# Patient Record
Sex: FEMALE | Race: BLACK OR AFRICAN AMERICAN | NOT HISPANIC OR LATINO | Employment: OTHER | URBAN - METROPOLITAN AREA
[De-identification: names, ages, dates, MRNs, and addresses within clinical notes are randomized per-mention and may not be internally consistent; named-entity substitution may affect disease eponyms.]

---

## 2023-05-31 ENCOUNTER — HOSPITAL ENCOUNTER (OUTPATIENT)
Dept: DATA CONVERSION | Facility: HOSPITAL | Age: 43
End: 2023-05-31
Attending: OTOLARYNGOLOGY | Admitting: OTOLARYNGOLOGY
Payer: COMMERCIAL

## 2023-05-31 DIAGNOSIS — I42.6 ALCOHOLIC CARDIOMYOPATHY (MULTI): ICD-10-CM

## 2023-05-31 DIAGNOSIS — Z79.01 LONG TERM (CURRENT) USE OF ANTICOAGULANTS: ICD-10-CM

## 2023-05-31 DIAGNOSIS — Z87.891 PERSONAL HISTORY OF NICOTINE DEPENDENCE: ICD-10-CM

## 2023-05-31 DIAGNOSIS — I13.0 HYPERTENSIVE HEART AND CHRONIC KIDNEY DISEASE WITH HEART FAILURE AND STAGE 1 THROUGH STAGE 4 CHRONIC KIDNEY DISEASE, OR UNSPECIFIED CHRONIC KIDNEY DISEASE (MULTI): ICD-10-CM

## 2023-05-31 DIAGNOSIS — R94.5 ABNORMAL RESULTS OF LIVER FUNCTION STUDIES: ICD-10-CM

## 2023-05-31 DIAGNOSIS — Z43.0 ENCOUNTER FOR ATTENTION TO TRACHEOSTOMY (MULTI): ICD-10-CM

## 2023-05-31 DIAGNOSIS — Z79.82 LONG TERM (CURRENT) USE OF ASPIRIN: ICD-10-CM

## 2023-05-31 DIAGNOSIS — D62 ACUTE POSTHEMORRHAGIC ANEMIA: ICD-10-CM

## 2023-05-31 DIAGNOSIS — E78.5 HYPERLIPIDEMIA, UNSPECIFIED: ICD-10-CM

## 2023-05-31 DIAGNOSIS — I50.23 ACUTE ON CHRONIC SYSTOLIC (CONGESTIVE) HEART FAILURE (MULTI): ICD-10-CM

## 2023-05-31 DIAGNOSIS — Z53.8 PROCEDURE AND TREATMENT NOT CARRIED OUT FOR OTHER REASONS: ICD-10-CM

## 2023-05-31 DIAGNOSIS — E11.22 TYPE 2 DIABETES MELLITUS WITH DIABETIC CHRONIC KIDNEY DISEASE (MULTI): ICD-10-CM

## 2023-05-31 DIAGNOSIS — E66.9 OBESITY, UNSPECIFIED: ICD-10-CM

## 2023-05-31 DIAGNOSIS — I48.91 UNSPECIFIED ATRIAL FIBRILLATION (MULTI): ICD-10-CM

## 2023-05-31 DIAGNOSIS — N18.9 CHRONIC KIDNEY DISEASE, UNSPECIFIED: ICD-10-CM

## 2023-05-31 DIAGNOSIS — J38.3 OTHER DISEASES OF VOCAL CORDS: ICD-10-CM

## 2023-05-31 LAB
ACTIVATED PARTIAL THROMBOPLASTIN TIME IN PPP BY COAGULATION ASSAY: 29 SEC (ref 26–39)
ALANINE AMINOTRANSFERASE (SGPT) (U/L) IN SER/PLAS: 22 U/L (ref 7–45)
ALBUMIN (G/DL) IN SER/PLAS: 3.9 G/DL (ref 3.4–5)
ALKALINE PHOSPHATASE (U/L) IN SER/PLAS: 55 U/L (ref 33–110)
ANION GAP IN SER/PLAS: 13 MMOL/L (ref 10–20)
ASPARTATE AMINOTRANSFERASE (SGOT) (U/L) IN SER/PLAS: 28 U/L (ref 9–39)
BILIRUBIN TOTAL (MG/DL) IN SER/PLAS: 1.7 MG/DL (ref 0–1.2)
CALCIUM (MG/DL) IN SER/PLAS: 8.6 MG/DL (ref 8.6–10.3)
CARBON DIOXIDE, TOTAL (MMOL/L) IN SER/PLAS: 24 MMOL/L (ref 21–32)
CHLORIDE (MMOL/L) IN SER/PLAS: 102 MMOL/L (ref 98–107)
CREATININE (MG/DL) IN SER/PLAS: 0.93 MG/DL (ref 0.5–1.05)
ERYTHROCYTE DISTRIBUTION WIDTH (RATIO) BY AUTOMATED COUNT: 13.1 % (ref 11.5–14.5)
ERYTHROCYTE MEAN CORPUSCULAR HEMOGLOBIN CONCENTRATION (G/DL) BY AUTOMATED: 31.6 G/DL (ref 32–36)
ERYTHROCYTE MEAN CORPUSCULAR VOLUME (FL) BY AUTOMATED COUNT: 93 FL (ref 80–100)
ERYTHROCYTES (10*6/UL) IN BLOOD BY AUTOMATED COUNT: 4.08 X10E12/L (ref 4–5.2)
GFR FEMALE: 78 ML/MIN/1.73M2
GLUCOSE (MG/DL) IN SER/PLAS: 88 MG/DL (ref 74–99)
HEMATOCRIT (%) IN BLOOD BY AUTOMATED COUNT: 38 % (ref 36–46)
HEMOGLOBIN (G/DL) IN BLOOD: 12 G/DL (ref 12–16)
INR IN PPP BY COAGULATION ASSAY: 1.8 (ref 0.9–1.1)
LEUKOCYTES (10*3/UL) IN BLOOD BY AUTOMATED COUNT: 9.5 X10E9/L (ref 4.4–11.3)
PLATELETS (10*3/UL) IN BLOOD AUTOMATED COUNT: 178 X10E9/L (ref 150–450)
POCT GLUCOSE: 100 MG/DL (ref 74–99)
POTASSIUM (MMOL/L) IN SER/PLAS: 3.7 MMOL/L (ref 3.5–5.3)
PROTEIN TOTAL: 6.5 G/DL (ref 6.4–8.2)
PROTHROMBIN TIME (PT) IN PPP BY COAGULATION ASSAY: 21.3 SEC (ref 9.8–13.4)
SODIUM (MMOL/L) IN SER/PLAS: 135 MMOL/L (ref 136–145)
UREA NITROGEN (MG/DL) IN SER/PLAS: 18 MG/DL (ref 6–23)

## 2023-09-07 VITALS — HEIGHT: 67 IN | WEIGHT: 214.51 LBS | BODY MASS INDEX: 33.67 KG/M2

## 2023-09-30 NOTE — H&P
"    History & Physical Reviewed:   Pregnant/Lactating:  ·  Are You Pregnant no (1)   ·  Are You Currently Breastfeeding no (1)     I have reviewed the History and Physical dated:  31-May-2023   History and Physical reviewed and relevant findings noted. Patient examined to review pertinent physical  findings.: No significant changes   Home Medications Reviewed: no changes noted   Allergies Reviewed: no changes noted       ERAS (Enhanced Recovery After Surgery):  ·  ERAS Patient: no     Consent:   COVID-19 Consent:  ·  COVID-19 Risk Consent Surgeon has reviewed key risks related to the risk of eligio COVID-19 and if they contract COVID-19 what the risks are.       Electronic Signatures:  Efrain King)  (Signed 31-May-2023 12:54)   Authored: History & Physical Reviewed, ERAS, Consent,  Note Completion      Last Updated: 31-May-2023 12:54 by Efrain King)    References:  1.  Data Referenced From \"Patient Profile - Preop v3\" 31-May-2023 11:43   "

## 2023-11-30 ENCOUNTER — ANESTHESIA (OUTPATIENT)
Dept: CARDIOLOGY | Facility: HOSPITAL | Age: 43
End: 2023-11-30
Payer: MEDICARE

## 2023-11-30 ENCOUNTER — ANESTHESIA EVENT (OUTPATIENT)
Dept: CARDIOLOGY | Facility: HOSPITAL | Age: 43
End: 2023-11-30

## 2023-12-07 ENCOUNTER — APPOINTMENT (OUTPATIENT)
Dept: RADIOLOGY | Facility: HOSPITAL | Age: 43
DRG: 291 | End: 2023-12-07
Payer: MEDICARE

## 2023-12-07 ENCOUNTER — HOSPITAL ENCOUNTER (INPATIENT)
Facility: HOSPITAL | Age: 43
LOS: 8 days | Discharge: HOME | DRG: 291 | End: 2023-12-15
Attending: EMERGENCY MEDICINE | Admitting: INTERNAL MEDICINE
Payer: MEDICARE

## 2023-12-07 DIAGNOSIS — I50.9 ACUTE DECOMPENSATED HEART FAILURE (MULTI): Primary | ICD-10-CM

## 2023-12-07 DIAGNOSIS — Z00.00 ENCOUNTER FOR MEDICAL EXAMINATION TO ESTABLISH CARE: ICD-10-CM

## 2023-12-07 LAB
ALBUMIN SERPL BCP-MCNC: 3.4 G/DL (ref 3.4–5)
ALP SERPL-CCNC: 65 U/L (ref 33–110)
ALT SERPL W P-5'-P-CCNC: 14 U/L (ref 7–45)
ANION GAP SERPL CALC-SCNC: 17 MMOL/L (ref 10–20)
AST SERPL W P-5'-P-CCNC: 18 U/L (ref 9–39)
BASOPHILS # BLD AUTO: 0.04 X10*3/UL (ref 0–0.1)
BASOPHILS NFR BLD AUTO: 0.5 %
BILIRUB SERPL-MCNC: 2 MG/DL (ref 0–1.2)
BNP SERPL-MCNC: 1082 PG/ML (ref 0–99)
BUN SERPL-MCNC: 17 MG/DL (ref 6–23)
CALCIUM SERPL-MCNC: 9.3 MG/DL (ref 8.6–10.6)
CARDIAC TROPONIN I PNL SERPL HS: 25 NG/L (ref 0–34)
CHLORIDE SERPL-SCNC: 105 MMOL/L (ref 98–107)
CO2 SERPL-SCNC: 23 MMOL/L (ref 21–32)
CREAT SERPL-MCNC: 1.06 MG/DL (ref 0.5–1.05)
EOSINOPHIL # BLD AUTO: 0.12 X10*3/UL (ref 0–0.7)
EOSINOPHIL NFR BLD AUTO: 1.4 %
ERYTHROCYTE [DISTWIDTH] IN BLOOD BY AUTOMATED COUNT: 16.5 % (ref 11.5–14.5)
GFR SERPL CREATININE-BSD FRML MDRD: 67 ML/MIN/1.73M*2
GLUCOSE SERPL-MCNC: 97 MG/DL (ref 74–99)
HCT VFR BLD AUTO: 44.4 % (ref 36–46)
HGB BLD-MCNC: 13.7 G/DL (ref 12–16)
IMM GRANULOCYTES # BLD AUTO: 0.03 X10*3/UL (ref 0–0.7)
IMM GRANULOCYTES NFR BLD AUTO: 0.4 % (ref 0–0.9)
LYMPHOCYTES # BLD AUTO: 4.01 X10*3/UL (ref 1.2–4.8)
LYMPHOCYTES NFR BLD AUTO: 47.9 %
MCH RBC QN AUTO: 26.9 PG (ref 26–34)
MCHC RBC AUTO-ENTMCNC: 30.9 G/DL (ref 32–36)
MCV RBC AUTO: 87 FL (ref 80–100)
MONOCYTES # BLD AUTO: 0.83 X10*3/UL (ref 0.1–1)
MONOCYTES NFR BLD AUTO: 9.9 %
NEUTROPHILS # BLD AUTO: 3.35 X10*3/UL (ref 1.2–7.7)
NEUTROPHILS NFR BLD AUTO: 39.9 %
NRBC BLD-RTO: 0 /100 WBCS (ref 0–0)
PLATELET # BLD AUTO: 221 X10*3/UL (ref 150–450)
POTASSIUM SERPL-SCNC: 4.5 MMOL/L (ref 3.5–5.3)
PROT SERPL-MCNC: 6.8 G/DL (ref 6.4–8.2)
RBC # BLD AUTO: 5.09 X10*6/UL (ref 4–5.2)
SODIUM SERPL-SCNC: 140 MMOL/L (ref 136–145)
WBC # BLD AUTO: 8.4 X10*3/UL (ref 4.4–11.3)

## 2023-12-07 PROCEDURE — 84075 ASSAY ALKALINE PHOSPHATASE: CPT | Performed by: EMERGENCY MEDICINE

## 2023-12-07 PROCEDURE — 85025 COMPLETE CBC W/AUTO DIFF WBC: CPT | Performed by: EMERGENCY MEDICINE

## 2023-12-07 PROCEDURE — 36415 COLL VENOUS BLD VENIPUNCTURE: CPT | Performed by: EMERGENCY MEDICINE

## 2023-12-07 PROCEDURE — 76705 ECHO EXAM OF ABDOMEN: CPT | Performed by: EMERGENCY MEDICINE

## 2023-12-07 PROCEDURE — 80053 COMPREHEN METABOLIC PANEL: CPT | Performed by: EMERGENCY MEDICINE

## 2023-12-07 PROCEDURE — 76604 US EXAM CHEST: CPT | Performed by: EMERGENCY MEDICINE

## 2023-12-07 PROCEDURE — 99285 EMERGENCY DEPT VISIT HI MDM: CPT | Performed by: EMERGENCY MEDICINE

## 2023-12-07 PROCEDURE — 71046 X-RAY EXAM CHEST 2 VIEWS: CPT | Mod: FR

## 2023-12-07 PROCEDURE — 84443 ASSAY THYROID STIM HORMONE: CPT

## 2023-12-07 PROCEDURE — 2500000001 HC RX 250 WO HCPCS SELF ADMINISTERED DRUGS (ALT 637 FOR MEDICARE OP): Mod: SE

## 2023-12-07 PROCEDURE — 93308 TTE F-UP OR LMTD: CPT | Performed by: EMERGENCY MEDICINE

## 2023-12-07 PROCEDURE — 76604 US EXAM CHEST: CPT

## 2023-12-07 PROCEDURE — 96374 THER/PROPH/DIAG INJ IV PUSH: CPT | Mod: 59

## 2023-12-07 PROCEDURE — 2500000004 HC RX 250 GENERAL PHARMACY W/ HCPCS (ALT 636 FOR OP/ED)

## 2023-12-07 PROCEDURE — 2500000002 HC RX 250 W HCPCS SELF ADMINISTERED DRUGS (ALT 637 FOR MEDICARE OP, ALT 636 FOR OP/ED): Mod: SE

## 2023-12-07 PROCEDURE — 84484 ASSAY OF TROPONIN QUANT: CPT | Performed by: EMERGENCY MEDICINE

## 2023-12-07 PROCEDURE — 84295 ASSAY OF SERUM SODIUM: CPT | Performed by: EMERGENCY MEDICINE

## 2023-12-07 PROCEDURE — 83880 ASSAY OF NATRIURETIC PEPTIDE: CPT | Performed by: EMERGENCY MEDICINE

## 2023-12-07 PROCEDURE — 2500000004 HC RX 250 GENERAL PHARMACY W/ HCPCS (ALT 636 FOR OP/ED): Mod: SE

## 2023-12-07 PROCEDURE — 96375 TX/PRO/DX INJ NEW DRUG ADDON: CPT | Mod: 59

## 2023-12-07 PROCEDURE — 1200000002 HC GENERAL ROOM WITH TELEMETRY DAILY

## 2023-12-07 PROCEDURE — 99222 1ST HOSP IP/OBS MODERATE 55: CPT

## 2023-12-07 PROCEDURE — 93010 ELECTROCARDIOGRAM REPORT: CPT | Performed by: EMERGENCY MEDICINE

## 2023-12-07 PROCEDURE — 71046 X-RAY EXAM CHEST 2 VIEWS: CPT | Mod: FOREIGN READ | Performed by: RADIOLOGY

## 2023-12-07 PROCEDURE — 2500000001 HC RX 250 WO HCPCS SELF ADMINISTERED DRUGS (ALT 637 FOR MEDICARE OP)

## 2023-12-07 RX ORDER — GABAPENTIN 100 MG/1
100 CAPSULE ORAL 3 TIMES DAILY
COMMUNITY
End: 2023-12-28

## 2023-12-07 RX ORDER — QUETIAPINE FUMARATE 50 MG/1
150 TABLET, EXTENDED RELEASE ORAL DAILY
Status: ON HOLD | COMMUNITY
End: 2023-12-15 | Stop reason: SDUPTHER

## 2023-12-07 RX ORDER — AMIODARONE HYDROCHLORIDE 200 MG/1
200 TABLET ORAL ONCE
Status: COMPLETED | OUTPATIENT
Start: 2023-12-07 | End: 2023-12-07

## 2023-12-07 RX ORDER — DIGOXIN 250 MCG
250 TABLET ORAL DAILY
Status: DISCONTINUED | OUTPATIENT
Start: 2023-12-08 | End: 2023-12-15 | Stop reason: HOSPADM

## 2023-12-07 RX ORDER — HYDROCHLOROTHIAZIDE 12.5 MG/1
12.5 TABLET ORAL DAILY
COMMUNITY
End: 2023-12-07 | Stop reason: WASHOUT

## 2023-12-07 RX ORDER — PANTOPRAZOLE SODIUM 40 MG/1
40 TABLET, DELAYED RELEASE ORAL
Status: DISCONTINUED | OUTPATIENT
Start: 2023-12-08 | End: 2023-12-15 | Stop reason: HOSPADM

## 2023-12-07 RX ORDER — METOPROLOL SUCCINATE 50 MG/1
150 TABLET, EXTENDED RELEASE ORAL DAILY
COMMUNITY
End: 2023-12-15 | Stop reason: HOSPADM

## 2023-12-07 RX ORDER — METOPROLOL SUCCINATE 50 MG/1
100 TABLET, EXTENDED RELEASE ORAL ONCE
Status: COMPLETED | OUTPATIENT
Start: 2023-12-07 | End: 2023-12-07

## 2023-12-07 RX ORDER — GABAPENTIN 100 MG/1
100 CAPSULE ORAL 3 TIMES DAILY
Status: DISCONTINUED | OUTPATIENT
Start: 2023-12-07 | End: 2023-12-15 | Stop reason: HOSPADM

## 2023-12-07 RX ORDER — POLYETHYLENE GLYCOL 3350 17 G/17G
17 POWDER, FOR SOLUTION ORAL DAILY
Status: DISCONTINUED | OUTPATIENT
Start: 2023-12-07 | End: 2023-12-09

## 2023-12-07 RX ORDER — MULTIVIT-MIN/IRON FUM/FOLIC AC 7.5 MG-4
1 TABLET ORAL DAILY
Status: ON HOLD | COMMUNITY
End: 2023-12-15 | Stop reason: SDUPTHER

## 2023-12-07 RX ORDER — FOLIC ACID 1 MG/1
1 TABLET ORAL DAILY
Status: DISCONTINUED | OUTPATIENT
Start: 2023-12-07 | End: 2023-12-15 | Stop reason: HOSPADM

## 2023-12-07 RX ORDER — MULTIVIT-MIN/IRON FUM/FOLIC AC 7.5 MG-4
1 TABLET ORAL DAILY
Status: DISCONTINUED | OUTPATIENT
Start: 2023-12-07 | End: 2023-12-15 | Stop reason: HOSPADM

## 2023-12-07 RX ORDER — FUROSEMIDE 10 MG/ML
40 INJECTION INTRAMUSCULAR; INTRAVENOUS ONCE
Status: COMPLETED | OUTPATIENT
Start: 2023-12-07 | End: 2023-12-07

## 2023-12-07 RX ORDER — FOLIC ACID 1 MG/1
1 TABLET ORAL DAILY
COMMUNITY

## 2023-12-07 RX ORDER — METOPROLOL TARTRATE 50 MG/1
50 TABLET ORAL 3 TIMES DAILY
Status: DISCONTINUED | OUTPATIENT
Start: 2023-12-08 | End: 2023-12-08

## 2023-12-07 RX ORDER — DIGOXIN 250 MCG
250 TABLET ORAL DAILY
COMMUNITY

## 2023-12-07 RX ORDER — ATORVASTATIN CALCIUM 40 MG/1
40 TABLET, FILM COATED ORAL DAILY
Status: DISCONTINUED | OUTPATIENT
Start: 2023-12-07 | End: 2023-12-08

## 2023-12-07 RX ORDER — METOPROLOL SUCCINATE 50 MG/1
50 TABLET, EXTENDED RELEASE ORAL DAILY
COMMUNITY
End: 2023-12-07 | Stop reason: WASHOUT

## 2023-12-07 RX ORDER — HYDROCHLOROTHIAZIDE 25 MG/1
12.5 TABLET ORAL DAILY
Status: DISCONTINUED | OUTPATIENT
Start: 2023-12-07 | End: 2023-12-08

## 2023-12-07 RX ORDER — QUETIAPINE FUMARATE 25 MG/1
50 TABLET, FILM COATED ORAL 3 TIMES DAILY
Status: DISCONTINUED | OUTPATIENT
Start: 2023-12-07 | End: 2023-12-15 | Stop reason: HOSPADM

## 2023-12-07 RX ORDER — PANTOPRAZOLE SODIUM 40 MG/1
40 TABLET, DELAYED RELEASE ORAL
Status: ON HOLD | COMMUNITY
End: 2023-12-15 | Stop reason: SDUPTHER

## 2023-12-07 RX ORDER — ATORVASTATIN CALCIUM 40 MG/1
40 TABLET, FILM COATED ORAL DAILY
COMMUNITY
End: 2023-12-15 | Stop reason: HOSPADM

## 2023-12-07 RX ORDER — LORAZEPAM 2 MG/ML
0.5 INJECTION INTRAMUSCULAR ONCE
Status: COMPLETED | OUTPATIENT
Start: 2023-12-07 | End: 2023-12-07

## 2023-12-07 RX ORDER — FUROSEMIDE 40 MG/1
40 TABLET ORAL DAILY
COMMUNITY
End: 2023-12-15 | Stop reason: HOSPADM

## 2023-12-07 RX ORDER — SPIRONOLACTONE 25 MG/1
25 TABLET ORAL DAILY
Status: DISCONTINUED | OUTPATIENT
Start: 2023-12-07 | End: 2023-12-15 | Stop reason: HOSPADM

## 2023-12-07 RX ORDER — DULOXETIN HYDROCHLORIDE 60 MG/1
60 CAPSULE, DELAYED RELEASE ORAL DAILY
COMMUNITY
End: 2023-12-15 | Stop reason: HOSPADM

## 2023-12-07 RX ORDER — SPIRONOLACTONE 25 MG/1
25 TABLET ORAL DAILY
Status: ON HOLD | COMMUNITY
End: 2023-12-15 | Stop reason: SDUPTHER

## 2023-12-07 RX ADMIN — SACUBITRIL AND VALSARTAN 1 TABLET: 97; 103 TABLET, FILM COATED ORAL at 21:29

## 2023-12-07 RX ADMIN — METOPROLOL SUCCINATE 100 MG: 50 TABLET, EXTENDED RELEASE ORAL at 16:15

## 2023-12-07 RX ADMIN — LORAZEPAM 0.5 MG: 2 INJECTION INTRAMUSCULAR; INTRAVENOUS at 16:15

## 2023-12-07 RX ADMIN — FUROSEMIDE 40 MG: 10 INJECTION, SOLUTION INTRAVENOUS at 15:40

## 2023-12-07 RX ADMIN — AMIODARONE HYDROCHLORIDE 200 MG: 200 TABLET ORAL at 16:15

## 2023-12-07 RX ADMIN — Medication 1 TABLET: at 19:43

## 2023-12-07 RX ADMIN — SPIRONOLACTONE 25 MG: 25 TABLET ORAL at 19:45

## 2023-12-07 RX ADMIN — FUROSEMIDE 40 MG: 10 INJECTION, SOLUTION INTRAVENOUS at 19:42

## 2023-12-07 RX ADMIN — ATORVASTATIN CALCIUM 40 MG: 20 TABLET, FILM COATED ORAL at 21:29

## 2023-12-07 RX ADMIN — FOLIC ACID 1 MG: 1 TABLET ORAL at 19:43

## 2023-12-07 RX ADMIN — QUETIAPINE 50 MG: 25 TABLET ORAL at 21:29

## 2023-12-07 RX ADMIN — GABAPENTIN 100 MG: 100 CAPSULE ORAL at 21:29

## 2023-12-07 SDOH — SOCIAL STABILITY: SOCIAL INSECURITY: HAVE YOU HAD THOUGHTS OF HARMING ANYONE ELSE?: NO

## 2023-12-07 SDOH — SOCIAL STABILITY: SOCIAL INSECURITY: DOES ANYONE TRY TO KEEP YOU FROM HAVING/CONTACTING OTHER FRIENDS OR DOING THINGS OUTSIDE YOUR HOME?: NO

## 2023-12-07 SDOH — SOCIAL STABILITY: SOCIAL INSECURITY: ARE THERE ANY APPARENT SIGNS OF INJURIES/BEHAVIORS THAT COULD BE RELATED TO ABUSE/NEGLECT?: NO

## 2023-12-07 SDOH — SOCIAL STABILITY: SOCIAL INSECURITY: ARE YOU OR HAVE YOU BEEN THREATENED OR ABUSED PHYSICALLY, EMOTIONALLY, OR SEXUALLY BY ANYONE?: YES

## 2023-12-07 SDOH — SOCIAL STABILITY: SOCIAL INSECURITY: ABUSE: ADULT

## 2023-12-07 SDOH — SOCIAL STABILITY: SOCIAL INSECURITY: DO YOU FEEL ANYONE HAS EXPLOITED OR TAKEN ADVANTAGE OF YOU FINANCIALLY OR OF YOUR PERSONAL PROPERTY?: NO

## 2023-12-07 SDOH — SOCIAL STABILITY: SOCIAL INSECURITY: DO YOU FEEL UNSAFE GOING BACK TO THE PLACE WHERE YOU ARE LIVING?: NO

## 2023-12-07 SDOH — SOCIAL STABILITY: SOCIAL INSECURITY: HAS ANYONE EVER THREATENED TO HURT YOUR FAMILY OR YOUR PETS?: NO

## 2023-12-07 ASSESSMENT — COGNITIVE AND FUNCTIONAL STATUS - GENERAL
HELP NEEDED FOR BATHING: A LOT
TOILETING: A LOT
DAILY ACTIVITIY SCORE: 15
PATIENT BASELINE BEDBOUND: NO
MOBILITY SCORE: 13
WALKING IN HOSPITAL ROOM: TOTAL
MOVING TO AND FROM BED TO CHAIR: A LOT
DRESSING REGULAR LOWER BODY CLOTHING: A LOT
TURNING FROM BACK TO SIDE WHILE IN FLAT BAD: A LITTLE
CLIMB 3 TO 5 STEPS WITH RAILING: A LOT
MOVING FROM LYING ON BACK TO SITTING ON SIDE OF FLAT BED WITH BEDRAILS: A LITTLE
PERSONAL GROOMING: A LITTLE
STANDING UP FROM CHAIR USING ARMS: A LOT
DRESSING REGULAR UPPER BODY CLOTHING: A LOT

## 2023-12-07 ASSESSMENT — ACTIVITIES OF DAILY LIVING (ADL)
HEARING - RIGHT EAR: FUNCTIONAL
DRESSING YOURSELF: INDEPENDENT
GROOMING: INDEPENDENT
JUDGMENT_ADEQUATE_SAFELY_COMPLETE_DAILY_ACTIVITIES: YES
ADEQUATE_TO_COMPLETE_ADL: YES
HEARING - LEFT EAR: FUNCTIONAL
FEEDING YOURSELF: INDEPENDENT
PATIENT'S MEMORY ADEQUATE TO SAFELY COMPLETE DAILY ACTIVITIES?: YES
WALKS IN HOME: NEEDS ASSISTANCE
BATHING: INDEPENDENT
TOILETING: INDEPENDENT

## 2023-12-07 ASSESSMENT — LIFESTYLE VARIABLES
HOW OFTEN DO YOU HAVE A DRINK CONTAINING ALCOHOL: MONTHLY OR LESS
AUDIT-C TOTAL SCORE: 2
AUDIT-C TOTAL SCORE: 2
SKIP TO QUESTIONS 9-10: 0
HOW MANY STANDARD DRINKS CONTAINING ALCOHOL DO YOU HAVE ON A TYPICAL DAY: 1 OR 2
HOW OFTEN DO YOU HAVE 6 OR MORE DRINKS ON ONE OCCASION: LESS THAN MONTHLY

## 2023-12-07 ASSESSMENT — PATIENT HEALTH QUESTIONNAIRE - PHQ9
2. FEELING DOWN, DEPRESSED OR HOPELESS: SEVERAL DAYS
SUM OF ALL RESPONSES TO PHQ9 QUESTIONS 1 & 2: 2
1. LITTLE INTEREST OR PLEASURE IN DOING THINGS: SEVERAL DAYS

## 2023-12-07 ASSESSMENT — COLUMBIA-SUICIDE SEVERITY RATING SCALE - C-SSRS
6. HAVE YOU EVER DONE ANYTHING, STARTED TO DO ANYTHING, OR PREPARED TO DO ANYTHING TO END YOUR LIFE?: NO
1. IN THE PAST MONTH, HAVE YOU WISHED YOU WERE DEAD OR WISHED YOU COULD GO TO SLEEP AND NOT WAKE UP?: NO
2. HAVE YOU ACTUALLY HAD ANY THOUGHTS OF KILLING YOURSELF?: NO

## 2023-12-07 NOTE — ED PROCEDURE NOTE
Procedure    Performed by: Berna Villagran MD  Authorized by: Angelica Edge MD  Cardiac Indications: orthopnea dyspnea                Procedure: Abdominal FAST Ultrasound    Findings:  RUQ: the RUQ was visualized and was POSITIVE for free fluid  LUQ: The LUQ was visualized and was POSITIVE for free fluid.  Pelvic Free Fluid: The Pelvis was visualized and was POSITIVE for free fluid.    Impression:  FAST exam: Ascites, free fluid.  Procedure: Cardiac Ultrasound    Findings:   Views: parasternal long, parasternal short, apical four and subxiphoid  The pericardial space was visualized and was NEGATIVE for a significant pericardial effusion.  Activity: Ventricular contractions were visualized.  LV: LV systolic function was DECREASED.      Impression:  Cardiac: The focused cardiac ultrasound exam had ABNORMAL findings as specified.  Procedure: Thoracic Ultrasound    Findings:  R Lung Sliding: The RIGHT chest was evaluated and LUNG SLIDING was visualized.  L Lung Sliding: The LEFT chest was evaluated and LUNG SLIDING was visualized.  R Effusion: The RIGHT chest was evaluated and there was NO PLEURAL EFFUSION.  L Effusion: The LEFT chest was evaluated and there was NO PLEURAL EFFUSION.    B-lines: The RIGHT chest was evaluated and there were NO B-LINES visualized        Impression:  Thorax: The focused thoracic ultrasound exam was NORMAL.    Comments: Ascites in abdomen  Decreased EF visualized less than 10%  Pericardial effusion visible, LA and LV enlargement  B lines present, no pulmonary edema or pleural effusion visible, no spine sign                   Berna Villagran MD  Resident  12/07/23 7178    I saw and evaluated the patient, participating in the key elements of the service.  I discussed the findings, assessment and plan with the resident and agree with resident’s findings and plan as documented in the resident's note.  I was immediately available for the entirety of the procedure(s) and present for the key and critical  portions.     MD Angelica Duran MD  12/11/23 1640

## 2023-12-07 NOTE — H&P
"History Of Present Illness  Amirah Bennett is a 42 y.o. female with significant PMHx of HFrEF (LVEF 20-25% (08/2022), Afib on Xarelto w/ DCCV 05/2023), ischemic stroke L MCA d/t AFib LLA thrombus seen on echo (lack of OAC adherence) s/p thrombectomy 01/2022 @ [facility] w/ residual expressive aphasia and R sided weakness, s/p tracheostomy, and HTN presenting to the ED on 17/7/23 with dyspnea, BLE edema and 25 lb weight gain. She endorses feeling swollen from feet to abdomen. She said she has not had medication refills or taken medications for ~2 weeks but has difficulty recalling when. She has stated fluid accumulation in legs has gotten progressively worse for past two weeks now resulting in difficulty ambulating. She has also had worsening SOB over same time, and the past few days her abdomen has been painful. \"Heavy\" chest pain started past few days as well. Of note, she has difficulty word finding so part of history garnered from friend who brought her in who denied changes in mental status or worsening communication. SOB improved significantly s/p 40 mg IV lasix in ED.       She had scheduled cardioversion in 09/2023 for which she did not show and has    She HA, Fevers, chills, visual changes, N/V/D.     ED COURSE  Visit Vitals  /85   Pulse 105   Temp 36.4 °C (97.5 °F) (Temporal)   Resp 22     Labs significant for BNP 1082, Creatinine 1.06, troponin 25    EKG revealed Afib w/ RVR HR ~150 bpm  CXR revealed stable cardiomegaly w/o infiltrates  POCUS significant for + B lines, EF < 10%, Left sided dilation      Received 40 mg IV Lasix, UO was ~1 Liter in 4 hours.    Past Medical History  As above      Surgical History  Past Surgical History:   Procedure Laterality Date    OTHER SURGICAL HISTORY  06/09/2022    Trachectomy        Social History  Denies alcohol, tobacco usage or illicit drugs.     Family History  No family history on file.     Allergies  Patient has no known allergies.    Physical Exam  Constitutional: " "      Appearance: Normal appearance. She is obese.   Eyes:      Conjunctiva/sclera: Conjunctivae normal.   Cardiovascular:      Rate and Rhythm: Tachycardia present. Rhythm irregular.      Pulses: Normal pulses.   Pulmonary:      Effort: Pulmonary effort is normal. No respiratory distress.      Breath sounds: Normal breath sounds. No wheezing or rales.   Abdominal:      General: There is distension.      Tenderness: There is abdominal tenderness. There is no guarding.   Musculoskeletal:      Right lower leg: Edema present.      Left lower leg: Edema present.   Skin:     Capillary Refill: Capillary refill takes 2 to 3 seconds.      Comments: Warm wet   Neurological:      Mental Status: She is alert. Mental status is at baseline.          Last Recorded Vitals  Blood pressure 141/85, pulse (!) 135, temperature 36.4 °C (97.5 °F), temperature source Temporal, resp. rate 25, height 1.702 m (5' 7\"), weight 99.8 kg (220 lb), SpO2 99 %.    Relevant Results  Results for orders placed or performed during the hospital encounter of 12/07/23 (from the past 24 hour(s))   CBC with Differential   Result Value Ref Range    WBC 8.4 4.4 - 11.3 x10*3/uL    nRBC 0.0 0.0 - 0.0 /100 WBCs    RBC 5.09 4.00 - 5.20 x10*6/uL    Hemoglobin 13.7 12.0 - 16.0 g/dL    Hematocrit 44.4 36.0 - 46.0 %    MCV 87 80 - 100 fL    MCH 26.9 26.0 - 34.0 pg    MCHC 30.9 (L) 32.0 - 36.0 g/dL    RDW 16.5 (H) 11.5 - 14.5 %    Platelets 221 150 - 450 x10*3/uL    Neutrophils % 39.9 40.0 - 80.0 %    Immature Granulocytes %, Automated 0.4 0.0 - 0.9 %    Lymphocytes % 47.9 13.0 - 44.0 %    Monocytes % 9.9 2.0 - 10.0 %    Eosinophils % 1.4 0.0 - 6.0 %    Basophils % 0.5 0.0 - 2.0 %    Neutrophils Absolute 3.35 1.20 - 7.70 x10*3/uL    Immature Granulocytes Absolute, Automated 0.03 0.00 - 0.70 x10*3/uL    Lymphocytes Absolute 4.01 1.20 - 4.80 x10*3/uL    Monocytes Absolute 0.83 0.10 - 1.00 x10*3/uL    Eosinophils Absolute 0.12 0.00 - 0.70 x10*3/uL    Basophils Absolute " 0.04 0.00 - 0.10 x10*3/uL   Comprehensive Metabolic Panel   Result Value Ref Range    Glucose 97 74 - 99 mg/dL    Sodium 140 136 - 145 mmol/L    Potassium 4.5 3.5 - 5.3 mmol/L    Chloride 105 98 - 107 mmol/L    Bicarbonate 23 21 - 32 mmol/L    Anion Gap 17 10 - 20 mmol/L    Urea Nitrogen 17 6 - 23 mg/dL    Creatinine 1.06 (H) 0.50 - 1.05 mg/dL    eGFR 67 >60 mL/min/1.73m*2    Calcium 9.3 8.6 - 10.6 mg/dL    Albumin 3.4 3.4 - 5.0 g/dL    Alkaline Phosphatase 65 33 - 110 U/L    Total Protein 6.8 6.4 - 8.2 g/dL    AST 18 9 - 39 U/L    Bilirubin, Total 2.0 (H) 0.0 - 1.2 mg/dL    ALT 14 7 - 45 U/L   Brain Natriuretic Peptide   Result Value Ref Range    BNP 1,082 (H) 0 - 99 pg/mL   Troponin I, High Sensitivity   Result Value Ref Range    Troponin I, High Sensitivity 25 0 - 34 ng/L       Assessment/Plan   Principal Problem:    Acute decompensated heart failure (CMS/HCC)    Amirah Bennett is a 42 y.o. female with significant PMHx of HFrEF (LVEF 20-25% (08/2022), Afib on Xarelto w/ DCCV 05/2023), ischemic stroke L MCA d/t AFib LLA thrombus seen on echo (lack of OAC adherence) s/p thrombectomy 01/2022 @ [facility] w/ residual expressive aphasia and R sided weakness, s/p tracheostomy, and HTN presenting to the ED on 17/7/23 with dyspnea, BLE edema and 25 lb weight gain. EKG revealed Afib w/ RVR, POCUS revealed significant reduction in LVEF to < 10%. This is concerning for ADHF in s/o of non-adherence to GDMT. She has significant history of not following up with EP for DCCV and AMA during admission for tracheostomy closure. Considering significant symptomatic improvement s/p 40 mg IV Lasix push, we will continue to manage ADHF with diuresis and managing Afib with rate control and DOAC. Will continue to monitor for ACS but troponin negative and HDS.    #ADHF  #Afib  #HFrEF (LVEF 20-25% (08/2022)  :: Patient has had persistent Afib managed with metoprolol and digoxin as outpatient. Has missed scheduled DCCV w/  EP.  :: Endorses  non-adherence to medications contributing to edema accumulation and shortness of breath.  :: Bedside POCUS w/ new LVEF < 10%  - TTE Pending  - 40 mg Lasix BID, RFP BID  - Digoxin 250 micrograms every day  - GDMT: Jardiance 10 mg, metoprolol tart 50 mg BID, Entresto , spironolactone 25 mg  - Xarelto 20 mg every day  - Telemetry  - EP consult in AM (not ordered)    #ischemic stroke L MCA  :: No new focal deficits  - CTM  - Xarelto 20 mg    F: PRN, EF ~10%  E: PRN  N: Cardiac  A: 2x PIV    DVT Ppx: Xarelto 20 mg  GI PPX: pantoprazole 40 mg, miralax daily    CODE: FULL, confirmed on admission  NOK: Pranay Owen (friend) [phone]    Neal Hsu MD  PGY1 Medicine

## 2023-12-07 NOTE — ED PROVIDER NOTES
CC: Leg Swelling     HPI: Amirah Bennett is an 42 y.o. female with history including HFrEF with an EF of 20%, A-fib on Xarelto, history of a stroke, urinary retention, status post tracheostomy presenting to the emergency department for lower extremity edema.  Patient notes that she is up 25 pounds from her dry weight.  She notes that her legs feel swollen up into her abdomen.  She states that she has been having shortness of breath with it.  She denies chest pain.  She notes she has been taking her medications however she has not taken a dose today.  She does have palpitations and an irregular rhythm.    Limitations to History: Patient did not know what medication that she was taking.  Additional History Obtained from: History obtained from patient and patient's family    PMHx/PSHx:  Per HPI.   - has no past medical history on file.  - has a past surgical history that includes Other surgical history (06/09/2022).    Social History:  - Tobacco:  has no history on file for tobacco use.   - Alcohol:  has no history on file for alcohol use.   - Drugs:  has no history on file for drug use.     Medications: Reviewed in EMR.     Allergies:  Patient has no known allergies.    ???????????????????????????????????????????????????????????????  Triage Vitals:  T 36.4 °C (97.5 °F)  HR (!) 135  BP (!) 143/95  RR (!) 28  O2 100 %      Physical Exam  Constitutional:       General: She is not in acute distress.  HENT:      Head: Normocephalic.   Cardiovascular:      Rate and Rhythm: Normal rate.   Pulmonary:      Effort: Pulmonary effort is normal. No respiratory distress.   Abdominal:      General: Abdomen is flat.   Musculoskeletal:         General: Normal range of motion.      Right lower leg: Edema present.      Left lower leg: Edema present.      Comments: 2+ lower extremity edema   Skin:     General: Skin is dry.   Neurological:      Mental Status: She is alert and oriented to person, place, and time. Mental status is at  baseline.       ???????????????????????????????????????????????????????????????  EKG (per my interpretation): EKG was independently interpreted and shows atrial fibrillation with RVR. No ST segment elevations.    ED Course/Medical Decision Making:  Patient is a 42-year-old female with a past medical history of HFrEF, stroke, trach who is presenting today for lower extremity edema.  Believe this is most likely related to a heart failure exacerbation.  Low concern for DVT.  Bedside POCUS was performed and showed reduced EF.  Patient was given a dose of Lasix.  BNP was elevated to 1000.  But no troponin elevation.  Patient was tachycardic in A-fib with RVR.  Patient was given their home amiodarone, digoxin and metoprolol.  Because they were unable to take it this morning.  Patient was admitted to cardiology for further management of their heart failure exacerbation.    External records reviewed: recent inpatient, clinic, and prior ED notes  Diagnostic imaging independently reviewed/interpreted by me (as reflected in MDM) includes: Lab work and chest x-ray which showed congestion  Social Determinants Affecting Care:   Discussion of management with other providers: Cardiology  Prescription Drug Consideration: Amiodarone, digoxin, metoprolol and Lasix  Escalation of Care: Consider need for ICU or stepdown admission given A-fib with RVR.    Impression:   A-fib with RVR  CHF exacerbation    Disposition: Admission      Procedures ? SmartLinks last updated 12/7/2023 3:35 PM        Kezia Bustillos MD  Resident  12/07/23 9287

## 2023-12-07 NOTE — ED TRIAGE NOTES
Pt presents to the ED c/o LE swelling, SOB, and CP. Pt states the swelling started about 2 weeks ago and she is unsure about when the chest pain or SOB started. Pt has a trach that is open to air. Pt is unable to determine any medical hx.

## 2023-12-08 ENCOUNTER — APPOINTMENT (OUTPATIENT)
Dept: CARDIOLOGY | Facility: HOSPITAL | Age: 43
DRG: 291 | End: 2023-12-08
Payer: MEDICARE

## 2023-12-08 LAB
ALBUMIN SERPL BCP-MCNC: 2.9 G/DL (ref 3.4–5)
ALBUMIN SERPL BCP-MCNC: 2.9 G/DL (ref 3.4–5)
ALBUMIN SERPL BCP-MCNC: 3 G/DL (ref 3.4–5)
ANION GAP SERPL CALC-SCNC: 14 MMOL/L (ref 10–20)
BUN SERPL-MCNC: 17 MG/DL (ref 6–23)
BUN SERPL-MCNC: 18 MG/DL (ref 6–23)
BUN SERPL-MCNC: 18 MG/DL (ref 6–23)
CALCIUM SERPL-MCNC: 7.9 MG/DL (ref 8.6–10.6)
CALCIUM SERPL-MCNC: 8.3 MG/DL (ref 8.6–10.6)
CALCIUM SERPL-MCNC: 8.5 MG/DL (ref 8.6–10.6)
CHLORIDE SERPL-SCNC: 104 MMOL/L (ref 98–107)
CHLORIDE SERPL-SCNC: 106 MMOL/L (ref 98–107)
CHLORIDE SERPL-SCNC: 107 MMOL/L (ref 98–107)
CO2 SERPL-SCNC: 20 MMOL/L (ref 21–32)
CO2 SERPL-SCNC: 20 MMOL/L (ref 21–32)
CO2 SERPL-SCNC: 23 MMOL/L (ref 21–32)
CREAT SERPL-MCNC: 0.87 MG/DL (ref 0.5–1.05)
CREAT SERPL-MCNC: 0.9 MG/DL (ref 0.5–1.05)
CREAT SERPL-MCNC: 1.2 MG/DL (ref 0.5–1.05)
GFR SERPL CREATININE-BSD FRML MDRD: 57 ML/MIN/1.73M*2
GFR SERPL CREATININE-BSD FRML MDRD: 81 ML/MIN/1.73M*2
GFR SERPL CREATININE-BSD FRML MDRD: 84 ML/MIN/1.73M*2
GLUCOSE SERPL-MCNC: 94 MG/DL (ref 74–99)
GLUCOSE SERPL-MCNC: 95 MG/DL (ref 74–99)
GLUCOSE SERPL-MCNC: 96 MG/DL (ref 74–99)
MAGNESIUM SERPL-MCNC: 1.41 MG/DL (ref 1.6–2.4)
MAGNESIUM SERPL-MCNC: 1.44 MG/DL (ref 1.6–2.4)
MAGNESIUM SERPL-MCNC: 1.7 MG/DL (ref 1.6–2.4)
PHOSPHATE SERPL-MCNC: 3.5 MG/DL (ref 2.5–4.9)
PHOSPHATE SERPL-MCNC: 3.9 MG/DL (ref 2.5–4.9)
PHOSPHATE SERPL-MCNC: 4.3 MG/DL (ref 2.5–4.9)
POTASSIUM SERPL-SCNC: 3.6 MMOL/L (ref 3.5–5.3)
POTASSIUM SERPL-SCNC: 3.7 MMOL/L (ref 3.5–5.3)
POTASSIUM SERPL-SCNC: 4 MMOL/L (ref 3.5–5.3)
Q ONSET: 217 MS
QRS COUNT: 23 BEATS
QRS DURATION: 78 MS
QT INTERVAL: 298 MS
QTC CALCULATION(BAZETT): 458 MS
QTC FREDERICIA: 397 MS
R AXIS: -2 DEGREES
SODIUM SERPL-SCNC: 136 MMOL/L (ref 136–145)
SODIUM SERPL-SCNC: 137 MMOL/L (ref 136–145)
SODIUM SERPL-SCNC: 137 MMOL/L (ref 136–145)
T AXIS: 187 DEGREES
T OFFSET: 366 MS
TSH SERPL-ACNC: 3.17 MIU/L (ref 0.44–3.98)
VENTRICULAR RATE: 142 BPM

## 2023-12-08 PROCEDURE — 80069 RENAL FUNCTION PANEL: CPT

## 2023-12-08 PROCEDURE — 93010 ELECTROCARDIOGRAM REPORT: CPT | Performed by: INTERNAL MEDICINE

## 2023-12-08 PROCEDURE — 83735 ASSAY OF MAGNESIUM: CPT

## 2023-12-08 PROCEDURE — 36415 COLL VENOUS BLD VENIPUNCTURE: CPT

## 2023-12-08 PROCEDURE — 99232 SBSQ HOSP IP/OBS MODERATE 35: CPT

## 2023-12-08 PROCEDURE — 2500000001 HC RX 250 WO HCPCS SELF ADMINISTERED DRUGS (ALT 637 FOR MEDICARE OP)

## 2023-12-08 PROCEDURE — 2500000005 HC RX 250 GENERAL PHARMACY W/O HCPCS

## 2023-12-08 PROCEDURE — 93005 ELECTROCARDIOGRAM TRACING: CPT

## 2023-12-08 PROCEDURE — 2500000004 HC RX 250 GENERAL PHARMACY W/ HCPCS (ALT 636 FOR OP/ED)

## 2023-12-08 PROCEDURE — 1200000002 HC GENERAL ROOM WITH TELEMETRY DAILY

## 2023-12-08 RX ORDER — HYDROMORPHONE HYDROCHLORIDE 1 MG/ML
0.2 INJECTION, SOLUTION INTRAMUSCULAR; INTRAVENOUS; SUBCUTANEOUS EVERY 4 HOURS PRN
Status: DISCONTINUED | OUTPATIENT
Start: 2023-12-08 | End: 2023-12-11

## 2023-12-08 RX ORDER — METOPROLOL TARTRATE 1 MG/ML
5 INJECTION, SOLUTION INTRAVENOUS ONCE
Status: COMPLETED | OUTPATIENT
Start: 2023-12-08 | End: 2023-12-08

## 2023-12-08 RX ORDER — POTASSIUM CHLORIDE 20 MEQ/1
20 TABLET, EXTENDED RELEASE ORAL 2 TIMES DAILY
Status: COMPLETED | OUTPATIENT
Start: 2023-12-08 | End: 2023-12-09

## 2023-12-08 RX ORDER — MAGNESIUM SULFATE HEPTAHYDRATE 40 MG/ML
2 INJECTION, SOLUTION INTRAVENOUS ONCE
Status: COMPLETED | OUTPATIENT
Start: 2023-12-08 | End: 2023-12-08

## 2023-12-08 RX ORDER — METOPROLOL SUCCINATE 50 MG/1
50 TABLET, EXTENDED RELEASE ORAL DAILY
Status: DISCONTINUED | OUTPATIENT
Start: 2023-12-08 | End: 2023-12-15 | Stop reason: HOSPADM

## 2023-12-08 RX ORDER — ATORVASTATIN CALCIUM 80 MG/1
80 TABLET, FILM COATED ORAL DAILY
Status: DISCONTINUED | OUTPATIENT
Start: 2023-12-08 | End: 2023-12-15 | Stop reason: HOSPADM

## 2023-12-08 RX ADMIN — Medication 1 TABLET: at 10:01

## 2023-12-08 RX ADMIN — METOPROLOL SUCCINATE 50 MG: 50 TABLET, EXTENDED RELEASE ORAL at 10:01

## 2023-12-08 RX ADMIN — METOPROLOL TARTRATE 5 MG: 1 INJECTION, SOLUTION INTRAVENOUS at 00:25

## 2023-12-08 RX ADMIN — HYDROMORPHONE HYDROCHLORIDE 0.2 MG: 1 INJECTION, SOLUTION INTRAMUSCULAR; INTRAVENOUS; SUBCUTANEOUS at 21:36

## 2023-12-08 RX ADMIN — FOLIC ACID 1 MG: 1 TABLET ORAL at 10:01

## 2023-12-08 RX ADMIN — QUETIAPINE 50 MG: 25 TABLET ORAL at 21:36

## 2023-12-08 RX ADMIN — GABAPENTIN 100 MG: 100 CAPSULE ORAL at 16:48

## 2023-12-08 RX ADMIN — ATORVASTATIN CALCIUM 80 MG: 80 TABLET, FILM COATED ORAL at 21:36

## 2023-12-08 RX ADMIN — QUETIAPINE 50 MG: 25 TABLET ORAL at 16:48

## 2023-12-08 RX ADMIN — QUETIAPINE 50 MG: 25 TABLET ORAL at 10:01

## 2023-12-08 RX ADMIN — GABAPENTIN 100 MG: 100 CAPSULE ORAL at 10:01

## 2023-12-08 RX ADMIN — PANTOPRAZOLE SODIUM 40 MG: 40 TABLET, DELAYED RELEASE ORAL at 10:01

## 2023-12-08 RX ADMIN — POTASSIUM CHLORIDE 20 MEQ: 1500 TABLET, EXTENDED RELEASE ORAL at 21:36

## 2023-12-08 RX ADMIN — SACUBITRIL AND VALSARTAN 1 TABLET: 24; 26 TABLET, FILM COATED ORAL at 21:36

## 2023-12-08 RX ADMIN — HYDROMORPHONE HYDROCHLORIDE 0.2 MG: 1 INJECTION, SOLUTION INTRAMUSCULAR; INTRAVENOUS; SUBCUTANEOUS at 00:42

## 2023-12-08 RX ADMIN — GABAPENTIN 100 MG: 100 CAPSULE ORAL at 21:36

## 2023-12-08 RX ADMIN — FUROSEMIDE 10 MG/HR: 10 INJECTION, SOLUTION INTRAMUSCULAR; INTRAVENOUS at 14:19

## 2023-12-08 RX ADMIN — SACUBITRIL AND VALSARTAN 1 TABLET: 24; 26 TABLET, FILM COATED ORAL at 10:01

## 2023-12-08 RX ADMIN — RIVAROXABAN 20 MG: 20 TABLET, FILM COATED ORAL at 18:00

## 2023-12-08 RX ADMIN — MAGNESIUM SULFATE HEPTAHYDRATE 2 G: 40 INJECTION, SOLUTION INTRAVENOUS at 10:24

## 2023-12-08 RX ADMIN — SPIRONOLACTONE 25 MG: 25 TABLET ORAL at 10:01

## 2023-12-08 RX ADMIN — EMPAGLIFLOZIN 10 MG: 10 TABLET, FILM COATED ORAL at 10:01

## 2023-12-08 RX ADMIN — DIGOXIN 250 MCG: 250 TABLET ORAL at 10:01

## 2023-12-08 ASSESSMENT — PAIN - FUNCTIONAL ASSESSMENT
PAIN_FUNCTIONAL_ASSESSMENT: 0-10

## 2023-12-08 ASSESSMENT — PAIN SCALES - GENERAL
PAINLEVEL_OUTOF10: 8
PAINLEVEL_OUTOF10: 3
PAINLEVEL_OUTOF10: 0 - NO PAIN
PAINLEVEL_OUTOF10: 8

## 2023-12-08 ASSESSMENT — PAIN DESCRIPTION - LOCATION
LOCATION: GENERALIZED
LOCATION: LEG

## 2023-12-08 ASSESSMENT — PAIN DESCRIPTION - ORIENTATION: ORIENTATION: LOWER

## 2023-12-08 ASSESSMENT — PAIN DESCRIPTION - DESCRIPTORS: DESCRIPTORS: ACHING

## 2023-12-08 NOTE — ED NOTES
Pharmacy Medication History Review    Amirah Bennett is a 42 y.o. female admitted for Acute decompensated heart failure (CMS/[facility]). Pharmacy reviewed the patient's [id number] medications and allergies for accuracy.    The list below reflects the updated PTA list. Comments regarding how patient may be taking medications differently can be found in the Admit Orders Activity  Prior to Admission Medications   Prescriptions Last Dose Informant   DULoxetine (Cymbalta) 60 mg DR capsule     Sig: Take 1 capsule (60 mg) by mouth once daily. Do not crush or chew.   QUEtiapine XR (SEROquel XR) 50 mg 24 hr tablet  Self, Other   Sig: Take 3 tablets (150 mg) by mouth once daily. Do not crush, chew, or split.   atorvastatin (Lipitor) 40 mg tablet  Self, Other   Sig: Take 1 tablet (40 mg) by mouth once daily.   digoxin (Lanoxin) 250 MCG tab;et  Self, Other   Sig: Take 1 tablet (250 mcg) by mouth once daily.   empagliflozin (Jardiance) 10 mg  Self, Other   Sig: Take 1 tablet (10 mg) by mouth once daily.   folic acid (Folvite) 1 mg tablet  Self, Other   Sig: Take 1 tablet (1 mg) by mouth once daily.   furosemide (Lasix) 40 mg tablet     Sig: Take 1 tablet (40 mg) by mouth once daily.   gabapentin (Neurontin) 100 mg capsule  Self, Other   Sig: Take 1 capsule (100 mg) by mouth 3 times a day.   metoprolol succinate XL (Toprol-XL) 50 mg 24 hr tablet     Sig: Take 3 tablets (150 mg) by mouth once daily. Do not crush or chew.   multivitamin with minerals tablet  Self, Other   Sig: Take 1 tablet by mouth once daily.   pantoprazole (ProtoNix) 40 mg EC tablet  Self, Other   Sig: Take 1 tablet (40 mg) by mouth once daily in the morning. Take before meals. Do not crush, chew, or split.   rivaroxaban (Xarelto) 20 mg tablet  Self, Other   Sig: Take 1 tablet (20 mg) by mouth once daily. Take with food.   sacubitriL-valsartan (Entresto)  mg tablet  Self, Other   Sig: Take 1 tablet by mouth 2 times a day.   spironolactone (Aldactone) 25  mg tablet  Self, Other   Sig: Take 1 tablet (25 mg) by mouth once daily.      Facility-Administered Medications: None        The list below reflects the updated allergy list. Please review each documented allergy for additional clarification and justification.  Allergies  Reviewed by Kristina Pratt RPh on 12/7/2023   No Known Allergies           The list below reflects the updated allergy list. Please review each documented allergy for additional clarification and justification.  Allergies  Reviewed by Kristina Pratt RPh on 12/7/2023   No Known Allergies         Patient accepts M2B at discharge. Pharmacy has been updated to [facility].    Sources used to complete the med history include   - patient / family interview (poor historians, probably non-compliance- most meds last filled in March-May 2023)  -OARRS  - cardio office visit note Dr. Umaña 9/21   - Called patient's home pharmacy-  [facility]      Below are additional concerns with the patient's PTA list.  - Digoxin's was last dispensed 11/26 per pharmacy (only recent pharmacy fill)  - Other medications were last dispensed in March-May per pharmacy  -med list based on cardio OV Dr. Umaña 9/21 and most recent pharmacy fills  -amiodarone 200mg once daily was filled one time on 9/21/23 x 30 day supply but patient would be out by now/is noncompliant  -xarelto last filled May 2023 x 30 days    Kristina Pratt, PharmD  PGY-1 Pharmacy Resident  [facility]  Please reach out via [employer] Secure Chat for questions, or if no response call [employer] or [employer] “MedRec”

## 2023-12-08 NOTE — SIGNIFICANT EVENT
This is a 42 y.o. female with significant PMHx of HFrEF (LVEF 20-25% (08/2022), Afib on Xarelto w/ DCCV 05/2023), ischemic stroke L MCA d/t AFib LLA thrombus seen on echo (lack of OAC adherence) s/p thrombectomy 01/2022 @ [facility] w/ residual expressive aphasia and R sided weakness, s/p tracheostomy, and HTN presenting to the ED with worsening dyspnea, BLE edema and 25 lb weight gain for two weeks likely acute decompensated heart failure I/s/o medication non-adherence.    V/S stable, satting 98% RA    Alert and oriented x3, not in acute distress  Clear lung sound with rale, no wheezing  Irregular heart beat w/o murmur, normal S1/S2, no JVD  Soft and flat abdomen w/o tenderness, normoactive bowel sound, no CVA tenderness  Pitting edema 3+  Trach decannulation site: clean  No other significant P/Ex findings    CBC/RFP unremarkable  BNP 1082 < 668  Trop 25  CXR mildly increased interstitial marking  Echo (8/2022) EF 20-25%, reduced RV systolic function, mild to moderate TR, mild to moderately elevated pulmonary artery pressure    #ADHF  #HFrEF (EF 20-25% 08/2022)  #A-fib RVR  :: She hasn't taken her med for about 2 weeks  :: s/p lasix 40mg with 1L urine output, amio 200mg, metop succinate 100cc  :: Her SOB has much improved after lasix 40mg  :: exam still shows warm and wet, hemodynamically stable except for A-fib RVR (100-110 from 130-140)  -resume her GDMT including entresto  BID/genia 25/empa 10/metop tart 50 TID (from succinate 150)  -c/w home digoxin 250  -c/w xarelto 20/atorvastatin 40  -will give additional lasix 40   -hold home hydrochlorothiazide 12.5 I/s/o diuresis  -TTE AM  -daily weight, strict I&O  -on telemetry

## 2023-12-08 NOTE — PROGRESS NOTES
"Amirah Bennett is a 42 y.o. female on day 1 of admission presenting with Acute decompensated heart failure (CMS/HCC).    Subjective   NAEO. Doing well this morning. Legs are very swollen but she is breathing better.     Denies CP, SOB, HA, Fever, Chills, N/V/D    Objective     Physical Exam  Constitutional:       Appearance: Normal appearance. She is obese.   Eyes:      Conjunctiva/sclera: Conjunctivae normal.   Cardiovascular:      Rate and Rhythm: Tachycardia present. Rhythm irregular.   Pulmonary:      Effort: Pulmonary effort is normal. No respiratory distress.      Breath sounds: Normal breath sounds.   Abdominal:      Palpations: Abdomen is soft.   Musculoskeletal:      Right lower leg: Edema present.      Left lower leg: Edema present.   Skin:     General: Skin is warm and dry.      Capillary Refill: Capillary refill takes 2 to 3 seconds.   Neurological:      Mental Status: She is alert.         Last Recorded Vitals  Blood pressure 108/79, pulse 103, temperature 36.5 °C (97.7 °F), temperature source Temporal, resp. rate 18, height 1.702 m (5' 7\"), weight 99.8 kg (220 lb), SpO2 93 %.  Intake/Output last 3 Shifts:  I/O last 3 completed shifts:  In: - (0 mL/kg)   Out: 2650 (26.6 mL/kg) [Urine:2650 (0.7 mL/kg/hr)]  Weight: 99.8 kg     Relevant Results  Results for orders placed or performed during the hospital encounter of 12/07/23 (from the past 24 hour(s))   CBC with Differential   Result Value Ref Range    WBC 8.4 4.4 - 11.3 x10*3/uL    nRBC 0.0 0.0 - 0.0 /100 WBCs    RBC 5.09 4.00 - 5.20 x10*6/uL    Hemoglobin 13.7 12.0 - 16.0 g/dL    Hematocrit 44.4 36.0 - 46.0 %    MCV 87 80 - 100 fL    MCH 26.9 26.0 - 34.0 pg    MCHC 30.9 (L) 32.0 - 36.0 g/dL    RDW 16.5 (H) 11.5 - 14.5 %    Platelets 221 150 - 450 x10*3/uL    Neutrophils % 39.9 40.0 - 80.0 %    Immature Granulocytes %, Automated 0.4 0.0 - 0.9 %    Lymphocytes % 47.9 13.0 - 44.0 %    Monocytes % 9.9 2.0 - 10.0 %    Eosinophils % 1.4 0.0 - 6.0 %    Basophils " % 0.5 0.0 - 2.0 %    Neutrophils Absolute 3.35 1.20 - 7.70 x10*3/uL    Immature Granulocytes Absolute, Automated 0.03 0.00 - 0.70 x10*3/uL    Lymphocytes Absolute 4.01 1.20 - 4.80 x10*3/uL    Monocytes Absolute 0.83 0.10 - 1.00 x10*3/uL    Eosinophils Absolute 0.12 0.00 - 0.70 x10*3/uL    Basophils Absolute 0.04 0.00 - 0.10 x10*3/uL   Comprehensive Metabolic Panel   Result Value Ref Range    Glucose 97 74 - 99 mg/dL    Sodium 140 136 - 145 mmol/L    Potassium 4.5 3.5 - 5.3 mmol/L    Chloride 105 98 - 107 mmol/L    Bicarbonate 23 21 - 32 mmol/L    Anion Gap 17 10 - 20 mmol/L    Urea Nitrogen 17 6 - 23 mg/dL    Creatinine 1.06 (H) 0.50 - 1.05 mg/dL    eGFR 67 >60 mL/min/1.73m*2    Calcium 9.3 8.6 - 10.6 mg/dL    Albumin 3.4 3.4 - 5.0 g/dL    Alkaline Phosphatase 65 33 - 110 U/L    Total Protein 6.8 6.4 - 8.2 g/dL    AST 18 9 - 39 U/L    Bilirubin, Total 2.0 (H) 0.0 - 1.2 mg/dL    ALT 14 7 - 45 U/L   Brain Natriuretic Peptide   Result Value Ref Range    BNP 1,082 (H) 0 - 99 pg/mL   Troponin I, High Sensitivity   Result Value Ref Range    Troponin I, High Sensitivity 25 0 - 34 ng/L   TSH   Result Value Ref Range    Thyroid Stimulating Hormone 3.17 0.44 - 3.98 mIU/L   Renal function panel   Result Value Ref Range    Glucose 94 74 - 99 mg/dL    Sodium 137 136 - 145 mmol/L    Potassium 4.0 3.5 - 5.3 mmol/L    Chloride 107 98 - 107 mmol/L    Bicarbonate 20 (L) 21 - 32 mmol/L    Anion Gap 14 10 - 20 mmol/L    Urea Nitrogen 17 6 - 23 mg/dL    Creatinine 0.87 0.50 - 1.05 mg/dL    eGFR 84 >60 mL/min/1.73m*2    Calcium 7.9 (L) 8.6 - 10.6 mg/dL    Phosphorus 3.5 2.5 - 4.9 mg/dL    Albumin 2.9 (L) 3.4 - 5.0 g/dL   Magnesium   Result Value Ref Range    Magnesium 1.41 (L) 1.60 - 2.40 mg/dL   Renal Function Panel   Result Value Ref Range    Glucose 96 74 - 99 mg/dL    Sodium 136 136 - 145 mmol/L    Potassium 3.6 3.5 - 5.3 mmol/L    Chloride 106 98 - 107 mmol/L    Bicarbonate 20 (L) 21 - 32 mmol/L    Anion Gap 14 10 - 20 mmol/L     Urea Nitrogen 18 6 - 23 mg/dL    Creatinine 0.90 0.50 - 1.05 mg/dL    eGFR 81 >60 mL/min/1.73m*2    Calcium 8.3 (L) 8.6 - 10.6 mg/dL    Phosphorus 3.9 2.5 - 4.9 mg/dL    Albumin 2.9 (L) 3.4 - 5.0 g/dL   Magnesium   Result Value Ref Range    Magnesium 1.44 (L) 1.60 - 2.40 mg/dL   ECG 12 lead   Result Value Ref Range    Ventricular Rate 142 BPM    QRS Duration 78 ms    QT Interval 298 ms    QTC Calculation(Bazett) 458 ms    R Axis -2 degrees    T Axis 187 degrees    QRS Count 23 beats    Q Onset 217 ms    T Offset 366 ms    QTC Fredericia 397 ms   Electrocardiogram, 12-lead PRN ACS symptoms   Result Value Ref Range    Ventricular Rate 128 BPM    Atrial Rate 156 BPM    QRS Duration 82 ms    QT Interval 308 ms    QTC Calculation(Bazett) 449 ms    R Axis 3 degrees    T Axis 195 degrees    QRS Count 21 beats    Q Onset 217 ms    T Offset 371 ms    QTC Fredericia 396 ms         Assessment/Plan   Principal Problem:    Acute decompensated heart failure (CMS/HCC)    Amirah Bennett is a 42 y.o. female with significant PMHx of HFrEF (LVEF 20-25% (08/2022), Afib on Xarelto w/ DCCV 05/2023), ischemic stroke L MCA d/t AFib LLA thrombus seen on echo (lack of OAC adherence) s/p thrombectomy 01/2022 @ [facility] w/ residual expressive aphasia and R sided weakness, s/p tracheostomy, and HTN presenting to the ED on 17/7/23 with dyspnea, BLE edema and 25 lb weight gain. EKG revealed Afib w/ RVR, POCUS revealed significant reduction in LVEF to < 10%. This is concerning for ADHF in s/o of non-adherence to GDMT. She has significant history of not following up with EP for DCCV and AMA during admission for tracheostomy closure. Considering significant symptomatic improvement s/p 40 mg IV Lasix push, we will continue to manage ADHF with diuresis and managing Afib with rate control and DOAC. Will continue to monitor for ACS but troponin negative and HDS.     UPDATES 12/8/23  - Started Lasix gtt at 10 mg/mL w/ goal net output > -1L / day  - RFP  BID    #ADHF  #Afib  #HFrEF (LVEF 20-25% (08/2022)  :: Patient has had persistent Afib managed with metoprolol and digoxin as outpatient. Has missed scheduled DCCV w/  EP.  :: Endorses non-adherence to medications contributing to edema accumulation and shortness of breath.  :: Bedside POCUS w/ new LVEF < 10%  - TTE Pending  - 40 mg Lasix BID, RFP BID  - Digoxin 250 micrograms every day  - GDMT: Jardiance 10 mg, metoprolol tart 50 mg BID, Entresto , spironolactone 25 mg  - Xarelto 20 mg every day  - Telemetry  - EP consult in AM (not ordered)     #ischemic stroke L MCA  :: No new focal deficits  - CTM  - Xarelto 20 mg     F: PRN, EF ~10%  E: PRN  N: Cardiac  A: 2x PIV     DVT Ppx: Xarelto 20 mg  GI PPX: pantoprazole 40 mg, miralax daily     CODE: FULL, confirmed on admission  NOK: Pranay Owen (friend) [phone]             Neal Hsu MD

## 2023-12-08 NOTE — CONSULTS
Wound Care Consult     Visit Date: 12/8/2023      Patient Name: Amirah Bennett         MRN: [id number]           YOB: 1980     Reason for Consult: trach site     Wound History: trach decannulated, but pt cannot recall precisely when     Wound Assessment:  Wound 12/08/23 Incision Throat Anterior (Active)   Wound Image   12/08/23 1334   Site Assessment Brown 12/08/23 1334   Rosario-Wound Assessment Dry 12/08/23 1334   Wound Length (cm) 1 cm 12/08/23 1334   Wound Width (cm) 0.5 cm 12/08/23 1334   Wound Surface Area (cm^2) 0.5 cm^2 12/08/23 1334   Margins Epibole (Rolled edges) 12/08/23 1334   Closure None 12/08/23 1334   Drainage Description None 12/08/23 1334   Dressing Open to air 12/08/23 1334     Wound Team Summary Assessment: Former trach site appears clean, dry and nearly healed. However, air whistle still audible. Wound appears to have rolled edges with moist light brown structure at base. Did not probe. Photo above.     Wound Team Plan: Gently clean trach periwound skin daily, pat dry. May cover with 2x2 gauze and paper tape.      Luciana Carpenter RN, CWON  12/8/2023  1:35 PM

## 2023-12-09 ENCOUNTER — APPOINTMENT (OUTPATIENT)
Dept: RADIOLOGY | Facility: HOSPITAL | Age: 43
DRG: 291 | End: 2023-12-09
Payer: MEDICARE

## 2023-12-09 LAB
ALBUMIN SERPL BCP-MCNC: 3 G/DL (ref 3.4–5)
ANION GAP SERPL CALC-SCNC: 16 MMOL/L (ref 10–20)
BUN SERPL-MCNC: 19 MG/DL (ref 6–23)
CALCIUM SERPL-MCNC: 8.5 MG/DL (ref 8.6–10.6)
CHLORIDE SERPL-SCNC: 100 MMOL/L (ref 98–107)
CO2 SERPL-SCNC: 30 MMOL/L (ref 21–32)
CREAT SERPL-MCNC: 1.22 MG/DL (ref 0.5–1.05)
GFR SERPL CREATININE-BSD FRML MDRD: 56 ML/MIN/1.73M*2
GLUCOSE SERPL-MCNC: 90 MG/DL (ref 74–99)
PHOSPHATE SERPL-MCNC: 4.3 MG/DL (ref 2.5–4.9)
POTASSIUM SERPL-SCNC: 3.8 MMOL/L (ref 3.5–5.3)
SODIUM SERPL-SCNC: 142 MMOL/L (ref 136–145)

## 2023-12-09 PROCEDURE — 2500000004 HC RX 250 GENERAL PHARMACY W/ HCPCS (ALT 636 FOR OP/ED)

## 2023-12-09 PROCEDURE — 2500000001 HC RX 250 WO HCPCS SELF ADMINISTERED DRUGS (ALT 637 FOR MEDICARE OP)

## 2023-12-09 PROCEDURE — 1200000002 HC GENERAL ROOM WITH TELEMETRY DAILY

## 2023-12-09 PROCEDURE — 80069 RENAL FUNCTION PANEL: CPT

## 2023-12-09 PROCEDURE — 36415 COLL VENOUS BLD VENIPUNCTURE: CPT

## 2023-12-09 PROCEDURE — 74176 CT ABD & PELVIS W/O CONTRAST: CPT

## 2023-12-09 PROCEDURE — 99232 SBSQ HOSP IP/OBS MODERATE 35: CPT

## 2023-12-09 PROCEDURE — 74176 CT ABD & PELVIS W/O CONTRAST: CPT | Performed by: STUDENT IN AN ORGANIZED HEALTH CARE EDUCATION/TRAINING PROGRAM

## 2023-12-09 RX ORDER — POTASSIUM CHLORIDE 20 MEQ/1
20 TABLET, EXTENDED RELEASE ORAL 2 TIMES DAILY
Status: DISPENSED | OUTPATIENT
Start: 2023-12-09 | End: 2023-12-11

## 2023-12-09 RX ORDER — POTASSIUM CHLORIDE 1.5 G/1.58G
POWDER, FOR SOLUTION ORAL
Status: COMPLETED
Start: 2023-12-09 | End: 2023-12-09

## 2023-12-09 RX ORDER — DOCUSATE SODIUM 100 MG/1
100 CAPSULE, LIQUID FILLED ORAL 2 TIMES DAILY
Status: DISCONTINUED | OUTPATIENT
Start: 2023-12-09 | End: 2023-12-09

## 2023-12-09 RX ORDER — MAGNESIUM SULFATE HEPTAHYDRATE 40 MG/ML
INJECTION, SOLUTION INTRAVENOUS
Status: COMPLETED
Start: 2023-12-09 | End: 2023-12-09

## 2023-12-09 RX ORDER — POTASSIUM CHLORIDE 1.5 G/1.58G
20 POWDER, FOR SOLUTION ORAL ONCE
Status: DISCONTINUED | OUTPATIENT
Start: 2023-12-09 | End: 2023-12-09

## 2023-12-09 RX ORDER — MAGNESIUM SULFATE HEPTAHYDRATE 40 MG/ML
4 INJECTION, SOLUTION INTRAVENOUS ONCE
Status: DISCONTINUED | OUTPATIENT
Start: 2023-12-09 | End: 2023-12-09

## 2023-12-09 RX ORDER — SENNOSIDES 8.6 MG/1
2 TABLET ORAL NIGHTLY
Status: DISCONTINUED | OUTPATIENT
Start: 2023-12-09 | End: 2023-12-15 | Stop reason: HOSPADM

## 2023-12-09 RX ORDER — POLYETHYLENE GLYCOL 3350 17 G/17G
17 POWDER, FOR SOLUTION ORAL
Status: DISCONTINUED | OUTPATIENT
Start: 2023-12-09 | End: 2023-12-15 | Stop reason: HOSPADM

## 2023-12-09 RX ADMIN — SPIRONOLACTONE 25 MG: 25 TABLET ORAL at 08:52

## 2023-12-09 RX ADMIN — MAGNESIUM SULFATE IN WATER 4 G: 4 INJECTION, SOLUTION INTRAVENOUS at 07:28

## 2023-12-09 RX ADMIN — ATORVASTATIN CALCIUM 80 MG: 80 TABLET, FILM COATED ORAL at 20:59

## 2023-12-09 RX ADMIN — POTASSIUM CHLORIDE 20 MEQ: 1500 TABLET, EXTENDED RELEASE ORAL at 20:59

## 2023-12-09 RX ADMIN — SACUBITRIL AND VALSARTAN 1 TABLET: 24; 26 TABLET, FILM COATED ORAL at 20:59

## 2023-12-09 RX ADMIN — Medication 1 TABLET: at 08:51

## 2023-12-09 RX ADMIN — SACUBITRIL AND VALSARTAN 1 TABLET: 24; 26 TABLET, FILM COATED ORAL at 08:52

## 2023-12-09 RX ADMIN — POTASSIUM CHLORIDE 20 MEQ: 1.5 POWDER, FOR SOLUTION ORAL at 07:20

## 2023-12-09 RX ADMIN — RIVAROXABAN 20 MG: 20 TABLET, FILM COATED ORAL at 17:43

## 2023-12-09 RX ADMIN — DIGOXIN 250 MCG: 250 TABLET ORAL at 08:49

## 2023-12-09 RX ADMIN — GABAPENTIN 100 MG: 100 CAPSULE ORAL at 20:59

## 2023-12-09 RX ADMIN — QUETIAPINE 50 MG: 25 TABLET ORAL at 14:51

## 2023-12-09 RX ADMIN — GABAPENTIN 100 MG: 100 CAPSULE ORAL at 08:51

## 2023-12-09 RX ADMIN — PANTOPRAZOLE SODIUM 40 MG: 40 TABLET, DELAYED RELEASE ORAL at 04:48

## 2023-12-09 RX ADMIN — QUETIAPINE 50 MG: 25 TABLET ORAL at 08:52

## 2023-12-09 RX ADMIN — EMPAGLIFLOZIN 10 MG: 10 TABLET, FILM COATED ORAL at 08:50

## 2023-12-09 RX ADMIN — HYDROMORPHONE HYDROCHLORIDE 0.2 MG: 1 INJECTION, SOLUTION INTRAMUSCULAR; INTRAVENOUS; SUBCUTANEOUS at 04:48

## 2023-12-09 RX ADMIN — FOLIC ACID 1 MG: 1 TABLET ORAL at 08:51

## 2023-12-09 RX ADMIN — SENNOSIDES 17.2 MG: 8.6 TABLET, FILM COATED ORAL at 20:59

## 2023-12-09 RX ADMIN — GABAPENTIN 100 MG: 100 CAPSULE ORAL at 14:50

## 2023-12-09 RX ADMIN — METOPROLOL SUCCINATE 50 MG: 50 TABLET, EXTENDED RELEASE ORAL at 08:51

## 2023-12-09 RX ADMIN — POTASSIUM CHLORIDE 20 MEQ: 1500 TABLET, EXTENDED RELEASE ORAL at 08:52

## 2023-12-09 RX ADMIN — MAGNESIUM SULFATE HEPTAHYDRATE 4 G: 40 INJECTION, SOLUTION INTRAVENOUS at 07:28

## 2023-12-09 ASSESSMENT — PAIN - FUNCTIONAL ASSESSMENT
PAIN_FUNCTIONAL_ASSESSMENT: 0-10

## 2023-12-09 ASSESSMENT — COGNITIVE AND FUNCTIONAL STATUS - GENERAL
DRESSING REGULAR LOWER BODY CLOTHING: A LOT
MOBILITY SCORE: 13
DRESSING REGULAR UPPER BODY CLOTHING: A LOT
CLIMB 3 TO 5 STEPS WITH RAILING: A LOT
TURNING FROM BACK TO SIDE WHILE IN FLAT BAD: A LITTLE
TOILETING: A LOT
MOVING TO AND FROM BED TO CHAIR: A LOT
PERSONAL GROOMING: A LITTLE
MOVING FROM LYING ON BACK TO SITTING ON SIDE OF FLAT BED WITH BEDRAILS: A LITTLE
STANDING UP FROM CHAIR USING ARMS: A LOT
WALKING IN HOSPITAL ROOM: TOTAL
CLIMB 3 TO 5 STEPS WITH RAILING: A LOT
HELP NEEDED FOR BATHING: A LOT
STANDING UP FROM CHAIR USING ARMS: A LOT
TURNING FROM BACK TO SIDE WHILE IN FLAT BAD: A LITTLE
MOBILITY SCORE: 13
MOVING TO AND FROM BED TO CHAIR: A LOT
WALKING IN HOSPITAL ROOM: TOTAL
MOVING FROM LYING ON BACK TO SITTING ON SIDE OF FLAT BED WITH BEDRAILS: A LITTLE
DAILY ACTIVITIY SCORE: 15

## 2023-12-09 ASSESSMENT — PAIN SCALES - GENERAL
PAINLEVEL_OUTOF10: 8
PAINLEVEL_OUTOF10: 0 - NO PAIN
PAINLEVEL_OUTOF10: 0 - NO PAIN

## 2023-12-09 ASSESSMENT — PAIN DESCRIPTION - LOCATION: LOCATION: BACK

## 2023-12-09 NOTE — CARE PLAN
Patient's Lasix drip discontinued today.  Diuretic holiday.  Nursing will continue to monitor patient's intake and output closely.

## 2023-12-09 NOTE — CARE PLAN
The patient's goals for the shift include      The clinical goals for the shift include remain HD stable    Over the shift, the patient did not make progress toward the following goals. Barriers to progression HF and fluid overload Recommendations to address these barriers include diurese 1 Liter/24 hours.

## 2023-12-09 NOTE — PROGRESS NOTES
"Amirah Bennett is a 42 y.o. female on day 2 of admission presenting with Acute decompensated heart failure (CMS/HCC).    Subjective   NAOE. Patient endorsing abdominal pain in LLQ, that is new and throbbing. Last BM a week ago, still passing gas. Still endorsing LE edema with mild improvement, denies any chest pain, sob, nausea, vomiting, palpitations.        Objective     Physical Exam  Constitutional:       Appearance: Normal appearance. She is obese.   Eyes:      Conjunctiva/sclera: Conjunctivae normal.   Cardiovascular:      Rate and Rhythm: Regular rate and rhythm   Pulmonary:      Effort: Pulmonary effort is normal. No respiratory distress.      Breath sounds: Normal breath sounds.   Abdominal:      Palpations: Abdomen is soft. Tender in LLQ with guarding. 10/10  Musculoskeletal:      Right lower leg: Edema present.      Left lower leg: Edema present.   Comments: 2+ pitting edema.   Skin:     General: Skin is warm and dry.      Capillary Refill: Capillary refill takes 2 to 3 seconds.   Neurological:      Mental Status: She is alert.     Last Recorded Vitals  Blood pressure 102/75, pulse 103, temperature 36 °C (96.8 °F), temperature source Temporal, resp. rate 18, height 1.702 m (5' 7\"), weight 99.8 kg (220 lb), SpO2 97 %.  Intake/Output last 3 Shifts:  I/O last 3 completed shifts:  In: - (0 mL/kg)   Out: 78718 (107.2 mL/kg) [Urine:31698 (3 mL/kg/hr)]  Weight: 99.8 kg     Relevant Results    Current Facility-Administered Medications:     atorvastatin (Lipitor) tablet 80 mg, 80 mg, oral, Daily, Candi Ruvalcaba MD, 80 mg at 12/08/23 2136    digoxin (Lanoxin) tablet 250 mcg, 250 mcg, oral, Daily, Neal Hsu MD, 250 mcg at 12/09/23 0849    empagliflozin (Jardiance) tablet 10 mg, 10 mg, oral, Daily, Neal Hsu MD, 10 mg at 12/09/23 0850    folic acid (Folvite) tablet 1 mg, 1 mg, oral, Daily, Neal Hsu MD, 1 mg at 12/09/23 0851    gabapentin (Neurontin) capsule 100 mg, 100 mg, oral, TID, Neal Hsu" MD, 100 mg at 12/09/23 0851    HYDROmorphone (Dilaudid) injection 0.2 mg, 0.2 mg, intravenous, q4h PRN, Carlos Alfonso MD, 0.2 mg at 12/09/23 0448    metoprolol succinate XL (Toprol-XL) 24 hr tablet 50 mg, 50 mg, oral, Daily, Candi Ruvalcaba MD, 50 mg at 12/09/23 0851    multivitamin with minerals 1 tablet, 1 tablet, oral, Daily, Neal Hsu MD, 1 tablet at 12/09/23 0851    pantoprazole (ProtoNix) EC tablet 40 mg, 40 mg, oral, Daily before breakfast, Neal Hsu MD, 40 mg at 12/09/23 0448    polyethylene glycol (Glycolax, Miralax) packet 17 g, 17 g, oral, Daily, Darius Durbin MD    potassium chloride CR (Klor-Con M20) ER tablet 20 mEq, 20 mEq, oral, BID, Neal Hsu MD, 20 mEq at 12/09/23 0852    QUEtiapine (SEROquel) tablet 50 mg, 50 mg, oral, TID, Neal Hsu MD, 50 mg at 12/09/23 0852    rivaroxaban (Xarelto) tablet 20 mg, 20 mg, oral, Daily with evening meal, Neal Hsu MD, 20 mg at 12/08/23 1800    sacubitriL-valsartan (Entresto) 24-26 mg per tablet 1 tablet, 1 tablet, oral, BID, Candi Ruvalcaba MD, 1 tablet at 12/09/23 0852    spironolactone (Aldactone) tablet 25 mg, 25 mg, oral, Daily, Neal Hsu MD, 25 mg at 12/09/23 0852    Results for orders placed or performed during the hospital encounter of 12/07/23 (from the past 24 hour(s))   Renal function panel   Result Value Ref Range    Glucose 95 74 - 99 mg/dL    Sodium 137 136 - 145 mmol/L    Potassium 3.7 3.5 - 5.3 mmol/L    Chloride 104 98 - 107 mmol/L    Bicarbonate 23 21 - 32 mmol/L    Anion Gap 14 10 - 20 mmol/L    Urea Nitrogen 18 6 - 23 mg/dL    Creatinine 1.20 (H) 0.50 - 1.05 mg/dL    eGFR 57 (L) >60 mL/min/1.73m*2    Calcium 8.5 (L) 8.6 - 10.6 mg/dL    Phosphorus 4.3 2.5 - 4.9 mg/dL    Albumin 3.0 (L) 3.4 - 5.0 g/dL   Magnesium   Result Value Ref Range    Magnesium 1.70 1.60 - 2.40 mg/dL       Intake/Output Summary (Last 24 hours) at 12/9/2023 1153  Last data filed at 12/9/2023 1035  Gross per 24 hour   Intake 240 ml   Output  99113 ml   Net -17533 ml          Assessment/Plan   Principal Problem:    Acute decompensated heart failure (CMS/HCC)    Amirah Bennett is a 42 y.o. female with significant PMHx of HFrEF (LVEF 20-25% (08/2022), Afib on Xarelto w/ DCCV 05/2023), ischemic stroke L MCA d/t AFib LLA thrombus seen on echo (lack of OAC adherence) s/p thrombectomy 01/2022 @ [facility] w/ residual expressive aphasia and R sided weakness, s/p tracheostomy, and HTN presenting to the ED on 17/7/23 with dyspnea, BLE edema and 25 lb weight gain. EKG revealed Afib w/ RVR, POCUS revealed significant reduction in LVEF to < 10%. This is concerning for ADHF in s/o of non-adherence to GDMT. She has significant history of not following up with EP for DCCV and AMA during admission for tracheostomy closure. Considering significant symptomatic improvement s/p 40 mg IV Lasix push, we will continue to manage ADHF with diuresis and managing Afib with rate control and DOAC. Will continue to monitor for ACS but troponin negative and HDS.     UPDATES 12/9:  - Diuresis holiday today, Discontinued lasis. Output 9L yesterday.   - CT abdomen pelvis   - NPO     #ADHF  #Afib  #HFrEF (LVEF 20-25% (08/2022)  :: Patient has had persistent Afib managed with metoprolol and digoxin as outpatient. Has missed scheduled DCCV w/ [facility] EP.  :: Endorses non-adherence to medications contributing to edema accumulation and shortness of breath.  :: Bedside POCUS w/ new LVEF < 10%  - TTE Pending  - 40 mg Lasix BID, RFP BID  - Digoxin 250 micrograms every day  - GDMT: Jardiance 10 mg, metoprolol tart 50 mg BID, Entresto , spironolactone 25 mg  - Xarelto 20 mg every day  - Telemetry  - EP consult in AM (not ordered)     #ischemic stroke L MCA  :: No new focal deficits  - CTM  - Xarelto 20 mg     F: PRN, EF ~10%  E: PRN  N: Cardiac  A: 2x PIV     DVT Ppx: Xarelto 20 mg  GI PPX: pantoprazole 40 mg, miralax daily     CODE: FULL, confirmed on admission  NOK: Pranay Owen (friend) [phone]  [phone]              Jeremias Stanford MD

## 2023-12-10 LAB
ALBUMIN SERPL BCP-MCNC: 2.7 G/DL (ref 3.4–5)
ALBUMIN SERPL BCP-MCNC: 2.9 G/DL (ref 3.4–5)
ALP SERPL-CCNC: 57 U/L (ref 33–110)
ALT SERPL W P-5'-P-CCNC: 11 U/L (ref 7–45)
ANION GAP SERPL CALC-SCNC: 13 MMOL/L (ref 10–20)
ANION GAP SERPL CALC-SCNC: 14 MMOL/L (ref 10–20)
AST SERPL W P-5'-P-CCNC: 14 U/L (ref 9–39)
BASOPHILS # BLD AUTO: 0.02 X10*3/UL (ref 0–0.1)
BASOPHILS NFR BLD AUTO: 0.3 %
BILIRUB SERPL-MCNC: 1.2 MG/DL (ref 0–1.2)
BUN SERPL-MCNC: 15 MG/DL (ref 6–23)
BUN SERPL-MCNC: 16 MG/DL (ref 6–23)
CALCIUM SERPL-MCNC: 8.1 MG/DL (ref 8.6–10.6)
CALCIUM SERPL-MCNC: 8.2 MG/DL (ref 8.6–10.6)
CHLORIDE SERPL-SCNC: 101 MMOL/L (ref 98–107)
CHLORIDE SERPL-SCNC: 103 MMOL/L (ref 98–107)
CO2 SERPL-SCNC: 27 MMOL/L (ref 21–32)
CO2 SERPL-SCNC: 28 MMOL/L (ref 21–32)
CREAT SERPL-MCNC: 1.16 MG/DL (ref 0.5–1.05)
CREAT SERPL-MCNC: 1.29 MG/DL (ref 0.5–1.05)
EOSINOPHIL # BLD AUTO: 0.13 X10*3/UL (ref 0–0.7)
EOSINOPHIL NFR BLD AUTO: 2.1 %
ERYTHROCYTE [DISTWIDTH] IN BLOOD BY AUTOMATED COUNT: 16.8 % (ref 11.5–14.5)
GFR SERPL CREATININE-BSD FRML MDRD: 53 ML/MIN/1.73M*2
GFR SERPL CREATININE-BSD FRML MDRD: 60 ML/MIN/1.73M*2
GLUCOSE SERPL-MCNC: 115 MG/DL (ref 74–99)
GLUCOSE SERPL-MCNC: 130 MG/DL (ref 74–99)
HCT VFR BLD AUTO: 41 % (ref 36–46)
HGB BLD-MCNC: 12.7 G/DL (ref 12–16)
IMM GRANULOCYTES # BLD AUTO: 0.03 X10*3/UL (ref 0–0.7)
IMM GRANULOCYTES NFR BLD AUTO: 0.5 % (ref 0–0.9)
LYMPHOCYTES # BLD AUTO: 2.95 X10*3/UL (ref 1.2–4.8)
LYMPHOCYTES NFR BLD AUTO: 46.9 %
MAGNESIUM SERPL-MCNC: 1.58 MG/DL (ref 1.6–2.4)
MAGNESIUM SERPL-MCNC: 2.12 MG/DL (ref 1.6–2.4)
MCH RBC QN AUTO: 26.6 PG (ref 26–34)
MCHC RBC AUTO-ENTMCNC: 31 G/DL (ref 32–36)
MCV RBC AUTO: 86 FL (ref 80–100)
MONOCYTES # BLD AUTO: 0.74 X10*3/UL (ref 0.1–1)
MONOCYTES NFR BLD AUTO: 11.8 %
NEUTROPHILS # BLD AUTO: 2.42 X10*3/UL (ref 1.2–7.7)
NEUTROPHILS NFR BLD AUTO: 38.4 %
NRBC BLD-RTO: 0 /100 WBCS (ref 0–0)
PHOSPHATE SERPL-MCNC: 3.9 MG/DL (ref 2.5–4.9)
PHOSPHATE SERPL-MCNC: 4.1 MG/DL (ref 2.5–4.9)
PLATELET # BLD AUTO: 169 X10*3/UL (ref 150–450)
POTASSIUM SERPL-SCNC: 4.1 MMOL/L (ref 3.5–5.3)
POTASSIUM SERPL-SCNC: 4.4 MMOL/L (ref 3.5–5.3)
PROT SERPL-MCNC: 5.2 G/DL (ref 6.4–8.2)
RBC # BLD AUTO: 4.78 X10*6/UL (ref 4–5.2)
SODIUM SERPL-SCNC: 139 MMOL/L (ref 136–145)
SODIUM SERPL-SCNC: 139 MMOL/L (ref 136–145)
WBC # BLD AUTO: 6.3 X10*3/UL (ref 4.4–11.3)

## 2023-12-10 PROCEDURE — 36415 COLL VENOUS BLD VENIPUNCTURE: CPT

## 2023-12-10 PROCEDURE — 85025 COMPLETE CBC W/AUTO DIFF WBC: CPT

## 2023-12-10 PROCEDURE — 83735 ASSAY OF MAGNESIUM: CPT

## 2023-12-10 PROCEDURE — 80053 COMPREHEN METABOLIC PANEL: CPT

## 2023-12-10 PROCEDURE — 2500000004 HC RX 250 GENERAL PHARMACY W/ HCPCS (ALT 636 FOR OP/ED)

## 2023-12-10 PROCEDURE — 1200000002 HC GENERAL ROOM WITH TELEMETRY DAILY

## 2023-12-10 PROCEDURE — 2500000001 HC RX 250 WO HCPCS SELF ADMINISTERED DRUGS (ALT 637 FOR MEDICARE OP)

## 2023-12-10 PROCEDURE — 84100 ASSAY OF PHOSPHORUS: CPT

## 2023-12-10 PROCEDURE — 99232 SBSQ HOSP IP/OBS MODERATE 35: CPT

## 2023-12-10 PROCEDURE — 80069 RENAL FUNCTION PANEL: CPT | Mod: CCI

## 2023-12-10 PROCEDURE — 97161 PT EVAL LOW COMPLEX 20 MIN: CPT | Mod: GP | Performed by: PHYSICAL MEDICINE & REHABILITATION

## 2023-12-10 RX ORDER — FUROSEMIDE 10 MG/ML
40 INJECTION INTRAMUSCULAR; INTRAVENOUS ONCE
Status: COMPLETED | OUTPATIENT
Start: 2023-12-10 | End: 2023-12-10

## 2023-12-10 RX ORDER — MAGNESIUM SULFATE HEPTAHYDRATE 40 MG/ML
4 INJECTION, SOLUTION INTRAVENOUS ONCE
Status: COMPLETED | OUTPATIENT
Start: 2023-12-10 | End: 2023-12-10

## 2023-12-10 RX ADMIN — PANTOPRAZOLE SODIUM 40 MG: 40 TABLET, DELAYED RELEASE ORAL at 06:52

## 2023-12-10 RX ADMIN — FOLIC ACID 1 MG: 1 TABLET ORAL at 09:22

## 2023-12-10 RX ADMIN — RIVAROXABAN 20 MG: 20 TABLET, FILM COATED ORAL at 17:19

## 2023-12-10 RX ADMIN — QUETIAPINE 50 MG: 25 TABLET ORAL at 20:12

## 2023-12-10 RX ADMIN — POTASSIUM CHLORIDE 20 MEQ: 1500 TABLET, EXTENDED RELEASE ORAL at 20:12

## 2023-12-10 RX ADMIN — SACUBITRIL AND VALSARTAN 1 TABLET: 24; 26 TABLET, FILM COATED ORAL at 20:25

## 2023-12-10 RX ADMIN — ATORVASTATIN CALCIUM 80 MG: 80 TABLET, FILM COATED ORAL at 20:12

## 2023-12-10 RX ADMIN — Medication 1 TABLET: at 09:22

## 2023-12-10 RX ADMIN — POTASSIUM CHLORIDE 20 MEQ: 1500 TABLET, EXTENDED RELEASE ORAL at 09:23

## 2023-12-10 RX ADMIN — DIGOXIN 250 MCG: 250 TABLET ORAL at 09:22

## 2023-12-10 RX ADMIN — MAGNESIUM SULFATE HEPTAHYDRATE 4 G: 40 INJECTION, SOLUTION INTRAVENOUS at 13:14

## 2023-12-10 RX ADMIN — GABAPENTIN 100 MG: 100 CAPSULE ORAL at 09:23

## 2023-12-10 RX ADMIN — FUROSEMIDE 40 MG: 10 INJECTION, SOLUTION INTRAVENOUS at 11:30

## 2023-12-10 RX ADMIN — QUETIAPINE 50 MG: 25 TABLET ORAL at 15:14

## 2023-12-10 RX ADMIN — EMPAGLIFLOZIN 10 MG: 10 TABLET, FILM COATED ORAL at 09:23

## 2023-12-10 RX ADMIN — GABAPENTIN 100 MG: 100 CAPSULE ORAL at 15:14

## 2023-12-10 RX ADMIN — QUETIAPINE 50 MG: 25 TABLET ORAL at 09:23

## 2023-12-10 RX ADMIN — GABAPENTIN 100 MG: 100 CAPSULE ORAL at 20:12

## 2023-12-10 RX ADMIN — SACUBITRIL AND VALSARTAN 1 TABLET: 24; 26 TABLET, FILM COATED ORAL at 09:23

## 2023-12-10 RX ADMIN — METOPROLOL SUCCINATE 50 MG: 50 TABLET, EXTENDED RELEASE ORAL at 09:23

## 2023-12-10 RX ADMIN — SPIRONOLACTONE 25 MG: 25 TABLET ORAL at 09:23

## 2023-12-10 RX ADMIN — HYDROMORPHONE HYDROCHLORIDE 0.2 MG: 1 INJECTION, SOLUTION INTRAMUSCULAR; INTRAVENOUS; SUBCUTANEOUS at 20:30

## 2023-12-10 SDOH — ECONOMIC STABILITY: TRANSPORTATION INSECURITY
IN THE PAST 12 MONTHS, HAS LACK OF TRANSPORTATION KEPT YOU FROM MEDICAL APPOINTMENTS OR FROM GETTING MEDICATIONS?: PATIENT DECLINED

## 2023-12-10 SDOH — ECONOMIC STABILITY: HOUSING INSECURITY: IN THE LAST 12 MONTHS, HOW MANY PLACES HAVE YOU LIVED?: 1

## 2023-12-10 SDOH — ECONOMIC STABILITY: FOOD INSECURITY: HOW HARD IS IT FOR YOU TO PAY FOR THE VERY BASICS LIKE FOOD, HOUSING, MEDICAL CARE, AND HEATING?: PATIENT DECLINED

## 2023-12-10 SDOH — ECONOMIC STABILITY: INCOME INSECURITY: IN THE LAST 12 MONTHS, WAS THERE A TIME WHEN YOU WERE NOT ABLE TO PAY THE MORTGAGE OR RENT ON TIME?: PATIENT REFUSED

## 2023-12-10 SDOH — ECONOMIC STABILITY: TRANSPORTATION INSECURITY
IN THE PAST 12 MONTHS, HAS LACK OF TRANSPORTATION KEPT YOU FROM MEETINGS, WORK, OR FROM GETTING THINGS NEEDED FOR DAILY LIVING?: PATIENT DECLINED

## 2023-12-10 SDOH — ECONOMIC STABILITY: HOUSING INSECURITY: IN THE LAST 12 MONTHS, WAS THERE A TIME WHEN YOU WERE NOT ABLE TO PAY THE MORTGAGE OR RENT ON TIME?: PATIENT REFUSED

## 2023-12-10 SDOH — ECONOMIC STABILITY: TRANSPORTATION INSECURITY
IN THE PAST 12 MONTHS, HAS THE LACK OF TRANSPORTATION KEPT YOU FROM MEDICAL APPOINTMENTS OR FROM GETTING MEDICATIONS?: PATIENT DECLINED

## 2023-12-10 SDOH — ECONOMIC STABILITY: HOUSING INSECURITY
IN THE LAST 12 MONTHS, WAS THERE A TIME WHEN YOU DID NOT HAVE A STEADY PLACE TO SLEEP OR SLEPT IN A SHELTER (INCLUDING NOW)?: PATIENT REFUSED

## 2023-12-10 SDOH — ECONOMIC STABILITY: INCOME INSECURITY: HOW HARD IS IT FOR YOU TO PAY FOR THE VERY BASICS LIKE FOOD, HOUSING, MEDICAL CARE, AND HEATING?: PATIENT DECLINED

## 2023-12-10 ASSESSMENT — COGNITIVE AND FUNCTIONAL STATUS - GENERAL
STANDING UP FROM CHAIR USING ARMS: A LOT
TOILETING: A LOT
MOVING TO AND FROM BED TO CHAIR: A LITTLE
TURNING FROM BACK TO SIDE WHILE IN FLAT BAD: A LITTLE
DRESSING REGULAR LOWER BODY CLOTHING: A LOT
MOVING FROM LYING ON BACK TO SITTING ON SIDE OF FLAT BED WITH BEDRAILS: A LITTLE
DAILY ACTIVITIY SCORE: 15
MOBILITY SCORE: 18
STANDING UP FROM CHAIR USING ARMS: A LOT
DAILY ACTIVITIY SCORE: 15
DRESSING REGULAR LOWER BODY CLOTHING: A LOT
PERSONAL GROOMING: A LITTLE
MOVING FROM LYING ON BACK TO SITTING ON SIDE OF FLAT BED WITH BEDRAILS: A LITTLE
DRESSING REGULAR UPPER BODY CLOTHING: A LOT
CLIMB 3 TO 5 STEPS WITH RAILING: A LOT
STANDING UP FROM CHAIR USING ARMS: A LITTLE
TURNING FROM BACK TO SIDE WHILE IN FLAT BAD: A LITTLE
HELP NEEDED FOR BATHING: A LOT
WALKING IN HOSPITAL ROOM: TOTAL
TURNING FROM BACK TO SIDE WHILE IN FLAT BAD: A LITTLE
CLIMB 3 TO 5 STEPS WITH RAILING: A LITTLE
WALKING IN HOSPITAL ROOM: TOTAL
TOILETING: A LOT
DRESSING REGULAR UPPER BODY CLOTHING: A LOT
MOBILITY SCORE: 13
HELP NEEDED FOR BATHING: A LOT
MOVING FROM LYING ON BACK TO SITTING ON SIDE OF FLAT BED WITH BEDRAILS: A LITTLE
MOVING TO AND FROM BED TO CHAIR: A LOT
MOVING TO AND FROM BED TO CHAIR: A LOT
CLIMB 3 TO 5 STEPS WITH RAILING: A LOT
MOBILITY SCORE: 13
PERSONAL GROOMING: A LITTLE
WALKING IN HOSPITAL ROOM: A LITTLE

## 2023-12-10 ASSESSMENT — PAIN SCALES - GENERAL
PAINLEVEL_OUTOF10: 5 - MODERATE PAIN
PAINLEVEL_OUTOF10: 8
PAINLEVEL_OUTOF10: 0 - NO PAIN
PAINLEVEL_OUTOF10: 0 - NO PAIN

## 2023-12-10 ASSESSMENT — ACTIVITIES OF DAILY LIVING (ADL)
LACK_OF_TRANSPORTATION: PATIENT DECLINED
LACK_OF_TRANSPORTATION: PATIENT DECLINED
ADL_ASSISTANCE: INDEPENDENT

## 2023-12-10 ASSESSMENT — PAIN - FUNCTIONAL ASSESSMENT
PAIN_FUNCTIONAL_ASSESSMENT: 0-10

## 2023-12-10 ASSESSMENT — PAIN DESCRIPTION - LOCATION: LOCATION: BACK

## 2023-12-10 NOTE — PROGRESS NOTES
Physical Therapy    Physical Therapy Evaluation    Patient Name: Amirah Bennett  MRN: [id number]  Today's Date: 12/10/2023   Time Calculation  Start Time: 0915  Stop Time: 0927  Time Calculation (min): 12 min    Assessment/Plan   PT Assessment  PT Assessment Results: Decreased strength, Impaired balance, Decreased mobility, Decreased safety awareness  Rehab Prognosis: Fair  Evaluation/Treatment Tolerance: Patient limited by fatigue  Medical Staff Made Aware: Yes  Strengths: Attitude of self, Coping skills  Barriers to Participation: Insight into problems, Rehab experience, Premorbid level of function  End of Session Communication: Bedside nurse  Assessment Comment: Patient demo decline in functional mobility as compared to baseline. Patient coudl benefit from PT services in order to maximzie function prior to D/C.  End of Session Patient Position: Up in chair  IP OR SWING BED PT PLAN  Inpatient or Swing Bed: Inpatient  PT Plan  Treatment/Interventions: Bed mobility, Transfer training, Gait training, Stair training, Balance training, Neuromuscular re-education, Strengthening, Endurance training, Range of motion, Therapeutic exercise, Therapeutic activity  PT Plan: Skilled PT  PT Frequency: 2 times per week  PT Discharge Recommendations: Low intensity level of continued care  Equipment Recommended upon Discharge: Wheeled walker  PT Recommended Transfer Status: Assist x1, Assistive device  PT - OK to Discharge: Yes (Evaluation completed and D/C recommendation made.)      Subjective   General Visit Information:  General  Reason for Referral: 42 year old female admitted with ADHF  Past Medical History Relevant to Rehab: CVA with resultant R hemiparesis and expressive aphasia, HFrEF (20-25%), tracheostomy, A-fib  Prior to Session Communication: Bedside nurse  Patient Position Received: Bed, 3 rail up (sitting EOB)  Preferred Learning Style: visual, verbal  General Comment: Patient appears close to baseline of functional  mobility but does need consistent cues for safety and proper use of RW  Home Living:  Home Living  Type of Home: House  Lives With: Spouse (Able to assist as needed)  Home Adaptive Equipment: Cane  Home Layout: One level  Home Access: Stairs to enter with rails  Entrance Stairs-Rails: Right  Entrance Stairs-Number of Steps: 2  Bathroom Shower/Tub: Walk-in shower  Bathroom Equipment: Grab bars in shower  Prior Level of Function:  Prior Function Per Pt/Caregiver Report  Level of [place]: Independent with ADLs and functional transfers (Home ambulator with SPC)  ADL Assistance: Independent  Homemaking Assistance: Independent  Ambulatory Assistance: Independent (Household ambulator with SPC)  Prior Function Comments: Patient denies falls  Precautions:  Precautions  Medical Precautions: Cardiac precautions, Fall precautions  Vital Signs:  Vital Signs  Heart Rate: 105    Objective   Pain:  Pain Assessment  Pain Assessment: 0-10  Pain Score: 0 - No pain  Cognition:  Cognition  Orientation Level: Oriented X4      Coordination  Movements are Fluid and Coordinated: Yes         Static Sitting Balance  Static Sitting-Balance Support: No upper extremity supported, Feet supported  Static Sitting-Level of Assistance: Distant supervision  Dynamic Sitting Balance  Dynamic Sitting-Balance Support: Feet supported, No upper extremity supported  Dynamic Sitting-Balance: Forward lean, Reaching for objects  Dynamic Sitting-Comments: SBA    Static Standing Balance  Static Standing-Balance Support: No upper extremity supported  Static Standing-Level of Assistance: Close supervision  Dynamic Standing Balance  Dynamic Standing-Balance Support: No upper extremity supported  Dynamic Standing-Balance: Forward lean, Lateral lean, Reaching for objects  Dynamic Standing-Comments: CGA  Functional Assessments:  Bed Mobility  Bed Mobility: No (Patient sitting upright EOB upon arrival)    Transfers  Transfer: Yes  Transfer 1  Transfer From 1: Sit  to  Transfer to 1: Stand  Transfer Device 1: Walker  Transfer Level of Assistance 1: Close supervision  Trials/Comments 1: Prolonged anterior weight shift  Transfers 2  Transfer From 2: Stand to  Transfer to 2: Sit  Transfer Device 2: Walker  Transfer Level of Assistance 2: Contact guard, Minimal verbal cues  Trials/Comments 2: Cues for use of UEs to assist in controlled lowering    Ambulation/Gait Training  Ambulation/Gait Training Performed: Yes  Ambulation/Gait Training 1  Surface 1: Level tile  Device 1: Rolling walker  Assistance 1: Close supervision, Minimal verbal cues, Minimal tactile cues  Quality of Gait 1: Forward flexed posture (RLE in hip ER with decreased heel to toe gait pattern. Cues for maintaining COM in GARO of RW while coordinating RW with steps)  Comments/Distance (ft) 1: 25 x 2  Extremity/Trunk Assessments:  RUE   RUE :  (Grossly 4/5)  LUE   LUE:  (Grossly 4/5)  RLE   RLE :  (Grossly 3+/5)  LLE   LLE :  (Grossly 4+/5)  Outcome Measures:  [facility] Basic Mobility  Turning from your back to your side while in a flat bed without using bedrails: A little  Moving from lying on your back to sitting on the side of a flat bed without using bedrails: A little  Moving to and from bed to chair (including a wheelchair): A little  Standing up from a chair using your arms (e.g. wheelchair or bedside chair): A little  To walk in hospital room: A little  Climbing 3-5 steps with railing: A little  Basic Mobility - Total Score: 18    Encounter Problems       Encounter Problems (Active)       Balance       STG - Maintains dynamic standing balance without upper extremity support >/= 5 min (Progressing)       Start:  12/10/23    Expected End:  12/24/23       INTERVENTIONS:  1. Practice standing with minimal support.  2. Educate patient about standing tolerance.  3. Educate patient about independence with gait, transfers, and ADL's.  4. Educate patient about use of assistive device.  5. Educate patient about  self-directed care.            Mobility       STG - Patient will ambulate household distance with LRAD and ability to turn, retro step and side step without LOB (Progressing)       Start:  12/10/23    Expected End:  12/24/23            STG - Patient will ascend and descend 2 stairs with handrail and SPC (Progressing)       Start:  12/10/23    Expected End:  12/24/23               Transfers       STG - Transfer from bed to chair mod I with LRAD (Progressing)       Start:  12/10/23    Expected End:  12/24/23            STG - Patient will perform bed mobility independent  (Progressing)       Start:  12/10/23    Expected End:  12/24/23            STG - Patient will transfer sit to and from stand mod I with LRAD (Progressing)       Start:  12/10/23    Expected End:  12/24/23                   Education Documentation  Mobility Training, taught by Court Godwin, PT at 12/10/2023  1:29 PM.  Learner: Patient  Readiness: Acceptance  Method: Explanation, Demonstration  Response: Verbalizes Understanding, Demonstrated Understanding  Comment: Patient educated on role of PT services and proper AD of RW to decrease risk for falls.    Education Comments  No comments found.

## 2023-12-10 NOTE — HOSPITAL COURSE
Amirah Bennett is a 42 y.o. female with significant PMHx of HFrEF (LVEF 20-25% (08/2022), Afib on Xarelto w/ DCCV 05/2023), ischemic stroke L MCA d/t AFib LLA thrombus seen on echo (lack of OAC adherence) s/p thrombectomy 01/2022 @ [facility] w/ residual expressive aphasia and R sided weakness, s/p tracheostomy, and HTN presented to the ED on 12/7/23 with dyspnea, BLE edema and 25 lb weight gain. EKG revealed Afib w/ RVR, POCUS revealed significant reduction in LVEF to < 10%. This is concerning for ADHF in s/o of non-adherence to GDMT. She has significant history of not following up with EP for DCCV and AMA during admission for tracheostomy closure. Considering significant symptomatic improvement s/p 40 mg IV Lasix push on admission patient was diuresed during her admission.  Patient weight on admission was 225 lbs and on discharge was ***

## 2023-12-10 NOTE — PROGRESS NOTES
"Amirah Bennett is a 42 y.o. female on day 3 of admission presenting with Acute decompensated heart failure (CMS/HCC).    Subjective   NAOE. Pt without abdominal pain, denies SOB, chest pain. States swelling in legs the same as yesterday. No concerns this AM.       Objective     Physical Exam  Constitutional:       Appearance: Normal appearance. She is obese.   Eyes:      Conjunctiva/sclera: Conjunctivae normal.   Cardiovascular:      Rate and Rhythm: Regular rate and rhythm   Pulmonary:      Effort: Pulmonary effort is normal. No respiratory distress.      Breath sounds: Normal breath sounds.   Abdominal:      Palpations: Abdomen is soft. Tender in LLQ with guarding. 10/10  Musculoskeletal:      Right lower leg: Edema present.      Left lower leg: Edema present.   Comments: 2+ pitting edema.   Skin:     General: Skin is warm and dry.      Capillary Refill: Capillary refill takes 2 to 3 seconds.   Neurological:      Mental Status: She is alert.     Last Recorded Vitals  Blood pressure 138/86, pulse 100, temperature 36.5 °C (97.7 °F), temperature source Temporal, resp. rate 18, height 1.702 m (5' 7\"), weight 102 kg (225 lb 12 oz), SpO2 93 %.  Intake/Output last 3 Shifts:  I/O last 3 completed shifts:  In: 240 (2.3 mL/kg) [P.O.:240]  Out: 60318 (127.2 mL/kg) [Urine:24862 (3.5 mL/kg/hr)]  Weight: 102.4 kg     Relevant Results    Current Facility-Administered Medications:     atorvastatin (Lipitor) tablet 80 mg, 80 mg, oral, Daily, Candi Ruvalcaba MD, 80 mg at 12/09/23 2059    digoxin (Lanoxin) tablet 250 mcg, 250 mcg, oral, Daily, Neal Hsu MD, 250 mcg at 12/09/23 0849    empagliflozin (Jardiance) tablet 10 mg, 10 mg, oral, Daily, Neal Hsu MD, 10 mg at 12/09/23 0850    folic acid (Folvite) tablet 1 mg, 1 mg, oral, Daily, Neal Hsu MD, 1 mg at 12/09/23 0851    gabapentin (Neurontin) capsule 100 mg, 100 mg, oral, TID, Neal Hsu MD, 100 mg at 12/09/23 2059    HYDROmorphone (Dilaudid) injection 0.2 mg, " 0.2 mg, intravenous, q4h PRN, Carlos Alfonso MD, 0.2 mg at 12/09/23 0448    metoprolol succinate XL (Toprol-XL) 24 hr tablet 50 mg, 50 mg, oral, Daily, Candi Ruvalcaba MD, 50 mg at 12/09/23 0851    multivitamin with minerals 1 tablet, 1 tablet, oral, Daily, Neal Hsu MD, 1 tablet at 12/09/23 0851    pantoprazole (ProtoNix) EC tablet 40 mg, 40 mg, oral, Daily before breakfast, Neal Hsu MD, 40 mg at 12/10/23 0652    polyethylene glycol (Glycolax, Miralax) packet 17 g, 17 g, oral, BID, Jeremias Stanford MD    potassium chloride CR (Klor-Con M20) ER tablet 20 mEq, 20 mEq, oral, BID, Evan HANSEN Do, MD    QUEtiapine (SEROquel) tablet 50 mg, 50 mg, oral, TID, Neal Hsu MD, 50 mg at 12/09/23 1451    rivaroxaban (Xarelto) tablet 20 mg, 20 mg, oral, Daily with evening meal, Neal Hsu MD, 20 mg at 12/09/23 1743    sacubitriL-valsartan (Entresto) 24-26 mg per tablet 1 tablet, 1 tablet, oral, BID, Candi Ruvalcaba MD, 1 tablet at 12/09/23 2059    sennosides (Senokot) tablet 17.2 mg, 2 tablet, oral, Nightly, Candi Ruvalcaba MD, 17.2 mg at 12/09/23 2059    spironolactone (Aldactone) tablet 25 mg, 25 mg, oral, Daily, Neal Hsu MD, 25 mg at 12/09/23 0852    Results for orders placed or performed during the hospital encounter of 12/07/23 (from the past 24 hour(s))   Renal function panel   Result Value Ref Range    Glucose 90 74 - 99 mg/dL    Sodium 142 136 - 145 mmol/L    Potassium 3.8 3.5 - 5.3 mmol/L    Chloride 100 98 - 107 mmol/L    Bicarbonate 30 21 - 32 mmol/L    Anion Gap 16 10 - 20 mmol/L    Urea Nitrogen 19 6 - 23 mg/dL    Creatinine 1.22 (H) 0.50 - 1.05 mg/dL    eGFR 56 (L) >60 mL/min/1.73m*2    Calcium 8.5 (L) 8.6 - 10.6 mg/dL    Phosphorus 4.3 2.5 - 4.9 mg/dL    Albumin 3.0 (L) 3.4 - 5.0 g/dL       Intake/Output Summary (Last 24 hours) at 12/10/2023 0804  Last data filed at 12/10/2023 0416  Gross per 24 hour   Intake 240 ml   Output 5075 ml   Net -4835 ml          Assessment/Plan   Principal Problem:     Acute decompensated heart failure (CMS/HCC)    Amirah Bennett is a 42 y.o. female with significant PMHx of HFrEF (LVEF 20-25% (08/2022), Afib on Xarelto w/ DCCV 05/2023), ischemic stroke L MCA d/t AFib LLA thrombus seen on echo (lack of OAC adherence) s/p thrombectomy 01/2022 @ [facility] w/ residual expressive aphasia and R sided weakness, s/p tracheostomy, and HTN presenting to the ED on 17/7/23 with dyspnea, BLE edema and 25 lb weight gain. EKG revealed Afib w/ RVR, POCUS revealed significant reduction in LVEF to < 10%. This is concerning for ADHF in s/o of non-adherence to GDMT. She has significant history of not following up with EP for DCCV and AMA during admission for tracheostomy closure. Considering significant symptomatic improvement s/p 40 mg IV Lasix push, we will continue to manage ADHF with diuresis and managing Afib with rate control and DOAC. Will continue to monitor for ACS but troponin negative and HDS.    Update 12/10:  -40mg IV Lasix, monitoring RFP and I/O     UPDATES 12/9:  - Diuresis holiday today, Discontinued lasis. Output 9L yesterday.   - CT abdomen pelvis   - NPO     #ADHF  #Afib  #HFrEF (LVEF 20-25% (08/2022)  :: Patient has had persistent Afib managed with metoprolol and digoxin as outpatient. Has missed scheduled DCCV w/ [facility] EP.  :: Endorses non-adherence to medications contributing to edema accumulation and shortness of breath.  :: Bedside POCUS w/ new LVEF < 10%  - TTE Pending  - 40 mg Lasix BID, RFP BID  - Digoxin 250 micrograms every day  - GDMT: Jardiance 10 mg, metoprolol tart 50 mg BID, Entresto , spironolactone 25 mg  - Xarelto 20 mg every day  - Telemetry  - 40mg IV Lasix 12/10, monitoring RFP and I/O      #ischemic stroke L MCA  :: No new focal deficits  - CTM  - Xarelto 20 mg     F: PRN, EF ~10%  E: PRN  N: Cardiac  A: 2x PIV     DVT Ppx: Xarelto 20 mg  GI PPX: pantoprazole 40 mg, miralax daily     CODE: FULL, confirmed on admission  NOK: Pranay Owen (friend) [phone]      Man Gomez,   PGY-1 Neurology

## 2023-12-10 NOTE — CARE PLAN
Patient remained safe and free of falls throughout shift.  Patient continues to be diuresed with IVP Lasix.  Intake and output continue to be closely monitored by nursing.  Patient voided 2.3 L this shift.  Mag 1.5.  Patient given 4g mag IV.  Will notify team of any changes in condition.

## 2023-12-11 LAB
ALBUMIN SERPL BCP-MCNC: 2.9 G/DL (ref 3.4–5)
ALBUMIN SERPL BCP-MCNC: 3.2 G/DL (ref 3.4–5)
ALP SERPL-CCNC: 63 U/L (ref 33–110)
ALT SERPL W P-5'-P-CCNC: 11 U/L (ref 7–45)
ANION GAP SERPL CALC-SCNC: 14 MMOL/L (ref 10–20)
ANION GAP SERPL CALC-SCNC: 15 MMOL/L (ref 10–20)
AST SERPL W P-5'-P-CCNC: 15 U/L (ref 9–39)
ATRIAL RATE: 156 BPM
BILIRUB SERPL-MCNC: 1 MG/DL (ref 0–1.2)
BUN SERPL-MCNC: 16 MG/DL (ref 6–23)
BUN SERPL-MCNC: 18 MG/DL (ref 6–23)
CALCIUM SERPL-MCNC: 8.3 MG/DL (ref 8.6–10.6)
CALCIUM SERPL-MCNC: 8.7 MG/DL (ref 8.6–10.6)
CHLORIDE SERPL-SCNC: 101 MMOL/L (ref 98–107)
CHLORIDE SERPL-SCNC: 102 MMOL/L (ref 98–107)
CO2 SERPL-SCNC: 27 MMOL/L (ref 21–32)
CO2 SERPL-SCNC: 27 MMOL/L (ref 21–32)
CREAT SERPL-MCNC: 1.19 MG/DL (ref 0.5–1.05)
CREAT SERPL-MCNC: 1.27 MG/DL (ref 0.5–1.05)
ERYTHROCYTE [DISTWIDTH] IN BLOOD BY AUTOMATED COUNT: 17.1 % (ref 11.5–14.5)
GFR SERPL CREATININE-BSD FRML MDRD: 54 ML/MIN/1.73M*2
GFR SERPL CREATININE-BSD FRML MDRD: 58 ML/MIN/1.73M*2
GLUCOSE SERPL-MCNC: 113 MG/DL (ref 74–99)
GLUCOSE SERPL-MCNC: 74 MG/DL (ref 74–99)
HCT VFR BLD AUTO: 42.7 % (ref 36–46)
HGB BLD-MCNC: 13.2 G/DL (ref 12–16)
MAGNESIUM SERPL-MCNC: 1.86 MG/DL (ref 1.6–2.4)
MAGNESIUM SERPL-MCNC: 2 MG/DL (ref 1.6–2.4)
MCH RBC QN AUTO: 26.8 PG (ref 26–34)
MCHC RBC AUTO-ENTMCNC: 30.9 G/DL (ref 32–36)
MCV RBC AUTO: 87 FL (ref 80–100)
NRBC BLD-RTO: 0 /100 WBCS (ref 0–0)
PHOSPHATE SERPL-MCNC: 3.5 MG/DL (ref 2.5–4.9)
PHOSPHATE SERPL-MCNC: 3.6 MG/DL (ref 2.5–4.9)
PLATELET # BLD AUTO: 178 X10*3/UL (ref 150–450)
POTASSIUM SERPL-SCNC: 4.2 MMOL/L (ref 3.5–5.3)
POTASSIUM SERPL-SCNC: 4.3 MMOL/L (ref 3.5–5.3)
PROT SERPL-MCNC: 6.1 G/DL (ref 6.4–8.2)
Q ONSET: 217 MS
QRS COUNT: 21 BEATS
QRS DURATION: 82 MS
QT INTERVAL: 308 MS
QTC CALCULATION(BAZETT): 449 MS
QTC FREDERICIA: 396 MS
R AXIS: 3 DEGREES
RBC # BLD AUTO: 4.92 X10*6/UL (ref 4–5.2)
SODIUM SERPL-SCNC: 139 MMOL/L (ref 136–145)
SODIUM SERPL-SCNC: 139 MMOL/L (ref 136–145)
T AXIS: 195 DEGREES
T OFFSET: 371 MS
VENTRICULAR RATE: 128 BPM
WBC # BLD AUTO: 6.4 X10*3/UL (ref 4.4–11.3)

## 2023-12-11 PROCEDURE — 2500000004 HC RX 250 GENERAL PHARMACY W/ HCPCS (ALT 636 FOR OP/ED)

## 2023-12-11 PROCEDURE — 83735 ASSAY OF MAGNESIUM: CPT

## 2023-12-11 PROCEDURE — 1200000002 HC GENERAL ROOM WITH TELEMETRY DAILY

## 2023-12-11 PROCEDURE — 97165 OT EVAL LOW COMPLEX 30 MIN: CPT | Mod: GO

## 2023-12-11 PROCEDURE — 99232 SBSQ HOSP IP/OBS MODERATE 35: CPT | Performed by: INTERNAL MEDICINE

## 2023-12-11 PROCEDURE — 36415 COLL VENOUS BLD VENIPUNCTURE: CPT

## 2023-12-11 PROCEDURE — 2500000001 HC RX 250 WO HCPCS SELF ADMINISTERED DRUGS (ALT 637 FOR MEDICARE OP)

## 2023-12-11 PROCEDURE — 80053 COMPREHEN METABOLIC PANEL: CPT

## 2023-12-11 PROCEDURE — 85027 COMPLETE CBC AUTOMATED: CPT

## 2023-12-11 PROCEDURE — 84100 ASSAY OF PHOSPHORUS: CPT

## 2023-12-11 RX ORDER — FUROSEMIDE 10 MG/ML
40 INJECTION INTRAMUSCULAR; INTRAVENOUS ONCE
Status: COMPLETED | OUTPATIENT
Start: 2023-12-11 | End: 2023-12-11

## 2023-12-11 RX ORDER — MAGNESIUM SULFATE HEPTAHYDRATE 40 MG/ML
2 INJECTION, SOLUTION INTRAVENOUS ONCE
Status: COMPLETED | OUTPATIENT
Start: 2023-12-11 | End: 2023-12-12

## 2023-12-11 RX ADMIN — GABAPENTIN 100 MG: 100 CAPSULE ORAL at 08:12

## 2023-12-11 RX ADMIN — GABAPENTIN 100 MG: 100 CAPSULE ORAL at 14:32

## 2023-12-11 RX ADMIN — FOLIC ACID 1 MG: 1 TABLET ORAL at 08:14

## 2023-12-11 RX ADMIN — DIGOXIN 250 MCG: 250 TABLET ORAL at 08:11

## 2023-12-11 RX ADMIN — QUETIAPINE 50 MG: 25 TABLET ORAL at 08:11

## 2023-12-11 RX ADMIN — SACUBITRIL AND VALSARTAN 1 TABLET: 24; 26 TABLET, FILM COATED ORAL at 08:11

## 2023-12-11 RX ADMIN — SPIRONOLACTONE 25 MG: 25 TABLET ORAL at 08:11

## 2023-12-11 RX ADMIN — GABAPENTIN 100 MG: 100 CAPSULE ORAL at 20:11

## 2023-12-11 RX ADMIN — FUROSEMIDE 40 MG: 10 INJECTION, SOLUTION INTRAVENOUS at 10:00

## 2023-12-11 RX ADMIN — Medication 1 TABLET: at 08:12

## 2023-12-11 RX ADMIN — QUETIAPINE 50 MG: 25 TABLET ORAL at 20:11

## 2023-12-11 RX ADMIN — SACUBITRIL AND VALSARTAN 1 TABLET: 24; 26 TABLET, FILM COATED ORAL at 20:11

## 2023-12-11 RX ADMIN — EMPAGLIFLOZIN 10 MG: 10 TABLET, FILM COATED ORAL at 08:11

## 2023-12-11 RX ADMIN — MAGNESIUM SULFATE HEPTAHYDRATE 2 G: 40 INJECTION, SOLUTION INTRAVENOUS at 23:31

## 2023-12-11 RX ADMIN — RIVAROXABAN 20 MG: 20 TABLET, FILM COATED ORAL at 16:44

## 2023-12-11 RX ADMIN — QUETIAPINE 50 MG: 25 TABLET ORAL at 14:32

## 2023-12-11 RX ADMIN — METOPROLOL SUCCINATE 50 MG: 50 TABLET, EXTENDED RELEASE ORAL at 08:11

## 2023-12-11 RX ADMIN — ATORVASTATIN CALCIUM 80 MG: 80 TABLET, FILM COATED ORAL at 20:11

## 2023-12-11 RX ADMIN — PANTOPRAZOLE SODIUM 40 MG: 40 TABLET, DELAYED RELEASE ORAL at 06:00

## 2023-12-11 ASSESSMENT — COGNITIVE AND FUNCTIONAL STATUS - GENERAL
HELP NEEDED FOR BATHING: A LOT
PERSONAL GROOMING: A LITTLE
DRESSING REGULAR UPPER BODY CLOTHING: A LITTLE
TURNING FROM BACK TO SIDE WHILE IN FLAT BAD: A LITTLE
TOILETING: A LITTLE
STANDING UP FROM CHAIR USING ARMS: A LOT
TURNING FROM BACK TO SIDE WHILE IN FLAT BAD: A LITTLE
TOILETING: A LOT
DAILY ACTIVITIY SCORE: 19
MOVING FROM LYING ON BACK TO SITTING ON SIDE OF FLAT BED WITH BEDRAILS: A LITTLE
DAILY ACTIVITIY SCORE: 18
CLIMB 3 TO 5 STEPS WITH RAILING: A LOT
WALKING IN HOSPITAL ROOM: A LOT
WALKING IN HOSPITAL ROOM: A LOT
HELP NEEDED FOR BATHING: A LITTLE
PERSONAL GROOMING: A LITTLE
STANDING UP FROM CHAIR USING ARMS: A LOT
HELP NEEDED FOR BATHING: A LOT
DRESSING REGULAR LOWER BODY CLOTHING: A LITTLE
MOVING TO AND FROM BED TO CHAIR: A LOT
MOBILITY SCORE: 14
PERSONAL GROOMING: A LITTLE
MOVING FROM LYING ON BACK TO SITTING ON SIDE OF FLAT BED WITH BEDRAILS: A LITTLE
DAILY ACTIVITIY SCORE: 18
DRESSING REGULAR LOWER BODY CLOTHING: A LOT
MOVING TO AND FROM BED TO CHAIR: A LOT
CLIMB 3 TO 5 STEPS WITH RAILING: A LOT
MOBILITY SCORE: 14
DRESSING REGULAR LOWER BODY CLOTHING: A LITTLE
TOILETING: A LITTLE

## 2023-12-11 ASSESSMENT — PAIN SCALES - GENERAL
PAINLEVEL_OUTOF10: 0 - NO PAIN

## 2023-12-11 ASSESSMENT — PAIN - FUNCTIONAL ASSESSMENT
PAIN_FUNCTIONAL_ASSESSMENT: 0-10
PAIN_FUNCTIONAL_ASSESSMENT: 0-10

## 2023-12-11 ASSESSMENT — ACTIVITIES OF DAILY LIVING (ADL): ADL_ASSISTANCE: INDEPENDENT

## 2023-12-11 NOTE — PROGRESS NOTES
Occupational Therapy    Evaluation    Patient Name: Amirah Bennett  MRN: [id number]  Today's Date: 12/11/2023       Assessment  IP OT Assessment  OT Assessment: 41 y/o female admitted for ACHF. Pt presents with decreased ability to fully manage BADLs and functional mobility activities compared to their reported baseline. Would benefit from skilled services to maximize functional potential.    Prognosis: Excellent  Evaluation/Treatment Tolerance: Patient limited by fatigue  Medical Staff Made Aware: Yes  End of Session Communication: Bedside nurse  End of Session Patient Position: Up in chair, Alarm off, not on at start of session  Plan:  Treatment Interventions: ADL retraining, Functional transfer training, UE strengthening/ROM, Endurance training, Compensatory technique education  OT Frequency: 3 times per week  OT Discharge Recommendations: Moderate intensity level of continued care  Equipment Recommended upon Discharge: Wheeled walker  OT Recommended Transfer Status: Stand by assist  OT - OK to Discharge: Yes (Per OT POC)    Subjective   Current Problem:  1. Acute decompensated heart failure (CMS/HCC)  Referral to Cardiology    CANCELED: Transthoracic Echo (TTE) Complete    CANCELED: Transthoracic Echo (TTE) Complete        General:  General  Reason for Referral: Impaired ADL  Past Medical History Relevant to Rehab: HFrEF (LVEF 20-25% (08/2022), Afib on Xarelto w/ DCCV 05/2023), ischemic stroke L MCA d/t AFib LLA thrombus seen on echo (lack of OAC adherence) s/p thrombectomy 01/2022 @ [facility] w/ residual expressive aphasia and R sided weakness, s/p tracheostomy, and HTN presenting  Prior to Session Communication: Bedside nurse  Patient Position Received: Bed, 3 rail up, Alarm off, not on at start of session  General Comment: Pt appears fatigued. Reports feeling for tired this date. BLE edema observed. She was able to participate with session.  Precautions:  Medical Precautions: Fall precautions  Vital Signs:      Pain:  Pain Assessment  Pain Score: 0 - No pain    Objective   Cognition:  Overall Cognitive Status: Within Functional Limits (Has h/o expressive aphasia. Some difficulty with word finding at times.)           Home Living:  Type of Home: House  Lives With: Spouse  Home Adaptive Equipment: Cane  Home Layout: One level  Home Access: Stairs to enter with rails  Entrance Stairs-Number of Steps: 2  Bathroom Shower/Tub: Walk-in shower  Bathroom Equipment: Grab bars in shower   Prior Function:  Level of Maury: Independent with ADLs and functional transfers  Receives Help From:  ( is helpful, but he does work.)  ADL Assistance: Independent  Homemaking Assistance:  (Shared responsbility with spouse)  Ambulatory Assistance: Independent (SC)       Activity Tolerance:  Endurance: Decreased tolerance for upright activites  Bed Mobility/Transfers: Bed Mobility  Bed Mobility: Yes  Bed Mobility 1  Bed Mobility 1: Supine to sitting  Level of Assistance 1: Close supervision  Bed Mobility Comments 1: Increased time needed. HOB raised.    Transfers  Transfer: Yes  Transfer 1  Transfer From 1: Sit to  Transfer to 1: Stand  Transfer Device 1: Walker  Transfer Level of Assistance 1: Close supervision      Ambulation/Gait Training:  Ambulation/Gait Training  Ambulation/Gait Training Performed: Yes  Ambulation/Gait Training 1  Device 1: Rolling walker  Assistance 1: Contact guard  Comments/Distance (ft) 1:  (Pt ambulated short distance to transfer bed->chair. Slow pace and fatigues easily. Ptuses SC at baseline. Agreeable to FWW use this date d/t feeling tired and weak.)  Sitting Balance:  Static Sitting Balance  Static Sitting-Level of Assistance: Close supervision  Dynamic Sitting Balance  Dynamic Sitting-Comments:  (Good balance while managing socks seated EOB)  Standing Balance:  Static Standing Balance  Static Standing-Level of Assistance: Contact guard       Vision:   Sensation:  Light Touch: No apparent  deficits  Strength:  Strength Comments: BUE strength WFL  Perception:  Inattention/Neglect: Appears intact  Coordination:  Movements are Fluid and Coordinated: Yes   Hand Function:  Hand Function  Gross Grasp: Functional        Outcome Measures: [facility] Daily Activity  Putting on and taking off regular lower body clothing: A little  Bathing (including washing, rinsing, drying): A lot  Putting on and taking off regular upper body clothing: None  Toileting, which includes using toilet, bedpan or urinal: A lot  Taking care of personal grooming such as brushing teeth: A little  Eating Meals: None  Daily Activity - Total Score: 18      Education Documentation  Body Mechanics, taught by Naheed Parrish OT at 12/11/2023 11:20 AM.  Learner: Patient  Readiness: Acceptance  Method: Explanation  Response: Verbalizes Understanding    ADL Training, taught by Naheed Parrish OT at 12/11/2023 11:20 AM.  Learner: Patient  Readiness: Acceptance  Method: Explanation  Response: Verbalizes Understanding    Education Comments  No comments found.      Goals:   Encounter Problems       Encounter Problems (Active)       ADLs       Patient will complete lower body dressing with stand by assist for donning and doffing all LE clothes using PRN adaptive equipment in order to increase Indep with task participation.        Start:  12/11/23    Expected End:  01/01/24            Patient will complete toileting, including clothing management and hygiene, with SBA in order to maximize functional Indep with task completion.        Start:  12/11/23    Expected End:  01/01/24                       COGNITION/SAFETY       Pt will identify and incorporate 3 EC/WS strategies into daily routines for increased safety and Indep.        Start:  12/11/23    Expected End:  01/01/24               MOBILITY       Pt will demo increased functional mobility to tolerate tasks necessary to complete ADL routine with Sup.        Start:  12/11/23    Expected End:   01/01/24                       TRANSFERS       Patient will complete functional transfers using least restrictive device with Sup in order to maximize functional potential and increase safety.        Start:  12/11/23    Expected End:  01/01/24

## 2023-12-11 NOTE — CARE PLAN
The patient's goals for the shift include      The clinical goals for the shift include remain HDS    Over the shift, the patient did not make progress toward the following goals. Barriers to progression include --fatigue. Recommendations to address these barriers include --swelling& fattigue.

## 2023-12-11 NOTE — PROGRESS NOTES
"Amirah Bennett is a 42 y.o. female on day 4 of admission presenting with Acute decompensated heart failure (CMS/HCC).    Subjective   No acute events overnight. Cr was 1.16 -> 1.29 so lasix held overnight. Iv 40mg lasix given in AM.         Objective     Physical Exam  Constitutional:       Appearance: Normal appearance. She is obese.   Eyes:      Conjunctiva/sclera: Conjunctivae normal.   Cardiovascular:      Rate and Rhythm: Regular rate and rhythm   Pulmonary:      Effort: Pulmonary effort is normal. No respiratory distress.      Breath sounds: Normal breath sounds.   Abdominal:      Palpations: Abdomen is soft.  Musculoskeletal:      Right lower leg: Edema present.      Left lower leg: Edema present.   Comments: 2+ pitting edema.   Skin:     General: Skin is warm and dry.      Capillary Refill: Capillary refill takes 2 to 3 seconds.   Neurological:      Mental Status: She is alert.   Last Recorded Vitals  Blood pressure 101/66, pulse 96, temperature 36.8 °C (98.2 °F), temperature source Temporal, resp. rate 16, height 1.702 m (5' 7\"), weight 96.3 kg (212 lb 4.9 oz), SpO2 97 %.  Intake/Output last 3 Shifts:  I/O last 3 completed shifts:  In: 960 (10 mL/kg) [P.O.:960]  Out: 5400 (56.1 mL/kg) [Urine:5400 (1.6 mL/kg/hr)]  Weight: 96.3 kg     Relevant Results              Active Medications  Scheduled medications  atorvastatin, 80 mg, oral, Daily  digoxin, 250 mcg, oral, Daily  empagliflozin, 10 mg, oral, Daily  folic acid, 1 mg, oral, Daily  gabapentin, 100 mg, oral, TID  metoprolol succinate XL, 50 mg, oral, Daily  multivitamin with minerals, 1 tablet, oral, Daily  pantoprazole, 40 mg, oral, Daily before breakfast  polyethylene glycol, 17 g, oral, BID  potassium chloride CR, 20 mEq, oral, BID  QUEtiapine, 50 mg, oral, TID  rivaroxaban, 20 mg, oral, Daily with evening meal  sacubitriL-valsartan, 1 tablet, oral, BID  sennosides, 2 tablet, oral, Nightly  spironolactone, 25 mg, oral, Daily      Continuous medications   "   PRN medications       Recent Labs  Results for orders placed or performed during the hospital encounter of 12/07/23 (from the past 24 hour(s))   CBC and Auto Differential   Result Value Ref Range    WBC 6.3 4.4 - 11.3 x10*3/uL    nRBC 0.0 0.0 - 0.0 /100 WBCs    RBC 4.78 4.00 - 5.20 x10*6/uL    Hemoglobin 12.7 12.0 - 16.0 g/dL    Hematocrit 41.0 36.0 - 46.0 %    MCV 86 80 - 100 fL    MCH 26.6 26.0 - 34.0 pg    MCHC 31.0 (L) 32.0 - 36.0 g/dL    RDW 16.8 (H) 11.5 - 14.5 %    Platelets 169 150 - 450 x10*3/uL    Neutrophils % 38.4 40.0 - 80.0 %    Immature Granulocytes %, Automated 0.5 0.0 - 0.9 %    Lymphocytes % 46.9 13.0 - 44.0 %    Monocytes % 11.8 2.0 - 10.0 %    Eosinophils % 2.1 0.0 - 6.0 %    Basophils % 0.3 0.0 - 2.0 %    Neutrophils Absolute 2.42 1.20 - 7.70 x10*3/uL    Immature Granulocytes Absolute, Automated 0.03 0.00 - 0.70 x10*3/uL    Lymphocytes Absolute 2.95 1.20 - 4.80 x10*3/uL    Monocytes Absolute 0.74 0.10 - 1.00 x10*3/uL    Eosinophils Absolute 0.13 0.00 - 0.70 x10*3/uL    Basophils Absolute 0.02 0.00 - 0.10 x10*3/uL   Magnesium   Result Value Ref Range    Magnesium 1.58 (L) 1.60 - 2.40 mg/dL   Comprehensive Metabolic Panel   Result Value Ref Range    Glucose 130 (H) 74 - 99 mg/dL    Sodium 139 136 - 145 mmol/L    Potassium 4.1 3.5 - 5.3 mmol/L    Chloride 101 98 - 107 mmol/L    Bicarbonate 28 21 - 32 mmol/L    Anion Gap 14 10 - 20 mmol/L    Urea Nitrogen 15 6 - 23 mg/dL    Creatinine 1.16 (H) 0.50 - 1.05 mg/dL    eGFR 60 (L) >60 mL/min/1.73m*2    Calcium 8.1 (L) 8.6 - 10.6 mg/dL    Albumin 2.7 (L) 3.4 - 5.0 g/dL    Alkaline Phosphatase 57 33 - 110 U/L    Total Protein 5.2 (L) 6.4 - 8.2 g/dL    AST 14 9 - 39 U/L    Bilirubin, Total 1.2 0.0 - 1.2 mg/dL    ALT 11 7 - 45 U/L   Phosphorus   Result Value Ref Range    Phosphorus 4.1 2.5 - 4.9 mg/dL   Renal function panel   Result Value Ref Range    Glucose 115 (H) 74 - 99 mg/dL    Sodium 139 136 - 145 mmol/L    Potassium 4.4 3.5 - 5.3 mmol/L    Chloride  103 98 - 107 mmol/L    Bicarbonate 27 21 - 32 mmol/L    Anion Gap 13 10 - 20 mmol/L    Urea Nitrogen 16 6 - 23 mg/dL    Creatinine 1.29 (H) 0.50 - 1.05 mg/dL    eGFR 53 (L) >60 mL/min/1.73m*2    Calcium 8.2 (L) 8.6 - 10.6 mg/dL    Phosphorus 3.9 2.5 - 4.9 mg/dL    Albumin 2.9 (L) 3.4 - 5.0 g/dL   Magnesium   Result Value Ref Range    Magnesium 2.12 1.60 - 2.40 mg/dL       Imaging  CT abdomen pelvis wo IV contrast    Result Date: 12/9/2023  Interpreted By:  Ray Ferreira, STUDY: CT ABDOMEN PELVIS WO IV CONTRAST;  12/9/2023 11:03 am   INDICATION: Signs/Symptoms:abdominal tenderness concerns for obstruction / diverticulitis.   COMPARISON: None.   ACCESSION NUMBER(S): [id number]   ORDERING CLINICIAN: TONY FLANNERY   TECHNIQUE: Contiguous axial images of the abdomen and pelvis were obtained without intravenous contrast. Coronal and sagittal reformatted images were reconstructed from the axial data.   FINDINGS: LOWER CHEST: There is moderate cardiomegaly. There is a small pericardial effusion. There is a trace right pleural effusion. There is minimal bibasilar atelectasis.   Note: The absence of intravenous contrast limits evaluation of the solid organs and vasculature.   ABDOMEN/PELVIS:   ABDOMINAL WALL: There is diffuse moderate-severe anasarca.   LIVER: There is geographic hepatic steatosis and cirrhosis. Scattered simple cysts are again noted. The liver is enlarged, measuring 19.2 cm in craniocaudal dimension, previously 17.5 cm (04/05/2022).   BILE DUCTS: No significant intrahepatic or extrahepatic dilatation.   GALLBLADDER: The gallbladder is contracted without acute abnormality.   PANCREAS: No significant abnormality.   SPLEEN: No significant abnormality.   ADRENALS: There is nonspecific diffuse thickening of the adrenal glands, progressed from prior study.   KIDNEYS, URETERS, BLADDER: There is redemonstration of focal volume loss in the mid-upper pole of the left kidney representing remote infarct. There is  mild bilateral pelvicaliectasis (new) without hydroureter or obstructing calculi. The urinary bladder wall thickness appears within normal limits for degree of distention.   REPRODUCTIVE ORGANS: The uterus appears normal. There are no suspicious adnexal masses. The ovaries are normal in size. There is a simple 1.1 cm left ovarian cyst.   VESSELS: Mild aortic atherosclerosis without AAA.   RETROPERITONEUM/LYMPH NODES: Compared to 04/05/2022, there is interval increase in size of para-aortic retroperitoneal lymph nodes which now measure up to 1.7 cm, previously 1.1 cm. There is also a generalized increase in size of gastrohepatic and periportal lymph nodes, bilateral external iliac lymph nodes, and bilateral inguinal lymph nodes. For example, a 1.1 cm x 1 cm right external iliac node previously measured 0.6 cm; a 2.7 cm x 1.5 cm right inguinal lymph node previously measured 2 cm x 1 cm; a left 2 cm x 1.6 cm inguinal lymph node previously measured 1.9 cm x 0.8 cm. No acute retroperitoneal abnormality.   BOWEL/MESENTERY/PERITONEUM: Large colonic stool burden from cecum through the descending colon. No inflammatory bowel wall thickening or dilatation. Normal appendix.   No free air or loculated fluid collection. Sized mesenteric edema and small volume ascites in the dependent pelvic recesses, slightly increased in volume compared to 04/05/2022     MUSCULOSKELETAL: No acute osseous abnormality. Within the left iliopsoas muscle, there are two circumscribed macroscopic fat-containing masses consistent with lipomas (6.5 cm x 3.2 cm x 2.9 cm and 3.9 cm x 2.7 cm x 2.6 cm).       1. No bowel obstruction or diverticulitis. However, there is a large stool burden throughout the ascending, transverse, descending colon that could be related to distended/tender abdomen.   2. Moderate-severe diffuse anasarca, mesenteric edema, and SMALL volume abdominopelvic ascites there is only minimally increased in quantity compared to 04/05/2022.  The aforementioned could be due to any combination of the following including but not limited to, diffuse systemic inflammation/infection, worsening right heart failure, worsening liver function, or worsening renal function. Integration of clinical, laboratory, and imaging findings is recommended.   3. New mild bilateral pelvicaliectasis without hydroureter or obstructing calculus.   4. Generalized increase in size of abdominopelvic and inguinal lymph nodes most likely on the basis of worsening anasarca.   5. Cirrhotic liver morphology with geographic steatosis.   6. Moderate cardiomegaly.   MACRO: None.   Signed by: Ray Ferreira 12/9/2023 11:29 AM Dictation workstation:   [id number]                      Assessment/Plan   Principal Problem:    Acute decompensated heart failure (CMS/HCC)    Amirah Bennett is a 42 y.o. female with significant PMHx of HFrEF (LVEF 20-25% (08/2022), Afib on Xarelto w/ DCCV 05/2023), ischemic stroke L MCA d/t AFib LLA thrombus seen on echo (lack of OAC adherence) s/p thrombectomy 01/2022 @ [facility] w/ residual expressive aphasia and R sided weakness, s/p tracheostomy, and HTN presenting to the ED on 17/7/23 with dyspnea, BLE edema and 25 lb weight gain. EKG revealed Afib w/ RVR, POCUS revealed significant reduction in LVEF to < 10%. This is concerning for ADHF in s/o of non-adherence to GDMT. She has significant history of not following up with EP for DCCV and AMA during admission for tracheostomy closure. Considering significant symptomatic improvement s/p 40 mg IV Lasix push, we will continue to manage ADHF with diuresis and managing Afib with rate control and DOAC. Will continue to monitor for ACS but troponin negative and HDS.     Update 12/11/23 :  - Still significantly volume overloaded  - IV lasix 40mg this AM  - Evening rfp, mag ordered       #ADHF  #Afib  #HFrEF (LVEF 20-25% (08/2022)  :: Patient has had persistent Afib managed with metoprolol and digoxin as outpatient. Has  missed scheduled DCCV w/  EP.  :: Endorses non-adherence to medications contributing to edema accumulation and shortness of breath.  :: Bedside POCUS w/ new LVEF < 10%  - TTE Pending  - 40 mg Lasix BID, RFP BID  - Digoxin 250 micrograms every day  - GDMT: Jardiance 10 mg, metoprolol tart 50 mg BID, Entresto , spironolactone 25 mg  - Xarelto 20 mg every day  - Telemetry  - 40mg IV Lasix 12/10, monitoring RFP and I/O      #ischemic stroke L MCA  :: No new focal deficits  - CTM  - Xarelto 20 mg     F: PRN, EF ~10%  E: PRN  N: Cardiac  A: 2x PIV     DVT Ppx: Xarelto 20 mg  GI PPX: pantoprazole 40 mg, miralax daily     CODE: FULL, confirmed on admission  NOK: Pranay Owen (friend) [phone]           Fermin Reveles MD

## 2023-12-11 NOTE — CARE PLAN
The patient's goals for the shift include      The clinical goals for the shift include Pt will remain HD during this shift    Pt slept in bed overnight, safety maintained, soft BPs, MD aware of SBP 90s, back pain managed with warm packs and Dilaudid, UO monitored.    Problem: Fall/Injury  Goal: Not fall by end of shift  Outcome: Progressing  Goal: Be free from injury by end of the shift  Outcome: Progressing  Goal: Verbalize understanding of personal risk factors for fall in the hospital  Outcome: Progressing  Goal: Verbalize understanding of risk factor reduction measures to prevent injury from fall in the home  Outcome: Progressing  Goal: Use assistive devices by end of the shift  Outcome: Progressing  Goal: Pace activities to prevent fatigue by end of the shift  Outcome: Progressing

## 2023-12-12 PROBLEM — Z00.00 ENCOUNTER FOR MEDICAL EXAMINATION TO ESTABLISH CARE: Status: ACTIVE | Noted: 2023-12-12

## 2023-12-12 LAB
ALBUMIN SERPL BCP-MCNC: 3.4 G/DL (ref 3.4–5)
ANION GAP SERPL CALC-SCNC: 12 MMOL/L (ref 10–20)
ANION GAP SERPL CALC-SCNC: 15 MMOL/L (ref 10–20)
BASOPHILS # BLD AUTO: 0.02 X10*3/UL (ref 0–0.1)
BASOPHILS NFR BLD AUTO: 0.3 %
BUN SERPL-MCNC: 16 MG/DL (ref 6–23)
BUN SERPL-MCNC: 16 MG/DL (ref 6–23)
CALCIUM SERPL-MCNC: 8.6 MG/DL (ref 8.6–10.6)
CALCIUM SERPL-MCNC: 8.7 MG/DL (ref 8.6–10.6)
CHLORIDE SERPL-SCNC: 103 MMOL/L (ref 98–107)
CHLORIDE SERPL-SCNC: 105 MMOL/L (ref 98–107)
CO2 SERPL-SCNC: 27 MMOL/L (ref 21–32)
CO2 SERPL-SCNC: 27 MMOL/L (ref 21–32)
CREAT SERPL-MCNC: 1.1 MG/DL (ref 0.5–1.05)
CREAT SERPL-MCNC: 1.4 MG/DL (ref 0.5–1.05)
EOSINOPHIL # BLD AUTO: 0.18 X10*3/UL (ref 0–0.7)
EOSINOPHIL NFR BLD AUTO: 2.7 %
ERYTHROCYTE [DISTWIDTH] IN BLOOD BY AUTOMATED COUNT: 17.2 % (ref 11.5–14.5)
GFR SERPL CREATININE-BSD FRML MDRD: 48 ML/MIN/1.73M*2
GFR SERPL CREATININE-BSD FRML MDRD: 64 ML/MIN/1.73M*2
GLUCOSE SERPL-MCNC: 101 MG/DL (ref 74–99)
GLUCOSE SERPL-MCNC: 118 MG/DL (ref 74–99)
HCT VFR BLD AUTO: 43.7 % (ref 36–46)
HGB BLD-MCNC: 13.5 G/DL (ref 12–16)
IMM GRANULOCYTES # BLD AUTO: 0.01 X10*3/UL (ref 0–0.7)
IMM GRANULOCYTES NFR BLD AUTO: 0.2 % (ref 0–0.9)
LYMPHOCYTES # BLD AUTO: 3.36 X10*3/UL (ref 1.2–4.8)
LYMPHOCYTES NFR BLD AUTO: 51.1 %
MAGNESIUM SERPL-MCNC: 2.01 MG/DL (ref 1.6–2.4)
MAGNESIUM SERPL-MCNC: 2.15 MG/DL (ref 1.6–2.4)
MCH RBC QN AUTO: 26.6 PG (ref 26–34)
MCHC RBC AUTO-ENTMCNC: 30.9 G/DL (ref 32–36)
MCV RBC AUTO: 86 FL (ref 80–100)
MONOCYTES # BLD AUTO: 0.68 X10*3/UL (ref 0.1–1)
MONOCYTES NFR BLD AUTO: 10.4 %
NEUTROPHILS # BLD AUTO: 2.32 X10*3/UL (ref 1.2–7.7)
NEUTROPHILS NFR BLD AUTO: 35.3 %
NRBC BLD-RTO: 0 /100 WBCS (ref 0–0)
PHOSPHATE SERPL-MCNC: 3.3 MG/DL (ref 2.5–4.9)
PHOSPHATE SERPL-MCNC: 3.7 MG/DL (ref 2.5–4.9)
PLATELET # BLD AUTO: 167 X10*3/UL (ref 150–450)
POTASSIUM SERPL-SCNC: 4.3 MMOL/L (ref 3.5–5.3)
POTASSIUM SERPL-SCNC: 4.6 MMOL/L (ref 3.5–5.3)
RBC # BLD AUTO: 5.07 X10*6/UL (ref 4–5.2)
SODIUM SERPL-SCNC: 140 MMOL/L (ref 136–145)
SODIUM SERPL-SCNC: 140 MMOL/L (ref 136–145)
WBC # BLD AUTO: 6.6 X10*3/UL (ref 4.4–11.3)

## 2023-12-12 PROCEDURE — 83735 ASSAY OF MAGNESIUM: CPT

## 2023-12-12 PROCEDURE — 2500000004 HC RX 250 GENERAL PHARMACY W/ HCPCS (ALT 636 FOR OP/ED)

## 2023-12-12 PROCEDURE — 2500000005 HC RX 250 GENERAL PHARMACY W/O HCPCS

## 2023-12-12 PROCEDURE — 36415 COLL VENOUS BLD VENIPUNCTURE: CPT

## 2023-12-12 PROCEDURE — 2500000001 HC RX 250 WO HCPCS SELF ADMINISTERED DRUGS (ALT 637 FOR MEDICARE OP)

## 2023-12-12 PROCEDURE — 80048 BASIC METABOLIC PNL TOTAL CA: CPT | Mod: CCI

## 2023-12-12 PROCEDURE — 85025 COMPLETE CBC W/AUTO DIFF WBC: CPT

## 2023-12-12 PROCEDURE — 80048 BASIC METABOLIC PNL TOTAL CA: CPT

## 2023-12-12 PROCEDURE — 84100 ASSAY OF PHOSPHORUS: CPT

## 2023-12-12 PROCEDURE — 1200000002 HC GENERAL ROOM WITH TELEMETRY DAILY

## 2023-12-12 PROCEDURE — 80069 RENAL FUNCTION PANEL: CPT

## 2023-12-12 PROCEDURE — 99232 SBSQ HOSP IP/OBS MODERATE 35: CPT | Performed by: INTERNAL MEDICINE

## 2023-12-12 RX ORDER — LIDOCAINE 560 MG/1
2 PATCH PERCUTANEOUS; TOPICAL; TRANSDERMAL DAILY
Status: DISCONTINUED | OUTPATIENT
Start: 2023-12-12 | End: 2023-12-15 | Stop reason: HOSPADM

## 2023-12-12 RX ORDER — FUROSEMIDE 10 MG/ML
20 INJECTION INTRAMUSCULAR; INTRAVENOUS ONCE
Status: COMPLETED | OUTPATIENT
Start: 2023-12-12 | End: 2023-12-12

## 2023-12-12 RX ORDER — METOPROLOL SUCCINATE 50 MG/1
50 TABLET, EXTENDED RELEASE ORAL DAILY
Qty: 30 TABLET | Refills: 0 | Status: ON HOLD
Start: 2023-12-13 | End: 2024-03-14

## 2023-12-12 RX ORDER — SENNOSIDES 8.6 MG/1
2 TABLET ORAL NIGHTLY
Qty: 60 TABLET | Refills: 0
Start: 2023-12-12 | End: 2023-12-15 | Stop reason: SDUPTHER

## 2023-12-12 RX ORDER — ATORVASTATIN CALCIUM 80 MG/1
80 TABLET, FILM COATED ORAL DAILY
Qty: 30 TABLET | Refills: 0
Start: 2023-12-12 | End: 2023-12-15 | Stop reason: SDUPTHER

## 2023-12-12 RX ORDER — QUETIAPINE FUMARATE 50 MG/1
50 TABLET, FILM COATED ORAL 3 TIMES DAILY
Qty: 90 TABLET | Refills: 0
Start: 2023-12-12 | End: 2023-12-15 | Stop reason: HOSPADM

## 2023-12-12 RX ADMIN — SPIRONOLACTONE 25 MG: 25 TABLET ORAL at 08:22

## 2023-12-12 RX ADMIN — QUETIAPINE 50 MG: 25 TABLET ORAL at 14:04

## 2023-12-12 RX ADMIN — GABAPENTIN 100 MG: 100 CAPSULE ORAL at 08:22

## 2023-12-12 RX ADMIN — ATORVASTATIN CALCIUM 80 MG: 80 TABLET, FILM COATED ORAL at 20:07

## 2023-12-12 RX ADMIN — METOPROLOL SUCCINATE 50 MG: 50 TABLET, EXTENDED RELEASE ORAL at 08:22

## 2023-12-12 RX ADMIN — QUETIAPINE 50 MG: 25 TABLET ORAL at 08:22

## 2023-12-12 RX ADMIN — FOLIC ACID 1 MG: 1 TABLET ORAL at 08:22

## 2023-12-12 RX ADMIN — PANTOPRAZOLE SODIUM 40 MG: 40 TABLET, DELAYED RELEASE ORAL at 06:19

## 2023-12-12 RX ADMIN — SACUBITRIL AND VALSARTAN 1 TABLET: 24; 26 TABLET, FILM COATED ORAL at 08:22

## 2023-12-12 RX ADMIN — Medication 1 TABLET: at 08:22

## 2023-12-12 RX ADMIN — RIVAROXABAN 20 MG: 20 TABLET, FILM COATED ORAL at 16:43

## 2023-12-12 RX ADMIN — LIDOCAINE 2 PATCH: 4 PATCH TOPICAL at 21:16

## 2023-12-12 RX ADMIN — GABAPENTIN 100 MG: 100 CAPSULE ORAL at 14:04

## 2023-12-12 RX ADMIN — EMPAGLIFLOZIN 10 MG: 10 TABLET, FILM COATED ORAL at 08:22

## 2023-12-12 RX ADMIN — SACUBITRIL AND VALSARTAN 1 TABLET: 24; 26 TABLET, FILM COATED ORAL at 20:07

## 2023-12-12 RX ADMIN — GABAPENTIN 100 MG: 100 CAPSULE ORAL at 20:07

## 2023-12-12 RX ADMIN — FUROSEMIDE 20 MG: 10 INJECTION, SOLUTION INTRAVENOUS at 11:24

## 2023-12-12 RX ADMIN — QUETIAPINE 50 MG: 25 TABLET ORAL at 20:07

## 2023-12-12 RX ADMIN — DIGOXIN 250 MCG: 250 TABLET ORAL at 08:22

## 2023-12-12 ASSESSMENT — COGNITIVE AND FUNCTIONAL STATUS - GENERAL
MOBILITY SCORE: 18
TOILETING: A LITTLE
DRESSING REGULAR LOWER BODY CLOTHING: A LITTLE
HELP NEEDED FOR BATHING: A LITTLE
CLIMB 3 TO 5 STEPS WITH RAILING: A LOT
WALKING IN HOSPITAL ROOM: A LITTLE
DRESSING REGULAR UPPER BODY CLOTHING: A LITTLE
STANDING UP FROM CHAIR USING ARMS: A LITTLE
WALKING IN HOSPITAL ROOM: A LITTLE
TURNING FROM BACK TO SIDE WHILE IN FLAT BAD: A LITTLE
DAILY ACTIVITIY SCORE: 19
MOVING TO AND FROM BED TO CHAIR: A LITTLE
HELP NEEDED FOR BATHING: A LITTLE
MOVING TO AND FROM BED TO CHAIR: A LITTLE
MOBILITY SCORE: 18
CLIMB 3 TO 5 STEPS WITH RAILING: A LOT
DRESSING REGULAR LOWER BODY CLOTHING: A LITTLE
TURNING FROM BACK TO SIDE WHILE IN FLAT BAD: A LITTLE
TOILETING: A LITTLE
STANDING UP FROM CHAIR USING ARMS: A LITTLE
DRESSING REGULAR UPPER BODY CLOTHING: A LITTLE
PERSONAL GROOMING: A LITTLE
DAILY ACTIVITIY SCORE: 19
PERSONAL GROOMING: A LITTLE

## 2023-12-12 ASSESSMENT — PAIN - FUNCTIONAL ASSESSMENT
PAIN_FUNCTIONAL_ASSESSMENT: 0-10
PAIN_FUNCTIONAL_ASSESSMENT: 0-10

## 2023-12-12 ASSESSMENT — PAIN SCALES - GENERAL
PAINLEVEL_OUTOF10: 5 - MODERATE PAIN
PAINLEVEL_OUTOF10: 0 - NO PAIN

## 2023-12-12 ASSESSMENT — PAIN DESCRIPTION - ORIENTATION: ORIENTATION: RIGHT;LEFT;LOWER

## 2023-12-12 NOTE — PROGRESS NOTES
Spoke with patient. Offered list of home care companies and explained to patient she could choose. Agreeable to [facility] at discharge for PT/OT needs. Naheed Causey RN, [facility]

## 2023-12-12 NOTE — PROGRESS NOTES
"Amirah Bennett is a 42 y.o. female on day 5 of admission presenting with Acute decompensated heart failure (CMS/HCC).    Subjective   No acute events overnight. Cr was uptrending so lasix held overnight. Iv 20mg lasix given in AM.         Objective     Physical Exam  Constitutional:       Appearance: Normal appearance. She is obese.   Eyes:      Conjunctiva/sclera: Conjunctivae normal.   Cardiovascular:      Rate and Rhythm: Regular rate and rhythm   Pulmonary:      Effort: Pulmonary effort is normal. No respiratory distress.      Breath sounds: Normal breath sounds.   Abdominal:      Palpations: Abdomen is soft.  Musculoskeletal:      Right lower leg: Edema present.      Left lower leg: Edema present.   Comments: 2+ pitting edema.   Skin:     General: Skin is warm and dry.      Capillary Refill: Capillary refill takes 2 to 3 seconds.   Neurological:      Mental Status: She is alert.   Last Recorded Vitals  Blood pressure 91/62, pulse 90, temperature 36.5 °C (97.7 °F), resp. rate 18, height 1.702 m (5' 7\"), weight 95.6 kg (210 lb 12.2 oz), SpO2 96 %.  Intake/Output last 3 Shifts:  I/O last 3 completed shifts:  In: 650 (6.8 mL/kg) [P.O.:600; I.V.:50 (0.5 mL/kg)]  Out: 6650 (69.6 mL/kg) [Urine:6650 (1.9 mL/kg/hr)]  Weight: 95.6 kg     Relevant Results              Active Medications  Scheduled medications  atorvastatin, 80 mg, oral, Daily  digoxin, 250 mcg, oral, Daily  empagliflozin, 10 mg, oral, Daily  folic acid, 1 mg, oral, Daily  gabapentin, 100 mg, oral, TID  metoprolol succinate XL, 50 mg, oral, Daily  multivitamin with minerals, 1 tablet, oral, Daily  pantoprazole, 40 mg, oral, Daily before breakfast  polyethylene glycol, 17 g, oral, BID  QUEtiapine, 50 mg, oral, TID  rivaroxaban, 20 mg, oral, Daily with evening meal  sacubitriL-valsartan, 1 tablet, oral, BID  sennosides, 2 tablet, oral, Nightly  spironolactone, 25 mg, oral, Daily      Continuous medications     PRN medications       Recent Labs  Results for " orders placed or performed during the hospital encounter of 12/07/23 (from the past 24 hour(s))   CBC   Result Value Ref Range    WBC 6.4 4.4 - 11.3 x10*3/uL    nRBC 0.0 0.0 - 0.0 /100 WBCs    RBC 4.92 4.00 - 5.20 x10*6/uL    Hemoglobin 13.2 12.0 - 16.0 g/dL    Hematocrit 42.7 36.0 - 46.0 %    MCV 87 80 - 100 fL    MCH 26.8 26.0 - 34.0 pg    MCHC 30.9 (L) 32.0 - 36.0 g/dL    RDW 17.1 (H) 11.5 - 14.5 %    Platelets 178 150 - 450 x10*3/uL   Comprehensive metabolic panel   Result Value Ref Range    Glucose 74 74 - 99 mg/dL    Sodium 139 136 - 145 mmol/L    Potassium 4.2 3.5 - 5.3 mmol/L    Chloride 101 98 - 107 mmol/L    Bicarbonate 27 21 - 32 mmol/L    Anion Gap 15 10 - 20 mmol/L    Urea Nitrogen 16 6 - 23 mg/dL    Creatinine 1.19 (H) 0.50 - 1.05 mg/dL    eGFR 58 (L) >60 mL/min/1.73m*2    Calcium 8.7 8.6 - 10.6 mg/dL    Albumin 3.2 (L) 3.4 - 5.0 g/dL    Alkaline Phosphatase 63 33 - 110 U/L    Total Protein 6.1 (L) 6.4 - 8.2 g/dL    AST 15 9 - 39 U/L    Bilirubin, Total 1.0 0.0 - 1.2 mg/dL    ALT 11 7 - 45 U/L   Magnesium   Result Value Ref Range    Magnesium 2.00 1.60 - 2.40 mg/dL   Phosphorus   Result Value Ref Range    Phosphorus 3.6 2.5 - 4.9 mg/dL   Renal function panel   Result Value Ref Range    Glucose 113 (H) 74 - 99 mg/dL    Sodium 139 136 - 145 mmol/L    Potassium 4.3 3.5 - 5.3 mmol/L    Chloride 102 98 - 107 mmol/L    Bicarbonate 27 21 - 32 mmol/L    Anion Gap 14 10 - 20 mmol/L    Urea Nitrogen 18 6 - 23 mg/dL    Creatinine 1.27 (H) 0.50 - 1.05 mg/dL    eGFR 54 (L) >60 mL/min/1.73m*2    Calcium 8.3 (L) 8.6 - 10.6 mg/dL    Phosphorus 3.5 2.5 - 4.9 mg/dL    Albumin 2.9 (L) 3.4 - 5.0 g/dL   Magnesium   Result Value Ref Range    Magnesium 1.86 1.60 - 2.40 mg/dL       Imaging  CT abdomen pelvis wo IV contrast    Result Date: 12/9/2023  Interpreted By:  Rya Ferreira, STUDY: CT ABDOMEN PELVIS WO IV CONTRAST;  12/9/2023 11:03 am   INDICATION: Signs/Symptoms:abdominal tenderness concerns for obstruction /  diverticulitis.   COMPARISON: None.   ACCESSION NUMBER(S): [id number]   ORDERING CLINICIAN: TONY FLANNERY   TECHNIQUE: Contiguous axial images of the abdomen and pelvis were obtained without intravenous contrast. Coronal and sagittal reformatted images were reconstructed from the axial data.   FINDINGS: LOWER CHEST: There is moderate cardiomegaly. There is a small pericardial effusion. There is a trace right pleural effusion. There is minimal bibasilar atelectasis.   Note: The absence of intravenous contrast limits evaluation of the solid organs and vasculature.   ABDOMEN/PELVIS:   ABDOMINAL WALL: There is diffuse moderate-severe anasarca.   LIVER: There is geographic hepatic steatosis and cirrhosis. Scattered simple cysts are again noted. The liver is enlarged, measuring 19.2 cm in craniocaudal dimension, previously 17.5 cm (04/05/2022).   BILE DUCTS: No significant intrahepatic or extrahepatic dilatation.   GALLBLADDER: The gallbladder is contracted without acute abnormality.   PANCREAS: No significant abnormality.   SPLEEN: No significant abnormality.   ADRENALS: There is nonspecific diffuse thickening of the adrenal glands, progressed from prior study.   KIDNEYS, URETERS, BLADDER: There is redemonstration of focal volume loss in the mid-upper pole of the left kidney representing remote infarct. There is mild bilateral pelvicaliectasis (new) without hydroureter or obstructing calculi. The urinary bladder wall thickness appears within normal limits for degree of distention.   REPRODUCTIVE ORGANS: The uterus appears normal. There are no suspicious adnexal masses. The ovaries are normal in size. There is a simple 1.1 cm left ovarian cyst.   VESSELS: Mild aortic atherosclerosis without AAA.   RETROPERITONEUM/LYMPH NODES: Compared to 04/05/2022, there is interval increase in size of para-aortic retroperitoneal lymph nodes which now measure up to 1.7 cm, previously 1.1 cm. There is also a generalized increase in size of  gastrohepatic and periportal lymph nodes, bilateral external iliac lymph nodes, and bilateral inguinal lymph nodes. For example, a 1.1 cm x 1 cm right external iliac node previously measured 0.6 cm; a 2.7 cm x 1.5 cm right inguinal lymph node previously measured 2 cm x 1 cm; a left 2 cm x 1.6 cm inguinal lymph node previously measured 1.9 cm x 0.8 cm. No acute retroperitoneal abnormality.   BOWEL/MESENTERY/PERITONEUM: Large colonic stool burden from cecum through the descending colon. No inflammatory bowel wall thickening or dilatation. Normal appendix.   No free air or loculated fluid collection. Sized mesenteric edema and small volume ascites in the dependent pelvic recesses, slightly increased in volume compared to 04/05/2022     MUSCULOSKELETAL: No acute osseous abnormality. Within the left iliopsoas muscle, there are two circumscribed macroscopic fat-containing masses consistent with lipomas (6.5 cm x 3.2 cm x 2.9 cm and 3.9 cm x 2.7 cm x 2.6 cm).       1. No bowel obstruction or diverticulitis. However, there is a large stool burden throughout the ascending, transverse, descending colon that could be related to distended/tender abdomen.   2. Moderate-severe diffuse anasarca, mesenteric edema, and SMALL volume abdominopelvic ascites there is only minimally increased in quantity compared to 04/05/2022. The aforementioned could be due to any combination of the following including but not limited to, diffuse systemic inflammation/infection, worsening right heart failure, worsening liver function, or worsening renal function. Integration of clinical, laboratory, and imaging findings is recommended.   3. New mild bilateral pelvicaliectasis without hydroureter or obstructing calculus.   4. Generalized increase in size of abdominopelvic and inguinal lymph nodes most likely on the basis of worsening anasarca.   5. Cirrhotic liver morphology with geographic steatosis.   6. Moderate cardiomegaly.   MACRO: None.   Signed  by: Ray Ferreira 12/9/2023 11:29 AM Dictation workstation:   [id number]                      Assessment/Plan   Principal Problem:    Acute decompensated heart failure (CMS/HCC)    Amirah Bennett is a 42 y.o. female with significant PMHx of HFrEF (LVEF 20-25% (08/2022), Afib on Xarelto w/ DCCV 05/2023), ischemic stroke L MCA d/t AFib LLA thrombus seen on echo (lack of OAC adherence) s/p thrombectomy 01/2022 @ [facility] w/ residual expressive aphasia and R sided weakness, s/p tracheostomy, and HTN presenting to the ED on 17/7/23 with dyspnea, BLE edema and 25 lb weight gain. EKG revealed Afib w/ RVR, POCUS revealed significant reduction in LVEF to < 10%. This is concerning for ADHF in s/o of non-adherence to GDMT. She has significant history of not following up with EP for DCCV and AMA during admission for tracheostomy closure. Considering significant symptomatic improvement s/p 40 mg IV Lasix push, we will continue to manage ADHF with diuresis and managing Afib with rate control and DOAC. Will continue to monitor for ACS but troponin negative and HDS.     Update 12/12/23 :   - Still significantly volume overloaded  - IV lasix 20mg this AM  - Evening rfp, mag ordered  - -5.3 L overnight        #ADHF  #Afib  #HFrEF (LVEF 20-25% (08/2022)  :: Patient has had persistent Afib managed with metoprolol and digoxin as outpatient. Has missed scheduled DCCV w/ [facility] EP.  :: Endorses non-adherence to medications contributing to edema accumulation and shortness of breath.  :: Bedside POCUS w/ new LVEF < 10%  - TTE Pending  - 40 mg Lasix BID, RFP BID  - Digoxin 250 micrograms every day  - GDMT: Jardiance 10 mg, metoprolol tart 50 mg BID, Entresto , spironolactone 25 mg  - Xarelto 20 mg every day  - Telemetry  - 40mg IV Lasix 12/10, monitoring RFP and I/O      #ischemic stroke L MCA  :: No new focal deficits  - CTM  - Xarelto 20 mg     F: PRN, EF ~10%  E: PRN  N: Cardiac  A: 2x PIV     DVT Ppx: Xarelto 20 mg  GI PPX: pantoprazole  40 mg, miralax daily     CODE: FULL, confirmed on admission  NOK: Pranay Owen (friend) [phone]           Fermin Reveles MD

## 2023-12-12 NOTE — CARE PLAN
The patient's goals for the shift include      The clinical goals for the shift include remain HDS    Over the shift, the patient did not make progress toward the following goals. Barriers to progression include --. Recommendations to address these barriers include--reinforce fluid restriction

## 2023-12-13 LAB
ANION GAP SERPL CALC-SCNC: 12 MMOL/L (ref 10–20)
BASOPHILS # BLD AUTO: 0.03 X10*3/UL (ref 0–0.1)
BASOPHILS NFR BLD AUTO: 0.4 %
BUN SERPL-MCNC: 17 MG/DL (ref 6–23)
CALCIUM SERPL-MCNC: 8.2 MG/DL (ref 8.6–10.6)
CHLORIDE SERPL-SCNC: 107 MMOL/L (ref 98–107)
CO2 SERPL-SCNC: 26 MMOL/L (ref 21–32)
CREAT SERPL-MCNC: 1.39 MG/DL (ref 0.5–1.05)
EOSINOPHIL # BLD AUTO: 0.2 X10*3/UL (ref 0–0.7)
EOSINOPHIL NFR BLD AUTO: 2.8 %
ERYTHROCYTE [DISTWIDTH] IN BLOOD BY AUTOMATED COUNT: 16.9 % (ref 11.5–14.5)
GFR SERPL CREATININE-BSD FRML MDRD: 48 ML/MIN/1.73M*2
GLUCOSE SERPL-MCNC: 78 MG/DL (ref 74–99)
HCT VFR BLD AUTO: 41.9 % (ref 36–46)
HGB BLD-MCNC: 12.6 G/DL (ref 12–16)
IMM GRANULOCYTES # BLD AUTO: 0.01 X10*3/UL (ref 0–0.7)
IMM GRANULOCYTES NFR BLD AUTO: 0.1 % (ref 0–0.9)
LYMPHOCYTES # BLD AUTO: 3.35 X10*3/UL (ref 1.2–4.8)
LYMPHOCYTES NFR BLD AUTO: 46.7 %
MAGNESIUM SERPL-MCNC: 1.92 MG/DL (ref 1.6–2.4)
MCH RBC QN AUTO: 26.3 PG (ref 26–34)
MCHC RBC AUTO-ENTMCNC: 30.1 G/DL (ref 32–36)
MCV RBC AUTO: 88 FL (ref 80–100)
MONOCYTES # BLD AUTO: 0.99 X10*3/UL (ref 0.1–1)
MONOCYTES NFR BLD AUTO: 13.8 %
NEUTROPHILS # BLD AUTO: 2.6 X10*3/UL (ref 1.2–7.7)
NEUTROPHILS NFR BLD AUTO: 36.2 %
NRBC BLD-RTO: 0 /100 WBCS (ref 0–0)
PHOSPHATE SERPL-MCNC: 3.9 MG/DL (ref 2.5–4.9)
PLATELET # BLD AUTO: 148 X10*3/UL (ref 150–450)
POTASSIUM SERPL-SCNC: 4.4 MMOL/L (ref 3.5–5.3)
RBC # BLD AUTO: 4.79 X10*6/UL (ref 4–5.2)
SODIUM SERPL-SCNC: 141 MMOL/L (ref 136–145)
WBC # BLD AUTO: 7.2 X10*3/UL (ref 4.4–11.3)

## 2023-12-13 PROCEDURE — 2500000001 HC RX 250 WO HCPCS SELF ADMINISTERED DRUGS (ALT 637 FOR MEDICARE OP)

## 2023-12-13 PROCEDURE — 2500000005 HC RX 250 GENERAL PHARMACY W/O HCPCS

## 2023-12-13 PROCEDURE — 36415 COLL VENOUS BLD VENIPUNCTURE: CPT

## 2023-12-13 PROCEDURE — 84100 ASSAY OF PHOSPHORUS: CPT

## 2023-12-13 PROCEDURE — 99232 SBSQ HOSP IP/OBS MODERATE 35: CPT | Performed by: INTERNAL MEDICINE

## 2023-12-13 PROCEDURE — 83735 ASSAY OF MAGNESIUM: CPT

## 2023-12-13 PROCEDURE — 1200000002 HC GENERAL ROOM WITH TELEMETRY DAILY

## 2023-12-13 PROCEDURE — 85025 COMPLETE CBC W/AUTO DIFF WBC: CPT

## 2023-12-13 PROCEDURE — 2500000004 HC RX 250 GENERAL PHARMACY W/ HCPCS (ALT 636 FOR OP/ED)

## 2023-12-13 PROCEDURE — 80048 BASIC METABOLIC PNL TOTAL CA: CPT

## 2023-12-13 RX ADMIN — SPIRONOLACTONE 25 MG: 25 TABLET ORAL at 09:14

## 2023-12-13 RX ADMIN — Medication 1 TABLET: at 09:14

## 2023-12-13 RX ADMIN — GABAPENTIN 100 MG: 100 CAPSULE ORAL at 14:16

## 2023-12-13 RX ADMIN — QUETIAPINE 50 MG: 25 TABLET ORAL at 09:14

## 2023-12-13 RX ADMIN — SACUBITRIL AND VALSARTAN 1 TABLET: 24; 26 TABLET, FILM COATED ORAL at 09:14

## 2023-12-13 RX ADMIN — PANTOPRAZOLE SODIUM 40 MG: 40 TABLET, DELAYED RELEASE ORAL at 05:17

## 2023-12-13 RX ADMIN — GABAPENTIN 100 MG: 100 CAPSULE ORAL at 20:30

## 2023-12-13 RX ADMIN — FOLIC ACID 1 MG: 1 TABLET ORAL at 09:14

## 2023-12-13 RX ADMIN — EMPAGLIFLOZIN 10 MG: 10 TABLET, FILM COATED ORAL at 09:00

## 2023-12-13 RX ADMIN — DIGOXIN 250 MCG: 250 TABLET ORAL at 09:14

## 2023-12-13 RX ADMIN — QUETIAPINE 50 MG: 25 TABLET ORAL at 20:30

## 2023-12-13 RX ADMIN — ATORVASTATIN CALCIUM 80 MG: 80 TABLET, FILM COATED ORAL at 20:30

## 2023-12-13 RX ADMIN — METOPROLOL SUCCINATE 50 MG: 50 TABLET, EXTENDED RELEASE ORAL at 09:14

## 2023-12-13 RX ADMIN — LIDOCAINE 2 PATCH: 4 PATCH TOPICAL at 09:00

## 2023-12-13 RX ADMIN — GABAPENTIN 100 MG: 100 CAPSULE ORAL at 09:14

## 2023-12-13 RX ADMIN — RIVAROXABAN 20 MG: 20 TABLET, FILM COATED ORAL at 17:11

## 2023-12-13 RX ADMIN — SACUBITRIL AND VALSARTAN 1 TABLET: 24; 26 TABLET, FILM COATED ORAL at 20:30

## 2023-12-13 RX ADMIN — QUETIAPINE 50 MG: 25 TABLET ORAL at 14:16

## 2023-12-13 ASSESSMENT — PAIN SCALES - GENERAL
PAINLEVEL_OUTOF10: 0 - NO PAIN
PAINLEVEL_OUTOF10: 0 - NO PAIN

## 2023-12-13 ASSESSMENT — COGNITIVE AND FUNCTIONAL STATUS - GENERAL
DRESSING REGULAR UPPER BODY CLOTHING: A LITTLE
MOVING TO AND FROM BED TO CHAIR: A LITTLE
DRESSING REGULAR UPPER BODY CLOTHING: A LITTLE
HELP NEEDED FOR BATHING: A LITTLE
MOBILITY SCORE: 18
HELP NEEDED FOR BATHING: A LITTLE
DRESSING REGULAR LOWER BODY CLOTHING: A LITTLE
CLIMB 3 TO 5 STEPS WITH RAILING: A LOT
TURNING FROM BACK TO SIDE WHILE IN FLAT BAD: A LITTLE
TURNING FROM BACK TO SIDE WHILE IN FLAT BAD: A LITTLE
TOILETING: A LITTLE
DAILY ACTIVITIY SCORE: 19
WALKING IN HOSPITAL ROOM: A LITTLE
PERSONAL GROOMING: A LITTLE
DAILY ACTIVITIY SCORE: 19
STANDING UP FROM CHAIR USING ARMS: A LITTLE
PERSONAL GROOMING: A LITTLE
MOBILITY SCORE: 18
WALKING IN HOSPITAL ROOM: A LITTLE
DRESSING REGULAR LOWER BODY CLOTHING: A LITTLE
CLIMB 3 TO 5 STEPS WITH RAILING: A LOT
STANDING UP FROM CHAIR USING ARMS: A LITTLE
TOILETING: A LITTLE
MOVING TO AND FROM BED TO CHAIR: A LITTLE

## 2023-12-13 ASSESSMENT — PAIN - FUNCTIONAL ASSESSMENT: PAIN_FUNCTIONAL_ASSESSMENT: 0-10

## 2023-12-13 NOTE — PROGRESS NOTES
Transitional Care Coordination Progress Note:  PLAN: Continue with diuresing.     PAYOR: [facility]    DISPO: Home with [facility] for PT/OT. ADOD over weekend.     SUPPORT/CONTACT: Friend, Pranay, [phone]    Naheed Causey RN, [facility]

## 2023-12-13 NOTE — PROGRESS NOTES
"Amirah Bennett is a 42 y.o. female on day 6 of admission presenting with Acute decompensated heart failure (CMS/HCC).    Subjective   No acute events overnight. Patient denied, nausea, vomiting, fever, and cough. Patient had an increase in cr to 1.4 and will have a diuresis holiday today.         Objective     Physical Exam  Constitutional:       Appearance: Normal appearance. She is obese.   Eyes:      Conjunctiva/sclera: Conjunctivae normal.   Cardiovascular:      Rate and Rhythm: Regular rate and rhythm   Pulmonary:      Effort: Pulmonary effort is normal. No respiratory distress.      Breath sounds: Normal breath sounds.   Abdominal:      Palpations: Abdomen is soft.  Musculoskeletal:      Right lower leg: Edema present.      Left lower leg: Edema present.   Comments: 2+ pitting edema.   Skin:     General: Skin is warm and dry.      Capillary Refill: Capillary refill takes 2 to 3 seconds.   Neurological:      Mental Status: She is alert.   Last Recorded Vitals  Blood pressure 111/67, pulse 94, temperature 36.4 °C (97.6 °F), temperature source Temporal, resp. rate 18, height 1.702 m (5' 7\"), weight 94.3 kg (207 lb 14.3 oz), SpO2 94 %.  Intake/Output last 3 Shifts:  I/O last 3 completed shifts:  In: 1850 (19.6 mL/kg) [P.O.:1800; I.V.:50 (0.5 mL/kg)]  Out: 6576 (69.7 mL/kg) [Urine:6575 (1.9 mL/kg/hr); Stool:1]  Weight: 94.3 kg     Relevant Results              Active Medications  Scheduled medications  atorvastatin, 80 mg, oral, Daily  digoxin, 250 mcg, oral, Daily  empagliflozin, 10 mg, oral, Daily  folic acid, 1 mg, oral, Daily  gabapentin, 100 mg, oral, TID  lidocaine, 2 patch, transdermal, Daily  metoprolol succinate XL, 50 mg, oral, Daily  multivitamin with minerals, 1 tablet, oral, Daily  pantoprazole, 40 mg, oral, Daily before breakfast  polyethylene glycol, 17 g, oral, BID  QUEtiapine, 50 mg, oral, TID  rivaroxaban, 20 mg, oral, Daily with evening meal  sacubitriL-valsartan, 1 tablet, oral, BID  sennosides, " 2 tablet, oral, Nightly  spironolactone, 25 mg, oral, Daily      Continuous medications     PRN medications       Recent Labs  Results for orders placed or performed during the hospital encounter of 12/07/23 (from the past 24 hour(s))   Renal function panel   Result Value Ref Range    Glucose 118 (H) 74 - 99 mg/dL    Sodium 140 136 - 145 mmol/L    Potassium 4.6 3.5 - 5.3 mmol/L    Chloride 103 98 - 107 mmol/L    Bicarbonate 27 21 - 32 mmol/L    Anion Gap 15 10 - 20 mmol/L    Urea Nitrogen 16 6 - 23 mg/dL    Creatinine 1.40 (H) 0.50 - 1.05 mg/dL    eGFR 48 (L) >60 mL/min/1.73m*2    Calcium 8.7 8.6 - 10.6 mg/dL    Phosphorus 3.7 2.5 - 4.9 mg/dL    Albumin 3.4 3.4 - 5.0 g/dL   Magnesium   Result Value Ref Range    Magnesium 2.01 1.60 - 2.40 mg/dL   CBC and Auto Differential   Result Value Ref Range    WBC 7.2 4.4 - 11.3 x10*3/uL    nRBC 0.0 0.0 - 0.0 /100 WBCs    RBC 4.79 4.00 - 5.20 x10*6/uL    Hemoglobin 12.6 12.0 - 16.0 g/dL    Hematocrit 41.9 36.0 - 46.0 %    MCV 88 80 - 100 fL    MCH 26.3 26.0 - 34.0 pg    MCHC 30.1 (L) 32.0 - 36.0 g/dL    RDW 16.9 (H) 11.5 - 14.5 %    Platelets 148 (L) 150 - 450 x10*3/uL    Neutrophils % 36.2 40.0 - 80.0 %    Immature Granulocytes %, Automated 0.1 0.0 - 0.9 %    Lymphocytes % 46.7 13.0 - 44.0 %    Monocytes % 13.8 2.0 - 10.0 %    Eosinophils % 2.8 0.0 - 6.0 %    Basophils % 0.4 0.0 - 2.0 %    Neutrophils Absolute 2.60 1.20 - 7.70 x10*3/uL    Immature Granulocytes Absolute, Automated 0.01 0.00 - 0.70 x10*3/uL    Lymphocytes Absolute 3.35 1.20 - 4.80 x10*3/uL    Monocytes Absolute 0.99 0.10 - 1.00 x10*3/uL    Eosinophils Absolute 0.20 0.00 - 0.70 x10*3/uL    Basophils Absolute 0.03 0.00 - 0.10 x10*3/uL   Basic Metabolic Panel   Result Value Ref Range    Glucose 78 74 - 99 mg/dL    Sodium 141 136 - 145 mmol/L    Potassium 4.4 3.5 - 5.3 mmol/L    Chloride 107 98 - 107 mmol/L    Bicarbonate 26 21 - 32 mmol/L    Anion Gap 12 10 - 20 mmol/L    Urea Nitrogen 17 6 - 23 mg/dL    Creatinine  1.39 (H) 0.50 - 1.05 mg/dL    eGFR 48 (L) >60 mL/min/1.73m*2    Calcium 8.2 (L) 8.6 - 10.6 mg/dL   Magnesium   Result Value Ref Range    Magnesium 1.92 1.60 - 2.40 mg/dL   Phosphorus   Result Value Ref Range    Phosphorus 3.9 2.5 - 4.9 mg/dL       Imaging  CT abdomen pelvis wo IV contrast    Result Date: 12/9/2023  Interpreted By:  Ray Ferreira, STUDY: CT ABDOMEN PELVIS WO IV CONTRAST;  12/9/2023 11:03 am   INDICATION: Signs/Symptoms:abdominal tenderness concerns for obstruction / diverticulitis.   COMPARISON: None.   ACCESSION NUMBER(S): [id number]   ORDERING CLINICIAN: TONY FLANNERY   TECHNIQUE: Contiguous axial images of the abdomen and pelvis were obtained without intravenous contrast. Coronal and sagittal reformatted images were reconstructed from the axial data.   FINDINGS: LOWER CHEST: There is moderate cardiomegaly. There is a small pericardial effusion. There is a trace right pleural effusion. There is minimal bibasilar atelectasis.   Note: The absence of intravenous contrast limits evaluation of the solid organs and vasculature.   ABDOMEN/PELVIS:   ABDOMINAL WALL: There is diffuse moderate-severe anasarca.   LIVER: There is geographic hepatic steatosis and cirrhosis. Scattered simple cysts are again noted. The liver is enlarged, measuring 19.2 cm in craniocaudal dimension, previously 17.5 cm (04/05/2022).   BILE DUCTS: No significant intrahepatic or extrahepatic dilatation.   GALLBLADDER: The gallbladder is contracted without acute abnormality.   PANCREAS: No significant abnormality.   SPLEEN: No significant abnormality.   ADRENALS: There is nonspecific diffuse thickening of the adrenal glands, progressed from prior study.   KIDNEYS, URETERS, BLADDER: There is redemonstration of focal volume loss in the mid-upper pole of the left kidney representing remote infarct. There is mild bilateral pelvicaliectasis (new) without hydroureter or obstructing calculi. The urinary bladder wall thickness appears  within normal limits for degree of distention.   REPRODUCTIVE ORGANS: The uterus appears normal. There are no suspicious adnexal masses. The ovaries are normal in size. There is a simple 1.1 cm left ovarian cyst.   VESSELS: Mild aortic atherosclerosis without AAA.   RETROPERITONEUM/LYMPH NODES: Compared to 04/05/2022, there is interval increase in size of para-aortic retroperitoneal lymph nodes which now measure up to 1.7 cm, previously 1.1 cm. There is also a generalized increase in size of gastrohepatic and periportal lymph nodes, bilateral external iliac lymph nodes, and bilateral inguinal lymph nodes. For example, a 1.1 cm x 1 cm right external iliac node previously measured 0.6 cm; a 2.7 cm x 1.5 cm right inguinal lymph node previously measured 2 cm x 1 cm; a left 2 cm x 1.6 cm inguinal lymph node previously measured 1.9 cm x 0.8 cm. No acute retroperitoneal abnormality.   BOWEL/MESENTERY/PERITONEUM: Large colonic stool burden from cecum through the descending colon. No inflammatory bowel wall thickening or dilatation. Normal appendix.   No free air or loculated fluid collection. Sized mesenteric edema and small volume ascites in the dependent pelvic recesses, slightly increased in volume compared to 04/05/2022     MUSCULOSKELETAL: No acute osseous abnormality. Within the left iliopsoas muscle, there are two circumscribed macroscopic fat-containing masses consistent with lipomas (6.5 cm x 3.2 cm x 2.9 cm and 3.9 cm x 2.7 cm x 2.6 cm).       1. No bowel obstruction or diverticulitis. However, there is a large stool burden throughout the ascending, transverse, descending colon that could be related to distended/tender abdomen.   2. Moderate-severe diffuse anasarca, mesenteric edema, and SMALL volume abdominopelvic ascites there is only minimally increased in quantity compared to 04/05/2022. The aforementioned could be due to any combination of the following including but not limited to, diffuse systemic  inflammation/infection, worsening right heart failure, worsening liver function, or worsening renal function. Integration of clinical, laboratory, and imaging findings is recommended.   3. New mild bilateral pelvicaliectasis without hydroureter or obstructing calculus.   4. Generalized increase in size of abdominopelvic and inguinal lymph nodes most likely on the basis of worsening anasarca.   5. Cirrhotic liver morphology with geographic steatosis.   6. Moderate cardiomegaly.   MACRO: None.   Signed by: Ray Ferreira 12/9/2023 11:29 AM Dictation workstation:   [id number]                      Assessment/Plan   Principal Problem:    Acute decompensated heart failure (CMS/HCC)  Active Problems:    Encounter for medical examination to establish care    Amirah Bennett is a 42 y.o. female with significant PMHx of HFrEF (LVEF 20-25% (08/2022), Afib on Xarelto w/ DCCV 05/2023), ischemic stroke L MCA d/t AFib LLA thrombus seen on echo (lack of OAC adherence) s/p thrombectomy 01/2022 @ [facility] w/ residual expressive aphasia and R sided weakness, s/p tracheostomy, and HTN presenting to the ED on 17/7/23 with dyspnea, BLE edema and 25 lb weight gain. EKG revealed Afib w/ RVR, POCUS revealed significant reduction in LVEF to < 10%. This is concerning for ADHF in s/o of non-adherence to GDMT. She has significant history of not following up with EP for DCCV and AMA during admission for tracheostomy closure. Considering significant symptomatic improvement s/p 40 mg IV Lasix push, we will continue to manage ADHF with diuresis and managing Afib with rate control and DOAC. Will continue to monitor for ACS but troponin negative and HDS.     Update 12/13/23 :   - Still significantly volume overloaded  - Cr bump to 1.4   - Plan for diuresis holiday today  - Evening rfp, mag ordered  - -3 L over 24h  - plan for trial of oral diuresis tomorrow       #ADHF  #Afib  #HFrEF (LVEF 20-25% (08/2022)  :: Patient has had persistent Afib managed  with metoprolol and digoxin as outpatient. Has missed scheduled DCCV w/  EP.  :: Endorses non-adherence to medications contributing to edema accumulation and shortness of breath.  :: Bedside POCUS w/ new LVEF < 10%  - TTE Pending  - 40 mg Lasix BID, RFP BID  - Digoxin 250 micrograms every day  - GDMT: Jardiance 10 mg, metoprolol tart 50 mg BID, Entresto , spironolactone 25 mg  - Xarelto 20 mg every day  - Telemetry  - 40mg IV Lasix 12/10, monitoring RFP and I/O   - Dry weight 190 lbs  -Patient currently 270 lbs     #ischemic stroke L MCA  :: No new focal deficits  - CTM  - Xarelto 20 mg     F: PRN, EF ~10%  E: PRN  N: Cardiac  A: 2x PIV     DVT Ppx: Xarelto 20 mg  GI PPX: pantoprazole 40 mg, miralax daily     CODE: FULL, confirmed on admission  NOK: Pranay Owen (friend) [phone]           Fermin Reveles MD

## 2023-12-13 NOTE — CARE PLAN
The patient's goals for the shift include      The clinical goals for the shift include Pt will remain HDS during this shift    Pt slept in bed overnight, safety maintained, HDS, Lidocaine patch ordered for lower back pain.    Problem: Fall/Injury  Goal: Not fall by end of shift  Outcome: Progressing  Goal: Be free from injury by end of the shift  Outcome: Progressing     Problem: Not Adhereing To Sodium Restrictions  Goal: 2000mg or less of sodium per day over the next 30 days  Outcome: Progressing

## 2023-12-14 ENCOUNTER — APPOINTMENT (OUTPATIENT)
Dept: CARDIOLOGY | Facility: HOSPITAL | Age: 43
DRG: 291 | End: 2023-12-14
Payer: MEDICARE

## 2023-12-14 LAB
ALBUMIN SERPL BCP-MCNC: 3.4 G/DL (ref 3.4–5)
ANION GAP SERPL CALC-SCNC: 13 MMOL/L (ref 10–20)
ANION GAP SERPL CALC-SCNC: 15 MMOL/L (ref 10–20)
AORTIC VALVE PEAK VELOCITY: 1.57
AV PEAK GRADIENT: 9.9
AVA (PEAK VEL): 1.72
BASOPHILS # BLD AUTO: 0.03 X10*3/UL (ref 0–0.1)
BASOPHILS NFR BLD AUTO: 0.5 %
BUN SERPL-MCNC: 14 MG/DL (ref 6–23)
BUN SERPL-MCNC: 16 MG/DL (ref 6–23)
CALCIUM SERPL-MCNC: 8.4 MG/DL (ref 8.6–10.6)
CALCIUM SERPL-MCNC: 8.7 MG/DL (ref 8.6–10.6)
CHLORIDE SERPL-SCNC: 103 MMOL/L (ref 98–107)
CHLORIDE SERPL-SCNC: 108 MMOL/L (ref 98–107)
CO2 SERPL-SCNC: 24 MMOL/L (ref 21–32)
CO2 SERPL-SCNC: 24 MMOL/L (ref 21–32)
CREAT SERPL-MCNC: 1.1 MG/DL (ref 0.5–1.05)
CREAT SERPL-MCNC: 1.13 MG/DL (ref 0.5–1.05)
EJECTION FRACTION APICAL 4 CHAMBER: 38.5
EJECTION FRACTION: 33
EOSINOPHIL # BLD AUTO: 0.16 X10*3/UL (ref 0–0.7)
EOSINOPHIL NFR BLD AUTO: 2.5 %
ERYTHROCYTE [DISTWIDTH] IN BLOOD BY AUTOMATED COUNT: 16.9 % (ref 11.5–14.5)
GFR SERPL CREATININE-BSD FRML MDRD: 62 ML/MIN/1.73M*2
GFR SERPL CREATININE-BSD FRML MDRD: 64 ML/MIN/1.73M*2
GLUCOSE SERPL-MCNC: 121 MG/DL (ref 74–99)
GLUCOSE SERPL-MCNC: 96 MG/DL (ref 74–99)
HCT VFR BLD AUTO: 42.7 % (ref 36–46)
HGB BLD-MCNC: 12.9 G/DL (ref 12–16)
IMM GRANULOCYTES # BLD AUTO: 0.01 X10*3/UL (ref 0–0.7)
IMM GRANULOCYTES NFR BLD AUTO: 0.2 % (ref 0–0.9)
LEFT ATRIUM VOLUME AREA LENGTH INDEX BSA: 44.9
LEFT VENTRICLE INTERNAL DIMENSION DIASTOLE: 5.1 (ref 3.5–6)
LEFT VENTRICULAR OUTFLOW TRACT DIAMETER: 1.8
LYMPHOCYTES # BLD AUTO: 3.34 X10*3/UL (ref 1.2–4.8)
LYMPHOCYTES NFR BLD AUTO: 52.4 %
MAGNESIUM SERPL-MCNC: 1.81 MG/DL (ref 1.6–2.4)
MAGNESIUM SERPL-MCNC: 1.92 MG/DL (ref 1.6–2.4)
MCH RBC QN AUTO: 26.7 PG (ref 26–34)
MCHC RBC AUTO-ENTMCNC: 30.2 G/DL (ref 32–36)
MCV RBC AUTO: 88 FL (ref 80–100)
MITRAL VALVE E/E' RATIO: 23.55
MONOCYTES # BLD AUTO: 0.91 X10*3/UL (ref 0.1–1)
MONOCYTES NFR BLD AUTO: 14.3 %
NEUTROPHILS # BLD AUTO: 1.92 X10*3/UL (ref 1.2–7.7)
NEUTROPHILS NFR BLD AUTO: 30.1 %
NRBC BLD-RTO: 0 /100 WBCS (ref 0–0)
PHOSPHATE SERPL-MCNC: 3.6 MG/DL (ref 2.5–4.9)
PHOSPHATE SERPL-MCNC: 3.9 MG/DL (ref 2.5–4.9)
PLATELET # BLD AUTO: 155 X10*3/UL (ref 150–450)
POTASSIUM SERPL-SCNC: 4.5 MMOL/L (ref 3.5–5.3)
POTASSIUM SERPL-SCNC: 4.6 MMOL/L (ref 3.5–5.3)
RBC # BLD AUTO: 4.84 X10*6/UL (ref 4–5.2)
RIGHT VENTRICLE FREE WALL PEAK S': 8.78
RIGHT VENTRICLE PEAK SYSTOLIC PRESSURE: 38.3
SODIUM SERPL-SCNC: 137 MMOL/L (ref 136–145)
SODIUM SERPL-SCNC: 140 MMOL/L (ref 136–145)
TRICUSPID ANNULAR PLANE SYSTOLIC EXCURSION: 1.3
WBC # BLD AUTO: 6.4 X10*3/UL (ref 4.4–11.3)

## 2023-12-14 PROCEDURE — 80048 BASIC METABOLIC PNL TOTAL CA: CPT | Mod: CCI

## 2023-12-14 PROCEDURE — 80069 RENAL FUNCTION PANEL: CPT

## 2023-12-14 PROCEDURE — 83735 ASSAY OF MAGNESIUM: CPT

## 2023-12-14 PROCEDURE — 36415 COLL VENOUS BLD VENIPUNCTURE: CPT

## 2023-12-14 PROCEDURE — 2500000001 HC RX 250 WO HCPCS SELF ADMINISTERED DRUGS (ALT 637 FOR MEDICARE OP)

## 2023-12-14 PROCEDURE — 2500000004 HC RX 250 GENERAL PHARMACY W/ HCPCS (ALT 636 FOR OP/ED)

## 2023-12-14 PROCEDURE — 85025 COMPLETE CBC W/AUTO DIFF WBC: CPT

## 2023-12-14 PROCEDURE — 1200000002 HC GENERAL ROOM WITH TELEMETRY DAILY

## 2023-12-14 PROCEDURE — 93306 TTE W/DOPPLER COMPLETE: CPT

## 2023-12-14 PROCEDURE — 84100 ASSAY OF PHOSPHORUS: CPT

## 2023-12-14 PROCEDURE — 93306 TTE W/DOPPLER COMPLETE: CPT | Performed by: INTERNAL MEDICINE

## 2023-12-14 RX ORDER — FUROSEMIDE 20 MG/1
20 TABLET ORAL ONCE
Status: COMPLETED | OUTPATIENT
Start: 2023-12-14 | End: 2023-12-14

## 2023-12-14 RX ORDER — LIDOCAINE 560 MG/1
2 PATCH PERCUTANEOUS; TOPICAL; TRANSDERMAL DAILY
Qty: 60 PATCH | Refills: 0 | Status: SHIPPED | OUTPATIENT
Start: 2023-12-15 | End: 2023-12-15 | Stop reason: SDUPTHER

## 2023-12-14 RX ORDER — LANOLIN ALCOHOL/MO/W.PET/CERES
400 CREAM (GRAM) TOPICAL ONCE
Status: COMPLETED | OUTPATIENT
Start: 2023-12-14 | End: 2023-12-14

## 2023-12-14 RX ORDER — FUROSEMIDE 20 MG/1
20 TABLET ORAL DAILY
Qty: 30 TABLET | Refills: 1 | Status: SHIPPED | OUTPATIENT
Start: 2023-12-14 | End: 2023-12-15 | Stop reason: SDUPTHER

## 2023-12-14 RX ADMIN — GABAPENTIN 100 MG: 100 CAPSULE ORAL at 08:43

## 2023-12-14 RX ADMIN — SACUBITRIL AND VALSARTAN 1 TABLET: 24; 26 TABLET, FILM COATED ORAL at 20:10

## 2023-12-14 RX ADMIN — RIVAROXABAN 20 MG: 20 TABLET, FILM COATED ORAL at 16:48

## 2023-12-14 RX ADMIN — PANTOPRAZOLE SODIUM 40 MG: 40 TABLET, DELAYED RELEASE ORAL at 05:14

## 2023-12-14 RX ADMIN — GABAPENTIN 100 MG: 100 CAPSULE ORAL at 14:31

## 2023-12-14 RX ADMIN — FUROSEMIDE 20 MG: 20 TABLET ORAL at 09:55

## 2023-12-14 RX ADMIN — ATORVASTATIN CALCIUM 80 MG: 80 TABLET, FILM COATED ORAL at 20:10

## 2023-12-14 RX ADMIN — EMPAGLIFLOZIN 10 MG: 10 TABLET, FILM COATED ORAL at 08:43

## 2023-12-14 RX ADMIN — FOLIC ACID 1 MG: 1 TABLET ORAL at 08:43

## 2023-12-14 RX ADMIN — QUETIAPINE 50 MG: 25 TABLET ORAL at 14:31

## 2023-12-14 RX ADMIN — METOPROLOL SUCCINATE 50 MG: 50 TABLET, EXTENDED RELEASE ORAL at 08:45

## 2023-12-14 RX ADMIN — SPIRONOLACTONE 25 MG: 25 TABLET ORAL at 08:43

## 2023-12-14 RX ADMIN — SACUBITRIL AND VALSARTAN 1 TABLET: 24; 26 TABLET, FILM COATED ORAL at 08:45

## 2023-12-14 RX ADMIN — QUETIAPINE 50 MG: 25 TABLET ORAL at 08:43

## 2023-12-14 RX ADMIN — QUETIAPINE 50 MG: 25 TABLET ORAL at 20:11

## 2023-12-14 RX ADMIN — DIGOXIN 250 MCG: 250 TABLET ORAL at 08:43

## 2023-12-14 RX ADMIN — MAGNESIUM OXIDE TAB 400 MG (241.3 MG ELEMENTAL MG) 400 MG: 400 (241.3 MG) TAB at 08:45

## 2023-12-14 RX ADMIN — GABAPENTIN 100 MG: 100 CAPSULE ORAL at 20:10

## 2023-12-14 RX ADMIN — Medication 1 TABLET: at 08:43

## 2023-12-14 ASSESSMENT — COGNITIVE AND FUNCTIONAL STATUS - GENERAL
STANDING UP FROM CHAIR USING ARMS: A LITTLE
CLIMB 3 TO 5 STEPS WITH RAILING: A LOT
DRESSING REGULAR UPPER BODY CLOTHING: A LITTLE
HELP NEEDED FOR BATHING: A LITTLE
TOILETING: A LITTLE
DAILY ACTIVITIY SCORE: 19
PERSONAL GROOMING: A LITTLE
TURNING FROM BACK TO SIDE WHILE IN FLAT BAD: A LITTLE
DRESSING REGULAR LOWER BODY CLOTHING: A LITTLE
MOBILITY SCORE: 18
WALKING IN HOSPITAL ROOM: A LITTLE
MOVING TO AND FROM BED TO CHAIR: A LITTLE

## 2023-12-14 ASSESSMENT — PAIN - FUNCTIONAL ASSESSMENT: PAIN_FUNCTIONAL_ASSESSMENT: 0-10

## 2023-12-14 ASSESSMENT — PAIN SCALES - GENERAL: PAINLEVEL_OUTOF10: 0 - NO PAIN

## 2023-12-14 NOTE — CARE PLAN
The patient's goals for the shift include      The clinical goals for the shift include Pt will remain HDS during this shift      Problem: Fall/Injury  Goal: Not fall by end of shift  Outcome: Progressing  Goal: Be free from injury by end of the shift  Outcome: Progressing

## 2023-12-14 NOTE — PROGRESS NOTES
"Amirah Bennett is a 42 y.o. female on day 7 of admission presenting with Acute decompensated heart failure (CMS/HCC).    Subjective   No acute events overnight. Patient denied, nausea, vomiting, fever, and cough.          Objective     Physical Exam  Constitutional:       Appearance: Normal appearance. She is obese.   Eyes:      Conjunctiva/sclera: Conjunctivae normal.   Cardiovascular:      Rate and Rhythm: irregularly irregular  Pulmonary:      Effort: Pulmonary effort is normal. No respiratory distress.      Breath sounds: Normal breath sounds.   Abdominal:      Palpations: Abdomen is soft.  Musculoskeletal:      Right lower leg: Edema present.      Left lower leg: Edema present.   Comments: 2+ pitting edema.   Skin:     General: Skin is warm and dry.      Capillary Refill: Capillary refill takes 2 to 3 seconds.   Neurological:      Mental Status: She is alert.   Last Recorded Vitals  Blood pressure 104/71, pulse 89, temperature 36.3 °C (97.4 °F), resp. rate 18, height 1.702 m (5' 7\"), weight 93.8 kg (206 lb 12.7 oz), SpO2 95 %.  Intake/Output last 3 Shifts:  I/O last 3 completed shifts:  In: 2400 (25.5 mL/kg) [P.O.:2400]  Out: 4526 (48 mL/kg) [Urine:4525 (1.3 mL/kg/hr); Stool:1]  Weight: 94.3 kg     Relevant Results                Active Medications  Scheduled medications  atorvastatin, 80 mg, oral, Daily  digoxin, 250 mcg, oral, Daily  empagliflozin, 10 mg, oral, Daily  folic acid, 1 mg, oral, Daily  gabapentin, 100 mg, oral, TID  lidocaine, 2 patch, transdermal, Daily  metoprolol succinate XL, 50 mg, oral, Daily  multivitamin with minerals, 1 tablet, oral, Daily  pantoprazole, 40 mg, oral, Daily before breakfast  polyethylene glycol, 17 g, oral, BID  QUEtiapine, 50 mg, oral, TID  rivaroxaban, 20 mg, oral, Daily with evening meal  sacubitriL-valsartan, 1 tablet, oral, BID  sennosides, 2 tablet, oral, Nightly  spironolactone, 25 mg, oral, Daily      Continuous medications     PRN medications       Recent Labs  No " results found for this or any previous visit (from the past 24 hour(s)).    Imaging  No results found.                  Assessment/Plan   Principal Problem:    Acute decompensated heart failure (CMS/HCC)  Active Problems:    Encounter for medical examination to establish care    Amirah Bennett is a 42 y.o. female with significant PMHx of HFrEF (LVEF 20-25% (08/2022), Afib on Xarelto w/ DCCV 05/2023), ischemic stroke L MCA d/t AFib LLA thrombus seen on echo (lack of OAC adherence) s/p thrombectomy 01/2022 @ [facility] w/ residual expressive aphasia and R sided weakness, s/p tracheostomy, and HTN presenting to the ED on 17/7/23 with dyspnea, BLE edema and 25 lb weight gain. EKG revealed Afib w/ RVR, POCUS revealed significant reduction in LVEF to < 10%. This is concerning for ADHF in s/o of non-adherence to GDMT. She has significant history of not following up with EP for DCCV and AMA during admission for tracheostomy closure. Managing Afib with rate control and DOAC. Currently diuresing     Update 12/14/23 :   - Still significantly volume overloaded  - Plan for oral diuresis trial with 20mg  - -140 ml over 24h w/o diuretics  - Evening rfp, mag ordered  - plan for trial of oral diuresis tomorrow  - ECHO today        #ADHF  #Afib  #HFrEF (LVEF 20-25% (08/2022)  :: Patient has had persistent Afib managed with metoprolol and digoxin as outpatient. Has missed scheduled DCCV w/ [facility] EP.  :: Endorses non-adherence to medications contributing to edema accumulation and shortness of breath.  :: Bedside POCUS w/ new LVEF < 10%  - TTE Pending  - 40 mg Lasix BID, RFP BID  - Digoxin 250 micrograms every day  - GDMT: Jardiance 10 mg, metoprolol tart 50 mg BID, Entresto , spironolactone 25 mg  - Xarelto 20 mg every day  - Telemetry  - 40mg IV Lasix 12/10, monitoring RFP and I/O   - Dry weight 190 lbs  -Patient currently   Vitals:    12/14/23 0539   Weight: 93.8 kg (206 lb 12.7 oz)        #ischemic stroke L MCA  :: No new focal  deficits  - CTM  - Xarelto 20 mg     F: PRN, EF ~10%  E: PRN  N: Cardiac  A: 2x PIV     DVT Ppx: Xarelto 20 mg  GI PPX: pantoprazole 40 mg, miralax daily     CODE: FULL, confirmed on admission  NOK: Pranay Owen (friend) [phone]             Fermin Reveles MD

## 2023-12-14 NOTE — DISCHARGE INSTRUCTIONS
Dear Ms. Bennett,  You were admitted to us after having significant shortness of breath and bilateral swelling in your legs that was caused by the decompensation in your heart failure.  He you are treated with diuretics to make you pee out the extra fluid and medications to control your heart rate.  After you lost a significant amount of extra fluid you were discharged home.  Things to do:    1.  Please take your medications as outlined in this document.  2.  Please go to your follow-up appointments as outlined in this document.  It was a pleasure being part of your care,  Your cardiology team

## 2023-12-14 NOTE — PROGRESS NOTES
Transitional Care Coordination Progress Note:  Patient discussed during interdisciplinary rounds.   Team members present: Erlin Torres RN TCC, [facility] Team, Nurse Manager  Plan per Medical/Surgical team: Diuresis, PT/OT, ECHO,   Payer: [facility]  Status: inpatient  Discharge disposition: Home no needs  Potential Barriers: None  ADOD: 12/15 vs weekend  This TCC spoke with patient and inquired about patients PCP. Patient states she currently does not have a PCP. This TCC advised patient we are unable to set up home care services without a PCP. Team also notified  Erlin Torres RN Transitional Care Coordinator [phone]

## 2023-12-14 NOTE — NURSING NOTE
Heart Failure Education    Assessment   Mini- Cog Assessment Score: NA  Primary language, and health literacy- English Good  Understanding of hospitalization- Yes  Understanding of heart failure diagnosis- yes  Understanding of signs and symptoms- yes  Understanding of how to address HF symptoms-yes  Scale in home?  No   Daily weights? No  BP cuff at home?  No    Daily BPs? No   Heart failure education materials provided   Living with Heart Failure book  - Know your heart failure zones and what to do magnet       Medications- GDMT  Understanding and adherence i.e. medication names dose and frequency.  ARNI- Entresto 24-26mg bid   BB- Metoprolol XL 50mg every day   MRA- Spironolactone 25mg every day   SGLT2i- Jardiance 10mg every day   Diuretic- not currently prescribed  Other- Xarelto 20mg every day Afib    [facility]ARMACY- [facility]      Nutrition  Reduced sodium intake- 2gm Na  Reduced fluid intake- 8-8oz a day if swelling/sob present  Reduced carb intake for DM- NA  Food labels- discussed with patient  Nutrition consult- NA    Activity   Cardiac Rehab- order not currently placed  Independent low intensity exercise. Target minimum 30-45 min. 3x week    Social   Smoking- 1 cigarette a day. Patient states she is trying to quit  Alcohol use- social drinking. On Sundays during football game  Illicit drug use- Denies  Support person(s)- Friend    Barriers  Financial- Denies  Transportation- Patient reports friend Oziel drives her. Also stated she may need assistance in the future to appointments. Patient notified of [facility] services.   Self-care- denies  Communication- denies    Follow-up appointment(s) with Cardiology or Advanced HF Scheduled?  1. Polly Singletary APRN 1/3/2024 /[facility] [place]  2. Advised she will need a PCP    [facility]- Pending    Patient provided my business card to contact me with any questions or concerns.    Debora Garibay BSN, RN  Clinical Nurse Navigator- CHF  [phone]

## 2023-12-15 ENCOUNTER — PHARMACY VISIT (OUTPATIENT)
Dept: PHARMACY | Facility: CLINIC | Age: 43
End: 2023-12-15
Payer: COMMERCIAL

## 2023-12-15 VITALS
BODY MASS INDEX: 32.11 KG/M2 | TEMPERATURE: 98.1 F | SYSTOLIC BLOOD PRESSURE: 117 MMHG | WEIGHT: 204.59 LBS | OXYGEN SATURATION: 96 % | DIASTOLIC BLOOD PRESSURE: 80 MMHG | RESPIRATION RATE: 17 BRPM | HEIGHT: 67 IN | HEART RATE: 77 BPM

## 2023-12-15 PROCEDURE — RXMED WILLOW AMBULATORY MEDICATION CHARGE

## 2023-12-15 PROCEDURE — 99238 HOSP IP/OBS DSCHRG MGMT 30/<: CPT

## 2023-12-15 PROCEDURE — 2500000001 HC RX 250 WO HCPCS SELF ADMINISTERED DRUGS (ALT 637 FOR MEDICARE OP)

## 2023-12-15 PROCEDURE — 2500000004 HC RX 250 GENERAL PHARMACY W/ HCPCS (ALT 636 FOR OP/ED)

## 2023-12-15 RX ORDER — SENNOSIDES 8.6 MG/1
2 TABLET ORAL NIGHTLY
Qty: 60 TABLET | Refills: 0 | Status: SHIPPED | OUTPATIENT
Start: 2023-12-15 | End: 2024-01-14

## 2023-12-15 RX ORDER — ACETAMINOPHEN 325 MG/1
650 TABLET ORAL ONCE
Status: DISCONTINUED | OUTPATIENT
Start: 2023-12-15 | End: 2023-12-15

## 2023-12-15 RX ORDER — LIDOCAINE 560 MG/1
2 PATCH PERCUTANEOUS; TOPICAL; TRANSDERMAL DAILY
Qty: 60 PATCH | Refills: 0 | Status: SHIPPED | OUTPATIENT
Start: 2023-12-15 | End: 2024-01-14

## 2023-12-15 RX ORDER — SPIRONOLACTONE 25 MG/1
25 TABLET ORAL DAILY
Qty: 30 TABLET | Refills: 0 | Status: SHIPPED | OUTPATIENT
Start: 2023-12-15 | End: 2024-03-14 | Stop reason: HOSPADM

## 2023-12-15 RX ORDER — FUROSEMIDE 40 MG/1
40 TABLET ORAL ONCE
Status: COMPLETED | OUTPATIENT
Start: 2023-12-15 | End: 2023-12-15

## 2023-12-15 RX ORDER — ATORVASTATIN CALCIUM 80 MG/1
80 TABLET, FILM COATED ORAL DAILY
Qty: 30 TABLET | Refills: 0 | Status: SHIPPED | OUTPATIENT
Start: 2023-12-15 | End: 2024-01-14

## 2023-12-15 RX ORDER — FUROSEMIDE 20 MG/1
40 TABLET ORAL DAILY
Qty: 60 TABLET | Refills: 1 | Status: SHIPPED | OUTPATIENT
Start: 2023-12-15 | End: 2024-03-14 | Stop reason: HOSPADM

## 2023-12-15 RX ORDER — PANTOPRAZOLE SODIUM 40 MG/1
40 TABLET, DELAYED RELEASE ORAL
Qty: 30 TABLET | Refills: 0 | Status: SHIPPED | OUTPATIENT
Start: 2023-12-15 | End: 2024-01-14

## 2023-12-15 RX ORDER — MULTIVIT-MIN/IRON FUM/FOLIC AC 7.5 MG-4
1 TABLET ORAL DAILY
Qty: 30 TABLET | Refills: 0 | Status: SHIPPED | OUTPATIENT
Start: 2023-12-15 | End: 2024-01-14

## 2023-12-15 RX ORDER — QUETIAPINE FUMARATE 50 MG/1
150 TABLET, EXTENDED RELEASE ORAL DAILY
Qty: 90 TABLET | Refills: 0 | Status: SHIPPED | OUTPATIENT
Start: 2023-12-15 | End: 2024-03-14 | Stop reason: HOSPADM

## 2023-12-15 RX ADMIN — SACUBITRIL AND VALSARTAN 1 TABLET: 24; 26 TABLET, FILM COATED ORAL at 08:24

## 2023-12-15 RX ADMIN — GABAPENTIN 100 MG: 100 CAPSULE ORAL at 14:18

## 2023-12-15 RX ADMIN — FUROSEMIDE 40 MG: 40 TABLET ORAL at 08:25

## 2023-12-15 RX ADMIN — SPIRONOLACTONE 25 MG: 25 TABLET ORAL at 08:24

## 2023-12-15 RX ADMIN — PANTOPRAZOLE SODIUM 40 MG: 40 TABLET, DELAYED RELEASE ORAL at 06:15

## 2023-12-15 RX ADMIN — METOPROLOL SUCCINATE 50 MG: 50 TABLET, EXTENDED RELEASE ORAL at 08:24

## 2023-12-15 RX ADMIN — DIGOXIN 250 MCG: 250 TABLET ORAL at 08:24

## 2023-12-15 RX ADMIN — QUETIAPINE 50 MG: 25 TABLET ORAL at 08:24

## 2023-12-15 RX ADMIN — Medication 1 TABLET: at 08:24

## 2023-12-15 RX ADMIN — QUETIAPINE 50 MG: 25 TABLET ORAL at 14:18

## 2023-12-15 RX ADMIN — FOLIC ACID 1 MG: 1 TABLET ORAL at 08:24

## 2023-12-15 RX ADMIN — EMPAGLIFLOZIN 10 MG: 10 TABLET, FILM COATED ORAL at 08:25

## 2023-12-15 RX ADMIN — GABAPENTIN 100 MG: 100 CAPSULE ORAL at 08:24

## 2023-12-15 ASSESSMENT — COGNITIVE AND FUNCTIONAL STATUS - GENERAL
DRESSING REGULAR LOWER BODY CLOTHING: A LITTLE
MOVING TO AND FROM BED TO CHAIR: A LITTLE
CLIMB 3 TO 5 STEPS WITH RAILING: A LITTLE
MOBILITY SCORE: 19
DRESSING REGULAR UPPER BODY CLOTHING: A LITTLE
HELP NEEDED FOR BATHING: A LITTLE
PERSONAL GROOMING: A LITTLE
TURNING FROM BACK TO SIDE WHILE IN FLAT BAD: A LITTLE
WALKING IN HOSPITAL ROOM: A LITTLE
TOILETING: A LITTLE
DAILY ACTIVITIY SCORE: 19
STANDING UP FROM CHAIR USING ARMS: A LITTLE

## 2023-12-15 ASSESSMENT — PAIN SCALES - GENERAL
PAINLEVEL_OUTOF10: 0 - NO PAIN
PAINLEVEL_OUTOF10: 0 - NO PAIN

## 2023-12-15 ASSESSMENT — PAIN - FUNCTIONAL ASSESSMENT: PAIN_FUNCTIONAL_ASSESSMENT: 0-10

## 2023-12-15 NOTE — CARE PLAN
The patient's goals for the shift include  patient will not have a fall through out the shift.    The clinical goals for the shift include Patient will remain HDS throughout shift      Problem: Fall/Injury  Goal: Not fall by end of shift  Outcome: Progressing  Goal: Be free from injury by end of the shift  Outcome: Progressing  Goal: Verbalize understanding of personal risk factors for fall in the hospital  Outcome: Progressing

## 2023-12-15 NOTE — DOCUMENTATION CLARIFICATION NOTE
"    PATIENT:               MANUEL GOULD  ACCT #:                  [id number]  MRN:                       [id number]  :                       1980  ADMIT DATE:       2023 3:05 PM  DISCH DATE:  RESPONDING PROVIDER #:        [id number]          PROVIDER RESPONSE TEXT:    Acute Kidney Injury    CDI QUERY TEXT:    [facility]    Instruction:    Based on your assessment of the patient and the clinical information, please provide the requested documentation by clicking on the appropriate radio button and enter any additional information if prompted.    Question: Please clarify a diagnosis for the patient renal status    When answering this query, please exercise your independent professional judgment. The fact that a question is being asked, does not imply that any particular answer is desired or expected.    The patient's clinical indicators include:  Clinical Information: Progress notes indicate patient is a 42 year old female admitted for acute decompensated heart failure    Clinical Indicators: Per Cardiology Progress Note on , \"Cr was uptrending so lasix held overnight\"    Serum creatinine trended  thru  as: 1.06, 0.87, 0.90, 1.20, 1.22, 1.16, 1.29, 1.19, 1.27, 1.10    Treatment: Holding diuresis, Monitoring Is/Os, Daily RFP    Risk Factors: IV diuresis, Acute decompensated HFrEF, HTN  Options provided:  -- Acute Kidney Injury  -- Chronic Kidney Disease, Please specify stage below  -- SARAH on CKD, Please specify stage below  -- Other - I will add my own diagnosis  -- Refer to Clinical Documentation Reviewer    Query created by: Vanessa Briscoe on 2023 5:42 PM      Electronically signed by:  MANAN BARBOSA MD 12/15/2023 3:18 PM          "

## 2023-12-15 NOTE — DISCHARGE SUMMARY
Discharge Diagnosis  Acute decompensated heart failure (CMS/HCC)    Issues Requiring Follow-Up  [ ] Ensure patient compliant with medications  [ ] Up titrate GDMT as tolerated       Test Results Pending At Discharge  Pending Labs       No current pending labs.            Hospital Course  Amirah Bennett is a 42 y.o. female with significant PMHx of HFrEF (LVEF 20-25% (08/2022), Afib on Xarelto w/ DCCV 05/2023), ischemic stroke L MCA d/t AFib LLA thrombus seen on echo (lack of OAC adherence) s/p thrombectomy 01/2022 @ [facility] w/ residual expressive aphasia and R sided weakness, s/p tracheostomy, and HTN presented to the ED on 12/7/23 with dyspnea, BLE edema and 25 lb weight gain. EKG revealed Afib w/ RVR, POCUS revealed significant reduction in LVEF to < 10%. This is concerning for ADHF in s/o of non-adherence to GDMT. She has significant history of not following up with EP for DCCV and AMA during admission for tracheostomy closure. Considering significant symptomatic improvement s/p 40 mg IV Lasix push on admission patient was diuresed during her admission.  Patient weight on admission was 225 lbs and on discharge was  204 on discharge. She was not euvolemic on discharge however within goal for discharge.       Pertinent Physical Exam At Time of Discharge  Physical Exam  Constitutional:       Appearance: Normal appearance. She is obese.   Eyes:      Conjunctiva/sclera: Conjunctivae normal.   Cardiovascular:      Rate and Rhythm: irregularly irregular  Pulmonary:      Effort: Pulmonary effort is normal. No respiratory distress.      Breath sounds: Normal breath sounds.   Abdominal:      Palpations: Abdomen is soft.  Musculoskeletal:      Right lower leg: Edema present.      Left lower leg: Edema present.   Comments: 2+ pitting edema.   Skin:     General: Skin is warm and dry.      Capillary Refill: Capillary refill takes 2 to 3 seconds.   Neurological:      Mental Status: She is alert.     Home Medications     Medication  List      START taking these medications     lidocaine 4 % patch; Place 2 patches over 12 hours on the skin once   daily for 7 days. Remove & discard patch within 12 hours or as directed by   MD. Leave off for at least 12 hours before applying new patch   sacubitriL-valsartan 24-26 mg tablet; Commonly known as: Entresto; Take   1 tablet by mouth 2 times a day.; Replaces: sacubitriL-valsartan  mg   tablet   sennosides 8.6 mg tablet; Commonly known as: Senokot; Take 2 tablets   (17.2 mg) by mouth once daily at bedtime.     CHANGE how you take these medications     atorvastatin 80 mg tablet; Commonly known as: Lipitor; Take 1 tablet (80   mg) by mouth once daily.; What changed: medication strength, how much to   take   furosemide 20 mg tablet; Commonly known as: Lasix; Take 2 tablets (40   mg) by mouth once daily.; What changed: medication strength   metoprolol succinate XL 50 mg 24 hr tablet; Commonly known as:   Toprol-XL; Take 1 tablet (50 mg) by mouth once daily. Do not crush or   chew. Do not start before December 13, 2023.; What changed: how much to   take     CONTINUE taking these medications     digoxin 250 MCG tab;et; Commonly known as: Lanoxin   empagliflozin 10 mg; Commonly known as: Jardiance; Take 1 tablet (10 mg)   by mouth once daily.   folic acid 1 mg tablet; Commonly known as: Folvite   gabapentin 100 mg capsule; Commonly known as: Neurontin   multivitamin with minerals tablet; Take 1 tablet by mouth once daily.   pantoprazole 40 mg EC tablet; Commonly known as: ProtoNix; Take 1 tablet   (40 mg) by mouth once daily in the morning. Take before meals. Do not   crush, chew, or split.   QUEtiapine XR 50 mg 24 hr tablet; Commonly known as: SEROquel XR; Take 3   tablets (150 mg) by mouth once daily. Do not crush, chew, or split.   spironolactone 25 mg tablet; Commonly known as: Aldactone; Take 1 tablet   (25 mg) by mouth once daily.   Xarelto 20 mg tablet; Generic drug: rivaroxaban     STOP taking  these medications     DULoxetine 60 mg DR capsule; Commonly known as: Cymbalta   sacubitriL-valsartan  mg tablet; Commonly known as: Entresto;   Replaced by: sacubitriL-valsartan 24-26 mg tablet       Outpatient Follow-Up  Future Appointments   Date Time Provider Department Center   1/3/2024  2:20 PM JAE Childers-CNP [id number] Academic   1/15/2024  1:00 PM Reyes Crane MD [id number] Academic       Fermin Reveles MD

## 2023-12-17 ENCOUNTER — PATIENT OUTREACH (OUTPATIENT)
Dept: HOME HEALTH SERVICES | Age: 43
End: 2023-12-17
Payer: MEDICARE

## 2023-12-17 SDOH — ECONOMIC STABILITY: INCOME INSECURITY: IN THE LAST 12 MONTHS, WAS THERE A TIME WHEN YOU WERE NOT ABLE TO PAY THE MORTGAGE OR RENT ON TIME?: NO

## 2023-12-17 SDOH — SOCIAL STABILITY: SOCIAL NETWORK: HOW OFTEN DO YOU GET TOGETHER WITH FRIENDS OR RELATIVES?: ONCE A WEEK

## 2023-12-17 SDOH — ECONOMIC STABILITY: INCOME INSECURITY: IN THE PAST 12 MONTHS, HAS THE ELECTRIC, GAS, OIL, OR WATER COMPANY THREATENED TO SHUT OFF SERVICE IN YOUR HOME?: YES

## 2023-12-17 SDOH — ECONOMIC STABILITY: FOOD INSECURITY: WITHIN THE PAST 12 MONTHS, THE FOOD YOU BOUGHT JUST DIDN'T LAST AND YOU DIDN'T HAVE MONEY TO GET MORE.: NEVER TRUE

## 2023-12-17 SDOH — SOCIAL STABILITY: SOCIAL INSECURITY: WITHIN THE LAST YEAR, HAVE YOU BEEN AFRAID OF YOUR PARTNER OR EX-PARTNER?: NO

## 2023-12-17 SDOH — ECONOMIC STABILITY: FOOD INSECURITY: WITHIN THE PAST 12 MONTHS, YOU WORRIED THAT YOUR FOOD WOULD RUN OUT BEFORE YOU GOT MONEY TO BUY MORE.: SOMETIMES TRUE

## 2023-12-17 SDOH — HEALTH STABILITY: MENTAL HEALTH: HOW OFTEN DO YOU HAVE A DRINK CONTAINING ALCOHOL?: NEVER

## 2023-12-17 SDOH — SOCIAL STABILITY: SOCIAL INSECURITY: WITHIN THE LAST YEAR, HAVE YOU BEEN HUMILIATED OR EMOTIONALLY ABUSED IN OTHER WAYS BY YOUR PARTNER OR EX-PARTNER?: NO

## 2023-12-17 SDOH — HEALTH STABILITY: MENTAL HEALTH
STRESS IS WHEN SOMEONE FEELS TENSE, NERVOUS, ANXIOUS, OR CAN'T SLEEP AT NIGHT BECAUSE THEIR MIND IS TROUBLED. HOW STRESSED ARE YOU?: ONLY A LITTLE

## 2023-12-17 SDOH — ECONOMIC STABILITY: TRANSPORTATION INSECURITY
IN THE PAST 12 MONTHS, HAS LACK OF TRANSPORTATION KEPT YOU FROM MEETINGS, WORK, OR FROM GETTING THINGS NEEDED FOR DAILY LIVING?: YES

## 2023-12-17 SDOH — SOCIAL STABILITY: SOCIAL NETWORK
DO YOU BELONG TO ANY CLUBS OR ORGANIZATIONS SUCH AS CHURCH GROUPS UNIONS, FRATERNAL OR ATHLETIC GROUPS, OR SCHOOL GROUPS?: YES

## 2023-12-17 SDOH — SOCIAL STABILITY: SOCIAL NETWORK: HOW OFTEN DO YOU ATTEND CHURCH OR RELIGIOUS SERVICES?: NEVER

## 2023-12-17 SDOH — SOCIAL STABILITY: SOCIAL NETWORK: ARE YOU MARRIED, WIDOWED, DIVORCED, SEPARATED, NEVER MARRIED, OR LIVING WITH A PARTNER?: NEVER MARRIED

## 2023-12-17 SDOH — HEALTH STABILITY: MENTAL HEALTH: HOW OFTEN DO YOU HAVE 6 OR MORE DRINKS ON ONE OCCASION?: NEVER

## 2023-12-17 SDOH — SOCIAL STABILITY: SOCIAL INSECURITY
WITHIN THE LAST YEAR, HAVE TO BEEN RAPED OR FORCED TO HAVE ANY KIND OF SEXUAL ACTIVITY BY YOUR PARTNER OR EX-PARTNER?: NO

## 2023-12-17 SDOH — HEALTH STABILITY: PHYSICAL HEALTH: ON AVERAGE, HOW MANY MINUTES DO YOU ENGAGE IN EXERCISE AT THIS LEVEL?: 30 MIN

## 2023-12-17 SDOH — SOCIAL STABILITY: SOCIAL NETWORK: IN A TYPICAL WEEK, HOW MANY TIMES DO YOU TALK ON THE PHONE WITH FAMILY, FRIENDS, OR NEIGHBORS?: TWICE A WEEK

## 2023-12-17 SDOH — HEALTH STABILITY: PHYSICAL HEALTH: ON AVERAGE, HOW MANY DAYS PER WEEK DO YOU ENGAGE IN MODERATE TO STRENUOUS EXERCISE (LIKE A BRISK WALK)?: 7 DAYS

## 2023-12-17 SDOH — SOCIAL STABILITY: SOCIAL INSECURITY
WITHIN THE LAST YEAR, HAVE YOU BEEN KICKED, HIT, SLAPPED, OR OTHERWISE PHYSICALLY HURT BY YOUR PARTNER OR EX-PARTNER?: NO

## 2023-12-17 SDOH — HEALTH STABILITY: MENTAL HEALTH: HOW MANY STANDARD DRINKS CONTAINING ALCOHOL DO YOU HAVE ON A TYPICAL DAY?: PATIENT DOES NOT DRINK

## 2023-12-17 SDOH — SOCIAL STABILITY: SOCIAL NETWORK: HOW OFTEN DO YOU ATTENT MEETINGS OF THE CLUB OR ORGANIZATION YOU BELONG TO?: NEVER

## 2023-12-17 SDOH — ECONOMIC STABILITY: TRANSPORTATION INSECURITY
IN THE PAST 12 MONTHS, HAS THE LACK OF TRANSPORTATION KEPT YOU FROM MEDICAL APPOINTMENTS OR FROM GETTING MEDICATIONS?: NO

## 2023-12-17 ASSESSMENT — LIFESTYLE VARIABLES
SKIP TO QUESTIONS 9-10: 1
AUDIT-C TOTAL SCORE: 0

## 2023-12-17 NOTE — PROGRESS NOTES
[facility] Transitions Assessment    RN enrollment call complete. Discussed [facility] program and [facility] screen completed. Patient agrees to enroll in the program. [facility], verbalized understanding. Provided phone number to contact [facility] if any questions.     [address]   [address] [facility] [address]  **  If this is not the address patient will receive services - alert team and address in EMR**       Patient Contacts:  Extended Emergency Contact Information  Primary Emergency Contact: Pranay Owen  Mobile Phone: [phone]  Relation: Friend  Preferred language: English   needed? No                                Patient's Preferred Phone: [phone]  Patient's E-mail: No e-mail address on record

## 2023-12-18 ENCOUNTER — PATIENT OUTREACH (OUTPATIENT)
Dept: HOME HEALTH SERVICES | Age: 43
End: 2023-12-18
Payer: MEDICARE

## 2023-12-18 NOTE — PROGRESS NOTES
Daily Call Note: Daily call complete.  Pt reports she is feeling well.  Does not have scale at home.  Is adhering to low sodium diet and fluid restriction.  Reviewed importance of both, pt verbalized understanding.  Verified upcoming [facility] appt.  All questions/ concerns answered.      Pt Education: CHF   Barriers:   Topics for Daily Review:   CHF   Pt demonstrates clear understanding: No    Daily Weight:  There were no vitals filed for this visit.   Last 3 Weights:  Wt Readings from Last 7 Encounters:   12/15/23 92.8 kg (204 lb 9.4 oz)   06/29/23 92.4 kg (203 lb 12.8 oz)   01/12/23 95.3 kg (210 lb)   11/02/22 103 kg (226 lb 6.6 oz)   06/27/22 98 kg (216 lb)       Masimo Device: No   Masimo Clinical Impression:     Virtual Visits--Scheduled (Most Recent Date at Top)  Follow up Appointments  Recent Visits  No visits were found meeting these conditions.  Showing recent visits within past 30 days and meeting all other requirements  Future Appointments  No visits were found meeting these conditions.  Showing future appointments within next 90 days and meeting all other requirements       Frequency of RN Calls & Virtual Visits per Team Agreement: [facility] Frequency: Daily    Medication issues Addressed (what was done):     Follow up appointments scheduled by [facility] Staff:   Referrals made by [facility] staff:       [facility] Care Transitions Assessment    Patient's Address:   [address] [place] [address]  **  If this is not the address patient will receive services - alert team and address in EMR**       Patient Contacts:  Extended Emergency Contact Information  Primary Emergency Contact: Pranay Owen  Mobile Phone: [phone]  Relation: Friend  Preferred language: English   needed? No                                Patient's Preferred Phone: [phone]  Patient's E-mail: [email or web address]

## 2023-12-23 ENCOUNTER — PATIENT OUTREACH (OUTPATIENT)
Dept: HOME HEALTH SERVICES | Age: 43
End: 2023-12-23
Payer: MEDICARE

## 2023-12-23 DIAGNOSIS — G89.0 CENTRAL PAIN SYNDROME: Primary | ICD-10-CM

## 2023-12-23 NOTE — PROGRESS NOTES
Daily Call Note:   Disenrolled from [facility]. Unable to contact patient.     Pt Education:   Barriers:   Topics for Daily Review:   Pt demonstrates clear understanding:     Daily Weight:  There were no vitals filed for this visit.   Last 3 Weights:  Wt Readings from Last 7 Encounters:   12/15/23 92.8 kg (204 lb 9.4 oz)   09/21/23 100 kg (221 lb 5 oz)   06/29/23 92.4 kg (203 lb 12.8 oz)   01/12/23 95.3 kg (210 lb)   11/02/22 103 kg (226 lb 6.6 oz)   06/27/22 98 kg (216 lb)       Masimo Device:    Masimo Clinical Impression:     Virtual Visits--Scheduled (Most Recent Date at Top)  Follow up Appointments  Recent Visits  No visits were found meeting these conditions.  Showing recent visits within past 30 days and meeting all other requirements  Future Appointments  No visits were found meeting these conditions.  Showing future appointments within next 90 days and meeting all other requirements       Frequency of RN Calls & Virtual Visits per Team Agreement:     Medication issues Addressed (what was done):     Follow up appointments scheduled by [facility] Staff:   Referrals made by [facility] staff:       [facility] Care Transitions Assessment    Patient's Address:   [address] [place] [address]  **  If this is not the address patient will receive services - alert team and address in EMR**       Patient Contacts:  Extended Emergency Contact Information  Primary Emergency Contact: Pranay Owen  Mobile Phone: [phone]  Relation: Friend  Preferred language: English   needed? No                                Patient's Preferred Phone: [phone]  Patient's E-mail: [email or web address]

## 2023-12-26 ENCOUNTER — TELEPHONE (OUTPATIENT)
Dept: CARDIOLOGY | Facility: HOSPITAL | Age: 43
End: 2023-12-26
Payer: MEDICARE

## 2023-12-26 NOTE — TELEPHONE ENCOUNTER
Heart Failure Discharge Follow-up Call  No Answer. Left voicemail to return call.      Debora LAWTONN, RN  Clinical Nurse Navigator- CHF  [phone]

## 2023-12-27 ENCOUNTER — APPOINTMENT (OUTPATIENT)
Dept: PRIMARY CARE | Facility: CLINIC | Age: 43
End: 2023-12-27
Payer: COMMERCIAL

## 2023-12-27 PROBLEM — I51.3 MURAL THROMBUS OF LEFT ATRIUM: Status: ACTIVE | Noted: 2021-12-18

## 2023-12-27 PROBLEM — E66.811 OBESITY, CLASS I, BMI 30-34.9: Status: ACTIVE | Noted: 2022-02-08

## 2023-12-27 PROBLEM — I69.351 HEMIPARESIS AFFECTING RIGHT SIDE AS LATE EFFECT OF CEREBROVASCULAR ACCIDENT (MULTI): Status: ACTIVE | Noted: 2023-12-27

## 2023-12-27 PROBLEM — R49.9 CHANGE IN VOICE: Status: ACTIVE | Noted: 2023-12-27

## 2023-12-27 PROBLEM — F39 MOOD DISORDER (CMS-HCC): Chronic | Status: ACTIVE | Noted: 2020-08-28

## 2023-12-27 PROBLEM — F10.10 ETOH ABUSE: Status: ACTIVE | Noted: 2021-12-18

## 2023-12-27 PROBLEM — R49.9 VOICE DISORDER: Status: ACTIVE | Noted: 2023-12-27

## 2023-12-27 PROBLEM — G89.0 CENTRAL PAIN SYNDROME: Status: ACTIVE | Noted: 2023-12-27

## 2023-12-27 PROBLEM — I62.9 INTRACRANIAL HEMORRHAGE (MULTI): Status: ACTIVE | Noted: 2022-02-10

## 2023-12-27 PROBLEM — E11.9 DIABETES (MULTI): Status: ACTIVE | Noted: 2023-12-27

## 2023-12-27 PROBLEM — F17.200 NICOTINE USE DISORDER: Status: ACTIVE | Noted: 2021-12-18

## 2023-12-27 PROBLEM — N18.9 CHRONIC KIDNEY DISEASE: Status: ACTIVE | Noted: 2022-02-01

## 2023-12-27 PROBLEM — I48.91 ATRIAL FIBRILLATION (MULTI): Status: ACTIVE | Noted: 2021-12-18

## 2023-12-27 PROBLEM — I50.9 CHF (CONGESTIVE HEART FAILURE) (MULTI): Status: ACTIVE | Noted: 2023-12-27

## 2023-12-27 PROBLEM — R79.89 ELEVATED LFTS: Status: ACTIVE | Noted: 2022-02-01

## 2023-12-27 PROBLEM — J38.7 LARYNGEAL MASS: Status: ACTIVE | Noted: 2023-12-27

## 2023-12-27 PROBLEM — E66.9 OBESITY, CLASS I, BMI 30-34.9: Status: ACTIVE | Noted: 2022-02-08

## 2023-12-27 PROBLEM — I69.391 DYSPHAGIA FOLLOWING CEREBRAL INFARCTION: Status: ACTIVE | Noted: 2022-02-08

## 2023-12-27 PROBLEM — Z86.73 HISTORY OF STROKE: Status: ACTIVE | Noted: 2023-12-27

## 2023-12-27 PROBLEM — I50.22 CHRONIC SYSTOLIC HEART FAILURE (MULTI): Status: ACTIVE | Noted: 2022-02-10

## 2023-12-27 PROBLEM — J38.6 LARYNGEAL STENOSIS: Status: ACTIVE | Noted: 2023-12-27

## 2023-12-27 PROBLEM — I69.920 APHASIA DUE TO LATE EFFECTS OF CEREBROVASCULAR DISEASE: Status: ACTIVE | Noted: 2023-12-27

## 2023-12-27 RX ORDER — DIGOXIN 250 MCG
TABLET ORAL
COMMUNITY
Start: 2022-08-16 | End: 2024-03-14 | Stop reason: HOSPADM

## 2023-12-27 RX ORDER — QUETIAPINE FUMARATE 100 MG/1
TABLET, FILM COATED ORAL
COMMUNITY
Start: 2022-04-20 | End: 2024-03-14 | Stop reason: HOSPADM

## 2023-12-27 RX ORDER — MEGESTROL ACETATE 40 MG/1
40 TABLET ORAL 2 TIMES DAILY
COMMUNITY
Start: 2012-05-23 | End: 2024-03-14 | Stop reason: HOSPADM

## 2023-12-27 RX ORDER — ESOMEPRAZOLE MAGNESIUM 40 MG/1
CAPSULE, DELAYED RELEASE ORAL
COMMUNITY
Start: 2022-08-26 | End: 2024-03-14 | Stop reason: HOSPADM

## 2023-12-27 RX ORDER — HYDROCHLOROTHIAZIDE 12.5 MG/1
TABLET ORAL
COMMUNITY
Start: 2022-04-25 | End: 2024-03-14 | Stop reason: HOSPADM

## 2023-12-27 RX ORDER — QUETIAPINE FUMARATE 50 MG/1
TABLET, FILM COATED ORAL
COMMUNITY
Start: 2022-08-26 | End: 2024-03-14 | Stop reason: HOSPADM

## 2023-12-27 RX ORDER — LOSARTAN POTASSIUM 25 MG/1
25 TABLET ORAL
COMMUNITY
Start: 2022-02-19 | End: 2024-03-14 | Stop reason: HOSPADM

## 2023-12-27 RX ORDER — SPIRONOLACTONE 25 MG/1
TABLET ORAL
COMMUNITY
Start: 2022-03-17 | End: 2024-03-14 | Stop reason: HOSPADM

## 2023-12-27 RX ORDER — TALC
3 POWDER (GRAM) TOPICAL DAILY PRN
COMMUNITY
Start: 2022-02-18

## 2023-12-27 RX ORDER — METOPROLOL SUCCINATE 100 MG/1
100 TABLET, EXTENDED RELEASE ORAL
COMMUNITY
Start: 2022-02-11 | End: 2024-03-14 | Stop reason: HOSPADM

## 2023-12-27 RX ORDER — METOPROLOL SUCCINATE 100 MG/1
1 TABLET, EXTENDED RELEASE ORAL 2 TIMES DAILY
COMMUNITY
Start: 2021-12-22 | End: 2024-03-14 | Stop reason: HOSPADM

## 2023-12-27 RX ORDER — SACUBITRIL AND VALSARTAN 97; 103 MG/1; MG/1
TABLET, FILM COATED ORAL
COMMUNITY
Start: 2022-06-09 | End: 2024-03-14 | Stop reason: HOSPADM

## 2023-12-27 RX ORDER — METFORMIN HYDROCHLORIDE 850 MG/1
850 TABLET ORAL 3 TIMES DAILY
COMMUNITY
Start: 2014-06-12 | End: 2024-03-14 | Stop reason: HOSPADM

## 2023-12-27 RX ORDER — NAPROXEN SODIUM 220 MG/1
81 TABLET, FILM COATED ORAL
COMMUNITY
Start: 2022-02-11

## 2023-12-27 RX ORDER — LANOLIN ALCOHOL/MO/W.PET/CERES
100 CREAM (GRAM) TOPICAL 2 TIMES DAILY
COMMUNITY
Start: 2022-02-10 | End: 2024-03-14 | Stop reason: HOSPADM

## 2023-12-27 RX ORDER — FOLIC ACID 1 MG/1
1 TABLET ORAL DAILY
COMMUNITY
Start: 2022-02-18 | End: 2024-03-14 | Stop reason: HOSPADM

## 2023-12-27 RX ORDER — DICLOFENAC SODIUM 10 MG/G
GEL TOPICAL
COMMUNITY
Start: 2023-05-17

## 2023-12-27 RX ORDER — ATORVASTATIN CALCIUM 40 MG/1
TABLET, FILM COATED ORAL
COMMUNITY
Start: 2022-04-20 | End: 2024-03-14 | Stop reason: HOSPADM

## 2023-12-27 RX ORDER — TRAZODONE HYDROCHLORIDE 50 MG/1
50-100 TABLET ORAL
COMMUNITY
Start: 2020-08-21 | End: 2024-03-14 | Stop reason: HOSPADM

## 2023-12-27 RX ORDER — DIGOXIN 250 MCG
TABLET ORAL
COMMUNITY
Start: 2022-08-26 | End: 2024-03-14 | Stop reason: HOSPADM

## 2023-12-27 RX ORDER — LIDOCAINE 560 MG/1
PATCH PERCUTANEOUS; TOPICAL; TRANSDERMAL
COMMUNITY
Start: 2022-08-16 | End: 2024-03-14 | Stop reason: HOSPADM

## 2023-12-27 RX ORDER — ACETAMINOPHEN 325 MG/1
TABLET ORAL
COMMUNITY
Start: 2022-08-26

## 2023-12-27 RX ORDER — SACUBITRIL AND VALSARTAN 24; 26 MG/1; MG/1
1 TABLET, FILM COATED ORAL 2 TIMES DAILY
COMMUNITY
Start: 2022-02-25 | End: 2024-03-14 | Stop reason: HOSPADM

## 2023-12-27 RX ORDER — ATORVASTATIN CALCIUM 40 MG/1
TABLET, FILM COATED ORAL
COMMUNITY
Start: 2022-08-26 | End: 2024-03-14 | Stop reason: HOSPADM

## 2023-12-27 RX ORDER — SIMVASTATIN 20 MG/1
20 TABLET, FILM COATED ORAL
COMMUNITY
Start: 2014-06-12 | End: 2024-03-14 | Stop reason: HOSPADM

## 2023-12-27 RX ORDER — OXYCODONE HYDROCHLORIDE 5 MG/1
5 TABLET ORAL EVERY 4 HOURS PRN
COMMUNITY
Start: 2023-05-31 | End: 2024-03-14 | Stop reason: HOSPADM

## 2023-12-27 RX ORDER — GABAPENTIN 100 MG/1
1 CAPSULE ORAL 3 TIMES DAILY
COMMUNITY
Start: 2022-06-09 | End: 2024-03-14 | Stop reason: HOSPADM

## 2023-12-27 RX ORDER — AMIODARONE HYDROCHLORIDE 200 MG/1
200 TABLET ORAL DAILY
COMMUNITY
End: 2024-03-14 | Stop reason: HOSPADM

## 2023-12-27 RX ORDER — DIGOXIN 125 MCG
0.12 TABLET ORAL
COMMUNITY
Start: 2022-02-11 | End: 2024-03-14 | Stop reason: HOSPADM

## 2023-12-27 RX ORDER — POLYETHYLENE GLYCOL 3350 17 G/17G
17 POWDER, FOR SOLUTION ORAL
COMMUNITY

## 2023-12-27 RX ORDER — ADHESIVE BANDAGE
30 BANDAGE TOPICAL DAILY PRN
COMMUNITY
Start: 2022-02-18 | End: 2024-03-14 | Stop reason: HOSPADM

## 2023-12-27 RX ORDER — SPIRONOLACTONE 25 MG/1
25 TABLET ORAL
COMMUNITY
Start: 2014-06-12

## 2023-12-27 RX ORDER — FUROSEMIDE 20 MG/1
TABLET ORAL
COMMUNITY
Start: 2022-08-16 | End: 2024-03-14 | Stop reason: HOSPADM

## 2023-12-27 RX ORDER — GABAPENTIN 300 MG/1
300 CAPSULE ORAL NIGHTLY
COMMUNITY
Start: 2023-05-15 | End: 2024-03-14 | Stop reason: HOSPADM

## 2023-12-27 RX ORDER — GUAIFENESIN 600 MG/1
600 TABLET, EXTENDED RELEASE ORAL 2 TIMES DAILY
COMMUNITY
Start: 2022-02-18 | End: 2024-03-14 | Stop reason: HOSPADM

## 2023-12-27 RX ORDER — ARIPIPRAZOLE 15 MG/1
15 TABLET ORAL
COMMUNITY
Start: 2020-08-21 | End: 2024-03-14 | Stop reason: HOSPADM

## 2023-12-27 RX ORDER — SACUBITRIL AND VALSARTAN 97; 103 MG/1; MG/1
TABLET, FILM COATED ORAL
COMMUNITY
Start: 2022-08-26 | End: 2024-03-14 | Stop reason: HOSPADM

## 2023-12-27 RX ORDER — FUROSEMIDE 40 MG/1
40 TABLET ORAL 2 TIMES DAILY
COMMUNITY
Start: 2021-12-22

## 2023-12-27 RX ORDER — MEDROXYPROGESTERONE ACETATE 10 MG/1
10 TABLET ORAL 2 TIMES DAILY
COMMUNITY
Start: 2011-02-25 | End: 2024-03-14 | Stop reason: HOSPADM

## 2023-12-27 RX ORDER — GUAIFENESIN 100 MG/5ML
100 SOLUTION ORAL EVERY 4 HOURS PRN
COMMUNITY
Start: 2022-02-18 | End: 2024-03-14 | Stop reason: HOSPADM

## 2023-12-27 RX ORDER — POTASSIUM CHLORIDE 20 MEQ/1
40 TABLET, EXTENDED RELEASE ORAL
COMMUNITY
Start: 2022-02-11

## 2023-12-28 RX ORDER — GABAPENTIN 100 MG/1
100 CAPSULE ORAL 3 TIMES DAILY
Qty: 270 CAPSULE | Refills: 1 | Status: SHIPPED | OUTPATIENT
Start: 2023-12-28 | End: 2024-06-25

## 2024-01-02 PROBLEM — F20.9 SCHIZOPHRENIA (MULTI): Status: ACTIVE | Noted: 2021-12-18

## 2024-01-02 PROBLEM — I63.9 STROKE (MULTI): Status: ACTIVE | Noted: 2022-01-31

## 2024-01-02 PROBLEM — I10 PRIMARY HYPERTENSION: Status: ACTIVE | Noted: 2021-12-19

## 2024-03-08 ENCOUNTER — HOSPITAL ENCOUNTER (INPATIENT)
Facility: HOSPITAL | Age: 44
LOS: 6 days | Discharge: NURSING, REHAB OR LONG-TERM CARE | DRG: 065 | End: 2024-03-14
Attending: EMERGENCY MEDICINE | Admitting: INTERNAL MEDICINE
Payer: COMMERCIAL

## 2024-03-08 ENCOUNTER — CLINICAL SUPPORT (OUTPATIENT)
Dept: EMERGENCY MEDICINE | Facility: HOSPITAL | Age: 44
DRG: 065 | End: 2024-03-08
Payer: COMMERCIAL

## 2024-03-08 ENCOUNTER — APPOINTMENT (OUTPATIENT)
Dept: RADIOLOGY | Facility: HOSPITAL | Age: 44
DRG: 065 | End: 2024-03-08
Payer: COMMERCIAL

## 2024-03-08 DIAGNOSIS — I10 PRIMARY HYPERTENSION: ICD-10-CM

## 2024-03-08 DIAGNOSIS — I48.11 LONGSTANDING PERSISTENT ATRIAL FIBRILLATION (MULTI): ICD-10-CM

## 2024-03-08 DIAGNOSIS — I50.9 CONGESTIVE HEART FAILURE, UNSPECIFIED HF CHRONICITY, UNSPECIFIED HEART FAILURE TYPE (MULTI): ICD-10-CM

## 2024-03-08 DIAGNOSIS — I63.9 CEREBROVASCULAR ACCIDENT (CVA), UNSPECIFIED MECHANISM (MULTI): Primary | ICD-10-CM

## 2024-03-08 DIAGNOSIS — I63.112: ICD-10-CM

## 2024-03-08 DIAGNOSIS — R10.84 GENERALIZED ABDOMINAL PAIN: ICD-10-CM

## 2024-03-08 DIAGNOSIS — R79.89 ELEVATED LFTS: ICD-10-CM

## 2024-03-08 DIAGNOSIS — I63.40 CEREBROVASCULAR ACCIDENT (CVA) DUE TO EMBOLISM OF CEREBRAL ARTERY (MULTI): ICD-10-CM

## 2024-03-08 DIAGNOSIS — I63.442 CEREBROVASCULAR ACCIDENT (CVA) DUE TO EMBOLISM OF LEFT CEREBELLAR ARTERY (MULTI): ICD-10-CM

## 2024-03-08 DIAGNOSIS — E11.42 DIABETIC POLYNEUROPATHY ASSOCIATED WITH TYPE 2 DIABETES MELLITUS (MULTI): ICD-10-CM

## 2024-03-08 DIAGNOSIS — I63.342 CEREBROVASCULAR ACCIDENT (CVA) DUE TO THROMBOSIS OF LEFT CEREBELLAR ARTERY (MULTI): ICD-10-CM

## 2024-03-08 DIAGNOSIS — R41.82 ALTERED MENTAL STATUS, UNSPECIFIED ALTERED MENTAL STATUS TYPE: ICD-10-CM

## 2024-03-08 DIAGNOSIS — I50.9 ACUTE DECOMPENSATED HEART FAILURE (MULTI): ICD-10-CM

## 2024-03-08 DIAGNOSIS — N83.201 CYST OF RIGHT OVARY: ICD-10-CM

## 2024-03-08 LAB
ABO GROUP (TYPE) IN BLOOD: NORMAL
ALBUMIN SERPL BCP-MCNC: 3.4 G/DL (ref 3.4–5)
ALBUMIN SERPL BCP-MCNC: 4.4 G/DL (ref 3.4–5)
ALP SERPL-CCNC: 68 U/L (ref 33–110)
ALT SERPL W P-5'-P-CCNC: 107 U/L (ref 7–45)
AMMONIA PLAS-SCNC: 33 UMOL/L (ref 16–53)
AMPHETAMINES UR QL SCN: ABNORMAL
ANION GAP BLDV CALCULATED.4IONS-SCNC: 15 MMOL/L (ref 10–25)
ANION GAP SERPL CALC-SCNC: 13 MMOL/L (ref 10–20)
ANION GAP SERPL CALC-SCNC: 18 MMOL/L (ref 10–20)
ANTIBODY SCREEN: NORMAL
APPEARANCE UR: CLEAR
AST SERPL W P-5'-P-CCNC: 107 U/L (ref 9–39)
BARBITURATES UR QL SCN: ABNORMAL
BASE EXCESS BLDV CALC-SCNC: -1.1 MMOL/L (ref -2–3)
BASOPHILS # BLD MANUAL: 0 X10*3/UL (ref 0–0.1)
BASOPHILS NFR BLD MANUAL: 0 %
BENZODIAZ UR QL SCN: ABNORMAL
BILIRUB SERPL-MCNC: 2.3 MG/DL (ref 0–1.2)
BILIRUB UR STRIP.AUTO-MCNC: NEGATIVE MG/DL
BNP SERPL-MCNC: 290 PG/ML (ref 0–99)
BODY TEMPERATURE: 37 DEGREES CELSIUS
BUN SERPL-MCNC: 16 MG/DL (ref 6–23)
BUN SERPL-MCNC: 19 MG/DL (ref 6–23)
BURR CELLS BLD QL SMEAR: ABNORMAL
BZE UR QL SCN: ABNORMAL
CA-I BLDV-SCNC: 0.98 MMOL/L (ref 1.1–1.33)
CALCIUM SERPL-MCNC: 9.2 MG/DL (ref 8.6–10.6)
CALCIUM SERPL-MCNC: 9.9 MG/DL (ref 8.6–10.6)
CANNABINOIDS UR QL SCN: ABNORMAL
CARDIAC TROPONIN I PNL SERPL HS: 33 NG/L (ref 0–34)
CHLORIDE BLDV-SCNC: 97 MMOL/L (ref 98–107)
CHLORIDE SERPL-SCNC: 96 MMOL/L (ref 98–107)
CHLORIDE SERPL-SCNC: 98 MMOL/L (ref 98–107)
CO2 SERPL-SCNC: 23 MMOL/L (ref 21–32)
CO2 SERPL-SCNC: 27 MMOL/L (ref 21–32)
COLOR UR: ABNORMAL
CREAT SERPL-MCNC: 0.77 MG/DL (ref 0.5–1.05)
CREAT SERPL-MCNC: 0.87 MG/DL (ref 0.5–1.05)
DIGOXIN SERPL-MCNC: 0.38 NG/ML (ref 0.8–?)
EGFRCR SERPLBLD CKD-EPI 2021: 84 ML/MIN/1.73M*2
EGFRCR SERPLBLD CKD-EPI 2021: >90 ML/MIN/1.73M*2
EOSINOPHIL # BLD MANUAL: 0.08 X10*3/UL (ref 0–0.7)
EOSINOPHIL NFR BLD MANUAL: 0.9 %
ERYTHROCYTE [DISTWIDTH] IN BLOOD BY AUTOMATED COUNT: 18.5 % (ref 11.5–14.5)
FENTANYL+NORFENTANYL UR QL SCN: ABNORMAL
FOLATE SERPL-MCNC: >24 NG/ML
GLUCOSE BLDV-MCNC: 155 MG/DL (ref 74–99)
GLUCOSE SERPL-MCNC: 109 MG/DL (ref 74–99)
GLUCOSE SERPL-MCNC: 142 MG/DL (ref 74–99)
GLUCOSE UR STRIP.AUTO-MCNC: NORMAL MG/DL
HCO3 BLDV-SCNC: 25.8 MMOL/L (ref 22–26)
HCT VFR BLD AUTO: 48.4 % (ref 36–46)
HCT VFR BLD EST: 49 % (ref 36–46)
HGB BLD-MCNC: 15.9 G/DL (ref 12–16)
HGB BLDV-MCNC: 16.2 G/DL (ref 12–16)
HOLD SPECIMEN: NORMAL
IMM GRANULOCYTES # BLD AUTO: 0.03 X10*3/UL (ref 0–0.7)
IMM GRANULOCYTES NFR BLD AUTO: 0.3 % (ref 0–0.9)
INHALED O2 CONCENTRATION: 21 %
KETONES UR STRIP.AUTO-MCNC: ABNORMAL MG/DL
LACTATE BLDV-SCNC: 2.7 MMOL/L (ref 0.4–2)
LEUKOCYTE ESTERASE UR QL STRIP.AUTO: NEGATIVE
LIPASE SERPL-CCNC: 16 U/L (ref 9–82)
LYMPHOCYTES # BLD MANUAL: 1.67 X10*3/UL (ref 1.2–4.8)
LYMPHOCYTES NFR BLD MANUAL: 18.2 %
MAGNESIUM SERPL-MCNC: 1.6 MG/DL (ref 1.6–2.4)
MAGNESIUM SERPL-MCNC: 2.04 MG/DL (ref 1.6–2.4)
MAGNESIUM SERPL-MCNC: NORMAL MG/DL
MCH RBC QN AUTO: 28 PG (ref 26–34)
MCHC RBC AUTO-ENTMCNC: 32.9 G/DL (ref 32–36)
MCV RBC AUTO: 85 FL (ref 80–100)
METHADONE UR QL SCN: ABNORMAL
MONOCYTES # BLD MANUAL: 0.32 X10*3/UL (ref 0.1–1)
MONOCYTES NFR BLD MANUAL: 3.5 %
NEUTS SEG # BLD MANUAL: 7.12 X10*3/UL (ref 1.2–7)
NEUTS SEG NFR BLD MANUAL: 77.4 %
NITRITE UR QL STRIP.AUTO: NEGATIVE
NRBC BLD-RTO: 0.2 /100 WBCS (ref 0–0)
OPIATES UR QL SCN: ABNORMAL
OXYCODONE+OXYMORPHONE UR QL SCN: ABNORMAL
OXYHGB MFR BLDV: 44.4 % (ref 45–75)
PCO2 BLDV: 50 MM HG (ref 41–51)
PCP UR QL SCN: ABNORMAL
PH BLDV: 7.32 PH (ref 7.33–7.43)
PH UR STRIP.AUTO: 7 [PH]
PHOSPHATE SERPL-MCNC: 3.8 MG/DL (ref 2.5–4.9)
PLATELET # BLD AUTO: 128 X10*3/UL (ref 150–450)
PO2 BLDV: 38 MM HG (ref 35–45)
POLYCHROMASIA BLD QL SMEAR: ABNORMAL
POTASSIUM BLDV-SCNC: 7.6 MMOL/L (ref 3.5–5.3)
POTASSIUM SERPL-SCNC: 3.8 MMOL/L (ref 3.5–5.3)
POTASSIUM SERPL-SCNC: 4 MMOL/L (ref 3.5–5.3)
POTASSIUM SERPL-SCNC: 4.2 MMOL/L (ref 3.5–5.3)
PREGNANCY TEST URINE, POC: NEGATIVE
PROT SERPL-MCNC: 8.1 G/DL (ref 6.4–8.2)
PROT UR STRIP.AUTO-MCNC: ABNORMAL MG/DL
RBC # BLD AUTO: 5.68 X10*6/UL (ref 4–5.2)
RBC # UR STRIP.AUTO: NEGATIVE /UL
RBC #/AREA URNS AUTO: NORMAL /HPF
RBC MORPH BLD: ABNORMAL
RH FACTOR (ANTIGEN D): NORMAL
SAO2 % BLDV: 45 % (ref 45–75)
SARS-COV-2 RNA RESP QL NAA+PROBE: NOT DETECTED
SODIUM BLDV-SCNC: 130 MMOL/L (ref 136–145)
SODIUM SERPL-SCNC: 132 MMOL/L (ref 136–145)
SODIUM SERPL-SCNC: 135 MMOL/L (ref 136–145)
SP GR UR STRIP.AUTO: 1.02
SQUAMOUS #/AREA URNS AUTO: NORMAL /HPF
TOTAL CELLS COUNTED BLD: 115
TREPONEMA PALLIDUM IGG+IGM AB [PRESENCE] IN SERUM OR PLASMA BY IMMUNOASSAY: NONREACTIVE
TSH SERPL-ACNC: 1.04 MIU/L (ref 0.44–3.98)
UROBILINOGEN UR STRIP.AUTO-MCNC: NORMAL MG/DL
VIT B12 SERPL-MCNC: 740 PG/ML (ref 211–911)
WBC # BLD AUTO: 9.2 X10*3/UL (ref 4.4–11.3)
WBC #/AREA URNS AUTO: NORMAL /HPF

## 2024-03-08 PROCEDURE — 2500000004 HC RX 250 GENERAL PHARMACY W/ HCPCS (ALT 636 FOR OP/ED): Performed by: STUDENT IN AN ORGANIZED HEALTH CARE EDUCATION/TRAINING PROGRAM

## 2024-03-08 PROCEDURE — 82140 ASSAY OF AMMONIA: CPT | Performed by: STUDENT IN AN ORGANIZED HEALTH CARE EDUCATION/TRAINING PROGRAM

## 2024-03-08 PROCEDURE — 85027 COMPLETE CBC AUTOMATED: CPT | Performed by: STUDENT IN AN ORGANIZED HEALTH CARE EDUCATION/TRAINING PROGRAM

## 2024-03-08 PROCEDURE — 84443 ASSAY THYROID STIM HORMONE: CPT | Performed by: STUDENT IN AN ORGANIZED HEALTH CARE EDUCATION/TRAINING PROGRAM

## 2024-03-08 PROCEDURE — 74177 CT ABD & PELVIS W/CONTRAST: CPT | Mod: FOREIGN READ | Performed by: RADIOLOGY

## 2024-03-08 PROCEDURE — 2500000005 HC RX 250 GENERAL PHARMACY W/O HCPCS

## 2024-03-08 PROCEDURE — 80179 DRUG ASSAY SALICYLATE: CPT | Mod: 91 | Performed by: STUDENT IN AN ORGANIZED HEALTH CARE EDUCATION/TRAINING PROGRAM

## 2024-03-08 PROCEDURE — 1200000002 HC GENERAL ROOM WITH TELEMETRY DAILY

## 2024-03-08 PROCEDURE — 2550000001 HC RX 255 CONTRASTS: Performed by: EMERGENCY MEDICINE

## 2024-03-08 PROCEDURE — 84484 ASSAY OF TROPONIN QUANT: CPT | Performed by: STUDENT IN AN ORGANIZED HEALTH CARE EDUCATION/TRAINING PROGRAM

## 2024-03-08 PROCEDURE — 99223 1ST HOSP IP/OBS HIGH 75: CPT

## 2024-03-08 PROCEDURE — 84132 ASSAY OF SERUM POTASSIUM: CPT

## 2024-03-08 PROCEDURE — 82248 BILIRUBIN DIRECT: CPT | Performed by: STUDENT IN AN ORGANIZED HEALTH CARE EDUCATION/TRAINING PROGRAM

## 2024-03-08 PROCEDURE — 2500000004 HC RX 250 GENERAL PHARMACY W/ HCPCS (ALT 636 FOR OP/ED)

## 2024-03-08 PROCEDURE — 96361 HYDRATE IV INFUSION ADD-ON: CPT

## 2024-03-08 PROCEDURE — 93005 ELECTROCARDIOGRAM TRACING: CPT

## 2024-03-08 PROCEDURE — 36415 COLL VENOUS BLD VENIPUNCTURE: CPT | Performed by: STUDENT IN AN ORGANIZED HEALTH CARE EDUCATION/TRAINING PROGRAM

## 2024-03-08 PROCEDURE — 80179 DRUG ASSAY SALICYLATE: CPT | Performed by: STUDENT IN AN ORGANIZED HEALTH CARE EDUCATION/TRAINING PROGRAM

## 2024-03-08 PROCEDURE — 99285 EMERGENCY DEPT VISIT HI MDM: CPT | Mod: 25

## 2024-03-08 PROCEDURE — 80162 ASSAY OF DIGOXIN TOTAL: CPT | Performed by: STUDENT IN AN ORGANIZED HEALTH CARE EDUCATION/TRAINING PROGRAM

## 2024-03-08 PROCEDURE — 2500000004 HC RX 250 GENERAL PHARMACY W/ HCPCS (ALT 636 FOR OP/ED): Mod: SE | Performed by: STUDENT IN AN ORGANIZED HEALTH CARE EDUCATION/TRAINING PROGRAM

## 2024-03-08 PROCEDURE — 82607 VITAMIN B-12: CPT

## 2024-03-08 PROCEDURE — 36415 COLL VENOUS BLD VENIPUNCTURE: CPT

## 2024-03-08 PROCEDURE — 96375 TX/PRO/DX INJ NEW DRUG ADDON: CPT

## 2024-03-08 PROCEDURE — 70450 CT HEAD/BRAIN W/O DYE: CPT

## 2024-03-08 PROCEDURE — 36415 COLL VENOUS BLD VENIPUNCTURE: CPT | Performed by: EMERGENCY MEDICINE

## 2024-03-08 PROCEDURE — 70450 CT HEAD/BRAIN W/O DYE: CPT | Performed by: RADIOLOGY

## 2024-03-08 PROCEDURE — 84425 ASSAY OF VITAMIN B-1: CPT

## 2024-03-08 PROCEDURE — 2500000001 HC RX 250 WO HCPCS SELF ADMINISTERED DRUGS (ALT 637 FOR MEDICARE OP)

## 2024-03-08 PROCEDURE — 93010 ELECTROCARDIOGRAM REPORT: CPT | Performed by: EMERGENCY MEDICINE

## 2024-03-08 PROCEDURE — 84132 ASSAY OF SERUM POTASSIUM: CPT | Performed by: STUDENT IN AN ORGANIZED HEALTH CARE EDUCATION/TRAINING PROGRAM

## 2024-03-08 PROCEDURE — 76856 US EXAM PELVIC COMPLETE: CPT

## 2024-03-08 PROCEDURE — 83735 ASSAY OF MAGNESIUM: CPT | Mod: 91

## 2024-03-08 PROCEDURE — 2500000002 HC RX 250 W HCPCS SELF ADMINISTERED DRUGS (ALT 637 FOR MEDICARE OP, ALT 636 FOR OP/ED)

## 2024-03-08 PROCEDURE — 86780 TREPONEMA PALLIDUM: CPT | Performed by: EMERGENCY MEDICINE

## 2024-03-08 PROCEDURE — 80074 ACUTE HEPATITIS PANEL: CPT | Performed by: STUDENT IN AN ORGANIZED HEALTH CARE EDUCATION/TRAINING PROGRAM

## 2024-03-08 PROCEDURE — 71045 X-RAY EXAM CHEST 1 VIEW: CPT

## 2024-03-08 PROCEDURE — 76856 US EXAM PELVIC COMPLETE: CPT | Performed by: STUDENT IN AN ORGANIZED HEALTH CARE EDUCATION/TRAINING PROGRAM

## 2024-03-08 PROCEDURE — 86900 BLOOD TYPING SEROLOGIC ABO: CPT | Mod: 91 | Performed by: STUDENT IN AN ORGANIZED HEALTH CARE EDUCATION/TRAINING PROGRAM

## 2024-03-08 PROCEDURE — 87635 SARS-COV-2 COVID-19 AMP PRB: CPT | Performed by: STUDENT IN AN ORGANIZED HEALTH CARE EDUCATION/TRAINING PROGRAM

## 2024-03-08 PROCEDURE — 83690 ASSAY OF LIPASE: CPT | Performed by: STUDENT IN AN ORGANIZED HEALTH CARE EDUCATION/TRAINING PROGRAM

## 2024-03-08 PROCEDURE — 80307 DRUG TEST PRSMV CHEM ANLYZR: CPT

## 2024-03-08 PROCEDURE — 82373 ASSAY C-D TRANSFER MEASURE: CPT

## 2024-03-08 PROCEDURE — 99285 EMERGENCY DEPT VISIT HI MDM: CPT | Performed by: EMERGENCY MEDICINE

## 2024-03-08 PROCEDURE — 71045 X-RAY EXAM CHEST 1 VIEW: CPT | Mod: FOREIGN READ | Performed by: RADIOLOGY

## 2024-03-08 PROCEDURE — 96374 THER/PROPH/DIAG INJ IV PUSH: CPT

## 2024-03-08 PROCEDURE — 87040 BLOOD CULTURE FOR BACTERIA: CPT | Mod: 91

## 2024-03-08 PROCEDURE — 83880 ASSAY OF NATRIURETIC PEPTIDE: CPT | Performed by: STUDENT IN AN ORGANIZED HEALTH CARE EDUCATION/TRAINING PROGRAM

## 2024-03-08 PROCEDURE — 83735 ASSAY OF MAGNESIUM: CPT | Performed by: STUDENT IN AN ORGANIZED HEALTH CARE EDUCATION/TRAINING PROGRAM

## 2024-03-08 PROCEDURE — 82746 ASSAY OF FOLIC ACID SERUM: CPT

## 2024-03-08 PROCEDURE — 81001 URINALYSIS AUTO W/SCOPE: CPT | Performed by: STUDENT IN AN ORGANIZED HEALTH CARE EDUCATION/TRAINING PROGRAM

## 2024-03-08 PROCEDURE — 85007 BL SMEAR W/DIFF WBC COUNT: CPT | Performed by: STUDENT IN AN ORGANIZED HEALTH CARE EDUCATION/TRAINING PROGRAM

## 2024-03-08 PROCEDURE — 74177 CT ABD & PELVIS W/CONTRAST: CPT

## 2024-03-08 PROCEDURE — 84295 ASSAY OF SERUM SODIUM: CPT | Performed by: STUDENT IN AN ORGANIZED HEALTH CARE EDUCATION/TRAINING PROGRAM

## 2024-03-08 PROCEDURE — 84132 ASSAY OF SERUM POTASSIUM: CPT | Mod: 91

## 2024-03-08 PROCEDURE — 36410 VNPNXR 3YR/> PHY/QHP DX/THER: CPT

## 2024-03-08 RX ORDER — QUETIAPINE FUMARATE 50 MG/1
50 TABLET, FILM COATED ORAL 3 TIMES DAILY
Status: DISCONTINUED | OUTPATIENT
Start: 2024-03-08 | End: 2024-03-08

## 2024-03-08 RX ORDER — FOLIC ACID 1 MG/1
1 TABLET ORAL DAILY
Status: DISCONTINUED | OUTPATIENT
Start: 2024-03-08 | End: 2024-03-14 | Stop reason: HOSPADM

## 2024-03-08 RX ORDER — DIGOXIN 125 MCG
250 TABLET ORAL DAILY
Status: DISCONTINUED | OUTPATIENT
Start: 2024-03-08 | End: 2024-03-14 | Stop reason: HOSPADM

## 2024-03-08 RX ORDER — METOPROLOL TARTRATE 1 MG/ML
INJECTION, SOLUTION INTRAVENOUS
Status: COMPLETED
Start: 2024-03-08 | End: 2024-03-08

## 2024-03-08 RX ORDER — HYDROMORPHONE HYDROCHLORIDE 1 MG/ML
0.5 INJECTION, SOLUTION INTRAMUSCULAR; INTRAVENOUS; SUBCUTANEOUS ONCE
Status: COMPLETED | OUTPATIENT
Start: 2024-03-08 | End: 2024-03-08

## 2024-03-08 RX ORDER — POLYETHYLENE GLYCOL 3350 17 G/17G
17 POWDER, FOR SOLUTION ORAL DAILY PRN
Status: DISCONTINUED | OUTPATIENT
Start: 2024-03-08 | End: 2024-03-14 | Stop reason: HOSPADM

## 2024-03-08 RX ORDER — GABAPENTIN 100 MG/1
100 CAPSULE ORAL 3 TIMES DAILY
Status: DISCONTINUED | OUTPATIENT
Start: 2024-03-08 | End: 2024-03-14 | Stop reason: HOSPADM

## 2024-03-08 RX ORDER — ACETAMINOPHEN 325 MG/1
650 TABLET ORAL EVERY 6 HOURS PRN
Status: DISCONTINUED | OUTPATIENT
Start: 2024-03-08 | End: 2024-03-14 | Stop reason: HOSPADM

## 2024-03-08 RX ORDER — METOPROLOL TARTRATE 25 MG/1
12.5 TABLET, FILM COATED ORAL ONCE
Status: COMPLETED | OUTPATIENT
Start: 2024-03-08 | End: 2024-03-08

## 2024-03-08 RX ORDER — METOPROLOL SUCCINATE 50 MG/1
50 TABLET, EXTENDED RELEASE ORAL DAILY
Status: DISCONTINUED | OUTPATIENT
Start: 2024-03-08 | End: 2024-03-14 | Stop reason: HOSPADM

## 2024-03-08 RX ORDER — FUROSEMIDE 20 MG/1
40 TABLET ORAL DAILY
Status: DISCONTINUED | OUTPATIENT
Start: 2024-03-08 | End: 2024-03-14 | Stop reason: HOSPADM

## 2024-03-08 RX ORDER — ONDANSETRON HYDROCHLORIDE 2 MG/ML
4 INJECTION, SOLUTION INTRAVENOUS ONCE
Status: COMPLETED | OUTPATIENT
Start: 2024-03-08 | End: 2024-03-08

## 2024-03-08 RX ORDER — ONDANSETRON HYDROCHLORIDE 2 MG/ML
4 INJECTION, SOLUTION INTRAVENOUS EVERY 8 HOURS PRN
Status: DISCONTINUED | OUTPATIENT
Start: 2024-03-08 | End: 2024-03-14 | Stop reason: HOSPADM

## 2024-03-08 RX ORDER — LABETALOL HYDROCHLORIDE 5 MG/ML
20 INJECTION, SOLUTION INTRAVENOUS ONCE
Status: COMPLETED | OUTPATIENT
Start: 2024-03-08 | End: 2024-03-08

## 2024-03-08 RX ORDER — ENOXAPARIN SODIUM 100 MG/ML
40 INJECTION SUBCUTANEOUS EVERY 24 HOURS
Status: DISCONTINUED | OUTPATIENT
Start: 2024-03-08 | End: 2024-03-08

## 2024-03-08 RX ORDER — QUETIAPINE FUMARATE 50 MG/1
150 TABLET, EXTENDED RELEASE ORAL DAILY
Status: DISCONTINUED | OUTPATIENT
Start: 2024-03-08 | End: 2024-03-08

## 2024-03-08 RX ORDER — POTASSIUM CHLORIDE 14.9 MG/ML
INJECTION INTRAVENOUS
Status: COMPLETED
Start: 2024-03-08 | End: 2024-03-09

## 2024-03-08 RX ORDER — PANTOPRAZOLE SODIUM 40 MG/1
40 TABLET, DELAYED RELEASE ORAL
Status: DISCONTINUED | OUTPATIENT
Start: 2024-03-09 | End: 2024-03-14 | Stop reason: HOSPADM

## 2024-03-08 RX ORDER — POTASSIUM CHLORIDE 14.9 MG/ML
20 INJECTION INTRAVENOUS ONCE
Status: COMPLETED | OUTPATIENT
Start: 2024-03-08 | End: 2024-03-09

## 2024-03-08 RX ORDER — METOPROLOL TARTRATE 1 MG/ML
5 INJECTION, SOLUTION INTRAVENOUS ONCE
Status: COMPLETED | OUTPATIENT
Start: 2024-03-08 | End: 2024-03-08

## 2024-03-08 RX ORDER — ATORVASTATIN CALCIUM 80 MG/1
80 TABLET, FILM COATED ORAL DAILY
Status: DISCONTINUED | OUTPATIENT
Start: 2024-03-08 | End: 2024-03-14 | Stop reason: HOSPADM

## 2024-03-08 RX ORDER — ONDANSETRON 4 MG/1
4 TABLET, ORALLY DISINTEGRATING ORAL EVERY 8 HOURS PRN
Status: DISCONTINUED | OUTPATIENT
Start: 2024-03-08 | End: 2024-03-14 | Stop reason: HOSPADM

## 2024-03-08 RX ORDER — METOPROLOL TARTRATE 25 MG/1
TABLET, FILM COATED ORAL
Status: COMPLETED
Start: 2024-03-08 | End: 2024-03-08

## 2024-03-08 RX ORDER — MAGNESIUM SULFATE HEPTAHYDRATE 40 MG/ML
2 INJECTION, SOLUTION INTRAVENOUS ONCE
Status: COMPLETED | OUTPATIENT
Start: 2024-03-08 | End: 2024-03-08

## 2024-03-08 RX ORDER — SPIRONOLACTONE 25 MG/1
25 TABLET ORAL
Status: DISCONTINUED | OUTPATIENT
Start: 2024-03-08 | End: 2024-03-14 | Stop reason: HOSPADM

## 2024-03-08 RX ORDER — MULTIVIT-MIN/IRON FUM/FOLIC AC 7.5 MG-4
1 TABLET ORAL
Status: DISCONTINUED | OUTPATIENT
Start: 2024-03-08 | End: 2024-03-14 | Stop reason: HOSPADM

## 2024-03-08 RX ADMIN — METOPROLOL TARTRATE 5 MG: 1 INJECTION, SOLUTION INTRAVENOUS at 20:57

## 2024-03-08 RX ADMIN — EMPAGLIFLOZIN 10 MG: 10 TABLET, FILM COATED ORAL at 16:45

## 2024-03-08 RX ADMIN — QUETIAPINE FUMARATE 50 MG: 50 TABLET ORAL at 16:44

## 2024-03-08 RX ADMIN — POTASSIUM CHLORIDE 20 MEQ: 14.9 INJECTION, SOLUTION INTRAVENOUS at 22:34

## 2024-03-08 RX ADMIN — ONDANSETRON 4 MG: 2 INJECTION INTRAMUSCULAR; INTRAVENOUS at 04:08

## 2024-03-08 RX ADMIN — LABETALOL HYDROCHLORIDE 20 MG: 5 INJECTION, SOLUTION INTRAVENOUS at 05:25

## 2024-03-08 RX ADMIN — METOPROLOL TARTRATE 12.5 MG: 25 TABLET, FILM COATED ORAL at 20:51

## 2024-03-08 RX ADMIN — RIVAROXABAN 20 MG: 20 TABLET, FILM COATED ORAL at 17:41

## 2024-03-08 RX ADMIN — FOLIC ACID 1 MG: 1 TABLET ORAL at 16:44

## 2024-03-08 RX ADMIN — GABAPENTIN 100 MG: 100 CAPSULE ORAL at 16:44

## 2024-03-08 RX ADMIN — POTASSIUM CHLORIDE 20 MEQ: 14.9 INJECTION, SOLUTION INTRAVENOUS at 22:35

## 2024-03-08 RX ADMIN — FUROSEMIDE 40 MG: 40 TABLET ORAL at 16:43

## 2024-03-08 RX ADMIN — SPIRONOLACTONE 25 MG: 25 TABLET, FILM COATED ORAL at 16:43

## 2024-03-08 RX ADMIN — SODIUM CHLORIDE, POTASSIUM CHLORIDE, SODIUM LACTATE AND CALCIUM CHLORIDE 500 ML: 600; 310; 30; 20 INJECTION, SOLUTION INTRAVENOUS at 04:27

## 2024-03-08 RX ADMIN — METOPROLOL TARTRATE 5 MG: 1 INJECTION, SOLUTION INTRAVENOUS at 20:50

## 2024-03-08 RX ADMIN — MAGNESIUM SULFATE HEPTAHYDRATE 2 G: 40 INJECTION, SOLUTION INTRAVENOUS at 19:08

## 2024-03-08 RX ADMIN — Medication 1 TABLET: at 16:44

## 2024-03-08 RX ADMIN — DIGOXIN 250 MCG: 125 TABLET ORAL at 16:43

## 2024-03-08 RX ADMIN — HYDROMORPHONE HYDROCHLORIDE 0.5 MG: 1 INJECTION, SOLUTION INTRAMUSCULAR; INTRAVENOUS; SUBCUTANEOUS at 04:09

## 2024-03-08 RX ADMIN — IOHEXOL 75 ML: 350 INJECTION, SOLUTION INTRAVENOUS at 06:07

## 2024-03-08 RX ADMIN — ATORVASTATIN CALCIUM 80 MG: 80 TABLET, FILM COATED ORAL at 16:44

## 2024-03-08 RX ADMIN — METOPROLOL SUCCINATE 50 MG: 50 TABLET, EXTENDED RELEASE ORAL at 16:44

## 2024-03-08 ASSESSMENT — ENCOUNTER SYMPTOMS
TROUBLE SWALLOWING: 0
DIAPHORESIS: 1
LIGHT-HEADEDNESS: 1
VOMITING: 1
TREMORS: 0
APPETITE CHANGE: 1
NUMBNESS: 0
SORE THROAT: 0
CONSTIPATION: 0
BLOOD IN STOOL: 0
DIARRHEA: 1
PHOTOPHOBIA: 1
HEADACHES: 1
SHORTNESS OF BREATH: 0
PALPITATIONS: 0
CHILLS: 0
WEAKNESS: 0
FEVER: 0
FLANK PAIN: 0
CHEST TIGHTNESS: 0
NAUSEA: 1
CHOKING: 0
ABDOMINAL PAIN: 1
SEIZURES: 0
AGITATION: 1
DYSURIA: 0
FREQUENCY: 0
SPEECH DIFFICULTY: 1
FACIAL ASYMMETRY: 0
CONFUSION: 1
COUGH: 0
DIZZINESS: 0

## 2024-03-08 ASSESSMENT — PAIN DESCRIPTION - PROGRESSION: CLINICAL_PROGRESSION: NOT CHANGED

## 2024-03-08 ASSESSMENT — COLUMBIA-SUICIDE SEVERITY RATING SCALE - C-SSRS
2. HAVE YOU ACTUALLY HAD ANY THOUGHTS OF KILLING YOURSELF?: NO
1. IN THE PAST MONTH, HAVE YOU WISHED YOU WERE DEAD OR WISHED YOU COULD GO TO SLEEP AND NOT WAKE UP?: NO
6. HAVE YOU EVER DONE ANYTHING, STARTED TO DO ANYTHING, OR PREPARED TO DO ANYTHING TO END YOUR LIFE?: NO

## 2024-03-08 ASSESSMENT — LIFESTYLE VARIABLES
EVER FELT BAD OR GUILTY ABOUT YOUR DRINKING: NO
EVER HAD A DRINK FIRST THING IN THE MORNING TO STEADY YOUR NERVES TO GET RID OF A HANGOVER: NO
HAVE YOU EVER FELT YOU SHOULD CUT DOWN ON YOUR DRINKING: NO
HAVE PEOPLE ANNOYED YOU BY CRITICIZING YOUR DRINKING: NO

## 2024-03-08 ASSESSMENT — PAIN SCALES - GENERAL
PAINLEVEL_OUTOF10: 0 - NO PAIN
PAINLEVEL_OUTOF10: 0 - NO PAIN

## 2024-03-08 ASSESSMENT — PAIN - FUNCTIONAL ASSESSMENT: PAIN_FUNCTIONAL_ASSESSMENT: 0-10

## 2024-03-08 NOTE — CONSULTS
"Reason For Consult  Ovarian cyst    History Of Present Illness  Amirah Bennett is a 43 y.o. P0 with a history of HrEF, Afib on Xarelto, ischemic stroke 2/2 afib thrombus, and residual aphasia and R sided weakness, now presenting to the ED with AMS.    Patient is altered and unable to communicate or consent.  at bedside providing collateral.   reports that for the last few days, patient has been vomiting and feeling generally unwell. She was reporting \"stomach\" pain from all the vomiting. Overnight, she called out saying she all of a sudden could not see and when he tried to get her to walk she was unable. He says that at that time her speech started to change and he was immediately concerned about a stroke as she has had multiple before.  According to his best knowledge she has no children, has been pregnant once which resulted in a miscarriage. She is getting regular monthly menses, last however was in January as far as he knows. He does not think she has had pelvic pain.   He is unaware of any other relevant history.     In ED:  - hypertensive & tachypnea, afebrile  - pan-scan CT demonstrated 4cm R ovarian cyst, not present on previous imaging  - neg UPT  - COVID neg, flu pending     Past Medical History  She has a past medical history of CHF (congestive heart failure) (CMS/[facility]) and Stroke (CMS/[facility]).    Surgical History  She has a past surgical history that includes Other surgical history (06/09/2022).     Social History  She reports that she quit smoking about 3 months ago. Her smoking use included cigarettes. She smoked an average of .25 packs per day. She does not have any smokeless tobacco history on file. No history on file for alcohol use and drug use.    Family History  No family history on file.     Allergies  Hydrocodone-acetaminophen and Ibuprofen    Review of Systems  Review of Systems   Gastrointestinal:  Positive for abdominal pain.   Psychiatric/Behavioral:  Positive for agitation and " confusion.    All other systems reviewed and are negative.        Physical Exam  General: sleeping   Cardiovascular: Warm and well perfused, hypertensive  Respiratory: Normal respiratory effort, NC in place   Abdominal:  Soft, tender in LUQ & epigastric region > lower quadrants  Extremities: No edema, no calf tenderness   Skin: No rashes or lesions visualized  Psych: intermittently agitated and confused        Last Recorded Vitals  Blood pressure (!) 124/100, pulse 93, temperature 36.3 °C (97.3 °F), temperature source Oral, resp. rate (!) 24, SpO2 99 %.    Relevant Results  CT abdomen pelvis w IV contrast    Result Date: 3/8/2024  STUDY: CT Abdomen and Pelvis with IV Contrast; 03/08/2024 6:19 AM INDICATION: Right-sided abdominal pain, nausea, vomiting. COMPARISON: CT AP 12/09/2023, US RUQ 08/08/2022. ACCESSION NUMBER(S): [id number] ORDERING CLINICIAN: ULISSES LOONEY TECHNIQUE: CT of the abdomen and pelvis was performed.  Contiguous axial images were obtained at 3 mm slice thickness through the abdomen and pelvis. Coronal and sagittal reconstructions at 3 mm slice thickness were performed.  Omnipaque 350 75 mL was administered intravenously.  FINDINGS: Cardiac enlargement.  No pericardial effusion.  Pulmonary vascular congestive changes.  Lung bases are otherwise clear. Abdomen: No acute abnormality of the stomach is identified. The liver of normal size and contour.  9 mm LEFT hepatic cyst unchanged.  No intrahepatic ductal dilatation is identified. Gallbladder collapsed.  The pancreas, spleen and adrenal glands are normal appearance. No acute renal process.  Chronic LEFT lateral cortical renal infarct. No retroperitoneal adenopathy. No findings of abdominal aortic aneurysm. No bowel obstruction. No free air or free fluid within the abdomen. There is a normal-appearing appendix. Pelvis: CT examination of the pelvis shows no free fluid. No findings of adenopathy.  4.8 cm RIGHT ovarian cyst.  No inflammatory change or  findings of free air. Skeleton: Lower lumbar facet arthrosis.  No acute bony process identified.    Cardiac enlargement and pulmonary vascular congestive changes. 4.8 cm RIGHT ovarian cyst.  This was not present on the prior study. Consider ultrasound correlation of the RIGHT ovary. Signed by Evan Hughes MD    CT head wo IV contrast    Result Date: 3/8/2024  Interpreted By:  Finkelstein, Evan, STUDY: CT HEAD WO IV CONTRAST;  3/8/2024 6:05 am   INDICATION: Signs/Symptoms:Possible AMS.   COMPARISON: CT brain 05/31/2023   ACCESSION NUMBER(S): [id number]   ORDERING CLINICIAN: ULISSES LOONEY   TECHNIQUE: Axial noncontrast CT images of the head with coronal and sagittal reconstructions.   FINDINGS: Extracalvarial soft tissues are unremarkable. Paranasal sinuses and mastoid air cells are aerated. No depressed calvarial fracture. Left frontal/temporal lobe encephalomalacia, similar compared to prior imaging. There are nonspecific periventricular white matter hypodensities. Parenchymal atrophy with prominence of the ventricles and cortical sulci. Ex vacuo dilatation of the left lateral ventricle.         No acute intracranial hemorrhage, mass effect or midline shift.   Sequela of prior left MCA territory infarct. Additional nonspecific scattered white matter hypodensities favored to represent sequela of small vessel ischemia.     MACRO: None.   Signed by: Evan Finkelstein 3/8/2024 6:35 AM Dictation workstation:   [id number]    XR chest 1 view    Result Date: 3/8/2024  STUDY: Chest Radiograph;  03/08/2024 02:09AM INDICATION: Altered mental status, possible infectious workup COMPARISON: 12/07/2023 XR Chest. ACCESSION NUMBER(S): [id number] ORDERING CLINICIAN: ULISSES LOONEY TECHNIQUE:  Frontal chest was obtained at 02:08 hours. FINDINGS: CARDIOMEDIASTINAL SILHOUETTE: Cardiomediastinal silhouette is enlarged..  LUNGS: Lungs are clear.  ABDOMEN: No remarkable upper abdominal findings.  BONES: No acute osseous  changes.    Cardiomegaly unchanged.. Signed by Yandel Maza, DO       Assessment/Plan     Amirah Bennett is a 43 y.o. P0 with a history of HrEF, Afib on Xarelto, ischemic stroke 2/2 afib thrombus, and residual aphasia and R sided weakness, now presenting to the ED with AMS.    Summary:  - patient with extensive past medical history, inclusive of: HF, strokes, neurological deficits  - presenting after multiple days of n/v and sick sympts  - found to have incidental new 4cm R ovarian cyst  - no previous GYN complaints or pathology known to  who provides collateral history    Recommendations:  - given that patient is unable to consent to pelvic exam or TVUS, would recommend transabdominal ultrasound for characterization of the cyst; from CT images, cyst appears simple  - small 4cm simple cyst in a pre-menopausal woman does not require urgent/surgical intervention in the absence of torsion/pain  - agree with investigation of any other etiologies of AMS, as initiated by primary team  - will order outpatient GYN referral for follow up, would obtain repeat TVUS in 6 months when patient stabilized    Discussed with Dr Pk Vaca MD  Gynecology, pager [phone]

## 2024-03-08 NOTE — ED PROVIDER NOTES
History of Present Illness   CC: Abdominal Pain     History provided by: EMS and Nursing Staff and significant other  Limitations to History: AMS and residual expressive aphasia from prior stroke    HPI:  Amirah Bennett is a 43 y.o. female with past medical history notable for HFrEF, EF 20 to 25%, A-fib on Xarelto, prior ischemic stroke with residual expressive aphasia and right-sided weakness status post trach which has since been decannulated, hypertension presenting to the ED with altered mental status for the past 3 days.  Per nursing triage note, patient was found sitting in her own stool today, has had flulike symptoms and abdominal pain.  She has had diarrhea as well.  Patient is only alert and oriented to person and place, but not time or events, cannot provide additional history.  History is otherwise limited at this time.    External Records Reviewed: Reviewed discharge summary from 12/15 documenting patient past medical history, at that time, she was admitted for CHF exacerbation and diuresis by the cardiology service.  I also reviewed the patient's home medication list including digoxin, DOACs, SGLT2 inhibitors    Physical Exam   Triage vitals:  T 36.3 °C (97.3 °F)  HR (!) 108  BP (!) 135/105  RR 20  O2 98 % None (Room air)    Vital signs reviewed in nursing triage note, EMR flow sheets, and at patient's bedside.   General: Awake, alert, but ill-appearing.  Eyes: Gaze conjugate.  No scleral icterus or injection.  Pupils equal, round, and reactive to light bilaterally.  HENT: Normo-cephalic, atraumatic. No stridor.  Patient is status post tracheostomy, with decannulation, but she remains with patent trach stoma.  CV: Tachycardic rate, irregular rhythm. Radial pulses 2+ bilaterally.  No lower extremity edema bilaterally  Resp: Breathing non-labored. Clear to auscultation bilaterally.  No wheezes, rales, rhonchi  GI: Soft, non-distended, tender in the right lower quadrant with focal guarding.  Unable to  assess for rebound tenderness.    MSK/Extremities: No gross bony deformities. Moving all extremities  Skin: Warm. Appropriate color  Neuro: Awake, alert oriented to person, knows she is in the hospital, otherwise unsure if time, or other events.  Moves all extremities.  Does follow commands in the upper extremities with strong  strength, and flexion extension at the elbows.  Does not follow commands in the lower extremities, but is moving vigorously.    ED Course & Medical Decision Making   ED Course:  ED Course as of 03/08/24 0728   Fri Mar 08, 2024   0215 ECG 12 lead  EKG obtained at 0148 demonstrating atrial flutter with rapid ventricular response, rate of 106, normal axis, normal intervals, 2 inversions in V4 through V6 as well as inferior leads.  No ST segment elevations.  Nonspecific overall. [SH]   0317 XR chest 1 view  Chest x-ray with cardiomegaly, no other acute processes. [SH]   0331 Comprehensive metabolic panel(!) [SH]   0334 Magnesium  Borderline low mag [SH]   0335 TSH with reflex to Free T4 if abnormal  TSH normal [SH]   0335 Troponin I, High Sensitivity  Trop normal [SH]   0335 Digoxin level(!)  Digoxin level is low [SH]   0336 Lipase  Lipase normal [SH]   0336 Comprehensive metabolic panel(!)  Normal glucose, electrolytes, renal function, bicarb, slight AST and ALT and bilirubin elevation [SH]   0347 CBC and Auto Differential(!)  Normal white blood cell count, normal hemoglobin, mild thrombocytopenia [SH]   0348 Manual Differential(!)  There is a left shift with increased segmented neutrophils [SH]   0348 B-Type Natriuretic Peptide(!)  Slightly elevated BNP [SH]   0402 Ammonia  Ammonia normal [SH]   0536 Sars-CoV-2 PCR  COVID-negative [SH]   0639 CT head wo IV contrast  CT head reviewed, independently interpreted, no evidence of acute intracranial hemorrhage.  There does appear to be sequela of prior left MCA stroke. [SH]   0657 POCT pregnancy, urine  HCG negative [SH]      ED Course User  Index  [SH] Jason Coyne MD       Differential diagnoses considered include but are not limited to: Intracranial hemorrhage, stroke, metabolic encephalopathy, DKA, euglycemic DKA, intra-abdominal infection, flu, dehydration, hepatic encephalopathy, hyperthyroidism, thyrotoxicosis, digoxin toxicity, CHF exacerbation    Social Determinants Limiting Care: None identified    MDM:  43 y.o. female who is chronically ill with multiple cardiovascular comorbidities presenting to the emergency department with altered mental status, abdominal pain, nausea, vomiting.  On arrival vital signs notable for tachycardia, hemodynamically stable.  Patient is ill-appearing with unclear etiology.  Will send broad metabolic workup, and obtain CT head, abdomen pelvis, and chest x-ray.  Will also send digoxin level and obtain EKG.  Anticipate patient will require admission.  Will provide small IV fluid bolus given her reported EF of 20 to 25%, and will provide Dilaudid and Zofran for analgesia and nausea.    Patient's heart rate improved with small IV fluid bolus.  She was noted to be fairly hypertensive, this improved as well.  CT head was negative.  CT abdomen pelvis demonstrates a large right ovarian cyst which is reportedly new.  This may be the etiology of her pain, but unclear how this would also lead to her severe encephalopathy.  Currently pending UA results and flu swab results.  Her lab work has otherwise been largely unremarkable.  She did have a blood gas which demonstrated hyperkalemia, but suspected that there was significant hemolysis given that her metabolic panel which was drawn via ultrasound-guided IV demonstrates normal potassium.  Patient signed out at 7 AM pending consult OB/GYN given her large right ovarian cyst.  Unable to perform a pelvic ultrasound given her alteration mental status making her not consentable for this.  We are also unable to perform pelvic exam for the same reason.  Unclear if she truly does  have any acute ovarian pathology or if this is an incidental finding.  Suspect that patient will be admitted to medicine regardless of OB/GYN recommendations.    Disposition   Patient was signed out at 0700 pending completion of their work-up.  Please see the next provider's transition of care note for the remainder of the patient's care.    Procedures   Procedures    Patient seen and discussed with ED attending physician.    Jose Martin Coyne MD  PGY 3 Emergency Medicine         Jason Coyne MD  Resident  03/08/24 7125

## 2024-03-08 NOTE — H&P
History Of Present Illness  Amirah Bennett is a 43 y.o. female PMHx of HFrEF (LVEF 20-25% (08/2022), Afib on Xarelto w/ DCCV 05/2023), ischemic stroke L MCA d/t AFib LLA thrombus seen on echo (lack of OAC adherence) s/p thrombectomy 01/2022 @ [facility] w/ residual expressive aphasia and R sided weakness, s/p tracheostomy, and HTN presenting with AMS.    Per , patient started having nausea, emesis, and diarrhea on Chi March 3. Patient also had poor PO tolerance. Endorses patient was able to take her medications. Denies sick contacts. Patient drinks 2 beers and maybe 4 glasses of liquor a few days per week. Patient endorses no more than 6 drinks per day. Denies other drug use. Last drink on Sunday.     Around 11:30 PM 3/7  heard patient call out I can't see. Clarified with patient what she meant by she could not see. Patient confirmed that she had blurry vision with spots (description sounds like scotomas) in vision.  then endorses that patient was not able to speak as well as she could.  was concerned for stroke so he brought wife to ED. Prior to event patient could communicate well and was A&Ox4. Patient denies numbness, tingling, new weakness during this event. Endorses throbbing migraine in b/l frontal areas that has persisted since blurry vision. Endorses worsening n/v with these headaches. Some photophobia. No phonophobia. Some difficulty with balance, but also has been experiencing some lightheadedness. Denies abnormal movements, shaking, tongue biting, urinary incontinence.     In ED patient was tachycardic which improved with some IVF. CTH only showed prior L MCA stroke. CT AP showed R ovarian cyst which was confirmed with ultrasound.     Currently patient is still having nausea and vomiting. Denies blood in emesis. Has diffuse lower abdominal pain. Some shortness of breath but only with emesis. Endorses she is still having migraine but no other neurological symptoms.     Past Medical  History  Past Medical History:   Diagnosis Date    CHF (congestive heart failure) (CMS/[facility])     Stroke (CMS/[facility])        Surgical History  Past Surgical History:   Procedure Laterality Date    OTHER SURGICAL HISTORY  06/09/2022    Trachectomy        Social History  She reports that she quit smoking about 3 months ago. Her smoking use included cigarettes. She smoked an average of .25 packs per day. She does not have any smokeless tobacco history on file. No history on file for alcohol use and drug use.    Family History  No family history on file.     Allergies  Hydrocodone-acetaminophen and Ibuprofen    Review of Systems   Constitutional:  Positive for appetite change and diaphoresis. Negative for chills and fever.   HENT:  Negative for sore throat and trouble swallowing.    Eyes:  Positive for photophobia and visual disturbance.   Respiratory:  Negative for cough, choking, chest tightness and shortness of breath.    Cardiovascular:  Negative for chest pain, palpitations and leg swelling.   Gastrointestinal:  Positive for abdominal pain, diarrhea, nausea and vomiting. Negative for blood in stool and constipation.   Genitourinary:  Negative for dysuria, flank pain, frequency and urgency.   Neurological:  Positive for speech difficulty, light-headedness and headaches. Negative for dizziness, tremors, seizures, syncope, facial asymmetry, weakness and numbness.        Physical Exam  HENT:      Head: Normocephalic and atraumatic.      Mouth/Throat:      Mouth: Mucous membranes are moist.      Pharynx: Oropharynx is clear.   Eyes:      Extraocular Movements: Extraocular movements intact.      Conjunctiva/sclera: Conjunctivae normal.      Pupils: Pupils are equal, round, and reactive to light.   Cardiovascular:      Rate and Rhythm: Regular rhythm. Tachycardia present.      Pulses: Normal pulses.      Heart sounds: Normal heart sounds.   Pulmonary:      Effort: Pulmonary effort is normal.      Breath sounds: Normal breath  "sounds.   Abdominal:      General: There is no distension.      Palpations: There is no mass.      Tenderness: There is abdominal tenderness. There is no guarding or rebound.   Musculoskeletal:         General: No swelling. Normal range of motion.   Skin:     General: Skin is warm and dry.   Neurological:      Comments: Mental status: alert. Able to follow complex commands. Comprehends. Will say a few words here and there. Was saying \"scon\" when trying to talk about losing vision. Frustrated with situation.  CN intact. Strength 5/5 UE and LE. Reportedly decreased sensation to light touch on R side. Reflexes intact. No dysmetria noticed on finger to nose.           Last Recorded Vitals  Blood pressure (!) 130/94, pulse 84, temperature 36.3 °C (97.3 °F), temperature source Oral, resp. rate 16, height 1.702 m (5' 7\"), weight 92 kg (202 lb 13.2 oz), SpO2 96 %.    Relevant Results  Results from last 72 hours   Lab Units 03/08/24  0240   WBC AUTO x10*3/uL 9.2   HEMOGLOBIN g/dL 15.9   HEMATOCRIT % 48.4*   PLATELETS AUTO x10*3/uL 128*        Results from last 72 hours   Lab Units 03/08/24  0134   SODIUM mmol/L 135*   POTASSIUM mmol/L 4.0   CHLORIDE mmol/L 98   CO2 mmol/L 23   BUN mg/dL 19   CREATININE mg/dL 0.87   MAGNESIUM mg/dL 1.60      , Ammonia 33, UA with 1+ protein and trace ketones  UDS positive for cannabinoids  Syphilis Nonreactive    US Pelvis 3/8  A 4.4 cm simple right ovarian cyst, likely physiologic. No   sonographic evidence of ovarian torsion.     CTH w/o IV contrast 3/8  No acute intracranial hemorrhage, mass effect or midline shift.       Sequela of prior left MCA territory infarct. Additional nonspecific   scattered white matter hypodensities favored to represent sequela of   small vessel ischemia.     CT A/P 3/8  Cardiac enlargement and pulmonary vascular congestive changes.   4.8 cm RIGHT ovarian cyst.  This was not present on the prior study.   Consider ultrasound correlation of the RIGHT ovary. "     CXR 3/8  Cardiomegaly unchanged..        Assessment/Plan   Amirah Bennett is a 42 y.o. female with significant PMHx of HFrEF (LVEF 20-25% (08/2022), Afib on Xarelto w/ DCCV 05/2023), ischemic stroke L MCA d/t AFib LLA thrombus seen on echo (lack of OAC adherence) s/p thrombectomy 01/2022 @ [facility] w/ residual expressive aphasia and R sided weakness, s/p tracheostomy, and HTN presenting to the ED with expressive aphasia and headache after a week of emesis and diarrhea. Differential includes stroke vs recrudescence vs complex migraine vs hypertensive urgency vs PRES. Will reach out to neurology and treat patient's symptoms.    #Expressive aphasia/AMS  #Hx L MCA stroke  Ddx: stroke vs complex migraine vs recrudescence vs PRES   :: endorses patient had much better expression prior to 1130PM 3/7  ::CTH wo IV contrast 3/8 without acute intracranial hemorrhage, mass effect or midline shift; sequela of prior L MCA infarct  ::prior L MCA infarct cardioembolic, patient had LLA thrombus  ::patient having very high blood pressures upon admission with MAPS up to 140  ::Syphilis nonreactive, TSH wnl, ammonia wnl  -Neurology consult, appreciate recs  -follow up B12, folate, thiamine  -Xarelto 20mg every day   -atorvastatin 80mg every day     #Migraine  ::patient reporting b/l frontal throbbing  -tylenol, zofran  -consider further migraine cocktail if not improving    #Emesis  #Diarrhea  -zofran  -imodium  -follow up enteric stool panel  -IVF cautiously (HFrEF)    #HFrEF (EF 20-25% 8/22)  #Afib  ::no signs of acute decompensation at this time  ::follows with Dr Umaña outpatient  -Atorvastatin 80mg every day   -Digoxin 250mg every day  -Lasix 40mg every day   -Metoprolol Succinate 50mg every day   -Xarelto 20mg every day   -Spironolactone 25mg every day     #Alcohol Use Disorder  ::Patient out of the window for withdrawal, last drink one week ago  -folic acid 1mg every day   -multivitamin every day     #R simple cyst of  ovary  ::no torsion seen on US  -follow up outpatient    #Neuropathic pain  -gabapentin    F: prn with caution  E: prn  N: Cardiac  A: PIV    GI: pantoprazole  DVT PPX: Xarelto 20mg    CODE STATUS: FULL CODE  NOK: Navin Sanches () [phone]    Aleah Braun MD  PGY-1    Patient will be staffed with attending in AM.             Aleah Braun MD

## 2024-03-08 NOTE — SIGNIFICANT EVENT
INTERNAL MEDICINE SENIOR STAFFING NOTE    See Dr. Braun's note for detailed HPI.    HPI:  Ms. Bennett is a 43y F PMH significant for HFrEF (LVEF 20-25% (08/2022), Afib on Xarelto w/ DCCV 05/2023), ischemic stroke L MCA d/t AFib LLA thrombus seen on echo (lack of OAC adherence) s/p thrombectomy 01/2022 @ [facility] w/ residual expressive aphasia and R sided weakness, s/p tracheostomy, and HTN presenting with an acute change in mental status which started last night.  Prior to this, Ms. Bennett had experienced ~4-5 days of nausea, vomiting, and diarrhea - all non-bloody.  She has ~4 episodes of emesis daily, unable to keep down any fluids, and also ~4 episodes of diarrhea per day, which is watery brown.  There is no suspicious food exposures, no sick contacts, no recent travel, or other exposures out of her norm.      Last night, symptoms of blurry vision, seeing spots, and tingling, per  is more confused.  Also with headache.  's main concern is that she is unable to express herself as well as usual, which is consistent with her prior stroke.    Note that , Navin Sanches, is at bedside, who helps to provide additional history.  It is difficult to understand patient at times (part of this is perhaps related to tracheostomy site which is not closed) and some of her answers are quite truncated, although it does seem that she is responding appropriately to the questions.      Ms. Bennett has hx of EtOH per chart review.  Per her and her , she drinks ~3-4 glasses of liquor per day,  indicating a quantity which would equate to approximately 3-4oz; also may have 2 beers; later he states that it is actually more like 2 glasses per day, and not every day, and Ms. Scruggs states that she does not have more than 6 drinks per day.  Last drink was Sunday.  Otherwise, denies illicit drug use.      Physical exam:  Neuro: (Please see Dr. Braun's note for detailed neuro exam.)  HEENt: MM mildly  dry.  Cardiac: JVP is ~5cm from sternal angle.  Tachycardic, irregular, no audible murmur or gallop.  Pulm: CTAB, no resp distress.  Extrem: No pitting edema, LE equal b/l; feet are mildly cool, no mottling.  Abdomen: Soft, mild diffuse tenderness, greater in lower quadrants.      ASSESSMENT: This acute change in mentation accompanied by some neurologic symptoms could represent recrudescence of prior stroke; other top differential is hypertensive encephalopathy or PRES.  Note that CTH in ED showed only sequela of prior stroke.  As for EtOH; with no drink for 5 days, she is out of window for withdrawal (also there is no tremor, not describing tactile or auditory disturbance).  Patient is on digoxin, though the level is actually low, 0.38; furthermore there is no SARAH despite probably ~dehydrated, no hyperkalemia.  So far, syphilis Ab negative, TSH normal, no hypercapnea, and otherwise laboratory workup to date is unrevealing for etiology of change in mental status.    As for diarrhea and nausea, will get stool panel.  Treat symptomatically in the meantime.  If it does not improve will pursue further testing, including stool electrolytes, etc.    Regarding her HFrEF, on exam, she is not in decompensated HF and breathing comfortably on RA.      PLAN:  - Follow up remainder of lab workup.  - Check CDT for EtOH use (this is the presumed etiology of her HFrEF and still drinking).  - Consulted neurology in case this could be concerning for stroke; of course, would be out of window for thrombolysis, but appreciate their help with recommendation for further imaging.  - For her HFrEF medications, continue her home regimen.  With her having MAPs >135 on presentation, suspect that a lot of this may be that her medications are all lost in her vomit, which as above may contribute to some items on our Ddx, also with her cardiomyopathy would target lower BP.  - Hold home diuretic.  - Continue her home Xarelto; there is no sign of  bleeding.

## 2024-03-08 NOTE — ED TRIAGE NOTES
PT presents to ED via EMS for chief complaint of abdominal pain. Per EMS they were originally called for stroke like symptoms. EMS states that PT was found sitting in her own stool and endorsing flu like symptoms and abdominal pain. Per EMS PT has a history of CVA, CHF and a trach stoma. PT unable to express when she has a trach stoma. PT endorsing abdominal pain and headache for the past two days. PT also endorsing diarrhea. PT is A[employer]4.

## 2024-03-08 NOTE — CONSULTS
Inpatient consult to Neurology  Consult performed by: Yaniv Lujan MD  Consult ordered by: Choco Solorzano MD        History Of Present Illness  mAirah Bennett is a 43 y.o. 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 Neurology was consulted for concern of stroke recrudescence.      Last known well: 11:30 pm on 3/7/2024    HPI obtained from patient, her  and chart review:    About 3-4 days ago patient started having nausea, vomiting and diarrhea (multiple episodes throughout the day) until on the 3rd day while the patient was trying to stand up her vision suddenly got blurry and her speech worsened.  During the past 3 days she hasn't been eating or drinking. This is when her  brought her to the hospital. There was no worsening to her right sided weakness. She never had anything like this since her last stroke. Symptoms at the time were aphasia and right sided weakness. Per her  right weakness improved since her initial stroke, so as her speech although it never went back to normal. He thinks her speech worse than her new baseline. Regarding blurry vision, she never had anything like this before and denies any visual aura or blacked out vision. She also endorses throbbing bifrontal headaches since the blurry vision started.  Denies missing any doses of Xarelto.     On interview almost 24 hours from presentation: vision remains blurry, speech slightly improved, and headaches are persistent    In the ED:   /126, HR: 122, afebrile  Labs: no leukocytosis, UA negative, Utox positive for cannabinoids      Last neurology exam at Stroke follow up in 6/2022  Patient is alert, attentive. She says she is 32 (she is 41) and the month is Dec (is June). She is able to  follow simple but has difficulty with complex crossed commands. She has difficulty with naming and repetition. Mild dysarthria. Extraocular eye movements are intact without nystagmus. Sensation is unreliable due to aphasia but seems to indicate reduced sensation to light touch V1-3. Face is symmetric. There is right drift, orbiting around the right arm and reduced fine finger movements. Sensation unreliable but seems to indicate that sensation is reduced on the right to light touch. Finger nose finger shows no ataxia out of proportion to weakness. Gait cannot be well seen on the video but she is steady when walking.       2022 Hospital course:   41 yo F w/ PMHx of AFIB c/b YFN thrombus (12/2021), non-compliant with Eliquis, HTN, schizoaffective disorder, ETOH use, smoker who presented with aphasia and right hemiparesis. Last known well 1/31 at 3:30am before sleeping- her boyfriend noticed the following day (11:00am) that she could not speak or move her right side. NIHSS on arrival to ED was 25 (LOC questions = 2; LOC commands = 1; RFD = 2; Motor RUE = 4; Motor RLE = 4; Motor LUE = 2; Motor LLE = 3; RUE sensory loss = 1; Aphasia = 3; Dysarthria = 1; Extinction = 2). She was also in cardiogenic shock, A Fib with RVR on arrival, necessitating intubation and commencement of amiodarone and levophed in ED prior to CT. CT Brain showed left basal ganglia hypoattenuation, with CTA showing acute left M1 occlusion. CT perfusion showed mismatch ratio of 1.1- felt to underestimate mismatch volume. She therefore underwent thrombectomy, with TICI3 recanalization of left M1 MCA occlusion.       CT Brain: left basal ganglia hypoattenuation CTA Head/Neck: acute left M1 occlusion; otherwise no focal stenosis/LVO CT Perfusion: ischemic core: 72 ml; hypoperfusion volume 79 ml (mismatch volume: 7 ml) ->Imaging reviewed and discussed with Endovascular Team- based on CTH appearance, felt that CT perfusion may have underestimated mismatch  volume ->Angiogram findings: 1. Initial proximal left M1 - MCA occlusion 2. Successful TICI 3 recanalization of left M1 MCA occlusion with single pass of aspiration thrombectomy 22: TC 72; LDL 36; HDL 13;  22: TSH 7.7 : HbA1C: 6.6% MRI Brain showing large infarct territory, would be at risk of hemorrhagic conversion .   Etiology: Cardio-embolic in the setting of a fib and known cardiac thrombus, non-adherence to eliquis at home         Past Medical History  Past Medical History:   Diagnosis Date    CHF (congestive heart failure) (CMS/[facility])     Stroke (CMS/[facility])      Surgical History  Past Surgical History:   Procedure Laterality Date    OTHER SURGICAL HISTORY  2022    Trachectomy     Social History  Social History     Tobacco Use    Smoking status: Former     Packs/day: .25     Types: Cigarettes     Quit date: 2023     Years since quittin.2     Allergies  Hydrocodone-acetaminophen and Ibuprofen  Home Medications  Xarelto      Review of Systems  Neurological Exam  Physical Exam      GENERAL APPEARANCE:  In moderate distress, hand on her head alert, interactive and cooperative.       MENTAL STATE:   Orientation was normal to slef, place and situation only. For time she thought it is 2022.  Recent and remote memory was intact.  Attention span and concentration were normal. She was able to name objects like glove, feather, key, cactus but there was slowness with finding the words. She did not repeat the given sentence. Mild dysarthria. Difficulty with following complex commands but able to perform it with repeated questions    OPHTHALMOSCOPIC:   The ophthalmoscopic exam was limited    CRANIAL NERVES:   CN 2   Visual fields full to confrontation.   CN 3, 4, 6   Pupils round, 4 mm in diameter, equally reactive to light. Left gaze preference but able to  cross midline    Facial sensation intact bilaterally.   CN 7   Normal and symmetric facial strength. Nasolabial folds symmetric.    CN 8   Hearing intact to conversation  CN 9   Palate elevates symmetrically.   CN 11   Normal strength of shoulder shrug and neck turning.   CN 12   Tongue midline, with normal bulk and strength; no fasciculations.     MOTOR:   Muscle bulk was normal. RLE spasticity.  No fasciculations, tremor or other abnormal movements were present.                     R       L  Delt          5       5  Bicep        5       5  Tricep       5       5             5       5    Hip Flex     5       5  Leg Ext     5       5  Leg Flex    5       5  DF            5       5  PF            5       5     REFLEXES:     RIGHT UE   LEFT UE   Biceps:1      Biceps:1     Triceps:1     Triceps:1       RIGHT LLE   LEFT LLE     Knee:1         Knee:1    Ankle:1         Ankle:1       Babinski: toes downgoing to plantar stimulation. No clonus,     SENSORY:   Sensory exam was normal. In both upper and lower extremities, sensation was intact to light touch; sharp/dull.    COORDINATION:    Coordination exam was normal. In both upper extremities, finger-nose-finger was intact without dysmetria or overshoot    GAIT:   Deferred      NIH Stroke Scale  Person Administering Scale: Yaniv Lujan MD    Administer stroke scale items in the order listed. Record performance in each category after each subscale exam. Do not go back and change scores. Follow directions provided for each exam technique. Scores should reflect what the patient does, not what the clinician thinks the patient can do. The clinician should record answers while administering the exam and work quickly. Except where indicated, the patient should not be coached (i.e., repeated requests to patient to make a special effort).      1a  Level of consciousness: 0=alert; keenly responsive   1b. LOC questions:  2=Performs neither task correctly   1c. LOC commands: 0=Performs both tasks correctly   2.  Best Gaze: 1=partial gaze palsy   3.  Visual: 0=No visual loss   4. Facial Palsy: 0=Normal  symmetric movement   5a.  Motor left arm: 0=No drift, limb holds 90 (or 45) degrees for full 10 seconds   5b.  Motor right arm: 0=No drift, limb holds 90 (or 45) degrees for full 10 seconds   6a. motor left le=No drift, limb holds 90 (or 45) degrees for full 10 seconds   6b  Motor right le=No drift, limb holds 90 (or 45) degrees for full 10 seconds   7. Limb Ataxia: 0=Absent   8.  Sensory: 0=Normal; no sensory loss   9. Best Language:  1=Mild to moderate aphasia; some obvious loss of fluency or facility of comprehension without significant limitation on ideas expressed or form of expression.   10. Dysarthria: 1=Mild to moderate, patient slurs at least some words and at worst, can be understood with some difficulty   11. Extinction and Inattention: 0=No abnormality   12. Distal motor function: 0=Normal    Total:   5       Relevant Results  Scheduled medications  atorvastatin, 80 mg, oral, Daily  digoxin, 250 mcg, oral, Daily  empagliflozin, 10 mg, oral, Daily  folic acid, 1 mg, oral, Daily  furosemide, 40 mg, oral, Daily  gabapentin, 100 mg, oral, TID  magnesium sulfate, 2 g, intravenous, Once  metoprolol succinate XL, 50 mg, oral, Daily  multivitamin with minerals, 1 tablet, oral, Daily  [START ON 3/9/2024] pantoprazole, 40 mg, oral, Daily before breakfast  rivaroxaban, 20 mg, oral, Daily with evening meal  sacubitriL-valsartan, 1 tablet, oral, BID  spironolactone, 25 mg, oral, Daily      Continuous medications     PRN medications  PRN medications: acetaminophen, ondansetron ODT **OR** ondansetron, polyethylene glycol      Results for orders placed or performed during the hospital encounter of 24 (from the past 24 hour(s))   Comprehensive metabolic panel   Result Value Ref Range    Glucose 142 (H) 74 - 99 mg/dL    Sodium 135 (L) 136 - 145 mmol/L    Potassium 4.0 3.5 - 5.3 mmol/L    Chloride 98 98 - 107 mmol/L    Bicarbonate 23 21 - 32 mmol/L    Anion Gap 18 10 - 20 mmol/L    Urea Nitrogen 19 6 - 23  mg/dL    Creatinine 0.87 0.50 - 1.05 mg/dL    eGFR 84 >60 mL/min/1.73m*2    Calcium 9.9 8.6 - 10.6 mg/dL    Albumin 4.4 3.4 - 5.0 g/dL    Alkaline Phosphatase 68 33 - 110 U/L    Total Protein 8.1 6.4 - 8.2 g/dL     (H) 9 - 39 U/L    Bilirubin, Total 2.3 (H) 0.0 - 1.2 mg/dL     (H) 7 - 45 U/L   Magnesium   Result Value Ref Range    Magnesium 1.60 1.60 - 2.40 mg/dL   Lipase   Result Value Ref Range    Lipase 16 9 - 82 U/L   Troponin I, High Sensitivity   Result Value Ref Range    Troponin I, High Sensitivity 33 0 - 34 ng/L   TSH with reflex to Free T4 if abnormal   Result Value Ref Range    Thyroid Stimulating Hormone 1.04 0.44 - 3.98 mIU/L   Digoxin level   Result Value Ref Range    Digoxin  0.38 (L) 0.80 - <2.00 ng/mL   Ammonia   Result Value Ref Range    Ammonia 33 16 - 53 umol/L   CBC and Auto Differential   Result Value Ref Range    WBC 9.2 4.4 - 11.3 x10*3/uL    nRBC 0.2 (H) 0.0 - 0.0 /100 WBCs    RBC 5.68 (H) 4.00 - 5.20 x10*6/uL    Hemoglobin 15.9 12.0 - 16.0 g/dL    Hematocrit 48.4 (H) 36.0 - 46.0 %    MCV 85 80 - 100 fL    MCH 28.0 26.0 - 34.0 pg    MCHC 32.9 32.0 - 36.0 g/dL    RDW 18.5 (H) 11.5 - 14.5 %    Platelets 128 (L) 150 - 450 x10*3/uL    Immature Granulocytes %, Automated 0.3 0.0 - 0.9 %    Immature Granulocytes Absolute, Automated 0.03 0.00 - 0.70 x10*3/uL   B-Type Natriuretic Peptide   Result Value Ref Range     (H) 0 - 99 pg/mL   Type And Screen   Result Value Ref Range    ABO TYPE B     Rh TYPE POS     ANTIBODY SCREEN NEG    Blood Gas Venous Full Panel   Result Value Ref Range    POCT pH, Venous 7.32 (L) 7.33 - 7.43 pH    POCT pCO2, Venous 50 41 - 51 mm Hg    POCT pO2, Venous 38 35 - 45 mm Hg    POCT SO2, Venous 45 45 - 75 %    POCT Oxy Hemoglobin, Venous 44.4 (L) 45.0 - 75.0 %    POCT Hematocrit Calculated, Venous 49.0 (H) 36.0 - 46.0 %    POCT Sodium, Venous 130 (L) 136 - 145 mmol/L    POCT Potassium, Venous 7.6 (HH) 3.5 - 5.3 mmol/L    POCT Chloride, Venous 97 (L) 98 -  107 mmol/L    POCT Ionized Calicum, Venous 0.98 (L) 1.10 - 1.33 mmol/L    POCT Glucose, Venous 155 (H) 74 - 99 mg/dL    POCT Lactate, Venous 2.7 (H) 0.4 - 2.0 mmol/L    POCT Base Excess, Venous -1.1 -2.0 - 3.0 mmol/L    POCT HCO3 Calculated, Venous 25.8 22.0 - 26.0 mmol/L    POCT Hemoglobin, Venous 16.2 (H) 12.0 - 16.0 g/dL    POCT Anion Gap, Venous 15.0 10.0 - 25.0 mmol/L    Patient Temperature 37.0 degrees Celsius    FiO2 21 %   Manual Differential   Result Value Ref Range    Neutrophils %, Manual 77.4 40.0 - 80.0 %    Lymphocytes %, Manual 18.2 13.0 - 44.0 %    Monocytes %, Manual 3.5 2.0 - 10.0 %    Eosinophils %, Manual 0.9 0.0 - 6.0 %    Basophils %, Manual 0.0 0.0 - 2.0 %    Seg Neutrophils Absolute, Manual 7.12 (H) 1.20 - 7.00 x10*3/uL    Lymphocytes Absolute, Manual 1.67 1.20 - 4.80 x10*3/uL    Monocytes Absolute, Manual 0.32 0.10 - 1.00 x10*3/uL    Eosinophils Absolute, Manual 0.08 0.00 - 0.70 x10*3/uL    Basophils Absolute, Manual 0.00 0.00 - 0.10 x10*3/uL    Total Cells Counted 115     RBC Morphology See Below     Polychromasia Mild     [place] Cells Few    Sars-CoV-2 PCR   Result Value Ref Range    Coronavirus 2019, PCR Not Detected Not Detected   Urinalysis with Reflex Culture and Microscopic   Result Value Ref Range    Color, Urine Light-Yellow Light-Yellow, Yellow, Dark-Yellow    Appearance, Urine Clear Clear    Specific Gravity, Urine 1.021 1.005 - 1.035    pH, Urine 7.0 5.0, 5.5, 6.0, 6.5, 7.0, 7.5, 8.0    Protein, Urine 30 (1+) (A) NEGATIVE, 10 (TRACE), 20 (TRACE) mg/dL    Glucose, Urine Normal Normal mg/dL    Blood, Urine NEGATIVE NEGATIVE    Ketones, Urine TRACE (A) NEGATIVE mg/dL    Bilirubin, Urine NEGATIVE NEGATIVE    Urobilinogen, Urine Normal Normal mg/dL    Nitrite, Urine NEGATIVE NEGATIVE    Leukocyte Esterase, Urine NEGATIVE NEGATIVE   Extra Urine Gray Tube   Result Value Ref Range    Extra Tube Hold for add-ons.    Urinalysis Microscopic   Result Value Ref Range    WBC, Urine NONE 1-5,  NONE /HPF    RBC, Urine NONE NONE, 1-2, 3-5 /HPF    Squamous Epithelial Cells, Urine 1-9 (SPARSE) Reference range not established. /HPF   Drug Screen, Urine   Result Value Ref Range    Amphetamine Screen, Urine Presumptive Negative Presumptive Negative    Barbiturate Screen, Urine Presumptive Negative Presumptive Negative    Benzodiazepines Screen, Urine Presumptive Negative Presumptive Negative    Cannabinoid Screen, Urine Presumptive Positive (A) Presumptive Negative    Cocaine Metabolite Screen, Urine Presumptive Negative Presumptive Negative    Fentanyl Screen, Urine Presumptive Negative Presumptive Negative    Opiate Screen, Urine Presumptive Negative Presumptive Negative    Oxycodone Screen, Urine Presumptive Negative Presumptive Negative    PCP Screen, Urine Presumptive Negative Presumptive Negative    Methadone Screen, Urine Presumptive Negative Presumptive Negative   POCT pregnancy, urine   Result Value Ref Range    Preg Test, Ur Negative Negative   Syphilis Screen with Reflex   Result Value Ref Range    Syphilis Total Ab Nonreactive Nonreactive   Blood Culture    Specimen: Peripheral Venipuncture; Blood culture   Result Value Ref Range    Blood Culture Loaded on Instrument - Culture in progress    Blood Culture    Specimen: Peripheral Venipuncture; Blood culture   Result Value Ref Range    Blood Culture Loaded on Instrument - Culture in progress    Folate   Result Value Ref Range    Folate, Serum >24.0 >5.0 ng/mL   Vitamin B12   Result Value Ref Range    Vitamin B12 740 211 - 911 pg/mL   Potassium   Result Value Ref Range    Potassium 4.2 3.5 - 5.3 mmol/L                     No echocardiogram results found for the past 14 days        BNP   Date/Time Value Ref Range Status   03/08/2024 02:40  (H) 0 - 99 pg/mL Final        I have personally reviewed the following imaging results US pelvis    Result Date: 3/8/2024  Interpreted By:  Marleen King and Stephens Katherine STUDY: US PELVIS;  3/8/2024 9:08  am   INDICATION: Signs/Symptoms:Large R ovarian cyst, please evaluate R ovarian blood flow.   COMPARISON: CTA abdomen pelvis 03/08/2024   ACCESSION NUMBER(S): [id number]   ORDERING CLINICIAN: ULISSES LOONEY   TECHNIQUE: Multiple multiplanar static gray scale, color and spectral waveform sonographic images of the pelvis were obtained. Transabdominal ultrasound was performed.  This examination was interpreted at [facility].   FINDINGS: UTERUS: The uterus measures  4.28 x 2.78 x 8.26  . The uterine myometrium appears normal.   ENDOMETRIUM: The endometrium measures a thickness of 0.4 cm, which is normal.   RIGHT ADNEXA: The right ovary measures 4.6 cm x 4.3 cm x 5.2 cm and demonstrates normal flow. 4.4 x 4.3 x 3.3 cm thin-walled anechoic lesion with posterior acoustic enhancement and no internal vascularity favored to represent a simple ovarian cyst. No gross right adnexal masses are seen, no hydrosalpinx.   LEFT ADNEXA: The left ovary measures 2.2 cm x 2.7 cm x 3.5 cm and demonstrates normal flow. No gross left adnexal masses are seen, no hydrosalpinx.   CUL DE SAC: No gross free fluid is seen in the pelvic cul-de-sac.       A 4.4 cm simple right ovarian cyst, likely physiologic. No sonographic evidence of ovarian torsion.   I personally reviewed the images/study and I agree with Azalea Genao DO's (radiology resident) findings as stated. This study was interpreted at [place], [place].   MACRO: None   Signed by: Marleen King 3/8/2024 9:44 AM Dictation workstation:   [id number]    CT abdomen pelvis w IV contrast    Result Date: 3/8/2024  STUDY: CT Abdomen and Pelvis with IV Contrast; 03/08/2024 6:19 AM INDICATION: Right-sided abdominal pain, nausea, vomiting. COMPARISON: CT AP 12/09/2023, US RUQ 08/08/2022. ACCESSION NUMBER(S): [id number] ORDERING CLINICIAN: ULISSES LOONEY TECHNIQUE: CT of the abdomen and pelvis was performed.   Contiguous axial images were obtained at 3 mm slice thickness through the abdomen and pelvis. Coronal and sagittal reconstructions at 3 mm slice thickness were performed.  Omnipaque 350 75 mL was administered intravenously.  FINDINGS: Cardiac enlargement.  No pericardial effusion.  Pulmonary vascular congestive changes.  Lung bases are otherwise clear. Abdomen: No acute abnormality of the stomach is identified. The liver of normal size and contour.  9 mm LEFT hepatic cyst unchanged.  No intrahepatic ductal dilatation is identified. Gallbladder collapsed.  The pancreas, spleen and adrenal glands are normal appearance. No acute renal process.  Chronic LEFT lateral cortical renal infarct. No retroperitoneal adenopathy. No findings of abdominal aortic aneurysm. No bowel obstruction. No free air or free fluid within the abdomen. There is a normal-appearing appendix. Pelvis: CT examination of the pelvis shows no free fluid. No findings of adenopathy.  4.8 cm RIGHT ovarian cyst.  No inflammatory change or findings of free air. Skeleton: Lower lumbar facet arthrosis.  No acute bony process identified.    Cardiac enlargement and pulmonary vascular congestive changes. 4.8 cm RIGHT ovarian cyst.  This was not present on the prior study. Consider ultrasound correlation of the RIGHT ovary. Signed by Evan Hughes MD    CT head wo IV contrast    Result Date: 3/8/2024  Interpreted By:  Finkelstein, Evan, STUDY: CT HEAD WO IV CONTRAST;  3/8/2024 6:05 am   INDICATION: Signs/Symptoms:Possible AMS.   COMPARISON: CT brain 05/31/2023   ACCESSION NUMBER(S): [id number]   ORDERING CLINICIAN: ULISSES LOONEY   TECHNIQUE: Axial noncontrast CT images of the head with coronal and sagittal reconstructions.   FINDINGS: Extracalvarial soft tissues are unremarkable. Paranasal sinuses and mastoid air cells are aerated. No depressed calvarial fracture. Left frontal/temporal lobe encephalomalacia, similar compared to prior imaging. There are  nonspecific periventricular white matter hypodensities. Parenchymal atrophy with prominence of the ventricles and cortical sulci. Ex vacuo dilatation of the left lateral ventricle.         No acute intracranial hemorrhage, mass effect or midline shift.   Sequela of prior left MCA territory infarct. Additional nonspecific scattered white matter hypodensities favored to represent sequela of small vessel ischemia.     MACRO: None.   Signed by: Evan Finkelstein 3/8/2024 6:35 AM Dictation workstation:   [id number]    XR chest 1 view    Result Date: 3/8/2024  STUDY: Chest Radiograph;  03/08/2024 02:09AM INDICATION: Altered mental status, possible infectious workup COMPARISON: 12/07/2023 XR Chest. ACCESSION NUMBER(S): [id number] ORDERING CLINICIAN: ULISSES LOONEY TECHNIQUE:  Frontal chest was obtained at 02:08 hours. FINDINGS: CARDIOMEDIASTINAL SILHOUETTE: Cardiomediastinal silhouette is enlarged..  LUNGS: Lungs are clear.  ABDOMEN: No remarkable upper abdominal findings.  BONES: No acute osseous changes.           Assessment/Plan   Amirah Bennett is a 43 y.o. 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 Neurology was consulted for concern of stroke recrudescence. On exam she has mild aphasia and dysarthria that seems better than the last documented exam but still thought to be worse by patient and her . No focal weakness seen. However, given the history of multiple days of nausea and vomiting patient might not be able to keep Xarelto down even if she reports taking it which make a new stroke a possibility. Recrudescence is also likely given her presentation and her acute illness. For her blurry vision, stroke or recrudescence would not explain her visual changes and she had no  unilateral visual field cut, therefore further investigation is needed.     # Aphasia  # Blurry vision  - Can consider Brain MRI without contrast given worsening aphasia and questionable Xarelto adherence  - Consider Ophthalmology consult for dilated eye exam  - Rest of toxic metabolic work up per primary team      Patient to be staffed with attending physician in the AM    Yaniv Lujan MD  Neurology Resident PGY-2

## 2024-03-08 NOTE — PROGRESS NOTES
Handoff Note    I received Amirah Bennett in signout from Dr. Coyne.  Please see the previous note for all HPI, PE and MDM up to the time of signout at 0700.    In brief Amirah Bennett is an 43 y.o. female presenting for   Chief Complaint   Patient presents with    Abdominal Pain   .      At the time of signout, the patient's disposition is pending pelvic ultrasound and gynecology recommendations.  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  She was apparently found sitting in her own stool.  She is alert and oriented to herself and location however does not recall the events of the past few days that led her here to the hospital.  She is complaining of abdominal pain for the previous provider ordered a CT abdomen and pelvis that returned positive for a large right-sided ovarian cyst.  Given she is unable to consent for pelvic exam, gynecology was consulted and a pelvic ultrasound was ordered.  This returned showing the ovarian cyst however no other acute findings.  Gynecology agrees this is likely just cystic and probably not because of her mental status though could be contributing to her abdominal pain.  The rest of her workup was reviewed showing no leukocytosis or anemia.  There is thrombocytopenia with platelets of 128.  Metabolic panel no significant electrolyte abnormalities.  AST, ALT, bilirubin all elevated pointing to likely some sort of liver dysfunction.  BNP mildly elevated at 290.  Troponin is normal.  Digoxin level is low at 0.38.  Thyroid level is normal.  Viral testing is negative for COVID.  Urinalysis not consistent with signs of urinary tract infection.  The patient is reevaluated by myself who continues to complain of abdominal pain that is relatively  diffuse on exam.  She is alert and oriented to herself and the place however is not aware of the details over the last few days and what brought her here to the hospital.  Admissions coordinator was contacted and accepted the patient to medicine.  She remained hemodynamically stable and awaits transfer to regular nursing floor for further workup of her out altered mental status.      Throughout the ED stay, the patient was monitored and re-examined for any changes in stability or symptomatology.     ED Course:   ED Course as of 03/08/24 1115   Fri Mar 08, 2024   0215 ECG 12 lead  EKG obtained at 0148 demonstrating atrial flutter with rapid ventricular response, rate of 106, normal axis, normal intervals, 2 inversions in V4 through V6 as well as inferior leads.  No ST segment elevations.  Nonspecific overall. [SH]   0317 XR chest 1 view  Chest x-ray with cardiomegaly, no other acute processes. [SH]   0331 Comprehensive metabolic panel(!) [SH]   0334 Magnesium  Borderline low mag [SH]   0335 TSH with reflex to Free T4 if abnormal  TSH normal [SH]   0335 Troponin I, High Sensitivity  Trop normal [SH]   0335 Digoxin level(!)  Digoxin level is low [SH]   0336 Lipase  Lipase normal [SH]   0336 Comprehensive metabolic panel(!)  Normal glucose, electrolytes, renal function, bicarb, slight AST and ALT and bilirubin elevation [SH]   0347 CBC and Auto Differential(!)  Normal white blood cell count, normal hemoglobin, mild thrombocytopenia [SH]   0348 Manual Differential(!)  There is a left shift with increased segmented neutrophils [SH]   0348 B-Type Natriuretic Peptide(!)  Slightly elevated BNP [SH]   0402 Ammonia  Ammonia normal [SH]   0536 Sars-CoV-2 PCR  COVID-negative [SH]   0639 CT head wo IV contrast  CT head reviewed, independently interpreted, no evidence of acute intracranial hemorrhage.  There does appear to be sequela of prior left MCA stroke. [SH]   0657 POCT pregnancy, urine  HCG negative [SH]      ED Course User  Index  [SH] Jason Coyne MD         Diagnoses as of 03/08/24 1115   Altered mental status, unspecified altered mental status type   Generalized abdominal pain         Patient seen by and discussed with attending emergency medicine physician, Dr. Arreaga    Pt Disposition: Admit-medicine    Procedures      Lane Perdue DO  Emergency Medicine PGY-2  [facility]

## 2024-03-08 NOTE — ED PROCEDURE NOTE
Procedure  Procedures    There was difficulty obtaining IV access on this patient, and multiple attempts by nursing staff and/or medics have failed. Patient required IV access by ED provider under the assistance of ultrasound.      Site was prepped and sterilized before IV insertion.     Ultrasound images were not saved in the patient's chart demonstrating cannulization.     IV Size:20  IV Side: Left  IV Site: Forearm    MD Jason Casarez MD  Resident  03/08/24 0254

## 2024-03-09 ENCOUNTER — APPOINTMENT (OUTPATIENT)
Dept: RADIOLOGY | Facility: HOSPITAL | Age: 44
DRG: 065 | End: 2024-03-09
Payer: COMMERCIAL

## 2024-03-09 ENCOUNTER — APPOINTMENT (OUTPATIENT)
Dept: CARDIOLOGY | Facility: HOSPITAL | Age: 44
DRG: 065 | End: 2024-03-09
Payer: COMMERCIAL

## 2024-03-09 LAB
ALBUMIN SERPL BCP-MCNC: 3 G/DL (ref 3.4–5)
ALBUMIN SERPL BCP-MCNC: 3.3 G/DL (ref 3.4–5)
ALP SERPL-CCNC: 52 U/L (ref 33–110)
ALT SERPL W P-5'-P-CCNC: 78 U/L (ref 7–45)
ANION GAP SERPL CALC-SCNC: 13 MMOL/L (ref 10–20)
APAP SERPL-MCNC: <10 UG/ML
AST SERPL W P-5'-P-CCNC: 71 U/L (ref 9–39)
BASOPHILS # BLD AUTO: 0.02 X10*3/UL (ref 0–0.1)
BASOPHILS NFR BLD AUTO: 0.2 %
BILIRUB DIRECT SERPL-MCNC: 0.4 MG/DL (ref 0–0.3)
BILIRUB SERPL-MCNC: 1.9 MG/DL (ref 0–1.2)
BUN SERPL-MCNC: 16 MG/DL (ref 6–23)
CALCIUM SERPL-MCNC: 8.7 MG/DL (ref 8.6–10.6)
CHLORIDE SERPL-SCNC: 101 MMOL/L (ref 98–107)
CO2 SERPL-SCNC: 27 MMOL/L (ref 21–32)
CREAT SERPL-MCNC: 0.98 MG/DL (ref 0.5–1.05)
EGFRCR SERPLBLD CKD-EPI 2021: 73 ML/MIN/1.73M*2
EOSINOPHIL # BLD AUTO: 0.08 X10*3/UL (ref 0–0.7)
EOSINOPHIL NFR BLD AUTO: 0.9 %
ERYTHROCYTE [DISTWIDTH] IN BLOOD BY AUTOMATED COUNT: 17.2 % (ref 11.5–14.5)
ETHANOL SERPL-MCNC: <10 MG/DL
GLUCOSE BLD MANUAL STRIP-MCNC: 103 MG/DL (ref 74–99)
GLUCOSE SERPL-MCNC: 112 MG/DL (ref 74–99)
HAV IGM SER QL: NONREACTIVE
HBV CORE IGM SER QL: NONREACTIVE
HBV SURFACE AG SERPL QL IA: NONREACTIVE
HCT VFR BLD AUTO: 41 % (ref 36–46)
HCV AB SER QL: NONREACTIVE
HGB BLD-MCNC: 13.9 G/DL (ref 12–16)
IMM GRANULOCYTES # BLD AUTO: 0.03 X10*3/UL (ref 0–0.7)
IMM GRANULOCYTES NFR BLD AUTO: 0.3 % (ref 0–0.9)
LACTATE SERPL-SCNC: 0.9 MMOL/L (ref 0.4–2)
LYMPHOCYTES # BLD AUTO: 4.06 X10*3/UL (ref 1.2–4.8)
LYMPHOCYTES NFR BLD AUTO: 43.1 %
MAGNESIUM SERPL-MCNC: 1.76 MG/DL (ref 1.6–2.4)
MCH RBC QN AUTO: 28.4 PG (ref 26–34)
MCHC RBC AUTO-ENTMCNC: 33.9 G/DL (ref 32–36)
MCV RBC AUTO: 84 FL (ref 80–100)
MONOCYTES # BLD AUTO: 0.85 X10*3/UL (ref 0.1–1)
MONOCYTES NFR BLD AUTO: 9 %
NEUTROPHILS # BLD AUTO: 4.37 X10*3/UL (ref 1.2–7.7)
NEUTROPHILS NFR BLD AUTO: 46.5 %
NRBC BLD-RTO: 0 /100 WBCS (ref 0–0)
PHOSPHATE SERPL-MCNC: 3.5 MG/DL (ref 2.5–4.9)
PLATELET # BLD AUTO: 150 X10*3/UL (ref 150–450)
POTASSIUM SERPL-SCNC: 3.8 MMOL/L (ref 3.5–5.3)
PROT SERPL-MCNC: 7.1 G/DL (ref 6.4–8.2)
RBC # BLD AUTO: 4.89 X10*6/UL (ref 4–5.2)
RIGHT VENTRICLE PEAK SYSTOLIC PRESSURE: 33 MMHG
SALICYLATES SERPL-MCNC: <3 MG/DL
SALICYLATES SERPL-MCNC: <3 MG/DL
SODIUM SERPL-SCNC: 137 MMOL/L (ref 136–145)
WBC # BLD AUTO: 9.4 X10*3/UL (ref 4.4–11.3)

## 2024-03-09 PROCEDURE — 99222 1ST HOSP IP/OBS MODERATE 55: CPT

## 2024-03-09 PROCEDURE — 82947 ASSAY GLUCOSE BLOOD QUANT: CPT

## 2024-03-09 PROCEDURE — 93010 ELECTROCARDIOGRAM REPORT: CPT | Performed by: INTERNAL MEDICINE

## 2024-03-09 PROCEDURE — 2500000005 HC RX 250 GENERAL PHARMACY W/O HCPCS

## 2024-03-09 PROCEDURE — 2500000001 HC RX 250 WO HCPCS SELF ADMINISTERED DRUGS (ALT 637 FOR MEDICARE OP)

## 2024-03-09 PROCEDURE — 93321 DOPPLER ECHO F-UP/LMTD STD: CPT | Performed by: INTERNAL MEDICINE

## 2024-03-09 PROCEDURE — 2020000001 HC ICU ROOM DAILY

## 2024-03-09 PROCEDURE — 2500000004 HC RX 250 GENERAL PHARMACY W/ HCPCS (ALT 636 FOR OP/ED)

## 2024-03-09 PROCEDURE — 80069 RENAL FUNCTION PANEL: CPT

## 2024-03-09 PROCEDURE — 70551 MRI BRAIN STEM W/O DYE: CPT | Performed by: RADIOLOGY

## 2024-03-09 PROCEDURE — 83605 ASSAY OF LACTIC ACID: CPT | Performed by: STUDENT IN AN ORGANIZED HEALTH CARE EDUCATION/TRAINING PROGRAM

## 2024-03-09 PROCEDURE — 83735 ASSAY OF MAGNESIUM: CPT

## 2024-03-09 PROCEDURE — 85025 COMPLETE CBC W/AUTO DIFF WBC: CPT

## 2024-03-09 PROCEDURE — 93325 DOPPLER ECHO COLOR FLOW MAPG: CPT | Performed by: INTERNAL MEDICINE

## 2024-03-09 PROCEDURE — 70496 CT ANGIOGRAPHY HEAD: CPT | Performed by: RADIOLOGY

## 2024-03-09 PROCEDURE — 70498 CT ANGIOGRAPHY NECK: CPT | Performed by: RADIOLOGY

## 2024-03-09 PROCEDURE — 70498 CT ANGIOGRAPHY NECK: CPT

## 2024-03-09 PROCEDURE — 70551 MRI BRAIN STEM W/O DYE: CPT

## 2024-03-09 PROCEDURE — 93308 TTE F-UP OR LMTD: CPT | Performed by: INTERNAL MEDICINE

## 2024-03-09 PROCEDURE — 2500000002 HC RX 250 W HCPCS SELF ADMINISTERED DRUGS (ALT 637 FOR MEDICARE OP, ALT 636 FOR OP/ED): Mod: MUE

## 2024-03-09 PROCEDURE — 99232 SBSQ HOSP IP/OBS MODERATE 35: CPT | Performed by: STUDENT IN AN ORGANIZED HEALTH CARE EDUCATION/TRAINING PROGRAM

## 2024-03-09 PROCEDURE — 2500000004 HC RX 250 GENERAL PHARMACY W/ HCPCS (ALT 636 FOR OP/ED): Performed by: STUDENT IN AN ORGANIZED HEALTH CARE EDUCATION/TRAINING PROGRAM

## 2024-03-09 PROCEDURE — 2550000001 HC RX 255 CONTRASTS: Performed by: INTERNAL MEDICINE

## 2024-03-09 PROCEDURE — 36415 COLL VENOUS BLD VENIPUNCTURE: CPT

## 2024-03-09 PROCEDURE — 93325 DOPPLER ECHO COLOR FLOW MAPG: CPT

## 2024-03-09 RX ORDER — DEXTROSE MONOHYDRATE 100 MG/ML
0.3 INJECTION, SOLUTION INTRAVENOUS ONCE AS NEEDED
Status: DISCONTINUED | OUTPATIENT
Start: 2024-03-09 | End: 2024-03-14 | Stop reason: HOSPADM

## 2024-03-09 RX ORDER — METOPROLOL TARTRATE 1 MG/ML
5 INJECTION, SOLUTION INTRAVENOUS ONCE
Status: COMPLETED | OUTPATIENT
Start: 2024-03-09 | End: 2024-03-09

## 2024-03-09 RX ORDER — HYDRALAZINE HYDROCHLORIDE 25 MG/1
25 TABLET, FILM COATED ORAL EVERY 6 HOURS PRN
Status: DISCONTINUED | OUTPATIENT
Start: 2024-03-11 | End: 2024-03-14 | Stop reason: HOSPADM

## 2024-03-09 RX ORDER — HYDRALAZINE HYDROCHLORIDE 20 MG/ML
10 INJECTION INTRAMUSCULAR; INTRAVENOUS
Status: ACTIVE | OUTPATIENT
Start: 2024-03-09 | End: 2024-03-11

## 2024-03-09 RX ORDER — DEXMEDETOMIDINE HYDROCHLORIDE 4 UG/ML
INJECTION, SOLUTION INTRAVENOUS
Status: COMPLETED
Start: 2024-03-09 | End: 2024-03-09

## 2024-03-09 RX ORDER — LABETALOL HYDROCHLORIDE 5 MG/ML
10 INJECTION, SOLUTION INTRAVENOUS EVERY 10 MIN PRN
Status: DISCONTINUED | OUTPATIENT
Start: 2024-03-09 | End: 2024-03-11

## 2024-03-09 RX ORDER — POLYETHYLENE GLYCOL 3350 17 G/17G
17 POWDER, FOR SOLUTION ORAL DAILY
Status: DISCONTINUED | OUTPATIENT
Start: 2024-03-09 | End: 2024-03-14 | Stop reason: HOSPADM

## 2024-03-09 RX ORDER — INSULIN LISPRO 100 [IU]/ML
0-10 INJECTION, SOLUTION INTRAVENOUS; SUBCUTANEOUS EVERY 4 HOURS
Status: DISCONTINUED | OUTPATIENT
Start: 2024-03-09 | End: 2024-03-14 | Stop reason: HOSPADM

## 2024-03-09 RX ORDER — DEXTROSE 50 % IN WATER (D50W) INTRAVENOUS SYRINGE
25
Status: DISCONTINUED | OUTPATIENT
Start: 2024-03-09 | End: 2024-03-14 | Stop reason: HOSPADM

## 2024-03-09 RX ORDER — DEXMEDETOMIDINE HYDROCHLORIDE 4 UG/ML
.1-1.5 INJECTION, SOLUTION INTRAVENOUS CONTINUOUS
Status: DISCONTINUED | OUTPATIENT
Start: 2024-03-09 | End: 2024-03-10

## 2024-03-09 RX ORDER — METOPROLOL TARTRATE 1 MG/ML
INJECTION, SOLUTION INTRAVENOUS
Status: COMPLETED
Start: 2024-03-09 | End: 2024-03-09

## 2024-03-09 RX ADMIN — METOPROLOL SUCCINATE 50 MG: 50 TABLET, EXTENDED RELEASE ORAL at 08:36

## 2024-03-09 RX ADMIN — SPIRONOLACTONE 25 MG: 25 TABLET, FILM COATED ORAL at 06:24

## 2024-03-09 RX ADMIN — IOHEXOL 75 ML: 350 INJECTION, SOLUTION INTRAVENOUS at 13:23

## 2024-03-09 RX ADMIN — DEXMEDETOMIDINE HYDROCHLORIDE 0.3 MCG/KG/HR: 4 INJECTION, SOLUTION INTRAVENOUS at 15:30

## 2024-03-09 RX ADMIN — SACUBITRIL AND VALSARTAN 1 TABLET: 24; 26 TABLET, FILM COATED ORAL at 08:35

## 2024-03-09 RX ADMIN — Medication 1 TABLET: at 06:24

## 2024-03-09 RX ADMIN — METOPROLOL TARTRATE 5 MG: 1 INJECTION, SOLUTION INTRAVENOUS at 13:50

## 2024-03-09 RX ADMIN — THIAMINE HYDROCHLORIDE 200 MG: 100 INJECTION, SOLUTION INTRAMUSCULAR; INTRAVENOUS at 10:07

## 2024-03-09 RX ADMIN — EMPAGLIFLOZIN 10 MG: 10 TABLET, FILM COATED ORAL at 08:36

## 2024-03-09 RX ADMIN — ATORVASTATIN CALCIUM 80 MG: 80 TABLET, FILM COATED ORAL at 08:30

## 2024-03-09 RX ADMIN — FOLIC ACID 1 MG: 1 TABLET ORAL at 08:31

## 2024-03-09 RX ADMIN — ACETAMINOPHEN 650 MG: 325 TABLET ORAL at 23:06

## 2024-03-09 RX ADMIN — FUROSEMIDE 40 MG: 40 TABLET ORAL at 08:31

## 2024-03-09 RX ADMIN — PANTOPRAZOLE SODIUM 40 MG: 40 TABLET, DELAYED RELEASE ORAL at 06:24

## 2024-03-09 RX ADMIN — GABAPENTIN 100 MG: 100 CAPSULE ORAL at 08:36

## 2024-03-09 RX ADMIN — DIGOXIN 250 MCG: 125 TABLET ORAL at 08:36

## 2024-03-09 ASSESSMENT — PAIN SCALES - GENERAL
PAINLEVEL_OUTOF10: 3
PAINLEVEL_OUTOF10: 0 - NO PAIN
PAINLEVEL_OUTOF10: 0 - NO PAIN

## 2024-03-09 ASSESSMENT — PAIN - FUNCTIONAL ASSESSMENT
PAIN_FUNCTIONAL_ASSESSMENT: 0-10
PAIN_FUNCTIONAL_ASSESSMENT: 0-10

## 2024-03-09 ASSESSMENT — ENCOUNTER SYMPTOMS
PALPITATIONS: 1
BACK PAIN: 1
NAUSEA: 1
NERVOUS/ANXIOUS: 1
HEADACHES: 1
SPEECH DIFFICULTY: 1
EYES NEGATIVE: 1
ENDOCRINE NEGATIVE: 1

## 2024-03-09 ASSESSMENT — PAIN DESCRIPTION - LOCATION: LOCATION: HEAD

## 2024-03-09 NOTE — PROGRESS NOTES
Amirah Bennett is a 43 y.o. female on day 1 of admission presenting with Altered mental status, unspecified altered mental status type.    Subjective   Overnight, Ms. Bennett had A fib with RVR (see significant event note), which improved with IVP of metoprolol 5mg x2.  This AM, she indicates that her headache is mild at this time, and no further emesis or diarrhea overnight.      Neurology was consulted after our assessment yesterday, recommended MRI, which was obtained and showed acute cerebellar stroke.  Called by neuro stroke team who accepted patient to [facility].      Hospital Course to Date: Ms. Bennett is a 43y F PMH significant for HFrEF (EtOH cardiomyopathy per prior cardiology evaluation), prior L MCA stroke with residual aphasia likely mechanism A fib, prior tracheostomy placed during prolonged hospitalization in 2022 for mixed cardiogenic / distributive shock (appears per ENT plan is for reversal), who presented with symptoms of nausea and vomiting starting on around Sunday, 3/3, then on Thursday had worsening aphasia, confusion / change in mental status.  We assessed patient on Friday afternoon when she was admitted for encephalopathy; the metabolic workup and noncontrast CT head were essentially negative for a cause of acute encephalopathy; we consulted neurology who advised to get MRI brain, suspected recrudescence of prior L MCA stroke, advised MRI susanna which ultimately showed an acute cerebellar stroke.  Transfer to NSU.    Called  to discuss MRI findings and plan to transfer care to neurology.    Objective     Physical Exam  Constitutional:       General: She is not in acute distress.     Appearance: She is ill-appearing.   HENT:      Head: Normocephalic and atraumatic.      Mouth/Throat:      Mouth: Mucous membranes are dry.   Eyes:      General:         Right eye: No discharge.         Left eye: No discharge.      Pupils: Pupils are equal, round, and reactive to light.   Cardiovascular:      Rate  "and Rhythm: Tachycardia present. Rhythm irregular.      Heart sounds: Normal heart sounds.      Comments: JVP is not elevated.  Pulmonary:      Effort: Pulmonary effort is normal. No respiratory distress.      Breath sounds: Normal breath sounds.   Abdominal:      General: Abdomen is flat.      Tenderness: There is abdominal tenderness.      Comments: Mild diffuse tenderness (improved from prior).   Musculoskeletal:         General: No swelling.   Skin:     General: Skin is warm.   Neurological:      Comments: Strength 5/5 in upper and lower extremities.  PERRL.  No nystagmus, EOM intact.         Last Recorded Vitals  Blood pressure (!) 134/95, pulse (!) 117, temperature 36.8 °C (98.2 °F), temperature source Temporal, resp. rate 24, height 1.702 m (5' 7\"), weight 83 kg (182 lb 15.7 oz), SpO2 98 %.  Intake/Output last 3 Shifts:  I/O last 3 completed shifts:  In: 500 (5.4 mL/kg) [IV Piggyback:500]  Out: - (0 mL/kg)   Weight: 92 kg     Assessment/Plan   Principal Problem:    Altered mental status, unspecified altered mental status type    Amirah Bennett is a 42 y.o. female with significant PMHx of HFrEF (LVEF 20-25% (08/2022), Afib on Xarelto w/ DCCV 05/2023), ischemic stroke L MCA d/t AFib LLA thrombus seen on echo (lack of OAC adherence) s/p thrombectomy 01/2022 @ [facility] w/ residual expressive aphasia and R sided weakness, s/p tracheostomy, and HTN presenting to the ED with expressive aphasia and headache after a week of emesis and diarrhea.  Found on MRI to have acute cerebellar stroke and transferred to NSU.     #Acute cerebellar stroke  #Hx L MCA stroke  Transfer to NSU.  Defer further care to neuro stroke team.      #Emesis  #Diarrhea  -zofran  -imodium  -follow up enteric stool panel  -IVF cautiously (HFrEF)     #HFrEF (EF 20-25% 8/22)  #Afib  ::no signs of acute decompensation at this time  ::follows with Dr Umaña outpatient  -Atorvastatin 80mg every day   -Digoxin 250mg every day  -Lasix 40mg every day "   -Metoprolol Succinate 50mg every day   -Xarelto 20mg every day   -Spironolactone 25mg every day      #Alcohol Use Disorder  ::Patient out of the window for withdrawal, last drink one week ago  -folic acid 1mg every day   -multivitamin every day      #R simple cyst of ovary  ::no torsion seen on US  -follow up outpatient     #Neuropathic pain  -gabapentin     F: prn with caution  E: prn  N: Cardiac  A: PIV     GI: pantoprazole  DVT PPX: Xarelto 20mg     CODE STATUS: FULL CODE  NOK: Navin Sanches () [phone]           Vincent Everett MD

## 2024-03-09 NOTE — SIGNIFICANT EVENT
Rapid response paged by ED staff 2/2 afib RVR with rates reaching as high as 170 bpm, other vitals stable as documented in flowsheets.  2 doses of 5 mg IVP Metoprolol and 1 dose of 12.5 mg Metoprolol given orally.  Rates now in the low 120's.  Patient has not had metoprolol in the last few days due to N/V/D at home.  Patient remained in ED at this time waiting for a [facility] 50 bed.

## 2024-03-09 NOTE — CONSULTS
Inpatient consult to Psychiatry  Consult performed by: Reece Kaba MD  Consult ordered by: Evan Márquez MD  Reason for consult: history of schizophrenia vs shizoaffective disorder; self d/c quiteiapine a couple months ago. Consult question is whether she needs to be on this.  Seems it was started in context of ICU delerium.      HISTORY OF PRESENT ILLNESS:  Amirah Bennett is a 43 y.o. female with a past psychiatric history of an anxiety disorder, alcohol use disorder, and questionable history of psychosis (schizophrenia v schizoaffective) and past medical history of left MCA stroke s/p thrombectomy in 2022 secondary to atrial fibrillation with left atrial thrombus on Xarelto with residual aphasia and right sided weakness, HFrEF (LVEF 20-25%), and Graves disease who presented to [facility] ED on 3/8/24 with 3-4 days of N/V, acute blurry vision, and worsening aphasia. MRI brain was complete demonstrating an acute cerebellar stroke so patient was admitted to [facility]. Psychiatry was consulted on 3/8/24 for medication management given unclear history of psychotic disorder (schizophrenia versus schizoaffective).     Over the course of current hospitalization, neurology was consulted after arrival to emergency department d/t concern for stroke recrudescence who recommended MRI brain given worsening aphasia. Gynecology was also consulted d/t incidental finding of large right-sided ovarian cyst on CT abdomen and pelvis which was done bc patient was complaining of abdominal pain. No acute findings on pelvic ultrasound and further work up deferred to outpatient. Neurosurgery was also consulted for SOC watch.     On chart review, psychiatry CL service consulted during patients hospitalization in April 2022 for agitation and assistance with weaning off of precedex after being admitted for acute stroke. At that time, there was no clear history of schizophrenia versus schizoaffective disorder per chart review or collateral  "other than she had received care at [facility] including previously reported medications of aripiprazole/trazodone from her boyfriend. During the course of that hospitalization, her delirium improved, and quetiapine was gradually titrated to 150mg at bedtime but there was no clear evidence or report from patient of psychotic symptoms.     Per nurse at bedside, patient experienced a panic attack shortly after arriving to [facility] because there were multiple individuals in her room as they were getting her situated. She was able to calm down once these persons left and the nurse walked her through guided imagery which was effective in managing her anxiety.     On interview:  The patient was initially asleep when approached at beside and somewhat reluctant to speak to this interviewer at first. She was difficult to understand at times due to dysarthria and mixed aphasia but she also communicated via thumbs up/down to some questions. She did give thumbs up when asked if anybody had ever told her she had schizophrenia or schizoaffective disorder but stated that she was not taking quetiapine currently. She could not recall any other psychotropic medications.    She was alert and oriented to person, place, time and circumstance. She noted that she came to the hospital for nausea and vomiting after several days including a headache.     She was somewhat irritable when asked about alcohol use and just swayed her head yes/no to questions but per chart review she has been drinking alcohol. Asked more questions regarding psychiatric and social history she repeated \"what can I say\" several times as she seemed increasingly irritated.    However, she did smile and joke near end of interview when asked if there was anything this writer could do for her she replied, \"cake.\"    She denied any suicidal ideation, intent or plan. She denied any auditory or visual hallucinations. She did not demonstrate or endorse any signs or symptoms " concerning for psychosis.    Per collateral from patient's partner (Oziel), [phone]:  He was upset because he had recently been told that the patient indeed did have a new stroke and he felt like providers did not appreciate his concern for her change in mental status. However, regarding her psychiatric history, he said that she has never been admitted to a psychiatric hospital over the 10+ years that he has been with her. He did note that she had been admitted in the early 2000s but was not entirely sure of why. He did state that she drinks, typically 2-3 glasses of liquor but was adamant that this is not a daily occurrence. He said that she does experience anxiety especially in regards to finances because she keeps getting cut off from her SSDI. Additionally, he said she experiences social anxiety and that will sometimes have panic attacks when she is around large groups of people. He was not aware of any previous history of suicide attempts or tiarra psychotic symptoms other than when she experienced delirium in previous hospitalizations while admitted.     PSYCHIATRIC REVIEW OF SYSTEMS  Depression: depressed mood  Anxiety: excessive worry that is difficult to control, restlessness or feeling keyed up or on edge, difficulty concentrating, irritability, and social anxiety  Prachi: negative  Psychosis: negative  Delirium: negative   Trauma: negative    PSYCHIATRIC HISTORY  Prior diagnoses: schizophrenia versus schizoaffective disorder, anxiety, delirium   Prior hospitalizations: [facility] in early 2000s (per )  History of suicide attempts: denied  History of self-harm: denied  History of trauma/abuse/loss: denied  History of violence: denied    Current psychiatrist: none  Current mental health agency: none (pre[facility]  Current : none    Current psychiatric medications: none  Past psychiatric medications: quetiapine (last filled quetiapine 50mg, 10 day supply, 30  tabs on 24), trazodone, aripiprazole, duloxetine    SUBSTANCE USE HISTORY   She reports that she quit smoking about 3 months ago. Her smoking use included cigarettes. She smoked an average of .25 packs per day. She does not have any smokeless tobacco history on file. No history on file for alcohol use and drug use.    Tobacco: former smoker, used to smoke 1/2 PPD   Alcohol: history of heavy use (quit around 2022) but recently started drinking again (unsure of time line); drinks 2-3 glasses of liquor but not daily (according to patient & )     - History of severe withdrawal: denied     - Last use: Chi 3/3  Cannabis: denied  Other substances: denied   Prior substance use disorder treatment: denied    SOCIAL HISTORY  Social History     Socioeconomic History    Marital status: Single     Spouse name: None    Number of children: None    Years of education: None    Highest education level: None   Occupational History    None   Tobacco Use    Smoking status: Former     Packs/day: .25     Types: Cigarettes     Quit date: 2023     Years since quittin.2    Smokeless tobacco: None   Substance and Sexual Activity    Alcohol use: None    Drug use: None    Sexual activity: None   Other Topics Concern    None   Social History Narrative    None     Social Determinants of Health     Financial Resource Strain: Patient Declined (12/10/2023)    Overall Financial Resource Strain (CARDIA)     Difficulty of Paying Living Expenses: Patient declined   Food Insecurity: Food Insecurity Present (2023)    Hunger Vital Sign     Worried About Running Out of Food in the Last Year: Sometimes true     Ran Out of Food in the Last Year: Never true   Transportation Needs: Unmet Transportation Needs (2023)    PRAPARE - Transportation     Lack of Transportation (Medical): No     Lack of Transportation (Non-Medical): Yes   Physical Activity: Sufficiently Active (2023)    Exercise Vital Sign     Days of  Exercise per Week: 7 days     Minutes of Exercise per Session: 30 min   Stress: No Stress Concern Present (12/17/2023)    [employer] - Occupational Stress Questionnaire     Feeling of Stress : Only a little   Social Connections: Moderately Isolated (12/17/2023)    Social Connection and Isolation Panel [NHANES]     Frequency of Communication with Friends and Family: Twice a week     Frequency of Social Gatherings with Friends and Family: Once a week     Attends Anabaptism Services: Never     Active Member of Clubs or Organizations: Yes     Attends Club or Organization Meetings: Never     Marital Status: Never    Intimate Partner Violence: Not At Risk (12/17/2023)    Humiliation, Afraid, Rape, and Kick questionnaire     Fear of Current or Ex-Partner: No     Emotionally Abused: No     Physically Abused: No     Sexually Abused: No   Housing Stability: Unknown (12/17/2023)    Housing Stability Vital Sign     Unable to Pay for Housing in the Last Year: No     Number of Places Lived in the Last Year: 1     Unstable Housing in the Last Year: Patient refused      Current living situation: lives at home with  and other family members  Current employment/source of income: unemployed, but intermittently receives SSDI?     Education: completed high school   Marital status:  to Navin Sanches (they have been together for over 10 years)  Children: none  Social support: did not assess  Legal history: denied   history: none  Access to weapons: denied    PAST MEDICAL HISTORY  Past Medical History:   Diagnosis Date    CHF (congestive heart failure) (CMS/HCC)     Stroke (CMS/HCC)       PAST SURGICAL HISTORY  Past Surgical History:   Procedure Laterality Date    OTHER SURGICAL HISTORY  06/09/2022    Trachectomy      FAMILY HISTORY  No family history on file.     ALLERGIES  Hydrocodone-acetaminophen and Ibuprofen    OARRS REVIEW  OARRS checked: yes  OARRS comments: Recent fill for  gabapentin 100mg on 12/28/23 (30 days, 90 capsules) and gabapentin 300mg on 1/8/24 (30 days, 30 capsules) with intermittent fills between January - May 2023     OBJECTIVE    VITALS      3/9/2024    11:15 AM 3/9/2024    11:30 AM 3/9/2024    11:45 AM 3/9/2024     1:50 PM 3/9/2024     1:55 PM 3/9/2024     4:00 PM 3/9/2024     5:00 PM   Vitals   Systolic    131 134  108   Diastolic    107 95  77   Heart Rate 108 114 107 214 117  98   Temp      36.8 °C (98.2 °F)    Resp 12 16 16 24   22   Weight (lb)    182.98      BMI    28.66 kg/m2      BSA (m2)    1.98 m2           MENTAL STATUS EXAM  Appearance: Black female who appears older than stated age, laying down in hospital bed, with eyes closed initially.   Attitude: Calm, cooperative, but poorly engaged in conversation.  Behavior: Fair eye contact  Motor Activity: No PMR/PMA.   Speech: Dysarthric with mixed aphasia, difficult to comprehend at times 2/2 decannulated trach; communicated via thumbs up/down as well  Mood: Anxious  Affect: Mood congruent  Thought Process: Linear and logical.  Thought Content:  Denies SI/HI. No delusions elicited.  Thought Perception: Denies AVH. Does not appear to be responding to internal stimuli.  Cognition: Alert and oriented to person, place, and time. Attention and concentration were normal.   Insight: Moderate  Judgement: Fair     MEDICAL REVIEW OF SYSTEMS  Review of Systems   HENT: Negative.     Eyes: Negative.    Cardiovascular:  Positive for palpitations. Negative for chest pain.   Gastrointestinal:  Positive for nausea.   Endocrine: Negative.    Musculoskeletal:  Positive for back pain and gait problem.   Neurological:  Positive for speech difficulty and headaches.   Psychiatric/Behavioral:  The patient is nervous/anxious.       HOME MEDICATIONS  Medication Documentation Review Audit       Reviewed by Alma Swanson RN (Registered Nurse) on 03/08/24 at 1218      Medication Order Taking? Sig Documenting Provider Last Dose Status    acetaminophen (Tylenol) 325 mg tablet [id number]  2 tablet EVERY 8 HOURS (route: oral) Nitin Estrada MD  Active   amiodarone (Pacerone) 200 mg tablet [id number]  Take 1 tablet (200 mg) by mouth once daily. Nitin Provider, MD  Active   apixaban (Eliquis) 5 mg tablet [id number]  Take 1 tablet (5 mg) by mouth twice a day. Nitin Estrada MD  Active   ARIPiprazole (Abilify) 15 mg tablet [id number]  Take 1 tablet (15 mg) by mouth once daily. Nitin Provider, MD  Active   aspirin 81 mg chewable tablet [id number]  1 tablet (81 mg) by Enteral route once daily. Nitin Estrada MD  Active   atorvastatin (Lipitor) 40 mg tablet [id number]  1 tablet DAILY (route: oral) Nitin Provider, MD  Active   atorvastatin (Lipitor) 40 mg tablet [id number]  Take by mouth. Historical Provider, MD  Active   atorvastatin (Lipitor) 80 mg tablet [id number]  Take 1 tablet (80 mg) by mouth once daily. Fermin Reveles MD   24 2359   diclofenac sodium (Voltaren) 1 % gel gel [id number]  PLEASE SEE ATTACHED FOR DETAILED DIRECTIONS Nitin Estrada MD  Active   digoxin (Lanoxin) 125 MCG tablet [id number]  Take 1 tablet (0.125 mg) by mouth once daily. Historical MD Sean  Active   digoxin (Lanoxin) 250 MCG tab;et [id number]  Take 1 tablet (250 mcg) by mouth once daily. Nitin Estrada MD  Active   digoxin (Lanoxin) 250 MCG tab;et [id number]  Take by mouth. Nitin Estraad MD  Active   digoxin (Lanoxin) 250 MCG tab;et [id number]  1 tablet DAILY (route: oral) Nitin Estrada MD  Active   empagliflozin (Jardiance) 10 mg [id number]  Take 1 tablet (10 mg) by mouth once daily. Fermin Reveles MD   24 2359   empagliflozin (Jardiance) 10 mg [id number]  Take by mouth. Nitin Estrada MD  Active   esomeprazole (NexIUM) 40 mg DR capsule [id number]  1 capsule DAILY (route: oral) Nitin Estrada MD  Active   folic acid (Folvite) 1 mg tablet [id number]  Take 1 tablet (1 mg) by mouth once daily.  Nitin Estrada MD  Active   folic acid (Folvite) 1 mg tablet [id number]  Take 1 tablet (1 mg) by mouth once daily. Nitin Estrada MD  Active   furosemide (Lasix) 20 mg tablet [id number]  Take 2 tablets (40 mg) by mouth once daily. Fermin Reveles MD   24 2359   furosemide (Lasix) 20 mg tablet [id number]  Take by mouth. Nitin Estrada MD  Active   furosemide (Lasix) 40 mg tablet [id number]  Take 1 tablet (40 mg) by mouth twice a day. Nitin Estrada MD  Active   gabapentin (Neurontin) 100 mg capsule [id number]  Take 1 capsule (100 mg) by mouth 3 times a day. Carolyn Moore MD  Active   gabapentin (Neurontin) 100 mg capsule [id number]  Take 1 capsule (100 mg) by mouth 3 times a day. Nitin Estrada MD  Active   gabapentin (Neurontin) 300 mg capsule [id number]  Take 1 capsule (300 mg) by mouth once daily at bedtime. Nitin Estrada MD  Active   guaiFENesin (Mucinex) 600 mg 12 hr tablet [id number]  Take 1 tablet (600 mg) by mouth twice a day. Nitin Estrada MD  Active   guaiFENesin (Robitussin) 100 mg/5 mL syrup [id number]  Take 5 mL (100 mg) by mouth every 4 hours if needed. Nitin Estrada MD  Active   hydroCHLOROthiazide (HYDRODiuril) 12.5 mg tablet [id number]  Take by mouth. Nitin Estrada MD  Active   lidocaine 4 % patch [id number]  Lidocaine Pain Relief 4 % External Patch   Quantity: 3  Refills: 0        Start : 16-Aug-2022   Active Nitin Estrada MD  Active   losartan (Cozaar) 25 mg tablet [id number]  Take 1 tablet (25 mg) by mouth once daily. Nitin Estrada MD  Active   magnesium hydroxide (Milk of Magnesia) 400 mg/5 mL suspension [id number]  Take 30 mL by mouth once daily as needed. Nitin Estrada MD  Active   medroxyPROGESTERone (Provera) 10 mg tablet [id number]  Take 1 tablet (10 mg) by mouth twice a day. Nitin Estrada MD  Active   megestrol (Megace) 40 mg tablet [id number]  Take 1 tablet (40 mg total) by mouth twice a day.  Nitin Estrada MD  Active   melatonin 3 mg tablet [id number]  Take 1 tablet (3 mg) by mouth once daily as needed. Nitin Estrada MD  Active   metFORMIN (Glucophage) 850 mg tablet [id number]  Take 1 tablet (850 mg) by mouth 3 times a day. Nitin Estrada MD  Active   metoprolol succinate XL (Toprol-XL) 100 mg 24 hr tablet [id number]  Take 1 tablet (100 mg) by mouth 2 times a day. Nitin Estrada MD  Active   metoprolol succinate XL (Toprol-XL) 100 mg 24 hr tablet [id number]  Take 1 tablet (100 mg) by mouth once daily. Nitin Estrada MD  Active   metoprolol succinate XL (Toprol-XL) 50 mg 24 hr tablet [id number]  Take 1 tablet (50 mg) by mouth once daily. Do not crush or chew. Do not start before 2023. Candi Ruvalcaba MD   24 235   multivitamin with minerals tablet [id number]  Take 1 tablet by mouth once daily. Nitin Estrada MD  Active   oxyCODONE (Roxicodone) 5 mg immediate release tablet [id number]  Take 1 tablet (5 mg) by mouth every 4 hours if needed. Historical MD Sean  Active   pantoprazole (ProtoNix) 40 mg EC tablet [id number]  Take 1 tablet (40 mg) by mouth once daily in the morning. Take before meals. Do not crush, chew, or split. Fermin Reveles MD   24 235   polyethylene glycol (Glycolax, Miralax) 17 gram packet [id number]  Take 17 g by mouth once daily. Nitin Estrada MD  Active   potassium chloride CR 20 mEq ER tablet [id number]  Take 2 tablets (40 mEq) by mouth once daily. Nitin Estrada MD  Active   QUEtiapine (SEROquel) 100 mg tablet [id number]  Take by mouth. Nitin Estrada MD  Active   QUEtiapine (SEROquel) 50 mg tablet [id number]  3 tablet DAILY (route: oral) Nitin Estrada MD  Active   QUEtiapine XR (SEROquel XR) 50 mg 24 hr tablet [id number]  Take 3 tablets (150 mg) by mouth once daily. Do not crush, chew, or split. Fermin Reveles MD   24 235   sacubitriL-valsartan (Entresto) 24-26 mg tablet  [id number]  Take 1 tablet by mouth 2 times a day. Fermin Reveles MD   24 2359   sacubitriL-valsartan (Entresto) 24-26 mg tablet [id number]  Take 1 tablet by mouth twice a day. Historical Provider, MD  Active   sacubitriL-valsartan (Entresto)  mg tablet [id number]  Take by mouth. Historical Provider, MD  Active   sacubitriL-valsartan (Entresto)  mg tablet [id number]  1 tablet 2 TIMES DAILY (route: oral) Historical Provider, MD  Active   simvastatin (Zocor) 20 mg tablet [id number]  Take 1 tablet (20 mg) by mouth. Historical Provider, MD  Active   spironolactone (Aldactone) 25 mg tablet [id number]  Take 1 tablet (25 mg) by mouth once daily. Fermin Reveles MD   24 2359   spironolactone (Aldactone) 25 mg tablet [id number]  Take 1 tablet (25 mg) by mouth once daily. Historical Provider, MD  Active   spironolactone (Aldactone) 25 mg tablet [id number]  25 mg DAILY (route: oral) Historical Provider, MD  Active   thiamine 100 mg tablet [id number]  1 tablet (100 mg) by Enteral route twice a day. Historical Provider, MD  Active   traZODone (Desyrel) 50 mg tablet [id number]  Take 1-2 tablets ( mg) by mouth. Historical Provider, MD  Active                     CURRENT MEDICATIONS  Scheduled medications  atorvastatin, 80 mg, oral, Daily  digoxin, 250 mcg, oral, Daily  [Held by provider] empagliflozin, 10 mg, oral, Daily  folic acid, 1 mg, oral, Daily  furosemide, 40 mg, oral, Daily  gabapentin, 100 mg, oral, TID  insulin lispro, 0-10 Units, subcutaneous, q4h  metoprolol succinate XL, 50 mg, oral, Daily  multivitamin with minerals, 1 tablet, oral, Daily  pantoprazole, 40 mg, oral, Daily before breakfast  polyethylene glycol, 17 g, oral, Daily  sacubitriL-valsartan, 1 tablet, oral, BID  spironolactone, 25 mg, oral, Daily  thiamine, 200 mg, intravenous, Daily        Continuous medications  dexmedeTOMIDine, 0.1-1.5 mcg/kg/hr, Last Rate: Stopped (24 4048)        PRN medications  PRN medications:  acetaminophen, dextrose 10 % in water (D10W), dextrose, glucagon, hydrALAZINE **FOLLOWED BY** [START ON 3/11/2024] hydrALAZINE, labetaloL, lubricating eye drops, ondansetron ODT **OR** ondansetron, oxygen, polyethylene glycol     LABS  Results for orders placed or performed during the hospital encounter of 03/08/24 (from the past 24 hour(s))   Renal function panel   Result Value Ref Range    Glucose 109 (H) 74 - 99 mg/dL    Sodium 132 (L) 136 - 145 mmol/L    Potassium 3.8 3.5 - 5.3 mmol/L    Chloride 96 (L) 98 - 107 mmol/L    Bicarbonate 27 21 - 32 mmol/L    Anion Gap 13 10 - 20 mmol/L    Urea Nitrogen 16 6 - 23 mg/dL    Creatinine 0.77 0.50 - 1.05 mg/dL    eGFR >90 >60 mL/min/1.73m*2    Calcium 9.2 8.6 - 10.6 mg/dL    Phosphorus 3.8 2.5 - 4.9 mg/dL    Albumin 3.4 3.4 - 5.0 g/dL   Magnesium   Result Value Ref Range    Magnesium     Salicylate   Result Value Ref Range    Salicylate  <3 4 - 20 mg/dL   Magnesium   Result Value Ref Range    Magnesium 2.04 1.60 - 2.40 mg/dL   Hepatic function panel   Result Value Ref Range    Albumin 3.3 (L) 3.4 - 5.0 g/dL    Bilirubin, Total 1.9 (H) 0.0 - 1.2 mg/dL    Bilirubin, Direct 0.4 (H) 0.0 - 0.3 mg/dL    Alkaline Phosphatase 52 33 - 110 U/L    ALT 78 (H) 7 - 45 U/L    AST 71 (H) 9 - 39 U/L    Total Protein 7.1 6.4 - 8.2 g/dL   Acute Toxicology Panel, Blood   Result Value Ref Range    Acetaminophen <10.0 10.0 - 30.0 ug/mL    Salicylate  <3 4 - 20 mg/dL    Alcohol <10 <=10 mg/dL   CBC and Auto Differential   Result Value Ref Range    WBC 9.4 4.4 - 11.3 x10*3/uL    nRBC 0.0 0.0 - 0.0 /100 WBCs    RBC 4.89 4.00 - 5.20 x10*6/uL    Hemoglobin 13.9 12.0 - 16.0 g/dL    Hematocrit 41.0 36.0 - 46.0 %    MCV 84 80 - 100 fL    MCH 28.4 26.0 - 34.0 pg    MCHC 33.9 32.0 - 36.0 g/dL    RDW 17.2 (H) 11.5 - 14.5 %    Platelets 150 150 - 450 x10*3/uL    Neutrophils % 46.5 40.0 - 80.0 %    Immature Granulocytes %, Automated 0.3 0.0 - 0.9 %    Lymphocytes % 43.1 13.0 - 44.0 %    Monocytes % 9.0 2.0  - 10.0 %    Eosinophils % 0.9 0.0 - 6.0 %    Basophils % 0.2 0.0 - 2.0 %    Neutrophils Absolute 4.37 1.20 - 7.70 x10*3/uL    Immature Granulocytes Absolute, Automated 0.03 0.00 - 0.70 x10*3/uL    Lymphocytes Absolute 4.06 1.20 - 4.80 x10*3/uL    Monocytes Absolute 0.85 0.10 - 1.00 x10*3/uL    Eosinophils Absolute 0.08 0.00 - 0.70 x10*3/uL    Basophils Absolute 0.02 0.00 - 0.10 x10*3/uL   Renal function panel   Result Value Ref Range    Glucose 112 (H) 74 - 99 mg/dL    Sodium 137 136 - 145 mmol/L    Potassium 3.8 3.5 - 5.3 mmol/L    Chloride 101 98 - 107 mmol/L    Bicarbonate 27 21 - 32 mmol/L    Anion Gap 13 10 - 20 mmol/L    Urea Nitrogen 16 6 - 23 mg/dL    Creatinine 0.98 0.50 - 1.05 mg/dL    eGFR 73 >60 mL/min/1.73m*2    Calcium 8.7 8.6 - 10.6 mg/dL    Phosphorus 3.5 2.5 - 4.9 mg/dL    Albumin 3.0 (L) 3.4 - 5.0 g/dL   Magnesium   Result Value Ref Range    Magnesium 1.76 1.60 - 2.40 mg/dL   Lactate   Result Value Ref Range    Lactate 0.9 0.4 - 2.0 mmol/L   Transthoracic Echo (TTE) Limited   Result Value Ref Range    RVSP 33.0 mmHg        IMAGING  CT angio head and neck w and wo IV contrast    Result Date: 3/9/2024  Interpreted By:  Tabby Monroy, STUDY: CT ANGIO HEAD AND NECK W AND WO IV CONTRAST   INDICATION: Signs/Symptoms:Stroke workup   History of left MCA stroke status post thrombectomy 2022 presenting with nausea, vomiting, blurry vision. Brain MRI demonstrates acute infarct in the inferior left cerebellar hemisphere.   COMPARISON: Brain MRI 03/09/2024. Head CT 03/08/2024.   ACCESSION NUMBER(S): [id number]   ORDERING CLINICIAN: NELSY SMART   TECHNIQUE: CTA of the head and neck was performed after the intravenous administration of 75 mL Omnipaque 350 nonionic IV contrast. 3-D reconstructions were performed under physician supervision on a separate workstation and reviewed.   FINDINGS: CTA NECK:   AORTIC ARCH: The visualized portion of the aortic arch is unremarkable in appearance. The origins of the great  vessels and the vertebral arteries are normal without evidence of stenosis.   CERVICAL CAROTID ARTERIES: The common carotid arteries are patent bilaterally without evidence of stenosis. There is no narrowing of the cervical internal carotid arteries bilaterally.   VERTEBRAL ARTERIES: The vertebral arteries are patent bilaterally throughout their course.   CTA HEAD:   INTERNAL CAROTID ARTERIES: Normal in course and caliber.   CEREBRAL ARTERIES: Left MCA branches are diminutive in caliber compared to the right, predominantly the superior division, likely due to area of encephalomalacia from previous MCA territory infarct. Right MCA, bilateral ACAs, and bilateral PCAs are unremarkable.   VERTEBROBASILAR SYSTEM: Focal high-grade stenosis versus occlusion within the left intradural vertebral artery (series 501, image 331). The left PICA is not visualized and is presumably occluded. Right intradural vertebral artery and PICA are patent and normal in caliber. Basilar artery is normal. Superior cerebellar arteries are patent.       OTHER: Redemonstration of chronic left MCA territory infarct. Evolving left inferior cerebellar infarct without associated hemorrhagic conversion. No change in partial effacement of the 4th ventricle. Supratentorial ventricular system appears unchanged.       Focal high-grade stenosis versus occlusion of the left intradural vertebral artery with immediate reconstitution. Nonvisualization of the left PICA, presumably occluded.   Diminutive caliber and superior division of left MCA branches, likely secondary to encephalomalacia in this territory.   Otherwise, remaining cervical and intracranial vasculature is within normal limits.   Evolving left inferior cerebellar infarct without associated hemorrhagic conversion. Partial effacement of the 4th ventricle, unchanged from previous examinations. Supratentorial ventricular system is unchanged.     _____________ NASCET criteria for internal carotid  artery stenosis: Mild: 0% to 49% Moderate: 50% to 69% Severe: 70% to 99% Complete Occlusion   Signed by: Tabby Monroy 3/9/2024 5:04 PM Dictation workstation:   [id number]    Transthoracic Echo (TTE) Limited    Result Date: 3/9/2024   [facility], [address] [place] [address]                Tel [phone] and Fax [phone] TRANSTHORACIC ECHOCARDIOGRAM REPORT  Patient Name:      MANUEL GOULD        Bree Physician:    [id number] Rao MD Study Date:        3/9/2024             Ordering Provider:    [id number] ROCK DIAZ MRN/PID:           [id number]             Fellow: Accession#:        [id number]         Nurse: Date of Birth/Age: 1980 / 43      Sonographer:          Edmund gillis RDCS Gender:            F                    Additional Staff: Height:            170.18 cm            Admit Date:           3/8/2024 Weight:            93.90 kg             Admission Status:     Inpatient -                                                               Routine BSA / BMI:         2.05 m2 / 32.42      Encounter#:           [id number]                    kg/m2                                         Department Location:  [facility] Blood Pressure: 109 /86 mmHg Study Type:    TRANSTHORACIC ECHO (TTE) LIMITED Diagnosis/ICD: Cerebral infarction due to thrombosis of left cerebellar                artery-I63.342 Indication:    Cerebrovascular Accident CPT Code:      Echo Limited-42297; Doppler Limited-35649; Color Doppler-80030 Patient History: Pertinent History: Acute stroke, HFrEF, afib, prior ischemic stroke, HTN, DM, Hx                    of stroke. Study Detail: The following Echo studies were performed: 2D, M-Mode, Doppler and               color flow. Agitated saline used as a contrast agent  for               intraseptal flow evaluation.  PHYSICIAN INTERPRETATION: Left Ventricle: The left ventricular systolic function is severely decreased, with an estimated ejection fraction of 20-25%. There is global hypokinesis of the left ventricle with minor regional variations. The left ventricular cavity size is mildly dilated. Left ventricular diastolic filling was not assessed. Left Atrium: The left atrium is mildly dilated. A bubble study using agitated saline was performed. Bubble study is negative. Right Ventricle: The right ventricle is mildly enlarged. There is mildly reduced right ventricular systolic function. Right Atrium: The right atrium is normal in size. Aortic Valve: The aortic valve is trileaflet. Aortic valve regurgitation was not assessed. Mitral Valve: The mitral valve is normal in structure. There is trace mitral valve regurgitation. Tricuspid Valve: The tricuspid valve is structurally normal. There is mild tricuspid regurgitation. The Doppler estimated RVSP is mildly elevated at 33.0 mmHg. Pulmonic Valve: The pulmonic valve is not well visualized. Pulmonic valve regurgitation was not assessed. Pericardium: There is a trivial pericardial effusion. Aorta: The aortic root is normal. Systemic Veins: The inferior vena cava appears to be of normal size. There is IVC inspiratory collapse greater than 50%.  CONCLUSIONS:  1. Left ventricular systolic function is severely decreased with a 20-25% estimated ejection fraction.  2. There is mildly reduced right ventricular systolic function.  3. Mildly elevated RVSP.  4. There is global hypokinesis of the left ventricle with minor regional variations. QUANTITATIVE DATA SUMMARY: TRICUSPID VALVE/RVSP:                             Normal Ranges: Peak TR Velocity: 2.74 m/s RV Syst Pressure: 33.0 mmHg (< 30mmHg)  [id number] Alex Rao MD Electronically signed on 3/9/2024 at 4:24:36 PM  ** Final **     MR brain wo IV contrast    Result Date: 3/9/2024  Interpreted  By:  Kali Win and Jiang Sirui STUDY: MR BRAIN WO IV CONTRAST;  3/9/2024 12:08 pm   INDICATION: Signs/Symptoms:New FND.   COMPARISON: CT head 03/08/2024   ACCESSION NUMBER(S): [id number]   ORDERING CLINICIAN: TOPHER VILLA   TECHNIQUE: Axial T2, FLAIR, DWI, gradient echo T2 and sagittal and coronal T1 weighted images of the brain were acquired.   FINDINGS: Evaluation is degraded secondary to motion.   CSF Spaces: There is dilatation of the ventricles at least in part due to global parenchymal volume loss. There is ex vacuo dilatation of the left lateral ventricle. The basal cisterns are patent. No abnormal extra-axial collection.   Parenchyma: The inferior medial left cerebellum has acute/subacute ischemia with diffusion restriction.   There is encephalomalacia and gliosis of the left MCA territory sequela of prior stroke. There is a background of confluence T2 and FLAIR hyperintense signal of the subcortical and periventricular white matter which is nonspecific. No evidence of intracranial hemorrhage. There is no mass effect or midline shift.   Paranasal Sinuses and Mastoids: Visualized mastoid air cells and paranasal sinuses are clear.   The orbits are unremarkable. The visualized flow voids are patent.       1. The inferior left cerebellum has acute ischemia with restricted diffusion. No hemorrhage is noted. 2. Left-sided encephalomalacia and gliosis compatible with sequela of prior left MCA stroke. 3. [place] periventricular and subcortical white matter changes which is nonspecific may be seen with chronic small vessel ischemic disease, migraine headaches, demyelinating processes, vasculitis, among others.   I personally reviewed the image(s) / study and I agree with the findings as stated by Lesli Davis MD. This study was interpreted at [facility], [place], [place].   MACRO: None.   Signed by: Kali Win 3/9/2024 12:50 PM Dictation workstation:   [id number]    US pelvis    Result  Date: 3/8/2024  Interpreted By:  Marleen King  and Chadwick Tristan STUDY: US PELVIS;  3/8/2024 9:08 am   INDICATION: Signs/Symptoms:Large R ovarian cyst, please evaluate R ovarian blood flow.   COMPARISON: CTA abdomen pelvis 03/08/2024   ACCESSION NUMBER(S): [id number]   ORDERING CLINICIAN: ULISSES LOONEY   TECHNIQUE: Multiple multiplanar static gray scale, color and spectral waveform sonographic images of the pelvis were obtained. Transabdominal ultrasound was performed.  This examination was interpreted at [facility]   FINDINGS: UTERUS: The uterus measures  4.28 x 2.78 x 8.26  . The uterine myometrium appears normal.   ENDOMETRIUM: The endometrium measures a thickness of 0.4 cm, which is normal.   RIGHT ADNEXA: The right ovary measures 4.6 cm x 4.3 cm x 5.2 cm and demonstrates normal flow. 4.4 x 4.3 x 3.3 cm thin-walled anechoic lesion with posterior acoustic enhancement and no internal vascularity favored to represent a simple ovarian cyst. No gross right adnexal masses are seen, no hydrosalpinx.   LEFT ADNEXA: The left ovary measures 2.2 cm x 2.7 cm x 3.5 cm and demonstrates normal flow. No gross left adnexal masses are seen, no hydrosalpinx.   CUL DE SAC: No gross free fluid is seen in the pelvic cul-de-sac.       A 4.4 cm simple right ovarian cyst, likely physiologic. No sonographic evidence of ovarian torsion.   I personally reviewed the images/study and I agree with Azalea Genao DO's (radiology resident) findings as stated. This study was interpreted at [place], [place].   MACRO: None   Signed by: Marleen King 3/8/2024 9:44 AM Dictation workstation:   [id number]    CT abdomen pelvis w IV contrast    Result Date: 3/8/2024  STUDY: CT Abdomen and Pelvis with IV Contrast; 03/08/2024 6:19 AM INDICATION: Right-sided abdominal pain, nausea, vomiting. COMPARISON: CT AP 12/09/2023, US RUQ 08/08/2022. ACCESSION NUMBER(S):  [id number] ORDERING CLINICIAN: ULISSES LOONEY TECHNIQUE: CT of the abdomen and pelvis was performed.  Contiguous axial images were obtained at 3 mm slice thickness through the abdomen and pelvis. Coronal and sagittal reconstructions at 3 mm slice thickness were performed.  Omnipaque 350 75 mL was administered intravenously.  FINDINGS: Cardiac enlargement.  No pericardial effusion.  Pulmonary vascular congestive changes.  Lung bases are otherwise clear. Abdomen: No acute abnormality of the stomach is identified. The liver of normal size and contour.  9 mm LEFT hepatic cyst unchanged.  No intrahepatic ductal dilatation is identified. Gallbladder collapsed.  The pancreas, spleen and adrenal glands are normal appearance. No acute renal process.  Chronic LEFT lateral cortical renal infarct. No retroperitoneal adenopathy. No findings of abdominal aortic aneurysm. No bowel obstruction. No free air or free fluid within the abdomen. There is a normal-appearing appendix. Pelvis: CT examination of the pelvis shows no free fluid. No findings of adenopathy.  4.8 cm RIGHT ovarian cyst.  No inflammatory change or findings of free air. Skeleton: Lower lumbar facet arthrosis.  No acute bony process identified.    Cardiac enlargement and pulmonary vascular congestive changes. 4.8 cm RIGHT ovarian cyst.  This was not present on the prior study. Consider ultrasound correlation of the RIGHT ovary. Signed by Evan Hughes MD    CT head wo IV contrast    Result Date: 3/8/2024  Interpreted By:  Finkelstein, Evan, STUDY: CT HEAD WO IV CONTRAST;  3/8/2024 6:05 am   INDICATION: Signs/Symptoms:Possible AMS.   COMPARISON: CT brain 05/31/2023   ACCESSION NUMBER(S): [id number]   ORDERING CLINICIAN: ULISSES LOONEY   TECHNIQUE: Axial noncontrast CT images of the head with coronal and sagittal reconstructions.   FINDINGS: Extracalvarial soft tissues are unremarkable. Paranasal sinuses and mastoid air cells are aerated. No depressed calvarial  fracture. Left frontal/temporal lobe encephalomalacia, similar compared to prior imaging. There are nonspecific periventricular white matter hypodensities. Parenchymal atrophy with prominence of the ventricles and cortical sulci. Ex vacuo dilatation of the left lateral ventricle.         No acute intracranial hemorrhage, mass effect or midline shift.   Sequela of prior left MCA territory infarct. Additional nonspecific scattered white matter hypodensities favored to represent sequela of small vessel ischemia.     MACRO: None.   Signed by: Evan Finkelstein 3/8/2024 6:35 AM Dictation workstation:   [id number]    XR chest 1 view    Result Date: 3/8/2024  STUDY: Chest Radiograph;  03/08/2024 02:09AM INDICATION: Altered mental status, possible infectious workup COMPARISON: 12/07/2023 XR Chest. ACCESSION NUMBER(S): [id number] ORDERING CLINICIAN: ULISSES LOONEY TECHNIQUE:  Frontal chest was obtained at 02:08 hours. FINDINGS: CARDIOMEDIASTINAL SILHOUETTE: Cardiomediastinal silhouette is enlarged..  LUNGS: Lungs are clear.  ABDOMEN: No remarkable upper abdominal findings.  BONES: No acute osseous changes.    Cardiomegaly unchanged.. Signed by Yandel Maaz DO       PSYCHIATRIC RISK ASSESSMENT  Violence Risk Factors:  substance abuse , unemployed, and lower socioeconomic status  Acute Risk of Harm to Others is Considered: Low  Suicide Risk Factors: having a disability , chronic medical illness, and chronic pain  Protective Factors: strong coping skills, fear of suicide or death, fear of social disapproval, sense of responsibility towards family, positive family relationships, and marriage/partnership  Acute Risk of Harm to Self is Considered: Low    ASSESSMENT AND PLAN  Amirah Bennett is a 43 y.o. female with a past psychiatric history of an anxiety disorder, alcohol use disorder, and questionable history of psychosis (schizophrenia v schizoaffective) and past medical history of left MCA stroke s/p thrombectomy in 2022  "secondary to atrial fibrillation with left atrial thrombus on Xarelto with residual aphasia and right sided weakness, HFrEF (LVEF 20-25%), and Graves disease who presented to [facility] ED on 3/8/24 with 3-4 days of N/V, acute blurry vision, and worsening aphasia. MRI brain was complete demonstrating an acute cerebellar stroke so patient was admitted to [facility]. Psychiatry was consulted on 3/8/24 for medication management given unclear history of psychotic disorder (schizophrenia versus schizoaffective).     On initial assessment, the patient was alert and oriented to person, place, and time but did demonstrate dysarthria and mixed aphasia which may be worse compared to baseline. The patient did not endorse any psychotic symptoms and there is an unclear history of psychosis. No clear indication for patient to remain on quetiapine and patient has not been on the medication since January 2024. However, the patient has utilized quetiapine in other hospitalizations for delirium and anxiety, with good benefit. Given patient has experienced anxiety in previous hospitalizations and reportedly had a panic attack after transfer to [facility] would recommend utilizing quetiapine 12.5mg every 8 hours as needed for anxiety.     Again, given no clear indication for quetiapine as a standing medication, would ensure that it is discontinued upon discharge. Additionally, as patient has experienced hospital-acquired delirium in previous hospitalizations would recommend strict application of delirium guidelines, as below.     EKG (3/9/24): QTc of 459 ms     IMPRESSION  - Other specified anxiety disorder  - Alcohol use disorder, mild to moderate  - Delirium by history    RECOMMENDATIONS  - Patient does not currently meet criteria for inpatient psychiatric admission.   - To evaluate decision-making capacity, recommend use of the Capacity Evaluation Tool. Search “ IP Capacity Evaluation under SmartText\" unless the patient has a legal guardian, in " which case all decisions per the legal guardian.  - Patient does not require a 1:1 sitter from a psychiatric perspective at this time.  - Defer to primary team decision for 1:1 sitter.  - As with all hospitalized patients, would recommend delirium precautions, as below.    Medications:  - Quetiapine 12.5mg PO every 8 hours PRN anxiety [DOES NOT needed to be continued on discharge]  - Melatonin 3mg PO at bedtime for sleep-wake cycle consolidation    Ancillary Services:  - Recommend , pet/music consults per patient preference.     ==========  - Discussed recommendations with primary team.  - Psychiatry will continue to follow but not daily.    Thank you for allowing us to participate in the care of this patient. Please page [phone] with any questions or concerns.    Patient discussed with Dr. Stewart, who agrees with above plan.    Reece Kaba MD  Adult Psychiatry, PGY3    Medication Consent  Medication Consent: n/a; consult service        DELIRIUM GUIDELINES  Non-Pharmacologic:  - Assess visual and hearing impairments and provide aids and communication boards.  - Assess immobility and advocate for early evaluation and intervention by physical therapy, out of bed when medically indicated, and expeditious removal of tethers.  - Promote physiologic sleep and maintenance of sleep/wake cycle by ensuring blinds are open during the day, maintaining dark/quiet room at night with minimal interruptions, and minimizing daytime naps.  - Minimize room and staff changes.  - Engage the patient in cognitively stimulating activities and provide frequent reorientation.   - Minimize use of restraints to situations where necessary to keep patient and staff safe and to prevent from removing lines, tubes, medical devices, dressings, etc.      Pharmacologic:  - Minimize use of deliriogenic medications such as benzodiazepines, anticholinergic medications, and opiates (while ensuring adequate treatment of pain).  - Assess and treat  disruption in bowel and bladder function.   - Assess and treat abnormalities in nutrition and hydration status.

## 2024-03-09 NOTE — NURSING NOTE
educated on risks of aspiration due to patient having difficulty swallowing. Informed that speech evaluation needs to be completed by speech therapist, for patient to eat any foods.

## 2024-03-09 NOTE — CONSULTS
Consults    Reason For Consult  SOC watch    History Of Present Illness  Amirah Bennett is a 43 y.o. 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 Neurosurgery was consulted for SOC watch.     Patient stated that she has been experiencing worsening aphasia, nausea, vomiting, and blurry vision for about 4 days.  She is on Xarelto for A-fib, however, if she has not been taking her medication daily.  Endorses mild headache worsening gait.    Imaging is personally reviewed and CTH is notable for L MCA territory encephalomalacia o/w neg for acute intracranial pathology or ventriculomegaly, or effacement of ventricles.  MRI brain is notable for diffuse acute left cerebellar diffusion hits otherwise no effacement of fourth ventricle.    Xarelto last dose 3/8 PM.  Past Medical History  She has a past medical history of CHF (congestive heart failure) (CMS/HCC) and Stroke (CMS/[facility]).    Surgical History  She has a past surgical history that includes Other surgical history (06/09/2022).     Social History  She reports that she quit smoking about 3 months ago. Her smoking use included cigarettes. She smoked an average of .25 packs per day. She does not have any smokeless tobacco history on file. No history on file for alcohol use and drug use.    Family History  No family history on file.     Allergies  Hydrocodone-acetaminophen and Ibuprofen    Review of Systems   Review of systems was reviewed and otherwise negative other than what was listed in the HPI.    Physical Exam  Appears anxious   Tracheostomy (decannulated)  Irregularly irregular HR  Awake, Oriented x3  Ou3R, EOMI, right field cut  Naming  "2/3, repetition impaired, dysarthria, Word finding difficulty (mixed aphasia)  R FD  TM  Motor: RUE D/B 4+ o/w 5/5               LUE 5/5               BLE 5/5  B/l dysmetria  Gait imbalance   SILT    Last Recorded Vitals  Blood pressure (!) 125/101, pulse (!) 107, temperature 36.8 °C (98.2 °F), temperature source Temporal, resp. rate 16, height 1.702 m (5' 7\"), weight 92 kg (202 lb 13.2 oz), SpO2 98 %.    Relevant Results  Results from last 72 hours   Lab Units 03/09/24  0641 03/08/24  0240   WBC AUTO x10*3/uL 9.4 9.2   NRBC AUTO /100 WBCs 0.0 0.2*   RBC AUTO x10*6/uL 4.89 5.68*   HEMOGLOBIN g/dL 13.9 15.9   HEMATOCRIT % 41.0 48.4*   MCV fL 84 85   MCH pg 28.4 28.0   MCHC g/dL 33.9 32.9   RDW % 17.2* 18.5*   PLATELETS AUTO x10*3/uL 150 128*      Results from last 72 hours   Lab Units 03/09/24  0641 03/08/24  2215 03/08/24  2115   GLUCOSE mg/dL 112*  --  109*   SODIUM mmol/L 137  --  132*   POTASSIUM mmol/L 3.8  --  3.8   CHLORIDE mmol/L 101  --  96*   CO2 mmol/L 27  --  27   ANION GAP mmol/L 13  --  13   BUN mg/dL 16  --  16   CREATININE mg/dL 0.98  --  0.77   EGFR mL/min/1.73m*2 73  --  >90   CALCIUM mg/dL 8.7  --  9.2   PHOSPHORUS mg/dL 3.5  --  3.8   ALBUMIN g/dL 3.0* 3.3* 3.4   MAGNESIUM mg/dL 1.76 2.04  --       MR brain wo IV contrast    Result Date: 3/9/2024  STUDY: MR BRAIN WO IV CONTRAST;  3/9/2024 12:08 pm   INDICATION: Signs/Symptoms:New FND.   COMPARISON: CT head 03/08/2024       1. Left-sided encephalomalacia and gliosis compatible with sequela of prior left MCA stroke. 2. [place] periventricular and subcortical white matter changes which is nonspecific may be seen with chronic small vessel ischemic disease, migraine headaches, demyelinating processes, vasculitis, among others.   I personally reviewed the image(s) / study and I agree with the findings as stated by Lesli Davis MD. This study was interpreted at [facility], [place], [place].   MACRO: None.     Dictation workstation:   " [id number]      CT head wo IV contrast    Result Date: 3/8/2024  Interpreted By:  Finkelstein, Evan, STUDY: CT HEAD WO IV CONTRAST;  3/8/2024 6:05 am   INDICATION: Signs/Symptoms:Possible AMS.   COMPARISON: CT brain 05/31/2023       No acute intracranial hemorrhage, mass effect or midline shift.   Sequela of prior left MCA territory infarct. Additional nonspecific scattered white matter hypodensities favored to represent sequela of small vessel ischemia.     MACRO: None.   Signed by: Evan Finkelstein 3/8/2024 6:35 AM Dictation workstation:   [id number]         Assessment/Plan   Amirah Bennett is a 43 y.o. 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 Neurosurgery was consulted for SOC watch.     Imaging is personally reviewed and CTH is notable for L MCA territory encephalomalacia o/w neg for acute intracranial pathology or ventriculomegaly, or effacement of ventricles. MRI brain is notable for diffuse acute left cerebellar diffusion hits otherwise no effacement of fourth ventricle.  CTA is concerning for possible left vert thrombus.     Exam and imaging is not concerning for effacement or ventricles of ventriculomgally.  We will continue to monitor.    Recommendation  - Stroke primary  - Recommend [facility] admission for every hour neurochecks  - Please obtain CT head in a.m.  - TTE and further workup per primary  - Please obtain CBC/RFP/coag/T&S/UA/UPT/EKG/CXR  - neurosurgery will continue to follow    Plan is not final until signed by attending physician.    Magnolia Salguero MD

## 2024-03-09 NOTE — SIGNIFICANT EVENT
Rapid Response:    Carolyn Mcdonnell called around 2035, pt was reported to be SOB and . Seen and examined at bedside. Pt states she is SOB, denies CP, endorses epigastric pain otherwise no pain reported.     Vitals:    03/08/24 2115   BP: (!) 125/98   Pulse: (!) 146   Resp: (!) 22   Temp:    SpO2: 99%     Exam  Gen: Somnolent, appears with mild discomfort  CV: Irregular rhythm, tachycardic, no murmurs appreciated  Pulm: CTA b/l, no wheeze, rales, rhonchi  Extremities: No peripheral edema, skin warm and dry  Neuro: A&O to name, location, no FND appreciated (baseline R sided sensory deficits)    A/P  -Pt found to be in A-fib RVR on EKG, gave metop 5mg IV x2 and additional 12.5mg PO metop tartrate, HR improved to 110s-120s  -RFP, Mg ordered, will fu and replete PRN  -Will continue to monitor patient over the next hour. If HR persistently >120s, plan to give 250cc Bolus, then assess for further indication for rate control  -Consider splitting metop succ into metop tartrate q6H in the AM    Man Gomez,   PGY-1 Neurology

## 2024-03-10 ENCOUNTER — APPOINTMENT (OUTPATIENT)
Dept: RADIOLOGY | Facility: HOSPITAL | Age: 44
DRG: 065 | End: 2024-03-10
Payer: COMMERCIAL

## 2024-03-10 LAB
ALBUMIN SERPL BCP-MCNC: 3.3 G/DL (ref 3.4–5)
ANION GAP BLDV CALCULATED.4IONS-SCNC: 8 MMOL/L (ref 10–25)
ANION GAP SERPL CALC-SCNC: 13 MMOL/L (ref 10–20)
BASE EXCESS BLDV CALC-SCNC: 6.1 MMOL/L (ref -2–3)
BASOPHILS # BLD AUTO: 0.02 X10*3/UL (ref 0–0.1)
BASOPHILS NFR BLD AUTO: 0.3 %
BNP SERPL-MCNC: 388 PG/ML (ref 0–99)
BODY TEMPERATURE: 37 DEGREES CELSIUS
BUN SERPL-MCNC: 14 MG/DL (ref 6–23)
CA-I BLDV-SCNC: 1.2 MMOL/L (ref 1.1–1.33)
CALCIUM SERPL-MCNC: 8.8 MG/DL (ref 8.6–10.6)
CHLORIDE BLDV-SCNC: 93 MMOL/L (ref 98–107)
CHLORIDE SERPL-SCNC: 99 MMOL/L (ref 98–107)
CHOLEST SERPL-MCNC: 125 MG/DL (ref 0–199)
CHOLESTEROL/HDL RATIO: 7.1
CO2 SERPL-SCNC: 30 MMOL/L (ref 21–32)
CREAT SERPL-MCNC: 1.03 MG/DL (ref 0.5–1.05)
EGFRCR SERPLBLD CKD-EPI 2021: 68 ML/MIN/1.73M*2
EOSINOPHIL # BLD AUTO: 0.14 X10*3/UL (ref 0–0.7)
EOSINOPHIL NFR BLD AUTO: 1.8 %
ERYTHROCYTE [DISTWIDTH] IN BLOOD BY AUTOMATED COUNT: 17.8 % (ref 11.5–14.5)
EST. AVERAGE GLUCOSE BLD GHB EST-MCNC: 111 MG/DL
GLUCOSE BLD MANUAL STRIP-MCNC: 102 MG/DL (ref 74–99)
GLUCOSE BLD MANUAL STRIP-MCNC: 87 MG/DL (ref 74–99)
GLUCOSE BLD MANUAL STRIP-MCNC: 95 MG/DL (ref 74–99)
GLUCOSE BLDV-MCNC: 124 MG/DL (ref 74–99)
GLUCOSE SERPL-MCNC: 88 MG/DL (ref 74–99)
HBA1C MFR BLD: 5.5 %
HCO3 BLDV-SCNC: 31.2 MMOL/L (ref 22–26)
HCT VFR BLD AUTO: 45.1 % (ref 36–46)
HCT VFR BLD EST: 41 % (ref 36–46)
HDLC SERPL-MCNC: 17.6 MG/DL
HGB BLD-MCNC: 14.7 G/DL (ref 12–16)
HGB BLDV-MCNC: 13.7 G/DL (ref 12–16)
IMM GRANULOCYTES # BLD AUTO: 0.01 X10*3/UL (ref 0–0.7)
IMM GRANULOCYTES NFR BLD AUTO: 0.1 % (ref 0–0.9)
INHALED O2 CONCENTRATION: 21 %
LACTATE BLDV-SCNC: 1 MMOL/L (ref 0.4–2)
LDLC SERPL CALC-MCNC: 87 MG/DL
LYMPHOCYTES # BLD AUTO: 3.74 X10*3/UL (ref 1.2–4.8)
LYMPHOCYTES NFR BLD AUTO: 47 %
MAGNESIUM SERPL-MCNC: 1.35 MG/DL (ref 1.6–2.4)
MCH RBC QN AUTO: 28.4 PG (ref 26–34)
MCHC RBC AUTO-ENTMCNC: 32.6 G/DL (ref 32–36)
MCV RBC AUTO: 87 FL (ref 80–100)
MONOCYTES # BLD AUTO: 0.73 X10*3/UL (ref 0.1–1)
MONOCYTES NFR BLD AUTO: 9.2 %
NEUTROPHILS # BLD AUTO: 3.31 X10*3/UL (ref 1.2–7.7)
NEUTROPHILS NFR BLD AUTO: 41.6 %
NON HDL CHOLESTEROL: 107 MG/DL (ref 0–149)
NRBC BLD-RTO: 0 /100 WBCS (ref 0–0)
OXYHGB MFR BLDV: 84.1 % (ref 45–75)
PCO2 BLDV: 46 MM HG (ref 41–51)
PH BLDV: 7.44 PH (ref 7.33–7.43)
PHOSPHATE SERPL-MCNC: 3.2 MG/DL (ref 2.5–4.9)
PLATELET # BLD AUTO: 160 X10*3/UL (ref 150–450)
PO2 BLDV: 61 MM HG (ref 35–45)
POTASSIUM BLDV-SCNC: 3.6 MMOL/L (ref 3.5–5.3)
POTASSIUM SERPL-SCNC: 3.5 MMOL/L (ref 3.5–5.3)
RBC # BLD AUTO: 5.17 X10*6/UL (ref 4–5.2)
SAO2 % BLDV: 87 % (ref 45–75)
SODIUM BLDV-SCNC: 129 MMOL/L (ref 136–145)
SODIUM SERPL-SCNC: 138 MMOL/L (ref 136–145)
TRIGL SERPL-MCNC: 100 MG/DL (ref 0–149)
VLDL: 20 MG/DL (ref 0–40)
WBC # BLD AUTO: 8 X10*3/UL (ref 4.4–11.3)

## 2024-03-10 PROCEDURE — 2500000002 HC RX 250 W HCPCS SELF ADMINISTERED DRUGS (ALT 637 FOR MEDICARE OP, ALT 636 FOR OP/ED)

## 2024-03-10 PROCEDURE — 2500000004 HC RX 250 GENERAL PHARMACY W/ HCPCS (ALT 636 FOR OP/ED)

## 2024-03-10 PROCEDURE — 80061 LIPID PANEL: CPT

## 2024-03-10 PROCEDURE — 83735 ASSAY OF MAGNESIUM: CPT

## 2024-03-10 PROCEDURE — 2020000001 HC ICU ROOM DAILY

## 2024-03-10 PROCEDURE — 83880 ASSAY OF NATRIURETIC PEPTIDE: CPT

## 2024-03-10 PROCEDURE — 97162 PT EVAL MOD COMPLEX 30 MIN: CPT | Mod: GP

## 2024-03-10 PROCEDURE — 85025 COMPLETE CBC W/AUTO DIFF WBC: CPT

## 2024-03-10 PROCEDURE — 80069 RENAL FUNCTION PANEL: CPT

## 2024-03-10 PROCEDURE — 36415 COLL VENOUS BLD VENIPUNCTURE: CPT

## 2024-03-10 PROCEDURE — 83036 HEMOGLOBIN GLYCOSYLATED A1C: CPT

## 2024-03-10 PROCEDURE — 99291 CRITICAL CARE FIRST HOUR: CPT | Performed by: STUDENT IN AN ORGANIZED HEALTH CARE EDUCATION/TRAINING PROGRAM

## 2024-03-10 PROCEDURE — 82947 ASSAY GLUCOSE BLOOD QUANT: CPT

## 2024-03-10 PROCEDURE — 2500000001 HC RX 250 WO HCPCS SELF ADMINISTERED DRUGS (ALT 637 FOR MEDICARE OP)

## 2024-03-10 PROCEDURE — 97110 THERAPEUTIC EXERCISES: CPT | Mod: GP

## 2024-03-10 PROCEDURE — 70450 CT HEAD/BRAIN W/O DYE: CPT

## 2024-03-10 PROCEDURE — 99232 SBSQ HOSP IP/OBS MODERATE 35: CPT

## 2024-03-10 PROCEDURE — 70450 CT HEAD/BRAIN W/O DYE: CPT | Performed by: RADIOLOGY

## 2024-03-10 PROCEDURE — 92610 EVALUATE SWALLOWING FUNCTION: CPT | Mod: GN

## 2024-03-10 PROCEDURE — 97165 OT EVAL LOW COMPLEX 30 MIN: CPT | Mod: GO

## 2024-03-10 RX ORDER — POTASSIUM CHLORIDE 1.5 G/1.58G
40 POWDER, FOR SOLUTION ORAL EVERY 6 HOURS PRN
Status: DISCONTINUED | OUTPATIENT
Start: 2024-03-10 | End: 2024-03-14 | Stop reason: HOSPADM

## 2024-03-10 RX ORDER — TALC
3 POWDER (GRAM) TOPICAL NIGHTLY
Status: DISCONTINUED | OUTPATIENT
Start: 2024-03-10 | End: 2024-03-14 | Stop reason: HOSPADM

## 2024-03-10 RX ORDER — MAGNESIUM SULFATE HEPTAHYDRATE 40 MG/ML
4 INJECTION, SOLUTION INTRAVENOUS EVERY 6 HOURS PRN
Status: DISCONTINUED | OUTPATIENT
Start: 2024-03-10 | End: 2024-03-14 | Stop reason: HOSPADM

## 2024-03-10 RX ORDER — SODIUM CHLORIDE 9 MG/ML
50 INJECTION, SOLUTION INTRAVENOUS CONTINUOUS
Status: DISCONTINUED | OUTPATIENT
Start: 2024-03-10 | End: 2024-03-10

## 2024-03-10 RX ORDER — POTASSIUM CHLORIDE 20 MEQ/1
40 TABLET, EXTENDED RELEASE ORAL EVERY 6 HOURS PRN
Status: DISCONTINUED | OUTPATIENT
Start: 2024-03-10 | End: 2024-03-14 | Stop reason: HOSPADM

## 2024-03-10 RX ORDER — POTASSIUM CHLORIDE 14.9 MG/ML
20 INJECTION INTRAVENOUS ONCE
Status: COMPLETED | OUTPATIENT
Start: 2024-03-10 | End: 2024-03-10

## 2024-03-10 RX ORDER — POTASSIUM CHLORIDE 20 MEQ/1
20 TABLET, EXTENDED RELEASE ORAL EVERY 6 HOURS PRN
Status: DISCONTINUED | OUTPATIENT
Start: 2024-03-10 | End: 2024-03-14 | Stop reason: HOSPADM

## 2024-03-10 RX ORDER — POTASSIUM CHLORIDE 1.5 G/1.58G
20 POWDER, FOR SOLUTION ORAL EVERY 6 HOURS PRN
Status: DISCONTINUED | OUTPATIENT
Start: 2024-03-10 | End: 2024-03-14 | Stop reason: HOSPADM

## 2024-03-10 RX ORDER — QUETIAPINE FUMARATE 25 MG/1
12.5 TABLET, FILM COATED ORAL EVERY 8 HOURS PRN
Status: DISCONTINUED | OUTPATIENT
Start: 2024-03-10 | End: 2024-03-14

## 2024-03-10 RX ORDER — MAGNESIUM SULFATE HEPTAHYDRATE 40 MG/ML
2 INJECTION, SOLUTION INTRAVENOUS ONCE
Status: COMPLETED | OUTPATIENT
Start: 2024-03-10 | End: 2024-03-10

## 2024-03-10 RX ORDER — MAGNESIUM SULFATE HEPTAHYDRATE 40 MG/ML
2 INJECTION, SOLUTION INTRAVENOUS EVERY 6 HOURS PRN
Status: DISCONTINUED | OUTPATIENT
Start: 2024-03-10 | End: 2024-03-14 | Stop reason: HOSPADM

## 2024-03-10 RX ADMIN — FOLIC ACID 1 MG: 1 TABLET ORAL at 09:51

## 2024-03-10 RX ADMIN — GABAPENTIN 100 MG: 100 CAPSULE ORAL at 09:51

## 2024-03-10 RX ADMIN — POTASSIUM CHLORIDE 20 MEQ: 14.9 INJECTION, SOLUTION INTRAVENOUS at 05:35

## 2024-03-10 RX ADMIN — SPIRONOLACTONE 25 MG: 25 TABLET, FILM COATED ORAL at 07:28

## 2024-03-10 RX ADMIN — MAGNESIUM SULFATE HEPTAHYDRATE 2 G: 40 INJECTION, SOLUTION INTRAVENOUS at 05:34

## 2024-03-10 RX ADMIN — THIAMINE HYDROCHLORIDE 200 MG: 100 INJECTION, SOLUTION INTRAMUSCULAR; INTRAVENOUS at 09:52

## 2024-03-10 RX ADMIN — Medication 1 TABLET: at 07:28

## 2024-03-10 RX ADMIN — DIGOXIN 250 MCG: 125 TABLET ORAL at 09:51

## 2024-03-10 RX ADMIN — ATORVASTATIN CALCIUM 80 MG: 80 TABLET, FILM COATED ORAL at 09:51

## 2024-03-10 RX ADMIN — GABAPENTIN 100 MG: 100 CAPSULE ORAL at 16:00

## 2024-03-10 RX ADMIN — FUROSEMIDE 40 MG: 40 TABLET ORAL at 09:51

## 2024-03-10 RX ADMIN — SACUBITRIL AND VALSARTAN 1 TABLET: 24; 26 TABLET, FILM COATED ORAL at 20:19

## 2024-03-10 RX ADMIN — SACUBITRIL AND VALSARTAN 1 TABLET: 24; 26 TABLET, FILM COATED ORAL at 09:51

## 2024-03-10 RX ADMIN — METOPROLOL SUCCINATE 50 MG: 50 TABLET, EXTENDED RELEASE ORAL at 09:51

## 2024-03-10 RX ADMIN — PANTOPRAZOLE SODIUM 40 MG: 40 TABLET, DELAYED RELEASE ORAL at 07:28

## 2024-03-10 RX ADMIN — MELATONIN 3 MG: 3 TAB ORAL at 20:19

## 2024-03-10 RX ADMIN — GABAPENTIN 100 MG: 100 CAPSULE ORAL at 20:19

## 2024-03-10 ASSESSMENT — PAIN - FUNCTIONAL ASSESSMENT
PAIN_FUNCTIONAL_ASSESSMENT: 0-10

## 2024-03-10 ASSESSMENT — COGNITIVE AND FUNCTIONAL STATUS - GENERAL
TURNING FROM BACK TO SIDE WHILE IN FLAT BAD: A LITTLE
DRESSING REGULAR LOWER BODY CLOTHING: A LITTLE
TOILETING: A LITTLE
WALKING IN HOSPITAL ROOM: A LOT
DRESSING REGULAR UPPER BODY CLOTHING: A LITTLE
DAILY ACTIVITIY SCORE: 19
STANDING UP FROM CHAIR USING ARMS: A LOT
MOVING TO AND FROM BED TO CHAIR: A LITTLE
MOVING FROM LYING ON BACK TO SITTING ON SIDE OF FLAT BED WITH BEDRAILS: A LITTLE
MOBILITY SCORE: 15
CLIMB 3 TO 5 STEPS WITH RAILING: A LOT
HELP NEEDED FOR BATHING: A LITTLE
PERSONAL GROOMING: A LITTLE

## 2024-03-10 ASSESSMENT — PAIN SCALES - GENERAL
PAINLEVEL_OUTOF10: 0 - NO PAIN

## 2024-03-10 ASSESSMENT — ACTIVITIES OF DAILY LIVING (ADL)
ADL_ASSISTANCE: INDEPENDENT
BATHING_ASSISTANCE: MINIMAL

## 2024-03-10 NOTE — CARE PLAN
Problem: General Stroke  Goal: Establish a mutual long term goal with patient by discharge  Outcome: Progressing  Goal: Demonstrate improvement in neurological exam throughout the shift  Outcome: Progressing  Goal: Maintain BP within ordered limits throughout shift  Outcome: Progressing  Goal: Participate in treatment (ie., meds, therapy) throughout shift  Outcome: Progressing  Goal: No symptoms of aspiration throughout shift  Outcome: Progressing  Goal: Controlled blood glucose throughout shift  Outcome: Progressing  Goal: Out of bed three times today  Outcome: Progressing     Problem: ICU Stroke  Goal: Maintain patent airway throughout shift  Outcome: Progressing     Problem: Skin  Goal: Prevent/manage excess moisture  Outcome: Progressing  Goal: Prevent/minimize sheer/friction injuries  Outcome: Progressing  Goal: Promote/optimize nutrition  Outcome: Progressing     Problem: Pain  Goal: My pain/discomfort is manageable  Outcome: Progressing     Problem: Safety  Goal: Patient will be injury free during hospitalization  Outcome: Progressing  Goal: I will remain free of falls  Outcome: Progressing     Problem: Daily Care  Goal: Daily care needs are met  Outcome: Progressing     Problem: Psychosocial Needs  Goal: Demonstrates ability to cope with hospitalization/illness  Outcome: Progressing  Goal: Collaborate with me, my family, and caregiver to identify my specific goals  Outcome: Progressing     Problem: Discharge Barriers  Goal: My discharge needs are met  Outcome: Progressing

## 2024-03-10 NOTE — PROGRESS NOTES
Mark Anthony Macario Bennett is a 43 y.o. 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 MRI brain showed left PICA infarct. CTA with left vertebral artery thrombus. Was admitted to the NSU for suboccipital craniectomy watch.    Interval Events: No acute events overnight     Objective   Vitals 24 hour ranges:  Heart Rate:  []   Temp:  [36.4 °C (97.5 °F)-36.8 °C (98.2 °F)]   Resp:  [7-24]   BP: (104-134)/()   Weight:  [83 kg (182 lb 15.7 oz)]   SpO2:  [95 %-100 %]    Hemodynamic parameters for last 24 hours:     Intake/Output for last 24 hours:    Intake/Output Summary (Last 24 hours) at 3/10/2024 0851  Last data filed at 3/10/2024 0000  Gross per 24 hour   Intake --   Output 650 ml   Net -650 ml      Vent settings:       Physical Exam:  NEURO:  Alert, oriented to person, place, year (stated month as January), follows simple commands, difficulty with complex commands. Errors with repetition, naming intact.  EOS, PERRL, EOMI, Mildly restricted visual field in right, tortional nystagmus worst with left gaze  KING 5/5 strength, no drift, mild dysmetria with left finger-nose-finger  Sensation to light touch decreased in right upper extremity  CV:  RRR on telemetry, NSR  RESP:  Regular, unlabored  Oxygen: room air  :  No catheter in place  GI:  Abdomen NT/ND, soft  SKIN:  Intact    Medications  Scheduled: PRN: Continuous:   atorvastatin, 80 mg, oral, Daily  digoxin, 250 mcg, oral, Daily  [Held by provider] empagliflozin, 10 mg, oral, Daily  folic acid, 1 mg, oral, Daily  furosemide, 40 mg, oral, Daily  gabapentin, 100 mg, oral, TID  insulin lispro, 0-10 Units, subcutaneous, q4h  metoprolol succinate XL, 50 mg, oral, Daily  multivitamin with minerals, 1 tablet, oral,  Daily  pantoprazole, 40 mg, oral, Daily before breakfast  polyethylene glycol, 17 g, oral, Daily  sacubitriL-valsartan, 1 tablet, oral, BID  spironolactone, 25 mg, oral, Daily  thiamine, 200 mg, intravenous, Daily     PRN medications: acetaminophen, dextrose 10 % in water (D10W), dextrose, glucagon, hydrALAZINE **FOLLOWED BY** [START ON 3/11/2024] hydrALAZINE, labetaloL, lubricating eye drops, magnesium sulfate, magnesium sulfate, ondansetron ODT **OR** ondansetron, oxygen, polyethylene glycol, potassium chloride CR **OR** potassium chloride, potassium chloride CR **OR** potassium chloride dexmedeTOMIDine, 0.1-1.5 mcg/kg/hr, Last Rate: Stopped (03/09/24 1718)         Lab Results  Results for orders placed or performed during the hospital encounter of 03/08/24 (from the past 96 hour(s))   Comprehensive metabolic panel   Result Value Ref Range    Glucose 142 (H) 74 - 99 mg/dL    Sodium 135 (L) 136 - 145 mmol/L    Potassium 4.0 3.5 - 5.3 mmol/L    Chloride 98 98 - 107 mmol/L    Bicarbonate 23 21 - 32 mmol/L    Anion Gap 18 10 - 20 mmol/L    Urea Nitrogen 19 6 - 23 mg/dL    Creatinine 0.87 0.50 - 1.05 mg/dL    eGFR 84 >60 mL/min/1.73m*2    Calcium 9.9 8.6 - 10.6 mg/dL    Albumin 4.4 3.4 - 5.0 g/dL    Alkaline Phosphatase 68 33 - 110 U/L    Total Protein 8.1 6.4 - 8.2 g/dL     (H) 9 - 39 U/L    Bilirubin, Total 2.3 (H) 0.0 - 1.2 mg/dL     (H) 7 - 45 U/L   Magnesium   Result Value Ref Range    Magnesium 1.60 1.60 - 2.40 mg/dL   Lipase   Result Value Ref Range    Lipase 16 9 - 82 U/L   Troponin I, High Sensitivity   Result Value Ref Range    Troponin I, High Sensitivity 33 0 - 34 ng/L   TSH with reflex to Free T4 if abnormal   Result Value Ref Range    Thyroid Stimulating Hormone 1.04 0.44 - 3.98 mIU/L   Digoxin level   Result Value Ref Range    Digoxin  0.38 (L) 0.80 - <2.00 ng/mL   Ammonia   Result Value Ref Range    Ammonia 33 16 - 53 umol/L   CBC and Auto Differential   Result Value Ref Range    WBC 9.2  4.4 - 11.3 x10*3/uL    nRBC 0.2 (H) 0.0 - 0.0 /100 WBCs    RBC 5.68 (H) 4.00 - 5.20 x10*6/uL    Hemoglobin 15.9 12.0 - 16.0 g/dL    Hematocrit 48.4 (H) 36.0 - 46.0 %    MCV 85 80 - 100 fL    MCH 28.0 26.0 - 34.0 pg    MCHC 32.9 32.0 - 36.0 g/dL    RDW 18.5 (H) 11.5 - 14.5 %    Platelets 128 (L) 150 - 450 x10*3/uL    Immature Granulocytes %, Automated 0.3 0.0 - 0.9 %    Immature Granulocytes Absolute, Automated 0.03 0.00 - 0.70 x10*3/uL   B-Type Natriuretic Peptide   Result Value Ref Range     (H) 0 - 99 pg/mL   Type And Screen   Result Value Ref Range    ABO TYPE B     Rh TYPE POS     ANTIBODY SCREEN NEG    Blood Gas Venous Full Panel   Result Value Ref Range    POCT pH, Venous 7.32 (L) 7.33 - 7.43 pH    POCT pCO2, Venous 50 41 - 51 mm Hg    POCT pO2, Venous 38 35 - 45 mm Hg    POCT SO2, Venous 45 45 - 75 %    POCT Oxy Hemoglobin, Venous 44.4 (L) 45.0 - 75.0 %    POCT Hematocrit Calculated, Venous 49.0 (H) 36.0 - 46.0 %    POCT Sodium, Venous 130 (L) 136 - 145 mmol/L    POCT Potassium, Venous 7.6 (HH) 3.5 - 5.3 mmol/L    POCT Chloride, Venous 97 (L) 98 - 107 mmol/L    POCT Ionized Calicum, Venous 0.98 (L) 1.10 - 1.33 mmol/L    POCT Glucose, Venous 155 (H) 74 - 99 mg/dL    POCT Lactate, Venous 2.7 (H) 0.4 - 2.0 mmol/L    POCT Base Excess, Venous -1.1 -2.0 - 3.0 mmol/L    POCT HCO3 Calculated, Venous 25.8 22.0 - 26.0 mmol/L    POCT Hemoglobin, Venous 16.2 (H) 12.0 - 16.0 g/dL    POCT Anion Gap, Venous 15.0 10.0 - 25.0 mmol/L    Patient Temperature 37.0 degrees Celsius    FiO2 21 %   Manual Differential   Result Value Ref Range    Neutrophils %, Manual 77.4 40.0 - 80.0 %    Lymphocytes %, Manual 18.2 13.0 - 44.0 %    Monocytes %, Manual 3.5 2.0 - 10.0 %    Eosinophils %, Manual 0.9 0.0 - 6.0 %    Basophils %, Manual 0.0 0.0 - 2.0 %    Seg Neutrophils Absolute, Manual 7.12 (H) 1.20 - 7.00 x10*3/uL    Lymphocytes Absolute, Manual 1.67 1.20 - 4.80 x10*3/uL    Monocytes Absolute, Manual 0.32 0.10 - 1.00 x10*3/uL     Eosinophils Absolute, Manual 0.08 0.00 - 0.70 x10*3/uL    Basophils Absolute, Manual 0.00 0.00 - 0.10 x10*3/uL    Total Cells Counted 115     RBC Morphology See Below     Polychromasia Mild     Rodríguez Cells Few    Sars-CoV-2 PCR   Result Value Ref Range    Coronavirus 2019, PCR Not Detected Not Detected   Urinalysis with Reflex Culture and Microscopic   Result Value Ref Range    Color, Urine Light-Yellow Light-Yellow, Yellow, Dark-Yellow    Appearance, Urine Clear Clear    Specific Gravity, Urine 1.021 1.005 - 1.035    pH, Urine 7.0 5.0, 5.5, 6.0, 6.5, 7.0, 7.5, 8.0    Protein, Urine 30 (1+) (A) NEGATIVE, 10 (TRACE), 20 (TRACE) mg/dL    Glucose, Urine Normal Normal mg/dL    Blood, Urine NEGATIVE NEGATIVE    Ketones, Urine TRACE (A) NEGATIVE mg/dL    Bilirubin, Urine NEGATIVE NEGATIVE    Urobilinogen, Urine Normal Normal mg/dL    Nitrite, Urine NEGATIVE NEGATIVE    Leukocyte Esterase, Urine NEGATIVE NEGATIVE   Extra Urine Gray Tube   Result Value Ref Range    Extra Tube Hold for add-ons.    Urinalysis Microscopic   Result Value Ref Range    WBC, Urine NONE 1-5, NONE /HPF    RBC, Urine NONE NONE, 1-2, 3-5 /HPF    Squamous Epithelial Cells, Urine 1-9 (SPARSE) Reference range not established. /HPF   Drug Screen, Urine   Result Value Ref Range    Amphetamine Screen, Urine Presumptive Negative Presumptive Negative    Barbiturate Screen, Urine Presumptive Negative Presumptive Negative    Benzodiazepines Screen, Urine Presumptive Negative Presumptive Negative    Cannabinoid Screen, Urine Presumptive Positive (A) Presumptive Negative    Cocaine Metabolite Screen, Urine Presumptive Negative Presumptive Negative    Fentanyl Screen, Urine Presumptive Negative Presumptive Negative    Opiate Screen, Urine Presumptive Negative Presumptive Negative    Oxycodone Screen, Urine Presumptive Negative Presumptive Negative    PCP Screen, Urine Presumptive Negative Presumptive Negative    Methadone Screen, Urine Presumptive Negative  Presumptive Negative   POCT pregnancy, urine   Result Value Ref Range    Preg Test, Ur Negative Negative   Syphilis Screen with Reflex   Result Value Ref Range    Syphilis Total Ab Nonreactive Nonreactive   Blood Culture    Specimen: Peripheral Venipuncture; Blood culture   Result Value Ref Range    Blood Culture No growth at 1 day    Blood Culture    Specimen: Peripheral Venipuncture; Blood culture   Result Value Ref Range    Blood Culture No growth at 1 day    Folate   Result Value Ref Range    Folate, Serum >24.0 >5.0 ng/mL   Vitamin B12   Result Value Ref Range    Vitamin B12 740 211 - 911 pg/mL   Potassium   Result Value Ref Range    Potassium 4.2 3.5 - 5.3 mmol/L   Hepatitis panel, acute   Result Value Ref Range    Hepatitis B Surface AG Nonreactive Nonreactive    Hepatitis A  AB- IgM Nonreactive Nonreactive    Hepatitis B Core AB; IgM Nonreactive Nonreactive    Hepatitis C AB Nonreactive Nonreactive   Renal function panel   Result Value Ref Range    Glucose 109 (H) 74 - 99 mg/dL    Sodium 132 (L) 136 - 145 mmol/L    Potassium 3.8 3.5 - 5.3 mmol/L    Chloride 96 (L) 98 - 107 mmol/L    Bicarbonate 27 21 - 32 mmol/L    Anion Gap 13 10 - 20 mmol/L    Urea Nitrogen 16 6 - 23 mg/dL    Creatinine 0.77 0.50 - 1.05 mg/dL    eGFR >90 >60 mL/min/1.73m*2    Calcium 9.2 8.6 - 10.6 mg/dL    Phosphorus 3.8 2.5 - 4.9 mg/dL    Albumin 3.4 3.4 - 5.0 g/dL   Magnesium   Result Value Ref Range    Magnesium     Salicylate   Result Value Ref Range    Salicylate  <3 4 - 20 mg/dL   Magnesium   Result Value Ref Range    Magnesium 2.04 1.60 - 2.40 mg/dL   Hepatic function panel   Result Value Ref Range    Albumin 3.3 (L) 3.4 - 5.0 g/dL    Bilirubin, Total 1.9 (H) 0.0 - 1.2 mg/dL    Bilirubin, Direct 0.4 (H) 0.0 - 0.3 mg/dL    Alkaline Phosphatase 52 33 - 110 U/L    ALT 78 (H) 7 - 45 U/L    AST 71 (H) 9 - 39 U/L    Total Protein 7.1 6.4 - 8.2 g/dL   Acute Toxicology Panel, Blood   Result Value Ref Range    Acetaminophen <10.0 10.0 -  30.0 ug/mL    Salicylate  <3 4 - 20 mg/dL    Alcohol <10 <=10 mg/dL   CBC and Auto Differential   Result Value Ref Range    WBC 9.4 4.4 - 11.3 x10*3/uL    nRBC 0.0 0.0 - 0.0 /100 WBCs    RBC 4.89 4.00 - 5.20 x10*6/uL    Hemoglobin 13.9 12.0 - 16.0 g/dL    Hematocrit 41.0 36.0 - 46.0 %    MCV 84 80 - 100 fL    MCH 28.4 26.0 - 34.0 pg    MCHC 33.9 32.0 - 36.0 g/dL    RDW 17.2 (H) 11.5 - 14.5 %    Platelets 150 150 - 450 x10*3/uL    Neutrophils % 46.5 40.0 - 80.0 %    Immature Granulocytes %, Automated 0.3 0.0 - 0.9 %    Lymphocytes % 43.1 13.0 - 44.0 %    Monocytes % 9.0 2.0 - 10.0 %    Eosinophils % 0.9 0.0 - 6.0 %    Basophils % 0.2 0.0 - 2.0 %    Neutrophils Absolute 4.37 1.20 - 7.70 x10*3/uL    Immature Granulocytes Absolute, Automated 0.03 0.00 - 0.70 x10*3/uL    Lymphocytes Absolute 4.06 1.20 - 4.80 x10*3/uL    Monocytes Absolute 0.85 0.10 - 1.00 x10*3/uL    Eosinophils Absolute 0.08 0.00 - 0.70 x10*3/uL    Basophils Absolute 0.02 0.00 - 0.10 x10*3/uL   Renal function panel   Result Value Ref Range    Glucose 112 (H) 74 - 99 mg/dL    Sodium 137 136 - 145 mmol/L    Potassium 3.8 3.5 - 5.3 mmol/L    Chloride 101 98 - 107 mmol/L    Bicarbonate 27 21 - 32 mmol/L    Anion Gap 13 10 - 20 mmol/L    Urea Nitrogen 16 6 - 23 mg/dL    Creatinine 0.98 0.50 - 1.05 mg/dL    eGFR 73 >60 mL/min/1.73m*2    Calcium 8.7 8.6 - 10.6 mg/dL    Phosphorus 3.5 2.5 - 4.9 mg/dL    Albumin 3.0 (L) 3.4 - 5.0 g/dL   Magnesium   Result Value Ref Range    Magnesium 1.76 1.60 - 2.40 mg/dL   Lactate   Result Value Ref Range    Lactate 0.9 0.4 - 2.0 mmol/L   Transthoracic Echo (TTE) Limited   Result Value Ref Range    RVSP 33.0 mmHg   POCT GLUCOSE   Result Value Ref Range    POCT Glucose 103 (H) 74 - 99 mg/dL   Lipid Panel   Result Value Ref Range    Cholesterol 125 0 - 199 mg/dL    HDL-Cholesterol 17.6 mg/dL    Cholesterol/HDL Ratio 7.1     LDL Calculated 87 <=99 mg/dL    VLDL 20 0 - 40 mg/dL    Triglycerides 100 0 - 149 mg/dL    Non HDL  Cholesterol 107 0 - 149 mg/dL   Hemoglobin A1C   Result Value Ref Range    Hemoglobin A1C 5.5 see below %    Estimated Average Glucose 111 Not Established mg/dL   B-Type Natriuretic Peptide   Result Value Ref Range     (H) 0 - 99 pg/mL   CBC and Auto Differential   Result Value Ref Range    WBC 8.0 4.4 - 11.3 x10*3/uL    nRBC 0.0 0.0 - 0.0 /100 WBCs    RBC 5.17 4.00 - 5.20 x10*6/uL    Hemoglobin 14.7 12.0 - 16.0 g/dL    Hematocrit 45.1 36.0 - 46.0 %    MCV 87 80 - 100 fL    MCH 28.4 26.0 - 34.0 pg    MCHC 32.6 32.0 - 36.0 g/dL    RDW 17.8 (H) 11.5 - 14.5 %    Platelets 160 150 - 450 x10*3/uL    Neutrophils % 41.6 40.0 - 80.0 %    Immature Granulocytes %, Automated 0.1 0.0 - 0.9 %    Lymphocytes % 47.0 13.0 - 44.0 %    Monocytes % 9.2 2.0 - 10.0 %    Eosinophils % 1.8 0.0 - 6.0 %    Basophils % 0.3 0.0 - 2.0 %    Neutrophils Absolute 3.31 1.20 - 7.70 x10*3/uL    Immature Granulocytes Absolute, Automated 0.01 0.00 - 0.70 x10*3/uL    Lymphocytes Absolute 3.74 1.20 - 4.80 x10*3/uL    Monocytes Absolute 0.73 0.10 - 1.00 x10*3/uL    Eosinophils Absolute 0.14 0.00 - 0.70 x10*3/uL    Basophils Absolute 0.02 0.00 - 0.10 x10*3/uL   Renal function panel   Result Value Ref Range    Glucose 88 74 - 99 mg/dL    Sodium 138 136 - 145 mmol/L    Potassium 3.5 3.5 - 5.3 mmol/L    Chloride 99 98 - 107 mmol/L    Bicarbonate 30 21 - 32 mmol/L    Anion Gap 13 10 - 20 mmol/L    Urea Nitrogen 14 6 - 23 mg/dL    Creatinine 1.03 0.50 - 1.05 mg/dL    eGFR 68 >60 mL/min/1.73m*2    Calcium 8.8 8.6 - 10.6 mg/dL    Phosphorus 3.2 2.5 - 4.9 mg/dL    Albumin 3.3 (L) 3.4 - 5.0 g/dL   Magnesium   Result Value Ref Range    Magnesium 1.35 (L) 1.60 - 2.40 mg/dL   POCT GLUCOSE   Result Value Ref Range    POCT Glucose 95 74 - 99 mg/dL   POCT GLUCOSE   Result Value Ref Range    POCT Glucose 87 74 - 99 mg/dL         Imaging Results  CT head wo IV contrast   Final Result   Evolving left inferior cerebellar infarct without hemorrhagic   conversion.  Similar mass effect with partial effacement of the 4th   ventricle and adjacent cerebellar folia.        I personally reviewed the images/study and I agree with the findings   as stated by María Quiñones MD. This study was interpreted at   [facility], [place], [place].        MACRO:   None        Signed by: Osbaldo Lassiter 3/10/2024 8:12 AM   Dictation workstation:   [id number]      CT angio head and neck w and wo IV contrast   Final Result   Focal high-grade stenosis versus occlusion of the left intradural   vertebral artery with immediate reconstitution. Nonvisualization of   the left PICA, presumably occluded.        Diminutive caliber and superior division of left MCA branches, likely   secondary to encephalomalacia in this territory.        Otherwise, remaining cervical and intracranial vasculature is within   normal limits.        Evolving left inferior cerebellar infarct without associated   hemorrhagic conversion. Partial effacement of the 4th ventricle,   unchanged from previous examinations. Supratentorial ventricular   system is unchanged.             _____________   NASCET criteria for internal carotid artery stenosis:   Mild: 0% to 49%   Moderate: 50% to 69%   Severe: 70% to 99%   Complete Occlusion        Signed by: Tabby Monroy 3/9/2024 5:04 PM   Dictation workstation:   [id number]      Transthoracic Echo (TTE) Limited   Final Result      MR brain wo IV contrast   Final Result   1. The inferior left cerebellum has acute ischemia with restricted   diffusion. No hemorrhage is noted.   2. Left-sided encephalomalacia and gliosis compatible with sequela of   prior left MCA stroke.   3. Eidson periventricular and subcortical white matter changes   which is nonspecific may be seen with chronic small vessel ischemic   disease, migraine headaches, demyelinating processes, vasculitis,   among others.        I personally reviewed the image(s) / study and I agree with the    findings as stated by Lesli Davis MD. This study was interpreted at   [facility], [place], [place].        MACRO:   None.        Signed by: Kali Win 3/9/2024 12:50 PM   Dictation workstation:   [id number]      US pelvis   Final Result   A 4.4 cm simple right ovarian cyst, likely physiologic. No   sonographic evidence of ovarian torsion.        I personally reviewed the images/study and I agree with Azalea Genao DO's (radiology resident) findings as stated. This study   was interpreted at [facility],   [place], [place].        MACRO:   None        Signed by: Marleen King 3/8/2024 9:44 AM   Dictation workstation:   [id number]      CT head wo IV contrast   Final Result        No acute intracranial hemorrhage, mass effect or midline shift.        Sequela of prior left MCA territory infarct. Additional nonspecific   scattered white matter hypodensities favored to represent sequela of   small vessel ischemia.             MACRO:   None.        Signed by: Evan Finkelstein 3/8/2024 6:35 AM   Dictation workstation:   [id number]      CT abdomen pelvis w IV contrast   Final Result   Cardiac enlargement and pulmonary vascular congestive changes.   4.8 cm RIGHT ovarian cyst.  This was not present on the prior study.    Consider ultrasound correlation of the RIGHT ovary.   Signed by Evan Hughes MD      XR chest 1 view   Final Result   Cardiomegaly unchanged..   Signed by Yandel Maza DO      Transthoracic Echo (TTE) Complete    (Results Pending)         Assessment/Plan   NEURO:  #Hx L MCA ischemic stroke  #Acute L cerebellar ischemic infarct  #Hx schizophrenia vs schizoaffective d/o  Assessment:  Neurologically: Alert, mild confusion,   Plan:  NSU  Q1H neuro checks  Hold anticoagulation for 1 week, resume 3/17  Neurosurgery consulted for SOC watch  Resume aspirin once out of window for SOC  Pain: acetaminophen PRN  Psychiatry consulted for hx  schizophrenia vs schizoaffective self-dc'd from seroquel  Seroquel 12.5mg q8hr PRN agitation  Melatonin 3mg QHS  Nausea: ondansetron PRN  PT/OT/SLP    CARDIOVASCULAR:  #HFrEF (EF 20-25%)  # Hx LA thrombus  Assessment:  ECHO: EF20-25%, global LV hypokinesis, mild RV systolic dysfunction, mild increased RVSP    Plan:  Continue to monitor on telemetry  Continue home meds  Atorvastatin 80mg daily  Digoxin 250mg PO daily  Furosemide 40mg PO daily  Metoprolol succinate 50mg PO daily  Spironolactone 25mg PO daily  Entresto 24-26mg PO BID  Hold Jardiance while hospitalized  Check digoxin level tomorrow  Goal SBP < 220 - Nicardipine, Hydralazine and Labetalol PRN    RESPIRATORY:  #No active issues  Assessment:  On room air  Plan:  Continuous pulse oximetry   O2 PRN to maintain SpO2 > 94%, wean as tolerated  Incentive spirometry while awake     RENAL/:  #No active issues  Assessment:  Baseline BUN/Cr: 18/0.9  Results from last 72 hours   Lab Units 03/10/24  0120 03/09/24  0641   BUN mg/dL 14 16   CREATININE mg/dL 1.03 0.98       Plan:  Monitor with daily RFP    FEN/GI:  #Nausea  #Emesis  Assessment:     Nausea 2/2 cerebellar infarct  Plan:  Monitor and replace electrolytes per protocol  IVF: Cautious bolus PRN with severe heart failure  Diet: 2-3g salt, 2L fluid restriction  Bowel Regimen: Miralax    ENDOCRINE:  #No active issues  Assessment:  Results from last 7 days   Lab Units 03/10/24  0139 03/10/24  0120 03/09/24 2022 03/09/24  0641   POCT GLUCOSE mg/dL 95  --  103*  --    GLUCOSE mg/dL  --  88  --  112*      Plan:  Accuchecks & ISS Q6H    HEMATOLOGY:  #No active issues  Assessment:  Baseline Hgb: 13.7  Baseline Plts: 221  Results from last 7 days   Lab Units 03/10/24  0120 03/09/24  0641   HEMOGLOBIN g/dL 14.7 13.9   HEMATOCRIT % 45.1 41.0   PLATELETS AUTO x10*3/uL 160 150     Plan:  Continue to monitor with daily CBC and Coag panel    INFECTIOUS DISEASE:  #No active issues  Assessment:  Results from last 7 days    Lab Units 03/10/24  0120 24  0641   WBC AUTO x10*3/uL 8.0 9.4     Temp (24hrs), Av.6 °C (97.9 °F), Min:36.4 °C (97.5 °F), Max:36.8 °C (98.2 °F)    Plan:  Continue to monitor for s/sx of infection  Pan culture for temperature > 38.4 C    MUSCULOSKELETAL:  No acute issues    SKIN:  No acute issues  Turns and skin care per NSU protocol    ACCESS:  pIVs    PROPHYLAXIS:  DVT/VTE: SCDs, SQ lovenox once out of SOC watch  GI: PPI    RESTRAINTS:  Not indicated/Patient does not meet criteria for restraints    Anders Catalan MD  Neurology PGY-2  Neuroscience Intensive Care       Plan of care to be discussed with neurocritical care attending    Attending Attestation:     Neuro - h/o prior L MCA stroke p/w several days of N/V and visual changes, MRI L cerebellar stroke with L vertebral artery thrombus   - baseline aphasia  - SOC watch   Cardiac -   - TTE EF 20-25% (known chronic heart failure)   - permissive hypertension   - resume home meds (metoprolol, digoxin, spironolactone, lasix)   - ASA 81 starting 3/11   - home xarelto held currently   Respiratory - no acute issues, on RA   Renal - no acute issues   GI - okay for diet   Psych - h/o schizoaffective disorder (psych following)     Code status - full code      Chris Mckeon MD    of Neurosurgery   [facility]   [facility]   Office: [phone]  Fax: [phone]

## 2024-03-10 NOTE — PROGRESS NOTES
Physical Therapy    Physical Therapy Evaluation & Treatment    Patient Name: Amirah Bennett  MRN: [id number]  Today's Date: 3/10/2024   Time Calculation  Start Time: 0920  Stop Time: 0948  Time Calculation (min): 28 min    Assessment/Plan   PT Assessment  PT Assessment Results: Decreased strength, Decreased range of motion, Decreased endurance, Impaired balance, Decreased mobility, Decreased cognition, Decreased coordination  Rehab Prognosis: Good  Evaluation/Treatment Tolerance: Patient tolerated treatment well  Medical Staff Made Aware: Yes  End of Session Communication: Bedside nurse (medical team, rounding following session)  End of Session Patient Position: Bed, 3 rail up, Alarm off, caregiver present   IP OR SWING BED PT PLAN  Inpatient or Swing Bed: Inpatient  PT Plan  Treatment/Interventions: Bed mobility, Transfer training, Gait training, Stair training, Balance training, Neuromuscular re-education, Strengthening, Endurance training  PT Plan: Skilled PT  PT Frequency: 4 times per week  PT Discharge Recommendations: High intensity level of continued care  PT Recommended Transfer Status: Assist x1  PT - OK to Discharge: Yes (pending medical team clearance)      Subjective     General Visit Information:  General  Reason for Referral: p/w 3-4 days of N/V,  acute blurry vision, & worsening aphasia. Neurosurgery was consulted for SOC watch. CTH is notable for L MCA territory encephalomalacia o/w neg for acute intracranial pathology or ventriculomegaly, or effacement of ventricles. MRI brain is notable for diffuse acute left cerebellar diffusion hits otherwise no effacement of fourth ventricle  Past Medical History Relevant to Rehab: HTN, left MCA stroke s/p thrombectomy in 2022 secondary to Afib with left atrial thrombus (on Xarelto), with residual aphasia and right sided weakness, HFrEF (LVEF 20-25% ), DM, dilated cardiomyopathy (2/2 to alcohol), alcohol use disorder, CKD, Graves disease, prior GI bleeding,  schizoaffective d/o,  Family/Caregiver Present: No  Prior to Session Communication: Bedside nurse  Patient Position Received: Bed, 3 rail up, Alarm on  General Comment: patient supine in bed on arrival, pleasant and agreeable to PT; tele, IV.  Home Living:  Home Living  Type of Home: House  Lives With: Spouse, Friends  Home Adaptive Equipment: None  Home Layout:  (pt reports 'oh yes' when asked about stairs, but was not able to state how many she had. pt reports 'lots of stairs' at home.)    Prior Level of Function:  Prior Function Per Pt/Caregiver Report  Level of [place]: Independent with ADLs and functional transfers, Independent with homemaking with ambulation  Prior Function Comments: pt reports that she enjoys watching tv, and she helps with household chores around the house. pt is a poor historian, but says that 'I don't get much help around the house'. Pt states she lives with  and a female friend. pt does not work. unclear on driving status.    Precautions:  Precautions  Hearing/Visual Limitations: hearing is WFL  Medical Precautions: Fall precautions, Neuro precautions  Precautions Comment: SBP <180 per RN    Vital Signs:  Vital Signs  Heart Rate:  (PRE: 101; DURIN; POST: 104)  Resp:  (PRE 21; POST: 20)  SpO2:  (PRE: 100: POST: 97%)  BP:  (PRE: 123/66; POST: 112/83)  BP Method: Automatic    Objective   Pain:  Pain Assessment  Pain Assessment: 0-10  Pain Score: 0 - No pain    Cognition:  Cognition  Orientation Level:  (oriented to self, oriented to place, not oriented to date/time (became frustrated/tearful when asked again). patient does have aphasia from prior CVA)  Following Commands: Follows one step commands with repetition  Processing Speed:  (pt required incr processing time and repeated cues.)    General Assessments:       Activity Tolerance  Endurance: Tolerates 10 - 20 min exercise with multiple rests  Early Mobility/Exercise Safety Screen: Proceed with mobilization - No  exclusion criteria met    Coordination  Movements are Fluid and Coordinated: No  Rapid Alternating Movements: Dysdiadokinesia  Heel to Miranda: Dysdiadokinesia  Coordination Comment: pt with impaired coordination in both UE (finger to nose), and lower extremity (heel to shin movement). pt tends to over correct for finger to nose, along with some dysmetria noted throughout.    Static Sitting Balance  Static Sitting-Comment/Number of Minutes: CGA  Dynamic Sitting Balance  Dynamic Sitting-Balance:  (CGA to Min A 2/2 impulsivity)    Static Standing Balance  Static Standing-Level of Assistance:  (Min A with FWW)  Dynamic Standing Balance  Dynamic Standing-Balance:  (Min A with FWW)  Functional Assessments:  Bed Mobility  Bed Mobility:  (Min A sit>supine, CGA supine>sit with cues for safety)    Transfers  Transfer:  (Min A sit<>stand with FWW)    Ambulation/Gait Training  Ambulation/Gait Training Performed:  (Min A with FWW x 15 ft, cues for safety and to assist with navigating FWW. Pt tends to take too large of steps, and requires constant cues for small steps and to keep LE within confines of walker. Min A during turns for safety.)  Extremity/Trunk Assessments:  RLE   RLE :  (ROM WFL; MMT hip flex 4/5, knee ext 4/5, ankle DF/PF 4-/5)  LLE   LLE :  (ROM WFL; MMT hip flex 4-/5, knee ext 4/5, ankle DF/PF 4/5)    Treatments:       Therapeutic Activity  Therapeutic Activity Performed: Yes  Therapeutic Activity 1: LAQ x 10, 2 set warm up prior to ambulation  Therapeutic Activity 2: Worked on standing balance, along with weight shifting. pt able to take forward/backward steps with cues from PT, to work on pre gait training prior to ambulation. Pt tends to be impulsive at times, and needed cuing to take time and work on slower movements for safety.       Outcome Measures:  [facility] Basic Mobility  Turning from your back to your side while in a flat bed without using bedrails: A little  Moving from lying on your back to sitting on the  side of a flat bed without using bedrails: A little  Moving to and from bed to chair (including a wheelchair): A little  Standing up from a chair using your arms (e.g. wheelchair or bedside chair): A lot  To walk in hospital room: A lot  Climbing 3-5 steps with railing: A lot  Basic Mobility - Total Score: 15    FSS-ICU  Ambulation: Walks <50 feet with any assistance x1 or walks any distance with assistance x2 people  Rolling: Supervision or set-up only  Sitting: Minimal assistance (performs 75% or more of task)  Transfer Sit-to-Stand: Minimal assistance (performs 75% or more of task)  Transfer Supine-to-Sit: Minimal assistance (performs 75% or more of task)  Total Score: 18      ICU Mobility Screen  Early Mobility/Exercise Safety Screen: Proceed with mobilization - No exclusion criteria met  E = Exercise and Early Mobility  Early Mobility/Exercise Safety Screen: Proceed with mobilization - No exclusion criteria met  Current Activity: Ambulating in room    Encounter Problems       Encounter Problems (Active)       PT Problem       indep in bed mobility        Start:  03/10/24    Expected End:  03/24/24            indep in sit<>stand with LRD       Start:  03/10/24    Expected End:  03/24/24            amb x 250 ft, with SBA and LRD, and no acute LOB       Start:  03/10/24    Expected End:  03/24/24            negotiate 1 flight of stairs with CGA, stable vitals, and no acute LOB       Start:  03/10/24    Expected End:  03/24/24                   Education Documentation  Mobility Training, taught by Emma Zapien, PT at 3/10/2024 12:34 PM.  Learner: Patient  Readiness: Acceptance  Method: Explanation  Response: Needs Reinforcement  Comment: safety during mobility    Education Comments  No comments found.

## 2024-03-10 NOTE — PROGRESS NOTES
Speech-Language Pathology        SLP Adult Inpatient Speech-Language Pathology Clinical Swallow Evaluation    Patient Name: Amirah Bnenett  MRN: [id number]  Today's Date: 3/10/2024   Time Calculation  Start Time: 1050  Stop Time: 1103  Time Calculation (min): 13 min       Recommendations:  Solid Diet Recommendations : Regular (IDDSI Level 7)  Liquid Diet Recommendations: Thin (IDDSI Level 0)  Frequent oral care  SLP evaluation for speech/language function    Assessment:   Clinical swallow evaluation completed. Pt demonstrating functional oral phase, and no s/s aspiration/pharyngeal dysphagia. Rec continue regular texture diet. Also rec SLP evaluation for speech/language function given mixed aphasia noted.     Plan:  SLP Plan: No skilled SLP  Discussed POC: Patient, Nursing, Physician  Discussed Risks/Benefits: Yes  Patient/Caregiver Agreeable: Yes  SLP - OK to Discharge: Yes      Subjective     History and Physical:    Amirah Bennett is a 43 y.o. 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 Neurology was consulted for concern of stroke recrudescence initially. MRI brain showed left cerebellar infarct. Was admitted to the [facility]U for suboccipital craniectomy watch     Relevant SLP Hx:  None on file     Baseline Assessment:  Respiratory Status: Room air  Behavior/Cognition: Alert, Cooperative (Recpetive and expressive aphasia noted (rec further eval by SLP))  Patient Positioning: Upright in Bed    Objective     Oral/Motor Assessment:  Oral Hygiene: WFL  Dentition: Adequate/Natural, Some Missing Teeth  Oral Motor: Within Functional Limits  Vocal Quality:  (Pt w/ open stoma, noted air escapte resulting in intermittent phonation during speech, but pt able to phonate.)      Clinical  Observations:  Consistencies Trialed: Yes  Consistencies Trialed: Thin (IDDSI Level 0) - Straw, Soft & bite sized/chopped (IDDSI Level 6), Regular (IDDSI Level 7)  3oz Water Protocol Utilized: Yes, pass    Pt received upright, consuming breakfast including Serbian toast and scrambled eggs. Noted impulsive rate with feeding. Mastication was timely and no oral residues observed post-swallow. Pt completed 3oz water assessment via straw sips =without s/s aspiration, no change in vitals, and maintaining clear vocal quality. No s/s aspiration observed across all trials. Pt denied perceived difficulty.      Inpatient Education:  Pt educated on result and recommendations. Pt indicated understanding.

## 2024-03-10 NOTE — PROGRESS NOTES
Occupational Therapy    Evaluation    Patient Name: Amirah Bennett  MRN: [id number]  Today's Date: 3/10/2024  Time Calculation  Start Time: 1117  Stop Time: 1132  Time Calculation (min): 15 min    Assessment  IP OT Assessment  OT Assessment: impaired ADLs/transfers  Prognosis: Good  End of Session Communication: Bedside nurse  End of Session Patient Position: Bed, 3 rail up, Alarm on  Plan:  Treatment Interventions: ADL retraining, Functional transfer training, UE strengthening/ROM, Endurance training, Cognitive reorientation, Patient/family training, Equipment evaluation/education, Neuromuscular reeducation, Compensatory technique education  OT Frequency: 4 times per week  OT Discharge Recommendations: High intensity level of continued care  OT Recommended Transfer Status: Minimal assist, Assist of 1  OT - OK to Discharge: Yes    Subjective   General:  General  Reason for Referral: p/w 3-4 days of N/V,  acute blurry vision, & worsening aphasia. Neurosurgery was consulted for SOC watch. CTH is notable for L MCA territory encephalomalacia o/w neg for acute intracranial pathology or ventriculomegaly, or effacement of ventricles. MRI brain is notable for diffuse acute left cerebellar diffusion hits otherwise no effacement of fourth ventricle  Past Medical History Relevant to Rehab: HTN, left MCA stroke s/p thrombectomy in 2022 secondary to Afib with left atrial thrombus (on Xarelto), with residual aphasia and right sided weakness, HFrEF (LVEF 20-25% ), DM, dilated cardiomyopathy (2/2 to alcohol), alcohol use disorder, CKD, Graves disease, prior GI bleeding, schizoaffective d/o  Family/Caregiver Present: No  Prior to Session Communication: Bedside nurse  Patient Position Received: Bed, 3 rail up, Alarm on  General Comment: patient supine in bed on arrival, pleasant and agreeable to OT; tele, IV  Precautions:  Hearing/Visual Limitations: hearing is WFL  Medical Precautions: Fall precautions, Neuro precautions  Precautions  "Comment: SBP <180 per RN  Vital Signs:  Heart Rate:  (pre 99, post 103)  SpO2:  (pre 99%, post 99%)  BP:  (pre 119/98, post 120/89)  Pain:  Pain Assessment  Pain Assessment: 0-10  Pain Score: 0 - No pain    Objective   Cognition:  Overall Cognitive Status:  (awake and alert)  Arousal/Alertness: Appropriate responses to stimuli  Orientation Level:  (oriented to self, oriented to place with choices, oriented to month, oriented to year with choices; patient does have aphasia from prior CVA)  Following Commands:  (follows one step commands ~90% with repetition)        [place] Agitation Sedation Scale  [place] Agitation Sedation Scale (RASS): Alert and calm  Home Living:  Type of Home: House  Lives With: Friends, Significant other  Home Adaptive Equipment: None   Prior Function:  Level of [place]: Independent with ADLs and functional transfers  ADL Assistance: Independent  Homemaking Assistance:  (reports sharing IADLs with boyfriend)  Ambulatory Assistance: Independent  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)  Leisure: \"having a good time\"  Hand Dominance: Right  Prior Function Comments: reports she drives     ADL:  Eating Assistance: Independent  Eating Deficit: Setup  Grooming Assistance: Stand by  Grooming Deficit:  (in sitting)  Bathing Assistance: Minimal  Bathing Deficit:  (anticipated)  UE Dressing Assistance: Stand by  UE Dressing Deficit:  (anticipated)  LE Dressing Assistance:  (CGA)  LE Dressing Deficit: Don/doff R sock, Don/doff L sock (sitting EOB)  Toileting Assistance with Device: Minimal  Toileting Deficit:  (anticipated)  Activity Tolerance:  Early Mobility/Exercise Safety Screen: Proceed with mobilization - No exclusion criteria met  Bed Mobility/Transfers: Bed Mobility  Bed Mobility: Yes  Bed Mobility 1  Bed Mobility 1: Supine to sitting, Sitting to supine  Level of Assistance 1: Contact guard, Minimal verbal cues  Bed Mobility Comments 1: HOB elevated    Transfers  Transfer: " "Yes  Transfer 1  Transfer From 1: Sit to, Stand to  Transfer to 1: Sit, Stand  Technique 1: Sit to stand, Stand to sit  Transfer Level of Assistance 1: Minimum assistance, Moderate verbal cues  Trials/Comments 1: arm in arm assist; patient unsteady in standing and when patient attempted a side step towards HOB patient with LOB posteriorly back on to bed requiring min-mod A to prevent full LOB    Sitting Balance:  Static Sitting Balance  Static Sitting-Level of Assistance: Close supervision  Dynamic Sitting Balance  Dynamic Sitting-Comments: CGA-SBA  Standing Balance:  Static Standing Balance  Static Standing-Level of Assistance: Minimum assistance  Dynamic Standing Balance  Dynamic Standing-Comments: min-mod A     Vision: Vision - Basic Assessment  Current Vision:  (patient states \"yes\" when asked if she wears glasses, able to correctly ID number of digits held up in front of patient in midline)  Sensation:  Light Touch:  (patient indicates feeling light touch to (B)UEs however difficult to fully assess 2/2 aphasia)  Strength:  Strength Comments: (B)  4/5, (R) elbow push/pull 4-5/, (L) elbow push/pull 4/5, (R) shoulder flexion 4-/5, (L) shoulder flexion 4/5     Coordination:  Coordination Comment: (B) thumb-digit opposition WFL      Extremities: JACQUIEE   RUE :  (AROM WFL) and FREDRICKE   LESLEY:  (AROM WFL)    Outcome Measures: [facility] Daily Activity  Putting on and taking off regular lower body clothing: A little  Bathing (including washing, rinsing, drying): A little  Putting on and taking off regular upper body clothing: A little  Toileting, which includes using toilet, bedpan or urinal: A little  Taking care of personal grooming such as brushing teeth: A little  Eating Meals: None  Daily Activity - Total Score: 19    , Confusion Assessment Method-ICU (CAM-ICU)  Feature 1: Acute Onset or Fluctuating Course: Positive  Feature 2: Inattention: Positive  Feature 3: Altered Level of Consciousness: Negative  Feature 4: " Disorganized Thinking: Positive  Overall CAM-ICU: Positive  , and E = Exercise and Early Mobility  Early Mobility/Exercise Safety Screen: Proceed with mobilization - No exclusion criteria met  Current Activity: Standing  Education Documentation  ADL Training, taught by Jennifer Saucedo OT at 3/10/2024 12:43 PM.  Learner: Patient  Readiness: Acceptance  Method: Explanation  Response: Needs Reinforcement    Education Comments  No comments found.      Goals:   Encounter Problems       Encounter Problems (Active)       ADLs       Patient with complete upper body dressing with independent level of assistance donning and doffing all UE clothes with no adaptive equipment. (Progressing)       Start:  03/10/24    Expected End:  03/24/24            Patient with complete lower body dressing with stand by assist level of assistance donning and doffing all LE clothes  with PRN adaptive equipment. (Progressing)       Start:  03/10/24    Expected End:  03/24/24            Patient will complete daily grooming tasks with stand by assist level of assistance and PRN adaptive equipment while standing. (Progressing)       Start:  03/10/24    Expected End:  03/24/24            Patient will complete toileting including hygiene clothing management/hygiene with supervision level of assistance. (Progressing)       Start:  03/10/24    Expected End:  03/24/24            Patient will perform IADLs/simulated household tasks with CGA, utilizing EC/WS principles as needed.O (Progressing)       Start:  03/10/24    Expected End:  03/24/24               BALANCE       Pt will maintain dynamic standing balance during ADL task with contact guard assist level of assistance in order to demonstrate decreased risk of falling and improved postural control. (Progressing)       Start:  03/10/24    Expected End:  03/24/24               COGNITION/SAFETY       Patient will follow 75% Two-step commands to allow improved ADL performance. (Progressing)       Start:   03/10/24    Expected End:  03/24/24            Patient will demonstrated orientation x 3 with use of external cues as needed. (Progressing)       Start:  03/10/24    Expected End:  03/24/24       ORIENTATION            EXERCISE/STRENGTHENING       Patient will complete RUE exercises in order to improve strength and activity tolerance for ADL performance.  (Progressing)       Start:  03/10/24    Expected End:  03/24/24               MOBILITY       Patient will perform Functional mobility Household distances/Community Distances with contact guard assist level of assistance and least restrictive device in order to improve safety and functional mobility. (Progressing)       Start:  03/10/24    Expected End:  03/24/24               TRANSFERS       Patient will perform bed mobility supervision level of assistance in order to improve safety and independence with mobility (Progressing)       Start:  03/10/24    Expected End:  03/24/24            Patient will complete functional transfers with least restrictive device with contact guard assist level of assistance. (Progressing)       Start:  03/10/24    Expected End:  03/24/24               Jennifer Saucedo OTR/L  Inpatient Occupational Therapist   Rehab Office: [phone]

## 2024-03-10 NOTE — PROGRESS NOTES
"Amirah Bennett is a 43 y.o. female on day 2 of admission presenting with Altered mental status, unspecified altered mental status type.    Subjective   naeo       Objective     Physical Exam  Awake, interactive  Ox3 delayed  Names 2/3, rep not intact  RUE 4+   LUE/BLE 5  Last Recorded Vitals  Blood pressure 113/78, pulse (!) 111, temperature 36.7 °C (98.1 °F), resp. rate (!) 8, height 1.702 m (5' 7\"), weight 83 kg (182 lb 15.7 oz), SpO2 100 %.  Intake/Output last 3 Shifts:  No intake/output data recorded.    Relevant Results           This patient currently has cardiac telemetry ordered; if you would like to modify or discontinue the telemetry order, click here to go to the orders activity to modify/discontinue the order.                 Assessment/Plan   Principal Problem:    Altered mental status, unspecified altered mental status type    Amirah Bennett is a 43 y.o. 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 Neurosurgery was consulted for SOC watch.     Imaging is personally reviewed and CTH is notable for L MCA territory encephalomalacia o/w neg for acute intracranial pathology or ventriculomegaly, or effacement of ventricles. MRI brain is notable for diffuse acute left cerebellar diffusion hits otherwise no effacement of fourth ventricle.  CTA is concerning for possible left vert thrombus.      Exam and imaging is not concerning for effacement or ventricles of ventriculomgally.  We will continue to monitor.    Hospital Course  3/10 CTH stable     Recommendation  - Stroke primary  - neurosurgery will continue to follow for possible SOC watch     Plan is not final until signed by " attending physician.           Lloyd Farooq MD

## 2024-03-10 NOTE — PROGRESS NOTES
"Amirah Bennett is a 43 y.o. female on day 2 of admission presenting with Altered mental status, unspecified altered mental status type.    Subjective   Patient was admitted to [facility] yesterday afternoon after was found to have a left cerebellar infarct for suboccipital craniectomy watch.        Objective     Last Recorded Vitals  Blood pressure (!) 134/104, pulse 95, temperature 36.7 °C (98.1 °F), resp. rate 15, height 1.702 m (5' 7\"), weight 83 kg (182 lb 15.7 oz), SpO2 99 %.    Physical Exam  Neurological Exam      Awake, following commands, no acute distress  Quiet resprations on room air  In afib,   Oriented to self, location and time. Attention and concentration normal. Moderate expressive and receptive aphasia  VF intact, EOM full, face symmetric  Moderate left upper extremity dysmetria, no dysmetria noticed in the LLE. Coordination intact on the right side  Normal bulk and tone  Strength preserved 5/5 throughout BUE and BLE in proximal and distal muscle groups  Sensation intact to light touch            Scheduled medications  atorvastatin, 80 mg, oral, Daily  digoxin, 250 mcg, oral, Daily  [Held by provider] empagliflozin, 10 mg, oral, Daily  folic acid, 1 mg, oral, Daily  furosemide, 40 mg, oral, Daily  gabapentin, 100 mg, oral, TID  insulin lispro, 0-10 Units, subcutaneous, q4h  melatonin, 3 mg, oral, Nightly  metoprolol succinate XL, 50 mg, oral, Daily  multivitamin with minerals, 1 tablet, oral, Daily  pantoprazole, 40 mg, oral, Daily before breakfast  polyethylene glycol, 17 g, oral, Daily  sacubitriL-valsartan, 1 tablet, oral, BID  spironolactone, 25 mg, oral, Daily  thiamine, 200 mg, intravenous, Daily      Continuous medications  dexmedeTOMIDine, 0.1-1.5 mcg/kg/hr, Last Rate: Stopped (03/09/24 4228)      PRN medications  PRN medications: acetaminophen, dextrose 10 % in water (D10W), dextrose, glucagon, hydrALAZINE **FOLLOWED BY** [START ON 3/11/2024] hydrALAZINE, labetaloL, lubricating eye drops, " magnesium sulfate, magnesium sulfate, ondansetron ODT **OR** ondansetron, oxygen, polyethylene glycol, potassium chloride CR **OR** potassium chloride, potassium chloride CR **OR** potassium chloride, QUEtiapine    Results for orders placed or performed during the hospital encounter of 03/08/24 (from the past 24 hour(s))   Transthoracic Echo (TTE) Limited   Result Value Ref Range    RVSP 33.0 mmHg   POCT GLUCOSE   Result Value Ref Range    POCT Glucose 103 (H) 74 - 99 mg/dL   Lipid Panel   Result Value Ref Range    Cholesterol 125 0 - 199 mg/dL    HDL-Cholesterol 17.6 mg/dL    Cholesterol/HDL Ratio 7.1     LDL Calculated 87 <=99 mg/dL    VLDL 20 0 - 40 mg/dL    Triglycerides 100 0 - 149 mg/dL    Non HDL Cholesterol 107 0 - 149 mg/dL   Hemoglobin A1C   Result Value Ref Range    Hemoglobin A1C 5.5 see below %    Estimated Average Glucose 111 Not Established mg/dL   B-Type Natriuretic Peptide   Result Value Ref Range     (H) 0 - 99 pg/mL   CBC and Auto Differential   Result Value Ref Range    WBC 8.0 4.4 - 11.3 x10*3/uL    nRBC 0.0 0.0 - 0.0 /100 WBCs    RBC 5.17 4.00 - 5.20 x10*6/uL    Hemoglobin 14.7 12.0 - 16.0 g/dL    Hematocrit 45.1 36.0 - 46.0 %    MCV 87 80 - 100 fL    MCH 28.4 26.0 - 34.0 pg    MCHC 32.6 32.0 - 36.0 g/dL    RDW 17.8 (H) 11.5 - 14.5 %    Platelets 160 150 - 450 x10*3/uL    Neutrophils % 41.6 40.0 - 80.0 %    Immature Granulocytes %, Automated 0.1 0.0 - 0.9 %    Lymphocytes % 47.0 13.0 - 44.0 %    Monocytes % 9.2 2.0 - 10.0 %    Eosinophils % 1.8 0.0 - 6.0 %    Basophils % 0.3 0.0 - 2.0 %    Neutrophils Absolute 3.31 1.20 - 7.70 x10*3/uL    Immature Granulocytes Absolute, Automated 0.01 0.00 - 0.70 x10*3/uL    Lymphocytes Absolute 3.74 1.20 - 4.80 x10*3/uL    Monocytes Absolute 0.73 0.10 - 1.00 x10*3/uL    Eosinophils Absolute 0.14 0.00 - 0.70 x10*3/uL    Basophils Absolute 0.02 0.00 - 0.10 x10*3/uL   Renal function panel   Result Value Ref Range    Glucose 88 74 - 99 mg/dL    Sodium 138  136 - 145 mmol/L    Potassium 3.5 3.5 - 5.3 mmol/L    Chloride 99 98 - 107 mmol/L    Bicarbonate 30 21 - 32 mmol/L    Anion Gap 13 10 - 20 mmol/L    Urea Nitrogen 14 6 - 23 mg/dL    Creatinine 1.03 0.50 - 1.05 mg/dL    eGFR 68 >60 mL/min/1.73m*2    Calcium 8.8 8.6 - 10.6 mg/dL    Phosphorus 3.2 2.5 - 4.9 mg/dL    Albumin 3.3 (L) 3.4 - 5.0 g/dL   Magnesium   Result Value Ref Range    Magnesium 1.35 (L) 1.60 - 2.40 mg/dL   POCT GLUCOSE   Result Value Ref Range    POCT Glucose 95 74 - 99 mg/dL   POCT GLUCOSE   Result Value Ref Range    POCT Glucose 87 74 - 99 mg/dL     MR brain wo IV contrast 03/09/2024    Narrative  Interpreted By:  Kali Win and Jiang Sirui  STUDY:  MR BRAIN WO IV CONTRAST;  3/9/2024 12:08 pm    INDICATION:  Signs/Symptoms:New FND.    COMPARISON:  CT head 03/08/2024    ACCESSION NUMBER(S):  [id number]    ORDERING CLINICIAN:  TOPHER VILLA    TECHNIQUE:  Axial T2, FLAIR, DWI, gradient echo T2 and sagittal and coronal T1  weighted images of the brain were acquired.    FINDINGS:  Evaluation is degraded secondary to motion.    CSF Spaces: There is dilatation of the ventricles at least in part  due to global parenchymal volume loss. There is ex vacuo dilatation  of the left lateral ventricle. The basal cisterns are patent. No  abnormal extra-axial collection.    Parenchyma: The inferior medial left cerebellum has acute/subacute  ischemia with diffusion restriction.    There is encephalomalacia and gliosis of the left MCA territory  sequela of prior stroke. There is a background of confluence T2 and  FLAIR hyperintense signal of the subcortical and periventricular  white matter which is nonspecific. No evidence of intracranial  hemorrhage. There is no mass effect or midline shift.    Paranasal Sinuses and Mastoids: Visualized mastoid air cells and  paranasal sinuses are clear.    The orbits are unremarkable. The visualized flow voids are patent.    Impression  1. The inferior left cerebellum has acute  ischemia with restricted  diffusion. No hemorrhage is noted.  2. Left-sided encephalomalacia and gliosis compatible with sequela of  prior left MCA stroke.  3. [place] periventricular and subcortical white matter changes  which is nonspecific may be seen with chronic small vessel ischemic  disease, migraine headaches, demyelinating processes, vasculitis,  among others.    I personally reviewed the image(s) / study and I agree with the  findings as stated by Lesli Davis MD. This study was interpreted at  [facility], [place], [place].    MACRO:  None.    Signed by: Kali Win 3/9/2024 12:50 PM  Dictation workstation:   [id number]    Assessment/Plan     Amirah Bennett is a 43 y.o. 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 Neurology was consulted for concern of stroke recrudescence initially. MRI brain showed left cerebellar infarct. Was admitted to the NSU for suboccipital craniectomy watch      #L cerebellar infarct  # Afib on Xarelto with poor adherence  - Mental status watch while in the NSU for  suboccipital craniectomy watch.   - Can restart AC within 1 week from today (3/16/24)  - HR control  - PT/OT  - Rest of care per NSU team      Yaniv Lujan MD  Neurology Resident PGY-2          Yaniv Lujan MD

## 2024-03-11 ENCOUNTER — APPOINTMENT (OUTPATIENT)
Dept: CARDIOLOGY | Facility: HOSPITAL | Age: 44
DRG: 065 | End: 2024-03-11
Payer: COMMERCIAL

## 2024-03-11 LAB
ALBUMIN SERPL BCP-MCNC: 3.1 G/DL (ref 3.4–5)
ANION GAP SERPL CALC-SCNC: 14 MMOL/L (ref 10–20)
BASOPHILS # BLD AUTO: 0.03 X10*3/UL (ref 0–0.1)
BASOPHILS NFR BLD AUTO: 0.5 %
BUN SERPL-MCNC: 13 MG/DL (ref 6–23)
CALCIUM SERPL-MCNC: 8.1 MG/DL (ref 8.6–10.6)
CHLORIDE SERPL-SCNC: 98 MMOL/L (ref 98–107)
CO2 SERPL-SCNC: 28 MMOL/L (ref 21–32)
CREAT SERPL-MCNC: 0.83 MG/DL (ref 0.5–1.05)
DIGOXIN SERPL-MCNC: 0.84 NG/ML (ref 0.8–?)
EGFRCR SERPLBLD CKD-EPI 2021: 89 ML/MIN/1.73M*2
EOSINOPHIL # BLD AUTO: 0.13 X10*3/UL (ref 0–0.7)
EOSINOPHIL NFR BLD AUTO: 2 %
ERYTHROCYTE [DISTWIDTH] IN BLOOD BY AUTOMATED COUNT: 17.6 % (ref 11.5–14.5)
FLUAV RNA RESP QL NAA+PROBE: NOT DETECTED
FLUBV RNA RESP QL NAA+PROBE: NOT DETECTED
GLUCOSE BLD MANUAL STRIP-MCNC: 106 MG/DL (ref 74–99)
GLUCOSE BLD MANUAL STRIP-MCNC: 113 MG/DL (ref 74–99)
GLUCOSE BLD MANUAL STRIP-MCNC: 125 MG/DL (ref 74–99)
GLUCOSE BLD MANUAL STRIP-MCNC: 126 MG/DL (ref 74–99)
GLUCOSE BLD MANUAL STRIP-MCNC: 89 MG/DL (ref 74–99)
GLUCOSE BLD MANUAL STRIP-MCNC: 97 MG/DL (ref 74–99)
GLUCOSE BLD MANUAL STRIP-MCNC: 98 MG/DL (ref 74–99)
GLUCOSE SERPL-MCNC: 109 MG/DL (ref 74–99)
HCT VFR BLD AUTO: 43.7 % (ref 36–46)
HGB BLD-MCNC: 13.2 G/DL (ref 12–16)
IMM GRANULOCYTES # BLD AUTO: 0.01 X10*3/UL (ref 0–0.7)
IMM GRANULOCYTES NFR BLD AUTO: 0.2 % (ref 0–0.9)
LYMPHOCYTES # BLD AUTO: 2.79 X10*3/UL (ref 1.2–4.8)
LYMPHOCYTES NFR BLD AUTO: 42.3 %
MAGNESIUM SERPL-MCNC: 1.44 MG/DL (ref 1.6–2.4)
MCH RBC QN AUTO: 28.5 PG (ref 26–34)
MCHC RBC AUTO-ENTMCNC: 30.2 G/DL (ref 32–36)
MCV RBC AUTO: 94 FL (ref 80–100)
MONOCYTES # BLD AUTO: 0.8 X10*3/UL (ref 0.1–1)
MONOCYTES NFR BLD AUTO: 12.1 %
NEUTROPHILS # BLD AUTO: 2.83 X10*3/UL (ref 1.2–7.7)
NEUTROPHILS NFR BLD AUTO: 42.9 %
NRBC BLD-RTO: 0 /100 WBCS (ref 0–0)
PHOSPHATE SERPL-MCNC: 4 MG/DL (ref 2.5–4.9)
PLATELET # BLD AUTO: 132 X10*3/UL (ref 150–450)
POTASSIUM SERPL-SCNC: 4 MMOL/L (ref 3.5–5.3)
RBC # BLD AUTO: 4.63 X10*6/UL (ref 4–5.2)
SODIUM SERPL-SCNC: 136 MMOL/L (ref 136–145)
WBC # BLD AUTO: 6.6 X10*3/UL (ref 4.4–11.3)

## 2024-03-11 PROCEDURE — 97535 SELF CARE MNGMENT TRAINING: CPT | Mod: GO

## 2024-03-11 PROCEDURE — 2500000004 HC RX 250 GENERAL PHARMACY W/ HCPCS (ALT 636 FOR OP/ED)

## 2024-03-11 PROCEDURE — 2500000001 HC RX 250 WO HCPCS SELF ADMINISTERED DRUGS (ALT 637 FOR MEDICARE OP)

## 2024-03-11 PROCEDURE — 99233 SBSQ HOSP IP/OBS HIGH 50: CPT

## 2024-03-11 PROCEDURE — 36415 COLL VENOUS BLD VENIPUNCTURE: CPT

## 2024-03-11 PROCEDURE — 80069 RENAL FUNCTION PANEL: CPT

## 2024-03-11 PROCEDURE — 2500000002 HC RX 250 W HCPCS SELF ADMINISTERED DRUGS (ALT 637 FOR MEDICARE OP, ALT 636 FOR OP/ED)

## 2024-03-11 PROCEDURE — 2500000005 HC RX 250 GENERAL PHARMACY W/O HCPCS

## 2024-03-11 PROCEDURE — 1200000002 HC GENERAL ROOM WITH TELEMETRY DAILY

## 2024-03-11 PROCEDURE — 92523 SPEECH SOUND LANG COMPREHEN: CPT | Mod: GN

## 2024-03-11 PROCEDURE — 80162 ASSAY OF DIGOXIN TOTAL: CPT

## 2024-03-11 PROCEDURE — 82947 ASSAY GLUCOSE BLOOD QUANT: CPT

## 2024-03-11 PROCEDURE — 93005 ELECTROCARDIOGRAM TRACING: CPT

## 2024-03-11 PROCEDURE — 85025 COMPLETE CBC W/AUTO DIFF WBC: CPT

## 2024-03-11 PROCEDURE — 97530 THERAPEUTIC ACTIVITIES: CPT | Mod: GO

## 2024-03-11 PROCEDURE — 83735 ASSAY OF MAGNESIUM: CPT

## 2024-03-11 RX ORDER — ENOXAPARIN SODIUM 100 MG/ML
40 INJECTION SUBCUTANEOUS EVERY 24 HOURS
Status: DISCONTINUED | OUTPATIENT
Start: 2024-03-11 | End: 2024-03-14 | Stop reason: HOSPADM

## 2024-03-11 RX ORDER — ONDANSETRON HYDROCHLORIDE 2 MG/ML
8 INJECTION, SOLUTION INTRAVENOUS ONCE
Status: DISCONTINUED | OUTPATIENT
Start: 2024-03-11 | End: 2024-03-11

## 2024-03-11 RX ORDER — DIPHENHYDRAMINE HYDROCHLORIDE 50 MG/ML
25 INJECTION INTRAMUSCULAR; INTRAVENOUS ONCE
Status: DISCONTINUED | OUTPATIENT
Start: 2024-03-11 | End: 2024-03-11

## 2024-03-11 RX ADMIN — SACUBITRIL AND VALSARTAN 1 TABLET: 24; 26 TABLET, FILM COATED ORAL at 09:33

## 2024-03-11 RX ADMIN — THIAMINE HYDROCHLORIDE 200 MG: 100 INJECTION, SOLUTION INTRAMUSCULAR; INTRAVENOUS at 09:33

## 2024-03-11 RX ADMIN — SACUBITRIL AND VALSARTAN 1 TABLET: 24; 26 TABLET, FILM COATED ORAL at 20:03

## 2024-03-11 RX ADMIN — Medication 1 TABLET: at 06:51

## 2024-03-11 RX ADMIN — GABAPENTIN 100 MG: 100 CAPSULE ORAL at 09:33

## 2024-03-11 RX ADMIN — PANTOPRAZOLE SODIUM 40 MG: 40 TABLET, DELAYED RELEASE ORAL at 06:51

## 2024-03-11 RX ADMIN — FOLIC ACID 1 MG: 1 TABLET ORAL at 09:33

## 2024-03-11 RX ADMIN — SPIRONOLACTONE 25 MG: 25 TABLET, FILM COATED ORAL at 06:51

## 2024-03-11 RX ADMIN — FUROSEMIDE 40 MG: 40 TABLET ORAL at 09:33

## 2024-03-11 RX ADMIN — GABAPENTIN 100 MG: 100 CAPSULE ORAL at 20:03

## 2024-03-11 RX ADMIN — POLYETHYLENE GLYCOL 3350 17 G: 17 POWDER, FOR SOLUTION ORAL at 09:33

## 2024-03-11 RX ADMIN — ENOXAPARIN SODIUM 40 MG: 100 INJECTION SUBCUTANEOUS at 11:14

## 2024-03-11 RX ADMIN — DIGOXIN 250 MCG: 125 TABLET ORAL at 09:33

## 2024-03-11 RX ADMIN — MAGNESIUM SULFATE IN WATER 4 G: 40 INJECTION, SOLUTION INTRAVENOUS at 03:03

## 2024-03-11 RX ADMIN — MELATONIN 3 MG: 3 TAB ORAL at 20:03

## 2024-03-11 RX ADMIN — ONDANSETRON 4 MG: 4 TABLET, ORALLY DISINTEGRATING ORAL at 09:33

## 2024-03-11 RX ADMIN — ACETAMINOPHEN 650 MG: 325 TABLET ORAL at 20:03

## 2024-03-11 RX ADMIN — GABAPENTIN 100 MG: 100 CAPSULE ORAL at 16:24

## 2024-03-11 RX ADMIN — METOPROLOL SUCCINATE 50 MG: 50 TABLET, EXTENDED RELEASE ORAL at 09:33

## 2024-03-11 RX ADMIN — ATORVASTATIN CALCIUM 80 MG: 80 TABLET, FILM COATED ORAL at 09:33

## 2024-03-11 ASSESSMENT — PAIN - FUNCTIONAL ASSESSMENT
PAIN_FUNCTIONAL_ASSESSMENT: 0-10

## 2024-03-11 ASSESSMENT — PAIN SCALES - GENERAL
PAINLEVEL_OUTOF10: 0 - NO PAIN

## 2024-03-11 ASSESSMENT — ACTIVITIES OF DAILY LIVING (ADL)
LACK_OF_TRANSPORTATION: NO
HOME_MANAGEMENT_TIME_ENTRY: 13

## 2024-03-11 ASSESSMENT — COGNITIVE AND FUNCTIONAL STATUS - GENERAL
DRESSING REGULAR LOWER BODY CLOTHING: A LITTLE
TOILETING: A LITTLE
HELP NEEDED FOR BATHING: A LITTLE
DRESSING REGULAR UPPER BODY CLOTHING: A LITTLE
DAILY ACTIVITIY SCORE: 19
PERSONAL GROOMING: A LITTLE

## 2024-03-11 NOTE — PROGRESS NOTES
Occupational Therapy    Occupational Therapy Treatment    Name: Amirah Bennett  MRN: [id number]  : 1980  Date: 24  Time Calculation  Start Time: 1704  Stop Time: 1730  Time Calculation (min): 26 min    Assessment:  Prognosis: Good  Evaluation/Treatment Tolerance: Patient tolerated treatment well  Medical Staff Made Aware: Yes  End of Session Communication: Bedside nurse  End of Session Patient Position: Bed, 3 rail up, Alarm on  Plan:  Treatment Interventions: ADL retraining, Functional transfer training, UE strengthening/ROM, Endurance training, Cognitive reorientation, Patient/family training, Equipment evaluation/education, Neuromuscular reeducation, Compensatory technique education  OT Frequency: 4 times per week  OT Discharge Recommendations: High intensity level of continued care  OT Recommended Transfer Status: Minimal assist, Assist of 1  OT - OK to Discharge: Yes    Subjective   General:  OT Last Visit  OT Received On: 24  Prior to Session Communication: Bedside nurse  Patient Position Received: Bed, 3 rail up, Alarm on  General Comment: Pt pleasant and agreeable to therapy. Pt emotionally labile but motivated for therapy.   Precautions:  Hearing/Visual Limitations: hearing is WFL  Medical Precautions: Fall precautions  Precautions Comment: \  Vitals:     Lines/Tubes/Drains:       Cognition:  Overall Cognitive Status: Impaired  Arousal/Alertness: Appropriate responses to stimuli  Orientation Level:  (oriented to self, hospital, month, and year with prompt)  Following Commands:  (Pt followed all 1-2 step commands appropriately)  Cognition Comments: Pt impulsive, cues for safety during session. Pt emotionally labile, initially tearful during session but then laughing and joking with therapist. Word finding deficits noted.  Insight: Mild  Impulsive: Mildly  Processing Speed: Delayed    Pain Assessment:  Pain Assessment  Pain Assessment: 0-10  Pain Score: 0 - No pain     Objective   Activities  of Daily Living:        Grooming  Grooming Level of Assistance: Contact guard  Grooming Where Assessed: Standing sinkside  Grooming Comments: Pt completed oral hygiene management in standing at sink with CGA.                   LE Dressing  LE Dressing: Yes  Pants Level of Assistance: Contact guard  Sock Level of Assistance: Contact guard  LE Dressing Where Assessed: Edge of bed  LE Dressing Comments: Pt doffed/donned socks at EOB with CGA-SBA. Pt donned pants with CGA at EOB with cues for safety and sequencing.         Functional Standing Tolerance:     Bed Mobility/Transfers:     Bed Mobility  Bed Mobility: Yes  Bed Mobility 1  Bed Mobility 1: Supine to sitting, Sitting to supine  Level of Assistance 1: Contact guard  Bed Mobility Comments 1: HOB elevated, cues for safety    Transfers  Transfer: Yes  Transfer 1  Technique 1: Sit to stand, Stand to sit  Transfer Device 1: Walker, Gait belt  Transfer Level of Assistance 1: Minimum assistance  Trials/Comments 1: cues for hand placement and sequencing                      Balance:  Dynamic Sitting Balance  Dynamic Sitting-Comments: CGA-SBA  Dynamic Standing Balance  Dynamic Standing-Comments: CGA-Min A at FWW  Static Sitting Balance  Static Sitting-Level of Assistance: Close supervision  Static Standing Balance  Static Standing-Level of Assistance: Contact guard (at FWW)  Communication:     Splinting:     Therapy/Activity:      Therapeutic Activity  Therapeutic Activity Performed: Yes  Therapeutic Activity 1: Pt completed dynamic standing at FWW with coordination task in standing with pt completing with CGA.     Sensory:     Cognitive Skill Development:     Vision:     Strength:     Other Activity:       Outcome Measures:  [facility] Daily Activity  Putting on and taking off regular lower body clothing: A little  Bathing (including washing, rinsing, drying): A little  Putting on and taking off regular upper body clothing: A little  Toileting, which includes using toilet,  bedpan or urinal: A little  Taking care of personal grooming such as brushing teeth: A little  Eating Meals: None  Daily Activity - Total Score: 19     Education Documentation  ADL Training, taught by Loreta Corbin OT at 3/11/2024  5:40 PM.  Learner: Patient  Readiness: Acceptance  Method: Explanation, Demonstration  Response: Verbalizes Understanding, Needs Reinforcement    Education Comments  No comments found.        Goals:  Encounter Problems       Encounter Problems (Active)       ADLs       Patient with complete upper body dressing with independent level of assistance donning and doffing all UE clothes with no adaptive equipment. (Progressing)       Start:  03/10/24    Expected End:  03/24/24            Patient with complete lower body dressing with stand by assist level of assistance donning and doffing all LE clothes  with PRN adaptive equipment. (Progressing)       Start:  03/10/24    Expected End:  03/24/24            Patient will complete daily grooming tasks with stand by assist level of assistance and PRN adaptive equipment while standing. (Progressing)       Start:  03/10/24    Expected End:  03/24/24            Patient will complete toileting including hygiene clothing management/hygiene with supervision level of assistance. (Progressing)       Start:  03/10/24    Expected End:  03/24/24            Patient will perform IADLs/simulated household tasks with CGA, utilizing EC/WS principles as needed.O (Progressing)       Start:  03/10/24    Expected End:  03/24/24               BALANCE       Pt will maintain dynamic standing balance during ADL task with contact guard assist level of assistance in order to demonstrate decreased risk of falling and improved postural control. (Progressing)       Start:  03/10/24    Expected End:  03/24/24               COGNITION/SAFETY       Patient will follow 75% Two-step commands to allow improved ADL performance. (Progressing)       Start:  03/10/24    Expected End:   03/24/24            Patient will demonstrated orientation x 3 with use of external cues as needed. (Progressing)       Start:  03/10/24    Expected End:  03/24/24       ORIENTATION            EXERCISE/STRENGTHENING       Patient will complete RUE exercises in order to improve strength and activity tolerance for ADL performance.  (Progressing)       Start:  03/10/24    Expected End:  03/24/24               MOBILITY       Patient will perform Functional mobility Household distances/Community Distances with contact guard assist level of assistance and least restrictive device in order to improve safety and functional mobility. (Progressing)       Start:  03/10/24    Expected End:  03/24/24               TRANSFERS       Patient will perform bed mobility supervision level of assistance in order to improve safety and independence with mobility (Progressing)       Start:  03/10/24    Expected End:  03/24/24            Patient will complete functional transfers with least restrictive device with contact guard assist level of assistance. (Progressing)       Start:  03/10/24    Expected End:  03/24/24 03/11/24 at 5:41 PM   Loreta Corbin, OT   [phone]

## 2024-03-11 NOTE — PROGRESS NOTES
03/11/24        Transitional Care Coordination Progress Note:   Patient discussed during interdisciplinary rounds.   Team members present: RN TAMIR CAMACHO MD   Plan per Medical/Surgical team: adm dx Altered mental status, unspecified altered mental status type   Discharge disposition: awaiting pt/ot recs   Status-Inpatient   Payer-  [facility]   Potential Barriers: None   ADOD: 3/13/2024   Colton Vaca RN [facility] [phone]    Patient is currently Precerting with  kip

## 2024-03-11 NOTE — HOSPITAL COURSE
Amirah Bennett is a 43 y.o. 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 Neurology was consulted for concern of stroke recrudescence initially. MRI brain showed left cerebellar infarct. Was admitted to the NSU for suboccipital craniectomy/EVD watch. Patient was downgraded to Stroke service on 3/11. ENT was consulted to assess patients trach site as patients speech was altered due to air escape and mucous discharge from trach site. They gave patient instructions o apply pressure while speaking and  decided to follow her output for closure. Patient was passed for a diet. She was discharge to acute rehab on 3/14.      Medication changes:   Restart Xarelto 20mg on 3/16:    Follow up:   - Stroke with Dr. Jaimes  - Cardiology for management of your irregular heart rhythm with Dr. Umaña  - PCP - for management of your diabetes, and heart failure  - Gynecology: follow up for R simple cyst of ovary  -ENT for tracheal site fistula closure with Dr King

## 2024-03-11 NOTE — PROGRESS NOTES
Amirah Bennett is a 43 y.o. female on day 3 of admission presenting with Altered mental status, unspecified altered mental status type.    Subjective/Overnight Events:   Neurosurgery signed off  Objective:       24 Hour Vitals  Temp:  [36.3 °C (97.3 °F)-36.9 °C (98.4 °F)] 36.5 °C (97.7 °F)  Heart Rate:  [] 80  Resp:  [14-23] 16  BP: (102-152)/() 127/99   24 hour Intake/Output    Intake/Output Summary (Last 24 hours) at 3/11/2024 0716  Last data filed at 3/11/2024 0700  Gross per 24 hour   Intake 1320 ml   Output 1400 ml   Net -80 ml       Last BM:         Physical Exam   NEURO:  Alert, oriented to person, place, year (stated month as January), follows simple commands, difficulty with complex commands. Errors with repetition, naming intact.  EOS, PERRL, EOMI, Possible RHH/neglect, limited exam.   Exam limited due to distress 2/2 nausea  KING 5/5 strength, no drift, no dysmmetria  CV:  RRR on telemetry, NSR  RESP:  Regular, unlabored  Oxygen: room air  :  No catheter in place  GI:  Abdomen NT/ND, soft  SKIN:  Intact    Labs  Results from last 72 hours   Lab Units 03/11/24  0112 03/10/24  0120 03/09/24  0641   WBC AUTO x10*3/uL 6.6 8.0 9.4   HEMOGLOBIN g/dL 13.2 14.7 13.9   HEMATOCRIT % 43.7 45.1 41.0   PLATELETS AUTO x10*3/uL 132* 160 150        Results from last 72 hours   Lab Units 03/11/24  0112 03/10/24  0120 03/09/24  0641   SODIUM mmol/L 136 138 137   POTASSIUM mmol/L 4.0 3.5 3.8   CHLORIDE mmol/L 98 99 101   CO2 mmol/L 28 30 27   BUN mg/dL 13 14 16   CREATININE mg/dL 0.83 1.03 0.98   MAGNESIUM mg/dL 1.44* 1.35* 1.76   PHOSPHORUS mg/dL 4.0 3.2 3.5           Medications   Scheduled Medications  atorvastatin, 80 mg, oral, Daily  digoxin, 250 mcg, oral, Daily  [Held by provider] empagliflozin, 10 mg, oral, Daily  folic acid, 1 mg, oral, Daily  furosemide, 40 mg, oral, Daily  gabapentin, 100 mg, oral, TID  insulin lispro, 0-10 Units, subcutaneous, q4h  melatonin, 3 mg, oral, Nightly  metoprolol succinate  XL, 50 mg, oral, Daily  multivitamin with minerals, 1 tablet, oral, Daily  pantoprazole, 40 mg, oral, Daily before breakfast  polyethylene glycol, 17 g, oral, Daily  sacubitriL-valsartan, 1 tablet, oral, BID  spironolactone, 25 mg, oral, Daily  thiamine, 200 mg, intravenous, Daily     Continuous Medications    PRN Medications  PRN medications: acetaminophen, dextrose 10 % in water (D10W), dextrose, glucagon, hydrALAZINE **FOLLOWED BY** hydrALAZINE, labetaloL, lubricating eye drops, magnesium sulfate, magnesium sulfate, ondansetron ODT **OR** ondansetron, oxygen, polyethylene glycol, potassium chloride CR **OR** potassium chloride, potassium chloride CR **OR** potassium chloride, QUEtiapine        Assessment/Plan   Amirah Bennett is a 43 y.o. 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 Neurology was consulted for concern of stroke recrudescence initially. MRI brain showed left cerebellar infarct. Was admitted to the NSU for suboccipital craniectomy watch. Neurosurgery signed off as does not need SOC/EVD watch anymore. Stroke will continue to follow.     Type:  Intracerebral Hemorrhage,   Subtype: Type 2 - cardioembolism  Vessels Involved: L. Vert thrombus on CTA  Neurologic Manifestations:  Aphasia,   Vascular Risk Factors: HTN, DM, Dyslipidemia, etc. (please specify here LDL, Hba1c, TTE).     #Hx L MCA ischemic stroke  #Acute L cerebellar ischemic infarct  #Ischemic stroke  :: HbA1c 5.5, LDL 87,   - Hold anticoagulation for 1 week, resume 3/16 (apixaban 5mg BID)  -Resume aspirin once out of window for SOC   -PT/OT/SLP     #HFrEF (EF 20-25% 8/22)  #Afib  ::no signs of acute decompensation at this time  ::follows with Dr Umaña outpatient  -Atorvastatin 80mg every day   -Digoxin  250mg every day  -Lasix 40mg every day   -Metoprolol Succinate 50mg every day   -Xarelto 20mg every day  - hold  -Spironolactone 25mg every day     #Hx schizophrenia vs schizoaffective d/o   -Psych following  - Seroquel 12.5mg q8hr PRN agitation  - Melatonin 3mg at bedtime    F: prn with caution  E: prn  N: Cardiac  A: PIV     GI: pantoprazole  DVT PPX: subcutaneous lovenox     CODE STATUS: FULL CODE  NOK: Navin Sanches () [phone]    Tonia Mendoza MD  Department of Neurology, PGY-2

## 2024-03-11 NOTE — PROGRESS NOTES
SUBJECTIVE  Per chart review, patient has not required PRN medications for anxiety/delirium. She received scheduled melatonin.    On interview, patient was somnolent and only opened eyes to name, with minimal speech production. She denies getting good sleep. She endorsed having AH but would not further elaborate before falling asleep. She did not answer orientation questions.    OBJECTIVE    VITALS      3/11/2024     4:00 AM 3/11/2024     4:31 AM 3/11/2024     5:00 AM 3/11/2024     6:00 AM 3/11/2024     7:00 AM 3/11/2024     8:00 AM 3/11/2024     9:00 AM   Vitals   Systolic 123 127   117 118 123   Diastolic 92 99   100 83 107   Heart Rate 78 93 80 86 91 79 100   Temp 36.5 °C (97.7 °F)         Resp 21 23 16 17 19 16 18      MENTAL STATUS EXAM  Appearance: 44 yo F, with fair grooming and hygiene, trimmed short black hair, curled up on side, covered in blankets   Attitude: Calm but disengaged  Behavior: Only opened eyes to name  Motor Activity: No gross PMR/PMA  Speech: Minimal speech production, previously noted to be dysarthric with mixed aphasia. Communicated through shaking/nodding head  Mood: Unable to assess  Affect: Somnolent, blunted, non-labile  Thought Process: limited assessment d/t somnolence  Thought Content: Poor sleep  Thought Perception: Endorses AH, not responding to internal simuli  Cognition: somnolent, difficulty sustaining wakefulness  Insight: impaired/somnolent  Judgment: impaired/somnolent    CURRENT MEDICATIONS  Scheduled medications  atorvastatin, 80 mg, oral, Daily  digoxin, 250 mcg, oral, Daily  [Held by provider] empagliflozin, 10 mg, oral, Daily  folic acid, 1 mg, oral, Daily  furosemide, 40 mg, oral, Daily  gabapentin, 100 mg, oral, TID  insulin lispro, 0-10 Units, subcutaneous, q4h  melatonin, 3 mg, oral, Nightly  metoprolol succinate XL, 50 mg, oral, Daily  multivitamin with minerals, 1 tablet, oral, Daily  pantoprazole, 40 mg, oral, Daily before breakfast  polyethylene glycol, 17 g,  oral, Daily  sacubitriL-valsartan, 1 tablet, oral, BID  spironolactone, 25 mg, oral, Daily        Continuous medications       PRN medications  PRN medications: acetaminophen, dextrose 10 % in water (D10W), dextrose, glucagon, hydrALAZINE **FOLLOWED BY** hydrALAZINE, labetaloL, lubricating eye drops, magnesium sulfate, magnesium sulfate, ondansetron ODT **OR** ondansetron, oxygen, polyethylene glycol, potassium chloride CR **OR** potassium chloride, potassium chloride CR **OR** potassium chloride, QUEtiapine     LABS  Results for orders placed or performed during the hospital encounter of 03/08/24 (from the past 24 hour(s))   POCT GLUCOSE   Result Value Ref Range    POCT Glucose 102 (H) 74 - 99 mg/dL   POCT GLUCOSE   Result Value Ref Range    POCT Glucose 106 (H) 74 - 99 mg/dL   CBC and Auto Differential   Result Value Ref Range    WBC 6.6 4.4 - 11.3 x10*3/uL    nRBC 0.0 0.0 - 0.0 /100 WBCs    RBC 4.63 4.00 - 5.20 x10*6/uL    Hemoglobin 13.2 12.0 - 16.0 g/dL    Hematocrit 43.7 36.0 - 46.0 %    MCV 94 80 - 100 fL    MCH 28.5 26.0 - 34.0 pg    MCHC 30.2 (L) 32.0 - 36.0 g/dL    RDW 17.6 (H) 11.5 - 14.5 %    Platelets 132 (L) 150 - 450 x10*3/uL    Neutrophils % 42.9 40.0 - 80.0 %    Immature Granulocytes %, Automated 0.2 0.0 - 0.9 %    Lymphocytes % 42.3 13.0 - 44.0 %    Monocytes % 12.1 2.0 - 10.0 %    Eosinophils % 2.0 0.0 - 6.0 %    Basophils % 0.5 0.0 - 2.0 %    Neutrophils Absolute 2.83 1.20 - 7.70 x10*3/uL    Immature Granulocytes Absolute, Automated 0.01 0.00 - 0.70 x10*3/uL    Lymphocytes Absolute 2.79 1.20 - 4.80 x10*3/uL    Monocytes Absolute 0.80 0.10 - 1.00 x10*3/uL    Eosinophils Absolute 0.13 0.00 - 0.70 x10*3/uL    Basophils Absolute 0.03 0.00 - 0.10 x10*3/uL   Renal function panel   Result Value Ref Range    Glucose 109 (H) 74 - 99 mg/dL    Sodium 136 136 - 145 mmol/L    Potassium 4.0 3.5 - 5.3 mmol/L    Chloride 98 98 - 107 mmol/L    Bicarbonate 28 21 - 32 mmol/L    Anion Gap 14 10 - 20 mmol/L    Urea  Nitrogen 13 6 - 23 mg/dL    Creatinine 0.83 0.50 - 1.05 mg/dL    eGFR 89 >60 mL/min/1.73m*2    Calcium 8.1 (L) 8.6 - 10.6 mg/dL    Phosphorus 4.0 2.5 - 4.9 mg/dL    Albumin 3.1 (L) 3.4 - 5.0 g/dL   Magnesium   Result Value Ref Range    Magnesium 1.44 (L) 1.60 - 2.40 mg/dL   POCT GLUCOSE   Result Value Ref Range    POCT Glucose 89 74 - 99 mg/dL   POCT GLUCOSE   Result Value Ref Range    POCT Glucose 98 74 - 99 mg/dL        IMAGING  CT head wo IV contrast    Result Date: 3/10/2024  Interpreted By:  Osbaldo Lassiter,  and Ishmael Daniel STUDY: CT HEAD WO IV CONTRAST;  3/10/2024 4:02 am   INDICATION: Signs/Symptoms:fu cerebellar stroke.   COMPARISON: CT head 03/09/2024 and MR brain 03/09/2024   ACCESSION NUMBER(S): [id number]   ORDERING CLINICIAN: UMU LISA   TECHNIQUE: Noncontrast axial CT scan of head was performed. Angled reformats in brain and bone windows were generated. The images were reviewed in bone, brain, blood and soft tissue windows.   FINDINGS: CSF Spaces: There is mild prominence of the ventricles and sulci. The basal cisterns are clear. There is no extraaxial fluid collection.   Parenchyma: Redemonstrated large area of encephalomalacia due to left MCA territory infarct. An evolving infarct is again noted in the inferior left cerebellar hemisphere without evidence of hemorrhagic conversion. There is associated edema with partial effacement of the 4th ventricle and effacement of the adjacent cerebellar folia. The grey-white differentiation is otherwise intact. No midline shift.   Calvarium: The calvarium is unremarkable.   Paranasal sinuses and mastoids: Visualized paranasal sinuses and mastoids are clear.       Evolving left inferior cerebellar infarct without hemorrhagic conversion. Similar mass effect with partial effacement of the 4th ventricle and adjacent cerebellar folia.   I personally reviewed the images/study and I agree with the findings as stated by María Quiñones MD. This study was  interpreted at [facility], [place], [place].   MACRO: None   Signed by: Osbaldo Lassiter 3/10/2024 8:12 AM Dictation workstation:   [id number]     PSYCHIATRIC RISK ASSESSMENT  Acute Risk of Harm to Others is Considered: Low  Acute Risk of Harm to Self is Considered: Low    ASSESSMENT AND PLAN  Amirah Bennett is a 43 y.o. female with a past psychiatric history of an anxiety disorder, alcohol use disorder, and questionable history of psychosis (schizophrenia v schizoaffective) and past medical history of left MCA stroke s/p thrombectomy in 2022 secondary to atrial fibrillation with left atrial thrombus on Xarelto with residual aphasia and right sided weakness, HFrEF (LVEF 20-25%), and Graves disease who presented to [facility] ED on 3/8/24 with 3-4 days of N/V, acute blurry vision, and worsening aphasia. MRI brain was complete demonstrating an acute cerebellar stroke so patient was admitted to [facility]. Psychiatry was consulted on 3/8/24 for medication management given unclear history of psychotic disorder (schizophrenia versus schizoaffective).      On initial assessment, the patient was alert and oriented to person, place, and time but did demonstrate dysarthria and mixed aphasia which may be worse compared to baseline. The patient did not endorse any psychotic symptoms and there is an unclear history of psychosis. No clear indication for patient to remain on quetiapine and patient has not been on the medication since January 2024. However, the patient has utilized quetiapine in other hospitalizations for delirium and anxiety, with good benefit. Given patient has experienced anxiety in previous hospitalizations and reportedly had a panic attack after transfer to [facility] would recommend utilizing quetiapine 12.5mg every 8 hours as needed for anxiety.    EKG (3/9/24): QTc of 459 ms     Update 3/11: Patient notably somnolent and only opened eyes to name. She did not answer orientation questions but did  "endorse AH without further elaboration. Reminded patient of PRNs available to her.    IMPRESSION  - Delirium, multifactorial  - Other specified anxiety disorder  - Alcohol use disorder, mild to moderate    RECOMMENDATIONS  Safety:  - Patient does not currently meet criteria for inpatient psychiatric admission.  - Patient does not require a 1:1 sitter from a psychiatric perspective at this time. Defer to primary team decision for 1:1 sitter.  - To evaluate decision-making capacity, recommend use of the Capacity Evaluation Tool. Search “ IP Capacity Evaluation under SmartText\"    - As with all hospitalized patients, would recommend delirium precautions, as below.         - Frequent reorientation, minimize naps and room/staff changes, open blinds during the day, and dark/quiet room at night.         - Out of bed as tolerated, with early evaluation and intervention by physical therapy.         - Minimize use of restraints to situations involving patient/staff safety and preventing removal of lines, tubes, medical devices, and dressings.         - Minimize use of benzodiazepines, anticholinergic medications, and opiates (while ensuring adequate treatment of pain).         - Ensure regular bowel and bladder function, nutrition, and hydration.    Medications:  - Continue quetiapine 12.5 mg PO every 8 hours PRN anxiety [DOES NOT needed to be continued on discharge]  - Continue melatonin 3 mg PO at bedtime for sleep-wake cycle consolidation    ==========  - Discussed recommendations with primary team.  - Psychiatry will continue to follow but not daily. Please page [phone] with any questions or concerns.     Patient staffed and discussed with attending, Dr. Kincaid, who agrees with the above.    Crystal Camejo MD   Adult Psychiatry, PGY-2  "

## 2024-03-11 NOTE — PROGRESS NOTES
"Speech-Language Pathology    Inpatient SLP Speech-Language Cognition Evaluation     Patient Name: Amirah Bennett  MRN: [id number]  Today's Date: 3/11/2024   Time Calculation  Start Time: 1135  Stop Time: 1150  Time Calculation (min): 15 min       Recommendations:  Utilize multiple modalities to increase comprehension  Provide verbal options when asking wants/needs  Ongoing SLP     SLP Assessment:  Pt presenting with moderate expression and mild-mod receptive deficits in setting of stroke. Evaluation limited by pt's headache, thus plan to complete ongoing evaluation.     Prognosis: Good      SLP Plan:  Plan  SLP Plan: Skilled SLP  SLP Frequency: 2x per week  Duration: 2 weeks  Discussed POC: Patient, Nursing, Physician  Discussed Risks/Benefits: Yes  Patient/Caregiver Agreeable: Yes  SLP - OK to Discharge: Yes    Goals:   Patient will communicate wants/needs/thoughts via any modality with no more than min cues.    Start: 3/11/24, End: 3/25/24    Pt will follow two-step commands with min cues for accurate completion >80% opportunities.   Start: 03/11/24, End: 3/25/24      Subjective   Current Problem:  Amirah Bennett is a 43 y.o. 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 Neurology was consulted for concern of stroke recrudescence initially. MRI brain showed left cerebellar infarct. Was admitted to the [facility] for suboccipital craniectomy watch      Current presentation:   Pt alert and willing to participate. Good mood. Approximately 15 minutes into session pt's face scrunched up and she appeared to be in pain, upon questioning she said \"my head\" and rated it 10/10 pain\" she also politely asked to conclude session d/t her headache. RN notified of pain and she stated she would contact " "physician.     Objective     Pain: 10/10 headache, RN notified.         Motor Speech Production:  Motor Speech Production  Automatic Speech:  (Counting 1-10: intact, Days of the week: x1 error, perseverating on error requiring cue to redirect)      Auditory Comprehension:   Auditory Comprehension  Yes/No Questions:  (Basic: 75% accurate, Min. Complex: 60% accurate)  Commands:  (One step: Intact Two step: Impaired, pt would complete both steps in command but not necessarily in correct order)  Conversation: Impaired    Verbal:  Verbal Expression  Confrontation Naming: Impaired  Confrontation Objects:  (80% accuracy, noted semantic paraphasias, unable to correct without direct cue)  Open Ended Questions:  (Pt with appropriate automatic responses such as \"i'm good, howre you\" however otherwise responses may be unrelated and paraphasic)  Affect: Within Functional Limits  Prosody: Impaired  Eye Contact: Within Functional Limits      Inpatient Education:  Pt educated regarding results and recommendations. Pt indicated understanding understanding                   "

## 2024-03-11 NOTE — PROGRESS NOTES
"Amirah Bennett is a 43 y.o. female on day 3 of admission presenting with Altered mental status, unspecified altered mental status type.    Subjective     No acute events overnight.       Objective     Physical Exam    Awake, Ox3  Names 2/3, rep not intact  RUE 4+   LUE/BLE 5    Last Recorded Vitals  Blood pressure 113/86, pulse 85, temperature 36.4 °C (97.5 °F), temperature source Temporal, resp. rate 19, height 1.702 m (5' 7\"), weight 83 kg (182 lb 15.7 oz), SpO2 99 %.  Intake/Output last 3 Shifts:  I/O last 3 completed shifts:  In: 1320 (15.9 mL/kg) [I.V.:1320 (15.9 mL/kg)]  Out: 1350 (16.3 mL/kg) [Urine:1350 (0.5 mL/kg/hr)]  Weight: 83 kg     Relevant Results           This patient currently has cardiac telemetry ordered; if you would like to modify or discontinue the telemetry order, click here to go to the orders activity to modify/discontinue the order.      Assessment/Plan   Principal Problem:    Altered mental status, unspecified altered mental status type    Amirah Bennett is a 43 y.o. 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 Neurosurgery was consulted for SOC watch.     Imaging is personally reviewed and CTH is notable for L MCA territory encephalomalacia o/w neg for acute intracranial pathology or ventriculomegaly, or effacement of ventricles. MRI brain is notable for diffuse acute left cerebellar diffusion hits otherwise no effacement of fourth ventricle.  CTA is concerning for possible left vert thrombus.      Exam and imaging is not concerning for effacement or ventricles of ventriculomgally.  We will continue to monitor.    Hospital Course  3/10 CTH stable   "   Recommendations    Patient with stable mentation over course of several days  No further need for SOC/EVD watch at this time given long term stability  ASA/Xarelto restart per primary team  We will sign off, please page [phone] with any questions or concerns           Figueroa Amin MD

## 2024-03-11 NOTE — CARE PLAN
The patient's goals for the shift include        Problem: General Stroke  Goal: Maintain BP within ordered limits throughout shift  Outcome: Progressing  Goal: Participate in treatment (ie., meds, therapy) throughout shift  Outcome: Progressing  Goal: No symptoms of aspiration throughout shift  Outcome: Progressing  Goal: Controlled blood glucose throughout shift  Outcome: Progressing     Problem: Skin  Goal: Promote/optimize nutrition  Outcome: Progressing     Problem: Pain  Goal: My pain/discomfort is manageable  Outcome: Progressing     Problem: Safety  Goal: Patient will be injury free during hospitalization  Outcome: Progressing  Goal: I will remain free of falls  Outcome: Progressing

## 2024-03-12 LAB
ALBUMIN SERPL BCP-MCNC: 3.4 G/DL (ref 3.4–5)
ANION GAP SERPL CALC-SCNC: 14 MMOL/L (ref 10–20)
ATRIAL RATE: 227 BPM
BACTERIA BLD CULT: NORMAL
BACTERIA BLD CULT: NORMAL
BASOPHILS # BLD AUTO: 0.02 X10*3/UL (ref 0–0.1)
BASOPHILS NFR BLD AUTO: 0.3 %
BUN SERPL-MCNC: 19 MG/DL (ref 6–23)
CALCIUM SERPL-MCNC: 9.1 MG/DL (ref 8.6–10.6)
CHLORIDE SERPL-SCNC: 100 MMOL/L (ref 98–107)
CO2 SERPL-SCNC: 28 MMOL/L (ref 21–32)
CREAT SERPL-MCNC: 0.89 MG/DL (ref 0.5–1.05)
EGFRCR SERPLBLD CKD-EPI 2021: 82 ML/MIN/1.73M*2
EOSINOPHIL # BLD AUTO: 0.19 X10*3/UL (ref 0–0.7)
EOSINOPHIL NFR BLD AUTO: 3 %
ERYTHROCYTE [DISTWIDTH] IN BLOOD BY AUTOMATED COUNT: 17.1 % (ref 11.5–14.5)
GLUCOSE BLD MANUAL STRIP-MCNC: 107 MG/DL (ref 74–99)
GLUCOSE BLD MANUAL STRIP-MCNC: 109 MG/DL (ref 74–99)
GLUCOSE BLD MANUAL STRIP-MCNC: 114 MG/DL (ref 74–99)
GLUCOSE BLD MANUAL STRIP-MCNC: 86 MG/DL (ref 74–99)
GLUCOSE BLD MANUAL STRIP-MCNC: 92 MG/DL (ref 74–99)
GLUCOSE SERPL-MCNC: 94 MG/DL (ref 74–99)
HCT VFR BLD AUTO: 48 % (ref 36–46)
HGB BLD-MCNC: 14.3 G/DL (ref 12–16)
IMM GRANULOCYTES # BLD AUTO: 0.01 X10*3/UL (ref 0–0.7)
IMM GRANULOCYTES NFR BLD AUTO: 0.2 % (ref 0–0.9)
LYMPHOCYTES # BLD AUTO: 2.87 X10*3/UL (ref 1.2–4.8)
LYMPHOCYTES NFR BLD AUTO: 45.3 %
MAGNESIUM SERPL-MCNC: 1.72 MG/DL (ref 1.6–2.4)
MCH RBC QN AUTO: 27.7 PG (ref 26–34)
MCHC RBC AUTO-ENTMCNC: 29.8 G/DL (ref 32–36)
MCV RBC AUTO: 93 FL (ref 80–100)
MONOCYTES # BLD AUTO: 0.87 X10*3/UL (ref 0.1–1)
MONOCYTES NFR BLD AUTO: 13.7 %
NEUTROPHILS # BLD AUTO: 2.37 X10*3/UL (ref 1.2–7.7)
NEUTROPHILS NFR BLD AUTO: 37.5 %
NRBC BLD-RTO: 0 /100 WBCS (ref 0–0)
PHOSPHATE SERPL-MCNC: 4.1 MG/DL (ref 2.5–4.9)
PLATELET # BLD AUTO: 154 X10*3/UL (ref 150–450)
POTASSIUM SERPL-SCNC: 4.1 MMOL/L (ref 3.5–5.3)
Q ONSET: 216 MS
Q ONSET: 217 MS
QRS COUNT: 15 BEATS
QRS COUNT: 21 BEATS
QRS DURATION: 82 MS
QRS DURATION: 88 MS
QT INTERVAL: 266 MS
QT INTERVAL: 366 MS
QTC CALCULATION(BAZETT): 395 MS
QTC CALCULATION(BAZETT): 459 MS
QTC FREDERICIA: 346 MS
QTC FREDERICIA: 426 MS
R AXIS: 38 DEGREES
R AXIS: 39 DEGREES
RBC # BLD AUTO: 5.16 X10*6/UL (ref 4–5.2)
SODIUM SERPL-SCNC: 138 MMOL/L (ref 136–145)
T AXIS: 242 DEGREES
T AXIS: 266 DEGREES
T OFFSET: 350 MS
T OFFSET: 399 MS
VENTRICULAR RATE: 133 BPM
VENTRICULAR RATE: 95 BPM
WBC # BLD AUTO: 6.3 X10*3/UL (ref 4.4–11.3)

## 2024-03-12 PROCEDURE — 83735 ASSAY OF MAGNESIUM: CPT

## 2024-03-12 PROCEDURE — 2500000004 HC RX 250 GENERAL PHARMACY W/ HCPCS (ALT 636 FOR OP/ED)

## 2024-03-12 PROCEDURE — 1200000002 HC GENERAL ROOM WITH TELEMETRY DAILY

## 2024-03-12 PROCEDURE — 99222 1ST HOSP IP/OBS MODERATE 55: CPT | Performed by: OTOLARYNGOLOGY

## 2024-03-12 PROCEDURE — 99232 SBSQ HOSP IP/OBS MODERATE 35: CPT

## 2024-03-12 PROCEDURE — 92507 TX SP LANG VOICE COMM INDIV: CPT | Mod: GN

## 2024-03-12 PROCEDURE — 2500000002 HC RX 250 W HCPCS SELF ADMINISTERED DRUGS (ALT 637 FOR MEDICARE OP, ALT 636 FOR OP/ED)

## 2024-03-12 PROCEDURE — 82947 ASSAY GLUCOSE BLOOD QUANT: CPT

## 2024-03-12 PROCEDURE — 36415 COLL VENOUS BLD VENIPUNCTURE: CPT

## 2024-03-12 PROCEDURE — 80069 RENAL FUNCTION PANEL: CPT

## 2024-03-12 PROCEDURE — 2500000001 HC RX 250 WO HCPCS SELF ADMINISTERED DRUGS (ALT 637 FOR MEDICARE OP)

## 2024-03-12 PROCEDURE — 85025 COMPLETE CBC W/AUTO DIFF WBC: CPT

## 2024-03-12 RX ORDER — ASPIRIN 81 MG/1
81 TABLET ORAL DAILY
Status: DISCONTINUED | OUTPATIENT
Start: 2024-03-13 | End: 2024-03-14 | Stop reason: HOSPADM

## 2024-03-12 RX ADMIN — ENOXAPARIN SODIUM 40 MG: 100 INJECTION SUBCUTANEOUS at 12:02

## 2024-03-12 RX ADMIN — POLYETHYLENE GLYCOL 3350 17 G: 17 POWDER, FOR SOLUTION ORAL at 20:36

## 2024-03-12 RX ADMIN — METOPROLOL SUCCINATE 50 MG: 50 TABLET, EXTENDED RELEASE ORAL at 09:15

## 2024-03-12 RX ADMIN — SACUBITRIL AND VALSARTAN 1 TABLET: 24; 26 TABLET, FILM COATED ORAL at 09:15

## 2024-03-12 RX ADMIN — SPIRONOLACTONE 25 MG: 25 TABLET, FILM COATED ORAL at 06:03

## 2024-03-12 RX ADMIN — PANTOPRAZOLE SODIUM 40 MG: 40 TABLET, DELAYED RELEASE ORAL at 06:03

## 2024-03-12 RX ADMIN — GABAPENTIN 100 MG: 100 CAPSULE ORAL at 09:15

## 2024-03-12 RX ADMIN — FUROSEMIDE 40 MG: 40 TABLET ORAL at 09:15

## 2024-03-12 RX ADMIN — ATORVASTATIN CALCIUM 80 MG: 80 TABLET, FILM COATED ORAL at 09:15

## 2024-03-12 RX ADMIN — FOLIC ACID 1 MG: 1 TABLET ORAL at 09:15

## 2024-03-12 RX ADMIN — SACUBITRIL AND VALSARTAN 1 TABLET: 24; 26 TABLET, FILM COATED ORAL at 20:35

## 2024-03-12 RX ADMIN — GABAPENTIN 100 MG: 100 CAPSULE ORAL at 20:35

## 2024-03-12 RX ADMIN — DIGOXIN 250 MCG: 125 TABLET ORAL at 09:15

## 2024-03-12 RX ADMIN — MELATONIN 3 MG: 3 TAB ORAL at 20:35

## 2024-03-12 RX ADMIN — Medication 1 TABLET: at 06:03

## 2024-03-12 RX ADMIN — GABAPENTIN 100 MG: 100 CAPSULE ORAL at 15:14

## 2024-03-12 ASSESSMENT — COGNITIVE AND FUNCTIONAL STATUS - GENERAL
EATING MEALS: A LITTLE
WALKING IN HOSPITAL ROOM: A LITTLE
TOILETING: A LITTLE
HELP NEEDED FOR BATHING: A LITTLE
TURNING FROM BACK TO SIDE WHILE IN FLAT BAD: A LITTLE
PERSONAL GROOMING: A LITTLE
MOVING FROM LYING ON BACK TO SITTING ON SIDE OF FLAT BED WITH BEDRAILS: A LITTLE
MOBILITY SCORE: 18
DRESSING REGULAR UPPER BODY CLOTHING: A LITTLE
MOVING TO AND FROM BED TO CHAIR: A LITTLE
CLIMB 3 TO 5 STEPS WITH RAILING: A LITTLE
DAILY ACTIVITIY SCORE: 18
DRESSING REGULAR LOWER BODY CLOTHING: A LITTLE
STANDING UP FROM CHAIR USING ARMS: A LITTLE

## 2024-03-12 ASSESSMENT — PAIN - FUNCTIONAL ASSESSMENT
PAIN_FUNCTIONAL_ASSESSMENT: 0-10

## 2024-03-12 ASSESSMENT — PAIN SCALES - GENERAL
PAINLEVEL_OUTOF10: 0 - NO PAIN

## 2024-03-12 NOTE — PROGRESS NOTES
"Speech-Language Pathology    Inpatient Speech-Language Pathology Treatment     Patient Name: Amirah Bennett  MRN: [id number]  Today's Date: 3/12/2024  Time Calculation  Start Time: 1000  Stop Time: 1022  Time Calculation (min): 22 min         Recommendations:  Utilize multiple modalities to increase comprehension  Provide verbal options when asking wants/needs  Ongoing SLP     SLP Assessment:  Pt presenting with moderate expression and mild-mod receptive deficits in setting of stroke. ?acute on chronic aphasia given report of residual deficits from previous stroke, and pt's report. Continue current recommendations.        Plan:  SLP Plan: Skilled SLP  SLP Frequency: 2x per week  Duration: 2 weeks  Discussed POC: Patient, Nursing, Physician  Discussed Risks/Benefits: Yes  Patient/Caregiver Agreeable: Yes  SLP - OK to Discharge: Yes    Goals:   Patient will communicate wants/needs/thoughts via any modality with no more than min cues.               Start: 3/11/24, End: 3/25/24     Pt will follow two-step commands with min cues for accurate completion >80% opportunities.   Start: 03/11/24, End: 3/25/24      Subjective   Current Presentation:    Pt received upright and alert at edge of bed, consuming breakfast. She spontaneously stated \"it's a shit day\", and when prompted regarding this statement she became significantly tearful. Pt provided emotional support/encouragement as well as a tissue, this seemed to be impacting her participation.       Objective           Language Expression:  Non-verbal communication:     Given that aphasia seems to be acute on chronic in setting of acute and previous stroke, therapy focused on communication in function/conversation.     Pt with paraphasic errors or perseverative errors in approximately every other phrase/statement obscuring meaning of her utterances. Pt requires mod level cuing to identify when a breakdown occurs and that the listener cannot understand.  Pt consistently " benefited from direct cues for writing and typing, resulting in accurate two word phrases when pt unable to verbalize accurately. Pt also spontaneously utilized gestures to promote understandability in setting of breakdowns, though not 100% effective.       Inpatient Education:  Pt educated on result and recommendations. Pt indicated understanding.

## 2024-03-12 NOTE — PROGRESS NOTES
"SUBJECTIVE  Per chart review, patient has not required PRN medications for anxiety/delirium. She received scheduled melatonin.    On interview, patient was bright and eating cereal at bedside. She initially only used body language to communicate but switched to using voice when declining AH. She then denied SI/HI/VH/paranoia. Mood stated as \"chill.\" She understands that she's waiting for dispo per primary team.    OBJECTIVE    VITALS      3/11/2024    11:07 AM 3/11/2024     3:45 PM 3/11/2024     7:56 PM 3/11/2024    11:27 PM 3/12/2024     3:00 AM 3/12/2024     7:00 AM 3/12/2024    11:00 AM   Vitals   Systolic 91 129 141 116 114 113 123   Diastolic 76 97 98 91 76 85 90   Heart Rate 76 80 89 75 73 66 73   Temp 36 °C (96.8 °F) 36.5 °C (97.7 °F) 36.2 °C (97.2 °F) 36.4 °C (97.5 °F) 36.5 °C (97.7 °F) 35.6 °C (96.1 °F) 36.4 °C (97.5 °F)   Resp 18 18 18 18 18 18 18      MENTAL STATUS EXAM  Appearance: 44 yo F, with fair grooming and hygiene, trimmed short black hair, sitting up at bedside eating cereal  Attitude: Calm, engaged, cooperative  Behavior: Fair eye contact, though at times stared; initially only used body language to communicate before switching to voice when asked about AH; responded to questions as if they were silly  Motor Activity: No gross PMR/PMA  Speech: Voice produced through trach stoma, mainly using 1-2 word answers; slight pauses and delays before responding  Mood: \"chill\"  Affect: Bright though constricted, non-labile  Thought Process: answers questions appropriately, concrete  Thought Content: denied SI/HI, no delusions elicited  Thought Perception: denied AVH, not responding to internal stimuli  Cognition: alert and grossly oriented  Insight: limited  Judgment: intact    CURRENT MEDICATIONS  Scheduled medications  [START ON 3/13/2024] aspirin, 81 mg, oral, Daily  atorvastatin, 80 mg, oral, Daily  digoxin, 250 mcg, oral, Daily  [Held by provider] empagliflozin, 10 mg, oral, Daily  enoxaparin, 40 mg, " subcutaneous, q24h  folic acid, 1 mg, oral, Daily  furosemide, 40 mg, oral, Daily  gabapentin, 100 mg, oral, TID  insulin lispro, 0-10 Units, subcutaneous, q4h  melatonin, 3 mg, oral, Nightly  metoprolol succinate XL, 50 mg, oral, Daily  multivitamin with minerals, 1 tablet, oral, Daily  pantoprazole, 40 mg, oral, Daily before breakfast  polyethylene glycol, 17 g, oral, Daily  sacubitriL-valsartan, 1 tablet, oral, BID  spironolactone, 25 mg, oral, Daily        Continuous medications       PRN medications  PRN medications: acetaminophen, dextrose 10 % in water (D10W), dextrose, glucagon, [] hydrALAZINE **FOLLOWED BY** hydrALAZINE, lubricating eye drops, magnesium sulfate, magnesium sulfate, ondansetron ODT **OR** ondansetron, oxygen, polyethylene glycol, potassium chloride CR **OR** potassium chloride, potassium chloride CR **OR** potassium chloride, QUEtiapine     LABS  Results for orders placed or performed during the hospital encounter of 24 (from the past 24 hour(s))   Digoxin   Result Value Ref Range    Digoxin  0.84 0.80 - <2.00 ng/mL   POCT GLUCOSE   Result Value Ref Range    POCT Glucose 113 (H) 74 - 99 mg/dL   POCT GLUCOSE   Result Value Ref Range    POCT Glucose 126 (H) 74 - 99 mg/dL   POCT GLUCOSE   Result Value Ref Range    POCT Glucose 97 74 - 99 mg/dL   POCT GLUCOSE   Result Value Ref Range    POCT Glucose 92 74 - 99 mg/dL   POCT GLUCOSE   Result Value Ref Range    POCT Glucose 107 (H) 74 - 99 mg/dL   CBC and Auto Differential   Result Value Ref Range    WBC 6.3 4.4 - 11.3 x10*3/uL    nRBC 0.0 0.0 - 0.0 /100 WBCs    RBC 5.16 4.00 - 5.20 x10*6/uL    Hemoglobin 14.3 12.0 - 16.0 g/dL    Hematocrit 48.0 (H) 36.0 - 46.0 %    MCV 93 80 - 100 fL    MCH 27.7 26.0 - 34.0 pg    MCHC 29.8 (L) 32.0 - 36.0 g/dL    RDW 17.1 (H) 11.5 - 14.5 %    Platelets 154 150 - 450 x10*3/uL    Neutrophils % 37.5 40.0 - 80.0 %    Immature Granulocytes %, Automated 0.2 0.0 - 0.9 %    Lymphocytes % 45.3 13.0 - 44.0 %     Monocytes % 13.7 2.0 - 10.0 %    Eosinophils % 3.0 0.0 - 6.0 %    Basophils % 0.3 0.0 - 2.0 %    Neutrophils Absolute 2.37 1.20 - 7.70 x10*3/uL    Immature Granulocytes Absolute, Automated 0.01 0.00 - 0.70 x10*3/uL    Lymphocytes Absolute 2.87 1.20 - 4.80 x10*3/uL    Monocytes Absolute 0.87 0.10 - 1.00 x10*3/uL    Eosinophils Absolute 0.19 0.00 - 0.70 x10*3/uL    Basophils Absolute 0.02 0.00 - 0.10 x10*3/uL   Renal function panel   Result Value Ref Range    Glucose 94 74 - 99 mg/dL    Sodium 138 136 - 145 mmol/L    Potassium 4.1 3.5 - 5.3 mmol/L    Chloride 100 98 - 107 mmol/L    Bicarbonate 28 21 - 32 mmol/L    Anion Gap 14 10 - 20 mmol/L    Urea Nitrogen 19 6 - 23 mg/dL    Creatinine 0.89 0.50 - 1.05 mg/dL    eGFR 82 >60 mL/min/1.73m*2    Calcium 9.1 8.6 - 10.6 mg/dL    Phosphorus 4.1 2.5 - 4.9 mg/dL    Albumin 3.4 3.4 - 5.0 g/dL   Magnesium   Result Value Ref Range    Magnesium 1.72 1.60 - 2.40 mg/dL   POCT GLUCOSE   Result Value Ref Range    POCT Glucose 86 74 - 99 mg/dL        IMAGING  CT head wo IV contrast    Result Date: 3/10/2024  Interpreted By:  Osbaldo Lassiter,  and Ishmael Daniel STUDY: CT HEAD WO IV CONTRAST;  3/10/2024 4:02 am   INDICATION: Signs/Symptoms:fu cerebellar stroke.   COMPARISON: CT head 03/09/2024 and MR brain 03/09/2024   ACCESSION NUMBER(S): [id number]   ORDERING CLINICIAN: UMU LISA   TECHNIQUE: Noncontrast axial CT scan of head was performed. Angled reformats in brain and bone windows were generated. The images were reviewed in bone, brain, blood and soft tissue windows.   FINDINGS: CSF Spaces: There is mild prominence of the ventricles and sulci. The basal cisterns are clear. There is no extraaxial fluid collection.   Parenchyma: Redemonstrated large area of encephalomalacia due to left MCA territory infarct. An evolving infarct is again noted in the inferior left cerebellar hemisphere without evidence of hemorrhagic conversion. There is associated edema with partial effacement  of the 4th ventricle and effacement of the adjacent cerebellar folia. The grey-white differentiation is otherwise intact. No midline shift.   Calvarium: The calvarium is unremarkable.   Paranasal sinuses and mastoids: Visualized paranasal sinuses and mastoids are clear.       Evolving left inferior cerebellar infarct without hemorrhagic conversion. Similar mass effect with partial effacement of the 4th ventricle and adjacent cerebellar folia.   I personally reviewed the images/study and I agree with the findings as stated by María Quiñones MD. This study was interpreted at [facility], [place], [place].   MACRO: None   Signed by: Osbaldo Lassiter 3/10/2024 8:12 AM Dictation workstation:   [id number]     PSYCHIATRIC RISK ASSESSMENT  Acute Risk of Harm to Others is Considered: Low  Acute Risk of Harm to Self is Considered: Low    ASSESSMENT AND PLAN  Amirah Bennett is a 43 y.o. female with a past psychiatric history of an anxiety disorder, alcohol use disorder, and questionable history of psychosis (schizophrenia v schizoaffective) and past medical history of left MCA stroke s/p thrombectomy in 2022 secondary to atrial fibrillation with left atrial thrombus on Xarelto with residual aphasia and right sided weakness, HFrEF (LVEF 20-25%), and Graves disease who presented to [facility] ED on 3/8/24 with 3-4 days of N/V, acute blurry vision, and worsening aphasia. MRI brain was complete demonstrating an acute cerebellar stroke so patient was admitted to [facility]. Psychiatry was consulted on 3/8/24 for medication management given unclear history of psychotic disorder (schizophrenia versus schizoaffective).      On initial assessment, the patient was alert and oriented to person, place, and time but did demonstrate dysarthria and mixed aphasia which may be worse compared to baseline. The patient did not endorse any psychotic symptoms and there is an unclear history of psychosis. No clear indication for  "patient to remain on quetiapine and patient has not been on the medication since January 2024. However, the patient has utilized quetiapine in other hospitalizations for delirium and anxiety, with good benefit. Given patient has experienced anxiety in previous hospitalizations and reportedly had a panic attack after transfer to [facility] would recommend utilizing quetiapine 12.5mg every 8 hours as needed for anxiety.    EKG (3/9/24): QTc of 459 ms     Update 3/12: Bright affect, though remains constricted. Appears concrete with odd behavior initially by only using body language to respond before declining AH verbally, with some indication that patient thought questions were silly. Denies SI, HI, AVH, paranoia. No PRNs utilized.    IMPRESSION  - Delirium, multifactorial, improving  - Other specified anxiety disorder  - Alcohol use disorder, mild to moderate    RECOMMENDATIONS  Safety:  - Patient does not currently meet criteria for inpatient psychiatric admission.  - Patient does not require a 1:1 sitter from a psychiatric perspective at this time. Defer to primary team decision for 1:1 sitter.  - To evaluate decision-making capacity, recommend use of the Capacity Evaluation Tool. Search “ IP Capacity Evaluation under SmartText\"    - As with all hospitalized patients, would recommend delirium precautions, as below.         - Frequent reorientation, minimize naps and room/staff changes, open blinds during the day, and dark/quiet room at night.         - Out of bed as tolerated, with early evaluation and intervention by physical therapy.         - Minimize use of restraints to situations involving patient/staff safety and preventing removal of lines, tubes, medical devices, and dressings.         - Minimize use of benzodiazepines, anticholinergic medications, and opiates (while ensuring adequate treatment of pain).         - Ensure regular bowel and bladder function, nutrition, and hydration.    Medications:  - Continue " quetiapine 12.5 mg PO every 8 hours PRN anxiety [DOES NOT needed to be continued on discharge]  - Continue melatonin 3 mg PO at bedtime for sleep-wake cycle consolidation    ==========  - Discussed recommendations with primary team.  - Psychiatry will continue to follow but not daily. Please page [phone] with any questions or concerns.     Patient staffed and discussed with attending, Dr. Kincaid, who agrees with the above.    Crystal Camejo MD   Adult Psychiatry, PGY-2

## 2024-03-12 NOTE — CONSULTS
"ENT DEPARTMENT CONSULTATION NOTE  Name: Amirah Bennett  MRN: [id number]  : 1980  Consulting Team: Neurology  Reason for Consult: Tracheocutaneous fistula \"gurgling\"    History of Present Illness  The patient is a 43 y.o. female who presented to [facility] on [facility]24 for acute blurry vision and worsening aphasia and was found to have left cerebellar infarct on brain MRI.  The patient has a history of a left MCA stroke in  secondary to A-fib, HFrEF with ejection fraction of 20 to 25%.  The patient was previously trach by ENT in 2022.  The patient remained with a trach in for a very long time and was eventually decannulated early .  She last followed with Dr. King in May 2023.  During her last visit with Dr. King, there were discussions of plans to repair the fistula in the OR and have her cover the fistula during voicing.  She unfortunately did not follow-up with Dr. King.    ENT was consulted for \"gurgling\" from her tracheocutaneous fistula that started today.  The patient has a tracheocutaneous fistula with mucus noted to be draining out of the fistula and a wheezing noise when she speaks.  When the tracheocutaneous fistula is occluded, there is no mucus draining or any breathiness or wheezing noted.  Patient was unable to give a good HPI given aphasia and previous stroke history.  Patient notes that she has had gurgling and mucus draining from her TCF for a long time, although she was not answering questions with great accuracy.  When asked about how long the tracheocutaneous fistula has been there, the patient just answers with \"trach\".  She also answered yes to many questions, even when asked opposing questions, likely secondary to her previous stroke unfortunately.        Review of Systems  14 point review of systems completed and all negative except as noted in HPI.    Past Medical History  Past Medical History:   Diagnosis Date    CHF (congestive heart failure) (CMS/[facility])     Stroke " (CMS/[facility])        Past Surgical History  Past Surgical History:   Procedure Laterality Date    OTHER SURGICAL HISTORY  2022    Trachectomy       Allergies  Allergies   Allergen Reactions    Hydrocodone-Acetaminophen Rash    Ibuprofen Rash     Tolerates aspirin       Medications    Current Facility-Administered Medications:     acetaminophen (Tylenol) tablet 650 mg, 650 mg, oral, q6h PRN, Tonia Mendoza MD, 650 mg at 24    [START ON 3/13/2024] aspirin EC tablet 81 mg, 81 mg, oral, Daily, Tonia Mendoza MD    atorvastatin (Lipitor) tablet 80 mg, 80 mg, oral, Daily, Tonia Mendoza MD, 80 mg at 24 0915    dextrose 10 % in water (D10W) infusion, 0.3 g/kg/hr, intravenous, Once PRN, Tonia Mendoza MD    dextrose 50 % injection 25 g, 25 g, intravenous, q15 min PRN, Tonia Mendoza MD    digoxin (Lanoxin) tablet 250 mcg, 250 mcg, oral, Daily, Tonia Mendoza MD, 250 mcg at 24 0915    [Held by provider] empagliflozin (Jardiance) tablet 10 mg, 10 mg, oral, Daily, Tonia Mendoza MD, 10 mg at 24 0836    enoxaparin (Lovenox) syringe 40 mg, 40 mg, subcutaneous, q24h, Tonia Mendoza MD, 40 mg at 24 1202    folic acid (Folvite) tablet 1 mg, 1 mg, oral, Daily, Tonia Mendoza MD, 1 mg at 24 0915    furosemide (Lasix) tablet 40 mg, 40 mg, oral, Daily, Tonia Mendoza MD, 40 mg at 24 0915    gabapentin (Neurontin) capsule 100 mg, 100 mg, oral, TID, Tonia Mendoza MD, 100 mg at 24 1514    glucagon (Glucagen) injection 1 mg, 1 mg, intramuscular, q15 min PRN, Tonia Mendoza MD    [] hydrALAZINE (Apresoline) injection 10 mg, 10 mg, intravenous, q20 min PRN **FOLLOWED BY** hydrALAZINE (Apresoline) tablet 25 mg, 25 mg, oral, q6h PRN, Tonia Mendoza MD    insulin lispro (HumaLOG) injection 0-10 Units, 0-10 Units, subcutaneous, q4h, Tonia Mendoza MD    lubricating eye drops ophthalmic solution 2 drop, 2 drop, Both Eyes, PRN, Tonia Mendoza MD    magnesium sulfate IV 2 g, 2 g,  intravenous, q6h PRN, Tonia Mendoza MD    magnesium sulfate IV 4 g, 4 g, intravenous, q6h PRN, Tonia Mendoza MD, Stopped at 03/11/24 0703    melatonin tablet 3 mg, 3 mg, oral, Nightly, Tonia Mendoza MD, 3 mg at 03/11/24 2003    metoprolol succinate XL (Toprol-XL) 24 hr tablet 50 mg, 50 mg, oral, Daily, Tonia Mendoza MD, 50 mg at 03/12/24 0915    multivitamin with minerals 1 tablet, 1 tablet, oral, Daily, Tonia Mendoza MD, 1 tablet at 03/12/24 0603    ondansetron ODT (Zofran-ODT) disintegrating tablet 4 mg, 4 mg, oral, q8h PRN, 4 mg at 03/11/24 0933 **OR** ondansetron (Zofran) injection 4 mg, 4 mg, intravenous, q8h PRN, Tonia Mendoza MD    oxygen (O2) therapy, , inhalation, Continuous PRN - O2/gases, Tonia Mendoza MD    pantoprazole (ProtoNix) EC tablet 40 mg, 40 mg, oral, Daily before breakfast, Tonia Mendoza MD, 40 mg at 03/12/24 0603    polyethylene glycol (Glycolax, Miralax) packet 17 g, 17 g, oral, Daily PRN, Tonia Mendoza MD    polyethylene glycol (Glycolax, Miralax) packet 17 g, 17 g, oral, Daily, Tonia Mendoza MD, 17 g at 03/11/24 0933    potassium chloride CR (Klor-Con M20) ER tablet 20 mEq, 20 mEq, oral, q6h PRN **OR** potassium chloride (Klor-Con) packet 20 mEq, 20 mEq, oral, q6h PRN, Tonia Mendoza MD    potassium chloride CR (Klor-Con M20) ER tablet 40 mEq, 40 mEq, oral, q6h PRN **OR** potassium chloride (Klor-Con) packet 40 mEq, 40 mEq, oral, q6h PRN, Tonia Mendoza MD    QUEtiapine (SEROquel) tablet 12.5 mg, 12.5 mg, oral, q8h PRN, Tonia Mendoza MD    sacubitriL-valsartan (Entresto) 24-26 mg per tablet 1 tablet, 1 tablet, oral, BID, Tonia Mendoza MD, 1 tablet at 03/12/24 0915    spironolactone (Aldactone) tablet 25 mg, 25 mg, oral, Daily, Tonia Mendoza MD, 25 mg at 03/12/24 0603    Family History  No family history on file.    Social History  Social History     Socioeconomic History    Marital status: Single     Spouse name: Not on file    Number of children: Not on file    Years of  education: Not on file    Highest education level: Not on file   Occupational History    Not on file   Tobacco Use    Smoking status: Former     Packs/day: .25     Types: Cigarettes     Quit date: 2023     Years since quittin.2    Smokeless tobacco: Not on file   Substance and Sexual Activity    Alcohol use: Yes    Drug use: Not on file    Sexual activity: Not on file   Other Topics Concern    Not on file   Social History Narrative    Not on file     Social Determinants of Health     Financial Resource Strain: Low Risk  (3/11/2024)    Overall Financial Resource Strain (CARDIA)     Difficulty of Paying Living Expenses: Not very hard   Food Insecurity: Food Insecurity Present (2023)    Hunger Vital Sign     Worried About Running Out of Food in the Last Year: Sometimes true     Ran Out of Food in the Last Year: Never true   Transportation Needs: No Transportation Needs (3/11/2024)    PRAPARE - Transportation     Lack of Transportation (Medical): No     Lack of Transportation (Non-Medical): No   Recent Concern: Transportation Needs - Unmet Transportation Needs (2023)    PRAPARE - Transportation     Lack of Transportation (Medical): No     Lack of Transportation (Non-Medical): Yes   Physical Activity: Sufficiently Active (2023)    Exercise Vital Sign     Days of Exercise per Week: 7 days     Minutes of Exercise per Session: 30 min   Stress: No Stress Concern Present (2023)    [place] [employer] - Occupational Stress Questionnaire     Feeling of Stress : Only a little   Social Connections: Moderately Isolated (2023)    Social Connection and Isolation Panel [NHANES]     Frequency of Communication with Friends and Family: Twice a week     Frequency of Social Gatherings with Friends and Family: Once a week     Attends [employer] Services: Never     Active Member of Clubs or Organizations: Yes     Attends Club or Organization Meetings: Never     Marital Status: Never     Intimate Partner Violence: Not At Risk (12/17/2023)    Humiliation, Afraid, Rape, and Kick questionnaire     Fear of Current or Ex-Partner: No     Emotionally Abused: No     Physically Abused: No     Sexually Abused: No   Housing Stability: Low Risk  (3/11/2024)    Housing Stability Vital Sign     Unable to Pay for Housing in the Last Year: No     Number of Places Lived in the Last Year: 1     Unstable Housing in the Last Year: No       Vital Signs  Vitals:    03/12/24 1100   BP: 123/90   Pulse: 73   Resp: 18   Temp: 36.4 °C (97.5 °F)   SpO2: 98%       Physical Examination  GEN: The patient appears stated age in no acute distress  VOICE: Patient has mild dysphonia.  Significant air leaking through tracheocutaneous fistula with mucous.  With plugging, no airleak or mucus and voice improves.  Voice quality is G1 R0 B2 A0 S0.  The breathiness goes away when occluding the TCF.  RESP: Unlabored on room air  CV: Clinically well perfused   EYES: EOM grossly intact with no scleral icterus  NEURO: Alert and oriented with no focal deficits   HEAD: Scalp is normocephalic and atraumatic  FACE: No abrasions or lacerations, no maxillary or mandibular stepoffs  SAL: No parotid or submandibular masses or tenderness to palpation   EARS: Normal external ears, hard of hearing to spoken voice  NOSE: External nose appears normal, no significant septal deviation, anterior rhinoscopy limited with no active bleeding or lesions  OC: Normal lips, normal buccal mucosa, normal alveolar ridge, normal floor of mouth, normal tongue, normal hard palate, normal retromolar trigone  OP: normal pharyngeal walls, normal soft palate  NECK: Trachea midline, no significant lymphadenopathy.  Tracheocutaneous fistula with moderate mucus bubbling with speech and cough.  When occluded, no mucus leaking.    Laboratory and Data  Results for orders placed or performed during the hospital encounter of 03/08/24 (from the past 24 hour(s))   POCT GLUCOSE    Result Value Ref Range    POCT Glucose 126 (H) 74 - 99 mg/dL   POCT GLUCOSE   Result Value Ref Range    POCT Glucose 97 74 - 99 mg/dL   POCT GLUCOSE   Result Value Ref Range    POCT Glucose 92 74 - 99 mg/dL   POCT GLUCOSE   Result Value Ref Range    POCT Glucose 107 (H) 74 - 99 mg/dL   CBC and Auto Differential   Result Value Ref Range    WBC 6.3 4.4 - 11.3 x10*3/uL    nRBC 0.0 0.0 - 0.0 /100 WBCs    RBC 5.16 4.00 - 5.20 x10*6/uL    Hemoglobin 14.3 12.0 - 16.0 g/dL    Hematocrit 48.0 (H) 36.0 - 46.0 %    MCV 93 80 - 100 fL    MCH 27.7 26.0 - 34.0 pg    MCHC 29.8 (L) 32.0 - 36.0 g/dL    RDW 17.1 (H) 11.5 - 14.5 %    Platelets 154 150 - 450 x10*3/uL    Neutrophils % 37.5 40.0 - 80.0 %    Immature Granulocytes %, Automated 0.2 0.0 - 0.9 %    Lymphocytes % 45.3 13.0 - 44.0 %    Monocytes % 13.7 2.0 - 10.0 %    Eosinophils % 3.0 0.0 - 6.0 %    Basophils % 0.3 0.0 - 2.0 %    Neutrophils Absolute 2.37 1.20 - 7.70 x10*3/uL    Immature Granulocytes Absolute, Automated 0.01 0.00 - 0.70 x10*3/uL    Lymphocytes Absolute 2.87 1.20 - 4.80 x10*3/uL    Monocytes Absolute 0.87 0.10 - 1.00 x10*3/uL    Eosinophils Absolute 0.19 0.00 - 0.70 x10*3/uL    Basophils Absolute 0.02 0.00 - 0.10 x10*3/uL   Renal function panel   Result Value Ref Range    Glucose 94 74 - 99 mg/dL    Sodium 138 136 - 145 mmol/L    Potassium 4.1 3.5 - 5.3 mmol/L    Chloride 100 98 - 107 mmol/L    Bicarbonate 28 21 - 32 mmol/L    Anion Gap 14 10 - 20 mmol/L    Urea Nitrogen 19 6 - 23 mg/dL    Creatinine 0.89 0.50 - 1.05 mg/dL    eGFR 82 >60 mL/min/1.73m*2    Calcium 9.1 8.6 - 10.6 mg/dL    Phosphorus 4.1 2.5 - 4.9 mg/dL    Albumin 3.4 3.4 - 5.0 g/dL   Magnesium   Result Value Ref Range    Magnesium 1.72 1.60 - 2.40 mg/dL   POCT GLUCOSE   Result Value Ref Range    POCT Glucose 86 74 - 99 mg/dL         Harpal Bennett is a 43 y.o. female with a history of a left MCA stroke and thrombectomy in 2022, HFrEF, who presented to  for increased blurry vision  and worsening aphasia.  The patient was found to have a left cerebellar infarct and has been followed by the neurosurgery and neurology teams.  ENT was consulted for tracheocutaneous fistula gurgling, and physical exam demonstrated mucus drainage and breathiness from her chronic tracheocutaneous fistula.  There is no evidence of pus, and when the tracheocutaneous fistula was occluded, the breathiness and mucus drainage resolved.    Recommendations  -Cover the tracheocutaneous fistula with tape and gauze and encourage patient to apply pressure when voicing. Please include this in her discharge instructions.   -Follow-up with Dr. King for consideration of fistula closure; ENT will coordinate  -Okay to discharge from ENT standpoint    Jose M Galarza MD - PGY1  Otolaryngology - Head & Neck Surgery  [facility]    ENT Consult pager: [phone]  ENT Peds pager: [phone]  ENT Head & Neck Surgery Phone: [phone]  ENT subspecialty team: Estrellita individual resident who wrote today's note  ENT Outpatient scheduling number: [phone]     I saw and evaluated the patient. I personally obtained the key and critical portions of the history and physical exam or was physically present for key and critical portions performed by the resident/fellow. I reviewed the resident/fellow's documentation and discussed the patient with the resident/fellow. I agree with the resident/fellow's medical decision making as documented in the note with the exception/addition of the following  Pt with known Tracheocutaneous fistula, consulted for gurgling at times since there is a hole there.  Can keep it covered will follow up with dr. King as outpt.    Marck Cabral MD

## 2024-03-12 NOTE — PROGRESS NOTES
Amirah Bennett is a 43 y.o. female on day 4 of admission presenting with Altered mental status, unspecified altered mental status type.    Subjective/Overnight Events:   Neurosurgery signed off  Objective:       24 Hour Vitals  Temp:  [36 °C (96.8 °F)-36.5 °C (97.7 °F)] 36.5 °C (97.7 °F)  Heart Rate:  [] 73  Resp:  [16-18] 18  BP: ()/() 114/76   24 hour Intake/Output  No intake or output data in the 24 hours ending 03/12/24 0744      Last BM:         Physical Exam   NEURO:  Alert, oriented to person, place, year (stated month as January), follows simple commands, difficulty with complex commands. Errors with repetition, naming intact.  EOS, PERRL, EOMI, Possible RHH/neglect,   No dysmmetria  KING 5/5 strength, no drift, no dysmetria  Sensation intact to light touch  CV:  RRR on telemetry, NSR  RESP:  Regular, unlabored  Oxygen: room air  :  No catheter in place  GI:  Abdomen NT/ND, soft  SKIN:  Intact    Labs  Results from last 72 hours   Lab Units 03/11/24  0112 03/10/24  0120   WBC AUTO x10*3/uL 6.6 8.0   HEMOGLOBIN g/dL 13.2 14.7   HEMATOCRIT % 43.7 45.1   PLATELETS AUTO x10*3/uL 132* 160        Results from last 72 hours   Lab Units 03/11/24  0112 03/10/24  0120   SODIUM mmol/L 136 138   POTASSIUM mmol/L 4.0 3.5   CHLORIDE mmol/L 98 99   CO2 mmol/L 28 30   BUN mg/dL 13 14   CREATININE mg/dL 0.83 1.03   MAGNESIUM mg/dL 1.44* 1.35*   PHOSPHORUS mg/dL 4.0 3.2           Medications   Scheduled Medications  [START ON 3/13/2024] aspirin, 81 mg, oral, Daily  atorvastatin, 80 mg, oral, Daily  digoxin, 250 mcg, oral, Daily  [Held by provider] empagliflozin, 10 mg, oral, Daily  enoxaparin, 40 mg, subcutaneous, q24h  folic acid, 1 mg, oral, Daily  furosemide, 40 mg, oral, Daily  gabapentin, 100 mg, oral, TID  insulin lispro, 0-10 Units, subcutaneous, q4h  melatonin, 3 mg, oral, Nightly  metoprolol succinate XL, 50 mg, oral, Daily  multivitamin with minerals, 1 tablet, oral, Daily  pantoprazole, 40 mg,  oral, Daily before breakfast  polyethylene glycol, 17 g, oral, Daily  sacubitriL-valsartan, 1 tablet, oral, BID  spironolactone, 25 mg, oral, Daily     Continuous Medications    PRN Medications  PRN medications: acetaminophen, dextrose 10 % in water (D10W), dextrose, glucagon, [] hydrALAZINE **FOLLOWED BY** hydrALAZINE, lubricating eye drops, magnesium sulfate, magnesium sulfate, ondansetron ODT **OR** ondansetron, oxygen, polyethylene glycol, potassium chloride CR **OR** potassium chloride, potassium chloride CR **OR** potassium chloride, QUEtiapine        Assessment/Plan   Amirah Bennett is a 43 y.o. female with PMH of Left MCA stroke s/p thrombectomy in  secondary to Afib with left atrial thrombus on Xarelto, with residual aphasia and right sided weakness, HFrEF (LVEF 20-25% )  presenting with 3-4 days of nausea and vomiting and acute blurry vision and worsening aphasia. Neurology was consulted for concern of stroke recrudescence initially. MRI brain showed left cerebellar infarct. Was admitted to the [facility] for suboccipital craniectomy watch. Neurosurgery signed off as does not need SOC/EVD watch anymore. Stroke will continue to follow. Pending dispo.    Type:  Intracerebral Hemorrhage,   Subtype: Type 2 - cardioembolism  Vessels Involved: L. Vert thrombus on CTA  Neurologic Manifestations:  Aphasia,   Vascular Risk Factors: HTN, DM, Dyslipidemia, etc. (please specify here LDL, Hba1c, TTE).     #Hx L MCA ischemic stroke  #Acute L cerebellar ischemic infarct  #Ischemic stroke  :: HbA1c 5.5, LDL 87,   - Hold anticoagulation for 1 week, resume 3/16 (apixaban 5mg BID)  - Aspirin restarted on 3/12  -PT/OT/SLP - regular diet    #HFrEF (EF 20-25% )  #Afib  ::no signs of acute decompensation at this time  ::follows with Dr Umaña outpatient  -Atorvastatin 80mg every day   -Digoxin 250mg every day  -Lasix 40mg every day   -Metoprolol Succinate 50mg every day   -Xarelto 20mg every day  -  hold  -Spironolactone 25mg every day     #Hx schizophrenia vs schizoaffective d/o   -Psych following  - Seroquel 12.5mg q8hr PRN agitation  - Melatonin 3mg at bedtime    F: prn with caution  E: prn  N: Cardiac  A: PIV     GI: pantoprazole  DVT PPX: subcutaneous lovenox     CODE STATUS: FULL CODE  NOK: Navin Sanches () [phone]    Tonia Mendoza MD  Department of Neurology, PGY-2

## 2024-03-13 LAB
ALBUMIN SERPL BCP-MCNC: 3.4 G/DL (ref 3.4–5)
ANION GAP SERPL CALC-SCNC: 11 MMOL/L (ref 10–20)
BASOPHILS # BLD AUTO: 0.03 X10*3/UL (ref 0–0.1)
BASOPHILS NFR BLD AUTO: 0.4 %
BUN SERPL-MCNC: 18 MG/DL (ref 6–23)
CALCIUM SERPL-MCNC: 9.2 MG/DL (ref 8.6–10.6)
CHLORIDE SERPL-SCNC: 101 MMOL/L (ref 98–107)
CO2 SERPL-SCNC: 28 MMOL/L (ref 21–32)
CREAT SERPL-MCNC: 0.9 MG/DL (ref 0.5–1.05)
EGFRCR SERPLBLD CKD-EPI 2021: 81 ML/MIN/1.73M*2
EOSINOPHIL # BLD AUTO: 0.22 X10*3/UL (ref 0–0.7)
EOSINOPHIL NFR BLD AUTO: 3.3 %
ERYTHROCYTE [DISTWIDTH] IN BLOOD BY AUTOMATED COUNT: 16.9 % (ref 11.5–14.5)
GLUCOSE BLD MANUAL STRIP-MCNC: 118 MG/DL (ref 74–99)
GLUCOSE BLD MANUAL STRIP-MCNC: 119 MG/DL (ref 74–99)
GLUCOSE BLD MANUAL STRIP-MCNC: 83 MG/DL (ref 74–99)
GLUCOSE BLD MANUAL STRIP-MCNC: 85 MG/DL (ref 74–99)
GLUCOSE BLD MANUAL STRIP-MCNC: 88 MG/DL (ref 74–99)
GLUCOSE BLD MANUAL STRIP-MCNC: 90 MG/DL (ref 74–99)
GLUCOSE SERPL-MCNC: 79 MG/DL (ref 74–99)
HCT VFR BLD AUTO: 49 % (ref 36–46)
HGB BLD-MCNC: 14.7 G/DL (ref 12–16)
IMM GRANULOCYTES # BLD AUTO: 0.01 X10*3/UL (ref 0–0.7)
IMM GRANULOCYTES NFR BLD AUTO: 0.1 % (ref 0–0.9)
LYMPHOCYTES # BLD AUTO: 3.46 X10*3/UL (ref 1.2–4.8)
LYMPHOCYTES NFR BLD AUTO: 51.3 %
MAGNESIUM SERPL-MCNC: 1.71 MG/DL (ref 1.6–2.4)
MCH RBC QN AUTO: 27.7 PG (ref 26–34)
MCHC RBC AUTO-ENTMCNC: 30 G/DL (ref 32–36)
MCV RBC AUTO: 92 FL (ref 80–100)
MONOCYTES # BLD AUTO: 0.57 X10*3/UL (ref 0.1–1)
MONOCYTES NFR BLD AUTO: 8.5 %
NEUTROPHILS # BLD AUTO: 2.45 X10*3/UL (ref 1.2–7.7)
NEUTROPHILS NFR BLD AUTO: 36.4 %
NRBC BLD-RTO: 0 /100 WBCS (ref 0–0)
PHOSPHATE SERPL-MCNC: 3.3 MG/DL (ref 2.5–4.9)
PLATELET # BLD AUTO: 168 X10*3/UL (ref 150–450)
POTASSIUM SERPL-SCNC: 4.1 MMOL/L (ref 3.5–5.3)
RBC # BLD AUTO: 5.31 X10*6/UL (ref 4–5.2)
SODIUM SERPL-SCNC: 136 MMOL/L (ref 136–145)
VIT B1 PYROPHOSHATE BLD-SCNC: 81 NMOL/L (ref 70–180)
WBC # BLD AUTO: 6.7 X10*3/UL (ref 4.4–11.3)

## 2024-03-13 PROCEDURE — 2500000002 HC RX 250 W HCPCS SELF ADMINISTERED DRUGS (ALT 637 FOR MEDICARE OP, ALT 636 FOR OP/ED)

## 2024-03-13 PROCEDURE — 82947 ASSAY GLUCOSE BLOOD QUANT: CPT

## 2024-03-13 PROCEDURE — 2500000001 HC RX 250 WO HCPCS SELF ADMINISTERED DRUGS (ALT 637 FOR MEDICARE OP)

## 2024-03-13 PROCEDURE — 85025 COMPLETE CBC W/AUTO DIFF WBC: CPT

## 2024-03-13 PROCEDURE — 2500000004 HC RX 250 GENERAL PHARMACY W/ HCPCS (ALT 636 FOR OP/ED)

## 2024-03-13 PROCEDURE — 97116 GAIT TRAINING THERAPY: CPT | Mod: GP | Performed by: PHYSICAL THERAPIST

## 2024-03-13 PROCEDURE — 80069 RENAL FUNCTION PANEL: CPT

## 2024-03-13 PROCEDURE — 36415 COLL VENOUS BLD VENIPUNCTURE: CPT

## 2024-03-13 PROCEDURE — 1200000002 HC GENERAL ROOM WITH TELEMETRY DAILY

## 2024-03-13 PROCEDURE — 97112 NEUROMUSCULAR REEDUCATION: CPT | Mod: GP | Performed by: PHYSICAL THERAPIST

## 2024-03-13 PROCEDURE — 83735 ASSAY OF MAGNESIUM: CPT

## 2024-03-13 PROCEDURE — 99232 SBSQ HOSP IP/OBS MODERATE 35: CPT

## 2024-03-13 RX ADMIN — METOPROLOL SUCCINATE 50 MG: 50 TABLET, EXTENDED RELEASE ORAL at 09:04

## 2024-03-13 RX ADMIN — MELATONIN 3 MG: 3 TAB ORAL at 22:07

## 2024-03-13 RX ADMIN — GABAPENTIN 100 MG: 100 CAPSULE ORAL at 09:04

## 2024-03-13 RX ADMIN — ENOXAPARIN SODIUM 40 MG: 100 INJECTION SUBCUTANEOUS at 13:24

## 2024-03-13 RX ADMIN — PANTOPRAZOLE SODIUM 40 MG: 40 TABLET, DELAYED RELEASE ORAL at 06:04

## 2024-03-13 RX ADMIN — ASPIRIN 81 MG: 81 TABLET, COATED ORAL at 09:04

## 2024-03-13 RX ADMIN — FOLIC ACID 1 MG: 1 TABLET ORAL at 09:04

## 2024-03-13 RX ADMIN — ATORVASTATIN CALCIUM 80 MG: 80 TABLET, FILM COATED ORAL at 09:04

## 2024-03-13 RX ADMIN — SACUBITRIL AND VALSARTAN 1 TABLET: 24; 26 TABLET, FILM COATED ORAL at 22:07

## 2024-03-13 RX ADMIN — SPIRONOLACTONE 25 MG: 25 TABLET, FILM COATED ORAL at 06:04

## 2024-03-13 RX ADMIN — Medication 1 TABLET: at 06:04

## 2024-03-13 RX ADMIN — SACUBITRIL AND VALSARTAN 1 TABLET: 24; 26 TABLET, FILM COATED ORAL at 13:24

## 2024-03-13 RX ADMIN — GABAPENTIN 100 MG: 100 CAPSULE ORAL at 22:07

## 2024-03-13 RX ADMIN — FUROSEMIDE 40 MG: 40 TABLET ORAL at 09:04

## 2024-03-13 RX ADMIN — DIGOXIN 250 MCG: 125 TABLET ORAL at 09:04

## 2024-03-13 RX ADMIN — GABAPENTIN 100 MG: 100 CAPSULE ORAL at 14:52

## 2024-03-13 RX ADMIN — POLYETHYLENE GLYCOL 3350 17 G: 17 POWDER, FOR SOLUTION ORAL at 09:04

## 2024-03-13 ASSESSMENT — PAIN - FUNCTIONAL ASSESSMENT
PAIN_FUNCTIONAL_ASSESSMENT: 0-10

## 2024-03-13 ASSESSMENT — PAIN SCALES - GENERAL
PAINLEVEL_OUTOF10: 0 - NO PAIN

## 2024-03-13 ASSESSMENT — COGNITIVE AND FUNCTIONAL STATUS - GENERAL
MOVING FROM LYING ON BACK TO SITTING ON SIDE OF FLAT BED WITH BEDRAILS: A LITTLE
STANDING UP FROM CHAIR USING ARMS: A LITTLE
WALKING IN HOSPITAL ROOM: A LITTLE
CLIMB 3 TO 5 STEPS WITH RAILING: TOTAL
MOBILITY SCORE: 16
TURNING FROM BACK TO SIDE WHILE IN FLAT BAD: A LITTLE
MOVING TO AND FROM BED TO CHAIR: A LITTLE

## 2024-03-13 NOTE — PROGRESS NOTES
Physical Therapy    Physical Therapy Treatment    Patient Name: Amirah Bennett  MRN: [id number]  Today's Date: 3/13/2024  Time Calculation  Start Time: 1330  Stop Time: 1411  Time Calculation (min): 41 min       Assessment/Plan   PT Assessment  PT Assessment Results: Decreased strength, Decreased range of motion, Decreased endurance, Impaired balance, Decreased mobility, Decreased cognition, Decreased coordination, Decreased safety awareness, Impaired judgement  Rehab Prognosis: Excellent  Evaluation/Treatment Tolerance: Patient tolerated treatment well  Medical Staff Made Aware: Yes  Strengths: Ability to acquire knowledge, Attitude of self, Coping skills, Premorbid level of function, Support of Caregivers  Barriers to Participation:  (dizziness slightly limiting at times)  End of Session Communication: Bedside nurse  Assessment Comment: 42 yo female with Left MCA encephalomalacia, acute Left cerebellar infarct, delirium, and premorbid aphasia, trach, DM and A-fib. Pt presents with aphasia, ataxia, decreased coordination, decreased activity tolerance and high fall risk; pt with improved ambulation today. Recommend high intensity therapy as pt needs all 3 therapies, is not at baseline, appears motivated and is making good gains.  End of Session Patient Position: Bed, 3 rail up, Alarm on  PT Plan  Inpatient/Swing Bed or Outpatient: Inpatient  PT Plan  Treatment/Interventions: Bed mobility, Transfer training, Gait training, Stair training, Balance training, Neuromuscular re-education, Strengthening, Endurance training, Therapeutic exercise, Therapeutic activity, Home exercise program, Postural re-education  PT Plan: Skilled PT  PT Frequency: 5 times per week  PT Discharge Recommendations: High intensity level of continued care  Equipment Recommended upon Discharge:  (TBD at rehab)  PT Recommended Transfer Status: Assist x1, Assistive device (WW min A)  PT - OK to Discharge: Yes (PT evaluation completed and DC recs  made)      General Visit Information:   PT  Visit  PT Received On: 03/13/24  General  Reason for Referral: p/w 3-4 days of N/V,  acute blurry vision, & worsening aphasia as well as AMS; dx: L MCA territory encephalomalacia, acute Left cerebellar ischemic infarct, delirium, transcutaneous trach fistula  Past Medical History Relevant to Rehab: HTN, left MCA stroke s/p thrombectomy in 2022 secondary to Afib with left atrial thrombus (on Xarelto), with residual aphasia and right sided weakness, HFrEF (LVEF 20-25% ), DM, dilated cardiomyopathy (2/2 to alcohol), alcohol use disorder, CKD, Graves disease, prior GI bleeding, schizoaffective d/o  Missed Visit: No  Family/Caregiver Present: No  Co-Treatment:  (N/A)  Prior to Session Communication: Bedside nurse  Patient Position Received: Bed, 3 rail up, Alarm on  Preferred Learning Style: verbal, kinesthetic, visual  General Comment: Pt supine alert, very engaged, tearful/labile at end of session, slightly limited by dizziness towards end of first walk, but overall improved stability and balance as noted.    Subjective   Precautions:  Precautions  Medical Precautions: Fall precautions (SBP <180, falls aphasia, DM, labile)  Precautions Comment: mildly impulsive at times  Vital Signs:  Vital Signs  Heart Rate:  (sitting after first walk: O2 100% HR 88; sitting end of session after 2nd walk:  /73  O2  100%  HR 52)    Objective   Pain:  Pain Assessment  Pain Assessment: 0-10  Pain Score: 0 - No pain  Cognition:  Cognition  Overall Cognitive Status: Impaired  Arousal/Alertness: Delayed responses to stimuli  Orientation Level:  (dificult to assess accurately due to aphasia; oriented to self, hospital, stroke (not day, date))  Following Commands:  (followed 100% of simple 1 step commands with demo and some repetition)  Safety Judgment: Decreased awareness of need for assistance  Problem Solving: Assistance required to identify errors made  Cognition Comments: alert, engaged,  labile at end of session  Problem Solving: Exceptions to WFL  Simple Functional Tasks: Impaired  Safety/Judgement: Exceptions to WFL  Routine Tasks: Moderate  Insight: Moderate  Impulsive: Moderately  Processing Speed: Delayed  Postural Control:  Postural Control  Postural Control: Impaired  Trunk Control: decreased in standing (see standing balance activities)  Posture Comment: posterior lean, wide GARO  Static Sitting Balance  Static Sitting-Balance Support: Feet supported, Bilateral upper extremity supported  Static Sitting-Level of Assistance: Contact guard  Dynamic Sitting Balance  Dynamic Sitting-Balance Support: Feet supported, No upper extremity supported  Dynamic Sitting-Balance: Reaching for objects  Dynamic Sitting-Comments: CG and cues for safety  Static Standing Balance  Static Standing-Balance Support: Bilateral upper extremity supported (on WW)  Static Standing-Level of Assistance: Contact guard (cues for safety)  Dynamic Standing Balance  Dynamic Standing-Balance Support: Bilateral upper extremity supported (on WW)  Dynamic Standing-Balance: Turning (transfers and gait including turns)  Dynamic Standing-Comments: CG/min A for balance with gait, cues for safety    Activity Tolerance:  Activity Tolerance  Endurance: Tolerates 10 - 20 min exercise with multiple rests (limited by dizziness after 1st walk (likely b/c she was looking at her feet))  Treatments:  Balance/Neuromuscular Re-Education  Balance/Neuromuscular Re-Education Activity Performed: Yes  Balance/Neuromuscular Re-Education Activity 1: sit to/from stand balance using the 5 Times Sit to Stand Test with CGA (15.52 seconds)    Bed Mobility  Bed Mobility: Yes  Bed Mobility 1  Bed Mobility 1: Supine to sitting, Sitting to supine  Level of Assistance 1: Contact guard, Minimal verbal cues  Bed Mobility Comments 1: HOB elevated 25 degrees, use of rail, in/out on Right side, cues for safety (pt moves impulsively at times)    Ambulation/Gait  Training  Ambulation/Gait Training Performed: Yes  Ambulation/Gait Training 1  Surface 1: Level tile  Device 1: Rolling walker  Assistance 1: Minimum assistance, Minimal verbal cues, Minimal tactile cues  Quality of Gait 1:  (eyes looking at feet entire time, ataxic,slow, dizziness towards end of 1st walk)  Comments/Distance (ft) 1: 65 (x 2 (5 minute sitting rest in between; very dizzy at end of 1st walk))  Transfers  Transfer: Yes  Transfer 1  Transfer From 1: Sit to, Stand to  Transfer to 1: Sit, Stand  Technique 1: Sit to stand, Stand to sit  Transfer Device 1: Walker  Transfer Level of Assistance 1: Contact guard, Minimal verbal cues  Trials/Comments 1: cues for hand placement  Transfers 2  Transfer From 2: Stand to (bench)  Transfer to 2: Stand (ench)  Technique 2: Sit to stand, Stand to sit, Stand pivot  Transfer Device 2: Walker  Trials/Comments 2: CG/min A, cues for hand placement    Stairs  Stairs: No    Outcome Measures:  [facility] Basic Mobility  Turning from your back to your side while in a flat bed without using bedrails: A little  Moving from lying on your back to sitting on the side of a flat bed without using bedrails: A little  Moving to and from bed to chair (including a wheelchair): A little  Standing up from a chair using your arms (e.g. wheelchair or bedside chair): A little  To walk in hospital room: A little  Climbing 3-5 steps with railing: Total  Basic Mobility - Total Score: 16    Other Measures  5x Sit to Stand: 15.52 seconds with CGA no device present    Education Documentation  Precautions, taught by Leonarda Dietz PT at 3/13/2024  2:25 PM.  Learner: Patient  Readiness: Acceptance  Method: Explanation, Demonstration, Teach-back  Response: Needs Reinforcement, Demonstrated Understanding, Verbalizes Understanding  Comment: reoriented multiple times, need for assist with all in-hospital mobility, safe gait/transfers, improvements, DC recs for rehab, vitals    Body Mechanics, taught by  Leonarda Dietz, PT at 3/13/2024  2:25 PM.  Learner: Patient  Readiness: Acceptance  Method: Explanation, Demonstration, Teach-back  Response: Needs Reinforcement, Demonstrated Understanding, Verbalizes Understanding  Comment: reoriented multiple times, need for assist with all in-hospital mobility, safe gait/transfers, improvements, DC recs for rehab, vitals    Mobility Training, taught by Leonarda Dietz PT at 3/13/2024  2:25 PM.  Learner: Patient  Readiness: Acceptance  Method: Explanation, Demonstration, Teach-back  Response: Needs Reinforcement, Demonstrated Understanding, Verbalizes Understanding  Comment: reoriented multiple times, need for assist with all in-hospital mobility, safe gait/transfers, improvements, DC recs for rehab, vitals    Education Comments  No comments found.        OP EDUCATION:       Encounter Problems       Encounter Problems (Active)       PT Problem       indep in bed mobility  (Progressing)       Start:  03/10/24    Expected End:  03/24/24            indep in sit<>stand with LRD (Progressing)       Start:  03/10/24    Expected End:  03/24/24            amb x 250 ft, with SBA and LRD, and no acute LOB (Progressing)       Start:  03/10/24    Expected End:  03/24/24            negotiate 1 flight of stairs with CGA, stable vitals, and no acute LOB (Not Progressing)       Start:  03/10/24    Expected End:  03/24/24

## 2024-03-13 NOTE — CARE PLAN
The patient's goals for the shift include      The clinical goals for the shift include Patient will remain safe throughout shift.    Over the shift, the patient remained safe with no falls/injury.      Problem: General Stroke  Goal: Maintain BP within ordered limits throughout shift  Outcome: Met  Goal: Controlled blood glucose throughout shift  Outcome: Met

## 2024-03-13 NOTE — PROGRESS NOTES
Amirah Bennett is a 43 y.o. female on day 5 of admission presenting with Altered mental status, unspecified altered mental status type.    Subjective/Overnight Events:   Ent evaluated - no inpatient measures  Objective:       24 Hour Vitals  Temp:  [35.6 °C (96.1 °F)-36.5 °C (97.7 °F)] 35.7 °C (96.3 °F)  Heart Rate:  [70-84] 84  Resp:  [18] 18  BP: ()/(59-94) 98/63   24 hour Intake/Output    Intake/Output Summary (Last 24 hours) at 3/13/2024 0745  Last data filed at 3/12/2024 1154  Gross per 24 hour   Intake --   Output 300 ml   Net -300 ml         Last BM:         Physical Exam   NEURO:  Alert, oriented to person, place, year (stated month as January), follows simple commands, difficulty with complex commands. Errors with repetition, naming intact.  EOS, PERRL, EOMI, Possible RHH/neglect,   No dysmmetria  KING 5/5 strength, no drift, no dysmetria  Sensation intact to light touch  CV:  RRR on telemetry, NSR  RESP:  Regular, unlabored  Oxygen: room air  :  No catheter in place  GI:  Abdomen NT/ND, soft  SKIN:  Intact    Labs  Results from last 72 hours   Lab Units 03/12/24  0906 03/11/24  0112   WBC AUTO x10*3/uL 6.3 6.6   HEMOGLOBIN g/dL 14.3 13.2   HEMATOCRIT % 48.0* 43.7   PLATELETS AUTO x10*3/uL 154 132*        Results from last 72 hours   Lab Units 03/12/24  0906 03/11/24  0112   SODIUM mmol/L 138 136   POTASSIUM mmol/L 4.1 4.0   CHLORIDE mmol/L 100 98   CO2 mmol/L 28 28   BUN mg/dL 19 13   CREATININE mg/dL 0.89 0.83   MAGNESIUM mg/dL 1.72 1.44*   PHOSPHORUS mg/dL 4.1 4.0           Medications   Scheduled Medications  aspirin, 81 mg, oral, Daily  atorvastatin, 80 mg, oral, Daily  digoxin, 250 mcg, oral, Daily  [Held by provider] empagliflozin, 10 mg, oral, Daily  enoxaparin, 40 mg, subcutaneous, q24h  folic acid, 1 mg, oral, Daily  furosemide, 40 mg, oral, Daily  gabapentin, 100 mg, oral, TID  insulin lispro, 0-10 Units, subcutaneous, q4h  melatonin, 3 mg, oral, Nightly  metoprolol succinate XL, 50 mg,  oral, Daily  multivitamin with minerals, 1 tablet, oral, Daily  pantoprazole, 40 mg, oral, Daily before breakfast  polyethylene glycol, 17 g, oral, Daily  sacubitriL-valsartan, 1 tablet, oral, BID  spironolactone, 25 mg, oral, Daily     Continuous Medications    PRN Medications  PRN medications: acetaminophen, dextrose 10 % in water (D10W), dextrose, glucagon, [] hydrALAZINE **FOLLOWED BY** hydrALAZINE, lubricating eye drops, magnesium sulfate, magnesium sulfate, ondansetron ODT **OR** ondansetron, oxygen, polyethylene glycol, potassium chloride CR **OR** potassium chloride, potassium chloride CR **OR** potassium chloride, QUEtiapine        Assessment/Plan   Amirah Bennett is a 43 y.o. female with PMH of Left MCA stroke s/p thrombectomy in  secondary to Afib with left atrial thrombus on Xarelto, with residual aphasia and right sided weakness, HFrEF (LVEF 20-25% )  presenting with 3-4 days of nausea and vomiting and acute blurry vision and worsening aphasia. Neurology was consulted for concern of stroke recrudescence initially. MRI brain showed left cerebellar infarct. Was admitted to the [facility] for suboccipital craniectomy watch. Neurosurgery signed off as does not need SOC/EVD watch anymore. Stroke will continue to follow. Pending dispo.    Type:  Intracerebral Hemorrhage,   Subtype: Type 2 - cardioembolism  Vessels Involved: L. Vert thrombus on CTA  Neurologic Manifestations:  Aphasia,   Vascular Risk Factors: HTN, DM, Dyslipidemia, etc. (please specify here LDL, Hba1c, TTE).     #Hx L MCA ischemic stroke  #Acute L cerebellar ischemic infarct  #Ischemic stroke  :: HbA1c 5.5, LDL 87,   - Hold anticoagulation for 1 week, resume 3/16 (apixaban 5mg BID)  - Aspirin restarted on 3/12  -PT/OT/SLP - regular diet    #HFrEF (EF 20-25% )  #Afib  ::no signs of acute decompensation at this time  ::follows with Dr Umaña outpatient  -Atorvastatin 80mg every day   -Digoxin 250mg every day  -Lasix 40mg every  day   -Metoprolol Succinate 50mg every day   -Xarelto 20mg every day  - hold  -Spironolactone 25mg every day     #Trach site - transcutaneous fistula  :: increased breaths/mucous discharge from trach site  -ENT consulted - will see outpt for closure  - Patient to be instructed to cover with tape and apply pressure while speaking    #Hx schizophrenia vs schizoaffective d/o   -Psych following  - Seroquel 12.5mg q8hr PRN agitation  - Melatonin 3mg at bedtime    F: prn with caution  E: prn  N: Cardiac  A: PIV     GI: pantoprazole  DVT PPX: subcutaneous lovenox     CODE STATUS: FULL CODE  NOK: Navin Sanches () [phone]    Tonia Mendoza MD  Department of Neurology, PGY-2

## 2024-03-14 VITALS
BODY MASS INDEX: 28.72 KG/M2 | DIASTOLIC BLOOD PRESSURE: 86 MMHG | WEIGHT: 182.98 LBS | HEIGHT: 67 IN | TEMPERATURE: 98.1 F | RESPIRATION RATE: 20 BRPM | HEART RATE: 88 BPM | OXYGEN SATURATION: 100 % | SYSTOLIC BLOOD PRESSURE: 138 MMHG

## 2024-03-14 PROBLEM — R41.82 ALTERED MENTAL STATUS, UNSPECIFIED ALTERED MENTAL STATUS TYPE: Status: RESOLVED | Noted: 2024-03-08 | Resolved: 2024-03-14

## 2024-03-14 LAB
ALBUMIN SERPL BCP-MCNC: 3.7 G/DL (ref 3.4–5)
ANION GAP SERPL CALC-SCNC: 15 MMOL/L (ref 10–20)
BASOPHILS # BLD AUTO: 0.02 X10*3/UL (ref 0–0.1)
BASOPHILS NFR BLD AUTO: 0.4 %
BUN SERPL-MCNC: 16 MG/DL (ref 6–23)
CALCIUM SERPL-MCNC: 9.5 MG/DL (ref 8.6–10.6)
CHLORIDE SERPL-SCNC: 102 MMOL/L (ref 98–107)
CO2 SERPL-SCNC: 25 MMOL/L (ref 21–32)
CREAT SERPL-MCNC: 0.87 MG/DL (ref 0.5–1.05)
EGFRCR SERPLBLD CKD-EPI 2021: 84 ML/MIN/1.73M*2
EOSINOPHIL # BLD AUTO: 0.17 X10*3/UL (ref 0–0.7)
EOSINOPHIL NFR BLD AUTO: 3.1 %
ERYTHROCYTE [DISTWIDTH] IN BLOOD BY AUTOMATED COUNT: 17 % (ref 11.5–14.5)
GLUCOSE BLD MANUAL STRIP-MCNC: 119 MG/DL (ref 74–99)
GLUCOSE BLD MANUAL STRIP-MCNC: 72 MG/DL (ref 74–99)
GLUCOSE BLD MANUAL STRIP-MCNC: 79 MG/DL (ref 74–99)
GLUCOSE BLD MANUAL STRIP-MCNC: 90 MG/DL (ref 74–99)
GLUCOSE SERPL-MCNC: 109 MG/DL (ref 74–99)
HCT VFR BLD AUTO: 48.6 % (ref 36–46)
HGB BLD-MCNC: 14.5 G/DL (ref 12–16)
IMM GRANULOCYTES # BLD AUTO: 0.01 X10*3/UL (ref 0–0.7)
IMM GRANULOCYTES NFR BLD AUTO: 0.2 % (ref 0–0.9)
LYMPHOCYTES # BLD AUTO: 2.59 X10*3/UL (ref 1.2–4.8)
LYMPHOCYTES NFR BLD AUTO: 47.2 %
MAGNESIUM SERPL-MCNC: 1.79 MG/DL (ref 1.6–2.4)
MCH RBC QN AUTO: 28.4 PG (ref 26–34)
MCHC RBC AUTO-ENTMCNC: 29.8 G/DL (ref 32–36)
MCV RBC AUTO: 95 FL (ref 80–100)
MONOCYTES # BLD AUTO: 0.49 X10*3/UL (ref 0.1–1)
MONOCYTES NFR BLD AUTO: 8.9 %
NEUTROPHILS # BLD AUTO: 2.21 X10*3/UL (ref 1.2–7.7)
NEUTROPHILS NFR BLD AUTO: 40.2 %
NRBC BLD-RTO: 0 /100 WBCS (ref 0–0)
PHOSPHATE SERPL-MCNC: 3.5 MG/DL (ref 2.5–4.9)
PLATELET # BLD AUTO: 162 X10*3/UL (ref 150–450)
POTASSIUM SERPL-SCNC: 4.7 MMOL/L (ref 3.5–5.3)
RBC # BLD AUTO: 5.1 X10*6/UL (ref 4–5.2)
SODIUM SERPL-SCNC: 137 MMOL/L (ref 136–145)
WBC # BLD AUTO: 5.5 X10*3/UL (ref 4.4–11.3)

## 2024-03-14 PROCEDURE — 97530 THERAPEUTIC ACTIVITIES: CPT | Mod: GO

## 2024-03-14 PROCEDURE — 2500000004 HC RX 250 GENERAL PHARMACY W/ HCPCS (ALT 636 FOR OP/ED)

## 2024-03-14 PROCEDURE — 97116 GAIT TRAINING THERAPY: CPT | Mod: GP | Performed by: PHYSICAL THERAPIST

## 2024-03-14 PROCEDURE — 36415 COLL VENOUS BLD VENIPUNCTURE: CPT

## 2024-03-14 PROCEDURE — 2500000002 HC RX 250 W HCPCS SELF ADMINISTERED DRUGS (ALT 637 FOR MEDICARE OP, ALT 636 FOR OP/ED)

## 2024-03-14 PROCEDURE — 83735 ASSAY OF MAGNESIUM: CPT

## 2024-03-14 PROCEDURE — 82947 ASSAY GLUCOSE BLOOD QUANT: CPT

## 2024-03-14 PROCEDURE — 85025 COMPLETE CBC W/AUTO DIFF WBC: CPT

## 2024-03-14 PROCEDURE — 2500000001 HC RX 250 WO HCPCS SELF ADMINISTERED DRUGS (ALT 637 FOR MEDICARE OP)

## 2024-03-14 PROCEDURE — 99239 HOSP IP/OBS DSCHRG MGMT >30: CPT | Performed by: PSYCHIATRY & NEUROLOGY

## 2024-03-14 PROCEDURE — 97112 NEUROMUSCULAR REEDUCATION: CPT | Mod: GP | Performed by: PHYSICAL THERAPIST

## 2024-03-14 PROCEDURE — 80069 RENAL FUNCTION PANEL: CPT

## 2024-03-14 RX ORDER — METOPROLOL SUCCINATE 50 MG/1
50 TABLET, EXTENDED RELEASE ORAL DAILY
Qty: 30 TABLET | Refills: 11 | Status: SHIPPED | OUTPATIENT
Start: 2024-03-14 | End: 2025-03-14

## 2024-03-14 RX ADMIN — FUROSEMIDE 40 MG: 40 TABLET ORAL at 08:34

## 2024-03-14 RX ADMIN — FOLIC ACID 1 MG: 1 TABLET ORAL at 08:34

## 2024-03-14 RX ADMIN — GABAPENTIN 100 MG: 100 CAPSULE ORAL at 15:46

## 2024-03-14 RX ADMIN — ATORVASTATIN CALCIUM 80 MG: 80 TABLET, FILM COATED ORAL at 08:34

## 2024-03-14 RX ADMIN — Medication 1 TABLET: at 08:43

## 2024-03-14 RX ADMIN — GABAPENTIN 100 MG: 100 CAPSULE ORAL at 08:34

## 2024-03-14 RX ADMIN — POLYETHYLENE GLYCOL 3350 17 G: 17 POWDER, FOR SOLUTION ORAL at 08:35

## 2024-03-14 RX ADMIN — SACUBITRIL AND VALSARTAN 1 TABLET: 24; 26 TABLET, FILM COATED ORAL at 08:35

## 2024-03-14 RX ADMIN — METOPROLOL SUCCINATE 50 MG: 50 TABLET, EXTENDED RELEASE ORAL at 08:34

## 2024-03-14 RX ADMIN — ASPIRIN 81 MG: 81 TABLET, COATED ORAL at 08:34

## 2024-03-14 RX ADMIN — SPIRONOLACTONE 25 MG: 25 TABLET, FILM COATED ORAL at 08:43

## 2024-03-14 RX ADMIN — DIGOXIN 250 MCG: 125 TABLET ORAL at 08:35

## 2024-03-14 RX ADMIN — PANTOPRAZOLE SODIUM 40 MG: 40 TABLET, DELAYED RELEASE ORAL at 08:43

## 2024-03-14 RX ADMIN — ENOXAPARIN SODIUM 40 MG: 100 INJECTION SUBCUTANEOUS at 12:16

## 2024-03-14 ASSESSMENT — COGNITIVE AND FUNCTIONAL STATUS - GENERAL
PERSONAL GROOMING: A LITTLE
STANDING UP FROM CHAIR USING ARMS: A LITTLE
DRESSING REGULAR UPPER BODY CLOTHING: A LITTLE
DRESSING REGULAR UPPER BODY CLOTHING: A LITTLE
TOILETING: A LITTLE
MOBILITY SCORE: 18
MOBILITY SCORE: 18
PERSONAL GROOMING: A LITTLE
MOVING TO AND FROM BED TO CHAIR: A LITTLE
CLIMB 3 TO 5 STEPS WITH RAILING: A LITTLE
HELP NEEDED FOR BATHING: A LITTLE
WALKING IN HOSPITAL ROOM: A LITTLE
DRESSING REGULAR LOWER BODY CLOTHING: A LITTLE
TOILETING: A LITTLE
WALKING IN HOSPITAL ROOM: A LITTLE
MOVING FROM LYING ON BACK TO SITTING ON SIDE OF FLAT BED WITH BEDRAILS: A LITTLE
DRESSING REGULAR LOWER BODY CLOTHING: A LITTLE
CLIMB 3 TO 5 STEPS WITH RAILING: A LOT
TURNING FROM BACK TO SIDE WHILE IN FLAT BAD: A LITTLE
DAILY ACTIVITIY SCORE: 18
EATING MEALS: A LITTLE
HELP NEEDED FOR BATHING: A LITTLE
MOVING TO AND FROM BED TO CHAIR: A LITTLE
TURNING FROM BACK TO SIDE WHILE IN FLAT BAD: A LITTLE
STANDING UP FROM CHAIR USING ARMS: A LITTLE
DAILY ACTIVITIY SCORE: 19

## 2024-03-14 ASSESSMENT — PAIN SCALES - WONG BAKER: WONGBAKER_NUMERICALRESPONSE: NO HURT

## 2024-03-14 ASSESSMENT — PAIN - FUNCTIONAL ASSESSMENT
PAIN_FUNCTIONAL_ASSESSMENT: 0-10

## 2024-03-14 ASSESSMENT — PAIN SCALES - GENERAL
PAINLEVEL_OUTOF10: 0 - NO PAIN

## 2024-03-14 ASSESSMENT — ACTIVITIES OF DAILY LIVING (ADL): HOME_MANAGEMENT_TIME_ENTRY: 2

## 2024-03-14 NOTE — PROGRESS NOTES
"Occupational Therapy    Occupational Therapy Treatment    Name: Amirah Bennett  MRN: [id number]  : 1980  Date: 24  Time Calculation  Start Time: 1408  Stop Time: 1418  Time Calculation (min): 10 min    Assessment:  Evaluation/Treatment Tolerance: Patient tolerated treatment well  Medical Staff Made Aware: Yes  End of Session Communication: Bedside nurse  End of Session Patient Position: Bed, 3 rail up, Alarm on  Plan:  Treatment Interventions: ADL retraining, Functional transfer training, UE strengthening/ROM, Endurance training, Cognitive reorientation, Patient/family training, Equipment evaluation/education, Neuromuscular reeducation, Compensatory technique education  OT Frequency: 4 times per week  OT Discharge Recommendations: High intensity level of continued care  Equipment Recommended upon Discharge:  (TBD at rehab)  OT Recommended Transfer Status: Minimal assist, Assist of 1  OT - OK to Discharge: Yes    Subjective   General:  OT Last Visit  OT Received On: 24  Prior to Session Communication: Bedside nurse  Patient Position Received: Bed, 3 rail up, Alarm on  Family/Caregiver Present: No  General Comment: Pt pleasant and agreeable to OT session, she states \"I am ready to get out of here.\"   Precautions:  Hearing/Visual Limitations: hearing is WFL  Medical Precautions: Fall precautions (SBP <180, falls aphasia, DM, labile)  Precautions Comment: mildly impulsive at times  Cognition:  Orientation Level: Oriented X4    Pain Assessment:  Pain Assessment  Pain Assessment: 0-10  Pain Score: 0 - No pain     Objective   Activities of Daily Living:        Grooming  Grooming Level of Assistance: Contact guard  Grooming Where Assessed: Standing sinkside  Grooming Comments: Pt completed teeth brushing while standing at sink with CGA for safety, pt leaned into sink for balance with education for improved positioning for safety. Pt able to complete with extended time.    UE Dressing  UE Dressing Level of " Assistance: Contact guard  UE Dressing Where Assessed: Edge of bed  UE Dressing Comments: CGA provided while stting EOB to manage dress up around shoulders, cueing for improved IND.,     Bed Mobility/Transfers:     Bed Mobility 1  Bed Mobility 1: Supine to sitting, Sitting to supine  Level of Assistance 1: Close supervision  Bed Mobility Comments 1: SBA for safety, HOB elevated, use of bed rails, cueing to increase safety.    Transfers  Transfer: Yes  Transfer 1  Transfer From 1: Sit to, Stand to  Transfer to 1: Stand, Sit  Technique 1: Sit to stand, Stand to sit  Transfer Device 1: Walker  Transfer Level of Assistance 1: Contact guard  Trials/Comments 1: Cueing for safety     Therapy/Activity:      Therapeutic Activity  Therapeutic Activity Performed: Yes  Therapeutic Activity 1: Pt performed several sit to stands from bench in hallway to walker to increase safety awareness during functional transfers. She benefits from cueing to push up from bench and reach back when sitting. Ot demos increasing safety throughout and verbalizes understanding. Short rest breaks between trials for recovery.   Outcome Measures:  [facility] Daily Activity  Putting on and taking off regular lower body clothing: A little  Bathing (including washing, rinsing, drying): A little  Putting on and taking off regular upper body clothing: A little  Toileting, which includes using toilet, bedpan or urinal: A little  Taking care of personal grooming such as brushing teeth: A little  Eating Meals: None  Daily Activity - Total Score: 19     Education Documentation  ADL Training, taught by Maycol Garcia OT at 3/14/2024  2:30 PM.  Learner: Patient  Readiness: Acceptance  Method: Explanation, Demonstration  Response: Verbalizes Understanding, Needs Reinforcement    Education Comments  No comments found.        Goals:  Encounter Problems       Encounter Problems (Active)       ADLs       Patient with complete upper body dressing with independent level of  assistance donning and doffing all UE clothes with no adaptive equipment. (Progressing)       Start:  03/10/24    Expected End:  03/24/24            Patient with complete lower body dressing with stand by assist level of assistance donning and doffing all LE clothes  with PRN adaptive equipment. (Progressing)       Start:  03/10/24    Expected End:  03/24/24            Patient will complete daily grooming tasks with stand by assist level of assistance and PRN adaptive equipment while standing. (Progressing)       Start:  03/10/24    Expected End:  03/24/24            Patient will complete toileting including hygiene clothing management/hygiene with supervision level of assistance. (Progressing)       Start:  03/10/24    Expected End:  03/24/24            Patient will perform IADLs/simulated household tasks with CGA, utilizing EC/WS principles as needed.O (Progressing)       Start:  03/10/24    Expected End:  03/24/24               BALANCE       Pt will maintain dynamic standing balance during ADL task with contact guard assist level of assistance in order to demonstrate decreased risk of falling and improved postural control. (Progressing)       Start:  03/10/24    Expected End:  03/24/24               COGNITION/SAFETY       Patient will follow 75% Two-step commands to allow improved ADL performance. (Progressing)       Start:  03/10/24    Expected End:  03/24/24            Patient will demonstrated orientation x 3 with use of external cues as needed. (Progressing)       Start:  03/10/24    Expected End:  03/24/24       ORIENTATION            EXERCISE/STRENGTHENING       Patient will complete RUE exercises in order to improve strength and activity tolerance for ADL performance.  (Progressing)       Start:  03/10/24    Expected End:  03/24/24               MOBILITY       Patient will perform Functional mobility Household distances/Community Distances with contact guard assist level of assistance and least restrictive  device in order to improve safety and functional mobility. (Progressing)       Start:  03/10/24    Expected End:  03/24/24               TRANSFERS       Patient will perform bed mobility supervision level of assistance in order to improve safety and independence with mobility (Progressing)       Start:  03/10/24    Expected End:  03/24/24            Patient will complete functional transfers with least restrictive device with contact guard assist level of assistance. (Progressing)       Start:  03/10/24    Expected End:  03/24/24 03/14/24 at 2:30 PM   GARLAND CALDERON, OT   [phone]

## 2024-03-14 NOTE — PROGRESS NOTES
"SUBJECTIVE  Per chart review, patient has not required PRN medications for anxiety/delirium. She received scheduled melatonin. Pending acceptance to acute rehabilitation facility.    On interview, patient was bright and smiling, sitting in bed with ample food on tray. She states that her mood is \"good.\" When asked how she's been spending her time, she states, \"my legs! You know what I mean?\" When asked if she means that she's been working on getting stronger, she affirmed statement. She has not been anxious and denies SI. No issues with sleep.    OBJECTIVE    VITALS      3/13/2024     7:00 AM 3/13/2024    11:00 AM 3/13/2024     4:00 PM 3/13/2024     8:00 PM 3/14/2024     1:00 AM 3/14/2024     5:00 AM 3/14/2024     7:00 AM   Vitals   Systolic 126 120 131 124 113 153 85   Diastolic 90 88 80 74 70 82 66   Heart Rate 78 64 74 66 86 82 77   Temp 36 °C (96.8 °F) 36.4 °C (97.5 °F) 35.9 °C (96.6 °F) 36.1 °C (97 °F) 35.5 °C (95.9 °F) 35.8 °C (96.4 °F) 36.7 °C (98.1 °F)   Resp 18 18 18 18 14 18 18      MENTAL STATUS EXAM  Appearance: 44 yo F, with fair grooming and hygiene, trimmed short black hair, sitting up in bed  Attitude: Bright, animated, engaged, cooperative, pleasant  Behavior: Appropriate eye contact  Motor Activity: No gross PMR/PMA  Speech: Voice produced through trach stoma, increased quantity; slight pauses and delays before responding  Mood: \"good!\"  Affect: Bright though constricted, non-labile  Thought Process: Answers questions appropriately, concrete  Thought Content: Denied SI, no delusions elicited  Thought Perception: Not responding to internal stimuli  Cognition: Alert and grossly oriented; some deficits in word-finding/ability to explain self  Insight: Limited given concreteness  Judgment: Intact    CURRENT MEDICATIONS  Scheduled medications  aspirin, 81 mg, oral, Daily  atorvastatin, 80 mg, oral, Daily  digoxin, 250 mcg, oral, Daily  [Held by provider] empagliflozin, 10 mg, oral, Daily  enoxaparin, 40 " mg, subcutaneous, q24h  folic acid, 1 mg, oral, Daily  furosemide, 40 mg, oral, Daily  gabapentin, 100 mg, oral, TID  insulin lispro, 0-10 Units, subcutaneous, q4h  melatonin, 3 mg, oral, Nightly  metoprolol succinate XL, 50 mg, oral, Daily  multivitamin with minerals, 1 tablet, oral, Daily  pantoprazole, 40 mg, oral, Daily before breakfast  polyethylene glycol, 17 g, oral, Daily  sacubitriL-valsartan, 1 tablet, oral, BID  spironolactone, 25 mg, oral, Daily        Continuous medications       PRN medications  PRN medications: acetaminophen, dextrose 10 % in water (D10W), dextrose, glucagon, [] hydrALAZINE **FOLLOWED BY** hydrALAZINE, lubricating eye drops, magnesium sulfate, magnesium sulfate, ondansetron ODT **OR** ondansetron, oxygen, polyethylene glycol, potassium chloride CR **OR** potassium chloride, potassium chloride CR **OR** potassium chloride, QUEtiapine     LABS  Results for orders placed or performed during the hospital encounter of 24 (from the past 24 hour(s))   POCT GLUCOSE   Result Value Ref Range    POCT Glucose 118 (H) 74 - 99 mg/dL   POCT GLUCOSE   Result Value Ref Range    POCT Glucose 88 74 - 99 mg/dL   POCT GLUCOSE   Result Value Ref Range    POCT Glucose 119 (H) 74 - 99 mg/dL   POCT GLUCOSE   Result Value Ref Range    POCT Glucose 90 74 - 99 mg/dL   POCT GLUCOSE   Result Value Ref Range    POCT Glucose 79 74 - 99 mg/dL        IMAGING  CT head wo IV contrast    Result Date: 3/10/2024  Interpreted By:  Osbaldo Lassiter,  and Ishmael Daniel STUDY: CT HEAD WO IV CONTRAST;  3/10/2024 4:02 am   INDICATION: Signs/Symptoms:fu cerebellar stroke.   COMPARISON: CT head 2024 and MR brain 2024   ACCESSION NUMBER(S): [id number]   ORDERING CLINICIAN: UMU LISA   TECHNIQUE: Noncontrast axial CT scan of head was performed. Angled reformats in brain and bone windows were generated. The images were reviewed in bone, brain, blood and soft tissue windows.   FINDINGS: CSF Spaces: There  is mild prominence of the ventricles and sulci. The basal cisterns are clear. There is no extraaxial fluid collection.   Parenchyma: Redemonstrated large area of encephalomalacia due to left MCA territory infarct. An evolving infarct is again noted in the inferior left cerebellar hemisphere without evidence of hemorrhagic conversion. There is associated edema with partial effacement of the 4th ventricle and effacement of the adjacent cerebellar folia. The grey-white differentiation is otherwise intact. No midline shift.   Calvarium: The calvarium is unremarkable.   Paranasal sinuses and mastoids: Visualized paranasal sinuses and mastoids are clear.       Evolving left inferior cerebellar infarct without hemorrhagic conversion. Similar mass effect with partial effacement of the 4th ventricle and adjacent cerebellar folia.   I personally reviewed the images/study and I agree with the findings as stated by María Quñiones MD. This study was interpreted at [facility], [place], [place].   MACRO: None   Signed by: Osbaldo Lassiter 3/10/2024 8:12 AM Dictation workstation:   [id number]     PSYCHIATRIC RISK ASSESSMENT  Acute Risk of Harm to Others is Considered: Low  Acute Risk of Harm to Self is Considered: Low    ASSESSMENT AND PLAN  Amirah Bennett is a 43 y.o. female with a past psychiatric history of an anxiety disorder, alcohol use disorder, and questionable history of psychosis (schizophrenia v schizoaffective) and past medical history of left MCA stroke s/p thrombectomy in 2022 secondary to atrial fibrillation with left atrial thrombus on Xarelto with residual aphasia and right sided weakness, HFrEF (LVEF 20-25%), and Graves disease who presented to [facility] ED on 3/8/24 with 3-4 days of N/V, acute blurry vision, and worsening aphasia. MRI brain was complete demonstrating an acute cerebellar stroke so patient was admitted to [facility]. Psychiatry was consulted on 3/8/24 for medication management  "given unclear history of psychotic disorder (schizophrenia versus schizoaffective).      On initial assessment, the patient was alert and oriented to person, place, and time but did demonstrate dysarthria and mixed aphasia which may be worse compared to baseline. The patient did not endorse any psychotic symptoms and there is an unclear history of psychosis. No clear indication for patient to remain on quetiapine and patient has not been on the medication since January 2024. However, the patient has utilized quetiapine in other hospitalizations for delirium and anxiety, with good benefit. Given patient has experienced anxiety in previous hospitalizations and reportedly had a panic attack after transfer to [facility] would recommend utilizing quetiapine 12.5mg every 8 hours as needed for anxiety.    EKG (3/9/24): QTc of 459 ms     Update 3/14: No PRNs utilized. Denies anxiety, issues with sleep, or SI. Bright affect, though remains constricted, pleasant, and concrete thought process. Pending discharge to acute rehabilitation. Psychiatry will sign-off at this time.    IMPRESSION  - Delirium, multifactorial, resolved  - Other specified anxiety disorder  - Alcohol use disorder, mild to moderate    RECOMMENDATIONS  Safety:  - Patient does not currently meet criteria for inpatient psychiatric admission.  - Patient does not require a 1:1 sitter from a psychiatric perspective at this time. Defer to primary team decision for 1:1 sitter.  - To evaluate decision-making capacity, recommend use of the Capacity Evaluation Tool. Search “[facility] Capacity Evaluation under SmartText\"    - As with all hospitalized patients, would recommend delirium precautions, as below.         - Frequent reorientation, minimize naps and room/staff changes, open blinds during the day, and dark/quiet room at night.         - Out of bed as tolerated, with early evaluation and intervention by physical therapy.         - Minimize use of restraints to situations " involving patient/staff safety and preventing removal of lines, tubes, medical devices, and dressings.         - Minimize use of benzodiazepines, anticholinergic medications, and opiates (while ensuring adequate treatment of pain).         - Ensure regular bowel and bladder function, nutrition, and hydration.    Medications:  - Please DISCONTINUE quetiapine 12.5 mg PO every 8 hours PRN anxiety on discharge  - Continue melatonin 3 mg PO at bedtime for sleep-wake cycle consolidation    ==========  - Discussed recommendations with primary team.  - Psychiatry will sign-off. Please page [phone] with any questions or concerns.     Patient staffed and discussed with attending, Dr. Rose, who agrees with the above.    Crystal Camejo MD   Adult Psychiatry, PGY-2

## 2024-03-14 NOTE — DISCHARGE INSTRUCTIONS
Dear Ms. Amirah,    It was a pleasure taking care of you [facility] you came in with nausea, vomiting and blurry vision and found to have a left-sided cerebellar (side of your brain that lies posteriorly) stroke.  We believe the stroke was caused because of clot dislodging from your heart due to your irregular heart rhythm and traveling to the brain.  Please continue to take your home Xarelto to prevent strokes in the future.  While you were admitted we also found incidentally a simple cyst in your right ovary as well as problems with your tracheal sites during speaking wound that would require closure.  We have ordered appointments with stroke, cardiology, gynecology, PCP and ENT.     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    Medication changes:   Restart Xarelto 20mg on 3/16:    Follow up:   - Stroke with Dr. Jaimes  - Cardiology for management of your irregular heart rhythm with Dr. Umaña  - PCP - for management of your diabetes, and heart failure  - Gynecology: follow up for R simple cyst of ovary  -ENT for tracheal site fistula closure with Dr King    Stroke prevention:  Eat a healthy diet with plenty of fresh fruits and vegetables.  Avoid salt and fried foods.  Lose weight and exercise on a regular basis.  Do not smoke or use drugs.  Be sure to take all medications.  Call 911 for signs of stroke (numbness or weakness on one side, trouble seeing on one side, trouble speaking (either because your speech is slurred or you can't find the words), sudden double vision, spinning sensation or loss of coordination).    To find a stroke support group: https://www.stroke.org/en/stroke-support-group-finder  To learn about Virtual Stroke Educations sessions: contact Parris Hahn at: [email or web address]    I can be reached at phone: [phone] or email:  [email or web address]      Wishing you a speedy recovery,   neurology

## 2024-03-14 NOTE — PROGRESS NOTES
Amirah Bennett is a 43 y.o. female on day 6 of admission presenting with Altered mental status, unspecified altered mental status type.    Subjective/Overnight Events:   No overnight events  Objective:       24 Hour Vitals  Temp:  [35.5 °C (95.9 °F)-36.7 °C (98.1 °F)] 36.7 °C (98.1 °F)  Heart Rate:  [64-86] 77  Resp:  [14-18] 18  BP: ()/(66-88) 85/66   24 hour Intake/Output    Intake/Output Summary (Last 24 hours) at 3/14/2024 0815  Last data filed at 3/14/2024 0500  Gross per 24 hour   Intake 240 ml   Output --   Net 240 ml         Last BM:         Physical Exam   NEURO:  Alert, oriented to person, place, year, follows simple commands,  EOS, PERRL, EOMI, Possible RHH/neglect,   No dysmmetria  KING 5/5 strength, no drift, no dysmetria  Sensation intact to light touch  CV:  RRR on telemetry, NSR  RESP:  Regular, unlabored  Oxygen: room air  :  No catheter in place  GI:  Abdomen NT/ND, soft  SKIN:  Intact    Labs  Results from last 72 hours   Lab Units 03/13/24  0901 03/12/24  0906   WBC AUTO x10*3/uL 6.7 6.3   HEMOGLOBIN g/dL 14.7 14.3   HEMATOCRIT % 49.0* 48.0*   PLATELETS AUTO x10*3/uL 168 154        Results from last 72 hours   Lab Units 03/13/24  0901 03/12/24  0906   SODIUM mmol/L 136 138   POTASSIUM mmol/L 4.1 4.1   CHLORIDE mmol/L 101 100   CO2 mmol/L 28 28   BUN mg/dL 18 19   CREATININE mg/dL 0.90 0.89   MAGNESIUM mg/dL 1.71 1.72   PHOSPHORUS mg/dL 3.3 4.1           Medications   Scheduled Medications  aspirin, 81 mg, oral, Daily  atorvastatin, 80 mg, oral, Daily  digoxin, 250 mcg, oral, Daily  [Held by provider] empagliflozin, 10 mg, oral, Daily  enoxaparin, 40 mg, subcutaneous, q24h  folic acid, 1 mg, oral, Daily  furosemide, 40 mg, oral, Daily  gabapentin, 100 mg, oral, TID  insulin lispro, 0-10 Units, subcutaneous, q4h  melatonin, 3 mg, oral, Nightly  metoprolol succinate XL, 50 mg, oral, Daily  multivitamin with minerals, 1 tablet, oral, Daily  pantoprazole, 40 mg, oral, Daily before  breakfast  polyethylene glycol, 17 g, oral, Daily  sacubitriL-valsartan, 1 tablet, oral, BID  spironolactone, 25 mg, oral, Daily     Continuous Medications    PRN Medications  PRN medications: acetaminophen, dextrose 10 % in water (D10W), dextrose, glucagon, [] hydrALAZINE **FOLLOWED BY** hydrALAZINE, lubricating eye drops, magnesium sulfate, magnesium sulfate, ondansetron ODT **OR** ondansetron, oxygen, polyethylene glycol, potassium chloride CR **OR** potassium chloride, potassium chloride CR **OR** potassium chloride, QUEtiapine        Assessment/Plan   Amirah Bennett is a 43 y.o. female with PMH of Left MCA stroke s/p thrombectomy in  secondary to Afib with left atrial thrombus on Xarelto, with residual aphasia and right sided weakness, HFrEF (LVEF 20-25% )  presenting with 3-4 days of nausea and vomiting and acute blurry vision and worsening aphasia. Neurology was consulted for concern of stroke recrudescence initially. MRI brain showed left cerebellar infarct. Was admitted to the [facility] for suboccipital craniectomy watch. Neurosurgery signed off as does not need SOC/EVD watch anymore. Stroke will continue to follow. Pending dispo.    Type:  Intracerebral Hemorrhage,   Subtype: Type 2 - cardioembolism  Vessels Involved: L. Vert thrombus on CTA  Neurologic Manifestations:  Aphasia,   Vascular Risk Factors: HTN, DM, Dyslipidemia, etc. (please specify here LDL, Hba1c, TTE).     #Hx L MCA ischemic stroke  #Acute L cerebellar ischemic infarct  #Ischemic stroke  :: HbA1c 5.5, LDL 87,   - Hold anticoagulation for 1 week, resume 3/16 (apixaban 5mg BID)  - Aspirin restarted on 3/12  -PT/OT/SLP - regular diet    #HFrEF (EF 20-25% )  #Afib  ::no signs of acute decompensation at this time  ::follows with Dr Umaña outpatient  -Atorvastatin 80mg every day   -Digoxin 250mg every day  -Lasix 40mg every day   -Metoprolol Succinate 50mg every day   -Xarelto 20mg every day  - hold  -Spironolactone 25mg every  day     #Trach site - transcutaneous fistula  :: increased breaths/mucous discharge from trach site  -ENT consulted - will see outpt for closure  - Patient to be instructed to cover with tape and apply pressure while speaking    #Hx schizophrenia vs schizoaffective d/o   -Psych following  - Seroquel 12.5mg q8hr PRN agitation  - Melatonin 3mg at bedtime    F: prn with caution  E: prn  N: Cardiac  A: PIV     GI: pantoprazole  DVT PPX: subcutaneous lovenox     CODE STATUS: FULL CODE  NOK: Navin Sanches () [phone]    Tonia Mendoza MD  Department of Neurology, PGY-2

## 2024-03-14 NOTE — PROGRESS NOTES
Physical Therapy    Physical Therapy Treatment    Patient Name: Amirah Bennett  MRN: [id number]  Today's Date: 3/14/2024  Time Calculation  Start Time: 1425  Stop Time: 1452  Time Calculation (min): 27 min       Assessment/Plan   PT Assessment  PT Assessment Results: Decreased strength, Decreased range of motion, Decreased endurance, Impaired balance, Decreased mobility, Decreased cognition, Decreased coordination, Decreased safety awareness, Impaired judgement  Rehab Prognosis: Excellent  Evaluation/Treatment Tolerance: Patient tolerated treatment well  Medical Staff Made Aware: Yes  Strengths: Ability to acquire knowledge, Attitude of self, Premorbid level of function  Barriers to Participation:  (none)  End of Session Communication: Bedside nurse  Assessment Comment: 42 yo female with Left MCA encephalomalacia, acute Left cerebellar infarct, delirium, and premorbid aphasia, trach, DM and A-fib. Pt presents with aphasia, ataxia, decreased coordination, decreased activity tolerance and high fall risk; pt with much improved ambulation and mood today today. Recommend high intensity therapy as pt needs all 3 therapies, is not at baseline, appears motivated and is making good gains.  End of Session Patient Position: Bed, 2 rail up, Alarm on  PT Plan  Inpatient/Swing Bed or Outpatient: Inpatient  PT Plan  Treatment/Interventions: Bed mobility, Transfer training, Gait training, Stair training, Balance training, Neuromuscular re-education, Endurance training, Therapeutic activity, Home exercise program, Postural re-education  PT Plan: Skilled PT  PT Frequency: 5 times per week  PT Discharge Recommendations: High intensity level of continued care  Equipment Recommended upon Discharge:  (TBD at rehab)  PT Recommended Transfer Status: Assist x1, Assistive device, Contact guard (WW, CG to min A, cues for safety)  PT - OK to Discharge: Yes (PT evaluation completed and DC recs made)      General Visit Information:   PT  Visit  PT  Received On: 03/14/24  General  Reason for Referral: Reason for Referral: p/w 3-4 days of N/V,  acute blurry vision, & worsening aphasia as well as AMS; dx: L MCA territory encephalomalacia, acute Left cerebellar ischemic infarct, delirium, transcutaneous trach fistula  Past Medical History Relevant to Rehab: Past Medical History Relevant to Rehab: HTN, left MCA stroke s/p thrombectomy in 2022 secondary to Afib with left atrial thrombus (on Xarelto), with residual aphasia and right sided weakness, HFrEF (LVEF 20-25% ), DM, dilated cardiomyopathy (2/2 to alcohol), alcohol use disorder, CKD, Graves disease, prior GI bleeding, schizoaffective d/o  Missed Visit: No  Family/Caregiver Present: No  Co-Treatment:  (N/A)  Prior to Session Communication: Bedside nurse  Patient Position Received: Bed, 3 rail up, Alarm on  Preferred Learning Style: verbal, visual  General Comment: PT supine excited to go to rehab, improved gait and balance, aphasic and slight lability at end of session.    Subjective   Precautions:  Precautions  Medical Precautions: Fall precautions (SBP <180, aphasia, DM, labile)  Precautions Comment: mildly impulsive at times  Vital Signs:  Vital Signs  Heart Rate:  (sitting post gait:  /96  HR 46-60 and O2 93%-100%)  BP Location: Right arm  BP Method: Automatic  Patient Position: Sitting    Objective   Pain:  Pain Assessment  Pain Assessment: 0-10  Pain Score: 0 - No pain  Cognition:  Cognition  Overall Cognitive Status: Impaired  Arousal/Alertness: Delayed responses to stimuli (due to aphasia)  Orientation Level:  (oriented x 4 with choices)  Safety Judgment: Decreased awareness of need for assistance  Problem Solving: Assistance required to identify errors made  Cognition Comments: alert, engaged, euphoric at times/crying at others (labile), excited about her progress and going to rehab  Problem Solving: Exceptions to WFL  Simple Functional Tasks: Impaired  Safety/Judgement: Exceptions to  WFL  Routine Tasks: Minimal  Insight: Mild  Impulsive: Moderately  Processing Speed: Delayed  Postural Control:  Postural Control  Postural Control: Impaired  Trunk Control: decreased in standing (static & dynamic)  Posture Comment: standing: wide GARO, flat feet bilaterally, slight posterior lean  Static Sitting Balance  Static Sitting-Balance Support: Feet supported, Bilateral upper extremity supported  Static Sitting-Level of Assistance: Close supervision  Dynamic Sitting Balance  Dynamic Sitting-Balance Support: Feet supported, No upper extremity supported  Dynamic Sitting-Balance: Reaching for objects  Dynamic Sitting-Comments: SBA/CGA  Static Standing Balance  Static Standing-Balance Support: Bilateral upper extremity supported (on WW)  Static Standing-Level of Assistance: Contact guard  Dynamic Standing Balance  Dynamic Standing-Balance Support: Bilateral upper extremity supported (on WW)  Dynamic Standing-Balance:  (gait including turns)    Activity Tolerance:  Activity Tolerance  Endurance: Endurance does not limit participation in activity  Treatments:       Balance/Neuromuscular Re-Education  Balance/Neuromuscular Re-Education Activity Performed: Yes  Balance/Neuromuscular Re-Education Activity 1: standing balance activities focusing on transfers and static standing without device as noted (too fatigued to do entire Kaplan)    Bed Mobility  Bed Mobility: Yes  Bed Mobility 1  Bed Mobility 1: Supine to sitting, Sitting to supine  Level of Assistance 1: Minimal verbal cues, Minimal tactile cues  Bed Mobility Comments 1: HOB elevated 60 degrees, use of rail, in/out on Right side    Ambulation/Gait Training  Ambulation/Gait Training Performed: Yes  Ambulation/Gait Training 1  Surface 1: Level tile  Device 1: Rolling walker  Assistance 1: Minimum assistance, Minimal verbal cues, Minimal tactile cues (CG/min A, cues for hand placement and safety)  Quality of Gait 1:  (eyes looking at feet intermittently,  ataxic,slow, mild intermittent dizziness)  Comments/Distance (ft) 1: 100' then 70' after 5 minute rest  Transfers  Transfer: Yes  Transfer 1  Transfer From 1: Sit to, Stand to  Transfer to 1: Stand, Sit  Technique 1: Sit to stand, Stand to sit  Transfer Device 1: Walker  Transfer Level of Assistance 1: Contact guard, Minimal verbal cues  Transfers 2  Transfer From 2: Stand to (bench)  Transfer to 2: Stand (bench)  Technique 2: Stand pivot, Stand to sit, Sit to stand  Transfer Device 2: Walker  Transfer Level of Assistance 2: Contact guard, Minimal verbal cues    Stairs  Stairs: No    Other Activity  Other Activity Performed: Yes  Other Activity 1: started to do Kaplan Balance Test but pt became labile and tired/dizzy so only did a few items. CG/cues for transfers, use of hands for transfers, static stand 2 minutes no device with CGA, min A static standing balance eyes closed    Outcome Measures:  [facility] Basic Mobility  Turning from your back to your side while in a flat bed without using bedrails: None  Moving from lying on your back to sitting on the side of a flat bed without using bedrails: A little  Moving to and from bed to chair (including a wheelchair): A little  Standing up from a chair using your arms (e.g. wheelchair or bedside chair): A little  To walk in hospital room: A little  Climbing 3-5 steps with railing: A lot  Basic Mobility - Total Score: 18    Education Documentation  Precautions, taught by Leonarda Dietz, PT at 3/14/2024  3:08 PM.  Learner: Patient  Readiness: Acceptance  Method: Explanation, Demonstration  Response: Verbalizes Understanding, Demonstrated Understanding, Needs Reinforcement  Comment: progress, PT purpose, safe gait/transfers, reoriented    Body Mechanics, taught by Leonarda Dietz, PT at 3/14/2024  3:08 PM.  Learner: Patient  Readiness: Acceptance  Method: Explanation, Demonstration  Response: Verbalizes Understanding, Demonstrated Understanding, Needs Reinforcement  Comment:  progress, PT purpose, safe gait/transfers, reoriented    Home Exercise Program, taught by Leonarda Dietz PT at 3/14/2024  3:08 PM.  Learner: Patient  Readiness: Acceptance  Method: Explanation, Demonstration  Response: Verbalizes Understanding, Demonstrated Understanding, Needs Reinforcement  Comment: progress, PT purpose, safe gait/transfers, reoriented    Mobility Training, taught by Leonarda Dietz PT at 3/14/2024  3:08 PM.  Learner: Patient  Readiness: Acceptance  Method: Explanation, Demonstration  Response: Verbalizes Understanding, Demonstrated Understanding, Needs Reinforcement  Comment: progress, PT purpose, safe gait/transfers, reoriented    Education Comments  No comments found.          Encounter Problems       Encounter Problems (Active)       PT Problem       indep in bed mobility  (Progressing)       Start:  03/10/24    Expected End:  03/24/24            indep in sit<>stand with LRD (Progressing)       Start:  03/10/24    Expected End:  03/24/24            amb x 250 ft, with SBA and LRD, and no acute LOB (Progressing)       Start:  03/10/24    Expected End:  03/24/24            negotiate 1 flight of stairs with CGA, stable vitals, and no acute LOB (Not Progressing)       Start:  03/10/24    Expected End:  03/24/24               Pain - Adult

## 2024-03-14 NOTE — CARE PLAN
Problem: General Stroke  Goal: Establish a mutual long term goal with patient by discharge  Outcome: Adequate for Discharge  Goal: Demonstrate improvement in neurological exam throughout the shift  Outcome: Adequate for Discharge  Goal: Participate in treatment (ie., meds, therapy) throughout shift  Outcome: Adequate for Discharge  Goal: No symptoms of aspiration throughout shift  Outcome: Adequate for Discharge  Goal: Out of bed three times today  Outcome: Adequate for Discharge  Goal: Maintain BP within ordered limits throughout shift  Outcome: Adequate for Discharge  Goal: No symptoms of hemorrhage throughout shift  Outcome: Adequate for Discharge  Goal: Tolerate enteral feeding throughout shift  Outcome: Adequate for Discharge  Goal: Decreased nausea/vomiting throughout shift  Outcome: Adequate for Discharge  Goal: Controlled blood glucose throughout shift  Outcome: Adequate for Discharge     Problem: ICU Stroke  Goal: Maintain patent airway throughout shift  Outcome: Adequate for Discharge  Goal: Maintain ICP within ordered limits throughout shift  Outcome: Adequate for Discharge  Goal: Tolerate EVD clamping trial throughout shift  Outcome: Adequate for Discharge  Goal: Tolerate ventilator weaning trial during shift  Outcome: Adequate for Discharge  Goal: Achieve/maintain targeted sodium level throughout shift  Outcome: Adequate for Discharge     Problem: Skin  Goal: Prevent/manage excess moisture  Outcome: Adequate for Discharge  Goal: Prevent/minimize sheer/friction injuries  Outcome: Adequate for Discharge  Goal: Promote/optimize nutrition  Outcome: Adequate for Discharge     Problem: Pain  Goal: My pain/discomfort is manageable  Outcome: Adequate for Discharge     Problem: Safety  Goal: Patient will be injury free during hospitalization  Outcome: Adequate for Discharge  Goal: I will remain free of falls  Outcome: Adequate for Discharge     Problem: Daily Care  Goal: Daily care needs are met  Outcome:  Adequate for Discharge     Problem: Psychosocial Needs  Goal: Demonstrates ability to cope with hospitalization/illness  Outcome: Adequate for Discharge  Goal: Collaborate with me, my family, and caregiver to identify my specific goals  Outcome: Adequate for Discharge     Problem: Discharge Barriers  Goal: My discharge needs are met  Outcome: Adequate for Discharge     Problem: Pain - Adult  Goal: Verbalizes/displays adequate comfort level or baseline comfort level  Outcome: Adequate for Discharge     Problem: Safety - Adult  Goal: Free from fall injury  Outcome: Adequate for Discharge     Problem: Discharge Planning  Goal: Discharge to home or other facility with appropriate resources  Outcome: Adequate for Discharge     Problem: Chronic Conditions and Co-morbidities  Goal: Patient's chronic conditions and co-morbidity symptoms are monitored and maintained or improved  Outcome: Adequate for Discharge     Problem: Diabetes  Goal: Achieve decreasing blood glucose levels by end of shift  Outcome: Adequate for Discharge  Goal: Increase stability of blood glucose readings by end of shift  Outcome: Adequate for Discharge  Goal: Decrease in ketones present in urine by end of shift  Outcome: Adequate for Discharge  Goal: Maintain electrolyte levels within acceptable range throughout shift  Outcome: Adequate for Discharge  Goal: Maintain glucose levels >70mg/dl to <250mg/dl throughout shift  Outcome: Adequate for Discharge  Goal: No changes in neurological exam by end of shift  Outcome: Adequate for Discharge  Goal: Learn about and adhere to nutrition recommendations by end of shift  Outcome: Adequate for Discharge  Goal: Vital signs within normal range for age by end of shift  Outcome: Adequate for Discharge  Goal: Increase self care and/or family involovement by end of shift  Outcome: Adequate for Discharge  Goal: Receive DSME education by end of shift  Outcome: Adequate for Discharge  The clinical goals for the shift  include pt will remain free of fallsduring shift

## 2024-03-14 NOTE — PROGRESS NOTES
0700 assumed care of pt. Pt in bed resting with eyes closed. Resp even non labored. Care is ongoing.  0900 meds given per MD order pt tolerated well, pt up to the rest room unsteady gait observed nurse assist needed.   1145 orders received fo DC.  1443 report given

## 2024-03-15 ENCOUNTER — LAB REQUISITION (OUTPATIENT)
Dept: LAB | Facility: LAB | Age: 44
End: 2024-03-15
Payer: COMMERCIAL

## 2024-03-15 LAB
ALBUMIN SERPL BCP-MCNC: 3.4 G/DL (ref 3.4–5)
ALP SERPL-CCNC: 44 U/L (ref 33–110)
ALT SERPL W P-5'-P-CCNC: 24 U/L (ref 7–45)
ANION GAP SERPL CALC-SCNC: 12 MMOL/L (ref 10–20)
APO CIII-0/APO CIII-2 SERPL: 0.09 {RATIO}
APO CIII-1/APO CIII-2 SERPL: 1.05 {RATIO}
AST SERPL W P-5'-P-CCNC: 20 U/L (ref 9–39)
BILIRUB SERPL-MCNC: 0.5 MG/DL (ref 0–1.2)
BUN SERPL-MCNC: 14 MG/DL (ref 6–23)
CALCIUM SERPL-MCNC: 8.3 MG/DL (ref 8.6–10.3)
CDT ASIALO/CDT TETRASIALO SERPL: 0
CDT DISIALO/CDT TETRASIALO SERPL: 0.02
CDT TRISIALO/CDT TETRASIALO SERPL: 0.03 {RATIO}
CHLORIDE SERPL-SCNC: 101 MMOL/L (ref 98–107)
CO2 SERPL-SCNC: 28 MMOL/L (ref 21–32)
CREAT SERPL-MCNC: 0.91 MG/DL (ref 0.5–1.05)
EGFRCR SERPLBLD CKD-EPI 2021: 79 ML/MIN/1.73M*2
ERYTHROCYTE [DISTWIDTH] IN BLOOD BY AUTOMATED COUNT: 16.9 % (ref 11.5–14.5)
GLUCOSE SERPL-MCNC: 84 MG/DL (ref 74–99)
HCT VFR BLD AUTO: 44.4 % (ref 36–46)
HGB BLD-MCNC: 13.4 G/DL (ref 12–16)
MCH RBC QN AUTO: 28 PG (ref 26–34)
MCHC RBC AUTO-ENTMCNC: 30.2 G/DL (ref 32–36)
MCV RBC AUTO: 93 FL (ref 80–100)
NRBC BLD-RTO: 0 /100 WBCS (ref 0–0)
OLIGOSACCHARIDE PATTERN SERPL-IMP: NORMAL
PLATELET # BLD AUTO: 169 X10*3/UL (ref 150–450)
POTASSIUM SERPL-SCNC: 4.1 MMOL/L (ref 3.5–5.3)
PROT SERPL-MCNC: 6.1 G/DL (ref 6.4–8.2)
PROVIDER SIGNING NAME: NORMAL
RBC # BLD AUTO: 4.79 X10*6/UL (ref 4–5.2)
REASON FOR REFERRAL (NARRATIVE): NORMAL
SODIUM SERPL-SCNC: 137 MMOL/L (ref 136–145)
WBC # BLD AUTO: 7.2 X10*3/UL (ref 4.4–11.3)

## 2024-03-15 PROCEDURE — 85027 COMPLETE CBC AUTOMATED: CPT

## 2024-03-15 PROCEDURE — 80053 COMPREHEN METABOLIC PANEL: CPT

## 2024-03-15 RX ORDER — POTASSIUM CHLORIDE 20 MEQ/1
10 TABLET, EXTENDED RELEASE ORAL DAILY
Status: CANCELLED | OUTPATIENT
Start: 2024-03-15

## 2024-03-15 NOTE — DISCHARGE SUMMARY
Discharge Diagnosis  Altered mental status, unspecified altered mental status type    Problem List  Active Problems:  There are no active Hospital Problems.      Amirah Bennett is a 43 y.o. female who presented to the hospital with acute blurry vision and worsening aphasia. They were diagnosed with a stroke.  Etiology: Ischemic Stroke: Cardioembolism    Relevant hospital complications: None  Discharge antithrombotics: Aspirin, Xarelto (start on 3/16)  Pending evaluation:  NOne   Issues Requiring Follow-Up  Follow up:   - Stroke with Dr. Jaimes  - Cardiology for management of your irregular heart rhythm with Dr. Umaña  - PCP - for management of your diabetes, and heart failure  - Gynecology: follow up for R simple cyst of ovary  -ENT for tracheal site fistula closure with Dr King  MR brain wo IV contrast    Result Date: 3/9/2024  Interpreted By:  Kali Win and Jiang Sirui STUDY: MR BRAIN WO IV CONTRAST;  3/9/2024 12:08 pm   INDICATION: Signs/Symptoms:New FND.   COMPARISON: CT head 03/08/2024   ACCESSION NUMBER(S): [id number]   ORDERING CLINICIAN: TOPHER VILLA   TECHNIQUE: Axial T2, FLAIR, DWI, gradient echo T2 and sagittal and coronal T1 weighted images of the brain were acquired.   FINDINGS: Evaluation is degraded secondary to motion.   CSF Spaces: There is dilatation of the ventricles at least in part due to global parenchymal volume loss. There is ex vacuo dilatation of the left lateral ventricle. The basal cisterns are patent. No abnormal extra-axial collection.   Parenchyma: The inferior medial left cerebellum has acute/subacute ischemia with diffusion restriction.   There is encephalomalacia and gliosis of the left MCA territory sequela of prior stroke. There is a background of confluence T2 and FLAIR hyperintense signal of the subcortical and periventricular white matter which is nonspecific. No evidence of intracranial hemorrhage. There is no mass effect or midline shift.   Paranasal Sinuses and  Mastoids: Visualized mastoid air cells and paranasal sinuses are clear.   The orbits are unremarkable. The visualized flow voids are patent.       1. The inferior left cerebellum has acute ischemia with restricted diffusion. No hemorrhage is noted. 2. Left-sided encephalomalacia and gliosis compatible with sequela of prior left MCA stroke. 3. Erwin periventricular and subcortical white matter changes which is nonspecific may be seen with chronic small vessel ischemic disease, migraine headaches, demyelinating processes, vasculitis, among others.   I personally reviewed the image(s) / study and I agree with the findings as stated by Lesli Davis MD. This study was interpreted at [place], [place].   MACRO: None.   Signed by: Kali Win 3/9/2024 12:50 PM Dictation workstation:   [id number]   CT head wo IV contrast    Result Date: 3/10/2024  Interpreted By:  Osbaldo Lassiter,  and Ishmael Daniel STUDY: CT HEAD WO IV CONTRAST;  3/10/2024 4:02 am   INDICATION: Signs/Symptoms:fu cerebellar stroke.   COMPARISON: CT head 03/09/2024 and MR brain 03/09/2024   ACCESSION NUMBER(S): [id number]   ORDERING CLINICIAN: UMU LISA   TECHNIQUE: Noncontrast axial CT scan of head was performed. Angled reformats in brain and bone windows were generated. The images were reviewed in bone, brain, blood and soft tissue windows.   FINDINGS: CSF Spaces: There is mild prominence of the ventricles and sulci. The basal cisterns are clear. There is no extraaxial fluid collection.   Parenchyma: Redemonstrated large area of encephalomalacia due to left MCA territory infarct. An evolving infarct is again noted in the inferior left cerebellar hemisphere without evidence of hemorrhagic conversion. There is associated edema with partial effacement of the 4th ventricle and effacement of the adjacent cerebellar folia. The grey-white differentiation is otherwise intact. No midline shift.   Calvarium: The calvarium is  unremarkable.   Paranasal sinuses and mastoids: Visualized paranasal sinuses and mastoids are clear.       Evolving left inferior cerebellar infarct without hemorrhagic conversion. Similar mass effect with partial effacement of the 4th ventricle and adjacent cerebellar folia.   I personally reviewed the images/study and I agree with the findings as stated by María Quiñones MD. This study was interpreted at [facility], [place], [place].   MACRO: None   Signed by: Osbaldo Lassiter 3/10/2024 8:12 AM Dictation workstation:   [id number]    CT head wo IV contrast    Result Date: 3/8/2024  Interpreted By:  Finkelstein, Evan, STUDY: CT HEAD WO IV CONTRAST;  3/8/2024 6:05 am   INDICATION: Signs/Symptoms:Possible AMS.   COMPARISON: CT brain 05/31/2023   ACCESSION NUMBER(S): [id number]   ORDERING CLINICIAN: ULISSES LOONEY   TECHNIQUE: Axial noncontrast CT images of the head with coronal and sagittal reconstructions.   FINDINGS: Extracalvarial soft tissues are unremarkable. Paranasal sinuses and mastoid air cells are aerated. No depressed calvarial fracture. Left frontal/temporal lobe encephalomalacia, similar compared to prior imaging. There are nonspecific periventricular white matter hypodensities. Parenchymal atrophy with prominence of the ventricles and cortical sulci. Ex vacuo dilatation of the left lateral ventricle.         No acute intracranial hemorrhage, mass effect or midline shift.   Sequela of prior left MCA territory infarct. Additional nonspecific scattered white matter hypodensities favored to represent sequela of small vessel ischemia.     MACRO: None.   Signed by: Evan Finkelstein 3/8/2024 6:35 AM Dictation workstation:   [id number]   Transthoracic Echo (TTE) Limited    Result Date: 3/9/2024   [facility], [address] [place] [address]                Tel [phone] and Fax [phone] TRANSTHORACIC ECHOCARDIOGRAM REPORT  Patient Name:      MANUEL  BRYANNA Giron Physician:    [id number] Alex Rao MD Study Date:        3/9/2024             Ordering Provider:    [id number] ROCK DIAZ MRN/PID:           [id number]             Fellow: Accession#:        [id number]         Nurse: Date of Birth/Age: 1980 / 43      Sonographer:          Edmund gillis                                      LOWELL Gender:            F                    Additional Staff: Height:            170.18 cm            Admit Date:           3/8/2024 Weight:            93.90 kg             Admission Status:     Inpatient -                                                               Routine BSA / BMI:         2.05 m2 / 32.42      Encounter#:           [id number]                    kg/m2                                         Department Location:  [facility] Blood Pressure: 109 /86 mmHg Study Type:    TRANSTHORACIC ECHO (TTE) LIMITED Diagnosis/ICD: Cerebral infarction due to thrombosis of left cerebellar                artery-I63.342 Indication:    Cerebrovascular Accident CPT Code:      Echo Limited-40274; Doppler Limited-45336; Color Doppler-45094 Patient History: Pertinent History: Acute stroke, HFrEF, afib, prior ischemic stroke, HTN, DM, Hx                    of stroke. Study Detail: The following Echo studies were performed: 2D, M-Mode, Doppler and               color flow. Agitated saline used as a contrast agent for               intraseptal flow evaluation.  PHYSICIAN INTERPRETATION: Left Ventricle: The left ventricular systolic function is severely decreased, with an estimated ejection fraction of 20-25%. There is global hypokinesis of the left ventricle with minor regional variations. The left ventricular cavity size is mildly dilated. Left ventricular diastolic filling was not assessed. Left Atrium: The left atrium is  mildly dilated. A bubble study using agitated saline was performed. Bubble study is negative. Right Ventricle: The right ventricle is mildly enlarged. There is mildly reduced right ventricular systolic function. Right Atrium: The right atrium is normal in size. Aortic Valve: The aortic valve is trileaflet. Aortic valve regurgitation was not assessed. Mitral Valve: The mitral valve is normal in structure. There is trace mitral valve regurgitation. Tricuspid Valve: The tricuspid valve is structurally normal. There is mild tricuspid regurgitation. The Doppler estimated RVSP is mildly elevated at 33.0 mmHg. Pulmonic Valve: The pulmonic valve is not well visualized. Pulmonic valve regurgitation was not assessed. Pericardium: There is a trivial pericardial effusion. Aorta: The aortic root is normal. Systemic Veins: The inferior vena cava appears to be of normal size. There is IVC inspiratory collapse greater than 50%.  CONCLUSIONS:  1. Left ventricular systolic function is severely decreased with a 20-25% estimated ejection fraction.  2. There is mildly reduced right ventricular systolic function.  3. Mildly elevated RVSP.  4. There is global hypokinesis of the left ventricle with minor regional variations. QUANTITATIVE DATA SUMMARY: TRICUSPID VALVE/RVSP:                             Normal Ranges: Peak TR Velocity: 2.74 m/s RV Syst Pressure: 33.0 mmHg (< 30mmHg)  [id number] Alex Rao MD Electronically signed on 3/9/2024 at 4:24:36 PM  ** Final **        Results from last 7 days   Lab Units 03/10/24  0120   HEMOGLOBIN A1C % 5.5     BNP   Date/Time Value Ref Range Status   03/10/2024 01:20  (H) 0 - 99 pg/mL Final       Test Results Pending At Discharge  Pending Labs       Order Current Status    Carbohydrate deficient transferrin In process    POCT pregnancy, urine In process            Hospital Course  Amirah Bennett is a 43 y.o. female with PMH of Left MCA stroke s/p thrombectomy in 2022 secondary to Afib with left  atrial thrombus on Xarelto, with residual aphasia and right sided weakness, HFrEF (LVEF 20-25% )  presenting with 3-4 days of nausea and vomiting and acute blurry vision and worsening aphasia. Neurology was consulted for concern of stroke recrudescence initially. MRI brain showed left cerebellar infarct. Was admitted to the [facility] for suboccipital craniectomy/EVD watch. Patient was downgraded to Stroke service on 3/11. ENT was consulted to assess patients trach site as patients speech was altered due to air escape and mucous discharge from trach site. They gave patient instructions o apply pressure while speaking and  decided to follow her output for closure. Patient was passed for a diet. She was discharge to acute rehab on 3/14.      Medication changes:   Restart Xarelto 20mg on 3/16:    Follow up:   - Stroke with Dr. Jaimes  - Cardiology for management of your irregular heart rhythm with Dr. Umaña  - PCP - for management of your diabetes, and heart failure  - Gynecology: follow up for R simple cyst of ovary  -ENT for tracheal site fistula closure with Dr King        Pertinent Physical Exam At Time of Discharge  Physical Exam  Neurological Exam  NEURO:  Alert, oriented to person, place, year, follows simple commands,  EOS, PERRL, EOMI, No field deficits (previous right field neglect improved)   No dysmmetria  KING 5/5 strength, no drift, no dysmetria  Sensation intact to light touch  CV:  RRR on telemetry, NSR  RESP:  Regular, unlabored  Oxygen: room air  :  No catheter in place  GI:  Abdomen NT/ND, soft  SKIN:  Intact  Outpatient Follow-Up  Future Appointments   Date Time Provider Department Center   5/13/2024 11:45 AM Efrain King MD [id number]       Tonia Mendoza MD

## 2024-03-15 NOTE — PROGRESS NOTES
Patient set to discharge being transported by community care ambulance going to [facility] patient medical team  spouse and nurse all notified

## 2024-06-07 ENCOUNTER — DOCUMENTATION (OUTPATIENT)
Dept: HOME HEALTH SERVICES | Facility: HOME HEALTH | Age: 44
End: 2024-06-07
Payer: COMMERCIAL

## 2024-06-07 ENCOUNTER — HOME HEALTH ADMISSION (OUTPATIENT)
Dept: HOME HEALTH SERVICES | Facility: HOME HEALTH | Age: 44
End: 2024-06-07
Payer: COMMERCIAL

## 2024-06-07 NOTE — HH CARE COORDINATION
Home Care received a Referral for Nursing. We have processed the referral for a Start of Care on 6/8-6/9/24.     If you have any questions or concerns, please feel free to contact us at [phone]. Follow the prompts, enter your five digit zip code, and you will be directed to your care team on [facility].

## 2024-06-10 ENCOUNTER — HOME CARE VISIT (OUTPATIENT)
Dept: HOME HEALTH SERVICES | Facility: HOME HEALTH | Age: 44
End: 2024-06-10
Payer: COMMERCIAL

## 2024-06-10 PROCEDURE — 169592 NO-PAY CLAIM PROCEDURE

## 2024-06-10 PROCEDURE — G0299 HHS/HOSPICE OF RN EA 15 MIN: HCPCS | Mod: HHH

## 2024-06-11 ASSESSMENT — ENCOUNTER SYMPTOMS
PAIN LOCATION: LEFT LEG
PAIN LOCATION: RIGHT LEG
PAIN: 1
HIGHEST PAIN SEVERITY IN PAST 24 HOURS: 6/10
LOWEST PAIN SEVERITY IN PAST 24 HOURS: 3/10

## 2024-06-11 ASSESSMENT — ACTIVITIES OF DAILY LIVING (ADL): ENTERING_EXITING_HOME: SUPERVISION

## 2024-06-12 VITALS — BODY MASS INDEX: 28.66 KG/M2 | HEIGHT: 67 IN

## 2024-06-12 ASSESSMENT — ENCOUNTER SYMPTOMS
LAST BOWEL MOVEMENT: 67001
LOWER EXTREMITY EDEMA: 1
OCCASIONAL FEELINGS OF UNSTEADINESS: 0
APPETITE LEVEL: GOOD

## 2024-06-12 ASSESSMENT — ACTIVITIES OF DAILY LIVING (ADL): OASIS_M1830: 02

## 2024-06-13 ENCOUNTER — HOME CARE VISIT (OUTPATIENT)
Dept: HOME HEALTH SERVICES | Facility: HOME HEALTH | Age: 44
End: 2024-06-13
Payer: COMMERCIAL

## 2024-06-14 ENCOUNTER — HOME CARE VISIT (OUTPATIENT)
Dept: HOME HEALTH SERVICES | Facility: HOME HEALTH | Age: 44
End: 2024-06-14
Payer: COMMERCIAL

## 2024-06-18 ENCOUNTER — HOME CARE VISIT (OUTPATIENT)
Dept: HOME HEALTH SERVICES | Facility: HOME HEALTH | Age: 44
End: 2024-06-18
Payer: COMMERCIAL

## 2024-06-19 ENCOUNTER — HOME CARE VISIT (OUTPATIENT)
Dept: HOME HEALTH SERVICES | Facility: HOME HEALTH | Age: 44
End: 2024-06-19
Payer: COMMERCIAL

## 2024-06-19 PROCEDURE — G0153 HHCP-SVS OF S/L PATH,EA 15MN: HCPCS | Mod: HHH

## 2024-06-19 PROCEDURE — G0155 HHCP-SVS OF CSW,EA 15 MIN: HCPCS | Mod: HHH

## 2024-06-19 SDOH — HEALTH STABILITY: MENTAL HEALTH: STRESS FACTORS COMMENTS: ONGOING PHYSICAL LIMITATIONS, ANXIETY, ANGER ISSUES

## 2024-06-19 SDOH — HEALTH STABILITY: MENTAL HEALTH: SOCIAL ISOLATION: 1

## 2024-06-19 ASSESSMENT — ACTIVITIES OF DAILY LIVING (ADL)
LAUNDRY_REQUIRES_ASSISTANCE: 1
AMBULATION_REQUIRES_ASSISTANCE: 1
SHOPPING_REQUIRES_ASSISTANCE: 1

## 2024-06-19 ASSESSMENT — ENCOUNTER SYMPTOMS: AGITATION: 1

## 2024-06-20 ENCOUNTER — APPOINTMENT (OUTPATIENT)
Dept: RADIOLOGY | Facility: HOSPITAL | Age: 44
DRG: 239 | End: 2024-06-20
Payer: COMMERCIAL

## 2024-06-20 ENCOUNTER — APPOINTMENT (OUTPATIENT)
Dept: CARDIOLOGY | Facility: HOSPITAL | Age: 44
DRG: 239 | End: 2024-06-20
Payer: COMMERCIAL

## 2024-06-20 ENCOUNTER — HOSPITAL ENCOUNTER (INPATIENT)
Facility: HOSPITAL | Age: 44
End: 2024-06-20
Attending: EMERGENCY MEDICINE | Admitting: INTERNAL MEDICINE
Payer: COMMERCIAL

## 2024-06-20 DIAGNOSIS — F10.10 ETOH ABUSE: ICD-10-CM

## 2024-06-20 DIAGNOSIS — R49.9 VOICE DISORDER: ICD-10-CM

## 2024-06-20 DIAGNOSIS — I10 PRIMARY HYPERTENSION: ICD-10-CM

## 2024-06-20 DIAGNOSIS — M79.89 LEFT ARM SWELLING: ICD-10-CM

## 2024-06-20 DIAGNOSIS — I48.91 ATRIAL FIBRILLATION (MULTI): Primary | ICD-10-CM

## 2024-06-20 DIAGNOSIS — I69.920 APHASIA DUE TO LATE EFFECTS OF CEREBROVASCULAR DISEASE: ICD-10-CM

## 2024-06-20 DIAGNOSIS — I69.351 HEMIPARESIS AFFECTING RIGHT SIDE AS LATE EFFECT OF CEREBROVASCULAR ACCIDENT (MULTI): ICD-10-CM

## 2024-06-20 DIAGNOSIS — R60.0 EDEMA LEG: ICD-10-CM

## 2024-06-20 DIAGNOSIS — J95.04 TRACHEOCUTANEOUS FISTULA FOLLOWING TRACHEOSTOMY (MULTI): ICD-10-CM

## 2024-06-20 DIAGNOSIS — G47.00 INSOMNIA, UNSPECIFIED TYPE: ICD-10-CM

## 2024-06-20 DIAGNOSIS — I51.3 MURAL THROMBUS OF LEFT ATRIUM: ICD-10-CM

## 2024-06-20 DIAGNOSIS — I50.21 ACUTE SYSTOLIC CONGESTIVE HEART FAILURE (MULTI): ICD-10-CM

## 2024-06-20 DIAGNOSIS — G89.0 CENTRAL PAIN SYNDROME: ICD-10-CM

## 2024-06-20 DIAGNOSIS — Z74.09 IMPAIRED MOBILITY: ICD-10-CM

## 2024-06-20 DIAGNOSIS — I70.223 ATHEROSCLEROSIS OF NATIVE ARTERIES OF EXTREMITIES WITH REST PAIN, BILATERAL LEGS (MULTI): ICD-10-CM

## 2024-06-20 DIAGNOSIS — J38.7 LARYNGEAL MASS: ICD-10-CM

## 2024-06-20 DIAGNOSIS — I99.8 ACUTE LOWER EXTREMITY ISCHEMIA: ICD-10-CM

## 2024-06-20 DIAGNOSIS — I73.9 PAD (PERIPHERAL ARTERY DISEASE) (CMS-HCC): ICD-10-CM

## 2024-06-20 DIAGNOSIS — I62.9 INTRACRANIAL HEMORRHAGE (MULTI): ICD-10-CM

## 2024-06-20 DIAGNOSIS — F39 MOOD DISORDER (CMS-HCC): Chronic | ICD-10-CM

## 2024-06-20 DIAGNOSIS — K59.00 CONSTIPATION, UNSPECIFIED CONSTIPATION TYPE: ICD-10-CM

## 2024-06-20 DIAGNOSIS — E66.9 OBESITY, CLASS I, BMI 30-34.9: ICD-10-CM

## 2024-06-20 DIAGNOSIS — I50.9 ACUTE DECOMPENSATED HEART FAILURE (MULTI): ICD-10-CM

## 2024-06-20 DIAGNOSIS — R57.0 CARDIOGENIC SHOCK (MULTI): ICD-10-CM

## 2024-06-20 DIAGNOSIS — R49.9 CHANGE IN VOICE: ICD-10-CM

## 2024-06-20 DIAGNOSIS — I50.22 CHRONIC SYSTOLIC HEART FAILURE (MULTI): ICD-10-CM

## 2024-06-20 DIAGNOSIS — E56.9 VITAMIN DEFICIENCY: ICD-10-CM

## 2024-06-20 DIAGNOSIS — I25.10 CORONARY ARTERY DISEASE INVOLVING NATIVE HEART, UNSPECIFIED VESSEL OR LESION TYPE, UNSPECIFIED WHETHER ANGINA PRESENT: ICD-10-CM

## 2024-06-20 DIAGNOSIS — Z86.73 HISTORY OF STROKE: ICD-10-CM

## 2024-06-20 DIAGNOSIS — Z00.00 ENCOUNTER FOR MEDICAL EXAMINATION TO ESTABLISH CARE: ICD-10-CM

## 2024-06-20 DIAGNOSIS — T81.30XA WOUND DEHISCENCE: ICD-10-CM

## 2024-06-20 DIAGNOSIS — J38.6 LARYNGEAL STENOSIS: ICD-10-CM

## 2024-06-20 DIAGNOSIS — F17.200 NICOTINE USE DISORDER: ICD-10-CM

## 2024-06-20 DIAGNOSIS — I50.9 ACUTE ON CHRONIC CONGESTIVE HEART FAILURE, UNSPECIFIED HEART FAILURE TYPE (MULTI): ICD-10-CM

## 2024-06-20 DIAGNOSIS — I69.391 DYSPHAGIA FOLLOWING CEREBRAL INFARCTION: ICD-10-CM

## 2024-06-20 DIAGNOSIS — R79.89 ELEVATED LFTS: ICD-10-CM

## 2024-06-20 LAB
ABO GROUP (TYPE) IN BLOOD: NORMAL
ALBUMIN SERPL BCP-MCNC: 2.6 G/DL (ref 3.4–5)
ALBUMIN SERPL BCP-MCNC: 2.7 G/DL (ref 3.4–5)
ALBUMIN SERPL BCP-MCNC: 3.3 G/DL (ref 3.4–5)
ALBUMIN SERPL BCP-MCNC: 3.4 G/DL (ref 3.4–5)
ALP SERPL-CCNC: 65 U/L (ref 33–110)
ALP SERPL-CCNC: 72 U/L (ref 33–110)
ALT SERPL W P-5'-P-CCNC: 19 U/L (ref 7–45)
ALT SERPL W P-5'-P-CCNC: 609 U/L (ref 7–45)
AMPHETAMINES UR QL SCN: ABNORMAL
ANION GAP BLDA CALCULATED.4IONS-SCNC: 25 MMO/L (ref 10–25)
ANION GAP BLDA CALCULATED.4IONS-SCNC: 29 MMO/L (ref 10–25)
ANION GAP BLDA CALCULATED.4IONS-SCNC: 33 MMO/L (ref 10–25)
ANION GAP BLDMV CALCULATED.4IONS-SCNC: 22 MMO/L (ref 10–25)
ANION GAP BLDMV CALCULATED.4IONS-SCNC: 30 MMO/L (ref 10–25)
ANION GAP BLDMV CALCULATED.4IONS-SCNC: 32 MMO/L (ref 10–25)
ANION GAP BLDV CALCULATED.4IONS-SCNC: 14 MMOL/L (ref 10–25)
ANION GAP BLDV CALCULATED.4IONS-SCNC: 29 MMOL/L (ref 10–25)
ANION GAP SERPL CALC-SCNC: 22 MMOL/L (ref 10–20)
ANION GAP SERPL CALC-SCNC: 24 MMOL/L (ref 10–20)
ANION GAP SERPL CALC-SCNC: 27 MMOL/L (ref 10–20)
ANION GAP SERPL CALC-SCNC: 31 MMOL/L (ref 10–20)
ANTIBODY SCREEN: NORMAL
APTT PPP: 27 SECONDS (ref 27–38)
AST SERPL W P-5'-P-CCNC: 1996 U/L (ref 9–39)
AST SERPL W P-5'-P-CCNC: 28 U/L (ref 9–39)
ATRIAL RATE: 111 BPM
BARBITURATES UR QL SCN: ABNORMAL
BASE EXCESS BLDA CALC-SCNC: -13.4 MMOL/L (ref -2–3)
BASE EXCESS BLDA CALC-SCNC: -14.2 MMOL/L (ref -2–3)
BASE EXCESS BLDA CALC-SCNC: -18.8 MMOL/L (ref -2–3)
BASE EXCESS BLDMV CALC-SCNC: -15.8 MMOL/L (ref -2–3)
BASE EXCESS BLDMV CALC-SCNC: -16 MMOL/L (ref -2–3)
BASE EXCESS BLDMV CALC-SCNC: -5.2 MMOL/L (ref -2–3)
BASE EXCESS BLDV CALC-SCNC: -17.8 MMOL/L (ref -2–3)
BASE EXCESS BLDV CALC-SCNC: -4.5 MMOL/L (ref -2–3)
BASOPHILS # BLD AUTO: 0.03 X10*3/UL (ref 0–0.1)
BASOPHILS # BLD AUTO: 0.04 X10*3/UL (ref 0–0.1)
BASOPHILS NFR BLD AUTO: 0.2 %
BASOPHILS NFR BLD AUTO: 0.4 %
BENZODIAZ UR QL SCN: ABNORMAL
BILIRUB DIRECT SERPL-MCNC: 2.3 MG/DL (ref 0–0.3)
BILIRUB SERPL-MCNC: 3.2 MG/DL (ref 0–1.2)
BILIRUB SERPL-MCNC: 3.7 MG/DL (ref 0–1.2)
BNP SERPL-MCNC: 263 PG/ML (ref 0–99)
BODY TEMPERATURE: 37 DEGREES CELSIUS
BUN SERPL-MCNC: 25 MG/DL (ref 6–23)
BUN SERPL-MCNC: 26 MG/DL (ref 6–23)
BUN SERPL-MCNC: 29 MG/DL (ref 6–23)
BUN SERPL-MCNC: 31 MG/DL (ref 6–23)
BZE UR QL SCN: ABNORMAL
CA-I BLDA-SCNC: 0.97 MMOL/L (ref 1.1–1.33)
CA-I BLDA-SCNC: 1 MMOL/L (ref 1.1–1.33)
CA-I BLDA-SCNC: 1.07 MMOL/L (ref 1.1–1.33)
CA-I BLDMV-SCNC: 1 MMOL/L (ref 1.1–1.33)
CA-I BLDMV-SCNC: 1.06 MMOL/L (ref 1.1–1.33)
CA-I BLDMV-SCNC: 1.12 MMOL/L (ref 1.1–1.33)
CA-I BLDV-SCNC: 1.01 MMOL/L (ref 1.1–1.33)
CA-I BLDV-SCNC: 1.09 MMOL/L (ref 1.1–1.33)
CALCIUM SERPL-MCNC: 8.6 MG/DL (ref 8.6–10.6)
CALCIUM SERPL-MCNC: 8.7 MG/DL (ref 8.6–10.6)
CALCIUM SERPL-MCNC: 8.9 MG/DL (ref 8.6–10.6)
CALCIUM SERPL-MCNC: 9.1 MG/DL (ref 8.6–10.6)
CANNABINOIDS UR QL SCN: ABNORMAL
CARDIAC TROPONIN I PNL SERPL HS: 289 NG/L (ref 0–34)
CARDIAC TROPONIN I PNL SERPL HS: 313 NG/L (ref 0–34)
CARDIAC TROPONIN I PNL SERPL HS: 326 NG/L (ref 0–34)
CARDIAC TROPONIN I PNL SERPL HS: 95 NG/L (ref 0–34)
CHLORIDE BLD-SCNC: 93 MMOL/L (ref 98–107)
CHLORIDE BLD-SCNC: 94 MMOL/L (ref 98–107)
CHLORIDE BLD-SCNC: 97 MMOL/L (ref 98–107)
CHLORIDE BLDA-SCNC: 94 MMOL/L (ref 98–107)
CHLORIDE BLDA-SCNC: 95 MMOL/L (ref 98–107)
CHLORIDE BLDA-SCNC: 95 MMOL/L (ref 98–107)
CHLORIDE BLDV-SCNC: 91 MMOL/L (ref 98–107)
CHLORIDE BLDV-SCNC: 98 MMOL/L (ref 98–107)
CHLORIDE SERPL-SCNC: 95 MMOL/L (ref 98–107)
CHLORIDE SERPL-SCNC: 97 MMOL/L (ref 98–107)
CHLORIDE SERPL-SCNC: 97 MMOL/L (ref 98–107)
CHLORIDE SERPL-SCNC: 98 MMOL/L (ref 98–107)
CHLORIDE UR-SCNC: 18 MMOL/L
CHLORIDE/CREATININE (MMOL/G) IN URINE: 8 MMOL/G CREAT (ref 38–318)
CO2 SERPL-SCNC: 10 MMOL/L (ref 21–32)
CO2 SERPL-SCNC: 15 MMOL/L (ref 21–32)
CO2 SERPL-SCNC: 15 MMOL/L (ref 21–32)
CO2 SERPL-SCNC: 17 MMOL/L (ref 21–32)
CREAT SERPL-MCNC: 1.33 MG/DL (ref 0.5–1.05)
CREAT SERPL-MCNC: 1.56 MG/DL (ref 0.5–1.05)
CREAT SERPL-MCNC: 1.63 MG/DL (ref 0.5–1.05)
CREAT SERPL-MCNC: 1.81 MG/DL (ref 0.5–1.05)
CREAT UR-MCNC: 239.4 MG/DL (ref 20–320)
EGFRCR SERPLBLD CKD-EPI 2021: 35 ML/MIN/1.73M*2
EGFRCR SERPLBLD CKD-EPI 2021: 39 ML/MIN/1.73M*2
EGFRCR SERPLBLD CKD-EPI 2021: 42 ML/MIN/1.73M*2
EGFRCR SERPLBLD CKD-EPI 2021: 50 ML/MIN/1.73M*2
EOSINOPHIL # BLD AUTO: 0 X10*3/UL (ref 0–0.7)
EOSINOPHIL # BLD AUTO: 0.03 X10*3/UL (ref 0–0.7)
EOSINOPHIL NFR BLD AUTO: 0 %
EOSINOPHIL NFR BLD AUTO: 0.3 %
ERYTHROCYTE [DISTWIDTH] IN BLOOD BY AUTOMATED COUNT: 15.6 % (ref 11.5–14.5)
ERYTHROCYTE [DISTWIDTH] IN BLOOD BY AUTOMATED COUNT: 15.8 % (ref 11.5–14.5)
ERYTHROCYTE [DISTWIDTH] IN BLOOD BY AUTOMATED COUNT: 15.9 % (ref 11.5–14.5)
FENTANYL+NORFENTANYL UR QL SCN: ABNORMAL
GLUCOSE BLD MANUAL STRIP-MCNC: 239 MG/DL (ref 74–99)
GLUCOSE BLD MANUAL STRIP-MCNC: 55 MG/DL (ref 74–99)
GLUCOSE BLD MANUAL STRIP-MCNC: 62 MG/DL (ref 74–99)
GLUCOSE BLD-MCNC: 146 MG/DL (ref 74–99)
GLUCOSE BLD-MCNC: 154 MG/DL (ref 74–99)
GLUCOSE BLD-MCNC: 169 MG/DL (ref 74–99)
GLUCOSE BLDA-MCNC: 103 MG/DL (ref 74–99)
GLUCOSE BLDA-MCNC: 162 MG/DL (ref 74–99)
GLUCOSE BLDA-MCNC: 187 MG/DL (ref 74–99)
GLUCOSE BLDV-MCNC: 107 MG/DL (ref 74–99)
GLUCOSE BLDV-MCNC: ABNORMAL MG/DL
GLUCOSE SERPL-MCNC: 102 MG/DL (ref 74–99)
GLUCOSE SERPL-MCNC: 103 MG/DL (ref 74–99)
GLUCOSE SERPL-MCNC: 153 MG/DL (ref 74–99)
GLUCOSE SERPL-MCNC: 153 MG/DL (ref 74–99)
HCG UR QL IA.RAPID: NEGATIVE
HCO3 BLDA-SCNC: 4.4 MMOL/L (ref 22–26)
HCO3 BLDA-SCNC: 8.5 MMOL/L (ref 22–26)
HCO3 BLDA-SCNC: 9.2 MMOL/L (ref 22–26)
HCO3 BLDMV-SCNC: 18.4 MMOL/L (ref 22–26)
HCO3 BLDMV-SCNC: 9 MMOL/L (ref 22–26)
HCO3 BLDMV-SCNC: 9.4 MMOL/L (ref 22–26)
HCO3 BLDV-SCNC: 11.2 MMOL/L (ref 22–26)
HCO3 BLDV-SCNC: 20.3 MMOL/L (ref 22–26)
HCT VFR BLD AUTO: 35 % (ref 36–46)
HCT VFR BLD AUTO: 36.5 % (ref 36–46)
HCT VFR BLD AUTO: 39.9 % (ref 36–46)
HCT VFR BLD EST: 32 % (ref 36–46)
HCT VFR BLD EST: 35 % (ref 36–46)
HCT VFR BLD EST: 36 % (ref 36–46)
HCT VFR BLD EST: 36 % (ref 36–46)
HCT VFR BLD EST: 40 % (ref 36–46)
HCT VFR BLD EST: 41 % (ref 36–46)
HGB BLD-MCNC: 11.1 G/DL (ref 12–16)
HGB BLD-MCNC: 11.4 G/DL (ref 12–16)
HGB BLD-MCNC: 13.4 G/DL (ref 12–16)
HGB BLDA-MCNC: 11.9 G/DL (ref 12–16)
HGB BLDA-MCNC: 13.2 G/DL (ref 12–16)
HGB BLDA-MCNC: 13.7 G/DL (ref 12–16)
HGB BLDMV-MCNC: 10.7 G/DL (ref 12–16)
HGB BLDMV-MCNC: 11.6 G/DL (ref 12–16)
HGB BLDMV-MCNC: 12.1 G/DL (ref 12–16)
HGB BLDV-MCNC: 13.6 G/DL (ref 12–16)
HGB BLDV-MCNC: 13.7 G/DL (ref 12–16)
IMM GRANULOCYTES # BLD AUTO: 0.03 X10*3/UL (ref 0–0.7)
IMM GRANULOCYTES # BLD AUTO: 0.15 X10*3/UL (ref 0–0.7)
IMM GRANULOCYTES NFR BLD AUTO: 0.3 % (ref 0–0.9)
IMM GRANULOCYTES NFR BLD AUTO: 1.2 % (ref 0–0.9)
INHALED O2 CONCENTRATION: 21 %
INHALED O2 CONCENTRATION: 21 %
INHALED O2 CONCENTRATION: 28 %
INHALED O2 CONCENTRATION: 36 %
INHALED O2 CONCENTRATION: 42 %
INR PPP: 2.1 (ref 0.9–1.1)
LACTATE BLDA-SCNC: 12.3 MMOL/L (ref 0.4–2)
LACTATE BLDA-SCNC: 15.4 MMOL/L (ref 0.4–2)
LACTATE BLDA-SCNC: 8.1 MMOL/L (ref 0.4–2)
LACTATE BLDMV-SCNC: 12.9 MMOL/L (ref 0.4–2)
LACTATE BLDMV-SCNC: 15.3 MMOL/L (ref 0.4–2)
LACTATE BLDMV-SCNC: 8.5 MMOL/L (ref 0.4–2)
LACTATE BLDV-SCNC: 13.6 MMOL/L (ref 0.4–2)
LACTATE BLDV-SCNC: 13.6 MMOL/L (ref 0.4–2)
LACTATE BLDV-SCNC: 4.8 MMOL/L (ref 0.4–2)
LACTATE SERPL-SCNC: 13.5 MMOL/L (ref 0.4–2)
LACTATE SERPL-SCNC: 14.7 MMOL/L (ref 0.4–2)
LACTATE SERPL-SCNC: 16 MMOL/L (ref 0.4–2)
LYMPHOCYTES # BLD AUTO: 2.85 X10*3/UL (ref 1.2–4.8)
LYMPHOCYTES # BLD AUTO: 3.84 X10*3/UL (ref 1.2–4.8)
LYMPHOCYTES NFR BLD AUTO: 23.2 %
LYMPHOCYTES NFR BLD AUTO: 39.5 %
MAGNESIUM SERPL-MCNC: 1.85 MG/DL (ref 1.6–2.4)
MAGNESIUM SERPL-MCNC: 2.08 MG/DL (ref 1.6–2.4)
MCH RBC QN AUTO: 27.7 PG (ref 26–34)
MCH RBC QN AUTO: 27.8 PG (ref 26–34)
MCH RBC QN AUTO: 28.2 PG (ref 26–34)
MCHC RBC AUTO-ENTMCNC: 30.4 G/DL (ref 32–36)
MCHC RBC AUTO-ENTMCNC: 32.6 G/DL (ref 32–36)
MCHC RBC AUTO-ENTMCNC: 33.6 G/DL (ref 32–36)
MCV RBC AUTO: 84 FL (ref 80–100)
MCV RBC AUTO: 85 FL (ref 80–100)
MCV RBC AUTO: 91 FL (ref 80–100)
METHADONE UR QL SCN: ABNORMAL
MONOCYTES # BLD AUTO: 0.65 X10*3/UL (ref 0.1–1)
MONOCYTES # BLD AUTO: 0.82 X10*3/UL (ref 0.1–1)
MONOCYTES NFR BLD AUTO: 5.3 %
MONOCYTES NFR BLD AUTO: 8.4 %
NEUTROPHILS # BLD AUTO: 4.97 X10*3/UL (ref 1.2–7.7)
NEUTROPHILS # BLD AUTO: 8.58 X10*3/UL (ref 1.2–7.7)
NEUTROPHILS NFR BLD AUTO: 51.1 %
NEUTROPHILS NFR BLD AUTO: 70.1 %
NRBC BLD-RTO: 0.1 /100 WBCS (ref 0–0)
NRBC BLD-RTO: 0.2 /100 WBCS (ref 0–0)
NRBC BLD-RTO: 0.2 /100 WBCS (ref 0–0)
OPIATES UR QL SCN: ABNORMAL
OXYCODONE+OXYMORPHONE UR QL SCN: ABNORMAL
OXYHGB MFR BLDA: 96.9 % (ref 94–98)
OXYHGB MFR BLDA: 97.5 % (ref 94–98)
OXYHGB MFR BLDA: 97.5 % (ref 94–98)
OXYHGB MFR BLDMV: 43.5 % (ref 45–75)
OXYHGB MFR BLDMV: 50.9 % (ref 45–75)
OXYHGB MFR BLDMV: 68.9 % (ref 45–75)
OXYHGB MFR BLDV: 11.5 % (ref 45–75)
OXYHGB MFR BLDV: 59.4 % (ref 45–75)
PCO2 BLDA: 14 MM HG (ref 38–42)
PCO2 BLDA: 17 MM HG (ref 38–42)
PCO2 BLDA: 9 MM HG (ref 38–42)
PCO2 BLDMV: 20 MM HG (ref 41–51)
PCO2 BLDMV: 21 MM HG (ref 41–51)
PCO2 BLDMV: 29 MM HG (ref 41–51)
PCO2 BLDV: 36 MM HG (ref 41–51)
PCO2 BLDV: 37 MM HG (ref 41–51)
PCP UR QL SCN: ABNORMAL
PH BLDA: 7.3 PH (ref 7.38–7.42)
PH BLDA: 7.34 PH (ref 7.38–7.42)
PH BLDA: 7.39 PH (ref 7.38–7.42)
PH BLDMV: 7.26 PH (ref 7.33–7.43)
PH BLDMV: 7.26 PH (ref 7.33–7.43)
PH BLDMV: 7.41 PH (ref 7.33–7.43)
PH BLDV: 7.09 PH (ref 7.33–7.43)
PH BLDV: 7.36 PH (ref 7.33–7.43)
PHOSPHATE SERPL-MCNC: 4.7 MG/DL (ref 2.5–4.9)
PHOSPHATE SERPL-MCNC: 4.9 MG/DL (ref 2.5–4.9)
PHOSPHATE SERPL-MCNC: 5.8 MG/DL (ref 2.5–4.9)
PLATELET # BLD AUTO: 139 X10*3/UL (ref 150–450)
PLATELET # BLD AUTO: 149 X10*3/UL (ref 150–450)
PLATELET # BLD AUTO: 212 X10*3/UL (ref 150–450)
PO2 BLDA: 116 MM HG (ref 85–95)
PO2 BLDA: 159 MM HG (ref 85–95)
PO2 BLDA: 168 MM HG (ref 85–95)
PO2 BLDMV: 34 MM HG (ref 35–45)
PO2 BLDMV: 40 MM HG (ref 35–45)
PO2 BLDMV: 44 MM HG (ref 35–45)
PO2 BLDV: 18 MM HG (ref 35–45)
PO2 BLDV: 42 MM HG (ref 35–45)
POTASSIUM BLDA-SCNC: 3.6 MMOL/L (ref 3.5–5.3)
POTASSIUM BLDA-SCNC: 3.8 MMOL/L (ref 3.5–5.3)
POTASSIUM BLDA-SCNC: 3.9 MMOL/L (ref 3.5–5.3)
POTASSIUM BLDMV-SCNC: 3.4 MMOL/L (ref 3.5–5.3)
POTASSIUM BLDMV-SCNC: 3.5 MMOL/L (ref 3.5–5.3)
POTASSIUM BLDMV-SCNC: 3.6 MMOL/L (ref 3.5–5.3)
POTASSIUM BLDV-SCNC: 3.4 MMOL/L (ref 3.5–5.3)
POTASSIUM BLDV-SCNC: 3.9 MMOL/L (ref 3.5–5.3)
POTASSIUM SERPL-SCNC: 3.4 MMOL/L (ref 3.5–5.3)
POTASSIUM SERPL-SCNC: 3.5 MMOL/L (ref 3.5–5.3)
POTASSIUM SERPL-SCNC: 3.6 MMOL/L (ref 3.5–5.3)
POTASSIUM SERPL-SCNC: 3.9 MMOL/L (ref 3.5–5.3)
POTASSIUM UR-SCNC: 44 MMOL/L
POTASSIUM/CREAT UR-RTO: 18 MMOL/G CREAT
PROCALCITONIN SERPL-MCNC: 0.28 NG/ML
PROCALCITONIN SERPL-MCNC: 0.56 NG/ML
PROT SERPL-MCNC: 5.6 G/DL (ref 6.4–8.2)
PROT SERPL-MCNC: 7 G/DL (ref 6.4–8.2)
PROTHROMBIN TIME: 23.9 SECONDS (ref 9.8–12.8)
Q ONSET: 215 MS
Q ONSET: 216 MS
QRS COUNT: 19 BEATS
QRS COUNT: 31 BEATS
QRS DURATION: 82 MS
QRS DURATION: 90 MS
QT INTERVAL: 242 MS
QT INTERVAL: 340 MS
QTC CALCULATION(BAZETT): 431 MS
QTC CALCULATION(BAZETT): 462 MS
QTC FREDERICIA: 356 MS
QTC FREDERICIA: 417 MS
R AXIS: 56 DEGREES
R AXIS: 9 DEGREES
RBC # BLD AUTO: 4 X10*6/UL (ref 4–5.2)
RBC # BLD AUTO: 4.11 X10*6/UL (ref 4–5.2)
RBC # BLD AUTO: 4.75 X10*6/UL (ref 4–5.2)
RH FACTOR (ANTIGEN D): NORMAL
SAO2 % BLDA: 100 % (ref 94–100)
SAO2 % BLDA: 100 % (ref 94–100)
SAO2 % BLDA: 99 % (ref 94–100)
SAO2 % BLDMV: 44 % (ref 45–75)
SAO2 % BLDMV: 52 % (ref 45–75)
SAO2 % BLDMV: 70 % (ref 45–75)
SAO2 % BLDV: 12 % (ref 45–75)
SAO2 % BLDV: 60 % (ref 45–75)
SODIUM BLDA-SCNC: 125 MMOL/L (ref 136–145)
SODIUM BLDA-SCNC: 128 MMOL/L (ref 136–145)
SODIUM BLDA-SCNC: 130 MMOL/L (ref 136–145)
SODIUM BLDMV-SCNC: 130 MMOL/L (ref 136–145)
SODIUM BLDMV-SCNC: 131 MMOL/L (ref 136–145)
SODIUM BLDMV-SCNC: 133 MMOL/L (ref 136–145)
SODIUM BLDV-SCNC: 127 MMOL/L (ref 136–145)
SODIUM BLDV-SCNC: 129 MMOL/L (ref 136–145)
SODIUM SERPL-SCNC: 130 MMOL/L (ref 136–145)
SODIUM SERPL-SCNC: 133 MMOL/L (ref 136–145)
SODIUM SERPL-SCNC: 135 MMOL/L (ref 136–145)
SODIUM SERPL-SCNC: 135 MMOL/L (ref 136–145)
SODIUM UR-SCNC: 18 MMOL/L
SODIUM/CREAT UR-RTO: 8 MMOL/G CREAT
T AXIS: -20 DEGREES
T AXIS: 155 DEGREES
T OFFSET: 336 MS
T OFFSET: 386 MS
T4 FREE SERPL-MCNC: 1.48 NG/DL (ref 0.78–1.48)
TSH SERPL-ACNC: 6.28 MIU/L (ref 0.44–3.98)
UFH PPP CHRO-ACNC: 0.6 IU/ML
UFH PPP CHRO-ACNC: 0.6 IU/ML
UFH PPP CHRO-ACNC: 1 IU/ML
VENTRICULAR RATE: 111 BPM
VENTRICULAR RATE: 191 BPM
WBC # BLD AUTO: 12.3 X10*3/UL (ref 4.4–11.3)
WBC # BLD AUTO: 15.1 X10*3/UL (ref 4.4–11.3)
WBC # BLD AUTO: 9.7 X10*3/UL (ref 4.4–11.3)

## 2024-06-20 PROCEDURE — 93010 ELECTROCARDIOGRAM REPORT: CPT | Performed by: INTERNAL MEDICINE

## 2024-06-20 PROCEDURE — 71275 CT ANGIOGRAPHY CHEST: CPT | Performed by: RADIOLOGY

## 2024-06-20 PROCEDURE — 2500000004 HC RX 250 GENERAL PHARMACY W/ HCPCS (ALT 636 FOR OP/ED)

## 2024-06-20 PROCEDURE — 2500000005 HC RX 250 GENERAL PHARMACY W/O HCPCS: Mod: SE

## 2024-06-20 PROCEDURE — 2550000001 HC RX 255 CONTRASTS: Performed by: EMERGENCY MEDICINE

## 2024-06-20 PROCEDURE — 84132 ASSAY OF SERUM POTASSIUM: CPT | Mod: 91

## 2024-06-20 PROCEDURE — 85027 COMPLETE CBC AUTOMATED: CPT

## 2024-06-20 PROCEDURE — 96375 TX/PRO/DX INJ NEW DRUG ADDON: CPT | Mod: 59

## 2024-06-20 PROCEDURE — 92960 CARDIOVERSION ELECTRIC EXT: CPT

## 2024-06-20 PROCEDURE — 84484 ASSAY OF TROPONIN QUANT: CPT

## 2024-06-20 PROCEDURE — 81025 URINE PREGNANCY TEST: CPT

## 2024-06-20 PROCEDURE — 83605 ASSAY OF LACTIC ACID: CPT | Mod: 91

## 2024-06-20 PROCEDURE — 85025 COMPLETE CBC W/AUTO DIFF WBC: CPT

## 2024-06-20 PROCEDURE — 93005 ELECTROCARDIOGRAM TRACING: CPT

## 2024-06-20 PROCEDURE — 85025 COMPLETE CBC W/AUTO DIFF WBC: CPT | Mod: 91

## 2024-06-20 PROCEDURE — 84132 ASSAY OF SERUM POTASSIUM: CPT

## 2024-06-20 PROCEDURE — 99291 CRITICAL CARE FIRST HOUR: CPT

## 2024-06-20 PROCEDURE — C1769 GUIDE WIRE: HCPCS | Performed by: INTERNAL MEDICINE

## 2024-06-20 PROCEDURE — 84443 ASSAY THYROID STIM HORMONE: CPT

## 2024-06-20 PROCEDURE — 96374 THER/PROPH/DIAG INJ IV PUSH: CPT | Mod: 59

## 2024-06-20 PROCEDURE — 93451 RIGHT HEART CATH: CPT | Performed by: INTERNAL MEDICINE

## 2024-06-20 PROCEDURE — 85610 PROTHROMBIN TIME: CPT | Performed by: STUDENT IN AN ORGANIZED HEALTH CARE EDUCATION/TRAINING PROGRAM

## 2024-06-20 PROCEDURE — 2500000005 HC RX 250 GENERAL PHARMACY W/O HCPCS

## 2024-06-20 PROCEDURE — 83735 ASSAY OF MAGNESIUM: CPT

## 2024-06-20 PROCEDURE — 84484 ASSAY OF TROPONIN QUANT: CPT | Mod: 91

## 2024-06-20 PROCEDURE — 71045 X-RAY EXAM CHEST 1 VIEW: CPT | Performed by: STUDENT IN AN ORGANIZED HEALTH CARE EDUCATION/TRAINING PROGRAM

## 2024-06-20 PROCEDURE — 2500000004 HC RX 250 GENERAL PHARMACY W/ HCPCS (ALT 636 FOR OP/ED): Mod: SE

## 2024-06-20 PROCEDURE — 36620 INSERTION CATHETER ARTERY: CPT | Performed by: INTERNAL MEDICINE

## 2024-06-20 PROCEDURE — 2720000007 HC OR 272 NO HCPCS: Performed by: INTERNAL MEDICINE

## 2024-06-20 PROCEDURE — 36415 COLL VENOUS BLD VENIPUNCTURE: CPT

## 2024-06-20 PROCEDURE — 2500000001 HC RX 250 WO HCPCS SELF ADMINISTERED DRUGS (ALT 637 FOR MEDICARE OP)

## 2024-06-20 PROCEDURE — 71275 CT ANGIOGRAPHY CHEST: CPT

## 2024-06-20 PROCEDURE — 80307 DRUG TEST PRSMV CHEM ANLYZR: CPT

## 2024-06-20 PROCEDURE — 71045 X-RAY EXAM CHEST 1 VIEW: CPT

## 2024-06-20 PROCEDURE — 83880 ASSAY OF NATRIURETIC PEPTIDE: CPT

## 2024-06-20 PROCEDURE — 37799 UNLISTED PX VASCULAR SURGERY: CPT

## 2024-06-20 PROCEDURE — 82436 ASSAY OF URINE CHLORIDE: CPT

## 2024-06-20 PROCEDURE — C1751 CATH, INF, PER/CENT/MIDLINE: HCPCS | Performed by: INTERNAL MEDICINE

## 2024-06-20 PROCEDURE — 92960 CARDIOVERSION ELECTRIC EXT: CPT | Performed by: EMERGENCY MEDICINE

## 2024-06-20 PROCEDURE — 83735 ASSAY OF MAGNESIUM: CPT | Mod: 91

## 2024-06-20 PROCEDURE — C1894 INTRO/SHEATH, NON-LASER: HCPCS | Performed by: INTERNAL MEDICINE

## 2024-06-20 PROCEDURE — 85520 HEPARIN ASSAY: CPT | Mod: 91

## 2024-06-20 PROCEDURE — 84520 ASSAY OF UREA NITROGEN: CPT

## 2024-06-20 PROCEDURE — 86900 BLOOD TYPING SEROLOGIC ABO: CPT

## 2024-06-20 PROCEDURE — 84132 ASSAY OF SERUM POTASSIUM: CPT | Mod: 91 | Performed by: INTERNAL MEDICINE

## 2024-06-20 PROCEDURE — 99291 CRITICAL CARE FIRST HOUR: CPT | Mod: 25 | Performed by: STUDENT IN AN ORGANIZED HEALTH CARE EDUCATION/TRAINING PROGRAM

## 2024-06-20 PROCEDURE — 84439 ASSAY OF FREE THYROXINE: CPT

## 2024-06-20 PROCEDURE — 84145 PROCALCITONIN (PCT): CPT | Mod: 91

## 2024-06-20 PROCEDURE — 5A2204Z RESTORATION OF CARDIAC RHYTHM, SINGLE: ICD-10-PCS | Performed by: STUDENT IN AN ORGANIZED HEALTH CARE EDUCATION/TRAINING PROGRAM

## 2024-06-20 PROCEDURE — 87040 BLOOD CULTURE FOR BACTERIA: CPT

## 2024-06-20 PROCEDURE — 99291 CRITICAL CARE FIRST HOUR: CPT | Performed by: EMERGENCY MEDICINE

## 2024-06-20 PROCEDURE — 84075 ASSAY ALKALINE PHOSPHATASE: CPT

## 2024-06-20 PROCEDURE — 2500000005 HC RX 250 GENERAL PHARMACY W/O HCPCS: Performed by: INTERNAL MEDICINE

## 2024-06-20 PROCEDURE — 4A023N6 MEASUREMENT OF CARDIAC SAMPLING AND PRESSURE, RIGHT HEART, PERCUTANEOUS APPROACH: ICD-10-PCS | Performed by: INTERNAL MEDICINE

## 2024-06-20 PROCEDURE — 2020000001 HC ICU ROOM DAILY

## 2024-06-20 PROCEDURE — 82947 ASSAY GLUCOSE BLOOD QUANT: CPT | Mod: 91

## 2024-06-20 PROCEDURE — 86901 BLOOD TYPING SEROLOGIC RH(D): CPT

## 2024-06-20 PROCEDURE — 84100 ASSAY OF PHOSPHORUS: CPT

## 2024-06-20 RX ORDER — HEPARIN SODIUM 10000 [USP'U]/100ML
0-4500 INJECTION, SOLUTION INTRAVENOUS CONTINUOUS
Status: DISCONTINUED | OUTPATIENT
Start: 2024-06-20 | End: 2024-06-25

## 2024-06-20 RX ORDER — CHLOROTHIAZIDE SODIUM 500 MG/1
1000 INJECTION INTRAVENOUS ONCE
Status: COMPLETED | OUTPATIENT
Start: 2024-06-20 | End: 2024-06-20

## 2024-06-20 RX ORDER — TALC
3 POWDER (GRAM) TOPICAL NIGHTLY
Status: DISCONTINUED | OUTPATIENT
Start: 2024-06-20 | End: 2024-06-26

## 2024-06-20 RX ORDER — NAPROXEN SODIUM 220 MG/1
81 TABLET, FILM COATED ORAL
Status: DISCONTINUED | OUTPATIENT
Start: 2024-06-20 | End: 2024-07-24 | Stop reason: HOSPADM

## 2024-06-20 RX ORDER — FUROSEMIDE 10 MG/ML
40 INJECTION INTRAMUSCULAR; INTRAVENOUS ONCE
Status: DISCONTINUED | OUTPATIENT
Start: 2024-06-20 | End: 2024-06-20

## 2024-06-20 RX ORDER — CALCIUM GLUCONATE 20 MG/ML
1 INJECTION, SOLUTION INTRAVENOUS ONCE
Status: COMPLETED | OUTPATIENT
Start: 2024-06-20 | End: 2024-06-20

## 2024-06-20 RX ORDER — HEPARIN SODIUM 10000 [USP'U]/100ML
INJECTION, SOLUTION INTRAVENOUS
Status: COMPLETED
Start: 2024-06-20 | End: 2024-06-20

## 2024-06-20 RX ORDER — DEXTROSE 50 % IN WATER (D50W) INTRAVENOUS SYRINGE
12.5
Status: DISCONTINUED | OUTPATIENT
Start: 2024-06-20 | End: 2024-07-24 | Stop reason: HOSPADM

## 2024-06-20 RX ORDER — DIGOXIN 0.25 MG/ML
INJECTION INTRAMUSCULAR; INTRAVENOUS
Status: COMPLETED
Start: 2024-06-20 | End: 2024-06-20

## 2024-06-20 RX ORDER — MAGNESIUM SULFATE HEPTAHYDRATE 40 MG/ML
2 INJECTION, SOLUTION INTRAVENOUS ONCE
Status: COMPLETED | OUTPATIENT
Start: 2024-06-20 | End: 2024-06-20

## 2024-06-20 RX ORDER — NOREPINEPHRINE BITARTRATE/D5W 8 MG/250ML
PLASTIC BAG, INJECTION (ML) INTRAVENOUS
Status: DISPENSED
Start: 2024-06-20 | End: 2024-06-20

## 2024-06-20 RX ORDER — SODIUM BICARBONATE 1 MEQ/ML
50 SYRINGE (ML) INTRAVENOUS ONCE
Status: COMPLETED | OUTPATIENT
Start: 2024-06-20 | End: 2024-06-20

## 2024-06-20 RX ORDER — VANCOMYCIN HYDROCHLORIDE 1 G/20ML
INJECTION, POWDER, LYOPHILIZED, FOR SOLUTION INTRAVENOUS DAILY PRN
Status: DISCONTINUED | OUTPATIENT
Start: 2024-06-20 | End: 2024-06-21

## 2024-06-20 RX ORDER — CALCIUM GLUCONATE 98 MG/ML
1 INJECTION, SOLUTION INTRAVENOUS ONCE
Status: DISCONTINUED | OUTPATIENT
Start: 2024-06-20 | End: 2024-06-20

## 2024-06-20 RX ORDER — SPIRONOLACTONE 25 MG/1
25 TABLET ORAL
Status: DISCONTINUED | OUTPATIENT
Start: 2024-06-20 | End: 2024-06-27

## 2024-06-20 RX ORDER — METOPROLOL TARTRATE 1 MG/ML
INJECTION, SOLUTION INTRAVENOUS
Status: COMPLETED
Start: 2024-06-20 | End: 2024-06-20

## 2024-06-20 RX ORDER — GABAPENTIN 100 MG/1
100 CAPSULE ORAL 3 TIMES DAILY
Status: DISCONTINUED | OUTPATIENT
Start: 2024-06-20 | End: 2024-06-27

## 2024-06-20 RX ORDER — POTASSIUM CHLORIDE 14.9 MG/ML
20 INJECTION INTRAVENOUS ONCE
Status: COMPLETED | OUTPATIENT
Start: 2024-06-20 | End: 2024-06-20

## 2024-06-20 RX ORDER — BUMETANIDE 0.25 MG/ML
4 INJECTION INTRAMUSCULAR; INTRAVENOUS ONCE
Status: COMPLETED | OUTPATIENT
Start: 2024-06-20 | End: 2024-06-20

## 2024-06-20 RX ORDER — INSULIN LISPRO 100 [IU]/ML
0-5 INJECTION, SOLUTION INTRAVENOUS; SUBCUTANEOUS EVERY 4 HOURS
Status: DISCONTINUED | OUTPATIENT
Start: 2024-06-20 | End: 2024-07-03

## 2024-06-20 RX ORDER — SODIUM NITROPRUSSIDE IN 0.9% SODIUM CHLORIDE 0.5 MG/ML
.25-5 INJECTION INTRAVENOUS CONTINUOUS
Status: DISCONTINUED | OUTPATIENT
Start: 2024-06-20 | End: 2024-06-29

## 2024-06-20 RX ORDER — LIDOCAINE HYDROCHLORIDE 10 MG/ML
INJECTION, SOLUTION EPIDURAL; INFILTRATION; INTRACAUDAL; PERINEURAL AS NEEDED
Status: DISCONTINUED | OUTPATIENT
Start: 2024-06-20 | End: 2024-06-20 | Stop reason: HOSPADM

## 2024-06-20 RX ORDER — EPINEPHRINE 1 MG/ML
INJECTION INTRAMUSCULAR; INTRAVENOUS; SUBCUTANEOUS
Status: DISPENSED
Start: 2024-06-20 | End: 2024-06-20

## 2024-06-20 RX ORDER — DIGOXIN 0.25 MG/ML
250 INJECTION INTRAMUSCULAR; INTRAVENOUS ONCE
Status: COMPLETED | OUTPATIENT
Start: 2024-06-20 | End: 2024-06-20

## 2024-06-20 RX ORDER — POTASSIUM CHLORIDE 1.5 G/1.58G
20 POWDER, FOR SOLUTION ORAL ONCE
Status: DISCONTINUED | OUTPATIENT
Start: 2024-06-20 | End: 2024-06-20

## 2024-06-20 RX ORDER — DEXTROSE 50 % IN WATER (D50W) INTRAVENOUS SYRINGE
25
Status: DISCONTINUED | OUTPATIENT
Start: 2024-06-20 | End: 2024-07-24 | Stop reason: HOSPADM

## 2024-06-20 RX ORDER — DOBUTAMINE HYDROCHLORIDE 400 MG/100ML
2.5-2 INJECTION INTRAVENOUS CONTINUOUS
Status: DISCONTINUED | OUTPATIENT
Start: 2024-06-20 | End: 2024-06-27

## 2024-06-20 RX ORDER — CALCIUM GLUCONATE 20 MG/ML
2 INJECTION, SOLUTION INTRAVENOUS ONCE
Status: COMPLETED | OUTPATIENT
Start: 2024-06-20 | End: 2024-06-20

## 2024-06-20 RX ORDER — FOLIC ACID 1 MG/1
1 TABLET ORAL DAILY
Status: DISCONTINUED | OUTPATIENT
Start: 2024-06-20 | End: 2024-06-27

## 2024-06-20 RX ORDER — DOBUTAMINE HYDROCHLORIDE 400 MG/100ML
INJECTION INTRAVENOUS
Status: COMPLETED
Start: 2024-06-20 | End: 2024-06-20

## 2024-06-20 RX ORDER — METOPROLOL SUCCINATE 50 MG/1
50 TABLET, EXTENDED RELEASE ORAL DAILY
Status: DISCONTINUED | OUTPATIENT
Start: 2024-06-20 | End: 2024-06-27

## 2024-06-20 RX ORDER — PANTOPRAZOLE SODIUM 40 MG/1
40 TABLET, DELAYED RELEASE ORAL
Status: DISCONTINUED | OUTPATIENT
Start: 2024-06-21 | End: 2024-06-22

## 2024-06-20 RX ORDER — POTASSIUM CHLORIDE 14.9 MG/ML
20 INJECTION INTRAVENOUS ONCE
Status: COMPLETED | OUTPATIENT
Start: 2024-06-20 | End: 2024-06-21

## 2024-06-20 RX ORDER — ATORVASTATIN CALCIUM 80 MG/1
80 TABLET, FILM COATED ORAL DAILY
Status: DISCONTINUED | OUTPATIENT
Start: 2024-06-20 | End: 2024-06-22

## 2024-06-20 RX ADMIN — AMIODARONE HYDROCHLORIDE 1 MG/MIN: 1.8 INJECTION, SOLUTION INTRAVENOUS at 08:19

## 2024-06-20 RX ADMIN — ASPIRIN 81 MG CHEWABLE TABLET 81 MG: 81 TABLET CHEWABLE at 10:45

## 2024-06-20 RX ADMIN — Medication 2 L/MIN: at 20:00

## 2024-06-20 RX ADMIN — DOBUTAMINE HYDROCHLORIDE 7.5 MCG/KG/MIN: 400 INJECTION INTRAVENOUS at 16:38

## 2024-06-20 RX ADMIN — SODIUM BICARBONATE 50 MEQ: 84 INJECTION INTRAVENOUS at 19:48

## 2024-06-20 RX ADMIN — METOPROLOL TARTRATE 5 MG: 1 INJECTION, SOLUTION INTRAVENOUS at 07:43

## 2024-06-20 RX ADMIN — HEPARIN SODIUM AND DEXTROSE 1600 UNITS/HR: 10000; 5 INJECTION INTRAVENOUS at 09:12

## 2024-06-20 RX ADMIN — BUMETANIDE 4 MG: 0.25 INJECTION INTRAMUSCULAR; INTRAVENOUS at 18:17

## 2024-06-20 RX ADMIN — HEPARIN SODIUM 1600 UNITS/HR: 10000 INJECTION, SOLUTION INTRAVENOUS at 09:12

## 2024-06-20 RX ADMIN — HEPARIN SODIUM 1600 UNITS/HR: 10000 INJECTION, SOLUTION INTRAVENOUS at 20:57

## 2024-06-20 RX ADMIN — CHLOROTHIAZIDE SODIUM 1000 MG: 500 INJECTION, POWDER, LYOPHILIZED, FOR SOLUTION INTRAVENOUS at 12:36

## 2024-06-20 RX ADMIN — BUMETANIDE 2 MG/HR: 0.25 INJECTION INTRAMUSCULAR; INTRAVENOUS at 18:18

## 2024-06-20 RX ADMIN — POTASSIUM CHLORIDE 20 MEQ: 14.9 INJECTION, SOLUTION INTRAVENOUS at 16:49

## 2024-06-20 RX ADMIN — MAGNESIUM SULFATE HEPTAHYDRATE 2 G: 40 INJECTION, SOLUTION INTRAVENOUS at 16:52

## 2024-06-20 RX ADMIN — DEXTROSE MONOHYDRATE 25 G: 25 INJECTION, SOLUTION INTRAVENOUS at 12:37

## 2024-06-20 RX ADMIN — AMIODARONE HYDROCHLORIDE 1 MG/MIN: 1.8 INJECTION, SOLUTION INTRAVENOUS at 16:37

## 2024-06-20 RX ADMIN — BUMETANIDE 4 MG: 0.25 INJECTION INTRAMUSCULAR; INTRAVENOUS at 10:29

## 2024-06-20 RX ADMIN — CALCIUM GLUCONATE 1 G: 20 INJECTION, SOLUTION INTRAVENOUS at 20:12

## 2024-06-20 RX ADMIN — ATORVASTATIN CALCIUM 80 MG: 80 TABLET, FILM COATED ORAL at 10:45

## 2024-06-20 RX ADMIN — IOHEXOL 90 ML: 350 INJECTION, SOLUTION INTRAVENOUS at 09:34

## 2024-06-20 RX ADMIN — SODIUM NITROPRUSSIDE 0.5 MCG/KG/MIN: 0.5 INJECTION, SOLUTION INTRAVENOUS at 23:52

## 2024-06-20 RX ADMIN — AMIODARONE HYDROCHLORIDE 1 MG/MIN: 1.8 INJECTION, SOLUTION INTRAVENOUS at 23:51

## 2024-06-20 RX ADMIN — DIGOXIN 250 MCG: 0.25 INJECTION INTRAMUSCULAR; INTRAVENOUS at 08:00

## 2024-06-20 RX ADMIN — CALCIUM GLUCONATE 2 G: 20 INJECTION, SOLUTION INTRAVENOUS at 16:48

## 2024-06-20 ASSESSMENT — PAIN - FUNCTIONAL ASSESSMENT
PAIN_FUNCTIONAL_ASSESSMENT: 0-10

## 2024-06-20 ASSESSMENT — ENCOUNTER SYMPTOMS
SUBJECTIVE PAIN PROGRESSION: UNCHANGED
PERSON REPORTING PAIN: PATIENT
ANGER WITHIN DEFINED LIMITS: 1
AGGRESSION WITHIN DEFINED LIMITS: 1
DENIES PAIN: 1

## 2024-06-20 ASSESSMENT — PAIN DESCRIPTION - LOCATION: LOCATION: LEG

## 2024-06-20 ASSESSMENT — LIFESTYLE VARIABLES
TOTAL SCORE: 0
HAVE PEOPLE ANNOYED YOU BY CRITICIZING YOUR DRINKING: NO
EVER FELT BAD OR GUILTY ABOUT YOUR DRINKING: NO
HAVE YOU EVER FELT YOU SHOULD CUT DOWN ON YOUR DRINKING: NO
EVER HAD A DRINK FIRST THING IN THE MORNING TO STEADY YOUR NERVES TO GET RID OF A HANGOVER: NO

## 2024-06-20 ASSESSMENT — COLUMBIA-SUICIDE SEVERITY RATING SCALE - C-SSRS
1. IN THE PAST MONTH, HAVE YOU WISHED YOU WERE DEAD OR WISHED YOU COULD GO TO SLEEP AND NOT WAKE UP?: NO
2. HAVE YOU ACTUALLY HAD ANY THOUGHTS OF KILLING YOURSELF?: NO
6. HAVE YOU EVER DONE ANYTHING, STARTED TO DO ANYTHING, OR PREPARED TO DO ANYTHING TO END YOUR LIFE?: NO

## 2024-06-20 ASSESSMENT — PAIN SCALES - GENERAL
PAINLEVEL_OUTOF10: 10 - WORST POSSIBLE PAIN
PAINLEVEL_OUTOF10: 0 - NO PAIN
PAINLEVEL_OUTOF10: 0 - NO PAIN

## 2024-06-20 ASSESSMENT — PAIN DESCRIPTION - ORIENTATION: ORIENTATION: RIGHT;LEFT

## 2024-06-20 NOTE — ED PROCEDURE NOTE
Procedure  Critical Care    Performed by: Elidia Oro MD  Authorized by: Kermit Arreaga MD    Critical care provider statement:     Critical care time (minutes):  45    Critical care time was exclusive of:  Separately billable procedures and treating other patients and teaching time    Critical care was necessary to treat or prevent imminent or life-threatening deterioration of the following conditions:  Cardiac failure and circulatory failure    Critical care was time spent personally by me on the following activities:  Ordering and performing treatments and interventions, blood draw for specimens, development of treatment plan with patient or surrogate, ordering and review of laboratory studies, discussions with consultants, pulse oximetry, re-evaluation of patient's condition, evaluation of patient's response to treatment, examination of patient, review of old charts, interpretation of cardiac output measurements, vascular access procedures and obtaining history from patient or surrogate    Care discussed with: admitting provider                 Elidia Oro MD  Resident  06/20/24 0945      ** Please excuse any errors in grammar or translation related to this dictation. Voice recognition software was utilized to prepare this document. **       Kermit Arreaga MD  [facility] Emergency Medicine      Kermit Arreaga MD  06/20/24 1221    I was present during all critical and key portions of the procedure(s) and immediately available to furnish services the entire duration.  See resident note for details.      Kermit Arreaga MD  09/06/24 [id number]

## 2024-06-20 NOTE — Clinical Note
Patient Clipped and Prepped: groin, right radial and right neck. Prepped with ChloraPrep, a minimum of 3 minute dry time, longer if needed, no pooling noted, patient draped in sterile fashion.

## 2024-06-20 NOTE — ED TRIAGE NOTES
Pt BIB [facility] for bilateral leg pain. Pt states she ran out of her water pills 4 days ago, 3 days ago she noticed pain and sweling to both legs. Pt has pitting edema to both legs going up to her knees. Pt state shse is unable to ambulate due to the pain. Pt enriqueta CP, HA, n/v, abd pain or SOB.

## 2024-06-20 NOTE — H&P
Health Maintenance Due   Topic Date Due   • IPV Vaccine (3 of 4 - 4-dose series) 2023   • Hepatitis B Vaccine (4 of 4 - 4-dose series) 2023   • Rotavirus Vaccine (3 of 3 - 3-dose series) 2023   • DTaP/Tdap/Td Vaccine (3 - DTaP) 2023   • COVID-19 Vaccine (1) Never done   • Influenza Vaccine (1 of 2) 2023   • Well Child Exam 6 Months  2023   • Pneumococcal Vaccine 0-64 (3 - PCV13 or PCV15) 2023       Patient is due for the topics as listed above and wishes to proceed with them. Orders placed for Immunization(s) Dtap/Tdap/Td, Hep B, IPV, Pneumococcal and Rotavirus.     JESSE Benentt is a 42 y.o. female with significant PMHx of HFrEF (LVEF 20-25% (08/2022), Afib on Xarelto w/ DCCV 05/2023), ischemic stroke L MCA d/t AFib LLA thrombus seen on echo (lack of OAC adherence) s/p thrombectomy 01/2022 @ [facility] w/ residual expressive aphasia and R sided weakness, recent 03/2024 left cerebellar MCA, s/p tracheostomy, T2DM (03/2024 HgbA1c 5.5) and HTN presenting with bilateral leg pain.     Patient is poor historian and occasionally not answering questions appropriately. For example she adamantly agrees when asked if she checks her blood pressures at home but then when asked what her blood pressures typically run, she repeatedly points to her head.     She is unable to provide full history of what brought her to the hospital. Patient endorses shortness of breath, leg pain and swelling, and headache. She also endorses dizziness (denies lightheadedness, endorses feeling like the room was spinning). She ran out of lasix and a couple other medications couple weeks ago. She is unable to ambulate due to pain.     Denies fevers, chills, dizziness, blurry vision, chest pain, palpitations, cough, abdominal pain, nausea/vomiting, diarrhea/constipation, dysuria, hematuria, melena, hematochezia, new numbness/tingling/weakness in extremities.      Patient stated she gets her medications from [facility] on [address]. In chart, half from [facility] and half from [facility]. Per pharmacy, no medications filled since December 2023 at [facility] and were all 30 day supplies however pharmacy states they don't always see the most up to date fill history on Epic. Patient initially stated she last filled medications 2 weeks ago but then she states she ran out of medications many weeks ago and hasn't been taking them.     Last hospitalization for ADHF in 12/2023 for medication nonadherence. Dry weight based on 12/2023 discharge weight is 204 though noted to not be euvolemic at that time.    ED course:  In the ED, she was noted to be  tachycardic to 105 in triage and but then when placed on the monitor was found to be in atrial fibrillation with RVR with a rate between 150 and 190. She was given 5 mg of metoprolol with slight improvement of her rate but became hypotensive and symptomatic. She was started on heparin both for her A-fib and for possible DVT/PE. Lytics were not given because of the risk of potential intracranial hemorrhage after her recent stroke. Instead decision made to cardiovert the patient and also gave digoxin, and amiodarone. After cardioversion she still looked like she was in atrial fibrillation with RVR but only to a rate in the 120s and 130s. Her blood pressure h improved. Levophed ordered along with calcium and magnesium. No evidence of infection. Patient admitted to the ICU with plan to get a CT angiogram of her chest to rule out PE prior to going upstairs.     CICU course:   On transfer to the unit, noted to have cold extremities and difficulty placing an Haily despite multiple tries radially, brachially, and femoral (x5). Friend Navin Sanches provided consent for lines, intubation if needed, and blood/blood products. Started on dobutamine. Worsening lactate so decision made for RHC. CVP 25. CI 1.6. CO 3.2. Femoral Haily placed however stopped working.     *I discussed with both patient and friend her current critical state. They were made aware she is not a candidate for advanced therapies. I discussed our plan to do our best to use medications to take off fluid and support her heart. Patient seemed to understand this and teared up. She wanted to remain FULL CODE.     In the ED:  - Vitals:   T 36.8,  /105   CBC: WBC 9.7, Hgb 13.4, plt 169   BMP: Na 133, K 3.4, Cl 97, HCO3 17, BUN 25, Cr 1.33H (from baseline 0.9), glu 103   LFT: Ca 8.6, tprot 7.0, alb 3.3, alkphos 72, AST 28, ALT 19, tbili 3.2   Electrolytes: PO4 4.7 Mg 1.85   Heme: PT 23.9H, INR 2.1H, aPTT 27   hs-TnI 95 > 289 > 313 > 326 > 289,   (from 388 03/2024), lactate 14.7H   Urine pregnancy test negative  Procal 0.28   BCx x2 drawn  - Imaging:  CXR 6/20/2024:  1. Persistent cardiomegaly.  2. Subsegmental right mid and basilar atelectasis. Otherwise, no  acute cardiopulmonary process.    CTA 6/20/2024:  1. No evidence of acute pulmonary embolism.  2. Redemonstrated cardiomegaly with findings suggestive of right  heart dysfunction including right atrial dilatation and reflux of  contrast into the intrahepatic IVC and hepatic veins.  3. There are indeterminate low-attenuation nodular filling defects  within the atrial appendage. While this may represent contrast under  filling, a right atrial appendage thrombus can not be excluded.  4. Consider correlation with transesophageal echocardiogram or  cardiac MR.  5. Mild pulmonary edema.  6. Small volume ascites noted within the partially visualized upper  abdomen.    - Echocardiography 03/9/2024:   1. Left ventricular systolic function is severely decreased with a 20-25% estimated ejection fraction.   2. There is mildly reduced right ventricular systolic function.   3. Mildly elevated RVSP.   4. There is global hypokinesis of the left ventricle with minor regional variations.    - [facility] 6/20/2024:  Hemodynamic Pressures:     +----+----------+---------+-------------+-------------+---+----+-------+-------+  SiteDate Time   Phase  Systolic mmHg  Diastolic  ED MeanA-Wave V-Wave                   Name                    mmHg     mmHmmHg mmHg   mmHg                                                      g                     +----+----------+---------+-------------+-------------+---+----+-------+-------+    RA 6/20/2024     Rest                               19     26     28      3:34:40 PM                                                          +----+----------+---------+-------------+-------------+---+----+-------+-------+    RV 6/20/2024     Rest           43             4 12                        3:35:14 PM                                                          +----+----------+---------+-------------+-------------+---+----+-------+-------+    RV 6/20/2024     Rest           46            5 10                        3:36:23 PM                                                          +----+----------+---------+-------------+-------------+---+----+-------+-------+    PW 6/20/2024     Rest                               18     19     25      3:37:42 PM                                                          +----+----------+---------+-------------+-------------+---+----+-------+-------+    PA 6/20/2024     Rest           42           18     30                    3:41:25 PM                                                          +----+----------+---------+-------------+-------------+---+----+-------+-------+           Oxygen Saturation %:  +-----------+----------+------------+  Sample SiteO2 Sat (%)HB (g/100ml)  +-----------+----------+------------+           FA        98        12.3  +-----------+----------+------------+           PA        48        12.3  +-----------+----------+------------+           FA        98        12.3  +-----------+----------+------------+           PA        48        12.3  +-----------+----------+------------+        Cardiac Outputs:  +---------------+------------------+-------+  MITCHEL CO (l/min)MITCHEL CI (l/min/m2)MITCHEL SV  +---------------+------------------+-------+              3.2               1.6   33.8  +---------------+------------------+-------+        Complications:  No in-lab complications observed.     Cardiac Cath Post Procedure Notes:  Post Procedure Diagnosis: CMY.  Blood Loss:               Estimated blood loss during the procedure was < 10                            mls.  Specimens Removed:        Number of specimen(s) removed:  none.        Recommendations:  Maximize medical therapy.  Further recommendations per primary team.     ____________________________________________________________________________________  CONCLUSIONS:   1. Elevated right and left heart filling pressures with low CO/CI.   2. Successful 4 Frisian right femoral arterial line placement.    PMH: per above  Allergies:  Ibuprofen  Hydrocodone-Tylenol    SocHx:   Denies smoking, alcohol use, drug use.   Lives alone per patient report  Feels safe at home    Home Medications   Unknown what patient is actively taking    Meds listed as prior meds in chart:  Duloxetine 60mg   Tylenol 650 q8hr  Aspirin 81mg   Atorvastatin 80  Diclofenac  Digoxin 250mcg (last filled 12/2023)  Jardiance 10mg  Folic acid 1mg  Lasix 40mg BID  Gabapentin 100mg TID  Melatonin 3mg  Metoprolol succinate 50mg daily  Pantoprazole 40mg daily  Miralax 17g daily  Potassium chloride  Xarelto 20mg daily  Entresto 24-26 daily  Spironolactone 25mg daily      Constitutional: in NAD  HEENT: sclerae anicteric, EOM grossly intact  CV: RRR, no murmurs noted  Pulm: CTAB, increased work of breathing, 3L O2  GI: abd soft, NT, ND  Skin: cold extremities  Ext: bilateral LE with 3+ pitting edema, no LE pulses dopplerable  Neuro: alert and conversant however only intermittently answering questions appropriately  AOX4,+expressive aphasia, EOMI, peripheral vision intact, facial sensation and expressions symmetric,  strength equal, able to move arms spontaneously. Endorsing pain in bilateral legs. She has equal sensation in bilateral thighs but states she has numbness in lower right leg. She is unable to wiggle toes in lower right leg but can bend right hip.     Assessment & Plan  Amirah Bennett is a 42 y.o. 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 RHC c/w cardiogenic shock with CI of 1.6, CVP of 25. She is not candidate for advanced therapies given documented medical nonadherence. Managing medically. Course also c/b SARAH, cardiac thrombi, and Afib with RVR.    NEUROLOGIC  #AMS  #ischemic stroke L MCA d/t AFib LLA thrombus seen on echo (lack of OAC adherence) s/p thrombectomy 01/2022 @ [facility] w/ residual expressive aphasia and R sided weakness   ::UDS positive for cannabinoids  -pending CT head  -pending infectious w/up per below  -pending PT/OT    #Insomnia  -c/w melatonin 3mg at bedtime    #Depression?  ::Patient's friend states that she doesn't care for herself due to being depressed  -consider psychiatry consult    CARDIOVASCULAR  #Cardiogenic shock  #HFrEF (LVEF 20-25%)  ::Not candidate for advanced therapies due to medical nonadherence  -dobutamine gtt  -bumex 2mg/hr gtt  -palliative care consulted given patient not candidate for advanced therapies, appreciate recommendations  -holding GDMT given pressures, unclear what patient was actively taking however was previously prescribed entresto 24-26, spironolactone 25mg, metoprolol succinate 50mg daily, lasix 40mg, jardiance 10mg    #Cardiac thrombi  ::Likely in setting of Xarelto nonadherence  ::There are indeterminate low-attenuation nodular filling defects within the atrial appendage. While this may represent contrast under filling, a right atrial appendage thrombus can not be excluded.  -c/w heparin gtt    #Afib on Xarelto w/ DCCV 05/2023), ischemic stroke L MCA d/t AFib LLA thrombus seen on echo (lack of OAC adherence) s/p thrombectomy 01/2022 @ [facility] w/  residual expressive aphasia and R sided weakness   ::Episode of RVR in ED  ::Previously prescribed digoxin 250mcg however last fill was 12/2023  -c/w amiodarone gtt  -hold home digoxin 250mcg  -c/w AC per above (holding home Xarelto 20mg daily in evening)  -c/w aspirin 81mg  -c/w atorvastatin 80  -consider repeat echo for bubble study  -hold digoxin  -pending TSH/FT4 (has had elevated free T4 and TSH in past with negative peroxidase abs)    #S/p femoral arterial line sheath placement, c/f clot  -pending bilateral arterial duplex ultrasound of lower extremity in AM  -maintaining femoral sheath for frequent ABGs    #Elevated troponin  ::95 > 289 > 313 > 326 > 289  -stop trending    #HTN  -holding home medications    PULMONARY  #Dyspnea  #Mild pulmonary edema  ::No evidence of PE  -diuresis per above  -wean as able to room air    #Hx trach c/b trancutaneous fistula  -monitor for breaths/mucus discharge from trach site  -follow up outpatient ENT for closure    RENAL/  #Metabolic acidosis  ::Likely in setting of shock  -trend ABGs q1hr    #SARAH  ::Likely prerenal vs ATN in setting of shock  -pending UA with culture  -pending urine lytes  -avoid nephrotoxic medications    #Elevated lactate  -trend q6hr to normalization    GASTROINTESTINAL  #Elevated Tbili (3.2 on admission, from 0.5 in 03/2024)  -trend CMP  -RUQ US if continues to be elevated    #GERD  -c/w pantoprazole 40mg daily    ENDOCRINE  #T2DM, diet controlled  ::HgbA1c 3/2024 5.5  -hypoglycemia protocol and mild SSI    HEME/ONC  #Cardiac thrombi  -see cardiac section for plan    INFECTIOUS DISEASE  #Concern for infection  ::Procal 0.28  ::CXR without signs of PNA  -pending blood cultures x1 (only able to draw one culture)  -pending UA with culture      N: NPO  A: femoral artery sheath right, pIVs  DVT ppx: heparin drip  GI ppx: pantoprazole 40mg     Code Status: FULL CODE per patient and POA confirmed on presentation to CICU)  Surrogate Medical Decision-maker:  friend (not )

## 2024-06-20 NOTE — ED NOTES
0815 time out completed. Plan to cardiovert due to unstable pressures and poor mental status. No time for sedation.   0816 Duane RN on monitor. Monitor synched, cardioverted 150J. See flowsheets for vitals signs. Difficulty obtained pulse ox. Pressures stabilizing.   0834 18 LAC USIV       Nae Castro, RN  06/20/24 0834

## 2024-06-20 NOTE — ED PROCEDURE NOTE
Procedure  Electrical Cardioversion    Performed by: Elidia Oro MD  Authorized by: Kermit Arreaga MD    Consent:     Consent obtained:  Verbal    Consent given by:  Patient    Risks, benefits, and alternatives were discussed: yes      Risks discussed:  Death, pain, induced arrhythmia and cutaneous burn    Alternatives discussed:  No treatment, delayed treatment, rate-control medication and alternative treatment  Universal protocol:     Procedure explained and questions answered to patient or proxy's satisfaction: yes    Pre-procedure details:     Cardioversion basis:  Emergent    Rhythm:  Atrial fibrillation    Electrode placement:  Anterior-lateral  Attempt one:     Cardioversion mode:  Synchronous    Shock (Joules):  150    Cardioversion outcome attempt one: rate and BP improved, remains in afib.  Post-procedure details:     Patient status:  Alert    Procedure completion:  Tolerated well, no immediate complications               Elidia Oro MD  Resident  06/20/24 2471    ** Please excuse any errors in grammar or translation related to this dictation. Voice recognition software was utilized to prepare this document. **       Kermit Arreaga MD  [facility]      Kermit Arreaga MD  06/20/24 [id number]

## 2024-06-20 NOTE — Clinical Note
Sheath was exchanged in the right femoral artery with ACCESS KIT, S-LIZBETH MINI, 4FR 10CM 0.018IN 40CM, NT/PT, ECHO ENHANCE NEEDLE.

## 2024-06-20 NOTE — ED NOTES
0813 plan to cardiovert, Duane RN on [facility]. Preethi [facility] time out  BP 41/26 , 160, ambu bag, suction, pt on pads,   0816 synched, cleared, shock delivered 117, RR 16, /164  0819 amio bolus started   0821 105

## 2024-06-20 NOTE — POST-PROCEDURE NOTE
Physician Transition of Care Summary  Invasive Cardiovascular Lab    Procedure Date: 6/20/2024  Attending:    * Osman Su - Primary  Resident/Fellow/Other Assistant: Surgeons and Role:     * Diego Butts MD - Fellow     * Ernesto Gongora MD - Fellow    Indications:   Pre-op Diagnosis     * Atrial fibrillation (Multi) [I48.91]     * Cardiogenic shock (Multi) [R57.0]     * Acute systolic congestive heart failure (Multi) [I50.21]    Post-procedure diagnosis:   Post-op Diagnosis     * Atrial fibrillation (Multi) [I48.91]     * Cardiogenic shock (Multi) [R57.0]     * Acute systolic congestive heart failure (Multi) [I50.21]    Procedure(s):   Arterial  Access, Intracatheter    Right Heart Cath  36657 - [place] RIGHT HEART CATH O2 SATURATION & CARDIAC OUTPUT    Procedure Findings:   RHC: elevated right- and left-sided filling pressures, low cardiac output    RA: 19  RV: 46 (EDP 10)  PA: 42/18 (mean 30)  PCWP: 18    PA sat: 48%  CO/CI: 3.0/1.5    Description of the Procedure:   RHC: 9Fr RIJ access with [place]-Abe catheter placement, locked at 50 cm.     Arterial line: unable to pass catheter from 16 cm a-line kit, placed 4Fr sheath via R CFA. Sutured in place.     Complications:   None.     Stents/Implants:   Implants       No implant documentation for this case.          Anticoagulation/Antiplatelet Plan:   Per primary team. Heparin gtt was resumed at conclusion of case.     Estimated Blood Loss:   5 mL    Anesthesia: Moderate Sedation Anesthesia Staff: No anesthesia staff entered.    Any Specimen(s) Removed:   No specimens collected during this procedure.    Disposition:   Return to CICU.     Electronically signed by: Ernesto Gongora MD, 6/20/2024 3:56 PM

## 2024-06-20 NOTE — ED PROVIDER NOTES
HPI   Chief Complaint   Patient presents with    Leg Pain       43-year-old female past medical history of hypertension hyperlipidemia HFrEF most recent EF of 20 to 25% and history of LA thrombus with cardioembolic stroke as well as A-fib on Xarelto who presents today for bilateral lower extremity swelling.  Patient states that she has been out of some of her meds for roughly 2 weeks.  She states that the meds that she has with her of the only meds that she has been taking.  She does appear uncomfortable, does endorse swelling in her bilateral extremities, shortness of breath, no chest pain.  She does have some difficulty answering questions secondary to discomfort.      History provided by:  Patient  History limited by:  Acuity of condition and mental status change   used: No                        [place] Coma Scale Score: 15                     Patient History   Past Medical History:   Diagnosis Date    CHF (congestive heart failure) (Multi)     Stroke (Multi)      Past Surgical History:   Procedure Laterality Date    OTHER SURGICAL HISTORY  2022    Trachectomy     No family history on file.  Social History     Tobacco Use    Smoking status: Former     Current packs/day: 0.00     Types: Cigarettes     Quit date: 2023     Years since quittin.5    Smokeless tobacco: Not on file   Substance Use Topics    Alcohol use: Yes    Drug use: Not on file       Physical Exam   ED Triage Vitals   Temp Heart Rate Resp BP   24 0649 24 0649 24 0649 24 0649   36.8 °C (98.2 °F) (!) 105 (!) 22 (!) 117/105      SpO2 Temp Source Heart Rate Source Patient Position   24 0750 24 0649 24 0649 24 0649   (!) 83 % Temporal Monitor Lying      BP Location FiO2 (%)     24 0649 24 0649     Right arm 21 %       Physical Exam  Vitals and nursing note reviewed.   Constitutional:       General: She is in acute distress.      Appearance: She is  ill-appearing and diaphoretic.   HENT:      Head: Normocephalic and atraumatic.      Mouth/Throat:      Mouth: Mucous membranes are dry.      Pharynx: Oropharynx is clear.   Eyes:      Extraocular Movements: Extraocular movements intact.      Pupils: Pupils are equal, round, and reactive to light.   Cardiovascular:      Rate and Rhythm: Regular rhythm. Tachycardia present.      Pulses: Normal pulses.      Heart sounds: Normal heart sounds.   Pulmonary:      Effort: Respiratory distress present.      Breath sounds: Rales present. No wheezing or rhonchi.   Chest:      Chest wall: No tenderness.   Abdominal:      Palpations: Abdomen is soft.      Tenderness: There is no abdominal tenderness. There is no guarding or rebound.   Musculoskeletal:      Cervical back: Normal range of motion and neck supple.      Comments: Pitting edema bilateral lower extremities   Skin:     Coloration: Skin is not pale.      Findings: No erythema.   Neurological:      Mental Status: She is disoriented.   Psychiatric:         Mood and Affect: Mood normal.         Behavior: Behavior normal.         ED Course & MDM   ED Course as of 06/20/24 1328   Thu Jun 20, 2024   0902 Patient is a 43-year-old female who is chronically ill with a history of heart failure, EF 20 to 25%, atrial fibrillation, recent MCA stroke supposed to be on digoxin and anticoagulation coming in with lower extremity pain, lightheadedness, in the setting of not taking her medications.  She was noted to be tachycardic to 105 in triage and was brought back to room 34 but then when placed on the monitor was found to be in atrial fibrillation with RVR with a rate between 150 and 190.  She was given 5 mg of metoprolol with slight improvement of her rate but became hypotensive and symptomatic.  We started her on heparin both for her A-fib and for possible DVT/PE.  Lytics were not given because of the risk of potential intracranial hemorrhage after her recent stroke.  Instead we  decided to cardiovert the patient and also gave digoxin, and amiodarone.  After cardioversion she still looks like she is in atrial fibrillation with RVR but only to a rate in the 120s and 130s.  Her blood pressure has improved.  Levophed has been ordered along with calcium and magnesium.  No evidence of infection.  Patient admitted to the ICU with plan to get a CT angiogram of her chest to rule out PE prior to going upstairs.  She is full code. [DM]   0908 Senior resident placing an A-line now. [DM]      ED Course User Index  [DM] Kermit Arreaga MD         Diagnoses as of 06/20/24 1328   Atrial fibrillation (Multi)       Medical Decision Making  43-year-old female with a past medical history of hypertension hyperlipidemia HFrEF most recent EF of 20 to 25% with LA thrombus with resultant cardioembolic stroke as well as A-fib with RVR on Xarelto who presents today for bilateral leg swelling.  Patient does appear to be having a CHF exacerbation upon initial evaluation, given this, we will get basic labs as well as troponins and BNP.  Will also put in for 40 of Lasix, as it does not appear she has been taking her Lasix.  After the patient was placed on telemetry, she did appear to become more acutely ill with heart rates in the 200s appearing to be in A-fib with RVR.  At this time, we put in for 5 metoprolol, as this was at home and that she has not been taking with mild reduction in her heart rate.  Given this, as well as further review of her med list and evaluation via point-of-care ultrasound with the patient's EF being very reduced and significantly enlarged RV, we did decide to dig load her with 250mcg as it did not appear the patient had taken it recently.      Patient's pressures continue to to get softer during this period, so cardiology consult was placed emergently, they evaluated the patient at bedside with us, and agreed that the patient appeared to be becoming much more unstable with maps in the 30s  to 40s with altered mental status, and recommended initiating Amio with cardioversion.  The patient was cardioverted with a reduction in her heart rate to the 100s.  Patient's blood pressure did improve at this time.  She did appear to remain in A-fib with RVR with heart rates in the 100s to 110s. Additionally, given the fact the patient not been taking her Xarelto, we did initiate heparin as she should be therapeutically anticoagulated.  The patient's pressure did improve to some degree, and the patient was admitted to the cardiac ICU for further evaluation and monitoring.        Procedure  Procedures     Colin Jeff MD  Resident  06/20/24 1745    I saw and evaluated the patient. I personally obtained the key and critical portions of the history and physical exam or was physically present for key and critical portions performed by the resident/fellow/JOSE. I reviewed the resident/fellow/JOSE's documentation and discussed the patient with the resident/fellow/JOSE. I agree with the resident/fellow/JOSE's medical decision making as documented in the note.    ** Please excuse any errors in grammar or translation related to this dictation. Voice recognition software was utilized to prepare this document. **       Kermit Arreaga MD  [facility]        Kermit Arreaga MD  06/24/24 0540

## 2024-06-20 NOTE — Clinical Note
IVC filter placed via R fem vein. 2 mg of Versed and 150 mcg of Fentanyl given. VSS. Site dressed with 2x2 and tegaderm.  Dressing is C/D/I. Report given to CICU RNParris. Pt in transport back to CICU

## 2024-06-21 ENCOUNTER — APPOINTMENT (OUTPATIENT)
Dept: VASCULAR MEDICINE | Facility: HOSPITAL | Age: 44
DRG: 239 | End: 2024-06-21
Payer: COMMERCIAL

## 2024-06-21 ENCOUNTER — APPOINTMENT (OUTPATIENT)
Dept: RADIOLOGY | Facility: HOSPITAL | Age: 44
DRG: 239 | End: 2024-06-21
Payer: COMMERCIAL

## 2024-06-21 ENCOUNTER — APPOINTMENT (OUTPATIENT)
Dept: RADIOLOGY | Facility: HOSPITAL | Age: 44
End: 2024-06-21
Payer: COMMERCIAL

## 2024-06-21 LAB
ALBUMIN SERPL BCP-MCNC: 2.6 G/DL (ref 3.4–5)
ALBUMIN SERPL BCP-MCNC: 2.7 G/DL (ref 3.4–5)
ALBUMIN SERPL BCP-MCNC: 2.7 G/DL (ref 3.4–5)
ALP SERPL-CCNC: 61 U/L (ref 33–110)
ALT SERPL W P-5'-P-CCNC: 857 U/L (ref 7–45)
ANION GAP BLDA CALCULATED.4IONS-SCNC: 11 MMO/L (ref 10–25)
ANION GAP BLDA CALCULATED.4IONS-SCNC: 16 MMO/L (ref 10–25)
ANION GAP BLDA CALCULATED.4IONS-SCNC: 19 MMO/L (ref 10–25)
ANION GAP BLDMV CALCULATED.4IONS-SCNC: 10 MMO/L (ref 10–25)
ANION GAP BLDMV CALCULATED.4IONS-SCNC: 12 MMO/L (ref 10–25)
ANION GAP BLDMV CALCULATED.4IONS-SCNC: 16 MMO/L (ref 10–25)
ANION GAP BLDMV CALCULATED.4IONS-SCNC: 7 MMO/L (ref 10–25)
ANION GAP SERPL CALC-SCNC: 15 MMOL/L (ref 10–20)
ANION GAP SERPL CALC-SCNC: 16 MMOL/L (ref 10–20)
ANION GAP SERPL CALC-SCNC: 17 MMOL/L (ref 10–20)
APPEARANCE UR: CLEAR
AST SERPL W P-5'-P-CCNC: 2629 U/L (ref 9–39)
ATRIAL RATE: 107 BPM
BACTERIA #/AREA URNS AUTO: ABNORMAL /HPF
BASE EXCESS BLDA CALC-SCNC: -1.2 MMOL/L (ref -2–3)
BASE EXCESS BLDA CALC-SCNC: -2.3 MMOL/L (ref -2–3)
BASE EXCESS BLDA CALC-SCNC: 4.1 MMOL/L (ref -2–3)
BASE EXCESS BLDMV CALC-SCNC: 0 MMOL/L (ref -2–3)
BASE EXCESS BLDMV CALC-SCNC: 4.5 MMOL/L (ref -2–3)
BASE EXCESS BLDMV CALC-SCNC: 6.8 MMOL/L (ref -2–3)
BASE EXCESS BLDMV CALC-SCNC: 7.5 MMOL/L (ref -2–3)
BASE EXCESS BLDMV CALC-SCNC: 7.5 MMOL/L (ref -2–3)
BASE EXCESS BLDMV CALC-SCNC: 7.8 MMOL/L (ref -2–3)
BASOPHILS # BLD AUTO: 0.02 X10*3/UL (ref 0–0.1)
BASOPHILS NFR BLD AUTO: 0.1 %
BILIRUB DIRECT SERPL-MCNC: 1.6 MG/DL (ref 0–0.3)
BILIRUB SERPL-MCNC: 2.8 MG/DL (ref 0–1.2)
BILIRUB UR STRIP.AUTO-MCNC: NEGATIVE MG/DL
BODY TEMPERATURE: 37 DEGREES CELSIUS
BUN SERPL-MCNC: 29 MG/DL (ref 6–23)
BUN SERPL-MCNC: 30 MG/DL (ref 6–23)
BUN SERPL-MCNC: 30 MG/DL (ref 6–23)
CA-I BLD-SCNC: 1.11 MMOL/L (ref 1.1–1.33)
CA-I BLDA-SCNC: 1.06 MMOL/L (ref 1.1–1.33)
CA-I BLDA-SCNC: 1.1 MMOL/L (ref 1.1–1.33)
CA-I BLDA-SCNC: 1.11 MMOL/L (ref 1.1–1.33)
CA-I BLDMV-SCNC: 1.03 MMOL/L (ref 1.1–1.33)
CA-I BLDMV-SCNC: 1.05 MMOL/L (ref 1.1–1.33)
CA-I BLDMV-SCNC: 1.07 MMOL/L (ref 1.1–1.33)
CA-I BLDMV-SCNC: 1.1 MMOL/L (ref 1.1–1.33)
CA-I BLDMV-SCNC: 1.11 MMOL/L (ref 1.1–1.33)
CA-I BLDMV-SCNC: 1.11 MMOL/L (ref 1.1–1.33)
CALCIUM SERPL-MCNC: 8.5 MG/DL (ref 8.6–10.6)
CALCIUM SERPL-MCNC: 8.5 MG/DL (ref 8.6–10.6)
CALCIUM SERPL-MCNC: 8.6 MG/DL (ref 8.6–10.6)
CHLORIDE BLD-SCNC: 91 MMOL/L (ref 98–107)
CHLORIDE BLD-SCNC: 92 MMOL/L (ref 98–107)
CHLORIDE BLD-SCNC: 93 MMOL/L (ref 98–107)
CHLORIDE BLD-SCNC: 95 MMOL/L (ref 98–107)
CHLORIDE BLDA-SCNC: 95 MMOL/L (ref 98–107)
CHLORIDE BLDA-SCNC: 95 MMOL/L (ref 98–107)
CHLORIDE BLDA-SCNC: 96 MMOL/L (ref 98–107)
CHLORIDE SERPL-SCNC: 90 MMOL/L (ref 98–107)
CHLORIDE SERPL-SCNC: 91 MMOL/L (ref 98–107)
CHLORIDE SERPL-SCNC: 96 MMOL/L (ref 98–107)
CHLORIDE UR-SCNC: 119 MMOL/L
CHLORIDE/CREATININE (MMOL/G) IN URINE: 1750 MMOL/G CREAT (ref 38–318)
CO2 SERPL-SCNC: 25 MMOL/L (ref 21–32)
CO2 SERPL-SCNC: 28 MMOL/L (ref 21–32)
CO2 SERPL-SCNC: 32 MMOL/L (ref 21–32)
COLOR UR: COLORLESS
CREAT SERPL-MCNC: 1.41 MG/DL (ref 0.5–1.05)
CREAT SERPL-MCNC: 1.42 MG/DL (ref 0.5–1.05)
CREAT SERPL-MCNC: 1.6 MG/DL (ref 0.5–1.05)
CREAT UR-MCNC: 6.8 MG/DL (ref 20–320)
CREAT UR-MCNC: 7 MG/DL (ref 20–320)
EGFRCR SERPLBLD CKD-EPI 2021: 40 ML/MIN/1.73M*2
EGFRCR SERPLBLD CKD-EPI 2021: 47 ML/MIN/1.73M*2
EGFRCR SERPLBLD CKD-EPI 2021: 47 ML/MIN/1.73M*2
EOSINOPHIL # BLD AUTO: 0 X10*3/UL (ref 0–0.7)
EOSINOPHIL NFR BLD AUTO: 0 %
ERYTHROCYTE [DISTWIDTH] IN BLOOD BY AUTOMATED COUNT: 15 % (ref 11.5–14.5)
GLUCOSE BLD MANUAL STRIP-MCNC: 117 MG/DL (ref 74–99)
GLUCOSE BLD MANUAL STRIP-MCNC: 118 MG/DL (ref 74–99)
GLUCOSE BLD MANUAL STRIP-MCNC: 127 MG/DL (ref 74–99)
GLUCOSE BLD MANUAL STRIP-MCNC: 133 MG/DL (ref 74–99)
GLUCOSE BLD MANUAL STRIP-MCNC: 141 MG/DL (ref 74–99)
GLUCOSE BLD MANUAL STRIP-MCNC: 154 MG/DL (ref 74–99)
GLUCOSE BLD MANUAL STRIP-MCNC: 154 MG/DL (ref 74–99)
GLUCOSE BLD-MCNC: 113 MG/DL (ref 74–99)
GLUCOSE BLD-MCNC: 113 MG/DL (ref 74–99)
GLUCOSE BLD-MCNC: 124 MG/DL (ref 74–99)
GLUCOSE BLD-MCNC: 137 MG/DL (ref 74–99)
GLUCOSE BLD-MCNC: 149 MG/DL (ref 74–99)
GLUCOSE BLD-MCNC: 160 MG/DL (ref 74–99)
GLUCOSE BLDA-MCNC: 118 MG/DL (ref 74–99)
GLUCOSE BLDA-MCNC: 152 MG/DL (ref 74–99)
GLUCOSE BLDA-MCNC: 160 MG/DL (ref 74–99)
GLUCOSE SERPL-MCNC: 110 MG/DL (ref 74–99)
GLUCOSE SERPL-MCNC: 140 MG/DL (ref 74–99)
GLUCOSE SERPL-MCNC: 80 MG/DL (ref 74–99)
GLUCOSE UR STRIP.AUTO-MCNC: NORMAL MG/DL
HAV IGM SER QL: NONREACTIVE
HBV CORE IGM SER QL: NONREACTIVE
HBV SURFACE AG SERPL QL IA: NONREACTIVE
HCO3 BLDA-SCNC: 20.9 MMOL/L (ref 22–26)
HCO3 BLDA-SCNC: 21.8 MMOL/L (ref 22–26)
HCO3 BLDA-SCNC: 26.9 MMOL/L (ref 22–26)
HCO3 BLDMV-SCNC: 23.6 MMOL/L (ref 22–26)
HCO3 BLDMV-SCNC: 28.3 MMOL/L (ref 22–26)
HCO3 BLDMV-SCNC: 30.3 MMOL/L (ref 22–26)
HCO3 BLDMV-SCNC: 30.9 MMOL/L (ref 22–26)
HCO3 BLDMV-SCNC: 31.1 MMOL/L (ref 22–26)
HCO3 BLDMV-SCNC: 32 MMOL/L (ref 22–26)
HCT VFR BLD AUTO: 30.9 % (ref 36–46)
HCT VFR BLD EST: 33 % (ref 36–46)
HCT VFR BLD EST: 34 % (ref 36–46)
HCT VFR BLD EST: 35 % (ref 36–46)
HCT VFR BLD EST: 35 % (ref 36–46)
HCV AB SER QL: NONREACTIVE
HGB BLD-MCNC: 10.6 G/DL (ref 12–16)
HGB BLDA-MCNC: 10.9 G/DL (ref 12–16)
HGB BLDA-MCNC: 10.9 G/DL (ref 12–16)
HGB BLDA-MCNC: 11.2 G/DL (ref 12–16)
HGB BLDMV-MCNC: 11.1 G/DL (ref 12–16)
HGB BLDMV-MCNC: 11.3 G/DL (ref 12–16)
HGB BLDMV-MCNC: 11.3 G/DL (ref 12–16)
HGB BLDMV-MCNC: 11.4 G/DL (ref 12–16)
HGB BLDMV-MCNC: 11.6 G/DL (ref 12–16)
HGB BLDMV-MCNC: 11.7 G/DL (ref 12–16)
HOLD SPECIMEN: NORMAL
IMM GRANULOCYTES # BLD AUTO: 0.12 X10*3/UL (ref 0–0.7)
IMM GRANULOCYTES NFR BLD AUTO: 0.7 % (ref 0–0.9)
INHALED O2 CONCENTRATION: 21 %
INHALED O2 CONCENTRATION: 28 %
KETONES UR STRIP.AUTO-MCNC: NEGATIVE MG/DL
LACTATE BLDA-SCNC: 2.5 MMOL/L (ref 0.4–2)
LACTATE BLDA-SCNC: 4.6 MMOL/L (ref 0.4–2)
LACTATE BLDA-SCNC: 6 MMOL/L (ref 0.4–2)
LACTATE BLDMV-SCNC: 1.6 MMOL/L (ref 0.4–2)
LACTATE BLDMV-SCNC: 2 MMOL/L (ref 0.4–2)
LACTATE BLDMV-SCNC: 2.4 MMOL/L (ref 0.4–2)
LACTATE BLDMV-SCNC: 2.4 MMOL/L (ref 0.4–2)
LACTATE BLDMV-SCNC: 2.5 MMOL/L (ref 0.4–2)
LACTATE BLDMV-SCNC: 4.6 MMOL/L (ref 0.4–2)
LACTATE SERPL-SCNC: 1.5 MMOL/L (ref 0.4–2)
LACTATE SERPL-SCNC: 1.6 MMOL/L (ref 0.4–2)
LACTATE SERPL-SCNC: 2.6 MMOL/L (ref 0.4–2)
LACTATE SERPL-SCNC: 3.7 MMOL/L (ref 0.4–2)
LACTATE SERPL-SCNC: 6.2 MMOL/L (ref 0.4–2)
LEUKOCYTE ESTERASE UR QL STRIP.AUTO: NEGATIVE
LYMPHOCYTES # BLD AUTO: 3.66 X10*3/UL (ref 1.2–4.8)
LYMPHOCYTES NFR BLD AUTO: 21.9 %
MAGNESIUM SERPL-MCNC: 1.68 MG/DL (ref 1.6–2.4)
MAGNESIUM SERPL-MCNC: 1.92 MG/DL (ref 1.6–2.4)
MAGNESIUM SERPL-MCNC: 2.08 MG/DL (ref 1.6–2.4)
MCH RBC QN AUTO: 28 PG (ref 26–34)
MCHC RBC AUTO-ENTMCNC: 34.3 G/DL (ref 32–36)
MCV RBC AUTO: 82 FL (ref 80–100)
MONOCYTES # BLD AUTO: 0.65 X10*3/UL (ref 0.1–1)
MONOCYTES NFR BLD AUTO: 3.9 %
MUCOUS THREADS #/AREA URNS AUTO: ABNORMAL /LPF
NEUTROPHILS # BLD AUTO: 12.28 X10*3/UL (ref 1.2–7.7)
NEUTROPHILS NFR BLD AUTO: 73.4 %
NITRITE UR QL STRIP.AUTO: NEGATIVE
NRBC BLD-RTO: 0 /100 WBCS (ref 0–0)
OXYHGB MFR BLDA: 92.6 % (ref 94–98)
OXYHGB MFR BLDA: 92.9 % (ref 94–98)
OXYHGB MFR BLDA: 93.4 % (ref 94–98)
OXYHGB MFR BLDMV: 60.7 % (ref 45–75)
OXYHGB MFR BLDMV: 63.3 % (ref 45–75)
OXYHGB MFR BLDMV: 70.3 % (ref 45–75)
OXYHGB MFR BLDMV: 70.4 % (ref 45–75)
OXYHGB MFR BLDMV: 70.4 % (ref 45–75)
OXYHGB MFR BLDMV: 71.9 % (ref 45–75)
PCO2 BLDA: 30 MM HG (ref 38–42)
PCO2 BLDA: 30 MM HG (ref 38–42)
PCO2 BLDA: 33 MM HG (ref 38–42)
PCO2 BLDMV: 34 MM HG (ref 41–51)
PCO2 BLDMV: 37 MM HG (ref 41–51)
PCO2 BLDMV: 38 MM HG (ref 41–51)
PCO2 BLDMV: 38 MM HG (ref 41–51)
PCO2 BLDMV: 39 MM HG (ref 41–51)
PCO2 BLDMV: 44 MM HG (ref 41–51)
PH BLDA: 7.45 PH (ref 7.38–7.42)
PH BLDA: 7.47 PH (ref 7.38–7.42)
PH BLDA: 7.52 PH (ref 7.38–7.42)
PH BLDMV: 7.45 PH (ref 7.33–7.43)
PH BLDMV: 7.47 PH (ref 7.33–7.43)
PH BLDMV: 7.48 PH (ref 7.33–7.43)
PH BLDMV: 7.51 PH (ref 7.33–7.43)
PH BLDMV: 7.51 PH (ref 7.33–7.43)
PH BLDMV: 7.53 PH (ref 7.33–7.43)
PH UR STRIP.AUTO: 7 [PH]
PHOSPHATE SERPL-MCNC: 3.2 MG/DL (ref 2.5–4.9)
PHOSPHATE SERPL-MCNC: 3.4 MG/DL (ref 2.5–4.9)
PHOSPHATE SERPL-MCNC: 3.9 MG/DL (ref 2.5–4.9)
PLATELET # BLD AUTO: 132 X10*3/UL (ref 150–450)
PO2 BLDA: 68 MM HG (ref 85–95)
PO2 BLDA: 70 MM HG (ref 85–95)
PO2 BLDA: 73 MM HG (ref 85–95)
PO2 BLDMV: 36 MM HG (ref 35–45)
PO2 BLDMV: 42 MM HG (ref 35–45)
PO2 BLDMV: 44 MM HG (ref 35–45)
PO2 BLDMV: 45 MM HG (ref 35–45)
PO2 BLDMV: 45 MM HG (ref 35–45)
PO2 BLDMV: 46 MM HG (ref 35–45)
POTASSIUM BLDA-SCNC: 3.3 MMOL/L (ref 3.5–5.3)
POTASSIUM BLDA-SCNC: 3.4 MMOL/L (ref 3.5–5.3)
POTASSIUM BLDA-SCNC: 3.4 MMOL/L (ref 3.5–5.3)
POTASSIUM BLDMV-SCNC: 3.4 MMOL/L (ref 3.5–5.3)
POTASSIUM BLDMV-SCNC: 3.4 MMOL/L (ref 3.5–5.3)
POTASSIUM BLDMV-SCNC: 3.5 MMOL/L (ref 3.5–5.3)
POTASSIUM BLDMV-SCNC: 3.6 MMOL/L (ref 3.5–5.3)
POTASSIUM BLDMV-SCNC: 3.6 MMOL/L (ref 3.5–5.3)
POTASSIUM BLDMV-SCNC: 3.7 MMOL/L (ref 3.5–5.3)
POTASSIUM SERPL-SCNC: 3.4 MMOL/L (ref 3.5–5.3)
POTASSIUM SERPL-SCNC: 3.6 MMOL/L (ref 3.5–5.3)
POTASSIUM SERPL-SCNC: 3.8 MMOL/L (ref 3.5–5.3)
POTASSIUM UR-SCNC: 6 MMOL/L
POTASSIUM/CREAT UR-RTO: 88 MMOL/G CREAT
PROT SERPL-MCNC: 5.1 G/DL (ref 6.4–8.2)
PROT UR STRIP.AUTO-MCNC: NEGATIVE MG/DL
Q ONSET: 213 MS
QRS COUNT: 22 BEATS
QRS DURATION: 100 MS
QT INTERVAL: 334 MS
QTC CALCULATION(BAZETT): 494 MS
QTC FREDERICIA: 434 MS
R AXIS: 39 DEGREES
RBC # BLD AUTO: 3.79 X10*6/UL (ref 4–5.2)
RBC # UR STRIP.AUTO: ABNORMAL /UL
RBC #/AREA URNS AUTO: ABNORMAL /HPF
SAO2 % BLDA: 94 % (ref 94–100)
SAO2 % BLDA: 95 % (ref 94–100)
SAO2 % BLDA: 96 % (ref 94–100)
SAO2 % BLDMV: 62 % (ref 45–75)
SAO2 % BLDMV: 65 % (ref 45–75)
SAO2 % BLDMV: 72 % (ref 45–75)
SAO2 % BLDMV: 74 % (ref 45–75)
SODIUM BLDA-SCNC: 130 MMOL/L (ref 136–145)
SODIUM BLDA-SCNC: 130 MMOL/L (ref 136–145)
SODIUM BLDA-SCNC: 131 MMOL/L (ref 136–145)
SODIUM BLDMV-SCNC: 126 MMOL/L (ref 136–145)
SODIUM BLDMV-SCNC: 130 MMOL/L (ref 136–145)
SODIUM BLDMV-SCNC: 131 MMOL/L (ref 136–145)
SODIUM SERPL-SCNC: 132 MMOL/L (ref 136–145)
SODIUM SERPL-SCNC: 133 MMOL/L (ref 136–145)
SODIUM SERPL-SCNC: 134 MMOL/L (ref 136–145)
SODIUM UR-SCNC: 117 MMOL/L
SODIUM/CREAT UR-RTO: 1721 MMOL/G CREAT
SP GR UR STRIP.AUTO: 1.01
T AXIS: 256 DEGREES
T OFFSET: 380 MS
UFH PPP CHRO-ACNC: 0.4 IU/ML
UREA/CREAT UR-SRTO: 10.4 G/G CREAT
UROBILINOGEN UR STRIP.AUTO-MCNC: NORMAL MG/DL
UUN UR-MCNC: 73 MG/DL
VENTRICULAR RATE: 132 BPM
WBC # BLD AUTO: 16.7 X10*3/UL (ref 4.4–11.3)
WBC #/AREA URNS AUTO: ABNORMAL /HPF

## 2024-06-21 PROCEDURE — 83605 ASSAY OF LACTIC ACID: CPT | Mod: 91

## 2024-06-21 PROCEDURE — 99497 ADVNCD CARE PLAN 30 MIN: CPT

## 2024-06-21 PROCEDURE — 76937 US GUIDE VASCULAR ACCESS: CPT

## 2024-06-21 PROCEDURE — 2500000001 HC RX 250 WO HCPCS SELF ADMINISTERED DRUGS (ALT 637 FOR MEDICARE OP)

## 2024-06-21 PROCEDURE — 76705 ECHO EXAM OF ABDOMEN: CPT

## 2024-06-21 PROCEDURE — 83735 ASSAY OF MAGNESIUM: CPT

## 2024-06-21 PROCEDURE — 97162 PT EVAL MOD COMPLEX 30 MIN: CPT | Mod: GP

## 2024-06-21 PROCEDURE — 85520 HEPARIN ASSAY: CPT

## 2024-06-21 PROCEDURE — 84132 ASSAY OF SERUM POTASSIUM: CPT | Mod: 91

## 2024-06-21 PROCEDURE — 84540 ASSAY OF URINE/UREA-N: CPT

## 2024-06-21 PROCEDURE — 82947 ASSAY GLUCOSE BLOOD QUANT: CPT | Mod: 91

## 2024-06-21 PROCEDURE — 82436 ASSAY OF URINE CHLORIDE: CPT

## 2024-06-21 PROCEDURE — 2500000002 HC RX 250 W HCPCS SELF ADMINISTERED DRUGS (ALT 637 FOR MEDICARE OP, ALT 636 FOR OP/ED)

## 2024-06-21 PROCEDURE — 83735 ASSAY OF MAGNESIUM: CPT | Mod: 91

## 2024-06-21 PROCEDURE — A4217 STERILE WATER/SALINE, 500 ML: HCPCS

## 2024-06-21 PROCEDURE — 2500000004 HC RX 250 GENERAL PHARMACY W/ HCPCS (ALT 636 FOR OP/ED)

## 2024-06-21 PROCEDURE — 82330 ASSAY OF CALCIUM: CPT | Mod: 91 | Performed by: INTERNAL MEDICINE

## 2024-06-21 PROCEDURE — 99223 1ST HOSP IP/OBS HIGH 75: CPT

## 2024-06-21 PROCEDURE — 84132 ASSAY OF SERUM POTASSIUM: CPT | Mod: 91 | Performed by: INTERNAL MEDICINE

## 2024-06-21 PROCEDURE — 84100 ASSAY OF PHOSPHORUS: CPT

## 2024-06-21 PROCEDURE — 71045 X-RAY EXAM CHEST 1 VIEW: CPT

## 2024-06-21 PROCEDURE — 2500000005 HC RX 250 GENERAL PHARMACY W/O HCPCS

## 2024-06-21 PROCEDURE — 76705 ECHO EXAM OF ABDOMEN: CPT | Performed by: RADIOLOGY

## 2024-06-21 PROCEDURE — 85025 COMPLETE CBC W/AUTO DIFF WBC: CPT

## 2024-06-21 PROCEDURE — 80074 ACUTE HEPATITIS PANEL: CPT

## 2024-06-21 PROCEDURE — 84075 ASSAY ALKALINE PHOSPHATASE: CPT

## 2024-06-21 PROCEDURE — 99291 CRITICAL CARE FIRST HOUR: CPT

## 2024-06-21 PROCEDURE — 81003 URINALYSIS AUTO W/O SCOPE: CPT

## 2024-06-21 PROCEDURE — 97530 THERAPEUTIC ACTIVITIES: CPT | Mod: GP

## 2024-06-21 PROCEDURE — 84132 ASSAY OF SERUM POTASSIUM: CPT

## 2024-06-21 PROCEDURE — 37799 UNLISTED PX VASCULAR SURGERY: CPT

## 2024-06-21 PROCEDURE — 71045 X-RAY EXAM CHEST 1 VIEW: CPT | Performed by: RADIOLOGY

## 2024-06-21 PROCEDURE — 2020000001 HC ICU ROOM DAILY

## 2024-06-21 RX ORDER — POTASSIUM CHLORIDE 20 MEQ/1
40 TABLET, EXTENDED RELEASE ORAL ONCE
Status: COMPLETED | OUTPATIENT
Start: 2024-06-21 | End: 2024-06-21

## 2024-06-21 RX ORDER — DIGOXIN 250 MCG
250 TABLET ORAL DAILY
COMMUNITY
End: 2024-07-24 | Stop reason: HOSPADM

## 2024-06-21 RX ORDER — ACETAZOLAMIDE 500 MG/5ML
500 INJECTION, POWDER, LYOPHILIZED, FOR SOLUTION INTRAVENOUS ONCE
Status: COMPLETED | OUTPATIENT
Start: 2024-06-21 | End: 2024-06-21

## 2024-06-21 RX ORDER — LIDOCAINE 50 MG/G
1 PATCH TOPICAL DAILY
Status: ON HOLD | COMMUNITY
End: 2024-07-24

## 2024-06-21 RX ORDER — BUMETANIDE 0.25 MG/ML
4 INJECTION INTRAMUSCULAR; INTRAVENOUS ONCE
Status: COMPLETED | OUTPATIENT
Start: 2024-06-21 | End: 2024-06-21

## 2024-06-21 RX ORDER — MAGNESIUM SULFATE HEPTAHYDRATE 40 MG/ML
4 INJECTION, SOLUTION INTRAVENOUS ONCE
Status: COMPLETED | OUTPATIENT
Start: 2024-06-21 | End: 2024-06-21

## 2024-06-21 RX ORDER — QUETIAPINE FUMARATE 50 MG/1
TABLET, FILM COATED ORAL
COMMUNITY
End: 2024-07-24 | Stop reason: HOSPADM

## 2024-06-21 RX ORDER — POLYETHYLENE GLYCOL 3350 17 G/17G
17 POWDER, FOR SOLUTION ORAL DAILY
Status: DISCONTINUED | OUTPATIENT
Start: 2024-06-21 | End: 2024-06-22

## 2024-06-21 RX ORDER — CALCIUM GLUCONATE 20 MG/ML
1 INJECTION, SOLUTION INTRAVENOUS ONCE
Status: COMPLETED | OUTPATIENT
Start: 2024-06-21 | End: 2024-06-21

## 2024-06-21 RX ORDER — SENNOSIDES 8.6 MG/1
1 TABLET ORAL NIGHTLY PRN
Status: DISCONTINUED | OUTPATIENT
Start: 2024-06-21 | End: 2024-06-24

## 2024-06-21 RX ORDER — POTASSIUM CHLORIDE 20 MEQ/1
40 TABLET, EXTENDED RELEASE ORAL ONCE
Status: DISCONTINUED | OUTPATIENT
Start: 2024-06-21 | End: 2024-06-21

## 2024-06-21 RX ORDER — MULTIVIT-MIN/IRON FUM/FOLIC AC 7.5 MG-4
1 TABLET ORAL DAILY
Status: ON HOLD | COMMUNITY
End: 2024-07-24

## 2024-06-21 RX ORDER — OXYCODONE HYDROCHLORIDE 5 MG/1
5 TABLET ORAL ONCE
Status: COMPLETED | OUTPATIENT
Start: 2024-06-21 | End: 2024-06-21

## 2024-06-21 RX ORDER — CALCIUM GLUCONATE 98 MG/ML
1 INJECTION, SOLUTION INTRAVENOUS ONCE
Status: DISCONTINUED | OUTPATIENT
Start: 2024-06-21 | End: 2024-06-21

## 2024-06-21 RX ORDER — POTASSIUM CHLORIDE 1.5 G/1.58G
40 POWDER, FOR SOLUTION ORAL ONCE
Status: COMPLETED | OUTPATIENT
Start: 2024-06-21 | End: 2024-06-22

## 2024-06-21 RX ADMIN — FOLIC ACID 1 MG: 1 TABLET ORAL at 09:12

## 2024-06-21 RX ADMIN — POTASSIUM CHLORIDE 40 MEQ: 1500 TABLET, EXTENDED RELEASE ORAL at 05:54

## 2024-06-21 RX ADMIN — CALCIUM GLUCONATE 1 G: 20 INJECTION, SOLUTION INTRAVENOUS at 15:33

## 2024-06-21 RX ADMIN — MAGNESIUM SULFATE HEPTAHYDRATE 4 G: 40 INJECTION, SOLUTION INTRAVENOUS at 05:48

## 2024-06-21 RX ADMIN — AMIODARONE HYDROCHLORIDE 1 MG/MIN: 1.8 INJECTION, SOLUTION INTRAVENOUS at 03:36

## 2024-06-21 RX ADMIN — Medication 2 L/MIN: at 20:00

## 2024-06-21 RX ADMIN — AMIODARONE HYDROCHLORIDE 1 MG/MIN: 1.8 INJECTION, SOLUTION INTRAVENOUS at 09:11

## 2024-06-21 RX ADMIN — BUMETANIDE 2 MG/HR: 0.25 INJECTION INTRAMUSCULAR; INTRAVENOUS at 02:42

## 2024-06-21 RX ADMIN — OXYCODONE HYDROCHLORIDE 5 MG: 5 TABLET ORAL at 18:22

## 2024-06-21 RX ADMIN — CALCIUM GLUCONATE 1 G: 20 INJECTION, SOLUTION INTRAVENOUS at 02:36

## 2024-06-21 RX ADMIN — AMIODARONE HYDROCHLORIDE 1 MG/MIN: 1.8 INJECTION, SOLUTION INTRAVENOUS at 15:32

## 2024-06-21 RX ADMIN — VANCOMYCIN HYDROCHLORIDE 1500 MG: 5 INJECTION, POWDER, LYOPHILIZED, FOR SOLUTION INTRAVENOUS at 01:26

## 2024-06-21 RX ADMIN — SODIUM NITROPRUSSIDE 1 MCG/KG/MIN: 0.5 INJECTION, SOLUTION INTRAVENOUS at 04:41

## 2024-06-21 RX ADMIN — BUMETANIDE 4 MG: 0.25 INJECTION INTRAMUSCULAR; INTRAVENOUS at 20:08

## 2024-06-21 RX ADMIN — Medication 2 L/MIN: at 08:00

## 2024-06-21 RX ADMIN — PIPERACILLIN SODIUM AND TAZOBACTAM SODIUM 4.5 G: 4; .5 INJECTION, SOLUTION INTRAVENOUS at 05:57

## 2024-06-21 RX ADMIN — HEPARIN SODIUM 1600 UNITS/HR: 10000 INJECTION, SOLUTION INTRAVENOUS at 11:04

## 2024-06-21 RX ADMIN — Medication 2 L/MIN: at 00:00

## 2024-06-21 RX ADMIN — ATORVASTATIN CALCIUM 80 MG: 80 TABLET, FILM COATED ORAL at 09:12

## 2024-06-21 RX ADMIN — PANTOPRAZOLE SODIUM 40 MG: 40 TABLET, DELAYED RELEASE ORAL at 09:12

## 2024-06-21 RX ADMIN — POLYETHYLENE GLYCOL 3350 17 G: 17 POWDER, FOR SOLUTION ORAL at 20:08

## 2024-06-21 RX ADMIN — POTASSIUM CHLORIDE 20 MEQ: 14.9 INJECTION, SOLUTION INTRAVENOUS at 00:02

## 2024-06-21 RX ADMIN — DOBUTAMINE HYDROCHLORIDE 5 MCG/KG/MIN: 400 INJECTION INTRAVENOUS at 02:37

## 2024-06-21 RX ADMIN — AMIODARONE HYDROCHLORIDE 1 MG/MIN: 1.8 INJECTION, SOLUTION INTRAVENOUS at 22:21

## 2024-06-21 RX ADMIN — ACETAZOLAMIDE 500 MG: 500 INJECTION, POWDER, LYOPHILIZED, FOR SOLUTION INTRAVENOUS at 18:41

## 2024-06-21 RX ADMIN — SODIUM NITROPRUSSIDE 1 MCG/KG/MIN: 0.5 INJECTION, SOLUTION INTRAVENOUS at 13:04

## 2024-06-21 RX ADMIN — PIPERACILLIN SODIUM AND TAZOBACTAM SODIUM 4.5 G: 4; .5 INJECTION, SOLUTION INTRAVENOUS at 00:42

## 2024-06-21 RX ADMIN — ASPIRIN 81 MG CHEWABLE TABLET 81 MG: 81 TABLET CHEWABLE at 09:12

## 2024-06-21 RX ADMIN — Medication 2 L/MIN: at 04:00

## 2024-06-21 RX ADMIN — BUMETANIDE 2 MG/HR: 0.25 INJECTION INTRAMUSCULAR; INTRAVENOUS at 15:32

## 2024-06-21 RX ADMIN — Medication 3 MG: at 20:08

## 2024-06-21 RX ADMIN — SODIUM NITROPRUSSIDE 1.5 MCG/KG/MIN: 0.5 INJECTION, SOLUTION INTRAVENOUS at 19:52

## 2024-06-21 SDOH — SOCIAL STABILITY: SOCIAL INSECURITY: ARE YOU OR HAVE YOU BEEN THREATENED OR ABUSED PHYSICALLY, EMOTIONALLY, OR SEXUALLY BY ANYONE?: NO

## 2024-06-21 SDOH — SOCIAL STABILITY: SOCIAL INSECURITY: HAVE YOU HAD THOUGHTS OF HARMING ANYONE ELSE?: NO

## 2024-06-21 SDOH — ECONOMIC STABILITY: HOUSING INSECURITY
IN THE LAST 12 MONTHS, WAS THERE A TIME WHEN YOU DID NOT HAVE A STEADY PLACE TO SLEEP OR SLEPT IN A SHELTER (INCLUDING NOW)?: NO

## 2024-06-21 SDOH — ECONOMIC STABILITY: INCOME INSECURITY: IN THE LAST 12 MONTHS, WAS THERE A TIME WHEN YOU WERE NOT ABLE TO PAY THE MORTGAGE OR RENT ON TIME?: NO

## 2024-06-21 SDOH — ECONOMIC STABILITY: FOOD INSECURITY: WITHIN THE PAST 12 MONTHS, YOU WORRIED THAT YOUR FOOD WOULD RUN OUT BEFORE YOU GOT MONEY TO BUY MORE.: SOMETIMES TRUE

## 2024-06-21 SDOH — SOCIAL STABILITY: SOCIAL INSECURITY: DOES ANYONE TRY TO KEEP YOU FROM HAVING/CONTACTING OTHER FRIENDS OR DOING THINGS OUTSIDE YOUR HOME?: NO

## 2024-06-21 SDOH — SOCIAL STABILITY: SOCIAL INSECURITY: ARE THERE ANY APPARENT SIGNS OF INJURIES/BEHAVIORS THAT COULD BE RELATED TO ABUSE/NEGLECT?: NO

## 2024-06-21 SDOH — SOCIAL STABILITY: SOCIAL INSECURITY: HAS ANYONE EVER THREATENED TO HURT YOUR FAMILY OR YOUR PETS?: NO

## 2024-06-21 SDOH — SOCIAL STABILITY: SOCIAL INSECURITY: WERE YOU ABLE TO COMPLETE ALL THE BEHAVIORAL HEALTH SCREENINGS?: YES

## 2024-06-21 SDOH — SOCIAL STABILITY: SOCIAL INSECURITY: HAVE YOU HAD ANY THOUGHTS OF HARMING ANYONE ELSE?: NO

## 2024-06-21 SDOH — ECONOMIC STABILITY: FOOD INSECURITY: WITHIN THE PAST 12 MONTHS, THE FOOD YOU BOUGHT JUST DIDN'T LAST AND YOU DIDN'T HAVE MONEY TO GET MORE.: SOMETIMES TRUE

## 2024-06-21 SDOH — SOCIAL STABILITY: SOCIAL INSECURITY: DO YOU FEEL UNSAFE GOING BACK TO THE PLACE WHERE YOU ARE LIVING?: NO

## 2024-06-21 SDOH — SOCIAL STABILITY: SOCIAL INSECURITY: DO YOU FEEL ANYONE HAS EXPLOITED OR TAKEN ADVANTAGE OF YOU FINANCIALLY OR OF YOUR PERSONAL PROPERTY?: NO

## 2024-06-21 SDOH — ECONOMIC STABILITY: TRANSPORTATION INSECURITY
IN THE PAST 12 MONTHS, HAS LACK OF TRANSPORTATION KEPT YOU FROM MEETINGS, WORK, OR FROM GETTING THINGS NEEDED FOR DAILY LIVING?: NO

## 2024-06-21 SDOH — SOCIAL STABILITY: SOCIAL INSECURITY: ABUSE: ADULT

## 2024-06-21 SDOH — ECONOMIC STABILITY: INCOME INSECURITY: HOW HARD IS IT FOR YOU TO PAY FOR THE VERY BASICS LIKE FOOD, HOUSING, MEDICAL CARE, AND HEATING?: NOT HARD AT ALL

## 2024-06-21 ASSESSMENT — ACTIVITIES OF DAILY LIVING (ADL)
JUDGMENT_ADEQUATE_SAFELY_COMPLETE_DAILY_ACTIVITIES: YES
GROOMING: NEEDS ASSISTANCE
FEEDING YOURSELF: NEEDS ASSISTANCE
LACK_OF_TRANSPORTATION: NO
HEARING - RIGHT EAR: FUNCTIONAL
WALKS IN HOME: NEEDS ASSISTANCE
TOILETING: NEEDS ASSISTANCE
ADEQUATE_TO_COMPLETE_ADL: YES
DRESSING YOURSELF: NEEDS ASSISTANCE
BATHING: NEEDS ASSISTANCE
PATIENT'S MEMORY ADEQUATE TO SAFELY COMPLETE DAILY ACTIVITIES?: YES
HEARING - LEFT EAR: FUNCTIONAL

## 2024-06-21 ASSESSMENT — PAIN DESCRIPTION - ORIENTATION: ORIENTATION: RIGHT;LEFT

## 2024-06-21 ASSESSMENT — PATIENT HEALTH QUESTIONNAIRE - PHQ9
1. LITTLE INTEREST OR PLEASURE IN DOING THINGS: NOT AT ALL
SUM OF ALL RESPONSES TO PHQ9 QUESTIONS 1 & 2: 0
2. FEELING DOWN, DEPRESSED OR HOPELESS: NOT AT ALL

## 2024-06-21 ASSESSMENT — LIFESTYLE VARIABLES
HOW MANY STANDARD DRINKS CONTAINING ALCOHOL DO YOU HAVE ON A TYPICAL DAY: PATIENT DOES NOT DRINK
AUDIT-C TOTAL SCORE: 0
SKIP TO QUESTIONS 9-10: 1
AUDIT-C TOTAL SCORE: 0
HOW OFTEN DO YOU HAVE 6 OR MORE DRINKS ON ONE OCCASION: NEVER
HOW OFTEN DO YOU HAVE A DRINK CONTAINING ALCOHOL: NEVER

## 2024-06-21 ASSESSMENT — COGNITIVE AND FUNCTIONAL STATUS - GENERAL
CLIMB 3 TO 5 STEPS WITH RAILING: TOTAL
WALKING IN HOSPITAL ROOM: TOTAL
TURNING FROM BACK TO SIDE WHILE IN FLAT BAD: A LOT
MOBILITY SCORE: 10
MOVING TO AND FROM BED TO CHAIR: TOTAL
MOVING FROM LYING ON BACK TO SITTING ON SIDE OF FLAT BED WITH BEDRAILS: A LITTLE
STANDING UP FROM CHAIR USING ARMS: A LOT
PATIENT BASELINE BEDBOUND: UNABLE TO ASSESS AT THIS TIME

## 2024-06-21 ASSESSMENT — PAIN SCALES - GENERAL
PAINLEVEL_OUTOF10: 0 - NO PAIN
PAINLEVEL_OUTOF10: 2
PAINLEVEL_OUTOF10: 0 - NO PAIN
PAINLEVEL_OUTOF10: 8
PAINLEVEL_OUTOF10: 0 - NO PAIN
PAINLEVEL_OUTOF10: 3
PAINLEVEL_OUTOF10: 5 - MODERATE PAIN

## 2024-06-21 ASSESSMENT — PAIN DESCRIPTION - DESCRIPTORS
DESCRIPTORS: ACHING
DESCRIPTORS: ACHING

## 2024-06-21 ASSESSMENT — PAIN - FUNCTIONAL ASSESSMENT
PAIN_FUNCTIONAL_ASSESSMENT: 0-10
PAIN_FUNCTIONAL_ASSESSMENT: CPOT (CRITICAL CARE PAIN OBSERVATION TOOL)
PAIN_FUNCTIONAL_ASSESSMENT: 0-10

## 2024-06-21 ASSESSMENT — PAIN DESCRIPTION - LOCATION: LOCATION: LEG

## 2024-06-21 ASSESSMENT — PAIN SCALES - WONG BAKER: WONGBAKER_NUMERICALRESPONSE: HURTS LITTLE MORE

## 2024-06-21 NOTE — PROGRESS NOTES
Vancomycin Dosing by Pharmacy- INITIAL    Amirah Bennett is a 43 y.o. year old female who Pharmacy has been consulted for vancomycin dosing for leukocytosis. Based on the patient's indication and renal status this patient will be dosed based on a goal AUC of 400-600.     Renal function is currently declining.    Visit Vitals  /87   Pulse (!) 115   Temp 35.4 °C (95.7 °F) (Temporal)   Resp (!) 31        Lab Results   Component Value Date    CREATININE 1.56 (H) 2024    CREATININE 1.81 (H) 2024    CREATININE 1.63 (H) 2024    CREATININE 1.33 (H) 2024        Patient weight is as follows:   Vitals:    24 1100   Weight: 111 kg (244 lb 11.4 oz)       Cultures:  No results found for the encounter in last 14 days.        I/O last 3 completed shifts:  In: 522.3 (4.7 mL/kg) [I.V.:422.3 (3.8 mL/kg); IV Piggyback:100]  Out: 1155 (10.4 mL/kg) [Urine:1150 (0.3 mL/kg/hr); Blood:5]  Weight: 111 kg   I/O during current shift:  I/O this shift:  In: 242.1 [I.V.:192.1; IV Piggyback:50]  Out: 1050 [Urine:1050]    Temp (24hrs), Av °C (96.8 °F), Min:35.4 °C (95.7 °F), Max:36.8 °C (98.2 °F)         Assessment/Plan     Patient will not be given a loading dose.  Will initiate vancomycin maintenance,  1500 mg every 24 hours.    This dosing regimen is predicted by [employer] [facility] [employer] to result in the following pharmacokinetic parameters:    Regimen: 1500 mg IV every 24 hours.  Start time: 22:57 on 2024  Exposure target: AUC24 (range)400-600 mg/L.hr   AUC24,ss: 437 mg/L.hr  Probability of AUC24 > 400: 61 %  Ctrough,ss: 13.1 mg/L  Probability of Ctrough,ss > 20: 14 %  Probability of nephrotoxicity (Lodise KWAME ): 8 %    Follow-up level will be ordered on  at  unless clinically indicated sooner.  Will continue to monitor renal function daily while on vancomycin and order serum creatinine at least every 48 hours if not already ordered.  Follow for continued vancomycin needs, clinical response, and  signs/symptoms of toxicity.       Mohsen Betancourt, PharmD        no

## 2024-06-21 NOTE — PROGRESS NOTES
Social Work Progress Note  -ICU treatment plan: Patient was brought to the ED via E[facility] due to bilateral leg swelling, she was admitted to the MICU for CT angiogram to rule out PE and RHC. Patient also ran out of medication at home.   - Additional Information: Palliative has been consulted; Per Pall care note patient does not have capacity to make her own decisions at this time. Her surrogate decision maker is Navin Sanches  - Payer: [facility]      -Planned disposition: Rehab recommendation - Mod intensity.   -Barriers to discharge: None noted at this time SW will continue to follow.   - Assessment: I attempted to met with the patient to complete the assessments. Due to patients MS and pain, the assessment is incomplete. SW will attempt when appropriate.   ADOD: 6/24  OLVIN Rader, LSW

## 2024-06-21 NOTE — CARE PLAN
The patient's goals for the shift include  remain pain free    The clinical goals for the shift include become  hemodynamically stable      Problem: Skin  Goal: Decreased wound size/increased tissue granulation at next dressing change  Outcome: Progressing  Goal: Participates in plan/prevention/treatment measures  Outcome: Progressing  Goal: Prevent/manage excess moisture  Outcome: Progressing  Goal: Prevent/minimize sheer/friction injuries  Outcome: Progressing  Goal: Promote/optimize nutrition  Outcome: Progressing  Goal: Promote skin healing  Outcome: Progressing     Problem: Pain - Adult  Goal: Verbalizes/displays adequate comfort level or baseline comfort level  Outcome: Progressing     Problem: Safety - Adult  Goal: Free from fall injury  Outcome: Progressing     Problem: Discharge Planning  Goal: Discharge to home or other facility with appropriate resources  Outcome: Progressing     Problem: Chronic Conditions and Co-morbidities  Goal: Patient's chronic conditions and co-morbidity symptoms are monitored and maintained or improved  Outcome: Progressing

## 2024-06-21 NOTE — PROGRESS NOTES
Pharmacy Admission Order Reconciliation Review    Amirah Bennett is a 43 y.o. female admitted for Atrial fibrillation (Multi). Pharmacy reviewed the patient's unreconciled admission medications.    Prior to admission medications that were reviewed and acted on by the pharmacist include:  MVI  Digoxin    These medications have been reconciled.     Any other unreconcilied medications have been addressed and will be ordered or held by the patient's medical team. Medications addressed by the pharmacist may be added or changed by the patient's medical team at any time.    Nan Villagran, PharmD  Transitions of Care Pharmacist  [facility]  Please reach out via Secure Chat for questions

## 2024-06-21 NOTE — CONSULTS
Vancomycin Dosing by Pharmacy- Cessation of Therapy    Consult to pharmacy for vancomycin dosing has been discontinued by the prescriber, pharmacy will sign off at this time.    Please call pharmacy if there are further questions or re-enter a consult if vancomycin is resumed.     Richa Moore, PharmD

## 2024-06-21 NOTE — CONSULTS
Inpatient consult to Palliative Care  Consult performed by: JAE Evangelista-CNP  Consult ordered by: Evan Morgan DO          Palliative Medicine Consult  Complex medical decision making, symptom management, patient/family support    History obtained from chart review including ED note, H&P, patient's daily progress notes, review of lab/test results, and discussion with primary team and bedside RN.    Subjective    History of Present Illness  Amirah Bennett is a 42 y.o. female with significant PMHx of HFrEF (LVEF 20-25% (08/2022), Afib on Xarelto w/ DCCV 05/2023), ischemic stroke L MCA d/t AFib LLA thrombus seen on echo (lack of OAC adherence) s/p thrombectomy 01/2022 @ [facility] w/ residual expressive aphasia and R sided weakness, recent 03/2024 left cerebellar MCA, s/p tracheostomy, T2DM (03/2024 HgbA1c 5.5) and HTN presenting with bilateral leg pain.     In the ED, she was noted to be tachycardic to 105 in triage and but then when placed on the monitor was found to be in atrial fibrillation with RVR with a rate between 150 and 190. She was given 5 mg of metoprolol with slight improvement of her rate but became hypotensive and symptomatic. She was started on heparin both for her A-fib and for possible DVT/PE. Lytics were not given because of the risk of potential intracranial hemorrhage after her recent stroke. Instead decision made to cardiovert the patient and also gave digoxin, and amiodarone. After cardioversion she still looked like she was in atrial fibrillation with RVR but only to a rate in the 120s and 130s. Her blood pressure improved. Levophed ordered along with calcium and magnesium. No evidence of infection. Patient admitted to the ICU with plan to get a CT angiogram of her chest to rule out PE and RHC.      Introduction to Palliative Care  Met with pt at bedside. Palliative called Navin via phone x3 but no response. Left a message with call back number to discuss pt/GOC.    Patient remains  "altered, does not have capacity to make their own medical decisions at this time. Pt is able to participate in ROS  with yes and no questions, is able to be an active participant in her goals of care discussion. Surrogate decision maker is Navin Sanches.    Staff present: Dannielle Silvestre CNP    Palliative Medicine was introduced as a specialty service for patients with serious illness to help with symptom management, improve quality of life, assist with goals of care conversations, navigate complex decision making, and provide support to patients and families. Support and empathy was provided throughout the encounter. Provided reflective listening and presence.     Symptoms  Pain: denies  Dyspnea: yes, some  Fatigue: no. Agrees to taking naps sometimes  Insomnia: denies  Drowsiness: denies  Constipation: yes  Nausea: denies  Appetite: not much  Anxiety: yes  Depression: yes.    Palliative Medicine Social History:  The patient is not  and does not have any children. The patient lives alone in a house with stairs. Pt repeats \"has water\", as in water damage. 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 Pt says she uses a cane or walker for mobility assistance when at home. The patient spends most of the day at home. Hobbies and enjoyments include being in the presence of her friends and listening to music. Pt says she likes to \"get down\" (as in dance). Coping methods are \"her hopes and dreams\" which pt was unable to elaborate.     Objective    Last Recorded Vitals  BP 97/69   Pulse (!) 121   Temp 36 °C (96.8 °F) (Temporal)   Resp (!) 0   Ht 1.702 m (5' 7.01\")   Wt 107 kg (234 lb 12.6 oz)   SpO2 96%   BMI 36.76 kg/m²      Physical Exam  Constitutional:       Appearance: She is obese.   HENT:      Head: Normocephalic and atraumatic.      Nose: Nose normal.      Mouth/Throat:      Mouth: Mucous membranes are moist.      Pharynx: Oropharynx is clear. "   Cardiovascular:      Rate and Rhythm: Tachycardia present.   Pulmonary:      Effort: Pulmonary effort is normal.      Breath sounds: Decreased air movement present. Decreased breath sounds present.   Abdominal:      Palpations: Abdomen is soft.   Musculoskeletal:      Right lower leg: Edema present.      Left lower leg: Edema present.      Comments: BLE wrapped to the knee with ace wraps   Skin:     General: Skin is dry.      Comments: BLE cool to touch   Neurological:      Mental Status: She is alert.      Comments: Significant expressive aphasia, word salad, spelling of names/things, and repetitive speech. Long pauses and word searching.  Alert to place, month, year, self. Able to write some. Unable to articulate reason for ICU stay other than leg pain.   Psychiatric:         Attention and Perception: Attention normal.         Mood and Affect: Mood is anxious.         Behavior: Behavior is cooperative.          Relevant Results  Results for orders placed or performed during the hospital encounter of 06/20/24 (from the past 24 hour(s))   POCT GLUCOSE   Result Value Ref Range    POCT Glucose 55 (L) 74 - 99 mg/dL   POCT GLUCOSE   Result Value Ref Range    POCT Glucose 62 (L) 74 - 99 mg/dL   POCT GLUCOSE   Result Value Ref Range    POCT Glucose 239 (H) 74 - 99 mg/dL   Blood Gas Venous Full Panel   Result Value Ref Range    POCT pH, Venous 7.09 (LL) 7.33 - 7.43 pH    POCT pCO2, Venous 37 (L) 41 - 51 mm Hg    POCT pO2, Venous 18 (L) 35 - 45 mm Hg    POCT SO2, Venous 12 (L) 45 - 75 %    POCT Oxy Hemoglobin, Venous 11.5 (L) 45.0 - 75.0 %    POCT Hematocrit Calculated, Venous 41.0 36.0 - 46.0 %    POCT Sodium, Venous 127 (L) 136 - 145 mmol/L    POCT Potassium, Venous 3.9 3.5 - 5.3 mmol/L    POCT Chloride, Venous 91 (L) 98 - 107 mmol/L    POCT Ionized Calicum, Venous 1.01 (L) 1.10 - 1.33 mmol/L    POCT Glucose, Venous      POCT Lactate, Venous 13.6 (HH) 0.4 - 2.0 mmol/L    POCT Base Excess, Venous -17.8 (L) -2.0 - 3.0  mmol/L    POCT HCO3 Calculated, Venous 11.2 (L) 22.0 - 26.0 mmol/L    POCT Hemoglobin, Venous 13.6 12.0 - 16.0 g/dL    POCT Anion Gap, Venous 29.0 (H) 10.0 - 25.0 mmol/L    Patient Temperature 37.0 degrees Celsius    FiO2 21 %   Blood Gas Lactic Acid, Venous   Result Value Ref Range    POCT Lactate, Venous 13.6 (HH) 0.4 - 2.0 mmol/L   Blood Culture    Specimen: Peripheral Venipuncture; Blood culture   Result Value Ref Range    Blood Culture Loaded on Instrument - Culture in progress    Lactate   Result Value Ref Range    Lactate 14.7 (HH) 0.4 - 2.0 mmol/L   Troponin I, High Sensitivity   Result Value Ref Range    Troponin I, High Sensitivity 326 (HH) 0 - 34 ng/L   Blood Gas Arterial Full Panel Unsolicited   Result Value Ref Range    POCT pH, Arterial 7.30 (L) 7.38 - 7.42 pH    POCT pCO2, Arterial 9 (LL) 38 - 42 mm Hg    POCT pO2, Arterial 159 (H) 85 - 95 mm Hg    POCT SO2, Arterial 100 94 - 100 %    POCT Oxy Hemoglobin, Arterial 97.5 94.0 - 98.0 %    POCT Hematocrit Calculated, Arterial 40.0 36.0 - 46.0 %    POCT Sodium, Arterial 128 (L) 136 - 145 mmol/L    POCT Potassium, Arterial 3.8 3.5 - 5.3 mmol/L    POCT Chloride, Arterial 94 (L) 98 - 107 mmol/L    POCT Ionized Calcium, Arterial 0.97 (L) 1.10 - 1.33 mmol/L    POCT Glucose, Arterial 187 (H) 74 - 99 mg/dL    POCT Lactate, Arterial 15.4 (HH) 0.4 - 2.0 mmol/L    POCT Base Excess, Arterial -18.8 (L) -2.0 - 3.0 mmol/L    POCT HCO3 Calculated, Arterial 4.4 (L) 22.0 - 26.0 mmol/L    POCT Hemoglobin, Arterial 13.2 12.0 - 16.0 g/dL    POCT Anion Gap, Arterial 33 (H) 10 - 25 mmo/L    Patient Temperature 37.0 degrees Celsius   Drug Screen, Urine   Result Value Ref Range    Amphetamine Screen, Urine Presumptive Negative Presumptive Negative    Barbiturate Screen, Urine Presumptive Negative Presumptive Negative    Benzodiazepines Screen, Urine Presumptive Negative Presumptive Negative    Cannabinoid Screen, Urine Presumptive Positive (A) Presumptive Negative    Cocaine  Metabolite Screen, Urine Presumptive Negative Presumptive Negative    Fentanyl Screen, Urine Presumptive Negative Presumptive Negative    Opiate Screen, Urine Presumptive Negative Presumptive Negative    Oxycodone Screen, Urine Presumptive Negative Presumptive Negative    PCP Screen, Urine Presumptive Negative Presumptive Negative    Methadone Screen, Urine Presumptive Negative Presumptive Negative   hCG, Urine, Qualitative   Result Value Ref Range    HCG, Urine NEGATIVE NEGATIVE   Urine electrolytes   Result Value Ref Range    Sodium, Urine Random 18 mmol/L    Sodium/Creatinine Ratio 8 Not established. mmol/g Creat    Potassium, Urine Random 44 mmol/L    Potassium/Creatinine Ratio 18 Not established mmol/g Creat    Chloride, Urine Random 18 mmol/L    Chloride/Creatinine Ratio 8 (L) 38 - 318 mmol/g creat    Creatinine, Urine Random 239.4 20.0 - 320.0 mg/dL   Blood Gas Mixed Venous Full Panel Unsolicited   Result Value Ref Range    POCT pH, Mixed 7.26 (L) 7.33 - 7.43 pH    POCT pCO2, Mixed 20 (L) 41 - 51 mm Hg    POCT pO2, Mixed 40 35 - 45 mm Hg    POCT SO2, Mixed 52 45 - 75 %    POCT Oxy Hemoglobin, Mixed 50.9 45.0 - 75.0 %    POCT Hematocrit Calculated, Mixed 36.0 36.0 - 46.0 %    POCT Sodium, Mixed 130 (L) 136 - 145 mmol/L    POCT Potassium, Mixed 3.6 3.5 - 5.3 mmol/L    POCT Chloride, Mixed 93 (L) 98 - 107 mmol/L    POCT Ionized Calcium, Mixed 1.00 (L) 1.10 - 1.33 mmol/L    POCT Glucose, Mixed 154 (H) 74 - 99 mg/dL    POCT Lactate, Mixed 15.3 (HH) 0.4 - 2.0 mmol/L    POCT Base Excess, Mixed -16.0 (L) -2.0 - 3.0 mmol/L    POCT HCO3 Calculated, Mixed 9.0 (L) 22.0 - 26.0 mmol/L    POCT Hemoglobin, Mixed 12.1 12.0 - 16.0 g/dL    POCT Anion Gap, Mixed 32 (H) 10 - 25 mmo/L    Patient Temperature 37.0 degrees Celsius   Procalcitonin   Result Value Ref Range    Procalcitonin 0.28 (H) <=0.07 ng/mL   Lactate   Result Value Ref Range    Lactate 16.0 (HH) 0.4 - 2.0 mmol/L   Troponin I, High Sensitivity   Result Value Ref  Range    Troponin I, High Sensitivity 289 (HH) 0 - 34 ng/L   T4, free   Result Value Ref Range    Thyroxine, Free 1.48 0.78 - 1.48 ng/dL   Renal function panel   Result Value Ref Range    Glucose 153 (H) 74 - 99 mg/dL    Sodium 135 (L) 136 - 145 mmol/L    Potassium 3.9 3.5 - 5.3 mmol/L    Chloride 98 98 - 107 mmol/L    Bicarbonate 10 (LL) 21 - 32 mmol/L    Anion Gap 31 (H) 10 - 20 mmol/L    Urea Nitrogen 29 (H) 6 - 23 mg/dL    Creatinine 1.81 (H) 0.50 - 1.05 mg/dL    eGFR 35 (L) >60 mL/min/1.73m*2    Calcium 8.9 8.6 - 10.6 mg/dL    Phosphorus 5.8 (H) 2.5 - 4.9 mg/dL    Albumin 2.6 (L) 3.4 - 5.0 g/dL   CBC and Auto Differential   Result Value Ref Range    WBC 12.3 (H) 4.4 - 11.3 x10*3/uL    nRBC 0.2 (H) 0.0 - 0.0 /100 WBCs    RBC 4.00 4.00 - 5.20 x10*6/uL    Hemoglobin 11.1 (L) 12.0 - 16.0 g/dL    Hematocrit 36.5 36.0 - 46.0 %    MCV 91 80 - 100 fL    MCH 27.8 26.0 - 34.0 pg    MCHC 30.4 (L) 32.0 - 36.0 g/dL    RDW 15.9 (H) 11.5 - 14.5 %    Platelets 139 (L) 150 - 450 x10*3/uL    Neutrophils % 70.1 40.0 - 80.0 %    Immature Granulocytes %, Automated 1.2 (H) 0.0 - 0.9 %    Lymphocytes % 23.2 13.0 - 44.0 %    Monocytes % 5.3 2.0 - 10.0 %    Eosinophils % 0.0 0.0 - 6.0 %    Basophils % 0.2 0.0 - 2.0 %    Neutrophils Absolute 8.58 (H) 1.20 - 7.70 x10*3/uL    Immature Granulocytes Absolute, Automated 0.15 0.00 - 0.70 x10*3/uL    Lymphocytes Absolute 2.85 1.20 - 4.80 x10*3/uL    Monocytes Absolute 0.65 0.10 - 1.00 x10*3/uL    Eosinophils Absolute 0.00 0.00 - 0.70 x10*3/uL    Basophils Absolute 0.03 0.00 - 0.10 x10*3/uL   Heparin Assay, UFH   Result Value Ref Range    Heparin Unfractionated 0.6 See Comment Below for Therapeutic Ranges IU/mL   BLOOD GAS MIXED VENOUS FULL PANEL   Result Value Ref Range    POCT pH, Mixed 7.26 (L) 7.33 - 7.43 pH    POCT pCO2, Mixed 21 (L) 41 - 51 mm Hg    POCT pO2, Mixed 34 (L) 35 - 45 mm Hg    POCT SO2, Mixed 44 (L) 45 - 75 %    POCT Oxy Hemoglobin, Mixed 43.5 (L) 45.0 - 75.0 %    POCT  Hematocrit Calculated, Mixed 32.0 (L) 36.0 - 46.0 %    POCT Sodium, Mixed 133 (L) 136 - 145 mmol/L    POCT Potassium, Mixed 3.5 3.5 - 5.3 mmol/L    POCT Chloride, Mixed 97 (L) 98 - 107 mmol/L    POCT Ionized Calcium, Mixed 1.06 (L) 1.10 - 1.33 mmol/L    POCT Glucose, Mixed 146 (H) 74 - 99 mg/dL    POCT Lactate, Mixed 12.9 (HH) 0.4 - 2.0 mmol/L    POCT Base Excess, Mixed -15.8 (L) -2.0 - 3.0 mmol/L    POCT HCO3 Calculated, Mixed 9.4 (L) 22.0 - 26.0 mmol/L    POCT Hemoglobin, Mixed 10.7 (L) 12.0 - 16.0 g/dL    POCT Anion Gap, Mixed 30 (H) 10 - 25 mmo/L    Patient Temperature 37.0 degrees Celsius    FiO2 21 %   Procalcitonin   Result Value Ref Range    Procalcitonin 0.56 (H) <=0.07 ng/mL   Lactate   Result Value Ref Range    Lactate 13.5 (HH) 0.4 - 2.0 mmol/L   Blood Gas Arterial Full Panel   Result Value Ref Range    POCT pH, Arterial 7.34 (L) 7.38 - 7.42 pH    POCT pCO2, Arterial 17 (L) 38 - 42 mm Hg    POCT pO2, Arterial 116 (H) 85 - 95 mm Hg    POCT SO2, Arterial 99 94 - 100 %    POCT Oxy Hemoglobin, Arterial 96.9 94.0 - 98.0 %    POCT Hematocrit Calculated, Arterial 36.0 36.0 - 46.0 %    POCT Sodium, Arterial 130 (L) 136 - 145 mmol/L    POCT Potassium, Arterial 3.6 3.5 - 5.3 mmol/L    POCT Chloride, Arterial 95 (L) 98 - 107 mmol/L    POCT Ionized Calcium, Arterial 1.07 (L) 1.10 - 1.33 mmol/L    POCT Glucose, Arterial 162 (H) 74 - 99 mg/dL    POCT Lactate, Arterial 12.3 (HH) 0.4 - 2.0 mmol/L    POCT Base Excess, Arterial -14.2 (L) -2.0 - 3.0 mmol/L    POCT HCO3 Calculated, Arterial 9.2 (L) 22.0 - 26.0 mmol/L    POCT Hemoglobin, Arterial 11.9 (L) 12.0 - 16.0 g/dL    POCT Anion Gap, Arterial 29 (H) 10 - 25 mmo/L    Patient Temperature 37.0 degrees Celsius    FiO2 28 %   Hepatic Function Panel   Result Value Ref Range    Albumin 2.7 (L) 3.4 - 5.0 g/dL    Bilirubin, Total 3.7 (H) 0.0 - 1.2 mg/dL    Bilirubin, Direct 2.3 (H) 0.0 - 0.3 mg/dL    Alkaline Phosphatase 65 33 - 110 U/L     (H) 7 - 45 U/L    AST 1,996  (H) 9 - 39 U/L    Total Protein 5.6 (L) 6.4 - 8.2 g/dL   Heparin Assay, UFH   Result Value Ref Range    Heparin Unfractionated 0.6 See Comment Below for Therapeutic Ranges IU/mL   CBC   Result Value Ref Range    WBC 15.1 (H) 4.4 - 11.3 x10*3/uL    nRBC 0.1 (H) 0.0 - 0.0 /100 WBCs    RBC 4.11 4.00 - 5.20 x10*6/uL    Hemoglobin 11.4 (L) 12.0 - 16.0 g/dL    Hematocrit 35.0 (L) 36.0 - 46.0 %    MCV 85 80 - 100 fL    MCH 27.7 26.0 - 34.0 pg    MCHC 32.6 32.0 - 36.0 g/dL    RDW 15.6 (H) 11.5 - 14.5 %    Platelets 149 (L) 150 - 450 x10*3/uL   Basic Metabolic Panel   Result Value Ref Range    Glucose 153 (H) 74 - 99 mg/dL    Sodium 135 (L) 136 - 145 mmol/L    Potassium 3.6 3.5 - 5.3 mmol/L    Chloride 97 (L) 98 - 107 mmol/L    Bicarbonate 15 (L) 21 - 32 mmol/L    Anion Gap 27 (H) 10 - 20 mmol/L    Urea Nitrogen 31 (H) 6 - 23 mg/dL    Creatinine 1.56 (H) 0.50 - 1.05 mg/dL    eGFR 42 (L) >60 mL/min/1.73m*2    Calcium 8.7 8.6 - 10.6 mg/dL   Phosphorus   Result Value Ref Range    Phosphorus 4.9 2.5 - 4.9 mg/dL   Magnesium   Result Value Ref Range    Magnesium 1.92 1.60 - 2.40 mg/dL   BLOOD GAS MIXED VENOUS FULL PANEL   Result Value Ref Range    POCT pH, Mixed 7.41 7.33 - 7.43 pH    POCT pCO2, Mixed 29 (L) 41 - 51 mm Hg    POCT pO2, Mixed 44 35 - 45 mm Hg    POCT SO2, Mixed 70 45 - 75 %    POCT Oxy Hemoglobin, Mixed 68.9 45.0 - 75.0 %    POCT Hematocrit Calculated, Mixed 35.0 (L) 36.0 - 46.0 %    POCT Sodium, Mixed 131 (L) 136 - 145 mmol/L    POCT Potassium, Mixed 3.4 (L) 3.5 - 5.3 mmol/L    POCT Chloride, Mixed 94 (L) 98 - 107 mmol/L    POCT Ionized Calcium, Mixed 1.12 1.10 - 1.33 mmol/L    POCT Glucose, Mixed 169 (H) 74 - 99 mg/dL    POCT Lactate, Mixed 8.5 (HH) 0.4 - 2.0 mmol/L    POCT Base Excess, Mixed -5.2 (L) -2.0 - 3.0 mmol/L    POCT HCO3 Calculated, Mixed 18.4 (L) 22.0 - 26.0 mmol/L    POCT Hemoglobin, Mixed 11.6 (L) 12.0 - 16.0 g/dL    POCT Anion Gap, Mixed 22 10 - 25 mmo/L    Patient Temperature 37.0 degrees Celsius     FiO2 36 %   Electrocardiogram, 12-lead PRN ACS symptoms   Result Value Ref Range    Ventricular Rate 132 BPM    Atrial Rate 107 BPM    QRS Duration 100 ms    QT Interval 334 ms    QTC Calculation(Bazett) 494 ms    R Axis 39 degrees    T Axis 256 degrees    QRS Count 22 beats    Q Onset 213 ms    T Offset 380 ms    QTC Fredericia 434 ms   POCT GLUCOSE   Result Value Ref Range    POCT Glucose 154 (H) 74 - 99 mg/dL   Lactate   Result Value Ref Range    Lactate 6.2 (HH) 0.4 - 2.0 mmol/L   Urine electrolytes   Result Value Ref Range    Sodium, Urine Random 117 mmol/L    Sodium/Creatinine Ratio 1,721 Not established. mmol/g Creat    Potassium, Urine Random 6 mmol/L    Potassium/Creatinine Ratio 88 Not established mmol/g Creat    Chloride, Urine Random 119 mmol/L    Chloride/Creatinine Ratio 1,750 (H) 38 - 318 mmol/g creat    Creatinine, Urine Random 6.8 (L) 20.0 - 320.0 mg/dL   Hepatitis panel, acute   Result Value Ref Range    Hepatitis B Surface AG Nonreactive Nonreactive    Hepatitis A  AB- IgM Nonreactive Nonreactive    Hepatitis B Core AB; IgM Nonreactive Nonreactive    Hepatitis C AB Nonreactive Nonreactive   Urea Nitrogen, Urine Random   Result Value Ref Range    Urea Nitrogen, Urine Random 73 mg/dL    Creatinine, Urine Random 7.0 (L) 20.0 - 320.0 mg/dL    Urea Nitrogen/Creatinine Ratio 10.4 Not established. g/g creat   Blood Gas Arterial Full Panel   Result Value Ref Range    POCT pH, Arterial 7.45 (H) 7.38 - 7.42 pH    POCT pCO2, Arterial 30 (L) 38 - 42 mm Hg    POCT pO2, Arterial 70 (L) 85 - 95 mm Hg    POCT SO2, Arterial 95 94 - 100 %    POCT Oxy Hemoglobin, Arterial 92.9 (L) 94.0 - 98.0 %    POCT Hematocrit Calculated, Arterial 34.0 (L) 36.0 - 46.0 %    POCT Sodium, Arterial 131 (L) 136 - 145 mmol/L    POCT Potassium, Arterial 3.4 (L) 3.5 - 5.3 mmol/L    POCT Chloride, Arterial 95 (L) 98 - 107 mmol/L    POCT Ionized Calcium, Arterial 1.06 (L) 1.10 - 1.33 mmol/L    POCT Glucose, Arterial 152 (H) 74 - 99  mg/dL    POCT Lactate, Arterial 6.0 (HH) 0.4 - 2.0 mmol/L    POCT Base Excess, Arterial -2.3 (L) -2.0 - 3.0 mmol/L    POCT HCO3 Calculated, Arterial 20.9 (L) 22.0 - 26.0 mmol/L    POCT Hemoglobin, Arterial 11.2 (L) 12.0 - 16.0 g/dL    POCT Anion Gap, Arterial 19 10 - 25 mmo/L    Patient Temperature 37.0 degrees Celsius    FiO2 28 %   BLOOD GAS MIXED VENOUS FULL PANEL   Result Value Ref Range    POCT pH, Mixed 7.45 (H) 7.33 - 7.43 pH    POCT pCO2, Mixed 34 (L) 41 - 51 mm Hg    POCT pO2, Mixed 46 (H) 35 - 45 mm Hg    POCT SO2, Mixed 72 45 - 75 %    POCT Oxy Hemoglobin, Mixed 70.4 45.0 - 75.0 %    POCT Hematocrit Calculated, Mixed 34.0 (L) 36.0 - 46.0 %    POCT Sodium, Mixed 131 (L) 136 - 145 mmol/L    POCT Potassium, Mixed 3.5 3.5 - 5.3 mmol/L    POCT Chloride, Mixed 95 (L) 98 - 107 mmol/L    POCT Ionized Calcium, Mixed 1.10 1.10 - 1.33 mmol/L    POCT Glucose, Mixed 160 (H) 74 - 99 mg/dL    POCT Lactate, Mixed 4.6 (HH) 0.4 - 2.0 mmol/L    POCT Base Excess, Mixed 0.0 -2.0 - 3.0 mmol/L    POCT HCO3 Calculated, Mixed 23.6 22.0 - 26.0 mmol/L    POCT Hemoglobin, Mixed 11.3 (L) 12.0 - 16.0 g/dL    POCT Anion Gap, Mixed 16 10 - 25 mmo/L    Patient Temperature 37.0 degrees Celsius    FiO2 28 %   Blood Gas Arterial Full Panel   Result Value Ref Range    POCT pH, Arterial 7.47 (H) 7.38 - 7.42 pH    POCT pCO2, Arterial 30 (L) 38 - 42 mm Hg    POCT pO2, Arterial 68 (L) 85 - 95 mm Hg    POCT SO2, Arterial 94 94 - 100 %    POCT Oxy Hemoglobin, Arterial 92.6 (L) 94.0 - 98.0 %    POCT Hematocrit Calculated, Arterial 33.0 (L) 36.0 - 46.0 %    POCT Sodium, Arterial 130 (L) 136 - 145 mmol/L    POCT Potassium, Arterial 3.4 (L) 3.5 - 5.3 mmol/L    POCT Chloride, Arterial 96 (L) 98 - 107 mmol/L    POCT Ionized Calcium, Arterial 1.11 1.10 - 1.33 mmol/L    POCT Glucose, Arterial 160 (H) 74 - 99 mg/dL    POCT Lactate, Arterial 4.6 (HH) 0.4 - 2.0 mmol/L    POCT Base Excess, Arterial -1.2 -2.0 - 3.0 mmol/L    POCT HCO3 Calculated, Arterial 21.8  (L) 22.0 - 26.0 mmol/L    POCT Hemoglobin, Arterial 10.9 (L) 12.0 - 16.0 g/dL    POCT Anion Gap, Arterial 16 10 - 25 mmo/L    Patient Temperature 37.0 degrees Celsius    FiO2 28 %   POCT GLUCOSE   Result Value Ref Range    POCT Glucose 141 (H) 74 - 99 mg/dL   CBC and Auto Differential   Result Value Ref Range    WBC 16.7 (H) 4.4 - 11.3 x10*3/uL    nRBC 0.0 0.0 - 0.0 /100 WBCs    RBC 3.79 (L) 4.00 - 5.20 x10*6/uL    Hemoglobin 10.6 (L) 12.0 - 16.0 g/dL    Hematocrit 30.9 (L) 36.0 - 46.0 %    MCV 82 80 - 100 fL    MCH 28.0 26.0 - 34.0 pg    MCHC 34.3 32.0 - 36.0 g/dL    RDW 15.0 (H) 11.5 - 14.5 %    Platelets 132 (L) 150 - 450 x10*3/uL    Neutrophils % 73.4 40.0 - 80.0 %    Immature Granulocytes %, Automated 0.7 0.0 - 0.9 %    Lymphocytes % 21.9 13.0 - 44.0 %    Monocytes % 3.9 2.0 - 10.0 %    Eosinophils % 0.0 0.0 - 6.0 %    Basophils % 0.1 0.0 - 2.0 %    Neutrophils Absolute 12.28 (H) 1.20 - 7.70 x10*3/uL    Immature Granulocytes Absolute, Automated 0.12 0.00 - 0.70 x10*3/uL    Lymphocytes Absolute 3.66 1.20 - 4.80 x10*3/uL    Monocytes Absolute 0.65 0.10 - 1.00 x10*3/uL    Eosinophils Absolute 0.00 0.00 - 0.70 x10*3/uL    Basophils Absolute 0.02 0.00 - 0.10 x10*3/uL   Magnesium   Result Value Ref Range    Magnesium 1.68 1.60 - 2.40 mg/dL   Phosphorus   Result Value Ref Range    Phosphorus 3.9 2.5 - 4.9 mg/dL   Basic Metabolic Panel   Result Value Ref Range    Glucose 140 (H) 74 - 99 mg/dL    Sodium 134 (L) 136 - 145 mmol/L    Potassium 3.4 (L) 3.5 - 5.3 mmol/L    Chloride 96 (L) 98 - 107 mmol/L    Bicarbonate 25 21 - 32 mmol/L    Anion Gap 16 10 - 20 mmol/L    Urea Nitrogen 30 (H) 6 - 23 mg/dL    Creatinine 1.60 (H) 0.50 - 1.05 mg/dL    eGFR 40 (L) >60 mL/min/1.73m*2    Calcium 8.6 8.6 - 10.6 mg/dL   Lactate   Result Value Ref Range    Lactate 3.7 (H) 0.4 - 2.0 mmol/L   Calcium, ionized   Result Value Ref Range    POCT Calcium, Ionized 1.11 1.1 - 1.33 mmol/L   Heparin Assay, UFH   Result Value Ref Range    Heparin  Unfractionated 0.4 See Comment Below for Therapeutic Ranges IU/mL   Blood Gas Arterial Full Panel   Result Value Ref Range    POCT pH, Arterial 7.52 (H) 7.38 - 7.42 pH    POCT pCO2, Arterial 33 (L) 38 - 42 mm Hg    POCT pO2, Arterial 73 (L) 85 - 95 mm Hg    POCT SO2, Arterial 96 94 - 100 %    POCT Oxy Hemoglobin, Arterial 93.4 (L) 94.0 - 98.0 %    POCT Hematocrit Calculated, Arterial 33.0 (L) 36.0 - 46.0 %    POCT Sodium, Arterial 130 (L) 136 - 145 mmol/L    POCT Potassium, Arterial 3.3 (L) 3.5 - 5.3 mmol/L    POCT Chloride, Arterial 95 (L) 98 - 107 mmol/L    POCT Ionized Calcium, Arterial 1.10 1.10 - 1.33 mmol/L    POCT Glucose, Arterial 118 (H) 74 - 99 mg/dL    POCT Lactate, Arterial 2.5 (H) 0.4 - 2.0 mmol/L    POCT Base Excess, Arterial 4.1 (H) -2.0 - 3.0 mmol/L    POCT HCO3 Calculated, Arterial 26.9 (H) 22.0 - 26.0 mmol/L    POCT Hemoglobin, Arterial 10.9 (L) 12.0 - 16.0 g/dL    POCT Anion Gap, Arterial 11 10 - 25 mmo/L    Patient Temperature 37.0 degrees Celsius    FiO2 28 %   Urinalysis with Reflex Culture and Microscopic   Result Value Ref Range    Color, Urine Colorless (N) Light-Yellow, Yellow, Dark-Yellow    Appearance, Urine Clear Clear    Specific Gravity, Urine 1.007 1.005 - 1.035    pH, Urine 7.0 5.0, 5.5, 6.0, 6.5, 7.0, 7.5, 8.0    Protein, Urine NEGATIVE NEGATIVE, 10 (TRACE), 20 (TRACE) mg/dL    Glucose, Urine Normal Normal mg/dL    Blood, Urine 0.1 (1+) (A) NEGATIVE    Ketones, Urine NEGATIVE NEGATIVE mg/dL    Bilirubin, Urine NEGATIVE NEGATIVE    Urobilinogen, Urine Normal Normal mg/dL    Nitrite, Urine NEGATIVE NEGATIVE    Leukocyte Esterase, Urine NEGATIVE NEGATIVE   Urinalysis Microscopic   Result Value Ref Range    WBC, Urine NONE 1-5, NONE /HPF    RBC, Urine 1-2 NONE, 1-2, 3-5 /HPF    Bacteria, Urine 1+ (A) NONE SEEN /HPF    Mucus, Urine FEW Reference range not established. /LPF   BLOOD GAS MIXED VENOUS FULL PANEL   Result Value Ref Range    POCT pH, Mixed 7.48 (H) 7.33 - 7.43 pH    POCT pCO2,  Mixed 38 (L) 41 - 51 mm Hg    POCT pO2, Mixed 45 35 - 45 mm Hg    POCT SO2, Mixed 74 45 - 75 %    POCT Oxy Hemoglobin, Mixed 71.9 45.0 - 75.0 %    POCT Hematocrit Calculated, Mixed 33.0 (L) 36.0 - 46.0 %    POCT Sodium, Mixed 130 (L) 136 - 145 mmol/L    POCT Potassium, Mixed 3.4 (L) 3.5 - 5.3 mmol/L    POCT Chloride, Mixed 93 (L) 98 - 107 mmol/L    POCT Ionized Calcium, Mixed 1.11 1.10 - 1.33 mmol/L    POCT Glucose, Mixed 124 (H) 74 - 99 mg/dL    POCT Lactate, Mixed 2.4 (H) 0.4 - 2.0 mmol/L    POCT Base Excess, Mixed 4.5 (H) -2.0 - 3.0 mmol/L    POCT HCO3 Calculated, Mixed 28.3 (H) 22.0 - 26.0 mmol/L    POCT Hemoglobin, Mixed 11.1 (L) 12.0 - 16.0 g/dL    POCT Anion Gap, Mixed 12 10 - 25 mmo/L    Patient Temperature 37.0 degrees Celsius    FiO2 28 %   POCT GLUCOSE   Result Value Ref Range    POCT Glucose 127 (H) 74 - 99 mg/dL      Electrocardiogram, 12-lead PRN ACS symptoms  Atrial fibrillation with rapid ventricular response with premature ventricular or aberrantly conducted complexes  T wave abnormality, consider inferolateral ischemia  Abnormal ECG  When compared with ECG of 20-JUN-2024 10:05,  Non-specific change in ST segment in Inferior leads  XR chest 1 view  Narrative: Interpreted By:  Zheng Damon and Meyers Emily   STUDY:  XR CHEST 1 VIEW;  6/21/2024 7:13 am      INDICATION:  Signs/Symptoms:swan placement.      COMPARISON:  Chest radiograph 06/20/2024      ACCESSION NUMBER(S):  [id number]      ORDERING CLINICIAN:  MINNA MICHAEL      FINDINGS:  AP radiograph of the chest was provided.      Interval placement of a right IJ approach [place]-Abe catheter with its  tip overlying the expected location of the right interlobar pulmonary  artery.      CARDIOMEDIASTINAL SILHOUETTE:  Cardiomediastinal silhouette is enlarged, though stable in size and  configuration.      LUNGS:  Bilateral subsegmental atelectasis. Otherwise, no focal  consolidation, pleural effusion, or pneumothorax.      ABDOMEN:  No  remarkable upper abdominal findings.      BONES:  No acute osseous changes.      Impression: 1. Bilateral subsegmental atelectasis.  2. Right IJ approach [place]-Abe catheter with its tip overlying the  expected location of the right interlobar pulmonary artery.      I personally reviewed the images/study, and I agree with the findings  as stated above. This study was interpreted at [facility], [place], [place].      MACRO:  None      Signed by: Zheng Damon 6/21/2024 8:04 AM  Dictation workstation:   [id number]  US right upper quadrant  Narrative: Interpreted By:  Manuel Miller and Ebai Jerky   STUDY:  US RIGHT UPPER QUADRANT;  6/21/2024 12:22 am      INDICATION:  Signs/Symptoms:elevated LFTs.      COMPARISON:  Right upper quadrant ultrasound 08/08/2022.      ACCESSION NUMBER(S):  [id number]      ORDERING CLINICIAN:  MINNA MICHAEL      TECHNIQUE:  Multiple images of the right upper quadrant were obtained.      FINDINGS:  LIVER:  The liver measures 17.99 and is diffusely echogenic and coarse  appearance, consistent with diffuse fatty infiltration. The resulting  increased beam attenuation thereby limiting evaluation of the liver  for focal lesions. There is a 1.1 x 0.8 x 0.8 cm anechoic mass within  the periphery of the left hepatic lobe demonstrating posterior  acoustic enhancement and no internal vascularity on Doppler  interrogation, consistent with a cyst seen on CT abdomen pelvis from  03/08/2024. No additional liver lesions are seen..      GALLBLADDER:  The gallbladder is decompressed, and demonstrates no evidence of  gallstones,  or surrounding fluid. The gallbladder wall is thickened  measuring 0.55 cm. Sonographic James's sign is negative.      BILE DUCTS:  No evidence of intra or extrahepatic biliary dilatation is  identified; the common bile duct measures .43 cm.      PANCREAS:  The visualized pancreas is unremarkable in appearance.      RIGHT KIDNEY:  The  right kidney measures 12.13 cm in length. The renal cortical  echogenicity and thickness are within normal limit.  No  hydronephrosis or renal calculi are seen.      There is perihepatic ascites.  Newly enlarged intrahepatic hepatic veins compared to 08/08/2022.  Stable from same day CT chest for PE.      Impression: 1. Diffuse coarse and echogenic appearance of the liver, correlate  with liver disease.  2. Decompressed gallbladder with wall thickening, correlate with  increased fluid status.  3. Interval increase in size of the intrahepatic veins compared to  08/08/2022, recommend correlation with concern for right heart  dysfunction.  4. Perihepatic ascites.      I personally reviewed the images/study and I agree with the findings  as stated by Resident Tam Oliver. This study was interpreted at  [facility], [place], [place].      MACRO:  None      Signed by: Manuel Miller 6/21/2024 5:57 AM  Dictation workstation:   [id number]     Encounter Date: 06/20/24   Electrocardiogram, 12-lead PRN ACS symptoms   Result Value    Ventricular Rate 132    Atrial Rate 107    QRS Duration 100    QT Interval 334    QTC Calculation(Bazett) 494    R Axis 39    T Axis 256    QRS Count 22    Q Onset 213    T Offset 380    QTC Fredericia 434    Narrative    Atrial fibrillation with rapid ventricular response with premature ventricular or aberrantly conducted complexes  T wave abnormality, consider inferolateral ischemia  Abnormal ECG  When compared with ECG of 20-JUN-2024 10:05,  Non-specific change in ST segment in Inferior leads        Allergies  Hydrocodone-acetaminophen and Ibuprofen    Scheduled medications  amiodarone, 150 mg, intravenous, Once  aspirin, 81 mg, oral, Daily  atorvastatin, 80 mg, oral, Daily  [Held by provider] empagliflozin, 10 mg, oral, Daily  folic acid, 1 mg, oral, Daily  [Held by provider] gabapentin, 100 mg, oral, TID  insulin lispro, 0-5 Units, subcutaneous, q4h  melatonin,  3 mg, oral, Nightly  [Held by provider] metoprolol succinate XL, 50 mg, oral, Daily  oxygen, , inhalation, Continuous - Inhalation  pantoprazole, 40 mg, oral, Daily before breakfast  [Held by provider] sacubitriL-valsartan, 1 tablet, oral, BID  [Held by provider] spironolactone, 25 mg, oral, Daily      Continuous medications  amiodarone, 1 mg/min, Last Rate: 1 mg/min (06/21/24 0911)  bumetanide, 2 mg/hr, Last Rate: 2 mg/hr (06/21/24 0700)  DOBUTamine, 2.5-20 mcg/kg/min, Last Rate: 2.5 mcg/kg/min (06/21/24 0700)  heparin, 0-4,500 Units/hr, Last Rate: 1,600 Units/hr (06/21/24 0700)  nitroprusside, 0.25-5 mcg/kg/min, Last Rate: 1 mcg/kg/min (06/21/24 0700)      PRN medications  PRN medications: dextrose, dextrose, glucagon, glucagon, heparin     Assessment/Plan    Amirah Bennett is a 42 y.o. female with significant PMHx of HFrEF (LVEF 20-25% (08/2022), Afib on Xarelto w/ DCCV 05/2023), ischemic stroke L MCA d/t AFib LLA thrombus seen on echo (lack of OAC adherence) s/p thrombectomy 01/2022 @ [facility] w/ residual expressive aphasia and R sided weakness, recent 03/2024 left cerebellar MCA, s/p tracheostomy, T2DM (03/2024 HgbA1c 5.5) and HTN presenting with bilateral leg pain.     In the ED, she was noted to be tachycardic to 105 in triage and but then when placed on the monitor was found to be in atrial fibrillation with RVR with a rate between 150 and 190. She was given 5 mg of metoprolol with slight improvement of her rate but became hypotensive and symptomatic. She was started on heparin both for her A-fib and for possible DVT/PE. Lytics were not given because of the risk of potential intracranial hemorrhage after her recent stroke. Instead decision made to cardiovert the patient and also gave digoxin, and amiodarone. After cardioversion she still looked like she was in atrial fibrillation with RVR but only to a rate in the 120s and 130s. Her blood pressure improved. Levophed ordered along with calcium and magnesium. No  "evidence of infection. Patient admitted to the ICU with plan to get a CT angiogram of her chest to rule out PE and RHC.     6/21: Palliative consulted for pt support and GOC.      Palliative Performance Scale (PPS): 50    ----------------------------------------------------------------------------------------------------------------------------------------------------------------------------------------------------------------------------------------------------------------------------------------------------------------------------------------------------------------------  Advanced Care Planning  Patient and/or family consented to a voluntary Advanced Care Planning meeting.   Serious Illness Assessment and Counseling:  Life Limiting Disease:   Cardiogenic shock requiring inotrope cardiac support posing threat to life and function.    Disease Specific Information Provided/Prognosis Discussed: Patient's current clinical condition, including diagnosis, prognosis, and management plan were discussed.   Counseling provided on home meds (GDMT) are needed for heart health and continued life to prevent hospitalization and death.  Counseling provided on guarded prognosis and inotrope needed to support heart function.  Counseling provided on the irreversible and progressive nature of patient's heart disease, and that pt is in advanced stages.    Understanding/Overall Impression: Patient expressing limited understanding of overall health status and severity of illness.     Goals/Hopes: Discussion ensued about patient's goals for their medical care going forward. Allowed patient time to talk about her current quality of life, disease course/progression, and symptom and treatment burden. Pt's goals are \"to live\" and \"home\".    Fears/Worries/Concerns: Unable to comment    Patient's Perception of Functional Status: Pt was using a walker or cane at home and lived alone.    Minimal Acceptable Quality of Life/Maximal [place] " "Tolerable for the Possibility of More Time: Counseling provided on the concept of MAO/Maximal [place]. Patient expressing that they would never want to be in a health state where they were unable to recognize/be with her friends Pranay Ashley and Isiah. Patient deems that this would not be an acceptable quality of life for the patient.   Author asked family to consider whether or not they believed this ICU admission and IV life support would be an acceptable quality of life for the patient. Pt stated \"I can make it\".    Resuscitation Assessment: Counseling provided on the benefit versus burden of CPR in the setting of patient's overall health status. Pt states she wants to live.     Advanced Directives:  Surrogate Health Care Decision Maker: Navin Sanches  HPOA: Not on file   Living will: Not on file    Code Status:   Decision to keep code status FULL CODE at this time.     I spent 25 minutes in providing separately identifiable ACP services with the patient and/or surrogate decision maker in a voluntary conversation discussing the patient's wishes and goals as detailed in the above note.   ----------------------------------------------------------------------------------------------------------------------------------------------------------------------------------------------------------------------------------------------------------------------------------------------------------------------------------------------------------------------    #Complex Medical Decision Making  #Goals of Care  #Advanced Care Planning  - Code status: Full code  - Surrogate decision maker: Navin Sanches  - Goals are survival and time based   6/21: Pt with notable expressive aphasia making open ended questioning difficult. Pt able to answer yes and no questions more easily. Pt demonstrating understanding/comprehension of questions asked. Pt agrees that Navin is MPOA and papers are signed. She is upset with him today but is unable to " "verbalize why. Palliative recapped medication nonadherence and pt agreeable that she stops taking them when she is depressed. When pt asked why she stops taking her medicine, pt starting spelling out her name and saying nonsensical words. Palliative expressed a worry that her depression is causing her to not to want to continue to live. Palliative asked is that is correct, pt stated \"no\". Palliative asked pt earlier about her goals, she stated \"to live\" and \"I can make it\". Pt was unable to articulate any further explanation of her statements.   Pt was asked if her breathing were to be compromised and she needed intubated again (trached) would she want that? Pt hesitated but stated \"yes\".     Palliative attempted to reach out to Navin x3. First call he answered, and said he was on the other line with a doctor and to call back at 1230. Palliative did and his phone went to . Gia called back around 1500 and again, right to . Gia then called Pranay to see if Navin has another number, and Pranay's went right to . Palliative left a message with call back number for Navin and Pranay.     Will attempt to discuss GOC with returned call from Navin. Otherwise Palliative can follow up Monday or Tuesday.     #Cardiogenic shock  - Per primary team, on nipride and bumex gtts, heparin and dobutamine.  - Pt with multiple admissions r/t medication nonadherence d/t pt stated depression. Recommend Psychiatry consult.  - Unable to have GOC 6/20 d/t unavailable HPOA.    #Depression  - Not currently on antidepressant but home med list notable for Quetiapine 50mg at bedtime. Unclear history. Recommend Psychiatry consult.    #Psychosocial Support  - Ongoing pt and family support  - Music Therapy  - Spiritual Care Support    Plan of Care discussed with: Updated primary team and bedside RN on goals of care decision, medication adjustments, and code status     Medical Decision Making was high level due to high complexity of problems, " extensive data review, and high risk of management/treatment.     - Cardiogenic shock requiring inotrope cardiac support posing threat to life and function.  - Reviewed external notes from   - Reviews results from TTE and labs which were used in decision making for guiding GOC conversation.  - Recommended the following tests:   - Assessment required independent historian: gayle ortiz mikayla  - Independent interpretation of test: labs and imaging  - Discussion of management with: nursing, primary team  - Drug therapy requiring intensive monitoring for toxicity: monitor renal status with meds/HF.  - Decision regarding elective major surgery with identified patient or procedure risk factors:   - Decision regarding emergency major surgery:   - Decision regarding hospitalization or escalation of hospital-level care:   - Decision not to resuscitate or to de-escalate care because of poor prognosis:   - Parenteral controlled substances: amio, hep, bumex, dobut, nipride    Thank you for allowing us to participate in the care of this patient. Palliative will continue to follow as needed. Palliative medicine is available Monday-Friday, 8a-6p. Please contact team with any questions or concerns.  Team pager [phone] (weekdays)  JAE Evangelista-CNP

## 2024-06-21 NOTE — PROGRESS NOTES
Pharmacy Medication History Review    Amirah Bennett is a 43 y.o. female admitted for Atrial fibrillation (Multi). Pharmacy reviewed the patient's [id number] medications and allergies for accuracy.    The list below reflects the updated PTA list. Comments regarding how patient may be taking medications differently can be found in the Admit Orders Activity  Prior to Admission Medications   Prescriptions Last Dose Informant Patient Reported? Taking?   QUEtiapine (SEROquel) 50 mg tablet Unknown at patient request refills Self Yes No   Sig: Take 3 tablets by mouth at bedtime   aspirin 81 mg chewable tablet 2024 at patient request refills Self Yes Yes   Si tablet (81 mg) by Enteral route once daily.   atorvastatin (Lipitor) 80 mg tablet Unknown at patient request refills Self No Yes   Sig: Take 1 tablet (80 mg) by mouth once daily.   digoxin (Lanoxin) 250 MCG tab;et Unknown at patient request refills Self Yes No   Sig: Take 1 tablet (250 mcg) by mouth once daily.   empagliflozin (Jardiance) 10 mg Unknown at patient request refills Self No No   Sig: Take 1 tablet (10 mg) by mouth once daily.   folic acid (Folvite) 1 mg tablet Unknown at patient request refills Self Yes No   Sig: Take 1 tablet (1 mg) by mouth once daily.   furosemide (Lasix) 40 mg tablet Unknown at patient request refills Self Yes No   Sig: Take 1 tablet (40 mg) by mouth twice a day.   gabapentin (Neurontin) 100 mg capsule Unknown at patient request refills Self No No   Sig: Take 1 capsule (100 mg) by mouth 3 times a day.   lidocaine (Lidoderm) 5 % patch Unknown at patient request refills Self Yes No   Sig: Place 1 patch on the skin once daily. Remove & discard patch within 12 hours or as directed by MD.   melatonin 3 mg tablet Unknown at patient request refills Self Yes No   Sig: Take 1 tablet (3 mg) by mouth once daily as needed.   metoprolol succinate XL (Toprol-XL) 50 mg 24 hr tablet Unknown at patient request refills Self No No   Sig:  Take 1 tablet (50 mg) by mouth once daily. Do not crush or chew.   multivitamin with minerals tablet Unknown at patient request refills Self Yes No   Sig: Take 1 tablet by mouth once daily.   pantoprazole (ProtoNix) 40 mg EC tablet Unknown at patient request refills  No No   Sig: Take 1 tablet (40 mg) by mouth once daily in the morning. Take before meals. Do not crush, chew, or split.   polyethylene glycol (Glycolax, Miralax) 17 gram packet Unknown at patient request refills Self Yes No   Sig: Take 17 g by mouth once daily.   rivaroxaban (Xarelto) 20 mg tablet Unknown Self No No   Sig: Take 1 tablet (20 mg) by mouth once daily in the evening. Take with meals. Take with food. Do not start before March 16, 2024.   sacubitriL-valsartan (Entresto) 24-26 mg tablet Unknown at patient request refills  No No   Sig: Take 1 tablet by mouth 2 times a day.   spironolactone (Aldactone) 25 mg tablet Unknown at patient request refills Self Yes No   Sig: Take 1 tablet (25 mg) by mouth once daily.      Facility-Administered Medications: None        The list below reflects the updated allergy list. Please review each documented allergy for additional clarification and justification.  Allergies  Reviewed by Ramón Self on 6/21/2024        Severity Reactions Comments    Hydrocodone-acetaminophen Medium Rash     Ibuprofen Medium Rash Tolerates aspirin            Patient accepts M2B at discharge. Pharmacy has been updated to [facility].    Sources used to complete the med history include   Epic dispense history  Epic allergy list   [facility] ( none listed )  3/14/2024 discharge summary   Patient interview     Below are additional concerns with the patient's PTA list.  Patient is non-compliant with home medications patient states that she has problems getting to preferred pharmacy - patient would like education and possible enrolment in  medication home delivery service    Per the 3/14/2024 discharge summary patient is to stop taking  quetiapine 50 mg - patient states that she is having problems with anxiety - patient request refills   Patient prescription for gabapentin 100 mg and gabapentin 300 mg are not listed in [facility] - Patient state that she takes gabapentin 100 mg however patient last fill was 12/28/2023 ds 30 qty 90  - patient has a more recent fill of gabapentin 300 mg 1/8/2024 ds 30 qty 30      [facility]   12/28/2023 gabapentin 100 mg ds 30 qty 90  1/8/2024 gabapentin 300 mg ds 30 qty 30     Ramón Self CP  Transitions of Care Pharmacy Technician  [facility]  Please reach out via [employer] Chat for questions, or if no response call [phone] or [employer] “MedRec”

## 2024-06-21 NOTE — PROGRESS NOTES
Patient has an appointment tomorrow at 12/21 at 2:30. This can be discuss further   Physical Therapy    Physical Therapy Evaluation & Treatment    Patient Name: Amirah Bennett  MRN: [id number]  Today's Date: 6/21/2024   Time Calculation  Start Time: 1421  Stop Time: 1446  Time Calculation (min): 25 min    Assessment/Plan   PT Assessment  PT Assessment Results: Decreased strength, Decreased range of motion, Decreased endurance, Impaired balance, Decreased mobility, Pain, Decreased cognition (expressive aphasia, some receptive deficits)  Rehab Prognosis: Fair  End of Session Communication: Bedside nurse  End of Session Patient Position: Bed, 3 rail up, Alarm off, not on at start of session   IP OR SWING BED PT PLAN  Inpatient or Swing Bed: Inpatient  PT Plan  Treatment/Interventions: Bed mobility, Transfer training, Gait training, Stair training, Balance training, Strengthening, Endurance training, Range of motion, Therapeutic exercise, Therapeutic activity  PT Plan: Ongoing PT  PT Frequency: 3 times per week  PT Discharge Recommendations: Moderate intensity level of continued care  PT Recommended Transfer Status: Assist x2  PT - OK to Discharge: Yes    Subjective     General Visit Information:  General  Reason for Referral: cardiogenic shock; Course c/b SARAH, cardiac thrombi, and Afib with RVR.  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 Pt is a poor historian 2/2 residual expressive aphasia. Telemetry, nipride 1mcg, Harvinder Fish  Catheter, Araya catheter, bumex gtt, Amio 1mg, heaprin GTT, 2 L O2 NC.    Home Living:  Home Living  Type of Home: House  Lives With: Alone  Home Adaptive Equipment: Walker rolling or standard  Home Layout:  (Patient stated there were stairs with railings)    Prior Level of Function:  Prior Function Per Pt/Caregiver Report  Ambulatory Assistance: Independent (Uses FWW; Patient denies falls)  Leisure: Going out    Precautions:  Precautions  Medical Precautions: Cardiac precautions, Fall precautions, Oxygen therapy device and L/min    Vital Signs:  Vital Signs  Heart Rate:  (PRE: 121 DURIN POST: 112)  Resp:  (PRE: 20 POST : 15)  SpO2:  (PRE: 100 DURIN POST: 100)  BP:  (PRE: 90/63 DURING: slight drop from supine to sit then increased to 98/68 POST: 91/70)    Objective     Pain:  Pain Assessment  Pain Assessment: 0-10  0-10 (Numeric) Pain Score:  (Pt reporting pain in R calf/shin and foot- unable to rate, but limited activity/WBing. RN aware.)    Cognition:  Cognition  Overall Cognitive Status: Impaired at baseline  Arousal/Alertness: Appropriate responses to stimuli  Orientation Level: Oriented X4 (Needed choices to answer questions 2/2 expressive aphasia)  Following Commands: Follows one step commands with increased time (Follows one step commands with repitition. Needs reliance on gestures and demonstration to follow commands)    General Assessments:     Activity Tolerance  Early Mobility/Exercise Safety Screen: Proceed with mobilization - No exclusion criteria met    Sensation  Light Touch:  (Pt reports unable to feel light touch in R foot, but otherwise able to feel light touch throughout.. L LE WFL.)    Strength  Strength Comments: RLE limited due to pain. At least 4-/5 Bilaterally (Required demonstration and verbal and tactile cues)  Strength  Strength Comments: RLE limited due to pain. At least 4-/5 Bilaterally (Required demonstration and verbal and tactile cues)    Static Sitting Balance  Static  Sitting-Balance Support: Bilateral upper extremity supported  Static Sitting-Level of Assistance: Close supervision  Dynamic Sitting Balance  Dynamic Sitting-Balance Support: Bilateral upper extremity supported  Dynamic Sitting-Comments: CGA    Static Standing Balance  Static Standing-Balance Support: Bilateral upper extremity supported  Static Standing-Level of Assistance: Minimum assistance (x2; arm-in-arm assistance; needed verbal and tactile cues)  Dynamic Standing Balance  Dynamic Standing-Balance Support: Bilateral upper extremity supported  Dynamic Standing-Comments: Denny x2 (Needed verbal and tactile cues; To lift RLE Denny x2; To put weight on RLE ModA x2)    Functional Assessments:  Bed Mobility  Bed Mobility: Yes  Bed Mobility 1  Bed Mobility 1: Supine to sitting  Level of Assistance 1: Moderate assistance (x1; needed verbal and tactile cues)  Bed Mobility Comments 1: Needed assistance with RLE; HOB elevated  Bed Mobility 2  Bed Mobility  2: Sitting to supine  Level of Assistance 2: Moderate assistance (x1; Needed verbal and tactile cues)  Bed Mobility Comments 2: Needed assistance moving B LEs onto bed    Transfers  Transfer: Yes  Transfer 1  Transfer From 1: Sit to  Transfer to 1: Stand  Technique 1: Sit to stand  Transfer Device 1: Walker  Transfer Level of Assistance 1: Minimum assistance (x2; Needed verbal and tactile cues)  Transfers 2  Transfer From 2: Stand to  Transfer to 2: Sit  Technique 2: Stand to sit  Transfer Device 2: Walker  Transfer Level of Assistance 2: Minimum assistance (x2; Needed verbal and tactile cues. Poor eccentric control.)    Ambulation/Gait Training  Ambulation/Gait Training Performed: Yes  Ambulation/Gait Training 1  Surface 1: Level tile  Device 1: Rolling walker  Assistance 1: Moderate assistance (x2)  Quality of Gait 1: Decreased step length, Knee(s) buckle, Forward flexed posture (leaning significantly to the L with WBing on R LE)  Comments/Distance (ft) 1: Small lateral  step to the left.    Extremity/Trunk Assessments:  RLE   RLE : Within Functional Limits (PROM WFL)  LLE   LLE : Within Functional Limits    Treatments:  Therapeutic Activity  Therapeutic Activity Performed: Yes  Therapeutic Activity 1: Patient transferred from Sit <-> stand with Denny x2 with FWW. Poor eccentric control. Required verbal and tactile cues.  Therapeutic Activity 2: Patient sat EOB for 8 minutes with close supervision.  Therapeutic Activity 3: Pt stood statically with FWW and minAx2 with cues for posture correction ~30 seconds. Time limited by pain.    Outcome Measures:  [facility] Basic Mobility  Turning from your back to your side while in a flat bed without using bedrails: A little  Moving from lying on your back to sitting on the side of a flat bed without using bedrails: A lot  Moving to and from bed to chair (including a wheelchair): Total  Standing up from a chair using your arms (e.g. wheelchair or bedside chair): A lot  To walk in hospital room: Total  Climbing 3-5 steps with railing: Total  Basic Mobility - Total Score: 10    Confusion Assessment Method-ICU (CAM-ICU)  Feature 1: Acute Onset or Fluctuating Course: Negative  Overall CAM-ICU: Negative    FSS-ICU  Ambulation: Unable to attempt due to weakness  Rolling: Minimal assistance (performs 75% or more of task)  Sitting: Supervision or set-up only  Transfer Sit-to-Stand: Total assistance (performs 25% or requires another person)  Transfer Supine-to-Sit: Moderate assistance (performs 50 - 74% of task)  Total Score: 13    ICU Mobility Screen  Early Mobility/Exercise Safety Screen: Proceed with mobilization - No exclusion criteria met  ICU Mobility Scale: Standing  E = Exercise and Early Mobility  Early Mobility/Exercise Safety Screen: Proceed with mobilization - No exclusion criteria met  ICU Mobility Scale: Standing    Encounter Problems       Encounter Problems (Active)       Mobility       Patient will ambulate 15ft with LRAD and ModA x1  (Progressing)       Start:  06/21/24    Expected End:  07/05/24               PT Transfers       Transfer from bed to chair with LRAD and Denny x1 (Progressing)       Start:  06/21/24    Expected End:  07/05/24            Patient will perform bed mobility independently (Progressing)       Start:  06/21/24    Expected End:  07/05/24            Patient will transfer sit to and from stand with LRAD and CGA (Progressing)       Start:  06/21/24    Expected End:  07/05/24            Pt will perform B alternating marches x20 with LRAD and Denny x1 (Progressing)       Start:  06/21/24    Expected End:  07/05/24               Pain - Adult              Education Documentation  Mobility Training, taught by AUBREY Armando at 6/21/2024  3:51 PM.  Learner: Patient  Readiness: Acceptance  Method: Explanation, Demonstration  Response: Needs Reinforcement    Education Comments  No comments found.

## 2024-06-21 NOTE — PROGRESS NOTES
Speech-Language Pathology             Therapy Communication Note     Patient Name: Amirah Bennett  MRN:  [id number]  Today's Date:  06/21/24  Missed Time: 0830     Missed Visit Reason:      Speech-language eval received and appreciated. Pt familiar to SLP from admission in March. At that time we extensively discussed pt's aphasia hx which had been ongoing for two years and didn't seem to have worsened with new stroke. Given chronicity of aphasia, without current any acute etiology for worsening, pt not a candidate for acute SLP services. May consider follow-up in OP if pt willing to participate in therapy.     Refer to recommendations per speech-language evaluation 3/11/24 for most effectively communicating with pt including: Provide multiple modalities when communicating with pt (verbal, gestures, written prompts, pictures if need be), and also give pt choices when asking regarding wants/needs     SLP to sign-off. If pt with neurologic changes/worsening of communication deficits may consider re-consulting SLP for updated evaluation.

## 2024-06-21 NOTE — PROGRESS NOTES
"  MEDICINE INPATIENT PROGRESS NOTE    Amirah Bennett is a 43 y.o. female on hospital day 1    Subjective   Overnight started on nipride. She had improving lactates and had good output with bumex drip.    Upon evaluation this AM, patient endorsing palpitations, headache, and dizziness (describes as vertigo).    Denies fevers, chills, blurry vision, chest pain, dyspnea, cough, abdominal pain, nausea/vomiting, diarrhea/constipation, dysuria, hematuria, melena, hematochezia, new numbness/tingling/weakness in extremities.         Objective     Last Recorded Vitals  Blood pressure 100/75, pulse (!) 113, temperature 36 °C (96.8 °F), temperature source Temporal, resp. rate 14, height 1.702 m (5' 7.01\"), weight 107 kg (234 lb 12.6 oz), SpO2 99%.    Intake/Output last 3 Shifts:  I/O last 3 completed shifts:  In: 2328.1 (21.9 mL/kg) [I.V.:1428.1 (13.4 mL/kg); IV Piggyback:900]  Out: 5605 (52.6 mL/kg) [Urine:5600 (1.5 mL/kg/hr); Blood:5]  Weight: 106.5 kg     Relevant Results  Results from last 7 days   Lab Units 06/21/24  0403 06/20/24  2116 06/20/24  1747   WBC AUTO x10*3/uL 16.7* 15.1* 12.3*   HEMOGLOBIN g/dL 10.6* 11.4* 11.1*   HEMATOCRIT % 30.9* 35.0* 36.5   PLATELETS AUTO x10*3/uL 132* 149* 139*     Results from last 7 days   Lab Units 06/21/24  0403 06/20/24 2116 06/20/24  1747 06/20/24  1023   SODIUM mmol/L 134* 135* 135* 130*   POTASSIUM mmol/L 3.4* 3.6 3.9 3.5   CO2 mmol/L 25 15* 10* 15*   ANION GAP mmol/L 16 27* 31* 24*   BUN mg/dL 30* 31* 29* 26*   CREATININE mg/dL 1.60* 1.56* 1.81* 1.63*   GLUCOSE mg/dL 140* 153* 153* 103*   EGFR mL/min/1.73m*2 40* 42* 35* 39*   MAGNESIUM mg/dL 1.68 1.92  --  2.08   PHOSPHORUS mg/dL 3.9 4.9 5.8* 4.7      Results from last 7 days   Lab Units 06/20/24  2116 06/20/24  0758   ALT U/L 609* 19   AST U/L 1,996* 28   ALK PHOS U/L 65 72      Results from last 7 days   Lab Units 06/20/24  0758   INR  2.1*     Results from last 7 days   Lab Units 06/21/24  1129 06/21/24  0613 06/21/24  0542 " 06/21/24  0206 06/21/24  0202 06/21/24  0026   POCT PH, ARTERIAL pH  --   --  7.52* 7.47*  --  7.45*   POCT PO2, ARTERIAL mm Hg  --   --  73* 68*  --  70*   POCT PCO2, ARTERIAL mm Hg  --   --  33* 30*  --  30*   FIO2 % 28 28 28 28   < > 28    < > = values in this interval not displayed.     Results from last 7 days   Lab Units 06/20/24  1303 06/20/24  0746   POCT PH, VENOUS pH 7.09* 7.36   POCT PCO2, VENOUS mm Hg 37* 36*     Results from last 7 days   Lab Units 06/21/24  1129 06/21/24  0403 06/21/24  0025 06/20/24  1942 06/20/24  1624   LACTATE mmol/L 1.6 3.7* 6.2*   < > 16.0*   FREE T4 ng/dL  --   --   --   --  1.48    < > = values in this interval not displayed.         trophs:10     Physical Exam  Constitutional: in NAD  HEENT: sclerae anicteric, EOM grossly intact  CV: RRR, no murmurs noted  Pulm: CTAB, increased work of breathing, 3L O2  GI: abd soft, NT, ND  Skin: cold extremities  Ext: bilateral LE wrapped  Neuro: alert and conversant however only intermittently answering questions appropriately  AOX4,+expressive aphasia, EOMI, able to move arms spontaneously. Endorsing pain in bilateral legs. She has equal sensation in bilateral thighs but states she has numbness in lower right leg. She is unable to wiggle toes in lower right leg but can bend right hip.     Medications    amiodarone, 150 mg, intravenous, Once  aspirin, 81 mg, oral, Daily  atorvastatin, 80 mg, oral, Daily  [Held by provider] empagliflozin, 10 mg, oral, Daily  folic acid, 1 mg, oral, Daily  [Held by provider] gabapentin, 100 mg, oral, TID  insulin lispro, 0-5 Units, subcutaneous, q4h  melatonin, 3 mg, oral, Nightly  [Held by provider] metoprolol succinate XL, 50 mg, oral, Daily  oxygen, , inhalation, Continuous - Inhalation  pantoprazole, 40 mg, oral, Daily before breakfast  [Held by provider] sacubitriL-valsartan, 1 tablet, oral, BID  [Held by provider] spironolactone, 25 mg, oral, Daily        amiodarone, 1 mg/min, Last Rate: 1 mg/min  (06/21/24 1200)  bumetanide, 2 mg/hr, Last Rate: 2 mg/hr (06/21/24 1200)  DOBUTamine, 2.5-20 mcg/kg/min, Last Rate: Stopped (06/21/24 1211)  heparin, 0-4,500 Units/hr, Last Rate: 16 Units/hr (06/21/24 1104)  nitroprusside, 0.25-5 mcg/kg/min, Last Rate: 1 mcg/kg/min (06/21/24 1200)        PRN medications: dextrose, dextrose, glucagon, glucagon, heparin           Assessment/Plan   Amirah Bennett is a 42 y.o. 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 RHC c/w cardiogenic shock with CI of 1.6, CVP of 25. She is not candidate for advanced therapies given documented medical nonadherence. Managing medically. Course also c/b SARAH, cardiac thrombi, and Afib with RVR.    Updates 6/21:  -swan numbers q6hrs  -started on nipride  -weaning off dobutamine  -lactate with significant improvement (lactate 1.6 from peak of 16.0)  -plan to start entresto if continues to improve  -continue bumex gtt  -remove femoral sheath (not working for blood pressures)  -stop antibiotics  -pending LE bilateral DVT US  -patient at neurologic baseline, CT head not indicated  -start diet  -UA with 1+ blood, 1+ bacteria, no WBC, neg nitrite and leuk esterase  *Patient requesting I not call POA or let him make decisions because she is mad at him. Due to expressive aphasia, she is unable to expand on why or have conversation regarding this. Unable to assess capacity to change POA. The day prior she had confirmed him as POA so also with  fluctuating decisions. No major medical decisions so did not contact POA. I did update patient's friend Isiah (Pranay Owen) per patient's request.      NEUROLOGIC  #AMS, resolved at her baseline  #ischemic stroke L MCA d/t AFib LLA thrombus seen on echo (lack of OAC adherence) s/p thrombectomy 01/2022 @ [facility] w/ residual expressive aphasia and R sided weakness   ::UDS positive for cannabinoids  -pending infectious w/up per below  -pending PT/OT     #Insomnia  -c/w melatonin 3mg at bedtime     #Depression?  #Anxiety  ::Patient's friend states that she doesn't care for herself due to being depressed  -consider psychiatry consult  -told to stop quetiapine 50mg at last discharge     CARDIOVASCULAR  #Cardiogenic shock  #HFrEF (LVEF 20-25%)  ::Not candidate for advanced therapies due to medical nonadherence  -weaning off dobutamine gtt  -bumex 2mg/hr gtt  -palliative care consulted given patient not candidate for advanced therapies, appreciate recommendations  -monitor swan numbers q6h  -plan to start entresto if continues to improve  -holding GDMT given pressures, unclear what patient was actively taking however was previously prescribed entresto 24-26, spironolactone 25mg, metoprolol succinate 50mg daily, lasix 40mg, jardiance 10mg    #Cardiac thrombi  ::Likely in setting of Xarelto nonadherence  ::There are indeterminate low-attenuation nodular filling defects within the atrial appendage. While this may represent contrast under filling, a right atrial appendage thrombus can not be excluded.  -c/w heparin gtt     #Afib on Xarelto w/ DCCV 05/2023), ischemic stroke L MCA d/t AFib LLA thrombus seen on echo (lack of OAC adherence) s/p thrombectomy 01/2022 @ [facility] w/ residual expressive aphasia and R sided weakness   ::Episode of RVR in ED  ::Previously prescribed digoxin 250mcg however last fill was 12/2023  ::TSH 6.28H, free T4 1.48  -c/w amiodarone gtt at 1  -hold home digoxin 250mcg  -c/w AC per above (holding home Xarelto  20mg daily in evening)  -c/w aspirin 81mg  -c/w atorvastatin 80     #S/p femoral arterial line sheath placement, c/f clot on ultrasound  -pending bilateral duplex ultrasound of lower extremity in AM  -maintaining femoral sheath for frequent ABGs     #Elevated troponin  ::95 > 289 > 313 > 326 > 289  -stop trending     #HTN  -holding home medications     PULMONARY  #Dyspnea  #Mild pulmonary edema  ::No evidence of PE  -diuresis per above  -wean as able to room air     #Hx trach c/b trancutaneous fistula  -monitor for breaths/mucus discharge from trach site  -follow up outpatient ENT for closure     RENAL/  #Metabolic acidosis  ::Likely in setting of shock  -trend ABGs q1hr     #SARAH  ::Likely prerenal vs ATN in setting of shock  ::FeUrea suggests intrinsic  -pending UA with culture  -avoid nephrotoxic medications     #Elevated lactate, improved     GASTROINTESTINAL  #Elevated Tbili (3.2 on admission, from 0.5 in 03/2024)  -trend CMP  -RUQ US if continues to be elevated     #GERD  -c/w pantoprazole 40mg daily     ENDOCRINE  #T2DM, diet controlled  ::HgbA1c 3/2024 5.5  -hypoglycemia protocol and mild SSI     HEME/ONC  #Cardiac thrombi  -see cardiac section for plan     INFECTIOUS DISEASE  #Concern for infection  ::Procal 0.28  ::CXR without signs of PNA  ::s/p vanc/zosyn (6/20-6/21)  ::UA with 1+ blood, 1+ bacteria, no WBC, neg nitrite and leuk esterase  -pending blood cultures x1 (only able to draw one culture)  -pending UA with culture     Dispo:  -pending PT/OT  -patient would like enrollment in  home delivery service for medications (she has trouble getting to preferred pharmacy), pharmacy updated to [facility]  -repeat thyroid labs outpatient     N: cardiac, diabetic  A: femoral artery sheath right, pIVs  DVT ppx: heparin drip  GI ppx: pantoprazole 40mg     Code Status: FULL CODE per patient and POA confirmed on presentation to CICU)  Surrogate Medical Decision-maker: friend Navin Sanches (not ) [phone]      Kristine Pat MD  Internal Medicine, PGY-2

## 2024-06-22 ENCOUNTER — APPOINTMENT (OUTPATIENT)
Dept: CARDIOLOGY | Facility: HOSPITAL | Age: 44
DRG: 239 | End: 2024-06-22
Payer: COMMERCIAL

## 2024-06-22 ENCOUNTER — APPOINTMENT (OUTPATIENT)
Dept: RADIOLOGY | Facility: HOSPITAL | Age: 44
DRG: 239 | End: 2024-06-22
Payer: COMMERCIAL

## 2024-06-22 ENCOUNTER — APPOINTMENT (OUTPATIENT)
Dept: CARDIOLOGY | Facility: HOSPITAL | Age: 44
End: 2024-06-22
Payer: COMMERCIAL

## 2024-06-22 LAB
ALBUMIN SERPL BCP-MCNC: 2.5 G/DL (ref 3.4–5)
ALBUMIN SERPL BCP-MCNC: 3 G/DL (ref 3.4–5)
ALP SERPL-CCNC: 80 U/L (ref 33–110)
ALT SERPL W P-5'-P-CCNC: 805 U/L (ref 7–45)
ANION GAP BLDA CALCULATED.4IONS-SCNC: 13 MMO/L (ref 10–25)
ANION GAP BLDMV CALCULATED.4IONS-SCNC: 11 MMO/L (ref 10–25)
ANION GAP BLDMV CALCULATED.4IONS-SCNC: 12 MMO/L (ref 10–25)
ANION GAP BLDMV CALCULATED.4IONS-SCNC: 12 MMO/L (ref 10–25)
ANION GAP BLDMV CALCULATED.4IONS-SCNC: 13 MMO/L (ref 10–25)
ANION GAP BLDMV CALCULATED.4IONS-SCNC: 7 MMO/L (ref 10–25)
ANION GAP BLDMV CALCULATED.4IONS-SCNC: 7 MMO/L (ref 10–25)
ANION GAP BLDMV CALCULATED.4IONS-SCNC: 8 MMO/L (ref 10–25)
ANION GAP SERPL CALC-SCNC: 14 MMOL/L (ref 10–20)
ANION GAP SERPL CALC-SCNC: 21 MMOL/L (ref 10–20)
AORTIC VALVE MEAN GRADIENT: 8 MMHG
AORTIC VALVE PEAK VELOCITY: 1.78 M/S
APPEARANCE UR: CLEAR
AST SERPL W P-5'-P-CCNC: 1417 U/L (ref 9–39)
ATRIAL RATE: 107 BPM
AV PEAK GRADIENT: 12.7 MMHG
AVA (PEAK VEL): 2.21 CM2
AVA (VTI): 2.19 CM2
BASE EXCESS BLDA CALC-SCNC: 2.2 MMOL/L (ref -2–3)
BASE EXCESS BLDMV CALC-SCNC: 3.9 MMOL/L (ref -2–3)
BASE EXCESS BLDMV CALC-SCNC: 4.7 MMOL/L (ref -2–3)
BASE EXCESS BLDMV CALC-SCNC: 4.9 MMOL/L (ref -2–3)
BASE EXCESS BLDMV CALC-SCNC: 5 MMOL/L (ref -2–3)
BASE EXCESS BLDMV CALC-SCNC: 5 MMOL/L (ref -2–3)
BASE EXCESS BLDMV CALC-SCNC: 6.9 MMOL/L (ref -2–3)
BASE EXCESS BLDMV CALC-SCNC: 8.1 MMOL/L (ref -2–3)
BASOPHILS # BLD AUTO: 0.02 X10*3/UL (ref 0–0.1)
BASOPHILS NFR BLD AUTO: 0.1 %
BILIRUB DIRECT SERPL-MCNC: 1.7 MG/DL (ref 0–0.3)
BILIRUB SERPL-MCNC: 3 MG/DL (ref 0–1.2)
BILIRUB UR STRIP.AUTO-MCNC: NEGATIVE MG/DL
BODY TEMPERATURE: 37 DEGREES CELSIUS
BUN SERPL-MCNC: 29 MG/DL (ref 6–23)
BUN SERPL-MCNC: 30 MG/DL (ref 6–23)
CA-I BLDA-SCNC: 0.96 MMOL/L (ref 1.1–1.33)
CA-I BLDMV-SCNC: 0.99 MMOL/L (ref 1.1–1.33)
CA-I BLDMV-SCNC: 0.99 MMOL/L (ref 1.1–1.33)
CA-I BLDMV-SCNC: 1 MMOL/L (ref 1.1–1.33)
CA-I BLDMV-SCNC: 1.01 MMOL/L (ref 1.1–1.33)
CA-I BLDMV-SCNC: 1.02 MMOL/L (ref 1.1–1.33)
CA-I BLDMV-SCNC: 1.03 MMOL/L (ref 1.1–1.33)
CA-I BLDMV-SCNC: 1.07 MMOL/L (ref 1.1–1.33)
CALCIUM SERPL-MCNC: 8.5 MG/DL (ref 8.6–10.6)
CALCIUM SERPL-MCNC: 8.5 MG/DL (ref 8.6–10.6)
CHLORIDE BLD-SCNC: 91 MMOL/L (ref 98–107)
CHLORIDE BLD-SCNC: 91 MMOL/L (ref 98–107)
CHLORIDE BLD-SCNC: 92 MMOL/L (ref 98–107)
CHLORIDE BLD-SCNC: 92 MMOL/L (ref 98–107)
CHLORIDE BLD-SCNC: 93 MMOL/L (ref 98–107)
CHLORIDE BLD-SCNC: 93 MMOL/L (ref 98–107)
CHLORIDE BLD-SCNC: 95 MMOL/L (ref 98–107)
CHLORIDE BLDA-SCNC: 94 MMOL/L (ref 98–107)
CHLORIDE SERPL-SCNC: 91 MMOL/L (ref 98–107)
CHLORIDE SERPL-SCNC: 91 MMOL/L (ref 98–107)
CO2 SERPL-SCNC: 26 MMOL/L (ref 21–32)
CO2 SERPL-SCNC: 30 MMOL/L (ref 21–32)
COLOR UR: ABNORMAL
CREAT SERPL-MCNC: 1.53 MG/DL (ref 0.5–1.05)
CREAT SERPL-MCNC: 1.66 MG/DL (ref 0.5–1.05)
EGFRCR SERPLBLD CKD-EPI 2021: 39 ML/MIN/1.73M*2
EGFRCR SERPLBLD CKD-EPI 2021: 43 ML/MIN/1.73M*2
EJECTION FRACTION APICAL 4 CHAMBER: 20.3
EJECTION FRACTION: 23 %
EOSINOPHIL # BLD AUTO: 0.02 X10*3/UL (ref 0–0.7)
EOSINOPHIL NFR BLD AUTO: 0.1 %
ERYTHROCYTE [DISTWIDTH] IN BLOOD BY AUTOMATED COUNT: 16 % (ref 11.5–14.5)
GLUCOSE BLD MANUAL STRIP-MCNC: 111 MG/DL (ref 74–99)
GLUCOSE BLD MANUAL STRIP-MCNC: 117 MG/DL (ref 74–99)
GLUCOSE BLD MANUAL STRIP-MCNC: 127 MG/DL (ref 74–99)
GLUCOSE BLD MANUAL STRIP-MCNC: 143 MG/DL (ref 74–99)
GLUCOSE BLD-MCNC: 100 MG/DL (ref 74–99)
GLUCOSE BLD-MCNC: 109 MG/DL (ref 74–99)
GLUCOSE BLD-MCNC: 123 MG/DL (ref 74–99)
GLUCOSE BLD-MCNC: 125 MG/DL (ref 74–99)
GLUCOSE BLD-MCNC: 125 MG/DL (ref 74–99)
GLUCOSE BLD-MCNC: 127 MG/DL (ref 74–99)
GLUCOSE BLD-MCNC: 135 MG/DL (ref 74–99)
GLUCOSE BLDA-MCNC: 140 MG/DL (ref 74–99)
GLUCOSE SERPL-MCNC: 129 MG/DL (ref 74–99)
GLUCOSE SERPL-MCNC: 98 MG/DL (ref 74–99)
GLUCOSE UR STRIP.AUTO-MCNC: NORMAL MG/DL
HCO3 BLDA-SCNC: 24.2 MMOL/L (ref 22–26)
HCO3 BLDMV-SCNC: 27.7 MMOL/L (ref 22–26)
HCO3 BLDMV-SCNC: 29.1 MMOL/L (ref 22–26)
HCO3 BLDMV-SCNC: 29.1 MMOL/L (ref 22–26)
HCO3 BLDMV-SCNC: 29.2 MMOL/L (ref 22–26)
HCO3 BLDMV-SCNC: 29.2 MMOL/L (ref 22–26)
HCO3 BLDMV-SCNC: 31.3 MMOL/L (ref 22–26)
HCO3 BLDMV-SCNC: 32 MMOL/L (ref 22–26)
HCT VFR BLD AUTO: 38.9 % (ref 36–46)
HCT VFR BLD EST: 31 % (ref 36–46)
HCT VFR BLD EST: 31 % (ref 36–46)
HCT VFR BLD EST: 33 % (ref 36–46)
HCT VFR BLD EST: 36 % (ref 36–46)
HCT VFR BLD EST: 37 % (ref 36–46)
HCT VFR BLD EST: 38 % (ref 36–46)
HGB BLD-MCNC: 12.3 G/DL (ref 12–16)
HGB BLDA-MCNC: 12.5 G/DL (ref 12–16)
HGB BLDMV-MCNC: 10.3 G/DL (ref 12–16)
HGB BLDMV-MCNC: 10.4 G/DL (ref 12–16)
HGB BLDMV-MCNC: 11 G/DL (ref 12–16)
HGB BLDMV-MCNC: 12.1 G/DL (ref 12–16)
HGB BLDMV-MCNC: 12.3 G/DL (ref 12–16)
HGB BLDMV-MCNC: 12.5 G/DL (ref 12–16)
HGB BLDMV-MCNC: 12.6 G/DL (ref 12–16)
HOLD SPECIMEN: NORMAL
IMM GRANULOCYTES # BLD AUTO: 0.17 X10*3/UL (ref 0–0.7)
IMM GRANULOCYTES NFR BLD AUTO: 0.8 % (ref 0–0.9)
INHALED O2 CONCENTRATION: 100 %
INHALED O2 CONCENTRATION: 21 %
INHALED O2 CONCENTRATION: 28 %
INHALED O2 CONCENTRATION: 42 %
INHALED O2 CONCENTRATION: 42 %
KETONES UR STRIP.AUTO-MCNC: NEGATIVE MG/DL
LACTATE BLDA-SCNC: 2.8 MMOL/L (ref 0.4–2)
LACTATE BLDMV-SCNC: 1.6 MMOL/L (ref 0.4–2)
LACTATE BLDMV-SCNC: 1.6 MMOL/L (ref 0.4–2)
LACTATE BLDMV-SCNC: 1.9 MMOL/L (ref 0.4–2)
LACTATE BLDMV-SCNC: 2.5 MMOL/L (ref 0.4–2)
LACTATE BLDMV-SCNC: 2.7 MMOL/L (ref 0.4–2)
LACTATE BLDMV-SCNC: 3.1 MMOL/L (ref 0.4–2)
LACTATE BLDMV-SCNC: 3.2 MMOL/L (ref 0.4–2)
LACTATE SERPL-SCNC: 1.5 MMOL/L (ref 0.4–2)
LACTATE SERPL-SCNC: 2.5 MMOL/L (ref 0.4–2)
LACTATE SERPL-SCNC: 2.5 MMOL/L (ref 0.4–2)
LACTATE SERPL-SCNC: 3 MMOL/L (ref 0.4–2)
LEFT ATRIUM VOLUME AREA LENGTH INDEX BSA: 40.5 ML/M2
LEFT VENTRICLE INTERNAL DIMENSION DIASTOLE: 5.6 CM (ref 3.5–6)
LEFT VENTRICULAR OUTFLOW TRACT DIAMETER: 1.9 CM
LEUKOCYTE ESTERASE UR QL STRIP.AUTO: NEGATIVE
LYMPHOCYTES # BLD AUTO: 3.06 X10*3/UL (ref 1.2–4.8)
LYMPHOCYTES NFR BLD AUTO: 15.3 %
MAGNESIUM SERPL-MCNC: 2.01 MG/DL (ref 1.6–2.4)
MCH RBC QN AUTO: 27.6 PG (ref 26–34)
MCHC RBC AUTO-ENTMCNC: 31.6 G/DL (ref 32–36)
MCV RBC AUTO: 87 FL (ref 80–100)
MITRAL VALVE E/A RATIO: 3.66
MITRAL VALVE E/E' RATIO: 17.76
MONOCYTES # BLD AUTO: 0.84 X10*3/UL (ref 0.1–1)
MONOCYTES NFR BLD AUTO: 4.2 %
NEUTROPHILS # BLD AUTO: 15.95 X10*3/UL (ref 1.2–7.7)
NEUTROPHILS NFR BLD AUTO: 79.5 %
NITRITE UR QL STRIP.AUTO: NEGATIVE
NRBC BLD-RTO: 0.2 /100 WBCS (ref 0–0)
OXYHGB MFR BLDA: 97.9 % (ref 94–98)
OXYHGB MFR BLDMV: 24.3 % (ref 45–75)
OXYHGB MFR BLDMV: 26.3 % (ref 45–75)
OXYHGB MFR BLDMV: 30.5 % (ref 45–75)
OXYHGB MFR BLDMV: 35.9 % (ref 45–75)
OXYHGB MFR BLDMV: 69 % (ref 45–75)
OXYHGB MFR BLDMV: 74.7 % (ref 45–75)
OXYHGB MFR BLDMV: 74.8 % (ref 45–75)
P AXIS: -65 DEGREES
P OFFSET: 200 MS
P ONSET: 144 MS
PCO2 BLDA: 29 MM HG (ref 38–42)
PCO2 BLDMV: 38 MM HG (ref 41–51)
PCO2 BLDMV: 40 MM HG (ref 41–51)
PCO2 BLDMV: 40 MM HG (ref 41–51)
PCO2 BLDMV: 41 MM HG (ref 41–51)
PCO2 BLDMV: 41 MM HG (ref 41–51)
PCO2 BLDMV: 42 MM HG (ref 41–51)
PCO2 BLDMV: 43 MM HG (ref 41–51)
PH BLDA: 7.53 PH (ref 7.38–7.42)
PH BLDMV: 7.45 PH (ref 7.33–7.43)
PH BLDMV: 7.46 PH (ref 7.33–7.43)
PH BLDMV: 7.47 PH (ref 7.33–7.43)
PH BLDMV: 7.5 PH (ref 7.33–7.43)
PH UR STRIP.AUTO: 7.5 [PH]
PHOSPHATE SERPL-MCNC: 3.6 MG/DL (ref 2.5–4.9)
PHOSPHATE SERPL-MCNC: 3.9 MG/DL (ref 2.5–4.9)
PLATELET # BLD AUTO: 160 X10*3/UL (ref 150–450)
PO2 BLDA: 300 MM HG (ref 85–95)
PO2 BLDMV: 22 MM HG (ref 35–45)
PO2 BLDMV: 23 MM HG (ref 35–45)
PO2 BLDMV: 26 MM HG (ref 35–45)
PO2 BLDMV: 29 MM HG (ref 35–45)
PO2 BLDMV: 44 MM HG (ref 35–45)
PO2 BLDMV: 46 MM HG (ref 35–45)
PO2 BLDMV: 48 MM HG (ref 35–45)
POTASSIUM BLDA-SCNC: 4.7 MMOL/L (ref 3.5–5.3)
POTASSIUM BLDMV-SCNC: 3.4 MMOL/L (ref 3.5–5.3)
POTASSIUM BLDMV-SCNC: 3.5 MMOL/L (ref 3.5–5.3)
POTASSIUM BLDMV-SCNC: 3.5 MMOL/L (ref 3.5–5.3)
POTASSIUM BLDMV-SCNC: 4.1 MMOL/L (ref 3.5–5.3)
POTASSIUM BLDMV-SCNC: 4.2 MMOL/L (ref 3.5–5.3)
POTASSIUM BLDMV-SCNC: 4.6 MMOL/L (ref 3.5–5.3)
POTASSIUM BLDMV-SCNC: 4.7 MMOL/L (ref 3.5–5.3)
POTASSIUM SERPL-SCNC: 3.4 MMOL/L (ref 3.5–5.3)
POTASSIUM SERPL-SCNC: 4.7 MMOL/L (ref 3.5–5.3)
PR INTERVAL: 144 MS
PROT SERPL-MCNC: 5.8 G/DL (ref 6.4–8.2)
PROT UR STRIP.AUTO-MCNC: ABNORMAL MG/DL
Q ONSET: 216 MS
QRS COUNT: 17 BEATS
QRS DURATION: 90 MS
QT INTERVAL: 352 MS
QTC CALCULATION(BAZETT): 469 MS
QTC FREDERICIA: 427 MS
R AXIS: 18 DEGREES
RBC # BLD AUTO: 4.45 X10*6/UL (ref 4–5.2)
RBC # UR STRIP.AUTO: ABNORMAL /UL
RBC #/AREA URNS AUTO: NORMAL /HPF
RIGHT VENTRICLE FREE WALL PEAK S': 10.7 CM/S
RIGHT VENTRICLE PEAK SYSTOLIC PRESSURE: 54.9 MMHG
SAO2 % BLDA: 100 % (ref 94–100)
SAO2 % BLDMV: 25 % (ref 45–75)
SAO2 % BLDMV: 27 % (ref 45–75)
SAO2 % BLDMV: 31 % (ref 45–75)
SAO2 % BLDMV: 36 % (ref 45–75)
SAO2 % BLDMV: 71 % (ref 45–75)
SAO2 % BLDMV: 76 % (ref 45–75)
SAO2 % BLDMV: 76 % (ref 45–75)
SODIUM BLDA-SCNC: 126 MMOL/L (ref 136–145)
SODIUM BLDMV-SCNC: 128 MMOL/L (ref 136–145)
SODIUM SERPL-SCNC: 132 MMOL/L (ref 136–145)
SODIUM SERPL-SCNC: 133 MMOL/L (ref 136–145)
SP GR UR STRIP.AUTO: 1.01
T AXIS: -65 DEGREES
T OFFSET: 392 MS
TRICUSPID ANNULAR PLANE SYSTOLIC EXCURSION: 2.1 CM
UFH PPP CHRO-ACNC: 0.7 IU/ML
UROBILINOGEN UR STRIP.AUTO-MCNC: NORMAL MG/DL
VANCOMYCIN TROUGH SERPL-MCNC: 8.8 UG/ML (ref 5–20)
VENTRICULAR RATE: 107 BPM
WBC # BLD AUTO: 20.1 X10*3/UL (ref 4.4–11.3)
WBC #/AREA URNS AUTO: NORMAL /HPF

## 2024-06-22 PROCEDURE — 2500000004 HC RX 250 GENERAL PHARMACY W/ HCPCS (ALT 636 FOR OP/ED)

## 2024-06-22 PROCEDURE — 84132 ASSAY OF SERUM POTASSIUM: CPT | Mod: 91 | Performed by: STUDENT IN AN ORGANIZED HEALTH CARE EDUCATION/TRAINING PROGRAM

## 2024-06-22 PROCEDURE — 83605 ASSAY OF LACTIC ACID: CPT | Mod: 91

## 2024-06-22 PROCEDURE — 83735 ASSAY OF MAGNESIUM: CPT | Mod: 91

## 2024-06-22 PROCEDURE — 87040 BLOOD CULTURE FOR BACTERIA: CPT

## 2024-06-22 PROCEDURE — 36620 INSERTION CATHETER ARTERY: CPT | Performed by: STUDENT IN AN ORGANIZED HEALTH CARE EDUCATION/TRAINING PROGRAM

## 2024-06-22 PROCEDURE — 99291 CRITICAL CARE FIRST HOUR: CPT

## 2024-06-22 PROCEDURE — 36415 COLL VENOUS BLD VENIPUNCTURE: CPT

## 2024-06-22 PROCEDURE — 85520 HEPARIN ASSAY: CPT

## 2024-06-22 PROCEDURE — 84132 ASSAY OF SERUM POTASSIUM: CPT | Mod: 91 | Performed by: INTERNAL MEDICINE

## 2024-06-22 PROCEDURE — 85025 COMPLETE CBC W/AUTO DIFF WBC: CPT

## 2024-06-22 PROCEDURE — 84075 ASSAY ALKALINE PHOSPHATASE: CPT

## 2024-06-22 PROCEDURE — 93005 ELECTROCARDIOGRAM TRACING: CPT

## 2024-06-22 PROCEDURE — 37799 UNLISTED PX VASCULAR SURGERY: CPT | Performed by: INTERNAL MEDICINE

## 2024-06-22 PROCEDURE — 2500000001 HC RX 250 WO HCPCS SELF ADMINISTERED DRUGS (ALT 637 FOR MEDICARE OP)

## 2024-06-22 PROCEDURE — 3E033XZ INTRODUCTION OF VASOPRESSOR INTO PERIPHERAL VEIN, PERCUTANEOUS APPROACH: ICD-10-PCS

## 2024-06-22 PROCEDURE — 82947 ASSAY GLUCOSE BLOOD QUANT: CPT

## 2024-06-22 PROCEDURE — 2500000005 HC RX 250 GENERAL PHARMACY W/O HCPCS

## 2024-06-22 PROCEDURE — 84132 ASSAY OF SERUM POTASSIUM: CPT | Mod: 91

## 2024-06-22 PROCEDURE — 2020000001 HC ICU ROOM DAILY

## 2024-06-22 PROCEDURE — 74018 RADEX ABDOMEN 1 VIEW: CPT | Performed by: STUDENT IN AN ORGANIZED HEALTH CARE EDUCATION/TRAINING PROGRAM

## 2024-06-22 PROCEDURE — 93306 TTE W/DOPPLER COMPLETE: CPT | Performed by: INTERNAL MEDICINE

## 2024-06-22 PROCEDURE — 82810 BLOOD GASES O2 SAT ONLY: CPT | Mod: 91 | Performed by: INTERNAL MEDICINE

## 2024-06-22 PROCEDURE — 74018 RADEX ABDOMEN 1 VIEW: CPT

## 2024-06-22 PROCEDURE — 37799 UNLISTED PX VASCULAR SURGERY: CPT

## 2024-06-22 PROCEDURE — 2500000004 HC RX 250 GENERAL PHARMACY W/ HCPCS (ALT 636 FOR OP/ED): Mod: JZ

## 2024-06-22 PROCEDURE — 82248 BILIRUBIN DIRECT: CPT

## 2024-06-22 PROCEDURE — 83735 ASSAY OF MAGNESIUM: CPT

## 2024-06-22 PROCEDURE — 71045 X-RAY EXAM CHEST 1 VIEW: CPT | Performed by: STUDENT IN AN ORGANIZED HEALTH CARE EDUCATION/TRAINING PROGRAM

## 2024-06-22 PROCEDURE — 71045 X-RAY EXAM CHEST 1 VIEW: CPT

## 2024-06-22 PROCEDURE — 81001 URINALYSIS AUTO W/SCOPE: CPT

## 2024-06-22 PROCEDURE — 84100 ASSAY OF PHOSPHORUS: CPT

## 2024-06-22 PROCEDURE — 80202 ASSAY OF VANCOMYCIN: CPT

## 2024-06-22 PROCEDURE — 93306 TTE W/DOPPLER COMPLETE: CPT

## 2024-06-22 PROCEDURE — 93010 ELECTROCARDIOGRAM REPORT: CPT | Performed by: INTERNAL MEDICINE

## 2024-06-22 PROCEDURE — A4217 STERILE WATER/SALINE, 500 ML: HCPCS

## 2024-06-22 RX ORDER — PANTOPRAZOLE SODIUM 40 MG/10ML
40 INJECTION, POWDER, LYOPHILIZED, FOR SOLUTION INTRAVENOUS
Status: DISCONTINUED | OUTPATIENT
Start: 2024-06-23 | End: 2024-06-27

## 2024-06-22 RX ORDER — POTASSIUM CHLORIDE 20 MEQ/1
40 TABLET, EXTENDED RELEASE ORAL ONCE
Status: DISCONTINUED | OUTPATIENT
Start: 2024-06-22 | End: 2024-06-22

## 2024-06-22 RX ORDER — CALCIUM GLUCONATE 20 MG/ML
2 INJECTION, SOLUTION INTRAVENOUS ONCE
Status: COMPLETED | OUTPATIENT
Start: 2024-06-22 | End: 2024-06-22

## 2024-06-22 RX ORDER — VANCOMYCIN HYDROCHLORIDE 1 G/20ML
INJECTION, POWDER, LYOPHILIZED, FOR SOLUTION INTRAVENOUS DAILY PRN
Status: DISCONTINUED | OUTPATIENT
Start: 2024-06-22 | End: 2024-06-24

## 2024-06-22 RX ORDER — NOREPINEPHRINE BITARTRATE/D5W 8 MG/250ML
PLASTIC BAG, INJECTION (ML) INTRAVENOUS
Status: COMPLETED
Start: 2024-06-22 | End: 2024-06-22

## 2024-06-22 RX ORDER — NOREPINEPHRINE BITARTRATE/D5W 8 MG/250ML
.01-1 PLASTIC BAG, INJECTION (ML) INTRAVENOUS CONTINUOUS
Status: DISCONTINUED | OUTPATIENT
Start: 2024-06-22 | End: 2024-06-24

## 2024-06-22 RX ORDER — BISACODYL 10 MG/1
10 SUPPOSITORY RECTAL DAILY
Status: DISCONTINUED | OUTPATIENT
Start: 2024-06-22 | End: 2024-07-24 | Stop reason: HOSPADM

## 2024-06-22 RX ORDER — POTASSIUM CHLORIDE 14.9 MG/ML
20 INJECTION INTRAVENOUS
Status: COMPLETED | OUTPATIENT
Start: 2024-06-22 | End: 2024-06-23

## 2024-06-22 RX ADMIN — SODIUM NITROPRUSSIDE 1 MCG/KG/MIN: 0.5 INJECTION, SOLUTION INTRAVENOUS at 23:20

## 2024-06-22 RX ADMIN — SODIUM NITROPRUSSIDE 1 MCG/KG/MIN: 0.5 INJECTION, SOLUTION INTRAVENOUS at 00:18

## 2024-06-22 RX ADMIN — HEPARIN SODIUM 1600 UNITS/HR: 10000 INJECTION, SOLUTION INTRAVENOUS at 04:25

## 2024-06-22 RX ADMIN — PANTOPRAZOLE SODIUM 40 MG: 40 TABLET, DELAYED RELEASE ORAL at 08:52

## 2024-06-22 RX ADMIN — POTASSIUM CHLORIDE 20 MEQ: 14.9 INJECTION, SOLUTION INTRAVENOUS at 21:16

## 2024-06-22 RX ADMIN — VANCOMYCIN HYDROCHLORIDE 1250 MG: 1.25 INJECTION, POWDER, LYOPHILIZED, FOR SOLUTION INTRAVENOUS at 10:52

## 2024-06-22 RX ADMIN — Medication 0.06 MCG: at 03:34

## 2024-06-22 RX ADMIN — NOREPINEPHRINE BITARTRATE 0.06 MCG: 8 INJECTION, SOLUTION INTRAVENOUS at 03:34

## 2024-06-22 RX ADMIN — POTASSIUM CHLORIDE 20 MEQ: 14.9 INJECTION, SOLUTION INTRAVENOUS at 23:24

## 2024-06-22 RX ADMIN — ASPIRIN 81 MG CHEWABLE TABLET 81 MG: 81 TABLET CHEWABLE at 08:52

## 2024-06-22 RX ADMIN — BISACODYL 10 MG: 10 SUPPOSITORY RECTAL at 15:34

## 2024-06-22 RX ADMIN — PIPERACILLIN SODIUM AND TAZOBACTAM SODIUM 3.38 G: 3; .375 INJECTION, SOLUTION INTRAVENOUS at 21:16

## 2024-06-22 RX ADMIN — AMIODARONE HYDROCHLORIDE 1 MG/MIN: 1.8 INJECTION, SOLUTION INTRAVENOUS at 10:50

## 2024-06-22 RX ADMIN — Medication 2 L/MIN: at 20:00

## 2024-06-22 RX ADMIN — AMIODARONE HYDROCHLORIDE 1 MG/MIN: 1.8 INJECTION, SOLUTION INTRAVENOUS at 04:24

## 2024-06-22 RX ADMIN — SODIUM NITROPRUSSIDE 0.2 MCG/KG/MIN: 0.5 INJECTION, SOLUTION INTRAVENOUS at 08:30

## 2024-06-22 RX ADMIN — PIPERACILLIN SODIUM AND TAZOBACTAM SODIUM 3.38 G: 3; .375 INJECTION, SOLUTION INTRAVENOUS at 10:00

## 2024-06-22 RX ADMIN — PIPERACILLIN SODIUM AND TAZOBACTAM SODIUM 3.38 G: 3; .375 INJECTION, SOLUTION INTRAVENOUS at 15:35

## 2024-06-22 RX ADMIN — PIPERACILLIN SODIUM AND TAZOBACTAM SODIUM 3.38 G: 3; .375 INJECTION, SOLUTION INTRAVENOUS at 06:45

## 2024-06-22 RX ADMIN — BUMETANIDE 2 MG/HR: 0.25 INJECTION INTRAMUSCULAR; INTRAVENOUS at 00:15

## 2024-06-22 RX ADMIN — CALCIUM GLUCONATE 2 G: 20 INJECTION, SOLUTION INTRAVENOUS at 08:51

## 2024-06-22 RX ADMIN — DOBUTAMINE HYDROCHLORIDE 7.5 MCG/KG/MIN: 400 INJECTION INTRAVENOUS at 20:45

## 2024-06-22 RX ADMIN — AMIODARONE HYDROCHLORIDE 1 MG/MIN: 1.8 INJECTION, SOLUTION INTRAVENOUS at 17:03

## 2024-06-22 RX ADMIN — CALCIUM GLUCONATE 2 G: 20 INJECTION, SOLUTION INTRAVENOUS at 15:34

## 2024-06-22 RX ADMIN — HEPARIN SODIUM 1600 UNITS/HR: 10000 INJECTION, SOLUTION INTRAVENOUS at 20:45

## 2024-06-22 RX ADMIN — SODIUM NITROPRUSSIDE 2 MCG/KG/MIN: 0.5 INJECTION, SOLUTION INTRAVENOUS at 12:56

## 2024-06-22 RX ADMIN — POTASSIUM CHLORIDE 40 MEQ: 1.5 POWDER, FOR SOLUTION ORAL at 00:01

## 2024-06-22 RX ADMIN — ATORVASTATIN CALCIUM 80 MG: 80 TABLET, FILM COATED ORAL at 08:52

## 2024-06-22 RX ADMIN — POLYETHYLENE GLYCOL 3350 17 G: 17 POWDER, FOR SOLUTION ORAL at 08:57

## 2024-06-22 RX ADMIN — PERFLUTREN 10 ML OF DILUTION: 6.52 INJECTION, SUSPENSION INTRAVENOUS at 12:30

## 2024-06-22 RX ADMIN — AMIODARONE HYDROCHLORIDE 1 MG/MIN: 1.8 INJECTION, SOLUTION INTRAVENOUS at 23:20

## 2024-06-22 RX ADMIN — FOLIC ACID 1 MG: 1 TABLET ORAL at 08:52

## 2024-06-22 ASSESSMENT — PAIN SCALES - GENERAL
PAINLEVEL_OUTOF10: 5 - MODERATE PAIN
PAINLEVEL_OUTOF10: 0 - NO PAIN
PAINLEVEL_OUTOF10: 2
PAINLEVEL_OUTOF10: 3

## 2024-06-22 ASSESSMENT — PAIN - FUNCTIONAL ASSESSMENT
PAIN_FUNCTIONAL_ASSESSMENT: 0-10
PAIN_FUNCTIONAL_ASSESSMENT: 0-10
PAIN_FUNCTIONAL_ASSESSMENT: CPOT (CRITICAL CARE PAIN OBSERVATION TOOL)
PAIN_FUNCTIONAL_ASSESSMENT: 0-10

## 2024-06-22 ASSESSMENT — PAIN SCALES - WONG BAKER
WONGBAKER_NUMERICALRESPONSE: HURTS LITTLE BIT
WONGBAKER_NUMERICALRESPONSE: HURTS LITTLE BIT

## 2024-06-22 NOTE — CONSULTS
Vancomycin Dosing by Pharmacy- INITIAL    Amirah Bennett is a 43 y.o. year old female who Pharmacy has been consulted for vancomycin dosing for other unknown/sepsis concern . Based on the patient's indication and renal status this patient will be dosed based on a goal 15-20    Renal function is currently elevated from baseline of around ~0.9 and will dose by level for now can consider q24H dosing depending on results of 1st level.     Patient was started on vanco  and received 1 dose of 1500 mg on  around 130. Will check AM level prior to ordering next dose.    Visit Vitals  BP 85/59   Pulse 107   Temp 35.8 °C (96.4 °F) (Temporal)   Resp 22        Lab Results   Component Value Date    CREATININE 1.53 (H) 2024    CREATININE 1.41 (H) 2024    CREATININE 1.42 (H) 2024    CREATININE 1.60 (H) 2024        Patient weight is as follows:   Vitals:    24 0600   Weight: 107 kg (234 lb 12.6 oz)       Cultures:  No results found for the encounter in last 14 days.        I/O last 3 completed shifts:  In: 3631.6 (34.1 mL/kg) [P.O.:480; I.V.:2251.6 (21.1 mL/kg); IV Piggyback:900]  Out: 02308 (104.5 mL/kg) [Urine:04921 (2.9 mL/kg/hr); Blood:5]  Weight: 106.5 kg   I/O during current shift:  I/O this shift:  In: 240 [P.O.:240]  Out: 2150 [Urine:2150]    Temp (24hrs), Av.9 °C (96.7 °F), Min:35.8 °C (96.4 °F), Max:36 °C (96.8 °F)         Assessment/Plan  Patient was started on vanco  and received 1 dose of 1500 mg on  around 013. Will check AM level prior to ordering next dose.  Will initiate vancomycin maintenance, pending results of  AM level   Follow-up level will be ordered on  AM labs unless clinically indicated sooner.  Will continue to monitor renal function daily while on vancomycin and order serum creatinine at least every 48 hours if not already ordered.  Follow for continued vancomycin needs, clinical response, and signs/symptoms of toxicity.       Earl Bermeo,  PharmD

## 2024-06-22 NOTE — HOSPITAL COURSE
Amirah Bennett is a 42 y.o. female with significant PMHx of HFrEF (LVEF 20-25% (08/2022), with prior history of cardiogenic shock 04/2022 Afib on Xarelto w/ DCCV 05/2023), ischemic stroke L MCA d/t AFib LLA thrombus seen on echo (lack of OAC adherence) s/p thrombectomy 01/2022 @ [facility] w/ residual expressive aphasia and R sided weakness, recent 03/2024 left cerebellar MCA, paratracheal abscess s/p tracheostomy c/b tracheocutaneous fistula, T2DM (03/2024 HgbA1c 5.5) and HTN presenting with dyspnea and leg swelling/pain in setting of running out of medications 2-3 weeks ago.     ED course:  In the ED, she was noted to be tachycardic to 105 in triage and but then when placed on the monitor was found to be in atrial fibrillation with RVR with a rate between 150 and 190. She was given 5 mg of metoprolol with slight improvement of her rate but became hypotensive and symptomatic. She was started on heparin both for her A-fib and for possible DVT/PE. Lytics were not given because of the risk of potential intracranial hemorrhage after her recent stroke. Instead decision made to cardiovert the patient and also gave digoxin, and amiodarone. After cardioversion she still looked like she was in atrial fibrillation with RVR but only to a rate in the 120s and 130s. Her blood pressure h improved. Levophed ordered along with calcium and magnesium. No evidence of infection. UDS positive for cannabinoids. CTPE without pulmonary embolus however there are indeterminate low-attenuation nodular filling defects within the atrial appendage (suggests RAA thrombus), cardiomegaly with influx of contrast into IVC and hepatic veins, mild pulmonary edema, and small volume ascites. Patient admitted to the ICU.    CICU course:  Initially with significant difficulty placing Haily so multiple attempts were made. Patient arrived with cold extremities and lacking pulses in extremities, unable to move right lower extremity and numbness below knee. RHC c/w  cardiogenic shock with CI of 1.6, CVP of 25 ([place] placed). Troponin peak of 326. Lactate peak of 16. Procal 0.26. Started on vanc/zosyn (6/20-6/24-stopped as shock likely cardiogenic and blood cultures negative, 6/27 to present). She is not candidate for advanced therapies given documented medical nonadherence, determined by two heart failure specialists at [facility]. Managed medically with dobutamine and nipride, along with heparin drip and amiodarone drip. Holding digoxin and GDMT. Attempted to wean off dobutamine while increasing nitroprusside so stopped dobutamine 6/21 however acutely worsened with increased lactate and worsened SvO2 from 65 to 25 overnight with new abdominal pain and right leg pain concerning for ischemia. Diuresed as needed. Improving parameters, normalized lactate, and pain resolution when dobutamine restarted. Echo 6/22 with 20-25% EF, global hypokinesis, moderate to severe TR, and moderately elevated RV systolic pressure along with severely dilated LA, mildly enlarged RV, and mildly reduced RV systolic function. Lactate normalized. Patient with fluctuating pain in lower extremities and limbs intermittently cool which both seemed associated with state of cardiogenic shock at time of exam.  Vascular surgery consulted 6/24 due to concern for right limb ischemia. Arterial duplex with monophasic R CFA, SFA, and PFA signals, absent popliteal flow, and monophasic tibial flow though hard to visualize. 6/24 CTA Aorta with BLE runoff showed aortic bifurcation embolus, R BA-EIA embolus, SFA and popliteal emboli. Vascular surgery recommending right leg AKA. AKA surgery delayed pending stability with cardiogenic shock. Endovascular consulted to attempt to salvage limb however not able to offer alternative options. Neurology also confirmed likely vascular cause to inability to move right foot. Had family meeting, patient and family electing to move forward with AKA. Vascular medicine consulted 6/25 for  thrombocytopenia and multiple thrombi. Recommended IVC filter prior to surgery due to duration of time off heparin drip for surgery, IVC filter placed 6/27.      Broad spectrum antibiotics restarted 6/27 due to leukocytosis and concern for infected wound (wound culture negative). Blood cultures have remained negative. Patient had right limb AKA and embolectomy on 6/28 with Dr. Vaca and Dr. De La Rosa.  Vascular medicine recommended IVC filter insertion on 6/27 in preparation for right AKA 6/28. Which was complicated by 3 days persistent stump site oozing & intramuscular hematoma evident on CTA RLE, which was managed conservatively with 6 blood transfusions while on heparin infusion necessitation to hold heparin drip temporarily for 12 hours to allow hemostasis. On the following day patient had similar stump bleeding with Hgb 6.9 requiring an additional 1pRBC incrementing hemoglobin to 8.1. hence Heparin was restarted 12 hours later at 1:30 Am 7/5 without signs of bleeding & stable Hemoglobin 8.9.    Course also c/b Afib with RVR, SARAH (resolved), cardiac thrombi, liver injury with AST to 2,629 on 6/21 likely due to ischemia (negative RUQ US) which has improved, anemia (in part due to oozing while maintaining heparin drip, no evidence of DIC),  thrombocytopenia (briefly on bivalrudin however negative HIT abs, likely iso critical illness), left brachial artery injury with attempt at [place] with hematoma though resolving, bilateral lower extremity DVTs, and rhabdomyolysis (CK 14K, unable to give fluids due to cardiogenic shock). Infectious workup remained negative. Palliative care and ethics were consulted given patient's overall clinical status. In the absence of POA documentation, NOK are cousins Clara, Elyse, and Keli, however friends can give additional perspective.     Floor  On 6th July, patient was hemodynamically stable and transferred to cardiology floor, was kept on heparin infusion with hemoglobin  monitoring, as patient's stump headed well without gross bleeding, nor hemoglobin drop, Vascular medicine recommended anticoagulation bridging with warfarin started on 9th July. Patient was started on Lyrica for phantom limb pain.  July 11th patient had minimal bloody output from dhillon catheter, but hemoglobin stable. Urine culture was positive and patient was started on ceftriaxone for UTI, received 2 days. Urine culture resulted with Klebsiella and switched her to Augmentin for 10 days starting 07/14 (07/14-07/23). We also held her home jardiance in the setting of UTI. Continuing to monitor INR on warfarin with heparin bridge with goal of 2-3. Warfarin was held for right AKA revision which took place on 7/17 with vascular surgery.  Wound VAC was placed with plans to discharge with consult to wound care team for VAC change.  Warfarin was then restarted on 7/18.  Lyrica was increased to 75 twice daily and duloxetine to 60 mg daily for phantom limb pain and depression, respectively. On 7/23, she was also administered 1u pRBCs and Lasix 40 mg IV yesterday after persistent anemia (HgB approximately 7.5). Patient met INR goal of >2 for at least 2 days before leaving the hospital. She was discharged on Warfarin 7.5 mg.  Also discharged with a wound VAC to be changed at her facility.    Patient was medically stable and appropriate for discharge. Patient agreeable to discharge and expresses agreement to follow-up with outpatient providers.

## 2024-06-22 NOTE — SIGNIFICANT EVENT
"Decision maker    Ethics consulted due to unclear surrogate with patient's ongoing lack of capacity (inability to communicate what she is thinking at times due to expressive aphasia, inability to express consistent decision with changing responses likely in part due to expressive aphasia- this has been evidenced by patient's substitutions of words she doesn't mean when requesting something, denying pain despite crying out and then later able to endorse she is having pain, initially saying boyfriend was POA but then later saying cousin is POA). Based on chart, POA was noted to be patient's prior boyfriend Navin Sanches (had made tracheostomy decision for patient). Both he and patient confirmed that he was POA and had paperwork however he thinks it is at his house and he doesn't know where this paperwork is. Without legal paperwork, decision making falls to next of kin. We also became aware on 6/22 that a cousin had started process of POA paperwork but unknown whether submitted.      Patient and cousin Clara Black confirmed no living parents, no children, no spouse.  She has half brother she is extranged from (neither Clara nor patient have a number for him anyway). Patient gave numbers for a couple cousins but when asked if any other family members, she shook her head no. Per ethics, given lack of legal documentation, NOK would be cousins (patient and cousins do not have number of half brother who she does not talk to). Legally would fall to \"majority\" of cousins however patient has 10+ first cousins and only talks to 3 of them per patient and cousin Clara Black ([phone]). Cousin Clara Black states she is closest to him, his brother Efrain Black ([phone]), and cousin Keli Osborne ([phone]). They had been in the process of completing paperwork for Keli Osborne to be POA however Clara says patient last had the paperwork and unclear if it was submitted (patient said it was not). " "Keli Osborne is currently in [place] and unable to be reached by our team and Clara. Clara said she is there for a wedding event and he worries about telling her when she can't get here soon. Claar stated that when her prior boyfriend Mr. Sanches had made decisions for her in the past, they had been in communication about patient (seemed to be friendly with Mr. Sanches). Per ethics, it is allowable to use the three cousins as we do not have the numbers of any other cousins, and ethically decision making should be done by the people who know her best. I also spoke with Mr. Sanches this morning to update him on patient's course. I let him know that cousin Keli Osborne had been working on POA paperwork. Mr. Sanches stated that he was unaware of this but that it was actually preferable to him if cousins would take medical decision making because it is stressful for him.     Clara Black planning on visiting patient tomorrow morning. I expressed that she was critically ill due to her heart and that we were managing her with \"life support\" medications to try to help her heart pump blood to her vital organs. I expressed that we are hoping for the best but that things can change quickly in the ICU. We are hoping for the best but preparing for the worst. I told him that he should visit if he can as there is a chance that despite everything we are doing currently, she may die during this admission. He is planning to visit tomorrow morning. Clara Black is happy to make decisions for patient and I expressed that it would be both him and his brother in that capacity. I was unable to reach Efrain Black.    Important numbers  NOK Cousins:  Cousin Clara Black ([phone])  Cousin Efrain Black ([phone])  Cousin Keli Osborne ([phone])- in [place]    Friends who may be helpful in making decisions:  Prior boyfriend Navin Sanches [phone]  Very good friend \"Isiah\" Pranay Owen " [phone]

## 2024-06-22 NOTE — PROGRESS NOTES
Pharmacy to Dose Vancomycin:  Amirah Bennett is a 43 y.o. female ordered pharmacy to dose vancomycin for  leukocytosis . Today is day 1 of therapy.  Goal: -600mg/L*hr  Results from last 7 days   Lab Units 06/22/24  0449 06/22/24  0124 06/21/24  2041 06/21/24  1736 06/21/24  0403 06/20/24  2116   BUN mg/dL  --  29* 29* 30* 30* 31*   CREATININE mg/dL  --  1.53* 1.41* 1.42* 1.60* 1.56*   WBC AUTO x10*3/uL  --  20.1*  --   --  16.7* 15.1*   VANCOMYCIN TR ug/mL 8.8  --   --   --   --   --       Blood Culture   Date/Time Value Ref Range Status   06/20/2024 01:03 PM No growth at 1 day  Preliminary      No results found for the last 90 days.    Renal function remains stable (scr above apparent baseline but has been consistent at this level for every draw over the last three days.    Plan:  Start vanco [id number].  The above dosing regimen is predicted by [employer] to result in the following pharmacokinetic parameters:    Follow-up level ordered for 6/24 AM unless clinically indicated sooner.  Pharmacy will continue daily vancomycin monitoring. Please contact the pharmacy with any questions.    Thank you,  Ricardo Cheng [facility]

## 2024-06-22 NOTE — PROGRESS NOTES
"  MEDICINE INPATIENT PROGRESS NOTE    Amirah Bennett is a 43 y.o. female on hospital day 2    Subjective   OVN events:   Patient had been weaned off dobutamine with nipride 1 at noon on 6/21. Titrated nipride up to 1.5 however became more hypotensive so nipride weaned off by 1AM. She had a change in her mixed SO2 from 65 on 6/21 at 2253 to 25 on 6/22 at 0200. Dobutamine restarted at that time and titrated upwards to 10 by 8AM with addition of nipride. Bumex drip was stopped. She was also briefly on levo to support pressures. Lactate at highest overnight was 3.0 from normal. Haily was placed ovn. She was net negative 6.5L.     This AM, patient with pain all over body, particularly in head, abdomen, and legs. Per nursing was yelling out in pain. When I talked to her patient initially denying pain, then when I specified each body part she was able to say that she was having pain in those areas. She also endorsed dyspnea. Unable to complete full ROS due to patient condition.    Of note, when reassessed after [place] numbers improved, patient now without significant pain in body and improved mental status.        Objective     Last Recorded Vitals  Blood pressure 85/59, pulse 95, temperature 36.1 °C (97 °F), temperature source Temporal, resp. rate 19, height 1.702 m (5' 7.01\"), weight 102 kg (225 lb 8.5 oz), SpO2 99%.    Intake/Output last 3 Shifts:  I/O last 3 completed shifts:  In: 4253.4 (41.6 mL/kg) [P.O.:720; I.V.:2733.4 (26.7 mL/kg); IV Piggyback:800]  Out: 83694 (121.7 mL/kg) [Urine:42361 (3.4 mL/kg/hr)]  Weight: 102.3 kg     Relevant Results  Results from last 7 days   Lab Units 06/22/24  0124 06/21/24  0403 06/20/24  2116   WBC AUTO x10*3/uL 20.1* 16.7* 15.1*   HEMOGLOBIN g/dL 12.3 10.6* 11.4*   HEMATOCRIT % 38.9 30.9* 35.0*   PLATELETS AUTO x10*3/uL 160 132* 149*     Results from last 7 days   Lab Units 06/22/24  0124 06/21/24  2041 06/21/24  1736 06/21/24  0403   SODIUM mmol/L 133* 132* 133* 134*   POTASSIUM " mmol/L 4.7 3.6 3.8 3.4*   CO2 mmol/L 26 28 32 25   ANION GAP mmol/L 21* 17 15 16   BUN mg/dL 29* 29* 30* 30*   CREATININE mg/dL 1.53* 1.41* 1.42* 1.60*   GLUCOSE mg/dL 129* 110* 80 140*   EGFR mL/min/1.73m*2 43* 47* 47* 40*   MAGNESIUM mg/dL 2.01 2.08  --  1.68   PHOSPHORUS mg/dL 3.6 3.2 3.4 3.9      Results from last 7 days   Lab Units 06/22/24  0124 06/21/24  0403 06/20/24  2116   ALT U/L 805* 857* 609*   AST U/L 1,417* 2,629* 1,996*   ALK PHOS U/L 80 61 65      Results from last 7 days   Lab Units 06/20/24  0758   INR  2.1*     Results from last 7 days   Lab Units 06/22/24  1137 06/22/24  0746 06/22/24  0449 06/22/24  0321 06/21/24  0613 06/21/24  0542 06/21/24  0206   POCT PH, ARTERIAL pH  --   --   --  7.53*  --  7.52* 7.47*   POCT PO2, ARTERIAL mm Hg  --   --   --  300*  --  73* 68*   POCT PCO2, ARTERIAL mm Hg  --   --   --  29*  --  33* 30*   FIO2 % 28 28 42 100   < > 28 28    < > = values in this interval not displayed.     Results from last 7 days   Lab Units 06/20/24  1303 06/20/24  0746   POCT PH, VENOUS pH 7.09* 7.36   POCT PCO2, VENOUS mm Hg 37* 36*     Results from last 7 days   Lab Units 06/22/24  0634 06/22/24  0449 06/22/24  0124 06/20/24  1942 06/20/24  1624   LACTATE mmol/L 2.5* 2.5* 3.0*   < > 16.0*   FREE T4 ng/dL  --   --   --   --  1.48    < > = values in this interval not displayed.         trophs:10     Physical Exam  Constitutional: in NAD  HEENT: sclerae anicteric, EOM grossly intact  CV: RRR, no murmurs noted  Pulm: CTAB, increased work of breathing, 3L O2  GI: abd soft, significant pain with palpation especially over RLQ  Skin: cold extremities  Ext: bilateral LE wrapped, pulse present on left foot, no pulse right foot (has femoral pulse), feet cool, significant pain with light touch  Neuro: alert and conversant however only intermittently answering questions appropriately  ,+expressive aphasia, endorsing pain in bilateral legs. She has equal sensation in bilateral thighs but states she  has numbness in lower right leg. She is unable to wiggle toes in lower right leg but can bend right hip.     Medications    amiodarone, 150 mg, intravenous, Once  aspirin, 81 mg, oral, Daily  bisacodyl, 10 mg, rectal, Daily  [Held by provider] empagliflozin, 10 mg, oral, Daily  [Held by provider] folic acid, 1 mg, oral, Daily  [Held by provider] gabapentin, 100 mg, oral, TID  insulin lispro, 0-5 Units, subcutaneous, q4h  [Held by provider] melatonin, 3 mg, oral, Nightly  [Held by provider] metoprolol succinate XL, 50 mg, oral, Daily  oxygen, , inhalation, Continuous - Inhalation  [START ON 6/23/2024] pantoprazole, 40 mg, intravenous, Daily before breakfast  perflutren protein A microsphere, 0.5 mL, intravenous, Once in imaging  piperacillin-tazobactam, 3.375 g, intravenous, q6h  [Held by provider] sacubitriL-valsartan, 1 tablet, oral, BID  [Held by provider] spironolactone, 25 mg, oral, Daily  sulfur hexafluoride microsphr, 2 mL, intravenous, Once in imaging  vancomycin, 1,250 mg, intravenous, q24h        amiodarone, 1 mg/min, Last Rate: 1 mg/min (06/22/24 1300)  DOBUTamine, 2.5-20 mcg/kg/min, Last Rate: 10.004 mcg/kg/min (06/22/24 1300)  heparin, 0-4,500 Units/hr, Last Rate: 1,600 Units/hr (06/22/24 1300)  nitroprusside, 0.25-5 mcg/kg/min, Last Rate: 2 mcg/kg/min (06/22/24 1256)  norepinephrine, 0.01-1 mcg/kg/min, Last Rate: Stopped (06/22/24 0345)        PRN medications: dextrose, dextrose, glucagon, glucagon, heparin, [Held by provider] sennosides, vancomycin           Assessment/Plan   Amirah Bennett is a 42 y.o. 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 RHC c/w cardiogenic shock with CI of 1.6, CVP of 25. She is not candidate for advanced therapies given documented medical nonadherence. Managing medically. Attempted to wean off dobutamine while increasing nitroprusside so stopped dobutamine 6/21 however acutely worsened with increased lactate and worsened SvO2 from 65 to 25 overnight with new abdominal pain and right leg pain concerning for ischemia. Improving parameters, normalized lactate, and pain resolution when dobutamine restarted. Course also c/b SARAH, cardiac thrombi, liver injury likely due to ischemia, and Afib with RVR.    Updates 6/22:  -Patient had been weaned off dobutamine with nipride 1 at noon on 6/21. Titrated nipride up to 1.5 however became more hypotensive so nipride weaned off by 1AM. She had a change in her mixed SO2 from 65 on 6/21 at 2253 to 25 on 6/22 at 0200. Dobutamine restarted at that time and titrated upwards to 10 by 8AM with addition of nipride. Bumex drip was stopped. She was also briefly on levo to support pressures. Lactate at highest overnight was 3.0 from normal. [place] was placed ovn. She was net negative 6.5L.   -patient improving on nipride and dobutamine with mixed SO2 to 71%, with pain resolved. Likely was due to ischemia. Lactate normalized.   -hold diuresis today   -pending repeat echo to rule out pericardial effusion on heparin  -Ethics consulted due to unclear surrogate with patient's ongoing lack of capacity (inability to communicate what she is thinking at times due to expressive aphasia, inability to express consistent decision likely in part due to expressive aphasia). See significant event note from today 6/22 for further information on surrogate decision maker (will fall to cousins Clara Black, Efrain Black, and Azalea Osborne- Azalea is  currently not reachable and in [place]). Friends should be consulted if able given that they may be able to also speak to patient's wishes.     NEUROLOGIC  #AMS, resolved at her baseline  #ischemic stroke L MCA d/t AFib LLA thrombus seen on echo (lack of OAC adherence) s/p thrombectomy 01/2022 @ [facility] w/ residual expressive aphasia and R sided weakness   ::UDS positive for cannabinoids  -pending infectious w/up per below  -pending PT/OT     #Insomnia  -c/w melatonin 3mg at bedtime     #Depression?  #Anxiety  ::Patient's friend states that she doesn't care for herself due to being depressed  -consider psychiatry consult  -told to stop quetiapine 50mg at last discharge     CARDIOVASCULAR  #Cardiogenic shock  #HFrEF (LVEF 20-25%)  ::Not candidate for advanced therapies due to medical nonadherence  -dobutamine gtt  -nitroprusside gtt  -bumex drip holiday  -palliative care consulted given patient not candidate for advanced therapies, appreciate recommendations  -monitor swan numbers q6h  -pending repeat echo to rule out pericardial effusion on heparin drip  -holding GDMT given pressures, unclear what patient was actively taking however was previously prescribed entresto 24-26, spironolactone 25mg, metoprolol succinate 50mg daily, lasix 40mg, jardiance 10mg     #Cardiac thrombi  ::Likely in setting of Xarelto nonadherence  ::There are indeterminate low-attenuation nodular filling defects within the atrial appendage. While this may represent contrast under filling, a right atrial appendage thrombus can not be excluded.  -c/w heparin gtt     #Afib on Xarelto w/ DCCV 05/2023), ischemic stroke L MCA d/t AFib LLA thrombus seen on echo (lack of OAC adherence) s/p thrombectomy 01/2022 @ [facility] w/ residual expressive aphasia and R sided weakness   ::Episode of RVR in ED  ::Previously prescribed digoxin 250mcg however last fill was 12/2023  ::TSH 6.28H, free T4 1.48  -c/w amiodarone gtt at 1  -hold home digoxin 250mcg  -c/w AC per  above (holding home Xarelto 20mg daily in evening)  -c/w aspirin 81mg  -HOLD atorvastatin 80 due to liver injury     #S/p femoral arterial line sheath placement, c/f clot on ultrasound  -pending bilateral duplex ultrasound of lower extremity in AM  -maintaining femoral sheath for frequent ABGs     #Elevated troponin  ::95 > 289 > 313 > 326 > 289  -stop trending     #HTN  -holding home medications     PULMONARY  #Dyspnea  #Mild pulmonary edema  ::No evidence of PE  -diuresis per above  -wean as able to room air     #Hx trach c/b trancutaneous fistula  -monitor for breaths/mucus discharge from trach site  -follow up outpatient ENT for closure     RENAL/  #Metabolic acidosis  ::Likely in setting of shock  -trend ABGs q1hr     #SARAH  ::Likely prerenal vs ATN in setting of shock  ::FeUrea suggests intrinsic  -pending UA with culture  -avoid nephrotoxic medications     #Elevated lactate, improved     GASTROINTESTINAL  #Elevated Tbili (3.2 on admission, from 0.5 in 03/2024)  #Elevated LFTs  -trend CMP  -RUQ US if continues to be elevated     #GERD  -c/w pantoprazole 40mg daily     ENDOCRINE  #T2DM, diet controlled  ::HgbA1c 3/2024 5.5  -hypoglycemia protocol and mild SSI     HEME/ONC  #Cardiac thrombi  -see cardiac section for plan     INFECTIOUS DISEASE  #Concern for infection  ::Procal 0.28  ::CXR without signs of PNA  ::s/p vanc/zosyn (6/20-6/21)  ::UA with 1+ blood, 1+ bacteria, no WBC, neg nitrite and leuk esterase  -pending blood cultures x1 (only able to draw one culture)  -pending UA with culture  -monitoring off antibiotics     Dispo:  -pending PT/OT  -patient would like enrollment in  home delivery service for medications (she has trouble getting to preferred pharmacy), pharmacy updated to [facility]  -repeat thyroid labs outpatient     N: cardiac, diabetic  A: femoral artery sheath right, pIVs  DVT ppx: heparin drip  GI ppx: pantoprazole 40mg     Code Status: FULL CODE per patient and POA confirmed on  presentation to CICU)  Surrogate Medical Decision-maker:   Given lack of paperwork from Navin Pritchard (noted to be POA in chart) and sarai Pat MD  Internal Medicine, PGY-2

## 2024-06-22 NOTE — PROCEDURES
Arterial Line Insertion    Date/Time: 6/22/2024 3:37 AM    Performed by: Lamonte Marvin MD  Authorized by: Lamonte Marvin MD    Consent:     Consent obtained:  Emergent situation  Universal protocol:     Procedure explained and questions answered to patient or proxy's satisfaction: yes      Immediately prior to procedure, a time out was called: yes      Patient identity confirmed:  Arm band  Indications:     Indications: hemodynamic monitoring and multiple ABGs    Pre-procedure details:     Skin preparation:  Chlorhexidine  Sedation:     Sedation type:  None  Anesthesia:     Anesthesia method:  Local infiltration  Procedure details:     Location:  L radial    Number of attempts:  1    Transducer: waveform confirmed    Post-procedure details:     Post-procedure:  Sterile dressing applied and sutured    Procedure completion:  Tolerated well, no immediate complications

## 2024-06-22 NOTE — CONSULTS
ETHICS CONSULTATION NOTE    SERVICE DATE: 6/22/34     SERVICE TIME:  12:00  CONSULT REQUESTER: Kristine Pat MD    ETHICS QUESTION: Who is the appropriate surrogate decision-maker?    BACKGROUND:    Process Steps: Information was gathered via conversation with Dr. Pat and chart review.     Ethically Relevant Medical Information: Amirah Bennett is a 42 y.o. female with significant PMHx of HFrEF (LVEF 20-25% (08/2022), Afib on Xarelto w/ DCCV 05/2023), ischemic stroke L MCA d/t AFib LLA thrombus seen on echo (lack of OAC adherence) s/p thrombectomy 01/2022 @ [facility] w/ residual expressive aphasia and R sided weakness, recent 03/2024 left cerebellar MCA, s/p tracheostomy, T2DM (03/2024 HgbA1c 5.5) and HTN presenting with bilateral leg pain.     Ms. Bennett was admitted for cardiogenic shock.     Code Status: Ms. Bennett has a full code order. She has expressed to multiple clinicians that she wishes for resuscitation in the event of a cardiac arrest.     Capacity/Decision-Making Considerations: From a chart review, it appears Ms. Bennett's capacity for medical decision-making is limited due to her expressive aphasia    Advance Directives/Family/Support System: Ms. Bennett purportedly has a healthcare power of . In fact, there is a note from Dr. Umaña on 9/21/23 that states that the patient's Living Will and Healthcare Power of  are on file. Yet I am unable to find them in the EMR.     Ms. Bennett has friends Navin Sanches, Dion, and Pranay Owen. Navin is apparently Ms. Bennett's HCPOA. Yet he has not been able to present the documentation. And I am unable to locate it in the EMR.    Ms. Bennett also has a half brother (whom she apparently does not speak with), and cousins, particularly Felicitas and his brother and Keli (who is currently in [place]).     ETHICS DISCUSSION & ANALYSIS:    The role of a surrogate is to represent the wishes and healthcare values of the patient, in addition to safeguarding  her best interests. In this way, a surrogate should be close to the patient, capable of articulating her minimally acceptable qualities of life (such as levels of mobility, cognition, communication, etc.). With this information, the clinicians and the surrogate may then fashion a treatment plan together.     ETHICS RECOMMENDATIONS:  Given the confusion over Ms. Bennett's surrogate decision-maker, I recommend speaking with the available next of kin (Felicitas and his brother, for instance), so long as they are willing and able to perform the role of a surrogate. Should her friends, like Navin, be available for goals of care discussions, I think they would also be helpful to articulate Ms. Bennett's values and preferences    I suggest continuing to include Ms. Bennett in goals of care discussions as much as possible.     If it is determined that emergent interventions, such as cardiac resuscitation, would have no meaningful chance of achieving the intended medical outcome, cause harm or suffering which significantly outweigh benefit to the patient, offer little, if any, hope of survival outside an ICU, and/or is inconsistent with recognized professional standards of care, then a DNR order with added limitations should be placed in the patient's chart to prevent suffering or harm. A DNR order is a medical order determined by a careful balance between medical judgment, patient values, and the patient's best interests. It does not require consent from the patient or next of kin. The patient and the family should be notified of the DNR order as soon as possible.    FOLLOW UP:  I will continue to follow. I'm available via Haiku or at #[id number]    SIGNATURE: Sandeep De La Paz, PhD PATIENT NAME: Amirah Bennett   DATE: June 22, 2024 MRN: [id number]   TIME: 12:58 PM

## 2024-06-22 NOTE — DOCUMENTATION CLARIFICATION NOTE
"    PATIENT:               MANUEL GOULD  ACCT #:                  [id number]  MRN:                       [id number]  :                       1980  ADMIT DATE:       2024 6:40 AM  DISCH DATE:  RESPONDING PROVIDER #:        [id number]          PROVIDER RESPONSE TEXT:    Acute on Chronic Systolic Congestive Heart Failure    CDI QUERY TEXT:    Clarification        Instruction:    Based on your assessment of the patient and the clinical information, please provide the requested documentation by clicking on the appropriate radio button and enter any additional information if prompted.    Question: Please further clarify the acuity of systolic congestive heart failure      When answering this query, please exercise your independent professional judgment. The fact that a question is being asked, does not imply that any particular answer is desired or expected.    The patient's clinical indicators include:  Clinical Information: Pt is 43yo female with PMHx of HFrEF, HTN, prior stroke, and DM2 who presented with leg edema.    Clinical Indicators: BNP : 263  Troponin trend: 95, 313, 326, 289    per [facility] : \"found to have elevated lactate to 14.7, cold extremities, and 3 plus pitting edema. RHC c/w cardiogenic shock with CI of 1.6, CVP of 25.\" and \"HFrEF LVEF 20-25 percent\" and \"Dyspnea  Mild pulmonary edema\"    per ED Provider Note : \"does appear to be having a CHF exacerbation upon initial evaluation, given this, we will get basic labs as well as troponins and BNP.  Will also put in for 40 of Lasix, as it does not appear she has been taking her Lasix.\"    Treatment: RHC with SGC, BNP, troponins, cardioversion, monitor IO/wt, Bumex drip at 2mg/hr, Bumex 4mg IVP x2 doses, dobutamine and nipride infusions -    Risk Factors: hx HFrEF, HTN, cardiogenic shock, pAfib, medicine non-compliance  Options provided:  -- Acute on Chronic Systolic Congestive Heart Failure  -- Chronic Systolic Congestive Heart " Failure  -- Other - I will add my own diagnosis  -- Refer to Clinical Documentation Reviewer    Query created by: Richa Rios on 6/21/2024 7:55 AM      Electronically signed by:  ABE PARKINSON MD 6/22/2024 1:30 PM

## 2024-06-23 ENCOUNTER — APPOINTMENT (OUTPATIENT)
Dept: RADIOLOGY | Facility: HOSPITAL | Age: 44
DRG: 239 | End: 2024-06-23
Payer: COMMERCIAL

## 2024-06-23 ENCOUNTER — APPOINTMENT (OUTPATIENT)
Dept: RADIOLOGY | Facility: HOSPITAL | Age: 44
End: 2024-06-23
Payer: COMMERCIAL

## 2024-06-23 VITALS
HEART RATE: 87 BPM | WEIGHT: 215.17 LBS | BODY MASS INDEX: 33.77 KG/M2 | DIASTOLIC BLOOD PRESSURE: 59 MMHG | OXYGEN SATURATION: 92 % | SYSTOLIC BLOOD PRESSURE: 85 MMHG | TEMPERATURE: 96.4 F | HEIGHT: 67 IN | RESPIRATION RATE: 24 BRPM

## 2024-06-23 LAB
ALBUMIN SERPL BCP-MCNC: 2.5 G/DL (ref 3.4–5)
ALBUMIN SERPL BCP-MCNC: 2.6 G/DL (ref 3.4–5)
ALP SERPL-CCNC: 66 U/L (ref 33–110)
ALT SERPL W P-5'-P-CCNC: 640 U/L (ref 7–45)
ANION GAP BLDMV CALCULATED.4IONS-SCNC: 10 MMO/L (ref 10–25)
ANION GAP BLDMV CALCULATED.4IONS-SCNC: 7 MMO/L (ref 10–25)
ANION GAP BLDMV CALCULATED.4IONS-SCNC: 7 MMO/L (ref 10–25)
ANION GAP BLDMV CALCULATED.4IONS-SCNC: 9 MMO/L (ref 10–25)
ANION GAP BLDMV CALCULATED.4IONS-SCNC: 9 MMO/L (ref 10–25)
ANION GAP SERPL CALC-SCNC: 12 MMOL/L (ref 10–20)
ANION GAP SERPL CALC-SCNC: 16 MMOL/L (ref 10–20)
AST SERPL W P-5'-P-CCNC: 997 U/L (ref 9–39)
BACTERIA BLD CULT: NORMAL
BASE EXCESS BLDMV CALC-SCNC: 2.8 MMOL/L (ref -2–3)
BASE EXCESS BLDMV CALC-SCNC: 3.5 MMOL/L (ref -2–3)
BASE EXCESS BLDMV CALC-SCNC: 6.3 MMOL/L (ref -2–3)
BASE EXCESS BLDMV CALC-SCNC: 6.4 MMOL/L (ref -2–3)
BASE EXCESS BLDMV CALC-SCNC: 7.2 MMOL/L (ref -2–3)
BASOPHILS # BLD AUTO: 0.01 X10*3/UL (ref 0–0.1)
BASOPHILS NFR BLD AUTO: 0.1 %
BILIRUB DIRECT SERPL-MCNC: 1.7 MG/DL (ref 0–0.3)
BILIRUB SERPL-MCNC: 2.9 MG/DL (ref 0–1.2)
BODY TEMPERATURE: 37 DEGREES CELSIUS
BUN SERPL-MCNC: 24 MG/DL (ref 6–23)
BUN SERPL-MCNC: 27 MG/DL (ref 6–23)
CA-I BLDMV-SCNC: 0.98 MMOL/L (ref 1.1–1.33)
CA-I BLDMV-SCNC: 1 MMOL/L (ref 1.1–1.33)
CA-I BLDMV-SCNC: 1.01 MMOL/L (ref 1.1–1.33)
CA-I BLDMV-SCNC: 1.02 MMOL/L (ref 1.1–1.33)
CA-I BLDMV-SCNC: 1.05 MMOL/L (ref 1.1–1.33)
CALCIUM SERPL-MCNC: 7.4 MG/DL (ref 8.6–10.6)
CALCIUM SERPL-MCNC: 7.9 MG/DL (ref 8.6–10.6)
CHLORIDE BLD-SCNC: 93 MMOL/L (ref 98–107)
CHLORIDE BLD-SCNC: 93 MMOL/L (ref 98–107)
CHLORIDE BLD-SCNC: 94 MMOL/L (ref 98–107)
CHLORIDE BLD-SCNC: 94 MMOL/L (ref 98–107)
CHLORIDE BLD-SCNC: 97 MMOL/L (ref 98–107)
CHLORIDE SERPL-SCNC: 91 MMOL/L (ref 98–107)
CHLORIDE SERPL-SCNC: 92 MMOL/L (ref 98–107)
CK SERPL-CCNC: ABNORMAL U/L (ref 0–215)
CO2 SERPL-SCNC: 30 MMOL/L (ref 21–32)
CO2 SERPL-SCNC: 30 MMOL/L (ref 21–32)
CREAT SERPL-MCNC: 1.48 MG/DL (ref 0.5–1.05)
CREAT SERPL-MCNC: 1.61 MG/DL (ref 0.5–1.05)
EGFRCR SERPLBLD CKD-EPI 2021: 40 ML/MIN/1.73M*2
EGFRCR SERPLBLD CKD-EPI 2021: 44 ML/MIN/1.73M*2
EOSINOPHIL # BLD AUTO: 0.01 X10*3/UL (ref 0–0.7)
EOSINOPHIL NFR BLD AUTO: 0.1 %
ERYTHROCYTE [DISTWIDTH] IN BLOOD BY AUTOMATED COUNT: 16 % (ref 11.5–14.5)
ERYTHROCYTE [DISTWIDTH] IN BLOOD BY AUTOMATED COUNT: 16.5 % (ref 11.5–14.5)
GLUCOSE BLD MANUAL STRIP-MCNC: 105 MG/DL (ref 74–99)
GLUCOSE BLD MANUAL STRIP-MCNC: 108 MG/DL (ref 74–99)
GLUCOSE BLD MANUAL STRIP-MCNC: 108 MG/DL (ref 74–99)
GLUCOSE BLD MANUAL STRIP-MCNC: 109 MG/DL (ref 74–99)
GLUCOSE BLD MANUAL STRIP-MCNC: 117 MG/DL (ref 74–99)
GLUCOSE BLD MANUAL STRIP-MCNC: 155 MG/DL (ref 74–99)
GLUCOSE BLD MANUAL STRIP-MCNC: 94 MG/DL (ref 74–99)
GLUCOSE BLD-MCNC: 106 MG/DL (ref 74–99)
GLUCOSE BLD-MCNC: 109 MG/DL (ref 74–99)
GLUCOSE BLD-MCNC: 152 MG/DL (ref 74–99)
GLUCOSE BLD-MCNC: 93 MG/DL (ref 74–99)
GLUCOSE BLD-MCNC: 96 MG/DL (ref 74–99)
GLUCOSE SERPL-MCNC: 110 MG/DL (ref 74–99)
GLUCOSE SERPL-MCNC: 111 MG/DL (ref 74–99)
HCO3 BLDMV-SCNC: 27.2 MMOL/L (ref 22–26)
HCO3 BLDMV-SCNC: 27.8 MMOL/L (ref 22–26)
HCO3 BLDMV-SCNC: 30.5 MMOL/L (ref 22–26)
HCO3 BLDMV-SCNC: 31.2 MMOL/L (ref 22–26)
HCO3 BLDMV-SCNC: 31.2 MMOL/L (ref 22–26)
HCT VFR BLD AUTO: 29.2 % (ref 36–46)
HCT VFR BLD AUTO: 29.9 % (ref 36–46)
HCT VFR BLD EST: 28 % (ref 36–46)
HCT VFR BLD EST: 29 % (ref 36–46)
HCT VFR BLD EST: 30 % (ref 36–46)
HGB BLD-MCNC: 9.7 G/DL (ref 12–16)
HGB BLD-MCNC: 9.9 G/DL (ref 12–16)
HGB BLDMV-MCNC: 10 G/DL (ref 12–16)
HGB BLDMV-MCNC: 10.1 G/DL (ref 12–16)
HGB BLDMV-MCNC: 9.4 G/DL (ref 12–16)
HGB BLDMV-MCNC: 9.6 G/DL (ref 12–16)
HGB BLDMV-MCNC: 9.9 G/DL (ref 12–16)
IMM GRANULOCYTES # BLD AUTO: 0.1 X10*3/UL (ref 0–0.7)
IMM GRANULOCYTES NFR BLD AUTO: 0.7 % (ref 0–0.9)
INHALED O2 CONCENTRATION: 21 %
INHALED O2 CONCENTRATION: 27 %
INHALED O2 CONCENTRATION: 27 %
LACTATE BLDMV-SCNC: 0.9 MMOL/L (ref 0.4–2)
LACTATE BLDMV-SCNC: 0.9 MMOL/L (ref 0.4–2)
LACTATE BLDMV-SCNC: 1 MMOL/L (ref 0.4–2)
LACTATE BLDMV-SCNC: 1 MMOL/L (ref 0.4–2)
LACTATE BLDMV-SCNC: 2.9 MMOL/L (ref 0.4–2)
LACTATE SERPL-SCNC: 1 MMOL/L (ref 0.4–2)
LACTATE SERPL-SCNC: 1 MMOL/L (ref 0.4–2)
LACTATE SERPL-SCNC: 1.1 MMOL/L (ref 0.4–2)
LYMPHOCYTES # BLD AUTO: 2.2 X10*3/UL (ref 1.2–4.8)
LYMPHOCYTES NFR BLD AUTO: 16 %
MAGNESIUM SERPL-MCNC: 1.6 MG/DL (ref 1.6–2.4)
MAGNESIUM SERPL-MCNC: 1.68 MG/DL (ref 1.6–2.4)
MCH RBC QN AUTO: 27.3 PG (ref 26–34)
MCH RBC QN AUTO: 27.8 PG (ref 26–34)
MCHC RBC AUTO-ENTMCNC: 33.1 G/DL (ref 32–36)
MCHC RBC AUTO-ENTMCNC: 33.2 G/DL (ref 32–36)
MCV RBC AUTO: 82 FL (ref 80–100)
MCV RBC AUTO: 84 FL (ref 80–100)
MONOCYTES # BLD AUTO: 0.87 X10*3/UL (ref 0.1–1)
MONOCYTES NFR BLD AUTO: 6.3 %
NEUTROPHILS # BLD AUTO: 10.57 X10*3/UL (ref 1.2–7.7)
NEUTROPHILS NFR BLD AUTO: 76.8 %
NRBC BLD-RTO: 0.1 /100 WBCS (ref 0–0)
NRBC BLD-RTO: 0.1 /100 WBCS (ref 0–0)
OXYHGB MFR BLDMV: 59.7 % (ref 45–75)
OXYHGB MFR BLDMV: 63 % (ref 45–75)
OXYHGB MFR BLDMV: 63 % (ref 45–75)
OXYHGB MFR BLDMV: 64.6 % (ref 45–75)
OXYHGB MFR BLDMV: 70.9 % (ref 45–75)
PCO2 BLDMV: 40 MM HG (ref 41–51)
PCO2 BLDMV: 40 MM HG (ref 41–51)
PCO2 BLDMV: 41 MM HG (ref 41–51)
PCO2 BLDMV: 41 MM HG (ref 41–51)
PCO2 BLDMV: 46 MM HG (ref 41–51)
PH BLDMV: 7.44 PH (ref 7.33–7.43)
PH BLDMV: 7.44 PH (ref 7.33–7.43)
PH BLDMV: 7.45 PH (ref 7.33–7.43)
PH BLDMV: 7.48 PH (ref 7.33–7.43)
PH BLDMV: 7.49 PH (ref 7.33–7.43)
PHOSPHATE SERPL-MCNC: 3.9 MG/DL (ref 2.5–4.9)
PHOSPHATE SERPL-MCNC: 4.1 MG/DL (ref 2.5–4.9)
PLATELET # BLD AUTO: 102 X10*3/UL (ref 150–450)
PLATELET # BLD AUTO: 102 X10*3/UL (ref 150–450)
PO2 BLDMV: 39 MM HG (ref 35–45)
PO2 BLDMV: 40 MM HG (ref 35–45)
PO2 BLDMV: 40 MM HG (ref 35–45)
PO2 BLDMV: 44 MM HG (ref 35–45)
PO2 BLDMV: 44 MM HG (ref 35–45)
POTASSIUM BLDMV-SCNC: 3.4 MMOL/L (ref 3.5–5.3)
POTASSIUM BLDMV-SCNC: 3.6 MMOL/L (ref 3.5–5.3)
POTASSIUM SERPL-SCNC: 3.4 MMOL/L (ref 3.5–5.3)
POTASSIUM SERPL-SCNC: 3.5 MMOL/L (ref 3.5–5.3)
PROT SERPL-MCNC: 5.2 G/DL (ref 6.4–8.2)
RBC # BLD AUTO: 3.55 X10*6/UL (ref 4–5.2)
RBC # BLD AUTO: 3.56 X10*6/UL (ref 4–5.2)
SAO2 % BLDMV: 61 % (ref 45–75)
SAO2 % BLDMV: 64 % (ref 45–75)
SAO2 % BLDMV: 65 % (ref 45–75)
SAO2 % BLDMV: 66 % (ref 45–75)
SAO2 % BLDMV: 72 % (ref 45–75)
SODIUM BLDMV-SCNC: 128 MMOL/L (ref 136–145)
SODIUM BLDMV-SCNC: 130 MMOL/L (ref 136–145)
SODIUM BLDMV-SCNC: 130 MMOL/L (ref 136–145)
SODIUM SERPL-SCNC: 130 MMOL/L (ref 136–145)
SODIUM SERPL-SCNC: 134 MMOL/L (ref 136–145)
UFH PPP CHRO-ACNC: 0.6 IU/ML
UFH PPP CHRO-ACNC: 0.6 IU/ML
UFH PPP CHRO-ACNC: 0.8 IU/ML
UFH PPP CHRO-ACNC: 0.8 IU/ML
WBC # BLD AUTO: 13.8 X10*3/UL (ref 4.4–11.3)
WBC # BLD AUTO: 16.2 X10*3/UL (ref 4.4–11.3)

## 2024-06-23 PROCEDURE — 84132 ASSAY OF SERUM POTASSIUM: CPT | Mod: 91

## 2024-06-23 PROCEDURE — 85025 COMPLETE CBC W/AUTO DIFF WBC: CPT

## 2024-06-23 PROCEDURE — 93970 EXTREMITY STUDY: CPT

## 2024-06-23 PROCEDURE — 83735 ASSAY OF MAGNESIUM: CPT | Mod: 91

## 2024-06-23 PROCEDURE — 83605 ASSAY OF LACTIC ACID: CPT | Mod: 91

## 2024-06-23 PROCEDURE — 93971 EXTREMITY STUDY: CPT | Performed by: RADIOLOGY

## 2024-06-23 PROCEDURE — 82247 BILIRUBIN TOTAL: CPT

## 2024-06-23 PROCEDURE — 82947 ASSAY GLUCOSE BLOOD QUANT: CPT

## 2024-06-23 PROCEDURE — 71045 X-RAY EXAM CHEST 1 VIEW: CPT

## 2024-06-23 PROCEDURE — 37799 UNLISTED PX VASCULAR SURGERY: CPT | Performed by: INTERNAL MEDICINE

## 2024-06-23 PROCEDURE — 37799 UNLISTED PX VASCULAR SURGERY: CPT

## 2024-06-23 PROCEDURE — 82550 ASSAY OF CK (CPK): CPT

## 2024-06-23 PROCEDURE — 84100 ASSAY OF PHOSPHORUS: CPT

## 2024-06-23 PROCEDURE — 84132 ASSAY OF SERUM POTASSIUM: CPT

## 2024-06-23 PROCEDURE — 85520 HEPARIN ASSAY: CPT | Mod: 91

## 2024-06-23 PROCEDURE — 2500000001 HC RX 250 WO HCPCS SELF ADMINISTERED DRUGS (ALT 637 FOR MEDICARE OP)

## 2024-06-23 PROCEDURE — 84075 ASSAY ALKALINE PHOSPHATASE: CPT

## 2024-06-23 PROCEDURE — 2500000004 HC RX 250 GENERAL PHARMACY W/ HCPCS (ALT 636 FOR OP/ED)

## 2024-06-23 PROCEDURE — 84132 ASSAY OF SERUM POTASSIUM: CPT | Mod: 91 | Performed by: INTERNAL MEDICINE

## 2024-06-23 PROCEDURE — 85027 COMPLETE CBC AUTOMATED: CPT

## 2024-06-23 PROCEDURE — 83735 ASSAY OF MAGNESIUM: CPT

## 2024-06-23 PROCEDURE — 2020000001 HC ICU ROOM DAILY

## 2024-06-23 PROCEDURE — 93925 LOWER EXTREMITY STUDY: CPT

## 2024-06-23 PROCEDURE — C9113 INJ PANTOPRAZOLE SODIUM, VIA: HCPCS

## 2024-06-23 PROCEDURE — A4217 STERILE WATER/SALINE, 500 ML: HCPCS

## 2024-06-23 PROCEDURE — 99291 CRITICAL CARE FIRST HOUR: CPT

## 2024-06-23 PROCEDURE — 71045 X-RAY EXAM CHEST 1 VIEW: CPT | Performed by: STUDENT IN AN ORGANIZED HEALTH CARE EDUCATION/TRAINING PROGRAM

## 2024-06-23 RX ORDER — BUMETANIDE 0.25 MG/ML
2 INJECTION INTRAMUSCULAR; INTRAVENOUS ONCE
Status: COMPLETED | OUTPATIENT
Start: 2024-06-23 | End: 2024-06-23

## 2024-06-23 RX ORDER — HYDROMORPHONE HYDROCHLORIDE 1 MG/ML
0.4 INJECTION, SOLUTION INTRAMUSCULAR; INTRAVENOUS; SUBCUTANEOUS EVERY 4 HOURS PRN
Status: DISCONTINUED | OUTPATIENT
Start: 2024-06-23 | End: 2024-06-29

## 2024-06-23 RX ORDER — OXYCODONE HYDROCHLORIDE 5 MG/1
5 TABLET ORAL ONCE
Status: COMPLETED | OUTPATIENT
Start: 2024-06-23 | End: 2024-06-23

## 2024-06-23 RX ORDER — MAGNESIUM SULFATE HEPTAHYDRATE 40 MG/ML
4 INJECTION, SOLUTION INTRAVENOUS ONCE
Status: COMPLETED | OUTPATIENT
Start: 2024-06-23 | End: 2024-06-24

## 2024-06-23 RX ORDER — POTASSIUM CHLORIDE 1.5 G/1.58G
40 POWDER, FOR SOLUTION ORAL ONCE
Status: COMPLETED | OUTPATIENT
Start: 2024-06-23 | End: 2024-06-23

## 2024-06-23 RX ORDER — POTASSIUM CHLORIDE 14.9 MG/ML
20 INJECTION INTRAVENOUS ONCE
Status: COMPLETED | OUTPATIENT
Start: 2024-06-23 | End: 2024-06-23

## 2024-06-23 RX ORDER — POTASSIUM CHLORIDE 14.9 MG/ML
20 INJECTION INTRAVENOUS
Status: COMPLETED | OUTPATIENT
Start: 2024-06-23 | End: 2024-06-23

## 2024-06-23 RX ORDER — MAGNESIUM SULFATE HEPTAHYDRATE 40 MG/ML
2 INJECTION, SOLUTION INTRAVENOUS ONCE
Status: COMPLETED | OUTPATIENT
Start: 2024-06-23 | End: 2024-06-23

## 2024-06-23 RX ADMIN — PIPERACILLIN SODIUM AND TAZOBACTAM SODIUM 3.38 G: 3; .375 INJECTION, SOLUTION INTRAVENOUS at 22:42

## 2024-06-23 RX ADMIN — POTASSIUM CHLORIDE 20 MEQ: 14.9 INJECTION, SOLUTION INTRAVENOUS at 10:32

## 2024-06-23 RX ADMIN — HYDROMORPHONE HYDROCHLORIDE 0.4 MG: 1 INJECTION, SOLUTION INTRAMUSCULAR; INTRAVENOUS; SUBCUTANEOUS at 15:50

## 2024-06-23 RX ADMIN — AMIODARONE HYDROCHLORIDE 1 MG/MIN: 1.8 INJECTION, SOLUTION INTRAVENOUS at 04:54

## 2024-06-23 RX ADMIN — PIPERACILLIN SODIUM AND TAZOBACTAM SODIUM 3.38 G: 3; .375 INJECTION, SOLUTION INTRAVENOUS at 03:30

## 2024-06-23 RX ADMIN — SODIUM NITROPRUSSIDE 1.3 MCG/KG/MIN: 0.5 INJECTION, SOLUTION INTRAVENOUS at 18:34

## 2024-06-23 RX ADMIN — HYDROMORPHONE HYDROCHLORIDE 0.4 MG: 1 INJECTION, SOLUTION INTRAMUSCULAR; INTRAVENOUS; SUBCUTANEOUS at 20:59

## 2024-06-23 RX ADMIN — HEPARIN SODIUM 1200 UNITS/HR: 10000 INJECTION, SOLUTION INTRAVENOUS at 15:03

## 2024-06-23 RX ADMIN — SODIUM NITROPRUSSIDE 1.3 MCG/KG/MIN: 0.5 INJECTION, SOLUTION INTRAVENOUS at 06:31

## 2024-06-23 RX ADMIN — VANCOMYCIN HYDROCHLORIDE 1250 MG: 1.25 INJECTION, POWDER, LYOPHILIZED, FOR SOLUTION INTRAVENOUS at 08:15

## 2024-06-23 RX ADMIN — SODIUM NITROPRUSSIDE 1.3 MCG/KG/MIN: 0.5 INJECTION, SOLUTION INTRAVENOUS at 12:15

## 2024-06-23 RX ADMIN — PANTOPRAZOLE SODIUM 40 MG: 40 INJECTION, POWDER, FOR SOLUTION INTRAVENOUS at 08:15

## 2024-06-23 RX ADMIN — PIPERACILLIN SODIUM AND TAZOBACTAM SODIUM 3.38 G: 3; .375 INJECTION, SOLUTION INTRAVENOUS at 10:28

## 2024-06-23 RX ADMIN — AMIODARONE HYDROCHLORIDE 1 MG/MIN: 1.8 INJECTION, SOLUTION INTRAVENOUS at 23:28

## 2024-06-23 RX ADMIN — ASPIRIN 81 MG CHEWABLE TABLET 81 MG: 81 TABLET CHEWABLE at 08:15

## 2024-06-23 RX ADMIN — MAGNESIUM SULFATE HEPTAHYDRATE 2 G: 40 INJECTION, SOLUTION INTRAVENOUS at 08:25

## 2024-06-23 RX ADMIN — POTASSIUM CHLORIDE 40 MEQ: 1.5 POWDER, FOR SOLUTION ORAL at 20:15

## 2024-06-23 RX ADMIN — AMIODARONE HYDROCHLORIDE 1 MG/MIN: 1.8 INJECTION, SOLUTION INTRAVENOUS at 10:57

## 2024-06-23 RX ADMIN — OXYCODONE HYDROCHLORIDE 5 MG: 5 TABLET ORAL at 10:46

## 2024-06-23 RX ADMIN — POTASSIUM CHLORIDE 20 MEQ: 14.9 INJECTION, SOLUTION INTRAVENOUS at 08:25

## 2024-06-23 RX ADMIN — BISACODYL 10 MG: 10 SUPPOSITORY RECTAL at 08:25

## 2024-06-23 RX ADMIN — MAGNESIUM SULFATE HEPTAHYDRATE 4 G: 40 INJECTION, SOLUTION INTRAVENOUS at 20:13

## 2024-06-23 RX ADMIN — AMIODARONE HYDROCHLORIDE 1 MG/MIN: 1.8 INJECTION, SOLUTION INTRAVENOUS at 17:24

## 2024-06-23 RX ADMIN — DOBUTAMINE HYDROCHLORIDE 2.5 MCG/KG/MIN: 400 INJECTION INTRAVENOUS at 23:28

## 2024-06-23 RX ADMIN — BUMETANIDE 2 MG: 0.25 INJECTION, SOLUTION INTRAMUSCULAR; INTRAVENOUS at 18:29

## 2024-06-23 RX ADMIN — POTASSIUM CHLORIDE 20 MEQ: 14.9 INJECTION, SOLUTION INTRAVENOUS at 20:12

## 2024-06-23 RX ADMIN — PIPERACILLIN SODIUM AND TAZOBACTAM SODIUM 3.38 G: 3; .375 INJECTION, SOLUTION INTRAVENOUS at 15:47

## 2024-06-23 ASSESSMENT — PAIN SCALES - GENERAL
PAINLEVEL_OUTOF10: 3
PAINLEVEL_OUTOF10: 0 - NO PAIN
PAINLEVEL_OUTOF10: 0 - NO PAIN
PAINLEVEL_OUTOF10: 7
PAINLEVEL_OUTOF10: 7
PAINLEVEL_OUTOF10: 8
PAINLEVEL_OUTOF10: 0 - NO PAIN

## 2024-06-23 ASSESSMENT — PAIN - FUNCTIONAL ASSESSMENT
PAIN_FUNCTIONAL_ASSESSMENT: 0-10

## 2024-06-23 ASSESSMENT — PAIN SCALES - PAIN ASSESSMENT IN ADVANCED DEMENTIA (PAINAD): TOTALSCORE: MD NOTIFIED (COMMENT);MEDICATION (SEE MAR)

## 2024-06-23 ASSESSMENT — PAIN DESCRIPTION - ORIENTATION
ORIENTATION: RIGHT
ORIENTATION: RIGHT

## 2024-06-23 ASSESSMENT — PAIN DESCRIPTION - LOCATION
LOCATION: FOOT
LOCATION: FOOT

## 2024-06-23 NOTE — PROGRESS NOTES
"Amirah Bennett is a 43 y.o. female on day 3 of admission presenting with Atrial fibrillation (Multi).    Subjective   Patient states that she is feeling much better. Denies chest pain palpitations or SOB.        Objective     Physical Exam  Constitutional: in NAD  HEENT: sclerae anicteric, EOM grossly intact  CV: RRR, no murmurs noted  Pulm: CTAB, increased work of breathing, 2L O2  GI: abd soft, non-tender  Skin: cold extremities  Ext: bilateral LE wrapped, pulse present on left foot, no pulse right foot (has femoral pulse), feet cool, significant pain with light touch  Neuro: alert and conversant however only intermittently answering questions appropriately  ,+expressive aphasia, endorsing pain in bilateral legs. She has equal sensation in bilateral thighs but states she has numbness in lower right leg. She is unable to wiggle toes in lower right leg but can bend right hip.     Last Recorded Vitals  Blood pressure 85/59, pulse 94, temperature 35.9 °C (96.6 °F), temperature source Temporal, resp. rate 12, height 1.702 m (5' 7.01\"), weight 97.6 kg (215 lb 2.7 oz), SpO2 97%.  Intake/Output last 3 Shifts:  I/O last 3 completed shifts:  In: 3685.8 (37.8 mL/kg) [P.O.:240; I.V.:2745.8 (28.1 mL/kg); IV Piggyback:700]  Out: 7790 (79.8 mL/kg) [Urine:7790 (2.2 mL/kg/hr)]  Weight: 97.6 kg     Relevant Results  Scheduled medications  amiodarone, 150 mg, intravenous, Once  aspirin, 81 mg, oral, Daily  bisacodyl, 10 mg, rectal, Daily  [Held by provider] empagliflozin, 10 mg, oral, Daily  [Held by provider] folic acid, 1 mg, oral, Daily  [Held by provider] gabapentin, 100 mg, oral, TID  insulin lispro, 0-5 Units, subcutaneous, q4h  [Held by provider] melatonin, 3 mg, oral, Nightly  [Held by provider] metoprolol succinate XL, 50 mg, oral, Daily  oxygen, , inhalation, Continuous - Inhalation  pantoprazole, 40 mg, intravenous, Daily before breakfast  perflutren protein A microsphere, 0.5 mL, intravenous, Once in " imaging  piperacillin-tazobactam, 3.375 g, intravenous, q6h  potassium chloride, 20 mEq, intravenous, q2h  [Held by provider] sacubitriL-valsartan, 1 tablet, oral, BID  [Held by provider] spironolactone, 25 mg, oral, Daily  sulfur hexafluoride microsphr, 2 mL, intravenous, Once in imaging  vancomycin, 1,250 mg, intravenous, q24h      Continuous medications  amiodarone, 1 mg/min, Last Rate: 1 mg/min (06/23/24 1057)  DOBUTamine, 2.5-20 mcg/kg/min, Last Rate: 7.5 mcg/kg/min (06/23/24 0607)  heparin, 0-4,500 Units/hr, Last Rate: 1,200 Units/hr (06/23/24 1134)  nitroprusside, 0.25-5 mcg/kg/min, Last Rate: 1.3 mcg/kg/min (06/23/24 0631)  norepinephrine, 0.01-1 mcg/kg/min, Last Rate: Stopped (06/22/24 0725)      PRN medications  PRN medications: dextrose, dextrose, glucagon, glucagon, heparin, [Held by provider] sennosides, vancomycin     LABS:     CBC     Latest Reference Range & Units 06/23/24 05:46   LEUKOCYTES (10*3/UL) IN BLOOD BY AUTOMATED COUNT, Serbian 4.4 - 11.3 x10*3/uL 13.8 (H)   nRBC 0.0 - 0.0 /100 WBCs 0.1 (H)   ERYTHROCYTES (10*6/UL) IN BLOOD BY AUTOMATED COUNT, Serbian 4.00 - 5.20 x10*6/uL 3.56 (L)   HEMOGLOBIN 12.0 - 16.0 g/dL 9.9 (L)   HEMATOCRIT 36.0 - 46.0 % 29.9 (L)   MCV 80 - 100 fL 84   MCH 26.0 - 34.0 pg 27.8   MCHC 32.0 - 36.0 g/dL 33.1   RED CELL DISTRIBUTION WIDTH 11.5 - 14.5 % 16.5 (H)   PLATELETS (10*3/UL) IN BLOOD AUTOMATED COUNT, Serbian 150 - 450 x10*3/uL 102 (L)   NEUTROPHILS/100 LEUKOCYTES IN BLOOD BY AUTOMATED COUNT, Serbian 40.0 - 80.0 % 76.8   Immature Granulocytes %, Automated 0.0 - 0.9 % 0.7   Lymphocytes % 13.0 - 44.0 % 16.0   Monocytes % 2.0 - 10.0 % 6.3   Eosinophils % 0.0 - 6.0 % 0.1   Basophils % 0.0 - 2.0 % 0.1   NEUTROPHILS (10*3/UL) IN BLOOD BY AUTOMATED COUNT, Serbian 1.20 - 7.70 x10*3/uL 10.57 (H)   Immature Granulocytes Absolute, Automated 0.00 - 0.70 x10*3/uL 0.10   Lymphocytes Absolute 1.20 - 4.80 x10*3/uL 2.20   Monocytes Absolute 0.10 - 1.00 x10*3/uL 0.87    Eosinophils Absolute 0.00 - 0.70 x10*3/uL 0.01   Basophils Absolute 0.00 - 0.10 x10*3/uL 0.01   (H): Data is abnormally high  (L): Data is abnormally low    CMP     Latest Reference Range & Units 06/23/24 05:46   GLUCOSE 74 - 99 mg/dL 110 (H)   SODIUM 136 - 145 mmol/L 134 (L)   POTASSIUM 3.5 - 5.3 mmol/L 3.5   CHLORIDE 98 - 107 mmol/L 92 (L)   Bicarbonate 21 - 32 mmol/L 30   Anion Gap 10 - 20 mmol/L 16   Blood Urea Nitrogen 6 - 23 mg/dL 27 (H)   Creatinine 0.50 - 1.05 mg/dL 1.61 (H)   EGFR >60 mL/min/1.73m*2 40 (L)   Calcium 8.6 - 10.6 mg/dL 7.9 (L)   PHOSPHORUS 2.5 - 4.9 mg/dL 3.9   Albumin 3.4 - 5.0 g/dL 2.6 (L)   Alkaline Phosphatase 33 - 110 U/L 66   ALT 7 - 45 U/L 640 (H)   AST 9 - 39 U/L 997 (H)   Bilirubin Total 0.0 - 1.2 mg/dL 2.9 (H)   Bilirubin, Direct 0.0 - 0.3 mg/dL 1.7 (H)   (H): Data is abnormally high  (L): Data is abnormally low    CK   Latest Reference Range & Units 06/23/24 05:46   Creatine Kinase 0 - 215 U/L 14,529 (H)   (H): Data is abnormally high    Imaging:     Venous lower extremity doppler    1. Partial compressibility of the right external iliac vein and the  right common femoral vein, which raises concern for early  nonocclusive deep venous thrombosis, although flow is seen on Doppler  images.  2. Otherwise, no sonographic evidence for deep vein thrombosis within  the remainder of the evaluated veins of the bilateral lower extremity.     Arterial doppler    1. Absent Doppler flow seen within the right popliteal artery, with significantly diminished flow seen within the distal right common femoral artery, right superficial femoral artery as well as the right peroneal and right posterior tibial arteries. Findings likely represent stenosis proximal to the right common femoral artery, possibly within the right external iliac right common iliac artery. A CTA of the lower extremities can be obtained for further evaluation.   2. Otherwise, remainder of the right-sided arteries and the arteries  of the left lower extremity are patent, with unremarkable flow seen on Doppler images.      Assessment/Plan   Principal Problem:    Atrial fibrillation (Multi)  Active Problems:    CHF (congestive heart failure) (Multi)    Cardiogenic shock (Multi)    Amirah Bennett is a 42 y.o. 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 RHC c/w cardiogenic shock with CI of 1.6, CVP of 25. She is not candidate for advanced therapies given documented medical nonadherence. Managing medically. Attempted to wean off dobutamine while increasing nitroprusside so stopped dobutamine 6/21 however acutely worsened with increased lactate and worsened SvO2 from 65 to 25 overnight with new abdominal pain and right leg pain concerning for ischemia. Improving parameters, normalized lactate, and pain resolution when dobutamine restarted. Course also c/b SARHA, cardiac thrombi, liver injury likely due to ischemia, and Afib with RVR. Found to have rhabdomyolysis with CK of 14,000. Venous and Arterial dopplers of lower extremities concerning for non-occlusive thrombus in      Updates 6/23-  -Dobutamine decreased to 5mcg/kg/min based off most-recent swan numbers  -Venous doppler concerning for thrombus in right CFV and right EIV, already on heparin gtt  -Arterial doppler concerning for absent flow in right popliteal artery, diminised flow in right CFA, right SFA, right peroneal & right PTA.   -Ethics consulted: NOK is decision maker until  POA forms can be produced by friend.     NEUROLOGIC  #AMS, resolved at her baseline  #ischemic stroke L MCA d/t AFib LLA thrombus seen on echo (lack of OAC adherence) s/p thrombectomy 01/2022 @ [facility] w/ residual expressive aphasia and R sided weakness   ::UDS positive for cannabinoids  -pending infectious w/up per below     #Insomnia  -c/w melatonin 3mg at bedtime     #Depression?  #Anxiety  ::Patient's friend states that she doesn't care for herself due to being depressed  -consider psychiatry consult  -told to stop quetiapine 50mg at last discharge     CARDIOVASCULAR  #Cardiogenic shock  #HFrEF (LVEF 20-25%)  ::Not candidate for advanced therapies due to medical nonadherence  -dobutamine gtt  -nitroprusside gtt  -bumex drip holiday  -palliative care consulted given patient not candidate for advanced therapies, appreciate recommendations  -monitor swan numbers q6h  -pending repeat echo to rule out pericardial effusion on heparin drip  -holding GDMT given pressures, unclear what patient was actively taking however was previously prescribed entresto 24-26, spironolactone 25mg, metoprolol succinate 50mg daily, lasix 40mg, jardiance 10mg     #Cardiac thrombi  ::Likely in setting of Xarelto nonadherence  ::There are indeterminate low-attenuation nodular filling defects within the atrial appendage. While this may represent contrast under filling, a right atrial appendage thrombus can not be excluded.  -c/w heparin gtt    #Right lower extremity DVT  -Continue heparin gtt    #Chronic limb ischemia  -Arterial duplex as above  -Consider CTA when more stable     #Afib on Xarelto w/ DCCV 05/2023), ischemic stroke L MCA d/t AFib LLA thrombus seen on echo (lack of OAC adherence) s/p thrombectomy 01/2022 @ [facility] w/ residual expressive aphasia and R sided weakness   ::Episode of RVR in ED  ::Previously prescribed digoxin 250mcg however last fill was 12/2023  ::TSH 6.28H, free T4 1.48  -c/w amiodarone gtt at 1  -hold home digoxin  250mcg  -c/w AC per above (holding home Xarelto 20mg daily in evening)  -c/w aspirin 81mg  -HOLD atorvastatin 80 due to liver injury     #S/p femoral arterial line sheath placement, c/f clot on ultrasound  -pending bilateral duplex ultrasound of lower extremity in AM  -maintaining femoral sheath for frequent ABGs     #Elevated troponin  ::95 > 289 > 313 > 326 > 289  -stop trending     #HTN  -holding home medications     PULMONARY  #Dyspnea  #Mild pulmonary edema  ::No evidence of PE  -diuresis per above  -wean as able to room air     #Hx trach c/b trancutaneous fistula  -monitor for breaths/mucus discharge from trach site  -follow up outpatient ENT for closure     RENAL/  #Metabolic acidosis  ::Likely in setting of shock  -trend ABGs q1hr    #Rhabdomyolysis   -Ck > 14,000  -Daily CK, unable to give fluids given cardiogenic shock     #SARAH  ::Likely prerenal vs ATN in setting of shock  ::FeUrea suggests intrinsic  -U/A with 3+ blood and no RBCs. Elevated CK as above.  -avoid nephrotoxic medications     #Elevated lactate, improved     GASTROINTESTINAL  #Elevated Tbili (3.2 on admission, from 0.5 in 03/2024)  #Elevated LFTs  -trend CMP  -RUQ US if continues to be elevated     #GERD  -c/w pantoprazole 40mg daily     ENDOCRINE  #T2DM, diet controlled  ::HgbA1c 3/2024 5.5  -hypoglycemia protocol and mild SSI     HEME/ONC  #Cardiac thrombi  -see cardiac section for plan     INFECTIOUS DISEASE  #Concern for infection  ::Procal 0.28  ::CXR without signs of PNA  ::s/p vanc/zosyn (6/20-6/21)  ::UA with 1+ blood, 1+ bacteria, no WBC, neg nitrite and leuk esterase  -pending blood cultures x1 (only able to draw one culture)  -pending UA with culture  -monitoring off antibiotics     Dispo:  -PT: Moderate intensity level of care  -patient would like enrollment in  home delivery service for medications (she has trouble getting to preferred pharmacy), pharmacy updated to [facility]  -repeat thyroid labs outpatient     N: cardiac,  diabetic  A: femoral artery sheath right, pIVs  DVT ppx: heparin drip  GI ppx: pantoprazole 40mg     Code Status: FULL CODE per patient and POA confirmed on presentation to CICU)  Surrogate Medical Decision-maker:   Given lack of paperwork from Navin Pritchard (noted to be POA in chart) and sarai Werner MD  PGY-3 Internal Medicine

## 2024-06-24 ENCOUNTER — APPOINTMENT (OUTPATIENT)
Dept: RADIOLOGY | Facility: HOSPITAL | Age: 44
DRG: 239 | End: 2024-06-24
Payer: COMMERCIAL

## 2024-06-24 ENCOUNTER — APPOINTMENT (OUTPATIENT)
Dept: VASCULAR MEDICINE | Facility: HOSPITAL | Age: 44
End: 2024-06-24
Payer: COMMERCIAL

## 2024-06-24 ENCOUNTER — ANESTHESIA EVENT (OUTPATIENT)
Dept: OPERATING ROOM | Facility: HOSPITAL | Age: 44
End: 2024-06-24
Payer: COMMERCIAL

## 2024-06-24 PROBLEM — I99.8 ACUTE LOWER EXTREMITY ISCHEMIA: Status: ACTIVE | Noted: 2024-06-20

## 2024-06-24 LAB
ALBUMIN SERPL BCP-MCNC: 2.4 G/DL (ref 3.4–5)
ALBUMIN SERPL BCP-MCNC: 2.5 G/DL (ref 3.4–5)
ALP SERPL-CCNC: 62 U/L (ref 33–110)
ALT SERPL W P-5'-P-CCNC: 655 U/L (ref 7–45)
ANION GAP BLDMV CALCULATED.4IONS-SCNC: 6 MMO/L (ref 10–25)
ANION GAP BLDMV CALCULATED.4IONS-SCNC: 7 MMO/L (ref 10–25)
ANION GAP BLDMV CALCULATED.4IONS-SCNC: 8 MMO/L (ref 10–25)
ANION GAP SERPL CALC-SCNC: 12 MMOL/L (ref 10–20)
ANION GAP SERPL CALC-SCNC: 14 MMOL/L (ref 10–20)
APTT PPP: 189 SECONDS (ref 27–38)
AST SERPL W P-5'-P-CCNC: 890 U/L (ref 9–39)
BACTERIA BLD CULT: NORMAL
BASE EXCESS BLDMV CALC-SCNC: 1.5 MMOL/L (ref -2–3)
BASE EXCESS BLDMV CALC-SCNC: 3.2 MMOL/L (ref -2–3)
BASE EXCESS BLDMV CALC-SCNC: 5.9 MMOL/L (ref -2–3)
BASOPHILS # BLD AUTO: 0.01 X10*3/UL (ref 0–0.1)
BASOPHILS NFR BLD AUTO: 0.1 %
BILIRUB DIRECT SERPL-MCNC: 1.7 MG/DL (ref 0–0.3)
BILIRUB SERPL-MCNC: 2.9 MG/DL (ref 0–1.2)
BODY TEMPERATURE: 37 DEGREES CELSIUS
BUN SERPL-MCNC: 22 MG/DL (ref 6–23)
BUN SERPL-MCNC: 23 MG/DL (ref 6–23)
CA-I BLDMV-SCNC: 1.01 MMOL/L (ref 1.1–1.33)
CA-I BLDMV-SCNC: 1.05 MMOL/L (ref 1.1–1.33)
CA-I BLDMV-SCNC: 1.05 MMOL/L (ref 1.1–1.33)
CALCIUM SERPL-MCNC: 7.6 MG/DL (ref 8.6–10.6)
CALCIUM SERPL-MCNC: 7.7 MG/DL (ref 8.6–10.6)
CHLORIDE BLD-SCNC: 100 MMOL/L (ref 98–107)
CHLORIDE BLD-SCNC: 94 MMOL/L (ref 98–107)
CHLORIDE BLD-SCNC: 95 MMOL/L (ref 98–107)
CHLORIDE SERPL-SCNC: 94 MMOL/L (ref 98–107)
CHLORIDE SERPL-SCNC: 94 MMOL/L (ref 98–107)
CK SERPL-CCNC: ABNORMAL U/L (ref 0–215)
CK SERPL-CCNC: ABNORMAL U/L (ref 0–215)
CO2 SERPL-SCNC: 26 MMOL/L (ref 21–32)
CO2 SERPL-SCNC: 29 MMOL/L (ref 21–32)
CREAT SERPL-MCNC: 1.32 MG/DL (ref 0.5–1.05)
CREAT SERPL-MCNC: 1.41 MG/DL (ref 0.5–1.05)
D DIMER PPP FEU-MCNC: 1408 NG/ML FEU
EGFRCR SERPLBLD CKD-EPI 2021: 47 ML/MIN/1.73M*2
EGFRCR SERPLBLD CKD-EPI 2021: 51 ML/MIN/1.73M*2
EOSINOPHIL # BLD AUTO: 0.03 X10*3/UL (ref 0–0.7)
EOSINOPHIL NFR BLD AUTO: 0.2 %
ERYTHROCYTE [DISTWIDTH] IN BLOOD BY AUTOMATED COUNT: 16.2 % (ref 11.5–14.5)
FIBRINOGEN PPP-MCNC: 339 MG/DL (ref 200–400)
GLUCOSE BLD MANUAL STRIP-MCNC: 109 MG/DL (ref 74–99)
GLUCOSE BLD MANUAL STRIP-MCNC: 127 MG/DL (ref 74–99)
GLUCOSE BLD MANUAL STRIP-MCNC: 82 MG/DL (ref 74–99)
GLUCOSE BLD MANUAL STRIP-MCNC: 94 MG/DL (ref 74–99)
GLUCOSE BLD MANUAL STRIP-MCNC: 99 MG/DL (ref 74–99)
GLUCOSE BLD-MCNC: 100 MG/DL (ref 74–99)
GLUCOSE BLD-MCNC: 103 MG/DL (ref 74–99)
GLUCOSE BLD-MCNC: 90 MG/DL (ref 74–99)
GLUCOSE SERPL-MCNC: 108 MG/DL (ref 74–99)
GLUCOSE SERPL-MCNC: 111 MG/DL (ref 74–99)
HCO3 BLDMV-SCNC: 25.9 MMOL/L (ref 22–26)
HCO3 BLDMV-SCNC: 27.9 MMOL/L (ref 22–26)
HCO3 BLDMV-SCNC: 30.6 MMOL/L (ref 22–26)
HCT VFR BLD AUTO: 28.4 % (ref 36–46)
HCT VFR BLD EST: 26 % (ref 36–46)
HCT VFR BLD EST: 28 % (ref 36–46)
HCT VFR BLD EST: 30 % (ref 36–46)
HGB BLD-MCNC: 9.5 G/DL (ref 12–16)
HGB BLDMV-MCNC: 10 G/DL (ref 12–16)
HGB BLDMV-MCNC: 8.5 G/DL (ref 12–16)
HGB BLDMV-MCNC: 9.2 G/DL (ref 12–16)
IMM GRANULOCYTES # BLD AUTO: 0.18 X10*3/UL (ref 0–0.7)
IMM GRANULOCYTES NFR BLD AUTO: 1.1 % (ref 0–0.9)
INHALED O2 CONCENTRATION: 21 %
INR PPP: 1.8 (ref 0.9–1.1)
LACTATE BLDMV-SCNC: 0.9 MMOL/L (ref 0.4–2)
LACTATE SERPL-SCNC: 0.8 MMOL/L (ref 0.4–2)
LACTATE SERPL-SCNC: 1.1 MMOL/L (ref 0.4–2)
LACTATE SERPL-SCNC: 1.7 MMOL/L (ref 0.4–2)
LYMPHOCYTES # BLD AUTO: 2.34 X10*3/UL (ref 1.2–4.8)
LYMPHOCYTES NFR BLD AUTO: 14.9 %
MAGNESIUM SERPL-MCNC: 1.71 MG/DL (ref 1.6–2.4)
MAGNESIUM SERPL-MCNC: 2.02 MG/DL (ref 1.6–2.4)
MAGNESIUM SERPL-MCNC: 2.06 MG/DL (ref 1.6–2.4)
MCH RBC QN AUTO: 27.3 PG (ref 26–34)
MCHC RBC AUTO-ENTMCNC: 33.5 G/DL (ref 32–36)
MCV RBC AUTO: 82 FL (ref 80–100)
MONOCYTES # BLD AUTO: 1.27 X10*3/UL (ref 0.1–1)
MONOCYTES NFR BLD AUTO: 8.1 %
NEUTROPHILS # BLD AUTO: 11.87 X10*3/UL (ref 1.2–7.7)
NEUTROPHILS NFR BLD AUTO: 75.6 %
NRBC BLD-RTO: 0.1 /100 WBCS (ref 0–0)
OXYHGB MFR BLDMV: 58.1 % (ref 45–75)
OXYHGB MFR BLDMV: 61.5 % (ref 45–75)
OXYHGB MFR BLDMV: 65.8 % (ref 45–75)
PCO2 BLDMV: 39 MM HG (ref 41–51)
PCO2 BLDMV: 42 MM HG (ref 41–51)
PCO2 BLDMV: 44 MM HG (ref 41–51)
PH BLDMV: 7.43 PH (ref 7.33–7.43)
PH BLDMV: 7.43 PH (ref 7.33–7.43)
PH BLDMV: 7.45 PH (ref 7.33–7.43)
PHOSPHATE SERPL-MCNC: 3.9 MG/DL (ref 2.5–4.9)
PHOSPHATE SERPL-MCNC: 4.1 MG/DL (ref 2.5–4.9)
PLATELET # BLD AUTO: 99 X10*3/UL (ref 150–450)
PO2 BLDMV: 38 MM HG (ref 35–45)
PO2 BLDMV: 41 MM HG (ref 35–45)
PO2 BLDMV: 42 MM HG (ref 35–45)
POTASSIUM BLDMV-SCNC: 3.2 MMOL/L (ref 3.5–5.3)
POTASSIUM BLDMV-SCNC: 3.3 MMOL/L (ref 3.5–5.3)
POTASSIUM BLDMV-SCNC: 3.4 MMOL/L (ref 3.5–5.3)
POTASSIUM SERPL-SCNC: 3.6 MMOL/L (ref 3.5–5.3)
POTASSIUM SERPL-SCNC: 3.6 MMOL/L (ref 3.5–5.3)
PROT SERPL-MCNC: 5.1 G/DL (ref 6.4–8.2)
PROTHROMBIN TIME: 20.7 SECONDS (ref 9.8–12.8)
RBC # BLD AUTO: 3.48 X10*6/UL (ref 4–5.2)
SAO2 % BLDMV: 60 % (ref 45–75)
SAO2 % BLDMV: 63 % (ref 45–75)
SAO2 % BLDMV: 68 % (ref 45–75)
SODIUM BLDMV-SCNC: 127 MMOL/L (ref 136–145)
SODIUM BLDMV-SCNC: 128 MMOL/L (ref 136–145)
SODIUM BLDMV-SCNC: 130 MMOL/L (ref 136–145)
SODIUM SERPL-SCNC: 130 MMOL/L (ref 136–145)
SODIUM SERPL-SCNC: 131 MMOL/L (ref 136–145)
VANCOMYCIN SERPL-MCNC: 14.1 UG/ML (ref 5–20)
WBC # BLD AUTO: 15.7 X10*3/UL (ref 4.4–11.3)

## 2024-06-24 PROCEDURE — 37799 UNLISTED PX VASCULAR SURGERY: CPT | Performed by: INTERNAL MEDICINE

## 2024-06-24 PROCEDURE — 93971 EXTREMITY STUDY: CPT

## 2024-06-24 PROCEDURE — 84132 ASSAY OF SERUM POTASSIUM: CPT | Mod: 91

## 2024-06-24 PROCEDURE — 85384 FIBRINOGEN ACTIVITY: CPT

## 2024-06-24 PROCEDURE — 80053 COMPREHEN METABOLIC PANEL: CPT

## 2024-06-24 PROCEDURE — 82550 ASSAY OF CK (CPK): CPT | Mod: 91

## 2024-06-24 PROCEDURE — 75635 CT ANGIO ABDOMINAL ARTERIES: CPT | Performed by: RADIOLOGY

## 2024-06-24 PROCEDURE — 84100 ASSAY OF PHOSPHORUS: CPT

## 2024-06-24 PROCEDURE — 85379 FIBRIN DEGRADATION QUANT: CPT

## 2024-06-24 PROCEDURE — 85025 COMPLETE CBC W/AUTO DIFF WBC: CPT

## 2024-06-24 PROCEDURE — 80202 ASSAY OF VANCOMYCIN: CPT

## 2024-06-24 PROCEDURE — 82248 BILIRUBIN DIRECT: CPT

## 2024-06-24 PROCEDURE — 83605 ASSAY OF LACTIC ACID: CPT

## 2024-06-24 PROCEDURE — 82947 ASSAY GLUCOSE BLOOD QUANT: CPT

## 2024-06-24 PROCEDURE — 2500000004 HC RX 250 GENERAL PHARMACY W/ HCPCS (ALT 636 FOR OP/ED): Mod: JZ

## 2024-06-24 PROCEDURE — 2500000001 HC RX 250 WO HCPCS SELF ADMINISTERED DRUGS (ALT 637 FOR MEDICARE OP)

## 2024-06-24 PROCEDURE — 86022 PLATELET ANTIBODIES: CPT

## 2024-06-24 PROCEDURE — C9113 INJ PANTOPRAZOLE SODIUM, VIA: HCPCS

## 2024-06-24 PROCEDURE — 82947 ASSAY GLUCOSE BLOOD QUANT: CPT | Mod: 91

## 2024-06-24 PROCEDURE — 75635 CT ANGIO ABDOMINAL ARTERIES: CPT

## 2024-06-24 PROCEDURE — 93971 EXTREMITY STUDY: CPT | Performed by: SURGERY

## 2024-06-24 PROCEDURE — 83735 ASSAY OF MAGNESIUM: CPT

## 2024-06-24 PROCEDURE — 83605 ASSAY OF LACTIC ACID: CPT | Mod: 91

## 2024-06-24 PROCEDURE — 80069 RENAL FUNCTION PANEL: CPT | Mod: CCI

## 2024-06-24 PROCEDURE — 83010 ASSAY OF HAPTOGLOBIN QUANT: CPT | Mod: WESLAB

## 2024-06-24 PROCEDURE — 2500000002 HC RX 250 W HCPCS SELF ADMINISTERED DRUGS (ALT 637 FOR MEDICARE OP, ALT 636 FOR OP/ED)

## 2024-06-24 PROCEDURE — A4217 STERILE WATER/SALINE, 500 ML: HCPCS

## 2024-06-24 PROCEDURE — 99291 CRITICAL CARE FIRST HOUR: CPT | Performed by: INTERNAL MEDICINE

## 2024-06-24 PROCEDURE — 37799 UNLISTED PX VASCULAR SURGERY: CPT

## 2024-06-24 PROCEDURE — 83615 LACTATE (LD) (LDH) ENZYME: CPT

## 2024-06-24 PROCEDURE — 2550000001 HC RX 255 CONTRASTS: Performed by: INTERNAL MEDICINE

## 2024-06-24 PROCEDURE — 2500000005 HC RX 250 GENERAL PHARMACY W/O HCPCS

## 2024-06-24 PROCEDURE — 2500000004 HC RX 250 GENERAL PHARMACY W/ HCPCS (ALT 636 FOR OP/ED)

## 2024-06-24 PROCEDURE — 99497 ADVNCD CARE PLAN 30 MIN: CPT

## 2024-06-24 PROCEDURE — 85610 PROTHROMBIN TIME: CPT

## 2024-06-24 PROCEDURE — 84132 ASSAY OF SERUM POTASSIUM: CPT | Mod: 91 | Performed by: INTERNAL MEDICINE

## 2024-06-24 PROCEDURE — 82550 ASSAY OF CK (CPK): CPT

## 2024-06-24 PROCEDURE — 80048 BASIC METABOLIC PNL TOTAL CA: CPT

## 2024-06-24 PROCEDURE — 2020000001 HC ICU ROOM DAILY

## 2024-06-24 PROCEDURE — 99233 SBSQ HOSP IP/OBS HIGH 50: CPT | Performed by: SURGERY

## 2024-06-24 RX ORDER — SENNOSIDES 8.6 MG/1
2 TABLET ORAL 2 TIMES DAILY
Status: DISCONTINUED | OUTPATIENT
Start: 2024-06-24 | End: 2024-07-24 | Stop reason: HOSPADM

## 2024-06-24 RX ORDER — CALCIUM GLUCONATE 20 MG/ML
2 INJECTION, SOLUTION INTRAVENOUS ONCE
Status: COMPLETED | OUTPATIENT
Start: 2024-06-24 | End: 2024-06-24

## 2024-06-24 RX ORDER — LIDOCAINE 50 MG/G
OINTMENT TOPICAL AS NEEDED
Status: DISCONTINUED | OUTPATIENT
Start: 2024-06-24 | End: 2024-07-24 | Stop reason: HOSPADM

## 2024-06-24 RX ORDER — POTASSIUM CHLORIDE 14.9 MG/ML
20 INJECTION INTRAVENOUS
Status: COMPLETED | OUTPATIENT
Start: 2024-06-24 | End: 2024-06-25

## 2024-06-24 RX ORDER — POLYETHYLENE GLYCOL 3350 17 G/17G
17 POWDER, FOR SOLUTION ORAL 2 TIMES DAILY
Status: DISCONTINUED | OUTPATIENT
Start: 2024-06-24 | End: 2024-07-24 | Stop reason: HOSPADM

## 2024-06-24 RX ORDER — POTASSIUM CHLORIDE 20 MEQ/1
20 TABLET, EXTENDED RELEASE ORAL ONCE
Status: DISCONTINUED | OUTPATIENT
Start: 2024-06-24 | End: 2024-06-24

## 2024-06-24 RX ORDER — POTASSIUM CHLORIDE 14.9 MG/ML
20 INJECTION INTRAVENOUS ONCE
Status: COMPLETED | OUTPATIENT
Start: 2024-06-24 | End: 2024-06-24

## 2024-06-24 RX ORDER — AMIODARONE HYDROCHLORIDE 200 MG/1
200 TABLET ORAL DAILY
Status: DISCONTINUED | OUTPATIENT
Start: 2024-06-24 | End: 2024-07-24 | Stop reason: HOSPADM

## 2024-06-24 RX ORDER — SENNOSIDES 8.6 MG/1
1 TABLET ORAL 2 TIMES DAILY
Status: DISCONTINUED | OUTPATIENT
Start: 2024-06-24 | End: 2024-06-24

## 2024-06-24 RX ADMIN — VANCOMYCIN HYDROCHLORIDE 1250 MG: 1.25 INJECTION, POWDER, LYOPHILIZED, FOR SOLUTION INTRAVENOUS at 10:17

## 2024-06-24 RX ADMIN — POLYETHYLENE GLYCOL 3350 17 G: 17 POWDER, FOR SOLUTION ORAL at 20:27

## 2024-06-24 RX ADMIN — Medication 95 PERCENT: at 20:00

## 2024-06-24 RX ADMIN — CALCIUM GLUCONATE 2 G: 20 INJECTION, SOLUTION INTRAVENOUS at 14:41

## 2024-06-24 RX ADMIN — HYDROMORPHONE HYDROCHLORIDE 0.4 MG: 1 INJECTION, SOLUTION INTRAMUSCULAR; INTRAVENOUS; SUBCUTANEOUS at 04:23

## 2024-06-24 RX ADMIN — CALCIUM GLUCONATE 2 G: 20 INJECTION, SOLUTION INTRAVENOUS at 08:56

## 2024-06-24 RX ADMIN — SENNOSIDES 8.6 MG: 8.6 TABLET, FILM COATED ORAL at 10:56

## 2024-06-24 RX ADMIN — POTASSIUM CHLORIDE 20 MEQ: 14.9 INJECTION, SOLUTION INTRAVENOUS at 11:54

## 2024-06-24 RX ADMIN — SODIUM NITROPRUSSIDE 1 MCG/KG/MIN: 0.5 INJECTION, SOLUTION INTRAVENOUS at 01:07

## 2024-06-24 RX ADMIN — CALCIUM GLUCONATE 2 G: 20 INJECTION, SOLUTION INTRAVENOUS at 20:28

## 2024-06-24 RX ADMIN — HYDROMORPHONE HYDROCHLORIDE 0.4 MG: 1 INJECTION, SOLUTION INTRAMUSCULAR; INTRAVENOUS; SUBCUTANEOUS at 10:47

## 2024-06-24 RX ADMIN — PANTOPRAZOLE SODIUM 40 MG: 40 INJECTION, POWDER, FOR SOLUTION INTRAVENOUS at 06:20

## 2024-06-24 RX ADMIN — ASPIRIN 81 MG CHEWABLE TABLET 81 MG: 81 TABLET CHEWABLE at 06:20

## 2024-06-24 RX ADMIN — HYDROMORPHONE HYDROCHLORIDE 0.4 MG: 1 INJECTION, SOLUTION INTRAMUSCULAR; INTRAVENOUS; SUBCUTANEOUS at 23:40

## 2024-06-24 RX ADMIN — AMIODARONE HYDROCHLORIDE 1 MG/MIN: 1.8 INJECTION, SOLUTION INTRAVENOUS at 05:53

## 2024-06-24 RX ADMIN — POTASSIUM CHLORIDE 20 MEQ: 14.9 INJECTION, SOLUTION INTRAVENOUS at 21:54

## 2024-06-24 RX ADMIN — SENNOSIDES 17.2 MG: 8.6 TABLET, FILM COATED ORAL at 20:27

## 2024-06-24 RX ADMIN — Medication 3 MG: at 20:27

## 2024-06-24 RX ADMIN — PIPERACILLIN SODIUM AND TAZOBACTAM SODIUM 3.38 G: 3; .375 INJECTION, SOLUTION INTRAVENOUS at 03:22

## 2024-06-24 RX ADMIN — SODIUM NITROPRUSSIDE 1 MCG/KG/MIN: 0.5 INJECTION, SOLUTION INTRAVENOUS at 08:53

## 2024-06-24 RX ADMIN — POLYETHYLENE GLYCOL 3350 17 G: 17 POWDER, FOR SOLUTION ORAL at 10:56

## 2024-06-24 RX ADMIN — SODIUM NITROPRUSSIDE 0.8 MCG/KG/MIN: 0.5 INJECTION, SOLUTION INTRAVENOUS at 18:00

## 2024-06-24 RX ADMIN — BISACODYL 10 MG: 10 SUPPOSITORY RECTAL at 08:53

## 2024-06-24 RX ADMIN — HEPARIN SODIUM 1200 UNITS/HR: 10000 INJECTION, SOLUTION INTRAVENOUS at 08:53

## 2024-06-24 RX ADMIN — IOHEXOL 120 ML: 350 INJECTION, SOLUTION INTRAVENOUS at 14:25

## 2024-06-24 RX ADMIN — AMIODARONE HYDROCHLORIDE 200 MG: 200 TABLET ORAL at 10:58

## 2024-06-24 ASSESSMENT — PAIN SCALES - GENERAL
PAINLEVEL_OUTOF10: 0 - NO PAIN
PAINLEVEL_OUTOF10: 10 - WORST POSSIBLE PAIN
PAINLEVEL_OUTOF10: 0 - NO PAIN
PAINLEVEL_OUTOF10: 0 - NO PAIN
PAINLEVEL_OUTOF10: 6
PAINLEVEL_OUTOF10: 0 - NO PAIN
PAINLEVEL_OUTOF10: 5 - MODERATE PAIN
PAINLEVEL_OUTOF10: 10 - WORST POSSIBLE PAIN

## 2024-06-24 ASSESSMENT — PAIN - FUNCTIONAL ASSESSMENT
PAIN_FUNCTIONAL_ASSESSMENT: 0-10

## 2024-06-24 ASSESSMENT — PAIN DESCRIPTION - LOCATION
LOCATION: LEG
LOCATION: ARM

## 2024-06-24 ASSESSMENT — PAIN DESCRIPTION - ORIENTATION
ORIENTATION: LEFT
ORIENTATION: RIGHT

## 2024-06-24 NOTE — PROGRESS NOTES
Vancomycin Dosing by Pharmacy- Cessation of Therapy    Consult to pharmacy for vancomycin dosing has been discontinued by the prescriber, pharmacy will sign off at this time.    Please call pharmacy if there are further questions or re-enter a consult if vancomycin is resumed.     Joslyn Goode, PharmD

## 2024-06-24 NOTE — H&P (VIEW-ONLY)
Reason For Consult  Acute on chronic limb ischemia     History Of Present Illness  Amirah Bennett is a 43 y.o. female presenting with leg pain. She has a complex history including HFrEF, AF on DOAC s./p prior DCCV and cardioversion this admission, L MCA stroke s/p thrombectomy 2022 with reported aphasia and right sided weakness per the admission H&P (though last neurology note 3/2024 documents 5/5 strength throughout all extremities). Reported to be a poor historian per H&P.     In the ED found to be in cardiogenic shock and AFRVR, s/p DCCV. Working diagnosis PE but CTPE negative. Started on multiple vasoactive medications and transferred to ICU. Reported many attempts at arterial lines which were unsuccessful. Notably a right femoral arterial line was attempted but no blood diamante back. She was taken to the cath lab for RHC, also placement of arterial line in the right femoral system which functioned but reportedly there was difficulty passing the wire.     Throughout her hospital course there are no documented arterial signals of either foot. Sensation documented in the bilateral thighs but reported numbness in the right leg without any noted movement beyond the right hip. Left leg without motor or sensory deficits reported.     Also had left brachial arterial line attempted which was unsuccessful but later a left radial arterial line was placed successfully.     On my exam the patient endorses pain in the left arm/hand and right leg. She is unable to move the right foot or toes. She states she ambulates at home.      Past Medical History  She has a past medical history of CHF (congestive heart failure) (Multi) and Stroke (Multi).    Surgical History  She has a past surgical history that includes Other surgical history (06/09/2022); Invasive Vascular Procedure (N/A, 6/20/2024); and Cardiac catheterization (N/A, 6/20/2024).     Social History  She reports that she quit smoking about 6 months ago. Her smoking use  "included cigarettes. She does not have any smokeless tobacco history on file. She reports current alcohol use. No history on file for drug use.    Family History  No family history on file.     Allergies  Hydrocodone-acetaminophen and Ibuprofen    Review of Systems  A complete, 10-point review of systems was completed and negative except as noted above.        Physical Exam  Constitutional: no acute distress  Neuro: awake, alert, oriented; inconsistent at answering questions throughout the interview. Moves the left leg briskly. Able to flex the right hip and adduct the thigh. Unable to extend the knee but can bend the knee slightly. No movement of the left ankle or toes. Sensory intact to light touch LLE, right distal thigh, knee, proximal leg sensation diminished, absent below this. Move the left arm/hand,  strength weak. Left hand sensation intact to thumb, 3rd and 5th digits.  HEENT: No deformities, no scleral icterus. Right neck line in place.  Cardiac: regular rate  Pulmonary: unlabored respirations on room air  Abdomen: soft, non-tender, non-distended  Skin: warm and dry; wounds: right groin puncture dressing in place, no hematoma.   Extremities: peripheral edema noted BLE, LUE; ecchymoses of the right arm with tender mass that is not pulsatile.   Vascular:   Radial: R: palpable. L: a line in place with pulsatile waveform.   Femoral: R: non-palpable. L: palpable.   AT: R: absent. L: monophasic.   PT: R: monophasic. L: multiphasic.        Last Recorded Vitals  Blood pressure 85/59, pulse 87, temperature 35.6 °C (96.1 °F), temperature source Temporal, resp. rate 20, height 1.702 m (5' 7.01\"), weight 97.6 kg (215 lb 2.7 oz), SpO2 97%.    Relevant Results  6/23/24  Venous duplex BUE  Right Upper Venous: The subclavian vein demonstrates a normal spontaneous and phasic flow.  Left Upper Venous: No evidence of acute deep vein thrombus visualized in the left upper extremity. Non-vascularized mass in left upper arm " measuring 46.27 mm x 64.90 mm. Additional Findings; Non-vascularized mass.    6/23/24  Arterial duplex lower extremities  1. Echogenic material within the right distal femoral and popliteal  arteries with  absent Doppler flow in the right popliteal artery  significantly diminished flow within the right distal superficial  femoral artery. Findings raise concern for thrombosis of the distal  SFA and popliteal artery. Decreased flow within the right posterior  tibial and peroneal arteries could be through collateralization.  2. Decreased flow within the right common femoral artery, right deep  femoral artery as well as the proximal and mid superficial femoral  arteries, raising concern for stenosis proximal to the right common  femoral artery, possibly within the right external iliac right common  iliac artery. A CTA of the lower extremities can be obtained for  further evaluation.  3. Unremarkable Doppler interrogation of the left lower extremity  arteries.  ** R CFA monophasic waveform 30cm/s  ** R SFA monophasic 10cc/s  ** R PFA monophasic 10cc/s  ** R popliteal absent  ** R PTA and peroneal monophasic    6/23/24  Venous duplex BLE  1. Partial compressibility of the right external iliac vein and the  right common femoral vein, which raises concern for early  nonocclusive deep venous thrombosis, although flow is seen on Doppler  images.  2. Otherwise, no sonographic evidence for deep vein thrombosis within  the remainder of the evaluated veins of the bilateral lower extremity.     Assessment/Plan     43 year old female presents with reported bilateral lower extremity pain, found to be in cardiogenic shock. Admission exam on 6/20/24 documented inability to move the right lower extremity beyond the hip and with no sensation below the knee. No reported BLE signals throughout her course here. Notably she had an attempted but unsuccessful right femoral artery catheterization followed by placement of 4Fr sheath in the cath  lab. Arterial duplex with monophasic R CFA, SFA, and PFA signals, absent popliteal flow, and monophasic tibial flow though hard to visualize. On exam no right femoral pulse, monophasic PT signal at distal leg; no sensation below the knee and no movement at the ankle. History and physical concerning for acute limb ischemia present at admission (6/20/24) based on chart review and history. Unclear whether the right foot will be salvageable based on exam today. Likely with right iliac (inflow) disease based on exam, possible popliteal thromboembolism based on duplex. Also with possible left brachial arterial injury given likely hematoma on exam (radial waveform is appropriate). Right leg not salvageable, will need inflow procedure and R AKA given motor deficits.     CTA Aorta with BLE runoff shows: aortic bifurcation embolus, R BA-EIA embolus, SFA and popliteal emboli    - arterial duplex LUE to eval for hematoma/arterial injury  - continue heparin gtt  - added on for 6/25/24 for Dr. Rebolledo for R AKA, aorto-iliac thrombectomy    Seen with attending, Dr. Amaury Simon MD, PhD  Vascular Surgery, PGY2  [id number]

## 2024-06-24 NOTE — PROGRESS NOTES
Occupational Therapy                 Therapy Communication Note    Patient Name: Amirah Bennett  MRN: [id number]  Today's Date: 6/24/2024     Discipline: Occupational Therapy    Missed Visit Reason: Missed Visit Reason:  (Communicated with RN, recomend hold therapy at this time. Pt in too much pain and awaiting angiogram. No OT at this times.)    Missed Time: Attempt 1254

## 2024-06-24 NOTE — PROGRESS NOTES
Amirah Bennett is a 43 y.o. female on day 4 of admission presenting with Atrial fibrillation (Multi). Patient remains acutely ill and unable to make medical decisions. Over the weekend, Ethics team did assessment and recommended that surrogate decision maker be patient's cousin, Felicitas. He can be reached at [phone]. Felicitas is requesting some assist with housing resources. SW referred to W to provide resources to family. PT/OT recommending SNF. Palliative care also involved at this time.     Ada Barcenas, JEREMYW

## 2024-06-24 NOTE — PROGRESS NOTES
"Amirah Bennett is a 43 y.o. female on day 4 of admission presenting with Atrial fibrillation (Multi).    Subjective   Pt awake, alert, nodding her head yes to questions, intermittently speaking in short phrases. Provided mindfulness practices as pt was anxious of bleeding noted on her central line site.    Objective     Physical Exam  Vitals reviewed. Exam conducted with a chaperone present.   Constitutional:       General: She is awake.      Appearance: She is overweight. She is ill-appearing.   HENT:      Head: Normocephalic and atraumatic.      Mouth/Throat:      Mouth: Mucous membranes are dry.   Eyes:      Extraocular Movements: Extraocular movements intact.      Pupils: Pupils are equal, round, and reactive to light.   Cardiovascular:      Rate and Rhythm: Normal rate.   Pulmonary:      Effort: Pulmonary effort is normal.      Breath sounds: Examination of the right-lower field reveals decreased breath sounds. Examination of the left-lower field reveals decreased breath sounds. Decreased breath sounds present.   Abdominal:      General: There is distension.      Palpations: Abdomen is soft.   Musculoskeletal:      Right lower leg: Edema (Decreased pulses on RLE; team aware) present.      Left lower leg: Edema present.   Skin:     General: Skin is cool and dry.   Neurological:      Mental Status: She is alert. She is disoriented.   Psychiatric:         Mood and Affect: Mood is anxious.         Behavior: Behavior is cooperative.         Last Recorded Vitals  Blood pressure 85/59, pulse 89, temperature 36.2 °C (97.2 °F), temperature source Temporal, resp. rate 10, height 1.702 m (5' 7.01\"), weight 97.6 kg (215 lb 2.7 oz), SpO2 95%.  Intake/Output last 3 Shifts:  I/O last 3 completed shifts:  In: 3235.4 (33.1 mL/kg) [P.O.:480; I.V.:1855.4 (19 mL/kg); IV Piggyback:900]  Out: 7315 (74.9 mL/kg) [Urine:7315 (2.1 mL/kg/hr)]  Weight: 97.6 kg     Relevant Results  Scheduled medications  amiodarone, 200 mg, oral, " Daily  aspirin, 81 mg, oral, Daily  bisacodyl, 10 mg, rectal, Daily  [Held by provider] empagliflozin, 10 mg, oral, Daily  [Held by provider] folic acid, 1 mg, oral, Daily  [Held by provider] gabapentin, 100 mg, oral, TID  insulin lispro, 0-5 Units, subcutaneous, q4h  melatonin, 3 mg, oral, Nightly  [Held by provider] metoprolol succinate XL, 50 mg, oral, Daily  oxygen, , inhalation, Continuous - Inhalation  pantoprazole, 40 mg, intravenous, Daily before breakfast  perflutren protein A microsphere, 0.5 mL, intravenous, Once in imaging  polyethylene glycol, 17 g, oral, BID  potassium chloride, 20 mEq, intravenous, Once  [Held by provider] sacubitriL-valsartan, 1 tablet, oral, BID  sennosides, 2 tablet, oral, BID  [Held by provider] spironolactone, 25 mg, oral, Daily  sulfur hexafluoride microsphr, 2 mL, intravenous, Once in imaging      Continuous medications  DOBUTamine, 2.5-20 mcg/kg/min, Last Rate: 2.5 mcg/kg/min (06/24/24 1200)  heparin, 0-4,500 Units/hr, Last Rate: 1,200 Units/hr (06/24/24 1200)  nitroprusside, 0.25-5 mcg/kg/min, Last Rate: 1 mcg/kg/min (06/24/24 1200)      PRN medications  PRN medications: dextrose, dextrose, glucagon, glucagon, heparin, HYDROmorphone    Results for orders placed or performed during the hospital encounter of 06/20/24 (from the past 24 hour(s))   Heparin Assay, UFH   Result Value Ref Range    Heparin Unfractionated 0.6 See Comment Below for Therapeutic Ranges IU/mL   POCT GLUCOSE   Result Value Ref Range    POCT Glucose 117 (H) 74 - 99 mg/dL   Renal function panel   Result Value Ref Range    Glucose 111 (H) 74 - 99 mg/dL    Sodium 130 (L) 136 - 145 mmol/L    Potassium 3.4 (L) 3.5 - 5.3 mmol/L    Chloride 91 (L) 98 - 107 mmol/L    Bicarbonate 30 21 - 32 mmol/L    Anion Gap 12 10 - 20 mmol/L    Urea Nitrogen 24 (H) 6 - 23 mg/dL    Creatinine 1.48 (H) 0.50 - 1.05 mg/dL    eGFR 44 (L) >60 mL/min/1.73m*2    Calcium 7.4 (L) 8.6 - 10.6 mg/dL    Phosphorus 4.1 2.5 - 4.9 mg/dL    Albumin  2.5 (L) 3.4 - 5.0 g/dL   Magnesium   Result Value Ref Range    Magnesium 1.68 1.60 - 2.40 mg/dL   BLOOD GAS MIXED VENOUS FULL PANEL   Result Value Ref Range    POCT pH, Mixed 7.44 (H) 7.33 - 7.43 pH    POCT pCO2, Mixed 46 41 - 51 mm Hg    POCT pO2, Mixed 39 35 - 45 mm Hg    POCT SO2, Mixed 61 45 - 75 %    POCT Oxy Hemoglobin, Mixed 59.7 45.0 - 75.0 %    POCT Hematocrit Calculated, Mixed 30.0 (L) 36.0 - 46.0 %    POCT Sodium, Mixed 128 (L) 136 - 145 mmol/L    POCT Potassium, Mixed 3.4 (L) 3.5 - 5.3 mmol/L    POCT Chloride, Mixed 93 (L) 98 - 107 mmol/L    POCT Ionized Calcium, Mixed 1.02 (L) 1.10 - 1.33 mmol/L    POCT Glucose, Mixed 109 (H) 74 - 99 mg/dL    POCT Lactate, Mixed 0.9 0.4 - 2.0 mmol/L    POCT Base Excess, Mixed 6.3 (H) -2.0 - 3.0 mmol/L    POCT HCO3 Calculated, Mixed 31.2 (H) 22.0 - 26.0 mmol/L    POCT Hemoglobin, Mixed 9.9 (L) 12.0 - 16.0 g/dL    POCT Anion Gap, Mixed 7 (L) 10 - 25 mmo/L    Patient Temperature 37.0 degrees Celsius    FiO2 21 %   POCT GLUCOSE   Result Value Ref Range    POCT Glucose 109 (H) 74 - 99 mg/dL   Lactate   Result Value Ref Range    Lactate 1.0 0.4 - 2.0 mmol/L   CBC   Result Value Ref Range    WBC 16.2 (H) 4.4 - 11.3 x10*3/uL    nRBC 0.1 (H) 0.0 - 0.0 /100 WBCs    RBC 3.55 (L) 4.00 - 5.20 x10*6/uL    Hemoglobin 9.7 (L) 12.0 - 16.0 g/dL    Hematocrit 29.2 (L) 36.0 - 46.0 %    MCV 82 80 - 100 fL    MCH 27.3 26.0 - 34.0 pg    MCHC 33.2 32.0 - 36.0 g/dL    RDW 16.0 (H) 11.5 - 14.5 %    Platelets 102 (L) 150 - 450 x10*3/uL   Heparin Assay, UFH   Result Value Ref Range    Heparin Unfractionated 0.6 See Comment Below for Therapeutic Ranges IU/mL   BLOOD GAS MIXED VENOUS FULL PANEL   Result Value Ref Range    POCT pH, Mixed 7.44 (H) 7.33 - 7.43 pH    POCT pCO2, Mixed 40 (L) 41 - 51 mm Hg    POCT pO2, Mixed 40 35 - 45 mm Hg    POCT SO2, Mixed 65 45 - 75 %    POCT Oxy Hemoglobin, Mixed 63.0 45.0 - 75.0 %    POCT Hematocrit Calculated, Mixed 28.0 (L) 36.0 - 46.0 %    POCT Sodium, Mixed 128  (L) 136 - 145 mmol/L    POCT Potassium, Mixed 3.4 (L) 3.5 - 5.3 mmol/L    POCT Chloride, Mixed 97 (L) 98 - 107 mmol/L    POCT Ionized Calcium, Mixed 1.00 (L) 1.10 - 1.33 mmol/L    POCT Glucose, Mixed 96 74 - 99 mg/dL    POCT Lactate, Mixed 0.9 0.4 - 2.0 mmol/L    POCT Base Excess, Mixed 2.8 -2.0 - 3.0 mmol/L    POCT HCO3 Calculated, Mixed 27.2 (H) 22.0 - 26.0 mmol/L    POCT Hemoglobin, Mixed 9.4 (L) 12.0 - 16.0 g/dL    POCT Anion Gap, Mixed 7 (L) 10 - 25 mmo/L    Patient Temperature 37.0 degrees Celsius    FiO2 21 %   POCT GLUCOSE   Result Value Ref Range    POCT Glucose 94 74 - 99 mg/dL   CBC and Auto Differential   Result Value Ref Range    WBC 15.7 (H) 4.4 - 11.3 x10*3/uL    nRBC 0.1 (H) 0.0 - 0.0 /100 WBCs    RBC 3.48 (L) 4.00 - 5.20 x10*6/uL    Hemoglobin 9.5 (L) 12.0 - 16.0 g/dL    Hematocrit 28.4 (L) 36.0 - 46.0 %    MCV 82 80 - 100 fL    MCH 27.3 26.0 - 34.0 pg    MCHC 33.5 32.0 - 36.0 g/dL    RDW 16.2 (H) 11.5 - 14.5 %    Platelets 99 (L) 150 - 450 x10*3/uL    Neutrophils % 75.6 40.0 - 80.0 %    Immature Granulocytes %, Automated 1.1 (H) 0.0 - 0.9 %    Lymphocytes % 14.9 13.0 - 44.0 %    Monocytes % 8.1 2.0 - 10.0 %    Eosinophils % 0.2 0.0 - 6.0 %    Basophils % 0.1 0.0 - 2.0 %    Neutrophils Absolute 11.87 (H) 1.20 - 7.70 x10*3/uL    Immature Granulocytes Absolute, Automated 0.18 0.00 - 0.70 x10*3/uL    Lymphocytes Absolute 2.34 1.20 - 4.80 x10*3/uL    Monocytes Absolute 1.27 (H) 0.10 - 1.00 x10*3/uL    Eosinophils Absolute 0.03 0.00 - 0.70 x10*3/uL    Basophils Absolute 0.01 0.00 - 0.10 x10*3/uL   Magnesium   Result Value Ref Range    Magnesium 2.06 1.60 - 2.40 mg/dL   Hepatic Function Panel   Result Value Ref Range    Albumin 2.5 (L) 3.4 - 5.0 g/dL    Bilirubin, Total 2.9 (H) 0.0 - 1.2 mg/dL    Bilirubin, Direct 1.7 (H) 0.0 - 0.3 mg/dL    Alkaline Phosphatase 62 33 - 110 U/L     (H) 7 - 45 U/L     (H) 9 - 39 U/L    Total Protein 5.1 (L) 6.4 - 8.2 g/dL   Vancomycin   Result Value Ref Range     Vancomycin 14.1 5.0 - 20.0 ug/mL   Creatine Kinase   Result Value Ref Range    Creatine Kinase 14,677 (H) 0 - 215 U/L   Phosphorus   Result Value Ref Range    Phosphorus 3.9 2.5 - 4.9 mg/dL   Basic Metabolic Panel   Result Value Ref Range    Glucose 111 (H) 74 - 99 mg/dL    Sodium 130 (L) 136 - 145 mmol/L    Potassium 3.6 3.5 - 5.3 mmol/L    Chloride 94 (L) 98 - 107 mmol/L    Bicarbonate 26 21 - 32 mmol/L    Anion Gap 14 10 - 20 mmol/L    Urea Nitrogen 22 6 - 23 mg/dL    Creatinine 1.41 (H) 0.50 - 1.05 mg/dL    eGFR 47 (L) >60 mL/min/1.73m*2    Calcium 7.6 (L) 8.6 - 10.6 mg/dL   Lactate   Result Value Ref Range    Lactate 0.8 0.4 - 2.0 mmol/L   POCT GLUCOSE   Result Value Ref Range    POCT Glucose 109 (H) 74 - 99 mg/dL   BLOOD GAS MIXED VENOUS FULL PANEL   Result Value Ref Range    POCT pH, Mixed 7.45 (H) 7.33 - 7.43 pH    POCT pCO2, Mixed 44 41 - 51 mm Hg    POCT pO2, Mixed 38 35 - 45 mm Hg    POCT SO2, Mixed 60 45 - 75 %    POCT Oxy Hemoglobin, Mixed 58.1 45.0 - 75.0 %    POCT Hematocrit Calculated, Mixed 30.0 (L) 36.0 - 46.0 %    POCT Sodium, Mixed 127 (L) 136 - 145 mmol/L    POCT Potassium, Mixed 3.4 (L) 3.5 - 5.3 mmol/L    POCT Chloride, Mixed 94 (L) 98 - 107 mmol/L    POCT Ionized Calcium, Mixed 1.01 (L) 1.10 - 1.33 mmol/L    POCT Glucose, Mixed 100 (H) 74 - 99 mg/dL    POCT Lactate, Mixed 0.9 0.4 - 2.0 mmol/L    POCT Base Excess, Mixed 5.9 (H) -2.0 - 3.0 mmol/L    POCT HCO3 Calculated, Mixed 30.6 (H) 22.0 - 26.0 mmol/L    POCT Hemoglobin, Mixed 10.0 (L) 12.0 - 16.0 g/dL    POCT Anion Gap, Mixed 6 (L) 10 - 25 mmo/L    Patient Temperature 37.0 degrees Celsius    FiO2 21 %   POCT GLUCOSE   Result Value Ref Range    POCT Glucose 94 74 - 99 mg/dL   Lactate   Result Value Ref Range    Lactate 1.7 0.4 - 2.0 mmol/L   Coagulation Screen   Result Value Ref Range    Protime 20.7 (H) 9.8 - 12.8 seconds    INR 1.8 (H) 0.9 - 1.1    aPTT 189 (HH) 27 - 38 seconds   Fibrinogen   Result Value Ref Range    Fibrinogen 339 200  - 400 mg/dL   D-dimer, Non VTE   Result Value Ref Range    D-Dimer Non VTE, Quant (ng/mL FEU) 1,408 (H) <=500 ng/mL FEU   POCT GLUCOSE   Result Value Ref Range    POCT Glucose 127 (H) 74 - 99 mg/dL   BLOOD GAS MIXED VENOUS FULL PANEL   Result Value Ref Range    POCT pH, Mixed 7.43 7.33 - 7.43 pH    POCT pCO2, Mixed 42 41 - 51 mm Hg    POCT pO2, Mixed 42 35 - 45 mm Hg    POCT SO2, Mixed 68 45 - 75 %    POCT Oxy Hemoglobin, Mixed 65.8 45.0 - 75.0 %    POCT Hematocrit Calculated, Mixed 28.0 (L) 36.0 - 46.0 %    POCT Sodium, Mixed 128 (L) 136 - 145 mmol/L    POCT Potassium, Mixed 3.3 (L) 3.5 - 5.3 mmol/L    POCT Chloride, Mixed 95 (L) 98 - 107 mmol/L    POCT Ionized Calcium, Mixed 1.05 (L) 1.10 - 1.33 mmol/L    POCT Glucose, Mixed 90 74 - 99 mg/dL    POCT Lactate, Mixed 0.9 0.4 - 2.0 mmol/L    POCT Base Excess, Mixed 3.2 (H) -2.0 - 3.0 mmol/L    POCT HCO3 Calculated, Mixed 27.9 (H) 22.0 - 26.0 mmol/L    POCT Hemoglobin, Mixed 9.2 (L) 12.0 - 16.0 g/dL    POCT Anion Gap, Mixed 8 (L) 10 - 25 mmo/L    Patient Temperature 37.0 degrees Celsius    FiO2 21 %     Vascular US upper extremity venous duplex left    Result Date: 6/24/2024  Preliminary Cardiology Report           [place] [address]   Tel [phone] and Fax [phone]       Preliminary Vascular Lab Report  VASC US UPPER EXTREMITY VENOUS DUPLEX LEFT  Patient Name:      MANUEL GOULD Bree Physician:  [id number] Ugo Moreno MD Study Date:        6/24/2024     Ordering Physician: [id number] OMER SCHILLING MRN/PID:           [id number]      Technologist:       Catalina Lantigua T Accession#:        [id number]  Technologist 2: Date of Birth/Age: 1980    Encounter#:         [id number] Gender:            F Admission Status:  Inpatient     Location Performed: [facility]  Diagnosis/ICD: Left arm swelling-M79.89 Procedure/CPT: 02415 Peripheral venous duplex scan for DVT Limited  PRELIMINARY CONCLUSIONS: Right Upper  Venous: The subclavian vein demonstrates a normal spontaneous and phasic flow. Left Upper Venous: No evidence of acute deep vein thrombus visualized in the left upper extremity. Non-vascularized mass in left upper arm measuring 46.27 mm x 64.90 mm. Additional Findings; Non-vascularized mass.  Imaging & Doppler Findings:  Right             Thrombus   Flow Subclavian Distal   None   Pulsatile  Left                Compress Thrombus   Flow Internal Jugular      Yes      None   Pulsatile Subclavian            Yes      None Subclavian Proximal   Yes      None   Pulsatile Subclavian Mid        Yes      None Subclavian Distal     Yes      None   Pulsatile Axillary              Yes      None   Pulsatile Brachial              Yes      None Cephalic              Yes      None Basilic               Yes      None VASCULAR PRELIMINARY REPORT completed by Catalina Lantigua [email or web address] on 6/24/2024 at 9:58:27 AM  ** Final **     Lower extremity arterial duplex bilateral    Result Date: 6/23/2024  Interpreted By:  Kun Marshall,  and Mindi Padilla STUDY: [facility] LOWER EXTREMITY ARTERIAL DUPLEX BILATERAL; ;  6/23/2024 10:24 am   INDICATION: Signs/Symptoms:loss of dopplerable pulses.   COMPARISON: None.   ACCESSION NUMBER(S): [id number]   ORDERING CLINICIAN: OMER SCHILLING   TECHNIQUE: Grayscale and color Doppler images of the arteries of the bilateral lower extremities were obtained for evaluation.   FINDINGS: Bilateral common femoral, superficial femoral, popliteal, posterior tibial, peroneal arteries were evaluated.     The right common femoral artery, deep femoral artery, proximal and mid superficial femoral arteries demonstrate normal color filling but decreased flow velocities and biphasic waveforms on Doppler interrogation compared to the left side.   There is echogenic material within the distal right superficial femoral artery as well as the popliteal artery. Doppler interrogation demonstrates a decreased flow within the distal  superficial femoral artery with the slightly biphasic waveforms and no significant flow within the popliteal artery. The right posterior tibial and peroneal arteries demonstrate color filling but with markedly decreased flow velocities and monophasic waveforms.   The left common femoral, deep femoral, superficial femoral and popliteal arteries demonstrate normal color filling with normal peak velocities and triphasic waveforms. The left peroneal and posterior tibial arteries demonstrate normal color filling with biphasic waveforms.   Few prominent inguinal lymph nodes are visualized.       1. Echogenic material within the right distal femoral and popliteal arteries with  absent Doppler flow in the right popliteal artery significantly diminished flow within the right distal superficial femoral artery. Findings raise concern for thrombosis of the distal SFA and popliteal artery. Decreased flow within the right posterior tibial and peroneal arteries could be through collateralization. 2. Decreased flow within the right common femoral artery, right deep femoral artery as well as the proximal and mid superficial femoral arteries, raising concern for stenosis proximal to the right common femoral artery, possibly within the right external iliac right common iliac artery. A CTA of the lower extremities can be obtained for further evaluation. 3. Unremarkable Doppler interrogation of the left lower extremity arteries.     I personally reviewed the images/study and I agree with the findings as stated. This study was interpreted at [place], [place].   MACRO: None   Signed by: Kun Marshall 6/23/2024 12:18 PM Dictation workstation:   [id number]    Lower extremity venous duplex bilateral    Result Date: 6/23/2024  Interpreted By:  Kun Marshall,  and Mindi Padilla STUDY: [facility] LOWER EXTREMITY VENOUS DUPLEX BILATERAL;  6/23/2024 10:24 am   INDICATION:  Signs/Symptoms:lower extremity swelling and concern clot was seen on US as femoral Haily placed, doac nonadherene.   COMPARISON: None.   ACCESSION NUMBER(S): [id number]   ORDERING CLINICIAN: OMER SCHILLING   TECHNIQUE: Vascular ultrasound of the bilateral lower extremities was performed. Real-time compression views as well as Gray scale, color Doppler and spectral Doppler waveform analysis was performed.   FINDINGS: Evaluation of the visualized portions of the bilateral common femoral vein, proximal, mid, and distal femoral vein, and popliteal vein were performed.  Evaluation of the visualized portions of the  posterior tibial and peroneal veins were also performed.   There is partial compressibility of the right external iliac vein and the right common femoral vein, however with normal flow seen on Doppler images.   The remainder of the evaluated veins demonstrate normal compressibility. There is intact venous flow demonstrating normal respiratory variability and normal augmentation of flow with calf compression. Therefore, there is no ultrasonographic evidence for deep vein thrombosis within the evaluated veins.   Respiratory variation and augmentation to calf pressure was noted.       1. Partial compressibility of the right external iliac vein and the right common femoral vein, which raises concern for early nonocclusive deep venous thrombosis, although flow is seen on Doppler images. 2. Otherwise, no sonographic evidence for deep vein thrombosis within the remainder of the evaluated veins of the bilateral lower extremity.   I personally reviewed the images/study and I agree with the findings as stated. This study was interpreted at [place], [place].   MACRO: None   Signed by: Kun Marshall 6/23/2024 11:25 AM Dictation workstation:   [id number]    XR chest 1 view    Result Date: 6/23/2024  Interpreted By:  Perfecto Cope and Dervishi Mario STUDY: XR CHEST 1  VIEW;  6/23/2024 9:33 am   INDICATION: Signs/Symptoms:check PA catheter placement.   COMPARISON: Chest radiograph: 06/22/2024. CT chest 06/20/2024   ACCESSION NUMBER(S): [id number]   ORDERING CLINICIAN: OMER SCHILLING   FINDINGS: AP radiograph of the chest was provided. Patient is slightly rotated to the right. A right IJ [place]-Abe catheter is noted with the tip projecting over the distal right main pulmonary artery, similar to prior.   CARDIOMEDIASTINAL SILHOUETTE: Cardio-pericardial silhouette remains markedly enlarged, similar compared to prior imaging.   LUNGS: There are low lung volumes with associated bronchovascular crowding and no tiarra pulmonary edema. Bilateral subsegmental atelectasis are again noted. No focal consolidations, sizable pleural effusions or pneumothorax.   ABDOMEN: No remarkable upper abdominal findings.   BONES: No acute osseous changes.       1. Right IJ approach [place]-Abe catheter tip overlies distal right pulmonary artery, similar to prior. 2. Stable enlargement of cardiomediastinal silhouette with no tiarra pulmonary edema on present study.   I personally reviewed the images/study and I agree with the findings as stated by Resident Wellington Mesa MD.   MACRO: NONE.   Signed by: Perfecto Cope 6/23/2024 10:48 AM Dictation workstation:   [id number]    Transthoracic Echo (TTE) Complete    Result Date: 6/22/2024   [facility] (formerly [facility]3], [address] [place] [address]                Tel [phone] and Fax [phone] TRANSTHORACIC ECHOCARDIOGRAM REPORT  Patient Name:      MANUEL Giron Physician:    [id number] Rao MD Study Date:        6/22/2024            Ordering Provider:    [id number] OMER SCHILLING MRN/PID:           [id number]             Fellow: Accession#:        [id number]         Nurse: Date of Birth/Age: 1980 / 43       Sonographer:          Juanita Carlos RCS                    years Gender:            F                    Additional Staff: Height:            170.00 cm            Admit Date:           6/20/2024 Weight:            102.06 kg            Admission Status:     Inpatient -                                                               Routine BSA / BMI:         2.12 m2 / 35.31      Encounter#:           [id number]                    kg/m2 Blood Pressure:    85/59 mmHg           Department Location:  [facility] Study Type:    TRANSTHORACIC ECHO (TTE) COMPLETE Diagnosis/ICD: Cardiogenic shock-R57.0; Acute systolic (congestive) heart                failure (CHF)-I50.21 Indication:    Congestive Heart Failure; Cardiogenic Shock CPT Code:      Echo Complete w Full Doppler-[id number] Patient History: Valve Disorders:   Mitral Regurgitation and Tricuspid Regurgitation. Pertinent History: HTN, CHF and A-Fib. HFrEF, Cardiogenic shock, ETOH abuse, Hx                    of stroke. Study Detail: The following Echo studies were performed: 2D, M-Mode, Doppler and               color flow. Technically challenging study due to prominent lung               artifact. Definity used as a contrast agent for endocardial border               definition. Total contrast used for this procedure was 3.0 mL via               IV push. Unable to obtain suprasternal notch view.  PHYSICIAN INTERPRETATION: Left Ventricle: Left ventricular ejection fraction is severely decreased, by visual estimate at 20-25%. There is global hypokinesis of the left ventricle with minor regional variations. The left ventricular cavity size is normal. Left ventricular diastolic filling was indeterminate. Left Atrium: The left atrium is severely dilated. Right Ventricle: The right ventricle is mildly enlarged. There is mildly reduced right ventricular systolic function. A device is visualized in the right ventricle. Right Atrium: The right atrium is mildly dilated. Aortic  Valve: The aortic valve is trileaflet. The aortic valve dimensionless index is 0.77. There is no evidence of aortic valve regurgitation. The peak instantaneous gradient of the aortic valve is 12.7 mmHg. The mean gradient of the aortic valve is 8.0 mmHg. Mitral Valve: The mitral valve is normal in structure. There is trace mitral valve regurgitation. Tricuspid Valve: The tricuspid valve is structurally normal. There is moderate to severe tricuspid regurgitation. The Doppler estimated RVSP is moderately elevated at 54.9 mmHg. Pulmonic Valve: The pulmonic valve is structurally normal. There is mild pulmonic valve regurgitation. Pericardium: There is a small pericardial effusion. Aorta: The aortic root is normal. The Ao Sinus is 2.70 cm. The Asc Ao is 3.30 cm. Systemic Veins: The inferior vena cava appears dilated. There is less than 50% IVC collapse with inspiration. In comparison to the previous echocardiogram(s): Compared with study dated 3/9/2024,. TR has worsened.  CONCLUSIONS:  1. Left ventricular ejection fraction is severely decreased, by visual estimate at 20-25%.  2. There is global hypokinesis of the left ventricle with minor regional variations.  3. Left ventricular diastolic filling was indeterminate.  4. Mildly enlarged right ventricle.  5. There is mildly reduced right ventricular systolic function.  6. The left atrium is severely dilated.  7. Moderate to severe tricuspid regurgitation visualized.  8. Moderately elevated right ventricular systolic pressure.  9. TR has worsened. QUANTITATIVE DATA SUMMARY: 2D MEASUREMENTS:                          Normal Ranges: Ao Root d:     2.70 cm   (2.0-3.7cm) LAs:           4.70 cm   (2.7-4.0cm) IVSd:          0.80 cm   (0.6-1.1cm) LVPWd:         0.90 cm   (0.6-1.1cm) LVIDd:         5.60 cm   (3.9-5.9cm) LVIDs:         5.00 cm LV Mass Index: 83.9 g/m2 LV % FS        10.7 % LA VOLUME:                               Normal Ranges: LA Vol A4C:        89.6 ml     (22+/-6mL/m2) LA Vol A2C:        79.1 ml LA Vol BP:         86.1 ml LA Vol Index A4C:  42.2ml/m2 LA Vol Index A2C:  37.3 ml/m2 LA Vol Index BP:   40.5 ml/m2 LA Area A4C:       25.6 cm2 LA Area A2C:       24.6 cm2 LA Major Axis A4C: 6.2 cm LA Major Axis A2C: 6.5 cm LA Volume Index:   40.5 ml/m2 LA Vol A4C:        82.5 ml LA Vol A2C:        71.9 ml RA VOLUME BY A/L METHOD:                               Normal Ranges: RA Vol A4C:        72.7 ml    (8.3-19.5ml) RA Vol Index A4C:  34.2 ml/m2 RA Area A4C:       22.4 cm2 RA Major Axis A4C: 5.9 cm AORTA MEASUREMENTS:                      Normal Ranges: Ao Sinus, d: 2.70 cm (2.1-3.5cm) Asc Ao, d:   3.30 cm (2.1-3.4cm) LV SYSTOLIC FUNCTION BY 2D PLANIMETRY (MOD):                      Normal Ranges: EF-A4C View:    20 % (>=55%) EF-A2C View:    21 % EF-Biplane:     21 % EF-Visual:      23 % LV EF Reported: 23 % LV DIASTOLIC FUNCTION:                         Normal Ranges: MV Peak E:    1.51 m/s  (0.7-1.2 m/s) MV Peak A:    0.41 m/s  (0.42-0.7 m/s) E/A Ratio:    3.66      (1.0-2.2) MV e'         0.088 m/s (>8.0) MV lateral e' 0.10 m/s MV medial e'  0.07 m/s E/e' Ratio:   17.17     (<8.0) MITRAL VALVE:                       Normal Ranges: MV Vmax:    1.76 m/s  (<=1.3m/s) MV peak P.4 mmHg (<5mmHg) MV mean P.0 mmHg  (<2mmHg) MV DT:      123 msec  (150-240msec) AORTIC VALVE:                                    Normal Ranges: AoV Vmax:                1.78 m/s  (<=1.7m/s) AoV Peak P.7 mmHg (<20mmHg) AoV Mean P.0 mmHg  (1.7-11.5mmHg) LVOT Max Jim:            1.39 m/s  (<=1.1m/s) AoV VTI:                 29.80 cm  (18-25cm) LVOT VTI:                23.00 cm LVOT Diameter:           1.90 cm   (1.8-2.4cm) AoV Area, VTI:           2.19 cm2  (2.5-5.5cm2) AoV Area,Vmax:           2.21 cm2  (2.5-4.5cm2) AoV Dimensionless Index: 0.77  RIGHT VENTRICLE: RV Basal 4.70 cm RV Mid   4.50 cm RV Major 6.8 cm TAPSE:   20.7 mm RV s'    0.11 m/s TRICUSPID  VALVE/RVSP:                             Normal Ranges: Peak TR Velocity: 3.16 m/s Est. RA Pressure: 15 mmHg RV Syst Pressure: 54.9 mmHg (< 30mmHg) IVC Diam:         3.31 cm PULMONIC VALVE:                          Normal Ranges: PV Accel Time: 53 msec   (>120ms) PV Max Jim:    1.4 m/s   (0.6-0.9m/s) PV Max P.0 mmHg PV Mean P.0 mmHg PV VTI:        21.50 cm CA Vmax:       1.54 m/s PIEDV:         1.54 m/s PADP:          24.5 mmHg  [id number] Alex Rao MD Electronically signed on 2024 at 2:26:43 PM  ** Final **     XR chest 1 view    Result Date: 2024  Interpreted By:  Perfecto Cope and Meyers Emily STUDY: XR CHEST 1 VIEW;  2024 4:22 am   INDICATION: Signs/Symptoms:swan placement.   COMPARISON: Chest radiograph 2024 and chest CT 2024   ACCESSION NUMBER(S): [id number]   ORDERING CLINICIAN: OMER SCHILLING   FINDINGS: AP radiograph of the chest was provided. Right IJ approach Hood-Abe catheter with its tip overlying the expected location of the distal right main pulmonary artery, similar to prior..   CARDIOMEDIASTINAL SILHOUETTE: Cardiomediastinal silhouette is persistently enlarged, stable in size and configuration.   LUNGS: Low lung volumes with bronchovascular crowding with suggestion of mild interstitial edema, similar to prior. Bilateral subsegmental atelectasis. Otherwise, no focal consolidation, pleural effusion, or pneumothorax.   ABDOMEN: No remarkable upper abdominal findings.   BONES: No acute osseous changes.       1. Right IJ approach Hood-Abe catheter with its tip overlying the expected location of the distal right main pulmonary artery, similar to prior. 2. Suggestion of mild interstitial pulmonary edema, similar to prior. 3. Bilateral subsegmental atelectasis.   I personally reviewed the images/study, and I agree with the findings as stated above. This study was interpreted at [facility], [place], [place].   MACRO: None   Signed by:  Perfecto Cope 6/22/2024 12:12 PM Dictation workstation:   [id number]    XR abdomen 1 view    Result Date: 6/22/2024  Interpreted By:  Perfecto Cope and Meyers Emily STUDY: XR ABDOMEN 1 VIEW;  6/22/2024 2:26 am   INDICATION: Signs/Symptoms:abdominal pain.   COMPARISON: CT abdomen pelvis 03/20/2024, abdominal radiograph 04/10/2022   ACCESSION NUMBER(S): [id number]   ORDERING CLINICIAN: OMER SCHILLING   FINDINGS: 2 AP radiographs of the abdomen available for interpretation.   Nonobstructive bowel gas pattern. Limited evaluation of pneumoperitoneum on supine imaging, however no gross evidence of free air is noted.   Visualized lungs are clear. Similar asymmetric right hemidiaphragm elevation.   Osseous structures demonstrate no acute bony changes.       1.  Nonobstructive bowel gas pattern.   I personally reviewed the images/study, and I agree with the findings as stated above. This study was interpreted at [place], [place].   MACRO: None   Signed by: Perfecto Cope 6/22/2024 12:11 PM Dictation workstation:   [id number]    ECG 12 lead    Result Date: 6/22/2024  Unusual P axis, possible ectopic atrial tachycardia with occasional Premature ventricular complexes Nonspecific T wave abnormality Abnormal ECG When compared with ECG of 20-JUN-2024 23:42, Ectopic atrial rhythm has replaced Atrial fibrillation    Electrocardiogram, 12-lead PRN ACS symptoms    Result Date: 6/21/2024  Atrial fibrillation with rapid ventricular response with premature ventricular or aberrantly conducted complexes T wave abnormality, consider inferolateral ischemia Abnormal ECG When compared with ECG of 20-JUN-2024 10:05, Non-specific change in ST segment in Inferior leads    XR chest 1 view    Result Date: 6/21/2024  Interpreted By:  Zheng Damon and Meyers Emily STUDY: XR CHEST 1 VIEW;  6/21/2024 7:13 am   INDICATION: Signs/Symptoms:swan placement.   COMPARISON: Chest radiograph 06/20/2024   ACCESSION  NUMBER(S): [id number]   ORDERING CLINICIAN: MINNA MICHAEL   FINDINGS: AP radiograph of the chest was provided.   Interval placement of a right IJ approach Lyles-Abe catheter with its tip overlying the expected location of the right interlobar pulmonary artery.   CARDIOMEDIASTINAL SILHOUETTE: Cardiomediastinal silhouette is enlarged, though stable in size and configuration.   LUNGS: Bilateral subsegmental atelectasis. Otherwise, no focal consolidation, pleural effusion, or pneumothorax.   ABDOMEN: No remarkable upper abdominal findings.   BONES: No acute osseous changes.       1. Bilateral subsegmental atelectasis. 2. Right IJ approach Lyles-Abe catheter with its tip overlying the expected location of the right interlobar pulmonary artery.   I personally reviewed the images/study, and I agree with the findings as stated above. This study was interpreted at [place], [place].   MACRO: None   Signed by: Zheng Damon 6/21/2024 8:04 AM Dictation workstation:   [id number]    US right upper quadrant    Result Date: 6/21/2024  Interpreted By:  Manuel Miller and Ebai Jerky STUDY: US RIGHT UPPER QUADRANT;  6/21/2024 12:22 am   INDICATION: Signs/Symptoms:elevated LFTs.   COMPARISON: Right upper quadrant ultrasound 08/08/2022.   ACCESSION NUMBER(S): [id number]   ORDERING CLINICIAN: MINNA MICHAEL   TECHNIQUE: Multiple images of the right upper quadrant were obtained.   FINDINGS: LIVER: The liver measures 17.99 and is diffusely echogenic and coarse appearance, consistent with diffuse fatty infiltration. The resulting increased beam attenuation thereby limiting evaluation of the liver for focal lesions. There is a 1.1 x 0.8 x 0.8 cm anechoic mass within the periphery of the left hepatic lobe demonstrating posterior acoustic enhancement and no internal vascularity on Doppler interrogation, consistent with a cyst seen on CT abdomen pelvis from 03/08/2024. No additional  liver lesions are seen..   GALLBLADDER: The gallbladder is decompressed, and demonstrates no evidence of gallstones,  or surrounding fluid. The gallbladder wall is thickened measuring 0.55 cm. Sonographic James's sign is negative.   BILE DUCTS: No evidence of intra or extrahepatic biliary dilatation is identified; the common bile duct measures .43 cm.   PANCREAS: The visualized pancreas is unremarkable in appearance.   RIGHT KIDNEY: The right kidney measures 12.13 cm in length. The renal cortical echogenicity and thickness are within normal limit.  No hydronephrosis or renal calculi are seen.   There is perihepatic ascites. Newly enlarged intrahepatic hepatic veins compared to 08/08/2022. Stable from same day CT chest for PE.       1. Diffuse coarse and echogenic appearance of the liver, correlate with liver disease. 2. Decompressed gallbladder with wall thickening, correlate with increased fluid status. 3. Interval increase in size of the intrahepatic veins compared to 08/08/2022, recommend correlation with concern for right heart dysfunction. 4. Perihepatic ascites.   I personally reviewed the images/study and I agree with the findings as stated by Resident Tam Oliver. This study was interpreted at [facility], [place], [place].   MACRO: None   Signed by: Manuel Miller 6/21/2024 5:57 AM Dictation workstation:   [id number]    ECG 12 lead    Result Date: 6/20/2024  Atrial fibrillation with rapid ventricular response Cannot rule out Anterior infarct , age undetermined Abnormal ECG When compared with ECG of 09-MAR-2024 17:34, Vent. rate has increased BY  96 BPM Nonspecific T wave abnormality has replaced inverted T waves in Inferior leads See ED provider note for full interpretation and clinical correlation Confirmed by Princess Burt ([id number]) on 6/20/2024 5:39:18 PM    Cardiac Catheterization Procedure    Result Date: 6/20/2024   [facility], Cath Lab, [address]  [place] [address] Cardiovascular Catheterization Report Patient Name:      MANUEL GOULD         Performing Physician:  [id number] Osman Su MD Study Date:        6/20/2024             Verifying Physician:   [id number] Su MD MRN/PID:           [id number]              Cardiologist/Co-Scrub: Accession#:        [id number]          Ordering Provider:     [id number] MINNA YU Date of Birth/Age: 1980 / 43 years Cardiologist: Gender:            F                     Fellow:                [id number] Diego Butts MD Admit Date:                              Fellow:                [id number] Ernesto Gongora MD Encounter#:        [id number]            Surgeon:  Study:            Right Heart Cath Additional Study: Arterial Line Insertion  Indications: Heart failure.  Appropriate Use Criteria: Known or suspected cardiomyopathy with or without heart failure; AUC score = 7.  Procedure Description: After infiltration with 2% Lidocaine, the right femoral artery was cannulated with a modified Seldinger technique. Subsequently a 4 Irish sheath was placed retrograde in the right femoral artery. After infiltration of local anesthetic, the right internal jugular vein was identified with two-dimensional ultrasound. Under direct ultrasound visualization, the right internal jugular vein was cannulated with a micropuncture technique. A 9 Irish sheath was placed in the vein. A balloon tipped catheter was advanced through the right heart to record pressures and balloon tipped catheter was positioned in the right heart system to record pressures and remained in the patient when transferred from the lab.  Cardiac output was calculated via the Dulce method. After completion of the procedure, the arterial sheath was left intact and sutured in place.  Right Heart Catheterization: A balloon tipped catheter was advanced through the right heart to record pressures and balloon tipped catheter was positioned in the right heart system to record pressures and remained in the patient when transferred from the lab. Cardiac output was calculated via the Dulce method. Elevated left sided filling pressures with low cardiac output. Cardiac output is severely decreased.  Hemo Personnel: +--------------------+---------+ Name                Duty      +--------------------+---------+ Osman Su MD 1 +--------------------+---------+  Hemodynamic Pressures:  +----+----------+---------+-------------+-------------+---+----+-------+-------+ SiteDate Time   Phase  Systolic mmHg  Diastolic  ED MeanA-Wave V-Wave                  Name                    mmHg     mmHmmHg mmHg   mmHg                                                     g                    +----+----------+---------+-------------+-------------+---+----+-------+-------+   RA 6/20/2024     Rest                               19     26     28     3:34:40 PM                                                         +----+----------+---------+-------------+-------------+---+----+-------+-------+   RV 6/20/2024     Rest           43            4 12                       3:35:14 PM                                                         +----+----------+---------+-------------+-------------+---+----+-------+-------+   RV 6/20/2024     Rest           46            5 10                       3:36:23 PM                                                         +----+----------+---------+-------------+-------------+---+----+-------+-------+   PW 6/20/2024     Rest                                18     19     25     3:37:42 PM                                                         +----+----------+---------+-------------+-------------+---+----+-------+-------+   PA 6/20/2024     Rest           42           18     30                   3:41:25 PM                                                         +----+----------+---------+-------------+-------------+---+----+-------+-------+  Oxygen Saturation %: +-----------+----------+------------+ Sample SiteO2 Sat (%)HB (g/100ml) +-----------+----------+------------+          FA        98        12.3 +-----------+----------+------------+          PA        48        12.3 +-----------+----------+------------+          FA        98        12.3 +-----------+----------+------------+          PA        48        12.3 +-----------+----------+------------+  Cardiac Outputs: +---------------+------------------+-------+ MITCHEL CO (l/min)MITCHEL CI (l/min/m2)MITCHEL SV +---------------+------------------+-------+             3.2               1.6   33.8 +---------------+------------------+-------+  Complications: No in-lab complications observed.  Cardiac Cath Post Procedure Notes: Post Procedure Diagnosis: CMY. Blood Loss:               Estimated blood loss during the procedure was < 10                           mls. Specimens Removed:        Number of specimen(s) removed: none.  Recommendations: Maximize medical therapy. Further recommendations per primary team. ____________________________________________________________________________________ CONCLUSIONS:  1. Elevated right and left heart filling pressures with low CO/CI.  2. Successful 4 Macedonian right femoral arterial line placement. ICD 10 Codes: Cardiogenic shock-R57.0  CPT Codes: Insert arterial Cath-48540; Right Heart Cath O2/Cardiac output without biopsy (RHC)-56316  [id number] Osman Su MD Performing Physician Electronically signed by [id number] Osman  Sharlene [facility] on 6/20/2024 at 4:08:38 PM  ** Final **     Electrocardiogram, 12-lead PRN ACS symptoms    Result Date: 6/20/2024  Atrial fibrillation with rapid ventricular response with premature ventricular or aberrantly conducted complexes Cannot rule out Anterior infarct (cited on or before 20-JUN-2024) T wave abnormality, consider inferior ischemia Abnormal ECG When compared with ECG of 20-JUN-2024 09:06, Atrial fibrillation has replaced Sinus rhythm Inverted T waves have replaced nonspecific T wave abnormality in Inferior leads    CT angio chest for pulmonary embolism    Result Date: 6/20/2024  Interpreted By:  Sandeep Hanks  and Ishmael Daniel STUDY: CT ANGIO CHEST FOR PULMONARY EMBOLISM;  6/20/2024 9:49 am   INDICATION: Signs/Symptoms:c/f PE, unstable.   COMPARISON: CT chest abdomen pelvis 03/26/2022, CT abdomen pelvis 03/08/2024   ACCESSION NUMBER(S): [id number]   ORDERING CLINICIAN: JOSEFINA CEVALLOS   TECHNIQUE: Helical data acquisition of the chest was obtained after intravenous administration of 90 ML Omnipaque 350, as per PE protocol. Images were reformatted in coronal and sagittal planes. Axial and coronal maximum intensity projection (MIP) images were created and reviewed.   FINDINGS: POTENTIAL LIMITATIONS OF THE STUDY: None   HEART AND VESSELS: There are no discrete filling defects within main pulmonary artery and its branches to suggest acute pulmonary embolism. Main pulmonary artery and its branches are normal in caliber.   The thoracic aorta normal in course and caliber. No coronary artery calcifications are seen. Please note, the study is not optimized for evaluation of coronary arteries.   Redemonstrated severe cardiomegaly with biatrial enlargement and disproportion enlargement of the right atrium. There is nodular low-attenuation filling defects within the atrial appendage.   There is a small pericardial effusion present.   MEDIASTINUM AND GHISLAINE, LOWER NECK AND AXILLA: The visualized  thyroid gland is within normal limits. Similar increased soft tissue attenuation of the anterior mediastinum is noted. No evidence of thoracic lymphadenopathy by CT criteria. Esophagus appears within normal limits as seen.   LUNGS AND AIRWAYS: The trachea and central airways are patent. No endobronchial lesion is seen.   Bibasilar atelectasis/scarring, right-greater-than-left. Scattered areas of smooth interlobular septal thickening and patchy ground-glass opacities are noted bilaterally, most pronounced in the right upper lobe. No focal consolidation, pleural effusion, or pneumothorax.   No suspicious pulmonary nodule.   UPPER ABDOMEN: Reflux of contrast is again noted into the intrahepatic inferior vena cava and hepatic veins. Small volume perihepatic ascites is noted. Otherwise, the subdiaphragmatic structures are unremarkable.   CHEST WALL AND OSSEOUS STRUCTURES: Chest wall is within normal limits. No acute osseous pathology.There are no suspicious osseous lesions.       1. No evidence of acute pulmonary embolism. 2. Redemonstrated cardiomegaly with findings suggestive of right heart dysfunction including right atrial dilatation and reflux of contrast into the intrahepatic IVC and hepatic veins. 3. There are indeterminate low-attenuation nodular filling defects within the atrial appendage. While this may represent contrast under filling, a right atrial appendage thrombus can not be excluded. 4. Consider correlation with transesophageal echocardiogram or cardiac MR. 5. Mild pulmonary edema. 6. Small volume ascites noted within the partially visualized upper abdomen.   I personally reviewed the images/study and I agree with the findings as stated by María Quiñones MD. This study was interpreted at [facility], [place], [place].   MACRO: None   Signed by: Sandeep Hanks 6/20/2024 11:02 AM Dictation workstation:   [id number]    XR chest 1 view    Result Date:  6/20/2024  Interpreted By:  Colton Arellano and Meyers Emily STUDY: XR CHEST 1 VIEW;  6/20/2024 7:10 am   INDICATION: Signs/Symptoms:dyspnea.   COMPARISON: Chest radiograph 03/08/2024   ACCESSION NUMBER(S): [id number]   ORDERING CLINICIAN: TEE ROSADO   FINDINGS: AP radiograph of the chest was provided.   CARDIOMEDIASTINAL SILHOUETTE: Cardiomediastinal silhouette is persistently enlarged, though stable in size and configuration.   LUNGS: Subsegmental atelectasis within the right middle lower lung zones, similar prior. Otherwise, no focal consolidation, pleural effusion, or pneumothorax.   ABDOMEN: No remarkable upper abdominal findings.   BONES: No acute osseous changes.       1. Persistent cardiomegaly. 2. Subsegmental right mid and basilar atelectasis. Otherwise, no acute cardiopulmonary process.   I personally reviewed the images/study, and I agree with the findings as stated above. This study was interpreted at [place], [place].   MACRO: None   Signed by: Colton Arellano 6/20/2024 8:37 AM Dictation workstation:   [id number]         Assessment/Plan   Introduction to Palliative Care:  Spoke with pt at bedside. Patient seemed to appreciate the extra layer of support.   Palliative care was introduced as a service for patients with serious illness to help with symptoms, assist with goals of care conversations, navigate complex decision making, improve quality of life for patients, and provide support both patients and families.    Generalized pain:   pain related to B/L LE pain, concern for limb ischemia   Pending imaging   Pain is well-controlled with current regimen   -Tt has received Dilaudid 0.4 mg IV x 4 doses and oxycodone 5 mg PO x 1 dose in the past 24 hours (Total OME = 26.25)  -Recommend continuing Dilaudid 0.4 mg IV q4h PRN for pain management      Constipation:    Patient is at risk due to decreased motility  LBM 6/23/24  Monitor BM frequency  Senna-S 2 tabs  BID   Miralax 17g daily   Recommend having additional laxative PRN if no BM in 48h or to achieve soft stool without straining (i.e. Miralax, Lactulose, dulcolax AR, or enema)    Altered Mood:  Anxiety and/or depression r/t health concerns  -Reviewed mindfulness practices with pt with improved anxiety relief    Supportive Interventions:  Music therapy as needed  Spiritual care  Art therapy  Mindfulness practices reviewed with pt      Advance Care Planning:   No Advance Directives on file.   Surrogate decision maker is: pt's Clara moon Wayne: [phone], Efrain Wayne: [phone], Keli Osborne: [phone]  Code status: full. Not addressed at today's visit.  -Will arrange discussion with pt's NOK once imaging of B/L LE are completed; primary aware    Disposition:  Plan pending hospital course; ongoing evaluation for concern of RLE ischemia     Case discussed with primary team.    Thank you for allowing us to participate in the care of this patient. Palliative Team will continue to follow as needed.  Please contact team with any questions or concerns.    JAE Mendosa-CNP   Palliative Care (weekdays - pager [phone])    I spent 60 minutes in the care of this patient which included chart review, interviewing patient/family, discussion with primary team, coordination of care, and documentation.    Medical Decision Making was high level due to high complexity of problems, extensive data review, and high risk of management/treatment.

## 2024-06-24 NOTE — CONSULTS
"Nutrition Initial Assessment:   Nutrition Assessment    The patient is a 43 y.o. female who is hospital day #4.  Pt presented with dyspnea and leg swelling. Found to have elevated lactated, cold extremities, and edema. RHC c/w cardiogenic shock. Not a candidate for advanced therapies given documented medical non adherence.  Managing medically. Ethics consult for help with who is medical decision maker for pt. Course also c/b SARAH, cardiac thrombi, liver injury likely due to ischemia, and Afib with RVR. Found to have rhabdomyolysis with CK of 14,000.    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     Nutrition History:  Food and Nutrient History: Pt reports she has not been eating well. Apetite is low. Reports maybe eating 1 meal a day. When asked for how long she has been eating 1 meal a day she says 1 day. But later on states her appetite has been decreased for weeks and only eating 1 meal at home. Does not drink ONS. Likes pizza. Pt getting 3 meals a day throughout admission. Most meals ordered have been just liquids/pudding/gelatin. Only 3 meals out of 9 with solid foods including protein. PO intake recorded in flowsheet indicate pt eating 50-75% of what is ordered.      Anthropometrics:  Start of admission anthropometrics:  Height: 170.2 cm (5' 7\")  Weight: 88.5 kg (195 lb)  BMI (Calculated): 30.53    IBW/kg (Dietitian Calculated): 61.4 kg  Percent of IBW: 159 %       Wt Hx PTA    06/23/24 97.6 kg " (215 lb 2.7 oz)   03/09/24 83 kg (182 lb 15.7 oz)   12/15/23 92.8 kg (204 lb 9.4 oz)   09/21/23 100 kg (221 lb 5 oz)   06/29/23 92.4 kg (203 lb 12.8 oz)   01/12/23 95.3 kg (210 lb)   11/02/22 103 kg (226 lb 6.6 oz)   06/27/22 98 kg (216 lb)     Weight during admission   06/23/24  97.6 kg (215 lb 2.7 oz)  06/22/24  102 kg (225 lb 8.5 oz)  06/21/24  107 kg (234 lb 12.6 oz)  06/20/24  111 kg (244 lb 11.4 oz)  06/20/24  90.7 kg (200 lb)  06/20/24  88.5 kg     Weight Change %:  Weight History / % Weight Change: No evidence of wt loss PTA per EMR wt hx. Wt gain during this admission and variability PTA likely d/t fluid status 2/2 med non adherence. Pt unsure of UBW but reports no wt loss.    Nutrition Focused Physical Exam Findings:    Subcutaneous Fat Loss:   Orbital Fat Pads: Well nourished (slightly bulging fat pads)  Buccal Fat Pads: Well nourished (full, rounded cheeks)  Triceps: Well nourished (ample fat tissue)  Muscle Wasting:  Temporalis: Well nourished (well-defined muscle)  Pectoralis (Clavicular Region): Well nourished (clavicle not visible)  Deltoid/Trapezius: Well nourished (rounded appearance at arm, shoulder, neck)  Interosseous: Well nourished (muscle bulges)  Quadriceps: Well nourished (well developed, well rounded)  Gastrocnemius: Well nourished (well developed bulbous muscle)  Edema:  Edema: +2 mild  Edema Location: generalized  Physical Findings:  Hair: Negative  Eyes: Negative  Nails: Negative  Skin:  (skin tear)    Objective Data:    Last BM Date:  (unknown)    Nutrition Significant Labs:    Results from last 7 days   Lab Units 06/24/24  0316 06/23/24  2025 06/23/24  1734 06/23/24  0546   HEMOGLOBIN g/dL 9.5* 9.7*  --  9.9*   MCV fL 82 82  --  84   GLUCOSE mg/dL 111*  --    < > 110*   POTASSIUM mmol/L 3.6  --    < > 3.5   SODIUM mmol/L 130*  --    < > 134*   PHOSPHORUS mg/dL 3.9  --    < > 3.9   MAGNESIUM mg/dL 2.06  --    < > 1.60   CREATININE mg/dL 1.41*  --    < > 1.61*   BUN mg/dL 22  --    < >  27*   ALT U/L 655*  --   --  640*   AST U/L 890*  --   --  997*   ALK PHOS U/L 62  --   --  66    < > = values in this interval not displayed.       Nutrition Specific Medications:  bisacodyl, 10 mg, rectal, Daily  [Held by provider] folic acid, 1 mg, oral, Daily  [Held by provider] gabapentin, 100 mg, oral, TID  insulin lispro, 0-5 Units, subcutaneous, q4h  pantoprazole, 40 mg, intravenous, Daily before breakfast  polyethylene glycol, 17 g, oral, BID  potassium chloride, 20 mEq, intravenous, Once  sennosides, 1 tablet, oral, BID  [Held by provider] spironolactone, 25 mg, oral, Daily  DOBUTamine, 2.5-20 mcg/kg/min, Last Rate: 2.5 mcg/kg/min (06/24/24 1200)  heparin, 0-4,500 Units/hr, Last Rate: 1,200 Units/hr (06/24/24 1200)  nitroprusside, 0.25-5 mcg/kg/min, Last Rate: 0.8 mcg/kg/min (06/24/24 1227)      I/O:   I/O last 2 completed shifts:  In: 1935.7 (19.8 mL/kg) [P.O.:480; I.V.:905.7 (9.3 mL/kg); IV Piggyback:550]  Out: 4600 (47.1 mL/kg) [Urine:4600 (2 mL/kg/hr)]  Weight: 97.6 kg     Dietary Orders (From admission, onward)       Start     Ordered    06/24/24 1327  Oral nutritional supplements  Until discontinued        Question Answer Comment   Deliver with Breakfast    Deliver with Lunch    Select supplement: Ensure Plus        06/24/24 1326    06/22/24 1439  Adult diet Cardiac; 70 gm fat; 2 - 3 grams Sodium  Diet effective now        Question Answer Comment   Diet type Cardiac    Fat restriction: 70 gm fat    Sodium restriction: 2 - 3 grams Sodium        06/22/24 1438                     Estimated Needs:   Total Energy Estimated Needs (kCal): 1700 kCal  Method for Estimating Needs: 28 kcal/kg IBW    Total Protein Estimated Needs (g): 80 g  Method for Estimating Needs: 1.3 g/kg IBW    Method for Estimating Needs: 1 ml/kcal or per team          Nutrition Diagnosis   Malnutrition Diagnosis  Patient has Malnutrition Diagnosis: No    Nutrition Diagnosis  Patient has Nutrition Diagnosis: Yes  Diagnosis Status (1):  New  Nutrition Diagnosis 1: Inadequate protein energy intake  Related to (1): decreased appetite?  As Evidenced by (1): meals while inpatient mostly include only fluids/pudding/gelatin       Nutrition Interventions/Recommendations   Individualized Nutrition Prescription Provided for : 1700 kcal and 80g protein via oral diet        Nutrition Interventions:   Interventions: Medical food supplement  Medical Food Supplement: Commercial beverage  Goal: Ensure Plus (350 kcal and 13g protein ) BID         Nutrition Education:   Discussed ONS use to help increase PO intake      Nutrition Monitoring and Evaluation   Monitoring and Evaluation Plan: Energy intake  Energy Intake: Estimated energy intake  Criteria: >50% of nutr needs    Monitoring and Evaluation Plan: Weight  Weight: Weight change  Criteria: no wt change    Monitoring and Evaluation Plan: Electrolyte/renal panel  Electrolyte and Renal Panel: Phosphorus, Magnesium, Potassium, Sodium  Criteria: lytes wnl              Time Spent (min): 45 minutes

## 2024-06-24 NOTE — PROGRESS NOTES
"  MEDICINE INPATIENT PROGRESS NOTE    Amirah Bennett is a 43 y.o. female on hospital day 4    Subjective   Overnight nipride decreased 1.3 to 1 due to low pressures and dobutamine decreased 5 to 2.5.     Upon evaluation this AM, patient endorses pain in left arm and bilateral legs. Limited ability to complete ROS due to expressive aphasia.        Objective     Last Recorded Vitals  Blood pressure 85/59, pulse 89, temperature 36.2 °C (97.2 °F), temperature source Temporal, resp. rate 10, height 1.702 m (5' 7.01\"), weight 97.6 kg (215 lb 2.7 oz), SpO2 95%.    Intake/Output last 3 Shifts:  I/O last 3 completed shifts:  In: 3235.4 (33.1 mL/kg) [P.O.:480; I.V.:1855.4 (19 mL/kg); IV Piggyback:900]  Out: 7315 (74.9 mL/kg) [Urine:7315 (2.1 mL/kg/hr)]  Weight: 97.6 kg     Relevant Results  Results from last 7 days   Lab Units 06/24/24 0316 06/23/24 2025 06/23/24  0546   WBC AUTO x10*3/uL 15.7* 16.2* 13.8*   HEMOGLOBIN g/dL 9.5* 9.7* 9.9*   HEMATOCRIT % 28.4* 29.2* 29.9*   PLATELETS AUTO x10*3/uL 99* 102* 102*     Results from last 7 days   Lab Units 06/24/24  0316 06/23/24  1734 06/23/24  0546   SODIUM mmol/L 130* 130* 134*   POTASSIUM mmol/L 3.6 3.4* 3.5   CO2 mmol/L 26 30 30   ANION GAP mmol/L 14 12 16   BUN mg/dL 22 24* 27*   CREATININE mg/dL 1.41* 1.48* 1.61*   GLUCOSE mg/dL 111* 111* 110*   EGFR mL/min/1.73m*2 47* 44* 40*   MAGNESIUM mg/dL 2.06 1.68 1.60   PHOSPHORUS mg/dL 3.9 4.1 3.9      Results from last 7 days   Lab Units 06/24/24  0316 06/23/24  0546 06/22/24  0124   ALT U/L 655* 640* 805*   AST U/L 890* 997* 1,417*   ALK PHOS U/L 62 66 80      Results from last 7 days   Lab Units 06/24/24  1022 06/20/24  0758   INR  1.8* 2.1*     Results from last 7 days   Lab Units 06/24/24  1204 06/24/24  0539 06/23/24  2314 06/22/24  0449 06/22/24  0321 06/21/24  0613 06/21/24  0542 06/21/24  0206   POCT PH, ARTERIAL pH  --   --   --   --  7.53*  --  7.52* 7.47*   POCT PO2, ARTERIAL mm Hg  --   --   --   --  300*  --  73* 68* "   POCT PCO2, ARTERIAL mm Hg  --   --   --   --  29*  --  33* 30*   FIO2 % 21 21 21   < > 100   < > 28 28    < > = values in this interval not displayed.     Results from last 7 days   Lab Units 06/20/24  1303 06/20/24  0746   POCT PH, VENOUS pH 7.09* 7.36   POCT PCO2, VENOUS mm Hg 37* 36*     Results from last 7 days   Lab Units 06/24/24  1022 06/24/24  0317 06/23/24 2025 06/20/24  1942 06/20/24  1624   LACTATE mmol/L 1.7 0.8 1.0   < > 16.0*   FREE T4 ng/dL  --   --   --   --  1.48    < > = values in this interval not displayed.         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 Can hear airleak. RIJ line oozing blood.   CV: normal rate, irregular rhythm, no murmurs noted  Pulm: CTAB anteriorly, increased work of breathing, 2L O2  GI: abd soft, non-tender, bowel sounds present  Skin: warm extremities  Ext: pulse present on left foot, no pulse right foot (has femoral pulse, popliteal and TP per vascular), feet warm significant pain with light touch, left upper extremity with asymmetric firm swelling and pain (unable to wiggle left hand fingers), able to move right upper extremity spontaneously  Neuro: alert and conversant however only intermittently answering questions appropriately, AOx4,+expressive aphasia so will initially say no pain, endorsing pain in bilateral legs. She has equal sensation in bilateral thighs but states she has numbness in lower right leg. She is unable to wiggle toes in right foot but can bend right hip and knee. Right popliteal pulse present. Per vascular surgery, she has a high TP pulse present. No DP in the right. Patient does have DP/TP in left foot. Both feet/legs are warm.      Medications    amiodarone, 200 mg, oral, Daily  aspirin, 81 mg, oral, Daily  bisacodyl, 10 mg, rectal, Daily  calcium gluconate, 2 g, intravenous, Once  [Held by provider]  empagliflozin, 10 mg, oral, Daily  [Held by provider] folic acid, 1 mg, oral, Daily  [Held by provider] gabapentin, 100 mg, oral, TID  insulin lispro, 0-5 Units, subcutaneous, q4h  melatonin, 3 mg, oral, Nightly  [Held by provider] metoprolol succinate XL, 50 mg, oral, Daily  oxygen, , inhalation, Continuous - Inhalation  pantoprazole, 40 mg, intravenous, Daily before breakfast  perflutren protein A microsphere, 0.5 mL, intravenous, Once in imaging  polyethylene glycol, 17 g, oral, BID  potassium chloride, 20 mEq, intravenous, Once  [Held by provider] sacubitriL-valsartan, 1 tablet, oral, BID  sennosides, 2 tablet, oral, BID  [Held by provider] spironolactone, 25 mg, oral, Daily  sulfur hexafluoride microsphr, 2 mL, intravenous, Once in imaging        DOBUTamine, 2.5-20 mcg/kg/min, Last Rate: 2.5 mcg/kg/min (06/24/24 1200)  heparin, 0-4,500 Units/hr, Last Rate: 1,200 Units/hr (06/24/24 1200)  nitroprusside, 0.25-5 mcg/kg/min, Last Rate: 0.8 mcg/kg/min (06/24/24 1227)        PRN medications: dextrose, dextrose, glucagon, glucagon, heparin, HYDROmorphone           Assessment/Plan   Amirah Bennett is a 42 y.o. 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 RHC c/w cardiogenic shock with CI of 1.6, CVP of 25. She is not candidate for advanced therapies given documented medical nonadherence. Managing medically. Attempted to wean off dobutamine while increasing nitroprusside so stopped dobutamine 6/21 however  acutely worsened with increased lactate and worsened SvO2 from 65 to 25 overnight with new abdominal pain and right leg pain concerning for ischemia. Improving parameters, normalized lactate, and pain resolution when dobutamine restarted. Course also c/b SARAH, cardiac thrombi, liver injury likely due to ischemia, and Afib with RVR. Found to have rhabdomyolysis with CK of 14,000, unable to give fluids due to cardiogenic shock. Arterial doppler of lower extremities with echogenic material within right distal femoral and popliteal arteries with absent Doppler flow in the right popliteal artery with significantly diminished flow within the right distal superficial femoral artery significantly diminished flow within the right distal superficial femoral artery, findings raise concern for thrombosis of distal SFA and popliteal artery. Decreased flow within right posterior tibial and peroneal arteries could be through collateralization. Additionally decreased flow in right common femoral artery, right deep femoral artery as well as proximal and mid superficial femoral arteries concerning for stenosis proximal to right common femoral artery possible within right external iliac right common iliac artery. Bilateral LE duplex with likely early nonocclusive deep venous thrombosis. Ethics involved given lack of documentation of POA and limited family (NOK are cousins).     Updates 6/24:  -Arterial doppler of lower extremities with echogenic material within right distal femoral and popliteal arteries with absent Doppler flow in the right popliteal artery with significantly diminished flow within the right distal superficial femoral artery significantly diminished flow within the right distal superficial femoral artery, findings raise concern for thrombosis of distal SFA and popliteal artery. Decreased flow within right posterior tibial and peroneal arteries could be through collateralization. Additionally decreased flow in right  common femoral artery, right deep femoral artery as well as proximal and mid superficial femoral arteries concerning for stenosis proximal to right common femoral artery possible within right external iliac right common iliac artery.   -Bilateral LE duplex with likely early nonocclusive deep venous thrombosis.   -vascular surgery consulted, pending CTA aorta and bilateral iliofemoral runoff   -Discussed patient's care and current plan including concern that patient's foot may not be salvageable with Clara and Keli- (unable to reach Efrain)- Clara planning to call Efrain. Clara and Keli are planning to come tomorrow for family meeting. Both Clara and Keli stated that if there was a procedure to salvage the foot, the patient would want that. Isiah, Navin Sanches both unable to make meeting. Unable to reach Efrain.  -requested that Keli bring POA paperwork if she has it  -DIC score 5 c/w overt DIC (oozing of line and multiple clots)  -alerted SW of family's request to start process with [facility] program for patient  -transition IV amiodarone to amiodarone 200mg daily  -pending vascular surgery plan regarding whether surgery needed, will hold off on entresto  -LUE DVT scan with nonvascularized mass, called CT while patient was there to request CTA of left upper extremity and they said logistically they would be unable to perform both scans  -lactate wnl since 6/22  -pending PF4  -Labs c/w DIC  -stop antibiotics, cardiogenic shock most likely cause      NEUROLOGIC  #AMS, resolved at her baseline  #ischemic stroke L MCA d/t AFib LLA thrombus seen on echo (lack of OAC adherence) s/p thrombectomy 01/2022 @ [facility] w/ residual expressive aphasia and R sided weakness   ::UDS positive for cannabinoids  -pending infectious w/up per below     #Insomnia  -c/w melatonin 3mg at bedtime     #Depression?  #Anxiety  ::Patient's friend states that she doesn't care for herself due to being depressed  -consider psychiatry  consult  -told to stop quetiapine 50mg at last discharge     CARDIOVASCULAR  #Cardiogenic shock  #HFrEF (LVEF 20-25%)  ::Not candidate for advanced therapies due to medical nonadherence  -dobutamine gtt  -nitroprusside gtt  -bumex drip holiday  -palliative care consulted given patient not candidate for advanced therapies, appreciate recommendations  -monitor swan numbers q6h  -pending repeat echo to rule out pericardial effusion on heparin drip  -holding GDMT given pressures, unclear what patient was actively taking however was previously prescribed entresto 24-26, spironolactone 25mg, metoprolol succinate 50mg daily, lasix 40mg, jardiance 10mg     #Cardiac thrombi  ::Likely in setting of Xarelto nonadherence  ::There are indeterminate low-attenuation nodular filling defects within the atrial appendage. While this may represent contrast under filling, a right atrial appendage thrombus can not be excluded.  -c/w heparin gtt     #Right lower extremity DVT  -Continue heparin gtt     #Afib on Xarelto w/ DCCV 05/2023), ischemic stroke L MCA d/t AFib LLA thrombus seen on echo (lack of OAC adherence) s/p thrombectomy 01/2022 @ [facility] w/ residual expressive aphasia and R sided weakness   ::Episode of RVR in ED  ::Previously prescribed digoxin 250mcg however last fill was 12/2023  ::TSH 6.28H, free T4 1.48  -c/w amiodarone 200mg daily  -hold home digoxin 250mcg  -c/w AC per above (holding home Xarelto 20mg daily in evening)  -c/w aspirin 81mg  -HOLD atorvastatin 80 due to liver injury     #Right limb ischemia  #S/p femoral arterial line sheath placement now removed  ::Arterial doppler of lower extremities with echogenic material within right distal femoral and popliteal arteries with absent Doppler flow in the right popliteal artery with significantly diminished flow within the right distal superficial femoral artery significantly diminished flow within the right distal superficial femoral artery, findings raise concern for  thrombosis of distal SFA and popliteal artery. Decreased flow within right posterior tibial and peroneal arteries could be through collateralization. Additionally decreased flow in right common femoral artery, right deep femoral artery as well as proximal and mid superficial femoral arteries concerning for stenosis proximal to right common femoral artery possible within right external iliac right common iliac artery.   ::Bilateral LE duplex with likely early nonocclusive deep venous thrombosis.   ::Unable to walk prior to presentation to hospital  ::Right leg has been cold and painful in times where cardiogenic shock has worsened then warm and not painful to touch when out of shock state  -heparin drip per above  --vascular surgery consulted, pending CTA aorta and bilateral iliofemoral runoff      #Elevated troponin  ::95 > 289 > 313 > 326 > 289  -stop trending     #HTN  -holding home medications     PULMONARY  #Dyspnea  #Mild pulmonary edema  ::No evidence of PE  -diuresis per above  -wean as able to room air     #Hx trach c/b trancutaneous fistula  ::ENT had been consulted 3/2024 for gurgling and mucus drainage, no inpatient interventions required  -monitor for breaths/mucus discharge from trach site  -Cover the tracheocutaneous fistula with tape and gauze and encourage patient to apply pressure when voicing.   -per prior ENT note on last admission   -follow up outpatient ENT for closure (Dr. King)     RENAL/  #Metabolic acidosis  ::Likely in setting of shock  -trend ABGs q1hr     #Rhabdomyolysis   -Ck > 14,000  -Daily CK, unable to give fluids given cardiogenic shock     #SARAH  ::Likely prerenal vs ATN in setting of shock  ::FeUrea suggests intrinsic  -U/A with 3+ blood and no RBCs. Elevated CK as above.  -avoid nephrotoxic medications     #Elevated lactate, improved     GASTROINTESTINAL  #Elevated Tbili (3.2 on admission, from 0.5 in 03/2024)  #Elevated LFTs  -trend CMP  -RUQ US if continues to be elevated      #GERD  -c/w pantoprazole 40mg daily     ENDOCRINE  #T2DM, diet controlled  ::HgbA1c 3/2024 5.5  -hypoglycemia protocol and mild SSI     HEME/ONC  #Cardiac thrombi  -see cardiac section for plan    #Arterial thrombosis  #DVT  ::Arterial doppler of lower extremities with echogenic material within right distal femoral and popliteal arteries with absent Doppler flow in the right popliteal artery with significantly diminished flow within the right distal superficial femoral artery significantly diminished flow within the right distal superficial femoral artery, findings raise concern for thrombosis of distal SFA and popliteal artery. Decreased flow within right posterior tibial and peroneal arteries could be through collateralization. Additionally decreased flow in right common femoral artery, right deep femoral artery as well as proximal and mid superficial femoral arteries concerning for stenosis proximal to right common femoral artery possible within right external iliac right common iliac artery.   ::Bilateral LE duplex with likely early nonocclusive deep venous thrombosis.   -heparin drip per above  -further plan per above    #DIC  #Thrombocytopenia  ::Oozing of central line, Multiple thrombi, DIC score 5 on 6/24/2024  ::Hit score of 4  -monitor DIC labs daily  -pending PF4    #Left brachial injury  ::Likely injury  :LUE DVT scan with nonvascularized mass, called CT while patient was there to request CTA of left upper extremity and they said logistically they would be unable to perform both scans, and given protocol for max dosing of contrast, could not give further contrast until 6/25 at 2PM  -consider benefit vs risk of CTA LUE given SARAH     INFECTIOUS DISEASE  #Concern for infection  ::Procal 0.28  ::CXR without signs of PNA  ::s/p vanc/zosyn (6/20-6/24)  ::UA with 1+ blood, 1+ bacteria, no WBC, neg nitrite and leuk esterase  -pending blood cultures x1 6/20, x2 6/22, NGTD   -monitoring off antibiotics    "  Dispo:  -PT: Moderate intensity level of care  -patient would like enrollment in  home delivery service for medications (she has trouble getting to preferred pharmacy), pharmacy updated to [facility]  -patient's family would like her enrolled in [facility]  -repeat thyroid labs outpatient     N: cardiac, diabetic  A: femoral artery sheath right, pIVs  DVT ppx: heparin drip  GI ppx: pantoprazole 40mg     Code Status: FULL CODE per patient and POA confirmed on presentation to CICU)  Surrogate Medical Decision-maker:   Surrogate decision maker is: pt's cousin, Clara Wayne: [phone], Efrain Wayne: [phone], Keli Dylon: [phone]     Friends who may be helpful in making decisions:  Prior boyfriend Navin Sanches [phone]  Very good friend \"Isiah\" Pranay Owen [phone]    Kristine Pat MD  Internal Medicine, PGY-2     Addended to add:  Vascular surgery recommending right AKA. Discussed with Clara and Navin Sanches. Keli currently flying. I asked Clara to please update Keli and his brother. I attempted to call Isiah but did not get an answer. We planned for family meeting on 6/24, unknown when Keli is available as she is returning on flight landing at midnight but Clara is asking her if 9 would work for her. I invited Navin Sanches but he may not be able to join due to work but expressed concerns that Keli \"would just try to get her in a home and leave her there.\"   "

## 2024-06-24 NOTE — PROGRESS NOTES
"Spiritual Care Visit     People who were present: Patient, Bedside RN and Palliative CNP    Topics discussed: Patient's condition, music, art (loves art \"obviously\")    Interventions: Provided non-anxious, supportive presence, reflective listening, referral to art and music therapy    Plan of Care: Palliative  to provide ongoing spiritual care                                                     "

## 2024-06-24 NOTE — CONSULTS
Reason For Consult  Acute on chronic limb ischemia     History Of Present Illness  Amirah Bennett is a 43 y.o. female presenting with leg pain. She has a complex history including HFrEF, AF on DOAC s./p prior DCCV and cardioversion this admission, L MCA stroke s/p thrombectomy 2022 with reported aphasia and right sided weakness per the admission H&P (though last neurology note 3/2024 documents 5/5 strength throughout all extremities). Reported to be a poor historian per H&P.     In the ED found to be in cardiogenic shock and AFRVR, s/p DCCV. Working diagnosis PE but CTPE negative. Started on multiple vasoactive medications and transferred to ICU. Reported many attempts at arterial lines which were unsuccessful. Notably a right femoral arterial line was attempted but no blood diamante back. She was taken to the cath lab for RHC, also placement of arterial line in the right femoral system which functioned but reportedly there was difficulty passing the wire.     Throughout her hospital course there are no documented arterial signals of either foot. Sensation documented in the bilateral thighs but reported numbness in the right leg without any noted movement beyond the right hip. Left leg without motor or sensory deficits reported.     Also had left brachial arterial line attempted which was unsuccessful but later a left radial arterial line was placed successfully.     On my exam the patient endorses pain in the left arm/hand and right leg. She is unable to move the right foot or toes. She states she ambulates at home.      Past Medical History  She has a past medical history of CHF (congestive heart failure) (Multi) and Stroke (Multi).    Surgical History  She has a past surgical history that includes Other surgical history (06/09/2022); Invasive Vascular Procedure (N/A, 6/20/2024); and Cardiac catheterization (N/A, 6/20/2024).     Social History  She reports that she quit smoking about 6 months ago. Her smoking use  "included cigarettes. She does not have any smokeless tobacco history on file. She reports current alcohol use. No history on file for drug use.    Family History  No family history on file.     Allergies  Hydrocodone-acetaminophen and Ibuprofen    Review of Systems  A complete, 10-point review of systems was completed and negative except as noted above.        Physical Exam  Constitutional: no acute distress  Neuro: awake, alert, oriented; inconsistent at answering questions throughout the interview. Moves the left leg briskly. Able to flex the right hip and adduct the thigh. Unable to extend the knee but can bend the knee slightly. No movement of the left ankle or toes. Sensory intact to light touch LLE, right distal thigh, knee, proximal leg sensation diminished, absent below this. Move the left arm/hand,  strength weak. Left hand sensation intact to thumb, 3rd and 5th digits.  HEENT: No deformities, no scleral icterus. Right neck line in place.  Cardiac: regular rate  Pulmonary: unlabored respirations on room air  Abdomen: soft, non-tender, non-distended  Skin: warm and dry; wounds: right groin puncture dressing in place, no hematoma.   Extremities: peripheral edema noted BLE, LUE; ecchymoses of the right arm with tender mass that is not pulsatile.   Vascular:   Radial: R: palpable. L: a line in place with pulsatile waveform.   Femoral: R: non-palpable. L: palpable.   AT: R: absent. L: monophasic.   PT: R: monophasic. L: multiphasic.        Last Recorded Vitals  Blood pressure 85/59, pulse 87, temperature 35.6 °C (96.1 °F), temperature source Temporal, resp. rate 20, height 1.702 m (5' 7.01\"), weight 97.6 kg (215 lb 2.7 oz), SpO2 97%.    Relevant Results  6/23/24  Venous duplex BUE  Right Upper Venous: The subclavian vein demonstrates a normal spontaneous and phasic flow.  Left Upper Venous: No evidence of acute deep vein thrombus visualized in the left upper extremity. Non-vascularized mass in left upper arm " measuring 46.27 mm x 64.90 mm. Additional Findings; Non-vascularized mass.    6/23/24  Arterial duplex lower extremities  1. Echogenic material within the right distal femoral and popliteal  arteries with  absent Doppler flow in the right popliteal artery  significantly diminished flow within the right distal superficial  femoral artery. Findings raise concern for thrombosis of the distal  SFA and popliteal artery. Decreased flow within the right posterior  tibial and peroneal arteries could be through collateralization.  2. Decreased flow within the right common femoral artery, right deep  femoral artery as well as the proximal and mid superficial femoral  arteries, raising concern for stenosis proximal to the right common  femoral artery, possibly within the right external iliac right common  iliac artery. A CTA of the lower extremities can be obtained for  further evaluation.  3. Unremarkable Doppler interrogation of the left lower extremity  arteries.  ** R CFA monophasic waveform 30cm/s  ** R SFA monophasic 10cc/s  ** R PFA monophasic 10cc/s  ** R popliteal absent  ** R PTA and peroneal monophasic    6/23/24  Venous duplex BLE  1. Partial compressibility of the right external iliac vein and the  right common femoral vein, which raises concern for early  nonocclusive deep venous thrombosis, although flow is seen on Doppler  images.  2. Otherwise, no sonographic evidence for deep vein thrombosis within  the remainder of the evaluated veins of the bilateral lower extremity.     Assessment/Plan     43 year old female presents with reported bilateral lower extremity pain, found to be in cardiogenic shock. Admission exam on 6/20/24 documented inability to move the right lower extremity beyond the hip and with no sensation below the knee. No reported BLE signals throughout her course here. Notably she had an attempted but unsuccessful right femoral artery catheterization followed by placement of 4Fr sheath in the cath  lab. Arterial duplex with monophasic R CFA, SFA, and PFA signals, absent popliteal flow, and monophasic tibial flow though hard to visualize. On exam no right femoral pulse, monophasic PT signal at distal leg; no sensation below the knee and no movement at the ankle. History and physical concerning for acute limb ischemia present at admission (6/20/24) based on chart review and history. Unclear whether the right foot will be salvageable based on exam today. Likely with right iliac (inflow) disease based on exam, possible popliteal thromboembolism based on duplex. Also with possible left brachial arterial injury given likely hematoma on exam (radial waveform is appropriate). Right leg not salvageable, will need inflow procedure and R AKA given motor deficits.     CTA Aorta with BLE runoff shows: aortic bifurcation embolus, R BA-EIA embolus, SFA and popliteal emboli    - arterial duplex LUE to eval for hematoma/arterial injury  - continue heparin gtt  - added on for 6/25/24 for Dr. Rebolledo for R AKA, aorto-iliac thrombectomy    Seen with attending, Dr. Amaury Simon MD, PhD  Vascular Surgery, PGY2  [id number]

## 2024-06-24 NOTE — PROGRESS NOTES
Vancomycin Dosing by Pharmacy- FOLLOW UP    Amirah Bennett is a 43 y.o. year old female who Pharmacy has been consulted for vancomycin dosing for other Leukocytosis . Based on the patient's indication and renal status this patient is being dosed based on a goal AUC of 400-600.     Renal function is currently stable.    Current vancomycin dose: 1250 mg given every 24 hours    Most recent random level: 14.1 mcg/mL    Visit Vitals  BP 85/59   Pulse 99   Temp 35.6 °C (96.1 °F) (Temporal)   Resp 24        Lab Results   Component Value Date    CREATININE 1.41 (H) 2024    CREATININE 1.48 (H) 2024    CREATININE 1.61 (H) 2024    CREATININE 1.66 (H) 2024        Patient weight is as follows:   Vitals:    24 0600   Weight: 97.6 kg (215 lb 2.7 oz)       Cultures:  No results found for the encounter in last 14 days.       I/O last 3 completed shifts:  In: 3235.4 (33.1 mL/kg) [P.O.:480; I.V.:1855.4 (19 mL/kg); IV Piggyback:900]  Out: 7315 (74.9 mL/kg) [Urine:7315 (2.1 mL/kg/hr)]  Weight: 97.6 kg   I/O during current shift:  I/O this shift:  In: 827.9 [I.V.:827.9]  Out: -     Temp (24hrs), Av.8 °C (96.4 °F), Min:35.6 °C (96.1 °F), Max:36 °C (96.8 °F)      Assessment/Plan    Within goal AUC range. Continue current vancomycin regimen.    This dosing regimen is predicted by [employer] to result in the following pharmacokinetic parameters:  AUC24,ss: 478 mg/L.hr  Probability of AUC24 > 400: 93 %  Ctrough,ss: 13.9 mg/L  Probability of Ctrough,ss > 20: 2 %  Probability of nephrotoxicity (Lodise KWAME ): 9 %    The next level will be obtained on  with AM labs. May be obtained sooner if clinically indicated.   Will continue to monitor renal function daily while on vancomycin and order serum creatinine at least every 48 hours if not already ordered.  Follow for continued vancomycin needs, clinical response, and signs/symptoms of toxicity.       LAVERNE HOLM, PharmD

## 2024-06-24 NOTE — PROGRESS NOTES
Physical Therapy                 Therapy Communication Note    Patient Name: Amirah Bennett  MRN: [id number]  Today's Date: 6/24/2024     Discipline: Physical Therapy    Missed Visit Reason: Missed Visit Reason:  (Pt on hold for PT at this time per RN as pt having too much pain.)    Missed Time: Attempt    Comment:

## 2024-06-25 ENCOUNTER — ANESTHESIA (OUTPATIENT)
Dept: OPERATING ROOM | Facility: HOSPITAL | Age: 44
End: 2024-06-25
Payer: COMMERCIAL

## 2024-06-25 LAB
ABO GROUP (TYPE) IN BLOOD: NORMAL
ALBUMIN SERPL BCP-MCNC: 2.3 G/DL (ref 3.4–5)
ALBUMIN SERPL BCP-MCNC: 2.4 G/DL (ref 3.4–5)
ALP SERPL-CCNC: 69 U/L (ref 33–110)
ALT SERPL W P-5'-P-CCNC: 513 U/L (ref 7–45)
ANION GAP BLDMV CALCULATED.4IONS-SCNC: 5 MMO/L (ref 10–25)
ANION GAP BLDMV CALCULATED.4IONS-SCNC: 7 MMO/L (ref 10–25)
ANION GAP BLDMV CALCULATED.4IONS-SCNC: 7 MMO/L (ref 10–25)
ANION GAP BLDMV CALCULATED.4IONS-SCNC: 8 MMO/L (ref 10–25)
ANION GAP SERPL CALC-SCNC: 11 MMOL/L (ref 10–20)
ANION GAP SERPL CALC-SCNC: 14 MMOL/L (ref 10–20)
ANTIBODY SCREEN: NORMAL
APTT PPP: 182 SECONDS (ref 27–38)
APTT PPP: 61 SECONDS (ref 27–38)
APTT PPP: 73 SECONDS (ref 27–38)
AST SERPL W P-5'-P-CCNC: 510 U/L (ref 9–39)
BASE EXCESS BLDMV CALC-SCNC: 2.3 MMOL/L (ref -2–3)
BASE EXCESS BLDMV CALC-SCNC: 3.3 MMOL/L (ref -2–3)
BASE EXCESS BLDMV CALC-SCNC: 3.3 MMOL/L (ref -2–3)
BASE EXCESS BLDMV CALC-SCNC: 4.7 MMOL/L (ref -2–3)
BASOPHILS # BLD AUTO: 0.02 X10*3/UL (ref 0–0.1)
BASOPHILS # BLD AUTO: 0.02 X10*3/UL (ref 0–0.1)
BASOPHILS # BLD AUTO: 0.03 X10*3/UL (ref 0–0.1)
BASOPHILS NFR BLD AUTO: 0.1 %
BASOPHILS NFR BLD AUTO: 0.1 %
BASOPHILS NFR BLD AUTO: 0.2 %
BILIRUB DIRECT SERPL-MCNC: 2 MG/DL (ref 0–0.3)
BILIRUB SERPL-MCNC: 3.2 MG/DL (ref 0–1.2)
BODY TEMPERATURE: 37 DEGREES CELSIUS
BUN SERPL-MCNC: 20 MG/DL (ref 6–23)
BUN SERPL-MCNC: 21 MG/DL (ref 6–23)
CA-I BLDMV-SCNC: 1.01 MMOL/L (ref 1.1–1.33)
CA-I BLDMV-SCNC: 1.08 MMOL/L (ref 1.1–1.33)
CA-I BLDMV-SCNC: 1.1 MMOL/L (ref 1.1–1.33)
CA-I BLDMV-SCNC: 1.15 MMOL/L (ref 1.1–1.33)
CALCIUM SERPL-MCNC: 7.5 MG/DL (ref 8.6–10.6)
CALCIUM SERPL-MCNC: 7.7 MG/DL (ref 8.6–10.6)
CHLORIDE BLD-SCNC: 94 MMOL/L (ref 98–107)
CHLORIDE BLD-SCNC: 95 MMOL/L (ref 98–107)
CHLORIDE BLD-SCNC: 97 MMOL/L (ref 98–107)
CHLORIDE BLD-SCNC: 98 MMOL/L (ref 98–107)
CHLORIDE SERPL-SCNC: 94 MMOL/L (ref 98–107)
CHLORIDE SERPL-SCNC: 95 MMOL/L (ref 98–107)
CK SERPL-CCNC: 8204 U/L (ref 0–215)
CK SERPL-CCNC: 8543 U/L (ref 0–215)
CK SERPL-CCNC: 8550 U/L (ref 0–215)
CK SERPL-CCNC: ABNORMAL U/L (ref 0–215)
CO2 SERPL-SCNC: 26 MMOL/L (ref 21–32)
CO2 SERPL-SCNC: 26 MMOL/L (ref 21–32)
CREAT SERPL-MCNC: 1.06 MG/DL (ref 0.5–1.05)
CREAT SERPL-MCNC: 1.25 MG/DL (ref 0.5–1.05)
D DIMER PPP FEU-MCNC: 1145 NG/ML FEU
EGFRCR SERPLBLD CKD-EPI 2021: 54 ML/MIN/1.73M*2
EGFRCR SERPLBLD CKD-EPI 2021: 66 ML/MIN/1.73M*2
EOSINOPHIL # BLD AUTO: 0.11 X10*3/UL (ref 0–0.7)
EOSINOPHIL # BLD AUTO: 0.12 X10*3/UL (ref 0–0.7)
EOSINOPHIL # BLD AUTO: 0.16 X10*3/UL (ref 0–0.7)
EOSINOPHIL NFR BLD AUTO: 0.6 %
EOSINOPHIL NFR BLD AUTO: 0.7 %
EOSINOPHIL NFR BLD AUTO: 0.9 %
ERYTHROCYTE [DISTWIDTH] IN BLOOD BY AUTOMATED COUNT: 15.8 % (ref 11.5–14.5)
ERYTHROCYTE [DISTWIDTH] IN BLOOD BY AUTOMATED COUNT: 16.1 % (ref 11.5–14.5)
ERYTHROCYTE [DISTWIDTH] IN BLOOD BY AUTOMATED COUNT: 16.8 % (ref 11.5–14.5)
FIBRINOGEN PPP-MCNC: 321 MG/DL (ref 200–400)
GLUCOSE BLD MANUAL STRIP-MCNC: 103 MG/DL (ref 74–99)
GLUCOSE BLD MANUAL STRIP-MCNC: 106 MG/DL (ref 74–99)
GLUCOSE BLD MANUAL STRIP-MCNC: 60 MG/DL (ref 74–99)
GLUCOSE BLD MANUAL STRIP-MCNC: 62 MG/DL (ref 74–99)
GLUCOSE BLD MANUAL STRIP-MCNC: 73 MG/DL (ref 74–99)
GLUCOSE BLD MANUAL STRIP-MCNC: 93 MG/DL (ref 74–99)
GLUCOSE BLD-MCNC: 111 MG/DL (ref 74–99)
GLUCOSE BLD-MCNC: 118 MG/DL (ref 74–99)
GLUCOSE BLD-MCNC: 88 MG/DL (ref 74–99)
GLUCOSE BLD-MCNC: 92 MG/DL (ref 74–99)
GLUCOSE SERPL-MCNC: 117 MG/DL (ref 74–99)
GLUCOSE SERPL-MCNC: 87 MG/DL (ref 74–99)
HCO3 BLDMV-SCNC: 26.4 MMOL/L (ref 22–26)
HCO3 BLDMV-SCNC: 27.8 MMOL/L (ref 22–26)
HCO3 BLDMV-SCNC: 27.8 MMOL/L (ref 22–26)
HCO3 BLDMV-SCNC: 29.2 MMOL/L (ref 22–26)
HCT VFR BLD AUTO: 22.1 % (ref 36–46)
HCT VFR BLD AUTO: 23.3 % (ref 36–46)
HCT VFR BLD AUTO: 24 % (ref 36–46)
HCT VFR BLD EST: 25 % (ref 36–46)
HCT VFR BLD EST: 25 % (ref 36–46)
HCT VFR BLD EST: 26 % (ref 36–46)
HCT VFR BLD EST: 43 % (ref 36–46)
HGB BLD-MCNC: 7.7 G/DL (ref 12–16)
HGB BLD-MCNC: 7.7 G/DL (ref 12–16)
HGB BLD-MCNC: 7.9 G/DL (ref 12–16)
HGB BLDMV-MCNC: 14.2 G/DL (ref 12–16)
HGB BLDMV-MCNC: 8.2 G/DL (ref 12–16)
HGB BLDMV-MCNC: 8.2 G/DL (ref 12–16)
HGB BLDMV-MCNC: 8.8 G/DL (ref 12–16)
IMM GRANULOCYTES # BLD AUTO: 0.15 X10*3/UL (ref 0–0.7)
IMM GRANULOCYTES # BLD AUTO: 0.16 X10*3/UL (ref 0–0.7)
IMM GRANULOCYTES # BLD AUTO: 0.18 X10*3/UL (ref 0–0.7)
IMM GRANULOCYTES NFR BLD AUTO: 0.9 % (ref 0–0.9)
IMM GRANULOCYTES NFR BLD AUTO: 0.9 % (ref 0–0.9)
IMM GRANULOCYTES NFR BLD AUTO: 1 % (ref 0–0.9)
INHALED O2 CONCENTRATION: 21 %
INHALED O2 CONCENTRATION: 21 %
INHALED O2 CONCENTRATION: 28 %
INHALED O2 CONCENTRATION: 28 %
INR PPP: 1.6 (ref 0.9–1.1)
INTERPRETATION FOR ANTI-PLATELET FACTOR 4 ANTIBODY: NEGATIVE
LACTATE BLDMV-SCNC: 0.6 MMOL/L (ref 0.4–2)
LACTATE BLDMV-SCNC: 0.8 MMOL/L (ref 0.4–2)
LACTATE BLDMV-SCNC: 0.8 MMOL/L (ref 0.4–2)
LACTATE BLDMV-SCNC: 1 MMOL/L (ref 0.4–2)
LACTATE SERPL-SCNC: 0.6 MMOL/L (ref 0.4–2)
LACTATE SERPL-SCNC: 0.7 MMOL/L (ref 0.4–2)
LACTATE SERPL-SCNC: 0.8 MMOL/L (ref 0.4–2)
LACTATE SERPL-SCNC: 1 MMOL/L (ref 0.4–2)
LDH SERPL L TO P-CCNC: 899 U/L (ref 84–246)
LYMPHOCYTES # BLD AUTO: 3.31 X10*3/UL (ref 1.2–4.8)
LYMPHOCYTES # BLD AUTO: 3.43 X10*3/UL (ref 1.2–4.8)
LYMPHOCYTES # BLD AUTO: 3.64 X10*3/UL (ref 1.2–4.8)
LYMPHOCYTES NFR BLD AUTO: 19.6 %
LYMPHOCYTES NFR BLD AUTO: 19.7 %
LYMPHOCYTES NFR BLD AUTO: 20.1 %
MAGNESIUM SERPL-MCNC: 1.76 MG/DL (ref 1.6–2.4)
MAGNESIUM SERPL-MCNC: 1.92 MG/DL (ref 1.6–2.4)
MAGNESIUM SERPL-MCNC: 2.08 MG/DL (ref 1.6–2.4)
MCH RBC QN AUTO: 27.8 PG (ref 26–34)
MCH RBC QN AUTO: 27.9 PG (ref 26–34)
MCH RBC QN AUTO: 28.1 PG (ref 26–34)
MCHC RBC AUTO-ENTMCNC: 32.9 G/DL (ref 32–36)
MCHC RBC AUTO-ENTMCNC: 33 G/DL (ref 32–36)
MCHC RBC AUTO-ENTMCNC: 34.8 G/DL (ref 32–36)
MCV RBC AUTO: 80 FL (ref 80–100)
MCV RBC AUTO: 84 FL (ref 80–100)
MCV RBC AUTO: 85 FL (ref 80–100)
MONOCYTES # BLD AUTO: 1.66 X10*3/UL (ref 0.1–1)
MONOCYTES # BLD AUTO: 1.67 X10*3/UL (ref 0.1–1)
MONOCYTES # BLD AUTO: 1.71 X10*3/UL (ref 0.1–1)
MONOCYTES NFR BLD AUTO: 9.2 %
MONOCYTES NFR BLD AUTO: 9.8 %
MONOCYTES NFR BLD AUTO: 9.8 %
NEUTROPHILS # BLD AUTO: 11.63 X10*3/UL (ref 1.2–7.7)
NEUTROPHILS # BLD AUTO: 11.95 X10*3/UL (ref 1.2–7.7)
NEUTROPHILS # BLD AUTO: 12.46 X10*3/UL (ref 1.2–7.7)
NEUTROPHILS NFR BLD AUTO: 68.6 %
NEUTROPHILS NFR BLD AUTO: 68.9 %
NEUTROPHILS NFR BLD AUTO: 68.9 %
NRBC BLD-RTO: 0.3 /100 WBCS (ref 0–0)
NRBC BLD-RTO: 0.4 /100 WBCS (ref 0–0)
NRBC BLD-RTO: 0.4 /100 WBCS (ref 0–0)
OXYHGB MFR BLDMV: 55.9 % (ref 45–75)
OXYHGB MFR BLDMV: 58.3 % (ref 45–75)
OXYHGB MFR BLDMV: 59.9 % (ref 45–75)
OXYHGB MFR BLDMV: 63.8 % (ref 45–75)
PCO2 BLDMV: 38 MM HG (ref 41–51)
PCO2 BLDMV: 41 MM HG (ref 41–51)
PCO2 BLDMV: 41 MM HG (ref 41–51)
PCO2 BLDMV: 42 MM HG (ref 41–51)
PH BLDMV: 7.44 PH (ref 7.33–7.43)
PH BLDMV: 7.44 PH (ref 7.33–7.43)
PH BLDMV: 7.45 PH (ref 7.33–7.43)
PH BLDMV: 7.45 PH (ref 7.33–7.43)
PHOSPHATE SERPL-MCNC: 3.3 MG/DL (ref 2.5–4.9)
PHOSPHATE SERPL-MCNC: 3.7 MG/DL (ref 2.5–4.9)
PLATELET # BLD AUTO: 108 X10*3/UL (ref 150–450)
PLATELET # BLD AUTO: 94 X10*3/UL (ref 150–450)
PLATELET # BLD AUTO: 95 X10*3/UL (ref 150–450)
PO2 BLDMV: 38 MM HG (ref 35–45)
PO2 BLDMV: 38 MM HG (ref 35–45)
PO2 BLDMV: 39 MM HG (ref 35–45)
PO2 BLDMV: 40 MM HG (ref 35–45)
POTASSIUM BLDMV-SCNC: 3.4 MMOL/L (ref 3.5–5.3)
POTASSIUM BLDMV-SCNC: 3.5 MMOL/L (ref 3.5–5.3)
POTASSIUM BLDMV-SCNC: 4 MMOL/L (ref 3.5–5.3)
POTASSIUM BLDMV-SCNC: 4 MMOL/L (ref 3.5–5.3)
POTASSIUM SERPL-SCNC: 3.4 MMOL/L (ref 3.5–5.3)
POTASSIUM SERPL-SCNC: 3.8 MMOL/L (ref 3.5–5.3)
PROT SERPL-MCNC: 4.7 G/DL (ref 6.4–8.2)
PROTHROMBIN TIME: 18.6 SECONDS (ref 9.8–12.8)
RBC # BLD AUTO: 2.76 X10*6/UL (ref 4–5.2)
RBC # BLD AUTO: 2.77 X10*6/UL (ref 4–5.2)
RBC # BLD AUTO: 2.81 X10*6/UL (ref 4–5.2)
RH FACTOR (ANTIGEN D): NORMAL
SAO2 % BLDMV: 58 % (ref 45–75)
SAO2 % BLDMV: 60 % (ref 45–75)
SAO2 % BLDMV: 62 % (ref 45–75)
SAO2 % BLDMV: 66 % (ref 45–75)
SERUM AND PLATELET FACTOR 4: 0.18 OD UNITS
SODIUM BLDMV-SCNC: 126 MMOL/L (ref 136–145)
SODIUM BLDMV-SCNC: 126 MMOL/L (ref 136–145)
SODIUM BLDMV-SCNC: 127 MMOL/L (ref 136–145)
SODIUM BLDMV-SCNC: 128 MMOL/L (ref 136–145)
SODIUM SERPL-SCNC: 129 MMOL/L (ref 136–145)
SODIUM SERPL-SCNC: 130 MMOL/L (ref 136–145)
UFH PPP CHRO-ACNC: 0.3 IU/ML
UFH PPP CHRO-ACNC: 0.4 IU/ML
WBC # BLD AUTO: 16.9 X10*3/UL (ref 4.4–11.3)
WBC # BLD AUTO: 17.3 X10*3/UL (ref 4.4–11.3)
WBC # BLD AUTO: 18.1 X10*3/UL (ref 4.4–11.3)

## 2024-06-25 PROCEDURE — 85520 HEPARIN ASSAY: CPT | Mod: 91

## 2024-06-25 PROCEDURE — 85730 THROMBOPLASTIN TIME PARTIAL: CPT | Mod: 91

## 2024-06-25 PROCEDURE — 2500000004 HC RX 250 GENERAL PHARMACY W/ HCPCS (ALT 636 FOR OP/ED)

## 2024-06-25 PROCEDURE — 85520 HEPARIN ASSAY: CPT

## 2024-06-25 PROCEDURE — 86900 BLOOD TYPING SEROLOGIC ABO: CPT

## 2024-06-25 PROCEDURE — 83735 ASSAY OF MAGNESIUM: CPT | Mod: 91

## 2024-06-25 PROCEDURE — 2500000001 HC RX 250 WO HCPCS SELF ADMINISTERED DRUGS (ALT 637 FOR MEDICARE OP)

## 2024-06-25 PROCEDURE — C9113 INJ PANTOPRAZOLE SODIUM, VIA: HCPCS

## 2024-06-25 PROCEDURE — 85025 COMPLETE CBC W/AUTO DIFF WBC: CPT

## 2024-06-25 PROCEDURE — 84132 ASSAY OF SERUM POTASSIUM: CPT | Mod: 91 | Performed by: INTERNAL MEDICINE

## 2024-06-25 PROCEDURE — 2500000002 HC RX 250 W HCPCS SELF ADMINISTERED DRUGS (ALT 637 FOR MEDICARE OP, ALT 636 FOR OP/ED)

## 2024-06-25 PROCEDURE — 85025 COMPLETE CBC W/AUTO DIFF WBC: CPT | Mod: 91

## 2024-06-25 PROCEDURE — 82550 ASSAY OF CK (CPK): CPT

## 2024-06-25 PROCEDURE — 85060 BLOOD SMEAR INTERPRETATION: CPT | Performed by: PATHOLOGY

## 2024-06-25 PROCEDURE — 37799 UNLISTED PX VASCULAR SURGERY: CPT | Performed by: INTERNAL MEDICINE

## 2024-06-25 PROCEDURE — 82248 BILIRUBIN DIRECT: CPT

## 2024-06-25 PROCEDURE — 82550 ASSAY OF CK (CPK): CPT | Mod: 91

## 2024-06-25 PROCEDURE — 85027 COMPLETE CBC AUTOMATED: CPT

## 2024-06-25 PROCEDURE — 80053 COMPREHEN METABOLIC PANEL: CPT

## 2024-06-25 PROCEDURE — 85384 FIBRINOGEN ACTIVITY: CPT

## 2024-06-25 PROCEDURE — 83605 ASSAY OF LACTIC ACID: CPT

## 2024-06-25 PROCEDURE — 84132 ASSAY OF SERUM POTASSIUM: CPT | Mod: 91

## 2024-06-25 PROCEDURE — 99233 SBSQ HOSP IP/OBS HIGH 50: CPT

## 2024-06-25 PROCEDURE — 99223 1ST HOSP IP/OBS HIGH 75: CPT | Performed by: SURGERY

## 2024-06-25 PROCEDURE — 2020000001 HC ICU ROOM DAILY

## 2024-06-25 PROCEDURE — 2500000005 HC RX 250 GENERAL PHARMACY W/O HCPCS

## 2024-06-25 PROCEDURE — 99497 ADVNCD CARE PLAN 30 MIN: CPT

## 2024-06-25 PROCEDURE — 99223 1ST HOSP IP/OBS HIGH 75: CPT | Performed by: INTERNAL MEDICINE

## 2024-06-25 PROCEDURE — 85379 FIBRIN DEGRADATION QUANT: CPT

## 2024-06-25 PROCEDURE — 83735 ASSAY OF MAGNESIUM: CPT

## 2024-06-25 PROCEDURE — 83605 ASSAY OF LACTIC ACID: CPT | Mod: 91

## 2024-06-25 PROCEDURE — 86923 COMPATIBILITY TEST ELECTRIC: CPT | Mod: 91

## 2024-06-25 PROCEDURE — 99291 CRITICAL CARE FIRST HOUR: CPT | Performed by: INTERNAL MEDICINE

## 2024-06-25 PROCEDURE — 85610 PROTHROMBIN TIME: CPT

## 2024-06-25 PROCEDURE — 82947 ASSAY GLUCOSE BLOOD QUANT: CPT | Mod: 91

## 2024-06-25 PROCEDURE — 84100 ASSAY OF PHOSPHORUS: CPT

## 2024-06-25 PROCEDURE — 99223 1ST HOSP IP/OBS HIGH 75: CPT

## 2024-06-25 PROCEDURE — 86901 BLOOD TYPING SEROLOGIC RH(D): CPT

## 2024-06-25 RX ORDER — HEPARIN SODIUM 10000 [USP'U]/100ML
0-4500 INJECTION, SOLUTION INTRAVENOUS CONTINUOUS
Status: DISCONTINUED | OUTPATIENT
Start: 2024-06-25 | End: 2024-06-28

## 2024-06-25 RX ORDER — CALCIUM GLUCONATE 20 MG/ML
2 INJECTION, SOLUTION INTRAVENOUS ONCE
Status: COMPLETED | OUTPATIENT
Start: 2024-06-25 | End: 2024-06-25

## 2024-06-25 RX ORDER — POTASSIUM CHLORIDE 29.8 MG/ML
40 INJECTION INTRAVENOUS ONCE
Status: COMPLETED | OUTPATIENT
Start: 2024-06-25 | End: 2024-06-26

## 2024-06-25 RX ORDER — MAGNESIUM SULFATE HEPTAHYDRATE 40 MG/ML
2 INJECTION, SOLUTION INTRAVENOUS ONCE
Status: COMPLETED | OUTPATIENT
Start: 2024-06-25 | End: 2024-06-25

## 2024-06-25 RX ORDER — HEPARIN SODIUM 10000 [USP'U]/100ML
INJECTION, SOLUTION INTRAVENOUS
Status: COMPLETED
Start: 2024-06-25 | End: 2024-06-25

## 2024-06-25 RX ORDER — MAGNESIUM SULFATE HEPTAHYDRATE 40 MG/ML
INJECTION, SOLUTION INTRAVENOUS
Status: COMPLETED
Start: 2024-06-25 | End: 2024-06-25

## 2024-06-25 RX ADMIN — DEXTROSE MONOHYDRATE 12.5 G: 25 INJECTION, SOLUTION INTRAVENOUS at 16:04

## 2024-06-25 RX ADMIN — POLYETHYLENE GLYCOL 3350 17 G: 17 POWDER, FOR SOLUTION ORAL at 10:11

## 2024-06-25 RX ADMIN — HYDROMORPHONE HYDROCHLORIDE 0.4 MG: 1 INJECTION, SOLUTION INTRAMUSCULAR; INTRAVENOUS; SUBCUTANEOUS at 11:42

## 2024-06-25 RX ADMIN — DEXTROSE MONOHYDRATE 12.5 G: 25 INJECTION, SOLUTION INTRAVENOUS at 11:46

## 2024-06-25 RX ADMIN — SODIUM NITROPRUSSIDE 0.8 MCG/KG/MIN: 0.5 INJECTION, SOLUTION INTRAVENOUS at 03:15

## 2024-06-25 RX ADMIN — PANTOPRAZOLE SODIUM 40 MG: 40 INJECTION, POWDER, FOR SOLUTION INTRAVENOUS at 06:30

## 2024-06-25 RX ADMIN — BISACODYL 10 MG: 10 SUPPOSITORY RECTAL at 10:06

## 2024-06-25 RX ADMIN — HEPARIN SODIUM 1200 UNITS/HR: 10000 INJECTION, SOLUTION INTRAVENOUS at 05:09

## 2024-06-25 RX ADMIN — SENNOSIDES 17.2 MG: 8.6 TABLET, FILM COATED ORAL at 10:06

## 2024-06-25 RX ADMIN — SODIUM NITROPRUSSIDE 0.7 MCG/KG/MIN: 0.5 INJECTION, SOLUTION INTRAVENOUS at 20:22

## 2024-06-25 RX ADMIN — MAGNESIUM SULFATE HEPTAHYDRATE 2 G: 40 INJECTION, SOLUTION INTRAVENOUS at 03:27

## 2024-06-25 RX ADMIN — SODIUM NITROPRUSSIDE 0.8 MCG/KG/MIN: 0.5 INJECTION, SOLUTION INTRAVENOUS at 14:23

## 2024-06-25 RX ADMIN — BIVALIRUDIN 0.1 MG/KG/HR: 250 INJECTION, POWDER, LYOPHILIZED, FOR SOLUTION INTRAVENOUS at 09:28

## 2024-06-25 RX ADMIN — HEPARIN SODIUM 1200 UNITS/HR: 10000 INJECTION, SOLUTION INTRAVENOUS at 17:53

## 2024-06-25 RX ADMIN — HYDROMORPHONE HYDROCHLORIDE 0.4 MG: 1 INJECTION, SOLUTION INTRAMUSCULAR; INTRAVENOUS; SUBCUTANEOUS at 20:22

## 2024-06-25 RX ADMIN — POTASSIUM CHLORIDE 20 MEQ: 14.9 INJECTION, SOLUTION INTRAVENOUS at 00:27

## 2024-06-25 RX ADMIN — AMIODARONE HYDROCHLORIDE 200 MG: 200 TABLET ORAL at 10:07

## 2024-06-25 RX ADMIN — MAGNESIUM SULFATE HEPTAHYDRATE 2 G: 40 INJECTION, SOLUTION INTRAVENOUS at 10:06

## 2024-06-25 RX ADMIN — ASPIRIN 81 MG CHEWABLE TABLET 81 MG: 81 TABLET CHEWABLE at 06:30

## 2024-06-25 RX ADMIN — HYDROMORPHONE HYDROCHLORIDE 0.4 MG: 1 INJECTION, SOLUTION INTRAMUSCULAR; INTRAVENOUS; SUBCUTANEOUS at 05:53

## 2024-06-25 RX ADMIN — POTASSIUM CHLORIDE 40 MEQ: 29.8 INJECTION, SOLUTION INTRAVENOUS at 20:52

## 2024-06-25 RX ADMIN — CALCIUM GLUCONATE 2 G: 20 INJECTION, SOLUTION INTRAVENOUS at 22:57

## 2024-06-25 RX ADMIN — Medication 3 MG: at 20:52

## 2024-06-25 ASSESSMENT — PAIN SCALES - GENERAL
PAINLEVEL_OUTOF10: 2
PAINLEVEL_OUTOF10: 7
PAINLEVEL_OUTOF10: 0 - NO PAIN
PAINLEVEL_OUTOF10: 10 - WORST POSSIBLE PAIN
PAINLEVEL_OUTOF10: 7
PAINLEVEL_OUTOF10: 0 - NO PAIN

## 2024-06-25 ASSESSMENT — PAIN - FUNCTIONAL ASSESSMENT
PAIN_FUNCTIONAL_ASSESSMENT: 0-10

## 2024-06-25 ASSESSMENT — PAIN DESCRIPTION - DESCRIPTORS: DESCRIPTORS: ACHING

## 2024-06-25 ASSESSMENT — PAIN DESCRIPTION - LOCATION
LOCATION: LEG

## 2024-06-25 ASSESSMENT — PAIN DESCRIPTION - ORIENTATION
ORIENTATION: RIGHT
ORIENTATION: RIGHT
ORIENTATION: RIGHT;LOWER

## 2024-06-25 ASSESSMENT — PAIN SCALES - PAIN ASSESSMENT IN ADVANCED DEMENTIA (PAINAD): TOTALSCORE: MEDICATION (SEE MAR)

## 2024-06-25 NOTE — PROGRESS NOTES
Occupational Therapy                 Therapy Communication Note    Patient Name: Amirah Bennett  MRN: [id number]  Today's Date: 6/25/2024     Discipline: Occupational Therapy    Missed Visit Reason: Missed Visit Reason:  (Currently in family meeting with Palliative Care. No OT at this time.)    Missed Time: Attempt 1037

## 2024-06-25 NOTE — CARE PLAN
CHW Note  This CHW was consulted to follow up with patient's cousin Felicitas ([phone]) to discuss housing support.  I spoke with Felicitas via outbound phone, introduced self and role.  Felicitas interested in receiving information for [employer] and additional subsidized housing support for the patient.  CHW educated Felicitas on [employer] as a free community DME supplied for patient's future equipment needs.  Felicitas requests that I leave all information and resources discussed with the patient at bedside to retrieve when he returns to the hospital on Friday.   Discussed the following topics on behalf of the patient:  []  Behavioral Health Assistance     []  Case Management  []   Assistance  []  Digital Equity Assistance  []  Dental Health Assistance  []  Education Assistance  []  Employment Assistance  []  Financial Strain Relief Assistance  []  Food Insecurity Assistance  []  Healthcare Coverage Assistance  [x]  Housing Stability Assistance  []  IP Violence Relief Assistance  []  Legal Assistance  []  Physical Activity Assistance  []  Social Connection Assistance  []  Stress Relief Assistance   []  Substance Abuse Assistance  []  Transportation Assistance  []  Utility Assistance  []  Other: [insert comment here]    Kristina Donnelly [facility]

## 2024-06-25 NOTE — PROGRESS NOTES
"Amirah Bennett is a 43 y.o. female on day 5 of admission presenting with Atrial fibrillation (Multi).      Palliative Medicine following for:  Complex medical decision making, symptom management, patient/family support    History obtained from chart review including ED note, H&P, patient's daily progress notes, review of lab/test results, and discussion with primary team and bedside RN.    Subjective    History of Present Illness  Amirah Bennett is a 42 y.o. female with significant PMHx of HFrEF (LVEF 20-25% (08/2022), Afib on Xarelto w/ DCCV 05/2023), ischemic stroke L MCA d/t AFib LLA thrombus seen on echo (lack of OAC adherence) s/p thrombectomy 01/2022 @ [facility] w/ residual expressive aphasia and R sided weakness, recent 03/2024 left cerebellar MCA, s/p tracheostomy, T2DM (03/2024 HgbA1c 5.5) and HTN presenting with bilateral leg pain.      In the ED, she was noted to be tachycardic to 105 in triage and but then when placed on the monitor was found to be in atrial fibrillation with RVR with a rate between 150 and 190. She was given 5 mg of metoprolol with slight improvement of her rate but became hypotensive and symptomatic. She was started on heparin both for her A-fib and for possible DVT/PE. Lytics were not given because of the risk of potential intracranial hemorrhage after her recent stroke. Instead decision made to cardiovert the patient and also gave digoxin, and amiodarone. After cardioversion she still looked like she was in atrial fibrillation with RVR but only to a rate in the 120s and 130s. Her blood pressure improved. Levophed ordered along with calcium and magnesium. No evidence of infection. Patient admitted to the ICU with plan to get a CT angiogram of her chest to rule out PE and RHC.      Symptoms  Pain: rle  Dyspnea: denies  Fatigue: denies  Insomnia: denies  Drowsiness: denies  Constipation: denies  Nausea: denies  Appetite: good. Is hungry.  Anxiety: sort of  Depression: is \"cool\"    Objective  " "  Last Recorded Vitals  BP 85/59   Pulse 91   Temp 36.2 °C (97.2 °F)   Resp 21   Ht 1.702 m (5' 7.01\")   Wt 95.5 kg (210 lb 8.6 oz)   SpO2 100%   BMI 32.97 kg/m²      Physical Exam   Physical Exam  Constitutional:       Appearance: She is obese.   HENT:      Head: Normocephalic and atraumatic.      Nose: Nose normal.      Mouth/Throat:      Mouth: Mucous membranes are moist.      Pharynx: Oropharynx is clear.   Cardiovascular:      Rate and Rhythm: Tachycardia present.   Pulmonary:      Effort: Pulmonary effort is normal.      Breath sounds: Decreased air movement present. Decreased breath sounds present.   Abdominal:      Palpations: Abdomen is soft.   Musculoskeletal:   LUE: Significant edema present.     Right lower leg: Edema present.      Left lower leg: Edema present.      Comments: BLE wrapped to the knee with ace wraps   Skin:     General: Skin is dry.      Comments: BLE cool to touch   Neurological:      Mental Status: She is alert.      Comments: Significant expressive aphasia and poor name recollection. Pauses and word searching.    Psychiatric:         Attention and Perception: Attention normal.         Mood and Affect: Mood is calm.         Behavior: Behavior is cooperative.       Relevant Results   Results for orders placed or performed during the hospital encounter of 06/20/24 (from the past 24 hour(s))   POCT GLUCOSE   Result Value Ref Range    POCT Glucose 99 74 - 99 mg/dL   Lactate   Result Value Ref Range    Lactate 1.1 0.4 - 2.0 mmol/L   Renal function panel   Result Value Ref Range    Glucose 108 (H) 74 - 99 mg/dL    Sodium 131 (L) 136 - 145 mmol/L    Potassium 3.6 3.5 - 5.3 mmol/L    Chloride 94 (L) 98 - 107 mmol/L    Bicarbonate 29 21 - 32 mmol/L    Anion Gap 12 10 - 20 mmol/L    Urea Nitrogen 23 6 - 23 mg/dL    Creatinine 1.32 (H) 0.50 - 1.05 mg/dL    eGFR 51 (L) >60 mL/min/1.73m*2    Calcium 7.7 (L) 8.6 - 10.6 mg/dL    Phosphorus 4.1 2.5 - 4.9 mg/dL    Albumin 2.4 (L) 3.4 - 5.0 g/dL "   Creatine Kinase   Result Value Ref Range    Creatine Kinase 11,624 (H) 0 - 215 U/L   Lactate dehydrogenase   Result Value Ref Range     (H) 84 - 246 U/L   BLOOD GAS MIXED VENOUS FULL PANEL   Result Value Ref Range    POCT pH, Mixed 7.43 7.33 - 7.43 pH    POCT pCO2, Mixed 39 (L) 41 - 51 mm Hg    POCT pO2, Mixed 41 35 - 45 mm Hg    POCT SO2, Mixed 63 45 - 75 %    POCT Oxy Hemoglobin, Mixed 61.5 45.0 - 75.0 %    POCT Hematocrit Calculated, Mixed 26.0 (L) 36.0 - 46.0 %    POCT Sodium, Mixed 130 (L) 136 - 145 mmol/L    POCT Potassium, Mixed 3.2 (L) 3.5 - 5.3 mmol/L    POCT Chloride, Mixed 100 98 - 107 mmol/L    POCT Ionized Calcium, Mixed 1.05 (L) 1.10 - 1.33 mmol/L    POCT Glucose, Mixed 103 (H) 74 - 99 mg/dL    POCT Lactate, Mixed 0.9 0.4 - 2.0 mmol/L    POCT Base Excess, Mixed 1.5 -2.0 - 3.0 mmol/L    POCT HCO3 Calculated, Mixed 25.9 22.0 - 26.0 mmol/L    POCT Hemoglobin, Mixed 8.5 (L) 12.0 - 16.0 g/dL    POCT Anion Gap, Mixed 7 (L) 10 - 25 mmo/L    Patient Temperature 37.0 degrees Celsius    FiO2 21 %   POCT GLUCOSE   Result Value Ref Range    POCT Glucose 82 74 - 99 mg/dL   Lactate   Result Value Ref Range    Lactate 0.8 0.4 - 2.0 mmol/L   Creatine Kinase   Result Value Ref Range    Creatine Kinase 11,379 (H) 0 - 215 U/L   BLOOD GAS MIXED VENOUS FULL PANEL   Result Value Ref Range    POCT pH, Mixed 7.45 (H) 7.33 - 7.43 pH    POCT pCO2, Mixed 42 41 - 51 mm Hg    POCT pO2, Mixed 38 35 - 45 mm Hg    POCT SO2, Mixed 60 45 - 75 %    POCT Oxy Hemoglobin, Mixed 58.3 45.0 - 75.0 %    POCT Hematocrit Calculated, Mixed 26.0 (L) 36.0 - 46.0 %    POCT Sodium, Mixed 127 (L) 136 - 145 mmol/L    POCT Potassium, Mixed 4.0 3.5 - 5.3 mmol/L    POCT Chloride, Mixed 95 (L) 98 - 107 mmol/L    POCT Ionized Calcium, Mixed 1.10 1.10 - 1.33 mmol/L    POCT Glucose, Mixed 88 74 - 99 mg/dL    POCT Lactate, Mixed 0.8 0.4 - 2.0 mmol/L    POCT Base Excess, Mixed 4.7 (H) -2.0 - 3.0 mmol/L    POCT HCO3 Calculated, Mixed 29.2 (H) 22.0 -  26.0 mmol/L    POCT Hemoglobin, Mixed 8.8 (L) 12.0 - 16.0 g/dL    POCT Anion Gap, Mixed 7 (L) 10 - 25 mmo/L    Patient Temperature 37.0 degrees Celsius    FiO2 28 %   Magnesium   Result Value Ref Range    Magnesium 1.76 1.60 - 2.40 mg/dL   POCT GLUCOSE   Result Value Ref Range    POCT Glucose 73 (L) 74 - 99 mg/dL   Lactate   Result Value Ref Range    Lactate 0.6 0.4 - 2.0 mmol/L   CBC and Auto Differential   Result Value Ref Range    WBC 16.9 (H) 4.4 - 11.3 x10*3/uL    nRBC 0.4 (H) 0.0 - 0.0 /100 WBCs    RBC 2.77 (L) 4.00 - 5.20 x10*6/uL    Hemoglobin 7.7 (L) 12.0 - 16.0 g/dL    Hematocrit 23.3 (L) 36.0 - 46.0 %    MCV 84 80 - 100 fL    MCH 27.8 26.0 - 34.0 pg    MCHC 33.0 32.0 - 36.0 g/dL    RDW 15.8 (H) 11.5 - 14.5 %    Platelets 95 (L) 150 - 450 x10*3/uL    Neutrophils % 68.9 40.0 - 80.0 %    Immature Granulocytes %, Automated 0.9 0.0 - 0.9 %    Lymphocytes % 19.6 13.0 - 44.0 %    Monocytes % 9.8 2.0 - 10.0 %    Eosinophils % 0.7 0.0 - 6.0 %    Basophils % 0.1 0.0 - 2.0 %    Neutrophils Absolute 11.63 (H) 1.20 - 7.70 x10*3/uL    Immature Granulocytes Absolute, Automated 0.15 0.00 - 0.70 x10*3/uL    Lymphocytes Absolute 3.31 1.20 - 4.80 x10*3/uL    Monocytes Absolute 1.66 (H) 0.10 - 1.00 x10*3/uL    Eosinophils Absolute 0.12 0.00 - 0.70 x10*3/uL    Basophils Absolute 0.02 0.00 - 0.10 x10*3/uL   Magnesium   Result Value Ref Range    Magnesium 1.92 1.60 - 2.40 mg/dL   Hepatic Function Panel   Result Value Ref Range    Albumin 2.3 (L) 3.4 - 5.0 g/dL    Bilirubin, Total 3.2 (H) 0.0 - 1.2 mg/dL    Bilirubin, Direct 2.0 (H) 0.0 - 0.3 mg/dL    Alkaline Phosphatase 69 33 - 110 U/L     (H) 7 - 45 U/L     (H) 9 - 39 U/L    Total Protein 4.7 (L) 6.4 - 8.2 g/dL   Fibrinogen   Result Value Ref Range    Fibrinogen 321 200 - 400 mg/dL   Coagulation Screen   Result Value Ref Range    Protime 18.6 (H) 9.8 - 12.8 seconds    INR 1.6 (H) 0.9 - 1.1    aPTT 182 (HH) 27 - 38 seconds   D-dimer, Non VTE   Result Value Ref  Range    D-Dimer Non VTE, Quant (ng/mL FEU) 1,145 (H) <=500 ng/mL FEU   Creatine Kinase   Result Value Ref Range    Creatine Kinase 8,550 (H) 0 - 215 U/L   Phosphorus   Result Value Ref Range    Phosphorus 3.7 2.5 - 4.9 mg/dL   Basic Metabolic Panel   Result Value Ref Range    Glucose 87 74 - 99 mg/dL    Sodium 130 (L) 136 - 145 mmol/L    Potassium 3.8 3.5 - 5.3 mmol/L    Chloride 94 (L) 98 - 107 mmol/L    Bicarbonate 26 21 - 32 mmol/L    Anion Gap 14 10 - 20 mmol/L    Urea Nitrogen 21 6 - 23 mg/dL    Creatinine 1.25 (H) 0.50 - 1.05 mg/dL    eGFR 54 (L) >60 mL/min/1.73m*2    Calcium 7.7 (L) 8.6 - 10.6 mg/dL   Heparin Assay, UFH   Result Value Ref Range    Heparin Unfractionated 0.4 See Comment Below for Therapeutic Ranges IU/mL   BLOOD GAS MIXED VENOUS FULL PANEL   Result Value Ref Range    POCT pH, Mixed 7.44 (H) 7.33 - 7.43 pH    POCT pCO2, Mixed 41 41 - 51 mm Hg    POCT pO2, Mixed 38 35 - 45 mm Hg    POCT SO2, Mixed 58 45 - 75 %    POCT Oxy Hemoglobin, Mixed 55.9 45.0 - 75.0 %    POCT Hematocrit Calculated, Mixed 25.0 (L) 36.0 - 46.0 %    POCT Sodium, Mixed 126 (L) 136 - 145 mmol/L    POCT Potassium, Mixed 4.0 3.5 - 5.3 mmol/L    POCT Chloride, Mixed 97 (L) 98 - 107 mmol/L    POCT Ionized Calcium, Mixed 1.15 1.10 - 1.33 mmol/L    POCT Glucose, Mixed 92 74 - 99 mg/dL    POCT Lactate, Mixed 0.6 0.4 - 2.0 mmol/L    POCT Base Excess, Mixed 3.3 (H) -2.0 - 3.0 mmol/L    POCT HCO3 Calculated, Mixed 27.8 (H) 22.0 - 26.0 mmol/L    POCT Hemoglobin, Mixed 8.2 (L) 12.0 - 16.0 g/dL    POCT Anion Gap, Mixed 5 (L) 10 - 25 mmo/L    Patient Temperature 37.0 degrees Celsius    FiO2 28 %   Type and Screen   Result Value Ref Range    ABO TYPE B     Rh TYPE POS     ANTIBODY SCREEN NEG    POCT GLUCOSE   Result Value Ref Range    POCT Glucose 93 74 - 99 mg/dL   POCT GLUCOSE   Result Value Ref Range    POCT Glucose 60 (L) 74 - 99 mg/dL   BLOOD GAS MIXED VENOUS FULL PANEL   Result Value Ref Range    POCT pH, Mixed 7.44 (H) 7.33 - 7.43 pH     POCT pCO2, Mixed 41 41 - 51 mm Hg    POCT pO2, Mixed 39 35 - 45 mm Hg    POCT SO2, Mixed 62 45 - 75 %    POCT Oxy Hemoglobin, Mixed 59.9 45.0 - 75.0 %    POCT Hematocrit Calculated, Mixed 43.0 36.0 - 46.0 %    POCT Sodium, Mixed 126 (L) 136 - 145 mmol/L    POCT Potassium, Mixed 3.4 (L) 3.5 - 5.3 mmol/L    POCT Chloride, Mixed 94 (L) 98 - 107 mmol/L    POCT Ionized Calcium, Mixed 1.01 (L) 1.10 - 1.33 mmol/L    POCT Glucose, Mixed 111 (H) 74 - 99 mg/dL    POCT Lactate, Mixed 0.8 0.4 - 2.0 mmol/L    POCT Base Excess, Mixed 3.3 (H) -2.0 - 3.0 mmol/L    POCT HCO3 Calculated, Mixed 27.8 (H) 22.0 - 26.0 mmol/L    POCT Hemoglobin, Mixed 14.2 12.0 - 16.0 g/dL    POCT Anion Gap, Mixed 8 (L) 10 - 25 mmo/L    Patient Temperature 37.0 degrees Celsius    FiO2 21 %   aPTT   Result Value Ref Range    aPTT 73 (H) 27 - 38 seconds   CBC   Result Value Ref Range    WBC 17.3 (H) 4.4 - 11.3 x10*3/uL    nRBC 0.3 (H) 0.0 - 0.0 /100 WBCs    RBC 2.76 (L) 4.00 - 5.20 x10*6/uL    Hemoglobin 7.7 (L) 12.0 - 16.0 g/dL    Hematocrit 22.1 (L) 36.0 - 46.0 %    MCV 80 80 - 100 fL    MCH 27.9 26.0 - 34.0 pg    MCHC 34.8 32.0 - 36.0 g/dL    RDW 16.1 (H) 11.5 - 14.5 %    Platelets 94 (L) 150 - 450 x10*3/uL        Allergies  Hydrocodone-acetaminophen and Ibuprofen  Medications  Scheduled medications  amiodarone, 200 mg, oral, Daily  aspirin, 81 mg, oral, Daily  bisacodyl, 10 mg, rectal, Daily  [Held by provider] empagliflozin, 10 mg, oral, Daily  [Held by provider] folic acid, 1 mg, oral, Daily  [Held by provider] gabapentin, 100 mg, oral, TID  insulin lispro, 0-5 Units, subcutaneous, q4h  melatonin, 3 mg, oral, Nightly  [Held by provider] metoprolol succinate XL, 50 mg, oral, Daily  oxygen, , inhalation, Continuous - Inhalation  pantoprazole, 40 mg, intravenous, Daily before breakfast  perflutren protein A microsphere, 0.5 mL, intravenous, Once in imaging  polyethylene glycol, 17 g, oral, BID  [Held by provider] sacubitriL-valsartan, 1 tablet, oral,  BID  sennosides, 2 tablet, oral, BID  [Held by provider] spironolactone, 25 mg, oral, Daily  sulfur hexafluoride microsphr, 2 mL, intravenous, Once in imaging      Continuous medications  bivalirudin, 0.05-0.49 mg/kg/hr, Last Rate: 0.1 mg/kg/hr (06/25/24 1400)  DOBUTamine, 2.5-20 mcg/kg/min, Last Rate: Stopped (06/24/24 1431)  nitroprusside, 0.25-5 mcg/kg/min, Last Rate: 0.8 mcg/kg/min (06/25/24 1423)      PRN medications  PRN medications: dextrose, dextrose, glucagon, glucagon, HYDROmorphone, lidocaine     Assessment/Plan    History of Present Illness  Amirah Bennett is a 42 y.o. female with significant PMHx of HFrEF (LVEF 20-25% (08/2022), Afib on Xarelto w/ DCCV 05/2023), ischemic stroke L MCA d/t AFib LLA thrombus seen on echo (lack of OAC adherence) s/p thrombectomy 01/2022 @ [facility] w/ residual expressive aphasia and R sided weakness, recent 03/2024 left cerebellar MCA, s/p tracheostomy, T2DM (03/2024 HgbA1c 5.5) and HTN presenting with bilateral leg pain.      In the ED, she was noted to be tachycardic to 105 in triage and but then when placed on the monitor was found to be in atrial fibrillation with RVR with a rate between 150 and 190. She was given 5 mg of metoprolol with slight improvement of her rate but became hypotensive and symptomatic. She was started on heparin both for her A-fib and for possible DVT/PE. Lytics were not given because of the risk of potential intracranial hemorrhage after her recent stroke. Instead decision made to cardiovert the patient and also gave digoxin, and amiodarone. After cardioversion she still looked like she was in atrial fibrillation with RVR but only to a rate in the 120s and 130s. Her blood pressure improved. Levophed ordered along with calcium and magnesium. No evidence of infection. Patient admitted to the ICU with plan to get a CT angiogram of her chest to rule out PE and RHC.      6/21: Palliative consulted for pt support and GOC.      6/25: Palliative met for a family  and care team meeting in pt's room. Discussed current findings via imaging/scans and labs, current complications, cardiac and circulation risks of sx and not doing sx. Pt wishes to move forward with sx. NOK/surrogates agreeable to pt's wishes. Needing cardiac optimization prior to sx. No current infection present.   Pt elects cousins Felicitas and Keli as surrogate decision makers. Keli mentions a recent MPOA completed and will bring in.       Palliative Performance Scale (PPS): 30    ----------------------------------------------------------------------------------------------------------------------------------------------------------------------------------------------------------------------------------------------------------------------------------------------------------------------------------------------------------------------      I spent 36 minutes in providing separately identifiable ACP services with the patient and/or surrogate decision maker in a voluntary conversation discussing the patient's wishes and goals as detailed in the above note.   ----------------------------------------------------------------------------------------------------------------------------------------------------------------------------------------------------------------------------------------------------------------------------------------------------------------------------------------------------------------------    #Complex Medical Decision Making  #Goals of Care  #Advanced Care Planning  - Code status: Full code  - Surrogate decision maker: Keli Osborne (sarai) [phone]. Clara Black (sarai) [phone].  - Goals are survival and improved quality of life.   6/21: Pt with notable expressive aphasia making open ended questioning difficult. Pt able to answer yes and no questions more easily. Pt demonstrating understanding/comprehension of questions asked. Pt agrees that Navin is MPOA and papers are  "signed. She is upset with him today but is unable to verbalize why. Palliative recapped medication nonadherence and pt agreeable that she stops taking them when she is depressed. When pt asked why she stops taking her medicine, pt starting spelling out her name and saying nonsensical words. Palliative expressed a worry that her depression is causing her to not to want to continue to live. Palliative asked is that is correct, pt stated \"no\". Palliative asked pt earlier about her goals, she stated \"to live\" and \"I can make it\". Pt was unable to articulate any further explanation of her statements.   Pt was asked if her breathing were to be compromised and she needed intubated again (trached) would she want that? Pt hesitated but stated \"yes\".      Palliative attempted to reach out to Navin x3. First call he answered, and said he was on the other line with a doctor and to call back at 1230. Palliative did and his phone went to . Gia called back around 1500 and again, right to . Gia then called Pranay to see if Navin has another number, and Pranay's went right to . Damian left a message with call back number for Navin and Pranay.      Will attempt to discuss GOC with returned call from Navin. Otherwise Palliative can follow up Monday or Tuesday.      6/25: Palliative met for a family and care team meeting in pt's room. Those involved included Dr. Simon, Dr. Pat, Dannielle NP, Jennifer Palliative Mary, pt, and cousins Felicitas and Keli, and second cousin Marianela. Discussed current findings via imaging/scans and labs, current clot complications, and cardiac and circulation standpoints. Pt now requiring right AKA for loss of circulation. Vascular explained risks of AKA sx and not doing sx. Pt wishes to move forward with sx. NOK/new surrogates (Clara and Keli) agreeable to pt's wishes. Vascular and primary team expressed needing cardiac optimization prior to sx. Pt does not currently have an active infection, " "making this a non-urgent decision. Potential sx in the next few days.   Pt elects cousins Clara and Keli as surrogate decision makers vs Navin, and both Felicitas and Keli expressing the want and willingness to fulfil that position. Keli mentions a recent MPOA was completed with pt and signed by a notary. Keli will bring in.   Palliative reiterated and realigned heard goals of continuing to do everything we are currently doing as life and continued aggressive measures are paramount. Pt and family state \"yes\".       #Cardiogenic shock  #Thrombi  - Per primary team, on nipride, bival (c/f possible HIT vs DIC, anti-plt factor 4 AB in process), and trialing on/off Dobutamine.  - Kidney and liver levels improving. Latest lactate normal 0.6..  - Pt with multiple admissions r/t medication nonadherence d/t pt stated depression. Recommend Psychiatry consult.  - Unable to have GOC 6/20 d/t unavailable HPOA.       #Depression  - Not currently on antidepressant but home med list notable for Quetiapine 50mg at bedtime. Unclear history. Recommend Psychiatry consult.       #RLE circulation  - Vascular stating no blood flow to the foot and partial calf. Requires Right AKA d/t lack of current circulation and aortoiliac thrombectomy to open blood flow to remaining leg.   - Pt and family elect to move forward with the sx. Pt encourages and voices \"live\" and to \"keep going\".       #Psychosocial Support  - Ongoing pt and family support  - Music Therapy  - Spiritual Care Support     Plan of Care discussed with: Updated primary team and bedside RN on goals of care decision, medication adjustments, and code status      Medical Decision Making was high level due to high complexity of problems, extensive data review, and high risk of management/treatment.     - Cardiogenic shock, shock liver, acute kidney injury requiring inotrope cardiac support, loss of right lower extremity perfusion requiring AKA posing threat to life and " function.  - Reviewed external notes from   - Reviews results from  which were used in decision making for   - Recommended the following tests:   - Assessment required independent historian: Felicitas, meri, vascular, and primary team.  - Independent interpretation of test: labs and imaging  - Discussion of management with: nursing, primary and vascular team  - Drug therapy requiring intensive monitoring for toxicity: monitor renal status with meds/HF, mag level.  - Decision regarding elective major surgery with identified patient or procedure risk factors: pt/family aware of cardiac risk and risk of potential loss of LLE circulation.  - Decision regarding emergency major surgery: pt/surrogates wishing to pursue right AKA  - Decision regarding hospitalization or escalation of hospital-level care:   - Decision not to resuscitate or to de-escalate care because of poor prognosis: full code  - Parenteral controlled substances: bivalirudin, nipride. On/off dobutamine.     Thank you for allowing us to participate in the care of this patient. Palliative will continue to follow as needed. Palliative medicine is available Monday-Friday, 8a-6p. Please contact team with any questions or concerns.  Team pager [phone] (weekdays)  JAE Evangelista-CNP

## 2024-06-25 NOTE — CONSULTS
Inpatient consult to Vascular Medicine  Consult performed by: Julia Gordon MD  Consult ordered by: Angel Chan MD  Reason for consult: HIT         Mark Anthony Bennett is a 43 y.o. female on day 5 of admission presenting with Atrial fibrillation (Multi). Vascular medicine is consulted for thrombocytopenia and concern for HIT     Pt has PMHx of HTN, HLD, DM, obesity, HFrEF, Afib s/p DCCV (non adherence to Xarelto) c/b YFN thrombus and stroke s/p thrombectomy in 2022, Stroke 3/2024    Admitted for cardiogenic shock course further complicated by SARAH, elevated liver enzymes, rahbdo, Afib with RVR    ?R atrial appendage thrombus noted on the CTPE on 6/20 in setting of Afib    ALI thought to be embolic given evidence of aortic bifurcation- R BA/IIA/EIA/SFA/popliteal thrombus, plans for R AKA, aorto-iliac thrombectomy by VS when stable    DVT US showing R EIV-CFV DVT     Thrombocytopenia ?HIT  Baseline seems to be in the 200s, per chart review it fluctuates throughout the hospital stays  PLTs are 90s today, 212 on admission on 6/20, dropped 6/20 later in the day   Last hospitalization 3/2024, she was on Lovenox ppx     PF4 sent yesterday, heparin dc today, pt started on Bival      Review of Systems  RLE weakness and no sensation below the knee   Legs swelling   Arms swelling   Chronic Expressive aphasia   Chronic R sided weakness  Oozing from the lines earlier reported   No new SOB     Past Medical History:   Diagnosis Date    CHF (congestive heart failure) (Multi)     Stroke (Multi)      Past Surgical History:   Procedure Laterality Date    CARDIAC CATHETERIZATION N/A 6/20/2024    Procedure: Right Heart Cath;  Surgeon: Osman Su MD;  Location: [place] [address] [facility];  Service: Cardiovascular;  Laterality: N/A;    INVASIVE VASCULAR PROCEDURE N/A 6/20/2024    Procedure: Arterial  Access, Intracatheter;  Surgeon: Osman Su MD;  Location: [place] [address] [facility];   Service: Cardiovascular;  Laterality: N/A;    OTHER SURGICAL HISTORY  2022    Trachectomy     Social History     Socioeconomic History    Marital status: Single     Spouse name: Not on file    Number of children: Not on file    Years of education: Not on file    Highest education level: Not on file   Occupational History    Not on file   Tobacco Use    Smoking status: Former     Current packs/day: 0.00     Types: Cigarettes     Quit date: 2023     Years since quittin.5    Smokeless tobacco: Not on file   Substance and Sexual Activity    Alcohol use: Yes    Drug use: Not on file    Sexual activity: Not on file   Other Topics Concern    Not on file   Social History Narrative    Not on file     Social Determinants of Health     Financial Resource Strain: Low Risk  (2024)    Overall Financial Resource Strain (CARDIA)     Difficulty of Paying Living Expenses: Not hard at all   Food Insecurity: Food Insecurity Present (2024)    Hunger Vital Sign     Worried About Running Out of Food in the Last Year: Sometimes true     Ran Out of Food in the Last Year: Sometimes true   Transportation Needs: No Transportation Needs (2024)    PRAPARE - Transportation     Lack of Transportation (Medical): No     Lack of Transportation (Non-Medical): No   Recent Concern: Transportation Needs - Unmet Transportation Needs (6/10/2024)    OASIS : Transportation     Lack of Transportation (Medical): Yes     Lack of Transportation (Non-Medical): No     Patient Unable or Declines to Respond: No   Physical Activity: Sufficiently Active (2023)    Exercise Vital Sign     Days of Exercise per Week: 7 days     Minutes of Exercise per Session: 30 min   Stress: No Stress Concern Present (2023)    Citizen of [place] [employer] - Occupational Stress Questionnaire     Feeling of Stress : Only a little   Social Connections: Feeling Socially Integrated (6/10/2024)    OASIS : Social Isolation      Frequency of experiencing loneliness or isolation: Never   Intimate Partner Violence: Not At Risk (12/17/2023)    Humiliation, Afraid, Rape, and Kick questionnaire     Fear of Current or Ex-Partner: No     Emotionally Abused: No     Physically Abused: No     Sexually Abused: No   Housing Stability: Low Risk  (6/21/2024)    Housing Stability Vital Sign     Unable to Pay for Housing in the Last Year: No     Number of Places Lived in the Last Year: 1     Unstable Housing in the Last Year: No     No family history on file.   Allergies   Allergen Reactions    Hydrocodone-Acetaminophen Rash    Ibuprofen Rash     Tolerates aspirin       Objective   Physical Exam  Vitals:    06/25/24 0600 06/25/24 0700 06/25/24 0800 06/25/24 0900   BP:       Pulse: 88 81 87 86   Resp: 19 20 15 19   Temp:   35.9 °C (96.6 °F)    TempSrc:       SpO2: 95% 94% 93% 96%   Weight: 95.5 kg (210 lb 8.6 oz)      Height:          General:  In no acute distress, ill appearing   Central line in place   Neuro: alert and oriented x3  CV:  RRR  Lungs: CTA bilaterally  Abd:  Soft, non-tender   Psych:  Appropriate affect  Upper extremities: jadiel LE swelling, left >Rt  Lower extremities: jadiel LE edema, R PT, L DP/PT- dopplerable    Medications   Scheduled medications  amiodarone, 200 mg, oral, Daily  aspirin, 81 mg, oral, Daily  bisacodyl, 10 mg, rectal, Daily  [Held by provider] empagliflozin, 10 mg, oral, Daily  [Held by provider] folic acid, 1 mg, oral, Daily  [Held by provider] gabapentin, 100 mg, oral, TID  insulin lispro, 0-5 Units, subcutaneous, q4h  magnesium sulfate, 2 g, intravenous, Once  melatonin, 3 mg, oral, Nightly  [Held by provider] metoprolol succinate XL, 50 mg, oral, Daily  oxygen, , inhalation, Continuous - Inhalation  pantoprazole, 40 mg, intravenous, Daily before breakfast  perflutren protein A microsphere, 0.5 mL, intravenous, Once in imaging  polyethylene glycol, 17 g, oral, BID  [Held by provider] sacubitriL-valsartan, 1 tablet, oral,  BID  sennosides, 2 tablet, oral, BID  [Held by provider] spironolactone, 25 mg, oral, Daily  sulfur hexafluoride microsphr, 2 mL, intravenous, Once in imaging      Continuous medications  bivalirudin, 0.05-0.49 mg/kg/hr, Last Rate: 0.1 mg/kg/hr (06/25/24 0928)  DOBUTamine, 2.5-20 mcg/kg/min, Last Rate: Stopped (06/24/24 1431)  nitroprusside, 0.25-5 mcg/kg/min, Last Rate: 0.8 mcg/kg/min (06/25/24 0800)      PRN medications  PRN medications: dextrose, dextrose, glucagon, glucagon, HYDROmorphone, lidocaine     Lab Review   Recent Labs     06/25/24  0505 06/25/24  0028 06/24/24  1802 06/24/24  0316 06/23/24  1734 06/23/24  0546 06/22/24  1756 06/22/24  0449 06/22/24  0124 06/21/24  2041   *  --  131* 130* 130* 134* 132*  --  133* 132*   K 3.8  --  3.6 3.6 3.4* 3.5 3.4*  --  4.7 3.6   CL 94*  --  94* 94* 91* 92* 91*  --  91* 91*   CO2 26  --  29 26 30 30 30  --  26 28   ANIONGAP 14  --  12 14 12 16 14  --  21* 17   BUN 21  --  23 22 24* 27* 30*  --  29* 29*   CREATININE 1.25*  --  1.32* 1.41* 1.48* 1.61* 1.66*  --  1.53* 1.41*   EGFR 54*  --  51* 47* 44* 40* 39*  --  43* 47*   MG 1.92 1.76  --  2.06 1.68 1.60 1.71 2.02 2.01 2.08     Recent Labs     06/25/24  0505 06/24/24  1802 06/24/24  0316 06/23/24  1734 06/23/24  0546 06/22/24  1756 06/22/24  0124 06/21/24  1736 06/21/24  0403 03/08/24  2115 03/08/24  0134 03/26/22  1926 03/26/22  1446   ALBUMIN 2.3* 2.4* 2.5* 2.5* 2.6*   < > 3.0*   < > 2.6*   < > 4.4   < > 3.5   ALKPHOS 69  --  62  --  66  --  80  --  61   < > 68   < > 90   *  --  655*  --  640*  --  805*  --  857*   < > 107*   < > 2,716*   *  --  890*  --  997*  --  1,417*  --  2,629*   < > 107*   < > 6,960*   BILITOT 3.2*  --  2.9*  --  2.9*  --  3.0*  --  2.8*   < > 2.3*   < > 3.6*   LIPASE  --   --   --   --   --   --   --   --   --   --  16  --  80    < > = values in this interval not displayed.     Recent Labs     06/25/24  0505 06/24/24  0316 06/23/24  2025 06/23/24  0546 06/22/24  0124  06/21/24  0403 06/20/24 2116 06/20/24  1747   WBC 16.9* 15.7* 16.2* 13.8* 20.1* 16.7* 15.1* 12.3*   HGB 7.7* 9.5* 9.7* 9.9* 12.3 10.6* 11.4* 11.1*   HCT 23.3* 28.4* 29.2* 29.9* 38.9 30.9* 35.0* 36.5   PLT 95* 99* 102* 102* 160 132* 149* 139*   MCV 84 82 82 84 87 82 85 91     Recent Labs     06/25/24  0505 06/24/24  1022 06/23/24 2025 06/23/24  1546 06/23/24  1041 06/23/24  0546 06/22/24  0335 06/21/24  0403 06/20/24 2116 06/20/24  1024 06/20/24  0758 05/31/23  1456 05/31/23  1353 08/09/22  0330 08/08/22  0816 08/07/22  2208 08/07/22  1658 08/07/22  0009 03/30/22  0437 03/29/22  1643   INR 1.6* 1.8*  --   --   --   --   --   --   --   --  2.1* 1.8* 1.8*  --  2.5*  --  2.7* 2.9*   < >  --    HAUF 0.4  --  0.6 0.6 0.8 0.8 0.7 0.4 0.6   < >  --   --   --    < >  --    < > 0.9  --    < >  --    HAPTOGLOBIN  --   --   --   --   --   --   --   --   --   --   --   --   --   --   --   --   --   --   --  76   FIBRINOGEN 321 339  --   --   --   --   --   --   --   --   --   --   --   --   --   --   --   --   --  204    < > = values in this interval not displayed.     PTT - 6/25/2024:  5:05 AM  Estimated Creatinine Clearance: 67.5 mL/min (A) (by C-G formula based on SCr of 1.25 mg/dL (H)).    Recent Labs     03/10/24  0120 06/01/23  1843   CHOL 125 CANCELED   LDLF  --  CANCELED   HDL 17.6 CANCELED   TRIG 100 CANCELED     Lab Results   Component Value Date    HGBA1C 5.5 03/10/2024     Lab Results   Component Value Date    TSH 6.28 (H) 06/20/2024       Imaging  CTA 6/24/24  RIGHT LOWER EXTREMITY:  1. Intraluminal filling defects in the aortoiliac bifurcation.  2. Nearly occlusive thrombus in the distal right common iliac artery  with nearly completely occlusive thrombus in the proximal right  external iliac artery with extension into the proximal right internal  iliac artery. Partially occlusive thrombus within the short-segment  of the proximal SFA and distal SFA as it exits the adductor hiatus  with complete occlusion of the  popliteal artery and faint  reconstitution of flow into the infrapopliteal vasculature as  detailed above.  LEFT LOWER EXTREMITY:  1. Complete total occlusion of the P2 and P3 segments of the left  popliteal artery with the extension into the tibioperoneal trunk and  reconstitution of flow into the proximal posterior tibial, peroneal  and anterior tibial arteries. There is abrupt cutoff of the mid  anterior tibial artery with distal reconstitution. Faint  opacification of the peroneal artery and posterior artery throughout  their course. Additional findings are as detailed above      LE arterial duplex 6/23/24  1. Echogenic material within the right distal femoral and popliteal  arteries with  absent Doppler flow in the right popliteal artery  significantly diminished flow within the right distal superficial  femoral artery. Findings raise concern for thrombosis of the distal  SFA and popliteal artery. Decreased flow within the right posterior  tibial and peroneal arteries could be through collateralization.  2. Decreased flow within the right common femoral artery, right deep  femoral artery as well as the proximal and mid superficial femoral  arteries, raising concern for stenosis proximal to the right common  femoral artery, possibly within the right external iliac right common  iliac artery. A CTA of the lower extremities can be obtained for  further evaluation.  3. Unremarkable Doppler interrogation of the left lower extremity  arteries.    LE DVT US 6/23/24  1. Partial compressibility of the right external iliac vein and the  right common femoral vein, which raises concern for early  nonocclusive deep venous thrombosis, although flow is seen on Doppler  images.  2. Otherwise, no sonographic evidence for deep vein thrombosis within  the remainder of the evaluated veins of the bilateral lower extremity    CTPE 6/20/24  1. No evidence of acute pulmonary embolism.  2. Redemonstrated cardiomegaly with findings  suggestive of right  heart dysfunction including right atrial dilatation and reflux of  contrast into the intrahepatic IVC and hepatic veins.  3. There are indeterminate low-attenuation nodular filling defects  within the atrial appendage. While this may represent contrast under  filling, a right atrial appendage thrombus can not be excluded.  4. Consider correlation with transesophageal echocardiogram or  cardiac MR.  5. Mild pulmonary edema.  6. Small volume ascites noted within the partially visualized upper  abdomen.    Assessment/Plan   Amirah Bennett is a 43 y.o. female with PMHx of HTN, HLD, DM, obesity, HFrEF, Afib s/p DCCV (non adherence to Xarelto) c/b YFN thrombus and stroke s/p thrombectomy in 2022, Stroke 3/2024. Admitted for cardiogenic shock course further complicated by SARAH, elevated liver enzymes, rahbdo, Afib with RVR, DVT, intracardiac thrombus and ALI. VM consulted for thrombocytopenia and concern for HIT     6/2024   _ Thrombocytopenia ?HIT, 4T score-6  _ R EIV-CFV DVT   _ ?R atrial appendage thrombus in setting of AFib  _ ALI in setting of aortic bifurcation- R BA/IIA/EIA/SFA/popliteal emboli, plans for R AKA, aorto-iliac thrombectomy by VS when stable  _ Anemia   _ Elevated liver enzymes   _ SARAH     Plan   --- 4T score with high probability of HIT. Continue Bival, monitor for bleeding (irreversible)   --- Avoid heparin, heparin products, flushes, heparin coated catheters   --- UE and LE DVT US   --- Avoid PLTs transfusion as possible  --- If primary team wish to pursue further thrombocytopenia work up, please consult star Gordon MD

## 2024-06-25 NOTE — CONSULTS
Consults    Inpatient Consult to Neurology  Consult seen by Dr. Lamonte Leahy and Melinda Vences, MS3  Consult ordered by: Angel Chan MD    History Of Present Illness  Amirah Bennett is a 43 y.o. female presenting initially 5 days ago with dyspnea and lower extremity pain. PMHx is complex, significant for HFrEF (LVEF 20-25% (08/2022), with prior history of cardiogenic shock, Afib on Xarelto w/ DCCV 05/2023, L MCA stroke d/t AFib LLA thrombus seen on echo (lack of OAC adherence) s/p thrombectomy 01/2022 @ [facility] w/ residual expressive aphasia, recent 03/2024 left cerebellar stroke, paratracheal abscess s/p tracheostomy c/b tracheocutaneous fistula, T2DM (03/2024 HgbA1c 5.5) and HTN. Neurology was consulted to examine lower extremities for sensation and motor function.    Per cardiology note, on initial presentation, had dyspnea and leg swelling, was found to have elevated lactate to 14.7, cold extremities, and 3+ pitting edema. C/f thrombosis of distal SFA and popliteal artery. Arterial Doppler of lower extremities showed echogenic material within right distal femoral and popliteal arteries and absent Doppler flow in the right popliteal artery with significantly diminished flow within the right distal superficial femoral artery. Decreased flow within right posterior tibial and peroneal arteries could be through collateralization. Additionally decreased flow in right common femoral artery, right deep femoral artery as well as proximal and mid superficial femoral arteries concerning for stenosis proximal to right common femoral artery possible within right external iliac right common iliac artery. Bilateral LE duplex with likely early nonocclusive deep venous thrombosis. Ethics was involved given lack of documentation of POA and limited family (NOK are cousins). Throughout hospital course, no documented arterial signals of either foot. On previous exams per chart review, sensation documented in the bilateral thighs but  reported numbness in the right leg without any noted movement beyond the right hip. Left leg without motor or sensory deficits reported.     Past Medical History  Past Medical History:   Diagnosis Date    CHF (congestive heart failure) (Multi)     Stroke (Multi)      Surgical History  Past Surgical History:   Procedure Laterality Date    CARDIAC CATHETERIZATION N/A 2024    Procedure: Right Heart Cath;  Surgeon: Osman Su MD;  Location: [place] [address] [facility];  Service: Cardiovascular;  Laterality: N/A;    INVASIVE VASCULAR PROCEDURE N/A 2024    Procedure: Arterial  Access, Intracatheter;  Surgeon: Osman Su MD;  Location: [place] [address] [facility];  Service: Cardiovascular;  Laterality: N/A;    OTHER SURGICAL HISTORY  2022    Trachectomy     Social History  Social History     Tobacco Use    Smoking status: Former     Current packs/day: 0.00     Types: Cigarettes     Quit date: 2023     Years since quittin.5   Substance Use Topics    Alcohol use: Yes     Allergies  Hydrocodone-acetaminophen and Ibuprofen  Medications Prior to Admission   Medication Sig Dispense Refill Last Dose    aspirin 81 mg chewable tablet 1 tablet (81 mg) by Enteral route once daily.   2024 at patient request refills    atorvastatin (Lipitor) 80 mg tablet Take 1 tablet (80 mg) by mouth once daily. 30 tablet 0 Unknown at patient request refills    digoxin (Lanoxin) 250 MCG tab;et Take 1 tablet (250 mcg) by mouth once daily.   Unknown at patient request refills    empagliflozin (Jardiance) 10 mg Take 1 tablet (10 mg) by mouth once daily. 30 tablet 11 Unknown at patient request refills    folic acid (Folvite) 1 mg tablet Take 1 tablet (1 mg) by mouth once daily.   Unknown at patient request refills    furosemide (Lasix) 40 mg tablet Take 1 tablet (40 mg) by mouth twice a day.   Unknown at patient request refills    gabapentin (Neurontin) 100 mg capsule Take 1 capsule (100 mg) by mouth 3  times a day. 270 capsule 1 Unknown at patient request refills    lidocaine (Lidoderm) 5 % patch Place 1 patch on the skin once daily. Remove & discard patch within 12 hours or as directed by MD.   Unknown at patient request refills    melatonin 3 mg tablet Take 1 tablet (3 mg) by mouth once daily as needed.   Unknown at patient request refills    metoprolol succinate XL (Toprol-XL) 50 mg 24 hr tablet Take 1 tablet (50 mg) by mouth once daily. Do not crush or chew. 30 tablet 11 Unknown at patient request refills    multivitamin with minerals tablet Take 1 tablet by mouth once daily.   Unknown at patient request refills    pantoprazole (ProtoNix) 40 mg EC tablet Take 1 tablet (40 mg) by mouth once daily in the morning. Take before meals. Do not crush, chew, or split. 30 tablet 0 Unknown at patient request refills    polyethylene glycol (Glycolax, Miralax) 17 gram packet Take 17 g by mouth once daily.   Unknown at patient request refills    QUEtiapine (SEROquel) 50 mg tablet Take 3 tablets by mouth at bedtime   Unknown at patient request refills    rivaroxaban (Xarelto) 20 mg tablet Take 1 tablet (20 mg) by mouth once daily in the evening. Take with meals. Take with food. Do not start before March 16, 2024. 30 tablet 11 Unknown    spironolactone (Aldactone) 25 mg tablet Take 1 tablet (25 mg) by mouth once daily.   Unknown at patient request refills    [DISCONTINUED] sacubitriL-valsartan (Entresto) 24-26 mg tablet Take 1 tablet by mouth 2 times a day. 60 tablet 2 Unknown at patient request refills     Neurological Exam  Physical Exam  GENERAL APPEARANCE:  Laying in bed, appears distressed and uncomfortable, cooperation limited by pain.     MENTAL STATE:   Patient was oriented to person, place, and time. Attention span and concentration was generally maintained except when distracted due to pain. Language notable for expressive aphasia and difficulty with word finding.     CRANIAL NERVES:   CN 2   Visual fields were full  to confrontation.    CN 3, 4, 6   PERRL, 3mm in diameter. Lids were symmetric with no ptosis. EOMs were intact, slight saccadic intrusions but otherwise smooth pursuit. Convergence was intact. No nystagmus was noted. Peripheral fields were generally intact.  CN 5   Intact to light touch bilaterally.   CN 7   Normal and symmetric strength. Flattened R nasolabial fold.   CN 8   Hearing impaired and not recognizing finger rub on R. Hearing intact to conversation and finger rub on L.  CN 9/10  Symmetric elevation.  CN 11   Neck turning intact. Shoulder shrug observed, but resistance not attempted due to significant patient pain and access lines.   CN 12   Tongue appears midline, with normal bulk and strength, no fasciculations noted.     MOTOR:   Muscle bulk and tone appears normal in RUE. Both lower extremities appear swollen. RLE appears taut and with significant pain limiting tone and strength exam. RLE initially seen in position of hip external rotation. RLE able to move against gravity, but exam terminated due to pain. No fasciculations, tremor, or abnormal movements appreciated on observation.     STRENGTH:                               R       L  Shoulder Abd      5        3* *Did not attempt resistance due to patient pain  Elbow Flexion      5        -* *Patient refused due to pain  Elbow Extension  5        -* *Patient refused due to pain  Patient refused wrist flexion and extension in both UE due to LUE pain.    Hip Flex               3*   5 *Did not attempt resistance due to patient pain   Knee Flex     2*   4 *Did not attempt resistance due to patient pain  Knee Ext               -*      4 *Did not attempt resistance due to patient pain  Patient refused dorsiflexion and plantarflexion in both LE due to RLE pain.    REFLEXES:                       R          L  BR:               3         -*  Biceps:         3          -*  Triceps:        -*         -*  Knee:           3          3  Ankle:          3           "3  *Reflexes limited by patient cooperation due to significant pain. Babinski, clonus also not assessed due to this reason.    SENSORY:   In both upper and lower extremities, sensation was intact to light touch, pinprick, and temperature. Hyperalgesia noted in LUE and both lower extremities, but was symmetric.    COORDINATION:    Deferred exam for patient pain.      GAIT:   Deferred for patient pain.    Last Recorded Vitals  Blood pressure 85/59, pulse 91, temperature 36.2 °C (97.2 °F), resp. rate 21, height 1.702 m (5' 7.01\"), weight 95.5 kg (210 lb 8.6 oz), SpO2 100%.    Relevant Results  [place] Coma Scale  Best Eye Response: Spontaneous  Best Verbal Response: Confused  Best Motor Response: Follows commands  [place] Coma Scale Score: 14              Assessment/Plan   Principal Problem:    Atrial fibrillation (Multi)  Active Problems:    CHF (congestive heart failure) (Multi)    Cardiogenic shock (Multi)    Acute lower extremity ischemia    Amirah Bennett is a 43 y.o. year old female patient presenting with acute leg pain. Neurology was consulted to perform a neurological exam to assess lower extremities for sensation and strength in case other conditions may better explain the acute leg pain R>>L.     Patient's PMHx is complex and significant for HFrEF (LVEF 20-25% 08/2022), Afib (on Xarelto and s/p DCCV 05/2023), L MCA stroke d/t AFib LLA thrombus seen on echo (lack of OAC adherence) s/p thrombectomy 01/2022 @ [facility] w/ residual expressive aphasia and R sided weakness, L cerebellar stroke 03/2024, T2DM (03/2024 HgbA1c 5.5) and HTN. On initial presentation, had dyspnea and leg swelling, was found to have elevated lactate to 14.7, cold extremities, and 3+ pitting edema. Per chart review, limb ischemia suspected and vascular surgery consulted with recommendation for pending amputation.     Given the patient's intact sensation to light touch and pinprick in all extremities and deficits in strength and temperature " appreciated mainly in the LUE and RLE, the sites of suspected limb ischemia, there does not appear to be a significant neurologic basis for the patient's presentation. Neurologic exam is not significantly changed from prior presentations; expressive aphasia remains, but patient is otherwise oriented without notable focal weakness aside from that of actions in limbs, which include LUE and RLE, limited by significant pain. As patient is pending R AKA, aorto-iliac thrombectomy when medically optimized from HF/cardiogenic shock standpoint per vascular surgery note, no further recommendations from neurology.      CLAUDY HART  Medical Student, MS3        ===============  Senior neurology addendum note      Amirah Bennett is a 43 y.o. female presenting with PMHx notable for LMCA ischemic stroke s/p MT 2022 in the setting of Afib, HFrEF 20-25% with atrial thrombus, paratracheal abscess s/p tracheostomy c/b tracheocutaneous fistula, left cerebellar infarct with PICA territory in the setting of L certebral occlusion (3/2024) who presented on 6/20 with dyspnea and bilateral leg pain. During hospital course, he was having unstable Afib with RVR  and was cardiverted and started on digitoxin and amiodarone. Persisted to have Afib with RVR and hypotension requiring vasopressor and ICU admission. He was found to have elevated lactate to 14.7, cold extremities, and 3+ pitting edema. CTPE without pulmonary embolus however there are indeterminate low-attenuation nodular filling defects within the atrial appendage. RHC was done and was consistent with cardiogenic shock with ischemic transaminitis. LE pain and swelling worked up with LE arterial Duplex showed right distal femoral and popliteal arteries with absent flow and echogenic materials. Vascular surgery consulted and impression was concerning for acute limb ischemia and planned for AKA. Neurology consulted for neuro exam. Exam is as listed above. RLE is concerning for limb ischemia. No  neurologic cause to explain RLE pain, coldness and absent pulse.   Neurology team will sign off for now.       Lamonte Leahy MD   PGY-4 Neurology

## 2024-06-25 NOTE — CARE PLAN
The patient's goals for the shift include      The clinical goals for the shift include Patient will remain hemodynamically stable throughout shift.      Problem: Skin  Goal: Decreased wound size/increased tissue granulation at next dressing change  Outcome: Progressing  Goal: Participates in plan/prevention/treatment measures  Outcome: Progressing  Goal: Prevent/manage excess moisture  Outcome: Progressing  Goal: Prevent/minimize sheer/friction injuries  Outcome: Progressing  Goal: Promote/optimize nutrition  Outcome: Progressing  Goal: Promote skin healing  Outcome: Progressing     Problem: Pain - Adult  Goal: Verbalizes/displays adequate comfort level or baseline comfort level  Outcome: Progressing     Problem: Safety - Adult  Goal: Free from fall injury  Outcome: Progressing     Problem: Discharge Planning  Goal: Discharge to home or other facility with appropriate resources  Outcome: Progressing     Problem: Chronic Conditions and Co-morbidities  Goal: Patient's chronic conditions and co-morbidity symptoms are monitored and maintained or improved  Outcome: Progressing     Problem: Heart Failure  Goal: Improved gas exchange this shift  Outcome: Progressing  Goal: Improved urinary output this shift  Outcome: Progressing  Goal: Reduction in peripheral edema within 24 hours  Outcome: Progressing  Goal: Report improvement of dyspnea/breathlessness this shift  Outcome: Progressing  Goal: Weight from fluid excess reduced over 2-3 days, then stabilize  Outcome: Progressing  Goal: Increase self care and/or family involvement in 24 hours  Outcome: Progressing     Problem: Diabetes  Goal: Achieve decreasing blood glucose levels by end of shift  Outcome: Progressing  Goal: Increase stability of blood glucose readings by end of shift  Outcome: Progressing  Goal: Decrease in ketones present in urine by end of shift  Outcome: Progressing  Goal: Maintain electrolyte levels within acceptable range throughout shift  Outcome:  Progressing  Goal: Maintain glucose levels >70mg/dl to <250mg/dl throughout shift  Outcome: Progressing  Goal: No changes in neurological exam by end of shift  Outcome: Progressing  Goal: Learn about and adhere to nutrition recommendations by end of shift  Outcome: Progressing  Goal: Vital signs within normal range for age by end of shift  Outcome: Progressing  Goal: Increase self care and/or family involovement by end of shift  Outcome: Progressing  Goal: Receive DSME education by end of shift  Outcome: Progressing

## 2024-06-25 NOTE — CARE PLAN
Problem: Skin  Goal: Decreased wound size/increased tissue granulation at next dressing change  6/25/2024 0155 by Lavern Valenzuela RN  Flowsheets (Taken 6/25/2024 0155)  Decreased wound size/increased tissue granulation at next dressing change: Protective dressings over bony prominences  6/25/2024 0154 by Lavern Valenzuela RN  Outcome: Progressing  Goal: Participates in plan/prevention/treatment measures  6/25/2024 0155 by Lavern Valenzuela RN  Flowsheets (Taken 6/25/2024 0155)  Participates in plan/prevention/treatment measures:   Elevate heels   Discuss with provider PT/OT consult  6/25/2024 0154 by Lavern Valenzuela RN  Outcome: Progressing  Goal: Prevent/manage excess moisture  6/25/2024 0155 by Lavern Valenzuela RN  Flowsheets (Taken 6/25/2024 0155)  Prevent/manage excess moisture:   Moisturize dry skin   Cleanse incontinence/protect with barrier cream  6/25/2024 0154 by Lavern Valenzuela RN  Outcome: Progressing  Goal: Prevent/minimize sheer/friction injuries  6/25/2024 0155 by Lavern Valenzuela RN  Flowsheets (Taken 6/25/2024 0155)  Prevent/minimize sheer/friction injuries:   Turn/reposition every 2 hours/use positioning/transfer devices   HOB 30 degrees or less  6/25/2024 0154 by Lavern Valenzuela RN  Outcome: Progressing  Goal: Promote/optimize nutrition  6/25/2024 0155 by Lavern Valenzuela RN  Flowsheets (Taken 6/25/2024 0155)  Promote/optimize nutrition: Monitor/record intake including meals  6/25/2024 0154 by Lavern Valenzuela RN  Outcome: Progressing  Goal: Promote skin healing  6/25/2024 0155 by Lavern Valenzuela RN  Flowsheets (Taken 6/25/2024 0155)  Promote skin healing:   Assess skin/pad under line(s)/device(s)   Rotate device position/do not position patient on device  6/25/2024 0154 by Lavern Valenzuela RN  Outcome: Progressing   The patient's goals for the shift include      The clinical goals for the shift include Patient will remain hemodynamically stable throughout shift.

## 2024-06-25 NOTE — PROGRESS NOTES
"Vascular Surgery Daily Progress Note    Amirah Bennett is a 43 y.o. female on day 5 of admission presenting with Atrial fibrillation (Multi).    Subjective   Overnight events: none  Stable exam  CK downtrending  CICU team feels she is not optimized from heart failure/shock standpoing so will hold off on OR today  Pending family meeting       Objective     Last Recorded Vitals  BP 85/59   Pulse 87   Temp 35.9 °C (96.6 °F)   Resp 15   Ht 1.702 m (5' 7.01\")   Wt 95.5 kg (210 lb 8.6 oz)   SpO2 93%   BMI 32.97 kg/m²     Physical Exam:  Constitutional: no acute distress  Neuro: awake, alert, oriented; inconsistent at answering questions throughout the interview. Moves the left leg briskly. Able to flex the right hip and adduct the thigh. Unable to extend the knee but can bend the knee slightly. No movement of the left ankle or toes. Sensory intact to light touch LLE, right distal thigh, knee, proximal leg sensation diminished, absent below this. Move the left arm/hand,  strength weak. Left hand sensation intact to thumb, 3rd and 5th digits.  HEENT: No deformities, no scleral icterus. Right neck line in place.  Cardiac: regular rate  Pulmonary: unlabored respirations on room air  Abdomen: soft, non-tender, non-distended  Skin: warm and dry; wounds: right groin puncture dressing in place, no hematoma.   Extremities: peripheral edema noted BLE, LUE; ecchymoses of the right arm with tender mass that is not pulsatile.   Vascular:   Radial: R: palpable. L: a line in place with pulsatile waveform.   Femoral: R: non-palpable. L: palpable.   AT: R: absent. L: monophasic.   PT: R: monophasic. L: multiphasic.     Intake/Output last 3 Shifts:  I/O last 3 completed shifts:  In: 2189 (22.9 mL/kg) [P.O.:240; I.V.:1524 (16 mL/kg); IV Piggyback:425]  Out: 4150 (43.5 mL/kg) [Urine:4150 (1.2 mL/kg/hr)]  Weight: 95.5 kg     Relevant Lab Results  Lab Review   Recent Labs     06/25/24  0505 06/25/24  0028 06/24/24  1802 " 06/24/24 0316 06/23/24  1734 06/23/24  0546   *  --  131* 130* 130* 134*   K 3.8  --  3.6 3.6 3.4* 3.5   BUN 21 -- 23 22 24* 27*   CREATININE 1.25*  --  1.32* 1.41* 1.48* 1.61*   EGFR 54*  --  51* 47* 44* 40*   MG 1.92 1.76  --  2.06 1.68 1.60     Recent Labs     06/25/24  0505 06/24/24  1802 06/24/24 0316 06/23/24 1734 06/23/24  0546 06/22/24  1756 06/22/24  0124 06/21/24 1736 06/21/24  0403 03/08/24  2115 03/08/24  0134 03/26/22  1926 03/26/22  1446   ALBUMIN 2.3* 2.4* 2.5* 2.5* 2.6*   < > 3.0*   < > 2.6*   < > 4.4   < > 3.5   ALKPHOS 69  --  62  --  66  --  80  --  61   < > 68   < > 90   *  --  655*  --  640*  --  805*  --  857*   < > 107*   < > 2,716*   *  --  890*  --  997*  --  1,417*  --  2,629*   < > 107*   < > 6,960*   BILITOT 3.2*  --  2.9*  --  2.9*  --  3.0*  --  2.8*   < > 2.3*   < > 3.6*   LIPASE  --   --   --   --   --   --   --   --   --   --  16  --  80    < > = values in this interval not displayed.     Recent Labs     06/25/24  0505 06/24/24 0316 06/23/24 2025 06/23/24  0546 06/22/24  0124   WBC 16.9* 15.7* 16.2* 13.8* 20.1*   HGB 7.7* 9.5* 9.7* 9.9* 12.3   HCT 23.3* 28.4* 29.2* 29.9* 38.9   PLT 95* 99* 102* 102* 160   MCV 84 82 82 84 87     Recent Labs     06/25/24  0505 06/24/24  1022 06/23/24 2025 06/23/24  1546 06/23/24  1041 06/23/24  0546 06/20/24  1024 06/20/24  0758 05/31/23  1456 05/31/23  1353 03/30/22  0437 03/29/22  1643   INR 1.6* 1.8*  --   --   --   --   --  2.1* 1.8* 1.8*   < >  --    HAUF 0.4  --  0.6 0.6 0.8 0.8   < >  --   --   --    < >  --    FIBRINOGEN 321 339  --   --   --   --   --   --   --   --   --  204    < > = values in this interval not displayed.     PTT - 6/25/2024:  5:05 AM  1.6   18.6 182     Recent Labs     03/10/24  0120 06/01/23  1843   CHOL 125 CANCELED   LDLF  --  CANCELED   HDL 17.6 CANCELED   TRIG 100 CANCELED     Lab Results   Component Value Date    HGBA1C 5.5 03/10/2024     Lab Results   Component Value Date    TSH 6.28 (H)  2024      8550 (37071, 54052)    Imagin24  Venous duplex BUE  Right Upper Venous: The subclavian vein demonstrates a normal spontaneous and phasic flow.  Left Upper Venous: No evidence of acute deep vein thrombus visualized in the left upper extremity. Non-vascularized mass in left upper arm measuring 46.27 mm x 64.90 mm. Additional Findings; Non-vascularized mass.     24  Arterial duplex lower extremities  1. Echogenic material within the right distal femoral and popliteal  arteries with  absent Doppler flow in the right popliteal artery  significantly diminished flow within the right distal superficial  femoral artery. Findings raise concern for thrombosis of the distal  SFA and popliteal artery. Decreased flow within the right posterior  tibial and peroneal arteries could be through collateralization.  2. Decreased flow within the right common femoral artery, right deep  femoral artery as well as the proximal and mid superficial femoral  arteries, raising concern for stenosis proximal to the right common  femoral artery, possibly within the right external iliac right common  iliac artery. A CTA of the lower extremities can be obtained for  further evaluation.  3. Unremarkable Doppler interrogation of the left lower extremity  arteries.  ** R CFA monophasic waveform 30cm/s  ** R SFA monophasic 10cc/s  ** R PFA monophasic 10cc/s  ** R popliteal absent  ** R PTA and peroneal monophasic     24  Venous duplex BLE  1. Partial compressibility of the right external iliac vein and the  right common femoral vein, which raises concern for early  nonocclusive deep venous thrombosis, although flow is seen on Doppler  images.  2. Otherwise, no sonographic evidence for deep vein thrombosis within  the remainder of the evaluated veins of the bilateral lower extremity.       CTA  RIGHT LOWER EXTREMITY:  1. Intraluminal filling defects in the aortoiliac bifurcation.  2. Nearly occlusive thrombus in  the distal right common iliac artery  with nearly completely occlusive thrombus in the proximal right  external iliac artery with extension into the proximal right internal  iliac artery. Partially occlusive thrombus within the short-segment  of the proximal SFA and distal SFA as it exits the adductor hiatus  with complete occlusion of the popliteal artery and faint  reconstitution of flow into the infrapopliteal vasculature as  detailed above.      LEFT LOWER EXTREMITY:  1. Complete total occlusion of the P2 and P3 segments of the left  popliteal artery with the extension into the tibioperoneal trunk and  reconstitution of flow into the proximal posterior tibial, peroneal  and anterior tibial arteries. There is abrupt cutoff of the mid  anterior tibial artery with distal reconstitution. Faint  opacification of the peroneal artery and posterior artery throughout  their course. Additional findings are as detailed above.    Current medications:   Scheduled medications  amiodarone, 200 mg, oral, Daily  aspirin, 81 mg, oral, Daily  bisacodyl, 10 mg, rectal, Daily  [Held by provider] empagliflozin, 10 mg, oral, Daily  [Held by provider] folic acid, 1 mg, oral, Daily  [Held by provider] gabapentin, 100 mg, oral, TID  insulin lispro, 0-5 Units, subcutaneous, q4h  magnesium sulfate, 2 g, intravenous, Once  melatonin, 3 mg, oral, Nightly  [Held by provider] metoprolol succinate XL, 50 mg, oral, Daily  oxygen, , inhalation, Continuous - Inhalation  pantoprazole, 40 mg, intravenous, Daily before breakfast  perflutren protein A microsphere, 0.5 mL, intravenous, Once in imaging  polyethylene glycol, 17 g, oral, BID  [Held by provider] sacubitriL-valsartan, 1 tablet, oral, BID  sennosides, 2 tablet, oral, BID  [Held by provider] spironolactone, 25 mg, oral, Daily  sulfur hexafluoride microsphr, 2 mL, intravenous, Once in imaging      Continuous medications  bivalirudin, 0.05-0.49 mg/kg/hr, Last Rate: 0.1 mg/kg/hr (06/25/24  0928)  DOBUTamine, 2.5-20 mcg/kg/min, Last Rate: Stopped (06/24/24 1431)  nitroprusside, 0.25-5 mcg/kg/min, Last Rate: 0.8 mcg/kg/min (06/25/24 0800)      PRN medications  PRN medications: dextrose, dextrose, glucagon, glucagon, HYDROmorphone, lidocaine    Assessment/Plan   Principal Problem:    Atrial fibrillation (Multi)  Active Problems:    CHF (congestive heart failure) (Multi)    Acute lower extremity ischemia    Cardiogenic shock (Multi)    43 year old female presents with reported bilateral lower extremity pain, found to be in cardiogenic shock. Admission exam on 6/20/24 documented inability to move the right lower extremity beyond the hip and with no sensation below the knee. No reported BLE signals throughout her course here. Notably she had an attempted but unsuccessful right femoral artery catheterization followed by placement of 4Fr sheath in the cath lab. Arterial duplex with monophasic R CFA, SFA, and PFA signals, absent popliteal flow, and monophasic tibial flow though hard to visualize. On exam no right femoral pulse, monophasic PT signal at distal leg; no sensation below the knee and no movement at the ankle. History and physical concerning for acute limb ischemia present at admission (6/20/24) based on chart review and history. Unclear whether the right foot will be salvageable based on exam today. Likely with right iliac (inflow) disease based on exam, possible popliteal thromboembolism based on duplex. Also with possible left brachial arterial injury given likely hematoma on exam (radial waveform is appropriate). Right leg not salvageable, will need inflow procedure and R AKA given motor deficits.      CTA Aorta with BLE runoff shows: aortic bifurcation embolus, R BA-EIA embolus, SFA and popliteal emboli     6/25: stable vascular exam, stable motor/sensory exam, downtrending CK    - continue heparin gtt  - Planning for R AKA, aorto-iliac thrombectomy when medically optimized from heart  failure/cardiogenic shock standpoint  - appreciate CICU care  - appreciate palliative care recs  - pending family meeting today    Discussed with attending, Dr. Amaury Simon MD, PhD  Vascular Surgery, PGY2  [id number]

## 2024-06-25 NOTE — PROGRESS NOTES
Spiritual Care Visit     People who were present: Patient, patient's 3 cousins, Clara, Keli and Marianela along with Primary Resident, Vascular Resident and Palliative CNP    Topics discussed: Patient's condition, possible medical trajectories    Interventions: Provided non-anxious, supportive presence, reflective listening    Plan of Care: Palliative  to provide ongoing spiritual care

## 2024-06-25 NOTE — PROGRESS NOTES
"  MEDICINE INPATIENT PROGRESS NOTE    Amirah Bennett is a 43 y.o. female on hospital day 5    Subjective   No acute events overnight.  Upon evaluation this AM, having pain in LUE and bilateral lower extremities. Full ROS limited by expressive aphasia.        Objective   Last Recorded Vitals  Blood pressure 85/59, pulse 88, temperature 36.8 °C (98.2 °F), resp. rate 21, height 1.702 m (5' 7.01\"), weight 95.5 kg (210 lb 8.6 oz), SpO2 96%.    Intake/Output last 3 Shifts:  I/O last 3 completed shifts:  In: 2189 (22.9 mL/kg) [P.O.:240; I.V.:1524 (16 mL/kg); IV Piggyback:425]  Out: 4150 (43.5 mL/kg) [Urine:4150 (1.2 mL/kg/hr)]  Weight: 95.5 kg     Relevant Results  Results from last 7 days   Lab Units 06/25/24  1218 06/25/24  0505 06/24/24  0316   WBC AUTO x10*3/uL 17.3* 16.9* 15.7*   HEMOGLOBIN g/dL 7.7* 7.7* 9.5*   HEMATOCRIT % 22.1* 23.3* 28.4*   PLATELETS AUTO x10*3/uL 94* 95* 99*     Results from last 7 days   Lab Units 06/25/24  0505 06/25/24  0028 06/24/24  1802 06/24/24  0316   SODIUM mmol/L 130*  --  131* 130*   POTASSIUM mmol/L 3.8  --  3.6 3.6   CO2 mmol/L 26  --  29 26   ANION GAP mmol/L 14  --  12 14   BUN mg/dL 21  --  23 22   CREATININE mg/dL 1.25*  --  1.32* 1.41*   GLUCOSE mg/dL 87  --  108* 111*   EGFR mL/min/1.73m*2 54*  --  51* 47*   MAGNESIUM mg/dL 1.92 1.76  --  2.06   PHOSPHORUS mg/dL 3.7  --  4.1 3.9      Results from last 7 days   Lab Units 06/25/24  0505 06/24/24  0316 06/23/24  0546   ALT U/L 513* 655* 640*   AST U/L 510* 890* 997*   ALK PHOS U/L 69 62 66      Results from last 7 days   Lab Units 06/25/24  0505 06/24/24  1022 06/20/24  0758   INR  1.6* 1.8* 2.1*     Results from last 7 days   Lab Units 06/25/24  1215 06/25/24  0512 06/25/24  0028 06/22/24  0449 06/22/24  0321 06/21/24  0613 06/21/24  0542 06/21/24  0206   POCT PH, ARTERIAL pH  --   --   --   --  7.53*  --  7.52* 7.47*   POCT PO2, ARTERIAL mm Hg  --   --   --   --  300*  --  73* 68*   POCT PCO2, ARTERIAL mm Hg  --   --   --   --  " 29*  --  33* 30*   FIO2 % 21 28 28   < > 100   < > 28 28    < > = values in this interval not displayed.     Results from last 7 days   Lab Units 06/20/24  1303 06/20/24  0746   POCT PH, VENOUS pH 7.09* 7.36   POCT PCO2, VENOUS mm Hg 37* 36*     Results from last 7 days   Lab Units 06/25/24  0505 06/25/24  0028 06/24/24  1802 06/20/24  1942 06/20/24  1624   LACTATE mmol/L 0.6 0.8 1.1   < > 16.0*   FREE T4 ng/dL  --   --   --   --  1.48    < > = values in this interval not displayed.         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 Can hear airleak. RIJ line oozing blood.   CV: normal rate, irregular rhythm, no murmurs noted  Pulm: CTAB anteriorly, 2L O2  GI: abd soft, non-tender, bowel sounds present  Ext: She has equal sensation in bilateral thighs but states she has numbness in lower right leg. She is unable to wiggle toes in right foot but can bend right hip and knee. Right popliteal pulse present. Per vascular surgery, she has a high TP pulse present. No DP in the right. Patient does have DP/TP in left foot. Right foot cool and numb. left upper extremity with asymmetric firm swelling and pain (able to slightly move fingers of left hand towards a fist position, able to move right upper extremity spontaneously  Neuro: alert and conversant however only intermittently answering questions appropriately, +dysmetria, AOx4,+expressive aphasia so will initially say no pain, endorsing pain in bilateral legs.     Medications    amiodarone, 200 mg, oral, Daily  aspirin, 81 mg, oral, Daily  bisacodyl, 10 mg, rectal, Daily  [Held by provider] empagliflozin, 10 mg, oral, Daily  [Held by provider] folic acid, 1 mg, oral, Daily  [Held by provider] gabapentin, 100 mg, oral, TID  insulin lispro, 0-5 Units, subcutaneous, q4h  melatonin, 3 mg, oral, Nightly  [Held by provider] metoprolol succinate XL,  50 mg, oral, Daily  oxygen, , inhalation, Continuous - Inhalation  pantoprazole, 40 mg, intravenous, Daily before breakfast  perflutren protein A microsphere, 0.5 mL, intravenous, Once in imaging  polyethylene glycol, 17 g, oral, BID  [Held by provider] sacubitriL-valsartan, 1 tablet, oral, BID  sennosides, 2 tablet, oral, BID  [Held by provider] spironolactone, 25 mg, oral, Daily  sulfur hexafluoride microsphr, 2 mL, intravenous, Once in imaging        DOBUTamine, 2.5-20 mcg/kg/min, Last Rate: Stopped (06/24/24 1431)  heparin, 0-4,500 Units/hr  nitroprusside, 0.25-5 mcg/kg/min, Last Rate: 0.8 mcg/kg/min (06/25/24 1700)        PRN medications: dextrose, dextrose, glucagon, glucagon, heparin, HYDROmorphone, lidocaine           Assessment/Plan   Amirah Bennett is a 42 y.o. 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 RHC c/w cardiogenic shock with CI of 1.6, CVP of 25. She is not candidate for advanced therapies given documented medical nonadherence. Managing medically. Attempted to wean off dobutamine while increasing nitroprusside so stopped dobutamine 6/21 however acutely worsened with increased lactate and worsened SvO2 from 65 to 25 overnight with new abdominal pain and right leg pain concerning for ischemia. Improving parameters, normalized lactate, and pain resolution when dobutamine restarted. Course also c/b SARAH, cardiac thrombi, liver injury likely due to ischemia, and Afib with RVR. Found to  have rhabdomyolysis with CK of 14,000, unable to give fluids due to cardiogenic shock. Arterial doppler of lower extremities with echogenic material within right distal femoral and popliteal arteries with absent Doppler flow in the right popliteal artery with significantly diminished flow within the right distal superficial femoral artery significantly diminished flow within the right distal superficial femoral artery, findings raise concern for thrombosis of distal SFA and popliteal artery. Decreased flow within right posterior tibial and peroneal arteries could be through collateralization. Additionally decreased flow in right common femoral artery, right deep femoral artery as well as proximal and mid superficial femoral arteries concerning for stenosis proximal to right common femoral artery possible within right external iliac right common iliac artery. Bilateral LE duplex with likely early nonocclusive deep venous thrombosis. Ethics involved given lack of documentation of POA and limited family (NOK are cousins).     Updates 6/25:  -CTA Aorta with runoff 6/24:    RIGHT LOWER EXTREMITY:  1. Intraluminal filling defects in the aortoiliac bifurcation.  2. Nearly occlusive thrombus in the distal right common iliac artery  with nearly completely occlusive thrombus in the proximal right  external iliac artery with extension into the proximal right internal  iliac artery. Partially occlusive thrombus within the short-segment  of the proximal SFA and distal SFA as it exits the adductor hiatus  with complete occlusion of the popliteal artery and faint  reconstitution of flow into the infrapopliteal vasculature as  detailed above.      LEFT LOWER EXTREMITY:  1. Complete total occlusion of the P2 and P3 segments of the left  popliteal artery with the extension into the tibioperoneal trunk and  reconstitution of flow into the proximal posterior tibial, peroneal  and anterior tibial arteries. There is abrupt cutoff of the  mid  anterior tibial artery with distal reconstitution. Faint  opacification of the peroneal artery and posterior artery     -family meeting today, family and patient electing to move forward with AKA as early as tomorrow (see palliative care note)  -vascular medicine consulted today, HIT labs negative, transitioned from bivalirudin to heparin drip for ease with AKA (okay with vascular medicine)  -neurology consulted, no new imaging recommended and believe that right leg is likely ischemic rather than neurologic cause of loss of function  -no upper extremity CT, pressure held on LUE and per vascular surgery should keep wrapped, [place] with good reading, no current concern for compartment syndrome  -CK continues to downtrend  -per hematology by telephone regarding oozing and thrombocytopenia, unlikely to be DIC given normal fibrinogen (labs are affected by heparin), recommending peripheral smear and can formally consult in AM  -continued oozing of Rt internal jugular central line  -volume optimized  -on nipride, remains off dobutamine  -per vascular, no further imaging needed prior to surgery  -vascular aware that this is high risk and our team recommending cardiac anesthesia  -NPO at midnight for AKA tomorrow  -cousins Clara and Keli both consented to transfusion of blood and blood products if needed        NEUROLOGIC  #AMS, resolved at her baseline  #ischemic stroke L MCA d/t AFib LLA thrombus seen on echo (lack of OAC adherence) s/p thrombectomy 01/2022 @ [facility] w/ residual expressive aphasia and R sided weakness   ::Neurology consulted 6/25, no new deficits concerning for cerebral event  ::UDS positive for cannabinoids  -pending infectious w/up per below     #Insomnia  -c/w melatonin 3mg at bedtime     #Depression?  #Anxiety  ::Patient's friend states that she doesn't care for herself due to being depressed  -consider psychiatry consult  -told to stop quetiapine 50mg at last discharge      CARDIOVASCULAR  #Cardiogenic shock  #HFrEF (LVEF 20-25%)  ::Not candidate for advanced therapies due to medical nonadherence  -dobutamine gtt, weaning as tolerated (*currently off)  -nitroprusside gtt  -bumex drip holiday  -palliative care consulted given patient not candidate for advanced therapies, appreciate recommendations  -monitor swan numbers q6h  -holding GDMT given pressures, unclear what patient was actively taking however was previously prescribed entresto 24-26, spironolactone 25mg, metoprolol succinate 50mg daily, lasix 40mg, jardiance 10mg-- once out of window after surgery would restart entresto first    #Right limb ischemia  #S/p femoral arterial line sheath placement now removed  ::CTA Aorta with runoff 6/24: RLE with intraluminal filling defects in aortoiliac bifurcation, nearly occlusive thrombus in distal right common iliac artery with nearly completely occlusive thrombus in the proximal right external iliac artery with extension into the proximal right internal iliac artery. Partially occlusive thrombus within the short-segment of the proximal SFA and distal SFA as it exits the adductor hiatus with complete occlusion of the popliteal artery and faint reconstitution of flow into the infrapopliteal vasculature as detailed above.  ::::CTA Aorta with runoff 6/24: LLE: Complete total occlusion of the P2 and P3 segments of the left popliteal artery with the extension into the tibioperoneal trunk and reconstitution of flow into the proximal posterior tibial, peroneal and anterior tibial arteries. There is abrupt cutoff of the mid anterior tibial artery with distal reconstitution. Faint  opacification of the peroneal artery and posterior artery throughout  their course. Additional findings are as detailed above.  ::Arterial doppler of lower extremities with echogenic material within right distal femoral and popliteal arteries with absent Doppler flow in the right popliteal artery with significantly  diminished flow within the right distal superficial femoral artery significantly diminished flow within the right distal superficial femoral artery, findings raise concern for thrombosis of distal SFA and popliteal artery. Decreased flow within right posterior tibial and peroneal arteries could be through collateralization. Additionally decreased flow in right common femoral artery, right deep femoral artery as well as proximal and mid superficial femoral arteries concerning for stenosis proximal to right common femoral artery possible within right external iliac right common iliac artery.   ::Bilateral LE duplex with likely early nonocclusive deep venous thrombosis.   ::Unable to walk prior to presentation to hospital  ::Right leg has been cold and painful in times where cardiogenic shock has worsened then warm and not painful to touch when out of shock state, fluctuating exam  -heparin drip per above  -vascular surgery consulted, pending CTA aorta and bilateral iliofemoral runoff   -plan for AKA with vascular surgery, potentially 6/26/2024     #Cardiac thrombi  ::Likely in setting of Xarelto nonadherence  ::There are indeterminate low-attenuation nodular filling defects within the atrial appendage. While this may represent contrast under filling, a right atrial appendage thrombus can not be excluded.  -c/w heparin gtt     #Right lower extremity DVT  -Continue heparin gtt     #Afib on Xarelto w/ DCCV 05/2023), ischemic stroke L MCA d/t AFib LLA thrombus seen on echo (lack of OAC adherence) s/p thrombectomy 01/2022 @ [facility] w/ residual expressive aphasia and R sided weakness   ::Episode of RVR in ED  ::Previously prescribed digoxin 250mcg however last fill was 12/2023  ::TSH 6.28H, free T4 1.48  -c/w amiodarone 200mg daily  -hold home digoxin 250mcg  -c/w AC per above (holding home Xarelto 20mg daily in evening)  -c/w aspirin 81mg  -HOLD atorvastatin 80 due to liver injury     #Elevated troponin  ::95 > 289 > 313 > 326  > 289  -stop trending     #HTN  -holding home medications     PULMONARY  #Dyspnea  #Mild pulmonary edema  ::No evidence of PE  -diuresis per above  -wean as able to room air     #Hx trach c/b trancutaneous fistula  ::ENT had been consulted 3/2024 for gurgling and mucus drainage, no inpatient interventions required  -monitor for breaths/mucus discharge from trach site  -Cover the tracheocutaneous fistula with tape and gauze and encourage patient to apply pressure when voicing.   -per prior ENT note on last admission   -follow up outpatient ENT for closure (Dr. King)     RENAL/  #Metabolic acidosis  ::Likely in setting of shock  -trend ABGs q1hr     #Rhabdomyolysis   -Ck > 14,000, trending down  -Daily CK, unable to give fluids given cardiogenic shock     #SARAH  ::Likely prerenal vs ATN in setting of shock  ::FeUrea suggests intrinsic  -U/A with 3+ blood and no RBCs. Elevated CK as above.  -avoid nephrotoxic medications     #Elevated lactate, improved     GASTROINTESTINAL  #Elevated Tbili (3.2 on admission, from 0.5 in 03/2024)  #Elevated LFTs  ::Likely ischemic liver injury  -trend CMP     #GERD  -c/w pantoprazole 40mg daily     ENDOCRINE  #T2DM, diet controlled  ::HgbA1c 3/2024 5.5  -hypoglycemia protocol and mild SSI     HEME/ONC  #Cardiac thrombi  -see cardiac section for plan     #Arterial thrombosis  #DVT  ::Arterial doppler of lower extremities with echogenic material within right distal femoral and popliteal arteries with absent Doppler flow in the right popliteal artery with significantly diminished flow within the right distal superficial femoral artery significantly diminished flow within the right distal superficial femoral artery, findings raise concern for thrombosis of distal SFA and popliteal artery. Decreased flow within right posterior tibial and peroneal arteries could be through collateralization. Additionally decreased flow in right common femoral artery, right deep femoral artery as well as  proximal and mid superficial femoral arteries concerning for stenosis proximal to right common femoral artery possible within right external iliac right common iliac artery.   ::Bilateral LE duplex with likely early nonocclusive deep venous thrombosis.   -heparin drip per above  -further plan per above     #Thrombocytopenia  ::Oozing of central line, Multiple thrombi, DIC score 5 on 6/24/2024  ::Hit score of 6  ::Negative PF4  -vascular medicine consulted   -monitor DIC labs daily  -consider hematology consult given oozing and thrombocytopenia with normal fibrinogen     #Left brachial injury  ::Likely injury  :LUE DVT scan with nonvascularized mass, called CT while patient was there to request CTA of left upper extremity and they said logistically they would be unable to perform both scans, and given protocol for max dosing of contrast, could not give further contrast until 6/25 at 2PM  -no CTA LUE needed      INFECTIOUS DISEASE  #Concern for infection  ::Procal 0.28  ::CXR without signs of PNA  ::s/p vanc/zosyn (6/20-6/24)  ::UA with 1+ blood, 1+ bacteria, no WBC, neg nitrite and leuk esterase  -pending blood cultures x1 6/20, x2 6/22, NGTD   -monitoring off antibiotics     Dispo:  -PT: Moderate intensity level of care  -patient would like enrollment in  home delivery service for medications (she has trouble getting to preferred pharmacy), pharmacy updated to [facility]  -patient's family would like her enrolled in [facility]  -repeat thyroid labs outpatient     N: cardiac, diabetic  A: [place], pIVs, Haily Lt radial  DVT ppx: heparin drip  GI ppx: pantoprazole 40mg     Code Status: FULL CODE per patient and POA confirmed on presentation to CICU)  Surrogate Medical Decision-maker:   Surrogate decision maker is: pt's cousin, Clara Wayne: [phone], Efrain Black: [phone], Keli Osborne: [phone]      Friends who may be helpful in making decisions:  Prior boyfriend Navin Sanches [phone]  Very good  "friend \"Isiah\" Pranay Owen [phone]    Kristine Pat MD  Internal Medicine, PGY-2   "

## 2024-06-26 ENCOUNTER — HOME CARE VISIT (OUTPATIENT)
Dept: HOME HEALTH SERVICES | Facility: HOME HEALTH | Age: 44
End: 2024-06-26
Payer: COMMERCIAL

## 2024-06-26 PROBLEM — J95.04 TRACHEOCUTANEOUS FISTULA FOLLOWING TRACHEOSTOMY (MULTI): Status: ACTIVE | Noted: 2024-06-26

## 2024-06-26 PROBLEM — D69.6 THROMBOCYTOPENIA (CMS-HCC): Status: ACTIVE | Noted: 2024-06-26

## 2024-06-26 PROBLEM — I70.221 CRITICAL LIMB ISCHEMIA OF RIGHT LOWER EXTREMITY (MULTI): Status: ACTIVE | Noted: 2024-06-26

## 2024-06-26 PROBLEM — E87.20 LACTIC ACIDOSIS: Status: ACTIVE | Noted: 2024-06-26

## 2024-06-26 PROBLEM — I82.409 DEEP VEIN THROMBOSIS (DVT) OF LOWER EXTREMITY (MULTI): Status: ACTIVE | Noted: 2024-06-26

## 2024-06-26 PROBLEM — M62.82 RHABDOMYOLYSIS: Status: ACTIVE | Noted: 2024-06-26

## 2024-06-26 LAB
ALBUMIN SERPL BCP-MCNC: 2.4 G/DL (ref 3.4–5)
ALBUMIN SERPL BCP-MCNC: 2.6 G/DL (ref 3.4–5)
ALP SERPL-CCNC: 71 U/L (ref 33–110)
ALT SERPL W P-5'-P-CCNC: 411 U/L (ref 7–45)
ANION GAP BLDMV CALCULATED.4IONS-SCNC: 4 MMO/L (ref 10–25)
ANION GAP BLDMV CALCULATED.4IONS-SCNC: 5 MMO/L (ref 10–25)
ANION GAP BLDMV CALCULATED.4IONS-SCNC: 6 MMO/L (ref 10–25)
ANION GAP BLDMV CALCULATED.4IONS-SCNC: 8 MMO/L (ref 10–25)
ANION GAP BLDMV CALCULATED.4IONS-SCNC: 9 MMO/L (ref 10–25)
ANION GAP SERPL CALC-SCNC: 13 MMOL/L (ref 10–20)
ANION GAP SERPL CALC-SCNC: 14 MMOL/L (ref 10–20)
APTT PPP: 105 SECONDS (ref 27–38)
AST SERPL W P-5'-P-CCNC: 332 U/L (ref 9–39)
BACTERIA BLD CULT: NORMAL
BACTERIA BLD CULT: NORMAL
BASE EXCESS BLDMV CALC-SCNC: 1.5 MMOL/L (ref -2–3)
BASE EXCESS BLDMV CALC-SCNC: 1.9 MMOL/L (ref -2–3)
BASE EXCESS BLDMV CALC-SCNC: 1.9 MMOL/L (ref -2–3)
BASE EXCESS BLDMV CALC-SCNC: 2.8 MMOL/L (ref -2–3)
BASE EXCESS BLDMV CALC-SCNC: 3.3 MMOL/L (ref -2–3)
BASOPHILS # BLD AUTO: 0.02 X10*3/UL (ref 0–0.1)
BASOPHILS # BLD AUTO: 0.03 X10*3/UL (ref 0–0.1)
BASOPHILS NFR BLD AUTO: 0.1 %
BASOPHILS NFR BLD AUTO: 0.1 %
BILIRUB DIRECT SERPL-MCNC: 2.3 MG/DL (ref 0–0.3)
BILIRUB SERPL-MCNC: 3.7 MG/DL (ref 0–1.2)
BODY TEMPERATURE: 37 DEGREES CELSIUS
BUN SERPL-MCNC: 17 MG/DL (ref 6–23)
BUN SERPL-MCNC: 19 MG/DL (ref 6–23)
CA-I BLDMV-SCNC: 1.05 MMOL/L (ref 1.1–1.33)
CA-I BLDMV-SCNC: 1.09 MMOL/L (ref 1.1–1.33)
CA-I BLDMV-SCNC: 1.1 MMOL/L (ref 1.1–1.33)
CA-I BLDMV-SCNC: 1.12 MMOL/L (ref 1.1–1.33)
CA-I BLDMV-SCNC: 1.13 MMOL/L (ref 1.1–1.33)
CALCIUM SERPL-MCNC: 7.4 MG/DL (ref 8.6–10.6)
CALCIUM SERPL-MCNC: 7.7 MG/DL (ref 8.6–10.6)
CHLORIDE BLD-SCNC: 100 MMOL/L (ref 98–107)
CHLORIDE BLD-SCNC: 101 MMOL/L (ref 98–107)
CHLORIDE BLD-SCNC: 99 MMOL/L (ref 98–107)
CHLORIDE SERPL-SCNC: 95 MMOL/L (ref 98–107)
CHLORIDE SERPL-SCNC: 96 MMOL/L (ref 98–107)
CK SERPL-CCNC: 6900 U/L (ref 0–215)
CK SERPL-CCNC: 7604 U/L (ref 0–215)
CO2 SERPL-SCNC: 25 MMOL/L (ref 21–32)
CO2 SERPL-SCNC: 26 MMOL/L (ref 21–32)
CREAT SERPL-MCNC: 0.99 MG/DL (ref 0.5–1.05)
CREAT SERPL-MCNC: 1.01 MG/DL (ref 0.5–1.05)
D DIMER PPP FEU-MCNC: 1145 NG/ML FEU
EGFRCR SERPLBLD CKD-EPI 2021: 70 ML/MIN/1.73M*2
EGFRCR SERPLBLD CKD-EPI 2021: 72 ML/MIN/1.73M*2
EOSINOPHIL # BLD AUTO: 0.23 X10*3/UL (ref 0–0.7)
EOSINOPHIL # BLD AUTO: 0.24 X10*3/UL (ref 0–0.7)
EOSINOPHIL NFR BLD AUTO: 1.1 %
EOSINOPHIL NFR BLD AUTO: 1.2 %
ERYTHROCYTE [DISTWIDTH] IN BLOOD BY AUTOMATED COUNT: 16.9 % (ref 11.5–14.5)
ERYTHROCYTE [DISTWIDTH] IN BLOOD BY AUTOMATED COUNT: 17.4 % (ref 11.5–14.5)
FIBRINOGEN PPP-MCNC: 420 MG/DL (ref 200–400)
GLUCOSE BLD MANUAL STRIP-MCNC: 112 MG/DL (ref 74–99)
GLUCOSE BLD MANUAL STRIP-MCNC: 122 MG/DL (ref 74–99)
GLUCOSE BLD MANUAL STRIP-MCNC: 134 MG/DL (ref 74–99)
GLUCOSE BLD MANUAL STRIP-MCNC: 83 MG/DL (ref 74–99)
GLUCOSE BLD MANUAL STRIP-MCNC: 89 MG/DL (ref 74–99)
GLUCOSE BLD MANUAL STRIP-MCNC: 96 MG/DL (ref 74–99)
GLUCOSE BLD-MCNC: 129 MG/DL (ref 74–99)
GLUCOSE BLD-MCNC: 85 MG/DL (ref 74–99)
GLUCOSE BLD-MCNC: 93 MG/DL (ref 74–99)
GLUCOSE BLD-MCNC: 97 MG/DL (ref 74–99)
GLUCOSE BLD-MCNC: 99 MG/DL (ref 74–99)
GLUCOSE SERPL-MCNC: 126 MG/DL (ref 74–99)
GLUCOSE SERPL-MCNC: 89 MG/DL (ref 74–99)
HAPTOGLOB SERPL-MCNC: 132 MG/DL (ref 37–246)
HCO3 BLDMV-SCNC: 25.9 MMOL/L (ref 22–26)
HCO3 BLDMV-SCNC: 26.6 MMOL/L (ref 22–26)
HCO3 BLDMV-SCNC: 26.6 MMOL/L (ref 22–26)
HCO3 BLDMV-SCNC: 27.2 MMOL/L (ref 22–26)
HCO3 BLDMV-SCNC: 27.9 MMOL/L (ref 22–26)
HCT VFR BLD AUTO: 20.7 % (ref 36–46)
HCT VFR BLD AUTO: 23.9 % (ref 36–46)
HCT VFR BLD EST: 22 % (ref 36–46)
HCT VFR BLD EST: 23 % (ref 36–46)
HCT VFR BLD EST: 24 % (ref 36–46)
HGB BLD-MCNC: 6.9 G/DL (ref 12–16)
HGB BLD-MCNC: 7.3 G/DL (ref 12–16)
HGB BLDMV-MCNC: 7.2 G/DL (ref 12–16)
HGB BLDMV-MCNC: 7.5 G/DL (ref 12–16)
HGB BLDMV-MCNC: 7.7 G/DL (ref 12–16)
HGB BLDMV-MCNC: 7.7 G/DL (ref 12–16)
HGB BLDMV-MCNC: 7.9 G/DL (ref 12–16)
IMM GRANULOCYTES # BLD AUTO: 0.32 X10*3/UL (ref 0–0.7)
IMM GRANULOCYTES # BLD AUTO: 0.44 X10*3/UL (ref 0–0.7)
IMM GRANULOCYTES NFR BLD AUTO: 1.6 % (ref 0–0.9)
IMM GRANULOCYTES NFR BLD AUTO: 2.1 % (ref 0–0.9)
INHALED O2 CONCENTRATION: 21 %
INR PPP: 1.5 (ref 0.9–1.1)
LACTATE BLDMV-SCNC: 0.6 MMOL/L (ref 0.4–2)
LACTATE BLDMV-SCNC: 0.6 MMOL/L (ref 0.4–2)
LACTATE BLDMV-SCNC: 0.7 MMOL/L (ref 0.4–2)
LACTATE BLDMV-SCNC: 0.7 MMOL/L (ref 0.4–2)
LACTATE BLDMV-SCNC: 1.1 MMOL/L (ref 0.4–2)
LACTATE SERPL-SCNC: 0.6 MMOL/L (ref 0.4–2)
LYMPHOCYTES # BLD AUTO: 3.95 X10*3/UL (ref 1.2–4.8)
LYMPHOCYTES # BLD AUTO: 4.76 X10*3/UL (ref 1.2–4.8)
LYMPHOCYTES NFR BLD AUTO: 19.3 %
LYMPHOCYTES NFR BLD AUTO: 24.4 %
MAGNESIUM SERPL-MCNC: 1.73 MG/DL (ref 1.6–2.4)
MAGNESIUM SERPL-MCNC: 1.76 MG/DL (ref 1.6–2.4)
MCH RBC QN AUTO: 27.4 PG (ref 26–34)
MCH RBC QN AUTO: 27.8 PG (ref 26–34)
MCHC RBC AUTO-ENTMCNC: 30.5 G/DL (ref 32–36)
MCHC RBC AUTO-ENTMCNC: 33.3 G/DL (ref 32–36)
MCV RBC AUTO: 84 FL (ref 80–100)
MCV RBC AUTO: 90 FL (ref 80–100)
MONOCYTES # BLD AUTO: 2.18 X10*3/UL (ref 0.1–1)
MONOCYTES # BLD AUTO: 2.35 X10*3/UL (ref 0.1–1)
MONOCYTES NFR BLD AUTO: 11.2 %
MONOCYTES NFR BLD AUTO: 11.5 %
NEUTROPHILS # BLD AUTO: 11.98 X10*3/UL (ref 1.2–7.7)
NEUTROPHILS # BLD AUTO: 13.5 X10*3/UL (ref 1.2–7.7)
NEUTROPHILS NFR BLD AUTO: 61.5 %
NEUTROPHILS NFR BLD AUTO: 65.9 %
NRBC BLD-RTO: 0.4 /100 WBCS (ref 0–0)
NRBC BLD-RTO: 0.7 /100 WBCS (ref 0–0)
OXYHGB MFR BLDMV: 47.7 % (ref 45–75)
OXYHGB MFR BLDMV: 53.7 % (ref 45–75)
OXYHGB MFR BLDMV: 55.1 % (ref 45–75)
OXYHGB MFR BLDMV: 55.5 % (ref 45–75)
OXYHGB MFR BLDMV: 58.4 % (ref 45–75)
PATH REVIEW-CBC DIFFERENTIAL: NORMAL
PCO2 BLDMV: 39 MM HG (ref 41–51)
PCO2 BLDMV: 40 MM HG (ref 41–51)
PCO2 BLDMV: 41 MM HG (ref 41–51)
PCO2 BLDMV: 41 MM HG (ref 41–51)
PCO2 BLDMV: 42 MM HG (ref 41–51)
PH BLDMV: 7.42 PH (ref 7.33–7.43)
PH BLDMV: 7.42 PH (ref 7.33–7.43)
PH BLDMV: 7.43 PH (ref 7.33–7.43)
PH BLDMV: 7.43 PH (ref 7.33–7.43)
PH BLDMV: 7.44 PH (ref 7.33–7.43)
PHOSPHATE SERPL-MCNC: 2.7 MG/DL (ref 2.5–4.9)
PHOSPHATE SERPL-MCNC: 3.3 MG/DL (ref 2.5–4.9)
PLATELET # BLD AUTO: 107 X10*3/UL (ref 150–450)
PLATELET # BLD AUTO: 116 X10*3/UL (ref 150–450)
PO2 BLDMV: 34 MM HG (ref 35–45)
PO2 BLDMV: 36 MM HG (ref 35–45)
PO2 BLDMV: 37 MM HG (ref 35–45)
PO2 BLDMV: 37 MM HG (ref 35–45)
PO2 BLDMV: 41 MM HG (ref 35–45)
POTASSIUM BLDMV-SCNC: 3.5 MMOL/L (ref 3.5–5.3)
POTASSIUM BLDMV-SCNC: 3.7 MMOL/L (ref 3.5–5.3)
POTASSIUM BLDMV-SCNC: 3.7 MMOL/L (ref 3.5–5.3)
POTASSIUM BLDMV-SCNC: 3.8 MMOL/L (ref 3.5–5.3)
POTASSIUM BLDMV-SCNC: 3.8 MMOL/L (ref 3.5–5.3)
POTASSIUM SERPL-SCNC: 3.6 MMOL/L (ref 3.5–5.3)
POTASSIUM SERPL-SCNC: 3.7 MMOL/L (ref 3.5–5.3)
PROT SERPL-MCNC: 4.8 G/DL (ref 6.4–8.2)
PROTHROMBIN TIME: 16.5 SECONDS (ref 9.8–12.8)
RBC # BLD AUTO: 2.48 X10*6/UL (ref 4–5.2)
RBC # BLD AUTO: 2.66 X10*6/UL (ref 4–5.2)
SAO2 % BLDMV: 49 % (ref 45–75)
SAO2 % BLDMV: 56 % (ref 45–75)
SAO2 % BLDMV: 57 % (ref 45–75)
SAO2 % BLDMV: 57 % (ref 45–75)
SAO2 % BLDMV: 60 % (ref 45–75)
SODIUM BLDMV-SCNC: 127 MMOL/L (ref 136–145)
SODIUM BLDMV-SCNC: 128 MMOL/L (ref 136–145)
SODIUM BLDMV-SCNC: 129 MMOL/L (ref 136–145)
SODIUM BLDMV-SCNC: 130 MMOL/L (ref 136–145)
SODIUM BLDMV-SCNC: 131 MMOL/L (ref 136–145)
SODIUM SERPL-SCNC: 130 MMOL/L (ref 136–145)
SODIUM SERPL-SCNC: 131 MMOL/L (ref 136–145)
UFH PPP CHRO-ACNC: 0.4 IU/ML
WBC # BLD AUTO: 19.5 X10*3/UL (ref 4.4–11.3)
WBC # BLD AUTO: 20.5 X10*3/UL (ref 4.4–11.3)

## 2024-06-26 PROCEDURE — 84132 ASSAY OF SERUM POTASSIUM: CPT | Mod: 91 | Performed by: INTERNAL MEDICINE

## 2024-06-26 PROCEDURE — 85610 PROTHROMBIN TIME: CPT

## 2024-06-26 PROCEDURE — 2500000002 HC RX 250 W HCPCS SELF ADMINISTERED DRUGS (ALT 637 FOR MEDICARE OP, ALT 636 FOR OP/ED)

## 2024-06-26 PROCEDURE — C9113 INJ PANTOPRAZOLE SODIUM, VIA: HCPCS

## 2024-06-26 PROCEDURE — 84132 ASSAY OF SERUM POTASSIUM: CPT | Mod: 91

## 2024-06-26 PROCEDURE — 82550 ASSAY OF CK (CPK): CPT

## 2024-06-26 PROCEDURE — 85384 FIBRINOGEN ACTIVITY: CPT

## 2024-06-26 PROCEDURE — 85027 COMPLETE CBC AUTOMATED: CPT

## 2024-06-26 PROCEDURE — 99291 CRITICAL CARE FIRST HOUR: CPT | Performed by: INTERNAL MEDICINE

## 2024-06-26 PROCEDURE — 85025 COMPLETE CBC W/AUTO DIFF WBC: CPT

## 2024-06-26 PROCEDURE — 99233 SBSQ HOSP IP/OBS HIGH 50: CPT

## 2024-06-26 PROCEDURE — 2020000001 HC ICU ROOM DAILY

## 2024-06-26 PROCEDURE — 85025 COMPLETE CBC W/AUTO DIFF WBC: CPT | Mod: 91

## 2024-06-26 PROCEDURE — 85379 FIBRIN DEGRADATION QUANT: CPT

## 2024-06-26 PROCEDURE — 84100 ASSAY OF PHOSPHORUS: CPT

## 2024-06-26 PROCEDURE — 83605 ASSAY OF LACTIC ACID: CPT | Mod: 91

## 2024-06-26 PROCEDURE — 2500000004 HC RX 250 GENERAL PHARMACY W/ HCPCS (ALT 636 FOR OP/ED)

## 2024-06-26 PROCEDURE — 85520 HEPARIN ASSAY: CPT

## 2024-06-26 PROCEDURE — 37799 UNLISTED PX VASCULAR SURGERY: CPT

## 2024-06-26 PROCEDURE — 99232 SBSQ HOSP IP/OBS MODERATE 35: CPT | Performed by: NURSE PRACTITIONER

## 2024-06-26 PROCEDURE — 2500000001 HC RX 250 WO HCPCS SELF ADMINISTERED DRUGS (ALT 637 FOR MEDICARE OP)

## 2024-06-26 PROCEDURE — 84075 ASSAY ALKALINE PHOSPHATASE: CPT

## 2024-06-26 PROCEDURE — 82947 ASSAY GLUCOSE BLOOD QUANT: CPT | Mod: 91

## 2024-06-26 PROCEDURE — 83735 ASSAY OF MAGNESIUM: CPT

## 2024-06-26 PROCEDURE — 80069 RENAL FUNCTION PANEL: CPT | Mod: CCI

## 2024-06-26 PROCEDURE — P9016 RBC LEUKOCYTES REDUCED: HCPCS

## 2024-06-26 PROCEDURE — 36430 TRANSFUSION BLD/BLD COMPNT: CPT

## 2024-06-26 PROCEDURE — 37799 UNLISTED PX VASCULAR SURGERY: CPT | Performed by: INTERNAL MEDICINE

## 2024-06-26 PROCEDURE — 99233 SBSQ HOSP IP/OBS HIGH 50: CPT | Performed by: SURGERY

## 2024-06-26 PROCEDURE — 83735 ASSAY OF MAGNESIUM: CPT | Mod: 91

## 2024-06-26 RX ORDER — HYDROMORPHONE HYDROCHLORIDE 1 MG/ML
0.4 INJECTION, SOLUTION INTRAMUSCULAR; INTRAVENOUS; SUBCUTANEOUS ONCE
Status: COMPLETED | OUTPATIENT
Start: 2024-06-26 | End: 2024-06-26

## 2024-06-26 RX ORDER — TALC
6 POWDER (GRAM) TOPICAL NIGHTLY
Status: DISCONTINUED | OUTPATIENT
Start: 2024-06-26 | End: 2024-07-03

## 2024-06-26 RX ORDER — POTASSIUM CHLORIDE 14.9 MG/ML
20 INJECTION INTRAVENOUS
Status: COMPLETED | OUTPATIENT
Start: 2024-06-26 | End: 2024-06-26

## 2024-06-26 RX ORDER — MAGNESIUM SULFATE HEPTAHYDRATE 40 MG/ML
4 INJECTION, SOLUTION INTRAVENOUS ONCE
Status: COMPLETED | OUTPATIENT
Start: 2024-06-26 | End: 2024-06-27

## 2024-06-26 RX ORDER — BUMETANIDE 0.25 MG/ML
4 INJECTION INTRAMUSCULAR; INTRAVENOUS ONCE
Status: COMPLETED | OUTPATIENT
Start: 2024-06-26 | End: 2024-06-26

## 2024-06-26 RX ORDER — MAGNESIUM SULFATE HEPTAHYDRATE 40 MG/ML
2 INJECTION, SOLUTION INTRAVENOUS ONCE
Status: COMPLETED | OUTPATIENT
Start: 2024-06-26 | End: 2024-06-26

## 2024-06-26 RX ORDER — POTASSIUM CHLORIDE 29.8 MG/ML
40 INJECTION INTRAVENOUS ONCE
Status: COMPLETED | OUTPATIENT
Start: 2024-06-26 | End: 2024-06-27

## 2024-06-26 RX ADMIN — POTASSIUM CHLORIDE 20 MEQ: 14.9 INJECTION, SOLUTION INTRAVENOUS at 11:09

## 2024-06-26 RX ADMIN — SODIUM NITROPRUSSIDE 0.8 MCG/KG/MIN: 0.5 INJECTION, SOLUTION INTRAVENOUS at 11:09

## 2024-06-26 RX ADMIN — SODIUM NITROPRUSSIDE 0.8 MCG/KG/MIN: 0.5 INJECTION, SOLUTION INTRAVENOUS at 02:36

## 2024-06-26 RX ADMIN — HYDROMORPHONE HYDROCHLORIDE 0.4 MG: 1 INJECTION, SOLUTION INTRAMUSCULAR; INTRAVENOUS; SUBCUTANEOUS at 02:28

## 2024-06-26 RX ADMIN — POTASSIUM CHLORIDE 20 MEQ: 14.9 INJECTION, SOLUTION INTRAVENOUS at 08:27

## 2024-06-26 RX ADMIN — AMIODARONE HYDROCHLORIDE 200 MG: 200 TABLET ORAL at 08:27

## 2024-06-26 RX ADMIN — SODIUM NITROPRUSSIDE 0.8 MCG/KG/MIN: 0.5 INJECTION, SOLUTION INTRAVENOUS at 18:32

## 2024-06-26 RX ADMIN — MAGNESIUM SULFATE HEPTAHYDRATE 4 G: 40 INJECTION, SOLUTION INTRAVENOUS at 23:10

## 2024-06-26 RX ADMIN — POTASSIUM CHLORIDE 40 MEQ: 29.8 INJECTION, SOLUTION INTRAVENOUS at 23:11

## 2024-06-26 RX ADMIN — Medication 6 MG: at 21:26

## 2024-06-26 RX ADMIN — MAGNESIUM SULFATE HEPTAHYDRATE 2 G: 2 INJECTION, SOLUTION INTRAVENOUS at 07:45

## 2024-06-26 RX ADMIN — HYDROMORPHONE HYDROCHLORIDE 0.4 MG: 1 INJECTION, SOLUTION INTRAMUSCULAR; INTRAVENOUS; SUBCUTANEOUS at 06:35

## 2024-06-26 RX ADMIN — BUMETANIDE 4 MG: 0.25 INJECTION INTRAMUSCULAR; INTRAVENOUS at 02:48

## 2024-06-26 RX ADMIN — HEPARIN SODIUM 1200 UNITS/HR: 10000 INJECTION, SOLUTION INTRAVENOUS at 13:56

## 2024-06-26 RX ADMIN — HYDROMORPHONE HYDROCHLORIDE 0.4 MG: 1 INJECTION, SOLUTION INTRAMUSCULAR; INTRAVENOUS; SUBCUTANEOUS at 15:34

## 2024-06-26 RX ADMIN — HYDROMORPHONE HYDROCHLORIDE 0.4 MG: 1 INJECTION, SOLUTION INTRAMUSCULAR; INTRAVENOUS; SUBCUTANEOUS at 17:25

## 2024-06-26 RX ADMIN — PANTOPRAZOLE SODIUM 40 MG: 40 INJECTION, POWDER, FOR SOLUTION INTRAVENOUS at 06:03

## 2024-06-26 RX ADMIN — HYDROMORPHONE HYDROCHLORIDE 0.4 MG: 1 INJECTION, SOLUTION INTRAMUSCULAR; INTRAVENOUS; SUBCUTANEOUS at 21:26

## 2024-06-26 RX ADMIN — ASPIRIN 81 MG CHEWABLE TABLET 81 MG: 81 TABLET CHEWABLE at 06:03

## 2024-06-26 SDOH — ECONOMIC STABILITY: FOOD INSECURITY: WITHIN THE PAST 12 MONTHS, YOU WORRIED THAT YOUR FOOD WOULD RUN OUT BEFORE YOU GOT MONEY TO BUY MORE.: SOMETIMES TRUE

## 2024-06-26 SDOH — HEALTH STABILITY: PHYSICAL HEALTH: ON AVERAGE, HOW MANY DAYS PER WEEK DO YOU ENGAGE IN MODERATE TO STRENUOUS EXERCISE (LIKE A BRISK WALK)?: 0 DAYS

## 2024-06-26 SDOH — ECONOMIC STABILITY: INCOME INSECURITY: IN THE LAST 12 MONTHS, WAS THERE A TIME WHEN YOU WERE NOT ABLE TO PAY THE MORTGAGE OR RENT ON TIME?: NO

## 2024-06-26 SDOH — SOCIAL STABILITY: SOCIAL NETWORK: IN A TYPICAL WEEK, HOW MANY TIMES DO YOU TALK ON THE PHONE WITH FAMILY, FRIENDS, OR NEIGHBORS?: TWICE A WEEK

## 2024-06-26 SDOH — HEALTH STABILITY: PHYSICAL HEALTH: ON AVERAGE, HOW MANY MINUTES DO YOU ENGAGE IN EXERCISE AT THIS LEVEL?: 0 MIN

## 2024-06-26 SDOH — SOCIAL STABILITY: SOCIAL NETWORK
DO YOU BELONG TO ANY CLUBS OR ORGANIZATIONS SUCH AS CHURCH GROUPS UNIONS, FRATERNAL OR ATHLETIC GROUPS, OR SCHOOL GROUPS?: NO

## 2024-06-26 SDOH — ECONOMIC STABILITY: HOUSING INSECURITY: IN THE LAST 12 MONTHS, HOW MANY PLACES HAVE YOU LIVED?: 1

## 2024-06-26 SDOH — SOCIAL STABILITY: SOCIAL NETWORK: HOW OFTEN DO YOU ATTEND CHURCH OR RELIGIOUS SERVICES?: NEVER

## 2024-06-26 SDOH — HEALTH STABILITY: MENTAL HEALTH
HOW OFTEN DO YOU NEED TO HAVE SOMEONE HELP YOU WHEN YOU READ INSTRUCTIONS, PAMPHLETS, OR OTHER WRITTEN MATERIAL FROM YOUR DOCTOR OR PHARMACY?: NEVER

## 2024-06-26 SDOH — SOCIAL STABILITY: SOCIAL NETWORK: ARE YOU MARRIED, WIDOWED, DIVORCED, SEPARATED, NEVER MARRIED, OR LIVING WITH A PARTNER?: NEVER MARRIED

## 2024-06-26 SDOH — ECONOMIC STABILITY: FOOD INSECURITY: WITHIN THE PAST 12 MONTHS, THE FOOD YOU BOUGHT JUST DIDN'T LAST AND YOU DIDN'T HAVE MONEY TO GET MORE.: SOMETIMES TRUE

## 2024-06-26 SDOH — SOCIAL STABILITY: SOCIAL INSECURITY: WITHIN THE LAST YEAR, HAVE YOU BEEN HUMILIATED OR EMOTIONALLY ABUSED IN OTHER WAYS BY YOUR PARTNER OR EX-PARTNER?: NO

## 2024-06-26 SDOH — SOCIAL STABILITY: SOCIAL INSECURITY: WITHIN THE LAST YEAR, HAVE YOU BEEN AFRAID OF YOUR PARTNER OR EX-PARTNER?: NO

## 2024-06-26 SDOH — SOCIAL STABILITY: SOCIAL NETWORK: HOW OFTEN DO YOU ATTENT MEETINGS OF THE CLUB OR ORGANIZATION YOU BELONG TO?: NEVER

## 2024-06-26 SDOH — SOCIAL STABILITY: SOCIAL NETWORK: HOW OFTEN DO YOU GET TOGETHER WITH FRIENDS OR RELATIVES?: TWICE A WEEK

## 2024-06-26 SDOH — HEALTH STABILITY: MENTAL HEALTH: HOW OFTEN DO YOU HAVE A DRINK CONTAINING ALCOHOL?: 2-3 TIMES A WEEK

## 2024-06-26 SDOH — ECONOMIC STABILITY: INCOME INSECURITY: IN THE PAST 12 MONTHS, HAS THE ELECTRIC, GAS, OIL, OR WATER COMPANY THREATENED TO SHUT OFF SERVICE IN YOUR HOME?: YES

## 2024-06-26 SDOH — ECONOMIC STABILITY: INCOME INSECURITY: HOW HARD IS IT FOR YOU TO PAY FOR THE VERY BASICS LIKE FOOD, HOUSING, MEDICAL CARE, AND HEATING?: NOT HARD AT ALL

## 2024-06-26 SDOH — ECONOMIC STABILITY: TRANSPORTATION INSECURITY
IN THE PAST 12 MONTHS, HAS THE LACK OF TRANSPORTATION KEPT YOU FROM MEDICAL APPOINTMENTS OR FROM GETTING MEDICATIONS?: YES

## 2024-06-26 ASSESSMENT — PAIN - FUNCTIONAL ASSESSMENT
PAIN_FUNCTIONAL_ASSESSMENT: 0-10

## 2024-06-26 ASSESSMENT — PAIN DESCRIPTION - LOCATION
LOCATION: LEG

## 2024-06-26 ASSESSMENT — PAIN SCALES - GENERAL
PAINLEVEL_OUTOF10: 0 - NO PAIN
PAINLEVEL_OUTOF10: 7
PAINLEVEL_OUTOF10: 1
PAINLEVEL_OUTOF10: 9
PAINLEVEL_OUTOF10: 1
PAINLEVEL_OUTOF10: 0 - NO PAIN
PAINLEVEL_OUTOF10: 0 - NO PAIN
PAINLEVEL_OUTOF10: 8
PAINLEVEL_OUTOF10: 1
PAINLEVEL_OUTOF10: 0 - NO PAIN
PAINLEVEL_OUTOF10: 3
PAINLEVEL_OUTOF10: 7
PAINLEVEL_OUTOF10: 7

## 2024-06-26 ASSESSMENT — PAIN DESCRIPTION - ORIENTATION
ORIENTATION: RIGHT;LEFT
ORIENTATION: RIGHT
ORIENTATION: RIGHT
ORIENTATION: RIGHT;LOWER
ORIENTATION: RIGHT

## 2024-06-26 ASSESSMENT — PAIN DESCRIPTION - DESCRIPTORS
DESCRIPTORS: ACHING

## 2024-06-26 ASSESSMENT — PAIN SCALES - WONG BAKER: WONGBAKER_NUMERICALRESPONSE: NO HURT

## 2024-06-26 NOTE — PROGRESS NOTES
Amirah Bennett is a 43 y.o. female on day 6 of admission.   Medical history significant for atrial fibrillation s/p DCCV c/b YFN thrombus and stroke s/p thrombectomy 2022 and stroke 3/2024 with residual expressive aphasia and right sided weakness, HLD, HTN, T2DM.  Presents with report of BLE pain and dyspnea.  Patient underwent RHC that was consistent with cardiogenic shock. Hospital course with medical management of shock, as patient not a candidate for advanced therapies, complicated by SARAH, cardiac thrombi, DVT of EIV-CFV, elevated liver enzymes, rhabdomyolysis, intracardiac thrombus, ALI and Afib with RVR.    Underwent arterial duplex exam with monophasic right CFA, SFA and PFA signals, absent popliteal flow and monophasic flow in the tibial artery, with no sensation below the right knee and no movement at the ankle, which is concerning for ALI.  Vascular Surgery is involved, and notes that the right leg is not salvageable, and that patient will need an inflow procedure and right AKA procedure.     Vascular Medicine Service consulted for HIT due to thrombocytopenia.    HIT workup stopped after PF4 resulted with negative result.   Transitioned back to Heparin infusion with rising platelet count.     Subjective   Patient reports she is preparing herself for right AKA surgery.  Denies CP, SOB or bleeding.      Objective   Vascular Medicine Service following for anticoagulation recommendations.     Physical Exam  Resting in bed in NAD  Respirations full and easy with breat sounds CTA all anterior lung fields; dressing dry and intact at neck;   Right PA line in place, with good wave forms on monitor.  Normal heart sounds with regular rate/rhythm; monitor shows SR; vital signs stable   Abdomen soft and nontender to palpation.  Extremities are warm to touch with the exception of the right foot that is cool to touch; palpable bilateral radial pulses and left DP pulse; unable to palpate or obtain doppler signal of the  "right DP/PT.  Patient is awake and conversant, participates a little in conversation    Last Recorded Vitals  Blood pressure 85/59, pulse 93, temperature 36.6 °C (97.9 °F), resp. rate 20, height 1.702 m (5' 7.01\"), weight 89.8 kg (197 lb 15.6 oz), SpO2 99%.  Intake/Output last 3 Shifts:  I/O last 3 completed shifts:  In: 1672.7 (17.5 mL/kg) [P.O.:450; I.V.:822.7 (8.6 mL/kg); IV Piggyback:400]  Out: 3850 (40.3 mL/kg) [Urine:3850 (1.1 mL/kg/hr)]  Weight: 95.5 kg     Relevant Results  Scheduled medications  amiodarone, 200 mg, oral, Daily  aspirin, 81 mg, oral, Daily  bisacodyl, 10 mg, rectal, Daily  [Held by provider] empagliflozin, 10 mg, oral, Daily  [Held by provider] folic acid, 1 mg, oral, Daily  [Held by provider] gabapentin, 100 mg, oral, TID  insulin lispro, 0-5 Units, subcutaneous, q4h  melatonin, 6 mg, oral, Nightly  [Held by provider] metoprolol succinate XL, 50 mg, oral, Daily  oxygen, , inhalation, Continuous - Inhalation  pantoprazole, 40 mg, intravenous, Daily before breakfast  perflutren protein A microsphere, 0.5 mL, intravenous, Once in imaging  polyethylene glycol, 17 g, oral, BID  [Held by provider] sacubitriL-valsartan, 1 tablet, oral, BID  sennosides, 2 tablet, oral, BID  [Held by provider] spironolactone, 25 mg, oral, Daily  sulfur hexafluoride microsphr, 2 mL, intravenous, Once in imaging      Continuous medications  DOBUTamine, 2.5-20 mcg/kg/min, Last Rate: Stopped (06/24/24 1431)  heparin, 0-4,500 Units/hr, Last Rate: 12 Units/hr (06/26/24 1400)  nitroprusside, 0.25-5 mcg/kg/min, Last Rate: 0.8 mcg/kg/min (06/26/24 1400)      Results from last 7 days   Lab Units 06/26/24  0434 06/25/24  1757 06/25/24  1218   WBC AUTO x10*3/uL 19.5* 18.1* 17.3*   HEMOGLOBIN g/dL 7.3* 7.9* 7.7*   HEMATOCRIT % 23.9* 24.0* 22.1*   PLATELETS AUTO x10*3/uL 107* 108* 94*       Results from last 7 days   Lab Units 06/26/24  0434 06/25/24  1757 06/25/24  0505   SODIUM mmol/L 131* 129* 130*   POTASSIUM mmol/L 3.7 3.4* " 3.8   CHLORIDE mmol/L 96* 95* 94*   CO2 mmol/L 25 26 26   BUN mg/dL 19 20 21   CREATININE mg/dL 0.99 1.06* 1.25*   GLUCOSE mg/dL 89 117* 87   CALCIUM mg/dL 7.7* 7.5* 7.7*   Estimated Creatinine Clearance: 82.6 mL/min (by C-G formula based on SCr of 0.99 mg/dL).      Results from last 7 days   Lab Units 06/26/24  0434 06/25/24  1606 06/25/24  1215 06/25/24  0505 06/24/24  1022   APTT seconds 105* 61* 73* 182* 189*   INR  1.5*  --   --  1.6* 1.8*       Results from last 7 days   Lab Units 06/26/24  0434 06/25/24  2302 06/25/24  0505   ANTI XA UNFRACTIONATED IU/mL 0.4 0.3 0.4      VAS US LOWER EXTREMITY ARTERIAL DUPLEX BILATERAL  6/23/2024 10:24   INDICATION: Signs/Symptoms:loss of dopplerable pulses.  IMPRESSION:  1. Echogenic material within the right distal femoral and popliteal arteries with  absent Doppler flow in the right popliteal artery significantly diminished flow within the right distal superficial femoral artery. Findings raise concern for thrombosis of the distal SFA and popliteal artery. Decreased flow within the right posterior  tibial and peroneal arteries could be through collateralization.  2. Decreased flow within the right common femoral artery, right deep femoral artery as well as the proximal and mid superficial femoral arteries, raising concern for stenosis proximal to the right common femoral artery, possibly within the right external iliac right common iliac artery. A CTA of the lower extremities can be obtained for further evaluation.  3. Unremarkable Doppler interrogation of the left lower extremity arteries.    [facility] US LOWER EXTREMITY VENOUS DUPLEX BILATERAL  6/23/2024 10:24 am   INDICATION: Signs/Symptoms:lower extremity swelling and concern clot was seen on US as femoral [place] placed, doac nonadherene.  IMPRESSION:  1. Partial compressibility of the right external iliac vein and the right common femoral vein, which raises concern for early nonocclusive deep venous thrombosis, although  flow is seen on Doppler images.  2. Otherwise, no sonographic evidence for deep vein thrombosis within the remainder of the evaluated veins of the bilateral lower extremity.     [facility] US UPPER EXTREMITY VENOUS DUPLEX LEFT   Study Date: 6/24/2024   CONCLUSIONS:  Right Upper Venous: The subclavian vein demonstrates a normal spontaneous and phasic flow.  Left Upper Venous: No evidence of acute deep vein thrombus visualized in the left upper extremity. Non-vascularized mass in left upper arm measuring 46.27 mm x 64.90 mm. Additional Findings; Non-vascularized mass.    CT ANGIO AORTA AND BILATERAL ILIOFEMORAL RUNOFF W AND OR WO IV CONTRAST  6/24/2024 2:24 pm  INDICATION: Signs/Symptoms:concern for limb ischemia in right leg.  IMPRESSION:  RIGHT LOWER EXTREMITY:   1. Intraluminal filling defects in the aortoiliac bifurcation.  2. Nearly occlusive thrombus in the distal right common iliac artery with nearly completely occlusive thrombus in the proximal right external iliac artery with extension into the proximal right internal iliac artery. Partially occlusive thrombus within the short-segment of the proximal SFA and distal SFA as it exits the adductor hiatus with complete occlusion of the popliteal artery and faint reconstitution of flow into the infrapopliteal vasculature as detailed above.       LEFT LOWER EXTREMITY:  1. Complete total occlusion of the P2 and P3 segments of the left popliteal artery with the extension into the tibioperoneal trunk and reconstitution of flow into the proximal posterior tibial, peroneal and anterior tibial arteries. There is abrupt cutoff of the mid anterior tibial artery with distal reconstitution. Faint opacification of the peroneal artery and posterior artery throughout their course. Additional findings are as detailed above.      NONVASCULAR:  1. Cardiomegaly with early opacification of the hepatic veins suggestive of congestive hepatopathy with component of right heart failure.  2.  Mildly enlarged liver with diffuse decreased hepatic attenuation suggestive of steatosis.  3. Cortical atrophy involving the upper and middle pole of the region of the left kidney similar compared to prior distant imaging suggestive of sequela of remote infarcts.  4. Diffuse anasarca with mesenteric edema and small abdominopelvic ascites.      Assessment/Plan   Principal Problem:    Atrial fibrillation (Multi)  Active Problems:    CHF (congestive heart failure) (Multi)    Cardiogenic shock (Multi)    Acute lower extremity ischemia    43  year old female presents with BLE pain and dyspnea.  Medical history as noted above.  Patient found to have monophasic right CFA, SFA and PFA signals, absent popliteal flow and monophasic flow in the tibial artery, with no sensation below the right knee and no movement at the ankle, which was concerning for ALI.  Vascular Surgery plans for right AKA this week.   Vascular Medicine Service consulted for thrombocytopenia and workup for HIT.  HIT ruled out with negative PF4 result.  Transitioned back to Heparin infusion with increase in platelet counts.   Vascular studies reviewed by Dr. Julia Gordon, and she is recommending placement of IVC filter prior to surgery, as discussion with Vascular Surgery team indicated need to hold anticoagulation therapy 6 hours prior to surgery and up to 48 hours postoperatively.   Plan of care discussed with patient, and her questions were answered to her satisfaction.        Recommendations:  ~CONTINUE Heparin infusion; follow nomogram to achieve therapeutic assay 0.3-0.7  ~MONITOR for bleeding  ~Plan for placement of IVC filter  ~We will determine outpatient anticoagulation agent after surgery has been completed.     Plan of care discussed with Dr. Julia Gordon  Plan of care discussed in person with Dr. KEERTHI Pat from the [facility] Service    JAE Mendiola-CNP  Vascular Medicine Service   Pager [phone]

## 2024-06-26 NOTE — PROGRESS NOTES
"Amirah Bennett is a 43 y.o. female on day 6 of admission presenting with Atrial fibrillation (Multi).    Subjective   Symptoms (0 - 10, Best to Worst)  [place] Symptom Assessment System  0-10 (Numeric) Pain Score: 0 - No pain  ***       Objective     Physical Exam    Last Recorded Vitals  Blood pressure 85/59, pulse 93, temperature 36.6 °C (97.9 °F), resp. rate 20, height 1.702 m (5' 7.01\"), weight 89.8 kg (197 lb 15.6 oz), SpO2 99%.  Intake/Output last 3 Shifts:  I/O last 3 completed shifts:  In: 1672.7 (17.5 mL/kg) [P.O.:450; I.V.:822.7 (8.6 mL/kg); IV Piggyback:400]  Out: 3850 (40.3 mL/kg) [Urine:3850 (1.1 mL/kg/hr)]  Weight: 95.5 kg     Relevant Results  {If you would like to pull in Medications, type .meds     If you would like to pull in Lab results for the last 24 hours, type .[id number]    If you would like to pull in Imaging results, type .imgrslt :99}      {Link to Stroke Scoring tools - Link :99}           Assessment/Plan   IMP:  ***    Symptom Management  Pain: ***  Medications recommended for pain?  {Responses; yes/no/unknown/maybe/na:32495}  Tiredness: ***  Nausea: ***  Depression: ***  Anxiety: ***  Drowsiness: ***  Appetite: ***  Wellbeing: ***  Dyspnea: ***  Intervention recommended for dyspnea?  {RESPONSES; YES/NO/UNKNOWN:44898}  Other: ***  Intervention recommended for constipation?  {Responses; yes/no/unknown/maybe/na:03473}    {Provider estimate of survival:15257:::1}  {Patient Prognostic Awareness:60110:::1}    Patient/proxy preference for information  Prefers {POD FULL/LIMTED/NO:75036} information    Goals of Care  ***    Is the patient hospice-eligible?   {yes,no:65057}  Was a discussion held re hospice services?   {Yes/no/unknown:20672::\"no\"}  Was a decision made re hospice services?  {Responses; yes/no/unknown/maybe/na:29528}    Other Palliative Support  ***       {This patient does not have an ACP note on file for this encounter, please fill one out - Advance Care Planning Activity :99}  I " spent *** minutes in the professional and overall care of this patient.      Iam Silvestre, JAE-CNP

## 2024-06-26 NOTE — CARE PLAN
The patient's goals for the shift include  be able to eat    The clinical goals for the shift include Patient will remain hemodynamiclly stable and without pain throughout shift.  Over the shift, the patient did not make progress toward the following goals. Barriers to progression include SVO2 decreased and intermittent pain in right leg. . . Recommendations to address these barriers include monitor SVO2 and adjust meds and give pain medication.

## 2024-06-26 NOTE — CARE PLAN
CHW Note  Attempted to meet with patient at bedside but patient was asleep.  Left at her bedside information on [employer] and additional subsidized housing resources, along with my contact information for pt and family to review.  Kristina Donnelly, [facility]

## 2024-06-26 NOTE — PROGRESS NOTES
Art Therapy Note    Amirah Bennett     Therapy Session  Referral Type: New referral this admission  Visit Type: New visit  Session Start Time: 1556  Session End Time: 1606  Intervention Delivery: In-person  Number of staff members present: 1              Treatment/Interventions       Post-assessment  Total Session Time (min): 10 minutes    Narrative  Assessment Detail: Pt was lying down, with nurse in and out of room when ATR introduced self, services and benefits of AT. Pt welcomed ATR, was smiling and open to engaging in AT another time after her upcoming surgery, which nurse shared may be as soon as Wednesday. Pt has use of her dominant hand making AT more feasible. ATR to f/u later in the week.    Education Documentation  No documentation found.

## 2024-06-26 NOTE — PROGRESS NOTES
2/2 CICU (5D)     SIGNIFICANT EVENTS  06/26 - Planning for R AKA, aorto-iliac thrombectomy. (Possibly Th/F)     DC PLAN  [facility]     > [address] [place] [address]     PAYOR   [facility]    PT/OT   06/24 PT (10) - Mod    ETHICS  06/22 Capacity/Decision-Making Considerations: From a chart review, it  appears Ms. Bennett's capacity for medical decision-making is  limited due to her expressive aphasia.    First Surrogate Felicitas (cousin) and 2nd is Felicitas's sister Keli (cousin)     COMPLETED  (X) 06/26 - SNF referral built in [facility] and sent to [facility].   (X) 06/26 - DC/SDOH Assessments completed with 1st cousin (Keli Osborne) via phone.     NOTE  On 6/24, colleague documented referral made to Community Health Worker. CHW has since put in 2 notes in working with patient for needs.  In call with Keli updated the order of Contacts in who to call first and subsequently thereafter.  Keli confirmed her understanding of above Ethics for surrogate decision maker. Keli help complete DC/SDOH assessments. Keli already aware patient will need SNF placement and asking for referral to [facility].. because she works there. If unable to accept for any reason, emailed SNF list to Keli for [facility] within zip code of patient's home zip. Advised her to pick 1-2 back up choices.     Pura Vasquez (LSW, MSW)

## 2024-06-26 NOTE — CARE PLAN
The patient's goals for the shift include  to get something to eat    The clinical goals for the shift include Patient will remain hemodynamically stable throughout shift.    Over the shift, the patient did not make progress toward the following goals. Barriers to progression include . Recommendations to address these barriers include ***.

## 2024-06-26 NOTE — PROGRESS NOTES
"Vascular Surgery Daily Progress Note    Amirah Bennett is a 43 y.o. female on day 6 of admission presenting with Atrial fibrillation (Multi).    Subjective   No events overnight. Stable exam      Objective     Last Recorded Vitals  BP 85/59   Pulse 87   Temp 36.5 °C (97.7 °F)   Resp 16   Ht 1.702 m (5' 7.01\")   Wt 95.5 kg (210 lb 8.6 oz)   SpO2 98%   BMI 32.97 kg/m²     Physical Exam:  Awake, alert  NCAT, MMM. Old tracheostomy site with dressing  RRR  Breathing comfortable  Abdomen soft, nontender  RLE without motor-sensation below the knee, mild coolness  Nontender to palpation    Intake/Output last 3 Shifts:  I/O last 3 completed shifts:  In: 1672.7 (17.5 mL/kg) [P.O.:450; I.V.:822.7 (8.6 mL/kg); IV Piggyback:400]  Out: 3850 (40.3 mL/kg) [Urine:3850 (1.1 mL/kg/hr)]  Weight: 95.5 kg     Relevant Lab Results  Lab Review   Recent Labs     06/26/24  0434 06/25/24  1757 06/25/24  0505 06/25/24  0028 06/24/24  1802 06/24/24  0316   * 129* 130*  --  131* 130*   K 3.7 3.4* 3.8  --  3.6 3.6   BUN 19 20 21  --  23 22   CREATININE 0.99 1.06* 1.25*  --  1.32* 1.41*   EGFR 72 66 54*  --  51* 47*   MG 1.73 2.08 1.92 1.76  --  2.06     Recent Labs     06/26/24  0434 06/25/24  1757 06/25/24  0505 06/24/24  1802 06/24/24  0316 06/23/24  1734 06/23/24  0546 06/22/24  1756 06/22/24  0124 03/08/24  2115 03/08/24  0134 03/26/22  1926 03/26/22  1446   ALBUMIN 2.4* 2.4* 2.3* 2.4* 2.5*   < > 2.6*   < > 3.0*   < > 4.4   < > 3.5   ALKPHOS 71  --  69  --  62  --  66  --  80   < > 68   < > 90   *  --  513*  --  655*  --  640*  --  805*   < > 107*   < > 2,716*   *  --  510*  --  890*  --  997*  --  1,417*   < > 107*   < > 6,960*   BILITOT 3.7*  --  3.2*  --  2.9*  --  2.9*  --  3.0*   < > 2.3*   < > 3.6*   LIPASE  --   --   --   --   --   --   --   --   --   --  16  --  80    < > = values in this interval not displayed.     Recent Labs     06/26/24  0434 06/25/24  1757 06/25/24  1218 06/25/24  0505 06/24/24  0316 "   WBC 19.5* 18.1* 17.3* 16.9* 15.7*   HGB 7.3* 7.9* 7.7* 7.7* 9.5*   HCT 23.9* 24.0* 22.1* 23.3* 28.4*   * 108* 94* 95* 99*   MCV 90 85 80 84 82     Recent Labs     24  0434 24  2302 24  0505 24  1022 24  1546 24  1024 24  0758 23  1456 22  0437 22  1643   INR 1.5*  --  1.6* 1.8*  --   --   --  2.1* 1.8*   < >  --    HAUF 0.4 0.3 0.4  --  0.6 0.6   < >  --   --    < >  --    FIBRINOGEN 420*  --  321 339  --   --   --   --   --   --  204    < > = values in this interval not displayed.     PTT - 2024:  4:34 AM  1.5   16.5 105     Recent Labs     03/10/24  0120 23  1843   CHOL 125 CANCELED   LDLF  --  CANCELED   HDL 17.6 CANCELED   TRIG 100 CANCELED     Lab Results   Component Value Date    HGBA1C 5.5 03/10/2024     Lab Results   Component Value Date    TSH 6.28 (H) 2024     CK 8550 (20053, 25529)    Imagin24  Venous duplex BUE  Right Upper Venous: The subclavian vein demonstrates a normal spontaneous and phasic flow.  Left Upper Venous: No evidence of acute deep vein thrombus visualized in the left upper extremity. Non-vascularized mass in left upper arm measuring 46.27 mm x 64.90 mm. Additional Findings; Non-vascularized mass.     24  Arterial duplex lower extremities  1. Echogenic material within the right distal femoral and popliteal  arteries with  absent Doppler flow in the right popliteal artery  significantly diminished flow within the right distal superficial  femoral artery. Findings raise concern for thrombosis of the distal  SFA and popliteal artery. Decreased flow within the right posterior  tibial and peroneal arteries could be through collateralization.  2. Decreased flow within the right common femoral artery, right deep  femoral artery as well as the proximal and mid superficial femoral  arteries, raising concern for stenosis proximal to the right common  femoral artery, possibly within the  right external iliac right common  iliac artery. A CTA of the lower extremities can be obtained for  further evaluation.  3. Unremarkable Doppler interrogation of the left lower extremity  arteries.  ** R CFA monophasic waveform 30cm/s  ** R SFA monophasic 10cc/s  ** R PFA monophasic 10cc/s  ** R popliteal absent  ** R PTA and peroneal monophasic     6/23/24  Venous duplex BLE  1. Partial compressibility of the right external iliac vein and the  right common femoral vein, which raises concern for early  nonocclusive deep venous thrombosis, although flow is seen on Doppler  images.  2. Otherwise, no sonographic evidence for deep vein thrombosis within  the remainder of the evaluated veins of the bilateral lower extremity.      6/24 CTA  RIGHT LOWER EXTREMITY:  1. Intraluminal filling defects in the aortoiliac bifurcation.  2. Nearly occlusive thrombus in the distal right common iliac artery  with nearly completely occlusive thrombus in the proximal right  external iliac artery with extension into the proximal right internal  iliac artery. Partially occlusive thrombus within the short-segment  of the proximal SFA and distal SFA as it exits the adductor hiatus  with complete occlusion of the popliteal artery and faint  reconstitution of flow into the infrapopliteal vasculature as  detailed above.      LEFT LOWER EXTREMITY:  1. Complete total occlusion of the P2 and P3 segments of the left  popliteal artery with the extension into the tibioperoneal trunk and  reconstitution of flow into the proximal posterior tibial, peroneal  and anterior tibial arteries. There is abrupt cutoff of the mid  anterior tibial artery with distal reconstitution. Faint  opacification of the peroneal artery and posterior artery throughout  their course. Additional findings are as detailed above.    Current medications:   Scheduled medications  amiodarone, 200 mg, oral, Daily  aspirin, 81 mg, oral, Daily  bisacodyl, 10 mg, rectal, Daily  [Held  by provider] empagliflozin, 10 mg, oral, Daily  [Held by provider] folic acid, 1 mg, oral, Daily  [Held by provider] gabapentin, 100 mg, oral, TID  insulin lispro, 0-5 Units, subcutaneous, q4h  magnesium sulfate, 2 g, intravenous, Once  melatonin, 3 mg, oral, Nightly  [Held by provider] metoprolol succinate XL, 50 mg, oral, Daily  oxygen, , inhalation, Continuous - Inhalation  pantoprazole, 40 mg, intravenous, Daily before breakfast  perflutren protein A microsphere, 0.5 mL, intravenous, Once in imaging  polyethylene glycol, 17 g, oral, BID  potassium chloride, 20 mEq, intravenous, q2h  [Held by provider] sacubitriL-valsartan, 1 tablet, oral, BID  sennosides, 2 tablet, oral, BID  [Held by provider] spironolactone, 25 mg, oral, Daily  sulfur hexafluoride microsphr, 2 mL, intravenous, Once in imaging      Continuous medications  DOBUTamine, 2.5-20 mcg/kg/min, Last Rate: Stopped (06/24/24 1431)  heparin, 0-4,500 Units/hr, Last Rate: 1,200 Units/hr (06/25/24 7827)  nitroprusside, 0.25-5 mcg/kg/min, Last Rate: 0.8 mcg/kg/min (06/26/24 0236)      PRN medications  PRN medications: dextrose, dextrose, glucagon, glucagon, heparin, HYDROmorphone, lidocaine    Assessment/Plan   Principal Problem:    Atrial fibrillation (Multi)  Active Problems:    CHF (congestive heart failure) (Multi)    Cardiogenic shock (Multi)    Acute lower extremity ischemia    43 year old female presents with reported bilateral lower extremity pain, found to be in cardiogenic shock. Admission exam on 6/20/24 documented inability to move the right lower extremity beyond the hip and with no sensation below the knee. No reported BLE signals throughout her course here. Notably she had an attempted but unsuccessful right femoral artery catheterization followed by placement of 4Fr sheath in the cath lab. Arterial duplex with monophasic R CFA, SFA, and PFA signals, absent popliteal flow, and monophasic tibial flow though hard to visualize. On exam no right  femoral pulse, monophasic PT signal at distal leg; no sensation below the knee and no movement at the ankle. History and physical concerning for acute limb ischemia present at admission (6/20/24) based on chart review and history. Unclear whether the right foot will be salvageable based on exam today. Likely with right iliac (inflow) disease based on exam, possible popliteal thromboembolism based on duplex. Also with possible left brachial arterial injury given likely hematoma on exam (radial waveform is appropriate). Right leg not salvageable, will need inflow procedure and R AKA given motor deficits.      CTA Aorta with BLE runoff shows: aortic bifurcation embolus, R BA-EIA embolus, SFA and popliteal emboli     6/25: stable vascular exam, stable motor/sensory exam, downtrending CK  6/26: No major changes. Stable exam. Patient and family in agreement with proceeding with planned surgery    - continue heparin gtt  - Planning for R AKA, aorto-iliac thrombectomy. Will examine availability Thursday and Friday to see when we can accommodate patient's case in the operating room    Discussed with attending, Dr. Amaury Vaca MD  [id number] - Integrated Vascular Surgery

## 2024-06-26 NOTE — PROGRESS NOTES
"  MEDICINE INPATIENT PROGRESS NOTE    Amirah Bennett is a 43 y.o. female on hospital day 6    Subjective   Did not tolerate decrease in nipride, increased back to 0.8 and will maintain.   Upon evaluation this AM, endorsing pain \"everywhere\". Specifically pain in legs and left upper arm, also abdomen.        Objective     Last Recorded Vitals  Blood pressure 85/59, pulse 93, temperature 36.6 °C (97.9 °F), resp. rate 20, height 1.702 m (5' 7.01\"), weight 89.8 kg (197 lb 15.6 oz), SpO2 99%.    Intake/Output last 3 Shifts:  I/O last 3 completed shifts:  In: 1672.7 (17.5 mL/kg) [P.O.:450; I.V.:822.7 (8.6 mL/kg); IV Piggyback:400]  Out: 3850 (40.3 mL/kg) [Urine:3850 (1.1 mL/kg/hr)]  Weight: 95.5 kg     Relevant Results  Results from last 7 days   Lab Units 06/26/24  0434 06/25/24  1757 06/25/24  1218   WBC AUTO x10*3/uL 19.5* 18.1* 17.3*   HEMOGLOBIN g/dL 7.3* 7.9* 7.7*   HEMATOCRIT % 23.9* 24.0* 22.1*   PLATELETS AUTO x10*3/uL 107* 108* 94*     Results from last 7 days   Lab Units 06/26/24  0434 06/25/24  1757 06/25/24  0505   SODIUM mmol/L 131* 129* 130*   POTASSIUM mmol/L 3.7 3.4* 3.8   CO2 mmol/L 25 26 26   ANION GAP mmol/L 14 11 14   BUN mg/dL 19 20 21   CREATININE mg/dL 0.99 1.06* 1.25*   GLUCOSE mg/dL 89 117* 87   EGFR mL/min/1.73m*2 72 66 54*   MAGNESIUM mg/dL 1.73 2.08 1.92   PHOSPHORUS mg/dL 3.3 3.3 3.7      Results from last 7 days   Lab Units 06/26/24  0434 06/25/24  0505 06/24/24  0316   ALT U/L 411* 513* 655*   AST U/L 332* 510* 890*   ALK PHOS U/L 71 69 62      Results from last 7 days   Lab Units 06/26/24  0434 06/25/24  0505 06/24/24  1022   INR  1.5* 1.6* 1.8*     Results from last 7 days   Lab Units 06/26/24  1142 06/26/24  0541 06/26/24  0225 06/22/24  0449 06/22/24  0321 06/21/24  0613 06/21/24  0542 06/21/24  0206   POCT PH, ARTERIAL pH  --   --   --   --  7.53*  --  7.52* 7.47*   POCT PO2, ARTERIAL mm Hg  --   --   --   --  300*  --  73* 68*   POCT PCO2, ARTERIAL mm Hg  --   --   --   --  29*  --  " 33* 30*   FIO2 % 21 21 21   < > 100   < > 28 28    < > = values in this interval not displayed.     Results from last 7 days   Lab Units 06/20/24  1303 06/20/24  0746   POCT PH, VENOUS pH 7.09* 7.36   POCT PCO2, VENOUS mm Hg 37* 36*     Results from last 7 days   Lab Units 06/26/24  0434 06/25/24  2302 06/25/24  1757 06/20/24  1942 06/20/24  1624   LACTATE mmol/L 0.6 0.7 1.0   < > 16.0*   FREE T4 ng/dL  --   --   --   --  1.48    < > = values in this interval not displayed.         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 Can hear airleak. RIJ line not currently oozing.  CV: normal rate, irregular rhythm, no murmurs noted  Pulm: CTAB anteriorly, 2L O2  GI: abd soft, non-tender, bowel sounds present  Ext: She has equal sensation in bilateral thighs but states she has numbness in lower right leg. She is unable to wiggle toes in right foot but can bend right hip and knee. Right popliteal pulse present. Per vascular surgery, she has a high TP pulse present. No DP in the right. Patient does have DP/TP in left foot. Right foot cool and numb. left upper extremity with asymmetric firm swelling and pain (able to slightly move fingers of left hand towards a fist position, able to move right upper extremity spontaneously  Neuro: alert and conversant however only intermittently answering questions appropriately, +dysmetria, AOx4,+expressive aphasia so will initially say no pain, endorsing pain in bilateral legs.     Medications    amiodarone, 200 mg, oral, Daily  aspirin, 81 mg, oral, Daily  bisacodyl, 10 mg, rectal, Daily  [Held by provider] empagliflozin, 10 mg, oral, Daily  [Held by provider] folic acid, 1 mg, oral, Daily  [Held by provider] gabapentin, 100 mg, oral, TID  insulin lispro, 0-5 Units, subcutaneous, q4h  melatonin, 6 mg, oral, Nightly  [Held by provider] metoprolol succinate XL, 50  mg, oral, Daily  oxygen, , inhalation, Continuous - Inhalation  pantoprazole, 40 mg, intravenous, Daily before breakfast  perflutren protein A microsphere, 0.5 mL, intravenous, Once in imaging  polyethylene glycol, 17 g, oral, BID  [Held by provider] sacubitriL-valsartan, 1 tablet, oral, BID  sennosides, 2 tablet, oral, BID  [Held by provider] spironolactone, 25 mg, oral, Daily  sulfur hexafluoride microsphr, 2 mL, intravenous, Once in imaging        DOBUTamine, 2.5-20 mcg/kg/min, Last Rate: Stopped (06/24/24 1431)  heparin, 0-4,500 Units/hr, Last Rate: 12 Units/hr (06/26/24 1400)  nitroprusside, 0.25-5 mcg/kg/min, Last Rate: 0.8 mcg/kg/min (06/26/24 1400)        PRN medications: dextrose, dextrose, glucagon, glucagon, heparin, HYDROmorphone, lidocaine           Assessment/Plan   Amirah Bennett is a 42 y.o. 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 RHC c/w cardiogenic shock with CI of 1.6, CVP of 25. She is not candidate for advanced therapies given documented medical nonadherence. Managing medically. Attempted to wean off dobutamine while increasing nitroprusside so stopped dobutamine 6/21 however acutely worsened with increased lactate and worsened SvO2 from 65 to 25 overnight with new abdominal pain and right leg pain concerning for ischemia. Improving parameters, normalized lactate, and pain resolution when dobutamine restarted. Course also c/b SARAH, cardiac thrombi, liver injury likely due to  ischemia, and Afib with RVR. Found to have rhabdomyolysis with CK of 14,000, unable to give fluids due to cardiogenic shock. Arterial doppler of lower extremities with echogenic material within right distal femoral and popliteal arteries with absent Doppler flow in the right popliteal artery with significantly diminished flow within the right distal superficial femoral artery significantly diminished flow within the right distal superficial femoral artery, findings raise concern for thrombosis of distal SFA and popliteal artery. Decreased flow within right posterior tibial and peroneal arteries could be through collateralization. Additionally decreased flow in right common femoral artery, right deep femoral artery as well as proximal and mid superficial femoral arteries concerning for stenosis proximal to right common femoral artery possible within right external iliac right common iliac artery. Vascular surgery recommending right AKA. Bilateral LE duplex with likely early nonocclusive deep venous thrombosis. Ethics involved given lack of documentation of POA and limited family (NOK are cousins). Palliative care consulted.    Updates 6/26:  -plan for AKA 6/27 or 6/28  -monitoring swan q6h  -continue to monitor thrombocytopenia  -stop trending CK     NEUROLOGIC  #AMS, resolved at her baseline  #ischemic stroke L MCA d/t AFib LLA thrombus seen on echo (lack of OAC adherence) s/p thrombectomy 01/2022 @ [facility] w/ residual expressive aphasia and R sided weakness   ::Neurology consulted 6/25, no new deficits concerning for cerebral event  ::UDS positive for cannabinoids  -pending infectious w/up per below     #Insomnia  -c/w melatonin 3mg at bedtime     #Depression?  #Anxiety  ::Patient's friend states that she doesn't care for herself due to being depressed  -consider psychiatry consult  -told to stop quetiapine 50mg at last discharge     CARDIOVASCULAR  #Cardiogenic shock  #HFrEF (LVEF 20-25%)  ::Not candidate for advanced  therapies due to medical nonadherence  -dobutamine gtt, weaning as tolerated (*currently off)  -nitroprusside gtt  -bumex drip holiday  -palliative care consulted given patient not candidate for advanced therapies, appreciate recommendations  -monitor swan numbers q6h  -holding GDMT given pressures, unclear what patient was actively taking however was previously prescribed entresto 24-26, spironolactone 25mg, metoprolol succinate 50mg daily, lasix 40mg, jardiance 10mg-- once out of window after surgery would restart entresto first     #Right limb ischemia  #S/p femoral arterial line sheath placement now removed  ::CTA Aorta with runoff 6/24: RLE with intraluminal filling defects in aortoiliac bifurcation, nearly occlusive thrombus in distal right common iliac artery with nearly completely occlusive thrombus in the proximal right external iliac artery with extension into the proximal right internal iliac artery. Partially occlusive thrombus within the short-segment of the proximal SFA and distal SFA as it exits the adductor hiatus with complete occlusion of the popliteal artery and faint reconstitution of flow into the infrapopliteal vasculature as detailed above.  ::::CTA Aorta with runoff 6/24: LLE: Complete total occlusion of the P2 and P3 segments of the left popliteal artery with the extension into the tibioperoneal trunk and reconstitution of flow into the proximal posterior tibial, peroneal and anterior tibial arteries. There is abrupt cutoff of the mid anterior tibial artery with distal reconstitution. Faint  opacification of the peroneal artery and posterior artery throughout  their course. Additional findings are as detailed above.  ::Arterial doppler of lower extremities with echogenic material within right distal femoral and popliteal arteries with absent Doppler flow in the right popliteal artery with significantly diminished flow within the right distal superficial femoral artery significantly  diminished flow within the right distal superficial femoral artery, findings raise concern for thrombosis of distal SFA and popliteal artery. Decreased flow within right posterior tibial and peroneal arteries could be through collateralization. Additionally decreased flow in right common femoral artery, right deep femoral artery as well as proximal and mid superficial femoral arteries concerning for stenosis proximal to right common femoral artery possible within right external iliac right common iliac artery.   ::Bilateral LE duplex with likely early nonocclusive deep venous thrombosis.   ::Unable to walk prior to presentation to hospital  ::Right leg has been cold and painful in times where cardiogenic shock has worsened then warm and not painful to touch when out of shock state, fluctuating exam  -heparin drip per above  -vascular surgery consulted, pending CTA aorta and bilateral iliofemoral runoff   -plan for AKA with vascular surgery, potentially 6/26/2024     #Cardiac thrombi  ::Likely in setting of Xarelto nonadherence  ::There are indeterminate low-attenuation nodular filling defects within the atrial appendage. While this may represent contrast under filling, a right atrial appendage thrombus can not be excluded.  -c/w heparin gtt     #Right lower extremity DVT  -Continue heparin gtt     #Afib on Xarelto w/ DCCV 05/2023), ischemic stroke L MCA d/t AFib LLA thrombus seen on echo (lack of OAC adherence) s/p thrombectomy 01/2022 @ [facility] w/ residual expressive aphasia and R sided weakness   ::Episode of RVR in ED  ::Previously prescribed digoxin 250mcg however last fill was 12/2023  ::TSH 6.28H, free T4 1.48  -c/w amiodarone 200mg daily  -hold home digoxin 250mcg  -c/w AC per above (holding home Xarelto 20mg daily in evening)  -c/w aspirin 81mg  -HOLD atorvastatin 80 due to liver injury     #Elevated troponin  ::95 > 289 > 313 > 326 > 289  -stop trending     #HTN  -holding home medications      PULMONARY  #Dyspnea  #Mild pulmonary edema  ::No evidence of PE  -diuresis per above  -wean as able to room air     #Hx trach c/b trancutaneous fistula  ::ENT had been consulted 3/2024 for gurgling and mucus drainage, no inpatient interventions required  -monitor for breaths/mucus discharge from trach site  -Cover the tracheocutaneous fistula with tape and gauze and encourage patient to apply pressure when voicing.   -per prior ENT note on last admission   -follow up outpatient ENT for closure (Dr. King)     RENAL/  #Metabolic acidosis, resolved  ::Likely in setting of shock     #Rhabdomyolysis   -Ck > 14,000, trending down  -Daily CK, unable to give fluids given cardiogenic shock     #SARAH  ::Likely prerenal vs ATN in setting of shock  ::FeUrea suggests intrinsic  -U/A with 3+ blood and no RBCs. Elevated CK as above.  -avoid nephrotoxic medications     #Elevated lactate, improved     GASTROINTESTINAL  #Elevated Tbili (3.2 on admission, from 0.5 in 03/2024)  #Elevated LFTs  ::Likely ischemic liver injury  -trend CMP     #GERD  -c/w pantoprazole 40mg daily     ENDOCRINE  #T2DM, diet controlled  ::HgbA1c 3/2024 5.5  -hypoglycemia protocol and mild SSI     HEME/ONC  #Cardiac thrombi  -see cardiac section for plan     #Arterial thrombosis  #DVT  ::Arterial doppler of lower extremities with echogenic material within right distal femoral and popliteal arteries with absent Doppler flow in the right popliteal artery with significantly diminished flow within the right distal superficial femoral artery significantly diminished flow within the right distal superficial femoral artery, findings raise concern for thrombosis of distal SFA and popliteal artery. Decreased flow within right posterior tibial and peroneal arteries could be through collateralization. Additionally decreased flow in right common femoral artery, right deep femoral artery as well as proximal and mid superficial femoral arteries concerning for stenosis  "proximal to right common femoral artery possible within right external iliac right common iliac artery.   ::Bilateral LE duplex with likely early nonocclusive deep venous thrombosis.   -heparin drip per above  -further plan per above     #Thrombocytopenia  ::Oozing of central line, Multiple thrombi, DIC score 5 on 6/24/2024  ::Hit score of 6  ::Negative PF4  -vascular medicine consulted   -monitor DIC labs daily  -consider hematology consult if ongoing oozing and thrombocytopenia      #Left brachial injury  ::Likely injury  :LUE DVT scan with nonvascularized mass, called CT while patient was there to request CTA of left upper extremity and they said logistically they would be unable to perform both scans, and given protocol for max dosing of contrast, could not give further contrast until 6/25 at 2PM  -no CTA LUE needed      INFECTIOUS DISEASE  #Concern for infection  ::Procal 0.28  ::CXR without signs of PNA  ::s/p vanc/zosyn (6/20-6/24)  ::UA with 1+ blood, 1+ bacteria, no WBC, neg nitrite and leuk esterase  -pending blood cultures x1 6/20, x2 6/22, NGTD   -monitoring off antibiotics     Dispo:  -PT: Moderate intensity level of care  -patient would like enrollment in  home delivery service for medications (she has trouble getting to preferred pharmacy), pharmacy updated to [facility]  -patient's family would like her enrolled in [facility]  -repeat thyroid labs outpatient     N: cardiac, diabetic  A: Harvinder, Keith, Haily Lt radial  DVT ppx: heparin drip  GI ppx: pantoprazole 40mg     Code Status: FULL CODE per patient and POA confirmed on presentation to CICU)  Surrogate Medical Decision-maker:   Surrogate decision maker is: pt's cousinClara Wayne: [phone], Efrain Black: [phone], Keli Osborne: [phone]      Friends who may be helpful in making decisions:  Prior boyfriend Navin Sanches [phone]  Very good friend \"Isiah\" Pranay Owen [phone]    Kristine Pat MD  Internal Medicine, PGY-2   "

## 2024-06-26 NOTE — PROGRESS NOTES
Amirah Bennett is a 43 y.o. female on day 6 of admission presenting with Atrial fibrillation (Multi).    Palliative Medicine following for:  Complex medical decision making, symptom management, patient/family support     History obtained from chart review including ED note, H&P, patient's daily progress notes, review of lab/test results, and discussion with primary team and bedside RN.        Subjective   History of Present Illness  Amirah Bennett is a 42 y.o. female with significant PMHx of HFrEF (LVEF 20-25% (08/2022), Afib on Xarelto w/ DCCV 05/2023), ischemic stroke L MCA d/t AFib LLA thrombus seen on echo (lack of OAC adherence) s/p thrombectomy 01/2022 @ [facility] w/ residual expressive aphasia and R sided weakness, recent 03/2024 left cerebellar MCA, s/p tracheostomy, T2DM (03/2024 HgbA1c 5.5) and HTN presenting with bilateral leg pain.      In the ED, she was noted to be tachycardic to 105 in triage and but then when placed on the monitor was found to be in atrial fibrillation with RVR with a rate between 150 and 190. She was given 5 mg of metoprolol with slight improvement of her rate but became hypotensive and symptomatic. She was started on heparin both for her A-fib and for possible DVT/PE. Lytics were not given because of the risk of potential intracranial hemorrhage after her recent stroke. Instead decision made to cardiovert the patient and also gave digoxin, and amiodarone. After cardioversion she still looked like she was in atrial fibrillation with RVR but only to a rate in the 120s and 130s. Her blood pressure improved. Levophed ordered along with calcium and magnesium. No evidence of infection. Patient admitted to the ICU with plan to get a CT angiogram of her chest to rule out PE and RHC. Ongoing work up revealed aortic bifurcation embolus, R BA-EIA embolus, SFA and popliteal emboli. Pt with acute right lower extremity ischemia, which is not salvageable, needing inflow procedure and R AKA given new  "motor deficits.        Symptoms  Pain: rle and lue, \"it's ok\"  Dyspnea: denies  Fatigue: denies  Insomnia: states up with racing thoughts. No issue with initially falling asleep.  Drowsiness: denies  Constipation: denies  Nausea: denies  Appetite: good. Is hungry but currently NPO.  Anxiety: yes  Depression: history      BP 85/59   Pulse 89   Temp 36.5 °C (97.7 °F)   Resp 19   Ht 1.702 m (5' 7.01\")   Wt 95.5 kg (210 lb 8.6 oz)   SpO2 97%   BMI 32.97 kg/m²       Physical Exam   Physical Exam  Constitutional:       Appearance: She is obese.   HENT:      Head: Normocephalic and atraumatic.      Nose: Nose normal.      Mouth/Throat:      Mouth: Mucous membranes are moist.      Pharynx: Oropharynx is clear.   Cardiovascular:      Rate and Rhythm: Regular rhythm with PVCs present.   Pulmonary:      Effort: Pulmonary effort is normal.      Breath sounds: Clear upper lobes bilaterally. Decreased air movement present to lower lobes bilaterally.   Abdominal:      Palpations: Abdomen is soft.   Musculoskeletal:   LUE: Significant edema present.     Right lower leg: Edema present.      Left lower leg: Edema present.      Comments: BLE wrapped to the knee with ace wraps . LUE mid arm wrapped with acewrap. Brusing noted.No sensation to the bottom of the right foot. Can not move right foot or wiggle toes.  Skin:     General: Skin is dry.      Comments: BLE cool to touch   Neurological:      Mental Status: She is alert.      Comments: Significant expressive aphasia. Pauses and word searching. No sensation to the bottom of the right foot. Can not move right foot or wiggle toes.  Psychiatric:         Attention and Perception: Attention normal, slightly drowsy.         Mood and Affect: Mood is mildly anxious.         Behavior: Behavior is cooperative.      Lab Results   Component Value Date    WBC 19.5 (H) 06/26/2024    HGB 7.3 (L) 06/26/2024    HCT 23.9 (L) 06/26/2024    MCV 90 06/26/2024     (L) 06/26/2024     Lab " Results   Component Value Date    GLUCOSE 89 06/26/2024    CALCIUM 7.7 (L) 06/26/2024     (L) 06/26/2024    K 3.7 06/26/2024    CO2 25 06/26/2024    CL 96 (L) 06/26/2024    BUN 19 06/26/2024    CREATININE 0.99 06/26/2024       Intake/Output Summary (Last 24 hours) at 6/26/2024 1122  Last data filed at 6/26/2024 0900  Gross per 24 hour   Intake 1220.55 ml   Output 4500 ml   Net -3279.45 ml     Allergies   Allergen Reactions    Hydrocodone-Acetaminophen Rash    Ibuprofen Rash     Tolerates aspirin     Current Facility-Administered Medications   Medication Dose Route Frequency Provider Last Rate Last Admin    amiodarone (Pacerone) tablet 200 mg  200 mg oral Daily Erlin Escalante MD   200 mg at 06/26/24 0827    aspirin chewable tablet 81 mg  81 mg oral Daily Erlin Escalante MD   81 mg at 06/26/24 0603    bisacodyl (Dulcolax) suppository 10 mg  10 mg rectal Daily Kristine Pat MD   10 mg at 06/25/24 1006    dextrose 50 % injection 12.5 g  12.5 g intravenous q15 min PRN Kristine Pat MD   12.5 g at 06/25/24 1604    dextrose 50 % injection 25 g  25 g intravenous q15 min PRN Kristine Pat MD   25 g at 06/20/24 1237    DOBUTamine (Dobutrex) 1,000 mg in dextrose 5% 250 mL (4 mg/mL) infusion (premix)  2.5-20 mcg/kg/min intravenous Continuous Kristine Pat MD   Stopped at 06/24/24 1431    [Held by provider] empagliflozin (Jardiance) tablet 10 mg  10 mg oral Daily Erlin Escalante MD        [Held by provider] folic acid (Folvite) tablet 1 mg  1 mg oral Daily Erlin Escalante MD   1 mg at 06/22/24 0852    [Held by provider] gabapentin (Neurontin) capsule 100 mg  100 mg oral TID Erlin Escalante MD        glucagon (Glucagen) injection 1 mg  1 mg intramuscular q15 min PRN Kristine Pat MD        glucagon (Glucagen) injection 1 mg  1 mg intramuscular q15 min PRN Kristine Pat MD        heparin 25,000 Units in dextrose 5% 250 mL (100 Units/mL) infusion (premix)  0-4,500 Units/hr intravenous Continuous  Kristine Pat MD 12 mL/hr at 06/26/24 0900 1,200 Units/hr at 06/26/24 0900    heparin bolus from bag 3,000-6,000 Units  3,000-6,000 Units intravenous q4h PRN Kristine Pat MD        HYDROmorphone (Dilaudid) injection 0.4 mg  0.4 mg intravenous q4h PRN Jackson Werner MD   0.4 mg at 06/26/24 0635    insulin lispro (HumaLOG) injection 0-5 Units  0-5 Units subcutaneous q4h Kristine Pta MD        lidocaine (Xylocaine) 5 % ointment   Topical PRN Britany Luevano MD        melatonin tablet 3 mg  3 mg oral Nightly Kristine Pat MD   3 mg at 06/25/24 2052    [Held by provider] metoprolol succinate XL (Toprol-XL) 24 hr tablet 50 mg  50 mg oral Daily Erlin Escalante MD        nitroprusside (Nipride) infusion 500 mcg/mL (100 mL vial) (adult)  0.25-5 mcg/kg/min intravenous Continuous Britany Luevano MD 10.66 mL/hr at 06/26/24 1109 0.8 mcg/kg/min at 06/26/24 1109    oxygen (O2) therapy   inhalation Continuous - Inhalation Kristine Pat MD   95 percent at 06/24/24 2000    pantoprazole (ProtoNix) injection 40 mg  40 mg intravenous Daily before breakfast Kristine Pat MD   40 mg at 06/26/24 0603    perflutren protein A microsphere (Optison) injection 0.5 mL  0.5 mL intravenous Once in imaging Megan Hernandez MD        polyethylene glycol (Glycolax, Miralax) packet 17 g  17 g oral BID Jackson Werner MD   17 g at 06/25/24 1011    potassium chloride 20 mEq in 100 mL IV premix  20 mEq intravenous q2h Kristine Pat MD 50 mL/hr at 06/26/24 1109 20 mEq at 06/26/24 1109    [Held by provider] sacubitriL-valsartan (Entresto) 24-26 mg per tablet 1 tablet  1 tablet oral BID Erlin Escalante MD        sennosides (Senokot) tablet 17.2 mg  2 tablet oral BID Kristine Pat MD   17.2 mg at 06/25/24 1006    [Held by provider] spironolactone (Aldactone) tablet 25 mg  25 mg oral Daily Erlin Escalante MD        sulfur hexafluoride microsphr (Lumason) injection 24.28 mg  2 mL intravenous Once in imaging Megan Hernandez MD             Assessment/Plan   History of Present Illness  Amirah Bennett is a 42 y.o. female with significant PMHx of HFrEF (LVEF 20-25% (08/2022), Afib on Xarelto w/ DCCV 05/2023), ischemic stroke L MCA d/t AFib LLA thrombus seen on echo (lack of OAC adherence) s/p thrombectomy 01/2022 @ [facility] w/ residual expressive aphasia and R sided weakness, recent 03/2024 left cerebellar MCA, s/p tracheostomy, T2DM (03/2024 HgbA1c 5.5) and HTN presenting with bilateral leg pain.      In the ED, she was noted to be tachycardic to 105 in triage and but then when placed on the monitor was found to be in atrial fibrillation with RVR with a rate between 150 and 190. She was given 5 mg of metoprolol with slight improvement of her rate but became hypotensive and symptomatic. She was started on heparin both for her A-fib and for possible DVT/PE. Lytics were not given because of the risk of potential intracranial hemorrhage after her recent stroke. Instead decision made to cardiovert the patient and also gave digoxin, and amiodarone. After cardioversion she still looked like she was in atrial fibrillation with RVR but only to a rate in the 120s and 130s. Her blood pressure improved. Levophed ordered along with calcium and magnesium. No evidence of infection. Patient admitted to the ICU with plan to get a CT angiogram of her chest to rule out PE and RHC. Ongoing work up revealed aortic bifurcation embolus, R BA-EIA embolus, SFA and popliteal emboli. Pt with acute right lower extremity ischemia, which is not salvageable, needing inflow procedure and R AKA given new motor deficits.     6/21: Palliative consulted for pt support and GOC.       6/25: Palliative met for a family and care team meeting in pt's room. Discussed current findings via imaging/scans and labs, current complications, cardiac and circulation risks of sx and not doing sx. Pt wishes to move forward with sx. NOK/surrogates agreeable to pt's wishes. Needing cardiac optimization  "prior to sx. No current infection present.   Pt elects cousins Felicitas and Keli as surrogate decision makers. Keli mentions a recent MPOA completed and will bring in.      6/26: Possible plan for sx today? Palliative rounded on pt for questions/clarifications, positive presence/support, and symptom management. Pt still choosing sx to support her goal of \"keep going\". Pt speaking with evident anxiety. Reviewed anxiety reduction strategies. See recs below.     Palliative Performance Scale (PPS): 30     ----------------------------------------------------------------------------------------------------------------------------------------------------------------------------------------------------------------------------------------------------------------------------------------------------------------------------------------------------------------------        I spent  minutes in providing separately identifiable ACP services with the patient and/or surrogate decision maker in a voluntary conversation discussing the patient's wishes and goals as detailed in the above note.   ----------------------------------------------------------------------------------------------------------------------------------------------------------------------------------------------------------------------------------------------------------------------------------------------------------------------------------------------------------------------     #Complex Medical Decision Making  #Goals of Care  #Advanced Care Planning  - Code status: Full code  - Surrogate decision maker: Keli Osborne (kelvinsin) [phone]. Clara Black (sarai) [phone].  - Goals are survival and improved quality of life.   6/21: Pt with notable expressive aphasia making open ended questioning difficult. Pt able to answer yes and no questions more easily. Pt demonstrating understanding/comprehension of questions asked. Pt agrees that Navin is MPOA " "and papers are signed. She is upset with him today but is unable to verbalize why. Palliative recapped medication nonadherence and pt agreeable that she stops taking them when she is depressed. When pt asked why she stops taking her medicine, pt starting spelling out her name and saying nonsensical words. Palliative expressed a worry that her depression is causing her to not to want to continue to live. Palliative asked is that is correct, pt stated \"no\". Palliative asked pt earlier about her goals, she stated \"to live\" and \"I can make it\". Pt was unable to articulate any further explanation of her statements.   Pt was asked if her breathing were to be compromised and she needed intubated again (trached) would she want that? Pt hesitated but stated \"yes\".      Palliative attempted to reach out to Navin x3. First call he answered, and said he was on the other line with a doctor and to call back at 1230. Damian did and his phone went to . Gia called back around 1500 and again, right to . Gia then called Pranay to see if Navin has another number, and [place]'s went right to . Damian left a message with call back number for Navin and Pranay.      Will attempt to discuss GOC with returned call from Navin. Otherwise Palliative can follow up Monday or Tuesday.      6/25: Palliative met for a family and care team meeting in pt's room. Those involved included Dr. Simon, Dr. Pat, Dannielle NP, Jennifer Palliative [place], pt, and cousins Felicitas and Keli, and second cousin Marianela. Discussed current findings via imaging/scans and labs, current clot complications, and cardiac and circulation standpoints. Pt now requiring right AKA for loss of circulation. Vascular explained risks of AKA sx and not doing sx. Pt wishes to move forward with sx. NOK/new surrogates (Clara and Keli) agreeable to pt's wishes. Vascular and primary team expressed needing cardiac optimization prior to sx. Pt does not currently have an active " "infection, making this a non-urgent decision. Potential sx in the next few days.   Pt elects cousins Clara and Keli as surrogate decision makers vs Navin, and both Felicitas and Keli expressing the want and willingness to fulfil that position. Keli mentions a recent MPOA was completed with pt and signed by a juanisary. Keli will bring in.   Palliative reiterated and realigned heard goals of continuing to do everything we are currently doing as life and continued aggressive measures are paramount. Pt and family state \"yes\".        #Cardiogenic shock  #Acute SARAH  #Shock Liver  #Thrombi  - Per primary team, on nipride, heparin, and trialing on/off Dobutamine.  - Kidney and liver levels with continued improvements. Latest lactate normal 0.6..  - Pt with multiple admissions r/t medication nonadherence d/t pt stated depression. Recommend Psychiatry consult.    #Acute Pain  #RLE circulation  - Vascular stating no blood flow to the foot and partial calf. Requires Right AKA d/t lack of current circulation and aortoiliac thrombectomy to open blood flow to remaining leg.   - Primary team optimizing cardiac/medical standpoint prior to R AKA and aorto-iliac thrombectomy. Possible plan for later today, if not, 6/27 or 6/28?   - Pt still wishing to move forward with the sx. Explained reason for R AKA and infection risk if no blood flow. Pt encourages and voices \"live\" and to \"keep going\".   - LUE hematoma. Pressure wrapped, pt stating tolerable pain.  - BLE ischemia, Right worse than left. ISO edema, also right worse than left. Tolerable at this time.   - Consider heel offloading for pressure sore reduction r/t poor RLE mobility and sensation.  - Currently on Hydromorphone 0.4mg Q4H PRN severe pain (4 doses in the lat 24 hours).   - Holding off on hepatotoxic meds (Acetaminophen) ISO acute liver injury, although improving.     #Depression  - Not currently on antidepressant but home med list notable for Quetiapine 50mg at " bedtime. (Not currently taking in pt.) Unclear history. Recommend Psychiatry consult.     #Anxiety  #Insomnia  - Situational related to upcoming sx.   - Endorses no issues falling asleep but awakens with racing thoughts about surgery.   - Is able to fall asleep again with deep breathing and self calming.  - Encouraged other calming modalities such as prayer, positive self talk/thoughts, and meditation.  - Recommend (increasing) Melatonin 6mg PO at 2000 to assist with staying asleep.  - Please consider Psychiatry consult for underlying depression and to assist with anticipated ongoing anxieties. C/f vicious cycle of ongoing depressive symptoms and medication non-adherence ISO worsened health and possible QOL. [place] to establish in pt and out patient plan and follow up.      #Psychosocial Support  - Ongoing pt and family support, positive presence and emotional support  - Music Therapy  - Spiritual Care Support     Plan of Care discussed with: Updated primary team and bedside RN on goals of care decision, medication adjustments, and code status      Medical Decision Making was high level due to high complexity of problems, extensive data review, and high risk of management/treatment.     - Cardiogenic shock, shock liver, acute kidney injury requiring inotrope cardiac support, multiple emboli and acute loss of right lower extremity perfusion requiring AKA, worsening leukocytosis posing threat to life and function.  - Reviewed external notes from   - Reviews results from  which were used in decision making for   - Recommended the following tests:   - Assessment required independent historian: nursing and vascular/primary  - Independent interpretation of test: labs and imaging  - Discussion of management with: nursing, primary and vascular team  - Drug therapy requiring intensive monitoring for toxicity: monitor renal status with meds/HF, mag level, potassium.  - Decision regarding elective major surgery with  identified patient or procedure risk factors:   - Decision regarding emergency major surgery: assured today that pt still onboard for right AKA  - Decision regarding hospitalization or escalation of hospital-level care:   - Decision not to resuscitate or to de-escalate care because of poor prognosis: full code  - Parenteral controlled substances: heparin, nipride, dilaudid, PPI, mag, bumex (spot dosing). On/off dobutamine.     Thank you for allowing us to participate in the care of this patient. Palliative will continue to follow as needed. Palliative medicine is available Monday-Friday, 8a-6p. Please contact team with any questions or concerns.  Team pager [phone] (weekdays)  Iam Silvestre, JAE-CNP  Iam Silvestre, JAE-CNP

## 2024-06-27 ENCOUNTER — ANESTHESIA EVENT (OUTPATIENT)
Dept: OPERATING ROOM | Facility: HOSPITAL | Age: 44
End: 2024-06-27
Payer: COMMERCIAL

## 2024-06-27 ENCOUNTER — APPOINTMENT (OUTPATIENT)
Dept: RADIOLOGY | Facility: HOSPITAL | Age: 44
End: 2024-06-27
Payer: COMMERCIAL

## 2024-06-27 LAB
ALBUMIN SERPL BCP-MCNC: 2.6 G/DL (ref 3.4–5)
ALBUMIN SERPL BCP-MCNC: 2.6 G/DL (ref 3.4–5)
ALP SERPL-CCNC: 77 U/L (ref 33–110)
ALT SERPL W P-5'-P-CCNC: 322 U/L (ref 7–45)
ANION GAP BLDMV CALCULATED.4IONS-SCNC: 3 MMO/L (ref 10–25)
ANION GAP BLDMV CALCULATED.4IONS-SCNC: 5 MMO/L (ref 10–25)
ANION GAP BLDMV CALCULATED.4IONS-SCNC: 7 MMO/L (ref 10–25)
ANION GAP BLDMV CALCULATED.4IONS-SCNC: 7 MMO/L (ref 10–25)
ANION GAP SERPL CALC-SCNC: 13 MMOL/L (ref 10–20)
ANION GAP SERPL CALC-SCNC: 18 MMOL/L (ref 10–20)
APTT PPP: 87 SECONDS (ref 27–38)
AST SERPL W P-5'-P-CCNC: 242 U/L (ref 9–39)
BASE EXCESS BLDMV CALC-SCNC: 1.5 MMOL/L (ref -2–3)
BASE EXCESS BLDMV CALC-SCNC: 1.8 MMOL/L (ref -2–3)
BASE EXCESS BLDMV CALC-SCNC: 2.9 MMOL/L (ref -2–3)
BASE EXCESS BLDMV CALC-SCNC: 3.4 MMOL/L (ref -2–3)
BASE EXCESS BLDV CALC-SCNC: 1.2 MMOL/L (ref -2–3)
BASOPHILS # BLD AUTO: 0.04 X10*3/UL (ref 0–0.1)
BASOPHILS # BLD AUTO: 0.04 X10*3/UL (ref 0–0.1)
BASOPHILS NFR BLD AUTO: 0.2 %
BASOPHILS NFR BLD AUTO: 0.2 %
BILIRUB DIRECT SERPL-MCNC: 2.5 MG/DL (ref 0–0.3)
BILIRUB SERPL-MCNC: 3.9 MG/DL (ref 0–1.2)
BODY TEMPERATURE: 37 DEGREES CELSIUS
BUN SERPL-MCNC: 13 MG/DL (ref 6–23)
BUN SERPL-MCNC: 15 MG/DL (ref 6–23)
CA-I BLDMV-SCNC: 1.04 MMOL/L (ref 1.1–1.33)
CA-I BLDMV-SCNC: 1.07 MMOL/L (ref 1.1–1.33)
CA-I BLDMV-SCNC: 1.09 MMOL/L (ref 1.1–1.33)
CA-I BLDMV-SCNC: 1.12 MMOL/L (ref 1.1–1.33)
CALCIUM SERPL-MCNC: 7.4 MG/DL (ref 8.6–10.6)
CALCIUM SERPL-MCNC: 7.7 MG/DL (ref 8.6–10.6)
CHLORIDE BLD-SCNC: 100 MMOL/L (ref 98–107)
CHLORIDE BLD-SCNC: 100 MMOL/L (ref 98–107)
CHLORIDE BLD-SCNC: 101 MMOL/L (ref 98–107)
CHLORIDE BLD-SCNC: 99 MMOL/L (ref 98–107)
CHLORIDE SERPL-SCNC: 97 MMOL/L (ref 98–107)
CHLORIDE SERPL-SCNC: 98 MMOL/L (ref 98–107)
CO2 SERPL-SCNC: 21 MMOL/L (ref 21–32)
CO2 SERPL-SCNC: 25 MMOL/L (ref 21–32)
CREAT SERPL-MCNC: 0.89 MG/DL (ref 0.5–1.05)
CREAT SERPL-MCNC: 0.93 MG/DL (ref 0.5–1.05)
D DIMER PPP FEU-MCNC: 1475 NG/ML FEU
EGFRCR SERPLBLD CKD-EPI 2021: 77 ML/MIN/1.73M*2
EGFRCR SERPLBLD CKD-EPI 2021: 82 ML/MIN/1.73M*2
EOSINOPHIL # BLD AUTO: 0.16 X10*3/UL (ref 0–0.7)
EOSINOPHIL # BLD AUTO: 0.23 X10*3/UL (ref 0–0.7)
EOSINOPHIL NFR BLD AUTO: 0.8 %
EOSINOPHIL NFR BLD AUTO: 1.1 %
ERYTHROCYTE [DISTWIDTH] IN BLOOD BY AUTOMATED COUNT: 16.6 % (ref 11.5–14.5)
ERYTHROCYTE [DISTWIDTH] IN BLOOD BY AUTOMATED COUNT: 16.7 % (ref 11.5–14.5)
ERYTHROCYTE [DISTWIDTH] IN BLOOD BY AUTOMATED COUNT: 16.9 % (ref 11.5–14.5)
FIBRINOGEN PPP-MCNC: 428 MG/DL (ref 200–400)
GLUCOSE BLD MANUAL STRIP-MCNC: 103 MG/DL (ref 74–99)
GLUCOSE BLD MANUAL STRIP-MCNC: 120 MG/DL (ref 74–99)
GLUCOSE BLD MANUAL STRIP-MCNC: 82 MG/DL (ref 74–99)
GLUCOSE BLD MANUAL STRIP-MCNC: 90 MG/DL (ref 74–99)
GLUCOSE BLD MANUAL STRIP-MCNC: 94 MG/DL (ref 74–99)
GLUCOSE BLD MANUAL STRIP-MCNC: 99 MG/DL (ref 74–99)
GLUCOSE BLD-MCNC: 120 MG/DL (ref 74–99)
GLUCOSE BLD-MCNC: 151 MG/DL (ref 74–99)
GLUCOSE BLD-MCNC: 96 MG/DL (ref 74–99)
GLUCOSE BLD-MCNC: 99 MG/DL (ref 74–99)
GLUCOSE SERPL-MCNC: 105 MG/DL (ref 74–99)
GLUCOSE SERPL-MCNC: 154 MG/DL (ref 74–99)
HCO3 BLDMV-SCNC: 25.9 MMOL/L (ref 22–26)
HCO3 BLDMV-SCNC: 26.6 MMOL/L (ref 22–26)
HCO3 BLDMV-SCNC: 27.1 MMOL/L (ref 22–26)
HCO3 BLDMV-SCNC: 28.5 MMOL/L (ref 22–26)
HCO3 BLDV-SCNC: 26 MMOL/L (ref 22–26)
HCT VFR BLD AUTO: 23.4 % (ref 36–46)
HCT VFR BLD AUTO: 23.6 % (ref 36–46)
HCT VFR BLD AUTO: 24.9 % (ref 36–46)
HCT VFR BLD EST: 26 % (ref 36–46)
HCT VFR BLD EST: 30 % (ref 36–46)
HGB BLD-MCNC: 7.6 G/DL (ref 12–16)
HGB BLD-MCNC: 8 G/DL (ref 12–16)
HGB BLD-MCNC: 8.1 G/DL (ref 12–16)
HGB BLDMV-MCNC: 10 G/DL (ref 12–16)
HGB BLDMV-MCNC: 8.5 G/DL (ref 12–16)
HGB BLDMV-MCNC: 8.7 G/DL (ref 12–16)
HGB BLDMV-MCNC: 8.8 G/DL (ref 12–16)
IMM GRANULOCYTES # BLD AUTO: 0.62 X10*3/UL (ref 0–0.7)
IMM GRANULOCYTES # BLD AUTO: 0.74 X10*3/UL (ref 0–0.7)
IMM GRANULOCYTES NFR BLD AUTO: 3.1 % (ref 0–0.9)
IMM GRANULOCYTES NFR BLD AUTO: 3.4 % (ref 0–0.9)
INHALED O2 CONCENTRATION: 21 %
INR PPP: 1.2 (ref 0.9–1.1)
LACTATE BLDMV-SCNC: 0.6 MMOL/L (ref 0.4–2)
LACTATE BLDMV-SCNC: 0.7 MMOL/L (ref 0.4–2)
LACTATE BLDMV-SCNC: 1.3 MMOL/L (ref 0.4–2)
LACTATE BLDMV-SCNC: 2.8 MMOL/L (ref 0.4–2)
LYMPHOCYTES # BLD AUTO: 3.72 X10*3/UL (ref 1.2–4.8)
LYMPHOCYTES # BLD AUTO: 5.23 X10*3/UL (ref 1.2–4.8)
LYMPHOCYTES NFR BLD AUTO: 18.5 %
LYMPHOCYTES NFR BLD AUTO: 24 %
MAGNESIUM SERPL-MCNC: 1.69 MG/DL (ref 1.6–2.4)
MAGNESIUM SERPL-MCNC: 2.39 MG/DL (ref 1.6–2.4)
MCH RBC QN AUTO: 28.2 PG (ref 26–34)
MCH RBC QN AUTO: 28.5 PG (ref 26–34)
MCH RBC QN AUTO: 28.7 PG (ref 26–34)
MCHC RBC AUTO-ENTMCNC: 32.1 G/DL (ref 32–36)
MCHC RBC AUTO-ENTMCNC: 32.5 G/DL (ref 32–36)
MCHC RBC AUTO-ENTMCNC: 34.3 G/DL (ref 32–36)
MCV RBC AUTO: 84 FL (ref 80–100)
MCV RBC AUTO: 88 FL (ref 80–100)
MCV RBC AUTO: 88 FL (ref 80–100)
MONOCYTES # BLD AUTO: 1.63 X10*3/UL (ref 0.1–1)
MONOCYTES # BLD AUTO: 2.22 X10*3/UL (ref 0.1–1)
MONOCYTES NFR BLD AUTO: 10.2 %
MONOCYTES NFR BLD AUTO: 8.1 %
NEUTROPHILS # BLD AUTO: 13.31 X10*3/UL (ref 1.2–7.7)
NEUTROPHILS # BLD AUTO: 13.99 X10*3/UL (ref 1.2–7.7)
NEUTROPHILS NFR BLD AUTO: 61.1 %
NEUTROPHILS NFR BLD AUTO: 69.3 %
NRBC BLD-RTO: 1.2 /100 WBCS (ref 0–0)
NRBC BLD-RTO: 1.2 /100 WBCS (ref 0–0)
NRBC BLD-RTO: 2 /100 WBCS (ref 0–0)
OXYHGB MFR BLDMV: 49.3 % (ref 45–75)
OXYHGB MFR BLDMV: 49.7 % (ref 45–75)
OXYHGB MFR BLDMV: 60.4 % (ref 45–75)
OXYHGB MFR BLDMV: 70.9 % (ref 45–75)
OXYHGB MFR BLDV: 67.1 % (ref 45–75)
PCO2 BLDMV: 39 MM HG (ref 41–51)
PCO2 BLDMV: 39 MM HG (ref 41–51)
PCO2 BLDMV: 42 MM HG (ref 41–51)
PCO2 BLDMV: 45 MM HG (ref 41–51)
PCO2 BLDV: 41 MM HG (ref 41–51)
PH BLDMV: 7.41 PH (ref 7.33–7.43)
PH BLDMV: 7.41 PH (ref 7.33–7.43)
PH BLDMV: 7.43 PH (ref 7.33–7.43)
PH BLDMV: 7.45 PH (ref 7.33–7.43)
PH BLDV: 7.41 PH (ref 7.33–7.43)
PHOSPHATE SERPL-MCNC: 2 MG/DL (ref 2.5–4.9)
PHOSPHATE SERPL-MCNC: 2.5 MG/DL (ref 2.5–4.9)
PLATELET # BLD AUTO: 104 X10*3/UL (ref 150–450)
PLATELET # BLD AUTO: 105 X10*3/UL (ref 150–450)
PLATELET # BLD AUTO: 115 X10*3/UL (ref 150–450)
PO2 BLDMV: 31 MM HG (ref 35–45)
PO2 BLDMV: 33 MM HG (ref 35–45)
PO2 BLDMV: 37 MM HG (ref 35–45)
PO2 BLDMV: 45 MM HG (ref 35–45)
PO2 BLDV: 43 MM HG (ref 35–45)
POTASSIUM BLDMV-SCNC: 3.6 MMOL/L (ref 3.5–5.3)
POTASSIUM BLDMV-SCNC: 3.8 MMOL/L (ref 3.5–5.3)
POTASSIUM BLDMV-SCNC: 3.9 MMOL/L (ref 3.5–5.3)
POTASSIUM BLDMV-SCNC: 4.2 MMOL/L (ref 3.5–5.3)
POTASSIUM SERPL-SCNC: 3.5 MMOL/L (ref 3.5–5.3)
POTASSIUM SERPL-SCNC: 4.2 MMOL/L (ref 3.5–5.3)
PROT SERPL-MCNC: 5.8 G/DL (ref 6.4–8.2)
PROTHROMBIN TIME: 13.7 SECONDS (ref 9.8–12.8)
RBC # BLD AUTO: 2.67 X10*6/UL (ref 4–5.2)
RBC # BLD AUTO: 2.82 X10*6/UL (ref 4–5.2)
RBC # BLD AUTO: 2.84 X10*6/UL (ref 4–5.2)
SAO2 % BLDMV: 51 % (ref 45–75)
SAO2 % BLDMV: 51 % (ref 45–75)
SAO2 % BLDMV: 63 % (ref 45–75)
SAO2 % BLDMV: 74 % (ref 45–75)
SAO2 % BLDV: 70 % (ref 45–75)
SODIUM BLDMV-SCNC: 127 MMOL/L (ref 136–145)
SODIUM BLDMV-SCNC: 127 MMOL/L (ref 136–145)
SODIUM BLDMV-SCNC: 129 MMOL/L (ref 136–145)
SODIUM BLDMV-SCNC: 131 MMOL/L (ref 136–145)
SODIUM SERPL-SCNC: 132 MMOL/L (ref 136–145)
SODIUM SERPL-SCNC: 132 MMOL/L (ref 136–145)
UFH PPP CHRO-ACNC: 0.3 IU/ML
WBC # BLD AUTO: 19.9 X10*3/UL (ref 4.4–11.3)
WBC # BLD AUTO: 20.2 X10*3/UL (ref 4.4–11.3)
WBC # BLD AUTO: 21.8 X10*3/UL (ref 4.4–11.3)

## 2024-06-27 PROCEDURE — 2500000002 HC RX 250 W HCPCS SELF ADMINISTERED DRUGS (ALT 637 FOR MEDICARE OP, ALT 636 FOR OP/ED)

## 2024-06-27 PROCEDURE — 2020000001 HC ICU ROOM DAILY

## 2024-06-27 PROCEDURE — 84100 ASSAY OF PHOSPHORUS: CPT

## 2024-06-27 PROCEDURE — 2780000003 HC OR 278 NO HCPCS

## 2024-06-27 PROCEDURE — 85520 HEPARIN ASSAY: CPT

## 2024-06-27 PROCEDURE — 85025 COMPLETE CBC W/AUTO DIFF WBC: CPT

## 2024-06-27 PROCEDURE — C1887 CATHETER, GUIDING: HCPCS

## 2024-06-27 PROCEDURE — 2500000004 HC RX 250 GENERAL PHARMACY W/ HCPCS (ALT 636 FOR OP/ED)

## 2024-06-27 PROCEDURE — 82947 ASSAY GLUCOSE BLOOD QUANT: CPT

## 2024-06-27 PROCEDURE — 85379 FIBRIN DEGRADATION QUANT: CPT

## 2024-06-27 PROCEDURE — 36415 COLL VENOUS BLD VENIPUNCTURE: CPT

## 2024-06-27 PROCEDURE — 76937 US GUIDE VASCULAR ACCESS: CPT | Performed by: RADIOLOGY

## 2024-06-27 PROCEDURE — 84132 ASSAY OF SERUM POTASSIUM: CPT | Mod: 91

## 2024-06-27 PROCEDURE — 77001 FLUOROGUIDE FOR VEIN DEVICE: CPT | Performed by: RADIOLOGY

## 2024-06-27 PROCEDURE — 2500000004 HC RX 250 GENERAL PHARMACY W/ HCPCS (ALT 636 FOR OP/ED): Performed by: RADIOLOGY

## 2024-06-27 PROCEDURE — 99233 SBSQ HOSP IP/OBS HIGH 50: CPT

## 2024-06-27 PROCEDURE — 85610 PROTHROMBIN TIME: CPT

## 2024-06-27 PROCEDURE — 37799 UNLISTED PX VASCULAR SURGERY: CPT | Performed by: INTERNAL MEDICINE

## 2024-06-27 PROCEDURE — 83735 ASSAY OF MAGNESIUM: CPT

## 2024-06-27 PROCEDURE — 2550000001 HC RX 255 CONTRASTS: Performed by: INTERNAL MEDICINE

## 2024-06-27 PROCEDURE — 80076 HEPATIC FUNCTION PANEL: CPT

## 2024-06-27 PROCEDURE — 37191 INS ENDOVAS VENA CAVA FILTR: CPT | Performed by: RADIOLOGY

## 2024-06-27 PROCEDURE — 99152 MOD SED SAME PHYS/QHP 5/>YRS: CPT | Performed by: RADIOLOGY

## 2024-06-27 PROCEDURE — 99291 CRITICAL CARE FIRST HOUR: CPT | Performed by: INTERNAL MEDICINE

## 2024-06-27 PROCEDURE — 87205 SMEAR GRAM STAIN: CPT

## 2024-06-27 PROCEDURE — 2500000001 HC RX 250 WO HCPCS SELF ADMINISTERED DRUGS (ALT 637 FOR MEDICARE OP)

## 2024-06-27 PROCEDURE — 2720000007 HC OR 272 NO HCPCS

## 2024-06-27 PROCEDURE — 85384 FIBRINOGEN ACTIVITY: CPT

## 2024-06-27 PROCEDURE — 36010 PLACE CATHETER IN VEIN: CPT | Performed by: RADIOLOGY

## 2024-06-27 PROCEDURE — 82805 BLOOD GASES W/O2 SATURATION: CPT | Mod: 91 | Performed by: INTERNAL MEDICINE

## 2024-06-27 PROCEDURE — 87040 BLOOD CULTURE FOR BACTERIA: CPT

## 2024-06-27 PROCEDURE — 83735 ASSAY OF MAGNESIUM: CPT | Mod: 91

## 2024-06-27 PROCEDURE — 06H03DZ INSERTION OF INTRALUMINAL DEVICE INTO INFERIOR VENA CAVA, PERCUTANEOUS APPROACH: ICD-10-PCS | Performed by: STUDENT IN AN ORGANIZED HEALTH CARE EDUCATION/TRAINING PROGRAM

## 2024-06-27 PROCEDURE — 85025 COMPLETE CBC W/AUTO DIFF WBC: CPT | Mod: 91

## 2024-06-27 PROCEDURE — 99153 MOD SED SAME PHYS/QHP EA: CPT

## 2024-06-27 PROCEDURE — C1880 VENA CAVA FILTER: HCPCS

## 2024-06-27 PROCEDURE — 87070 CULTURE OTHR SPECIMN AEROBIC: CPT

## 2024-06-27 PROCEDURE — 75825 VEIN X-RAY TRUNK: CPT | Performed by: RADIOLOGY

## 2024-06-27 PROCEDURE — C1769 GUIDE WIRE: HCPCS

## 2024-06-27 PROCEDURE — C9113 INJ PANTOPRAZOLE SODIUM, VIA: HCPCS

## 2024-06-27 PROCEDURE — 99152 MOD SED SAME PHYS/QHP 5/>YRS: CPT

## 2024-06-27 PROCEDURE — 84132 ASSAY OF SERUM POTASSIUM: CPT

## 2024-06-27 PROCEDURE — 84132 ASSAY OF SERUM POTASSIUM: CPT | Mod: 91 | Performed by: INTERNAL MEDICINE

## 2024-06-27 RX ORDER — FENTANYL CITRATE 50 UG/ML
INJECTION, SOLUTION INTRAMUSCULAR; INTRAVENOUS
Status: COMPLETED | OUTPATIENT
Start: 2024-06-27 | End: 2024-06-27

## 2024-06-27 RX ORDER — VANCOMYCIN HYDROCHLORIDE 1 G/200ML
1000 INJECTION, SOLUTION INTRAVENOUS EVERY 12 HOURS
Status: DISCONTINUED | OUTPATIENT
Start: 2024-06-27 | End: 2024-07-02

## 2024-06-27 RX ORDER — VANCOMYCIN HYDROCHLORIDE 1 G/20ML
INJECTION, POWDER, LYOPHILIZED, FOR SOLUTION INTRAVENOUS DAILY PRN
Status: DISCONTINUED | OUTPATIENT
Start: 2024-06-27 | End: 2024-07-02

## 2024-06-27 RX ORDER — MAGNESIUM SULFATE HEPTAHYDRATE 40 MG/ML
4 INJECTION, SOLUTION INTRAVENOUS ONCE
Status: COMPLETED | OUTPATIENT
Start: 2024-06-27 | End: 2024-06-28

## 2024-06-27 RX ORDER — BUMETANIDE 0.25 MG/ML
4 INJECTION INTRAMUSCULAR; INTRAVENOUS ONCE
Status: COMPLETED | OUTPATIENT
Start: 2024-06-27 | End: 2024-06-27

## 2024-06-27 RX ORDER — MIDAZOLAM HYDROCHLORIDE 1 MG/ML
INJECTION INTRAMUSCULAR; INTRAVENOUS
Status: COMPLETED | OUTPATIENT
Start: 2024-06-27 | End: 2024-06-27

## 2024-06-27 RX ORDER — CYCLOBENZAPRINE HCL 10 MG
10 TABLET ORAL 3 TIMES DAILY PRN
Status: DISCONTINUED | OUTPATIENT
Start: 2024-06-27 | End: 2024-07-02

## 2024-06-27 RX ORDER — HYDROMORPHONE HYDROCHLORIDE 0.2 MG/ML
0.2 INJECTION INTRAMUSCULAR; INTRAVENOUS; SUBCUTANEOUS EVERY 4 HOURS PRN
Status: DISCONTINUED | OUTPATIENT
Start: 2024-06-27 | End: 2024-06-29

## 2024-06-27 RX ORDER — POTASSIUM CHLORIDE 29.8 MG/ML
40 INJECTION INTRAVENOUS ONCE
Status: COMPLETED | OUTPATIENT
Start: 2024-06-27 | End: 2024-06-28

## 2024-06-27 RX ORDER — CALCIUM GLUCONATE 20 MG/ML
2 INJECTION, SOLUTION INTRAVENOUS ONCE
Status: COMPLETED | OUTPATIENT
Start: 2024-06-27 | End: 2024-06-27

## 2024-06-27 RX ORDER — PANTOPRAZOLE SODIUM 40 MG/1
40 TABLET, DELAYED RELEASE ORAL
Status: DISCONTINUED | OUTPATIENT
Start: 2024-06-28 | End: 2024-06-30

## 2024-06-27 RX ORDER — CHLORHEXIDINE GLUCONATE ORAL RINSE 1.2 MG/ML
15 SOLUTION DENTAL 2 TIMES DAILY
Status: DISCONTINUED | OUTPATIENT
Start: 2024-06-27 | End: 2024-06-28

## 2024-06-27 RX ADMIN — HYDROMORPHONE HYDROCHLORIDE 0.4 MG: 1 INJECTION, SOLUTION INTRAMUSCULAR; INTRAVENOUS; SUBCUTANEOUS at 17:57

## 2024-06-27 RX ADMIN — CYCLOBENZAPRINE 10 MG: 10 TABLET, FILM COATED ORAL at 16:18

## 2024-06-27 RX ADMIN — POTASSIUM CHLORIDE 40 MEQ: 29.8 INJECTION, SOLUTION INTRAVENOUS at 22:29

## 2024-06-27 RX ADMIN — HYDROMORPHONE HYDROCHLORIDE 0.4 MG: 1 INJECTION, SOLUTION INTRAMUSCULAR; INTRAVENOUS; SUBCUTANEOUS at 13:19

## 2024-06-27 RX ADMIN — SODIUM NITROPRUSSIDE 2 MCG/KG/MIN: 0.5 INJECTION, SOLUTION INTRAVENOUS at 22:29

## 2024-06-27 RX ADMIN — SODIUM NITROPRUSSIDE 2 MCG/KG/MIN: 0.5 INJECTION, SOLUTION INTRAVENOUS at 19:18

## 2024-06-27 RX ADMIN — HYDROMORPHONE HYDROCHLORIDE 0.4 MG: 1 INJECTION, SOLUTION INTRAMUSCULAR; INTRAVENOUS; SUBCUTANEOUS at 22:40

## 2024-06-27 RX ADMIN — BUMETANIDE 4 MG: 0.25 INJECTION INTRAMUSCULAR; INTRAVENOUS at 08:26

## 2024-06-27 RX ADMIN — SODIUM NITROPRUSSIDE 2 MCG/KG/MIN: 0.5 INJECTION, SOLUTION INTRAVENOUS at 17:56

## 2024-06-27 RX ADMIN — HYDROMORPHONE HYDROCHLORIDE 0.4 MG: 1 INJECTION, SOLUTION INTRAMUSCULAR; INTRAVENOUS; SUBCUTANEOUS at 08:26

## 2024-06-27 RX ADMIN — ASPIRIN 81 MG CHEWABLE TABLET 81 MG: 81 TABLET CHEWABLE at 06:34

## 2024-06-27 RX ADMIN — FENTANYL CITRATE 50 MCG: 50 INJECTION, SOLUTION INTRAMUSCULAR; INTRAVENOUS at 11:41

## 2024-06-27 RX ADMIN — VANCOMYCIN HYDROCHLORIDE 1000 MG: 1 INJECTION, SOLUTION INTRAVENOUS at 18:14

## 2024-06-27 RX ADMIN — MAGNESIUM SULFATE HEPTAHYDRATE 4 G: 40 INJECTION, SOLUTION INTRAVENOUS at 22:25

## 2024-06-27 RX ADMIN — FENTANYL CITRATE 50 MCG: 50 INJECTION, SOLUTION INTRAMUSCULAR; INTRAVENOUS at 12:03

## 2024-06-27 RX ADMIN — SODIUM NITROPRUSSIDE 0.8 MCG/KG/MIN: 0.5 INJECTION, SOLUTION INTRAVENOUS at 09:34

## 2024-06-27 RX ADMIN — MIDAZOLAM HYDROCHLORIDE 1 MG: 1 INJECTION, SOLUTION INTRAMUSCULAR; INTRAVENOUS at 11:41

## 2024-06-27 RX ADMIN — HYDROMORPHONE HYDROCHLORIDE 0.4 MG: 1 INJECTION, SOLUTION INTRAMUSCULAR; INTRAVENOUS; SUBCUTANEOUS at 03:20

## 2024-06-27 RX ADMIN — Medication 6 MG: at 20:48

## 2024-06-27 RX ADMIN — PIPERACILLIN SODIUM AND TAZOBACTAM SODIUM 3.38 G: 3; .375 INJECTION, SOLUTION INTRAVENOUS at 16:20

## 2024-06-27 RX ADMIN — SODIUM NITROPRUSSIDE 0.8 MCG/KG/MIN: 0.5 INJECTION, SOLUTION INTRAVENOUS at 02:11

## 2024-06-27 RX ADMIN — CALCIUM GLUCONATE 2 G: 20 INJECTION, SOLUTION INTRAVENOUS at 09:34

## 2024-06-27 RX ADMIN — IOHEXOL 100 ML: 350 INJECTION, SOLUTION INTRAVENOUS at 11:40

## 2024-06-27 RX ADMIN — FENTANYL CITRATE 50 MCG: 50 INJECTION, SOLUTION INTRAMUSCULAR; INTRAVENOUS at 11:50

## 2024-06-27 RX ADMIN — PANTOPRAZOLE SODIUM 40 MG: 40 INJECTION, POWDER, FOR SOLUTION INTRAVENOUS at 06:26

## 2024-06-27 RX ADMIN — HEPARIN SODIUM 1200 UNITS/HR: 10000 INJECTION, SOLUTION INTRAVENOUS at 09:34

## 2024-06-27 RX ADMIN — MIDAZOLAM HYDROCHLORIDE 1 MG: 1 INJECTION, SOLUTION INTRAMUSCULAR; INTRAVENOUS at 11:50

## 2024-06-27 RX ADMIN — AMIODARONE HYDROCHLORIDE 200 MG: 200 TABLET ORAL at 08:26

## 2024-06-27 RX ADMIN — PIPERACILLIN SODIUM AND TAZOBACTAM SODIUM 3.38 G: 3; .375 INJECTION, SOLUTION INTRAVENOUS at 21:38

## 2024-06-27 RX ADMIN — IOHEXOL 100 ML: 350 INJECTION, SOLUTION INTRAVENOUS at 11:39

## 2024-06-27 ASSESSMENT — PAIN SCALES - GENERAL
PAINLEVEL_OUTOF10: 0 - NO PAIN
PAINLEVEL_OUTOF10: 9
PAINLEVEL_OUTOF10: 10 - WORST POSSIBLE PAIN
PAINLEVEL_OUTOF10: 0 - NO PAIN
PAINLEVEL_OUTOF10: 8
PAINLEVEL_OUTOF10: 0 - NO PAIN
PAINLEVEL_OUTOF10: 8
PAINLEVEL_OUTOF10: 9
PAINLEVEL_OUTOF10: 10 - WORST POSSIBLE PAIN
PAINLEVEL_OUTOF10: 1
PAINLEVEL_OUTOF10: 5 - MODERATE PAIN
PAINLEVEL_OUTOF10: 0 - NO PAIN
PAINLEVEL_OUTOF10: 0 - NO PAIN

## 2024-06-27 ASSESSMENT — PAIN - FUNCTIONAL ASSESSMENT

## 2024-06-27 ASSESSMENT — PAIN DESCRIPTION - LOCATION
LOCATION: LEG
LOCATION: LEG

## 2024-06-27 ASSESSMENT — PAIN DESCRIPTION - ORIENTATION
ORIENTATION: RIGHT
ORIENTATION: RIGHT

## 2024-06-27 ASSESSMENT — PAIN DESCRIPTION - DESCRIPTORS: DESCRIPTORS: ACHING

## 2024-06-27 NOTE — ANESTHESIA PREPROCEDURE EVALUATION
Patient: Amirah Bennett    Procedure Information       Date/Time: 06/28/24 0715    Procedures:       Embolectomy Lower Extremity (Right)      Amputation Above Knee (Right)    Location: [facility] OR 16 / [facility] OR    Surgeons: Rizwana De La Rosa MD            Relevant Problems   Anesthesia (within normal limits)      Cardiac   (+) Acute lower extremity ischemia   (+) Atrial fibrillation (Multi)   (+) CHF (congestive heart failure) (Multi)   (+) Critical limb ischemia of right lower extremity (Multi)   (+) Primary hypertension      Neuro   (+) Intracranial hemorrhage (Multi)   (+) Schizophrenia (Multi)   (+) Stroke (Multi)      GI   (+) Dysphagia following cerebral infarction      Liver   (+) Elevated LFTs      Hematology   (+) Deep vein thrombosis (DVT) of lower extremity (Multi)   (+) Thrombocytopenia (CMS-HCC)       Clinical information reviewed:    Allergies  Meds               NPO Detail:  No data recorded     Physical Exam    Airway  Mallampati: II  TM distance: >3 FB  Comments: 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  Patient was not previously instructed to abstain from smoking on day of procedure.  Patient did not smoke on day of procedure.    intravenous induction   Postoperative administration of opioids is intended.  Trial extubation is planned.  Anesthetic plan and risks discussed with patient.  Use of blood products discussed with patient who.    Plan discussed with attending.

## 2024-06-27 NOTE — POST-PROCEDURE NOTE
Interventional Radiology Brief Postprocedure Note    Attending: Dr. Maria Eugenia MD     Assistant: Wellington Mesa MD     Diagnosis: DVT, can't be anticaogualted.     Description of procedure: Successful IVC filter placement.      Anesthesia:  Light sedation.     Complications: None    Estimated Blood Loss: none    Medications  As of 06/27/24 1233      metoprolol tartrate (Lopressor) injection  - Omnicell Override Pull (mg) Total dose:  5 mg      Date/Time Rate/Dose/Volume Action       06/20/24  0743 5 mg Given               digoxin (Lanoxin) injection 250 mcg (mcg) Total dose:  250 mcg Dosing weight:  61.6      Date/Time Rate/Dose/Volume Action       06/20/24  0800 250 mcg (over 5 min) Given               digoxin (Lanoxin) injection  - Omnicell Override Pull (mcg) Total dose:  250 mcg      Date/Time Rate/Dose/Volume Action       06/20/24  0800 250 mcg (over 5 min) Given               heparin bolus from bag 7,080 Units (Units) Total dose:  Cannot be calculated* Dosing weight:  88.5   *Total user-documented volume 2192.51 mL may contain volume from other administrations     Date/Time Rate/Dose/Volume Action       06/20/24  0910 7,080 Units Bolus from Bag               heparin 25,000 Units in dextrose 5% 250 mL (100 Units/mL) infusion (premix) (Units/hr) Total dose:  Cannot be calculated* Dosing weight:  88.5   *Total user-documented volume 2192.51 mL may contain volume from other administrations     Date/Time Rate/Dose/Volume Action       06/20/24  0912 1,600 Units/hr - 16 mL/hr New Bag      1900 1,600 Units/hr - 16 mL/hr Rate Verify      2000 1,600 Units/hr - 16 mL/hr Rate Verify      2057 1,600 Units/hr - 16 mL/hr New Bag      2100 1,600 Units/hr - 16 mL/hr Rate Verify      2200 1,600 Units/hr - 16 mL/hr Rate Verify      2300 1,600 Units/hr - 16 mL/hr Rate Verify     06/21/24  0100 1,600 Units/hr - 16 mL/hr Rate Verify      0200 1,600 Units/hr - 16 mL/hr Rate Verify      0300 1,600 Units/hr - 16 mL/hr Rate Verify       0400 1,600 Units/hr - 16 mL/hr Rate Verify      0500 1,600 Units/hr - 16 mL/hr Rate Verify      0600 1,600 Units/hr - 16 mL/hr Rate Verify      0642 1,600 Units/hr - 16 mL/hr Rate Verify      0700 1,600 Units/hr - 16 mL/hr Rate Verify      0800 1,600 Units/hr - 16 mL/hr Rate Verify      0900 1,600 Units/hr - 16 mL/hr Rate Verify      1000 1,600 Units/hr - 16 mL/hr Rate Verify      1100 1,600 Units/hr - 16 mL/hr Rate Verify      1104 1,600 Units/hr - 16 mL/hr New Bag      1300 1,600 Units/hr - 16 mL/hr Rate Verify      1400 1,600 Units/hr - 16 mL/hr Rate Verify      1500 1,600 Units/hr - 16 mL/hr Rate Verify      1600 1,600 Units/hr - 16 mL/hr Rate Verify      1700 1,600 Units/hr - 16 mL/hr Rate Verify      1800 1,600 Units/hr - 16 mL/hr Rate Verify      1900 1,600 Units/hr - 16 mL/hr Rate Verify      2000 1,600 Units/hr - 16 mL/hr Rate Verify     06/22/24  0000 1,600 Units/hr - 16 mL/hr Rate Verify      0425 1,600 Units/hr - 16 mL/hr New Bag      0800 1,600 Units/hr - 16 mL/hr Rate Verify      0900 1,600 Units/hr - 16 mL/hr Rate Verify      1000 1,600 Units/hr - 16 mL/hr Rate Verify      1100 1,600 Units/hr - 16 mL/hr Rate Verify      1200 1,600 Units/hr - 16 mL/hr Rate Verify      1300 1,600 Units/hr - 16 mL/hr Rate Verify      1400 1,600 Units/hr - 16 mL/hr Rate Verify      1500 1,600 Units/hr - 16 mL/hr Rate Verify      1600 1,600 Units/hr - 16 mL/hr Rate Verify      1700 1,600 Units/hr - 16 mL/hr Rate Verify      1800 1,600 Units/hr - 16 mL/hr Rate Verify      1900 1,600 Units/hr - 16 mL/hr Rate Verify      2000 1,600 Units/hr - 16 mL/hr Rate Verify      2045 1,600 Units/hr - 16 mL/hr New Bag      2100 1,600 Units/hr - 16 mL/hr Rate Verify      2200 1,600 Units/hr - 16 mL/hr Rate Verify      2300 1,600 Units/hr - 16 mL/hr Rate Verify     06/23/24  0000 1,600 Units/hr - 16 mL/hr Rate Verify      0100 1,600 Units/hr - 16 mL/hr Rate Verify      0200 1,600 Units/hr - 16 mL/hr Rate Verify      0300 1,600 Units/hr -  16 mL/hr Rate Verify      0400 1,600 Units/hr - 16 mL/hr Rate Verify      0500 1,600 Units/hr - 16 mL/hr Rate Verify      0607 1,600 Units/hr - 16 mL/hr Rate Verify      0623 1,400 Units/hr - 14 mL/hr Rate Change      1134 1,200 Units/hr - 12 mL/hr Rate Change      1503 1,200 Units/hr - 12 mL/hr New Bag      1900 1,200 Units/hr - 12 mL/hr Rate Verify      2000 1,200 Units/hr - 12 mL/hr Rate Verify      2100 1,200 Units/hr - 12 mL/hr Rate Verify      2200 1,200 Units/hr - 12 mL/hr Rate Verify      2300 1,200 Units/hr - 12 mL/hr Rate Verify     06/24/24  0000 1,200 Units/hr - 12 mL/hr Rate Verify      0100 1,200 Units/hr - 12 mL/hr Rate Verify      0200 1,200 Units/hr - 12 mL/hr Rate Verify      0300 1,200 Units/hr - 12 mL/hr Rate Verify      0400 1,200 Units/hr - 12 mL/hr Rate Verify      0500 1,200 Units/hr - 12 mL/hr Rate Verify      0600 1,200 Units/hr - 12 mL/hr Rate Verify      0700 1,200 Units/hr - 12 mL/hr Rate Verify      0800 1,200 Units/hr - 12 mL/hr Rate Verify      0853 1,200 Units/hr - 12 mL/hr New Bag      0900 1,200 Units/hr - 12 mL/hr Rate Verify      1000 1,200 Units/hr - 12 mL/hr Rate Verify      1100 1,200 Units/hr - 12 mL/hr Rate Verify      1200 1,200 Units/hr - 12 mL/hr Rate Verify      1300 1,200 Units/hr - 12 mL/hr Rate Verify      1500 1,200 Units/hr - 12 mL/hr Rate Verify      2000 1,200 Units/hr - 12 mL/hr Rate Verify     06/25/24  0000 1,200 Units/hr - 12 mL/hr Rate Verify      0509 1,200 Units/hr - 12 mL/hr New Bag      0700 1,200 Units/hr - 12 mL/hr Rate Verify      0800  Stopped               heparin (porcine) in D5W infusion  - Omnicell Override Pull (Units/hr) Total dose:  Cannot be calculated*   *Total user-documented volume 2192.51 mL may contain volume from other administrations     Date/Time Rate/Dose/Volume Action       06/20/24  0912 1,600 Units/hr - 16 mL/hr New Bag               heparin 25,000 Units in dextrose 5% 250 mL (100 Units/mL) infusion (premix) (Units/hr) Total dose:   Cannot be calculated* Dosing weight:  95.5   *Total user-documented volume 2192.51 mL may contain volume from other administrations     Date/Time Rate/Dose/Volume Action       06/25/24  1753 1,200 Units/hr - 12 mL/hr New Bag      1800 1,200 Units/hr - 12 mL/hr Rate Verify      2000 1,200 Units/hr - 12 mL/hr Rate Verify      2327 1,200 Units/hr - 12 mL/hr Rate Verify     06/26/24  0700 1,200 Units/hr - 12 mL/hr Rate Verify      0800 1,200 Units/hr - 12 mL/hr Rate Verify      0900 1,200 Units/hr - 12 mL/hr Rate Verify      1000 1,200 Units/hr - 12 mL/hr Rate Verify      1100 1,200 Units/hr - 12 mL/hr Rate Verify      1200 1,200 Units/hr - 12 mL/hr Rate Verify      1300 1,200 Units/hr - 12 mL/hr Rate Verify      1356 1,200 Units/hr - 12 mL/hr New Bag      1400 1,200 Units/hr - 12 mL/hr Rate Change      1500 1,200 Units/hr - 12 mL/hr Rate Verify      1600 1,200 Units/hr - 12 mL/hr Rate Verify      1700 1,200 Units/hr - 12 mL/hr Rate Verify      1800 1,200 Units/hr - 12 mL/hr Rate Verify      2000 1,200 Units/hr - 12 mL/hr Rate Verify     06/27/24  0800 1,200 Units/hr - 12 mL/hr Rate Verify      0934 1,200 Units/hr - 12 mL/hr New Bag               heparin (porcine) in D5W infusion  - Omnicell Override Pull (Units/hr) Total dose:  Cannot be calculated*   *Total user-documented volume 2192.51 mL may contain volume from other administrations     Date/Time Rate/Dose/Volume Action       06/25/24  1753 1,200 Units/hr - 12 mL/hr New Bag               norepinephrine in dextrose 5 % (Levophed) infusion  - Omnicell Override Pull Total dose:  Cannot be calculated*   *Total user-documented volume 29.47 mL may contain volume from other administrations     Date/Time Rate/Dose/Volume Action       06/20/24  0815 *Not included in total Missed               norepinephrine in dextrose 5 % (Levophed) infusion  - Omnicell Override Pull (mcg) Total dose:  Cannot be calculated*   *Total user-documented volume 29.47 mL may contain volume from  other administrations     Date/Time Rate/Dose/Volume Action       06/22/24  0334 0.06 mcg New Bag               norepinephrine (Levophed) 8 mg in dextrose 5% 250 mL (0.032 mg/mL) infusion (premix) (mcg/kg/min) Total dose:  Cannot be calculated* Dosing weight:  102   *Total user-documented volume 29.47 mL may contain volume from other administrations     Date/Time Rate/Dose/Volume Action       06/22/24  0230 0.04 mcg/kg/min - 7.65 mL/hr Rate Change      0256 0.1 mcg/kg/min - 19.13 mL/hr Rate Change      0300 0.2 mcg/kg/min - 38.3 mL/hr Rate Change      0315 0.4 mcg/kg/min - 76.5 mL/hr Rate Change      0316 0.38 mcg/kg/min - 72.7 mL/hr Rate Change      0317 0.36 mcg/kg/min - 68.9 mL/hr Rate Change      0318 0.34 mcg/kg/min - 65 mL/hr Rate Change      0319 0.32 mcg/kg/min - 61.2 mL/hr Rate Change      0320 0.28 mcg/kg/min - 53.6 mL/hr Rate Change      0321 0.26 mcg/kg/min - 49.7 mL/hr Rate Change      0322 0.22 mcg/kg/min - 42.1 mL/hr Rate Change      0323 0.2 mcg/kg/min - 38.3 mL/hr Rate Change      0325 0.18 mcg/kg/min - 34.4 mL/hr Rate Change      0326 0.14 mcg/kg/min - 26.8 mL/hr Rate Change      0330 0.1 mcg/kg/min - 19.13 mL/hr Rate Change      0334 0.06 mcg New Bag      0340 0.02 mcg/kg/min - 3.83 mL/hr Rate Change      0345  Stopped               EPINEPHrine (Adrenalin) injection  - Omnicell Override Pull Total dose:  Cannot be calculated*   *Administration dose not documented     Date/Time Rate/Dose/Volume Action       06/20/24  0815 *Not included in total Missed               amiodarone (Nexterone) infusion  - Omnicell Override Pull (mg/min) Total dose:  Cannot be calculated*   *Total user-documented volume 3178.5 mL may contain volume from other administrations     Date/Time Rate/Dose/Volume Action       06/20/24  0819 1 mg/min - 33.3 mL/hr New Bag               amiodarone (Nexterone) 360 mg in dextrose,iso-osm 200 mL (1.8 mg/mL) infusion (premix) (mg/min) Total dose:  Cannot be calculated* Dosing weight:   90.7   *Total user-documented volume 3178.5 mL may contain volume from other administrations     Date/Time Rate/Dose/Volume Action       06/20/24  0819 1 mg/min - 33.3 mL/hr New Bag      1637 1 mg/min - 33.3 mL/hr New Bag      1900 1 mg/min - 33.3 mL/hr Rate Verify      2000 1 mg/min - 33.3 mL/hr Rate Verify      2100 1 mg/min - 33.3 mL/hr Rate Verify      2200 1 mg/min - 33.3 mL/hr Rate Verify      2258 1 mg/min - 33.3 mL/hr Rate Verify      2300 1 mg/min - 33.3 mL/hr Rate Verify      2351 1 mg/min - 33.3 mL/hr New Bag     06/21/24  0000 1 mg/min - 33.3 mL/hr Rate Verify      0100 1 mg/min - 33.3 mL/hr Rate Verify      0200 1 mg/min - 33.3 mL/hr Rate Verify      0336 1 mg/min - 33.3 mL/hr New Bag      0400 1 mg/min - 33.3 mL/hr Rate Verify      0500 1 mg/min - 33.3 mL/hr Rate Verify      0600 1 mg/min - 33.3 mL/hr Rate Verify      0642 1 mg/min - 33.3 mL/hr Rate Verify      0700 1 mg/min - 33.3 mL/hr Rate Verify      0800 1 mg/min - 33.3 mL/hr Rate Verify      0900 1 mg/min - 33.3 mL/hr Rate Verify      0911 1 mg/min - 33.3 mL/hr New Bag      1000 1 mg/min - 33.3 mL/hr Rate Verify      1100 1 mg/min - 33.3 mL/hr Rate Verify      1200 1 mg/min - 33.3 mL/hr Rate Verify      1300 1 mg/min - 33.3 mL/hr Rate Verify      1400 1 mg/min - 33.3 mL/hr Rate Verify      1500 1 mg/min - 33.3 mL/hr Rate Verify      1532 1 mg/min - 33.3 mL/hr New Bag      1600 1 mg/min - 33.3 mL/hr Rate Verify      1700 1 mg/min - 33.3 mL/hr Rate Verify      1800 1 mg/min - 33.3 mL/hr Rate Verify      1900 1 mg/min - 33.3 mL/hr Rate Verify      2000 1 mg/min - 33.3 mL/hr Rate Verify      2221 1 mg/min - 33.3 mL/hr New Bag     06/22/24  0000 1 mg/min - 33.3 mL/hr Rate Verify      0424 1 mg/min - 33.3 mL/hr New Bag      0600 1 mg/min - 33.3 mL/hr Rate Verify      0800 1 mg/min - 33.3 mL/hr Rate Verify      0900 1 mg/min - 33.3 mL/hr Rate Verify      1000 1 mg/min - 33.3 mL/hr Rate Verify      1050 1 mg/min - 33.3 mL/hr New Bag      1100 1 mg/min -  33.3 mL/hr Rate Verify      1200 1 mg/min - 33.3 mL/hr Rate Verify      1300 1 mg/min - 33.3 mL/hr Rate Verify      1400 1 mg/min - 33.3 mL/hr Rate Verify      1500 1 mg/min - 33.3 mL/hr Rate Verify      1600 1 mg/min - 33.3 mL/hr Rate Verify      1703 1 mg/min - 33.3 mL/hr New Bag      1800 1 mg/min - 33.3 mL/hr Rate Verify      1900 1 mg/min - 33.3 mL/hr Rate Verify      2000 1 mg/min - 33.3 mL/hr Rate Verify      2100 1 mg/min - 33.3 mL/hr Rate Verify      2200 1 mg/min - 33.3 mL/hr Rate Verify      2300 1 mg/min - 33.3 mL/hr Rate Verify      Canceled Entry      2320 1 mg/min - 33.3 mL/hr New Bag     06/23/24  0000 1 mg/min - 33.3 mL/hr Rate Verify      0100 1 mg/min - 33.3 mL/hr Rate Verify      0200 1 mg/min - 33.3 mL/hr Rate Verify      0300 1 mg/min - 33.3 mL/hr Rate Verify      0400 1 mg/min - 33.3 mL/hr Rate Verify      0454 1 mg/min - 33.3 mL/hr New Bag      0500 1 mg/min - 33.3 mL/hr Rate Verify      0607 1 mg/min - 33.3 mL/hr Rate Verify      1057 1 mg/min - 33.3 mL/hr New Bag      1724 1 mg/min - 33.3 mL/hr New Bag      1900 1 mg/min - 33.3 mL/hr Rate Verify      2000 1 mg/min - 33.3 mL/hr Rate Verify      2100 1 mg/min - 33.3 mL/hr Rate Verify      2200 1 mg/min - 33.3 mL/hr Rate Verify      2328 1 mg/min - 33.3 mL/hr New Bag     06/24/24  0000 1 mg/min - 33.3 mL/hr Rate Verify      0100 1 mg/min - 33.3 mL/hr Rate Verify      0200 1 mg/min - 33.3 mL/hr Rate Verify      0300 1 mg/min - 33.3 mL/hr Rate Verify      0400 1 mg/min - 33.3 mL/hr Rate Verify      0553 1 mg/min - 33.3 mL/hr New Bag      0600 1 mg/min - 33.3 mL/hr Rate Verify      0700 1 mg/min - 33.3 mL/hr Rate Verify      0800 1 mg/min - 33.3 mL/hr Rate Verify      0900 1 mg/min - 33.3 mL/hr Rate Verify      1000 1 mg/min - 33.3 mL/hr Rate Verify      1027  Stopped               magnesium sulfate IV 2 g (mL/hr) Total volume:  Cannot be calculated* Dosing weight:  90.7   *Total user-documented volume 400 mL may contain volume from other  administrations     Date/Time Rate/Dose/Volume Action       06/20/24  1652 2 g - 25 mL/hr (over 120 min) New Bag      1852  (over 120 min) Stopped               magnesium sulfate IV 2 g (mL/hr) Total volume:  Cannot be calculated* Dosing weight:  97.6   *Total user-documented volume 400 mL may contain volume from other administrations     Date/Time Rate/Dose/Volume Action       06/23/24  0825 2 g - 25 mL/hr (over 120 min) New Bag      1025  (over 120 min) Stopped               magnesium sulfate IV 2 g (mL/hr) Total volume:  Cannot be calculated* Dosing weight:  97.6   *Total user-documented volume 400 mL may contain volume from other administrations     Date/Time Rate/Dose/Volume Action       06/25/24  0327 2 g - 25 mL/hr (over 120 min) New Bag      0527  (over 120 min) Stopped               magnesium sulfate IV  - Omnicell Override Pull (mL/hr) Total volume:  Cannot be calculated*   *Total user-documented volume 400 mL may contain volume from other administrations     Date/Time Rate/Dose/Volume Action       06/25/24  0327 2 g - 25 mL/hr (over 120 min) New Bag      0527  (over 120 min) Stopped               magnesium sulfate IV 2 g (mL/hr) Total volume:  Cannot be calculated* Dosing weight:  95.5   *Total user-documented volume 400 mL may contain volume from other administrations     Date/Time Rate/Dose/Volume Action       06/25/24  1006 2 g - 25 mL/hr (over 120 min) New Bag      1206  (over 120 min) Stopped               magnesium sulfate IV 2 g (mL/hr) Total volume:  Cannot be calculated* Dosing weight:  95.5   *Total user-documented volume 400 mL may contain volume from other administrations     Date/Time Rate/Dose/Volume Action       06/26/24  0745 2 g - 25 mL/hr (over 120 min) New Bag      0945  (over 120 min) Stopped               calcium gluconate in NS IV 2 g (mL/hr) Total volume:  Not documented* Dosing weight:  90.7   *Total volume has not been documented. View each administration to see the amount  administered.     Date/Time Rate/Dose/Volume Action       06/20/24  1648 2 g - 100 mL/hr (over 60 min) New Bag      1748  (over 60 min) Stopped               calcium gluconate in NS IV 2 g (mL/hr) Total dose:  0 g* Dosing weight:  102   *From user-documented volume     Date/Time Rate/Dose/Volume Action       06/22/24  0600 0 mL       0851 2 g - 100 mL/hr (over 60 min) New Bag      0951  (over 60 min) Stopped               calcium gluconate in NS IV 2 g (mL/hr) Total volume:  Not documented* Dosing weight:  102   *Total volume has not been documented. View each administration to see the amount administered.     Date/Time Rate/Dose/Volume Action       06/22/24  1534 2 g - 100 mL/hr (over 60 min) New Bag      1634  (over 60 min) Stopped               calcium gluconate in NS IV 2 g (mL/hr) Total volume:  Not documented* Dosing weight:  97.6   *Total volume has not been documented. View each administration to see the amount administered.     Date/Time Rate/Dose/Volume Action       06/24/24  0856 2 g - 100 mL/hr (over 60 min) New Bag      0956  (over 60 min) Stopped               calcium gluconate in NS IV 2 g (mL/hr) Total dose:  2 g* Dosing weight:  97.6   *From user-documented volume     Date/Time Rate/Dose/Volume Action       06/24/24  1441 2 g - 100 mL/hr (over 60 min) New Bag      1541 100 mL Stopped               calcium gluconate in NS IV 2 g (mL/hr) Total volume:  Not documented* Dosing weight:  97.6   *Total volume has not been documented. View each administration to see the amount administered.     Date/Time Rate/Dose/Volume Action       06/24/24  2028 2 g - 100 mL/hr (over 60 min) New Bag      2128  (over 60 min) Stopped               calcium gluconate in NS IV 2 g (mL/hr) Total dose:  2 g* Dosing weight:  95.5   *From user-documented volume     Date/Time Rate/Dose/Volume Action       06/25/24  2257 2 g - 100 mL/hr (over 60 min) New Bag      2357 100 mL Stopped               calcium gluconate in NS IV 2 g  (mL/hr) Total dose:  2 g* Dosing weight:  88.5   *From user-documented volume     Date/Time Rate/Dose/Volume Action       06/27/24  0934 2 g - 100 mL/hr (over 60 min) New Bag      1034 100 mL Stopped               potassium chloride 20 mEq in 100 mL IV premix (mL/hr) Total dose:  20 mEq* Dosing weight:  90.7   *From user-documented volume     Date/Time Rate/Dose/Volume Action       06/20/24  1649 20 mEq - 50 mL/hr (over 120 min) New Bag      1849 100 mL Stopped               potassium chloride 20 mEq in 100 mL IV premix (mL/hr) Total dose:  20 mEq* Dosing weight:  111   *From user-documented volume     Date/Time Rate/Dose/Volume Action       06/21/24  0002 20 mEq - 50 mL/hr (over 120 min) New Bag      0202 100 mL Stopped               potassium chloride 20 mEq in 100 mL IV premix (mL/hr) Total dose:  40 mEq* Dosing weight:  102   *From user-documented volume     Date/Time Rate/Dose/Volume Action       06/22/24  2116 20 mEq - 50 mL/hr (over 120 min) New Bag      2314 100 mL Stopped      2324 20 mEq - 50 mL/hr (over 120 min) New Bag     06/23/24  0124 100 mL Stopped               potassium chloride 20 mEq in 100 mL IV premix (mL/hr) Total dose:  40 mEq* Dosing weight:  97.6   *From user-documented volume     Date/Time Rate/Dose/Volume Action       06/23/24  0825 20 mEq - 50 mL/hr (over 120 min) New Bag      1025 100 mL Stopped      1032 20 mEq - 50 mL/hr (over 120 min) New Bag      1232 100 mL Stopped               potassium chloride 20 mEq in 100 mL IV premix (mL/hr) Total volume:  Not documented* Dosing weight:  97.6   *Total volume has not been documented. View each administration to see the amount administered.     Date/Time Rate/Dose/Volume Action       06/23/24  2012 20 mEq - 50 mL/hr (over 120 min) New Bag      2212  (over 120 min) Stopped               potassium chloride 20 mEq in 100 mL IV premix (mL/hr) Total dose:  20 mEq* Dosing weight:  97.6   *From user-documented volume     Date/Time Rate/Dose/Volume  Action       06/24/24  1154 20 mEq - 50 mL/hr (over 120 min) New Bag      1354 100 mL Stopped               potassium chloride 20 mEq in 100 mL IV premix (mL/hr) Total dose:  40 mEq* Dosing weight:  97.6   *From user-documented volume     Date/Time Rate/Dose/Volume Action       06/24/24  2154 20 mEq - 50 mL/hr (over 120 min) New Bag      2344 100 mL Stopped     06/25/24  0027 20 mEq - 50 mL/hr (over 120 min) New Bag      0227 100 mL Stopped               potassium chloride 20 mEq in 100 mL IV premix (mL/hr) Total dose:  40 mEq* Dosing weight:  95.5   *From user-documented volume     Date/Time Rate/Dose/Volume Action       06/26/24  0827 20 mEq - 50 mL/hr (over 120 min) New Bag      1100 100 mL Stopped      1109 20 mEq - 50 mL/hr (over 120 min) New Bag      1309 100 mL Stopped               iohexol (OMNIPaque) 350 mg iodine/mL solution 90 mL (mL) Total volume:  90 mL Dosing weight:  90.7      Date/Time Rate/Dose/Volume Action       06/20/24  0934 90 mL Given               iohexol (OMNIPaque) 350 mg iodine/mL solution 120 mL (mL) Total volume:  120 mL Dosing weight:  97.6      Date/Time Rate/Dose/Volume Action       06/24/24  1425 120 mL Given               iohexol (OMNIPaque) 350 mg iodine/mL solution 100 mL (mL) Total volume:  100 mL Dosing weight:  88.5      Date/Time Rate/Dose/Volume Action       06/27/24  1140 100 mL Given               iohexol (OMNIPaque) 350 mg iodine/mL solution 100 mL (mL) Total volume:  100 mL Dosing weight:  88.5      Date/Time Rate/Dose/Volume Action       06/27/24  1139 100 mL Given               bumetanide (Bumex) injection 4 mg (mL/hr) Total volume:  Not documented* Dosing weight:  90.7   *Total volume has not been documented. View each administration to see the amount administered.     Date/Time Rate/Dose/Volume Action       06/20/24  1029 4 mg - 480 mL/hr (over 2 min) Given               bumetanide (Bumex) injection 4 mg (mL/hr) Total volume:  Not documented* Dosing weight:  111    *Total volume has not been documented. View each administration to see the amount administered.     Date/Time Rate/Dose/Volume Action       06/20/24  1817 4 mg - 480 mL/hr (over 2 min) Given               bumetanide (Bumex) injection 4 mg (mL/hr) Total volume:  Not documented* Dosing weight:  107   *Total volume has not been documented. View each administration to see the amount administered.     Date/Time Rate/Dose/Volume Action       06/21/24  2008 4 mg - 480 mL/hr (over 2 min) Given               bumetanide (Bumex) injection 2 mg (mL/hr) Total volume:  Not documented* Dosing weight:  97.6   *Total volume has not been documented. View each administration to see the amount administered.     Date/Time Rate/Dose/Volume Action       06/23/24  1829 2 mg - 240 mL/hr (over 2 min) Given               bumetanide (Bumex) injection 4 mg (mL/hr) Total volume:  Not documented* Dosing weight:  95.5   *Total volume has not been documented. View each administration to see the amount administered.     Date/Time Rate/Dose/Volume Action       06/26/24  0248 4 mg - 480 mL/hr (over 2 min) Given               bumetanide (Bumex) injection 4 mg (mL/hr) Total volume:  Not documented* Dosing weight:  88.5   *Total volume has not been documented. View each administration to see the amount administered.     Date/Time Rate/Dose/Volume Action       06/27/24  0826 4 mg - 480 mL/hr (over 2 min) Given               aspirin chewable tablet 81 mg (mg) Total dose:  648 mg      Date/Time Rate/Dose/Volume Action       06/20/24  1045 81 mg Given     06/21/24  0912 81 mg Given     06/22/24  0852 81 mg Given     06/23/24  0815 81 mg Given     06/24/24  0620 81 mg Given     06/25/24  0630 81 mg Given     06/26/24  0603 81 mg Given     06/27/24  0634 81 mg Given               empagliflozin (Jardiance) tablet 10 mg (mg) Total dose:  Cannot be calculated* Dosing weight:  90.7   *Administration dose not documented     Date/Time Rate/Dose/Volume Action        06/20/24  1016 *Not included in total Held by provider      1045 *Not included in total Automatically Held     06/21/24  0900 *10 mg Missed     06/22/24  0900 *10 mg Missed     06/23/24  0900 *10 mg Missed     06/24/24  0900 *10 mg Missed     06/25/24  0900 *10 mg Missed     06/26/24  0900 *10 mg Missed     06/27/24  0900 *10 mg Missed      1147 *Not included in total Unheld by provider               folic acid (Folvite) tablet 1 mg (mg) Total dose:  2 mg* Dosing weight:  90.7   *Administration not included in total     Date/Time Rate/Dose/Volume Action       06/21/24  0912 1 mg Given     06/22/24  0852 1 mg Given      1054 *Not included in total Held by provider     06/23/24  0900 *1 mg Missed     06/24/24 0900 *1 mg Missed     06/25/24  0900 *1 mg Missed     06/26/24  0900 *1 mg Missed     06/27/24  0900 *1 mg Missed      1147 *Not included in total Unheld by provider               gabapentin (Neurontin) capsule 100 mg (mg) Total dose:  Cannot be calculated*   *Administration dose not documented     Date/Time Rate/Dose/Volume Action       06/20/24  1016 *Not included in total Held by provider      1045 *Not included in total Automatically Held      1500 *Not included in total Automatically Held      2100 *100 mg Missed     06/21/24  0900 *100 mg Missed      1500 *100 mg Missed      2100 *100 mg Missed     06/22/24  0900 *100 mg Missed      1500 *100 mg Missed      2100 *100 mg Missed     06/23/24  0900 *100 mg Missed      1500 *100 mg Missed      2100 *Not included in total Automatically Held     06/24/24  0900 *100 mg Missed      1500 *100 mg Missed      2100 *100 mg Missed     06/25/24  0900 *100 mg Missed      1500 *100 mg Missed      2100 *Not included in total Automatically Held     06/26/24  0900 *100 mg Missed      1500 *100 mg Missed      2100 *Not included in total Automatically Held     06/27/24  0900 *100 mg Missed      1147 *Not included in total Unheld by provider               melatonin tablet 3 mg  (mg) Total dose:  9 mg*   *Administration not included in total     Date/Time Rate/Dose/Volume Action       06/20/24  2100 *3 mg Missed     06/21/24 2008 3 mg Given     06/22/24  1054 *Not included in total Held by provider      2100 *3 mg Missed     06/23/24  2100 *Not included in total Automatically Held     06/24/24  1102 *Not included in total Unheld by provider      2027 3 mg Given     06/25/24 2052 3 mg Given               melatonin tablet 6 mg (mg) Total dose:  6 mg      Date/Time Rate/Dose/Volume Action       06/26/24 2126 6 mg Given               metoprolol succinate XL (Toprol-XL) 24 hr tablet 50 mg (mg) Total dose:  Cannot be calculated* Dosing weight:  90.7   *Administration dose not documented     Date/Time Rate/Dose/Volume Action       06/20/24  1016 *Not included in total Held by provider      1045 *Not included in total Automatically Held     06/21/24  0900 *50 mg Missed     06/22/24  0900 *50 mg Missed     06/23/24  0900 *50 mg Missed     06/24/24  0900 *50 mg Missed     06/25/24  0900 *50 mg Missed     06/26/24  0900 *50 mg Missed     06/27/24  0900 *50 mg Missed      1147 *Not included in total Unheld by provider               spironolactone (Aldactone) tablet 25 mg (mg) Total dose:  Cannot be calculated*   *Administration dose not documented     Date/Time Rate/Dose/Volume Action       06/20/24  1016 *Not included in total Held by provider      1045 *Not included in total Automatically Held     06/21/24  0700 *25 mg Missed     06/22/24  0700 *25 mg Missed     06/23/24  0700 *25 mg Missed     06/24/24  0700 *25 mg Missed     06/25/24  0700 *25 mg Missed     06/26/24  0700 *25 mg Missed     06/27/24  0700 *25 mg Missed      1147 *Not included in total Unheld by provider               sacubitriL-valsartan (Entresto) 24-26 mg per tablet 1 tablet (tablet) Total dose:  Cannot be calculated* Dosing weight:  90.7   *Administration dose not documented     Date/Time Rate/Dose/Volume Action       06/20/24   1016 *Not included in total Held by provider      1045 *Not included in total Automatically Held      2100 *1 tablet Missed     06/21/24  0900 *1 tablet Missed      2100 *1 tablet Missed     06/22/24  0900 *1 tablet Missed      2100 *1 tablet Missed     06/23/24  0900 *1 tablet Missed      2100 *Not included in total Automatically Held     06/24/24  0900 *1 tablet Missed      2100 *1 tablet Missed     06/25/24  0900 *1 tablet Missed      2100 *Not included in total Automatically Held     06/26/24  0900 *1 tablet Missed      2100 *Not included in total Automatically Held     06/27/24  0900 *1 tablet Missed      1147 *Not included in total Unheld by provider               atorvastatin (Lipitor) tablet 80 mg (mg) Total dose:  240 mg Dosing weight:  90.7      Date/Time Rate/Dose/Volume Action       06/20/24  1045 80 mg Given     06/21/24  0912 80 mg Given     06/22/24  0852 80 mg Given               pantoprazole (ProtoNix) EC tablet 40 mg (mg) Total dose:  80 mg Dosing weight:  90.7      Date/Time Rate/Dose/Volume Action       06/21/24  0912 40 mg Given     06/22/24  0852 40 mg Given               chlorothiazide (Diuril) injection 1,000 mg (mg) Total dose:  1,000 mg Dosing weight:  90.7      Date/Time Rate/Dose/Volume Action       06/20/24  1236 1,000 mg (over 5 min) Given               dextrose 50 % injection 25 g (g) Total dose:  25 g Dosing weight:  90.7      Date/Time Rate/Dose/Volume Action       06/20/24  1237 25 g Given               dextrose 50 % injection 12.5 g (g) Total dose:  25 g Dosing weight:  90.7      Date/Time Rate/Dose/Volume Action       06/25/24  1146 12.5 g Given      1604 12.5 g Given               DOBUTamine in D5W (Dobutrex) infusion  - [employer] Override Pull (mcg/kg/min) Total dose:  Cannot be calculated*   *Total user-documented volume 632.93 mL may contain volume from other administrations     Date/Time Rate/Dose/Volume Action       06/20/24  1638 7.5 mcg/kg/min - 10.2 mL/hr New Bag                DOBUTamine (Dobutrex) 1,000 mg in dextrose 5% 250 mL (4 mg/mL) infusion (premix) (mcg/kg/min) Total dose:  Cannot be calculated* Dosing weight:  90.7   *Total user-documented volume 632.93 mL may contain volume from other administrations     Date/Time Rate/Dose/Volume Action       06/20/24  1638 7.5 mcg/kg/min - 10.2 mL/hr New Bag      1900 7.5 mcg/kg/min - 10.2 mL/hr Rate Verify      2000 7.5 mcg/kg/min - 10.2 mL/hr Rate Verify      2100 7.5 mcg/kg/min - 10.2 mL/hr Rate Verify      2200 7.5 mcg/kg/min - 10.2 mL/hr Rate Verify      2300 7.5 mcg/kg/min - 10.2 mL/hr Rate Verify     06/21/24  0000 7.5 mcg/kg/min - 10.2 mL/hr Rate Verify      0100 7.5 mcg/kg/min - 10.2 mL/hr Rate Verify      0200 7.5 mcg/kg/min - 10.2 mL/hr Rate Verify      0237 5 mcg/kg/min - 6.8 mL/hr New Bag      0300 5 mcg/kg/min - 6.8 mL/hr Rate Verify      0400 5 mcg/kg/min - 6.8 mL/hr Rate Verify      0500 5 mcg/kg/min - 6.8 mL/hr Rate Verify      0600 5 mcg/kg/min - 6.8 mL/hr Rate Verify      0630 2.5 mcg/kg/min - 3.4 mL/hr Rate Change      0642 2.5 mcg/kg/min - 3.4 mL/hr Rate Verify      0700 2.5 mcg/kg/min - 3.4 mL/hr Rate Verify      0800 2.5 mcg/kg/min - 3.4 mL/hr Rate Verify      0900 2.5 mcg/kg/min - 3.4 mL/hr Rate Verify      1000 2.5 mcg/kg/min - 3.4 mL/hr Rate Verify      1100 2.5 mcg/kg/min - 3.4 mL/hr Rate Verify      1200 2.5 mcg/kg/min - 3.4 mL/hr Rate Verify      1211  Stopped      1900  Stopped      2000  Stopped     06/22/24  0000  Stopped      0230 2.5 mcg/kg/min - 3.4 mL/hr Restarted      0315 5 mcg/kg/min - 6.8 mL/hr Rate Change      0521 7.5 mcg/kg/min - 10.2 mL/hr Restarted      0800 7.5 mcg/kg/min - 10.2 mL/hr Rate Verify      0811 10 mcg/kg/min - 13.61 mL/hr Rate Change      0900 10 mcg/kg/min - 13.61 mL/hr Rate Verify      1000 10.004 mcg/kg/min - 13.61 mL/hr Rate Verify      1100 10.004 mcg/kg/min - 13.61 mL/hr Rate Verify      1200 10.004 mcg/kg/min - 13.61 mL/hr Rate Verify      1300 10.004 mcg/kg/min - 13.61  mL/hr Rate Verify      1400 10.004 mcg/kg/min - 13.61 mL/hr Rate Verify      1500 10.004 mcg/kg/min - 13.61 mL/hr Rate Verify      1600 10.004 mcg/kg/min - 13.61 mL/hr Rate Verify      1700 10.004 mcg/kg/min - 13.61 mL/hr Rate Verify      1800 10.004 mcg/kg/min - 13.61 mL/hr Rate Verify      1813 7.5 mcg/kg/min - 10.2 mL/hr Rate Change      1900 7.5 mcg/kg/min - 10.2 mL/hr Rate Verify      2000 7.5 mcg/kg/min - 10.2 mL/hr Rate Verify      2045 7.5 mcg/kg/min - 10.2 mL/hr New Bag      2100 7.5 mcg/kg/min - 10.2 mL/hr Rate Verify      2200 7.5 mcg/kg/min - 10.2 mL/hr Rate Verify      2300 7.5 mcg/kg/min - 10.2 mL/hr Rate Verify      Canceled Entry     06/23/24  0000 7.5 mcg/kg/min - 10.2 mL/hr Rate Verify      0100 7.5 mcg/kg/min - 10.2 mL/hr Rate Verify      0200 7.5 mcg/kg/min - 10.2 mL/hr Rate Verify      0300 7.5 mcg/kg/min - 10.2 mL/hr Rate Verify      0400 7.5 mcg/kg/min - 10.2 mL/hr Rate Verify      0500 7.5 mcg/kg/min - 10.2 mL/hr Rate Verify      0607 7.5 mcg/kg/min - 10.2 mL/hr Rate Verify      1214 5 mcg/kg/min - 6.8 mL/hr Rate Change      1828 2.5 mcg/kg/min - 3.4 mL/hr Rate Change      1900 2.5 mcg/kg/min - 3.4 mL/hr Rate Verify      2000 2.5 mcg/kg/min - 3.4 mL/hr Rate Verify      2100 2.5 mcg/kg/min - 3.4 mL/hr Rate Verify      2200 2.5 mcg/kg/min - 3.4 mL/hr Rate Verify      2328 2.5 mcg/kg/min - 3.4 mL/hr New Bag     06/24/24  0000 2.5 mcg/kg/min - 3.4 mL/hr Rate Verify      0100 2.5 mcg/kg/min - 3.4 mL/hr Rate Verify      0200 2.5 mcg/kg/min - 3.4 mL/hr Rate Verify      0300 2.5 mcg/kg/min - 3.4 mL/hr Rate Verify      0400 2.5 mcg/kg/min - 3.4 mL/hr Rate Verify      0500 2.5 mcg/kg/min - 3.4 mL/hr Rate Verify      0600 2.5 mcg/kg/min - 3.4 mL/hr Rate Verify      0700 2.5 mcg/kg/min - 3.4 mL/hr Rate Verify      0800 2.5 mcg/kg/min - 3.4 mL/hr Rate Verify      0900 2.5 mcg/kg/min - 3.4 mL/hr Rate Verify      1000 2.5 mcg/kg/min - 3.4 mL/hr Rate Verify      1100 2.5 mcg/kg/min - 3.4 mL/hr Rate Verify       1200 2.5 mcg/kg/min - 3.4 mL/hr Rate Verify      1300 2.5 mcg/kg/min - 3.4 mL/hr Rate Verify      1431  Stopped      2000  Stopped               bumetanide (Bumex) 25 mg in 100 mL (0.25 mg/mL) infusion (mg/hr) Total dose:  62.9 mg* Dosing weight:  111   *From user-documented volume     Date/Time Rate/Dose/Volume Action       06/20/24  1818 2 mg/hr - 8 mL/hr New Bag      1900 2 mg/hr - 8 mL/hr Rate Verify      2000 2 mg/hr - 8 mL/hr Rate Verify      2100 2 mg/hr - 8 mL/hr Rate Verify      2200 2 mg/hr - 8 mL/hr Rate Verify      2300 2 mg/hr - 8 mL/hr Rate Verify     06/21/24  0100 2 mg/hr - 8 mL/hr Rate Verify      0200 2 mg/hr - 8 mL/hr Rate Verify      0242 2 mg/hr - 8 mL/hr New Bag      0500 2 mg/hr - 8 mL/hr Rate Verify      0600 2 mg/hr - 8 mL/hr Rate Verify      0642 2 mg/hr - 8 mL/hr Rate Verify      0700 2 mg/hr - 8 mL/hr Rate Verify      0800 2 mg/hr - 8 mL/hr Rate Verify      0900 2 mg/hr - 8 mL/hr Rate Verify      1000 2 mg/hr - 8 mL/hr Rate Verify      1100 2 mg/hr - 8 mL/hr Rate Verify      1200 2 mg/hr - 8 mL/hr Rate Verify      1300 2 mg/hr - 8 mL/hr Rate Verify      1400 2 mg/hr - 8 mL/hr Rate Verify      1500 2 mg/hr - 8 mL/hr Rate Verify      1532 2 mg/hr - 8 mL/hr New Bag      1600 2 mg/hr - 8 mL/hr Rate Verify      1700 2 mg/hr - 8 mL/hr Rate Verify      1800 2 mg/hr - 8 mL/hr Rate Verify      1900 2 mg/hr - 8 mL/hr Rate Verify      2000 2 mg/hr - 8 mL/hr Rate Verify     06/22/24  0000 2 mg/hr - 8 mL/hr Rate Verify      0015 2 mg/hr - 8 mL/hr New Bag      0145  Stopped               sodium bicarbonate 8.4 % (1 mEq/mL) 50 mEq (mEq) Total dose:  50 mEq Dosing weight:  111      Date/Time Rate/Dose/Volume Action       06/20/24 1948 50 mEq Given               calcium gluconate in NS IV 1 g (mL/hr) Total dose:  2 g* Dosing weight:  111   *From user-documented volume     Date/Time Rate/Dose/Volume Action       06/20/24 2012 1 g - 100 mL/hr (over 30 min) - 50 mL New Bag      2042  (over 30 min)  Stopped     06/21/24  0236 1 g - 100 mL/hr (over 30 min) - 50 mL New Bag      0306  (over 30 min) Stopped               calcium gluconate in NS IV 1 g (mL/hr) Total volume:  Not documented* Dosing weight:  107   *Total volume has not been documented. View each administration to see the amount administered.     Date/Time Rate/Dose/Volume Action       06/21/24  1533 1 g - 100 mL/hr (over 30 min) New Bag      1538 100 mL/hr (over 30 min) Restarted               piperacillin-tazobactam-dextrose (Zosyn) IV 4.5 g (mL/hr) Total dose:  4.5 g* Dosing weight:  111   *From user-documented volume     Date/Time Rate/Dose/Volume Action       06/21/24  0042 4.5 g - 200 mL/hr (over 30 min) New Bag      0112  (over 30 min) Stopped      0126 100 mL       0557 4.5 g - 200 mL/hr (over 30 min) New Bag      0627  (over 30 min) Stopped               vancomycin (Vancocin) in dextrose 5 % water (D5W) 500 mL IV 1,500 mg (mL/hr) Total dose:  1,500 mg* Dosing weight:  111   *From user-documented volume     Date/Time Rate/Dose/Volume Action       06/21/24  0126 1,500 mg - 333.3 mL/hr (over 90 min) - 500 mL New Bag      0256  (over 90 min) Stopped               nitroprusside (Nipride) infusion 500 mcg/mL (100 mL vial) (adult) (mcg/kg/min) Total dose:  952,560 mcg* Dosing weight:  111   *From user-documented volume     Date/Time Rate/Dose/Volume Action       06/20/24  2352 0.5 mcg/kg/min - 6.66 mL/hr New Bag     06/21/24  0000 0.5 mcg/kg/min - 6.66 mL/hr Rate Verify      0100 1 mcg/kg/min - 13.32 mL/hr Rate Change      0200 1 mcg/kg/min - 13.32 mL/hr Rate Verify      0300 1 mcg/kg/min - 13.32 mL/hr Rate Verify      0400 1 mcg/kg/min - 13.32 mL/hr Rate Verify      0441 1 mcg/kg/min - 13.32 mL/hr New Bag      0500 1 mcg/kg/min - 13.32 mL/hr Rate Verify      0600 1 mcg/kg/min - 13.32 mL/hr Rate Verify      0642 1 mcg/kg/min - 13.32 mL/hr Rate Verify      0700 1 mcg/kg/min - 13.32 mL/hr Rate Verify      0800 1 mcg/kg/min - 13.32 mL/hr Rate Verify       0900 1 mcg/kg/min - 13.32 mL/hr Rate Verify      1000 1 mcg/kg/min - 13.32 mL/hr Rate Verify      1100 1 mcg/kg/min - 13.32 mL/hr Rate Verify      1200 1 mcg/kg/min - 13.32 mL/hr Rate Verify      1300 1 mcg/kg/min - 13.32 mL/hr Rate Verify      1304 1 mcg/kg/min - 13.32 mL/hr New Bag      1400 1 mcg/kg/min - 13.32 mL/hr Rate Verify      1500 1 mcg/kg/min - 13.32 mL/hr Rate Verify      1600 1 mcg/kg/min - 13.32 mL/hr Rate Verify      1700 1 mcg/kg/min - 13.32 mL/hr Rate Verify      1800 1 mcg/kg/min - 13.32 mL/hr Rate Verify      1811 1.5 mcg/kg/min - 19.98 mL/hr Rate Change      1900 1.5 mcg/kg/min - 19.98 mL/hr Rate Verify      1952 1.5 mcg/kg/min - 19.98 mL/hr New Bag      2000 1.5 mcg/kg/min - 19.98 mL/hr Rate Verify      2345 1 mcg/kg/min - 13.32 mL/hr Rate Change     06/22/24  0018 1 mcg/kg/min - 13.32 mL/hr New Bag      0045  Stopped      0830 0.2 mcg/kg/min - 2.66 mL/hr New Bag      0900 1 mcg/kg/min - 13.32 mL/hr Rate Change      0930 2 mcg/kg/min - 26.6 mL/hr Rate Change      1000 1.997 mcg/kg/min - 26.6 mL/hr Rate Verify      1100 1.997 mcg/kg/min - 26.6 mL/hr Rate Verify      1200 1.997 mcg/kg/min - 26.6 mL/hr Rate Verify      1256 2 mcg/kg/min - 26.6 mL/hr New Bag      1400 1.997 mcg/kg/min - 26.6 mL/hr Rate Verify      1500 1.997 mcg/kg/min - 26.6 mL/hr Rate Verify      1530 1.5 mcg/kg/min - 19.98 mL/hr Rate Change      1600 1.5 mcg/kg/min - 19.98 mL/hr Rate Verify      1610 1.2 mcg/kg/min - 15.98 mL/hr Rate Change      1700 1.2 mcg/kg/min - 15.98 mL/hr Rate Verify      1741 1 mcg/kg/min - 13.32 mL/hr Rate Change      1800 1 mcg/kg/min - 13.32 mL/hr Rate Verify      1900 1 mcg/kg/min - 13.32 mL/hr Rate Verify      2000 1 mcg/kg/min - 13.32 mL/hr Rate Verify      2100 1 mcg/kg/min - 13.32 mL/hr Rate Verify      2200 1 mcg/kg/min - 13.32 mL/hr Rate Verify      2300 1 mcg/kg/min - 13.32 mL/hr Rate Verify      2320 1 mcg/kg/min - 13.32 mL/hr New Bag     06/23/24  0000 1 mcg/kg/min - 13.32 mL/hr Rate Verify       0100 1 mcg/kg/min - 13.32 mL/hr Rate Verify      0200 1 mcg/kg/min - 13.32 mL/hr Rate Verify      0300 1 mcg/kg/min - 13.32 mL/hr Rate Verify      0400 1 mcg/kg/min - 13.32 mL/hr Rate Verify      0607 1 mcg/kg/min - 13.32 mL/hr Rate Verify      0629 1.3 mcg/kg/min - 17.32 mL/hr Rate Change      0631 1.3 mcg/kg/min - 17.32 mL/hr New Bag      1215 1.3 mcg/kg/min - 17.32 mL/hr New Bag      1834 1.3 mcg/kg/min - 17.32 mL/hr New Bag      1900 1.3 mcg/kg/min - 17.32 mL/hr Rate Verify      2000 1.3 mcg/kg/min - 17.32 mL/hr Rate Verify      2100 1.3 mcg/kg/min - 17.32 mL/hr Rate Verify      2200 1.3 mcg/kg/min - 17.32 mL/hr Rate Verify      2300 1 mcg/kg/min - 13.32 mL/hr Rate Change     06/24/24  0000 1 mcg/kg/min - 13.32 mL/hr Rate Verify      0107 1 mcg/kg/min - 13.32 mL/hr New Bag      0200 1 mcg/kg/min - 13.32 mL/hr Rate Verify      0300 1 mcg/kg/min - 13.32 mL/hr Rate Verify      0400 1 mcg/kg/min - 13.32 mL/hr Rate Verify      0500 1 mcg/kg/min - 13.32 mL/hr Rate Verify      0600 1 mcg/kg/min - 13.32 mL/hr Rate Verify      0700 1 mcg/kg/min - 13.32 mL/hr Rate Verify      0800 1 mcg/kg/min - 13.32 mL/hr Rate Verify      0853 1 mcg/kg/min - 13.32 mL/hr New Bag      0900 1 mcg/kg/min - 13.32 mL/hr Rate Verify      1000 1 mcg/kg/min - 13.32 mL/hr Rate Verify      1100 1 mcg/kg/min - 13.32 mL/hr Rate Verify      1200 1 mcg/kg/min - 13.32 mL/hr Rate Verify      1227 0.8 mcg/kg/min - 10.66 mL/hr Rate Change      1300 0.8 mcg/kg/min - 10.66 mL/hr Rate Verify      1500 0.8 mcg/kg/min - 10.66 mL/hr Rate Verify      1800 0.8 mcg/kg/min - 10.66 mL/hr New Bag      2000 0.8 mcg/kg/min - 10.66 mL/hr Rate Verify     06/25/24  0000 0.8 mcg/kg/min - 10.66 mL/hr Rate Verify      0315 0.8 mcg/kg/min - 10.66 mL/hr New Bag      0700 0.8 mcg/kg/min - 10.66 mL/hr Rate Verify      0800 0.8 mcg/kg/min - 10.66 mL/hr Rate Verify      1000 0.8 mcg/kg/min - 10.66 mL/hr Rate Verify      1100 0.8 mcg/kg/min - 10.66 mL/hr Rate Verify      1200  0.8 mcg/kg/min - 10.66 mL/hr Rate Verify      1300 0.8 mcg/kg/min - 10.66 mL/hr Rate Verify      1400 0.8 mcg/kg/min - 10.66 mL/hr Rate Verify      1423 0.8 mcg/kg/min - 10.66 mL/hr New Bag      1600 0.8 mcg/kg/min - 10.66 mL/hr Rate Verify      1700 0.8 mcg/kg/min - 10.66 mL/hr Rate Verify      1800 0.8 mcg/kg/min - 10.66 mL/hr Rate Verify      1827 0.7 mcg/kg/min - 9.32 mL/hr Rate Change      2022 0.7 mcg/kg/min - 9.32 mL/hr New Bag     06/26/24  0021 0.8 mcg/kg/min - 10.66 mL/hr Rate Change      0236 0.8 mcg/kg/min - 10.66 mL/hr New Bag      0700 0.8 mcg/kg/min - 10.66 mL/hr Rate Verify      0800 0.8 mcg/kg/min - 10.66 mL/hr Rate Verify      0900 0.8 mcg/kg/min - 10.66 mL/hr Rate Verify      1000 0.8 mcg/kg/min - 10.66 mL/hr Rate Verify      1100 0.8 mcg/kg/min - 10.66 mL/hr Rate Verify      1109 0.8 mcg/kg/min - 10.66 mL/hr New Bag      1200 0.8 mcg/kg/min - 10.66 mL/hr Rate Verify      1300 0.8 mcg/kg/min - 10.66 mL/hr Rate Verify      1400 0.8 mcg/kg/min - 10.66 mL/hr Rate Verify      1500 0.8 mcg/kg/min - 10.66 mL/hr Rate Verify      1600 0.8 mcg/kg/min - 10.66 mL/hr Rate Verify      1700 0.8 mcg/kg/min - 10.66 mL/hr Rate Verify      1800 0.8 mcg/kg/min - 10.66 mL/hr Rate Verify      1832 0.8 mcg/kg/min - 10.66 mL/hr New Bag      2000 0.8 mcg/kg/min - 10.66 mL/hr Rate Verify     06/27/24  0211 0.8 mcg/kg/min - 10.66 mL/hr New Bag      0800 0.8 mcg/kg/min - 10.66 mL/hr Rate Verify      0934 0.8 mcg/kg/min - 10.66 mL/hr New Bag      1127 1.3 mcg/kg/min - 17.32 mL/hr Rate Change      1134 1.8 mcg/kg/min - 24 mL/hr Rate Change               insulin lispro (HumaLOG) injection 0-5 Units (Units) Total dose:  Cannot be calculated* Dosing weight:  111   *Administration dose not documented     Date/Time Rate/Dose/Volume Action       06/20/24  2315 *Not included in total Missed     06/21/24  0315 *Not included in total Missed      0715 *Not included in total Missed      1115 *Not included in total Missed      1515 *Not  included in total Missed      2000 *Not included in total Missed      2315 *Not included in total Missed     06/22/24  0315 *Not included in total Missed      0715 *Not included in total Missed      1115 *Not included in total Missed      1515 *Not included in total Missed      1915 *Not included in total Missed      2315 *Not included in total Missed     06/23/24  0315 *Not included in total Missed      0715 *Not included in total Missed      1115 *Not included in total Missed      1515 *Not included in total Missed      1915 *Not included in total Missed      2315 *Not included in total Missed     06/24/24  0315 *Not included in total Missed      0715 *Not included in total Missed      1115 *Not included in total Missed      1515 *Not included in total Missed      1915 *Not included in total Missed      2315 *Not included in total Missed     06/25/24  0315 *Not included in total Missed      0715 *Not included in total Missed      1115 *Not included in total Missed      1515 *Not included in total Missed      1949 *Not included in total Missed      2315 *Not included in total Missed     06/26/24  0440 *Not included in total Missed      0800 *Not included in total Missed      1115 *Not included in total Missed      1515 *Not included in total Missed      1915 *Not included in total Missed     06/27/24  0005 *Not included in total Missed      0315 *Not included in total Missed      0635 *Not included in total Missed               oxygen (O2) therapy (L/min) Total volume:  Not documented* Dosing weight:  111   *Total volume has not been documented. View each administration to see the amount administered.     Date/Time Rate/Dose/Volume Action       06/20/24 2000 2 L/min Rate Verify Medical Gas     06/21/24  0000 2 L/min Rate Verify Medical Gas      0400 2 L/min Rate Verify Medical Gas      0800 2 L/min Start      2000 2 L/min Start     06/22/24  2000 2 L/min Rate Verify Medical Gas     06/24/24  0700  Stopped       2000 95 percent Start      2005  Stopped     06/25/24  0700  Stopped      0801  Stopped     06/27/24  0801  Stopped               potassium chloride CR (Klor-Con M20) ER tablet 40 mEq (mEq) Total dose:  40 mEq Dosing weight:  111      Date/Time Rate/Dose/Volume Action       06/21/24  0554 40 mEq Given               potassium chloride CR (Klor-Con M20) ER tablet 40 mEq (mEq) Total dose:  0 mEq* Dosing weight:  107   *Administration not included in total     Date/Time Rate/Dose/Volume Action       06/21/24  2215 *40 mEq Missed               potassium chloride CR (Klor-Con M20) ER tablet 20 mEq (mEq) Total dose:  0 mEq* Dosing weight:  97.6   *Administration not included in total     Date/Time Rate/Dose/Volume Action       06/24/24  0853 *20 mEq Missed               magnesium sulfate IV 4 g (mL/hr) Total volume:  Cannot be calculated* Dosing weight:  111   *Total user-documented volume 400 mL may contain volume from other administrations     Date/Time Rate/Dose/Volume Action       06/21/24  0548 4 g - 25 mL/hr (over 240 min) New Bag      0642 25 mL/hr Rate Verify      0700 25 mL/hr (over 240 min) Rate Verify      0948  (over 240 min) Stopped               magnesium sulfate IV 4 g (mL/hr) Total volume:  Cannot be calculated* Dosing weight:  97.6   *Total user-documented volume 400 mL may contain volume from other administrations     Date/Time Rate/Dose/Volume Action       06/23/24  2013 4 g - 25 mL/hr (over 240 min) New Bag     06/24/24  0013  (over 240 min) Stopped               magnesium sulfate IV 4 g (mL/hr) Total volume:  Cannot be calculated* Dosing weight:  89.8   *Total user-documented volume 400 mL may contain volume from other administrations     Date/Time Rate/Dose/Volume Action       06/26/24  2310 4 g - 25 mL/hr (over 240 min) New Bag     06/27/24  0310  (over 240 min) Stopped               calcium gluconate 100 mg/mL (10%) injection 1 g (g) Total dose:  0 g* Dosing weight:  107   *Administration not  included in total     Date/Time Rate/Dose/Volume Action       06/21/24  1315 *1 g (over 5 min) Missed               oxyCODONE (Roxicodone) immediate release tablet 5 mg (mg) Total dose:  5 mg Dosing weight:  107      Date/Time Rate/Dose/Volume Action       06/21/24  1822 5 mg Given               oxyCODONE (Roxicodone) immediate release tablet 5 mg (mg) Total dose:  5 mg Dosing weight:  97.6      Date/Time Rate/Dose/Volume Action       06/23/24  1046 5 mg Given               polyethylene glycol (Glycolax, Miralax) packet 17 g (g) Total dose:  34 g Dosing weight:  107      Date/Time Rate/Dose/Volume Action       06/21/24 2008 17 g Given     06/22/24  0857 17 g Given               polyethylene glycol (Glycolax, Miralax) packet 17 g (g) Total dose:  51 g* Dosing weight:  97.6   *Administration not included in total     Date/Time Rate/Dose/Volume Action       06/24/24  1056 17 g Given      2027 17 g Given     06/25/24  1011 17 g Given      2026 *17 g Missed     06/26/24 0900 *17 g Missed      2026 *17 g Missed     06/27/24 0900 *17 g Missed               sennosides (Senokot) tablet 8.6 mg (tablet) Total dose:  Cannot be calculated* Dosing weight:  107   *Administration dose not documented     Date/Time Rate/Dose/Volume Action       06/22/24  1055 *Not included in total Held by provider     06/24/24  1021 *Not included in total Unheld by provider               sennosides (Senokot) tablet 8.6 mg (mg) Total dose:  1 tablet Dosing weight:  97.6      Date/Time Rate/Dose/Volume Action       06/24/24  1056 8.6 mg Given               sennosides (Senokot) tablet 17.2 mg (mg) Total dose:  4 tablet* Dosing weight:  97.6   *Administration not included in total     Date/Time Rate/Dose/Volume Action       06/24/24 2027 17.2 mg Given     06/25/24  1006 17.2 mg Given      2026 *17.2 mg Missed     06/26/24  0900 *17.2 mg Missed      2026 *17.2 mg Missed     06/27/24  0900 *17.2 mg Missed               acetaZOLAMIDE (Diamox) injection  500 mg (mg) Total dose:  500 mg Dosing weight:  107      Date/Time Rate/Dose/Volume Action       06/21/24  1841 500 mg (over 3 min) Given               potassium chloride (Klor-Con) packet 40 mEq (mEq) Total dose:  40 mEq Dosing weight:  107      Date/Time Rate/Dose/Volume Action       06/22/24  0001 40 mEq Given               potassium chloride (Klor-Con) packet 40 mEq (mEq) Total dose:  40 mEq Dosing weight:  97.6      Date/Time Rate/Dose/Volume Action       06/23/24 2015 40 mEq Given               piperacillin-tazobactam-dextrose (Zosyn) IV 3.375 g (mL/hr) Total dose:  23.63 g* Dosing weight:  107   *From user-documented volume     Date/Time Rate/Dose/Volume Action       06/22/24  0600 0 mL       0645 3.375 g - 100 mL/hr (over 30 min) New Bag      0715  (over 30 min) Stopped      1000 3.375 g - 100 mL/hr (over 30 min) New Bag      1030 100 mL Stopped      1535 3.375 g - 100 mL/hr (over 30 min) New Bag      1605  (over 30 min) Stopped      2116 3.375 g - 100 mL/hr (over 30 min) New Bag      2146 100 mL Stopped     06/23/24  0330 3.375 g - 100 mL/hr (over 30 min) New Bag      0400 50 mL Stopped      1028 3.375 g - 100 mL/hr (over 30 min) New Bag      1058 50 mL Stopped      1547 3.375 g - 100 mL/hr (over 30 min) New Bag      1617 50 mL Stopped      2242 3.375 g - 100 mL/hr (over 30 min) New Bag      2312  (over 30 min) Stopped     06/24/24  0322 3.375 g - 100 mL/hr (over 30 min) New Bag      0352  (over 30 min) Stopped               vancomycin (Vancocin) in dextrose 5 % water (D5W) 250 mL IV 1,250 mg (mL/hr) Total dose:  2,625 mg* Dosing weight:  102   *From user-documented volume     Date/Time Rate/Dose/Volume Action       06/22/24  0600 0 mL       1052 1,250 mg - 200 mL/hr (over 75 min) New Bag      1207 250 mL Stopped     06/23/24  0815 1,250 mg - 200 mL/hr (over 75 min) New Bag      0930 250 mL Stopped     06/24/24  1017 1,250 mg - 200 mL/hr (over 75 min) New Bag      1040 25 mL Stopped                perflutren lipid microspheres (Definity) injection 0.5-10 mL of dilution (mL of dilution) Total dose:  10 mL of dilution Dosing weight:  102      Date/Time Rate/Dose/Volume Action       06/22/24  1230 10 mL of dilution Given               pantoprazole (ProtoNix) injection 40 mg (mg) Total dose:  200 mg Dosing weight:  102      Date/Time Rate/Dose/Volume Action       06/23/24  0815 40 mg Given     06/24/24  0620 40 mg Given     06/25/24  0630 40 mg Given     06/26/24  0603 40 mg Given     06/27/24  0626 40 mg Given               bisacodyl (Dulcolax) suppository 10 mg (mg) Total dose:  40 mg* Dosing weight:  102   *Administration not included in total     Date/Time Rate/Dose/Volume Action       06/22/24  1534 10 mg Given     06/23/24  0825 10 mg Given     06/24/24  0853 10 mg Given     06/25/24  1006 10 mg Given     06/26/24  0900 *10 mg Missed     06/27/24  0900 *10 mg Missed               HYDROmorphone (Dilaudid) injection 0.4 mg (mg) Total dose:  5.6 mg* Dosing weight:  97.6   *Administration not included in total     Date/Time Rate/Dose/Volume Action       06/23/24  1550 0.4 mg Given      2059 0.4 mg Given     06/24/24  0423 0.4 mg Given      1047 0.4 mg Given      2340 0.4 mg Given     06/25/24  0553 0.4 mg Given      1142 0.4 mg Given      2022 0.4 mg Given     06/26/24  0228 0.4 mg Given      0635 0.4 mg Given      1534 0.4 mg Given      1723 *0.4 mg Missed      2126 0.4 mg Given     06/27/24  0320 0.4 mg Given      0826 0.4 mg Given               HYDROmorphone (Dilaudid) injection 0.4 mg (mg) Total dose:  0.4 mg Dosing weight:  89.8      Date/Time Rate/Dose/Volume Action       06/26/24  1725 0.4 mg Given               amiodarone (Pacerone) tablet 200 mg (mg) Total dose:  800 mg Dosing weight:  97.6      Date/Time Rate/Dose/Volume Action       06/24/24  1058 200 mg Given     06/25/24  1007 200 mg Given     06/26/24  0827 200 mg Given     06/27/24  0826 200 mg Given               bivalirudin (Angiomax) 250 mg in  dextrose 5% 50 mL (5 mg/mL) infusion (mg/kg/hr) Total dose:  77.35 mg* Dosing weight:  95.5   *From user-documented volume     Date/Time Rate/Dose/Volume Action       06/25/24  0928 0.1 mg/kg/hr - 1.91 mL/hr New Bag      1000 0.1 mg/kg/hr - 1.91 mL/hr Rate Verify      1100 0.1 mg/kg/hr - 1.91 mL/hr Rate Verify      1200 0.1 mg/kg/hr - 1.91 mL/hr Rate Verify      1300 0.1 mg/kg/hr - 1.91 mL/hr Rate Verify      1400 0.1 mg/kg/hr - 1.91 mL/hr Rate Verify      1600 0.1 mg/kg/hr - 1.91 mL/hr Rate Verify      1700 0.1 mg/kg/hr - 1.91 mL/hr Rate Verify      1734  Stopped               potassium chloride 40 mEq in 100 mL IV premix (mL/hr) Total dose:  40 mEq* Dosing weight:  95.5   *From user-documented volume     Date/Time Rate/Dose/Volume Action       06/25/24  2052 40 mEq - 25 mL/hr (over 240 min) New Bag     06/26/24  0052 100 mL Stopped               potassium chloride 40 mEq in 100 mL IV premix (mL/hr) Total dose:  40 mEq* Dosing weight:  89.8   *From user-documented volume     Date/Time Rate/Dose/Volume Action       06/26/24  2311 40 mEq - 25 mL/hr (over 240 min) New Bag     06/27/24  0311 100 mL Stopped               fentaNYL PF (Sublimaze) injection (mcg) Total dose:  150 mcg      Date/Time Rate/Dose/Volume Action       06/27/24  1141 50 mcg Given      1150 50 mcg Given      1203 50 mcg Given               midazolam (Versed) injection (mg) Total dose:  2 mg      Date/Time Rate/Dose/Volume Action       06/27/24  1141 1 mg Given      1150 1 mg Given                         See detailed result report with images in PACS.    The patient tolerated the procedure well without incident or complication and is in stable condition.

## 2024-06-27 NOTE — CONSULTS
Vancomycin Dosing by Pharmacy- INITIAL    Amirah Bennett is a 43 y.o. year old female who Pharmacy has been consulted for vancomycin dosing for cellulitis, skin and soft tissue. Based on the patient's indication and renal status this patient will be dosed based on a goal AUC of 400-600.     Renal function is currently improving. Scr continues to drop, not on any RRT.     Visit Vitals  /77   Pulse 91   Temp 36.1 °C (97 °F) (Temporal)   Resp 12      Lab Results   Component Value Date    CREATININE 0.89 2024    CREATININE 1.01 2024    CREATININE 0.99 2024    CREATININE 1.06 (H) 2024      Patient weight is as follows:   Vitals:    24 0600   Weight: 88.5 kg (195 lb 1.7 oz)     Cultures:  No results found for the encounter in last 14 days.      I/O last 3 completed shifts:  In: 2887.1 (32.6 mL/kg) [P.O.:1080; I.V.:957.1 (10.8 mL/kg); Blood:350; IV Piggyback:500]  Out: 6600 (74.6 mL/kg) [Urine:6600 (2.1 mL/kg/hr)]  Weight: 88.5 kg   I/O during current shift:  I/O this shift:  In: 122.7 [I.V.:22.7; IV Piggyback:100]  Out: 2000 [Urine:2000]    Temp (24hrs), Av.2 °C (97.2 °F), Min:36 °C (96.8 °F), Max:36.6 °C (97.9 °F)     Assessment/Plan     Patient will not be given a loading dose.  Will initiate vancomycin maintenance,  1000 mg every 12 hours.    This dosing regimen is predicted by [employer] to result in the following pharmacokinetic parameters:  Loading dose: N/A  Regimen: 1000 mg IV every 12 hours.  Start time: 10:17 on 2024  Exposure target: AUC24 (range)400-600 mg/L.hr   AUC24,ss: 559 mg/L.hr  Probability of AUC24 > 400: 100 %  Ctrough,ss: 17.6 mg/L  Probability of Ctrough,ss > 20: 21 %  Probability of nephrotoxicity (Lodise KWAME 2009): 13 %    Follow-up level will be ordered on  at AM lab draw unless clinically indicated sooner.  Will continue to monitor renal function daily while on vancomycin and order serum creatinine at least every 48 hours if not already  ordered.  Follow for continued vancomycin needs, clinical response, and signs/symptoms of toxicity.     Kelly Cody, PharmD

## 2024-06-27 NOTE — PROGRESS NOTES
Physical Therapy                 Therapy Communication Note    Patient Name: Amirah Bennett  MRN: [id number]  Today's Date: 6/27/2024     Discipline: Physical Therapy    Missed Visit Reason: Missed Visit Reason: Patient in a medical procedure (1234. Pt. off div at IR. Will reattempt schedule permitting.)    Missed Time: Attempt    Comment:

## 2024-06-27 NOTE — PROGRESS NOTES
Vancomycin Dosing by Pharmacy- INITIAL    Amirah Bennett is a 43 y.o. year old female who Pharmacy has been consulted for vancomycin dosing for cellulitis, skin and soft tissue. Based on the patient's indication and renal status this patient will be dosed based on a goal AUC of 400-600.     Renal function is currently improving. Scr continues to drop, not on any RRT.     Visit Vitals  /77   Pulse 91   Temp 36.1 °C (97 °F) (Temporal)   Resp 12      Lab Results   Component Value Date    CREATININE 0.89 2024    CREATININE 1.01 2024    CREATININE 0.99 2024    CREATININE 1.06 (H) 2024      Patient weight is as follows:   Vitals:    24 0600   Weight: 88.5 kg (195 lb 1.7 oz)     Cultures:  No results found for the encounter in last 14 days.      I/O last 3 completed shifts:  In: 2887.1 (32.6 mL/kg) [P.O.:1080; I.V.:957.1 (10.8 mL/kg); Blood:350; IV Piggyback:500]  Out: 6600 (74.6 mL/kg) [Urine:6600 (2.1 mL/kg/hr)]  Weight: 88.5 kg   I/O during current shift:  I/O this shift:  In: 122.7 [I.V.:22.7; IV Piggyback:100]  Out: 2000 [Urine:2000]    Temp (24hrs), Av.2 °C (97.2 °F), Min:36 °C (96.8 °F), Max:36.6 °C (97.9 °F)     Assessment/Plan     Patient will not be given a loading dose.  Will initiate vancomycin maintenance,  1000 mg every 12 hours.    This dosing regimen is predicted by [employer] to result in the following pharmacokinetic parameters:  Loading dose: N/A  Regimen: 1000 mg IV every 12 hours.  Start time: 10:17 on 2024  Exposure target: AUC24 (range)400-600 mg/L.hr   AUC24,ss: 559 mg/L.hr  Probability of AUC24 > 400: 100 %  Ctrough,ss: 17.6 mg/L  Probability of Ctrough,ss > 20: 21 %  Probability of nephrotoxicity (Lodise KWAME 2009): 13 %    Follow-up level will be ordered on  at AM lab draw unless clinically indicated sooner.  Will continue to monitor renal function daily while on vancomycin and order serum creatinine at least every 48 hours if not already  ordered.  Follow for continued vancomycin needs, clinical response, and signs/symptoms of toxicity.     Kelly Cody, PharmD

## 2024-06-27 NOTE — CONSULTS
Inpatient consult to [facility]  Consult performed by: Kim Zhang MD  Consult ordered by: Angel Chan MD  Reason for consult: [facility].        History Of Present Illness:    Amirah Bennett is a 43 y.o. 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 RHC c/w cardiogenic shock. She is not candidate for advanced therapies given documented medical nonadherence per HF previous assessment.     Course also c/b SARAH, cardiac thrombi, liver injury likely due to ischemia, and Afib with RVR. Found to have rhabdomyolysis with CK of 14,000, unable to give fluids due to cardiogenic shock.     Arterial doppler of lower extremities with echogenic material within right distal femoral and popliteal arteries with absent Doppler flow in the right popliteal artery with significantly diminished flow within the right distal superficial femoral artery significantly diminished flow within the right distal superficial femoral artery, findings raise concern for thrombosis of distal SFA and popliteal artery. Decreased flow within right posterior tibial and peroneal arteries could be through collateralization. Additionally decreased flow in right common femoral artery, right deep femoral artery as well as proximal and mid superficial femoral arteries concerning for stenosis proximal to right common femoral artery possible within right external iliac right common iliac artery.   Bilateral LE duplex with likely early  nonocclusive deep venous thrombosis.  Endovascular service were involved as shared decision with vascular surgery. Vascular surgery recommending right AKA.  Ethics involved given lack of documentation of POA and limited family (NOK are cousins). Palliative care consulted.    .     Last Recorded Vitals:  Vitals:    06/27/24 0500 06/27/24 0600 06/27/24 0607 06/27/24 0700   BP:       Pulse: 81 89 86 82   Resp: 18 21  19   Temp:       TempSrc:       SpO2: 98% 99% 100% 98%   Weight:  88.5 kg (195 lb 1.7 oz)     Height:           Last Labs:  CBC - 6/27/2024:  5:44 AM  21.8 8.0 104    24.9      CMP - 6/27/2024:  5:44 AM  7.7 5.8 242 --- 3.9   2.5 2.6 322 77      PTT - 6/27/2024:  5:44 AM  1.2   13.7 87     Troponin I, High Sensitivity   Date/Time Value Ref Range Status   06/20/2024 04:24  (HH) 0 - 34 ng/L Final     Comment:     Previous result verified on 6/20/2024 1129 on specimen/case [id number] called with component TRPHS for procedure Troponin, High Sensitivity, 1 Hour with value 313 ng/L.   06/20/2024 01:03  (HH) 0 - 34 ng/L Final     Comment:     Previous result verified on 6/20/2024 1129 on specimen/case [id number] called with component TRPHS for procedure Troponin, High Sensitivity, 1 Hour with value 313 ng/L.   06/20/2024 10:23  (HH) 0 - 34 ng/L Final     BNP   Date/Time Value Ref Range Status   06/20/2024 07:58  (H) 0 - 99 pg/mL Final   03/10/2024 01:20  (H) 0 - 99 pg/mL Final     Hemoglobin A1C   Date/Time Value Ref Range Status   03/10/2024 01:20 AM 5.5 see below % Final   06/01/2023 06:43 PM CANCELED       Comment:          Diagnosis of Diabetes-Adults   Non-Diabetic: < or = 5.6%   Increased risk for developing diabetes: 5.7-6.4%   Diagnostic of diabetes: > or = 6.5%  .       Monitoring of Diabetes                Age (y)     Therapeutic Goal (%)   Adults:          >18           <7.0   Pediatrics:    13-18           <7.5                   7-12           <8.0                    0- 6            7.5-8.5   American Diabetes Association. Diabetes Care 33(S1), Jan 2010.    Result canceled by the ancillary.     03/26/2022 07:26 PM 5.8 (A) % Final     Comment:          Diagnosis of Diabetes-Adults   Non-Diabetic: < or = 5.6%   Increased risk for developing diabetes: 5.7-6.4%   Diagnostic of diabetes: > or = 6.5%  .       Monitoring of Diabetes                Age (y)     Therapeutic Goal (%)   Adults:          >18           <7.0   Pediatrics:    13-18           <7.5                   7-12           <8.0                   0- 6            7.5-8.5   American Diabetes Association. Diabetes Care 33(S1), Jan 2010.     01/31/2022 05:44 PM 6.6 (H) 4.3 - 5.6 % Final     Comment:     American Diabetes Association guidelines indicate that patients with HgbA1c in   the range 5.7-6.4% are at increased risk for development of diabetes, and   intervention by lifestyle modification may be beneficial. HgbA1c greater or   equal to 6.5% is considered diagnostic of diabetes.   12/15/2021 03:00 AM 5.8 (H) 4.3 - 5.6 % Final     Comment:     American Diabetes Association guidelines indicate that patients with HgbA1c in   the range 5.7-6.4% are at increased risk for development of diabetes, and   intervention by lifestyle modification may be beneficial. HgbA1c greater or   equal to 6.5% is considered diagnostic of diabetes.     LDL Calculated   Date/Time Value Ref Range Status   03/10/2024 01:20 AM 87 <=99 mg/dL Final     Comment:                                 Near   Borderline      AGE      Desirable  Optimal    High     High     Very High     0-19 Y     0 - 109     ---    110-129   >/= 130     ----    20-24 Y     0 - 119     ---    120-159   >/= 160     ----      >24 Y     0 -  99   100-129  130-159   160-189     >/=190       VLDL   Date/Time Value Ref Range Status   03/10/2024 01:20 AM 20 0 - 40 mg/dL Final   06/01/2023 06:43 PM CANCELED       Comment:     Result canceled by the ancillary.      Last I/O:  I/O  last 3 completed shifts:  In: 2887.1 (32.6 mL/kg) [P.O.:1080; I.V.:957.1 (10.8 mL/kg); Blood:350; IV Piggyback:500]  Out: 6600 (74.6 mL/kg) [Urine:6600 (2.1 mL/kg/hr)]  Weight: 88.5 kg     Ejection Fractions:  EF   Date/Time Value Ref Range Status   06/22/2024 12:41 PM 23 %    12/14/2023 08:09 AM 33           Past Medical History:  She has a past medical history of CHF (congestive heart failure) (Multi) and Stroke (Multi).    Past Surgical History:  She has a past surgical history that includes Other surgical history (06/09/2022); Invasive Vascular Procedure (N/A, 6/20/2024); and Cardiac catheterization (N/A, 6/20/2024).      Social History:  She reports that she quit smoking about 6 months ago. Her smoking use included cigarettes. She does not have any smokeless tobacco history on file. She reports current alcohol use. No history on file for drug use.    Family History:  No family history on file.     Allergies:  Hydrocodone-acetaminophen and Ibuprofen    Inpatient Medications:  Scheduled medications   Medication Dose Route Frequency    amiodarone  200 mg oral Daily    aspirin  81 mg oral Daily    bisacodyl  10 mg rectal Daily    [Held by provider] empagliflozin  10 mg oral Daily    [Held by provider] folic acid  1 mg oral Daily    [Held by provider] gabapentin  100 mg oral TID    insulin lispro  0-5 Units subcutaneous q4h    melatonin  6 mg oral Nightly    [Held by provider] metoprolol succinate XL  50 mg oral Daily    oxygen   inhalation Continuous - Inhalation    pantoprazole  40 mg intravenous Daily before breakfast    perflutren protein A microsphere  0.5 mL intravenous Once in imaging    polyethylene glycol  17 g oral BID    [Held by provider] sacubitriL-valsartan  1 tablet oral BID    sennosides  2 tablet oral BID    [Held by provider] spironolactone  25 mg oral Daily    sulfur hexafluoride microsphr  2 mL intravenous Once in imaging     PRN medications   Medication    dextrose    dextrose    glucagon     glucagon    heparin    HYDROmorphone    lidocaine     Continuous Medications   Medication Dose Last Rate    DOBUTamine  2.5-20 mcg/kg/min Stopped (06/24/24 1431)    heparin  0-4,500 Units/hr 1,200 Units/hr (06/26/24 2000)    nitroprusside  0.25-5 mcg/kg/min 0.8 mcg/kg/min (06/27/24 0211)     Outpatient Medications:  Current Outpatient Medications   Medication Instructions    aspirin 81 mg, Enteral, Daily RT    atorvastatin (LIPITOR) 80 mg, oral, Daily    digoxin (LANOXIN) 250 mcg, oral, Daily    empagliflozin (JARDIANCE) 10 mg, oral, Daily    folic acid (FOLVITE) 1 mg, oral, Daily    furosemide (LASIX) 40 mg, oral, 2 times daily    gabapentin (NEURONTIN) 100 mg, oral, 3 times daily    lidocaine (Lidoderm) 5 % patch 1 patch, transdermal, Daily, Remove & discard patch within 12 hours or as directed by MD.    melatonin 3 mg, oral, Daily PRN    metoprolol succinate XL (TOPROL-XL) 50 mg, oral, Daily, Do not crush or chew.    multivitamin with minerals tablet 1 tablet, oral, Daily    pantoprazole (PROTONIX) 40 mg, oral, Daily before breakfast, Do not crush, chew, or split.    polyethylene glycol (GLYCOLAX, MIRALAX) 17 g, oral, Daily RT    QUEtiapine (SEROquel) 50 mg tablet Take 3 tablets by mouth at bedtime     rivaroxaban (XARELTO) 20 mg, oral, Daily with evening meal, Take with food.    sacubitriL-valsartan (Entresto) 24-26 mg tablet 1 tablet, oral, 2 times daily    spironolactone (ALDACTONE) 25 mg, oral, Daily RT       Physical Exam:  Constitutional: in NAD  HEENT: sclerae anicteric, EOM grossly intact, tracheocutaneous fistula has been present since admission, now with mucus. Can hear airleak. RIJ line not currently oozing.  CV: normal rate, irregular rhythm, no murmurs noted  Pulm: CTAB anteriorly, 2L O2  GI: abd soft, non-tender, bowel sounds present  Ext:  warm to touch, palpable bilateral radial pulses and left DP pulse; unable to palpate or obtain doppler signal of the right DP/PT.  Patient is awake and  conversant, participates a little in conversation  Neuro: alert and conversant however only intermittently answering questions appropriately, +dysmetria, AOx4,+expressive aphasia so will initially say no pain, endorsing pain in bilateral legs.      Assessment/Plan   43 y.o. 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   Course also c/b SARAH, cardiac thrombi, liver injury likely due to ischemia, and Afib with RVR. Found to have rhabdomyolysis with CK of 14,000, unable to give fluids due to cardiogenic shock.   She has significant arterial thrombosis in the right distal femoral popliteal artery with absent flow in the right pop and diminished RSFA flow on PVR.   Endovascular service were involved as shared decision with vascular surgery. Vascular surgery recommending right AKA.  Ethics involved given lack of documentation of POA and limited family (NOK are cousins). Palliative care consulted.   - Case was discussed in length with the critical care team and vascular surgery.   - giving the presentation and profound cardiogenic shock, our options are limited in this case and would defer to vascular surgery assessment and plan.  - agree with the critical care medical management, please continue anticoagulation as above.       Peripheral IV 06/20/24 18 G Right Antecubital (Active)   Site Assessment Clean;Intact;Dry 06/27/24 0400   Dressing Type Transparent 06/27/24 0400   Line Status Infusing 06/27/24 0400   Dressing Status Clean;Dry;Occlusive 06/27/24 0400   Number of days: 7       Arterial Line 06/22/24 Left Radial (Active)   Site  Assessment Clean;Dry;Intact 06/27/24 0400   Line Status Pulsatile blood flow 06/27/24 0400   Art Line Waveform Appropriate 06/27/24 0400   Color/Movement/Sensation Capillary refill less than 3 sec 06/27/24 0400   Dressing Type Antimicrobial patch;Transparent 06/27/24 0400   Dressing Status Clean;Dry;Occlusive 06/27/24 0400   Number of days: 5       Introducer 06/20/24 Internal jugular Right (Active)   Specific Qualities [place] in place 06/27/24 0400   Dressing Type Antimicrobial patch;Transparent 06/27/24 0400   Dressing Status Clean;Dry;Occlusive 06/27/24 0400   Number of days: 7       Pulmonary Artery Catheter Internal jugular (Active)   Site Assessment Dry;Clean;Intact 06/27/24 0400   Proximal Lumen Status Infusing 06/27/24 0400   Medial 1 Lumen Status Infusing 06/27/24 0400   Distal Lumen Status Infusing 06/27/24 0400   PAC Waveform Appropriate waveforms 06/27/24 0400   Dressing Type Antimicrobial patch 06/27/24 0400   Dressing Status Clean;Dry;Occlusive 06/27/24 0400   Number of days: 7       Code Status:  Full Code    I spent 60 minutes in the professional and overall care of this patient.        Kim Zhang MD

## 2024-06-27 NOTE — PRE-PROCEDURE NOTE
Interventional Radiology Preprocedure Note    Indication for procedure: The primary encounter diagnosis was Atrial fibrillation (Multi). Diagnoses of Cardiogenic shock (Multi), Acute systolic congestive heart failure (Multi), Primary hypertension, Nicotine use disorder, Mood disorder (CMS-[facility]), Intracranial hemorrhage (Multi), Hemiparesis affecting right side as late effect of cerebrovascular accident (Multi), ETOH abuse, Elevated LFTs, Dysphagia following cerebral infarction, Chronic systolic heart failure (Multi), Voice disorder, Change in voice, Obesity, Class I, BMI 30-34.9, Mural thrombus of left atrium, Laryngeal stenosis, Laryngeal mass, History of stroke, Central pain syndrome, Aphasia due to late effects of cerebrovascular disease, Encounter for medical examination to establish care, Acute decompensated heart failure (Multi), Atherosclerosis of native arteries of extremities with rest pain, bilateral legs (Multi), Edema leg, Left arm swelling, PAD (peripheral artery disease) (CMS-HCC), and Acute lower extremity ischemia were also pertinent to this visit.    Patient presented to IR for IVC filter placement.     Relevant review of systems: NA    Relevant Labs:   Lab Results   Component Value Date    CREATININE 0.89 06/27/2024    EGFR 82 06/27/2024    INR 1.2 (H) 06/27/2024    PROTIME 13.7 (H) 06/27/2024    PREGTESTUR Negative 03/08/2024       Planned Sedation/Anesthesia: Light sedation.     Airway assessment: normal    Directed physical examination:    Focused exam: Right groin macerated skin. Swanz Abe cathter on patients neck. In pain. However hemodynamically. Stable.     Mallampati: II (hard and soft palate, upper portion of tonsils and uvula visible)    ASA Score: ASA 3 - Patient with moderate systemic disease with functional limitations    Benefits, risks and alternatives of procedure and planned sedation have been discussed with the patient and/or their representative. All questions answered and they  agree to proceed.

## 2024-06-27 NOTE — PROGRESS NOTES
"  MEDICINE INPATIENT PROGRESS NOTE    Amirah Bennett is a 43 y.o. female on hospital day 7    Subjective   Episode of epistaxis but no RIJ line oozing.   Transfusion 1 unit pRBCs  Given 4mg bumex this AM.   Upon evaluation this AM, continues to have pain especially in bilateral legs and LUE. Further ROS limited.          Objective     Last Recorded Vitals  Blood pressure 145/74, pulse 92, temperature 36.1 °C (97 °F), temperature source Temporal, resp. rate 21, height 1.702 m (5' 7.01\"), weight 88.5 kg (195 lb 1.7 oz), SpO2 97%.    Intake/Output last 3 Shifts:  I/O last 3 completed shifts:  In: 2887.1 (32.6 mL/kg) [P.O.:1080; I.V.:957.1 (10.8 mL/kg); Blood:350; IV Piggyback:500]  Out: 6600 (74.6 mL/kg) [Urine:6600 (2.1 mL/kg/hr)]  Weight: 88.5 kg     Relevant Results  Results from last 7 days   Lab Units 06/27/24  0544 06/26/24  2354 06/26/24  1728   WBC AUTO x10*3/uL 21.8* 19.9* 20.5*   HEMOGLOBIN g/dL 8.0* 7.6* 6.9*   HEMATOCRIT % 24.9* 23.4* 20.7*   PLATELETS AUTO x10*3/uL 104* 105* 116*     Results from last 7 days   Lab Units 06/27/24  0544 06/26/24  1728 06/26/24  0434   SODIUM mmol/L 132* 130* 131*   POTASSIUM mmol/L 4.2 3.6 3.7   CO2 mmol/L 25 26 25   ANION GAP mmol/L 13 13 14   BUN mg/dL 15 17 19   CREATININE mg/dL 0.89 1.01 0.99   GLUCOSE mg/dL 105* 126* 89   EGFR mL/min/1.73m*2 82 70 72   MAGNESIUM mg/dL 2.39 1.76 1.73   PHOSPHORUS mg/dL 2.5 2.7 3.3      Results from last 7 days   Lab Units 06/27/24  0544 06/26/24  0434 06/25/24  0505   ALT U/L 322* 411* 513*   AST U/L 242* 332* 510*   ALK PHOS U/L 77 71 69      Results from last 7 days   Lab Units 06/27/24  0544 06/26/24  0434 06/25/24  0505   INR  1.2* 1.5* 1.6*     Results from last 7 days   Lab Units 06/27/24  1313 06/27/24  0544 06/26/24  2354 06/22/24  0449 06/22/24  0321 06/21/24  0613 06/21/24  0542 06/21/24  0206   POCT PH, ARTERIAL pH  --   --   --   --  7.53*  --  7.52* 7.47*   POCT PO2, ARTERIAL mm Hg  --   --   --   --  300*  --  73* 68*   POCT " PCO2, ARTERIAL mm Hg  --   --   --   --  29*  --  33* 30*   FIO2 % 21 21 21   < > 100   < > 28 28    < > = values in this interval not displayed.         Results from last 7 days   Lab Units 06/26/24  0434 06/25/24  2302 06/25/24  1757 06/20/24  1942 06/20/24  1624   LACTATE mmol/L 0.6 0.7 1.0   < > 16.0*   FREE T4 ng/dL  --   --   --   --  1.48    < > = values in this interval not displayed.         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 Can hear airleak. RIJ line not currently oozing. Dried blood in nose.   CV: normal rate, irregular rhythm, no murmurs noted  Pulm: CTAB anteriorly, room air  GI: abd soft, non-tender, bowel sounds present  Ext: She has equal sensation in bilateral thighs but states she has numbness in lower right leg. She is unable to wiggle toes in right foot but can bend right hip and knee. Right popliteal pulse present. No DP or TP in the right. Patient does have DP/TP in left foot. Right foot cool and numb. Tender left upper extremity with bruising under wrapping (able to move fingers of left hand towards a fist position-improved from yesterday), able to move right upper extremity spontaneously. Right groin access site with yellow thickened fluid, concern for purulence.   Neuro: alert and conversant however only intermittently answering questions appropriately, +expressive aphasia so will initially say no pain, endorsing pain in bilateral legs.     Medications    amiodarone, 200 mg, oral, Daily  aspirin, 81 mg, oral, Daily  bisacodyl, 10 mg, rectal, Daily  insulin lispro, 0-5 Units, subcutaneous, q4h  melatonin, 6 mg, oral, Nightly  oxygen, , inhalation, Continuous - Inhalation  [START ON 6/28/2024] pantoprazole, 40 mg, oral, Daily before breakfast  perflutren protein A microsphere, 0.5 mL, intravenous, Once in imaging  piperacillin-tazobactam, 3.375 g,  intravenous, q6h  polyethylene glycol, 17 g, oral, BID  sennosides, 2 tablet, oral, BID  sulfur hexafluoride microsphr, 2 mL, intravenous, Once in imaging  vancomycin, 1,000 mg, intravenous, q12h        heparin, 0-4,500 Units/hr, Last Rate: 1,200 Units/hr (06/27/24 0934)  nitroprusside, 0.25-5 mcg/kg/min, Last Rate: 1.8 mcg/kg/min (06/27/24 1134)        PRN medications: dextrose, dextrose, glucagon, glucagon, heparin, HYDROmorphone, HYDROmorphone, lidocaine, vancomycin           Assessment/Plan   Amirah Bennett is a 42 y.o. 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 RHC c/w cardiogenic shock with CI of 1.6, CVP of 25. She is not candidate for advanced therapies given documented medical nonadherence. Managing medically. Attempted to wean off dobutamine while increasing nitroprusside so stopped dobutamine 6/21 however acutely worsened with increased lactate and worsened SvO2 from 65 to 25 overnight with new abdominal pain and right leg pain concerning for ischemia. Improving parameters, normalized lactate, and pain resolution when dobutamine restarted. Course also c/b SARAH, cardiac thrombi, liver injury likely due to ischemia, and Afib with RVR. Found to have rhabdomyolysis with CK of 14,000, unable to give fluids due to cardiogenic shock. Arterial doppler of lower extremities with echogenic material within right distal femoral and popliteal arteries with absent Doppler flow in the right popliteal artery  with significantly diminished flow within the right distal superficial femoral artery significantly diminished flow within the right distal superficial femoral artery, findings raise concern for thrombosis of distal SFA and popliteal artery. Decreased flow within right posterior tibial and peroneal arteries could be through collateralization. Additionally decreased flow in right common femoral artery, right deep femoral artery as well as proximal and mid superficial femoral arteries concerning for stenosis proximal to right common femoral artery possible within right external iliac right common iliac artery. CTA Aorta with runoff 6/24: aortic bifurcation embolus, R BA-EIA embolus, SFA and popliteal emboli. Vascular surgery recommending right AKA. Bilateral LE duplex with likely early nonocclusive deep venous thrombosis. IVC filter placed 6/27 on recommendation by vascular medicine in preparation for right AKA 6/28. IVC filter should be removed within 3 months. Ethics involved given lack of documentation of POA and limited family (NOK are cousins). Palliative care consulted.    Updates 6/27:  -given 4mg bumex this AM  -required 1 unit pRBCs  -2 units blood on hold  -IVC filter placed today  -requiring higher nitroprusside after procedure today  -planned for right AKA tomorrow with vascular surgery, NPO at midnight  -hemoglobin target prior to surgery of 8  -follow up evening CBC, RFP, Mg  -new blood cultures x2  -Rt groin access site with yellow likely purulent drainage, pending wound culture  -starting vanc/zosyn given leukocytosis and concern for wound infection  -started breakthrough dilaudid 0.2mg q4h and cyclobenzaprine 10mg TID prn  -transitioned to oral pantoprazole  -will return to [facility]ICU after OR     NEUROLOGIC  #AMS, resolved at her baseline  #ischemic stroke L MCA d/t AFib LLA thrombus seen on echo (lack of OAC adherence) s/p thrombectomy 01/2022 @ [facility] w/ residual expressive aphasia and R sided weakness    ::Neurology consulted 6/25, no new deficits concerning for cerebral event  -no acute changes     #Depression?  #Insomnia  ::Patient's friend states that she doesn't care for herself due to being depressed  -consider psychiatry consult, deferring for now  -told to stop quetiapine 50mg at last discharge  -c/w melatonin 6mg at bedtime     CARDIOVASCULAR  #Cardiogenic shock  #HFrEF (LVEF 20-25%)  ::Not candidate for advanced therapies due to medical nonadherence  -nitroprusside gtt  -diuresis as needed  -palliative care consulted given patient not candidate for advanced therapies, appreciate recommendations  -monitor swan numbers q6h  -holding GDMT given pressures, unclear what patient was actively taking however was previously prescribed entresto 24-26, spironolactone 25mg, metoprolol succinate 50mg daily, lasix 40mg, jardiance 10mg-- once out of window after surgery would restart entresto first     #Right limb ischemia  #Arterial emboli  #S/p femoral arterial line sheath placement now removed  ::::CTA Aorta with runoff 6/24: aortic bifurcation embolus, R BA-EIA embolus, SFA and popliteal emboli   ::arterial duplex exam with monophasic right CFA, SFA and PFA signals, absent popliteal flow and monophasic flow in the tibial artery   ::Unable to walk prior to presentation to hospital  ::Right leg has been cold and painful in times where cardiogenic shock has worsened then warm and not painful to touch when out of shock state, fluctuating exam  -heparin drip per above  -vascular surgery consulted, pending CTA aorta and bilateral iliofemoral runoff   -plan for AKA with vascular surgery, 6/28/2024  -pain medication: dilaudid 0.4mg q4 severe (breakthrough 0.2mg q4) and cyclobenzaprine     #Cardiac thrombi  #Right lower extremity DVT  ::Likely in setting of Xarelto nonadherence  ::There are indeterminate low-attenuation nodular filling defects within the atrial appendage. While this may represent contrast under filling, a  right atrial appendage thrombus can not be excluded.  ::Rt US duplex with likely early nonocclusive deep venous thrombosis.   -c/w heparin gtt     #Afib on Xarelto w/ DCCV 05/2023), ischemic stroke L MCA d/t AFib LLA thrombus seen on echo (lack of OAC adherence) s/p thrombectomy 01/2022 @ [facility] w/ residual expressive aphasia and R sided weakness   ::Episode of RVR in ED  ::Previously prescribed digoxin 250mcg however last fill was 12/2023  ::TSH 6.28H, free T4 1.48  -c/w amiodarone 200mg daily  -hold home digoxin 250mcg  -c/w AC per above (holding home Xarelto 20mg daily in evening)  -c/w aspirin 81mg  -stopped atorvastatin 80 due to liver injury     #Elevated troponin  ::95 > 289 > 313 > 326 > 289  -stop trending     #HTN  -holding home medications     PULMONARY  #Dyspnea  #Mild pulmonary edema  ::No evidence of PE  -diuresis per above     #Hx trach c/b trancutaneous fistula  ::ENT had been consulted 3/2024 for gurgling and mucus drainage, no inpatient interventions required  -monitor for breaths/mucus discharge from trach site  -Cover the tracheocutaneous fistula with tape and gauze and encourage patient to apply pressure when voicing.   -per prior ENT note on last admission   -follow up outpatient ENT for closure (Dr. King)     RENAL/  #Metabolic acidosis, resolved  ::Likely in setting of shock     #Rhabdomyolysis   -Ck peaked > 14,000, trending down  -unable to give fluids given cardiogenic shock     #SARAH  ::Likely prerenal vs ATN in setting of shock  ::FeUrea suggests intrinsic  -U/A with 3+ blood and no RBCs. Elevated CK as above.  -avoid nephrotoxic medications     #Elevated lactate, improved     GASTROINTESTINAL  #Elevated Tbili (3.2 on admission, from 0.5 in 03/2024)  #Elevated LFTs  ::Likely ischemic liver injury  -trend CMP     #GERD  -c/w pantoprazole 40mg daily     ENDOCRINE  #T2DM, diet controlled  ::HgbA1c 3/2024 5.5  -hypoglycemia protocol and mild SSI     HEME/ONC  #Cardiac thrombi  -see cardiac  section for plan     #Arterial thrombosis  #DVT  ::s/p IVC filter 6/27  -heparin drip per above  -further plan per above    #Anemia  -Hgb goal >8 for AKA     #Thrombocytopenia  ::Oozing of central line, Multiple thrombi, DIC score 5 on 6/24/2024  ::Hit score of 6  ::Negative PF4  -vascular medicine consulted   -monitor DIC labs daily     #Left brachial injury  ::Likely injury  :LUE DVT scan with nonvascularized mass, called CT while patient was there to request CTA of left upper extremity and they said logistically they would be unable to perform both scans, and given protocol for max dosing of contrast, could not give further contrast until 6/25 at 2PM  -no CTA LUE needed      INFECTIOUS DISEASE  #Concern for infection  ::Procal 0.28  ::CXR without signs of PNA  ::s/p vanc/zosyn (6/20-6/24) (6/27- )  ::UA with 1+ blood, 1+ bacteria, no WBC, neg nitrite and leuk esterase  -pending blood cultures x1 6/20, x2 6/22, NGTD   -restarted vanc/zosyn 6/27 due to elevated leukocytosis and purulent appearing right groin site  -pending blood cultures 6/27  -pending right groin wound culture 6/27     Dispo:  -PT: Moderate intensity level of care  -patient would like enrollment in  home delivery service for medications (she has trouble getting to preferred pharmacy), pharmacy updated to [facility]  -patient's family would like her enrolled in [facility]  -repeat thyroid labs outpatient  -ensure IVC filter removal in 3 months     N: cardiac  A: [place], pIVs, Haily Lt radial  DVT ppx: heparin drip  GI ppx: pantoprazole 40mg     Code Status: FULL CODE per patient and NOKs  *Note that patient LACKS capacity due to inability to express decision and inconsistency in decisions    Surrogate Medical Decision-maker:   Surrogate decision maker is: pt's cousin, Clara Black: [phone], Efrain Black: [phone], Keli Osborne: [phone]      Friends who may be helpful in making decisions:  Prior boyfriend Navin Sanches  "[phone]  Very good friend \"Isiah\" Pranay Owen [phone]        Kristine Pat MD  Internal Medicine, PGY-2   "

## 2024-06-27 NOTE — PROGRESS NOTES
Amirah Bennett is a 43 y.o. female on day 7 of admission presenting with Atrial fibrillation (Multi).    Palliative Medicine following for:  Complex medical decision making, symptom management, patient/family support     History obtained from chart review including ED note, H&P, patient's daily progress notes, review of lab/test results, and discussion with primary team and bedside RN.        Subjective   History of Present Illness  Amirah Bennett is a 42 y.o. female with significant PMHx of HFrEF (LVEF 20-25% (08/2022), Afib on Xarelto w/ DCCV 05/2023), ischemic stroke L MCA d/t AFib LLA thrombus seen on echo (lack of OAC adherence) s/p thrombectomy 01/2022 @ [facility] w/ residual expressive aphasia and R sided weakness, recent 03/2024 left cerebellar MCA, s/p tracheostomy, T2DM (03/2024 HgbA1c 5.5) and HTN presenting with bilateral leg pain.      In the ED, she was noted to be tachycardic to 105 in triage and but then when placed on the monitor was found to be in atrial fibrillation with RVR with a rate between 150 and 190. She was given 5 mg of metoprolol with slight improvement of her rate but became hypotensive and symptomatic. She was started on heparin both for her A-fib and for possible DVT/PE. Lytics were not given because of the risk of potential intracranial hemorrhage after her recent stroke. Instead decision made to cardiovert the patient and also gave digoxin, and amiodarone. After cardioversion she still looked like she was in atrial fibrillation with RVR but only to a rate in the 120s and 130s. Her blood pressure improved. Levophed ordered along with calcium and magnesium. No evidence of infection. Patient admitted to the ICU with plan to get a CT angiogram of her chest to rule out PE and RHC. Ongoing work up revealed aortic bifurcation embolus, R BA-EIA embolus, SFA and popliteal emboli. Pt with acute right lower extremity ischemia, which is not salvageable, needing inflow procedure and R AKA given new  "motor deficits.        Symptoms  Pain: rle only  Dyspnea: denies  Fatigue: denies  Insomnia: states slept very well last night.  Drowsiness: denies  Constipation: denies. Had diarrhea with bm regimen.  Nausea: denies  Appetite: good  Anxiety: \"feeling good today\"  Depression: ok now    BP (!) 110/49   Pulse 81   Temp 36.1 °C (97 °F) (Temporal)   Resp 19   Ht 1.702 m (5' 7.01\")   Wt 88.5 kg (195 lb 1.7 oz)   SpO2 99%   BMI 30.55 kg/m²     Physical Exam   Physical Exam  Constitutional:       Appearance: She is obese.   HENT:      Head: Normocephalic and atraumatic.      Nose: Nose normal.      Mouth/Throat:      Mouth: Mucous membranes are moist.      Pharynx: Oropharynx is clear.   Cardiovascular:      Rate and Rhythm: Regular rhythm with PVCs present.   Pulmonary:      Effort: Pulmonary effort is normal.      Breath sounds: CTA throughout.  Abdominal:      Palpations: Abdomen is soft.   Musculoskeletal:   LUE: Significant edema present.     Right lower leg: Edema present.      Left lower leg: Edema present.      Comments: LUE ace wrapped, bruising noted. No sensation to the bottom of the right foot. Can not move right foot or wiggle toes.  Skin:     General: Skin is dry.      Comments: RLE cool to touch, left warm.   Neurological:      Mental Status: She is alert.      Comments: Significant expressive aphasia. Pauses and word searching. No sensation to the bottom of the right foot. Can not move right foot or wiggle toes.  Psychiatric:         Attention and Perception: Attention normal.         Mood and Affect: Mood is calm, smiling, happy.         Behavior: Behavior is cooperative.     Lab Results   Component Value Date    WBC 21.8 (H) 06/27/2024    HGB 8.0 (L) 06/27/2024    HCT 24.9 (L) 06/27/2024    MCV 88 06/27/2024     (L) 06/27/2024      Lab Results   Component Value Date    GLUCOSE 105 (H) 06/27/2024    CALCIUM 7.7 (L) 06/27/2024     (L) 06/27/2024    K 4.2 06/27/2024    CO2 25 06/27/2024 "    CL 98 06/27/2024    BUN 15 06/27/2024    CREATININE 0.89 06/27/2024       Intake/Output Summary (Last 24 hours) at 6/27/2024 1112  Last data filed at 6/27/2024 1100  Gross per 24 hour   Intake 1932.41 ml   Output 4000 ml   Net -2067.59 ml     Allergies   Allergen Reactions    Hydrocodone-Acetaminophen Rash    Ibuprofen Rash     Tolerates aspirin     Current Facility-Administered Medications   Medication Dose Route Frequency Provider Last Rate Last Admin    amiodarone (Pacerone) tablet 200 mg  200 mg oral Daily Erlin Escalante MD   200 mg at 06/27/24 0826    aspirin chewable tablet 81 mg  81 mg oral Daily Erlin Escalante MD   81 mg at 06/27/24 0634    bisacodyl (Dulcolax) suppository 10 mg  10 mg rectal Daily Kristine Pat MD   10 mg at 06/25/24 1006    dextrose 50 % injection 12.5 g  12.5 g intravenous q15 min PRN Kristine Pat MD   12.5 g at 06/25/24 1604    dextrose 50 % injection 25 g  25 g intravenous q15 min PRN Kristine Pat MD   25 g at 06/20/24 1237    DOBUTamine (Dobutrex) 1,000 mg in dextrose 5% 250 mL (4 mg/mL) infusion (premix)  2.5-20 mcg/kg/min intravenous Continuous Kristine Pat MD   Stopped at 06/24/24 1431    [Held by provider] empagliflozin (Jardiance) tablet 10 mg  10 mg oral Daily Erlin Escalante MD        [Held by provider] folic acid (Folvite) tablet 1 mg  1 mg oral Daily Erlin Escalante MD   1 mg at 06/22/24 0852    [Held by provider] gabapentin (Neurontin) capsule 100 mg  100 mg oral TID Erlin Escalante MD        glucagon (Glucagen) injection 1 mg  1 mg intramuscular q15 min PRN Kristine Pat MD        glucagon (Glucagen) injection 1 mg  1 mg intramuscular q15 min PRN Kristine Pat MD        heparin 25,000 Units in dextrose 5% 250 mL (100 Units/mL) infusion (premix)  0-4,500 Units/hr intravenous Continuous Kristine Pat MD 12 mL/hr at 06/27/24 0934 1,200 Units/hr at 06/27/24 0934    heparin bolus from bag 3,000-6,000 Units  3,000-6,000 Units intravenous q4h  PRN Kristine Pat MD        HYDROmorphone (Dilaudid) injection 0.4 mg  0.4 mg intravenous q4h PRN Jackson Werner MD   0.4 mg at 06/27/24 0826    insulin lispro (HumaLOG) injection 0-5 Units  0-5 Units subcutaneous q4h Kristine Pat MD        lidocaine (Xylocaine) 5 % ointment   Topical PRN Britany Luevano MD        melatonin tablet 6 mg  6 mg oral Nightly Kristine Pat MD   6 mg at 06/26/24 2126    [Held by provider] metoprolol succinate XL (Toprol-XL) 24 hr tablet 50 mg  50 mg oral Daily Erlin Escalante MD        nitroprusside (Nipride) infusion 500 mcg/mL (100 mL vial) (adult)  0.25-5 mcg/kg/min intravenous Continuous Britany Luevano MD 10.66 mL/hr at 06/27/24 0934 0.8 mcg/kg/min at 06/27/24 0934    oxygen (O2) therapy   inhalation Continuous - Inhalation Kristine Pat MD   95 percent at 06/24/24 2000    pantoprazole (ProtoNix) injection 40 mg  40 mg intravenous Daily before breakfast Kristine Pat MD   40 mg at 06/27/24 0626    perflutren protein A microsphere (Optison) injection 0.5 mL  0.5 mL intravenous Once in imaging Megan Hernandez MD        polyethylene glycol (Glycolax, Miralax) packet 17 g  17 g oral BID Jackson Werner MD   17 g at 06/25/24 1011    [Held by provider] sacubitriL-valsartan (Entresto) 24-26 mg per tablet 1 tablet  1 tablet oral BID Erlin Escalante MD        sennosides (Senokot) tablet 17.2 mg  2 tablet oral BID Kristine Pat MD   17.2 mg at 06/25/24 1006    [Held by provider] spironolactone (Aldactone) tablet 25 mg  25 mg oral Daily Erlin Escalante MD        sulfur hexafluoride microsphr (Lumason) injection 24.28 mg  2 mL intravenous Once in imaging Megan Hernandez MD         Assessment/Plan   History of Present Illness  Amirah Bennett is a 42 y.o. female with significant PMHx of HFrEF (LVEF 20-25% (08/2022), Afib on Xarelto w/ DCCV 05/2023), ischemic stroke L MCA d/t AFib LLA thrombus seen on echo (lack of OAC adherence) s/p thrombectomy 01/2022 @ [facility] w/ residual  expressive aphasia and R sided weakness, recent 03/2024 left cerebellar MCA, s/p tracheostomy, T2DM (03/2024 HgbA1c 5.5) and HTN presenting with bilateral leg pain.      In the ED, she was noted to be tachycardic to 105 in triage and but then when placed on the monitor was found to be in atrial fibrillation with RVR with a rate between 150 and 190. She was given 5 mg of metoprolol with slight improvement of her rate but became hypotensive and symptomatic. She was started on heparin both for her A-fib and for possible DVT/PE. Lytics were not given because of the risk of potential intracranial hemorrhage after her recent stroke. Instead decision made to cardiovert the patient and also gave digoxin, and amiodarone. After cardioversion she still looked like she was in atrial fibrillation with RVR but only to a rate in the 120s and 130s. Her blood pressure improved. Levophed ordered along with calcium and magnesium. No evidence of infection. Patient admitted to the ICU with plan to get a CT angiogram of her chest to rule out PE and RHC. Ongoing work up revealed aortic bifurcation embolus, R BA-EIA embolus, SFA and popliteal emboli. Pt with acute right lower extremity ischemia, which is not salvageable, needing inflow procedure and R AKA given new motor deficits.     6/21: Palliative consulted for pt support and GOC.       6/25: Palliative met for a family and care team meeting in pt's room. Discussed current findings via imaging/scans and labs, current complications, cardiac and circulation risks of sx and not doing sx. Pt wishes to move forward with sx. NOK/surrogates agreeable to pt's wishes. Needing cardiac optimization prior to sx. No current infection present.   Pt elects cousins Felicitas and Keli as surrogate decision makers. Keli mentions a recent MPOA completed and will bring in.      6/26: Possible plan for sx today? Palliative rounded on pt for questions/clarifications, positive presence/support, and  "symptom management. Pt still choosing sx to support her goal of \"keep going\". Pt speaking with evident anxiety. Reviewed anxiety reduction strategies. See recs below.    6/27: Pt calm today, feeling better sx was not yesterday and has had time to process. Tentative IVC filter today with amputation and thrombectomy tomorrow. Discussed pain, see recs below. Continued emotional support.     Palliative Performance Scale (PPS): 30     ----------------------------------------------------------------------------------------------------------------------------------------------------------------------------------------------------------------------------------------------------------------------------------------------------------------------------------------------------------------------        I spent  minutes in providing separately identifiable ACP services with the patient and/or surrogate decision maker in a voluntary conversation discussing the patient's wishes and goals as detailed in the above note.   ----------------------------------------------------------------------------------------------------------------------------------------------------------------------------------------------------------------------------------------------------------------------------------------------------------------------------------------------------------------------     #Complex Medical Decision Making  #Goals of Care  #Advanced Care Planning  - Code status: Full code  - Surrogate decision maker: Keli Osborne (cousin) [phone]. Clara Black (cousin) [phone].  - Goals are survival and improved quality of life.   6/21: Pt with notable expressive aphasia making open ended questioning difficult. Pt able to answer yes and no questions more easily. Pt demonstrating understanding/comprehension of questions asked. Pt agrees that Navin is MPOA and papers are signed. She is upset with him today but is unable to verbalize " "why. Palliative recapped medication nonadherence and pt agreeable that she stops taking them when she is depressed. When pt asked why she stops taking her medicine, pt starting spelling out her name and saying nonsensical words. Palliative expressed a worry that her depression is causing her to not to want to continue to live. Palliative asked is that is correct, pt stated \"no\". Palliative asked pt earlier about her goals, she stated \"to live\" and \"I can make it\". Pt was unable to articulate any further explanation of her statements.   Pt was asked if her breathing were to be compromised and she needed intubated again (trached) would she want that? Pt hesitated but stated \"yes\".      Palliative attempted to reach out to Navin x3. First call he answered, and said he was on the other line with a doctor and to call back at 1230. Palliative did and his phone went to . Gia called back around 1500 and again, right to . Gia then called Pranay to see if Navin has another number, and Pranay's went right to . Palliative left a message with call back number for Navin and Pranay.      Will attempt to discuss GOC with returned call from Navin. Otherwise Palliative can follow up Monday or Tuesday.      6/25: Palliative met for a family and care team meeting in pt's room. Those involved included Dr. Simon, Dr. Pat, Dannielle NP, Jennifer Palliative Mary, pt, and cousins Felicitas and Keli, and second cousin Marianela. Discussed current findings via imaging/scans and labs, current clot complications, and cardiac and circulation standpoints. Pt now requiring right AKA for loss of circulation. Vascular explained risks of AKA sx and not doing sx. Pt wishes to move forward with sx. NOK/new surrogates (Clara and Keli) agreeable to pt's wishes. Vascular and primary team expressed needing cardiac optimization prior to sx. Pt does not currently have an active infection, making this a non-urgent decision. Potential sx in the next few " "days.   Pt elects cousins Clara and Keli as surrogate decision makers vs Navin, and both Felicitas and Keli expressing the want and willingness to fulfil that position. Keli mentions a recent MPOA was completed with pt and signed by a mariely. Keli will bring in.   Palliative reiterated and realigned heard goals of continuing to do everything we are currently doing as life and continued aggressive measures are paramount. Pt and family state \"yes\".         #Cardiogenic shock  #Acute SARAH  #Shock Liver  - Per primary team, on nipride, heparin, and currently off Dobutamine.  - Kidney and liver levels with continued improvements. Latest lactate normal 0.6..  - Pt with multiple admissions r/t medication nonadherence d/t pt stated depression. Recommend Psychiatry consult.       #Acute Pain  #RLE circulation  #Thrombi  - Vascular stating no blood flow to the foot and partial calf. Requires Right AKA d/t lack of current circulation and aortoiliac thrombectomy to open blood flow to remaining leg.   - Primary team optimizing cardiac/medical standpoint prior to R AKA and aorto-iliac thrombectomy. Possible plan for 6/28?   - Pt still wishing to move forward with the sx. 6/26, explained reason for R AKA and infection risk if no blood flow. Pt encourages and voices \"live\" and to \"keep going\".   - LUE hematoma. Pressure wrapped, pt stating no pain.  - BLE ischemia, right worse than left. ISO edema, also right worse than left. Currently on Hydromorphone 0.4mg Q4H PRN severe pain (6 doses in the lat 24 hours). Palliative asking yes and no questions. Pt states IV Dilaudid does make the pain tolerable but pain does not go away. Record shows needing a BTP dose last night. Recommend Hydromorphone 0.2mg Q4H PRN breakthrough pain.   - Mentions \"sharp\" and makes a wave like motion with her hand by the right calf. Pt agrees stating spasm-like pain that comes and goes. Recommend cyclobenzaprine 10mg TID PRN muscle spasms.   - " Consider heel offloading for pressure sore reduction r/t poor RLE mobility and sensation.  - Holding off on hepatotoxic meds (Acetaminophen) ISO acute liver injury, although improving.       #Depression  - Not currently on antidepressant but home med list notable for Quetiapine 50mg at bedtime. (Not currently taking in pt.) Unclear history. Recommend Psychiatry consult.       #Anxiety  #Insomnia  - Situational related to upcoming sx.   - Endorsed 6/26 no issues falling asleep but awakens with racing thoughts about surgery.   - Is able to fall asleep again with deep breathing and self calming.  - Ongoing encouragement of calming modalities such as distraction, prayer, positive self talk/thoughts, and meditation.  - Now on Melatonin 6mg PO at 2100 to assist with staying asleep. Reports sleeping very well last night.  - Please consider Psychiatry consult for underlying depression and to assist with anticipated ongoing anxieties. C/f vicious cycle of ongoing depressive symptoms and medication non-adherence ISO worsened health and possible QOL. Butler to establish in pt and out patient plan and follow up.        #Psychosocial Support  - Ongoing pt and family support, positive presence and emotional support  - Music Therapy  - Spiritual Care Support     Plan of Care discussed with: Updated primary team and bedside RN on goals of care decision, medication adjustments, and code status      Medical Decision Making was high level due to high complexity of problems, extensive data review, and high risk of management/treatment.     - Cardiogenic shock, shock liver, acute kidney injury requiring inotrope cardiac support, multiple emboli and acute loss of right lower extremity perfusion requiring AKA, worsening leukocytosis posing threat to life and function.  - Reviewed external notes from   - Reviews results from  which were used in decision making for   - Recommended the following tests:   - Assessment required independent  historian: nursing and primary team  - Independent interpretation of test: labs   - Discussion of management with: nursing, primary   - Drug therapy requiring intensive monitoring for toxicity: monitor renal status with meds/HF, mag level, potassium.  - Decision regarding elective major surgery with identified patient or procedure risk factors:   - Decision regarding emergency major surgery: assured again today that pt still onboard for right AKA  - Decision regarding hospitalization or escalation of hospital-level care:   - Decision not to resuscitate or to de-escalate care because of poor prognosis:   - Parenteral controlled substances: heparin, nipride, dilaudid, PPI, mag, bumex (spot dosing).      Thank you for allowing us to participate in the care of this patient. Palliative will continue to follow as needed. Palliative medicine is available Monday-Friday, 8a-6p. Please contact team with any questions or concerns.  Team pager [phone] (weekdays)    JAE Evangelista-CNP

## 2024-06-27 NOTE — PROGRESS NOTES
Occupational Therapy                 Therapy Communication Note    Patient Name: Amirah Bennett  MRN: [id number]  Today's Date: 6/27/2024     Discipline: Occupational Therapy    Missed Visit Reason: Missed Visit Reason: Patient in a medical procedure (Currently off floor at [facility]. No Ot eval at this time.)    Missed Time: Attempt

## 2024-06-27 NOTE — PROGRESS NOTES
Seen and examined.  No significant adverse events in the last 24 hours.    On examination the patient has a cold right lower extremity that is insensate up to the knee with no motor below the hip.  No signals obtained in the foot.  Monophasic right femoral signal.  Consistent with prior examinations.    All labs and imaging reviewed independently by the vascular surgery team    Assessment and plan:    43-year-old lady with acute right lower extremity limb ischemia that is nonsalvageable due to prolonged ischemic time.  Iliac artery occlusion.    Will be taken to the operating room for right iliofemoral thromboembolectomy with possible angiogram as well as right above-the-knee amputation, likely guillotine.    Vascular Preoperative Optimization Instructions:  Continue aspirin, high-intensity statin, and beta blockers (or document contraindications for use)  No ACEi/ARBs in the pre-op period (at least 48 hours).  Hold any SGLT2 inhibitors (Farxiga, Jardiance, etc.) for at least 3 days prior to cardiovascular surgery to prevent euglycemic DKA.  NPO after midnight on the day of surgery  Preoperative labs to include coagulation profile and an active type and screen to be sent the night before surgery  Chlorhexidine preoperative scrub the night before surgery and Peridex twice per day for 48 hours before and the morning of surgery  Hold anticoagulation on call to the OR    Discussed with attending Dr. Rj Lyon MD, DERRICK  Vascular Surgery Fellow  Team Pager [phone]  Personal Pager [phone]  Available on Epic Chat

## 2024-06-28 ENCOUNTER — ANESTHESIA (OUTPATIENT)
Dept: OPERATING ROOM | Facility: HOSPITAL | Age: 44
End: 2024-06-28
Payer: COMMERCIAL

## 2024-06-28 LAB
ABO GROUP (TYPE) IN BLOOD: NORMAL
ALBUMIN SERPL BCP-MCNC: 2.4 G/DL (ref 3.4–5)
ALBUMIN SERPL BCP-MCNC: 2.6 G/DL (ref 3.4–5)
ALBUMIN SERPL BCP-MCNC: 2.7 G/DL (ref 3.4–5)
ALP SERPL-CCNC: 78 U/L (ref 33–110)
ALT SERPL W P-5'-P-CCNC: 254 U/L (ref 7–45)
ANION GAP BLDA CALCULATED.4IONS-SCNC: 5 MMO/L (ref 10–25)
ANION GAP BLDA CALCULATED.4IONS-SCNC: 6 MMO/L (ref 10–25)
ANION GAP BLDMV CALCULATED.4IONS-SCNC: 1 MMO/L (ref 10–25)
ANION GAP BLDMV CALCULATED.4IONS-SCNC: 6 MMO/L (ref 10–25)
ANION GAP BLDMV CALCULATED.4IONS-SCNC: 6 MMO/L (ref 10–25)
ANION GAP BLDMV CALCULATED.4IONS-SCNC: 8 MMO/L (ref 10–25)
ANION GAP BLDMV CALCULATED.4IONS-SCNC: ABNORMAL MMOL/L
ANION GAP SERPL CALC-SCNC: 12 MMOL/L (ref 10–20)
ANION GAP SERPL CALC-SCNC: 13 MMOL/L (ref 10–20)
ANION GAP SERPL CALC-SCNC: 14 MMOL/L (ref 10–20)
ANTIBODY SCREEN: NORMAL
APTT PPP: 66 SECONDS (ref 27–38)
AST SERPL W P-5'-P-CCNC: 194 U/L (ref 9–39)
BASE EXCESS BLDA CALC-SCNC: 2.8 MMOL/L (ref -2–3)
BASE EXCESS BLDA CALC-SCNC: 4 MMOL/L (ref -2–3)
BASE EXCESS BLDMV CALC-SCNC: 0.8 MMOL/L (ref -2–3)
BASE EXCESS BLDMV CALC-SCNC: 0.9 MMOL/L (ref -2–3)
BASE EXCESS BLDMV CALC-SCNC: 1 MMOL/L (ref -2–3)
BASE EXCESS BLDMV CALC-SCNC: 1.2 MMOL/L (ref -2–3)
BASE EXCESS BLDMV CALC-SCNC: 2.4 MMOL/L (ref -2–3)
BASOPHILS # BLD AUTO: 0.03 X10*3/UL (ref 0–0.1)
BASOPHILS # BLD MANUAL: 0 X10*3/UL (ref 0–0.1)
BASOPHILS NFR BLD AUTO: 0.1 %
BASOPHILS NFR BLD MANUAL: 0 %
BILIRUB DIRECT SERPL-MCNC: 2.2 MG/DL (ref 0–0.3)
BILIRUB SERPL-MCNC: 3.8 MG/DL (ref 0–1.2)
BLOOD EXPIRATION DATE: NORMAL
BODY TEMPERATURE: 37 DEGREES CELSIUS
BUN SERPL-MCNC: 12 MG/DL (ref 6–23)
BUN SERPL-MCNC: 12 MG/DL (ref 6–23)
BUN SERPL-MCNC: 14 MG/DL (ref 6–23)
BURR CELLS BLD QL SMEAR: ABNORMAL
CA-I BLDA-SCNC: 1.11 MMOL/L (ref 1.1–1.33)
CA-I BLDA-SCNC: 1.15 MMOL/L (ref 1.1–1.33)
CA-I BLDMV-SCNC: 1.04 MMOL/L (ref 1.1–1.33)
CA-I BLDMV-SCNC: 1.08 MMOL/L (ref 1.1–1.33)
CA-I BLDMV-SCNC: 1.13 MMOL/L (ref 1.1–1.33)
CA-I BLDMV-SCNC: 1.13 MMOL/L (ref 1.1–1.33)
CA-I BLDMV-SCNC: 1.15 MMOL/L (ref 1.1–1.33)
CALCIUM SERPL-MCNC: 7.3 MG/DL (ref 8.6–10.6)
CALCIUM SERPL-MCNC: 7.5 MG/DL (ref 8.6–10.6)
CALCIUM SERPL-MCNC: 7.6 MG/DL (ref 8.6–10.6)
CHLORIDE BLD-SCNC: 100 MMOL/L (ref 98–107)
CHLORIDE BLD-SCNC: 102 MMOL/L (ref 98–107)
CHLORIDE BLD-SCNC: 102 MMOL/L (ref 98–107)
CHLORIDE BLD-SCNC: 104 MMOL/L (ref 98–107)
CHLORIDE BLD-SCNC: ABNORMAL MMOL/L
CHLORIDE BLDA-SCNC: 100 MMOL/L (ref 98–107)
CHLORIDE BLDA-SCNC: 99 MMOL/L (ref 98–107)
CHLORIDE SERPL-SCNC: 97 MMOL/L (ref 98–107)
CHLORIDE SERPL-SCNC: 98 MMOL/L (ref 98–107)
CHLORIDE SERPL-SCNC: 99 MMOL/L (ref 98–107)
CO2 SERPL-SCNC: 24 MMOL/L (ref 21–32)
CO2 SERPL-SCNC: 25 MMOL/L (ref 21–32)
CO2 SERPL-SCNC: 26 MMOL/L (ref 21–32)
CREAT SERPL-MCNC: 0.76 MG/DL (ref 0.5–1.05)
CREAT SERPL-MCNC: 0.86 MG/DL (ref 0.5–1.05)
CREAT SERPL-MCNC: 0.95 MG/DL (ref 0.5–1.05)
D DIMER PPP FEU-MCNC: 2096 NG/ML FEU
DISPENSE STATUS: NORMAL
EGFRCR SERPLBLD CKD-EPI 2021: 75 ML/MIN/1.73M*2
EGFRCR SERPLBLD CKD-EPI 2021: 85 ML/MIN/1.73M*2
EGFRCR SERPLBLD CKD-EPI 2021: >90 ML/MIN/1.73M*2
EOSINOPHIL # BLD AUTO: 0.19 X10*3/UL (ref 0–0.7)
EOSINOPHIL # BLD MANUAL: 0.42 X10*3/UL (ref 0–0.7)
EOSINOPHIL NFR BLD AUTO: 0.9 %
EOSINOPHIL NFR BLD MANUAL: 1.7 %
ERYTHROCYTE [DISTWIDTH] IN BLOOD BY AUTOMATED COUNT: 16.9 % (ref 11.5–14.5)
ERYTHROCYTE [DISTWIDTH] IN BLOOD BY AUTOMATED COUNT: 17.5 % (ref 11.5–14.5)
FIBRINOGEN PPP-MCNC: 498 MG/DL (ref 200–400)
GLUCOSE BLD MANUAL STRIP-MCNC: 121 MG/DL (ref 74–99)
GLUCOSE BLD MANUAL STRIP-MCNC: 124 MG/DL (ref 74–99)
GLUCOSE BLD MANUAL STRIP-MCNC: 125 MG/DL (ref 74–99)
GLUCOSE BLD MANUAL STRIP-MCNC: 130 MG/DL (ref 74–99)
GLUCOSE BLD-MCNC: 108 MG/DL (ref 74–99)
GLUCOSE BLD-MCNC: 108 MG/DL (ref 74–99)
GLUCOSE BLD-MCNC: 111 MG/DL (ref 74–99)
GLUCOSE BLD-MCNC: 114 MG/DL (ref 74–99)
GLUCOSE BLD-MCNC: 97 MG/DL (ref 74–99)
GLUCOSE BLDA-MCNC: 108 MG/DL (ref 74–99)
GLUCOSE BLDA-MCNC: 110 MG/DL (ref 74–99)
GLUCOSE SERPL-MCNC: 101 MG/DL (ref 74–99)
GLUCOSE SERPL-MCNC: 104 MG/DL (ref 74–99)
GLUCOSE SERPL-MCNC: 121 MG/DL (ref 74–99)
HCO3 BLDA-SCNC: 27.2 MMOL/L (ref 22–26)
HCO3 BLDA-SCNC: 27.3 MMOL/L (ref 22–26)
HCO3 BLDMV-SCNC: 25.2 MMOL/L (ref 22–26)
HCO3 BLDMV-SCNC: 25.3 MMOL/L (ref 22–26)
HCO3 BLDMV-SCNC: 26 MMOL/L (ref 22–26)
HCO3 BLDMV-SCNC: 26 MMOL/L (ref 22–26)
HCO3 BLDMV-SCNC: 26.2 MMOL/L (ref 22–26)
HCT VFR BLD AUTO: 22.6 % (ref 36–46)
HCT VFR BLD AUTO: 27.7 % (ref 36–46)
HCT VFR BLD EST: 23 % (ref 36–46)
HCT VFR BLD EST: 25 % (ref 36–46)
HCT VFR BLD EST: 25 % (ref 36–46)
HCT VFR BLD EST: 26 % (ref 36–46)
HCT VFR BLD EST: 26 % (ref 36–46)
HCT VFR BLD EST: 27 % (ref 36–46)
HCT VFR BLD EST: 29 % (ref 36–46)
HGB BLD-MCNC: 7.6 G/DL (ref 12–16)
HGB BLD-MCNC: 8.7 G/DL (ref 12–16)
HGB BLDA-MCNC: 8.8 G/DL (ref 12–16)
HGB BLDA-MCNC: 9.6 G/DL (ref 12–16)
HGB BLDMV-MCNC: 7.6 G/DL (ref 12–16)
HGB BLDMV-MCNC: 8.2 G/DL (ref 12–16)
HGB BLDMV-MCNC: 8.4 G/DL (ref 12–16)
HGB BLDMV-MCNC: 8.7 G/DL (ref 12–16)
HGB BLDMV-MCNC: 9.1 G/DL (ref 12–16)
IMM GRANULOCYTES # BLD AUTO: 0.88 X10*3/UL (ref 0–0.7)
IMM GRANULOCYTES # BLD AUTO: 1.56 X10*3/UL (ref 0–0.7)
IMM GRANULOCYTES NFR BLD AUTO: 4.1 % (ref 0–0.9)
IMM GRANULOCYTES NFR BLD AUTO: 6.3 % (ref 0–0.9)
INHALED O2 CONCENTRATION: 21 %
INHALED O2 CONCENTRATION: 44 %
INHALED O2 CONCENTRATION: 50 %
INHALED O2 CONCENTRATION: 60 %
INR PPP: 1.3 (ref 0.9–1.1)
LACTATE BLDA-SCNC: 0.7 MMOL/L (ref 0.4–2)
LACTATE BLDA-SCNC: 0.8 MMOL/L (ref 0.4–2)
LACTATE BLDMV-SCNC: 0.6 MMOL/L (ref 0.4–2)
LACTATE BLDMV-SCNC: 0.7 MMOL/L (ref 0.4–2)
LACTATE BLDMV-SCNC: 0.7 MMOL/L (ref 0.4–2)
LACTATE BLDMV-SCNC: 0.8 MMOL/L (ref 0.4–2)
LACTATE BLDMV-SCNC: 0.9 MMOL/L (ref 0.4–2)
LYMPHOCYTES # BLD AUTO: 4.62 X10*3/UL (ref 1.2–4.8)
LYMPHOCYTES # BLD MANUAL: 2.49 X10*3/UL (ref 1.2–4.8)
LYMPHOCYTES NFR BLD AUTO: 21.4 %
LYMPHOCYTES NFR BLD MANUAL: 10 %
MAGNESIUM SERPL-MCNC: 1.5 MG/DL (ref 1.6–2.4)
MAGNESIUM SERPL-MCNC: 1.93 MG/DL (ref 1.6–2.4)
MAGNESIUM SERPL-MCNC: 2.34 MG/DL (ref 1.6–2.4)
MCH RBC QN AUTO: 27.9 PG (ref 26–34)
MCH RBC QN AUTO: 28.6 PG (ref 26–34)
MCHC RBC AUTO-ENTMCNC: 31.4 G/DL (ref 32–36)
MCHC RBC AUTO-ENTMCNC: 33.6 G/DL (ref 32–36)
MCV RBC AUTO: 85 FL (ref 80–100)
MCV RBC AUTO: 89 FL (ref 80–100)
MONOCYTES # BLD AUTO: 2 X10*3/UL (ref 0.1–1)
MONOCYTES # BLD MANUAL: 2.29 X10*3/UL (ref 0.1–1)
MONOCYTES NFR BLD AUTO: 9.3 %
MONOCYTES NFR BLD MANUAL: 9.2 %
NEUTROPHILS # BLD AUTO: 13.89 X10*3/UL (ref 1.2–7.7)
NEUTROPHILS NFR BLD AUTO: 64.2 %
NEUTS SEG # BLD MANUAL: 19.5 X10*3/UL (ref 1.2–7)
NEUTS SEG NFR BLD MANUAL: 78.3 %
NRBC BLD-RTO: 1.3 /100 WBCS (ref 0–0)
NRBC BLD-RTO: 1.5 /100 WBCS (ref 0–0)
OXYHGB MFR BLDA: 95.3 % (ref 94–98)
OXYHGB MFR BLDA: 96.5 % (ref 94–98)
OXYHGB MFR BLDMV: 50.7 % (ref 45–75)
OXYHGB MFR BLDMV: 54.3 % (ref 45–75)
OXYHGB MFR BLDMV: 65.7 % (ref 45–75)
OXYHGB MFR BLDMV: 69.5 % (ref 45–75)
OXYHGB MFR BLDMV: 75.9 % (ref 45–75)
PCO2 BLDA: 35 MM HG (ref 38–42)
PCO2 BLDA: 40 MM HG (ref 38–42)
PCO2 BLDMV: 36 MM HG (ref 41–51)
PCO2 BLDMV: 38 MM HG (ref 41–51)
PCO2 BLDMV: 39 MM HG (ref 41–51)
PCO2 BLDMV: 41 MM HG (ref 41–51)
PCO2 BLDMV: 42 MM HG (ref 41–51)
PH BLDA: 7.44 PH (ref 7.38–7.42)
PH BLDA: 7.5 PH (ref 7.38–7.42)
PH BLDMV: 7.4 PH (ref 7.33–7.43)
PH BLDMV: 7.41 PH (ref 7.33–7.43)
PH BLDMV: 7.42 PH (ref 7.33–7.43)
PH BLDMV: 7.43 PH (ref 7.33–7.43)
PH BLDMV: 7.47 PH (ref 7.33–7.43)
PHOSPHATE SERPL-MCNC: 2.2 MG/DL (ref 2.5–4.9)
PHOSPHATE SERPL-MCNC: 2.8 MG/DL (ref 2.5–4.9)
PHOSPHATE SERPL-MCNC: 3.5 MG/DL (ref 2.5–4.9)
PLATELET # BLD AUTO: 105 X10*3/UL (ref 150–450)
PLATELET # BLD AUTO: 116 X10*3/UL (ref 150–450)
PO2 BLDA: 112 MM HG (ref 85–95)
PO2 BLDA: 308 MM HG (ref 85–95)
PO2 BLDMV: 32 MM HG (ref 35–45)
PO2 BLDMV: 36 MM HG (ref 35–45)
PO2 BLDMV: 40 MM HG (ref 35–45)
PO2 BLDMV: 43 MM HG (ref 35–45)
PO2 BLDMV: 49 MM HG (ref 35–45)
POLYCHROMASIA BLD QL SMEAR: ABNORMAL
POTASSIUM BLDA-SCNC: 4.1 MMOL/L (ref 3.5–5.3)
POTASSIUM BLDA-SCNC: 4.6 MMOL/L (ref 3.5–5.3)
POTASSIUM BLDMV-SCNC: 3.4 MMOL/L (ref 3.5–5.3)
POTASSIUM BLDMV-SCNC: 3.6 MMOL/L (ref 3.5–5.3)
POTASSIUM BLDMV-SCNC: 4 MMOL/L (ref 3.5–5.3)
POTASSIUM BLDMV-SCNC: 4.4 MMOL/L (ref 3.5–5.3)
POTASSIUM BLDMV-SCNC: 4.5 MMOL/L (ref 3.5–5.3)
POTASSIUM SERPL-SCNC: 3.6 MMOL/L (ref 3.5–5.3)
POTASSIUM SERPL-SCNC: 4 MMOL/L (ref 3.5–5.3)
POTASSIUM SERPL-SCNC: 4.1 MMOL/L (ref 3.5–5.3)
PRODUCT BLOOD TYPE: 7300
PRODUCT CODE: NORMAL
PROMYELOCYTES # BLD MANUAL: 0.2 X10*3/UL
PROMYELOCYTES NFR BLD MANUAL: 0.8 %
PROT SERPL-MCNC: 5.2 G/DL (ref 6.4–8.2)
PROTHROMBIN TIME: 14.4 SECONDS (ref 9.8–12.8)
RBC # BLD AUTO: 2.66 X10*6/UL (ref 4–5.2)
RBC # BLD AUTO: 3.12 X10*6/UL (ref 4–5.2)
RBC MORPH BLD: ABNORMAL
RH FACTOR (ANTIGEN D): NORMAL
SAO2 % BLDA: 99 % (ref 94–100)
SAO2 % BLDA: 99 % (ref 94–100)
SAO2 % BLDMV: 52 % (ref 45–75)
SAO2 % BLDMV: 56 % (ref 45–75)
SAO2 % BLDMV: 68 % (ref 45–75)
SAO2 % BLDMV: 72 % (ref 45–75)
SAO2 % BLDMV: 79 % (ref 45–75)
SODIUM BLDA-SCNC: 127 MMOL/L (ref 136–145)
SODIUM BLDA-SCNC: 129 MMOL/L (ref 136–145)
SODIUM BLDMV-SCNC: 128 MMOL/L (ref 136–145)
SODIUM BLDMV-SCNC: 128 MMOL/L (ref 136–145)
SODIUM BLDMV-SCNC: 130 MMOL/L (ref 136–145)
SODIUM SERPL-SCNC: 131 MMOL/L (ref 136–145)
SODIUM SERPL-SCNC: 132 MMOL/L (ref 136–145)
SODIUM SERPL-SCNC: 133 MMOL/L (ref 136–145)
TOTAL CELLS COUNTED BLD: 120
UFH PPP CHRO-ACNC: 0.2 IU/ML
UNIT ABO: NORMAL
UNIT NUMBER: NORMAL
UNIT RH: NORMAL
UNIT VOLUME: 350
VANCOMYCIN SERPL-MCNC: 8.4 UG/ML (ref 5–20)
WBC # BLD AUTO: 21.6 X10*3/UL (ref 4.4–11.3)
WBC # BLD AUTO: 24.9 X10*3/UL (ref 4.4–11.3)
XM INTEP: NORMAL

## 2024-06-28 PROCEDURE — C1769 GUIDE WIRE: HCPCS | Performed by: SURGERY

## 2024-06-28 PROCEDURE — 2500000005 HC RX 250 GENERAL PHARMACY W/O HCPCS: Performed by: SURGERY

## 2024-06-28 PROCEDURE — 2500000004 HC RX 250 GENERAL PHARMACY W/ HCPCS (ALT 636 FOR OP/ED): Performed by: SURGERY

## 2024-06-28 PROCEDURE — 83735 ASSAY OF MAGNESIUM: CPT | Mod: 91

## 2024-06-28 PROCEDURE — 85520 HEPARIN ASSAY: CPT

## 2024-06-28 PROCEDURE — 85027 COMPLETE CBC AUTOMATED: CPT

## 2024-06-28 PROCEDURE — 82947 ASSAY GLUCOSE BLOOD QUANT: CPT

## 2024-06-28 PROCEDURE — 2500000001 HC RX 250 WO HCPCS SELF ADMINISTERED DRUGS (ALT 637 FOR MEDICARE OP)

## 2024-06-28 PROCEDURE — 2500000004 HC RX 250 GENERAL PHARMACY W/ HCPCS (ALT 636 FOR OP/ED)

## 2024-06-28 PROCEDURE — 84132 ASSAY OF SERUM POTASSIUM: CPT | Mod: 91

## 2024-06-28 PROCEDURE — 85007 BL SMEAR W/DIFF WBC COUNT: CPT

## 2024-06-28 PROCEDURE — 85379 FIBRIN DEGRADATION QUANT: CPT

## 2024-06-28 PROCEDURE — 2720000007 HC OR 272 NO HCPCS: Performed by: SURGERY

## 2024-06-28 PROCEDURE — 2020000001 HC ICU ROOM DAILY

## 2024-06-28 PROCEDURE — 2500000005 HC RX 250 GENERAL PHARMACY W/O HCPCS: Performed by: STUDENT IN AN ORGANIZED HEALTH CARE EDUCATION/TRAINING PROGRAM

## 2024-06-28 PROCEDURE — 86901 BLOOD TYPING SEROLOGIC RH(D): CPT

## 2024-06-28 PROCEDURE — 3700000001 HC GENERAL ANESTHESIA TIME - INITIAL BASE CHARGE: Performed by: SURGERY

## 2024-06-28 PROCEDURE — 36430 TRANSFUSION BLD/BLD COMPNT: CPT | Performed by: STUDENT IN AN ORGANIZED HEALTH CARE EDUCATION/TRAINING PROGRAM

## 2024-06-28 PROCEDURE — 2780000003 HC OR 278 NO HCPCS: Performed by: SURGERY

## 2024-06-28 PROCEDURE — 3600000003 HC OR TIME - INITIAL BASE CHARGE - PROCEDURE LEVEL THREE: Performed by: SURGERY

## 2024-06-28 PROCEDURE — 85384 FIBRINOGEN ACTIVITY: CPT

## 2024-06-28 PROCEDURE — C1757 CATH, THROMBECTOMY/EMBOLECT: HCPCS | Performed by: SURGERY

## 2024-06-28 PROCEDURE — 86923 COMPATIBILITY TEST ELECTRIC: CPT | Mod: 91

## 2024-06-28 PROCEDURE — 37799 UNLISTED PX VASCULAR SURGERY: CPT | Performed by: INTERNAL MEDICINE

## 2024-06-28 PROCEDURE — 85610 PROTHROMBIN TIME: CPT

## 2024-06-28 PROCEDURE — 88304 TISSUE EXAM BY PATHOLOGIST: CPT | Performed by: PATHOLOGY

## 2024-06-28 PROCEDURE — 3700000002 HC GENERAL ANESTHESIA TIME - EACH INCREMENTAL 1 MINUTE: Performed by: SURGERY

## 2024-06-28 PROCEDURE — 99291 CRITICAL CARE FIRST HOUR: CPT | Performed by: INTERNAL MEDICINE

## 2024-06-28 PROCEDURE — 88307 TISSUE EXAM BY PATHOLOGIST: CPT | Performed by: PATHOLOGY

## 2024-06-28 PROCEDURE — 80202 ASSAY OF VANCOMYCIN: CPT

## 2024-06-28 PROCEDURE — 84075 ASSAY ALKALINE PHOSPHATASE: CPT

## 2024-06-28 PROCEDURE — 37799 UNLISTED PX VASCULAR SURGERY: CPT

## 2024-06-28 PROCEDURE — 88307 TISSUE EXAM BY PATHOLOGIST: CPT | Mod: TC,SUR | Performed by: INTERNAL MEDICINE

## 2024-06-28 PROCEDURE — 85025 COMPLETE CBC W/AUTO DIFF WBC: CPT

## 2024-06-28 PROCEDURE — 2500000004 HC RX 250 GENERAL PHARMACY W/ HCPCS (ALT 636 FOR OP/ED): Performed by: STUDENT IN AN ORGANIZED HEALTH CARE EDUCATION/TRAINING PROGRAM

## 2024-06-28 PROCEDURE — 84132 ASSAY OF SERUM POTASSIUM: CPT

## 2024-06-28 PROCEDURE — 27590 AMPUTATE LEG AT THIGH: CPT | Performed by: SURGERY

## 2024-06-28 PROCEDURE — 83735 ASSAY OF MAGNESIUM: CPT

## 2024-06-28 PROCEDURE — 84132 ASSAY OF SERUM POTASSIUM: CPT | Performed by: INTERNAL MEDICINE

## 2024-06-28 PROCEDURE — 88311 DECALCIFY TISSUE: CPT | Performed by: PATHOLOGY

## 2024-06-28 PROCEDURE — 04CK0ZZ EXTIRPATION OF MATTER FROM RIGHT FEMORAL ARTERY, OPEN APPROACH: ICD-10-PCS | Performed by: SURGERY

## 2024-06-28 PROCEDURE — 85347 COAGULATION TIME ACTIVATED: CPT

## 2024-06-28 PROCEDURE — 0Y6C0Z3 DETACHMENT AT RIGHT UPPER LEG, LOW, OPEN APPROACH: ICD-10-PCS | Performed by: SURGERY

## 2024-06-28 PROCEDURE — 84132 ASSAY OF SERUM POTASSIUM: CPT | Mod: 91 | Performed by: INTERNAL MEDICINE

## 2024-06-28 PROCEDURE — 3600000008 HC OR TIME - EACH INCREMENTAL 1 MINUTE - PROCEDURE LEVEL THREE: Performed by: SURGERY

## 2024-06-28 PROCEDURE — 34201 REMOVAL OF ARTERY CLOT: CPT | Performed by: SURGERY

## 2024-06-28 PROCEDURE — 0752T DGTZ GLS MCRSCP SLD LVL III: CPT | Mod: TC,SUR | Performed by: INTERNAL MEDICINE

## 2024-06-28 PROCEDURE — P9016 RBC LEUKOCYTES REDUCED: HCPCS

## 2024-06-28 PROCEDURE — 84100 ASSAY OF PHOSPHORUS: CPT

## 2024-06-28 RX ORDER — ONDANSETRON HYDROCHLORIDE 2 MG/ML
INJECTION, SOLUTION INTRAVENOUS AS NEEDED
Status: DISCONTINUED | OUTPATIENT
Start: 2024-06-28 | End: 2024-06-28

## 2024-06-28 RX ORDER — PROTAMINE SULFATE 10 MG/ML
INJECTION, SOLUTION INTRAVENOUS AS NEEDED
Status: DISCONTINUED | OUTPATIENT
Start: 2024-06-28 | End: 2024-06-28

## 2024-06-28 RX ORDER — FENTANYL CITRATE 50 UG/ML
INJECTION, SOLUTION INTRAMUSCULAR; INTRAVENOUS AS NEEDED
Status: DISCONTINUED | OUTPATIENT
Start: 2024-06-28 | End: 2024-06-28

## 2024-06-28 RX ORDER — SODIUM CHLORIDE 0.9 G/100ML
IRRIGANT IRRIGATION AS NEEDED
Status: DISCONTINUED | OUTPATIENT
Start: 2024-06-28 | End: 2024-06-28 | Stop reason: HOSPADM

## 2024-06-28 RX ORDER — HEPARIN SODIUM 10000 [USP'U]/100ML
0-4500 INJECTION, SOLUTION INTRAVENOUS CONTINUOUS
Status: DISCONTINUED | OUTPATIENT
Start: 2024-06-28 | End: 2024-07-23

## 2024-06-28 RX ORDER — HYDROMORPHONE HYDROCHLORIDE 1 MG/ML
INJECTION, SOLUTION INTRAMUSCULAR; INTRAVENOUS; SUBCUTANEOUS AS NEEDED
Status: DISCONTINUED | OUTPATIENT
Start: 2024-06-28 | End: 2024-06-28

## 2024-06-28 RX ORDER — MAGNESIUM SULFATE HEPTAHYDRATE 40 MG/ML
4 INJECTION, SOLUTION INTRAVENOUS ONCE
Status: COMPLETED | OUTPATIENT
Start: 2024-06-28 | End: 2024-06-29

## 2024-06-28 RX ORDER — CALCIUM GLUCONATE 20 MG/ML
2 INJECTION, SOLUTION INTRAVENOUS ONCE
Status: COMPLETED | OUTPATIENT
Start: 2024-06-28 | End: 2024-06-28

## 2024-06-28 RX ORDER — POTASSIUM CHLORIDE 14.9 MG/ML
20 INJECTION INTRAVENOUS
Status: DISPENSED | OUTPATIENT
Start: 2024-06-28 | End: 2024-06-29

## 2024-06-28 RX ORDER — ROCURONIUM BROMIDE 10 MG/ML
INJECTION, SOLUTION INTRAVENOUS AS NEEDED
Status: DISCONTINUED | OUTPATIENT
Start: 2024-06-28 | End: 2024-06-28

## 2024-06-28 RX ORDER — HEPARIN SODIUM 1000 [USP'U]/ML
INJECTION, SOLUTION INTRAVENOUS; SUBCUTANEOUS AS NEEDED
Status: DISCONTINUED | OUTPATIENT
Start: 2024-06-28 | End: 2024-06-28

## 2024-06-28 RX ORDER — BUMETANIDE 0.25 MG/ML
INJECTION INTRAMUSCULAR; INTRAVENOUS AS NEEDED
Status: DISCONTINUED | OUTPATIENT
Start: 2024-06-28 | End: 2024-06-28

## 2024-06-28 RX ORDER — PROPOFOL 10 MG/ML
INJECTION, EMULSION INTRAVENOUS AS NEEDED
Status: DISCONTINUED | OUTPATIENT
Start: 2024-06-28 | End: 2024-06-28

## 2024-06-28 RX ADMIN — HEPARIN SODIUM 1200 UNITS/HR: 10000 INJECTION, SOLUTION INTRAVENOUS at 06:26

## 2024-06-28 RX ADMIN — HEPARIN SODIUM 1200 UNITS/HR: 10000 INJECTION, SOLUTION INTRAVENOUS at 17:33

## 2024-06-28 RX ADMIN — SACUBITRIL AND VALSARTAN 1 TABLET: 24; 26 TABLET, FILM COATED ORAL at 22:30

## 2024-06-28 RX ADMIN — PANTOPRAZOLE SODIUM 40 MG: 40 TABLET, DELAYED RELEASE ORAL at 06:05

## 2024-06-28 RX ADMIN — CHLORHEXIDINE GLUCONATE 0.12% ORAL RINSE 15 ML: 1.2 LIQUID ORAL at 00:01

## 2024-06-28 RX ADMIN — SODIUM NITROPRUSSIDE 1 MCG/KG/MIN: 0.5 INJECTION, SOLUTION INTRAVENOUS at 16:22

## 2024-06-28 RX ADMIN — HYDROMORPHONE HYDROCHLORIDE 0.4 MG: 1 INJECTION, SOLUTION INTRAMUSCULAR; INTRAVENOUS; SUBCUTANEOUS at 15:58

## 2024-06-28 RX ADMIN — HYDROMORPHONE HYDROCHLORIDE 0.4 MG: 1 INJECTION, SOLUTION INTRAMUSCULAR; INTRAVENOUS; SUBCUTANEOUS at 11:51

## 2024-06-28 RX ADMIN — HYDROMORPHONE HYDROCHLORIDE 0.4 MG: 1 INJECTION, SOLUTION INTRAMUSCULAR; INTRAVENOUS; SUBCUTANEOUS at 21:39

## 2024-06-28 RX ADMIN — SENNOSIDES 17.2 MG: 8.6 TABLET, FILM COATED ORAL at 22:31

## 2024-06-28 RX ADMIN — PIPERACILLIN SODIUM AND TAZOBACTAM SODIUM 3.38 G: 3; .375 INJECTION, SOLUTION INTRAVENOUS at 10:00

## 2024-06-28 RX ADMIN — POTASSIUM CHLORIDE 20 MEQ: 14.9 INJECTION, SOLUTION INTRAVENOUS at 22:33

## 2024-06-28 RX ADMIN — SODIUM NITROPRUSSIDE 1 MCG/KG/MIN: 0.5 INJECTION, SOLUTION INTRAVENOUS at 23:49

## 2024-06-28 RX ADMIN — PIPERACILLIN SODIUM AND TAZOBACTAM SODIUM 3.38 G: 3; .375 INJECTION, SOLUTION INTRAVENOUS at 03:51

## 2024-06-28 RX ADMIN — ASPIRIN 81 MG CHEWABLE TABLET 81 MG: 81 TABLET CHEWABLE at 06:04

## 2024-06-28 RX ADMIN — CALCIUM GLUCONATE 2 G: 20 INJECTION, SOLUTION INTRAVENOUS at 12:18

## 2024-06-28 RX ADMIN — PIPERACILLIN SODIUM AND TAZOBACTAM SODIUM 3.38 G: 3; .375 INJECTION, SOLUTION INTRAVENOUS at 16:00

## 2024-06-28 RX ADMIN — VANCOMYCIN HYDROCHLORIDE 1000 MG: 1 INJECTION, SOLUTION INTRAVENOUS at 18:38

## 2024-06-28 RX ADMIN — MAGNESIUM SULFATE HEPTAHYDRATE 4 G: 40 INJECTION, SOLUTION INTRAVENOUS at 22:32

## 2024-06-28 RX ADMIN — SODIUM NITROPRUSSIDE 2 MCG/KG/MIN: 0.5 INJECTION, SOLUTION INTRAVENOUS at 02:02

## 2024-06-28 RX ADMIN — VANCOMYCIN HYDROCHLORIDE 1000 MG: 1 INJECTION, SOLUTION INTRAVENOUS at 05:47

## 2024-06-28 RX ADMIN — PIPERACILLIN SODIUM AND TAZOBACTAM SODIUM 3.38 G: 3; .375 INJECTION, SOLUTION INTRAVENOUS at 22:31

## 2024-06-28 RX ADMIN — Medication 6 MG: at 22:30

## 2024-06-28 SDOH — HEALTH STABILITY: MENTAL HEALTH: CURRENT SMOKER: 0

## 2024-06-28 ASSESSMENT — PAIN - FUNCTIONAL ASSESSMENT
PAIN_FUNCTIONAL_ASSESSMENT: 0-10

## 2024-06-28 ASSESSMENT — PAIN SCALES - GENERAL
PAINLEVEL_OUTOF10: 8
PAINLEVEL_OUTOF10: 8
PAINLEVEL_OUTOF10: 0 - NO PAIN
PAINLEVEL_OUTOF10: 8
PAINLEVEL_OUTOF10: 9
PAIN_LEVEL: 0

## 2024-06-28 NOTE — ANESTHESIA POSTPROCEDURE EVALUATION
Patient: Amirah Bennett    Procedure Summary       Date: 06/28/24 Room / Location: [facility] OR 16 / [facility] OR    Anesthesia Start: 0739 Anesthesia Stop:     Procedures:       Embolectomy Lower Extremity (Right)      Amputation Above Knee (Right) Diagnosis:       Atrial fibrillation (Multi)      (Atrial fibrillation (Multi) [I48.91])    Surgeons: Rizwana De La Rosa MD Responsible Provider: Oksana Osborne MD    Anesthesia Type: general ASA Status: 4            Anesthesia Type: general    Vitals Value Taken Time   /52 06/28/24 1103   Temp 36.2 06/28/24 1103   Pulse 84 06/28/24 1102   Resp 19 06/28/24 1102   SpO2 100 % 06/28/24 1102   Vitals shown include unfiled device data.    Anesthesia Post Evaluation    Patient location during evaluation: ICU  Patient participation: complete - patient participated  Level of consciousness: sleepy but conscious  Pain score: 0  Pain management: adequate  Airway patency: patent  Cardiovascular status: acceptable, blood pressure returned to baseline and hemodynamically stable  Respiratory status: acceptable, airway suctioned, face mask and nonlabored ventilation  Hydration status: acceptable  Postoperative Nausea and Vomiting: none        No notable events documented.

## 2024-06-28 NOTE — PROGRESS NOTES
3/3 CICU (8D)      SIGNIFICANT EVENTS  06/28 OR R AKA     DC PLAN  [facility], [facility]     > [address] [place] [address]      PAYOR   [facility]     PT/OT   06/24 PT (10) - Mod     ETHICS  06/22 Capacity/Decision-Making Considerations: From a chart review, it  appears Ms. Bennett's capacity for medical decision-making is  limited due to her expressive aphasia.    First Surrogate Felicitas (cousin) and 2nd is Felicitas's sister Keli (cousin)      COMPLETED  (X) 06/26 - SNF referral built in [facility] and sent to [facility].   (X) 06/26 - DC/SDOH Assessments completed with 1st cousin (Keli Osborne) via phone.      NOTE  On 6/24, colleague documented referral made to Community Health Worker. CHW has since put in 2 notes in working with patient for needs.  In call with Keli updated the order of Contacts in who to call first and subsequently thereafter.  Keli confirmed her understanding of above Ethics for surrogate decision maker. Keli help complete DC/SDOH assessments. Keli already aware patient will need SNF placement and asking for referral to [facility], [facility].. because she works there. If unable to accept for any reason, emailed SNF list to Keli for [facility] within zip code of patient's home zip. Advised her to pick 1-2 back up choices.

## 2024-06-28 NOTE — OP NOTE
Embolectomy Lower Extremity (R), Amputation Above Knee (R) Operative Note     Date: 2024  OR Location: [place] OR    Name: Amirah Bennett : 1980, Age: 43 y.o., MRN: [id number], Sex: female    Diagnosis  Pre-op Diagnosis     * Atrial fibrillation (Multi) [I48.91] Post-op Diagnosis     * Atrial fibrillation (Multi) [I48.91]     Procedures  Right femoral cutdown for aorto iliac thrombectomy  Right common femoral, superficial femoral, profunda thrombectomy  Right above knee amputation      Surgeons      * Rizwana De La Rosa - Primary    Resident/Fellow/Other Assistant:  Surgeons and Role:     * Panchito Vaca MD - Fellow    Procedure Summary  Anesthesia: General  ASA: IV  Anesthesia Staff: Anesthesiologist: Oksana Osborne MD  Anesthesia Resident: Tawana Shah MD  Estimated Blood Loss: 500 mL  Intra-op Medications:   Administrations occurring from 0715 to 1150 on 24:   Medication Name Total Dose   sodium chloride 0.9 % irrigation solution 1,000 mL   thrombin (recombinant) (Recothrom) topical solution 10,000 Units   gelatin absorbable (Gelfoam) 100 sponge 1 each   heparin (porcine) 5,000 Units in sodium chloride 0.9% 500 mL irrigation 5,000 Units   amiodarone (Pacerone) tablet 200 mg Cannot be calculated   aspirin chewable tablet 81 mg Cannot be calculated   bisacodyl (Dulcolax) suppository 10 mg Cannot be calculated   cyclobenzaprine (Flexeril) tablet 10 mg Cannot be calculated   dextrose 50 % injection 12.5 g Cannot be calculated   dextrose 50 % injection 25 g Cannot be calculated   glucagon (Glucagen) injection 1 mg Cannot be calculated   glucagon (Glucagen) injection 1 mg Cannot be calculated   HYDROmorphone (Dilaudid) injection 0.4 mg Cannot be calculated   HYDROmorphone PF (Dilaudid) injection 0.2 mg Cannot be calculated   insulin lispro (HumaLOG) injection 0-5 Units Cannot be calculated   lidocaine (Xylocaine) 5 % ointment Cannot be calculated   melatonin tablet 6 mg Cannot be calculated    nitroprusside (Nipride) infusion 500 mcg/mL (100 mL vial) (adult) 21.56 mg   pantoprazole (ProtoNix) EC tablet 40 mg Cannot be calculated   perflutren protein A microsphere (Optison) injection 0.5 mL Cannot be calculated   piperacillin-tazobactam-dextrose (Zosyn) IV 3.375 g 100 mL   polyethylene glycol (Glycolax, Miralax) packet 17 g Cannot be calculated   sennosides (Senokot) tablet 17.2 mg Cannot be calculated   sulfur hexafluoride microsphr (Lumason) injection 24.28 mg Cannot be calculated   vancomycin (Vancocin) 1,000 mg in dextrose 5% water 200 mL Cannot be calculated   vancomycin (Vancocin) pharmacy to dose - pharmacy monitoring Cannot be calculated   chlorhexidine (Peridex) 0.12 % solution 15 mL Cannot be calculated   heparin 25,000 Units in dextrose 5% 250 mL (100 Units/mL) infusion (premix) Cannot be calculated   heparin bolus from bag 3,000-6,000 Units Cannot be calculated   sodium phosphate 15 mmol in sodium chloride 0.9% 250 mL IV Cannot be calculated              Anesthesia Record               Intraprocedure I/O Totals          Intake    Transfuse RBC :30 Minutes 350.00 mL    Heparin Drip 0.00 mL    The total shown is the total volume documented since Anesthesia Start was filed.    Nitroprusside Drip 0.00 mL    The total shown is the total volume documented since Anesthesia Start was filed.    Total Intake 350 mL       Output    Urine 1390 mL    Est. Blood Loss 500 mL    Total Output 1890 mL       Net    Net Volume -1540 mL          Specimen:   ID Type Source Tests Collected by Time   1 : RIGHT ILIAC EMBOLUS Tissue CLOT/THROMBUS SURGICAL PATHOLOGY EXAM Rizwana De La Rosa MD 6/28/2024 0936   2 : RIGHT AKA Tissue LEG AMPUTATION ABOVE THE KNEE RIGHT SURGICAL PATHOLOGY EXAM Rizwana De La Rosa MD 6/28/2024 0954        Staff:   Circulator: Vika  Scrub Person: Leana  Circulator: Dee  Scrub Person: Monae         Drains and/or Catheters:   Urethral Catheter 16 Fr. (Active)       Tourniquet Times:    None    Implants:     Findings: Healthy viable tissue at amputation site. Organized dark thrombus from iliac and SFA     Indications: Amirah Bennett is an 43 y.o. female who is having surgery for Atrial fibrillation (Multi) [I48.91]. Patient presented with Afib RVR, significant heart failure in cardiogenic shock. 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 CTA was obtained which demonstrated right BA, EIA, and CFA/SFA thrombus, likely embolic. We discussed with patient and family (cousins) regarding treatment options as the leg was not salvageable. AKA was offered with thrombectomy of the iliac and profunda to preserve outflow for optimal stump healing. All parties agreed.    The patient was seen in the preoperative area. The risks, benefits, complications, treatment options, non-operative alternatives, expected recovery and outcomes were discussed with the patient. The possibilities of reaction to medication, pulmonary aspiration, injury to surrounding structures, bleeding, recurrent infection, the need for additional procedures, failure to diagnose a condition, and creating a complication requiring transfusion or operation were discussed with the patient. The patient concurred with the proposed plan, giving informed consent.  The site of surgery was properly noted/marked if necessary per policy. The patient has been actively warmed in preoperative area. Preoperative antibiotics have been ordered and given within 1 hours of incision. Venous thrombosis prophylaxis have been ordered including chemical prophylaxis    Procedure Details:   #15 blade used to create an oblique incision above the right groin crease but inferior to the inguinal ligament. Electrocautery used to dissect through subcutaneous tissue until the inguinal ligament was identified. The femoral artery was palpated and  sheath incised in vertical fashion. Combination of blunt and sharp dissection employed to isolate the common femoral, superficial femoral, and profunda arteries. These were controlled with silastic vessel loops. Patient was systemically heparinized and serial ACT > 250s checked.    Arteries were not clamped. #11 blade used to create a transverse arteriotomy near the bifurcation of the SFA and profunda. #4 Roxanne embolectomy catheter used to retrieve clot from the aorta and right iliofemoral system with return of brisk inflow, which was allowed to flush out any residual clot. The common femoral artery was controlled with the loop. #3 Roxanne embolectomy catheter used to perform thrombectomy of the SFA and profunda arteries with brisk return of backbleeding and return of small amount of dark, firm, organized thrombus; all thrombus specimens were sent to pathology. Arteries were flushed with heparinized saline. Arteriotomy closed with 6-0 prolene to re-establish perfusion to the leg. Protamine given for reversal. Wound copiously irrigated with irrisept. Wound closed in multiple layers using 2-0, 3-0 Vicryl, 4-0 monocryl for the skin followed by dermabond.    We then turned attention to the AKA portion of the case. [place] incision marked 5 cm proximal to the knee joint. #10 blade used to create skin incision and deepened with electrocautery. The greater saphenous vein was identified and ligated with 2-0 silk. The muscles of the anterior and medial compartments were divided with electrocautery until the neurovascular bundle was identified. This was suture ligated and divided with 2-0 silk. The muscles were then divided circumferentially until the entire femur was exposed. Periosteal elevator used to expose the femur 5 cm proximal to the skin incision. Oscillating saw used to transect femur in straight AP fashion, and then edges smoothed. Irrisept used to irrigate the wound. Hemostasis achieved with electrocautery.  Myodesis performed over the femur using 0 Vicryl. 2-0 Vicryl interrupted sutures used to approximate the fascia, followed by 3-0 vicryl for dermal closure, and staples for the skin. Xeroform placed over incision, followed by burn gauze and ioban.       Complications:  None; patient tolerated the procedure well.    Disposition: ICU - intubated and hemodynamically stable.  Condition: stable           Attending Attestation: I was present and scrubbed for the entire procedure.    Rizwana De La Rosa  Phone Number: [phone]

## 2024-06-28 NOTE — PROGRESS NOTES
Physical Therapy                 Therapy Communication Note    Patient Name: Amirah Bennett  MRN: [id number]  Today's Date: 6/28/2024     Discipline: Physical Therapy    Missed Visit Reason: Missed Visit Reason:  (Pt currently undergoing AKA procedure and unavailable for PT.)    Missed Time: Attempt    Comment:

## 2024-06-28 NOTE — ANESTHESIA PROCEDURE NOTES
Airway  Date/Time: 6/28/2024 8:27 AM  Urgency: elective      Staffing  Performed: resident   Authorized by: Oksana Osborne MD    Performed by: Tawana Shah MD  Patient location during procedure: OR    Indications and Patient Condition  Indications for airway management: anesthesia  Spontaneous Ventilation: absent  Sedation level: deep  Preoxygenated: yes  Patient position: sniffing  Mask difficulty assessment: 1 - vent by mask  Planned trial extubation    Final Airway Details  Final airway type: endotracheal airway      Successful airway: ETT  Cuffed: yes   Successful intubation technique: direct laryngoscopy (DL assist)  Endotracheal tube insertion site: oral  Blade: Sonia  Blade size: #3  ETT size (mm): 6.0  Cormack-Lehane Classification: grade IIa - partial view of glottis  Placement verified by: capnometry   Measured from: teeth  ETT to teeth (cm): 22  Number of attempts at approach: 2    Additional Comments  First attempt with flexible bronchospy unsuccessful, however second attempt with DL successful.

## 2024-06-28 NOTE — BRIEF OP NOTE
Date: 2024 - 2024  OR Location: [facility] OR    Name: Amirah Bennett, : 1980, Age: 43 y.o., MRN: [id number], Sex: female    Diagnosis  Pre-op Diagnosis     * Atrial fibrillation (Multi) [I48.91] Post-op Diagnosis     * Atrial fibrillation (Multi) [I48.91]     Procedures  - Open right lower extremity and iliac embolectomy via right common femoral artery exposure  - Right above-knee amputation      Surgeons      * Rizwana De La Rosa - Primary    Resident/Fellow/Other Assistant:  Surgeons and Role:     * Panchito Vaca MD - Fellow    Procedure Summary  Anesthesia: General  ASA: IV  Anesthesia Staff: Anesthesiologist: Oksana Osborne MD  Anesthesia Resident: Tawana Shah MD  Estimated Blood Loss: 500mL  Intra-op Medications:   Administrations occurring from 0715 to 1150 on 24:   Medication Name Total Dose   sodium chloride 0.9 % irrigation solution 1,000 mL   thrombin (recombinant) (Recothrom) topical solution 10,000 Units   gelatin absorbable (Gelfoam) 100 sponge 1 each   heparin (porcine) 5,000 Units in sodium chloride 0.9% 500 mL irrigation 5,000 Units   amiodarone (Pacerone) tablet 200 mg Cannot be calculated   aspirin chewable tablet 81 mg Cannot be calculated   bisacodyl (Dulcolax) suppository 10 mg Cannot be calculated   chlorhexidine (Peridex) 0.12 % solution 15 mL Cannot be calculated   cyclobenzaprine (Flexeril) tablet 10 mg Cannot be calculated   dextrose 50 % injection 12.5 g Cannot be calculated   dextrose 50 % injection 25 g Cannot be calculated   glucagon (Glucagen) injection 1 mg Cannot be calculated   glucagon (Glucagen) injection 1 mg Cannot be calculated   heparin 25,000 Units in dextrose 5% 250 mL (100 Units/mL) infusion (premix) Cannot be calculated   heparin bolus from bag 3,000-6,000 Units Cannot be calculated   HYDROmorphone (Dilaudid) injection 0.4 mg Cannot be calculated   HYDROmorphone PF (Dilaudid) injection 0.2 mg Cannot be calculated   insulin lispro (HumaLOG)  injection 0-5 Units Cannot be calculated   lidocaine (Xylocaine) 5 % ointment Cannot be calculated   melatonin tablet 6 mg Cannot be calculated   nitroprusside (Nipride) infusion 500 mcg/mL (100 mL vial) (adult) 21.56 mg   pantoprazole (ProtoNix) EC tablet 40 mg Cannot be calculated   perflutren protein A microsphere (Optison) injection 0.5 mL Cannot be calculated   piperacillin-tazobactam-dextrose (Zosyn) IV 3.375 g 50 mL   polyethylene glycol (Glycolax, Miralax) packet 17 g Cannot be calculated   sennosides (Senokot) tablet 17.2 mg Cannot be calculated   sodium phosphate 15 mmol in sodium chloride 0.9% 250 mL IV Cannot be calculated   sulfur hexafluoride microsphr (Lumason) injection 24.28 mg Cannot be calculated   vancomycin (Vancocin) 1,000 mg in dextrose 5% water 200 mL Cannot be calculated   vancomycin (Vancocin) pharmacy to dose - pharmacy monitoring Cannot be calculated              Anesthesia Record               Intraprocedure I/O Totals          Intake    Transfuse RBC :30 Minutes 350.00 mL    Heparin Drip 0.00 mL    The total shown is the total volume documented since Anesthesia Start was filed.    Nitroprusside Drip 0.00 mL    The total shown is the total volume documented since Anesthesia Start was filed.    Total Intake 350 mL       Output    Urine 1390 mL    Total Output 1390 mL       Net    Net Volume -1040 mL          Specimen:   ID Type Source Tests Collected by Time   1 : RIGHT ILIAC EMBOLUS Tissue CLOT/THROMBUS SURGICAL PATHOLOGY EXAM Rizwana De La Rosa MD 6/28/2024 0962   2 : RIGHT AKA Tissue LEG AMPUTATION ABOVE THE KNEE RIGHT SURGICAL PATHOLOGY EXAM Rizwana De La Rosa MD 6/28/2024 7892        Staff:   Circulator: Vika  Scrub Person: Leana  Circulator: Dee  Scrub Person: Monae          Findings:   Embolus removed from both R iliac system and R SFA/pop. Sent as specimens  R AKA performed uneventfully    Complications:  None; patient tolerated the procedure well.     Disposition: PACU -  hemodynamically stable.  Condition: stable  Specimens Collected:   ID Type Source Tests Collected by Time   1 : RIGHT ILIAC EMBOLUS Tissue CLOT/THROMBUS SURGICAL PATHOLOGY EXAM Rizwana De La Rosa MD 6/28/2024 6879   2 : RIGHT AKA Tissue LEG AMPUTATION ABOVE THE KNEE RIGHT SURGICAL PATHOLOGY EXAM Rizwana De La Rosa MD 6/28/2024 3392     Attending Attestation: I was present and scrubbed for the entire procedure.    Rizwana De La Rosa  Phone Number: [phone]

## 2024-06-28 NOTE — PROGRESS NOTES
Pharmacy to Dose Vancomycin:  Amirah Bennett is a 43 y.o. female ordered pharmacy to dose vancomycin for complicated skin and soft tissue infection. Today is day 2 of therapy.  Goal: -600mg/L*hr  Results from last 7 days   Lab Units 06/28/24  1113 06/28/24  0348 06/27/24  1625 06/22/24  1756 06/22/24  0449   BUN mg/dL 12 14 13   < >  --    CREATININE mg/dL 0.86 0.95 0.93   < >  --    WBC AUTO x10*3/uL 24.9* 21.6* 20.2*   < >  --    VANCOMYCIN RM ug/mL  --  8.4  --    < >  --    VANCOMYCIN TR ug/mL  --   --   --   --  8.8    < > = values in this interval not displayed.      Blood Culture   Date/Time Value Ref Range Status   06/27/2024 04:25 PM Loaded on Instrument - Culture in progress  Preliminary     Tissue/Wound Culture/Smear   Date/Time Value Ref Range Status   06/27/2024 01:55 PM No growth to date  Preliminary      No results found for the last 90 days.    Renal function remains stable.  Patient's current regimen is predicted to achieve AUC24,ss of 521.    Plan:  Continue vanco 1GQ12H.  The above dosing regimen is predicted by [employer] to result in the following pharmacokinetic parameters:    Follow-up level ordered for 6/30 AM unless clinically indicated sooner.  Pharmacy will continue daily vancomycin monitoring. Please contact the pharmacy with any questions.    Thank you,  Ricardo Cheng [facility]

## 2024-06-28 NOTE — SIGNIFICANT EVENT
Vascular Surgery Post-Op Plan of Care  S/p R iliac and RLE open thromboembolectomy, followed by R AKA for [place] III ALI.    Uncomplicated    Plan:  OK to resume diet  24hrs perioperative abx needed from surgery perspective  OK to resume low-intensity therapeutic heparin drip without boluses at 5pm  Vascular surgery will remove initial dressing on AKA POD#3    Panchito Vaca MD  PGY5 - Integrated Vascular Surgery

## 2024-06-28 NOTE — PROGRESS NOTES
"  MEDICINE INPATIENT PROGRESS NOTE    Amirah Bennett is a 43 y.o. female on hospital day 8    Subjective   Nipride uptitrated overnight. Plan for AKA today.  Patient with continued pain of LUE and bilateral legs requesting pain medication. Keli present at bedside prior to AKA.        Objective     Last Recorded Vitals  Blood pressure 145/74, pulse 83, temperature 35.6 °C (96.1 °F), temperature source Temporal, resp. rate 20, height 1.702 m (5' 7.01\"), weight 87.4 kg (192 lb 10.9 oz), SpO2 99%.    Intake/Output last 3 Shifts:  I/O last 3 completed shifts:  In: 1646.6 (18.8 mL/kg) [P.O.:50; I.V.:796.6 (9.1 mL/kg); Blood:350; IV Piggyback:450]  Out: 7300 (83.5 mL/kg) [Urine:7300 (2.3 mL/kg/hr)]  Weight: 87.4 kg     Relevant Results  Results from last 7 days   Lab Units 06/28/24  0348 06/27/24  1625 06/27/24  0544   WBC AUTO x10*3/uL 21.6* 20.2* 21.8*   HEMOGLOBIN g/dL 7.6* 8.1* 8.0*   HEMATOCRIT % 22.6* 23.6* 24.9*   PLATELETS AUTO x10*3/uL 105* 115* 104*     Results from last 7 days   Lab Units 06/28/24  0348 06/27/24  1625 06/27/24  0544   SODIUM mmol/L 131* 132* 132*   POTASSIUM mmol/L 4.1 3.5 4.2   CO2 mmol/L 24 21 25   ANION GAP mmol/L 12 18 13   BUN mg/dL 14 13 15   CREATININE mg/dL 0.95 0.93 0.89   GLUCOSE mg/dL 121* 154* 105*   EGFR mL/min/1.73m*2 75 77 82   MAGNESIUM mg/dL 2.34 1.69 2.39   PHOSPHORUS mg/dL 2.2* 2.0* 2.5      Results from last 7 days   Lab Units 06/28/24  0348 06/27/24  0544 06/26/24  0434   ALT U/L 254* 322* 411*   AST U/L 194* 242* 332*   ALK PHOS U/L 78 77 71      Results from last 7 days   Lab Units 06/28/24  0348 06/27/24  0544 06/26/24  0434   INR  1.3* 1.2* 1.5*     Results from last 7 days   Lab Units 06/28/24  1113 06/28/24  1035 06/28/24  0836 06/22/24  0449 06/22/24  0321   POCT PH, ARTERIAL pH  --  7.50* 7.44*  --  7.53*   POCT PO2, ARTERIAL mm Hg  --  112* 308*  --  300*   POCT PCO2, ARTERIAL mm Hg  --  35* 40  --  29*   FIO2 % 44 50 60   < > 100    < > = values in this " interval not displayed.     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 Can hear airleak. RIJ line not currently oozing.  CV: normal rate, irregular rhythm, no murmurs noted  Pulm: CTAB anteriorly, room air  GI: abd soft, non-tender, bowel sounds present  Ext: She has equal sensation in bilateral thighs but states she has numbness in lower right leg. Significant tenderness to palpation bilaterally. Not able to check pulses before patient moved to OR. Right foot cool and numb. Tender left upper extremity with bruising able to move right upper extremity spontaneously. Right groin access site covered.  Neuro: alert and conversant however only intermittently answering questions appropriately, +expressive aphasia so will initially say no pain, endorsing pain in bilateral legs.     Medications    amiodarone, 200 mg, oral, Daily  aspirin, 81 mg, oral, Daily  bisacodyl, 10 mg, rectal, Daily  calcium gluconate, 2 g, intravenous, Once  insulin lispro, 0-5 Units, subcutaneous, q4h  melatonin, 6 mg, oral, Nightly  oxygen, , inhalation, Continuous - Inhalation  pantoprazole, 40 mg, oral, Daily before breakfast  perflutren protein A microsphere, 0.5 mL, intravenous, Once in imaging  piperacillin-tazobactam, 3.375 g, intravenous, q6h  polyethylene glycol, 17 g, oral, BID  sennosides, 2 tablet, oral, BID  sulfur hexafluoride microsphr, 2 mL, intravenous, Once in imaging  vancomycin, 1,000 mg, intravenous, q12h        heparin, 0-4,500 Units/hr  nitroprusside, 0.25-5 mcg/kg/min, Last Rate: 1 mcg/kg/min (06/28/24 1046)        PRN medications: cyclobenzaprine, dextrose, dextrose,  glucagon, glucagon, heparin, HYDROmorphone, HYDROmorphone, lidocaine, vancomycin           Assessment/Plan   Amirah Bennett is a 42 y.o. 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 RHC c/w cardiogenic shock with CI of 1.6, CVP of 25. She is not candidate for advanced therapies given documented medical nonadherence. Managing medically. Attempted to wean off dobutamine while increasing nitroprusside so stopped dobutamine 6/21 however acutely worsened with increased lactate and worsened SvO2 from 65 to 25 overnight with new abdominal pain and right leg pain concerning for ischemia. Improving parameters, normalized lactate, and pain resolution when dobutamine restarted. Course also c/b SARAH, cardiac thrombi, liver injury likely due to ischemia, and Afib with RVR. Found to have rhabdomyolysis with CK of 14,000, unable to give fluids due to cardiogenic shock. Arterial doppler of lower extremities with echogenic material within right distal femoral and popliteal arteries with absent Doppler flow in the right popliteal artery with significantly diminished flow within the right distal superficial femoral artery significantly diminished flow within the right distal superficial femoral artery, findings raise concern for thrombosis of distal SFA and popliteal artery. Decreased flow within right posterior tibial and peroneal arteries could be through collateralization. Additionally decreased flow in right common  femoral artery, right deep femoral artery as well as proximal and mid superficial femoral arteries concerning for stenosis proximal to right common femoral artery possible within right external iliac right common iliac artery. CTA Aorta with runoff 6/24: aortic bifurcation embolus, R BA-EIA embolus, SFA and popliteal emboli. Vascular surgery recommending right AKA. Bilateral LE duplex with likely early nonocclusive deep venous thrombosis. IVC filter placed 6/27 on recommendation by vascular medicine in preparation for right AKA 6/28. IVC filter should be removed within 3 months. Ethics involved given lack of documentation of POA and limited family (NOK are cousins). Palliative care consulted.    Updates 6/28:  -Hgb goal >8  -s/p AKA today, restart heparin drip at 5PM  -repeat swan then start entresto 24-26 to wean off nitro  -family (Clara) called and updated, he will pass on update to rest of family and states he also speaks to Navin Sanches regularly. I also called Navin Sanches as he called in for update. Navin is upset that we did not first trial using iron to collect the blood clots from her legs prior to surgery. I attempted to explain that this is not an evidence based procedure and given the amount of tissue death in her leg, would not have changed her course significantly since there is a risk of infection.   -Vascular surgery will remove initial dressing on AKA POD#3      NEUROLOGIC  #AMS, resolved at her baseline  #ischemic stroke L MCA d/t AFib LLA thrombus seen on echo (lack of OAC adherence) s/p thrombectomy 01/2022 @ [facility] w/ residual expressive aphasia and R sided weakness   ::Neurology consulted 6/25, no new deficits concerning for cerebral event  -no acute changes     #Depression?  #Insomnia  ::Patient's friend states that she doesn't care for herself due to being depressed  -consider psychiatry consult, deferring for now  -told to stop quetiapine 50mg at last discharge  -c/w melatonin 6mg at  bedtime     CARDIOVASCULAR  #Cardiogenic shock  #HFrEF (LVEF 20-25%)  ::Not candidate for advanced therapies due to medical nonadherence  -nitroprusside gtt  -diuresis as needed  -palliative care consulted given patient not candidate for advanced therapies, appreciate recommendations  -monitor swan numbers q6h  -holding GDMT given pressures, unclear what patient was actively taking however was previously prescribed entresto 24-26, spironolactone 25mg, metoprolol succinate 50mg daily, lasix 40mg, jardiance 10mg-- once out of window after surgery would restart entresto first     #Right limb ischemia  #Arterial emboli  #S/p femoral arterial line sheath placement now removed  ::::CTA Aorta with runoff 6/24: aortic bifurcation embolus, R BA-EIA embolus, SFA and popliteal emboli   ::arterial duplex exam with monophasic right CFA, SFA and PFA signals, absent popliteal flow and monophasic flow in the tibial artery   ::Unable to walk prior to presentation to hospital  ::Right leg has been cold and painful in times where cardiogenic shock has worsened then warm and not painful to touch when out of shock state, fluctuating exam  -heparin drip per above  -vascular surgery consulted, pending CTA aorta and bilateral iliofemoral runoff   -plan for AKA with vascular surgery, 6/28/2024  -pain medication: dilaudid 0.4mg q4 severe (breakthrough 0.2mg q4) and cyclobenzaprine     #Cardiac thrombi  #Right lower extremity DVT  ::Likely in setting of Xarelto nonadherence  ::There are indeterminate low-attenuation nodular filling defects within the atrial appendage. While this may represent contrast under filling, a right atrial appendage thrombus can not be excluded.  ::Rt US duplex with likely early nonocclusive deep venous thrombosis.   -c/w heparin gtt     #Afib on Xarelto w/ DCCV 05/2023), ischemic stroke L MCA d/t AFib LLA thrombus seen on echo (lack of OAC adherence) s/p thrombectomy 01/2022 @ [facility] w/ residual expressive aphasia and  R sided weakness   ::Episode of RVR in ED  ::Previously prescribed digoxin 250mcg however last fill was 12/2023  ::TSH 6.28H, free T4 1.48  -c/w amiodarone 200mg daily  -hold home digoxin 250mcg  -c/w AC per above (holding home Xarelto 20mg daily in evening)  -c/w aspirin 81mg  -stopped atorvastatin 80 due to liver injury     #Elevated troponin  ::95 > 289 > 313 > 326 > 289  -stop trending     #HTN  -holding home medications     PULMONARY  #Dyspnea  #Mild pulmonary edema  ::No evidence of PE  -diuresis per above     #Hx trach c/b trancutaneous fistula  ::ENT had been consulted 3/2024 for gurgling and mucus drainage, no inpatient interventions required  -monitor for breaths/mucus discharge from trach site  -Cover the tracheocutaneous fistula with tape and gauze and encourage patient to apply pressure when voicing.   -per prior ENT note on last admission   -follow up outpatient ENT for closure (Dr. King)     RENAL/  #Metabolic acidosis, resolved  ::Likely in setting of shock     #Rhabdomyolysis   -Ck peaked > 14,000, trending down  -unable to give fluids given cardiogenic shock     #SARAH  ::Likely prerenal vs ATN in setting of shock  ::FeUrea suggests intrinsic  -U/A with 3+ blood and no RBCs. Elevated CK as above.  -avoid nephrotoxic medications     #Elevated lactate, improved     GASTROINTESTINAL  #Elevated Tbili (3.2 on admission, from 0.5 in 03/2024)  #Elevated LFTs  ::Likely ischemic liver injury  -trend CMP     #GERD  -c/w pantoprazole 40mg daily     ENDOCRINE  #T2DM, diet controlled  ::HgbA1c 3/2024 5.5  -hypoglycemia protocol and mild SSI     HEME/ONC  #Cardiac thrombi  -see cardiac section for plan     #Arterial thrombosis  #DVT  ::s/p IVC filter 6/27  -heparin drip per above  -further plan per above     #Anemia  -Hgb goal >8  given cardiac hx     #Thrombocytopenia  ::Oozing of central line, Multiple thrombi, DIC score 5 on 6/24/2024  ::Hit score of 6  ::Negative PF4  -vascular medicine consulted  "  -monitor DIC labs daily     #Left brachial injury  ::Likely injury  :LUE DVT scan with nonvascularized mass, called CT while patient was there to request CTA of left upper extremity and they said logistically they would be unable to perform both scans, and given protocol for max dosing of contrast, could not give further contrast until 6/25 at 2PM  -no CTA LUE needed      INFECTIOUS DISEASE  #Concern for infection  ::Procal 0.28  ::CXR without signs of PNA  ::s/p vanc/zosyn (6/20-6/24) (6/27- )  ::UA with 1+ blood, 1+ bacteria, no WBC, neg nitrite and leuk esterase  -pending blood cultures x1 6/20, x2 6/22, NGTD   -restarted vanc/zosyn 6/27 due to elevated leukocytosis and purulent appearing right groin site  -pending blood cultures 6/27  -pending right groin wound culture 6/27     Dispo:  -PT: Moderate intensity level of care  -patient would like enrollment in  home delivery service for medications (she has trouble getting to preferred pharmacy), pharmacy updated to [facility]  -patient's family would like her enrolled in [facility]  -repeat thyroid labs outpatient  -ensure IVC filter removal in 3 months     N: cardiac  A: [place], pIVs, [place] radial  DVT ppx: heparin drip  GI ppx: pantoprazole 40mg     Code Status: FULL CODE per patient and NOKs  *Note that patient LACKS capacity due to inability to express decision and inconsistency in decisions     Surrogate Medical Decision-maker:   Surrogate decision maker is: pt's cousin, Clara Wayne: [phone], Efrain Black: [phone], Keli Osborne: [phone]      Friends who may be helpful in making decisions:  Prior boyfriend Navin Sanches [phone]  Very good friend \"Isiah\" Pranay Owen [phone]    Kristine Pat MD  Internal Medicine, PGY-2   "

## 2024-06-29 ENCOUNTER — APPOINTMENT (OUTPATIENT)
Dept: CARDIOLOGY | Facility: HOSPITAL | Age: 44
DRG: 239 | End: 2024-06-29
Payer: COMMERCIAL

## 2024-06-29 LAB
ALBUMIN SERPL BCP-MCNC: 2.3 G/DL (ref 3.4–5)
ALBUMIN SERPL BCP-MCNC: 2.5 G/DL (ref 3.4–5)
ALP SERPL-CCNC: 75 U/L (ref 33–110)
ALT SERPL W P-5'-P-CCNC: 176 U/L (ref 7–45)
ANION GAP BLDMV CALCULATED.4IONS-SCNC: 7 MMO/L (ref 10–25)
ANION GAP BLDMV CALCULATED.4IONS-SCNC: 7 MMO/L (ref 10–25)
ANION GAP BLDMV CALCULATED.4IONS-SCNC: 9 MMO/L (ref 10–25)
ANION GAP SERPL CALC-SCNC: 12 MMOL/L (ref 10–20)
ANION GAP SERPL CALC-SCNC: 15 MMOL/L (ref 10–20)
APPEARANCE UR: CLEAR
APTT PPP: 128 SECONDS (ref 27–38)
AST SERPL W P-5'-P-CCNC: 90 U/L (ref 9–39)
ATRIAL RATE: 98 BPM
BACTERIA SPEC CULT: NORMAL
BASE EXCESS BLDMV CALC-SCNC: -0.7 MMOL/L (ref -2–3)
BASE EXCESS BLDMV CALC-SCNC: 2.9 MMOL/L (ref -2–3)
BASE EXCESS BLDMV CALC-SCNC: 3.6 MMOL/L (ref -2–3)
BASOPHILS # BLD AUTO: 0.04 X10*3/UL (ref 0–0.1)
BASOPHILS # BLD AUTO: 0.04 X10*3/UL (ref 0–0.1)
BASOPHILS # BLD AUTO: 0.06 X10*3/UL (ref 0–0.1)
BASOPHILS NFR BLD AUTO: 0.2 %
BILIRUB DIRECT SERPL-MCNC: 2.3 MG/DL (ref 0–0.3)
BILIRUB SERPL-MCNC: 4.4 MG/DL (ref 0–1.2)
BILIRUB UR STRIP.AUTO-MCNC: NEGATIVE MG/DL
BLOOD EXPIRATION DATE: NORMAL
BODY TEMPERATURE: 37 DEGREES CELSIUS
BUN SERPL-MCNC: 12 MG/DL (ref 6–23)
BUN SERPL-MCNC: 14 MG/DL (ref 6–23)
CA-I BLDMV-SCNC: 1.09 MMOL/L (ref 1.1–1.33)
CA-I BLDMV-SCNC: 1.12 MMOL/L (ref 1.1–1.33)
CA-I BLDMV-SCNC: 1.14 MMOL/L (ref 1.1–1.33)
CALCIUM SERPL-MCNC: 7.4 MG/DL (ref 8.6–10.6)
CALCIUM SERPL-MCNC: 7.5 MG/DL (ref 8.6–10.6)
CHLORIDE BLD-SCNC: 94 MMOL/L (ref 98–107)
CHLORIDE BLD-SCNC: 94 MMOL/L (ref 98–107)
CHLORIDE BLD-SCNC: 98 MMOL/L (ref 98–107)
CHLORIDE SERPL-SCNC: 92 MMOL/L (ref 98–107)
CHLORIDE SERPL-SCNC: 96 MMOL/L (ref 98–107)
CO2 SERPL-SCNC: 23 MMOL/L (ref 21–32)
CO2 SERPL-SCNC: 25 MMOL/L (ref 21–32)
COLOR UR: ABNORMAL
CREAT SERPL-MCNC: 0.75 MG/DL (ref 0.5–1.05)
CREAT SERPL-MCNC: 0.83 MG/DL (ref 0.5–1.05)
D DIMER PPP FEU-MCNC: 6276 NG/ML FEU
DISPENSE STATUS: NORMAL
EGFRCR SERPLBLD CKD-EPI 2021: 89 ML/MIN/1.73M*2
EGFRCR SERPLBLD CKD-EPI 2021: >90 ML/MIN/1.73M*2
EOSINOPHIL # BLD AUTO: 0.03 X10*3/UL (ref 0–0.7)
EOSINOPHIL # BLD AUTO: 0.05 X10*3/UL (ref 0–0.7)
EOSINOPHIL # BLD AUTO: 0.09 X10*3/UL (ref 0–0.7)
EOSINOPHIL NFR BLD AUTO: 0.1 %
EOSINOPHIL NFR BLD AUTO: 0.2 %
EOSINOPHIL NFR BLD AUTO: 0.3 %
ERYTHROCYTE [DISTWIDTH] IN BLOOD BY AUTOMATED COUNT: 17.2 % (ref 11.5–14.5)
ERYTHROCYTE [DISTWIDTH] IN BLOOD BY AUTOMATED COUNT: 17.2 % (ref 11.5–14.5)
ERYTHROCYTE [DISTWIDTH] IN BLOOD BY AUTOMATED COUNT: 17.4 % (ref 11.5–14.5)
ERYTHROCYTE [DISTWIDTH] IN BLOOD BY AUTOMATED COUNT: 17.6 % (ref 11.5–14.5)
FIBRINOGEN PPP-MCNC: 502 MG/DL (ref 200–400)
GLUCOSE BLD MANUAL STRIP-MCNC: 104 MG/DL (ref 74–99)
GLUCOSE BLD MANUAL STRIP-MCNC: 130 MG/DL (ref 74–99)
GLUCOSE BLD MANUAL STRIP-MCNC: 142 MG/DL (ref 74–99)
GLUCOSE BLD MANUAL STRIP-MCNC: 148 MG/DL (ref 74–99)
GLUCOSE BLD MANUAL STRIP-MCNC: 174 MG/DL (ref 74–99)
GLUCOSE BLD MANUAL STRIP-MCNC: 240 MG/DL (ref 74–99)
GLUCOSE BLD-MCNC: 121 MG/DL (ref 74–99)
GLUCOSE BLD-MCNC: 125 MG/DL (ref 74–99)
GLUCOSE BLD-MCNC: 237 MG/DL (ref 74–99)
GLUCOSE SERPL-MCNC: 109 MG/DL (ref 74–99)
GLUCOSE SERPL-MCNC: 113 MG/DL (ref 74–99)
GLUCOSE UR STRIP.AUTO-MCNC: ABNORMAL MG/DL
GRAM STN SPEC: NORMAL
GRAM STN SPEC: NORMAL
HCO3 BLDMV-SCNC: 22.9 MMOL/L (ref 22–26)
HCO3 BLDMV-SCNC: 27.1 MMOL/L (ref 22–26)
HCO3 BLDMV-SCNC: 27.7 MMOL/L (ref 22–26)
HCT VFR BLD AUTO: 20.7 % (ref 36–46)
HCT VFR BLD AUTO: 21.2 % (ref 36–46)
HCT VFR BLD AUTO: 21.6 % (ref 36–46)
HCT VFR BLD AUTO: 24.6 % (ref 36–46)
HCT VFR BLD EST: 23 % (ref 36–46)
HCT VFR BLD EST: 24 % (ref 36–46)
HCT VFR BLD EST: 28 % (ref 36–46)
HGB BLD-MCNC: 7.2 G/DL (ref 12–16)
HGB BLD-MCNC: 7.3 G/DL (ref 12–16)
HGB BLD-MCNC: 7.4 G/DL (ref 12–16)
HGB BLD-MCNC: 8.5 G/DL (ref 12–16)
HGB BLDMV-MCNC: 7.7 G/DL (ref 12–16)
HGB BLDMV-MCNC: 8 G/DL (ref 12–16)
HGB BLDMV-MCNC: 9.4 G/DL (ref 12–16)
IMM GRANULOCYTES # BLD AUTO: 0.56 X10*3/UL (ref 0–0.7)
IMM GRANULOCYTES # BLD AUTO: 0.62 X10*3/UL (ref 0–0.7)
IMM GRANULOCYTES # BLD AUTO: 1.01 X10*3/UL (ref 0–0.7)
IMM GRANULOCYTES NFR BLD AUTO: 2.4 % (ref 0–0.9)
IMM GRANULOCYTES NFR BLD AUTO: 2.4 % (ref 0–0.9)
IMM GRANULOCYTES NFR BLD AUTO: 3.2 % (ref 0–0.9)
INHALED O2 CONCENTRATION: 21 %
INR PPP: 1.3 (ref 0.9–1.1)
KETONES UR STRIP.AUTO-MCNC: NEGATIVE MG/DL
LACTATE BLDMV-SCNC: 1.1 MMOL/L (ref 0.4–2)
LACTATE BLDMV-SCNC: 1.1 MMOL/L (ref 0.4–2)
LACTATE BLDMV-SCNC: 2.4 MMOL/L (ref 0.4–2)
LEUKOCYTE ESTERASE UR QL STRIP.AUTO: NEGATIVE
LYMPHOCYTES # BLD AUTO: 3.89 X10*3/UL (ref 1.2–4.8)
LYMPHOCYTES # BLD AUTO: 4.23 X10*3/UL (ref 1.2–4.8)
LYMPHOCYTES # BLD AUTO: 5.48 X10*3/UL (ref 1.2–4.8)
LYMPHOCYTES NFR BLD AUTO: 16.1 %
LYMPHOCYTES NFR BLD AUTO: 16.5 %
LYMPHOCYTES NFR BLD AUTO: 17.6 %
MAGNESIUM SERPL-MCNC: 2.01 MG/DL (ref 1.6–2.4)
MAGNESIUM SERPL-MCNC: 2.44 MG/DL (ref 1.6–2.4)
MCH RBC QN AUTO: 28.6 PG (ref 26–34)
MCH RBC QN AUTO: 29.1 PG (ref 26–34)
MCH RBC QN AUTO: 29.1 PG (ref 26–34)
MCH RBC QN AUTO: 29.5 PG (ref 26–34)
MCHC RBC AUTO-ENTMCNC: 34 G/DL (ref 32–36)
MCHC RBC AUTO-ENTMCNC: 34.3 G/DL (ref 32–36)
MCHC RBC AUTO-ENTMCNC: 34.6 G/DL (ref 32–36)
MCHC RBC AUTO-ENTMCNC: 35.3 G/DL (ref 32–36)
MCV RBC AUTO: 83 FL (ref 80–100)
MCV RBC AUTO: 83 FL (ref 80–100)
MCV RBC AUTO: 85 FL (ref 80–100)
MCV RBC AUTO: 86 FL (ref 80–100)
MONOCYTES # BLD AUTO: 1.84 X10*3/UL (ref 0.1–1)
MONOCYTES # BLD AUTO: 1.93 X10*3/UL (ref 0.1–1)
MONOCYTES # BLD AUTO: 3.04 X10*3/UL (ref 0.1–1)
MONOCYTES NFR BLD AUTO: 7 %
MONOCYTES NFR BLD AUTO: 8.2 %
MONOCYTES NFR BLD AUTO: 9.8 %
NEUTROPHILS # BLD AUTO: 17.07 X10*3/UL (ref 1.2–7.7)
NEUTROPHILS # BLD AUTO: 19.51 X10*3/UL (ref 1.2–7.7)
NEUTROPHILS # BLD AUTO: 21.4 X10*3/UL (ref 1.2–7.7)
NEUTROPHILS NFR BLD AUTO: 68.9 %
NEUTROPHILS NFR BLD AUTO: 72.5 %
NEUTROPHILS NFR BLD AUTO: 74.2 %
NITRITE UR QL STRIP.AUTO: NEGATIVE
NRBC BLD-RTO: 0.5 /100 WBCS (ref 0–0)
NRBC BLD-RTO: 0.6 /100 WBCS (ref 0–0)
NRBC BLD-RTO: 1 /100 WBCS (ref 0–0)
NRBC BLD-RTO: 1.1 /100 WBCS (ref 0–0)
OXYHGB MFR BLDMV: 56.7 % (ref 45–75)
OXYHGB MFR BLDMV: 65 % (ref 45–75)
OXYHGB MFR BLDMV: 68.4 % (ref 45–75)
P AXIS: 14 DEGREES
P OFFSET: 198 MS
P ONSET: 138 MS
PCO2 BLDMV: 33 MM HG (ref 41–51)
PCO2 BLDMV: 39 MM HG (ref 41–51)
PCO2 BLDMV: 39 MM HG (ref 41–51)
PH BLDMV: 7.45 PH (ref 7.33–7.43)
PH BLDMV: 7.45 PH (ref 7.33–7.43)
PH BLDMV: 7.46 PH (ref 7.33–7.43)
PH UR STRIP.AUTO: 7 [PH]
PHOSPHATE SERPL-MCNC: 3.1 MG/DL (ref 2.5–4.9)
PHOSPHATE SERPL-MCNC: 3.5 MG/DL (ref 2.5–4.9)
PLATELET # BLD AUTO: 109 X10*3/UL (ref 150–450)
PLATELET # BLD AUTO: 124 X10*3/UL (ref 150–450)
PLATELET # BLD AUTO: 126 X10*3/UL (ref 150–450)
PLATELET # BLD AUTO: 133 X10*3/UL (ref 150–450)
PO2 BLDMV: 38 MM HG (ref 35–45)
PO2 BLDMV: 43 MM HG (ref 35–45)
PO2 BLDMV: 44 MM HG (ref 35–45)
POTASSIUM BLDMV-SCNC: 4.1 MMOL/L (ref 3.5–5.3)
POTASSIUM BLDMV-SCNC: 4.1 MMOL/L (ref 3.5–5.3)
POTASSIUM BLDMV-SCNC: 4.5 MMOL/L (ref 3.5–5.3)
POTASSIUM SERPL-SCNC: 3.6 MMOL/L (ref 3.5–5.3)
POTASSIUM SERPL-SCNC: 4.3 MMOL/L (ref 3.5–5.3)
PR INTERVAL: 154 MS
PRODUCT BLOOD TYPE: 7300
PRODUCT CODE: NORMAL
PROT SERPL-MCNC: 4.8 G/DL (ref 6.4–8.2)
PROT UR STRIP.AUTO-MCNC: NEGATIVE MG/DL
PROTHROMBIN TIME: 14.4 SECONDS (ref 9.8–12.8)
Q ONSET: 215 MS
QRS COUNT: 16 BEATS
QRS DURATION: 98 MS
QT INTERVAL: 356 MS
QTC CALCULATION(BAZETT): 454 MS
QTC FREDERICIA: 419 MS
R AXIS: 46 DEGREES
RBC # BLD AUTO: 2.47 X10*6/UL (ref 4–5.2)
RBC # BLD AUTO: 2.51 X10*6/UL (ref 4–5.2)
RBC # BLD AUTO: 2.59 X10*6/UL (ref 4–5.2)
RBC # BLD AUTO: 2.88 X10*6/UL (ref 4–5.2)
RBC # UR STRIP.AUTO: ABNORMAL /UL
RBC #/AREA URNS AUTO: NORMAL /HPF
SAO2 % BLDMV: 59 % (ref 45–75)
SAO2 % BLDMV: 68 % (ref 45–75)
SAO2 % BLDMV: 71 % (ref 45–75)
SODIUM BLDMV-SCNC: 124 MMOL/L (ref 136–145)
SODIUM BLDMV-SCNC: 124 MMOL/L (ref 136–145)
SODIUM BLDMV-SCNC: 126 MMOL/L (ref 136–145)
SODIUM SERPL-SCNC: 125 MMOL/L (ref 136–145)
SODIUM SERPL-SCNC: 130 MMOL/L (ref 136–145)
SP GR UR STRIP.AUTO: 1.02
SQUAMOUS #/AREA URNS AUTO: NORMAL /HPF
T AXIS: 203 DEGREES
T OFFSET: 393 MS
UFH PPP CHRO-ACNC: 0.4 IU/ML
UFH PPP CHRO-ACNC: 0.4 IU/ML
UNIT ABO: NORMAL
UNIT NUMBER: NORMAL
UNIT RH: NORMAL
UNIT VOLUME: 350
UROBILINOGEN UR STRIP.AUTO-MCNC: ABNORMAL MG/DL
VENTRICULAR RATE: 98 BPM
WBC # BLD AUTO: 19.8 X10*3/UL (ref 4.4–11.3)
WBC # BLD AUTO: 23.5 X10*3/UL (ref 4.4–11.3)
WBC # BLD AUTO: 26.3 X10*3/UL (ref 4.4–11.3)
WBC # BLD AUTO: 31.1 X10*3/UL (ref 4.4–11.3)
WBC #/AREA URNS AUTO: NORMAL /HPF
XM INTEP: NORMAL

## 2024-06-29 PROCEDURE — 2500000002 HC RX 250 W HCPCS SELF ADMINISTERED DRUGS (ALT 637 FOR MEDICARE OP, ALT 636 FOR OP/ED)

## 2024-06-29 PROCEDURE — 84132 ASSAY OF SERUM POTASSIUM: CPT | Mod: 91

## 2024-06-29 PROCEDURE — 85520 HEPARIN ASSAY: CPT | Mod: 91

## 2024-06-29 PROCEDURE — 2500000005 HC RX 250 GENERAL PHARMACY W/O HCPCS

## 2024-06-29 PROCEDURE — 84100 ASSAY OF PHOSPHORUS: CPT

## 2024-06-29 PROCEDURE — P9016 RBC LEUKOCYTES REDUCED: HCPCS

## 2024-06-29 PROCEDURE — 93005 ELECTROCARDIOGRAM TRACING: CPT

## 2024-06-29 PROCEDURE — 2500000001 HC RX 250 WO HCPCS SELF ADMINISTERED DRUGS (ALT 637 FOR MEDICARE OP)

## 2024-06-29 PROCEDURE — 82947 ASSAY GLUCOSE BLOOD QUANT: CPT | Mod: 91

## 2024-06-29 PROCEDURE — 93010 ELECTROCARDIOGRAM REPORT: CPT | Performed by: INTERNAL MEDICINE

## 2024-06-29 PROCEDURE — 85520 HEPARIN ASSAY: CPT

## 2024-06-29 PROCEDURE — 80048 BASIC METABOLIC PNL TOTAL CA: CPT

## 2024-06-29 PROCEDURE — 85379 FIBRIN DEGRADATION QUANT: CPT

## 2024-06-29 PROCEDURE — 85025 COMPLETE CBC W/AUTO DIFF WBC: CPT

## 2024-06-29 PROCEDURE — 85025 COMPLETE CBC W/AUTO DIFF WBC: CPT | Mod: 91

## 2024-06-29 PROCEDURE — 85027 COMPLETE CBC AUTOMATED: CPT | Mod: 91

## 2024-06-29 PROCEDURE — 99291 CRITICAL CARE FIRST HOUR: CPT | Performed by: INTERNAL MEDICINE

## 2024-06-29 PROCEDURE — 85384 FIBRINOGEN ACTIVITY: CPT

## 2024-06-29 PROCEDURE — 82436 ASSAY OF URINE CHLORIDE: CPT

## 2024-06-29 PROCEDURE — 85027 COMPLETE CBC AUTOMATED: CPT

## 2024-06-29 PROCEDURE — 99233 SBSQ HOSP IP/OBS HIGH 50: CPT | Performed by: INTERNAL MEDICINE

## 2024-06-29 PROCEDURE — 82248 BILIRUBIN DIRECT: CPT

## 2024-06-29 PROCEDURE — 81001 URINALYSIS AUTO W/SCOPE: CPT

## 2024-06-29 PROCEDURE — 37799 UNLISTED PX VASCULAR SURGERY: CPT

## 2024-06-29 PROCEDURE — 83735 ASSAY OF MAGNESIUM: CPT

## 2024-06-29 PROCEDURE — 2500000004 HC RX 250 GENERAL PHARMACY W/ HCPCS (ALT 636 FOR OP/ED)

## 2024-06-29 PROCEDURE — 85610 PROTHROMBIN TIME: CPT

## 2024-06-29 PROCEDURE — 85007 BL SMEAR W/DIFF WBC COUNT: CPT

## 2024-06-29 PROCEDURE — 2020000001 HC ICU ROOM DAILY

## 2024-06-29 PROCEDURE — 83735 ASSAY OF MAGNESIUM: CPT | Mod: 91

## 2024-06-29 PROCEDURE — 36430 TRANSFUSION BLD/BLD COMPNT: CPT

## 2024-06-29 RX ORDER — HYDROMORPHONE HYDROCHLORIDE 1 MG/ML
0.2 INJECTION, SOLUTION INTRAMUSCULAR; INTRAVENOUS; SUBCUTANEOUS EVERY 4 HOURS PRN
Status: DISCONTINUED | OUTPATIENT
Start: 2024-06-29 | End: 2024-06-30

## 2024-06-29 RX ORDER — TRAMADOL HYDROCHLORIDE 50 MG/1
50 TABLET ORAL EVERY 6 HOURS PRN
Status: DISCONTINUED | OUTPATIENT
Start: 2024-06-29 | End: 2024-06-30

## 2024-06-29 RX ORDER — POTASSIUM CHLORIDE 14.9 MG/ML
20 INJECTION INTRAVENOUS
Status: COMPLETED | OUTPATIENT
Start: 2024-06-29 | End: 2024-06-29

## 2024-06-29 RX ORDER — TRAMADOL HYDROCHLORIDE 50 MG/1
50 TABLET ORAL EVERY 6 HOURS PRN
Status: DISCONTINUED | OUTPATIENT
Start: 2024-06-29 | End: 2024-06-29

## 2024-06-29 RX ORDER — ALBUMIN HUMAN 50 G/1000ML
25 SOLUTION INTRAVENOUS ONCE
Status: DISCONTINUED | OUTPATIENT
Start: 2024-06-29 | End: 2024-06-29

## 2024-06-29 RX ADMIN — AMIODARONE HYDROCHLORIDE 200 MG: 200 TABLET ORAL at 08:39

## 2024-06-29 RX ADMIN — SENNOSIDES 17.2 MG: 8.6 TABLET, FILM COATED ORAL at 08:39

## 2024-06-29 RX ADMIN — INSULIN LISPRO 1 UNITS: 100 INJECTION, SOLUTION INTRAVENOUS; SUBCUTANEOUS at 20:08

## 2024-06-29 RX ADMIN — PIPERACILLIN SODIUM AND TAZOBACTAM SODIUM 3.38 G: 3; .375 INJECTION, SOLUTION INTRAVENOUS at 08:43

## 2024-06-29 RX ADMIN — HYDROMORPHONE HYDROCHLORIDE 0.4 MG: 1 INJECTION, SOLUTION INTRAMUSCULAR; INTRAVENOUS; SUBCUTANEOUS at 04:49

## 2024-06-29 RX ADMIN — VANCOMYCIN HYDROCHLORIDE 1000 MG: 1 INJECTION, SOLUTION INTRAVENOUS at 18:14

## 2024-06-29 RX ADMIN — Medication 1 L/MIN: at 08:00

## 2024-06-29 RX ADMIN — POTASSIUM CHLORIDE 20 MEQ: 14.9 INJECTION, SOLUTION INTRAVENOUS at 10:49

## 2024-06-29 RX ADMIN — PIPERACILLIN SODIUM AND TAZOBACTAM SODIUM 3.38 G: 3; .375 INJECTION, SOLUTION INTRAVENOUS at 22:18

## 2024-06-29 RX ADMIN — Medication 6 MG: at 20:01

## 2024-06-29 RX ADMIN — POLYETHYLENE GLYCOL 3350 17 G: 17 POWDER, FOR SOLUTION ORAL at 08:39

## 2024-06-29 RX ADMIN — TRAMADOL HYDROCHLORIDE 50 MG: 50 TABLET, COATED ORAL at 19:47

## 2024-06-29 RX ADMIN — HEPARIN SODIUM 1400 UNITS/HR: 10000 INJECTION, SOLUTION INTRAVENOUS at 10:22

## 2024-06-29 RX ADMIN — INSULIN LISPRO 2 UNITS: 100 INJECTION, SOLUTION INTRAVENOUS; SUBCUTANEOUS at 13:23

## 2024-06-29 RX ADMIN — TRAMADOL HYDROCHLORIDE 50 MG: 50 TABLET, COATED ORAL at 11:52

## 2024-06-29 RX ADMIN — SODIUM CHLORIDE, POTASSIUM CHLORIDE, SODIUM LACTATE AND CALCIUM CHLORIDE 500 ML: 600; 310; 30; 20 INJECTION, SOLUTION INTRAVENOUS at 14:20

## 2024-06-29 RX ADMIN — PIPERACILLIN SODIUM AND TAZOBACTAM SODIUM 3.38 G: 3; .375 INJECTION, SOLUTION INTRAVENOUS at 04:16

## 2024-06-29 RX ADMIN — PIPERACILLIN SODIUM AND TAZOBACTAM SODIUM 3.38 G: 3; .375 INJECTION, SOLUTION INTRAVENOUS at 16:41

## 2024-06-29 RX ADMIN — EMPAGLIFLOZIN 10 MG: 10 TABLET, FILM COATED ORAL at 11:52

## 2024-06-29 RX ADMIN — SENNOSIDES 17.2 MG: 8.6 TABLET, FILM COATED ORAL at 20:01

## 2024-06-29 RX ADMIN — SACUBITRIL AND VALSARTAN 1 TABLET: 24; 26 TABLET, FILM COATED ORAL at 08:39

## 2024-06-29 RX ADMIN — POLYETHYLENE GLYCOL 3350 17 G: 17 POWDER, FOR SOLUTION ORAL at 20:01

## 2024-06-29 RX ADMIN — SACUBITRIL AND VALSARTAN 1 TABLET: 24; 26 TABLET, FILM COATED ORAL at 20:01

## 2024-06-29 RX ADMIN — ASPIRIN 81 MG CHEWABLE TABLET 81 MG: 81 TABLET CHEWABLE at 06:40

## 2024-06-29 RX ADMIN — VANCOMYCIN HYDROCHLORIDE 1000 MG: 1 INJECTION, SOLUTION INTRAVENOUS at 05:24

## 2024-06-29 RX ADMIN — HYDROMORPHONE HYDROCHLORIDE 0.4 MG: 1 INJECTION, SOLUTION INTRAMUSCULAR; INTRAVENOUS; SUBCUTANEOUS at 08:58

## 2024-06-29 RX ADMIN — POTASSIUM CHLORIDE 20 MEQ: 14.9 INJECTION, SOLUTION INTRAVENOUS at 08:40

## 2024-06-29 RX ADMIN — HYDROMORPHONE HYDROCHLORIDE 0.2 MG: 1 INJECTION, SOLUTION INTRAMUSCULAR; INTRAVENOUS; SUBCUTANEOUS at 16:41

## 2024-06-29 RX ADMIN — PANTOPRAZOLE SODIUM 40 MG: 40 TABLET, DELAYED RELEASE ORAL at 06:40

## 2024-06-29 ASSESSMENT — PAIN SCALES - GENERAL
PAINLEVEL_OUTOF10: 0 - NO PAIN
PAINLEVEL_OUTOF10: 5 - MODERATE PAIN
PAINLEVEL_OUTOF10: 9
PAINLEVEL_OUTOF10: 10 - WORST POSSIBLE PAIN

## 2024-06-29 ASSESSMENT — PAIN - FUNCTIONAL ASSESSMENT
PAIN_FUNCTIONAL_ASSESSMENT: 0-10

## 2024-06-29 ASSESSMENT — PAIN DESCRIPTION - LOCATION: LOCATION: LEG

## 2024-06-29 ASSESSMENT — PAIN DESCRIPTION - ORIENTATION: ORIENTATION: RIGHT

## 2024-06-29 NOTE — PROGRESS NOTES
"Vascular Surgery Daily Progress Note    Amirah Bennett is a 43 y.o. female on day 9 of admission presenting with Atrial fibrillation (Multi).    Subjective   No events overnight.   Underwent R AKA and iliofemoral thrombectomy 6/28. Exam stable from post op. Pain adequately controlled at this time.      Objective     Last Recorded Vitals  /56   Pulse (!) 111   Temp 36.2 °C (97.2 °F) (Temporal)   Resp 19   Ht 1.702 m (5' 7.01\")   Wt 80.3 kg (177 lb 0.5 oz)   SpO2 100%   BMI 27.72 kg/m²     Physical Exam:  Awake, alert  NCAT, MMM. Old tracheostomy site with dressing  RRR  Breathing comfortable  Abdomen soft, nontender  RLE s/p AKA, dressing minimal strikethrough   Right groin dressing remains in place  Palpable left femoral pulse  Nontender to palpation    Intake/Output last 3 Shifts:  I/O last 3 completed shifts:  In: 1469.9 (18.3 mL/kg) [I.V.:619.9 (7.7 mL/kg); Blood:350; IV Piggyback:500]  Out: 5230 (65.1 mL/kg) [Urine:4730 (1.6 mL/kg/hr); Blood:500]  Weight: 80.3 kg     Relevant Lab Results  Lab Review   Recent Labs     06/29/24  0304 06/28/24  1841 06/28/24  1113 06/28/24  0348 06/27/24  1625   * 132* 133* 131* 132*   K 3.6 3.6 4.0 4.1 3.5   BUN 12 12 12 14 13   CREATININE 0.75 0.76 0.86 0.95 0.93   EGFR >90 >90 85 75 77   MG 2.44* 1.50* 1.93 2.34 1.69     Recent Labs     06/29/24  0304 06/28/24  1841 06/28/24  1113 06/28/24  0348 06/27/24  1625 06/27/24  0544 06/26/24  1728 06/26/24  0434 06/25/24  1757 06/25/24  0505 03/08/24  2115 03/08/24  0134 03/26/22  1926 03/26/22  1446   ALBUMIN 2.3* 2.4* 2.7* 2.6* 2.6* 2.6*   < > 2.4*   < > 2.3*   < > 4.4   < > 3.5   ALKPHOS 75  --   --  78  --  77  --  71  --  69   < > 68   < > 90   *  --   --  254*  --  322*  --  411*  --  513*   < > 107*   < > 2,716*   AST 90*  --   --  194*  --  242*  --  332*  --  510*   < > 107*   < > 6,960*   BILITOT 4.4*  --   --  3.8*  --  3.9*  --  3.7*  --  3.2*   < > 2.3*   < > 3.6*   LIPASE  --   --   --   --   --   " --   --   --   --   --   --  16  --  80    < > = values in this interval not displayed.     Recent Labs     24  0304 24  0017 24  1113 24  0348 24  1625   WBC 23.5* 19.8* 24.9* 21.6* 20.2*   HGB 7.4* 7.3* 8.7* 7.6* 8.1*   HCT 21.6* 20.7* 27.7* 22.6* 23.6*   * 109* 116* 105* 115*   MCV 83 83 89 85 84     Recent Labs     24  0436 24  0304 24  2319 24  0348 24  0544 24  0434 24  2302 24  0505   INR  --  1.3*  --  1.3* 1.2* 1.5*  --  1.6*   HAUF 0.4  --  0.2  --  0.3 0.4 0.3 0.4   FIBRINOGEN  --  502*  --  498* 428* 420*  --  321     PTT - 2024:  3:04 AM  1.3   14.4 128     Recent Labs     03/10/24  0120 23  1843   CHOL 125 CANCELED   LDLF  --  CANCELED   HDL 17.6 CANCELED   TRIG 100 CANCELED     Lab Results   Component Value Date    HGBA1C 5.5 03/10/2024     Lab Results   Component Value Date    TSH 6.28 (H) 2024     CK 8550 (21216, 06944)    Imagin24  Venous duplex BUE  Right Upper Venous: The subclavian vein demonstrates a normal spontaneous and phasic flow.  Left Upper Venous: No evidence of acute deep vein thrombus visualized in the left upper extremity. Non-vascularized mass in left upper arm measuring 46.27 mm x 64.90 mm. Additional Findings; Non-vascularized mass.     24  Arterial duplex lower extremities  1. Echogenic material within the right distal femoral and popliteal  arteries with  absent Doppler flow in the right popliteal artery  significantly diminished flow within the right distal superficial  femoral artery. Findings raise concern for thrombosis of the distal  SFA and popliteal artery. Decreased flow within the right posterior  tibial and peroneal arteries could be through collateralization.  2. Decreased flow within the right common femoral artery, right deep  femoral artery as well as the proximal and mid superficial femoral  arteries, raising concern for stenosis proximal to the  right common  femoral artery, possibly within the right external iliac right common  iliac artery. A CTA of the lower extremities can be obtained for  further evaluation.  3. Unremarkable Doppler interrogation of the left lower extremity  arteries.  ** R CFA monophasic waveform 30cm/s  ** R SFA monophasic 10cc/s  ** R PFA monophasic 10cc/s  ** R popliteal absent  ** R PTA and peroneal monophasic     6/23/24  Venous duplex BLE  1. Partial compressibility of the right external iliac vein and the  right common femoral vein, which raises concern for early  nonocclusive deep venous thrombosis, although flow is seen on Doppler  images.  2. Otherwise, no sonographic evidence for deep vein thrombosis within  the remainder of the evaluated veins of the bilateral lower extremity.      6/24 CTA  RIGHT LOWER EXTREMITY:  1. Intraluminal filling defects in the aortoiliac bifurcation.  2. Nearly occlusive thrombus in the distal right common iliac artery  with nearly completely occlusive thrombus in the proximal right  external iliac artery with extension into the proximal right internal  iliac artery. Partially occlusive thrombus within the short-segment  of the proximal SFA and distal SFA as it exits the adductor hiatus  with complete occlusion of the popliteal artery and faint  reconstitution of flow into the infrapopliteal vasculature as  detailed above.      LEFT LOWER EXTREMITY:  1. Complete total occlusion of the P2 and P3 segments of the left  popliteal artery with the extension into the tibioperoneal trunk and  reconstitution of flow into the proximal posterior tibial, peroneal  and anterior tibial arteries. There is abrupt cutoff of the mid  anterior tibial artery with distal reconstitution. Faint  opacification of the peroneal artery and posterior artery throughout  their course. Additional findings are as detailed above.    Current medications:   Scheduled medications  amiodarone, 200 mg, oral, Daily  aspirin, 81 mg,  oral, Daily  bisacodyl, 10 mg, rectal, Daily  insulin lispro, 0-5 Units, subcutaneous, q4h  melatonin, 6 mg, oral, Nightly  oxygen, , inhalation, Continuous - Inhalation  pantoprazole, 40 mg, oral, Daily before breakfast  perflutren protein A microsphere, 0.5 mL, intravenous, Once in imaging  piperacillin-tazobactam, 3.375 g, intravenous, q6h  polyethylene glycol, 17 g, oral, BID  potassium chloride, 20 mEq, intravenous, q2h  sacubitriL-valsartan, 1 tablet, oral, BID  sennosides, 2 tablet, oral, BID  sulfur hexafluoride microsphr, 2 mL, intravenous, Once in imaging  vancomycin, 1,000 mg, intravenous, q12h      Continuous medications  heparin, 0-4,500 Units/hr, Last Rate: 1,400 Units/hr (06/29/24 0600)  nitroprusside, 0.25-5 mcg/kg/min, Last Rate: 0.5 mcg/kg/min (06/29/24 0600)      PRN medications  PRN medications: cyclobenzaprine, dextrose, dextrose, glucagon, glucagon, heparin, HYDROmorphone, HYDROmorphone, lidocaine, vancomycin    Assessment/Plan   Principal Problem:    Atrial fibrillation (Multi)  Active Problems:    Acute decompensated heart failure (Multi)    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 Admission exam on 6/20/24 documented inability to move the right lower extremity beyond the hip and with no sensation below the knee. No reported BLE signals throughout her course here. Notably she had an attempted but unsuccessful right  femoral artery catheterization followed by placement of 4Fr sheath in the cath lab. Arterial duplex with monophasic R CFA, SFA, and PFA signals, absent popliteal flow, and monophasic tibial flow though hard to visualize. On exam no right femoral pulse, monophasic PT signal at distal leg; no sensation below the knee and no movement at the ankle. History and physical concerning for acute limb ischemia present at admission (6/20/24) based on chart review and history. Unclear whether the right foot will be salvageable based on exam today. Likely with right iliac (inflow) disease based on exam, possible popliteal thromboembolism based on duplex. Also with possible left brachial arterial injury given likely hematoma on exam (radial waveform is appropriate). Right leg not salvageable, will need inflow procedure and R AKA given motor deficits.      CTA Aorta with BLE runoff shows: aortic bifurcation embolus, R BA-EIA embolus, SFA and popliteal emboli     6/25: stable vascular exam, stable motor/sensory exam, downtrending CK  6/26: No major changes. Stable exam. Patient and family in agreement with proceeding with planned surgery  6/28: R iliac and RLE open thromboembolectomy, followed by R AKA for [place] III ALI     - continue heparin gtt  - will remove AKA dressing post op day 3    Discussed with attending, Dr. Ruben Simon MD, PhD  Vascular Surgery, PGY2  [id number]

## 2024-06-29 NOTE — PROGRESS NOTES
"Subjective:  No specific complaints. Pain is controlled. No bleeding on the AC    Objective:     Physical Exam  /56   Pulse 96   Temp 36.5 °C (97.7 °F) (Temporal)   Resp 26   Ht 1.702 m (5' 7.01\")   Wt 80.3 kg (177 lb 0.5 oz)   BMI 27.72 kg/m²      OE:  Sitting in bed  Some expressive aphasia  TLC right  CV reg, tachy  Lungs CTA  No LLE edema, NT  No UE embolic stigmata  LUE significant edema and ecchymosis    Medications   Scheduled medications  amiodarone, 200 mg, oral, Daily  aspirin, 81 mg, oral, Daily  bisacodyl, 10 mg, rectal, Daily  empagliflozin, 10 mg, oral, Daily  insulin lispro, 0-5 Units, subcutaneous, q4h  lactated Ringer's, 500 mL, intravenous, Once  melatonin, 6 mg, oral, Nightly  oxygen, , inhalation, Continuous - Inhalation  pantoprazole, 40 mg, oral, Daily before breakfast  perflutren protein A microsphere, 0.5 mL, intravenous, Once in imaging  piperacillin-tazobactam, 3.375 g, intravenous, q6h  polyethylene glycol, 17 g, oral, BID  sacubitriL-valsartan, 1 tablet, oral, BID  sennosides, 2 tablet, oral, BID  sulfur hexafluoride microsphr, 2 mL, intravenous, Once in imaging  vancomycin, 1,000 mg, intravenous, q12h      Continuous medications  heparin, 0-4,500 Units/hr, Last Rate: 1,400 Units/hr (06/29/24 1251)  nitroprusside, 0.25-5 mcg/kg/min, Last Rate: Stopped (06/29/24 1245)      PRN medications  PRN medications: cyclobenzaprine, dextrose, dextrose, glucagon, glucagon, heparin, HYDROmorphone, lidocaine, traMADol, vancomycin     Lab Review   Results from last 7 days   Lab Units 06/29/24  0956 06/29/24  0304 06/29/24  0017   WBC AUTO x10*3/uL 26.3* 23.5* 19.8*   HEMOGLOBIN g/dL 8.5* 7.4* 7.3*   HEMATOCRIT % 24.6* 21.6* 20.7*   PLATELETS AUTO x10*3/uL 126* 124* 109*       Results from last 7 days   Lab Units 06/29/24  0304 06/28/24  1841 06/28/24  1113   SODIUM mmol/L 130* 132* 133*   POTASSIUM mmol/L 3.6 3.6 4.0   CHLORIDE mmol/L 96* 97* 98   CO2 mmol/L 23 26 25   BUN mg/dL 12 12 12 "   CREATININE mg/dL 0.75 0.76 0.86   GLUCOSE mg/dL 113* 101* 104*   CALCIUM mg/dL 7.4* 7.3* 7.6*       Results from last 7 days   Lab Units 06/29/24  0304 06/28/24  0348 06/27/24  0544   APTT seconds 128* 66* 87*   INR  1.3* 1.3* 1.2*       Results from last 7 days   Lab Units 06/29/24  0956 06/29/24  0436 06/28/24  2319   ANTI XA UNFRACTIONATED IU/mL 0.4 0.4 0.2       PTT - 6/29/2024:  3:04 AM  1.3   14.4 128     6/23/24  Arterial duplex lower extremities  1. Echogenic material within the right distal femoral and popliteal arteries with  absent Doppler flow in the right popliteal artery significantly diminished flow within the right distal superficial femoral artery. Findings raise concern for thrombosis of the distal SFA and popliteal artery. Decreased flow within the right posterior tibial and peroneal arteries could be through collateralization.  2. Decreased flow within the right common femoral artery, right deep femoral artery as well as the proximal and mid superficial femoral arteries, raising concern for stenosis proximal to the right common femoral artery, possibly within the right external iliac right common iliac artery. A CTA of the lower extremities can be obtained for further evaluation.  3. Unremarkable Doppler interrogation of the left lower extremity arteries.    6/23/24  Venous duplex BLE  1. Partial compressibility of the right external iliac vein and the right common femoral vein, which raises concern for early nonocclusive deep venous thrombosis, although flow is seen on Doppler images.  2. Otherwise, no sonographic evidence for deep vein thrombosis within the remainder of the evaluated veins of the bilateral lower extremity.     A/P:  42 yo lady admitted to [facility] [place] 6/20 - complicated PMH - HFrEF [EF 20%] LA thrombus, h/o cardioembolic stroke 2022/2024, trach, afib - by ED noted was out of meds x 2 weeks. Presenting with jadiel LE swelling - although patient reports to me severe pain.  In ED Rx UFH gtt  - cardioverted for afib RVR and rx digoxin/amiodarone. On admission noted to have cold extremities, worsening lactate. Started on dobutamine. Underwent RHC with RIJ SG placed. VS consulted 6/24 for acute on chronic limb ischemia - ?POA. Right leg not salvageable, will need inflow procedure and R AKA given motor deficits. Now s/p right AKA.    VM apparently consulted with c/f HIT 2/2 thrombocytopenia - PF4 returned normal. ?whether the thrombocytopenia was related to the ALI and tissue loss. UFH gtt resumed and remains on this now. IVC filter place 6/27.     From a VM standpoint -   Heparin assay is therapeutic  H/H stable; PLT slowly recovering  Liver injury recovering    Will need likely DOAC and improved compliance at DC.    VM following  Please page [phone] for any concerns    Marla Newsome MD

## 2024-06-29 NOTE — PROGRESS NOTES
"  MEDICINE INPATIENT PROGRESS NOTE    Amirah Bennett is a 43 y.o. female on hospital day 9    Subjective   OVN, nitroprusside weaned from 1 to 0.5, weaned off throughout the day  Transfused 1 unit pRBCs  Upon evaluation this AM, continues to endorse pain in legs and abdomen (though no pain on palpation).   Seems to be in bright spirits today.        Objective     Last Recorded Vitals  Blood pressure 102/56, pulse 93, temperature 35.9 °C (96.6 °F), temperature source Temporal, resp. rate 21, height 1.702 m (5' 7.01\"), weight 80.3 kg (177 lb 0.5 oz), SpO2 100%.    Intake/Output last 3 Shifts:  I/O last 3 completed shifts:  In: 1469.9 (18.3 mL/kg) [I.V.:619.9 (7.7 mL/kg); Blood:350; IV Piggyback:500]  Out: 5230 (65.1 mL/kg) [Urine:4730 (1.6 mL/kg/hr); Blood:500]  Weight: 80.3 kg     Relevant Results  Results from last 7 days   Lab Units 06/29/24  0956 06/29/24  0304 06/29/24  0017   WBC AUTO x10*3/uL 26.3* 23.5* 19.8*   HEMOGLOBIN g/dL 8.5* 7.4* 7.3*   HEMATOCRIT % 24.6* 21.6* 20.7*   PLATELETS AUTO x10*3/uL 126* 124* 109*     Results from last 7 days   Lab Units 06/29/24  0304 06/28/24  1841 06/28/24  1113   SODIUM mmol/L 130* 132* 133*   POTASSIUM mmol/L 3.6 3.6 4.0   CO2 mmol/L 23 26 25   ANION GAP mmol/L 15 13 14   BUN mg/dL 12 12 12   CREATININE mg/dL 0.75 0.76 0.86   GLUCOSE mg/dL 113* 101* 104*   EGFR mL/min/1.73m*2 >90 >90 85   MAGNESIUM mg/dL 2.44* 1.50* 1.93   PHOSPHORUS mg/dL 3.5 3.5 2.8      Results from last 7 days   Lab Units 06/29/24  0304 06/28/24  0348 06/27/24  0544   ALT U/L 176* 254* 322*   AST U/L 90* 194* 242*   ALK PHOS U/L 75 78 77      Results from last 7 days   Lab Units 06/29/24  0304 06/28/24  0348 06/27/24  0544   INR  1.3* 1.3* 1.2*     Results from last 7 days   Lab Units 06/29/24  1816 06/29/24  1216 06/28/24  2104 06/28/24  1113 06/28/24  1035 06/28/24  0836   POCT PH, ARTERIAL pH  --   --   --   --  7.50* 7.44*   POCT PO2, ARTERIAL mm Hg  --   --   --   --  112* 308*   POCT PCO2, " ARTERIAL mm Hg  --   --   --   --  35* 40   FIO2 % 21 21 21   < > 50 60    < > = values in this interval not displayed.     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 Can hear airleak. RIJ line not currently oozing.  CV: normal rate, irregular rhythm, no murmurs noted  Pulm: CTAB anteriorly, room air  GI: abd soft, non-tender, bowel sounds present  Ext: She has equal sensation in bilateral thighs but states she has numbness in lower right leg. Significant tenderness to palpation bilaterally. No DP in left. Has TP and popliteal in left. S/p Rt AKA. Tender left upper extremity with bruising able to move right upper extremity spontaneously. Right groin access site covered.  Neuro: alert and conversant however only intermittently answering questions appropriately, +expressive aphasia so will initially say no pain, endorsing pain in bilateral legs.     Medications    amiodarone, 200 mg, oral, Daily  aspirin, 81 mg, oral, Daily  bisacodyl, 10 mg, rectal, Daily  empagliflozin, 10 mg, oral, Daily  insulin lispro, 0-5 Units, subcutaneous, q4h  melatonin, 6 mg, oral, Nightly  oxygen, , inhalation, Continuous - Inhalation  pantoprazole, 40 mg, oral, Daily before breakfast  perflutren protein A microsphere, 0.5 mL, intravenous, Once in imaging  piperacillin-tazobactam, 3.375 g, intravenous, q6h  polyethylene glycol, 17 g, oral, BID  sacubitriL-valsartan, 1 tablet, oral, BID  sennosides, 2 tablet, oral, BID  sulfur hexafluoride microsphr, 2 mL, intravenous, Once in imaging  vancomycin, 1,000 mg, intravenous, q12h        heparin, 0-4,500 Units/hr, Last Rate: 1,400 Units/hr  (06/29/24 1251)  nitroprusside, 0.25-5 mcg/kg/min, Last Rate: Stopped (06/29/24 1245)        PRN medications: cyclobenzaprine, dextrose, dextrose, glucagon, glucagon, heparin, HYDROmorphone, lidocaine, traMADol, vancomycin           Assessment/Plan   Amirah Bennett is a 42 y.o. 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 RHC c/w cardiogenic shock with CI of 1.6, CVP of 25. She is not candidate for advanced therapies given documented medical nonadherence. Managing medically. Attempted to wean off dobutamine while increasing nitroprusside so stopped dobutamine 6/21 however acutely worsened with increased lactate and worsened SvO2 from 65 to 25 overnight with new abdominal pain and right leg pain concerning for ischemia. Improving parameters, normalized lactate, and pain resolution when dobutamine restarted. Course also c/b SARAH, cardiac thrombi, liver injury likely due to ischemia, and Afib with RVR. Found to have rhabdomyolysis with CK of 14,000, unable to give fluids due to cardiogenic shock. Arterial doppler of lower extremities with echogenic material within right distal femoral and popliteal arteries with absent Doppler flow in the right popliteal artery with significantly diminished flow within the right distal superficial femoral artery significantly diminished flow within the right distal superficial femoral artery, findings raise concern for thrombosis of distal SFA and popliteal artery.  Decreased flow within right posterior tibial and peroneal arteries could be through collateralization. Additionally decreased flow in right common femoral artery, right deep femoral artery as well as proximal and mid superficial femoral arteries concerning for stenosis proximal to right common femoral artery possible within right external iliac right common iliac artery. CTA Aorta with runoff 6/24: aortic bifurcation embolus, R BA-EIA embolus, SFA and popliteal emboli. Vascular surgery recommending right AKA. Bilateral LE duplex with likely early nonocclusive deep venous thrombosis. IVC filter placed 6/27 on recommendation by vascular medicine in preparation for right AKA 6/28. IVC filter should be removed within 3 months. AKA performed 6/28 without complications. Entresto started 6/29. Ethics involved given lack of documentation of POA and limited family (NOK are cousins). Palliative care consulted.    Updates 6/29:  -repeat CBC post transfusion shows appropriate incrementation  -started tramadol 50q6hr, continuing breakthrough dilaudid  -nitroprusside stopped due to low pressures  -had mild elevation in lactate likely 2/2 dehydration, given 500cc fluid today  -RUQ US ordered due to elevated bilirubin, LFTs downtrending  -family member Keli and friend Isiah updated today  -maintain entresto at current dose and started on jardiance 10mg  -pending urine lytes      NEUROLOGIC  #AMS, resolved at her baseline  #ischemic stroke L MCA d/t AFib LLA thrombus seen on echo (lack of OAC adherence) s/p thrombectomy 01/2022 @ [facility] w/ residual expressive aphasia and R sided weakness   ::Neurology consulted 6/25, no new deficits concerning for cerebral event  -no acute changes     #Depression?  #Insomnia  ::Patient's friend states that she doesn't care for herself due to being depressed  -consider psychiatry consult, deferring for now  -told to stop quetiapine 50mg at last discharge  -c/w melatonin 6mg at bedtime      CARDIOVASCULAR  #Cardiogenic shock  #HFrEF (LVEF 20-25%)  ::Not candidate for advanced therapies due to medical nonadherence  -entresto 24-26  -empagliflozin 10mg  -diuresis as needed  -palliative care consulted given patient not candidate for advanced therapies, appreciate recommendations  -monitor swan numbers q6h  -holding GDMT given pressures, unclear what patient was actively taking however was previously prescribed entresto 24-26, spironolactone 25mg, metoprolol succinate 50mg daily, lasix 40mg, jardiance 10mg\     #Right limb ischemia  #Arterial emboli  #S/p femoral arterial line sheath placement now removed  ::::CTA Aorta with runoff 6/24: aortic bifurcation embolus, R BA-EIA embolus, SFA and popliteal emboli   ::arterial duplex exam with monophasic right CFA, SFA and PFA signals, absent popliteal flow and monophasic flow in the tibial artery   ::Unable to walk prior to presentation to hospital  ::Right leg has been cold and painful in times where cardiogenic shock has worsened then warm and not painful to touch when out of shock state, fluctuating exam  -heparin drip per above  -vascular surgery consulted, pending CTA aorta and bilateral iliofemoral runoff   -plan for AKA with vascular surgery, 6/28/2024  -pain medication: tramadol 50mg q6h prn moderate to severe pain, (breakthrough dilaudid 0.2mg q4) and cyclobenzaprine     #Cardiac thrombi  #Right lower extremity DVT  ::Likely in setting of Xarelto nonadherence  ::There are indeterminate low-attenuation nodular filling defects within the atrial appendage. While this may represent contrast under filling, a right atrial appendage thrombus can not be excluded.  ::Rt US duplex with likely early nonocclusive deep venous thrombosis.   -c/w heparin gtt     #Afib on Xarelto w/ DCCV 05/2023), ischemic stroke L MCA d/t AFib LLA thrombus seen on echo (lack of OAC adherence) s/p thrombectomy 01/2022 @ [facility] w/ residual expressive aphasia and R sided weakness    ::Episode of RVR in ED  ::Previously prescribed digoxin 250mcg however last fill was 12/2023  ::TSH 6.28H, free T4 1.48  -c/w amiodarone 200mg daily  -hold home digoxin 250mcg  -c/w AC per above (holding home Xarelto 20mg daily in evening)  -c/w aspirin 81mg  -stopped atorvastatin 80 due to liver injury     #Elevated troponin  ::95 > 289 > 313 > 326 > 289  -stop trending     #HTN  -holding home medications     PULMONARY  #Dyspnea  #Mild pulmonary edema  ::No evidence of PE  -diuresis per above     #Hx trach c/b trancutaneous fistula  ::ENT had been consulted 3/2024 for gurgling and mucus drainage, no inpatient interventions required  -monitor for breaths/mucus discharge from trach site  -Cover the tracheocutaneous fistula with tape and gauze and encourage patient to apply pressure when voicing.   -per prior ENT note on last admission   -follow up outpatient ENT for closure (Dr. King)     RENAL/  #Hyponatremia  -monitor RFP and diurese per [place]  -pending urine lytes     #Rhabdomyolysis   -Ck peaked > 14,000, trending down  -unable to give fluids given cardiogenic shock    #Metabolic acidosis, resolved  ::Likely in setting of shock     #SARAH, resolved  ::Likely prerenal vs ATN in setting of shock  ::FeUrea suggests intrinsic  -U/A with 3+ blood and no RBCs. Elevated CK as above.  -avoid nephrotoxic medications     #Elevated lactate, improved     GASTROINTESTINAL  #Elevated Tbili (3.2 on admission, from 0.5 in 03/2024)  #Elevated LFTs, downtrending  ::Likely ischemic liver injury  -RUQ US ordered due to elevated bilirubin  -trend CMP     #GERD  -c/w pantoprazole 40mg daily     ENDOCRINE  #T2DM, diet controlled  ::HgbA1c 3/2024 5.5  -hypoglycemia protocol and mild SSI     HEME/ONC  #Cardiac thrombi  -see cardiac section for plan     #Arterial thrombosis  #DVT  ::s/p IVC filter 6/27  -heparin drip per above  -further plan per above     #Anemia  -Hgb goal >8  given cardiac hx     #Thrombocytopenia  ::Oozing of  "central line, Multiple thrombi, DIC score 5 on 6/24/2024  ::Hit score of 6  ::Negative PF4  -vascular medicine consulted   -monitor DIC labs daily     #Left brachial injury  ::Likely injury  :LUE DVT scan with nonvascularized mass, called CT while patient was there to request CTA of left upper extremity and they said logistically they would be unable to perform both scans, and given protocol for max dosing of contrast, could not give further contrast until 6/25 at 2PM  -no CTA LUE needed      INFECTIOUS DISEASE  #Concern for infection  ::Procal 0.28  ::CXR without signs of PNA  ::s/p vanc/zosyn (6/20-6/24) (6/27- )  ::UA with 1+ blood, 1+ bacteria, no WBC, neg nitrite and leuk esterase  ::6/27 right groin wound culture negative  -pending blood cultures x1 6/20, x2 6/22, NGTD   -restarted vanc/zosyn 6/27 due to elevated leukocytosis and purulent appearing right groin site  -pending blood cultures 6/27, NGTD     Dispo:  -PT: Moderate intensity level of care  -patient would like enrollment in  home delivery service for medications (she has trouble getting to preferred pharmacy), pharmacy updated to [facility]  -patient's family would like her enrolled in [place]  -repeat thyroid labs outpatient  -ensure IVC filter removal in 3 months!     N: cardiac  A: [place], Haily Parker Lt radial  DVT ppx: heparin drip  GI ppx: pantoprazole 40mg     Code Status: FULL CODE per patient and NOKs  *Note that patient LACKS capacity due to inability to express decision and inconsistency in decisions     Surrogate Medical Decision-maker:   Surrogate decision maker is: pt's cousinClara Wayne: [phone], Efrain Black: [phone], Keli Osborne: [phone]      Friends who may be helpful in making decisions:  Prior boyfriend Navin Sanches [phone]  Very good friend \"Isiah\" Pranay Owen [phone]    Kristine Pat MD  Internal Medicine, PGY-2   "

## 2024-06-29 NOTE — CARE PLAN
The patient's goals for the shift include  making sure the patients needs are met.    Problem: Skin  Goal: Decreased wound size/increased tissue granulation at next dressing change  Outcome: Progressing  Flowsheets (Taken 6/29/2024 1034)  Decreased wound size/increased tissue granulation at next dressing change: Protective dressings over bony prominences  Goal: Participates in plan/prevention/treatment measures  Outcome: Progressing  Flowsheets (Taken 6/29/2024 1034)  Participates in plan/prevention/treatment measures: Elevate heels  Goal: Prevent/manage excess moisture  Outcome: Progressing  Flowsheets (Taken 6/29/2024 1034)  Prevent/manage excess moisture:   Cleanse incontinence/protect with barrier cream   Moisturize dry skin  Goal: Prevent/minimize sheer/friction injuries  Outcome: Progressing  Flowsheets (Taken 6/29/2024 1034)  Prevent/minimize sheer/friction injuries:   Use pull sheet   Turn/reposition every 2 hours/use positioning/transfer devices  Goal: Promote/optimize nutrition  Outcome: Progressing  Flowsheets (Taken 6/29/2024 1034)  Promote/optimize nutrition: Monitor/record intake including meals  Goal: Promote skin healing  Outcome: Progressing  Flowsheets (Taken 6/29/2024 1034)  Promote skin healing:   Turn/reposition every 2 hours/use positioning/transfer devices   Protective dressings over bony prominences   Rotate device position/do not position patient on device     Problem: Pain - Adult  Goal: Verbalizes/displays adequate comfort level or baseline comfort level  Outcome: Progressing     Problem: Safety - Adult  Goal: Free from fall injury  Outcome: Progressing     Problem: Discharge Planning  Goal: Discharge to home or other facility with appropriate resources  Outcome: Progressing     Problem: Chronic Conditions and Co-morbidities  Goal: Patient's chronic conditions and co-morbidity symptoms are monitored and maintained or improved  Outcome: Progressing     Problem: Heart Failure  Goal: Improved  gas exchange this shift  Outcome: Progressing  Goal: Improved urinary output this shift  Outcome: Progressing  Goal: Reduction in peripheral edema within 24 hours  Outcome: Progressing  Goal: Report improvement of dyspnea/breathlessness this shift  Outcome: Progressing  Goal: Weight from fluid excess reduced over 2-3 days, then stabilize  Outcome: Progressing  Goal: Increase self care and/or family involvement in 24 hours  Outcome: Progressing     Problem: Diabetes  Goal: Achieve decreasing blood glucose levels by end of shift  Outcome: Progressing  Goal: Increase stability of blood glucose readings by end of shift  Outcome: Progressing  Goal: Decrease in ketones present in urine by end of shift  Outcome: Progressing  Goal: Maintain electrolyte levels within acceptable range throughout shift  Outcome: Progressing  Goal: Maintain glucose levels >70mg/dl to <250mg/dl throughout shift  Outcome: Progressing  Goal: No changes in neurological exam by end of shift  Outcome: Progressing  Goal: Learn about and adhere to nutrition recommendations by end of shift  Outcome: Progressing  Goal: Vital signs within normal range for age by end of shift  Outcome: Progressing  Goal: Increase self care and/or family involovement by end of shift  Outcome: Progressing  Goal: Receive DSME education by end of shift  Outcome: Progressing       The clinical goals for the shift include Patient will remain hemodynamically stable throughout shift.

## 2024-06-29 NOTE — PROGRESS NOTES
The Ethics Service has been following Ms. Bennett for assistance with surrogate decision-making.     Given that the appropriate surrogates have been identified and are available and willing to assist with medical decision-making, I will sign off.     Please re-consult should further ethical issues arise.

## 2024-06-30 ENCOUNTER — APPOINTMENT (OUTPATIENT)
Dept: RADIOLOGY | Facility: HOSPITAL | Age: 44
DRG: 239 | End: 2024-06-30
Payer: COMMERCIAL

## 2024-06-30 VITALS
DIASTOLIC BLOOD PRESSURE: 77 MMHG | OXYGEN SATURATION: 100 % | HEIGHT: 67 IN | HEART RATE: 90 BPM | SYSTOLIC BLOOD PRESSURE: 101 MMHG | BODY MASS INDEX: 27.92 KG/M2 | WEIGHT: 177.91 LBS | TEMPERATURE: 97.2 F | RESPIRATION RATE: 21 BRPM

## 2024-06-30 LAB
ACANTHOCYTES BLD QL SMEAR: ABNORMAL
ALBUMIN SERPL BCP-MCNC: 2.1 G/DL (ref 3.4–5)
ALBUMIN SERPL BCP-MCNC: 2.3 G/DL (ref 3.4–5)
ALP SERPL-CCNC: 62 U/L (ref 33–110)
ALT SERPL W P-5'-P-CCNC: 116 U/L (ref 7–45)
ANION GAP BLDMV CALCULATED.4IONS-SCNC: 6 MMO/L (ref 10–25)
ANION GAP BLDMV CALCULATED.4IONS-SCNC: 6 MMO/L (ref 10–25)
ANION GAP BLDMV CALCULATED.4IONS-SCNC: 7 MMO/L (ref 10–25)
ANION GAP BLDMV CALCULATED.4IONS-SCNC: 9 MMO/L (ref 10–25)
ANION GAP SERPL CALC-SCNC: 11 MMOL/L (ref 10–20)
ANION GAP SERPL CALC-SCNC: 13 MMOL/L (ref 10–20)
AST SERPL W P-5'-P-CCNC: 52 U/L (ref 9–39)
BACTERIA BLD CULT: NORMAL
BACTERIA BLD CULT: NORMAL
BASE EXCESS BLDMV CALC-SCNC: -1 MMOL/L (ref -2–3)
BASE EXCESS BLDMV CALC-SCNC: -1.3 MMOL/L (ref -2–3)
BASE EXCESS BLDMV CALC-SCNC: 1.1 MMOL/L (ref -2–3)
BASE EXCESS BLDMV CALC-SCNC: 2 MMOL/L (ref -2–3)
BASOPHILS # BLD AUTO: 0.06 X10*3/UL (ref 0–0.1)
BASOPHILS # BLD MANUAL: 0 X10*3/UL (ref 0–0.1)
BASOPHILS NFR BLD AUTO: 0.2 %
BASOPHILS NFR BLD MANUAL: 0 %
BILIRUB DIRECT SERPL-MCNC: 2.7 MG/DL (ref 0–0.3)
BILIRUB SERPL-MCNC: 4.2 MG/DL (ref 0–1.2)
BLOOD EXPIRATION DATE: NORMAL
BODY TEMPERATURE: 37 DEGREES CELSIUS
BUN SERPL-MCNC: 12 MG/DL (ref 6–23)
BUN SERPL-MCNC: 13 MG/DL (ref 6–23)
BURR CELLS BLD QL SMEAR: ABNORMAL
CA-I BLDMV-SCNC: 1.06 MMOL/L (ref 1.1–1.33)
CA-I BLDMV-SCNC: 1.15 MMOL/L (ref 1.1–1.33)
CA-I BLDMV-SCNC: 1.17 MMOL/L (ref 1.1–1.33)
CA-I BLDMV-SCNC: 1.18 MMOL/L (ref 1.1–1.33)
CALCIUM SERPL-MCNC: 7.3 MG/DL (ref 8.6–10.6)
CALCIUM SERPL-MCNC: 7.3 MG/DL (ref 8.6–10.6)
CHLORIDE BLD-SCNC: 101 MMOL/L (ref 98–107)
CHLORIDE BLD-SCNC: 95 MMOL/L (ref 98–107)
CHLORIDE BLD-SCNC: 97 MMOL/L (ref 98–107)
CHLORIDE BLD-SCNC: 98 MMOL/L (ref 98–107)
CHLORIDE SERPL-SCNC: 94 MMOL/L (ref 98–107)
CHLORIDE SERPL-SCNC: 98 MMOL/L (ref 98–107)
CHLORIDE UR-SCNC: <15 MMOL/L
CHLORIDE/CREATININE (MMOL/G) IN URINE: NORMAL
CK SERPL-CCNC: 1534 U/L (ref 0–215)
CO2 SERPL-SCNC: 24 MMOL/L (ref 21–32)
CO2 SERPL-SCNC: 24 MMOL/L (ref 21–32)
CREAT SERPL-MCNC: 0.72 MG/DL (ref 0.5–1.05)
CREAT SERPL-MCNC: 0.73 MG/DL (ref 0.5–1.05)
CREAT UR-MCNC: 54.8 MG/DL (ref 20–320)
DISPENSE STATUS: NORMAL
EGFRCR SERPLBLD CKD-EPI 2021: >90 ML/MIN/1.73M*2
EGFRCR SERPLBLD CKD-EPI 2021: >90 ML/MIN/1.73M*2
EOSINOPHIL # BLD AUTO: 0.11 X10*3/UL (ref 0–0.7)
EOSINOPHIL # BLD MANUAL: 0 X10*3/UL (ref 0–0.7)
EOSINOPHIL # BLD MANUAL: 0 X10*3/UL (ref 0–0.7)
EOSINOPHIL # BLD MANUAL: 0.29 X10*3/UL (ref 0–0.7)
EOSINOPHIL NFR BLD AUTO: 0.4 %
EOSINOPHIL NFR BLD MANUAL: 0 %
EOSINOPHIL NFR BLD MANUAL: 0 %
EOSINOPHIL NFR BLD MANUAL: 0.9 %
ERYTHROCYTE [DISTWIDTH] IN BLOOD BY AUTOMATED COUNT: 16.8 % (ref 11.5–14.5)
ERYTHROCYTE [DISTWIDTH] IN BLOOD BY AUTOMATED COUNT: 16.8 % (ref 11.5–14.5)
ERYTHROCYTE [DISTWIDTH] IN BLOOD BY AUTOMATED COUNT: 17 % (ref 11.5–14.5)
ERYTHROCYTE [DISTWIDTH] IN BLOOD BY AUTOMATED COUNT: 17.1 % (ref 11.5–14.5)
FIBRINOGEN PPP-MCNC: 632 MG/DL (ref 200–400)
GLUCOSE BLD MANUAL STRIP-MCNC: 102 MG/DL (ref 74–99)
GLUCOSE BLD MANUAL STRIP-MCNC: 108 MG/DL (ref 74–99)
GLUCOSE BLD MANUAL STRIP-MCNC: 121 MG/DL (ref 74–99)
GLUCOSE BLD MANUAL STRIP-MCNC: 138 MG/DL (ref 74–99)
GLUCOSE BLD MANUAL STRIP-MCNC: 196 MG/DL (ref 74–99)
GLUCOSE BLD-MCNC: 105 MG/DL (ref 74–99)
GLUCOSE BLD-MCNC: 106 MG/DL (ref 74–99)
GLUCOSE BLD-MCNC: 123 MG/DL (ref 74–99)
GLUCOSE BLD-MCNC: 130 MG/DL (ref 74–99)
GLUCOSE SERPL-MCNC: 100 MG/DL (ref 74–99)
GLUCOSE SERPL-MCNC: 121 MG/DL (ref 74–99)
HCO3 BLDMV-SCNC: 23.1 MMOL/L (ref 22–26)
HCO3 BLDMV-SCNC: 23.7 MMOL/L (ref 22–26)
HCO3 BLDMV-SCNC: 26 MMOL/L (ref 22–26)
HCO3 BLDMV-SCNC: 26.5 MMOL/L (ref 22–26)
HCT VFR BLD AUTO: 21.8 % (ref 36–46)
HCT VFR BLD AUTO: 22.1 % (ref 36–46)
HCT VFR BLD AUTO: 23.5 % (ref 36–46)
HCT VFR BLD AUTO: 24.9 % (ref 36–46)
HCT VFR BLD EST: 16 % (ref 36–46)
HCT VFR BLD EST: 23 % (ref 36–46)
HCT VFR BLD EST: 24 % (ref 36–46)
HCT VFR BLD EST: 26 % (ref 36–46)
HGB BLD-MCNC: 7.4 G/DL (ref 12–16)
HGB BLD-MCNC: 7.6 G/DL (ref 12–16)
HGB BLD-MCNC: 7.7 G/DL (ref 12–16)
HGB BLD-MCNC: 8.4 G/DL (ref 12–16)
HGB BLDMV-MCNC: 5.4 G/DL (ref 12–16)
HGB BLDMV-MCNC: 7.7 G/DL (ref 12–16)
HGB BLDMV-MCNC: 7.9 G/DL (ref 12–16)
HGB BLDMV-MCNC: 8.5 G/DL (ref 12–16)
HOLD SPECIMEN: NORMAL
IMM GRANULOCYTES # BLD AUTO: 0.87 X10*3/UL (ref 0–0.7)
IMM GRANULOCYTES # BLD AUTO: 1.1 X10*3/UL (ref 0–0.7)
IMM GRANULOCYTES # BLD AUTO: 1.18 X10*3/UL (ref 0–0.7)
IMM GRANULOCYTES # BLD AUTO: 1.18 X10*3/UL (ref 0–0.7)
IMM GRANULOCYTES NFR BLD AUTO: 3 % (ref 0–0.9)
IMM GRANULOCYTES NFR BLD AUTO: 3.6 % (ref 0–0.9)
IMM GRANULOCYTES NFR BLD AUTO: 3.6 % (ref 0–0.9)
IMM GRANULOCYTES NFR BLD AUTO: 3.7 % (ref 0–0.9)
INHALED O2 CONCENTRATION: 0 %
INHALED O2 CONCENTRATION: 21 %
LACTATE BLDMV-SCNC: 0.5 MMOL/L (ref 0.4–2)
LACTATE BLDMV-SCNC: 0.7 MMOL/L (ref 0.4–2)
LACTATE BLDMV-SCNC: 1.3 MMOL/L (ref 0.4–2)
LACTATE BLDMV-SCNC: 1.3 MMOL/L (ref 0.4–2)
LDH SERPL L TO P-CCNC: 350 U/L (ref 84–246)
LYMPHOCYTES # BLD AUTO: 3.84 X10*3/UL (ref 1.2–4.8)
LYMPHOCYTES # BLD MANUAL: 2.66 X10*3/UL (ref 1.2–4.8)
LYMPHOCYTES # BLD MANUAL: 3.36 X10*3/UL (ref 1.2–4.8)
LYMPHOCYTES # BLD MANUAL: 3.55 X10*3/UL (ref 1.2–4.8)
LYMPHOCYTES NFR BLD AUTO: 13.1 %
LYMPHOCYTES NFR BLD MANUAL: 10.4 %
LYMPHOCYTES NFR BLD MANUAL: 11.2 %
LYMPHOCYTES NFR BLD MANUAL: 8.6 %
MAGNESIUM SERPL-MCNC: 1.83 MG/DL (ref 1.6–2.4)
MAGNESIUM SERPL-MCNC: 2.05 MG/DL (ref 1.6–2.4)
MCH RBC QN AUTO: 29.5 PG (ref 26–34)
MCH RBC QN AUTO: 29.6 PG (ref 26–34)
MCH RBC QN AUTO: 30.1 PG (ref 26–34)
MCH RBC QN AUTO: 30.1 PG (ref 26–34)
MCHC RBC AUTO-ENTMCNC: 32.8 G/DL (ref 32–36)
MCHC RBC AUTO-ENTMCNC: 33.5 G/DL (ref 32–36)
MCHC RBC AUTO-ENTMCNC: 33.7 G/DL (ref 32–36)
MCHC RBC AUTO-ENTMCNC: 34.9 G/DL (ref 32–36)
MCV RBC AUTO: 85 FL (ref 80–100)
MCV RBC AUTO: 89 FL (ref 80–100)
MCV RBC AUTO: 90 FL (ref 80–100)
MCV RBC AUTO: 90 FL (ref 80–100)
METAMYELOCYTES # BLD MANUAL: 0.29 X10*3/UL
METAMYELOCYTES NFR BLD MANUAL: 0.9 %
MONOCYTES # BLD AUTO: 2.71 X10*3/UL (ref 0.1–1)
MONOCYTES # BLD MANUAL: 1.13 X10*3/UL (ref 0.1–1)
MONOCYTES # BLD MANUAL: 1.9 X10*3/UL (ref 0.1–1)
MONOCYTES # BLD MANUAL: 2.13 X10*3/UL (ref 0.1–1)
MONOCYTES NFR BLD AUTO: 9.2 %
MONOCYTES NFR BLD MANUAL: 3.5 %
MONOCYTES NFR BLD MANUAL: 6 %
MONOCYTES NFR BLD MANUAL: 6.9 %
MYELOCYTES # BLD MANUAL: 0.28 X10*3/UL
MYELOCYTES NFR BLD MANUAL: 0.9 %
NEUTROPHILS # BLD AUTO: 21.82 X10*3/UL (ref 1.2–7.7)
NEUTROPHILS # BLD MANUAL: 24.51 X10*3/UL (ref 1.2–7.7)
NEUTROPHILS # BLD MANUAL: 27.52 X10*3/UL (ref 1.2–7.7)
NEUTROPHILS NFR BLD AUTO: 74.1 %
NEUTS BAND # BLD MANUAL: 0.84 X10*3/UL (ref 0–0.7)
NEUTS BAND # BLD MANUAL: 2.94 X10*3/UL (ref 0–0.7)
NEUTS BAND NFR BLD MANUAL: 2.6 %
NEUTS BAND NFR BLD MANUAL: 9.5 %
NEUTS SEG # BLD MANUAL: 21.57 X10*3/UL (ref 1.2–7)
NEUTS SEG # BLD MANUAL: 25.68 X10*3/UL (ref 1.2–7)
NEUTS SEG # BLD MANUAL: 26.68 X10*3/UL (ref 1.2–7)
NEUTS SEG NFR BLD MANUAL: 69.8 %
NEUTS SEG NFR BLD MANUAL: 81 %
NEUTS SEG NFR BLD MANUAL: 82.6 %
NRBC BLD-RTO: 0.2 /100 WBCS (ref 0–0)
NRBC BLD-RTO: 0.3 /100 WBCS (ref 0–0)
NRBC BLD-RTO: 0.3 /100 WBCS (ref 0–0)
NRBC BLD-RTO: 0.5 /100 WBCS (ref 0–0)
OXYHGB MFR BLDMV: 58.9 % (ref 45–75)
OXYHGB MFR BLDMV: 66.4 % (ref 45–75)
OXYHGB MFR BLDMV: 66.7 % (ref 45–75)
OXYHGB MFR BLDMV: 68.7 % (ref 45–75)
PCO2 BLDMV: 34 MM HG (ref 41–51)
PCO2 BLDMV: 40 MM HG (ref 41–51)
PCO2 BLDMV: 40 MM HG (ref 41–51)
PCO2 BLDMV: 42 MM HG (ref 41–51)
PH BLDMV: 7.38 PH (ref 7.33–7.43)
PH BLDMV: 7.4 PH (ref 7.33–7.43)
PH BLDMV: 7.43 PH (ref 7.33–7.43)
PH BLDMV: 7.44 PH (ref 7.33–7.43)
PHOSPHATE SERPL-MCNC: 3.3 MG/DL (ref 2.5–4.9)
PHOSPHATE SERPL-MCNC: 3.4 MG/DL (ref 2.5–4.9)
PLATELET # BLD AUTO: 112 X10*3/UL (ref 150–450)
PLATELET # BLD AUTO: 125 X10*3/UL (ref 150–450)
PLATELET # BLD AUTO: 126 X10*3/UL (ref 150–450)
PLATELET # BLD AUTO: 133 X10*3/UL (ref 150–450)
PO2 BLDMV: 39 MM HG (ref 35–45)
PO2 BLDMV: 42 MM HG (ref 35–45)
PO2 BLDMV: 42 MM HG (ref 35–45)
PO2 BLDMV: 43 MM HG (ref 35–45)
POLYCHROMASIA BLD QL SMEAR: ABNORMAL
POTASSIUM BLDMV-SCNC: 3.7 MMOL/L (ref 3.5–5.3)
POTASSIUM BLDMV-SCNC: 4.2 MMOL/L (ref 3.5–5.3)
POTASSIUM BLDMV-SCNC: 4.9 MMOL/L (ref 3.5–5.3)
POTASSIUM BLDMV-SCNC: 5 MMOL/L (ref 3.5–5.3)
POTASSIUM SERPL-SCNC: 3.8 MMOL/L (ref 3.5–5.3)
POTASSIUM SERPL-SCNC: 4.6 MMOL/L (ref 3.5–5.3)
POTASSIUM UR-SCNC: 49 MMOL/L
POTASSIUM/CREAT UR-RTO: 89 MMOL/G CREAT
PRODUCT BLOOD TYPE: 7300
PRODUCT CODE: NORMAL
PROT SERPL-MCNC: 4.8 G/DL (ref 6.4–8.2)
RBC # BLD AUTO: 2.46 X10*6/UL (ref 4–5.2)
RBC # BLD AUTO: 2.57 X10*6/UL (ref 4–5.2)
RBC # BLD AUTO: 2.61 X10*6/UL (ref 4–5.2)
RBC # BLD AUTO: 2.79 X10*6/UL (ref 4–5.2)
RBC MORPH BLD: ABNORMAL
SAO2 % BLDMV: 61 % (ref 45–75)
SAO2 % BLDMV: 69 % (ref 45–75)
SAO2 % BLDMV: 69 % (ref 45–75)
SAO2 % BLDMV: 71 % (ref 45–75)
SODIUM BLDMV-SCNC: 123 MMOL/L (ref 136–145)
SODIUM BLDMV-SCNC: 125 MMOL/L (ref 136–145)
SODIUM BLDMV-SCNC: 125 MMOL/L (ref 136–145)
SODIUM BLDMV-SCNC: 127 MMOL/L (ref 136–145)
SODIUM SERPL-SCNC: 127 MMOL/L (ref 136–145)
SODIUM SERPL-SCNC: 128 MMOL/L (ref 136–145)
SODIUM UR-SCNC: 18 MMOL/L
SODIUM/CREAT UR-RTO: 33 MMOL/G CREAT
TOTAL CELLS COUNTED BLD: 115
TOTAL CELLS COUNTED BLD: 116
TOTAL CELLS COUNTED BLD: 116
UFH PPP CHRO-ACNC: 0.3 IU/ML
UNIT ABO: NORMAL
UNIT NUMBER: NORMAL
UNIT RH: NORMAL
UNIT VOLUME: 350
VANCOMYCIN SERPL-MCNC: 19 UG/ML (ref 5–20)
VARIANT LYMPHS # BLD MANUAL: 0.29 X10*3/UL (ref 0–0.5)
VARIANT LYMPHS # BLD MANUAL: 1.33 X10*3/UL (ref 0–0.5)
VARIANT LYMPHS NFR BLD: 0.9 %
VARIANT LYMPHS NFR BLD: 4.3 %
WBC # BLD AUTO: 29.4 X10*3/UL (ref 4.4–11.3)
WBC # BLD AUTO: 30.9 X10*3/UL (ref 4.4–11.3)
WBC # BLD AUTO: 31.7 X10*3/UL (ref 4.4–11.3)
WBC # BLD AUTO: 32.3 X10*3/UL (ref 4.4–11.3)
XM INTEP: NORMAL

## 2024-06-30 PROCEDURE — 85520 HEPARIN ASSAY: CPT

## 2024-06-30 PROCEDURE — 2500000004 HC RX 250 GENERAL PHARMACY W/ HCPCS (ALT 636 FOR OP/ED)

## 2024-06-30 PROCEDURE — P9016 RBC LEUKOCYTES REDUCED: HCPCS

## 2024-06-30 PROCEDURE — 86923 COMPATIBILITY TEST ELECTRIC: CPT

## 2024-06-30 PROCEDURE — 86850 RBC ANTIBODY SCREEN: CPT

## 2024-06-30 PROCEDURE — 80202 ASSAY OF VANCOMYCIN: CPT

## 2024-06-30 PROCEDURE — 85027 COMPLETE CBC AUTOMATED: CPT | Mod: 91

## 2024-06-30 PROCEDURE — 83735 ASSAY OF MAGNESIUM: CPT | Mod: 91

## 2024-06-30 PROCEDURE — 85025 COMPLETE CBC W/AUTO DIFF WBC: CPT

## 2024-06-30 PROCEDURE — 99291 CRITICAL CARE FIRST HOUR: CPT | Performed by: INTERNAL MEDICINE

## 2024-06-30 PROCEDURE — 83010 ASSAY OF HAPTOGLOBIN QUANT: CPT

## 2024-06-30 PROCEDURE — 2500000001 HC RX 250 WO HCPCS SELF ADMINISTERED DRUGS (ALT 637 FOR MEDICARE OP)

## 2024-06-30 PROCEDURE — 36430 TRANSFUSION BLD/BLD COMPNT: CPT

## 2024-06-30 PROCEDURE — 84100 ASSAY OF PHOSPHORUS: CPT

## 2024-06-30 PROCEDURE — 2500000002 HC RX 250 W HCPCS SELF ADMINISTERED DRUGS (ALT 637 FOR MEDICARE OP, ALT 636 FOR OP/ED)

## 2024-06-30 PROCEDURE — 82550 ASSAY OF CK (CPK): CPT

## 2024-06-30 PROCEDURE — 82947 ASSAY GLUCOSE BLOOD QUANT: CPT | Mod: 91

## 2024-06-30 PROCEDURE — 85025 COMPLETE CBC W/AUTO DIFF WBC: CPT | Mod: 91

## 2024-06-30 PROCEDURE — 84132 ASSAY OF SERUM POTASSIUM: CPT

## 2024-06-30 PROCEDURE — 2020000001 HC ICU ROOM DAILY

## 2024-06-30 PROCEDURE — 84075 ASSAY ALKALINE PHOSPHATASE: CPT

## 2024-06-30 PROCEDURE — 85384 FIBRINOGEN ACTIVITY: CPT

## 2024-06-30 PROCEDURE — 84132 ASSAY OF SERUM POTASSIUM: CPT | Mod: 91

## 2024-06-30 PROCEDURE — 37799 UNLISTED PX VASCULAR SURGERY: CPT

## 2024-06-30 PROCEDURE — 86901 BLOOD TYPING SEROLOGIC RH(D): CPT

## 2024-06-30 PROCEDURE — 2550000001 HC RX 255 CONTRASTS: Performed by: INTERNAL MEDICINE

## 2024-06-30 PROCEDURE — C9113 INJ PANTOPRAZOLE SODIUM, VIA: HCPCS

## 2024-06-30 PROCEDURE — 85027 COMPLETE CBC AUTOMATED: CPT

## 2024-06-30 PROCEDURE — 83735 ASSAY OF MAGNESIUM: CPT

## 2024-06-30 PROCEDURE — 71275 CT ANGIOGRAPHY CHEST: CPT | Performed by: RADIOLOGY

## 2024-06-30 PROCEDURE — 71275 CT ANGIOGRAPHY CHEST: CPT

## 2024-06-30 PROCEDURE — 83615 LACTATE (LD) (LDH) ENZYME: CPT

## 2024-06-30 PROCEDURE — 85007 BL SMEAR W/DIFF WBC COUNT: CPT

## 2024-06-30 PROCEDURE — 76705 ECHO EXAM OF ABDOMEN: CPT

## 2024-06-30 PROCEDURE — 76705 ECHO EXAM OF ABDOMEN: CPT | Performed by: RADIOLOGY

## 2024-06-30 PROCEDURE — 74174 CTA ABD&PLVS W/CONTRAST: CPT | Performed by: RADIOLOGY

## 2024-06-30 RX ORDER — PANTOPRAZOLE SODIUM 40 MG/10ML
40 INJECTION, POWDER, LYOPHILIZED, FOR SOLUTION INTRAVENOUS DAILY
Status: DISCONTINUED | OUTPATIENT
Start: 2024-06-30 | End: 2024-07-02

## 2024-06-30 RX ORDER — OXYCODONE HYDROCHLORIDE 5 MG/1
5 TABLET ORAL EVERY 6 HOURS PRN
Status: DISCONTINUED | OUTPATIENT
Start: 2024-06-30 | End: 2024-06-30

## 2024-06-30 RX ORDER — POTASSIUM CHLORIDE 1.5 G/1.58G
40 POWDER, FOR SOLUTION ORAL ONCE
Status: COMPLETED | OUTPATIENT
Start: 2024-06-30 | End: 2024-06-30

## 2024-06-30 RX ORDER — CALCIUM GLUCONATE 20 MG/ML
2 INJECTION, SOLUTION INTRAVENOUS ONCE
Status: COMPLETED | OUTPATIENT
Start: 2024-06-30 | End: 2024-06-30

## 2024-06-30 RX ORDER — OXYCODONE HYDROCHLORIDE 5 MG/1
10 TABLET ORAL EVERY 6 HOURS PRN
Status: DISCONTINUED | OUTPATIENT
Start: 2024-06-30 | End: 2024-07-01

## 2024-06-30 RX ORDER — POTASSIUM CHLORIDE 20 MEQ/1
20 TABLET, EXTENDED RELEASE ORAL ONCE
Status: DISCONTINUED | OUTPATIENT
Start: 2024-06-30 | End: 2024-06-30

## 2024-06-30 RX ORDER — MAGNESIUM SULFATE HEPTAHYDRATE 40 MG/ML
2 INJECTION, SOLUTION INTRAVENOUS ONCE
Status: COMPLETED | OUTPATIENT
Start: 2024-06-30 | End: 2024-06-30

## 2024-06-30 RX ORDER — HYDROMORPHONE HYDROCHLORIDE 1 MG/ML
0.4 INJECTION, SOLUTION INTRAMUSCULAR; INTRAVENOUS; SUBCUTANEOUS EVERY 4 HOURS PRN
Status: DISCONTINUED | OUTPATIENT
Start: 2024-06-30 | End: 2024-07-01

## 2024-06-30 RX ORDER — OXYCODONE HYDROCHLORIDE 5 MG/1
5 TABLET ORAL EVERY 6 HOURS PRN
Status: DISCONTINUED | OUTPATIENT
Start: 2024-06-30 | End: 2024-07-01

## 2024-06-30 RX ADMIN — OXYCODONE HYDROCHLORIDE 10 MG: 5 TABLET ORAL at 22:31

## 2024-06-30 RX ADMIN — AMIODARONE HYDROCHLORIDE 200 MG: 200 TABLET ORAL at 08:14

## 2024-06-30 RX ADMIN — INSULIN LISPRO 1 UNITS: 100 INJECTION, SOLUTION INTRAVENOUS; SUBCUTANEOUS at 16:18

## 2024-06-30 RX ADMIN — SODIUM CHLORIDE, POTASSIUM CHLORIDE, SODIUM LACTATE AND CALCIUM CHLORIDE 500 ML: 600; 310; 30; 20 INJECTION, SOLUTION INTRAVENOUS at 15:01

## 2024-06-30 RX ADMIN — VANCOMYCIN HYDROCHLORIDE 1000 MG: 1 INJECTION, SOLUTION INTRAVENOUS at 05:22

## 2024-06-30 RX ADMIN — SENNOSIDES 17.2 MG: 8.6 TABLET, FILM COATED ORAL at 20:32

## 2024-06-30 RX ADMIN — ASPIRIN 81 MG CHEWABLE TABLET 81 MG: 81 TABLET CHEWABLE at 08:14

## 2024-06-30 RX ADMIN — HYDROMORPHONE HYDROCHLORIDE 0.4 MG: 1 INJECTION, SOLUTION INTRAMUSCULAR; INTRAVENOUS; SUBCUTANEOUS at 23:59

## 2024-06-30 RX ADMIN — PIPERACILLIN SODIUM AND TAZOBACTAM SODIUM 3.38 G: 3; .375 INJECTION, SOLUTION INTRAVENOUS at 15:00

## 2024-06-30 RX ADMIN — TRAMADOL HYDROCHLORIDE 50 MG: 50 TABLET, COATED ORAL at 08:55

## 2024-06-30 RX ADMIN — HEPARIN SODIUM 1400 UNITS/HR: 10000 INJECTION, SOLUTION INTRAVENOUS at 06:44

## 2024-06-30 RX ADMIN — OXYCODONE HYDROCHLORIDE 5 MG: 5 TABLET ORAL at 16:12

## 2024-06-30 RX ADMIN — CALCIUM GLUCONATE 2 G: 20 INJECTION, SOLUTION INTRAVENOUS at 12:36

## 2024-06-30 RX ADMIN — VANCOMYCIN HYDROCHLORIDE 1000 MG: 1 INJECTION, SOLUTION INTRAVENOUS at 18:21

## 2024-06-30 RX ADMIN — PIPERACILLIN SODIUM AND TAZOBACTAM SODIUM 3.38 G: 3; .375 INJECTION, SOLUTION INTRAVENOUS at 10:27

## 2024-06-30 RX ADMIN — PANTOPRAZOLE SODIUM 40 MG: 40 INJECTION, POWDER, FOR SOLUTION INTRAVENOUS at 08:14

## 2024-06-30 RX ADMIN — POTASSIUM CHLORIDE 40 MEQ: 1.5 POWDER, FOR SOLUTION ORAL at 15:00

## 2024-06-30 RX ADMIN — PIPERACILLIN SODIUM AND TAZOBACTAM SODIUM 3.38 G: 3; .375 INJECTION, SOLUTION INTRAVENOUS at 04:04

## 2024-06-30 RX ADMIN — POLYETHYLENE GLYCOL 3350 17 G: 17 POWDER, FOR SOLUTION ORAL at 20:33

## 2024-06-30 RX ADMIN — MAGNESIUM SULFATE HEPTAHYDRATE 2 G: 40 INJECTION, SOLUTION INTRAVENOUS at 12:37

## 2024-06-30 RX ADMIN — SODIUM CHLORIDE, POTASSIUM CHLORIDE, SODIUM LACTATE AND CALCIUM CHLORIDE 500 ML: 600; 310; 30; 20 INJECTION, SOLUTION INTRAVENOUS at 10:41

## 2024-06-30 RX ADMIN — SODIUM CHLORIDE, POTASSIUM CHLORIDE, SODIUM LACTATE AND CALCIUM CHLORIDE 500 ML: 600; 310; 30; 20 INJECTION, SOLUTION INTRAVENOUS at 18:48

## 2024-06-30 RX ADMIN — EMPAGLIFLOZIN 10 MG: 10 TABLET, FILM COATED ORAL at 08:14

## 2024-06-30 RX ADMIN — Medication 6 MG: at 21:00

## 2024-06-30 RX ADMIN — IOHEXOL 90 ML: 350 INJECTION, SOLUTION INTRAVENOUS at 12:07

## 2024-06-30 RX ADMIN — SACUBITRIL AND VALSARTAN 1 TABLET: 24; 26 TABLET, FILM COATED ORAL at 08:16

## 2024-06-30 RX ADMIN — PIPERACILLIN SODIUM AND TAZOBACTAM SODIUM 3.38 G: 3; .375 INJECTION, SOLUTION INTRAVENOUS at 22:31

## 2024-06-30 RX ADMIN — TRAMADOL HYDROCHLORIDE 50 MG: 50 TABLET, COATED ORAL at 02:55

## 2024-06-30 RX ADMIN — SENNOSIDES 17.2 MG: 8.6 TABLET, FILM COATED ORAL at 08:14

## 2024-06-30 ASSESSMENT — PAIN SCALES - GENERAL
PAINLEVEL_OUTOF10: 3
PAINLEVEL_OUTOF10: 9
PAINLEVEL_OUTOF10: 10 - WORST POSSIBLE PAIN
PAINLEVEL_OUTOF10: 6
PAINLEVEL_OUTOF10: 10 - WORST POSSIBLE PAIN
PAINLEVEL_OUTOF10: 0 - NO PAIN
PAINLEVEL_OUTOF10: 6
PAINLEVEL_OUTOF10: 3
PAINLEVEL_OUTOF10: 5 - MODERATE PAIN
PAINLEVEL_OUTOF10: 10 - WORST POSSIBLE PAIN
PAINLEVEL_OUTOF10: 8

## 2024-06-30 ASSESSMENT — PAIN - FUNCTIONAL ASSESSMENT
PAIN_FUNCTIONAL_ASSESSMENT: 0-10

## 2024-06-30 ASSESSMENT — PAIN DESCRIPTION - DESCRIPTORS
DESCRIPTORS: ACHING
DESCRIPTORS: ACHING

## 2024-06-30 ASSESSMENT — COGNITIVE AND FUNCTIONAL STATUS - GENERAL
DRESSING REGULAR LOWER BODY CLOTHING: A LITTLE
WALKING IN HOSPITAL ROOM: TOTAL
TOILETING: A LITTLE
DAILY ACTIVITIY SCORE: 18
PERSONAL GROOMING: A LITTLE
MOVING TO AND FROM BED TO CHAIR: A LOT
MOBILITY SCORE: 12
EATING MEALS: A LITTLE
HELP NEEDED FOR BATHING: A LITTLE
STANDING UP FROM CHAIR USING ARMS: TOTAL
CLIMB 3 TO 5 STEPS WITH RAILING: TOTAL
DRESSING REGULAR UPPER BODY CLOTHING: A LITTLE
TURNING FROM BACK TO SIDE WHILE IN FLAT BAD: A LITTLE

## 2024-06-30 ASSESSMENT — PAIN DESCRIPTION - LOCATION: LOCATION: LEG

## 2024-06-30 NOTE — PROGRESS NOTES
"  MEDICINE INPATIENT PROGRESS NOTE    Amirah Bennett is a 43 y.o. female on hospital day 10    Subjective   Required blood transfusion, did not increment appropriately. Also received fluids.   Upon evaluation this AM, endorsing pain in bilateral legs and abdominal pain.        Objective     Last Recorded Vitals  Blood pressure 91/64, pulse 94, temperature 35.5 °C (95.9 °F), temperature source Temporal, resp. rate 23, height 1.702 m (5' 7.01\"), weight 80.7 kg (177 lb 14.6 oz), SpO2 100%.    Intake/Output last 3 Shifts:  I/O last 3 completed shifts:  In: 2632.1 (32.6 mL/kg) [P.O.:720; I.V.:862.1 (10.7 mL/kg); Blood:350; IV Piggyback:700]  Out: 2200 (27.3 mL/kg) [Urine:2200 (0.8 mL/kg/hr)]  Weight: 80.7 kg     Relevant Results  Results from last 7 days   Lab Units 06/30/24  1247 06/30/24  0520 06/29/24  2333   WBC AUTO x10*3/uL 29.4* 31.7* 30.9*   HEMOGLOBIN g/dL 7.6* 7.4* 7.7*   HEMATOCRIT % 21.8* 22.1* 23.5*   PLATELETS AUTO x10*3/uL 125* 126* 112*     Results from last 7 days   Lab Units 06/30/24  0520 06/29/24  1816 06/29/24  0304   SODIUM mmol/L 127* 125* 130*   POTASSIUM mmol/L 3.8 4.3 3.6   CO2 mmol/L 24 25 23   ANION GAP mmol/L 13 12 15   BUN mg/dL 13 14 12   CREATININE mg/dL 0.72 0.83 0.75   GLUCOSE mg/dL 100* 109* 113*   EGFR mL/min/1.73m*2 >90 89 >90   MAGNESIUM mg/dL 1.83 2.01 2.44*   PHOSPHORUS mg/dL 3.4 3.1 3.5      Results from last 7 days   Lab Units 06/30/24  0520 06/29/24  0304 06/28/24  0348   ALT U/L 116* 176* 254*   AST U/L 52* 90* 194*   ALK PHOS U/L 62 75 78      Results from last 7 days   Lab Units 06/29/24  0304 06/28/24  0348 06/27/24  0544   INR  1.3* 1.3* 1.2*     Results from last 7 days   Lab Units 06/30/24  1247 06/30/24  0521 06/29/24  2333 06/28/24  1113 06/28/24  1035 06/28/24  0836   POCT PH, ARTERIAL pH  --   --   --   --  7.50* 7.44*   POCT PO2, ARTERIAL mm Hg  --   --   --   --  112* 308*   POCT PCO2, ARTERIAL mm Hg  --   --   --   --  35* 40   FIO2 % 21 21 21   < > 50 60    < > = " values in this interval not displayed.     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 Can hear airleak. RIJ line not currently oozing.  CV: normal rate, irregular rhythm, no murmurs noted  Pulm: CTAB anteriorly, room air  GI: abd soft, tender diffusely, bowel sounds present  Ext: She has equal sensation in bilateral thighs. Significant tenderness to palpation bilaterally. No DP in left. Has TP and popliteal in left. S/p Rt AKA. Tender left upper extremity with bruising able to move right upper extremity spontaneously. Right groin access site covered, feels firm (per vascular surgery due to how it was sutured). Per vasc surg (who removed dressing) persistent oozing from stump  Neuro: alert and conversant however only intermittently answering questions appropriately, +expressive aphasia so will initially say no pain, endorsing pain in bilateral legs.     Medications    amiodarone, 200 mg, oral, Daily  aspirin, 81 mg, oral, Daily  bisacodyl, 10 mg, rectal, Daily  [Held by provider] empagliflozin, 10 mg, oral, Daily  insulin lispro, 0-5 Units, subcutaneous, q4h  melatonin, 6 mg, oral, Nightly  oxygen, , inhalation, Continuous - Inhalation  pantoprazole, 40 mg, intravenous, Daily  perflutren protein A microsphere, 0.5 mL, intravenous, Once in imaging  piperacillin-tazobactam, 3.375 g, intravenous, q6h  polyethylene glycol, 17 g, oral, BID  [Held by provider] sacubitriL-valsartan, 1 tablet, oral, BID  sennosides, 2 tablet, oral, BID  sulfur hexafluoride microsphr, 2 mL, intravenous, Once in imaging  vancomycin, 1,000 mg, intravenous, q12h        heparin, 0-4,500  Units/hr, Last Rate: 1,400 Units/hr (06/30/24 1500)        PRN medications: cyclobenzaprine, dextrose, dextrose, glucagon, glucagon, heparin, HYDROmorphone, lidocaine, oxyCODONE, oxyCODONE, vancomycin           Assessment/Plan   Amirah Bennett is a 42 y.o. 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 RHC c/w cardiogenic shock with CI of 1.6, CVP of 25. She is not candidate for advanced therapies given documented medical nonadherence. Managing medically. Attempted to wean off dobutamine while increasing nitroprusside so stopped dobutamine 6/21 however acutely worsened with increased lactate and worsened SvO2 from 65 to 25 overnight with new abdominal pain and right leg pain concerning for ischemia. Improving parameters, normalized lactate, and pain resolution when dobutamine restarted. Course also c/b SARAH, cardiac thrombi, liver injury likely due to ischemia, and Afib with RVR. Found to have rhabdomyolysis with CK of 14,000, unable to give fluids due to cardiogenic shock. Arterial doppler of lower extremities with echogenic material within right distal femoral and popliteal arteries with absent Doppler flow in the right popliteal artery with significantly diminished flow within the right distal superficial femoral artery significantly diminished flow within the right distal superficial femoral artery, findings raise concern for thrombosis of distal SFA and popliteal artery. Decreased flow within right  posterior tibial and peroneal arteries could be through collateralization. Additionally decreased flow in right common femoral artery, right deep femoral artery as well as proximal and mid superficial femoral arteries concerning for stenosis proximal to right common femoral artery possible within right external iliac right common iliac artery. CTA Aorta with runoff 6/24: aortic bifurcation embolus, R BA-EIA embolus, SFA and popliteal emboli. Vascular surgery recommending right AKA. Bilateral LE duplex with likely early nonocclusive deep venous thrombosis. IVC filter placed 6/27 on recommendation by vascular medicine in preparation for right AKA 6/28. IVC filter should be removed within 3 months. AKA performed 6/28 without complications. Entresto started 6/29. Ethics involved given lack of documentation of POA and limited family (NOK are cousins). Palliative care consulted.    Updates 6/30:  -Hgb not incrementing appropriately, received blood transfusion in evening, as well as this AM and later in the day  -CTA C/A/P wet read without evidence of active hemorrhage within chest/abdomen/pelvis  -having persistent oozing from stump  -potential OR 7/1 for stump revision  -CK continues to decrease  - (downtrending)  -pending haptoglobin  -slide requested for peripheral smear  -RUQ US 7/1 without abnormality beyond hepatic steatosis and liver cyst  -Haily not corresponding, can use Haily systolic 60-70 based on how it correlates with cuff  -monitoring q4hr CBC  -stopped entresto and jardiance due to potential OR in AM  -pain regimen: oxycodone 5mg prn moderate pain, oxycodone 10mg severe pain, dilaudid 0.4mg breakthrough  -plan to remove dhillon  -Keli updated and said she would update other cousins and patient's friends       NEUROLOGIC  #AMS, resolved at her baseline  #ischemic stroke L MCA d/t AFib LLA thrombus seen on echo (lack of OAC adherence) s/p thrombectomy 01/2022 @ [facility] w/ residual expressive aphasia  and R sided weakness   ::Neurology consulted 6/25, no new deficits concerning for cerebral event  -no acute changes     #Depression?  #Insomnia  ::Patient's friend states that she doesn't care for herself due to being depressed  -consider psychiatry consult, deferring for now  -told to stop quetiapine 50mg at last discharge  -c/w melatonin 6mg at bedtime     CARDIOVASCULAR  #Cardiogenic shock  #HFrEF (LVEF 20-25%)  ::Not candidate for advanced therapies due to medical nonadherence  ::Weaned off dobutamine and nipride  -stopped entresto and jardiance due to upcoming OR  -diuresis as needed  -palliative care consulted given patient not candidate for advanced therapies, appreciate recommendations  -monitor swan numbers q6h  -holding GDMT given pressures, unclear what patient was actively taking however was previously prescribed entresto 24-26, spironolactone 25mg, metoprolol succinate 50mg daily, lasix 40mg, jardiance 10mg\     #Right limb ischemia  #Arterial emboli  #S/p femoral arterial line sheath placement now removed  ::::CTA Aorta with runoff 6/24: aortic bifurcation embolus, R BA-EIA embolus, SFA and popliteal emboli   ::arterial duplex exam with monophasic right CFA, SFA and PFA signals, absent popliteal flow and monophasic flow in the tibial artery   ::Unable to walk prior to presentation to hospital  ::Right leg has been cold and painful in times where cardiogenic shock has worsened then warm and not painful to touch when out of shock state, fluctuating exam  -heparin drip per above  -vascular surgery consulted, pending CTA aorta and bilateral iliofemoral runoff   -plan for AKA with vascular surgery, 6/28/2024  -pain regimen: oxycodone 5mg prn moderate pain, oxycodone 10mg severe pain, dilaudid 0.4mg breakthrough and cyclobenzaprine     #Cardiac thrombi  #Right lower extremity DVT  ::Likely in setting of Xarelto nonadherence  ::There are indeterminate low-attenuation nodular filling defects within the atrial  appendage. While this may represent contrast under filling, a right atrial appendage thrombus can not be excluded.  ::Rt US duplex with likely early nonocclusive deep venous thrombosis.   -c/w heparin gtt     #Afib on Xarelto w/ DCCV 05/2023), ischemic stroke L MCA d/t AFib LLA thrombus seen on echo (lack of OAC adherence) s/p thrombectomy 01/2022 @ [facility] w/ residual expressive aphasia and R sided weakness   ::Episode of RVR in ED  ::Previously prescribed digoxin 250mcg however last fill was 12/2023  ::TSH 6.28H, free T4 1.48  -c/w amiodarone 200mg daily  -hold home digoxin 250mcg  -c/w AC per above (holding home Xarelto 20mg daily in evening)  -c/w aspirin 81mg  -stopped atorvastatin 80 due to liver injury     #Elevated troponin  ::95 > 289 > 313 > 326 > 289  -stop trending     #HTN  -holding home medications     PULMONARY  #Dyspnea  #Mild pulmonary edema  ::No evidence of PE  -diuresis per above     #Hx trach c/b trancutaneous fistula  ::ENT had been consulted 3/2024 for gurgling and mucus drainage, no inpatient interventions required  -monitor for breaths/mucus discharge from trach site  -Cover the tracheocutaneous fistula with tape and gauze and encourage patient to apply pressure when voicing.   -per prior ENT note on last admission   -follow up outpatient ENT for closure (Dr. King)     RENAL/  #Hyponatremia  -monitor RFP and diurese per [facility]  -pending urine lytes     #Rhabdomyolysis   -Ck peaked > 14,000, trending down  -unable to give fluids given cardiogenic shock     #Metabolic acidosis, resolved  ::Likely in setting of shock     #SARAH, resolved  ::Likely prerenal vs ATN in setting of shock  ::FeUrea suggests intrinsic  -U/A with 3+ blood and no RBCs. Elevated CK as above.  -avoid nephrotoxic medications     #Elevated lactate, improved     GASTROINTESTINAL  #Elevated Tbili (3.2 on admission, from 0.5 in 03/2024)  #Elevated LFTs, downtrending  ::Likely ischemic liver injury  ::RUQ US 7/1 without abnormality  beyond hepatic steatosis and liver cyst  -trend CMP     #GERD  -c/w pantoprazole 40mg daily     ENDOCRINE  #T2DM, diet controlled  ::HgbA1c 3/2024 5.5  -hypoglycemia protocol and mild SSI     HEME/ONC  #Cardiac thrombi  -see cardiac section for plan     #Arterial thrombosis  #DVT  ::s/p IVC filter 6/27  -heparin drip per above  -further plan per above     #Anemia  ::CTA C/A/P 6/30 wet read without evidence of active hemorrhage within chest/abdomen/pelvis  ::6/30    -having persistent oozing from stump, plan for OR for revision  -Hgb goal >8  given cardiac hx    -pending haptoglobin  -slide requested for peripheral smear     #Thrombocytopenia  ::Oozing of central line, Multiple thrombi, DIC score 5 on 6/24/2024  ::Hit score of 6  ::Negative PF4  -vascular medicine consulted   -monitor DIC labs daily     #Left brachial injury  ::Likely injury  :LUE DVT scan with nonvascularized mass, called CT while patient was there to request CTA of left upper extremity and they said logistically they would be unable to perform both scans, and given protocol for max dosing of contrast, could not give further contrast until 6/25 at 2PM  -no CTA LUE needed      INFECTIOUS DISEASE  #Concern for infection  ::Procal 0.28  ::CXR without signs of PNA  ::s/p vanc/zosyn (6/20-6/24) (6/27- )  ::UA with 1+ blood, 1+ bacteria, no WBC, neg nitrite and leuk esterase  ::6/27 right groin wound culture negative  -pending blood cultures x1 6/20, x2 6/22, NGTD   -restarted vanc/zosyn 6/27 due to elevated leukocytosis and purulent appearing right groin site  -pending blood cultures 6/27, NGTD     Dispo:  -PT: Moderate intensity level of care  -patient would like enrollment in  home delivery service for medications (she has trouble getting to preferred pharmacy), pharmacy updated to [facility]  -patient's family would like her enrolled in [facility]  -repeat thyroid labs outpatient  -ensure IVC filter removal in 3 months!     N: cardiac  A: [place], pIVs,  "Haily Lt radial  DVT ppx: heparin drip  GI ppx: pantoprazole 40mg     Code Status: FULL CODE per patient and NOKs  *Note that patient LACKS capacity due to inability to express decision and inconsistency in decisions     Surrogate Medical Decision-maker:   Surrogate decision maker is: pt's cousinClara Wayne: [phone], Efrain Black: [phone], Keli Osborne: [phone]      Friends who may be helpful in making decisions:  Prior boyfriend Navin Sanches [phone]  Very good friend \"Isiah\" Pranay Owen [phone]      Kristine Pat MD  Internal Medicine, PGY-2   "

## 2024-06-30 NOTE — PROGRESS NOTES
"Vascular Surgery Daily Progress Note    Amirah Bennett is a 43 y.o. female on day 10 of admission presenting with Atrial fibrillation (Multi).    Subjective   No events overnight.   Required one unit pRBC for Hgb 7.2, incremented to 7.7 but now 7.4  Dressing on R AKA saturated and was leaking though, removed on rounds, was full with blood.    Objective     Last Recorded Vitals  BP 97/66   Pulse 93   Temp 36.3 °C (97.3 °F) (Temporal)   Resp 18   Ht 1.702 m (5' 7.01\")   Wt 80.7 kg (177 lb 14.6 oz)   SpO2 100%   BMI 27.86 kg/m²     Physical Exam:  Awake, alert  NCAT, MMM.   RRR  Breathing comfortable  Abdomen soft, nontender  RLE s/p AKA, dressing with saturation and soaking through to the bed below, some light amount of continuous oozing from between a few of the staples; no hematoma able to be expressed, some ecchymoses to the inferior side of the wound  Right groin dressing remains in place, likely small hematoma below this but there is a tissue ridge present  Palpable left femoral pulse  Nontender to palpation    Intake/Output last 3 Shifts:  I/O last 3 completed shifts:  In: 2632.1 (32.6 mL/kg) [P.O.:720; I.V.:862.1 (10.7 mL/kg); Blood:350; IV Piggyback:700]  Out: 2200 (27.3 mL/kg) [Urine:2200 (0.8 mL/kg/hr)]  Weight: 80.7 kg     Relevant Lab Results  Lab Review   Recent Labs     06/30/24  0520 06/29/24 1816 06/29/24  0304 06/28/24  1841 06/28/24  1113   * 125* 130* 132* 133*   K 3.8 4.3 3.6 3.6 4.0   BUN 13 14 12 12 12   CREATININE 0.72 0.83 0.75 0.76 0.86   EGFR >90 89 >90 >90 85   MG 1.83 2.01 2.44* 1.50* 1.93     Recent Labs     06/30/24  0520 06/29/24 1816 06/29/24  0304 06/28/24  1841 06/28/24  1113 06/28/24  0348 06/27/24  1625 06/27/24  0544 06/26/24  1728 06/26/24  0434 03/08/24  2115 03/08/24  0134 03/26/22  1926 03/26/22  1446   ALBUMIN 2.3* 2.5* 2.3* 2.4* 2.7* 2.6*   < > 2.6*   < > 2.4*   < > 4.4   < > 3.5   ALKPHOS 62  --  75  --   --  78  --  77  --  71   < > 68   < > 90   *  --  " 176*  --   --  254*  --  322*  --  411*   < > 107*   < > 2,716*   AST 52*  --  90*  --   --  194*  --  242*  --  332*   < > 107*   < > 6,960*   BILITOT 4.2*  --  4.4*  --   --  3.8*  --  3.9*  --  3.7*   < > 2.3*   < > 3.6*   LIPASE  --   --   --   --   --   --   --   --   --   --   --  16  --  80    < > = values in this interval not displayed.     Recent Labs     24  0520 24  2333 24  1816 24  0956 24  0304   WBC 31.7* 30.9* 31.1* 26.3* 23.5*   HGB 7.4* 7.7* 7.2* 8.5* 7.4*   HCT 22.1* 23.5* 21.2* 24.6* 21.6*   * 112* 133* 126* 124*   MCV 90 90 86 85 83     Recent Labs     24  0521 24  0956 24  0436 24  0304 24  2319 24  0348 24  0544 24  0434 24  2302 24  0505   INR  --   --   --  1.3*  --  1.3* 1.2* 1.5*  --  1.6*   HAUF 0.3 0.4 0.4  --  0.2  --  0.3 0.4   < > 0.4   FIBRINOGEN 632*  --   --  502*  --  498* 428* 420*  --  321    < > = values in this interval not displayed.     PTT - 2024:  3:04 AM  1.3   14.4 128     Recent Labs     03/10/24  0120 23  1843   CHOL 125 CANCELED   LDLF  --  CANCELED   HDL 17.6 CANCELED   TRIG 100 CANCELED     Lab Results   Component Value Date    HGBA1C 5.5 03/10/2024     Lab Results   Component Value Date    TSH 6.28 (H) 2024     CK 8550 (26751, 89811)    Imagin24  Venous duplex BUE  Right Upper Venous: The subclavian vein demonstrates a normal spontaneous and phasic flow.  Left Upper Venous: No evidence of acute deep vein thrombus visualized in the left upper extremity. Non-vascularized mass in left upper arm measuring 46.27 mm x 64.90 mm. Additional Findings; Non-vascularized mass.     24  Arterial duplex lower extremities  1. Echogenic material within the right distal femoral and popliteal  arteries with  absent Doppler flow in the right popliteal artery  significantly diminished flow within the right distal superficial  femoral artery. Findings raise  concern for thrombosis of the distal  SFA and popliteal artery. Decreased flow within the right posterior  tibial and peroneal arteries could be through collateralization.  2. Decreased flow within the right common femoral artery, right deep  femoral artery as well as the proximal and mid superficial femoral  arteries, raising concern for stenosis proximal to the right common  femoral artery, possibly within the right external iliac right common  iliac artery. A CTA of the lower extremities can be obtained for  further evaluation.  3. Unremarkable Doppler interrogation of the left lower extremity  arteries.  ** R CFA monophasic waveform 30cm/s  ** R SFA monophasic 10cc/s  ** R PFA monophasic 10cc/s  ** R popliteal absent  ** R PTA and peroneal monophasic     6/23/24  Venous duplex BLE  1. Partial compressibility of the right external iliac vein and the  right common femoral vein, which raises concern for early  nonocclusive deep venous thrombosis, although flow is seen on Doppler  images.  2. Otherwise, no sonographic evidence for deep vein thrombosis within  the remainder of the evaluated veins of the bilateral lower extremity.      6/24 CTA  RIGHT LOWER EXTREMITY:  1. Intraluminal filling defects in the aortoiliac bifurcation.  2. Nearly occlusive thrombus in the distal right common iliac artery  with nearly completely occlusive thrombus in the proximal right  external iliac artery with extension into the proximal right internal  iliac artery. Partially occlusive thrombus within the short-segment  of the proximal SFA and distal SFA as it exits the adductor hiatus  with complete occlusion of the popliteal artery and faint  reconstitution of flow into the infrapopliteal vasculature as  detailed above.      LEFT LOWER EXTREMITY:  1. Complete total occlusion of the P2 and P3 segments of the left  popliteal artery with the extension into the tibioperoneal trunk and  reconstitution of flow into the proximal posterior  tibial, peroneal  and anterior tibial arteries. There is abrupt cutoff of the mid  anterior tibial artery with distal reconstitution. Faint  opacification of the peroneal artery and posterior artery throughout  their course. Additional findings are as detailed above.    Current medications:   Scheduled medications  amiodarone, 200 mg, oral, Daily  aspirin, 81 mg, oral, Daily  bisacodyl, 10 mg, rectal, Daily  empagliflozin, 10 mg, oral, Daily  insulin lispro, 0-5 Units, subcutaneous, q4h  melatonin, 6 mg, oral, Nightly  oxygen, , inhalation, Continuous - Inhalation  pantoprazole, 40 mg, intravenous, Daily  perflutren protein A microsphere, 0.5 mL, intravenous, Once in imaging  piperacillin-tazobactam, 3.375 g, intravenous, q6h  polyethylene glycol, 17 g, oral, BID  sacubitriL-valsartan, 1 tablet, oral, BID  sennosides, 2 tablet, oral, BID  sulfur hexafluoride microsphr, 2 mL, intravenous, Once in imaging  vancomycin, 1,000 mg, intravenous, q12h      Continuous medications  heparin, 0-4,500 Units/hr, Last Rate: 1,400 Units/hr (06/30/24 0900)      PRN medications  PRN medications: cyclobenzaprine, dextrose, dextrose, glucagon, glucagon, heparin, HYDROmorphone, lidocaine, traMADol, vancomycin    Assessment/Plan   Principal Problem:    Atrial fibrillation (Multi)  Active Problems:    Acute decompensated heart failure (Multi)    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 Admission exam on 6/20/24 documented inability to move the right lower extremity beyond the hip and with no sensation below the knee. No reported BLE signals throughout her course here. Notably she had an attempted but unsuccessful right femoral artery catheterization followed by placement of 4Fr sheath in the cath lab. Arterial duplex with monophasic R CFA, SFA, and PFA signals, absent popliteal flow, and monophasic tibial flow though hard to visualize. On exam no right femoral pulse, monophasic PT signal at distal leg; no sensation below the knee and no movement at the ankle. History and physical concerning for acute limb ischemia present at admission (6/20/24) based on chart review and history. Unclear whether the right foot will be salvageable based on exam today. Likely with right iliac (inflow) disease based on exam, possible popliteal thromboembolism based on duplex. Also with possible left brachial arterial injury given likely hematoma on exam (radial waveform is appropriate). Right leg not salvageable, will need inflow procedure and R AKA given motor deficits.      CTA Aorta with BLE runoff shows: aortic bifurcation embolus, R BA-EIA embolus, SFA and popliteal emboli     6/25: stable vascular exam, stable motor/sensory exam, downtrending CK  6/26: No major changes. Stable exam. Patient and family in agreement with proceeding with planned surgery  6/28: R iliac and RLE open thromboembolectomy, followed by R AKA for [place] III ALI   6/30 R AKA dressing removed due to sanguinous saturation    - ok to continue heparin gtt  - applied compressive dressing to the stump  - continue trending CBC  - no need to image the thigh at this point from our perspective    Discussed with attending, Dr. Ruben Simon MD, PhD  Vascular Surgery, PGY2  [id number]

## 2024-07-01 LAB
ABO GROUP (TYPE) IN BLOOD: NORMAL
ALBUMIN SERPL BCP-MCNC: 2 G/DL (ref 3.4–5)
ALBUMIN SERPL BCP-MCNC: 2.1 G/DL (ref 3.4–5)
ALP SERPL-CCNC: 58 U/L (ref 33–110)
ALT SERPL W P-5'-P-CCNC: 82 U/L (ref 7–45)
ANION GAP BLDMV CALCULATED.4IONS-SCNC: 6 MMO/L (ref 10–25)
ANION GAP BLDMV CALCULATED.4IONS-SCNC: 6 MMO/L (ref 10–25)
ANION GAP BLDMV CALCULATED.4IONS-SCNC: 8 MMO/L (ref 10–25)
ANION GAP BLDMV CALCULATED.4IONS-SCNC: 9 MMO/L (ref 10–25)
ANION GAP SERPL CALC-SCNC: 12 MMOL/L (ref 10–20)
ANION GAP SERPL CALC-SCNC: 14 MMOL/L (ref 10–20)
ANTIBODY SCREEN: NORMAL
AST SERPL W P-5'-P-CCNC: 44 U/L (ref 9–39)
BACTERIA BLD CULT: NORMAL
BACTERIA BLD CULT: NORMAL
BASE EXCESS BLDMV CALC-SCNC: -0.2 MMOL/L (ref -2–3)
BASE EXCESS BLDMV CALC-SCNC: -0.7 MMOL/L (ref -2–3)
BASE EXCESS BLDMV CALC-SCNC: 1.3 MMOL/L (ref -2–3)
BASE EXCESS BLDMV CALC-SCNC: 4.7 MMOL/L (ref -2–3)
BASOPHILS # BLD AUTO: 0.05 X10*3/UL (ref 0–0.1)
BASOPHILS # BLD AUTO: 0.06 X10*3/UL (ref 0–0.1)
BASOPHILS # BLD AUTO: 0.07 X10*3/UL (ref 0–0.1)
BASOPHILS # BLD AUTO: 0.07 X10*3/UL (ref 0–0.1)
BASOPHILS NFR BLD AUTO: 0.2 %
BILIRUB DIRECT SERPL-MCNC: 2 MG/DL (ref 0–0.3)
BILIRUB SERPL-MCNC: 3.6 MG/DL (ref 0–1.2)
BLOOD EXPIRATION DATE: NORMAL
BODY TEMPERATURE: 37 DEGREES CELSIUS
BUN SERPL-MCNC: 11 MG/DL (ref 6–23)
BUN SERPL-MCNC: 11 MG/DL (ref 6–23)
BURR CELLS BLD QL SMEAR: NORMAL
BURR CELLS BLD QL SMEAR: NORMAL
CA-I BLDMV-SCNC: 1.08 MMOL/L (ref 1.1–1.33)
CA-I BLDMV-SCNC: 1.19 MMOL/L (ref 1.1–1.33)
CA-I BLDMV-SCNC: 1.2 MMOL/L (ref 1.1–1.33)
CA-I BLDMV-SCNC: 1.21 MMOL/L (ref 1.1–1.33)
CALCIUM SERPL-MCNC: 7.3 MG/DL (ref 8.6–10.6)
CALCIUM SERPL-MCNC: 7.3 MG/DL (ref 8.6–10.6)
CHLORIDE BLD-SCNC: 101 MMOL/L (ref 98–107)
CHLORIDE BLD-SCNC: 97 MMOL/L (ref 98–107)
CHLORIDE BLD-SCNC: 99 MMOL/L (ref 98–107)
CHLORIDE BLD-SCNC: 99 MMOL/L (ref 98–107)
CHLORIDE SERPL-SCNC: 101 MMOL/L (ref 98–107)
CHLORIDE SERPL-SCNC: 99 MMOL/L (ref 98–107)
CO2 SERPL-SCNC: 21 MMOL/L (ref 21–32)
CO2 SERPL-SCNC: 23 MMOL/L (ref 21–32)
CREAT SERPL-MCNC: 0.66 MG/DL (ref 0.5–1.05)
CREAT SERPL-MCNC: 0.72 MG/DL (ref 0.5–1.05)
DISPENSE STATUS: NORMAL
EGFRCR SERPLBLD CKD-EPI 2021: >90 ML/MIN/1.73M*2
EGFRCR SERPLBLD CKD-EPI 2021: >90 ML/MIN/1.73M*2
EOSINOPHIL # BLD AUTO: 0.16 X10*3/UL (ref 0–0.7)
EOSINOPHIL # BLD AUTO: 0.17 X10*3/UL (ref 0–0.7)
EOSINOPHIL # BLD AUTO: 0.18 X10*3/UL (ref 0–0.7)
EOSINOPHIL # BLD AUTO: 0.18 X10*3/UL (ref 0–0.7)
EOSINOPHIL NFR BLD AUTO: 0.6 %
EOSINOPHIL NFR BLD AUTO: 0.7 %
ERYTHROCYTE [DISTWIDTH] IN BLOOD BY AUTOMATED COUNT: 16.6 % (ref 11.5–14.5)
ERYTHROCYTE [DISTWIDTH] IN BLOOD BY AUTOMATED COUNT: 17.3 % (ref 11.5–14.5)
ERYTHROCYTE [DISTWIDTH] IN BLOOD BY AUTOMATED COUNT: 17.8 % (ref 11.5–14.5)
ERYTHROCYTE [DISTWIDTH] IN BLOOD BY AUTOMATED COUNT: 18.1 % (ref 11.5–14.5)
ERYTHROCYTE [DISTWIDTH] IN BLOOD BY AUTOMATED COUNT: 18.2 % (ref 11.5–14.5)
GLUCOSE BLD MANUAL STRIP-MCNC: 104 MG/DL (ref 74–99)
GLUCOSE BLD MANUAL STRIP-MCNC: 109 MG/DL (ref 74–99)
GLUCOSE BLD MANUAL STRIP-MCNC: 114 MG/DL (ref 74–99)
GLUCOSE BLD MANUAL STRIP-MCNC: 121 MG/DL (ref 74–99)
GLUCOSE BLD MANUAL STRIP-MCNC: 124 MG/DL (ref 74–99)
GLUCOSE BLD MANUAL STRIP-MCNC: 139 MG/DL (ref 74–99)
GLUCOSE BLD MANUAL STRIP-MCNC: 73 MG/DL (ref 74–99)
GLUCOSE BLD-MCNC: 115 MG/DL (ref 74–99)
GLUCOSE BLD-MCNC: 121 MG/DL (ref 74–99)
GLUCOSE BLD-MCNC: 131 MG/DL (ref 74–99)
GLUCOSE BLD-MCNC: 92 MG/DL (ref 74–99)
GLUCOSE SERPL-MCNC: 105 MG/DL (ref 74–99)
GLUCOSE SERPL-MCNC: 108 MG/DL (ref 74–99)
HAPTOGLOB SERPL-MCNC: 160 MG/DL (ref 37–246)
HCO3 BLDMV-SCNC: 24.2 MMOL/L (ref 22–26)
HCO3 BLDMV-SCNC: 24.8 MMOL/L (ref 22–26)
HCO3 BLDMV-SCNC: 25.9 MMOL/L (ref 22–26)
HCO3 BLDMV-SCNC: 28.1 MMOL/L (ref 22–26)
HCT VFR BLD AUTO: 22.5 % (ref 36–46)
HCT VFR BLD AUTO: 23.8 % (ref 36–46)
HCT VFR BLD AUTO: 24.9 % (ref 36–46)
HCT VFR BLD AUTO: 25.1 % (ref 36–46)
HCT VFR BLD AUTO: 27.1 % (ref 36–46)
HCT VFR BLD EST: 23 % (ref 36–46)
HCT VFR BLD EST: 25 % (ref 36–46)
HCT VFR BLD EST: 26 % (ref 36–46)
HCT VFR BLD EST: ABNORMAL %
HGB BLD-MCNC: 7.4 G/DL (ref 12–16)
HGB BLD-MCNC: 7.6 G/DL (ref 12–16)
HGB BLD-MCNC: 8.1 G/DL (ref 12–16)
HGB BLD-MCNC: 8.6 G/DL (ref 12–16)
HGB BLD-MCNC: 8.9 G/DL (ref 12–16)
HGB BLDMV-MCNC: 7.8 G/DL (ref 12–16)
HGB BLDMV-MCNC: 8.4 G/DL (ref 12–16)
HGB BLDMV-MCNC: 8.6 G/DL (ref 12–16)
HGB BLDMV-MCNC: <3 G/DL (ref 12–16)
IMM GRANULOCYTES # BLD AUTO: 0.77 X10*3/UL (ref 0–0.7)
IMM GRANULOCYTES # BLD AUTO: 0.95 X10*3/UL (ref 0–0.7)
IMM GRANULOCYTES # BLD AUTO: 0.95 X10*3/UL (ref 0–0.7)
IMM GRANULOCYTES # BLD AUTO: 1.07 X10*3/UL (ref 0–0.7)
IMM GRANULOCYTES NFR BLD AUTO: 2.9 % (ref 0–0.9)
IMM GRANULOCYTES NFR BLD AUTO: 3.3 % (ref 0–0.9)
IMM GRANULOCYTES NFR BLD AUTO: 3.4 % (ref 0–0.9)
IMM GRANULOCYTES NFR BLD AUTO: 3.4 % (ref 0–0.9)
INHALED O2 CONCENTRATION: 0 %
INHALED O2 CONCENTRATION: 21 %
LACTATE BLDMV-SCNC: 0.5 MMOL/L (ref 0.4–2)
LACTATE BLDMV-SCNC: 0.5 MMOL/L (ref 0.4–2)
LACTATE BLDMV-SCNC: 0.7 MMOL/L (ref 0.4–2)
LACTATE BLDMV-SCNC: 0.8 MMOL/L (ref 0.4–2)
LYMPHOCYTES # BLD AUTO: 3.38 X10*3/UL (ref 1.2–4.8)
LYMPHOCYTES # BLD AUTO: 3.77 X10*3/UL (ref 1.2–4.8)
LYMPHOCYTES # BLD AUTO: 4.2 X10*3/UL (ref 1.2–4.8)
LYMPHOCYTES # BLD AUTO: 4.52 X10*3/UL (ref 1.2–4.8)
LYMPHOCYTES NFR BLD AUTO: 11.9 %
LYMPHOCYTES NFR BLD AUTO: 14.1 %
LYMPHOCYTES NFR BLD AUTO: 14.2 %
LYMPHOCYTES NFR BLD AUTO: 14.7 %
MAGNESIUM SERPL-MCNC: 1.88 MG/DL (ref 1.6–2.4)
MAGNESIUM SERPL-MCNC: 2.01 MG/DL (ref 1.6–2.4)
MCH RBC QN AUTO: 29 PG (ref 26–34)
MCH RBC QN AUTO: 29.2 PG (ref 26–34)
MCH RBC QN AUTO: 29.5 PG (ref 26–34)
MCH RBC QN AUTO: 29.5 PG (ref 26–34)
MCH RBC QN AUTO: 30 PG (ref 26–34)
MCHC RBC AUTO-ENTMCNC: 31.9 G/DL (ref 32–36)
MCHC RBC AUTO-ENTMCNC: 32.5 G/DL (ref 32–36)
MCHC RBC AUTO-ENTMCNC: 32.8 G/DL (ref 32–36)
MCHC RBC AUTO-ENTMCNC: 32.9 G/DL (ref 32–36)
MCHC RBC AUTO-ENTMCNC: 34.3 G/DL (ref 32–36)
MCV RBC AUTO: 88 FL (ref 80–100)
MCV RBC AUTO: 89 FL (ref 80–100)
MCV RBC AUTO: 90 FL (ref 80–100)
MCV RBC AUTO: 91 FL (ref 80–100)
MCV RBC AUTO: 91 FL (ref 80–100)
MONOCYTES # BLD AUTO: 2.2 X10*3/UL (ref 0.1–1)
MONOCYTES # BLD AUTO: 2.38 X10*3/UL (ref 0.1–1)
MONOCYTES # BLD AUTO: 2.54 X10*3/UL (ref 0.1–1)
MONOCYTES # BLD AUTO: 2.61 X10*3/UL (ref 0.1–1)
MONOCYTES NFR BLD AUTO: 7.8 %
MONOCYTES NFR BLD AUTO: 8.2 %
MONOCYTES NFR BLD AUTO: 8.9 %
MONOCYTES NFR BLD AUTO: 8.9 %
NEUTROPHILS # BLD AUTO: 19.6 X10*3/UL (ref 1.2–7.7)
NEUTROPHILS # BLD AUTO: 20.62 X10*3/UL (ref 1.2–7.7)
NEUTROPHILS # BLD AUTO: 21.54 X10*3/UL (ref 1.2–7.7)
NEUTROPHILS # BLD AUTO: 23.36 X10*3/UL (ref 1.2–7.7)
NEUTROPHILS NFR BLD AUTO: 72.3 %
NEUTROPHILS NFR BLD AUTO: 73.2 %
NEUTROPHILS NFR BLD AUTO: 73.4 %
NEUTROPHILS NFR BLD AUTO: 76.1 %
NRBC BLD-RTO: 0.2 /100 WBCS (ref 0–0)
NRBC BLD-RTO: 0.3 /100 WBCS (ref 0–0)
OXYHGB MFR BLDMV: 59.6 % (ref 45–75)
OXYHGB MFR BLDMV: 66 % (ref 45–75)
OXYHGB MFR BLDMV: 71.4 % (ref 45–75)
OXYHGB MFR BLDMV: ABNORMAL %
PCO2 BLDMV: 36 MM HG (ref 41–51)
PCO2 BLDMV: 40 MM HG (ref 41–51)
PCO2 BLDMV: 40 MM HG (ref 41–51)
PCO2 BLDMV: 41 MM HG (ref 41–51)
PH BLDMV: 7.39 PH (ref 7.33–7.43)
PH BLDMV: 7.39 PH (ref 7.33–7.43)
PH BLDMV: 7.42 PH (ref 7.33–7.43)
PH BLDMV: 7.5 PH (ref 7.33–7.43)
PHOSPHATE SERPL-MCNC: 3.7 MG/DL (ref 2.5–4.9)
PHOSPHATE SERPL-MCNC: 3.9 MG/DL (ref 2.5–4.9)
PLATELET # BLD AUTO: 110 X10*3/UL (ref 150–450)
PLATELET # BLD AUTO: 140 X10*3/UL (ref 150–450)
PLATELET # BLD AUTO: 144 X10*3/UL (ref 150–450)
PLATELET # BLD AUTO: 152 X10*3/UL (ref 150–450)
PLATELET # BLD AUTO: 157 X10*3/UL (ref 150–450)
PO2 BLDMV: 40 MM HG (ref 35–45)
PO2 BLDMV: 41 MM HG (ref 35–45)
PO2 BLDMV: 42 MM HG (ref 35–45)
PO2 BLDMV: 44 MM HG (ref 35–45)
POLYCHROMASIA BLD QL SMEAR: NORMAL
POLYCHROMASIA BLD QL SMEAR: NORMAL
POTASSIUM BLDMV-SCNC: 3.4 MMOL/L (ref 3.5–5.3)
POTASSIUM BLDMV-SCNC: 4.9 MMOL/L (ref 3.5–5.3)
POTASSIUM SERPL-SCNC: 4.5 MMOL/L (ref 3.5–5.3)
POTASSIUM SERPL-SCNC: 5.6 MMOL/L (ref 3.5–5.3)
PRODUCT BLOOD TYPE: 7300
PRODUCT CODE: NORMAL
PROT SERPL-MCNC: 4.3 G/DL (ref 6.4–8.2)
RBC # BLD AUTO: 2.51 X10*6/UL (ref 4–5.2)
RBC # BLD AUTO: 2.62 X10*6/UL (ref 4–5.2)
RBC # BLD AUTO: 2.75 X10*6/UL (ref 4–5.2)
RBC # BLD AUTO: 2.87 X10*6/UL (ref 4–5.2)
RBC # BLD AUTO: 3.05 X10*6/UL (ref 4–5.2)
RBC MORPH BLD: NORMAL
RBC MORPH BLD: NORMAL
RH FACTOR (ANTIGEN D): NORMAL
SAO2 % BLDMV: 62 % (ref 45–75)
SAO2 % BLDMV: 69 % (ref 45–75)
SAO2 % BLDMV: 74 % (ref 45–75)
SAO2 % BLDMV: ABNORMAL %
SODIUM BLDMV-SCNC: 127 MMOL/L (ref 136–145)
SODIUM BLDMV-SCNC: 127 MMOL/L (ref 136–145)
SODIUM BLDMV-SCNC: 128 MMOL/L (ref 136–145)
SODIUM BLDMV-SCNC: 128 MMOL/L (ref 136–145)
SODIUM SERPL-SCNC: 128 MMOL/L (ref 136–145)
SODIUM SERPL-SCNC: 131 MMOL/L (ref 136–145)
UFH PPP CHRO-ACNC: 0.2 IU/ML
UFH PPP CHRO-ACNC: 0.5 IU/ML
UFH PPP CHRO-ACNC: 0.7 IU/ML
UNIT ABO: NORMAL
UNIT NUMBER: NORMAL
UNIT RH: NORMAL
UNIT VOLUME: 350
WBC # BLD AUTO: 26.6 X10*3/UL (ref 4.4–11.3)
WBC # BLD AUTO: 26.8 X10*3/UL (ref 4.4–11.3)
WBC # BLD AUTO: 28.3 X10*3/UL (ref 4.4–11.3)
WBC # BLD AUTO: 28.5 X10*3/UL (ref 4.4–11.3)
WBC # BLD AUTO: 31.8 X10*3/UL (ref 4.4–11.3)
XM INTEP: NORMAL

## 2024-07-01 PROCEDURE — 84132 ASSAY OF SERUM POTASSIUM: CPT

## 2024-07-01 PROCEDURE — 85027 COMPLETE CBC AUTOMATED: CPT

## 2024-07-01 PROCEDURE — 2500000001 HC RX 250 WO HCPCS SELF ADMINISTERED DRUGS (ALT 637 FOR MEDICARE OP)

## 2024-07-01 PROCEDURE — 99233 SBSQ HOSP IP/OBS HIGH 50: CPT

## 2024-07-01 PROCEDURE — 99291 CRITICAL CARE FIRST HOUR: CPT

## 2024-07-01 PROCEDURE — 2020000001 HC ICU ROOM DAILY

## 2024-07-01 PROCEDURE — 83735 ASSAY OF MAGNESIUM: CPT

## 2024-07-01 PROCEDURE — 84075 ASSAY ALKALINE PHOSPHATASE: CPT

## 2024-07-01 PROCEDURE — 2500000002 HC RX 250 W HCPCS SELF ADMINISTERED DRUGS (ALT 637 FOR MEDICARE OP, ALT 636 FOR OP/ED)

## 2024-07-01 PROCEDURE — 84100 ASSAY OF PHOSPHORUS: CPT

## 2024-07-01 PROCEDURE — C9113 INJ PANTOPRAZOLE SODIUM, VIA: HCPCS

## 2024-07-01 PROCEDURE — P9016 RBC LEUKOCYTES REDUCED: HCPCS

## 2024-07-01 PROCEDURE — 99232 SBSQ HOSP IP/OBS MODERATE 35: CPT | Performed by: NURSE PRACTITIONER

## 2024-07-01 PROCEDURE — 82947 ASSAY GLUCOSE BLOOD QUANT: CPT

## 2024-07-01 PROCEDURE — 82374 ASSAY BLOOD CARBON DIOXIDE: CPT

## 2024-07-01 PROCEDURE — 2500000004 HC RX 250 GENERAL PHARMACY W/ HCPCS (ALT 636 FOR OP/ED)

## 2024-07-01 PROCEDURE — 85520 HEPARIN ASSAY: CPT

## 2024-07-01 PROCEDURE — 85025 COMPLETE CBC W/AUTO DIFF WBC: CPT

## 2024-07-01 PROCEDURE — 36430 TRANSFUSION BLD/BLD COMPNT: CPT

## 2024-07-01 PROCEDURE — 37799 UNLISTED PX VASCULAR SURGERY: CPT

## 2024-07-01 RX ORDER — HYDROMORPHONE HYDROCHLORIDE 1 MG/ML
0.4 INJECTION, SOLUTION INTRAMUSCULAR; INTRAVENOUS; SUBCUTANEOUS EVERY 4 HOURS PRN
Status: DISCONTINUED | OUTPATIENT
Start: 2024-07-01 | End: 2024-07-02

## 2024-07-01 RX ORDER — OXYCODONE HYDROCHLORIDE 5 MG/1
5 TABLET ORAL EVERY 4 HOURS PRN
Status: DISCONTINUED | OUTPATIENT
Start: 2024-07-01 | End: 2024-07-02

## 2024-07-01 RX ORDER — MAGNESIUM SULFATE HEPTAHYDRATE 40 MG/ML
2 INJECTION, SOLUTION INTRAVENOUS ONCE
Status: COMPLETED | OUTPATIENT
Start: 2024-07-01 | End: 2024-07-01

## 2024-07-01 RX ORDER — OXYCODONE HYDROCHLORIDE 5 MG/1
10 TABLET ORAL EVERY 4 HOURS PRN
Status: DISCONTINUED | OUTPATIENT
Start: 2024-07-01 | End: 2024-07-02

## 2024-07-01 RX ADMIN — PIPERACILLIN SODIUM AND TAZOBACTAM SODIUM 3.38 G: 3; .375 INJECTION, SOLUTION INTRAVENOUS at 21:03

## 2024-07-01 RX ADMIN — PANTOPRAZOLE SODIUM 40 MG: 40 INJECTION, POWDER, FOR SOLUTION INTRAVENOUS at 09:55

## 2024-07-01 RX ADMIN — HEPARIN SODIUM 1400 UNITS/HR: 10000 INJECTION, SOLUTION INTRAVENOUS at 04:11

## 2024-07-01 RX ADMIN — AMIODARONE HYDROCHLORIDE 200 MG: 200 TABLET ORAL at 09:53

## 2024-07-01 RX ADMIN — MAGNESIUM SULFATE HEPTAHYDRATE 2 G: 40 INJECTION, SOLUTION INTRAVENOUS at 09:32

## 2024-07-01 RX ADMIN — VANCOMYCIN HYDROCHLORIDE 1000 MG: 1 INJECTION, SOLUTION INTRAVENOUS at 06:07

## 2024-07-01 RX ADMIN — POLYETHYLENE GLYCOL 3350 17 G: 17 POWDER, FOR SOLUTION ORAL at 09:00

## 2024-07-01 RX ADMIN — PIPERACILLIN SODIUM AND TAZOBACTAM SODIUM 3.38 G: 3; .375 INJECTION, SOLUTION INTRAVENOUS at 04:11

## 2024-07-01 RX ADMIN — ASPIRIN 81 MG CHEWABLE TABLET 81 MG: 81 TABLET CHEWABLE at 09:32

## 2024-07-01 RX ADMIN — Medication 6 MG: at 20:53

## 2024-07-01 RX ADMIN — PIPERACILLIN SODIUM AND TAZOBACTAM SODIUM 3.38 G: 3; .375 INJECTION, SOLUTION INTRAVENOUS at 16:00

## 2024-07-01 RX ADMIN — OXYCODONE HYDROCHLORIDE 10 MG: 5 TABLET ORAL at 15:24

## 2024-07-01 RX ADMIN — HYDROMORPHONE HYDROCHLORIDE 0.4 MG: 1 INJECTION, SOLUTION INTRAMUSCULAR; INTRAVENOUS; SUBCUTANEOUS at 10:27

## 2024-07-01 RX ADMIN — OXYCODONE HYDROCHLORIDE 10 MG: 5 TABLET ORAL at 09:50

## 2024-07-01 RX ADMIN — HEPARIN SODIUM 1600 UNITS/HR: 10000 INJECTION, SOLUTION INTRAVENOUS at 18:43

## 2024-07-01 RX ADMIN — SENNOSIDES 17.2 MG: 8.6 TABLET, FILM COATED ORAL at 20:53

## 2024-07-01 RX ADMIN — PIPERACILLIN SODIUM AND TAZOBACTAM SODIUM 3.38 G: 3; .375 INJECTION, SOLUTION INTRAVENOUS at 11:28

## 2024-07-01 RX ADMIN — SENNOSIDES 17.2 MG: 8.6 TABLET, FILM COATED ORAL at 09:54

## 2024-07-01 RX ADMIN — OXYCODONE HYDROCHLORIDE 5 MG: 5 TABLET ORAL at 20:54

## 2024-07-01 RX ADMIN — VANCOMYCIN HYDROCHLORIDE 1000 MG: 1 INJECTION, SOLUTION INTRAVENOUS at 18:30

## 2024-07-01 ASSESSMENT — PAIN - FUNCTIONAL ASSESSMENT
PAIN_FUNCTIONAL_ASSESSMENT: 0-10

## 2024-07-01 ASSESSMENT — PAIN SCALES - GENERAL
PAINLEVEL_OUTOF10: 9
PAINLEVEL_OUTOF10: 0 - NO PAIN
PAINLEVEL_OUTOF10: 2
PAINLEVEL_OUTOF10: 8
PAINLEVEL_OUTOF10: 0 - NO PAIN
PAINLEVEL_OUTOF10: 4
PAINLEVEL_OUTOF10: 9
PAINLEVEL_OUTOF10: 0 - NO PAIN

## 2024-07-01 ASSESSMENT — PAIN DESCRIPTION - LOCATION: LOCATION: LEG

## 2024-07-01 ASSESSMENT — PAIN DESCRIPTION - ORIENTATION: ORIENTATION: RIGHT

## 2024-07-01 NOTE — PROGRESS NOTES
"Amirah Bennett is a 43 y.o. female on day 11 of admission presenting with Atrial fibrillation (Multi).    Subjective   RLE stump had bleeding, saturating dressing  Hgb stable at 8.9  Otherwise, patient had no complaints       Objective     Physical Exam  Constitutional: no acute distress  Neuro: A/O x4, no gross deficits   Psych: normal affect  HEENT: No deformities, no scleral icterus   Cardiac: RRR  Pulmonary: unlabored respirations   Abdomen: soft, non distended, non tender  Skin: warm and dry overall    Extremities: RLE s/p AKA, dressing with saturation and soaking through to the bed below, no obvious hematoma vision      Last Recorded Vitals  Blood pressure 93/55, pulse 91, temperature 36.3 °C (97.3 °F), temperature source Temporal, resp. rate 20, height 1.702 m (5' 7.01\"), weight 84.4 kg (186 lb 1.1 oz), SpO2 100%.  Intake/Output last 3 Shifts:  I/O last 3 completed shifts:  In: 3997.8 (47.4 mL/kg) [P.O.:960; I.V.:626.1 (7.4 mL/kg); Blood:1411.7; IV Piggyback:1000]  Out: 2376 (28.2 mL/kg) [Urine:2376 (0.8 mL/kg/hr)]  Weight: 84.4 kg     Relevant Results  Scheduled medications  amiodarone, 200 mg, oral, Daily  aspirin, 81 mg, oral, Daily  bisacodyl, 10 mg, rectal, Daily  [Held by provider] empagliflozin, 10 mg, oral, Daily  insulin lispro, 0-5 Units, subcutaneous, q4h  magnesium sulfate, 2 g, intravenous, Once  melatonin, 6 mg, oral, Nightly  oxygen, , inhalation, Continuous - Inhalation  pantoprazole, 40 mg, intravenous, Daily  perflutren protein A microsphere, 0.5 mL, intravenous, Once in imaging  piperacillin-tazobactam, 3.375 g, intravenous, q6h  polyethylene glycol, 17 g, oral, BID  [Held by provider] sacubitriL-valsartan, 1 tablet, oral, BID  sennosides, 2 tablet, oral, BID  sodium zirconium cyclosilicate, 10 g, oral, Once  sulfur hexafluoride microsphr, 2 mL, intravenous, Once in imaging  vancomycin, 1,000 mg, intravenous, q12h      Continuous medications  heparin, 0-4,500 Units/hr, Last Rate: 1,400 " Units/hr (07/01/24 9603)      PRN medications  PRN medications: cyclobenzaprine, dextrose, dextrose, glucagon, glucagon, heparin, HYDROmorphone, lidocaine, oxyCODONE, oxyCODONE, vancomycin  Results for orders placed or performed during the hospital encounter of 06/20/24 (from the past 24 hour(s))   POCT GLUCOSE   Result Value Ref Range    POCT Glucose 108 (H) 74 - 99 mg/dL   CBC and Auto Differential   Result Value Ref Range    WBC 29.4 (H) 4.4 - 11.3 x10*3/uL    nRBC 0.2 (H) 0.0 - 0.0 /100 WBCs    RBC 2.57 (L) 4.00 - 5.20 x10*6/uL    Hemoglobin 7.6 (L) 12.0 - 16.0 g/dL    Hematocrit 21.8 (L) 36.0 - 46.0 %    MCV 85 80 - 100 fL    MCH 29.6 26.0 - 34.0 pg    MCHC 34.9 32.0 - 36.0 g/dL    RDW 16.8 (H) 11.5 - 14.5 %    Platelets 125 (L) 150 - 450 x10*3/uL    Neutrophils % 74.1 40.0 - 80.0 %    Immature Granulocytes %, Automated 3.0 (H) 0.0 - 0.9 %    Lymphocytes % 13.1 13.0 - 44.0 %    Monocytes % 9.2 2.0 - 10.0 %    Eosinophils % 0.4 0.0 - 6.0 %    Basophils % 0.2 0.0 - 2.0 %    Neutrophils Absolute 21.82 (H) 1.20 - 7.70 x10*3/uL    Immature Granulocytes Absolute, Automated 0.87 (H) 0.00 - 0.70 x10*3/uL    Lymphocytes Absolute 3.84 1.20 - 4.80 x10*3/uL    Monocytes Absolute 2.71 (H) 0.10 - 1.00 x10*3/uL    Eosinophils Absolute 0.11 0.00 - 0.70 x10*3/uL    Basophils Absolute 0.06 0.00 - 0.10 x10*3/uL   BLOOD GAS MIXED VENOUS FULL PANEL   Result Value Ref Range    POCT pH, Mixed 7.40 7.33 - 7.43 pH    POCT pCO2, Mixed 42 41 - 51 mm Hg    POCT pO2, Mixed 39 35 - 45 mm Hg    POCT SO2, Mixed 61 45 - 75 %    POCT Oxy Hemoglobin, Mixed 58.9 45.0 - 75.0 %    POCT Hematocrit Calculated, Mixed 24.0 (L) 36.0 - 46.0 %    POCT Sodium, Mixed 123 (L) 136 - 145 mmol/L    POCT Potassium, Mixed 4.2 3.5 - 5.3 mmol/L    POCT Chloride, Mixed 95 (L) 98 - 107 mmol/L    POCT Ionized Calcium, Mixed 1.15 1.10 - 1.33 mmol/L    POCT Glucose, Mixed 130 (H) 74 - 99 mg/dL    POCT Lactate, Mixed 1.3 0.4 - 2.0 mmol/L    POCT Base Excess, Mixed 1.1  -2.0 - 3.0 mmol/L    POCT HCO3 Calculated, Mixed 26.0 22.0 - 26.0 mmol/L    POCT Hemoglobin, Mixed 7.9 (L) 12.0 - 16.0 g/dL    POCT Anion Gap, Mixed 6 (L) 10 - 25 mmo/L    Patient Temperature 37.0 degrees Celsius    FiO2 21 %   POCT GLUCOSE   Result Value Ref Range    POCT Glucose 138 (H) 74 - 99 mg/dL   POCT GLUCOSE   Result Value Ref Range    POCT Glucose 196 (H) 74 - 99 mg/dL   Renal function panel   Result Value Ref Range    Glucose 121 (H) 74 - 99 mg/dL    Sodium 128 (L) 136 - 145 mmol/L    Potassium 4.6 3.5 - 5.3 mmol/L    Chloride 98 98 - 107 mmol/L    Bicarbonate 24 21 - 32 mmol/L    Anion Gap 11 10 - 20 mmol/L    Urea Nitrogen 12 6 - 23 mg/dL    Creatinine 0.73 0.50 - 1.05 mg/dL    eGFR >90 >60 mL/min/1.73m*2    Calcium 7.3 (L) 8.6 - 10.6 mg/dL    Phosphorus 3.3 2.5 - 4.9 mg/dL    Albumin 2.1 (L) 3.4 - 5.0 g/dL   Magnesium   Result Value Ref Range    Magnesium 2.05 1.60 - 2.40 mg/dL   CBC and Auto Differential   Result Value Ref Range    WBC 32.3 (H) 4.4 - 11.3 x10*3/uL    nRBC 0.3 (H) 0.0 - 0.0 /100 WBCs    RBC 2.79 (L) 4.00 - 5.20 x10*6/uL    Hemoglobin 8.4 (L) 12.0 - 16.0 g/dL    Hematocrit 24.9 (L) 36.0 - 46.0 %    MCV 89 80 - 100 fL    MCH 30.1 26.0 - 34.0 pg    MCHC 33.7 32.0 - 36.0 g/dL    RDW 17.1 (H) 11.5 - 14.5 %    Platelets 133 (L) 150 - 450 x10*3/uL    Immature Granulocytes %, Automated 3.6 (H) 0.0 - 0.9 %    Immature Granulocytes Absolute, Automated 1.18 (H) 0.00 - 0.70 x10*3/uL   BLOOD GAS MIXED VENOUS FULL PANEL   Result Value Ref Range    POCT pH, Mixed 7.38 7.33 - 7.43 pH    POCT pCO2, Mixed 40 (L) 41 - 51 mm Hg    POCT pO2, Mixed 43 35 - 45 mm Hg    POCT SO2, Mixed 69 45 - 75 %    POCT Oxy Hemoglobin, Mixed 66.4 45.0 - 75.0 %    POCT Hematocrit Calculated, Mixed 26.0 (L) 36.0 - 46.0 %    POCT Sodium, Mixed 125 (L) 136 - 145 mmol/L    POCT Potassium, Mixed 4.9 3.5 - 5.3 mmol/L    POCT Chloride, Mixed 97 (L) 98 - 107 mmol/L    POCT Ionized Calcium, Mixed 1.17 1.10 - 1.33 mmol/L    POCT  Glucose, Mixed 123 (H) 74 - 99 mg/dL    POCT Lactate, Mixed 1.3 0.4 - 2.0 mmol/L    POCT Base Excess, Mixed -1.3 -2.0 - 3.0 mmol/L    POCT HCO3 Calculated, Mixed 23.7 22.0 - 26.0 mmol/L    POCT Hemoglobin, Mixed 8.5 (L) 12.0 - 16.0 g/dL    POCT Anion Gap, Mixed 9 (L) 10 - 25 mmo/L    Patient Temperature 37.0 degrees Celsius    FiO2 21 %   Manual Differential   Result Value Ref Range    Neutrophils %, Manual 82.6 40.0 - 80.0 %    Bands %, Manual 2.6 0.0 - 5.0 %    Lymphocytes %, Manual 10.4 13.0 - 44.0 %    Monocytes %, Manual 3.5 2.0 - 10.0 %    Eosinophils %, Manual 0.9 0.0 - 6.0 %    Basophils %, Manual 0.0 0.0 - 2.0 %    Seg Neutrophils Absolute, Manual 26.68 (H) 1.20 - 7.00 x10*3/uL    Bands Absolute, Manual 0.84 (H) 0.00 - 0.70 x10*3/uL    Lymphocytes Absolute, Manual 3.36 1.20 - 4.80 x10*3/uL    Monocytes Absolute, Manual 1.13 (H) 0.10 - 1.00 x10*3/uL    Eosinophils Absolute, Manual 0.29 0.00 - 0.70 x10*3/uL    Basophils Absolute, Manual 0.00 0.00 - 0.10 x10*3/uL    Total Cells Counted 115     Neutrophils Absolute, Manual 27.52 (H) 1.20 - 7.70 x10*3/uL    RBC Morphology See Below     Polychromasia Mild     [place]rí[place] Cells Few    POCT GLUCOSE   Result Value Ref Range    POCT Glucose 102 (H) 74 - 99 mg/dL   CBC and Auto Differential   Result Value Ref Range    WBC 31.8 (H) 4.4 - 11.3 x10*3/uL    nRBC 0.3 (H) 0.0 - 0.0 /100 WBCs    RBC 2.75 (L) 4.00 - 5.20 x10*6/uL    Hemoglobin 8.1 (L) 12.0 - 16.0 g/dL    Hematocrit 24.9 (L) 36.0 - 46.0 %    MCV 91 80 - 100 fL    MCH 29.5 26.0 - 34.0 pg    MCHC 32.5 32.0 - 36.0 g/dL    RDW 17.8 (H) 11.5 - 14.5 %    Platelets 140 (L) 150 - 450 x10*3/uL    Neutrophils % 73.4 40.0 - 80.0 %    Immature Granulocytes %, Automated 3.4 (H) 0.0 - 0.9 %    Lymphocytes % 14.2 13.0 - 44.0 %    Monocytes % 8.2 2.0 - 10.0 %    Eosinophils % 0.6 0.0 - 6.0 %    Basophils % 0.2 0.0 - 2.0 %    Neutrophils Absolute 23.36 (H) 1.20 - 7.70 x10*3/uL    Immature Granulocytes Absolute, Automated 1.07 (H)  0.00 - 0.70 x10*3/uL    Lymphocytes Absolute 4.52 1.20 - 4.80 x10*3/uL    Monocytes Absolute 2.61 (H) 0.10 - 1.00 x10*3/uL    Eosinophils Absolute 0.18 0.00 - 0.70 x10*3/uL    Basophils Absolute 0.06 0.00 - 0.10 x10*3/uL   Morphology   Result Value Ref Range    RBC Morphology See Below     Polychromasia Mild     [place] Cells Few    Type and screen   Result Value Ref Range    ABO TYPE B     Rh TYPE POS     ANTIBODY SCREEN NEG    BLOOD GAS MIXED VENOUS FULL PANEL   Result Value Ref Range    POCT pH, Mixed 7.43 7.33 - 7.43 pH    POCT pCO2, Mixed 40 (L) 41 - 51 mm Hg    POCT pO2, Mixed 42 35 - 45 mm Hg    POCT SO2, Mixed 69 45 - 75 %    POCT Oxy Hemoglobin, Mixed 66.7 45.0 - 75.0 %    POCT Hematocrit Calculated, Mixed 23.0 (L) 36.0 - 46.0 %    POCT Sodium, Mixed 125 (L) 136 - 145 mmol/L    POCT Potassium, Mixed 5.0 3.5 - 5.3 mmol/L    POCT Chloride, Mixed 98 98 - 107 mmol/L    POCT Ionized Calcium, Mixed 1.18 1.10 - 1.33 mmol/L    POCT Glucose, Mixed 106 (H) 74 - 99 mg/dL    POCT Lactate, Mixed 0.5 0.4 - 2.0 mmol/L    POCT Base Excess, Mixed 2.0 -2.0 - 3.0 mmol/L    POCT HCO3 Calculated, Mixed 26.5 (H) 22.0 - 26.0 mmol/L    POCT Hemoglobin, Mixed 7.7 (L) 12.0 - 16.0 g/dL    POCT Anion Gap, Mixed 6 (L) 10 - 25 mmo/L    Patient Temperature 37.0 degrees Celsius    FiO2 0 %   Prepare RBC: 1 Units   Result Value Ref Range    PRODUCT CODE [id number]     Unit Number [id number]     Unit ABO B     Unit RH POS     XM INTEP COMP     Dispense Status TR     Blood Expiration Date July 17, 2024 23:59 EDT     PRODUCT BLOOD TYPE 7300     UNIT VOLUME 350    POCT GLUCOSE   Result Value Ref Range    POCT Glucose 124 (H) 74 - 99 mg/dL   CBC and Auto Differential   Result Value Ref Range    WBC 28.5 (H) 4.4 - 11.3 x10*3/uL    nRBC 0.3 (H) 0.0 - 0.0 /100 WBCs    RBC 3.05 (L) 4.00 - 5.20 x10*6/uL    Hemoglobin 8.9 (L) 12.0 - 16.0 g/dL    Hematocrit 27.1 (L) 36.0 - 46.0 %    MCV 89 80 - 100 fL    MCH 29.2 26.0 - 34.0 pg    MCHC 32.8 32.0 -  36.0 g/dL    RDW 16.6 (H) 11.5 - 14.5 %    Platelets 110 (L) 150 - 450 x10*3/uL    Neutrophils % 72.3 40.0 - 80.0 %    Immature Granulocytes %, Automated 3.3 (H) 0.0 - 0.9 %    Lymphocytes % 14.7 13.0 - 44.0 %    Monocytes % 8.9 2.0 - 10.0 %    Eosinophils % 0.6 0.0 - 6.0 %    Basophils % 0.2 0.0 - 2.0 %    Neutrophils Absolute 20.62 (H) 1.20 - 7.70 x10*3/uL    Immature Granulocytes Absolute, Automated 0.95 (H) 0.00 - 0.70 x10*3/uL    Lymphocytes Absolute 4.20 1.20 - 4.80 x10*3/uL    Monocytes Absolute 2.54 (H) 0.10 - 1.00 x10*3/uL    Eosinophils Absolute 0.16 0.00 - 0.70 x10*3/uL    Basophils Absolute 0.07 0.00 - 0.10 x10*3/uL   Magnesium   Result Value Ref Range    Magnesium 1.88 1.60 - 2.40 mg/dL   Hepatic Function Panel   Result Value Ref Range    Albumin 2.0 (L) 3.4 - 5.0 g/dL    Bilirubin, Total 3.6 (H) 0.0 - 1.2 mg/dL    Bilirubin, Direct 2.0 (H) 0.0 - 0.3 mg/dL    Alkaline Phosphatase 58 33 - 110 U/L    ALT 82 (H) 7 - 45 U/L    AST 44 (H) 9 - 39 U/L    Total Protein 4.3 (L) 6.4 - 8.2 g/dL   Phosphorus   Result Value Ref Range    Phosphorus 3.9 2.5 - 4.9 mg/dL   Basic Metabolic Panel   Result Value Ref Range    Glucose 108 (H) 74 - 99 mg/dL    Sodium 128 (L) 136 - 145 mmol/L    Potassium 5.6 (H) 3.5 - 5.3 mmol/L    Chloride 99 98 - 107 mmol/L    Bicarbonate 23 21 - 32 mmol/L    Anion Gap 12 10 - 20 mmol/L    Urea Nitrogen 11 6 - 23 mg/dL    Creatinine 0.72 0.50 - 1.05 mg/dL    eGFR >90 >60 mL/min/1.73m*2    Calcium 7.3 (L) 8.6 - 10.6 mg/dL   Morphology   Result Value Ref Range    RBC Morphology See Below     Polychromasia Mild     [place] Cells Few    BLOOD GAS MIXED VENOUS FULL PANEL   Result Value Ref Range    POCT pH, Mixed 7.39 7.33 - 7.43 pH    POCT pCO2, Mixed 40 (L) 41 - 51 mm Hg    POCT pO2, Mixed 44 35 - 45 mm Hg    POCT SO2, Mixed 74 45 - 75 %    POCT Oxy Hemoglobin, Mixed 71.4 45.0 - 75.0 %    POCT Hematocrit Calculated, Mixed 26.0 (L) 36.0 - 46.0 %    POCT Sodium, Mixed 127 (L) 136 - 145 mmol/L     POCT Potassium, Mixed 4.9 3.5 - 5.3 mmol/L    POCT Chloride, Mixed 99 98 - 107 mmol/L    POCT Ionized Calcium, Mixed 1.20 1.10 - 1.33 mmol/L    POCT Glucose, Mixed 92 74 - 99 mg/dL    POCT Lactate, Mixed 0.5 0.4 - 2.0 mmol/L    POCT Base Excess, Mixed -0.7 -2.0 - 3.0 mmol/L    POCT HCO3 Calculated, Mixed 24.2 22.0 - 26.0 mmol/L    POCT Hemoglobin, Mixed 8.6 (L) 12.0 - 16.0 g/dL    POCT Anion Gap, Mixed 9 (L) 10 - 25 mmo/L    Patient Temperature 37.0 degrees Celsius    FiO2 0 %   POCT GLUCOSE   Result Value Ref Range    POCT Glucose 114 (H) 74 - 99 mg/dL     CT angio chest abdomen pelvis    Result Date: 7/1/2024  Interpreted By:  Manuel Miller and Baker Zachary STUDY: CT ANGIO CHEST ABDOMEN PELVIS;  6/30/2024 12:00 pm   INDICATION: Signs/Symptoms:evaluate for source of bleeding, not incrementing appropriately.   COMPARISON: CTA on 06/24/2024. CTA head and neck on 03/09/2024.   ACCESSION NUMBER(S): [id number]   ORDERING CLINICIAN: ANTIONE GAMBLE   PROCEDURE: CT ANGIOGRAM OF THE CHEST, ABDOMEN AND PELVIS   TECHNIQUE: High-resolution contrast-enhanced helical CT of the chest, abdomen, and pelvis was performed, without contrast, timed to arterial phase, and timed to venous phase (three phases).  3-D processing was performed by the physician on an independent work station, with MIP and volume-rendering techniques. Total of 90 ml of Omnipaque 350 was injected intravenously during the examination.  The study was performed without oral contrast. The patient tolerated the injection without complications.   FINDINGS: VASCULAR:   Pulmonary Arteries: No acute pulmonary embolism. Note is made of right IJ Lusby-Abe catheter with tip terminating within a right upper lobe pulmonary artery.   Thoracic Aorta: Non-contrast images show no evidence of acute intramural hematoma. No thoracic aortic aneurysm or dissection. No significant atherosclerosis. Anatomic pattern of great vessels off the arch is normal.   Images of the abdominal  aorta demonstrate diffuse atherosclerotic change without significant focal stenosis or aneurysm. Redemonstration of hypodense filling defect involving the bifurcation of the right external/internal iliacs, decreased in size and extent compared to prior exam. Remaining common, external, and internal iliac arteries are patent without narrowing or filling defects.   Celiac artery demonstrates no significant focal stenosis.   Superior mesenteric artery demonstrates no significant focal stenosis.   Inferior mesenteric artery demonstrates no significant focal stenosis.   There is a single right renal artery.  Right renal artery demonstrates  no significant focal stenosis.  There  is a single renal vein which is patent.   There  is a single left renal artery.  Left renal artery demonstrates no significant focal stenosis.  There  is a single renal vein which is patent.   IVC filter noted in place, new compared to prior exam.   NONVASCULAR FINDINGS:   CHEST:   LUNG/PLEURA/LARGE AIRWAYS: No lung masses, pulmonary nodules or consolidations are present. There is no pleural effusion, no pneumothorax.   VESSELS: Please see above.   HEART: The heart is mildly enlarged, similar to prior exam. No pericardial effusion   MEDIASTINUM AND GHISLAINE: No mediastinal, hilar or axillary lymph nodes are present. The esophagus appears normal.   CHEST WALL AND LOWER NECK: Within normal limits. Hypodense nodule again noted within the left thyroid lobe, measuring 0.9 cm. Remaining thyroid gland is unremarkable.   ABDOMEN:   LIVER: The liver is mildly enlarged measuring approximately 20 cm in the craniocaudal dimension. Redemonstration of a hypoattenuating lesion within the anterior aspect of the left hepatic lobe measuring approximately 1.1 cm, likely representing a simple hepatic cyst. No new focal liver lesions.   BILE DUCTS: The intrahepatic and extrahepatic ducts are not dilated.   GALLBLADDER: The gallbladder is nondistended and without evidence  of radiopaque stones.   PANCREAS: The pancreas appears unremarkable without evidence of ductal dilatation or masses.   SPLEEN: The spleen is normal in size without focal lesions.   ADRENAL GLANDS: Nodular thickening of the bilateral adrenal glands, similar to prior exam.   KIDNEYS AND URETERS: Redemonstration of cortical atrophy involving the upper and mid left kidney with wedge-shaped areas of hypoenhancement in the interpolar region, likely sequelae of remote infarct. The right kidney is normal in appearance. No hydroureteronephrosis or nephroureterolithiasis is identified.   PELVIS:   BLADDER: The urinary bladder is decompressed with a Araya catheter in place.   REPRODUCTIVE ORGANS: No pelvic masses.   BOWEL: The stomach, small and large bowel are normal in appearance without wall thickening or obstruction. No extravasation of contrast to suggest active gastrointestinal hemorrhage. The appendix is not definitively visualized, however there is no pericecal stranding or fluid   VESSELS: Please see above.   PERITONEUM/RETROPERITONEUM/LYMPH NODES: Small amount of abdominopelvic ascites. Mild diffuse mesenteric edema. No loculated abdominopelvic fluid collections. No abdominopelvic lymphadenopathy.   BONES AND ABDOMINAL WALL: No suspicious osseous lesions are identified. Degenerative discogenic disease is noted in the lower thoracic and lumbar spine. Diffuse body wall edema. Multiple intramuscular lipomas again noted within the proximal left lower extremity. Fat stranding and soft tissue thickening involving the right inguinal soft tissues, with a small focus air, likely postprocedural. Multiple mildly enlarged and prominent inguinal lymph nodes, right-greater-than-left, likely reactive.       1. No evidence of active hemorrhage within the chest, abdomen, or pelvis. 2. Small filling defect at the bifurcation of the right internal and external iliac arteries, likely representing residual thrombus, decreased in size and  extent compared to prior exam. No additional filling defects identified. 3. Small volume abdominopelvic ascites and diffuse mesenteric/body wall edema, likely secondary to volume overload. 4. Soft tissue inflammation/edema and emphysema within the right inguinal region, likely post procedural in etiology. Multiple mildly enlarged inguinal lymph nodes in this region, likely reactive. 5. Additional findings as above.   I personally reviewed the images/study and I agree with the findings as stated by Dr. Vincent Urbina M.D. This study was interpreted at [place], [place].   MACRO: None   Signed by: Manuel Miller 7/1/2024 5:51 AM Dictation workstation:   [id number]    US abdomen limited liver    Result Date: 6/30/2024  Interpreted By:  George Hagen and Velez-Martinez Osvaldo STUDY: US ABDOMEN LIMITED LIVER;  6/30/2024 3:05 pm   INDICATION: Signs/Symptoms:uptrending direct bilirubin, diffuse abdominal pain.   COMPARISON: Right upper quadrant ultrasound 08/08/2022 Same day CTA chest abdomen pelvis   ACCESSION NUMBER(S): [id number]   ORDERING CLINICIAN: ANTIONE GAMBLE   TECHNIQUE: Multiple images of the right upper quadrant were obtained.  Gray scale, color Doppler and spectral Doppler waveform analysis was performed.  This examination was interpreted at [facility].   FINDINGS: LIVER: The liver measures 15.63 cm and is diffusely echogenic in appearance, consistent with diffuse fatty infiltration. The resulting increased beam attenuation thereby limiting evaluation of the liver for focal lesions. There is a 1.4 x 0.8 x 1.1 cm well-circumscribed anechoic lesion along the left hepatic lobe liver capsule likely representing a cyst. There are otherwise no focal lesions..     GALLBLADDER: The gallbladder is contracted due to the patient reportedly having just eaten. It demonstrates no evidence of gallstones, wall thickening or  surrounding fluid. The gallbladder wall thickness is 0.36 cm. Sonographic James's sign is reported to be negative.     BILE DUCTS: No evidence of intra or extrahepatic biliary dilatation is identified; the common bile duct measures 0.3cm.   PANCREAS: The pancreas is poorly visualized due to overlying bowel gas.   RIGHT KIDNEY: The right kidney measures 11.15 cm in length. The renal cortical echogenicity and thickness are within normal limit.  No hydronephrosis or renal calculi are seen.     PERITONEUM AND RETROPERITONEUM: There is no free or loculated fluid seen in the abdomen.       1. No acute sonographic abnormality of the right upper quadrant. 2. Hepatic steatosis.   I personally reviewed the images/study and I agree with the findings as stated by Maurice Khan MD (resident) . This study was interpreted at [place], [place].   MACRO: None   Signed by: George Hagen 6/30/2024 5:44 PM Dictation workstation:   [id number]    Electrocardiogram, 12-lead PRN ACS symptoms    Result Date: 6/29/2024  Normal sinus rhythm Voltage criteria for left ventricular hypertrophy T wave abnormality, consider inferolateral ischemia Abnormal ECG When compared with ECG of 29-JUN-2024 02:59, Premature atrial complexes are no longer Present    IR intervention filter placement    Result Date: 6/28/2024  Interpreted By:  Alber Del Cid and Dervishi Mario STUDY: IR INTERVENTION FILTER PLACEMENT;  6/27/2024 11:36 am   INDICATION: Signs/Symptoms:IVC filter placement.   COMPARISON: None.   ACCESSION NUMBER(S): [id number]   ORDERING CLINICIAN: ANTIONE GAMBLE   TECHNIQUE: INTERVENTIONALIST(S): Resident: Wellington Mesa MD Attending: Alber Del Cid MD   CONSENT: The patient/patient's POA/next of kin was informed of the nature of the proposed procedure. The purposes, alternatives, risks, and benefits were explained and discussed. All questions were answered and consent was  obtained.   RADIATION EXPOSURE: Fluoroscopy time: 3 min. Dose: 171.08 mGy. Dose Area Product (DAP): [id number]   SEDATION: Moderate conscious IV sedation services (supervision of administration, induction, and maintenance) were provided by the physician performing the procedure with intravenous fentanyl 150 mcg and versed 2 mg for 20 minutes. The physician was assisted by an independent trained observer, an interventional radiology nurse, in the continuous monitoring of patient level of consciousness and physiologic status.   MEDICATION/CONTRAST: No additional   TIME OUT: A time out was performed immediately prior to procedure start with the interventional team, correctly identifying the patient name, date of birth, MRN, procedure, anatomy (including marking of site and side), patient position, procedure consent form, relevant laboratory and imaging test results, antibiotic administration, safety precautions, and procedure-specific equipment needs.   COMPLICATIONS: No immediate adverse events identified.   FINDINGS: Maximum sterile barrier technique was implemented. In the recumbent position, the patient was positioned on the angiography table. The cutaneous tissues overlying the  right common femoral vein were prepared and draped in usual sterile manner. The optimal access site was identified by fluoroscopy with subsequent subcutaneous instillation of lidocaine 1% local anesthesia. Ultrasound images demonstrate a patent   right common femoral vein. Utilizing Seldinger/micropuncture access technique and direct ultrasound guidance the targeted vein was accessed. A hard copy ultrasound image was acquired and stored on the  PACS.   After confirmation of location, the access was secured with a 018 Acampo-Mandril guidewire. Subsequently, the 018 wire was removed and exchanged for an 035. The microcatheter was removed over the 035 wire and the site was upsized to the 5-Nigerien dilator sheath part of the IVC filter system which  was positioned at the proximal right common iliac vein. The 035 glidewire was then removed. Pre-placement inferior vena cava angiogram was performed at 20 cc for 30 sec injection rates at 900 PSI in the AP projection.   The inferior vena cava was patent without filling defects to sugest the presence of thrombus. No stricture, stenosis or oclusion was present. No anatomic variation was identified. The left common iliac vein was patent.   The level of the right and left renal veins were identified at the L1-2 level.   PROCEDURE: A retrievable  Option IVC filter was selected for placement. The infrarenal location was confirmed. Utilizing a 035 Amplatz wire, the introducer system was advanced into the inferior vena cava. There was subsequent deployment of the retrievable IVC filter in an infrarenal location.   A spot fluoroscopic image was obtained to confirm location.  The delivery system was removed and hemostasis was achieved utilizing manual compression.   The patient tolerated the procedure without complication.       1. Uncomplicated and technically successful placement of a retrievable   Option inferior vena cava filter in an infrarenal IVC location. The IVC filter may be retrieved as clinically indicated. Optimal recovery period is less than 90 days post-placement. However, longer periods have been reported and are possible. 2.  Patent inferior vena cava and common iliac vein reflux without evidence of thrombus, stenosis, occlusion, or anatomic variation.   Please contact interventional radiology if filtration is no longer indicated.   I was present for and/or performed the critical portions of the procedure and immediately available throughout the entire procedure.   I personally reviewed the image(s) / study and resident interpretation. I agree with the findings as stated.   Performed and dictated at [facility].   MACRO: None   Signed by: Alber Del Cid 6/28/2024 4:48 PM  Dictation workstation:   [id number]    Vascular US upper extremity venous duplex left    Result Date: 6/26/2024            [place] [address]   Tel [phone] and Fax [phone]  Vascular Lab Report [facility] US UPPER EXTREMITY VENOUS DUPLEX LEFT  Patient Name:      MANUEL GOULD         Reading Physician:  [id number] Ugo Moreno MD Study Date:        6/24/2024             Ordering Physician: [id number] SCHILLING MRN/PID:           [id number]              Technologist:       Catalina Lantigua RVT Accession#:        [id number]          Technologist 2: Date of Birth/Age: 1980 / 43 years Encounter#:         [id number] Gender:            F Admission Status:  Inpatient             Location Performed: [facility]  Diagnosis/ICD: Left arm swelling-M79.89 CPT Codes:     77957 Peripheral venous duplex scan for DVT Limited  CONCLUSIONS: Right Upper Venous: The subclavian vein demonstrates a normal spontaneous and phasic flow. Left Upper Venous: No evidence of acute deep vein thrombus visualized in the left upper extremity. Non-vascularized mass in left upper arm measuring 46.27 mm x 64.90 mm. Additional Findings; Non-vascularized mass.  Imaging & Doppler Findings:  Right             Thrombus   Flow Subclavian Distal   None   Pulsatile  Left                Compress Thrombus   Flow Internal Jugular      Yes      None   Pulsatile Subclavian            Yes      None Subclavian Proximal   Yes      None   Pulsatile Subclavian Mid        Yes      None Subclavian Distal     Yes      None   Pulsatile Axillary              Yes      None   Pulsatile Brachial              Yes      None Cephalic              Yes      None Basilic               Yes      None  [id number] Ugo Moreno MD Electronically  signed by [id number] Ugo Moreno MD on 6/26/2024 at 9:55:56 AM  ** Final **     CT angio chest for pulmonary embolism    Addendum Date: 6/25/2024    Interpreted By:  Sandeep Hanks and Calo Sean-Matthew ADDENDUM: María Quiñones discussed the significance and urgency of this critical finding by telephone with  Dr. Pat On 6/20/2024 at 11:09 am.  (**-RCF-**) Findings:  See findings.     Signed by: Sandeep Hanks 6/25/2024 3:34 PM   -------- ORIGINAL REPORT -------- Dictation workstation:   [id number]    Result Date: 6/25/2024  Interpreted By:  Sandeep Hanks and Calo Sean-Matthew STUDY: CT ANGIO CHEST FOR PULMONARY EMBOLISM;  6/20/2024 9:49 am   INDICATION: Signs/Symptoms:c/f PE, unstable.   COMPARISON: CT chest abdomen pelvis 03/26/2022, CT abdomen pelvis 03/08/2024   ACCESSION NUMBER(S): [id number]   ORDERING CLINICIAN: JOSEFINA CEVALLOS   TECHNIQUE: Helical data acquisition of the chest was obtained after intravenous administration of 90 ML Omnipaque 350, as per PE protocol. Images were reformatted in coronal and sagittal planes. Axial and coronal maximum intensity projection (MIP) images were created and reviewed.   FINDINGS: POTENTIAL LIMITATIONS OF THE STUDY: None   HEART AND VESSELS: There are no discrete filling defects within main pulmonary artery and its branches to suggest acute pulmonary embolism. Main pulmonary artery and its branches are normal in caliber.   The thoracic aorta normal in course and caliber. No coronary artery calcifications are seen. Please note, the study is not optimized for evaluation of coronary arteries.   Redemonstrated severe cardiomegaly with biatrial enlargement and disproportion enlargement of the right atrium. There is nodular low-attenuation filling defects within the atrial appendage.   There is a small pericardial effusion present.   MEDIASTINUM AND GHISLAINE, LOWER NECK AND AXILLA: The visualized thyroid gland is within normal limits. Similar increased soft tissue  attenuation of the anterior mediastinum is noted. No evidence of thoracic lymphadenopathy by CT criteria. Esophagus appears within normal limits as seen.   LUNGS AND AIRWAYS: The trachea and central airways are patent. No endobronchial lesion is seen.   Bibasilar atelectasis/scarring, right-greater-than-left. Scattered areas of smooth interlobular septal thickening and patchy ground-glass opacities are noted bilaterally, most pronounced in the right upper lobe. No focal consolidation, pleural effusion, or pneumothorax.   No suspicious pulmonary nodule.   UPPER ABDOMEN: Reflux of contrast is again noted into the intrahepatic inferior vena cava and hepatic veins. Small volume perihepatic ascites is noted. Otherwise, the subdiaphragmatic structures are unremarkable.   CHEST WALL AND OSSEOUS STRUCTURES: Chest wall is within normal limits. No acute osseous pathology.There are no suspicious osseous lesions.       1. No evidence of acute pulmonary embolism. 2. Redemonstrated cardiomegaly with findings suggestive of right heart dysfunction including right atrial dilatation and reflux of contrast into the intrahepatic IVC and hepatic veins. 3. There are indeterminate low-attenuation nodular filling defects within the atrial appendage. While this may represent contrast under filling, a right atrial appendage thrombus can not be excluded. 4. Consider correlation with transesophageal echocardiogram or cardiac MR. 5. Mild pulmonary edema. 6. Small volume ascites noted within the partially visualized upper abdomen.   I personally reviewed the images/study and I agree with the findings as stated by María Quiñones MD. This study was interpreted at [place], [place].   MACRO: None   Signed by: Sandeep Hanks 6/20/2024 11:02 AM Dictation workstation:   [id number]    CT angio aorta and bilateral iliofemoral runoff w and or wo IV contrast    Result Date: 6/25/2024  Interpreted By:   Manuel Miller  and Moshe Paris STUDY: CT ANGIO AORTA AND BILATERAL ILIOFEMORAL RUNOFF W AND OR WO IV CONTRAST;  6/24/2024 2:24 pm   INDICATION: Signs/Symptoms:concern for limb ischemia in right leg.   COMPARISON: CT abdomen pelvis: 03/08/2024., 12/09/2023, 04/05/2022   ACCESSION NUMBER(S): [id number]   ORDERING CLINICIAN: OMER SCHILLING   TECHNIQUE: Thin-section axial images of the abdomen, pelvis and bilateral lower extremities were obtained in the arterial phase after intravenous administration of 120 mL Omnipaque 350 contrast. Delayed images the legs were obtained for assessment of venous patency. Coronal and sagittal reformatted images were reconstructed from the axial data. Multiplanar MIPs and 3D reconstructions were created on an independent workstation and reviewed.   FINDINGS: VASCULATURE:   ABDOMINAL AORTA: No abdominal aortic aneurysm or dissection. No significant aortic atherosclerosis.   ABDOMINAL ARTERIES: Normal opacification of the celiac artery, SMA and bilateral renal arteries with no hemodynamically significant stenosis. Atherosclerotic calcification of the origin of the inferior mesenteric artery which is otherwise patent and opacify.   There is cavity in the aortoiliac bifurcation as visualized on series 401, image 206 and coronal imaging 402 image 82 consistent with thrombus.     RIGHT LOWER EXTREMITY:   - Right Common Iliac Artery: There is partially occlusive thrombus seen in the distal right common femoral artery (series 401, image 270). There is otherwise normal opacification of the vasculature proximal to the thrombus. - Right External Iliac Artery:  There is multifocal partially occlusive in nearly occlusive thrombus throughout the proximal and mid external iliac artery. There is reconstitution of flow into the distal segments of the external iliac artery. - Right Internal Iliac Artery:  Partially occlusive thrombus is seen at the origin of the internal iliac artery. The remaining  segments of the internal iliac artery demonstrate normal contrast opacification.   - Right Common Femoral Artery:  Widely patent with no hemodynamically significant stenosis. There is no filling defects to suggest thrombus. - Right Profunda Artery:  Normal contrast opacification with no intraluminal filling defects. - Right Superficial Femoral Artery:  Short-segment of filling defects in the proximal SFA consistent with thrombus. The remaining segments of the SFA are otherwise widely patent until the most distant portion of the SFA as it exits the adductor hiatus there is partially occlusive thrombus. - Right Popliteal Artery:  Completely occlusive thrombus is seen throughout the popliteal artery including all 3 segments. - Right Anterior Tibial, Posterior Tibial, Peroneal Arteries:  Occlusive thrombus in the tibioperoneal trunk. There is reconstitution of faint opacification of the posterior tibial, anterior tibial and peroneal arteries with the posterior tibial being the most opacify out of the 3 aforementioned vessels and visualized at the ankle. Dorsalis pedis is not well opacified.     LEFT LOWER EXTREMITY:   - Left Common Iliac Artery: No evidence of filling defects to suggest thrombus. No hemodynamically significant stenosis. - Left External Iliac Artery: No evidence of filling defects to suggest thrombus. No hemodynamically significant stenosis. - Left Internal Iliac Artery: No evidence of filling defects to suggest thrombus. No hemodynamically significant stenosis.   - Left Common Femoral Artery: No evidence of filling defects to suggest thrombus. No hemodynamically significant stenosis. - Left Profunda Artery: No evidence of filling defects to suggest thrombus. No hemodynamically significant stenosis. - Left Superficial Femoral Artery: No evidence of filling defects to suggest thrombus. No hemodynamically significant stenosis. - Left Popliteal Artery: The P1 segment of the popliteal artery is patent. There  is complete abrupt vessel cutoff/occlusion of the P2 and P3 segments. - Left Anterior Tibial, Posterior Tibial, Peroneal Arteries: There is complete occlusion of the tibioperoneal trunk. There is reconstitution of flow into the proximal posterior tibial, anterior tibial and peroneal artery. There is a abrupt vessel cutoff of the mid anterior tibial artery with faint reconstitution of flow and opacification of the distal anterior tibial artery. The dorsalis pedis is not well visualized. There is faint opacification of the posterior tibial and peroneal arteries throughout their course with the posterior tibial artery visualized at the ankle.       ABDOMEN/PELVIS:   LOWER CHEST: The heart is enlarged with significant enlargement of the right atrium. There is early reflux of contrast in the hepatic arteries compatible with congestive hepatopathy which can seen with right heart failure. Bibasilar dependent atelectasis are noted. No sizable pleural effusions. No pericardial effusion. Visualized distal esophagus is within normal limits.   LIVER: The liver is enlarged measuring 19 cm in craniocaudal dimension. There is diffuse decreased attenuation of the liver parenchyma with comparison to the splenic parenchyma suggestive of steatosis. There is no discrete liver lesions.   BILE DUCTS: No significant intrahepatic or extrahepatic dilatation.   GALLBLADDER: No significant abnormality.   SPLEEN: No significant abnormality.   PANCREAS: No significant abnormality.   ADRENALS: Mild thickening of bilateral adrenal glands.   KIDNEYS, URETERS, BLADDER: There is re-demonstration of cortical atrophy involving the upper and midpole region of the left kidney with a wedge-shaped area of relative hypo-enhancement in the interpolar region likely representing sequela of remote infarct. No enhancing renal lesions. No hydroureteronephrosis. The urinary bladder is within normal limits.   REPRODUCTIVE ORGANS: No significant abnormality.    RETROPERITONEUM/LYMPH NODES: No abdominal lymphadenopathy. Prominent right inguinal lymph node with the largest measuring 1.1 cm in diameter. Which are incompletely characterized.   BOWEL/PERITONEUM:  No inflammatory bowel wall thickening or dilatation. Normal appendix.   No pneumoperitoneum, small amount of free fluid in the pelvis. No evidence of fluid collections.     ABDOMINAL WALL: There is re-demonstration of a small umbilical hernia defect with the herniated mesenteric fat. No evidence of herniated loops of bowel. There is diffuse body wall edema.   MUSCULOSKELETAL:  No acute osseous abnormality. Degenerative changes of the visualized spine. Bilateral lower extremities demonstrates no acute osseous or soft tissue abnormality.         RIGHT LOWER EXTREMITY:   1. Intraluminal filling defects in the aortoiliac bifurcation. 2. Nearly occlusive thrombus in the distal right common iliac artery with nearly completely occlusive thrombus in the proximal right external iliac artery with extension into the proximal right internal iliac artery. Partially occlusive thrombus within the short-segment of the proximal SFA and distal SFA as it exits the adductor hiatus with complete occlusion of the popliteal artery and faint reconstitution of flow into the infrapopliteal vasculature as detailed above.     LEFT LOWER EXTREMITY:   1. Complete total occlusion of the P2 and P3 segments of the left popliteal artery with the extension into the tibioperoneal trunk and reconstitution of flow into the proximal posterior tibial, peroneal and anterior tibial arteries. There is abrupt cutoff of the mid anterior tibial artery with distal reconstitution. Faint opacification of the peroneal artery and posterior artery throughout their course. Additional findings are as detailed above.   NONVASCULAR:   1. Cardiomegaly with early opacification of the hepatic veins suggestive of congestive hepatopathy with component of right heart failure. 2.  Mildly enlarged liver with diffuse decreased hepatic attenuation suggestive of steatosis. 3. Cortical atrophy involving the upper and middle pole of the region of the left kidney similar compared to prior distant imaging suggestive of sequela of remote infarcts. 4. Diffuse anasarca with mesenteric edema and small abdominopelvic ascites. 5. Additional incidental findings are as detailed in the body of the report.       I personally reviewed the images/study and I agree with the findings as stated by Resident Wellington Mesa MD.   MACRO: Wellington Mesa discussed the significance and urgency of this critical finding by telephone with  ABE PARKINSON MD on 6/24/2024 at 4:33 pm.  (**-RCF-**) Findings:  See findings.   Signed by: Manuel Miller 6/25/2024 6:30 AM Dictation workstation:   [id number]    Lower extremity arterial duplex bilateral    Result Date: 6/23/2024  Interpreted By:  Kun Marshall,  and Mindi Padilla STUDY: [facility] LOWER EXTREMITY ARTERIAL DUPLEX BILATERAL; ;  6/23/2024 10:24 am   INDICATION: Signs/Symptoms:loss of dopplerable pulses.   COMPARISON: None.   ACCESSION NUMBER(S): [id number]   ORDERING CLINICIAN: OMER SCHILLING   TECHNIQUE: Grayscale and color Doppler images of the arteries of the bilateral lower extremities were obtained for evaluation.   FINDINGS: Bilateral common femoral, superficial femoral, popliteal, posterior tibial, peroneal arteries were evaluated.     The right common femoral artery, deep femoral artery, proximal and mid superficial femoral arteries demonstrate normal color filling but decreased flow velocities and biphasic waveforms on Doppler interrogation compared to the left side.   There is echogenic material within the distal right superficial femoral artery as well as the popliteal artery. Doppler interrogation demonstrates a decreased flow within the distal superficial femoral artery with the slightly biphasic waveforms and no significant flow within the popliteal  artery. The right posterior tibial and peroneal arteries demonstrate color filling but with markedly decreased flow velocities and monophasic waveforms.   The left common femoral, deep femoral, superficial femoral and popliteal arteries demonstrate normal color filling with normal peak velocities and triphasic waveforms. The left peroneal and posterior tibial arteries demonstrate normal color filling with biphasic waveforms.   Few prominent inguinal lymph nodes are visualized.       1. Echogenic material within the right distal femoral and popliteal arteries with  absent Doppler flow in the right popliteal artery significantly diminished flow within the right distal superficial femoral artery. Findings raise concern for thrombosis of the distal SFA and popliteal artery. Decreased flow within the right posterior tibial and peroneal arteries could be through collateralization. 2. Decreased flow within the right common femoral artery, right deep femoral artery as well as the proximal and mid superficial femoral arteries, raising concern for stenosis proximal to the right common femoral artery, possibly within the right external iliac right common iliac artery. A CTA of the lower extremities can be obtained for further evaluation. 3. Unremarkable Doppler interrogation of the left lower extremity arteries.     I personally reviewed the images/study and I agree with the findings as stated. This study was interpreted at [place], [place].   MACRO: None   Signed by: Kun Marshall 6/23/2024 12:18 PM Dictation workstation:   [id number]    Lower extremity venous duplex bilateral    Result Date: 6/23/2024  Interpreted By:  Kun Marshall,  Adriana Padilla STUDY: [facility] US LOWER EXTREMITY VENOUS DUPLEX BILATERAL;  6/23/2024 10:24 am   INDICATION: Signs/Symptoms:lower extremity swelling and concern clot was seen on US as femoral Haily placed, doac nonadherene.    COMPARISON: None.   ACCESSION NUMBER(S): [id number]   ORDERING CLINICIAN: OMER SCHILLING   TECHNIQUE: Vascular ultrasound of the bilateral lower extremities was performed. Real-time compression views as well as Gray scale, color Doppler and spectral Doppler waveform analysis was performed.   FINDINGS: Evaluation of the visualized portions of the bilateral common femoral vein, proximal, mid, and distal femoral vein, and popliteal vein were performed.  Evaluation of the visualized portions of the  posterior tibial and peroneal veins were also performed.   There is partial compressibility of the right external iliac vein and the right common femoral vein, however with normal flow seen on Doppler images.   The remainder of the evaluated veins demonstrate normal compressibility. There is intact venous flow demonstrating normal respiratory variability and normal augmentation of flow with calf compression. Therefore, there is no ultrasonographic evidence for deep vein thrombosis within the evaluated veins.   Respiratory variation and augmentation to calf pressure was noted.       1. Partial compressibility of the right external iliac vein and the right common femoral vein, which raises concern for early nonocclusive deep venous thrombosis, although flow is seen on Doppler images. 2. Otherwise, no sonographic evidence for deep vein thrombosis within the remainder of the evaluated veins of the bilateral lower extremity.   I personally reviewed the images/study and I agree with the findings as stated. This study was interpreted at [place], [place].   MACRO: None   Signed by: Kun Marshall 6/23/2024 11:25 AM Dictation workstation:   [id number]    XR chest 1 view    Result Date: 6/23/2024  Interpreted By:  Perfecto Cope and Dervishi Mario STUDY: XR CHEST 1 VIEW;  6/23/2024 9:33 am   INDICATION: Signs/Symptoms:check PA catheter placement.   COMPARISON: Chest radiograph:  06/22/2024. CT chest 06/20/2024   ACCESSION NUMBER(S): [id number]   ORDERING CLINICIAN: OMER SCHILLING   FINDINGS: AP radiograph of the chest was provided. Patient is slightly rotated to the right. A right IJ [place]-Abe catheter is noted with the tip projecting over the distal right main pulmonary artery, similar to prior.   CARDIOMEDIASTINAL SILHOUETTE: Cardio-pericardial silhouette remains markedly enlarged, similar compared to prior imaging.   LUNGS: There are low lung volumes with associated bronchovascular crowding and no tiarra pulmonary edema. Bilateral subsegmental atelectasis are again noted. No focal consolidations, sizable pleural effusions or pneumothorax.   ABDOMEN: No remarkable upper abdominal findings.   BONES: No acute osseous changes.       1. Right IJ approach [place]-Abe catheter tip overlies distal right pulmonary artery, similar to prior. 2. Stable enlargement of cardiomediastinal silhouette with no tiarra pulmonary edema on present study.   I personally reviewed the images/study and I agree with the findings as stated by Resident Wellington Mesa MD.   MACRO: NONE.   Signed by: Perfecto Cope 6/23/2024 10:48 AM Dictation workstation:   [id number]    Transthoracic Echo (TTE) Complete    Result Date: 6/22/2024   [facility], [address] [place] [address]                Tel [phone] and Fax [phone] TRANSTHORACIC ECHOCARDIOGRAM REPORT  Patient Name:      MANUEL Giron Physician:    [id number] Rao MD Study Date:        6/22/2024            Ordering Provider:    [id number] OMER SCHILLING MRN/PID:           [id number]             Fellow: Accession#:        [id number]         Nurse: Date of Birth/Age: 1980 / 43      Sonographer:          Juanita Carlos RCS                    years Gender:            F                    Additional  Staff: Height:            170.00 cm            Admit Date:           6/20/2024 Weight:            102.06 kg            Admission Status:     Inpatient -                                                               Routine BSA / BMI:         2.12 m2 / 35.31      Encounter#:           [id number]                    kg/m2 Blood Pressure:    85/59 mmHg           Department Location:  [facility] Study Type:    TRANSTHORACIC ECHO (TTE) COMPLETE Diagnosis/ICD: Cardiogenic shock-R57.0; Acute systolic (congestive) heart                failure (CHF)-I50.21 Indication:    Congestive Heart Failure; Cardiogenic Shock CPT Code:      Echo Complete w Full Doppler-99314 Patient History: Valve Disorders:   Mitral Regurgitation and Tricuspid Regurgitation. Pertinent History: HTN, CHF and A-Fib. HFrEF, Cardiogenic shock, ETOH abuse, Hx                    of stroke. Study Detail: The following Echo studies were performed: 2D, M-Mode, Doppler and               color flow. Technically challenging study due to prominent lung               artifact. Definity used as a contrast agent for endocardial border               definition. Total contrast used for this procedure was 3.0 mL via               IV push. Unable to obtain suprasternal notch view.  PHYSICIAN INTERPRETATION: Left Ventricle: Left ventricular ejection fraction is severely decreased, by visual estimate at 20-25%. There is global hypokinesis of the left ventricle with minor regional variations. The left ventricular cavity size is normal. Left ventricular diastolic filling was indeterminate. Left Atrium: The left atrium is severely dilated. Right Ventricle: The right ventricle is mildly enlarged. There is mildly reduced right ventricular systolic function. A device is visualized in the right ventricle. Right Atrium: The right atrium is mildly dilated. Aortic Valve: The aortic valve is trileaflet. The aortic valve dimensionless index is 0.77. There is no evidence of aortic  valve regurgitation. The peak instantaneous gradient of the aortic valve is 12.7 mmHg. The mean gradient of the aortic valve is 8.0 mmHg. Mitral Valve: The mitral valve is normal in structure. There is trace mitral valve regurgitation. Tricuspid Valve: The tricuspid valve is structurally normal. There is moderate to severe tricuspid regurgitation. The Doppler estimated RVSP is moderately elevated at 54.9 mmHg. Pulmonic Valve: The pulmonic valve is structurally normal. There is mild pulmonic valve regurgitation. Pericardium: There is a small pericardial effusion. Aorta: The aortic root is normal. The Ao Sinus is 2.70 cm. The Asc Ao is 3.30 cm. Systemic Veins: The inferior vena cava appears dilated. There is less than 50% IVC collapse with inspiration. In comparison to the previous echocardiogram(s): Compared with study dated 3/9/2024,. TR has worsened.  CONCLUSIONS:  1. Left ventricular ejection fraction is severely decreased, by visual estimate at 20-25%.  2. There is global hypokinesis of the left ventricle with minor regional variations.  3. Left ventricular diastolic filling was indeterminate.  4. Mildly enlarged right ventricle.  5. There is mildly reduced right ventricular systolic function.  6. The left atrium is severely dilated.  7. Moderate to severe tricuspid regurgitation visualized.  8. Moderately elevated right ventricular systolic pressure.  9. TR has worsened. QUANTITATIVE DATA SUMMARY: 2D MEASUREMENTS:                          Normal Ranges: Ao Root d:     2.70 cm   (2.0-3.7cm) LAs:           4.70 cm   (2.7-4.0cm) IVSd:          0.80 cm   (0.6-1.1cm) LVPWd:         0.90 cm   (0.6-1.1cm) LVIDd:         5.60 cm   (3.9-5.9cm) LVIDs:         5.00 cm LV Mass Index: 83.9 g/m2 LV % FS        10.7 % LA VOLUME:                               Normal Ranges: LA Vol A4C:        89.6 ml    (22+/-6mL/m2) LA Vol A2C:        79.1 ml LA Vol BP:         86.1 ml LA Vol Index A4C:  42.2ml/m2 LA Vol Index A2C:  37.3 ml/m2  LA Vol Index BP:   40.5 ml/m2 LA Area A4C:       25.6 cm2 LA Area A2C:       24.6 cm2 LA Major Axis A4C: 6.2 cm LA Major Axis A2C: 6.5 cm LA Volume Index:   40.5 ml/m2 LA Vol A4C:        82.5 ml LA Vol A2C:        71.9 ml RA VOLUME BY A/L METHOD:                               Normal Ranges: RA Vol A4C:        72.7 ml    (8.3-19.5ml) RA Vol Index A4C:  34.2 ml/m2 RA Area A4C:       22.4 cm2 RA Major Axis A4C: 5.9 cm AORTA MEASUREMENTS:                      Normal Ranges: Ao Sinus, d: 2.70 cm (2.1-3.5cm) Asc Ao, d:   3.30 cm (2.1-3.4cm) LV SYSTOLIC FUNCTION BY 2D PLANIMETRY (MOD):                      Normal Ranges: EF-A4C View:    20 % (>=55%) EF-A2C View:    21 % EF-Biplane:     21 % EF-Visual:      23 % LV EF Reported: 23 % LV DIASTOLIC FUNCTION:                         Normal Ranges: MV Peak E:    1.51 m/s  (0.7-1.2 m/s) MV Peak A:    0.41 m/s  (0.42-0.7 m/s) E/A Ratio:    3.66      (1.0-2.2) MV e'         0.088 m/s (>8.0) MV lateral e' 0.10 m/s MV medial e'  0.07 m/s E/e' Ratio:   17.17     (<8.0) MITRAL VALVE:                       Normal Ranges: MV Vmax:    1.76 m/s  (<=1.3m/s) MV peak P.4 mmHg (<5mmHg) MV mean P.0 mmHg  (<2mmHg) MV DT:      123 msec  (150-240msec) AORTIC VALVE:                                    Normal Ranges: AoV Vmax:                1.78 m/s  (<=1.7m/s) AoV Peak P.7 mmHg (<20mmHg) AoV Mean P.0 mmHg  (1.7-11.5mmHg) LVOT Max Jim:            1.39 m/s  (<=1.1m/s) AoV VTI:                 29.80 cm  (18-25cm) LVOT VTI:                23.00 cm LVOT Diameter:           1.90 cm   (1.8-2.4cm) AoV Area, VTI:           2.19 cm2  (2.5-5.5cm2) AoV Area,Vmax:           2.21 cm2  (2.5-4.5cm2) AoV Dimensionless Index: 0.77  RIGHT VENTRICLE: RV Basal 4.70 cm RV Mid   4.50 cm RV Major 6.8 cm TAPSE:   20.7 mm RV s'    0.11 m/s TRICUSPID VALVE/RVSP:                             Normal Ranges: Peak TR Velocity: 3.16 m/s Est. RA Pressure: 15 mmHg RV Syst Pressure: 54.9 mmHg  (< 30mmHg) IVC Diam:         3.31 cm PULMONIC VALVE:                          Normal Ranges: PV Accel Time: 53 msec   (>120ms) PV Max Jim:    1.4 m/s   (0.6-0.9m/s) PV Max P.0 mmHg PV Mean P.0 mmHg PV VTI:        21.50 cm W[place] Vmax:       1.54 m/s PIEDV:         1.54 m/s PADP:          24.5 mmHg  [id number] Alex Rao MD Electronically signed on 2024 at 2:26:43 PM  ** Final **     XR chest 1 view    Result Date: 2024  Interpreted By:  Perfecto Cope and Meyers Emily STUDY: XR CHEST 1 VIEW;  2024 4:22 am   INDICATION: Signs/Symptoms:swan placement.   COMPARISON: Chest radiograph 2024 and chest CT 2024   ACCESSION NUMBER(S): [id number]   ORDERING CLINICIAN: OMER SCHILLING   FINDINGS: AP radiograph of the chest was provided. Right IJ approach [place]-Abe catheter with its tip overlying the expected location of the distal right main pulmonary artery, similar to prior..   CARDIOMEDIASTINAL SILHOUETTE: Cardiomediastinal silhouette is persistently enlarged, stable in size and configuration.   LUNGS: Low lung volumes with bronchovascular crowding with suggestion of mild interstitial edema, similar to prior. Bilateral subsegmental atelectasis. Otherwise, no focal consolidation, pleural effusion, or pneumothorax.   ABDOMEN: No remarkable upper abdominal findings.   BONES: No acute osseous changes.       1. Right IJ approach [place]-Abe catheter with its tip overlying the expected location of the distal right main pulmonary artery, similar to prior. 2. Suggestion of mild interstitial pulmonary edema, similar to prior. 3. Bilateral subsegmental atelectasis.   I personally reviewed the images/study, and I agree with the findings as stated above. This study was interpreted at [facility], [place], [place].   MACRO: None   Signed by: Perfecto Cope 2024 12:12 PM Dictation workstation:   [id number]    XR abdomen 1 view    Result Date: 2024  Interpreted By:   Perfecto Cope and Meyers Emily STUDY: XR ABDOMEN 1 VIEW;  6/22/2024 2:26 am   INDICATION: Signs/Symptoms:abdominal pain.   COMPARISON: CT abdomen pelvis 03/20/2024, abdominal radiograph 04/10/2022   ACCESSION NUMBER(S): [id number]   ORDERING CLINICIAN: OMER SCHILLING   FINDINGS: 2 AP radiographs of the abdomen available for interpretation.   Nonobstructive bowel gas pattern. Limited evaluation of pneumoperitoneum on supine imaging, however no gross evidence of free air is noted.   Visualized lungs are clear. Similar asymmetric right hemidiaphragm elevation.   Osseous structures demonstrate no acute bony changes.       1.  Nonobstructive bowel gas pattern.   I personally reviewed the images/study, and I agree with the findings as stated above. This study was interpreted at [place], [place].   MACRO: None   Signed by: Perfecto Cope 6/22/2024 12:11 PM Dictation workstation:   [id number]    ECG 12 lead    Result Date: 6/22/2024  Unusual P axis, possible ectopic atrial tachycardia with occasional Premature ventricular complexes Nonspecific T wave abnormality Abnormal ECG When compared with ECG of 20-JUN-2024 23:42, Ectopic atrial rhythm has replaced Atrial fibrillation    Electrocardiogram, 12-lead PRN ACS symptoms    Result Date: 6/21/2024  Atrial fibrillation with rapid ventricular response with premature ventricular or aberrantly conducted complexes T wave abnormality, consider inferolateral ischemia Abnormal ECG When compared with ECG of 20-JUN-2024 10:05, Non-specific change in ST segment in Inferior leads    XR chest 1 view    Result Date: 6/21/2024  Interpreted By:  Zheng Damon and Meyers Emily STUDY: XR CHEST 1 VIEW;  6/21/2024 7:13 am   INDICATION: Signs/Symptoms:swan placement.   COMPARISON: Chest radiograph 06/20/2024   ACCESSION NUMBER(S): [id number]   ORDERING CLINICIAN: MINNA MICHAEL   FINDINGS: AP radiograph of the chest was provided.   Interval  placement of a right IJ approach [place]-Abe catheter with its tip overlying the expected location of the right interlobar pulmonary artery.   CARDIOMEDIASTINAL SILHOUETTE: Cardiomediastinal silhouette is enlarged, though stable in size and configuration.   LUNGS: Bilateral subsegmental atelectasis. Otherwise, no focal consolidation, pleural effusion, or pneumothorax.   ABDOMEN: No remarkable upper abdominal findings.   BONES: No acute osseous changes.       1. Bilateral subsegmental atelectasis. 2. Right IJ approach [place]-Abe catheter with its tip overlying the expected location of the right interlobar pulmonary artery.   I personally reviewed the images/study, and I agree with the findings as stated above. This study was interpreted at [place], [place].   MACRO: None   Signed by: Zheng Damon 6/21/2024 8:04 AM Dictation workstation:   [id number]    US right upper quadrant    Result Date: 6/21/2024  Interpreted By:  Manuel Miller and Ebai Jerky STUDY: US RIGHT UPPER QUADRANT;  6/21/2024 12:22 am   INDICATION: Signs/Symptoms:elevated LFTs.   COMPARISON: Right upper quadrant ultrasound 08/08/2022.   ACCESSION NUMBER(S): [id number]   ORDERING CLINICIAN: MINNA MICHAEL   TECHNIQUE: Multiple images of the right upper quadrant were obtained.   FINDINGS: LIVER: The liver measures 17.99 and is diffusely echogenic and coarse appearance, consistent with diffuse fatty infiltration. The resulting increased beam attenuation thereby limiting evaluation of the liver for focal lesions. There is a 1.1 x 0.8 x 0.8 cm anechoic mass within the periphery of the left hepatic lobe demonstrating posterior acoustic enhancement and no internal vascularity on Doppler interrogation, consistent with a cyst seen on CT abdomen pelvis from 03/08/2024. No additional liver lesions are seen..   GALLBLADDER: The gallbladder is decompressed, and demonstrates no evidence of gallstones,  or  surrounding fluid. The gallbladder wall is thickened measuring 0.55 cm. Sonographic James's sign is negative.   BILE DUCTS: No evidence of intra or extrahepatic biliary dilatation is identified; the common bile duct measures .43 cm.   PANCREAS: The visualized pancreas is unremarkable in appearance.   RIGHT KIDNEY: The right kidney measures 12.13 cm in length. The renal cortical echogenicity and thickness are within normal limit.  No hydronephrosis or renal calculi are seen.   There is perihepatic ascites. Newly enlarged intrahepatic hepatic veins compared to 08/08/2022. Stable from same day CT chest for PE.       1. Diffuse coarse and echogenic appearance of the liver, correlate with liver disease. 2. Decompressed gallbladder with wall thickening, correlate with increased fluid status. 3. Interval increase in size of the intrahepatic veins compared to 08/08/2022, recommend correlation with concern for right heart dysfunction. 4. Perihepatic ascites.   I personally reviewed the images/study and I agree with the findings as stated by Resident Tam Oliver. This study was interpreted at [facility], [place], [place].   MACRO: None   Signed by: Manuel Miller 6/21/2024 5:57 AM Dictation workstation:   [id number]    ECG 12 lead    Result Date: 6/20/2024  Atrial fibrillation with rapid ventricular response Cannot rule out Anterior infarct , age undetermined Abnormal ECG When compared with ECG of 09-MAR-2024 17:34, Vent. rate has increased BY  96 BPM Nonspecific T wave abnormality has replaced inverted T waves in Inferior leads See ED provider note for full interpretation and clinical correlation Confirmed by Princess Burt ([id number]) on 6/20/2024 5:39:18 PM    Cardiac Catheterization Procedure    Result Date: 6/20/2024   [facility], Cath Lab, [address] [place] [address] Cardiovascular Catheterization Report Patient Name:      MANUEL Hodge  Physician:  [id number] Su MD Study Date:        6/20/2024             Verifying Physician:   [id number] Osman Su MD MRN/PID:           [id number]              Cardiologist/Co-Scrub: Accession#:        [id number]          Ordering Provider:     [id number] MINNA YU Date of Birth/Age: 1980 / 43 years Cardiologist: Gender:            F                     Fellow:                [id number] Diego Butts MD Admit Date:                              Fellow:                [id number] Ernesto Gongora MD Encounter#:        [id number]            Surgeon:  Study:            Right Heart Cath Additional Study: Arterial Line Insertion  Indications: Heart failure.  Appropriate Use Criteria: Known or suspected cardiomyopathy with or without heart failure; AUC score = 7.  Procedure Description: After infiltration with 2% Lidocaine, the right femoral artery was cannulated with a modified Seldinger technique. Subsequently a 4 [place] Zealander sheath was placed retrograde in the right femoral artery. After infiltration of local anesthetic, the right internal jugular vein was identified with two-dimensional ultrasound. Under direct ultrasound visualization, the right internal jugular vein was cannulated with a micropuncture technique. A 9 [place] sheath was placed in the vein. A balloon tipped catheter was advanced through the right heart to record pressures and balloon tipped catheter was positioned in the right heart system to record pressures and remained in the patient when transferred from the lab. Cardiac output was calculated via the Dulce method. After completion of the procedure, the arterial sheath was left intact  and sutured in place.  Right Heart Catheterization: A balloon tipped catheter was advanced through the right heart to record pressures and balloon tipped catheter was positioned in the right heart system to record pressures and remained in the patient when transferred from the lab. Cardiac output was calculated via the Dulce method. Elevated left sided filling pressures with low cardiac output. Cardiac output is severely decreased.  Hemo Personnel: +--------------------+---------+ Name                Duty      +--------------------+---------+ Osman Su MD 1 +--------------------+---------+  Hemodynamic Pressures:  +----+----------+---------+-------------+-------------+---+----+-------+-------+ SiteDate Time   Phase  Systolic mmHg  Diastolic  ED MeanA-Wave V-Wave                  Name                    mmHg     mmHmmHg mmHg   mmHg                                                     g                    +----+----------+---------+-------------+-------------+---+----+-------+-------+   RA 6/20/2024     Rest                               19     26     28     3:34:40 PM                                                         +----+----------+---------+-------------+-------------+---+----+-------+-------+   RV 6/20/2024     Rest           43            4 12                       3:35:14 PM                                                         +----+----------+---------+-------------+-------------+---+----+-------+-------+   RV 6/20/2024     Rest           46            5 10                       3:36:23 PM                                                         +----+----------+---------+-------------+-------------+---+----+-------+-------+   PW 6/20/2024     Rest                               18     19     25     3:37:42 PM                                                          +----+----------+---------+-------------+-------------+---+----+-------+-------+   PA 6/20/2024     Rest           42           18     30                   3:41:25 PM                                                         +----+----------+---------+-------------+-------------+---+----+-------+-------+  Oxygen Saturation %: +-----------+----------+------------+ Sample SiteO2 Sat (%)HB (g/100ml) +-----------+----------+------------+          FA        98        12.3 +-----------+----------+------------+          PA        48        12.3 +-----------+----------+------------+          FA        98        12.3 +-----------+----------+------------+          PA        48        12.3 +-----------+----------+------------+  Cardiac Outputs: +---------------+------------------+-------+ MITCHEL CO (l/min)MITCHEL CI (l/min/m2)MITCHEL SV +---------------+------------------+-------+             3.2               1.6   33.8 +---------------+------------------+-------+  Complications: No in-lab complications observed.  Cardiac Cath Post Procedure Notes: Post Procedure Diagnosis: CMY. Blood Loss:               Estimated blood loss during the procedure was < 10                           mls. Specimens Removed:        Number of specimen(s) removed: none.  Recommendations: Maximize medical therapy. Further recommendations per primary team. ____________________________________________________________________________________ CONCLUSIONS:  1. Elevated right and left heart filling pressures with low CO/CI.  2. Successful 4 Icelandic right femoral arterial line placement. ICD 10 Codes: Cardiogenic shock-R57.0  CPT Codes: Insert arterial Cath-16746; Right Heart Cath O2/Cardiac output without biopsy (RHC)-33165  [id number] Osman Su MD Performing Physician Electronically signed by [id number] Osman Su MD on 6/20/2024 at 4:08:38 PM  ** Final **     Electrocardiogram, 12-lead PRN ACS symptoms    Result  Date: 6/20/2024  Atrial fibrillation with rapid ventricular response with premature ventricular or aberrantly conducted complexes Cannot rule out Anterior infarct (cited on or before 20-JUN-2024) T wave abnormality, consider inferior ischemia Abnormal ECG When compared with ECG of 20-JUN-2024 09:06, Atrial fibrillation has replaced Sinus rhythm Inverted T waves have replaced nonspecific T wave abnormality in Inferior leads    XR chest 1 view    Result Date: 6/20/2024  Interpreted By:  Colton Arellano and Meyers Emily STUDY: XR CHEST 1 VIEW;  6/20/2024 7:10 am   INDICATION: Signs/Symptoms:dyspnea.   COMPARISON: Chest radiograph 03/08/2024   ACCESSION NUMBER(S): [id number]   ORDERING CLINICIAN: TEE ROSADO   FINDINGS: AP radiograph of the chest was provided.   CARDIOMEDIASTINAL SILHOUETTE: Cardiomediastinal silhouette is persistently enlarged, though stable in size and configuration.   LUNGS: Subsegmental atelectasis within the right middle lower lung zones, similar prior. Otherwise, no focal consolidation, pleural effusion, or pneumothorax.   ABDOMEN: No remarkable upper abdominal findings.   BONES: No acute osseous changes.       1. Persistent cardiomegaly. 2. Subsegmental right mid and basilar atelectasis. Otherwise, no acute cardiopulmonary process.   I personally reviewed the images/study, and I agree with the findings as stated above. This study was interpreted at [facility], [place], [place].   MACRO: None   Signed by: Colton Arellano 6/20/2024 8:37 AM Dictation workstation:   [id number]          This patient currently has cardiac telemetry ordered; if you would like to modify or discontinue the telemetry order, click here to go to the orders activity to modify/discontinue the order.                 Assessment/Plan   Principal Problem:    Atrial fibrillation (Multi)  Active Problems:    Acute decompensated heart failure (Multi)    Aphasia due to late effects of  cerebrovascular disease    Mural thrombus of left atrium    CHF (congestive heart failure) (Multi)    Diabetes (Multi)    Elevated LFTs    Primary hypertension    Stroke (Multi)    Critical limb ischemia of right lower extremity (Multi)    Deep vein thrombosis (DVT) of lower extremity (Multi)    Tracheocutaneous fistula following tracheostomy (Multi)    Rhabdomyolysis    Lactic acidosis    Thrombocytopenia (CMS-HCC)    Cardiogenic shock (Multi)    Acute lower extremity ischemia    43 year old female presents with reported bilateral lower extremity pain, found to be in cardiogenic shock. Admission exam on 6/20/24 documented inability to move the right lower extremity beyond the hip and with no sensation below the knee. No reported BLE signals throughout her course here. Notably she had an attempted but unsuccessful right femoral artery catheterization followed by placement of 4Fr sheath in the cath lab. Arterial duplex with monophasic R CFA, SFA, and PFA signals, absent popliteal flow, and monophasic tibial flow though hard to visualize. On exam no right femoral pulse, monophasic PT signal at distal leg; no sensation below the knee and no movement at the ankle. History and physical concerning for acute limb ischemia present at admission (6/20/24) based on chart review and history. Unclear whether the right foot will be salvageable based on exam today. Likely with right iliac (inflow) disease based on exam, possible popliteal thromboembolism based on duplex. Also with possible left brachial arterial injury given likely hematoma on exam (radial waveform is appropriate). Right leg not salvageable, will need inflow procedure and R AKA given motor deficits.      CTA Aorta with BLE runoff shows: aortic bifurcation embolus, R BA-EIA embolus, SFA and popliteal emboli     6/25: stable vascular exam, stable motor/sensory exam, downtrending CK  6/26: No major changes. Stable exam. Patient and family in agreement with  proceeding with planned surgery  6/28: R iliac and RLE open thromboembolectomy, followed by R AKA for Li III ALI   6/30 R AKA dressing removed due to sanguinous saturation  7/1: Patient continues to have saturation of dressing from R AKA stump; H/H stable    - Obtain CTA RLE with delayed phase today  - Remove current dressing, reapply with tight ACE wrap to tamponade bleeding    Discussed with attending surgeon, Dr. De La Rosa,      Bill Zarate MD  Vascular Surgery [id number]  General surgery resident [id number]

## 2024-07-01 NOTE — PROGRESS NOTES
"  MEDICINE INPATIENT PROGRESS NOTE    Amirah Bennett is a 43 y.o. female on hospital day 11    Subjective   Right leg pain  Back pain, more on right  Trouble sleeping bc it is loud.   No abdominal pain/chest pain. No tingling in left leg        Objective     Last Recorded Vitals  Blood pressure 93/55, pulse 91, temperature 36.2 °C (97.2 °F), temperature source Temporal, resp. rate 20, height 1.702 m (5' 7.01\"), weight 84.4 kg (186 lb 1.1 oz), SpO2 100%.    Intake/Output last 3 Shifts:  I/O last 3 completed shifts:  In: 3997.8 (47.4 mL/kg) [P.O.:960; I.V.:626.1 (7.4 mL/kg); Blood:1411.7; IV Piggyback:1000]  Out: 2376 (28.2 mL/kg) [Urine:2376 (0.8 mL/kg/hr)]  Weight: 84.4 kg     Relevant Results  Results from last 7 days   Lab Units 07/01/24  0410 06/30/24  2336 06/30/24  1808   WBC AUTO x10*3/uL 28.5* 31.8* 32.3*   HEMOGLOBIN g/dL 8.9* 8.1* 8.4*   HEMATOCRIT % 27.1* 24.9* 24.9*   PLATELETS AUTO x10*3/uL 110* 140* 133*     Results from last 7 days   Lab Units 07/01/24  0410 06/30/24  1808 06/30/24  0520   SODIUM mmol/L 128* 128* 127*   POTASSIUM mmol/L 5.6* 4.6 3.8   CO2 mmol/L 23 24 24   ANION GAP mmol/L 12 11 13   BUN mg/dL 11 12 13   CREATININE mg/dL 0.72 0.73 0.72   GLUCOSE mg/dL 108* 121* 100*   EGFR mL/min/1.73m*2 >90 >90 >90   MAGNESIUM mg/dL 1.88 2.05 1.83   PHOSPHORUS mg/dL 3.9 3.3 3.4      Results from last 7 days   Lab Units 07/01/24  0410 06/30/24  0520 06/29/24  0304   ALT U/L 82* 116* 176*   AST U/L 44* 52* 90*   ALK PHOS U/L 58 62 75      Results from last 7 days   Lab Units 06/29/24  0304 06/28/24  0348 06/27/24  0544   INR  1.3* 1.3* 1.2*     Results from last 7 days   Lab Units 07/01/24  0606 06/30/24  2337 06/30/24  1808 06/28/24  1113 06/28/24  1035 06/28/24  0836   POCT PH, ARTERIAL pH  --   --   --   --  7.50* 7.44*   POCT PO2, ARTERIAL mm Hg  --   --   --   --  112* 308*   POCT PCO2, ARTERIAL mm Hg  --   --   --   --  35* 40   FIO2 % 0 0 21   < > 50 60    < > = values in this interval not " displayed.     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 Can hear airleak. RIJ line not currently oozing.  CV: normal rate, irregular rhythm, no murmurs noted  Pulm: CTAB anteriorly, room air  GI: abd soft, tender diffusely, bowel sounds present  Ext: She has equal sensation in bilateral thighs. Significant tenderness to palpation bilaterally. No DP in left. Has TP and popliteal in left. S/p Rt AKA. Tender left upper extremity with bruising able to move right upper extremity spontaneously. Right groin access site covered, feels firm (per vascular surgery due to how it was sutured). Per vasc surg (who removed dressing) persistent oozing from stump  Neuro: alert and conversant however only intermittently answering questions appropriately, +expressive aphasia so will initially say no pain, endorsing pain in bilateral legs.     Medications    amiodarone, 200 mg, oral, Daily  aspirin, 81 mg, oral, Daily  bisacodyl, 10 mg, rectal, Daily  [Held by provider] empagliflozin, 10 mg, oral, Daily  insulin lispro, 0-5 Units, subcutaneous, q4h  magnesium sulfate, 2 g, intravenous, Once  melatonin, 6 mg, oral, Nightly  oxygen, , inhalation, Continuous - Inhalation  pantoprazole, 40 mg, intravenous, Daily  perflutren protein A microsphere, 0.5 mL, intravenous, Once in imaging  piperacillin-tazobactam, 3.375 g, intravenous, q6h  polyethylene glycol, 17 g, oral, BID  [Held by provider] sacubitriL-valsartan, 1 tablet, oral, BID  sennosides, 2 tablet, oral, BID  sodium zirconium cyclosilicate, 10 g, oral, Once  sulfur hexafluoride microsphr, 2 mL, intravenous, Once in imaging  vancomycin,  1,000 mg, intravenous, q12h        heparin, 0-4,500 Units/hr, Last Rate: 1,400 Units/hr (07/01/24 0411)        PRN medications: cyclobenzaprine, dextrose, dextrose, glucagon, glucagon, heparin, HYDROmorphone, lidocaine, oxyCODONE, oxyCODONE, vancomycin           Assessment/Plan   Amirah Bennett is a 42 y.o. 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 RHC c/w cardiogenic shock with CI of 1.6, CVP of 25. She is not candidate for advanced therapies given documented medical nonadherence. Managing medically. Attempted to wean off dobutamine while increasing nitroprusside so stopped dobutamine 6/21 however acutely worsened with increased lactate and worsened SvO2 from 65 to 25 overnight with new abdominal pain and right leg pain concerning for ischemia. Improving parameters, normalized lactate, and pain resolution when dobutamine restarted. Course also c/b SARAH, cardiac thrombi, liver injury likely due to ischemia, and Afib with RVR. Found to have rhabdomyolysis with CK of 14,000, unable to give fluids due to cardiogenic shock. Arterial doppler of lower extremities with echogenic material within right distal femoral and popliteal arteries with absent Doppler flow in the right popliteal artery with significantly diminished flow within the right distal superficial femoral artery significantly diminished flow within the right distal superficial femoral artery, findings raise concern for thrombosis of distal SFA  and popliteal artery. Decreased flow within right posterior tibial and peroneal arteries could be through collateralization. Additionally decreased flow in right common femoral artery, right deep femoral artery as well as proximal and mid superficial femoral arteries concerning for stenosis proximal to right common femoral artery possible within right external iliac right common iliac artery. CTA Aorta with runoff 6/24: aortic bifurcation embolus, R BA-EIA embolus, SFA and popliteal emboli. Vascular surgery recommending right AKA. Bilateral LE duplex with likely early nonocclusive deep venous thrombosis. IVC filter placed 6/27 on recommendation by vascular medicine in preparation for right AKA 6/28. IVC filter should be removed within 3 months. AKA performed 6/28 without complications. Entresto started 6/29. Ethics involved given lack of documentation of POA and limited family (NOK are cousins). Palliative care consulted.    Updates 7/1  -Hgb not incrementing appropriately, received 3 units of rbc yesterday. CTA C/A/P 6/30 without evidence of active hemorrhage within chest/abdomen/pelvis. Oozing from RLE stump has slowed today, dressing was changed by vascular surgery. CTA RLE was ordered to follow up, identify any bleeding vessel.   -c/w heparin gtt as per vascular surgery   -defer diuresis , CVP 4  -CK and  downtrending  -RUQ US 7/1 without abnormality beyond hepatic steatosis and liver cyst  -Haily not corresponding, can use [employer] systolic 60-70 based on how it correlates with cuff  -monitoring q6hr CBC  -stopped entresto and jardiance due to potential OR in AM  -pain regimen: oxycodone 5mg prn moderate pain q4 hr, oxycodone 10mg severe painq 4hr, dilaudid 0.4mg q 4 hr breakthrough - also can use before and after dresssing changes        NEUROLOGIC  #AMS, resolved at her baseline  #ischemic stroke L MCA d/t AFib LLA thrombus seen on echo (lack of OAC adherence) s/p thrombectomy 01/2022 @ [facility] w/ residual  expressive aphasia and R sided weakness   ::Neurology consulted 6/25, no new deficits concerning for cerebral event  -no acute changes     #Depression?  #Insomnia  ::Patient's friend states that she doesn't care for herself due to being depressed  -consider psychiatry consult, deferring for now  -told to stop quetiapine 50mg at last discharge  -c/w melatonin 6mg at bedtime     CARDIOVASCULAR  #Cardiogenic shock, improving  #HFrEF (LVEF 20-25%)  ::Not candidate for advanced therapies due to medical nonadherence  ::Weaned off dobutamine and nipride  Plan:  -stopped entresto and jardiance due to upcoming OR  -diuresis as needed -cvp 4 on 7/1, defer  -palliative care consulted given patient not candidate for advanced therapies, appreciate recommendations  -monitor swan numbers q6h  -holding GDMT given pressures, unclear what patient was actively taking however was previously prescribed entresto 24-26, spironolactone 25mg, metoprolol succinate 50mg daily, lasix 40mg, jardiance 10mg     #Right limb ischemia  #Arterial emboli  #S/p femoral arterial line sheath placement now removed  Plan:  ::::CTA Aorta with runoff 6/24: aortic bifurcation embolus, R BA-EIA embolus, SFA and popliteal emboli   ::arterial duplex exam with monophasic right CFA, SFA and PFA signals, absent popliteal flow and monophasic flow in the tibial artery   ::Unable to walk prior to presentation to hospital  ::Right leg has been cold and painful in times where cardiogenic shock has worsened then warm and not painful to touch when out of shock state, fluctuating exam  -heparin drip per above - continue as per vascular medicine  -s/p AKA with vascular surgery, 6/28/2024.   -vascular surgery consulted, CT CAP 6/30 without active bleed. CTA RLE ordered to evaluate for bleeding vessels. Patient has been having bleeding from RLE stump. May go for stump revision based on vascular surgery recs.   -pain regimen: oxycodone 5mg prn moderate pain, oxycodone 10mg  severe pain, dilaudid 0.4mg breakthrough and cyclobenzaprine     #Cardiac thrombi  #Right lower extremity DVT  ::Likely in setting of Xarelto nonadherence  ::There are indeterminate low-attenuation nodular filling defects within the atrial appendage. While this may represent contrast under filling, a right atrial appendage thrombus can not be excluded.  ::Rt US duplex with likely early nonocclusive deep venous thrombosis.   Plan:  -c/w heparin gtt     #Afib on Xarelto w/ DCCV 05/2023), ischemic stroke L MCA d/t AFib LLA thrombus seen on echo (lack of OAC adherence) s/p thrombectomy 01/2022 @ [facility] w/ residual expressive aphasia and R sided weakness   ::Episode of RVR in ED  ::Previously prescribed digoxin 250mcg however last fill was 12/2023  ::TSH 6.28H, free T4 1.48  Plan:  -c/w amiodarone 200mg daily  -hold home digoxin 250mcg  -c/w AC per above (holding home Xarelto 20mg daily in evening)  -c/w aspirin 81mg  -stopped atorvastatin 80 due to liver injury     #Elevated troponin  ::95 > 289 > 313 > 326 > 289  -stop trending     #HTN  -holding home medications     PULMONARY  #Dyspnea  #Mild pulmonary edema  ::No evidence of PE  -diuresis per above     #Hx trach c/b trancutaneous fistula  ::ENT had been consulted 3/2024 for gurgling and mucus drainage, no inpatient interventions required  -monitor for breaths/mucus discharge from trach site  -Cover the tracheocutaneous fistula with tape and gauze and encourage patient to apply pressure when voicing.   -per prior ENT note on last admission   -follow up outpatient ENT for closure (Dr. King)     RENAL/  #Hyponatremia  -monitor RFP and diurese per Harvinder     #Rhabdomyolysis   -Ck peaked > 14,000, trending down as of 6/30  -unable to give fluids given cardiogenic shock     #Metabolic acidosis, resolved  ::Likely in setting of shock     #SARAH, resolved  ::Likely prerenal vs ATN in setting of shock  ::FeUrea suggests intrinsic  -U/A with 3+ blood and no RBCs. Elevated CK as  above.  -avoid nephrotoxic medications     #Elevated lactate, improved     GASTROINTESTINAL  #Elevated Tbili (3.2 on admission, from 0.5 in 03/2024)  #Elevated LFTs, downtrending  ::Likely ischemic liver injury  ::RUQ US 7/1 without abnormality beyond hepatic steatosis and liver cyst  -trend CMP     #GERD  -c/w pantoprazole 40mg daily     ENDOCRINE  #T2DM, diet controlled  ::HgbA1c 3/2024 5.5  -hypoglycemia protocol and mild SSI     HEME/ONC  #Cardiac thrombi  -see cardiac section for plan     #Arterial thrombosis  #DVT  ::s/p IVC filter 6/27  -heparin drip per above  -further plan per above     #Anemia  ::had 3 units of RBC transfused 6/30 without appropriate incrementation.   ::CTA C/A/P 6/30 without evidence of active hemorrhage within chest/abdomen/pelvis  ::6/30    Plan:  -having persistent oozing from stump, which has improved from 6/30.   -CTA RLE ordered to evaluate for bleeding vessel. Potential plan for OR for revision with vascular surgery if bleeding vessel identified   - CBC q6   -Hgb goal >8  given cardiac hx    -pending haptoglobin  -slide requested for peripheral smear     #Thrombocytopenia  ::Oozing of central line, Multiple thrombi, DIC score 5 on 6/24/2024  ::Hit score of 6  ::Negative PF4  -vascular medicine consulted   -monitor DIC labs daily     #Left brachial injury  ::Likely injury  :LUE DVT scan with nonvascularized mass, called CT while patient was there to request CTA of left upper extremity and they said logistically they would be unable to perform both scans, and given protocol for max dosing of contrast, could not give further contrast until 6/25 at 2PM  -no CTA LUE needed      INFECTIOUS DISEASE  #Concern for infection, suspect from stump   ::Procal 0.28  ::CXR without signs of PNA  ::s/p vanc/zosyn (6/20-6/24) (6/27- )  ::UA with 1+ blood, 1+ bacteria, no WBC, neg nitrite and leuk esterase  ::6/27 right groin wound culture negative  -pending blood cultures x1 6/20, x2 6/22,  "NGTD   -restarted vanc/zosyn 6/27 due to elevated leukocytosis and purulent appearing right groin site  -pending blood cultures 6/27, NGTD     Dispo:  -PT: Moderate intensity level of care  -patient would like enrollment in  home delivery service for medications (she has trouble getting to preferred pharmacy), pharmacy updated to [facility]  -patient's family would like her enrolled in [place]  -repeat thyroid labs outpatient  -ensure IVC filter removal in 3 months!     N: cardiac  A: [place], pIVs, Clive Lt radial  DVT ppx: heparin drip  GI ppx: pantoprazole 40mg     Code Status: FULL CODE per patient and NOKs  *Note that patient LACKS capacity due to inability to express decision and inconsistency in decisions     Surrogate Medical Decision-maker:   Surrogate decision maker is: pt's cousin, Clara Wayne: [phone], Efrain Black: [phone], Keli Osborne: [phone]      Friends who may be helpful in making decisions:  Prior boyfriend Navin Sanches [phone]  Very good friend \"Isiah\" Pranay Owen [phone]    Galdino Boles MD  Internal Medicine, PGY-2   "

## 2024-07-01 NOTE — PROGRESS NOTES
Amirah Bennett is a 43 y.o. female on day 11 of admission presenting with Atrial fibrillation (Multi).    Palliative Medicine following for:  Complex medical decision making, symptom management, patient/family support     History obtained from chart review including ED note, H&P, patient's daily progress notes, review of lab/test results, and discussion with primary team and bedside RN.     Subjective   History of Present Illness  Amirah Bennett is a 42 y.o. female with significant PMHx of HFrEF (LVEF 20-25% (08/2022), Afib on Xarelto w/ DCCV 05/2023), ischemic stroke L MCA d/t AFib LLA thrombus seen on echo (lack of OAC adherence) s/p thrombectomy 01/2022 @ [facility] w/ residual expressive aphasia and R sided weakness, recent 03/2024 left cerebellar MCA, s/p tracheostomy, T2DM (03/2024 HgbA1c 5.5) and HTN, presenting with bilateral leg pain.      In the ED, she was noted to be tachycardic to 105 in triage and but then when placed on the monitor was found to be in atrial fibrillation with RVR with a rate between 150 and 190. She was given 5 mg of metoprolol with slight improvement of her rate but became hypotensive and symptomatic. She was started on heparin both for her A-fib and for possible DVT/PE. Lytics were not given because of the risk of potential intracranial hemorrhage after her recent stroke. Instead decision made to cardiovert the patient and also gave digoxin, and amiodarone. After cardioversion she still looked like she was in atrial fibrillation with RVR but only to a rate in the 120s and 130s. Her blood pressure improved. Levophed ordered along with calcium and magnesium. No evidence of infection. Patient admitted to the ICU with plan to get a CT angiogram of her chest to rule out PE and RHC. Ongoing work up revealed aortic bifurcation embolus, R BA-EIA embolus, SFA and popliteal emboli. Pt with acute right lower extremity ischemia, which is not salvageable, needing inflow procedure and R AKA given new  "motor deficits.    IVC filter placed 6/27/24, open R iliofemoral thromboembolectomy and R AKA completed 6/28/24. Ongoing bleeding from stump c/b need for heparin for other clots, being managed by primary team and vascular medicine.     7/1: Pt calm without acute complaints after recent dressing change. Word finding difficulty pronounced and unclear what patient understanding of current condition is     Symptoms  Pain: denies  Dyspnea: denies  Fatigue: denies  Insomnia: states slept very well last night.  Drowsiness: denies  Constipation: initially endorses, but agrees when reminded she had a BM overnight  Nausea: denies  Appetite: good  Anxiety: not asked  Depression: not asked    BP 91/59   Pulse 92   Temp 36.3 °C (97.3 °F) (Temporal)   Resp 16   Ht 1.702 m (5' 7.01\")   Wt 84.4 kg (186 lb 1.1 oz)   SpO2 100%   BMI 29.14 kg/m²     Physical Exam   Physical Exam  Constitutional:       Appearance: She is obese.   HENT:      Head: Normocephalic and atraumatic. Skin around face and neck dry with flaking areas     Nose: Nose normal.      Mouth/Throat:      Mouth: dry      Pharynx: Oropharynx is clear.   Cardiovascular:      Rate and Rhythm: RRR no M/R/G   Pulmonary:      Effort: Pulmonary effort is normal.      Breath sounds: CTA anteriorly  Abdominal:      Palpations: Abdomen is soft, bowel sounds normal  Musculoskeletal:   LUE: Significant edema present. Thickening and cracking of overlying skin     Right lower leg stump: Edema present. Clean ace wrap tightly around area, not taken down     Left lower leg: Edema present. Able to wiggle toes  Skin:     General: Skin is dry.   Neurological:      Mental Status: She is alert. Difficult to assess orientation d/t word finding difficulty     Comments: Significant expressive aphasia. Pauses and word searching.   Psychiatric:         Attention and Perception: Attention normal.         Mood and Affect: Mood is calm, smiling, happy.         Behavior: Behavior is " cooperative.     Lab Results   Component Value Date    WBC 28.3 (H) 07/01/2024    HGB 8.6 (L) 07/01/2024    HCT 25.1 (L) 07/01/2024    MCV 88 07/01/2024     (L) 07/01/2024      Lab Results   Component Value Date    GLUCOSE 108 (H) 07/01/2024    CALCIUM 7.3 (L) 07/01/2024     (L) 07/01/2024    K 5.6 (H) 07/01/2024    CO2 23 07/01/2024    CL 99 07/01/2024    BUN 11 07/01/2024    CREATININE 0.72 07/01/2024       Intake/Output Summary (Last 24 hours) at 7/1/2024 1738  Last data filed at 7/1/2024 1630  Gross per 24 hour   Intake 1076.17 ml   Output 1201 ml   Net -124.83 ml     Allergies   Allergen Reactions    Hydrocodone-Acetaminophen Rash    Ibuprofen Rash     Tolerates aspirin     Current Facility-Administered Medications   Medication Dose Route Frequency Provider Last Rate Last Admin    amiodarone (Pacerone) tablet 200 mg  200 mg oral Daily Kristine Pat MD   200 mg at 07/01/24 0953    aspirin chewable tablet 81 mg  81 mg oral Daily Kristine Pat MD   81 mg at 07/01/24 0932    bisacodyl (Dulcolax) suppository 10 mg  10 mg rectal Daily Kristine Pat MD   10 mg at 06/25/24 1006    cyclobenzaprine (Flexeril) tablet 10 mg  10 mg oral TID PRN Kristine Pat MD   10 mg at 06/27/24 1618    dextrose 50 % injection 12.5 g  12.5 g intravenous q15 min PRN Kristine Pat MD   12.5 g at 06/25/24 1604    dextrose 50 % injection 25 g  25 g intravenous q15 min PRN Kristine Pat MD   25 g at 06/20/24 1237    [Held by provider] empagliflozin (Jardiance) tablet 10 mg  10 mg oral Daily Hosea Bourgeois MD   10 mg at 06/30/24 0814    glucagon (Glucagen) injection 1 mg  1 mg intramuscular q15 min PRN Kristine Pat MD        glucagon (Glucagen) injection 1 mg  1 mg intramuscular q15 min PRN Kristine Pat MD        heparin 25,000 Units in dextrose 5% 250 mL (100 Units/mL) infusion (premix)  0-4,500 Units/hr intravenous Continuous Kristine Pat MD 16 mL/hr at 07/01/24 1401 1,600 Units/hr at 07/01/24 1401     heparin bolus from bag 3,000-6,000 Units  3,000-6,000 Units intravenous q4h PRN Kristine Pat MD   3,000 Units at 07/01/24 1414    HYDROmorphone (Dilaudid) injection 0.4 mg  0.4 mg intravenous q4h PRNILESH Boles MD        insulin lispro (HumaLOG) injection 0-5 Units  0-5 Units subcutaneous q4h Kristine Pat MD   1 Units at 06/30/24 1618    lidocaine (Xylocaine) 5 % ointment   Topical PRN Kristine Pat MD        melatonin tablet 6 mg  6 mg oral Nightly Kristine Pat MD   6 mg at 06/30/24 2100    oxyCODONE (Roxicodone) immediate release tablet 10 mg  10 mg oral q4h PRN Galdino Boles MD   10 mg at 07/01/24 1524    oxyCODONE (Roxicodone) immediate release tablet 5 mg  5 mg oral q4h PRNILESH Boles MD        oxygen (O2) therapy   inhalation Continuous - Inhalation Kristine Pat MD   1 L/min at 06/29/24 0800    pantoprazole (ProtoNix) injection 40 mg  40 mg intravenous Daily Humberto Cook MD   40 mg at 07/01/24 0955    perflutren protein A microsphere (Optison) injection 0.5 mL  0.5 mL intravenous Once in imaging Kristine Pat MD        piperacillin-tazobactam-dextrose (Zosyn) IV 3.375 g  3.375 g intravenous q6h Kristine Pat MD   Stopped at 07/01/24 1630    polyethylene glycol (Glycolax, Miralax) packet 17 g  17 g oral BID Kristine Pat MD   17 g at 07/01/24 0900    [Held by provider] sacubitriL-valsartan (Entresto) 24-26 mg per tablet 1 tablet  1 tablet oral BID Kristine Pat MD   1 tablet at 06/30/24 0816    sennosides (Senokot) tablet 17.2 mg  2 tablet oral BID Kristnie Pat MD   17.2 mg at 07/01/24 0954    sodium zirconium cyclosilicate (Lokelma) packet 10 g  10 g oral Once Britany Luevano MD        sulfur hexafluoride microsphr (Lumason) injection 24.28 mg  2 mL intravenous Once in imaging Kristine Pat MD        vancomycin (Vancocin) 1,000 mg in dextrose 5% water 200 mL  1,000 mg intravenous q12h Kristine Pat MD   Stopped at 07/01/24 0707    vancomycin (Vancocin)  pharmacy to dose - pharmacy monitoring   miscellaneous Daily PRN Kristine Pat MD         Assessment/Plan   History of Present Illness  Amirah Bennett is a 42 y.o. female with significant PMHx of HFrEF (LVEF 20-25% (08/2022), Afib on Xarelto w/ DCCV 05/2023), ischemic stroke L MCA d/t AFib LLA thrombus seen on echo (lack of OAC adherence) s/p thrombectomy 01/2022 @ [facility] w/ residual expressive aphasia and R sided weakness, recent 03/2024 left cerebellar MCA, s/p tracheostomy, T2DM (03/2024 HgbA1c 5.5) and HTN presenting with bilateral leg pain and dyspnea. She was found to have cardiogenic shock, with hospital course c/b SARAH, cardiac thrombi, DVT of external iliac and common femoral veins, rhabdo, intracardiac thrombus, afib with RVR (IVC filter 6/27/24), acute limb ischemia resulting in open revascularization and AKA of the RLE on 6/28/24. Ongoing discussions about NOK/surrogate decision makers with ethics involvement, Ale elected.      Palliative Performance Scale (PPS): 30        #Complex Medical Decision Making  #Goals of Care  #Advanced Care Planning  - Code status: Full code  - Surrogate decision maker: Keli Osborne (cousin) [phone]. Clara Black (cousin) [phone].  - Goals are survival and improved quality of life.      #Acute Pain  #R AKA and open embolectomy  #Thrombi  - LUE hematoma, elevated  - Consider heel offloading for pressure sore reduction r/t poor RLE mobility and sensation.  - Holding off on hepatotoxic meds (Acetaminophen) ISO acute liver injury, although improving.  - Currently on Hydromorphone 0.4mg Q4H PRN severe pain, only receiving when able to verbalize pain. Recommend increasing hydromorphone to 0.4mg q3h, giving five minutes before and after dressing changes, and when grimacing/shifting in discomfort as well.  - Not utilizing cyclobenzaprine, can discontinue  - Recommend discontinuing oxycodone 5mg and making 10mg available q3h  PRN    #Depression  #Anxiety  #Insomnia  - Not currently on antidepressant but home med list notable for Quetiapine 50mg at bedtime. (Not currently taking in pt.) Unclear history. Recommend Psychiatry consult.  - Situational related to upcoming sx.   - reports sleeping well recently. Continue melatonin  - Ongoing encouragement of calming modalities such as distraction, prayer, positive self talk/thoughts, and meditation.  - Please consider Psychiatry consult for underlying depression and to assist with anticipated ongoing anxieties. C/f vicious cycle of ongoing depressive symptoms and medication non-adherence ISO worsened health and possible QOL. [place] to establish in pt and out patient plan and follow up.     #Psychosocial Support  - Ongoing pt and family support, positive presence and emotional support  - Music Therapy  - Spiritual Care Support    #Cardiogenic shock  #Acute SARAH  #Shock Liver  - Itching, can consider binders in setting of elevated bilirubin  - Kidney and liver function improving    Plan of Care discussed with: Updated primary team and bedside RN on goals of care decision, medication adjustments, and code status      Medical Decision Making was moderate level due to high complexity of problems, extensive data review, and high risk of management/treatment.     - Cardiogenic shock, shock liver, acute kidney injury requiring inotrope cardiac support, multiple emboli and acute loss of right lower extremity perfusion requiring AKA, worsening leukocytosis posing threat to life and function.  - Reviewed external notes from   - Reviews results from  which were used in decision making for   - Recommended the following tests:   - Assessment required independent historian: nursing and primary team  - Independent interpretation of test: labs   - Discussion of management with: nursing, primary   - Drug therapy requiring intensive monitoring for toxicity: monitor renal status with meds/HF, mag level,  potassium.  - Decision regarding elective major surgery with identified patient or procedure risk factors:   - Decision regarding emergency major surgery: assured again today that pt still onboard for right AKA  - Decision regarding hospitalization or escalation of hospital-level care:   - Decision not to resuscitate or to de-escalate care because of poor prognosis:   - Parenteral controlled substances: heparin, nipride, dilaudid, PPI, mag, bumex (spot dosing).      Thank you for allowing us to participate in the care of this patient. Palliative will continue to follow as needed. Palliative medicine is available Monday-Friday, 8a-6p. Please contact team with any questions or concerns.  Team pager [phone] (weekdays)    Rosa Maria Kerns MD

## 2024-07-01 NOTE — PROGRESS NOTES
"Nutrition Follow Up Assessment:   Nutrition Assessment    The patient is a 43 y.o. female who is hospital day #11.  Pt initially admitted w/ c/f cardiogenic shock. Not candidate for advanced therapies.    Course c/b SARAH, cardiac thrombi, liver injury likely due to ischemia, and Afib with RVR. Found to have rhabdomyolysis with CK of 14,000, unable to give fluids due to cardiogenic shock.  Arterial doppler findings raise concern for thrombosis of distal SFA and popliteal artery. AKA w/ vascular surgery on 06/28.     Nutrition History:  Food and Nutrient History: Pt reports appetite is good . Nothing to eat today due to NPO for procedure. Reports yesterday she had some grapes and ginger ale - could not share what else she had but per chart review 3 meals. Reports liking the ONS and drinking about 2 a day. No snacks. Per review of health [facility], prior to OR pt was ordering 1-2 meals per day but over the weekend increased to 3 meals per day. Food orders also know including solid/regular foods.      Date/Time  Percent Meals Eaten (%)    06/30/24 1800  25   06/30/24 1300  75   06/30/24 0800  75   --> total for 06/30 = 1153 kcal and 28g pro (+ 2 Ensure Plus (350 kcal and 13g pro each))  06/29/24 1500  50   06/29/24 1000  100   --> total for documented 06/29 (missing dinner) = 1020 kcal and 27g pro (+ 2 Ensure Plus (350 kcal and 13g pro each))      Anthropometrics:  Start of admission anthropometrics:  Height: 170.2 cm (5' 7\")  Weight: 88.5 kg (195 lb)  BMI (Calculated): 30.53    IBW/kg (Dietitian Calculated): 61.4 kg  Percent of IBW: 159 %       Date/Time Weight  07/01/24  84.4 kg (186 lb 1.1 oz)  06/29/24  80.3 kg (177 lb 0.5 oz) - after AKA   06/28/24  87.4 kg (192 lb 10.9 oz) - before AKA  06/27/24 88.5 kg (195 lb 1.7 oz)  06/26/24  89.8 kg (197 lb 15.6 oz)  06/25/24  95.5 kg (210 lb 8.6 oz)  06/23/24  97.6 kg (215 lb 2.7 oz)  06/22/24  102 kg (225 lb 8.5 oz)  06/21/24  107 kg (234 lb 12.6 oz)  06/20/24  88.5 kg (195 " lb)    Weight Change %:  Weight History / % Weight Change: 9% wt loss since start of admission - correlates with the 11% expected w/ an AKA.  Significant Weight Loss: No    Nutrition Focused Physical Exam Findings:  defer: Refer to initial note 06/24 - well nourished     Edema:  Edema Location: generalized non pitting  Physical Findings:  Skin: Negative (bruising; incisions)    Objective Data:    Last BM Date: 06/30/24    Nutrition Significant Labs:    Results from last 7 days   Lab Units 07/01/24  0845 07/01/24  0410 06/30/24  2336   HEMOGLOBIN g/dL 8.6* 8.9* 8.1*   MCV fL 88 89 91   GLUCOSE mg/dL  --  108*  --    POTASSIUM mmol/L  --  5.6*  --    SODIUM mmol/L  --  128*  --    PHOSPHORUS mg/dL  --  3.9  --    MAGNESIUM mg/dL  --  1.88  --    CREATININE mg/dL  --  0.72  --    BUN mg/dL  --  11  --    ALT U/L  --  82*  --    AST U/L  --  44*  --    ALK PHOS U/L  --  58  --        Nutrition Specific Medications:  bisacodyl, 10 mg, rectal, Daily  insulin lispro, 0-5 Units, subcutaneous, q4h  magnesium sulfate, 2 g, intravenous, Once  pantoprazole, 40 mg, intravenous, Daily  piperacillin-tazobactam, 3.375 g, intravenous, q6h  polyethylene glycol, 17 g, oral, BID  sennosides, 2 tablet, oral, BID  vancomycin, 1,000 mg, intravenous, q12h    I/O:   I/O last 2 completed shifts:  In: 3407.7 (40.4 mL/kg) [P.O.:960; I.V.:386 (4.6 mL/kg); Blood:1061.7; IV Piggyback:1000]  Out: 1536 (18.2 mL/kg) [Urine:1536 (0.8 mL/kg/hr)]  Weight: 84.4 kg     Dietary Orders (From admission, onward)       Start     Ordered    06/24/24 1327  Oral nutritional supplements  Until discontinued        Question Answer Comment   Deliver with Breakfast    Deliver with Lunch    Select supplement: Ensure Plus        06/24/24 1326                     Estimated Needs:   Total Energy Estimated Needs (kCal): 1700 kCal  Method for Estimating Needs: 28 kcal/kg IBW    Total Protein Estimated Needs (g): 80 g  Method for Estimating Needs: 1.3 g/kg IBW    Method for  Estimating Needs: 1 ml/kcal or per team          Nutrition Diagnosis   Malnutrition Diagnosis  Patient has Malnutrition Diagnosis: No    Nutrition Diagnosis  Patient has Nutrition Diagnosis: Yes  Diagnosis Status (1): Ongoing  Nutrition Diagnosis 1: Inadequate protein energy intake  Related to (1): decreased appetite?  As Evidenced by (1): meals while inpatient mostly include only fluids/pudding/gelatin  Additional Assessment Information (1): Appears to be improving with consumption of ONS bid and now ordering 3 meals/d. Will continue to monitor.       Nutrition Interventions/Recommendations   Individualized Nutrition Prescription Provided for : 1700 kcal and 80g protein via oral diet    Continue with Ensure Plus BID             Nutrition Monitoring and Evaluation   Monitoring and Evaluation Plan: Energy intake  Energy Intake: Estimated energy intake  Criteria: >50% of nutr needs    Monitoring and Evaluation Plan: Weight  Weight: Weight change  Criteria: no wt change    Monitoring and Evaluation Plan: Electrolyte/renal panel  Electrolyte and Renal Panel: Phosphorus, Magnesium, Potassium, Sodium  Criteria: lytes wnl              Time Spent (min): 45 minutes

## 2024-07-01 NOTE — PROGRESS NOTES
Amirah Bennett is a 43 y.o. female on day 11 of admission.   Medical history significant for atrial fibrillation s/p DCCV c/b YFN thrombus and stroke s/p thrombectomy 2022 and stroke 3/2024 with residual expressive aphasia and right sided weakness, HLD, HTN, T2DM.  Presents with report of BLE pain and dyspnea.  Patient underwent RHC that was consistent with cardiogenic shock. Hospital course with medical management of shock, as patient not a candidate for advanced therapies, complicated by SARAH, cardiac thrombi, DVT of EIV-CFV, elevated liver enzymes, rhabdomyolysis, intracardiac thrombus, ALI and Afib with RVR.    Underwent arterial duplex exam with monophasic right CFA, SFA and PFA signals, absent popliteal flow and monophasic flow in the tibial artery, with no sensation below the right knee and no movement at the ankle, which is concerning for ALI.  Vascular Surgery is involved, and notes that the right leg is not salvageable, and that patient will need an inflow procedure and right AKA procedure. REVASC AND AKA COMPLETED 6/28.     Vascular Medicine Service consulted for HIT due to thrombocytopenia.    HIT workup stopped after PF4 resulted with negative result.   Transitioned back to Heparin infusion with rising platelet count. STABLE  Underwent placement of IVC filter 6.27.24.  Patient underwent open RLE and iliac embolectomy via RCFA exposure and right AKA 6/20/24.     Subjective   Patient reports feeling better today.  Denies CP or SOB has bleeding noted from the right AKA dressing.      Objective   Vascular Medicine Service following for anticoagulation therapy.  Continues with Heparin infusion with sub-therapeutic Heparin assay,    Physical Exam  Resting in bed in NAD  Respirations full and easy with breat sounds CTA all anterior lung fields; right PA catheter dressing dry and intact at neck;   Normal heart sounds with regular rate/rhythm; monitor shows SR; vital signs stable   Abdomen soft and nontender to  "palpation.  Extremities are warm to touch; dressing on right leg stump with bloody drainage that is collecting under the upper right leg and saturating the dressing; left leg with palpable DP pulse, palpable bilateral radial pulses;   Patient is awake and conversant, participates a little in conversation    Last Recorded Vitals  Blood pressure 90/54, pulse 92, temperature 36.2 °C (97.2 °F), temperature source Temporal, resp. rate 20, height 1.702 m (5' 7.01\"), weight 84.4 kg (186 lb 1.1 oz), SpO2 100%.  Intake/Output last 3 Shifts:  I/O last 3 completed shifts:  In: 3997.8 (47.4 mL/kg) [P.O.:960; I.V.:626.1 (7.4 mL/kg); Blood:1411.7; IV Piggyback:1000]  Out: 2376 (28.2 mL/kg) [Urine:2376 (0.8 mL/kg/hr)]  Weight: 84.4 kg     Relevant Results  Scheduled medications  amiodarone, 200 mg, oral, Daily  aspirin, 81 mg, oral, Daily  bisacodyl, 10 mg, rectal, Daily  [Held by provider] empagliflozin, 10 mg, oral, Daily  insulin lispro, 0-5 Units, subcutaneous, q4h  magnesium sulfate, 2 g, intravenous, Once  melatonin, 6 mg, oral, Nightly  oxygen, , inhalation, Continuous - Inhalation  pantoprazole, 40 mg, intravenous, Daily  perflutren protein A microsphere, 0.5 mL, intravenous, Once in imaging  piperacillin-tazobactam, 3.375 g, intravenous, q6h  polyethylene glycol, 17 g, oral, BID  [Held by provider] sacubitriL-valsartan, 1 tablet, oral, BID  sennosides, 2 tablet, oral, BID  sodium zirconium cyclosilicate, 10 g, oral, Once  sulfur hexafluoride microsphr, 2 mL, intravenous, Once in imaging  vancomycin, 1,000 mg, intravenous, q12h      Continuous medications  heparin, 0-4,500 Units/hr, Last Rate: 1,400 Units/hr (07/01/24 0411)      Results from last 7 days   Lab Units 07/01/24  0845 07/01/24  0410 06/30/24  2336   WBC AUTO x10*3/uL 28.3* 28.5* 31.8*   HEMOGLOBIN g/dL 8.6* 8.9* 8.1*   HEMATOCRIT % 25.1* 27.1* 24.9*   PLATELETS AUTO x10*3/uL 144* 110* 140*       Results from last 7 days   Lab Units 07/01/24  0410 06/30/24  1808 " 06/30/24  0520   SODIUM mmol/L 128* 128* 127*   POTASSIUM mmol/L 5.6* 4.6 3.8   CHLORIDE mmol/L 99 98 94*   CO2 mmol/L 23 24 24   BUN mg/dL 11 12 13   CREATININE mg/dL 0.72 0.73 0.72   GLUCOSE mg/dL 108* 121* 100*   CALCIUM mg/dL 7.3* 7.3* 7.3*   Estimated Creatinine Clearance: 110.1 mL/min (by C-G formula based on SCr of 0.72 mg/dL).      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  Medical history as noted above.  Patient found to have ALI of the right lower extremity, and is now s/p open RLE and iliac embolectomy via RCFA exposure and right AKA 6/20/24.    Continues with Heparin with a sub-therapeutic Heparin assay today.   Review of labs today shows stable hemoglobin 8.6 grams stable platelets 144 K, and stable serum creatinine 0.73 with creatinine clearance of >110 ml/minute.   Plan of care discussed with patient, and her questions were answered to her satisfaction.   Vascular Surgery Team planning for CTA RLE imaging         Recommendations:  ~CONTINUE Heparin infusion; follow nomogram to achieve therapeutic assay 0.3-0.7 - HOLD FOR ONGOING BLEEDING AS NEEDED.   ~MONITOR for bleeding  ~We will determine outpatient anticoagulation  "agent after all invasive tests and procedures have been completed.     Patient seen and evaluated with Dr. Marla Newsome.    Plan of care discussed with Dr. Marla Newsome   Plan of care discussed with the CICU Service     JAE Mendiola-CNP  Vascular Medicine Service   Pager [phone]    MY ADDITIONS ARE NOTED IN \"CAPS\".  Marla Newsome MD    "

## 2024-07-01 NOTE — PROGRESS NOTES
Physical Therapy                 Therapy Communication Note    Patient Name: Amirah Bennett  MRN: [id number]  Today's Date: 7/1/2024     Discipline: Physical Therapy    Missed Visit Reason: Missed Visit Reason: Other (Comment) (Plan for return to OR for stump revision.)    Missed Time: Attempt    Comment:

## 2024-07-02 ENCOUNTER — APPOINTMENT (OUTPATIENT)
Dept: RADIOLOGY | Facility: HOSPITAL | Age: 44
DRG: 239 | End: 2024-07-02
Payer: COMMERCIAL

## 2024-07-02 LAB
ACT BLD: 152 SEC (ref 83–199)
ACT BLD: 168 SEC (ref 83–199)
ACT BLD: 232 SEC (ref 83–199)
ACT BLD: 290 SEC (ref 83–199)
ALBUMIN SERPL BCP-MCNC: 2.2 G/DL (ref 3.4–5)
ALBUMIN SERPL BCP-MCNC: 2.2 G/DL (ref 3.4–5)
ALP SERPL-CCNC: 60 U/L (ref 33–110)
ALT SERPL W P-5'-P-CCNC: 64 U/L (ref 7–45)
ANION GAP BLDMV CALCULATED.4IONS-SCNC: 11 MMO/L (ref 10–25)
ANION GAP BLDMV CALCULATED.4IONS-SCNC: 11 MMO/L (ref 10–25)
ANION GAP SERPL CALC-SCNC: 11 MMOL/L (ref 10–20)
ANION GAP SERPL CALC-SCNC: 14 MMOL/L (ref 10–20)
APTT PPP: 89 SECONDS (ref 27–38)
AST SERPL W P-5'-P-CCNC: 36 U/L (ref 9–39)
BASE EXCESS BLDMV CALC-SCNC: -0.7 MMOL/L (ref -2–3)
BASE EXCESS BLDMV CALC-SCNC: -1.9 MMOL/L (ref -2–3)
BASOPHILS # BLD AUTO: 0.04 X10*3/UL (ref 0–0.1)
BASOPHILS # BLD AUTO: 0.04 X10*3/UL (ref 0–0.1)
BASOPHILS # BLD AUTO: 0.05 X10*3/UL (ref 0–0.1)
BASOPHILS # BLD AUTO: 0.05 X10*3/UL (ref 0–0.1)
BASOPHILS # BLD AUTO: 0.06 X10*3/UL (ref 0–0.1)
BASOPHILS NFR BLD AUTO: 0.2 %
BASOPHILS NFR BLD AUTO: 0.3 %
BILIRUB DIRECT SERPL-MCNC: 1.8 MG/DL (ref 0–0.3)
BILIRUB SERPL-MCNC: 3.2 MG/DL (ref 0–1.2)
BODY TEMPERATURE: 37 DEGREES CELSIUS
BODY TEMPERATURE: 37 DEGREES CELSIUS
BUN SERPL-MCNC: 11 MG/DL (ref 6–23)
BUN SERPL-MCNC: 11 MG/DL (ref 6–23)
CA-I BLDMV-SCNC: 1.13 MMOL/L (ref 1.1–1.33)
CA-I BLDMV-SCNC: 1.15 MMOL/L (ref 1.1–1.33)
CALCIUM SERPL-MCNC: 7.5 MG/DL (ref 8.6–10.6)
CALCIUM SERPL-MCNC: 7.6 MG/DL (ref 8.6–10.6)
CHLORIDE BLD-SCNC: 103 MMOL/L (ref 98–107)
CHLORIDE BLD-SCNC: 103 MMOL/L (ref 98–107)
CHLORIDE SERPL-SCNC: 101 MMOL/L (ref 98–107)
CHLORIDE SERPL-SCNC: 99 MMOL/L (ref 98–107)
CO2 SERPL-SCNC: 22 MMOL/L (ref 21–32)
CO2 SERPL-SCNC: 23 MMOL/L (ref 21–32)
CREAT SERPL-MCNC: 0.61 MG/DL (ref 0.5–1.05)
CREAT SERPL-MCNC: 0.71 MG/DL (ref 0.5–1.05)
EGFRCR SERPLBLD CKD-EPI 2021: >90 ML/MIN/1.73M*2
EGFRCR SERPLBLD CKD-EPI 2021: >90 ML/MIN/1.73M*2
EOSINOPHIL # BLD AUTO: 0.09 X10*3/UL (ref 0–0.7)
EOSINOPHIL # BLD AUTO: 0.13 X10*3/UL (ref 0–0.7)
EOSINOPHIL # BLD AUTO: 0.2 X10*3/UL (ref 0–0.7)
EOSINOPHIL # BLD AUTO: 0.2 X10*3/UL (ref 0–0.7)
EOSINOPHIL # BLD AUTO: 0.21 X10*3/UL (ref 0–0.7)
EOSINOPHIL NFR BLD AUTO: 0.4 %
EOSINOPHIL NFR BLD AUTO: 0.6 %
EOSINOPHIL NFR BLD AUTO: 0.8 %
EOSINOPHIL NFR BLD AUTO: 0.9 %
EOSINOPHIL NFR BLD AUTO: 0.9 %
ERYTHROCYTE [DISTWIDTH] IN BLOOD BY AUTOMATED COUNT: 17.2 % (ref 11.5–14.5)
ERYTHROCYTE [DISTWIDTH] IN BLOOD BY AUTOMATED COUNT: 17.7 % (ref 11.5–14.5)
ERYTHROCYTE [DISTWIDTH] IN BLOOD BY AUTOMATED COUNT: 18.3 % (ref 11.5–14.5)
ERYTHROCYTE [DISTWIDTH] IN BLOOD BY AUTOMATED COUNT: 18.5 % (ref 11.5–14.5)
ERYTHROCYTE [DISTWIDTH] IN BLOOD BY AUTOMATED COUNT: 18.5 % (ref 11.5–14.5)
GLUCOSE BLD MANUAL STRIP-MCNC: 103 MG/DL (ref 74–99)
GLUCOSE BLD MANUAL STRIP-MCNC: 115 MG/DL (ref 74–99)
GLUCOSE BLD MANUAL STRIP-MCNC: 121 MG/DL (ref 74–99)
GLUCOSE BLD MANUAL STRIP-MCNC: 86 MG/DL (ref 74–99)
GLUCOSE BLD MANUAL STRIP-MCNC: 88 MG/DL (ref 74–99)
GLUCOSE BLD-MCNC: 104 MG/DL (ref 74–99)
GLUCOSE BLD-MCNC: 116 MG/DL (ref 74–99)
GLUCOSE SERPL-MCNC: 104 MG/DL (ref 74–99)
GLUCOSE SERPL-MCNC: 111 MG/DL (ref 74–99)
HCO3 BLDMV-SCNC: 18.5 MMOL/L (ref 22–26)
HCO3 BLDMV-SCNC: 18.5 MMOL/L (ref 22–26)
HCT VFR BLD AUTO: 20.2 % (ref 36–46)
HCT VFR BLD AUTO: 20.6 % (ref 36–46)
HCT VFR BLD AUTO: 23 % (ref 36–46)
HCT VFR BLD AUTO: 23.4 % (ref 36–46)
HCT VFR BLD AUTO: 24.8 % (ref 36–46)
HCT VFR BLD EST: 23 % (ref 36–46)
HCT VFR BLD EST: 26 % (ref 36–46)
HGB BLD-MCNC: 6.5 G/DL (ref 12–16)
HGB BLD-MCNC: 6.8 G/DL (ref 12–16)
HGB BLD-MCNC: 7.4 G/DL (ref 12–16)
HGB BLD-MCNC: 7.6 G/DL (ref 12–16)
HGB BLD-MCNC: 7.9 G/DL (ref 12–16)
HGB BLDMV-MCNC: 7.8 G/DL (ref 12–16)
HGB BLDMV-MCNC: 8.8 G/DL (ref 12–16)
IMM GRANULOCYTES # BLD AUTO: 0.61 X10*3/UL (ref 0–0.7)
IMM GRANULOCYTES # BLD AUTO: 0.63 X10*3/UL (ref 0–0.7)
IMM GRANULOCYTES # BLD AUTO: 0.68 X10*3/UL (ref 0–0.7)
IMM GRANULOCYTES # BLD AUTO: 0.76 X10*3/UL (ref 0–0.7)
IMM GRANULOCYTES # BLD AUTO: 0.79 X10*3/UL (ref 0–0.7)
IMM GRANULOCYTES NFR BLD AUTO: 2.7 % (ref 0–0.9)
IMM GRANULOCYTES NFR BLD AUTO: 2.8 % (ref 0–0.9)
IMM GRANULOCYTES NFR BLD AUTO: 2.9 % (ref 0–0.9)
IMM GRANULOCYTES NFR BLD AUTO: 3.2 % (ref 0–0.9)
IMM GRANULOCYTES NFR BLD AUTO: 3.3 % (ref 0–0.9)
INHALED O2 CONCENTRATION: 21 %
INHALED O2 CONCENTRATION: 21 %
INR PPP: 1.2 (ref 0.9–1.1)
LACTATE BLDMV-SCNC: 0.6 MMOL/L (ref 0.4–2)
LACTATE BLDMV-SCNC: 0.7 MMOL/L (ref 0.4–2)
LYMPHOCYTES # BLD AUTO: 2.42 X10*3/UL (ref 1.2–4.8)
LYMPHOCYTES # BLD AUTO: 2.6 X10*3/UL (ref 1.2–4.8)
LYMPHOCYTES # BLD AUTO: 2.97 X10*3/UL (ref 1.2–4.8)
LYMPHOCYTES # BLD AUTO: 3.32 X10*3/UL (ref 1.2–4.8)
LYMPHOCYTES # BLD AUTO: 3.36 X10*3/UL (ref 1.2–4.8)
LYMPHOCYTES NFR BLD AUTO: 10.3 %
LYMPHOCYTES NFR BLD AUTO: 11 %
LYMPHOCYTES NFR BLD AUTO: 13.5 %
LYMPHOCYTES NFR BLD AUTO: 14.2 %
LYMPHOCYTES NFR BLD AUTO: 14.2 %
MAGNESIUM SERPL-MCNC: 1.74 MG/DL (ref 1.6–2.4)
MAGNESIUM SERPL-MCNC: 1.91 MG/DL (ref 1.6–2.4)
MCH RBC QN AUTO: 28.2 PG (ref 26–34)
MCH RBC QN AUTO: 28.3 PG (ref 26–34)
MCH RBC QN AUTO: 28.6 PG (ref 26–34)
MCH RBC QN AUTO: 28.9 PG (ref 26–34)
MCH RBC QN AUTO: 29.1 PG (ref 26–34)
MCHC RBC AUTO-ENTMCNC: 31.9 G/DL (ref 32–36)
MCHC RBC AUTO-ENTMCNC: 32.2 G/DL (ref 32–36)
MCHC RBC AUTO-ENTMCNC: 32.2 G/DL (ref 32–36)
MCHC RBC AUTO-ENTMCNC: 32.5 G/DL (ref 32–36)
MCHC RBC AUTO-ENTMCNC: 33 G/DL (ref 32–36)
MCV RBC AUTO: 88 FL (ref 80–100)
MCV RBC AUTO: 88 FL (ref 80–100)
MCV RBC AUTO: 89 FL (ref 80–100)
MONOCYTES # BLD AUTO: 1.91 X10*3/UL (ref 0.1–1)
MONOCYTES # BLD AUTO: 2.08 X10*3/UL (ref 0.1–1)
MONOCYTES # BLD AUTO: 2.1 X10*3/UL (ref 0.1–1)
MONOCYTES # BLD AUTO: 2.15 X10*3/UL (ref 0.1–1)
MONOCYTES # BLD AUTO: 2.2 X10*3/UL (ref 0.1–1)
MONOCYTES NFR BLD AUTO: 8.2 %
MONOCYTES NFR BLD AUTO: 8.8 %
MONOCYTES NFR BLD AUTO: 9.1 %
MONOCYTES NFR BLD AUTO: 9.4 %
MONOCYTES NFR BLD AUTO: 9.5 %
NEUTROPHILS # BLD AUTO: 16.12 X10*3/UL (ref 1.2–7.7)
NEUTROPHILS # BLD AUTO: 16.88 X10*3/UL (ref 1.2–7.7)
NEUTROPHILS # BLD AUTO: 17.06 X10*3/UL (ref 1.2–7.7)
NEUTROPHILS # BLD AUTO: 18.23 X10*3/UL (ref 1.2–7.7)
NEUTROPHILS # BLD AUTO: 18.26 X10*3/UL (ref 1.2–7.7)
NEUTROPHILS NFR BLD AUTO: 72.1 %
NEUTROPHILS NFR BLD AUTO: 72.4 %
NEUTROPHILS NFR BLD AUTO: 73.1 %
NEUTROPHILS NFR BLD AUTO: 76.9 %
NEUTROPHILS NFR BLD AUTO: 77.7 %
NRBC BLD-RTO: 0.3 /100 WBCS (ref 0–0)
OXYHGB MFR BLDMV: 95.2 % (ref 45–75)
OXYHGB MFR BLDMV: 95.8 % (ref 45–75)
PCO2 BLDMV: 16 MM HG (ref 41–51)
PCO2 BLDMV: 18 MM HG (ref 41–51)
PH BLDMV: 7.62 PH (ref 7.33–7.43)
PH BLDMV: 7.67 PH (ref 7.33–7.43)
PHOSPHATE SERPL-MCNC: 3.8 MG/DL (ref 2.5–4.9)
PHOSPHATE SERPL-MCNC: 3.9 MG/DL (ref 2.5–4.9)
PLATELET # BLD AUTO: 139 X10*3/UL (ref 150–450)
PLATELET # BLD AUTO: 147 X10*3/UL (ref 150–450)
PLATELET # BLD AUTO: 170 X10*3/UL (ref 150–450)
PLATELET # BLD AUTO: 171 X10*3/UL (ref 150–450)
PLATELET # BLD AUTO: 174 X10*3/UL (ref 150–450)
PO2 BLDMV: 141 MM HG (ref 35–45)
PO2 BLDMV: 145 MM HG (ref 35–45)
POTASSIUM BLDMV-SCNC: 4.6 MMOL/L (ref 3.5–5.3)
POTASSIUM BLDMV-SCNC: 4.7 MMOL/L (ref 3.5–5.3)
POTASSIUM SERPL-SCNC: 4.5 MMOL/L (ref 3.5–5.3)
POTASSIUM SERPL-SCNC: 4.8 MMOL/L (ref 3.5–5.3)
PROT SERPL-MCNC: 4.9 G/DL (ref 6.4–8.2)
PROTHROMBIN TIME: 13 SECONDS (ref 9.8–12.8)
RBC # BLD AUTO: 2.3 X10*6/UL (ref 4–5.2)
RBC # BLD AUTO: 2.34 X10*6/UL (ref 4–5.2)
RBC # BLD AUTO: 2.59 X10*6/UL (ref 4–5.2)
RBC # BLD AUTO: 2.63 X10*6/UL (ref 4–5.2)
RBC # BLD AUTO: 2.8 X10*6/UL (ref 4–5.2)
SAO2 % BLDMV: 100 % (ref 45–75)
SAO2 % BLDMV: 99 % (ref 45–75)
SODIUM BLDMV-SCNC: 128 MMOL/L (ref 136–145)
SODIUM BLDMV-SCNC: 128 MMOL/L (ref 136–145)
SODIUM SERPL-SCNC: 129 MMOL/L (ref 136–145)
SODIUM SERPL-SCNC: 131 MMOL/L (ref 136–145)
UFH PPP CHRO-ACNC: 0.4 IU/ML
VANCOMYCIN SERPL-MCNC: 15.6 UG/ML (ref 5–20)
WBC # BLD AUTO: 22 X10*3/UL (ref 4.4–11.3)
WBC # BLD AUTO: 23.4 X10*3/UL (ref 4.4–11.3)
WBC # BLD AUTO: 23.4 X10*3/UL (ref 4.4–11.3)
WBC # BLD AUTO: 23.6 X10*3/UL (ref 4.4–11.3)
WBC # BLD AUTO: 23.7 X10*3/UL (ref 4.4–11.3)

## 2024-07-02 PROCEDURE — 85520 HEPARIN ASSAY: CPT

## 2024-07-02 PROCEDURE — 83735 ASSAY OF MAGNESIUM: CPT

## 2024-07-02 PROCEDURE — 2500000004 HC RX 250 GENERAL PHARMACY W/ HCPCS (ALT 636 FOR OP/ED)

## 2024-07-02 PROCEDURE — 71045 X-RAY EXAM CHEST 1 VIEW: CPT

## 2024-07-02 PROCEDURE — 84132 ASSAY OF SERUM POTASSIUM: CPT

## 2024-07-02 PROCEDURE — 85025 COMPLETE CBC W/AUTO DIFF WBC: CPT

## 2024-07-02 PROCEDURE — 70450 CT HEAD/BRAIN W/O DYE: CPT | Performed by: RADIOLOGY

## 2024-07-02 PROCEDURE — 2550000001 HC RX 255 CONTRASTS: Performed by: INTERNAL MEDICINE

## 2024-07-02 PROCEDURE — 82947 ASSAY GLUCOSE BLOOD QUANT: CPT

## 2024-07-02 PROCEDURE — 70450 CT HEAD/BRAIN W/O DYE: CPT

## 2024-07-02 PROCEDURE — 2500000002 HC RX 250 W HCPCS SELF ADMINISTERED DRUGS (ALT 637 FOR MEDICARE OP, ALT 636 FOR OP/ED)

## 2024-07-02 PROCEDURE — 99291 CRITICAL CARE FIRST HOUR: CPT

## 2024-07-02 PROCEDURE — 2020000001 HC ICU ROOM DAILY

## 2024-07-02 PROCEDURE — P9016 RBC LEUKOCYTES REDUCED: HCPCS

## 2024-07-02 PROCEDURE — 80048 BASIC METABOLIC PNL TOTAL CA: CPT

## 2024-07-02 PROCEDURE — 76937 US GUIDE VASCULAR ACCESS: CPT

## 2024-07-02 PROCEDURE — 99233 SBSQ HOSP IP/OBS HIGH 50: CPT

## 2024-07-02 PROCEDURE — 2500000001 HC RX 250 WO HCPCS SELF ADMINISTERED DRUGS (ALT 637 FOR MEDICARE OP)

## 2024-07-02 PROCEDURE — C9113 INJ PANTOPRAZOLE SODIUM, VIA: HCPCS

## 2024-07-02 PROCEDURE — 84100 ASSAY OF PHOSPHORUS: CPT

## 2024-07-02 PROCEDURE — 82248 BILIRUBIN DIRECT: CPT

## 2024-07-02 PROCEDURE — 71045 X-RAY EXAM CHEST 1 VIEW: CPT | Performed by: RADIOLOGY

## 2024-07-02 PROCEDURE — 87081 CULTURE SCREEN ONLY: CPT

## 2024-07-02 PROCEDURE — 37799 UNLISTED PX VASCULAR SURGERY: CPT

## 2024-07-02 PROCEDURE — 85610 PROTHROMBIN TIME: CPT

## 2024-07-02 PROCEDURE — 73706 CT ANGIO LWR EXTR W/O&W/DYE: CPT | Mod: RT

## 2024-07-02 PROCEDURE — 99232 SBSQ HOSP IP/OBS MODERATE 35: CPT | Performed by: NURSE PRACTITIONER

## 2024-07-02 PROCEDURE — 80202 ASSAY OF VANCOMYCIN: CPT

## 2024-07-02 PROCEDURE — 73706 CT ANGIO LWR EXTR W/O&W/DYE: CPT | Mod: RIGHT SIDE | Performed by: RADIOLOGY

## 2024-07-02 PROCEDURE — 36430 TRANSFUSION BLD/BLD COMPNT: CPT

## 2024-07-02 RX ORDER — HYDRALAZINE HYDROCHLORIDE 10 MG/1
10 TABLET, FILM COATED ORAL 3 TIMES DAILY
Status: DISCONTINUED | OUTPATIENT
Start: 2024-07-02 | End: 2024-07-03

## 2024-07-02 RX ORDER — PANTOPRAZOLE SODIUM 40 MG/1
40 TABLET, DELAYED RELEASE ORAL
Status: DISCONTINUED | OUTPATIENT
Start: 2024-07-03 | End: 2024-07-24 | Stop reason: HOSPADM

## 2024-07-02 RX ORDER — HYDROMORPHONE HYDROCHLORIDE 1 MG/ML
0.4 INJECTION, SOLUTION INTRAMUSCULAR; INTRAVENOUS; SUBCUTANEOUS
Status: DISCONTINUED | OUTPATIENT
Start: 2024-07-02 | End: 2024-07-24 | Stop reason: HOSPADM

## 2024-07-02 RX ORDER — HYDROMORPHONE HYDROCHLORIDE 1 MG/ML
0.4 INJECTION, SOLUTION INTRAMUSCULAR; INTRAVENOUS; SUBCUTANEOUS
Status: DISCONTINUED | OUTPATIENT
Start: 2024-07-02 | End: 2024-07-02

## 2024-07-02 RX ORDER — PANTOPRAZOLE SODIUM 40 MG/10ML
40 INJECTION, POWDER, LYOPHILIZED, FOR SOLUTION INTRAVENOUS
Status: DISCONTINUED | OUTPATIENT
Start: 2024-07-03 | End: 2024-07-07

## 2024-07-02 RX ORDER — OXYCODONE HYDROCHLORIDE 5 MG/1
10 TABLET ORAL
Status: DISCONTINUED | OUTPATIENT
Start: 2024-07-02 | End: 2024-07-06

## 2024-07-02 RX ORDER — MAGNESIUM SULFATE HEPTAHYDRATE 40 MG/ML
2 INJECTION, SOLUTION INTRAVENOUS ONCE
Status: COMPLETED | OUTPATIENT
Start: 2024-07-02 | End: 2024-07-02

## 2024-07-02 RX ADMIN — HYDRALAZINE HYDROCHLORIDE 10 MG: 10 TABLET ORAL at 15:13

## 2024-07-02 RX ADMIN — PIPERACILLIN SODIUM AND TAZOBACTAM SODIUM 3.38 G: 3; .375 INJECTION, SOLUTION INTRAVENOUS at 10:02

## 2024-07-02 RX ADMIN — PIPERACILLIN SODIUM AND TAZOBACTAM SODIUM 3.38 G: 3; .375 INJECTION, SOLUTION INTRAVENOUS at 21:00

## 2024-07-02 RX ADMIN — HYDROMORPHONE HYDROCHLORIDE 0.4 MG: 1 INJECTION INTRAMUSCULAR; INTRAVENOUS; SUBCUTANEOUS at 15:13

## 2024-07-02 RX ADMIN — AMIODARONE HYDROCHLORIDE 200 MG: 200 TABLET ORAL at 08:23

## 2024-07-02 RX ADMIN — SENNOSIDES 17.2 MG: 8.6 TABLET, FILM COATED ORAL at 20:57

## 2024-07-02 RX ADMIN — ASPIRIN 81 MG CHEWABLE TABLET 81 MG: 81 TABLET CHEWABLE at 08:23

## 2024-07-02 RX ADMIN — HYDROMORPHONE HYDROCHLORIDE 0.4 MG: 1 INJECTION, SOLUTION INTRAMUSCULAR; INTRAVENOUS; SUBCUTANEOUS at 06:29

## 2024-07-02 RX ADMIN — IOHEXOL 75 ML: 350 INJECTION, SOLUTION INTRAVENOUS at 01:52

## 2024-07-02 RX ADMIN — PIPERACILLIN SODIUM AND TAZOBACTAM SODIUM 3.38 G: 3; .375 INJECTION, SOLUTION INTRAVENOUS at 15:47

## 2024-07-02 RX ADMIN — SENNOSIDES 17.2 MG: 8.6 TABLET, FILM COATED ORAL at 08:23

## 2024-07-02 RX ADMIN — VANCOMYCIN HYDROCHLORIDE 1000 MG: 1 INJECTION, SOLUTION INTRAVENOUS at 05:10

## 2024-07-02 RX ADMIN — POLYETHYLENE GLYCOL 3350 17 G: 17 POWDER, FOR SOLUTION ORAL at 20:57

## 2024-07-02 RX ADMIN — MAGNESIUM SULFATE HEPTAHYDRATE 2 G: 40 INJECTION, SOLUTION INTRAVENOUS at 05:17

## 2024-07-02 RX ADMIN — OXYCODONE HYDROCHLORIDE 10 MG: 5 TABLET ORAL at 20:57

## 2024-07-02 RX ADMIN — OXYCODONE HYDROCHLORIDE 5 MG: 5 TABLET ORAL at 03:47

## 2024-07-02 RX ADMIN — PIPERACILLIN SODIUM AND TAZOBACTAM SODIUM 3.38 G: 3; .375 INJECTION, SOLUTION INTRAVENOUS at 03:42

## 2024-07-02 RX ADMIN — PANTOPRAZOLE SODIUM 40 MG: 40 INJECTION, POWDER, FOR SOLUTION INTRAVENOUS at 08:23

## 2024-07-02 RX ADMIN — Medication 6 MG: at 20:57

## 2024-07-02 RX ADMIN — HEPARIN SODIUM 1600 UNITS/HR: 10000 INJECTION, SOLUTION INTRAVENOUS at 12:36

## 2024-07-02 RX ADMIN — HYDRALAZINE HYDROCHLORIDE 10 MG: 10 TABLET ORAL at 10:40

## 2024-07-02 ASSESSMENT — PAIN SCALES - GENERAL
PAINLEVEL_OUTOF10: 0 - NO PAIN
PAINLEVEL_OUTOF10: 10 - WORST POSSIBLE PAIN
PAINLEVEL_OUTOF10: 3
PAINLEVEL_OUTOF10: 3
PAINLEVEL_OUTOF10: 8
PAINLEVEL_OUTOF10: 3
PAINLEVEL_OUTOF10: 10 - WORST POSSIBLE PAIN
PAINLEVEL_OUTOF10: 5 - MODERATE PAIN

## 2024-07-02 ASSESSMENT — PAIN DESCRIPTION - ORIENTATION
ORIENTATION: RIGHT
ORIENTATION: RIGHT

## 2024-07-02 ASSESSMENT — PAIN - FUNCTIONAL ASSESSMENT
PAIN_FUNCTIONAL_ASSESSMENT: 0-10

## 2024-07-02 ASSESSMENT — PAIN DESCRIPTION - LOCATION
LOCATION: LEG
LOCATION: LEG

## 2024-07-02 NOTE — PROGRESS NOTES
Vancomycin Dosing by Pharmacy- FOLLOW UP    Amirah Bennett is a 43 y.o. year old female who Pharmacy has been consulted for vancomycin dosing for cellulitis, skin and soft tissue. Based on the patient's indication and renal status this patient is being dosed based on a goal AUC of 400-600.     Renal function is currently stable.    Current vancomycin dose: 1000 mg given every 12 hours    Estimated vancomycin AUC on current dose: 450 mg/L.hr     Visit Vitals  BP 96/55   Pulse 86   Temp 36.6 °C (97.9 °F) (Temporal)   Resp 21        Lab Results   Component Value Date    CREATININE 0.61 2024    CREATININE 0.66 2024    CREATININE 0.72 2024    CREATININE 0.73 2024        Patient weight is as follows:   Vitals:    24 0500   Weight: 84.4 kg (186 lb 1.1 oz)       Cultures:  No results found for the encounter in last 14 days.       I/O last 3 completed shifts:  In: 2626 (31.1 mL/kg) [P.O.:240; I.V.:536 (6.4 mL/kg); Blood:1050; IV Piggyback:800]  Out: 1701 (20.2 mL/kg) [Urine:1701 (0.6 mL/kg/hr)]  Weight: 84.4 kg   I/O during current shift:  No intake/output data recorded.    Temp (24hrs), Av.4 °C (97.5 °F), Min:36.1 °C (97 °F), Max:36.8 °C (98.2 °F)      Assessment/Plan    Within goal AUC range. Continue current vancomycin regimen.    This dosing regimen is predicted by [employer] to result in the following pharmacokinetic parameters:  Loading dose: N/A  Regimen: 1000 mg IV every 12 hours.  Start time: 17:10 on 2024  Exposure target: AUC24 (range)400-600 mg/L.hr   AUC24,ss: 450 mg/L.hr  Probability of AUC24 > 400: 89 %  Ctrough,ss: 13.9 mg/L  Probability of Ctrough,ss > 20: 0 %  Probability of nephrotoxicity (Lodise KWAME ): 9 %      The next level will be obtained on 24 at 1st AM labs. May be obtained sooner if clinically indicated.   Will continue to monitor renal function daily while on vancomycin and order serum creatinine at least every 48 hours if not already ordered.  Follow  for continued vancomycin needs, clinical response, and signs/symptoms of toxicity.       Erlin Daniels, PharmD

## 2024-07-02 NOTE — PROGRESS NOTES
Occupational Therapy                 Therapy Communication Note    Patient Name: Amirah Bennett  MRN: [id number]  Today's Date: 7/2/2024     Discipline: Occupational Therapy    Missed Visit Reason: Missed Visit Reason: Patient placed on medical hold (Downtrending H&H. Not appropriate for therapy this date.)    Missed Time: Attempt

## 2024-07-02 NOTE — PROGRESS NOTES
4/4 CICU (12D)      SIGNIFICANT EVENTS  06/28 OR R AKA & open embolectomy      DC PLAN  [facility]     > [address] [place] [address]     > insurance auth needed     PAYOR   [facility]     PT/OT   Awaiting post op and if change in recs or same.   Pre-op - Moderate     ETHICS  Surrogate decision maker: Keli Osborne (cousin) [phone]. Clara Black (cousin) [phone].      COMPLETED  (X) 06/26 - SNF referral built in [facility] and sent to [facility].   (X) 06/26 - DC/SDOH Assessments completed with 1st cousin (Keli Osborne) via phone.      NOTE  There is a live in s/o who was not appointed by Ethics as decision maker but Ethics made first cousin Felicitas and second his sister, Keli. This s/o is not involved in representing patient and will sometimes refer to himself as spouse/. [facility] has already been active with requests for patient's community needs - see 06/25-26 notes     Pura Vasquez (LSW, MSW)

## 2024-07-02 NOTE — PROGRESS NOTES
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 female  has a past medical history of CHF (congestive heart failure) (Multi) and Stroke (Multi). Patient presented with Afib RVR, significant heart failure in cardiogenic shock. Vascular surgery consulted for acute limb ischemia, however, was found to be Morton's 3 with motor and sensory loss, paralysis of the right leg from advanced ischemia time. CTA was obtained which demonstrated right BA, EIA, and CFA/SFA thrombus, likely embolic.    On day 12 of admission Who is now s/p Procedure(s):  Embolectomy Lower Extremity  Amputation Above Knee     : stable vascular exam, stable motor/sensory exam, downtrending CK  : No major changes.   : R iliac and RLE open thromboembolectomy, followed by R AKA    R AKA dressing removed due to sanguinous saturation  : Dressing was changed with Ace compression. CTA with delayed phase  showed Postsurgical changes of right above knee amputation   07/02: discussion with the primary team about stopping the heparin drip for the day. Pending risks/benefits discussion with primary team and vascular medicine.    Events:  - Was called yesterday 07/01 at 5:50 pm for bleeding from her right groin. On examination, there was oozing of old dark blood from one point at the top of the incision. Packing was placed. This Morning 07/02 groin bleeding stopped       Plan  Dressing changed today  no acute plan for intervention at this time   Recommend stopping heparin drip as able after risks/benefits discussion with vascular medicine.  monitor for signs of worsening bleeding (based on labs or physical exam)  Rest of care per the primary team.  Today's plan was communicated to the primary team.  Vascular surgery will keep follow-up.  Please contact Vascular via netFactor or by paging [phone] with any questions.        Subjective   SUBJECTIVE  RLE stump had less bleeding, saturating dressing  Hgb stable at 7.6 from 7.9  Otherwise, patient had no complaints       Objective   OBJECTIVE  Last Recorded Vitals  Heart Rate:  [82-93]   Temp:  [36.1 °C (97 °F)-36.6 °C (97.9 °F)]   Resp:  [12-22]   BP: ()/(43-75)   SpO2:  [100 %]   Physical Exam:  Vascular Physical Exam  Constitutional: no acute distress  Neuro: A/O x4, no gross deficits   Psych: normal affect  HEENT: No deformities, no scleral icterus   Cardiac: RRR  Pulmonary: unlabored respirations   Abdomen: soft, non distended, non tender  Skin: warm and dry overall    Extremities: RLE s/p AKA, dressing with saturation and soaking through to the bed below, no obvious hematoma vision    Discussed with attending, Dr. Rj Vaz M.D.  U n i v e r s i t y  H o s p i t a l s  Vascular Surgery Resident  PGY-1  8:28 AM 07/02/24  Service Pager: [phone]

## 2024-07-02 NOTE — PROGRESS NOTES
Music Therapy Note    Amirah Bennett was referred by     Therapy Session  Referral Type: New referral this admission  Visit Type: New visit  Session Start Time: 1429  Session End Time: 1433  Intervention Delivery: In-person  Conflict of Service: None     Pre-assessment  Unable to Assess Reason: Cognitive limitation  Mood/Affect: Participative  Verbalized Emotional State:  (unable to assess)         Treatment/Interventions  Areas of Focus: Coping  Music Therapy Interventions: Assessment    Post-assessment  Unable to Assess Reason: Did not provide expressive therapy intervention  Mood/Affect: Appropriate  Verbalized Emotional State:  (unable to assess)  Continue Visiting: Yes  Total Session Time (min): 4 minutes    Narrative  Assessment Detail: Patient found sitting up in bed. Receptive to education and benefits of music therapy services. She expressed that she likes music; played a Gorilla/Cardi B song for MT. Difficulty with word finding; became tearful at times. MT provided validation of feelings and assured patient she is okay. Patient interested in making music; affect brightened when MT suggested playing some instruments or the ipad. Agreeable to follow up.  Follow-up: MT will follow up with patient accordingly    Education Documentation  No documentation found.

## 2024-07-02 NOTE — PROGRESS NOTES
Amirah Bennett is a 43 y.o. female on day 12 of admission.   Medical history significant for atrial fibrillation s/p DCCV c/b YFN thrombus and stroke s/p thrombectomy 2022, and stroke 3/2024 with residual expressive aphasia and right sided weakness, HLD, HTN, T2DM.  Presents with report of BLE pain and dyspnea.  Patient underwent RHC that was consistent with cardiogenic shock. Hospital course with medical management of shock, as patient not a candidate for advanced cardiac therapies, complicated by SARAH, cardiac thrombi, DVT of EIV-CFV, elevated liver enzymes, rhabdomyolysis, intracardiac thrombus, ALI and Afib with RVR.    Underwent arterial duplex exam with monophasic right CFA, SFA and PFA signals, absent popliteal flow and monophasic flow in the tibial artery, with no sensation below the right knee and no movement at the ankle, which is concerning for ALI.  Vascular Surgery is involved, and notes that the right leg is not salvageable.  Patient now s/p open RLE and iliac embolectomy via RCFA exposure and right AKA 6/28/24.      Vascular Medicine Service consulted for HIT due to thrombocytopenia.    HIT workup stopped after PF4 resulted with negative result.   Transitioned back to Heparin infusion with rising platelet count that remains stable   Underwent placement of IVC filter 6/27/24.      Subjective   Reports discomfort of right PA line and left arterial line.  Denies CP, SOB or bleeding.     Objective   Vascular Medicine Service following for anticoagulation therapy.  Continues with Heparin infusion with therapeutic Heparin assay.    Physical Exam  Resting in bed in NAD  Respirations full and easy with breath sounds CTA all anterior lung fields; on RA; right PA catheter dressing dry and intact at neck.   Normal heart sounds with regular rate/rhythm; monitor shows heart rate in the mid 90's with NSR; vital signs stable   Abdomen soft and nontender to palpation.  Extremities are warm to touch; dressing on right  "leg stump is dry and intact without evidence of bleeding; left leg with palpable DP pulse, palpable bilateral radial pulses; left radial arterial line; PIV x2 right arm  Patient is awake and conversant, participates a little in conversation     Last Recorded Vitals  Blood pressure 96/55, pulse 86, temperature 36.5 °C (97.7 °F), resp. rate 21, height 1.702 m (5' 7.01\"), weight 84.4 kg (186 lb 1.1 oz), SpO2 100%.  Intake/Output last 3 Shifts:  I/O last 3 completed shifts:  In: 2626 (31.1 mL/kg) [P.O.:240; I.V.:536 (6.4 mL/kg); Blood:1050; IV Piggyback:800]  Out: 1701 (20.2 mL/kg) [Urine:1701 (0.6 mL/kg/hr)]  Weight: 84.4 kg     Relevant Results  Scheduled medications  amiodarone, 200 mg, oral, Daily  aspirin, 81 mg, oral, Daily  bisacodyl, 10 mg, rectal, Daily  [Held by provider] empagliflozin, 10 mg, oral, Daily  hydrALAZINE, 10 mg, oral, TID  insulin lispro, 0-5 Units, subcutaneous, q4h  melatonin, 6 mg, oral, Nightly  oxygen, , inhalation, Continuous - Inhalation  [START ON 7/3/2024] pantoprazole, 40 mg, oral, Daily before breakfast   Or  [START ON 7/3/2024] pantoprazole, 40 mg, intravenous, Daily before breakfast  perflutren protein A microsphere, 0.5 mL, intravenous, Once in imaging  piperacillin-tazobactam, 3.375 g, intravenous, q6h  polyethylene glycol, 17 g, oral, BID  sennosides, 2 tablet, oral, BID  sodium zirconium cyclosilicate, 10 g, oral, Once  sulfur hexafluoride microsphr, 2 mL, intravenous, Once in imaging      Continuous medications  heparin, 0-4,500 Units/hr, Last Rate: 1,600 Units/hr (07/02/24 0000)      Results from last 7 days   Lab Units 07/02/24  0351 07/01/24  2355 07/01/24  1859   WBC AUTO x10*3/uL 22.0* 23.4* 26.6*   HEMOGLOBIN g/dL 7.6* 7.9* 7.4*   HEMATOCRIT % 23.4* 24.8* 22.5*   PLATELETS AUTO x10*3/uL 139* 147* 157       Results from last 7 days   Lab Units 07/02/24  0351 07/01/24  1809 07/01/24  0410   SODIUM mmol/L 129* 131* 128*   POTASSIUM mmol/L 4.5 4.5 5.6*   CHLORIDE mmol/L 101 " 101 99   CO2 mmol/L 22 21 23   BUN mg/dL 11 11 11   CREATININE mg/dL 0.61 0.66 0.72   GLUCOSE mg/dL 111* 105* 108*   CALCIUM mg/dL 7.6* 7.3* 7.3*   Estimated Creatinine Clearance: 125 mL/min (by C-G formula based on SCr of 0.61 mg/dL).      Results from last 7 days   Lab Units 07/02/24  0351 06/29/24  0304 06/28/24  0348   APTT seconds 89* 128* 66*   INR  1.2* 1.3* 1.3*       Results from last 7 days   Lab Units 07/02/24  0351 07/01/24  2217 07/01/24  1803   ANTI XA UNFRACTIONATED IU/mL 0.4 0.5 0.7       CT ANGIO LOWER EXTREMITY RIGHT W AND OR WO IV CONTRAST;  7/2/2024 2:06 am  INDICATION: Signs/Symptoms:source of bleeding.  IMPRESSION:  1. Postsurgical changes of right above knee amputation. There is extraluminal contrast extending distal to the right SFA interspersed throughout the right vastus intermedius and vastus lateralis muscles suggestive of expected vascular injury postsurgery.  2. There is extensive subcutaneous emphysema overlying the right vastus intermedius muscle and right biceps femoris with heterogenous high attenuating densities likely representing intramuscular hematomas.  3. New hypoattenuating fluid collections abutting the distal right vastus intermedius muscle at the surgical bed are secondary to postsurgical collections.  4. Diffuse body wall anasarca with small volume ascites layering within the rectouterine recess are compatible with volume overload.           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  Medical history as noted above.  Patient found to have ALI of the right lower extremity, and is now s/p open RLE and iliac embolectomy via RCFA exposure and right AKA 6/20/24.    Continues with Heparin with a therapeutic Heparin assay today.   Review of labs today shows decreased but stable hemoglobin 7.6 grams, stable platelets 139 K, and stable serum creatinine 0.61 with creatinine clearance of >125 ml/minute.   Plan for removal of PA line today.  Vascular Surgery asking about possibly stopping Heparin infusion today.  Would recommend against this as patient is at evan for clot recurrence. CTA of RLE is without evidence of active bleeding.   SEE MY NOTE BELOW.      Recommendations:  ~CONTINUE Heparin infusion; follow nomogram to achieve therapeutic assay 0.3-0.7; consider stopping Heparin infusion only for surgical site bleeding.   ~MONITOR for bleeding  ~Anticipate use of Warfarin for ongoing thromboembolic events; will need to bridge to Warfarin for a minimum of 5 days, and have outpatient Anticoagulation Clinic set up at [facility] which is the closest facility near her home.       Vascular Medicine Service will sign off for now; please reconsult us on pager [phone] if you need assistance with bridge to Warfarin, or for any questions/concerns.     Patient seen and evaluated with Dr. Marla Newsome.    Plan of care discussed with Dr. Marla Newsoem   Plan of care discussed with the CICU Service     JAE Mendiola-CNP  Vascular Medicine Service   Pager [phone]     ALI LIKLEY SECONDARY TO AFIB AND CARDIAC THROMBI - I AM UNCLEAR NOW WHETHER THERE ARE ADDITIONAL THROMBI - HOLDING AC WOULD INCREASE RISK FOR RECURRENT THROMBOEMBOLISM. IF THE BLEEDING CAN BE CONTROLLED MECHANICALLY - THIS IS PREFERRED. IF AC IS TO BE HELD THIS SHOULD BE MINIMIZED. STRIVE TO  "KEEP THE HEPARIN ASSAY CLOSER TO 0.3-0.4.    AGREE WHEN NO PROCEDURES ARE PLANNED WARFARIN PREFERRED. MY UNDERLYING ASSUMPTION IS THE NEED FOR SNF FOR REHAB +/- PLACEMENT GIVEN THE AKA-  INR MONITORING CAN BE ACCOMPLISHED AT SNF - THIS SHOULD GET PAST SOME OF THE CONCERNS REGARDING ADHERENCE.    VM CONSULTED AND FOLLOWING FOR THROMBCOYTOPENIA - PLT REMAIN LOW BUT STABLE. HIT EXCLUDED BY NORMAL PF4  CONTINUE TO TREND AND FOLLOW THE H/H AND PLT.      VM WILL S/O AS NOTED - PLEASE RECONSULT AS NEEDED FOR ANY NEW CONCERNS OF ISSUES.     MY ADDITIONS ARE NOTED IN \"CAPS\".      Marla Newsome MD    "

## 2024-07-02 NOTE — PROGRESS NOTES
Vancomycin Dosing by Pharmacy- Cessation of Therapy    Consult to pharmacy for vancomycin dosing has been discontinued by the prescriber, pharmacy will sign off at this time.    Please call pharmacy if there are further questions or re-enter a consult if vancomycin is resumed.     Carmela Suresh, [facility]

## 2024-07-02 NOTE — PROGRESS NOTES
"  MEDICINE INPATIENT PROGRESS NOTE    Amirah Bennett is a 43 y.o. female on hospital day 12    Overnight:   Had some bleeding from right groin which improved after manual compression and dressing change.   Right leg stump oozing all night, soaked apple pads. Dressing changed this morning.  Required 1 unit pRBC transfusion for hgb drop from 8.6 to 7.6, then remained stable around 7.6  CTA RLE resulted : suggested post-surgical changes, intramuscular hematoma, suspected vascular injury following surgery  Tyler moo advanced too far on CXR and o2 saturation 145 - swan moo pulled     Subjective   Reports that her left arm is swollen and bothering her- had L brachial artery injury after a -line attempt earlier on in hospital course. Duplex US showed non-vascular mass. Reports tenderness to touch on R stump site. Groin site is not tender at the moment.          Objective     Last Recorded Vitals  Blood pressure 96/55, pulse 86, temperature 36.6 °C (97.9 °F), temperature source Temporal, resp. rate 21, height 1.702 m (5' 7.01\"), weight 84.4 kg (186 lb 1.1 oz), SpO2 100%.    Intake/Output last 3 Shifts:  I/O last 3 completed shifts:  In: 4083.6 (48.4 mL/kg) [P.O.:960; I.V.:562 (6.7 mL/kg); Blood:1061.7; IV Piggyback:1500]  Out: 1536 (18.2 mL/kg) [Urine:1536 (0.5 mL/kg/hr)]  Weight: 84.4 kg     Relevant Results  Results from last 7 days   Lab Units 07/02/24  0351 07/01/24  2355 07/01/24  1859   WBC AUTO x10*3/uL 22.0* 23.4* 26.6*   HEMOGLOBIN g/dL 7.6* 7.9* 7.4*   HEMATOCRIT % 23.4* 24.8* 22.5*   PLATELETS AUTO x10*3/uL 139* 147* 157     Results from last 7 days   Lab Units 07/02/24  0351 07/01/24  1809 07/01/24  0410   SODIUM mmol/L 129* 131* 128*   POTASSIUM mmol/L 4.5 4.5 5.6*   CO2 mmol/L 22 21 23   ANION GAP mmol/L 11 14 12   BUN mg/dL 11 11 11   CREATININE mg/dL 0.61 0.66 0.72   GLUCOSE mg/dL 111* 105* 108*   EGFR mL/min/1.73m*2 >90 >90 >90   MAGNESIUM mg/dL 1.74 2.01 1.88   PHOSPHORUS mg/dL 3.8 3.7 3.9      Results " from last 7 days   Lab Units 07/02/24  0351 07/01/24  0410 06/30/24  0520   ALT U/L 64* 82* 116*   AST U/L 36 44* 52*   ALK PHOS U/L 60 58 62      Results from last 7 days   Lab Units 07/02/24  0351 06/29/24  0304 06/28/24  0348   INR  1.2* 1.3* 1.3*     Results from last 7 days   Lab Units 07/02/24  0542 07/02/24  0514 07/01/24  1816 06/28/24  1113 06/28/24  1035 06/28/24  0836   POCT PH, ARTERIAL pH  --   --   --   --  7.50* 7.44*   POCT PO2, ARTERIAL mm Hg  --   --   --   --  112* 308*   POCT PCO2, ARTERIAL mm Hg  --   --   --   --  35* 40   FIO2 % 21 21 21   < > 50 60    < > = values in this interval not displayed.     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 Can hear airleak. RIJ line not currently oozing.  CV: normal rate, irregular rhythm, no murmurs noted. Cap refill 2+ throughout, extremities warm to touch. Trace edema in LLE   Pulm: CTAB anteriorly, room air  GI: abd soft, tender diffusely, bowel sounds present  Ext: She has equal sensation in bilateral thighs. Significant tenderness to palpation at right stump site No DP in left. Has TP and popliteal in left. S/p Rt AKA. Right groin access site covered, feels firm (per vascular surgery due to how it was sutured). Had oozing from groin site overnight which improved with manual compression and dressing changes. Had oozing from stump all night, dressing changes this morning  LUE is bruised and swollen but not warm to the touch. had L brachial artery injury after a -line attempt earlier on in hospital course. Previous LUE duplex showed non vascularized mass  Neuro: alert and conversant however only intermittently  answering questions appropriately, +expressive aphasia so will initially say no pain, endorsing pain in bilateral legs.     Medications    amiodarone, 200 mg, oral, Daily  aspirin, 81 mg, oral, Daily  bisacodyl, 10 mg, rectal, Daily  [Held by provider] empagliflozin, 10 mg, oral, Daily  insulin lispro, 0-5 Units, subcutaneous, q4h  magnesium sulfate, 2 g, intravenous, Once  melatonin, 6 mg, oral, Nightly  oxygen, , inhalation, Continuous - Inhalation  pantoprazole, 40 mg, intravenous, Daily  perflutren protein A microsphere, 0.5 mL, intravenous, Once in imaging  piperacillin-tazobactam, 3.375 g, intravenous, q6h  polyethylene glycol, 17 g, oral, BID  [Held by provider] sacubitriL-valsartan, 1 tablet, oral, BID  sennosides, 2 tablet, oral, BID  sodium zirconium cyclosilicate, 10 g, oral, Once  sulfur hexafluoride microsphr, 2 mL, intravenous, Once in imaging  vancomycin, 1,000 mg, intravenous, q12h        heparin, 0-4,500 Units/hr, Last Rate: 1,600 Units/hr (07/02/24 0000)        PRN medications: dextrose, dextrose, glucagon, glucagon, heparin, HYDROmorphone, lidocaine, oxyCODONE, vancomycin           Assessment/Plan   Amirah Bennett is a 42 y.o. 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 RHC c/w cardiogenic shock with CI of 1.6, CVP of 25. She is not candidate for advanced therapies given documented medical nonadherence. Managing medically. Attempted to wean off dobutamine while increasing  nitroprusside so stopped dobutamine 6/21 however acutely worsened with increased lactate and worsened SvO2 from 65 to 25 overnight with new abdominal pain and right leg pain concerning for ischemia. Improving parameters, normalized lactate, and pain resolution when dobutamine restarted. Course also c/b SARAH, cardiac thrombi, liver injury likely due to ischemia, and Afib with RVR. Found to have rhabdomyolysis with CK of 14,000, unable to give fluids due to cardiogenic shock. Arterial doppler of lower extremities with echogenic material within right distal femoral and popliteal arteries with absent Doppler flow in the right popliteal artery with significantly diminished flow within the right distal superficial femoral artery significantly diminished flow within the right distal superficial femoral artery, findings raise concern for thrombosis of distal SFA and popliteal artery. Decreased flow within right posterior tibial and peroneal arteries could be through collateralization. Additionally decreased flow in right common femoral artery, right deep femoral artery as well as proximal and mid superficial femoral arteries concerning for stenosis proximal to right common femoral artery possible within right external iliac right common iliac artery. CTA Aorta with runoff 6/24: aortic bifurcation embolus, R BA-EIA embolus, SFA and popliteal emboli. Vascular surgery recommending right AKA. Bilateral LE duplex with likely early nonocclusive deep venous thrombosis. IVC filter placed 6/27 on recommendation by vascular medicine in preparation for right AKA 6/28. IVC filter should be removed within 3 months. AKA performed 6/28. This was complicated by oozing from the stump site over the next few days. CTA RLE suggested intramuscular hematoma and suspected vascular injury. GDMT (empa and entresto) were restarted but held in the case that patient would be sent to OR for stump revision with vascular surgery. Ethics involved given  lack of documentation of POA and limited family (NOK are cousins). Palliative care consulted.    Updates 7/2  -Had some bleeding from right groin which improved after manual compression and dressing change. Right leg stump oozing all night, soaked apple pads. Dressing changed this morning.Hgb stable around 7.6 after receiving 1 unit pRBC transfusion yesterday (hgb dropped from 8.6 to 7.6). There was no incrementation after transfusion.   -CTA RLE resulted : suggested post-surgical changes, intramuscular hematoma, suspected vascular injury following surgery  -No OR plans at the moment. Plan to continue anticoagulation. If she has re-bleeding ,will stop heparin gtt.  In our opinion, risk of cardioembolic event while off anticoagulations outweigh benefit of reduced bleeding while off heparin gtt.   -monitoring q6hr CBC. Will ctm, perform dressing changes and compress prn.   -plan to remove Lignum moo removed today   -defer diuresis , CVP 2  -will give hydralazine 10 tid to start GDMT  -Umbarger not corresponding, can use Haily systolic 60-70 based on how it correlates with cuff  -pain regimen: oxycodone 10mg severe painq 4hr, dilaudid 0.4mg q 3 hr breakthrough - also can use before and after dresssing changes        NEUROLOGIC  #AMS, resolved at her baseline  #ischemic stroke L MCA d/t AFib LLA thrombus seen on echo (lack of OAC adherence) s/p thrombectomy 01/2022 @ [facility] w/ residual expressive aphasia and R sided weakness   ::Neurology consulted 6/25, no new deficits concerning for cerebral event  -no acute changes     #Depression?  #Insomnia  ::Patient's friend states that she doesn't care for herself due to being depressed  -consider psychiatry consult, deferring for now  -told to stop quetiapine 50mg at last discharge  -c/w melatonin 6mg at bedtime     CARDIOVASCULAR  #Cardiogenic shock, improving  #HFrEF (LVEF 20-25%)  ::Not candidate for advanced therapies due to medical nonadherence  ::Weaned off dobutamine and  nipride  Plan:  -diuresis as needed -cvp 2 on 7/2, defer  -palliative care consulted given patient not candidate for advanced therapies, appreciate recommendations  -remove swan 7/2  -Start hydral 10 tid  -holding entresto and jardiance due to upcoming OR  -previous GDMT was entresto 24-26, spironolactone 25mg, metoprolol succinate 50mg daily, lasix 40mg, jardiance 10mg     #Right limb ischemia  #Arterial emboli  #S/p femoral arterial line sheath placement now removed  Plan:  ::::CTA Aorta with runoff 6/24: aortic bifurcation embolus, R BA-EIA embolus, SFA and popliteal emboli   ::arterial duplex exam with monophasic right CFA, SFA and PFA signals, absent popliteal flow and monophasic flow in the tibial artery   ::Unable to walk prior to presentation to hospital  ::Right leg has been cold and painful in times where cardiogenic shock has worsened then warm and not painful to touch when out of shock state, fluctuating exam  -heparin drip per above - continue as per vascular medicine  -s/p AKA with vascular surgery, 6/28/2024.   -vascular surgery consulted, CT CAP 6/30 without active bleed. Has been having stump site and right groin bleeding/oozing that improves with dressing changes/compression. CTA RLE did not show any obvious source that could be intervened on. No OR plans at the moment. Plan to continue anticoagulation. If she has re-bleeding ,will stop heparin gtt.    -pain regimen: oxycodone 10mg severe painq 4hr, dilaudid 0.4mg q 3 hr breakthrough - also can use before and after dresssing changes      #Cardiac thrombi  #Right lower extremity DVT  ::Likely in setting of Xarelto nonadherence  ::There are indeterminate low-attenuation nodular filling defects within the atrial appendage. While this may represent contrast under filling, a right atrial appendage thrombus can not be excluded.  ::Rt US duplex with likely early nonocclusive deep venous thrombosis.   Plan:  -c/w heparin gtt     #Afib on Xarelto w/ DCCV  05/2023), ischemic stroke L MCA d/t AFib LLA thrombus seen on echo (lack of OAC adherence) s/p thrombectomy 01/2022 @ [facility] w/ residual expressive aphasia and R sided weakness   ::Episode of RVR in ED  ::Previously prescribed digoxin 250mcg however last fill was 12/2023  ::TSH 6.28H, free T4 1.48  Plan:  -c/w amiodarone 200mg daily  -hold home digoxin 250mcg  -c/w AC per above (holding home Xarelto 20mg daily in evening)  -c/w aspirin 81mg  -stopped atorvastatin 80 due to liver injury     #Elevated troponin  ::95 > 289 > 313 > 326 > 289  -stop trending     #HTN  -holding home medications     PULMONARY  #Dyspnea  #Mild pulmonary edema  ::No evidence of PE  -diuresis per above     #Hx trach c/b trancutaneous fistula  ::ENT had been consulted 3/2024 for gurgling and mucus drainage, no inpatient interventions required  -monitor for breaths/mucus discharge from trach site  -Cover the tracheocutaneous fistula with tape and gauze and encourage patient to apply pressure when voicing.   -per prior ENT note on last admission   -follow up outpatient ENT for closure (Dr. King)     RENAL/  #Hyponatremia  -monitor RFP and diurese per [place]     #Rhabdomyolysis, improving  -Ck peaked > 14,000, trending down as of 6/30       #Metabolic acidosis, resolved  ::Likely in setting of shock     #SARAH, resolved  ::Likely prerenal vs ATN in setting of shock  ::FeUrea suggests intrinsic  -U/A with 3+ blood and no RBCs. Elevated CK as above.  -avoid nephrotoxic medications     #Elevated lactate, improved     GASTROINTESTINAL  #Elevated Tbili (3.2 on admission, from 0.5 in 03/2024)  #Elevated LFTs, downtrending  ::Likely ischemic liver injury  ::RUQ US 7/1 without abnormality beyond hepatic steatosis and liver cyst  -trend CMP     #GERD  -c/w pantoprazole 40mg daily     ENDOCRINE  #T2DM, diet controlled  ::HgbA1c 3/2024 5.5  -hypoglycemia protocol and mild SSI     HEME/ONC  #Cardiac thrombi  -see cardiac section for plan     #Arterial  thrombosis  #DVT  ::s/p IVC filter 6/27  -heparin drip per above  -further plan per above     #Anemia  ::had 3 units of RBC transfused 6/30 without appropriate incrementation.   ::CTA C/A/P 6/30 without evidence of active hemorrhage within chest/abdomen/pelvis  ::6/30    Plan:  -having persistent oozing from stump, which has improved from 6/30.   -CTA RLE ordered to evaluate for bleeding vessel. Potential plan for OR for revision with vascular surgery if bleeding vessel identified   - CBC q6   -Hgb goal >8  given cardiac hx    -pending haptoglobin  -slide requested for peripheral smear     #Thrombocytopenia  ::Oozing of central line, Multiple thrombi, DIC score 5 on 6/24/2024  ::Hit score of 6  ::Negative PF4  -vascular medicine consulted   -monitor DIC labs daily     #Left brachial injury  ::Likely injury  :LUE DVT scan with nonvascularized mass, called CT while patient was there to request CTA of left upper extremity and they said logistically they would be unable to perform both scans, and given protocol for max dosing of contrast, could not give further contrast until 6/25 at 2PM  -no CTA LUE needed      INFECTIOUS DISEASE  #Concern for infection, suspect from stump   ::Procal 0.28  ::CXR without signs of PNA  ::s/p vanc (6/20-6/24) (6/27-7/2 ) and zosyn (6/20-6/24), (6/27- p)-restarted vanc/zosyn 6/27 due to elevated leukocytosis and purulent appearing right groin site  ::UA with 1+ blood, 1+ bacteria, no WBC, neg nitrite and leuk esterase  ::6/27 right groin wound culture negative  ::blood cultures x1 6/20, x2 6/22, NGTD   ::blood cultures 6/27, NGTD  Plan:  -discontinued vancomycin 7/2, will continue zosyn for suspected stump infection        Dispo:  -PT: Moderate intensity level of care  -patient would like enrollment in  home delivery service for medications (she has trouble getting to preferred pharmacy), pharmacy updated to [facility]  -patient's family would like her enrolled in [place]  -repeat  "thyroid labs outpatient  -ensure IVC filter removal in 3 months!     N: cardiac  A: [place], pIVs, Haily Lt radial  DVT ppx: heparin drip  GI ppx: pantoprazole 40mg     Code Status: FULL CODE per patient and NOKs  *Note that patient LACKS capacity due to inability to express decision and inconsistency in decisions     Surrogate Medical Decision-maker:   Surrogate decision maker is: pt's cousin, Clara Wayne: [phone], Efrain Black: [phone], Keli Osborne: [phone]      Friends who may be helpful in making decisions:  Prior boyfriend Navin Sanches [phone]  Very good friend \"Isiah\" Pranay Owen [phone]    Galdino Boles MD  Internal Medicine, PGY-2   "

## 2024-07-02 NOTE — PROGRESS NOTES
"Amirah Bennett is a 43 y.o. female on day 12 of admission presenting with Atrial fibrillation (Multi).    Palliative Medicine following for:  Complex medical decision making, symptom management, patient/family support     History obtained from chart review including ED note, H&P, patient's daily progress notes, review of lab/test results, and discussion with primary team and bedside RN.    Subjective     Today Amirah was tearful, saying \"I fight\" several times. She indicated she was in pain. She was excited that her neck line was coming out today. She says at home that she types to communicate with family but was unable to demonstrate this on her phone.     History of Present Illness  Amirah Bennett is a 42 y.o. female with significant PMHx of HFrEF (LVEF 20-25% (08/2022), Afib on Xarelto w/ DCCV 05/2023), ischemic stroke L MCA d/t AFib LLA thrombus seen on echo (lack of OAC adherence) s/p thrombectomy 01/2022 @ [facility] w/ residual expressive aphasia and R sided weakness, recent 03/2024 left cerebellar MCA, s/p tracheostomy, T2DM (03/2024 HgbA1c 5.5) and HTN, presenting with bilateral leg pain.      In the ED, she was noted to be tachycardic to 105 in triage and but then when placed on the monitor was found to be in atrial fibrillation with RVR with a rate between 150 and 190. She was given 5 mg of metoprolol with slight improvement of her rate but became hypotensive and symptomatic. She was started on heparin both for her A-fib and for possible DVT/PE. Lytics were not given because of the risk of potential intracranial hemorrhage after her recent stroke. Instead decision made to cardiovert the patient and also gave digoxin, and amiodarone. After cardioversion she still looked like she was in atrial fibrillation with RVR but only to a rate in the 120s and 130s. Her blood pressure improved. Levophed ordered along with calcium and magnesium. No evidence of infection. Patient admitted to the ICU with plan to get a CT " "angiogram of her chest to rule out PE and RHC. Ongoing work up revealed aortic bifurcation embolus, R BA-EIA embolus, SFA and popliteal emboli. Pt with acute right lower extremity ischemia, which is not salvageable, needing inflow procedure and R AKA given new motor deficits.    IVC filter placed 6/27/24, open R iliofemoral thromboembolectomy and R AKA completed 6/28/24. Ongoing bleeding from stump c/b need for heparin for other clots, being managed by primary team and vascular medicine. CTA RLE 7/2 with post-surgical changes, possible vascular injury, stable H&H after 1u pRBC 7/1.     Symptoms  Pain: left arm, neck (at swan site), right leg stump  Dyspnea: denies  Fatigue: denies  Insomnia: denies  Drowsiness: denies  Constipation: denies  Nausea: denies  Appetite: not asked  Anxiety: denies  Depression: sad sometimes (tearful)     Objective     Vitals: BP 90/58 (BP Location: Left arm, Patient Position: Lying)   Pulse 94   Temp 36.5 °C (97.7 °F)   Resp 18   Ht 1.702 m (5' 7.01\")   Wt 84.4 kg (186 lb 1.1 oz)   SpO2 100%   BMI 29.14 kg/m²     Physical Exam   Physical Exam  Constitutional: alert, laying in bed, attentive to examiner  HEENT: normocephalic, atraumatic, dry skin improved from yesterday, central catheter in place, oropharynx clear, sclera icteric  Cardiovascular: tachycardic, prominent S1, no M/R/G  Pulmonary: normal WOB on RA, clear anteriorly  Abdominal: abdomen is soft, no tenderness, masses, guarding  Extremities: trace edema of LLE, RLE stump wrapped tightly in ACE bandages and with clear underlying swelling, upper left extremity hematoma decreased in size, overlying skin thickening and cracking  Skin: dry  Neurological: alert, orientation cannot be assess d/t word-finding difficulty, intermittent torrents of words. Moving all extremities but limited motion of left arm and lack of dexterity (could not hold phone)  Psychiatric: Intermittently tearful and smiling. Mood \"sad\" but denies feeling " sad all the time. Difficult to assess thought content and process d/t expressive aphasia. Grabbed phone to play music and dance today.    Lab Results   Component Value Date    WBC 23.6 (H) 07/02/2024    HGB 7.4 (L) 07/02/2024    HCT 23.0 (L) 07/02/2024    MCV 89 07/02/2024     07/02/2024      Lab Results   Component Value Date    GLUCOSE 111 (H) 07/02/2024    CALCIUM 7.6 (L) 07/02/2024     (L) 07/02/2024    K 4.5 07/02/2024    CO2 22 07/02/2024     07/02/2024    BUN 11 07/02/2024    CREATININE 0.61 07/02/2024       Intake/Output Summary (Last 24 hours) at 7/2/2024 1556  Last data filed at 7/2/2024 1500  Gross per 24 hour   Intake 2024 ml   Output 700 ml   Net 1324 ml     Allergies   Allergen Reactions    Hydrocodone-Acetaminophen Rash    Ibuprofen Rash     Tolerates aspirin     Current Facility-Administered Medications   Medication Dose Route Frequency Provider Last Rate Last Admin    amiodarone (Pacerone) tablet 200 mg  200 mg oral Daily Kristine Pat MD   200 mg at 07/02/24 0823    aspirin chewable tablet 81 mg  81 mg oral Daily Kristine Pat MD   81 mg at 07/02/24 0823    bisacodyl (Dulcolax) suppository 10 mg  10 mg rectal Daily Kristine Pat MD   10 mg at 06/25/24 1006    dextrose 50 % injection 12.5 g  12.5 g intravenous q15 min PRN Kristine Pat MD   12.5 g at 06/25/24 1604    dextrose 50 % injection 25 g  25 g intravenous q15 min PRN Kristine Pat MD   25 g at 06/20/24 1237    [Held by provider] empagliflozin (Jardiance) tablet 10 mg  10 mg oral Daily Hosea Bourgeois MD   10 mg at 06/30/24 0814    glucagon (Glucagen) injection 1 mg  1 mg intramuscular q15 min PRN Kristine Pat MD        glucagon (Glucagen) injection 1 mg  1 mg intramuscular q15 min PRN Kristine Pat MD        heparin 25,000 Units in dextrose 5% 250 mL (100 Units/mL) infusion (premix)  0-4,500 Units/hr intravenous Continuous Kristine Pat MD 16 mL/hr at 07/02/24 1236 1,600 Units/hr at 07/02/24 1236     heparin bolus from bag 3,000-6,000 Units  3,000-6,000 Units intravenous q4h PRN Kristine Pat MD   3,000 Units at 07/01/24 1414    hydrALAZINE (Apresoline) tablet 10 mg  10 mg oral TID Sapna Jordan MD   10 mg at 07/02/24 1513    HYDROmorphone (Dilaudid) injection 0.4 mg  0.4 mg intravenous q3h PRN Galdino Boles MD   0.4 mg at 07/02/24 1513    insulin lispro (HumaLOG) injection 0-5 Units  0-5 Units subcutaneous q4h Kristine Pat MD   1 Units at 06/30/24 1618    lidocaine (Xylocaine) 5 % ointment   Topical PRN Kristine Pat MD        melatonin tablet 6 mg  6 mg oral Nightly Kristine Pat MD   6 mg at 07/01/24 2053    oxyCODONE (Roxicodone) immediate release tablet 10 mg  10 mg oral q3h PRN Galdino Boles MD        oxygen (O2) therapy   inhalation Continuous - Inhalation Kristine Pat MD   1 L/min at 06/29/24 0800    [START ON 7/3/2024] pantoprazole (ProtoNix) EC tablet 40 mg  40 mg oral Daily before breakfast Sapna Jordan MD        Or    [START ON 7/3/2024] pantoprazole (ProtoNix) injection 40 mg  40 mg intravenous Daily before breakfast Sapna Jordan MD        perflutren protein A microsphere (Optison) injection 0.5 mL  0.5 mL intravenous Once in imaging Kristien Pat MD        piperacillin-tazobactam-dextrose (Zosyn) IV 3.375 g  3.375 g intravenous q6h Kristine Pat  mL/hr at 07/02/24 1547 3.375 g at 07/02/24 1547    polyethylene glycol (Glycolax, Miralax) packet 17 g  17 g oral BID Kristine Pat MD   17 g at 07/01/24 0900    sennosides (Senokot) tablet 17.2 mg  2 tablet oral BID Kristine Pat MD   17.2 mg at 07/02/24 0823    sodium zirconium cyclosilicate (Lokelma) packet 10 g  10 g oral Once Britany Luevano MD        sulfur hexafluoride microsphr (Lumason) injection 24.28 mg  2 mL intravenous Once in imaging Kristine Pat MD          Assessment   History of Present Illness  Amirah Bennett is a 42 y.o. female with significant PMHx of HFrEF (LVEF 20-25% (08/2022), Afib on Xarelto w/ DCCV  05/2023), ischemic stroke L MCA d/t AFib LLA thrombus seen on echo (lack of OAC adherence) s/p thrombectomy 01/2022 @ [facility] w/ residual expressive aphasia and R sided weakness, recent 03/2024 left cerebellar MCA, s/p tracheostomy, T2DM (03/2024 HgbA1c 5.5) and HTN presenting with bilateral leg pain and dyspnea. She was found to have cardiogenic shock, with hospital course c/b SARAH, cardiac thrombi, DVT of external iliac and common femoral veins, rhabdo, intracardiac thrombus, afib with RVR (IVC filter 6/27/24), acute limb ischemia resulting in open revascularization and AKA of the RLE on 6/28/24. Ongoing discussions about NOK/surrogate decision makers with ethics involvement, Ale elected per prior notes.     Current pain medications: hydromorphone IV 0.4mg q3h for pain, before and after dressing changes, oxycodone 10mg IR for severe pain, lidocaine ointment  Total MMEs in 24 hours: 2.0     Palliative Performance Scale (PPS): 30     Plan   No acute changes to recommendations.  Recommend ongoing assessment of pain with both verbal assessment and nonverbal cues, such as shifting in bed or grimacing, and administering dilaudid 0.4mg before and after dressing changes. Patient appears much more comfortable today but when asked, states she has pain. Family meeting hopefully Friday (7/5) with NOKs to discuss ongoing GOC, will also continue to monitor for a longer term pain management plan.     #Complex Medical Decision Making  #Goals of Care  #Advanced Care Planning  - Code status: Full code  - Surrogate decision maker: Keli Osborne (cousin) [phone]. Clara Black (cousin) [phone].  - Goals are survival and improved quality of life.      #Acute Pain  #R AKA and open embolectomy  #Thrombi  - LUE hematoma, elevated  - Consider heel offloading for pressure sore reduction r/t poor RLE mobility and sensation.  - Holding off on hepatotoxic meds (Acetaminophen) ISO acute liver injury, although  improving.  - Currently on Hydromorphone 0.4mg Q3H PRN pain and five minutes before and after dressing changes, administer also when grimacing/shifting in discomfort  - Oxycodone 10mg available q3h PRN    #Depression  #Anxiety  #Insomnia  - Not currently on antidepressant but home med list notable for Quetiapine 50mg at bedtime. (Not currently taking in pt.) Unclear history. Recommend Psychiatry consult.  - Situational related to upcoming sx.   - reports sleeping well recently. Continue melatonin  - Ongoing encouragement of calming modalities such as distraction, prayer, positive self talk/thoughts, and meditation.  - Please consider Psychiatry consult for underlying depression and to assist with anticipated ongoing anxieties. C/f vicious cycle of ongoing depressive symptoms and medication non-adherence ISO worsened health and possible QOL. [place] to establish in pt and out patient plan and follow up.     #Psychosocial Support  - Ongoing pt and family support, positive presence and emotional support  - Music Therapy  - Spiritual Care Support    #Cardiogenic shock  #Acute SARAH  #Shock Liver  - Itching, can consider binders in setting of elevated bilirubin  - Kidney and liver function improving    Plan of Care discussed with: Updated primary team and bedside RN on goals of care decision, medication adjustments, and code status      Medical Decision Making was moderate level due to high complexity of problems, extensive data review, and high risk of management/treatment.     - Cardiogenic shock, shock liver, acute kidney injury requiring inotrope cardiac support, multiple emboli and acute loss of right lower extremity perfusion requiring AKA  - Assessment required independent historian: nursing and primary team  - Independent interpretation of test: labs   - Discussion of management with: nursing, primary   - Parenteral controlled substances: heparin, dilaudid, mag, bumex (spot dosing).      Thank you for allowing us  to participate in the care of this patient. Palliative will continue to follow as needed. Palliative medicine is available Monday-Friday, 8a-6p. Please contact team with any questions or concerns.  Team pager [phone] (weekdays)       Rosa Maria Kerns MD  PGY1, Internal Medicine

## 2024-07-03 ENCOUNTER — HOME CARE VISIT (OUTPATIENT)
Dept: HOME HEALTH SERVICES | Facility: HOME HEALTH | Age: 44
End: 2024-07-03
Payer: COMMERCIAL

## 2024-07-03 LAB
ABO GROUP (TYPE) IN BLOOD: NORMAL
ALBUMIN SERPL BCP-MCNC: 2.2 G/DL (ref 3.4–5)
ALBUMIN SERPL BCP-MCNC: 2.3 G/DL (ref 3.4–5)
ALP SERPL-CCNC: 55 U/L (ref 33–110)
ALT SERPL W P-5'-P-CCNC: 54 U/L (ref 7–45)
ANION GAP SERPL CALC-SCNC: 11 MMOL/L (ref 10–20)
ANION GAP SERPL CALC-SCNC: 11 MMOL/L (ref 10–20)
ANTIBODY SCREEN: NORMAL
AST SERPL W P-5'-P-CCNC: 37 U/L (ref 9–39)
BASOPHILS # BLD AUTO: 0.04 X10*3/UL (ref 0–0.1)
BASOPHILS # BLD AUTO: 0.04 X10*3/UL (ref 0–0.1)
BASOPHILS # BLD AUTO: 0.06 X10*3/UL (ref 0–0.1)
BASOPHILS NFR BLD AUTO: 0.2 %
BASOPHILS NFR BLD AUTO: 0.2 %
BASOPHILS NFR BLD AUTO: 0.3 %
BILIRUB DIRECT SERPL-MCNC: 1.9 MG/DL (ref 0–0.3)
BILIRUB SERPL-MCNC: 3.2 MG/DL (ref 0–1.2)
BLOOD EXPIRATION DATE: NORMAL
BLOOD EXPIRATION DATE: NORMAL
BUN SERPL-MCNC: 10 MG/DL (ref 6–23)
BUN SERPL-MCNC: 11 MG/DL (ref 6–23)
CALCIUM SERPL-MCNC: 7.4 MG/DL (ref 8.6–10.6)
CALCIUM SERPL-MCNC: 7.9 MG/DL (ref 8.6–10.6)
CHLORIDE SERPL-SCNC: 100 MMOL/L (ref 98–107)
CHLORIDE SERPL-SCNC: 99 MMOL/L (ref 98–107)
CO2 SERPL-SCNC: 23 MMOL/L (ref 21–32)
CO2 SERPL-SCNC: 24 MMOL/L (ref 21–32)
CREAT SERPL-MCNC: 0.59 MG/DL (ref 0.5–1.05)
CREAT SERPL-MCNC: 0.63 MG/DL (ref 0.5–1.05)
DISPENSE STATUS: NORMAL
DISPENSE STATUS: NORMAL
EGFRCR SERPLBLD CKD-EPI 2021: >90 ML/MIN/1.73M*2
EGFRCR SERPLBLD CKD-EPI 2021: >90 ML/MIN/1.73M*2
EOSINOPHIL # BLD AUTO: 0.14 X10*3/UL (ref 0–0.7)
EOSINOPHIL # BLD AUTO: 0.19 X10*3/UL (ref 0–0.7)
EOSINOPHIL # BLD AUTO: 0.21 X10*3/UL (ref 0–0.7)
EOSINOPHIL NFR BLD AUTO: 0.7 %
EOSINOPHIL NFR BLD AUTO: 0.9 %
EOSINOPHIL NFR BLD AUTO: 0.9 %
ERYTHROCYTE [DISTWIDTH] IN BLOOD BY AUTOMATED COUNT: 16.3 % (ref 11.5–14.5)
ERYTHROCYTE [DISTWIDTH] IN BLOOD BY AUTOMATED COUNT: 16.8 % (ref 11.5–14.5)
ERYTHROCYTE [DISTWIDTH] IN BLOOD BY AUTOMATED COUNT: 17 % (ref 11.5–14.5)
GLUCOSE BLD MANUAL STRIP-MCNC: 108 MG/DL (ref 74–99)
GLUCOSE BLD MANUAL STRIP-MCNC: 111 MG/DL (ref 74–99)
GLUCOSE BLD MANUAL STRIP-MCNC: 133 MG/DL (ref 74–99)
GLUCOSE BLD MANUAL STRIP-MCNC: 135 MG/DL (ref 74–99)
GLUCOSE BLD MANUAL STRIP-MCNC: 139 MG/DL (ref 74–99)
GLUCOSE BLD MANUAL STRIP-MCNC: 93 MG/DL (ref 74–99)
GLUCOSE SERPL-MCNC: 113 MG/DL (ref 74–99)
GLUCOSE SERPL-MCNC: 124 MG/DL (ref 74–99)
HCT VFR BLD AUTO: 22 % (ref 36–46)
HCT VFR BLD AUTO: 23.9 % (ref 36–46)
HCT VFR BLD AUTO: 24.9 % (ref 36–46)
HGB BLD-MCNC: 7.6 G/DL (ref 12–16)
HGB BLD-MCNC: 7.9 G/DL (ref 12–16)
HGB BLD-MCNC: 8.2 G/DL (ref 12–16)
IMM GRANULOCYTES # BLD AUTO: 0.45 X10*3/UL (ref 0–0.7)
IMM GRANULOCYTES # BLD AUTO: 0.47 X10*3/UL (ref 0–0.7)
IMM GRANULOCYTES # BLD AUTO: 0.56 X10*3/UL (ref 0–0.7)
IMM GRANULOCYTES NFR BLD AUTO: 2.1 % (ref 0–0.9)
IMM GRANULOCYTES NFR BLD AUTO: 2.3 % (ref 0–0.9)
IMM GRANULOCYTES NFR BLD AUTO: 2.5 % (ref 0–0.9)
LYMPHOCYTES # BLD AUTO: 2.57 X10*3/UL (ref 1.2–4.8)
LYMPHOCYTES # BLD AUTO: 2.64 X10*3/UL (ref 1.2–4.8)
LYMPHOCYTES # BLD AUTO: 3.35 X10*3/UL (ref 1.2–4.8)
LYMPHOCYTES NFR BLD AUTO: 12 %
LYMPHOCYTES NFR BLD AUTO: 13 %
LYMPHOCYTES NFR BLD AUTO: 14.7 %
MAGNESIUM SERPL-MCNC: 1.79 MG/DL (ref 1.6–2.4)
MAGNESIUM SERPL-MCNC: 1.81 MG/DL (ref 1.6–2.4)
MCH RBC QN AUTO: 29.2 PG (ref 26–34)
MCH RBC QN AUTO: 29.3 PG (ref 26–34)
MCH RBC QN AUTO: 29.8 PG (ref 26–34)
MCHC RBC AUTO-ENTMCNC: 32.9 G/DL (ref 32–36)
MCHC RBC AUTO-ENTMCNC: 33.1 G/DL (ref 32–36)
MCHC RBC AUTO-ENTMCNC: 34.5 G/DL (ref 32–36)
MCV RBC AUTO: 86 FL (ref 80–100)
MCV RBC AUTO: 89 FL (ref 80–100)
MCV RBC AUTO: 89 FL (ref 80–100)
MONOCYTES # BLD AUTO: 1.52 X10*3/UL (ref 0.1–1)
MONOCYTES # BLD AUTO: 2.05 X10*3/UL (ref 0.1–1)
MONOCYTES # BLD AUTO: 2.28 X10*3/UL (ref 0.1–1)
MONOCYTES NFR BLD AUTO: 10 %
MONOCYTES NFR BLD AUTO: 7.5 %
MONOCYTES NFR BLD AUTO: 9.6 %
NEUTROPHILS # BLD AUTO: 15.47 X10*3/UL (ref 1.2–7.7)
NEUTROPHILS # BLD AUTO: 16.13 X10*3/UL (ref 1.2–7.7)
NEUTROPHILS # BLD AUTO: 16.27 X10*3/UL (ref 1.2–7.7)
NEUTROPHILS NFR BLD AUTO: 71.6 %
NEUTROPHILS NFR BLD AUTO: 75.2 %
NEUTROPHILS NFR BLD AUTO: 76.3 %
NRBC BLD-RTO: 0.1 /100 WBCS (ref 0–0)
NRBC BLD-RTO: 0.2 /100 WBCS (ref 0–0)
NRBC BLD-RTO: 0.4 /100 WBCS (ref 0–0)
PHOSPHATE SERPL-MCNC: 3.7 MG/DL (ref 2.5–4.9)
PHOSPHATE SERPL-MCNC: 4.1 MG/DL (ref 2.5–4.9)
PLATELET # BLD AUTO: 152 X10*3/UL (ref 150–450)
PLATELET # BLD AUTO: 169 X10*3/UL (ref 150–450)
PLATELET # BLD AUTO: 181 X10*3/UL (ref 150–450)
POTASSIUM SERPL-SCNC: 4.4 MMOL/L (ref 3.5–5.3)
POTASSIUM SERPL-SCNC: 4.6 MMOL/L (ref 3.5–5.3)
PRODUCT BLOOD TYPE: 7300
PRODUCT BLOOD TYPE: 7300
PRODUCT CODE: NORMAL
PRODUCT CODE: NORMAL
PROT SERPL-MCNC: 5 G/DL (ref 6.4–8.2)
RBC # BLD AUTO: 2.55 X10*6/UL (ref 4–5.2)
RBC # BLD AUTO: 2.7 X10*6/UL (ref 4–5.2)
RBC # BLD AUTO: 2.81 X10*6/UL (ref 4–5.2)
RH FACTOR (ANTIGEN D): NORMAL
SODIUM SERPL-SCNC: 129 MMOL/L (ref 136–145)
SODIUM SERPL-SCNC: 130 MMOL/L (ref 136–145)
UFH PPP CHRO-ACNC: 0.5 IU/ML
UNIT ABO: NORMAL
UNIT ABO: NORMAL
UNIT NUMBER: NORMAL
UNIT NUMBER: NORMAL
UNIT RH: NORMAL
UNIT RH: NORMAL
UNIT VOLUME: 350
UNIT VOLUME: 350
WBC # BLD AUTO: 20.3 X10*3/UL (ref 4.4–11.3)
WBC # BLD AUTO: 21.4 X10*3/UL (ref 4.4–11.3)
WBC # BLD AUTO: 22.7 X10*3/UL (ref 4.4–11.3)
XM INTEP: NORMAL
XM INTEP: NORMAL

## 2024-07-03 PROCEDURE — 36415 COLL VENOUS BLD VENIPUNCTURE: CPT

## 2024-07-03 PROCEDURE — 2500000001 HC RX 250 WO HCPCS SELF ADMINISTERED DRUGS (ALT 637 FOR MEDICARE OP)

## 2024-07-03 PROCEDURE — 97166 OT EVAL MOD COMPLEX 45 MIN: CPT | Mod: GO

## 2024-07-03 PROCEDURE — 99233 SBSQ HOSP IP/OBS HIGH 50: CPT

## 2024-07-03 PROCEDURE — 2500000004 HC RX 250 GENERAL PHARMACY W/ HCPCS (ALT 636 FOR OP/ED)

## 2024-07-03 PROCEDURE — 2020000001 HC ICU ROOM DAILY

## 2024-07-03 PROCEDURE — 99291 CRITICAL CARE FIRST HOUR: CPT

## 2024-07-03 PROCEDURE — 82947 ASSAY GLUCOSE BLOOD QUANT: CPT

## 2024-07-03 PROCEDURE — 84100 ASSAY OF PHOSPHORUS: CPT

## 2024-07-03 PROCEDURE — 80069 RENAL FUNCTION PANEL: CPT | Mod: CCI

## 2024-07-03 PROCEDURE — 83735 ASSAY OF MAGNESIUM: CPT

## 2024-07-03 PROCEDURE — 85520 HEPARIN ASSAY: CPT

## 2024-07-03 PROCEDURE — 2500000002 HC RX 250 W HCPCS SELF ADMINISTERED DRUGS (ALT 637 FOR MEDICARE OP, ALT 636 FOR OP/ED)

## 2024-07-03 PROCEDURE — 37799 UNLISTED PX VASCULAR SURGERY: CPT

## 2024-07-03 PROCEDURE — 80048 BASIC METABOLIC PNL TOTAL CA: CPT

## 2024-07-03 PROCEDURE — 85025 COMPLETE CBC W/AUTO DIFF WBC: CPT

## 2024-07-03 PROCEDURE — 86901 BLOOD TYPING SEROLOGIC RH(D): CPT

## 2024-07-03 PROCEDURE — 97164 PT RE-EVAL EST PLAN CARE: CPT | Mod: GP

## 2024-07-03 PROCEDURE — 82248 BILIRUBIN DIRECT: CPT

## 2024-07-03 PROCEDURE — 86923 COMPATIBILITY TEST ELECTRIC: CPT

## 2024-07-03 RX ORDER — MAGNESIUM SULFATE HEPTAHYDRATE 40 MG/ML
2 INJECTION, SOLUTION INTRAVENOUS ONCE
Status: COMPLETED | OUTPATIENT
Start: 2024-07-03 | End: 2024-07-03

## 2024-07-03 RX ORDER — TALC
6 POWDER (GRAM) TOPICAL DAILY
Status: DISCONTINUED | OUTPATIENT
Start: 2024-07-03 | End: 2024-07-10

## 2024-07-03 RX ORDER — TALC
6 POWDER (GRAM) TOPICAL NIGHTLY PRN
Status: DISCONTINUED | OUTPATIENT
Start: 2024-07-03 | End: 2024-07-05

## 2024-07-03 RX ADMIN — AMIODARONE HYDROCHLORIDE 200 MG: 200 TABLET ORAL at 09:00

## 2024-07-03 RX ADMIN — OXYCODONE HYDROCHLORIDE 10 MG: 5 TABLET ORAL at 21:01

## 2024-07-03 RX ADMIN — HYDROMORPHONE HYDROCHLORIDE 0.4 MG: 1 INJECTION INTRAMUSCULAR; INTRAVENOUS; SUBCUTANEOUS at 15:53

## 2024-07-03 RX ADMIN — Medication 6 MG: at 21:01

## 2024-07-03 RX ADMIN — HYDROMORPHONE HYDROCHLORIDE 0.4 MG: 1 INJECTION INTRAMUSCULAR; INTRAVENOUS; SUBCUTANEOUS at 11:15

## 2024-07-03 RX ADMIN — ASPIRIN 81 MG CHEWABLE TABLET 81 MG: 81 TABLET CHEWABLE at 06:19

## 2024-07-03 RX ADMIN — POLYETHYLENE GLYCOL 3350 17 G: 17 POWDER, FOR SOLUTION ORAL at 09:00

## 2024-07-03 RX ADMIN — SENNOSIDES 17.2 MG: 8.6 TABLET, FILM COATED ORAL at 09:00

## 2024-07-03 RX ADMIN — PANTOPRAZOLE SODIUM 40 MG: 40 TABLET, DELAYED RELEASE ORAL at 06:19

## 2024-07-03 RX ADMIN — HEPARIN SODIUM 1600 UNITS/HR: 10000 INJECTION, SOLUTION INTRAVENOUS at 07:38

## 2024-07-03 RX ADMIN — HYDRALAZINE HYDROCHLORIDE 10 MG: 10 TABLET ORAL at 09:00

## 2024-07-03 RX ADMIN — SACUBITRIL AND VALSARTAN 1 TABLET: 24; 26 TABLET, FILM COATED ORAL at 11:42

## 2024-07-03 RX ADMIN — MAGNESIUM SULFATE HEPTAHYDRATE 2 G: 40 INJECTION, SOLUTION INTRAVENOUS at 05:24

## 2024-07-03 RX ADMIN — PIPERACILLIN SODIUM AND TAZOBACTAM SODIUM 3.38 G: 3; .375 INJECTION, SOLUTION INTRAVENOUS at 04:21

## 2024-07-03 RX ADMIN — HYDROMORPHONE HYDROCHLORIDE 0.4 MG: 1 INJECTION INTRAMUSCULAR; INTRAVENOUS; SUBCUTANEOUS at 06:52

## 2024-07-03 RX ADMIN — PIPERACILLIN SODIUM AND TAZOBACTAM SODIUM 3.38 G: 3; .375 INJECTION, SOLUTION INTRAVENOUS at 09:00

## 2024-07-03 RX ADMIN — HYDROMORPHONE HYDROCHLORIDE 0.4 MG: 1 INJECTION INTRAMUSCULAR; INTRAVENOUS; SUBCUTANEOUS at 22:02

## 2024-07-03 RX ADMIN — SACUBITRIL AND VALSARTAN 1 TABLET: 24; 26 TABLET, FILM COATED ORAL at 21:01

## 2024-07-03 ASSESSMENT — COGNITIVE AND FUNCTIONAL STATUS - GENERAL
PERSONAL GROOMING: A LITTLE
EATING MEALS: A LITTLE
MOVING FROM LYING ON BACK TO SITTING ON SIDE OF FLAT BED WITH BEDRAILS: A LOT
MOBILITY SCORE: 8
TURNING FROM BACK TO SIDE WHILE IN FLAT BAD: A LOT
DRESSING REGULAR UPPER BODY CLOTHING: A LOT
DAILY ACTIVITIY SCORE: 14
CLIMB 3 TO 5 STEPS WITH RAILING: TOTAL
STANDING UP FROM CHAIR USING ARMS: TOTAL
TOILETING: A LOT
WALKING IN HOSPITAL ROOM: TOTAL
HELP NEEDED FOR BATHING: A LOT
DRESSING REGULAR LOWER BODY CLOTHING: A LOT
MOVING TO AND FROM BED TO CHAIR: TOTAL

## 2024-07-03 ASSESSMENT — ACTIVITIES OF DAILY LIVING (ADL)
ADL_ASSISTANCE: INDEPENDENT
BATHING_ASSISTANCE: MAXIMAL
ADL_ASSISTANCE: INDEPENDENT

## 2024-07-03 ASSESSMENT — PAIN SCALES - GENERAL
PAINLEVEL_OUTOF10: 2
PAINLEVEL_OUTOF10: 0 - NO PAIN
PAINLEVEL_OUTOF10: 3
PAINLEVEL_OUTOF10: 0 - NO PAIN
PAINLEVEL_OUTOF10: 2
PAINLEVEL_OUTOF10: 10 - WORST POSSIBLE PAIN
PAINLEVEL_OUTOF10: 0 - NO PAIN
PAINLEVEL_OUTOF10: 2
PAINLEVEL_OUTOF10: 8
PAINLEVEL_OUTOF10: 10 - WORST POSSIBLE PAIN
PAINLEVEL_OUTOF10: 10 - WORST POSSIBLE PAIN
PAINLEVEL_OUTOF10: 3

## 2024-07-03 ASSESSMENT — PAIN DESCRIPTION - LOCATION
LOCATION: LEG
LOCATION: LEG

## 2024-07-03 ASSESSMENT — PAIN DESCRIPTION - ORIENTATION
ORIENTATION: RIGHT
ORIENTATION: RIGHT

## 2024-07-03 NOTE — PROGRESS NOTES
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 female  has a past medical history of CHF (congestive heart failure) (Multi) and Stroke (Multi). Patient presented with Afib RVR, significant heart failure in cardiogenic shock. Vascular surgery consulted for acute limb ischemia, however, was found to be [place]'s 3 with motor and sensory loss, paralysis of the right leg from advanced ischemia time. CTA was obtained which demonstrated right BA, EIA, and CFA/SFA thrombus, likely embolic.    On day 13 of admission Who is now s/p Procedure(s):  Embolectomy Lower Extremity  Amputation Above Knee     : stable vascular exam, stable motor/sensory exam, downtrending CK  : No major changes.   : R iliac and RLE open thromboembolectomy, followed by R AKA    R AKA dressing removed due to sanguinous saturation  : Dressing was changed with Ace compression. CTA with delayed  phase showed Postsurgical changes of right above knee amputation   07/02: discussion with the primary team about stopping the heparin drip for the day. Pending risks/benefits discussion with primary team and vascular medicine.    Events:  - 07/01: Was called at 5:50 pm for bleeding from her right groin. On examination, there was oozing of old dark blood from one point at the top of the incision. Packing was placed. This Morning 07/02 groin bleeding stopped  - 07/02: Patient had continued bleeding from stump site. Hemoglobin down trended to 6.5. Patient received 2 units packed red blood cells. Hgb is 8.2 from 6.5.        Plan  Recommend stopping heparin drip at least for 12 hours to control the bleeding/oozing from the stump. Restart WITHOUT a bolus  Dressing changed today  no acute plan for intervention at this time   monitor for signs of worsening bleeding (based on labs or physical exam)  Rest of care per the primary team.  Today's plan was communicated to the primary team.  Vascular surgery will keep follow-up.  Please contact Vascular via [employer] or by paging [phone] with any questions.        Subjective   SUBJECTIVE  RLE stump had less bleeding, saturating dressing  Hgb 8.2 from 6.5.   Otherwise, patient had no complaints       Objective   OBJECTIVE  Last Recorded Vitals  Heart Rate:  []   Temp:  [36.1 °C (97 °F)-36.9 °C (98.4 °F)]   Resp:  [12-24]   BP: ()/(45-58)   Weight:  [82.5 kg (181 lb 14.1 oz)]   SpO2:  [100 %]   Physical Exam:  Vascular Physical Exam  Constitutional: no acute distress  Neuro: A/O x4, no gross deficits   Psych: normal affect  HEENT: No deformities, no scleral icterus   Cardiac: RRR  Pulmonary: unlabored respirations   Abdomen: soft, non distended, non tender  Skin: warm and dry overall    Extremities: RLE s/p AKA, dressing with saturation and soaking through to the bed below, no obvious hematoma vision    Discussed with attending, RODNEY John n i v e  r s i t y  H o s p i t a l s  Vascular Surgery Resident  PGY-1  11:06 AM 07/03/24  Service Pager: [phone]

## 2024-07-03 NOTE — PROGRESS NOTES
"  MEDICINE INPATIENT PROGRESS NOTE    Amirah Bennett is a 43 y.o. female on hospital day 13    Overnight:   Patient had continued bleeding from stump site.  Hemoglobin down trended to 6.5.  Patient received 2 units packed red blood cells.  Had appropriate incrementation to 8.3.  Patient has labile mood overnight.  Was tearful.  Expressed frustration about being in the hospital and hooked up to lines  She attempted to get up.  She fell on her right leg stump.  Vascular surgery was notified.  Additional imaging of right stump was deferred.  CT head was obtained and it showed no acute intracranial pathology    Subjective   She was tearful upon interview today.  She expressed frustration related to lines and being in the hospital.  She specifically pointed to the arterial line in her left wrist and said it was bothering her.  She has pain in her stump site.         Objective     Last Recorded Vitals  Blood pressure (!) 91/45, pulse 88, temperature 36.4 °C (97.5 °F), temperature source Temporal, resp. rate 15, height 1.702 m (5' 7.01\"), weight 82.5 kg (181 lb 14.1 oz), SpO2 100%.    Intake/Output last 3 Shifts:  I/O last 3 completed shifts:  In: 3292.7 (39.9 mL/kg) [P.O.:720; I.V.:676 (8.2 mL/kg); Blood:1396.7; IV Piggyback:500]  Out: 2100 (25.5 mL/kg) [Urine:2100 (0.7 mL/kg/hr)]  Weight: 82.5 kg     Relevant Results  Results from last 7 days   Lab Units 07/03/24 0137 07/02/24 2011 07/02/24 1756   WBC AUTO x10*3/uL 22.7* 23.7* 23.4*   HEMOGLOBIN g/dL 8.2* 6.5* 6.8*   HEMATOCRIT % 24.9* 20.2* 20.6*   PLATELETS AUTO x10*3/uL 152 171 174     Results from last 7 days   Lab Units 07/03/24 0137 07/02/24  1756 07/02/24  0351   SODIUM mmol/L 130* 131* 129*   POTASSIUM mmol/L 4.6 4.8 4.5   CO2 mmol/L 24 23 22   ANION GAP mmol/L 11 14 11   BUN mg/dL 11 11 11   CREATININE mg/dL 0.63 0.71 0.61   GLUCOSE mg/dL 113* 104* 111*   EGFR mL/min/1.73m*2 >90 >90 >90   MAGNESIUM mg/dL 1.79 1.91 1.74   PHOSPHORUS mg/dL 3.7 3.9 3.8    " "  Results from last 7 days   Lab Units 07/03/24  0137 07/02/24  0351 07/01/24  0410   ALT U/L 54* 64* 82*   AST U/L 37 36 44*   ALK PHOS U/L 55 60 58      Results from last 7 days   Lab Units 07/02/24  0351 06/29/24  0304 06/28/24  0348   INR  1.2* 1.3* 1.3*     Results from last 7 days   Lab Units 07/02/24  0542 07/02/24  0514 07/01/24  1816 06/28/24  1113 06/28/24  1035 06/28/24  0836   POCT PH, ARTERIAL pH  --   --   --   --  7.50* 7.44*   POCT PO2, ARTERIAL mm Hg  --   --   --   --  112* 308*   POCT PCO2, ARTERIAL mm Hg  --   --   --   --  35* 40   FIO2 % 21 21 21   < > 50 60    < > = values in this interval not displayed.     Results from last 7 days   Lab Units 06/27/24  1835   POCT PH, VENOUS pH 7.41   POCT PCO2, VENOUS mm Hg 41           No lab exists for component: \"BNPRESU\", \"CPKT\"            Physical Exam  Constitutional: in NAD, tearful on 7/3.  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 Can hear airleak. RIJ line not currently oozing.  CV: normal rate, irregular rhythm, no murmurs noted. Cap refill 2+ throughout, extremities warm to touch. Trace edema in LLE   Pulm: CTAB anteriorly, room air  GI: abd soft, tender diffusely, bowel sounds present  Ext: She has equal sensation in bilateral thighs. Significant tenderness to palpation at right stump site No DP in left. Has TP and popliteal in left. S/p Rt AKA. Right groin access site covered, feels firm (per vascular surgery due to how it was sutured).  Dried blood at groin site.  Had oozing from stump all night, dressing changes this morning  LUE is bruised and swollen but not warm to the touch. had L brachial artery injury after a -line attempt earlier on in hospital course. Previous LUE duplex showed non vascularized mass  Neuro: alert and conversant however only intermittently answering questions appropriately, +expressive aphasia so will initially say no pain, endorsing pain " in bilateral legs.     Medications    amiodarone, 200 mg, oral, Daily  aspirin, 81 mg, oral, Daily  bisacodyl, 10 mg, rectal, Daily  [Held by provider] empagliflozin, 10 mg, oral, Daily  melatonin, 6 mg, oral, Nightly  oxygen, , inhalation, Continuous - Inhalation  pantoprazole, 40 mg, oral, Daily before breakfast   Or  pantoprazole, 40 mg, intravenous, Daily before breakfast  perflutren protein A microsphere, 0.5 mL, intravenous, Once in imaging  polyethylene glycol, 17 g, oral, BID  sacubitriL-valsartan, 1 tablet, oral, BID  sennosides, 2 tablet, oral, BID  sodium zirconium cyclosilicate, 10 g, oral, Once  sulfur hexafluoride microsphr, 2 mL, intravenous, Once in imaging        [Held by provider] heparin, 0-4,500 Units/hr, Last Rate: Stopped (07/03/24 0912)        PRN medications: dextrose, dextrose, glucagon, glucagon, heparin, HYDROmorphone, lidocaine, oxyCODONE           Assessment/Plan   Amirah Bennett is a 42 y.o. 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 RHC c/w cardiogenic shock with CI of 1.6, CVP of 25. She is not candidate for advanced therapies given documented medical nonadherence. Managing medically. Attempted to wean off dobutamine while increasing nitroprusside so stopped dobutamine 6/21 however acutely worsened with increased lactate and worsened SvO2 from 65 to 25 overnight with new abdominal pain and right leg pain concerning for ischemia. Improving parameters, normalized lactate,  and pain resolution when dobutamine restarted. Course also c/b SARAH, cardiac thrombi, liver injury likely due to ischemia, and Afib with RVR. Found to have rhabdomyolysis with CK of 14,000, unable to give fluids due to cardiogenic shock. Arterial doppler of lower extremities with echogenic material within right distal femoral and popliteal arteries with absent Doppler flow in the right popliteal artery with significantly diminished flow within the right distal superficial femoral artery significantly diminished flow within the right distal superficial femoral artery, findings raise concern for thrombosis of distal SFA and popliteal artery. Decreased flow within right posterior tibial and peroneal arteries could be through collateralization. Additionally decreased flow in right common femoral artery, right deep femoral artery as well as proximal and mid superficial femoral arteries concerning for stenosis proximal to right common femoral artery possible within right external iliac right common iliac artery. CTA Aorta with runoff 6/24: aortic bifurcation embolus, R BA-EIA embolus, SFA and popliteal emboli. Vascular surgery recommending right AKA. Bilateral LE duplex with likely early nonocclusive deep venous thrombosis. IVC filter placed 6/27 on recommendation by vascular medicine in preparation for right AKA 6/28. IVC filter should be removed within 3 months. AKA performed 6/28. This was complicated by oozing from the stump site over the next few days. CTA RLE suggested intramuscular hematoma and suspected vascular injury.  Vascular surgery did not feel that intervention was necessary on the stump site.  Patient required 6 blood transfusions over 3 days while on heparin drip.  Decision was made to hold heparin drip for 12 hours to allow hemostasis.  Primary team felt that risk of significant cardioembolic event would be low over these 12 hours. Ethics involved given lack of documentation of POA and limited family  (NOK are cousins). Palliative care consulted.    Updates 7/3  -Stump site bleeding overnight requiring 2 units packed red blood cells.  Hemoglobin was 6.5 before transfusion.  Incremented to 8.3  -holding heparin gtt for 12 hours to allow for hemostasis. Will resume without bolus  -removing arterial line  -stop zosyn today. Has had 7 days of coverage for presumed stump site infection. Still has leukocytosis but may be reactive  -maintaining dhillon in place to prevent urine from getting in stump   -starting entresto 24/26 bid low dose  -GOC today     NEUROLOGIC  #AMS, resolved at her baseline  #ischemic stroke L MCA d/t AFib LLA thrombus seen on echo (lack of OAC adherence) s/p thrombectomy 01/2022 @ [facility] w/ residual expressive aphasia and R sided weakness   ::Neurology consulted 6/25, no new deficits concerning for cerebral event  -no acute changes     #Depression?  #Insomnia  ::Patient's friend states that she doesn't care for herself due to being depressed  -consider psychiatry consult, deferring for now  -told to stop quetiapine 50mg at last discharge  -c/w melatonin 6mg at bedtime     CARDIOVASCULAR  #Cardiogenic shock, improving  #HFrEF (LVEF 20-25%)  ::Not candidate for advanced therapies due to medical nonadherence  ::Weaned off dobutamine and nipride  Plan:  -diuresis as needed -cvp 2 on 7/2, defer  -palliative care consulted given patient not candidate for advanced therapies, appreciate recommendations  -starting entresto BID. Stopped hydralazine  -previous GDMT was entresto 24-26, spironolactone 25mg, metoprolol succinate 50mg daily, lasix 40mg, jardiance 10mg     #Right limb ischemia  #Arterial emboli  #S/p femoral arterial line sheath placement now removed  ::CTA Aorta with runoff 6/24: aortic bifurcation embolus, R BA-EIA embolus, SFA and popliteal emboli   ::arterial duplex exam with monophasic right CFA, SFA and PFA signals, absent popliteal flow and monophasic flow in the tibial artery   ::Unable to  walk prior to presentation to hospital  ::Right leg has been cold and painful in times where cardiogenic shock has worsened then warm and not painful to touch when out of shock state, fluctuating exam  ::s/p AKA with vascular surgery, 6/28/2024.   ::ongoing bleeding from stump site  ::CTA RLE with hematomas but does not have any obvious source that vascular surgery could intervene upon  Plan:  -holding heparin gtt for 12 hours to allow for hemostasis. Will resume without bolus  -pain regimen: oxycodone 10mg severe painq 4hr, dilaudid 0.4mg q 3 hr breakthrough - also can use before and after dresssing changes     #Cardiac thrombi  #Right lower extremity DVT  ::Likely in setting of Xarelto nonadherence  ::There are indeterminate low-attenuation nodular filling defects within the atrial appendage. While this may represent contrast under filling, a right atrial appendage thrombus can not be excluded.  ::Rt US duplex with likely early nonocclusive deep venous thrombosis.   Plan:  -holding heparin gtt for 12 hours to allow for hemostasis. Will resume without bolus     #Afib on Xarelto w/ DCCV 05/2023), ischemic stroke L MCA d/t AFib LLA thrombus seen on echo (lack of OAC adherence) s/p thrombectomy 01/2022 @ [facility] w/ residual expressive aphasia and R sided weakness   ::Episode of RVR in ED  ::Previously prescribed digoxin 250mcg however last fill was 12/2023  ::TSH 6.28H, free T4 1.48  Plan:  -c/w amiodarone 200mg daily  -hold home digoxin 250mcg  -c/w AC per above (holding home Xarelto 20mg daily in evening)  -c/w aspirin 81mg  -stopped atorvastatin 80 due to liver injury     #Elevated troponin  ::95 > 289 > 313 > 326 > 289  -stop trending     #HTN  -holding home medications     PULMONARY  #Dyspnea  #Mild pulmonary edema  ::No evidence of PE  -diuresis per above     #Hx trach c/b trancutaneous fistula  ::ENT had been consulted 3/2024 for gurgling and mucus drainage, no inpatient interventions required  -monitor for  breaths/mucus discharge from trach site  -Cover the tracheocutaneous fistula with tape and gauze and encourage patient to apply pressure when voicing.   -per prior ENT note on last admission   -follow up outpatient ENT for closure (Dr. King)     RENAL/  #Hyponatremia  -monitor RFP and diurese per Harvinder     #Rhabdomyolysis, improving-Ck peaked > 14,000, trending down as of 6/30  #Metabolic acidosis, resolved  #SARAH, resolved  #Elevated lactate, resolved     GASTROINTESTINAL  #Elevated Tbili (3.2 on admission, from 0.5 in 03/2024)  #Elevated LFTs, downtrending  ::Likely ischemic liver injury  ::RUQ US 7/1 without abnormality beyond hepatic steatosis and liver cyst  -trend CMP     #GERD  -c/w pantoprazole 40mg daily     ENDOCRINE  #T2DM, diet controlled:: HgbA1c 3/2024 5.5     HEME/ONC  #Cardiac thrombi  -see cardiac section for plan     #Arterial thrombosis  #DVT  ::s/p IVC filter 6/27  -heparin drip per above  -further plan per above     #Anemia  ::had 3 units of RBC transfused 6/30 without appropriate incrementation.   ::CTA C/A/P 6/30 without evidence of active hemorrhage within chest/abdomen/pelvis  ::CTA RLE 7/1 without a source that could be intervened upon  Plan:  - holding heparin gtt for 12 hours to allow for hemostasis. Will resume without bolus  - CBC q6   -Hgb goal >8  given cardiac hx    -pending haptoglobin  -slide requested for peripheral smear     #Thrombocytopenia  ::Oozing of central line, Multiple thrombi, DIC score 5 on 6/24/2024  ::Hit score of 6  ::Negative PF4  -vascular medicine consulted   -monitor DIC labs daily     #Left brachial injury  ::Likely injury  :LUE DVT scan with nonvascularized mass, called CT while patient was there to request CTA of left upper extremity and they said logistically they would be unable to perform both scans, and given protocol for max dosing of contrast, could not give further contrast until 6/25 at 2PM  -no CTA LUE needed      INFECTIOUS DISEASE  #stump  "infection, treated for 7 days  ::Procal 0.28  ::CXR without signs of PNA  ::s/p vanc (6/20-6/24) (6/27-7/2 ) and zosyn (6/20-6/24), (6/27- p)-restarted vanc/zosyn 6/27 due to elevated leukocytosis and purulent appearing right groin site  ::UA with 1+ blood, 1+ bacteria, no WBC, neg nitrite and leuk esterase  ::6/27 right groin wound culture negative  ::blood cultures x1 6/20, x2 6/22, NGTD   ::blood cultures 6/27, NGTD  Plan:  -discontinued vancomycin 7/2, discontinued zosyn 7/3      #leukocytosis  -may be reactive, has received tx as above       Dispo:  -PT: Moderate intensity level of care  -patient would like enrollment in  home delivery service for medications (she has trouble getting to preferred pharmacy), pharmacy updated to [facility]  -patient's family would like her enrolled in [facility]  -repeat thyroid labs outpatient  -ensure IVC filter removal in 3 months!     N: cardiac  A: pIVs, dhillon   DVT ppx: heparin drip  GI ppx: pantoprazole 40mg     Code Status: FULL CODE per patient and NOKs  *Note that patient LACKS capacity due to inability to express decision and inconsistency in decisions     Surrogate Medical Decision-maker:   Surrogate decision maker is: pt's cousinClara Wayne: [phone], Efrain Black: [phone], Keli Osborne: [phone]      Friends who may be helpful in making decisions:  Prior boyfriend Navin Sanches [phone]  Very good friend \"Isiah\" Pranay Owen [phone]    Galdino Boles MD  Internal Medicine, PGY-2   "

## 2024-07-03 NOTE — PROGRESS NOTES
Occupational Therapy    Evaluation    Patient Name: Amirah Bennett  MRN: [id number]  Today's Date: 7/3/2024  Room: 18/18  Time Calculation  Start Time: 1028  Stop Time: 1047  Time Calculation (min): 19 min    Assessment  IP OT Assessment  OT Assessment: Impaired self-care, balance, coordination, cognition, functional mobility. Would benefit from skilled services to maximize functional potential.  Prognosis: Good  Barriers to Discharge: Inaccessible home environment, Decreased caregiver support  Evaluation/Treatment Tolerance: Patient tolerated treatment well  Medical Staff Made Aware: Yes  End of Session Communication: Bedside nurse  End of Session Patient Position: Bed, 3 rail up, Alarm on (Sitter present)  Plan:  Inpatient Plan  Treatment Interventions: ADL retraining, Functional transfer training, UE strengthening/ROM, Endurance training, Cognitive reorientation, Neuromuscular reeducation  OT Frequency: 3 times per week  OT Discharge Recommendations: Moderate intensity level of continued care, 24 hr supervision due to cognition  Equipment Recommended upon Discharge:  (TBD)  OT Recommended Transfer Status: Moderate assist, Assist of 2  OT - OK to Discharge: Yes  OT Assessment  OT Assessment Results: Decreased ADL status, Decreased upper extremity strength, Decreased endurance, Decreased cognition, Decreased safe judgment during ADL, Decreased functional mobility, Decreased fine motor control  Prognosis: Good  Barriers to Discharge: Inaccessible home environment, Decreased caregiver support  Evaluation/Treatment Tolerance: Patient tolerated treatment well  Medical Staff Made Aware: Yes    Subjective   Current Problem:  1. Atrial fibrillation (Multi)  Case Request Cath Lab: Arterial  Access, Intracatheter, Right Heart Cath    Case Request Cath Lab: Arterial  Access, Intracatheter, Right Heart Cath    Cardiac Catheterization Procedure    Cardiac Catheterization Procedure    Invasive vascular procedure    Invasive  vascular procedure    Lower extremity venous duplex bilateral    Lower extremity venous duplex bilateral    Case Request Invasive Vascular: Embolectomy Lower Extremity, Amputation Above Knee    Case Request Invasive Vascular: Embolectomy Lower Extremity, Amputation Above Knee    Surgical Pathology Exam    Surgical Pathology Exam    CANCELED: Vascular US lower extremity arterial duplex bilateral    CANCELED: Vascular US lower extremity arterial duplex bilateral    CANCELED: Lower extremity venous duplex bilateral    CANCELED: Lower extremity venous duplex bilateral      2. Cardiogenic shock (Multi)  Case Request Cath Lab: Arterial  Access, Intracatheter, Right Heart Cath    Case Request Cath Lab: Arterial  Access, Intracatheter, Right Heart Cath    Cardiac Catheterization Procedure    Cardiac Catheterization Procedure    Invasive vascular procedure    Invasive vascular procedure    Transthoracic Echo (TTE) Complete    Transthoracic Echo (TTE) Complete      3. Acute systolic congestive heart failure (Multi)  Case Request Cath Lab: Arterial  Access, Intracatheter, Right Heart Cath    Case Request Cath Lab: Arterial  Access, Intracatheter, Right Heart Cath    Cardiac Catheterization Procedure    Cardiac Catheterization Procedure    Invasive vascular procedure    Invasive vascular procedure    Insert arterial line    Arterial Line Insertion    Transthoracic Echo (TTE) Complete    Transthoracic Echo (TTE) Complete      4. Primary hypertension        5. Nicotine use disorder        6. Mood disorder (CMS-HCC)        7. Intracranial hemorrhage (Multi)        8. Hemiparesis affecting right side as late effect of cerebrovascular accident (Multi)        9. ETOH abuse        10. Elevated LFTs        11. Dysphagia following cerebral infarction        12. Chronic systolic heart failure (Multi)        13. Voice disorder        14. Change in voice        15. Obesity, Class I, BMI 30-34.9        16. Mural thrombus of left atrium         17. Laryngeal stenosis        18. Laryngeal mass        19. History of stroke        20. Central pain syndrome        21. Aphasia due to late effects of cerebrovascular disease        22. Encounter for medical examination to establish care        23. Acute decompensated heart failure (Multi)  sacubitriL-valsartan (Entresto) 24-26 mg tablet      24. 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 Edema leg  Lower extremity venous duplex bilateral    Lower extremity venous duplex bilateral    CANCELED: Lower extremity venous duplex bilateral    CANCELED: Lower extremity venous duplex bilateral      26. Left arm swelling  Vascular US upper extremity venous duplex left    Vascular US upper extremity venous duplex left    CANCELED: Vascular US upper extremity venous duplex left    CANCELED: Vascular US upper extremity venous duplex left    CANCELED: Vascular US upper extremity venous duplex left    CANCELED: Vascular US upper extremity venous duplex left    CANCELED: Vascular US upper extremity arterial duplex left with OCTAVIO    CANCELED: Vascular US upper extremity arterial duplex left with OCTAVIO      27. PAD (peripheral artery disease) (CMS-HCC)  CANCELED: Vascular US upper extremity arterial duplex left with OCTAVIO    CANCELED: Vascular US upper extremity arterial duplex left with OCTAVIO      28. Acute lower extremity ischemia  CANCELED: Case Request Operating Room: Amputation Above Knee, Embolectomy Lower Extremity    CANCELED: Case Request Operating Room: Amputation Above Knee, Embolectomy Lower Extremity    CANCELED: Case Request Operating Room: Revision Stump Leg    CANCELED: Case Request Operating Room: Revision Stump Leg        General:  Reason for  Referral: 42 y/o female initially presented with cardiogenic shock; Course c/b SARAH, cardiac thrombi, and Afib with RVR. Now s/p R AKA on 6/28.  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, T2DM and HTN  Co-Treatment: PT  Co-Treatment Reason: To maximize pt. safety and therapeutic potential.  Prior to Session Communication: Bedside nurse  Patient Position Received: Bed, 3 rail up, Alarm on (Sitter present)  General Comment: Pt is pleasant and cooperative. Aphasic and difficulty with word finding at times. Able to follow commands and fully participate with session. R residual stump wrapped. Observed minimal strike-through bleeding posterior dressing at end of session. RN aware.   Precautions:  LE Weight Bearing Status: Right Non-Weight Bearing  Medical Precautions: Cardiac precautions, Fall precautions  Vital Signs:  Heart Rate: 92 (99)  SpO2: 100 % (100)  BP: 106/61 (108/69)  MAP (mmHg): 75 (82)  BP Location: Right arm  BP Method: Automatic  Pain:  Pain Assessment  0-10 (Numeric) Pain Score: 0 - No pain  Lines/Tubes/Drains:  Urethral Catheter (Active)   Number of days: 1         Objective   Cognition:  Overall Cognitive Status: Impaired at baseline  Arousal/Alertness: Appropriate responses to stimuli  Orientation Level:  (Difficult to fully assess d/t aphasia. Able to identify self, birth date, location, and date with cues and time.)  Following Commands: Follows one step commands with increased time  Safety/Judgement:  (Reports fall overnight when trying to get OOB. Now has sitter present. Emphasized importance of calling for assist if she needs to get up.)           Home  Living:  Type of Home: House  Lives With: Alone  Home Adaptive Equipment: Walker rolling or standard  Home Living Comments: Info obtained from chart.   Prior Function:  Level of [place]: Independent with ADLs and functional transfers  ADL Assistance: Independent  Homemaking Assistance: Independent  Ambulatory Assistance: Independent  Prior Function Comments: Per EMR  IADL History:     ADL:  Grooming Assistance: Minimal (Anticipate)  Bathing Assistance: Maximal  UE Dressing Assistance: Minimal  LE Dressing Assistance: Maximal  LE Dressing Deficit: Don/doff L sock  Toileting Assistance with Device: Maximal  Activity Tolerance:  Endurance: Decreased tolerance for upright activites  Early Mobility/Exercise Safety Screen: Proceed with mobilization - No exclusion criteria met  Activity Tolerance Comments: NWB RLE  Balance:  Static Sitting Balance  Static Sitting-Level of Assistance: Contact guard, Minimum assistance  Static Sitting-Comment/Number of Minutes: Approx 4min EOB sit. Retropulsive at times.  Static Standing Balance  Static Standing-Level of Assistance: Minimum assistance (x2)  Static Standing-Comment/Number of Minutes: Approx 2min. Cues for posture throughout.  Bed Mobility/Transfers: Bed Mobility  Bed Mobility: Yes  Bed Mobility 1  Bed Mobility 1: Supine to sitting  Level of Assistance 1: Moderate assistance, +2  Bed Mobility Comments 1: Increased time and cues for technique to advance hips.  Bed Mobility 2  Bed Mobility  2: Sitting to supine  Level of Assistance 2: Moderate assistance  Functional Mobility  Functional Mobility Performed: No   and Transfers  Transfer: Yes  Transfer 1  Transfer From 1: Sit to  Transfer to 1: Stand  Technique 1: Sit to stand, Stand to sit  Transfer Level of Assistance 1: Moderate assistance, +2, Arm in arm assistance  Trials/Comments 1: L foot/knee blocking. Cues for proper stand technique.  IADL's:      Vision:     and Vision - Complex Assessment  Ocular Range of Motion:  Within Functional Limits  Sensation:  Light Touch: No apparent deficits  Strength:  Strength Comments: RUE: 4+/5, LUE:4-/5  Perception:  Inattention/Neglect: Appears intact  Coordination:  Coordination Comment: L hand coordination slightly impaired and weak as compared to R.   Hand Function:     Extremities:  ,  ,  , and      Outcome Measures: [facility] Daily Activity  Putting on and taking off regular lower body clothing: A lot  Bathing (including washing, rinsing, drying): A lot  Putting on and taking off regular upper body clothing: A lot  Toileting, which includes using toilet, bedpan or urinal: A lot  Taking care of personal grooming such as brushing teeth: A little  Eating Meals: A little  Daily Activity - Total Score: 14        ICU Mobility Screen  Early Mobility/Exercise Safety Screen: Proceed with mobilization - No exclusion criteria met,          Education Documentation  Precautions, taught by Naheed Parrish OT at 7/3/2024  2:10 PM.  Learner: Patient  Readiness: Acceptance  Method: Explanation  Response: Needs Reinforcement    Body Mechanics, taught by Naheed Parrish OT at 7/3/2024  2:10 PM.  Learner: Patient  Readiness: Acceptance  Method: Explanation  Response: Needs Reinforcement    ADL Training, taught by Naheed Parrish OT at 7/3/2024  2:10 PM.  Learner: Patient  Readiness: Acceptance  Method: Explanation  Response: Needs Reinforcement    Education Comments  No comments found.        Goals:     Encounter Problems       Encounter Problems (Active)       ADLs       Patient will complete lower body dressing with MIN A  for donning and doffing all LE clothes in order to increase Indep with task participation.        Start:  07/03/24    Expected End:  07/24/24            Patient will complete toileting, including clothing management and hygiene, with MIN A in order to maximize functional Indep with task completion.        Start:  07/03/24    Expected End:  07/24/24               BALANCE       Pt will  increase static/dynamic sit/stand to Good to increase safety and indep with functional task completion.         Start:  07/03/24    Expected End:  07/24/24               COGNITION/SAFETY       Pt will demo good safety awareness with no more than min vc during functional task completion.       Start:  07/03/24    Expected End:  07/24/24               EXERCISE/STRENGTHENING       Pt will increase overall LUE strength to 4+/5 in order to increase functional task participation.        Start:  07/03/24    Expected End:  07/24/24            Pt will demo increased LUE fine motor/bimanual coordination in order to achieve MOD I with functional task completion.        Start:  07/03/24    Expected End:  07/24/24               TRANSFERS       Patient will complete functional transfers using least restrictive device with  CGA  in order to maximize functional potential and increase safety.        Start:  07/03/24    Expected End:  07/24/24 07/03/24 at 2:11 PM   Naheed Parrish, OT   Rehab Office: [phone]

## 2024-07-03 NOTE — PROGRESS NOTES
Physical Therapy    Physical Therapy Re-Evaluation    Patient Name: Amirah Bennett  MRN: [id number]  Today's Date: 7/3/2024   Time Calculation  Start Time: 1028  Stop Time: 1048  Time Calculation (min): 20 min    Assessment/Plan   PT Assessment  PT Assessment Results: Decreased strength, Decreased range of motion, Decreased endurance, Impaired balance, Decreased mobility, Pain, Decreased cognition, Decreased safety awareness  Rehab Prognosis: Fair  Barriers to Discharge: none  Evaluation/Treatment Tolerance: Patient tolerated treatment well  Medical Staff Made Aware: Yes  Strengths: Premorbid level of function  Barriers to Participation: Insight into problems  End of Session Communication: Bedside nurse    Assessment Comment: Pt. is a 43 yof that presents with cognitive impairments and decreased safety awareness, increased R LE pain, decreased functional strength, decreased activity tolerance/endurance, impaired balance, and increased difficulty with functional mobility compared to baseline level of function. Pt. will benefit from skilled PT intervention while inpatient to address the above deficits and maximize return to PLOF. Pt will benefit from MOD intensity PT upon discharge.     End of Session Patient Position: Bed, 3 rail up, Alarm on (Sitter at bedside)    IP OR SWING BED PT PLAN  Inpatient or Swing Bed: Inpatient  PT Plan  Treatment/Interventions: Bed mobility, Transfer training, Gait training, Balance training, Strengthening, Endurance training, Range of motion, Therapeutic exercise, Therapeutic activity, Home exercise program, Positioning  PT Plan: Ongoing PT  PT Frequency: 3 times per week  PT Discharge Recommendations: Moderate intensity level of continued care  PT Recommended Transfer Status: Assist x2  PT - OK to Discharge: Yes      Subjective   General Visit Information:  General  Reason for Referral: Pt. initially presented with cardiogenic shock; Course c/b SARAH, cardiac thrombi, and Afib with RVR. *PT  "Re-eval as pt. now s/p R AKA on 6/28  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, T2DM and HTN  Missed Visit: No  Co-Treatment: OT  Co-Treatment Reason: To maximize pt. safety and therapeutic potential.  Prior to Session Communication: Bedside nurse  Patient Position Received: Bed, 3 rail up, Alarm on (Sitter at bedside)  Preferred Learning Style: auditory, verbal  General Comment: Pt. received supine in bed, agreeable to participate in session. Per EMR, pt. with fall OOB on 7/2, landing on R AKA. \"Vascular surgery was notified.  Additional imaging of right stump was deferred.  CT head was obtained and it showed no acute intracranial pathology\" (Notified RN at end of session that R AKA dressing observed with increased strike through bleeding compared to start of session)    Home Living:  Home Living  Type of Home: House  Lives With: Alone  Home Adaptive Equipment: Walker rolling or standard  Home Layout:  (Patient stated there were stairs with railings)  Home Living Comments: Further home set up limited 2/2 expressive deficits.    Prior Level of Function:  Prior Function Per Pt/Caregiver Report  Level of [place]: Independent with ADLs and functional transfers  ADL Assistance: Independent  Ambulatory Assistance: Independent (with FWW, denied falls)  Leisure: Going out    Precautions:  Precautions  LE Weight Bearing Status: Right Non-Weight Bearing  Medical Precautions: Cardiac precautions, Fall precautions    Vital Signs:  Vital Signs  Heart Rate: 91 (post: 100)  Heart Rate Source: Monitor  Resp: 17  SpO2: 100 %  BP: 106/61 (post: 108/69)  MAP (mmHg): 75  BP Location: " "Right arm  BP Method: Automatic  Patient Position: Lying    Objective   Pain:  Pain Assessment  Pain Assessment: 0-10  0-10 (Numeric) Pain Score:  (unrated 2/2 expressive deficits)    Cognition:  Cognition  Overall Cognitive Status: Impaired at baseline  Arousal/Alertness: Appropriate responses to stimuli  Orientation Level:  (With increased time and choices, pt. able to identify self, date, and \"[facility]\". Pt. with expressive deficits at baseline)  Following Commands: Follows one step commands with increased time    General Assessments:  Activity Tolerance  Endurance: Tolerates 10 - 20 min exercise with multiple rests  Early Mobility/Exercise Safety Screen: Proceed with mobilization - No exclusion criteria met    Sensation  Light Touch: Not tested    Static Sitting Balance  Static Sitting-Comment/Number of Minutes: CGA  Dynamic Sitting Balance  Dynamic Sitting-Comments: Zak    Static Standing Balance  Static Standing-Comment/Number of Minutes: minAx2, pt. stood statically on L LE ~1 min    Functional Assessments:  Bed Mobility  Bed Mobility: Yes  Bed Mobility 1  Bed Mobility 1: Supine to sitting  Level of Assistance 1: Moderate assistance, +2  Bed Mobility Comments 1: HOB elevated, cues for sequencing. Increased assist to advance hips towards EOB  Bed Mobility 2  Bed Mobility  2: Sitting to supine  Level of Assistance 2: Moderate assistance (x1)  Bed Mobility Comments 2: Assist to elevate L LE onto bed and to lower trunk in controlled manner  Bed Mobility 3  Bed Mobility 3: Scooting  Level of Assistance 3: Dependent, +2  Bed Mobility Comments 3: boost, draw sheet    Transfers  Transfer: Yes  Transfer 1  Transfer From 1: Sit to, Stand to  Transfer to 1: Stand, Sit  Technique 1: Sit to stand, Stand to sit  Transfer Device 1:  (jadiel arm in arm assist)  Transfer Level of Assistance 1: Moderate assistance, Moderate verbal cues, +2  Trials/Comments 1: L foot/knee block, draw sheet to assist with hip " extension    Ambulation/Gait Training  Ambulation/Gait Training Performed: No    Stairs  Stairs: No    Extremity/Trunk Assessments:  RLE   RLE : Exceptions to WFL  AROM RLE (degrees)  RLE AROM Comment: R AKA, demos increased R hip flexion at rest  Strength RLE  RLE Overall Strength:  (R hip flexion >/= 3+/5 based on antigravity AROM)  LLE   LLE : Within Functional Limits    Outcome Measures:  [facility] Basic Mobility  Turning from your back to your side while in a flat bed without using bedrails: A lot  Moving from lying on your back to sitting on the side of a flat bed without using bedrails: A lot  Moving to and from bed to chair (including a wheelchair): Total  Standing up from a chair using your arms (e.g. wheelchair or bedside chair): Total  To walk in hospital room: Total  Climbing 3-5 steps with railing: Total  Basic Mobility - Total Score: 8    FSS-ICU  Ambulation: Unable to attempt due to weakness  Rolling: Moderate assistance (performs 50 - 74% of task)  Sitting: Minimal assistance (performs 75% or more of task)  Transfer Sit-to-Stand: Total assistance (performs 25% or requires another person)  Transfer Supine-to-Sit: Maximal assistance (performs 25% - 49% of task)  Total Score: 10      Early Mobility/Exercise Safety Screen: Proceed with mobilization - No exclusion criteria met  ICU Mobility Scale: Standing [4]  E = Exercise and Early Mobility  Early Mobility/Exercise Safety Screen: Proceed with mobilization - No exclusion criteria met  ICU Mobility Scale: Standing    Encounter Problems       Encounter Problems (Active)       Balance       Patient will complete static and dynamic sitting balance tasks with SUP to improve upright posture, core activation, and proximal stability.        Start:  07/03/24    Expected End:  07/17/24            Patient to demo static standing with unilateral UE support, performing single UE task with SBA, no sway or LOB x 2 mins for functional carryover        Start:  07/03/24     Expected End:  07/17/24               Mobility       STG - Patient will ambulate >/= 15 ft with minAx1 and LRAD, no acute LOB       Start:  07/03/24    Expected End:  07/17/24               PT Transfers       STG - Patient will perform bed mobility Ruth       Start:  07/03/24    Expected End:  07/17/24            STG - Patient will transfer sit to and from stand with CGAx1 and LRAD       Start:  07/03/24    Expected End:  07/17/24               Pain - Adult              Education Documentation  Precautions, taught by Catie Hector, PT at 7/3/2024 12:25 PM.  Learner: Patient  Readiness: Acceptance  Method: Explanation, Demonstration  Response: Needs Reinforcement    Body Mechanics, taught by Catie Hector, PT at 7/3/2024 12:25 PM.  Learner: Patient  Readiness: Acceptance  Method: Explanation, Demonstration  Response: Needs Reinforcement    Mobility Training, taught by Catie Hector, PT at 7/3/2024 12:25 PM.  Learner: Patient  Readiness: Acceptance  Method: Explanation, Demonstration  Response: Needs Reinforcement    Education Comments  No comments found.          07/03/24 at 12:29 PM - Catie Hector, PT

## 2024-07-03 NOTE — SIGNIFICANT EVENT
Called to bedside after patient had fall, landing on R AKA wound.   At bedside, dressings saturated with blood. Dressings removed, wound inspected. Incision appears intact, oozing noted from stump, with no hematoma.   Redressed wound at bedside.     Vascular surgery will continue to monitor.     Bryn West MD  PGY-1 Vascular Surgery  [phone]

## 2024-07-03 NOTE — PROGRESS NOTES
Art Therapy Note    Amirah Bennett     Therapy Session  Referral Type: New referral this admission  Visit Type: Follow-up visit  Session Start Time: 1436  Session End Time: 1516  Intervention Delivery: In-person  Family Present for Session: None  Number of staff members present: 1              Treatment/Interventions       Post-assessment  Total Session Time (min): 40 minutes    Narrative  Assessment Detail: Pt was lying in an upright position in bed and somewhat lethargic but greeted ATR. although pt is interested in painting, she stated she was in pain and was not up to actively engaging in art, so ATR offered to tape calming nature scenes in her room which pt was very thankful for, directing ATR where to place inthe room. Nurse was in and out, checking on pt's wound that had considerable seepage. Pt's pain escalated stating it ws a 50 out of 10 (10 being the worst). ATR played calming music for pt that helped minimally. ATR coached pt to take deep breaths through her nose, exhale throuh mouth and alerted nurse when pt's pain increased, pt began crying and squirming in extreme pain. Nurse attended to pt's needs and after pt was given something for pain, ATR then left and will f/u soon.  Follow-up: ATR will continue to follow pt during her admission.    Education Documentation  No documentation found.

## 2024-07-03 NOTE — PROGRESS NOTES
Amirah Bennett is a 43 y.o. female on day 13 of admission presenting with Atrial fibrillation (Multi).    Palliative Medicine following for:  Complex medical decision making, symptom management, patient/family support     History obtained from chart review including ED note, H&P, patient's daily progress notes, review of lab/test results, and discussion with primary team and bedside RN.        Subjective     History of Present Illness  Amirah Bennett is a 42 y.o. female with significant PMHx of HFrEF (LVEF 20-25% (08/2022), Afib on Xarelto w/ DCCV 05/2023), ischemic stroke L MCA d/t AFib LLA thrombus seen on echo (lack of OAC adherence) s/p thrombectomy 01/2022 @ [facility] w/ residual expressive aphasia and R sided weakness, recent 03/2024 left cerebellar MCA, s/p tracheostomy, T2DM (03/2024 HgbA1c 5.5) and HTN, presenting with bilateral leg pain.      In the ED, she was noted to be tachycardic to 105 in triage and but then when placed on the monitor was found to be in atrial fibrillation with RVR with a rate between 150 and 190. She was given 5 mg of metoprolol with slight improvement of her rate but became hypotensive and symptomatic. She was started on heparin both for her A-fib and for possible DVT/PE. Lytics were not given because of the risk of potential intracranial hemorrhage after her recent stroke. Instead decision made to cardiovert the patient and also gave digoxin, and amiodarone. After cardioversion she still looked like she was in atrial fibrillation with RVR but only to a rate in the 120s and 130s. Her blood pressure improved. Levophed ordered along with calcium and magnesium. No evidence of infection. Patient admitted to the ICU with plan to get a CT angiogram of her chest to rule out PE and RHC. Ongoing work up revealed aortic bifurcation embolus, R BA-EIA embolus, SFA and popliteal emboli. Pt with acute right lower extremity ischemia, which is not salvageable, needing inflow procedure and R AKA given new  "motor deficits.     IVC filter placed 6/27/24, open R iliofemoral thromboembolectomy and R AKA completed 6/28/24. Ongoing bleeding from stump c/b need for heparin for other clots, being managed by primary team and vascular medicine. CTA RLE 7/2 with post-surgical changes, possible vascular injury, stable H&H after 2u pRBC.     Symptoms  Pain: denies  Dyspnea: denies  Fatigue: denies  Insomnia: yes, awakens a few times each night.  Drowsiness: denies  Constipation: yes  Nausea: denies  Appetite: very good  Anxiety: denies  Depression: denies    /76   Pulse 99   Temp 35.9 °C (96.6 °F) (Temporal)   Resp 20   Ht 1.702 m (5' 7.01\")   Wt 82.5 kg (181 lb 14.1 oz)   SpO2 100%   BMI 28.48 kg/m²     Physical Exam  Constitutional:       Appearance: She is obese.   HENT:      Head: Normocephalic and atraumatic.      Nose: Nose normal.      Mouth/Throat:      Mouth: Mucous membranes are moist.      Pharynx: Oropharynx is clear.   Cardiovascular:      Rate and Rhythm: Regular rhythm with PVCs present.   Pulmonary:      Effort: Pulmonary effort is normal.      Breath sounds: CTA throughout.  Abdominal:      Palpations: Abdomen is soft. No BM for a few days, is taking BM regimen.  Musculoskeletal:      Right lower leg: AKA. bulky ace wrap/pressure dsg of stump.     Left lower leg: minimal nonpitting Edema present.      Comments: LUE bruising noted.   Skin:     General: Skin is dry. Large lue bruising.     Comments: R AKA with bulky ace wrap, D&I.  Neurological:      Mental Status: She is alert, confused to month stating June.     Comments: Expressive aphasia. Some pauses and occasional word searching.   Psychiatric:         Attention and Perception: Attention normal.         Mood and Affect: Mood is calm, smiling, happy.         Behavior: Behavior is cooperative.      Lab Results   Component Value Date    WBC 22.7 (H) 07/03/2024    HGB 8.2 (L) 07/03/2024    HCT 24.9 (L) 07/03/2024    MCV 89 07/03/2024     " 07/03/2024     Lab Results   Component Value Date    GLUCOSE 113 (H) 07/03/2024    CALCIUM 7.9 (L) 07/03/2024     (L) 07/03/2024    K 4.6 07/03/2024    CO2 24 07/03/2024     07/03/2024    BUN 11 07/03/2024    CREATININE 0.63 07/03/2024     Allergies   Allergen Reactions    Hydrocodone-Acetaminophen Rash    Ibuprofen Rash     Tolerates aspirin       Intake/Output Summary (Last 24 hours) at 7/3/2024 1205  Last data filed at 7/3/2024 1000  Gross per 24 hour   Intake 2005.87 ml   Output 2100 ml   Net -94.13 ml     Current Facility-Administered Medications   Medication Dose Route Frequency Provider Last Rate Last Admin    amiodarone (Pacerone) tablet 200 mg  200 mg oral Daily Kristine Pat MD   200 mg at 07/03/24 0900    aspirin chewable tablet 81 mg  81 mg oral Daily Kristine Pat MD   81 mg at 07/03/24 0619    bisacodyl (Dulcolax) suppository 10 mg  10 mg rectal Daily Kristine Pat MD   10 mg at 06/25/24 1006    dextrose 50 % injection 12.5 g  12.5 g intravenous q15 min PRN Kristine Pat MD   12.5 g at 06/25/24 1604    dextrose 50 % injection 25 g  25 g intravenous q15 min PRN Kristine Pat MD   25 g at 06/20/24 1237    [Held by provider] empagliflozin (Jardiance) tablet 10 mg  10 mg oral Daily Hosea Bourgeois MD   10 mg at 06/30/24 0814    glucagon (Glucagen) injection 1 mg  1 mg intramuscular q15 min PRNILESH Pat MD        glucagon (Glucagen) injection 1 mg  1 mg intramuscular q15 min PRN Kristine Pat MD        [Held by provider] heparin 25,000 Units in dextrose 5% 250 mL (100 Units/mL) infusion (premix)  0-4,500 Units/hr intravenous Continuous Kristine Pat MD   Stopped at 07/03/24 0912    heparin bolus from bag 3,000-6,000 Units  3,000-6,000 Units intravenous q4h PRN Kristine Pat MD   3,000 Units at 07/01/24 1414    HYDROmorphone (Dilaudid) injection 0.4 mg  0.4 mg intravenous q3h PRN Galdino Boles MD   0.4 mg at 07/03/24 1115    lidocaine (Xylocaine) 5 % ointment    Topical PRN Kristine Pat MD        melatonin tablet 6 mg  6 mg oral Nightly Kristine Pat MD   6 mg at 07/02/24 2057    oxyCODONE (Roxicodone) immediate release tablet 10 mg  10 mg oral q3h PRN Galdino Boles MD   10 mg at 07/02/24 2057    oxygen (O2) therapy   inhalation Continuous - Inhalation Kristine Pat MD   1 L/min at 06/29/24 0800    pantoprazole (ProtoNix) EC tablet 40 mg  40 mg oral Daily before breakfast Sapna Jordan MD   40 mg at 07/03/24 0619    Or    pantoprazole (ProtoNix) injection 40 mg  40 mg intravenous Daily before breakfast Sapna Jordan MD        perflutren protein A microsphere (Optison) injection 0.5 mL  0.5 mL intravenous Once in imaging Kristine Pat MD        polyethylene glycol (Glycolax, Miralax) packet 17 g  17 g oral BID Kristine Pat MD   17 g at 07/03/24 0900    sacubitriL-valsartan (Entresto) 24-26 mg per tablet 1 tablet  1 tablet oral BID Galdino Boles MD   1 tablet at 07/03/24 1142    sennosides (Senokot) tablet 17.2 mg  2 tablet oral BID Kristine Pat MD   17.2 mg at 07/03/24 0900    sodium zirconium cyclosilicate (Lokelma) packet 10 g  10 g oral Once Britany Luevano MD        sulfur hexafluoride microsphr (Lumason) injection 24.28 mg  2 mL intravenous Once in imaging Kristine Pat MD         Assessment/Plan   History of Present Illness  Amirah Bennett is a 42 y.o. female with significant PMHx of HFrEF (LVEF 20-25% (08/2022), Afib on Xarelto w/ DCCV 05/2023), ischemic stroke L MCA d/t AFib LLA thrombus seen on echo (lack of OAC adherence) s/p thrombectomy 01/2022 @ [facility] w/ residual expressive aphasia and R sided weakness, recent 03/2024 left cerebellar MCA, s/p tracheostomy, T2DM (03/2024 HgbA1c 5.5) and HTN presenting with bilateral leg pain.      In the ED, she was noted to be tachycardic to 105 in triage and but then when placed on the monitor was found to be in atrial fibrillation with RVR with a rate between 150 and 190. She was given 5 mg of metoprolol with  "slight improvement of her rate but became hypotensive and symptomatic. She was started on heparin both for her A-fib and for possible DVT/PE. Lytics were not given because of the risk of potential intracranial hemorrhage after her recent stroke. Instead decision made to cardiovert the patient and also gave digoxin, and amiodarone. After cardioversion she still looked like she was in atrial fibrillation with RVR but only to a rate in the 120s and 130s. Her blood pressure improved. Levophed ordered along with calcium and magnesium. No evidence of infection. Patient admitted to the ICU with plan to get a CT angiogram of her chest to rule out PE and RHC. Ongoing work up revealed aortic bifurcation embolus, R BA-EIA embolus, SFA and popliteal emboli. Pt with acute right lower extremity ischemia, which is not salvageable, needing inflow procedure and R AKA given new motor deficits.    IVC filter placed 6/27/24, open R iliofemoral thromboembolectomy and R AKA completed 6/28/24. Ongoing bleeding from stump c/b need for heparin for other clots, being managed by primary team and vascular medicine. CTA RLE 7/2 with post-surgical changes, possible vascular injury, stable H&H after 2u pRBC.     6/21: Palliative consulted for pt support and GOC.       6/25: Palliative met for a family and care team meeting in pt's room. Discussed current findings via imaging/scans and labs, current complications, cardiac and circulation risks of sx and not doing sx. Pt wishes to move forward with sx. NOK/surrogates agreeable to pt's wishes. Needing cardiac optimization prior to sx. No current infection present.   Pt elects cousins Felicitas and Keil as surrogate decision makers. Keli mentions a recent MPOA completed and will bring in.      6/26: Possible plan for sx today? Palliative rounded on pt for questions/clarifications, positive presence/support, and symptom management. Pt still choosing sx to support her goal of \"keep going\". Pt " speaking with evident anxiety. Reviewed anxiety reduction strategies. See recs below.     6/27: Pt calm today, feeling better sx was not yesterday and has had time to process. Tentative IVC filter today with amputation and thrombectomy tomorrow. Discussed pain, see recs below. Continued emotional support.    7/3: Rounded with pt and nursing. Pt happy, calm, cooperative, in no pain. Pt feeling happy with her decision that she went through with the amputation. Pointed out and happy with art and music therapies. Endorsed some issues with staying asleep, see recs below.     Palliative Performance Scale (PPS): 30     ----------------------------------------------------------------------------------------------------------------------------------------------------------------------------------------------------------------------------------------------------------------------------------------------------------------------------------------------------------------------        I spent  minutes in providing separately identifiable ACP services with the patient and/or surrogate decision maker in a voluntary conversation discussing the patient's wishes and goals as detailed in the above note.   ----------------------------------------------------------------------------------------------------------------------------------------------------------------------------------------------------------------------------------------------------------------------------------------------------------------------------------------------------------------------     #Complex Medical Decision Making  #Goals of Care  #Advanced Care Planning  - Code status: Full code  - Surrogate decision maker: Keli Osborne (sarai) [phone]. Clara Black (sarai) [phone].  - Goals are survival and improved quality of life.   6/21: Pt with notable expressive aphasia making open ended questioning difficult. Pt able to answer yes and no questions  "more easily. Pt demonstrating understanding/comprehension of questions asked. Pt agrees that Navin is MPOA and papers are signed. She is upset with him today but is unable to verbalize why. Palliative recapped medication nonadherence and pt agreeable that she stops taking them when she is depressed. When pt asked why she stops taking her medicine, pt starting spelling out her name and saying nonsensical words. Palliative expressed a worry that her depression is causing her to not to want to continue to live. Palliative asked is that is correct, pt stated \"no\". Palliative asked pt earlier about her goals, she stated \"to live\" and \"I can make it\". Pt was unable to articulate any further explanation of her statements.   Pt was asked if her breathing were to be compromised and she needed intubated again (trached) would she want that? Pt hesitated but stated \"yes\".      Palliative attempted to reach out to Navin x3. First call he answered, and said he was on the other line with a doctor and to call back at 1230. Palliative did and his phone went to . Gia called back around 1500 and again, right to . iGa then called Tom Bean to see if Navin has another number, and Tom Bean's went right to . Palliative left a message with call back number for Navin and Pranay.      Will attempt to discuss GOC with returned call from Navin. Otherwise Palliative can follow up Monday or Tuesday.      6/25: Palliative met for a family and care team meeting in pt's room. Those involved included Dr. Simon, Dr. Pat, Dannielle NP, Jennifer Palliative [place], pt, and cousins Guillermoerine, and second cousin Marianela. Discussed current findings via imaging/scans and labs, current clot complications, and cardiac and circulation standpoints. Pt now requiring right AKA for loss of circulation. Vascular explained risks of AKA sx and not doing sx. Pt wishes to move forward with sx. NOK/new surrogates (Clara and Keli) agreeable to pt's wishes. Vascular " "and primary team expressed needing cardiac optimization prior to sx. Pt does not currently have an active infection, making this a non-urgent decision. Potential sx in the next few days.   Pt elects cousins Clara and Keli as surrogate decision makers vs Naivn, and both Felicitas and Keli expressing the want and willingness to fulfil that position. Keli mentions a recent MPOA was completed with pt and signed by a notary. Keli will bring in.   Palliative reiterated and realigned heard goals of continuing to do everything we are currently doing as life and continued aggressive measures are paramount. Pt and family state \"yes\".         #Cardiogenic shock  #Acute SARAH  #Shock Liver  - Per primary team  - Kidney and liver levels with continued improvements.   - Pt with multiple admissions r/t medication nonadherence d/t pt stated depression. Recommend Psychiatry consult.        #Acute Pain  - Is s/p Right AKA with thrombectomy. Pt reporting no RLE/stump, LLE, or LUE pain at this time, but dressing changes are very painful.   - Has been getting  Hydromorphone 0.4mg Q3H PRN BTP/prior to dressing changes (3 doses in the last 24 hours).  On Oxycodone 10mg Q3H PRN pain (1 dose given).   - Holding off on hepatotoxic meds (Acetaminophen) ISO acute liver injury, although improving.        #Acute delirium  - Has sitter at bedside following a fall 7/2 which caused additional acute bleeding of the stump site.  - Bed alarm  - Family presence  - Familiar home objects near and insight  - If possible, move pt to a room with a window.  - Continued treatment of any of the following that apply: Pain, infection, nutrition, constipation, hydration, medications, electrolytes.   Delirium Guidelines  Provide glasses, hearing aids and/or communication boards as needed for impairments  Frequent reorientation, minimize room and staff changes  Open blinds during the day, dark/quiet room at night   Minimal interruptions and daytime " naps  Early evaluation and intervention by PT, out of bed as tolerated  Minimize use of restraints   Minimize use of benzodiazepines, anticholinergic medications, and opiates (while ensuring adequate treatment of pain)  Keep Mg>2, K>4 (as able)  Ensure regular bowel and bladder function (as able)       #Depression  - Not currently on antidepressant but home med list notable for Quetiapine 50mg at bedtime. (Not currently taking in pt.) Unclear history. Recommend Psychiatry consult.        #Anxiety  #Insomnia  - Denies anxiety today  - Endorsed no issues falling asleep but awakens around 2, 3, or 4 am about every hour.   - Is able to fall asleep again with deep breathing and self calming.  - Ongoing encouragement of calming modalities such as distraction, prayer, positive self talk/thoughts, and meditation.  - On Melatonin 6mg PO at 2100. Recommend dosing at 1800 and an additional 6mg dosing at 2200 to assist with staying asleep.   - Please consider Psychiatry consult for underlying depression and to assist with anticipated ongoing anxieties. C/f vicious cycle of ongoing depressive symptoms and medication non-adherence ISO worsened health and possible QOL. [place] to establish in pt and out patient plan and follow up.        #Psychosocial Support  - Ongoing pt and family support, positive presence and emotional support  - Music Therapy  - Spiritual Care Support     Plan of Care discussed with: Updated primary team and bedside RN on goals of care decision, medication adjustments, and code status      Medical Decision Making was high level due to high complexity of problems, extensive data review, and high risk of management/treatment.     - Cardiogenic shock, shock liver, acute kidney injury requiring inotrope cardiac support, multiple emboli and acute loss of right lower extremity perfusion requiring AKA, worsening leukocytosis, s/p right AKA c/b post op bleeding and fall,  posing threat to life and function.  -  Reviewed external notes from   - Reviews results from  which were used in decision making for   - Recommended the following tests:   - Assessment required independent historian: nursing and primary team  - Independent interpretation of test: labs   - Discussion of management with: nursing, primary.   - Drug therapy requiring intensive monitoring for toxicity: monitor renal status with meds/HF, mag level, potassium.  - Decision regarding elective major surgery with identified patient or procedure risk factors:   - Decision regarding emergency major surgery:   - Decision regarding hospitalization or escalation of hospital-level care:   - Decision not to resuscitate or to de-escalate care because of poor prognosis:   - Parenteral controlled substances: heparin, dilaudid,      Thank you for allowing us to participate in the care of this patient. Palliative will continue to follow as needed. Palliative medicine is available Monday-Friday, 8a-6p. Please contact team with any questions or concerns.  Team pager [phone] (weekdays)    JAE Evangelista-CNP

## 2024-07-03 NOTE — SIGNIFICANT EVENT
Patient attempted to get out of bed and ended up falling on the floor on her legs and buttocks. Did not hit her head. Staff was able to transfer patient back to bed. Head CT was obtained. NOK/Family (cousin) Wayne Elizabeth was notified about the incident.     Young Perez MD

## 2024-07-04 LAB
ABO GROUP (TYPE) IN BLOOD: NORMAL
ALBUMIN SERPL BCP-MCNC: 2.1 G/DL (ref 3.4–5)
ALBUMIN SERPL BCP-MCNC: 2.2 G/DL (ref 3.4–5)
ALP SERPL-CCNC: 57 U/L (ref 33–110)
ALT SERPL W P-5'-P-CCNC: 41 U/L (ref 7–45)
ANION GAP SERPL CALC-SCNC: 13 MMOL/L (ref 10–20)
ANION GAP SERPL CALC-SCNC: 13 MMOL/L (ref 10–20)
ANTIBODY SCREEN: NORMAL
AST SERPL W P-5'-P-CCNC: 33 U/L (ref 9–39)
BASOPHILS # BLD AUTO: 0.03 X10*3/UL (ref 0–0.1)
BASOPHILS # BLD AUTO: 0.03 X10*3/UL (ref 0–0.1)
BASOPHILS # BLD AUTO: 0.04 X10*3/UL (ref 0–0.1)
BASOPHILS # BLD AUTO: 0.04 X10*3/UL (ref 0–0.1)
BASOPHILS NFR BLD AUTO: 0.2 %
BILIRUB DIRECT SERPL-MCNC: 1.4 MG/DL (ref 0–0.3)
BILIRUB SERPL-MCNC: 2.7 MG/DL (ref 0–1.2)
BLOOD EXPIRATION DATE: NORMAL
BUN SERPL-MCNC: 10 MG/DL (ref 6–23)
BUN SERPL-MCNC: 10 MG/DL (ref 6–23)
CALCIUM SERPL-MCNC: 6.6 MG/DL (ref 8.6–10.6)
CALCIUM SERPL-MCNC: 7.5 MG/DL (ref 8.6–10.6)
CHLORIDE SERPL-SCNC: 103 MMOL/L (ref 98–107)
CHLORIDE SERPL-SCNC: 105 MMOL/L (ref 98–107)
CO2 SERPL-SCNC: 19 MMOL/L (ref 21–32)
CO2 SERPL-SCNC: 20 MMOL/L (ref 21–32)
CREAT SERPL-MCNC: 0.54 MG/DL (ref 0.5–1.05)
CREAT SERPL-MCNC: 0.56 MG/DL (ref 0.5–1.05)
DISPENSE STATUS: NORMAL
EGFRCR SERPLBLD CKD-EPI 2021: >90 ML/MIN/1.73M*2
EGFRCR SERPLBLD CKD-EPI 2021: >90 ML/MIN/1.73M*2
EOSINOPHIL # BLD AUTO: 0.05 X10*3/UL (ref 0–0.7)
EOSINOPHIL # BLD AUTO: 0.1 X10*3/UL (ref 0–0.7)
EOSINOPHIL # BLD AUTO: 0.13 X10*3/UL (ref 0–0.7)
EOSINOPHIL # BLD AUTO: 0.17 X10*3/UL (ref 0–0.7)
EOSINOPHIL NFR BLD AUTO: 0.2 %
EOSINOPHIL NFR BLD AUTO: 0.5 %
EOSINOPHIL NFR BLD AUTO: 0.8 %
EOSINOPHIL NFR BLD AUTO: 0.9 %
ERYTHROCYTE [DISTWIDTH] IN BLOOD BY AUTOMATED COUNT: 16.6 % (ref 11.5–14.5)
ERYTHROCYTE [DISTWIDTH] IN BLOOD BY AUTOMATED COUNT: 16.7 % (ref 11.5–14.5)
ERYTHROCYTE [DISTWIDTH] IN BLOOD BY AUTOMATED COUNT: 16.9 % (ref 11.5–14.5)
ERYTHROCYTE [DISTWIDTH] IN BLOOD BY AUTOMATED COUNT: 17.5 % (ref 11.5–14.5)
GLUCOSE BLD MANUAL STRIP-MCNC: 128 MG/DL (ref 74–99)
GLUCOSE BLD MANUAL STRIP-MCNC: 128 MG/DL (ref 74–99)
GLUCOSE BLD MANUAL STRIP-MCNC: 138 MG/DL (ref 74–99)
GLUCOSE BLD MANUAL STRIP-MCNC: 148 MG/DL (ref 74–99)
GLUCOSE SERPL-MCNC: 117 MG/DL (ref 74–99)
GLUCOSE SERPL-MCNC: 123 MG/DL (ref 74–99)
HCT VFR BLD AUTO: 20.9 % (ref 36–46)
HCT VFR BLD AUTO: 21.2 % (ref 36–46)
HCT VFR BLD AUTO: 22.2 % (ref 36–46)
HCT VFR BLD AUTO: 25.1 % (ref 36–46)
HGB BLD-MCNC: 6.9 G/DL (ref 12–16)
HGB BLD-MCNC: 7.3 G/DL (ref 12–16)
HGB BLD-MCNC: 7.4 G/DL (ref 12–16)
HGB BLD-MCNC: 8.1 G/DL (ref 12–16)
IMM GRANULOCYTES # BLD AUTO: 0.41 X10*3/UL (ref 0–0.7)
IMM GRANULOCYTES # BLD AUTO: 0.44 X10*3/UL (ref 0–0.7)
IMM GRANULOCYTES # BLD AUTO: 0.44 X10*3/UL (ref 0–0.7)
IMM GRANULOCYTES # BLD AUTO: 0.52 X10*3/UL (ref 0–0.7)
IMM GRANULOCYTES NFR BLD AUTO: 1.9 % (ref 0–0.9)
IMM GRANULOCYTES NFR BLD AUTO: 2.1 % (ref 0–0.9)
IMM GRANULOCYTES NFR BLD AUTO: 2.8 % (ref 0–0.9)
IMM GRANULOCYTES NFR BLD AUTO: 2.8 % (ref 0–0.9)
LYMPHOCYTES # BLD AUTO: 1.89 X10*3/UL (ref 1.2–4.8)
LYMPHOCYTES # BLD AUTO: 2.57 X10*3/UL (ref 1.2–4.8)
LYMPHOCYTES # BLD AUTO: 3.16 X10*3/UL (ref 1.2–4.8)
LYMPHOCYTES # BLD AUTO: 3.16 X10*3/UL (ref 1.2–4.8)
LYMPHOCYTES NFR BLD AUTO: 12 %
LYMPHOCYTES NFR BLD AUTO: 13.7 %
LYMPHOCYTES NFR BLD AUTO: 14.6 %
LYMPHOCYTES NFR BLD AUTO: 15.4 %
MAGNESIUM SERPL-MCNC: 1.67 MG/DL (ref 1.6–2.4)
MAGNESIUM SERPL-MCNC: 1.67 MG/DL (ref 1.6–2.4)
MCH RBC QN AUTO: 29.7 PG (ref 26–34)
MCH RBC QN AUTO: 29.8 PG (ref 26–34)
MCH RBC QN AUTO: 29.9 PG (ref 26–34)
MCH RBC QN AUTO: 30 PG (ref 26–34)
MCHC RBC AUTO-ENTMCNC: 31.1 G/DL (ref 32–36)
MCHC RBC AUTO-ENTMCNC: 32.3 G/DL (ref 32–36)
MCHC RBC AUTO-ENTMCNC: 34.4 G/DL (ref 32–36)
MCHC RBC AUTO-ENTMCNC: 35.4 G/DL (ref 32–36)
MCV RBC AUTO: 84 FL (ref 80–100)
MCV RBC AUTO: 87 FL (ref 80–100)
MCV RBC AUTO: 93 FL (ref 80–100)
MCV RBC AUTO: 96 FL (ref 80–100)
MONOCYTES # BLD AUTO: 1.54 X10*3/UL (ref 0.1–1)
MONOCYTES # BLD AUTO: 1.69 X10*3/UL (ref 0.1–1)
MONOCYTES # BLD AUTO: 1.91 X10*3/UL (ref 0.1–1)
MONOCYTES # BLD AUTO: 2.1 X10*3/UL (ref 0.1–1)
MONOCYTES NFR BLD AUTO: 9 %
MONOCYTES NFR BLD AUTO: 9.3 %
MONOCYTES NFR BLD AUTO: 9.7 %
MONOCYTES NFR BLD AUTO: 9.8 %
NEUTROPHILS # BLD AUTO: 11.73 X10*3/UL (ref 1.2–7.7)
NEUTROPHILS # BLD AUTO: 13.72 X10*3/UL (ref 1.2–7.7)
NEUTROPHILS # BLD AUTO: 14.88 X10*3/UL (ref 1.2–7.7)
NEUTROPHILS # BLD AUTO: 15.87 X10*3/UL (ref 1.2–7.7)
NEUTROPHILS NFR BLD AUTO: 72.5 %
NEUTROPHILS NFR BLD AUTO: 73.4 %
NEUTROPHILS NFR BLD AUTO: 73.4 %
NEUTROPHILS NFR BLD AUTO: 74.4 %
NRBC BLD-RTO: 0.1 /100 WBCS (ref 0–0)
NRBC BLD-RTO: 0.2 /100 WBCS (ref 0–0)
PHOSPHATE SERPL-MCNC: 3.2 MG/DL (ref 2.5–4.9)
PHOSPHATE SERPL-MCNC: 3.6 MG/DL (ref 2.5–4.9)
PLATELET # BLD AUTO: 180 X10*3/UL (ref 150–450)
PLATELET # BLD AUTO: 182 X10*3/UL (ref 150–450)
PLATELET # BLD AUTO: 184 X10*3/UL (ref 150–450)
PLATELET # BLD AUTO: 190 X10*3/UL (ref 150–450)
POTASSIUM SERPL-SCNC: 4.2 MMOL/L (ref 3.5–5.3)
POTASSIUM SERPL-SCNC: 4.3 MMOL/L (ref 3.5–5.3)
PRODUCT BLOOD TYPE: 7300
PRODUCT CODE: NORMAL
PROT SERPL-MCNC: 4.7 G/DL (ref 6.4–8.2)
RBC # BLD AUTO: 2.32 X10*6/UL (ref 4–5.2)
RBC # BLD AUTO: 2.44 X10*6/UL (ref 4–5.2)
RBC # BLD AUTO: 2.48 X10*6/UL (ref 4–5.2)
RBC # BLD AUTO: 2.7 X10*6/UL (ref 4–5.2)
RH FACTOR (ANTIGEN D): NORMAL
SODIUM SERPL-SCNC: 132 MMOL/L (ref 136–145)
SODIUM SERPL-SCNC: 133 MMOL/L (ref 136–145)
STAPHYLOCOCCUS SPEC CULT: NORMAL
UFH PPP CHRO-ACNC: 0.4 IU/ML
UFH PPP CHRO-ACNC: 0.4 IU/ML
UNIT ABO: NORMAL
UNIT NUMBER: NORMAL
UNIT RH: NORMAL
UNIT VOLUME: 350
WBC # BLD AUTO: 15.8 X10*3/UL (ref 4.4–11.3)
WBC # BLD AUTO: 18.7 X10*3/UL (ref 4.4–11.3)
WBC # BLD AUTO: 20.5 X10*3/UL (ref 4.4–11.3)
WBC # BLD AUTO: 21.6 X10*3/UL (ref 4.4–11.3)
XM INTEP: NORMAL

## 2024-07-04 PROCEDURE — 2500000004 HC RX 250 GENERAL PHARMACY W/ HCPCS (ALT 636 FOR OP/ED)

## 2024-07-04 PROCEDURE — 2500000001 HC RX 250 WO HCPCS SELF ADMINISTERED DRUGS (ALT 637 FOR MEDICARE OP)

## 2024-07-04 PROCEDURE — 84100 ASSAY OF PHOSPHORUS: CPT

## 2024-07-04 PROCEDURE — 83735 ASSAY OF MAGNESIUM: CPT

## 2024-07-04 PROCEDURE — 36415 COLL VENOUS BLD VENIPUNCTURE: CPT

## 2024-07-04 PROCEDURE — 82947 ASSAY GLUCOSE BLOOD QUANT: CPT

## 2024-07-04 PROCEDURE — 85520 HEPARIN ASSAY: CPT

## 2024-07-04 PROCEDURE — 80048 BASIC METABOLIC PNL TOTAL CA: CPT

## 2024-07-04 PROCEDURE — 36430 TRANSFUSION BLD/BLD COMPNT: CPT

## 2024-07-04 PROCEDURE — 2500000002 HC RX 250 W HCPCS SELF ADMINISTERED DRUGS (ALT 637 FOR MEDICARE OP, ALT 636 FOR OP/ED)

## 2024-07-04 PROCEDURE — 99291 CRITICAL CARE FIRST HOUR: CPT

## 2024-07-04 PROCEDURE — 85025 COMPLETE CBC W/AUTO DIFF WBC: CPT

## 2024-07-04 PROCEDURE — 2020000001 HC ICU ROOM DAILY

## 2024-07-04 PROCEDURE — P9040 RBC LEUKOREDUCED IRRADIATED: HCPCS

## 2024-07-04 PROCEDURE — 86901 BLOOD TYPING SEROLOGIC RH(D): CPT

## 2024-07-04 PROCEDURE — 80069 RENAL FUNCTION PANEL: CPT | Mod: CCI

## 2024-07-04 PROCEDURE — 82248 BILIRUBIN DIRECT: CPT

## 2024-07-04 PROCEDURE — 2500000005 HC RX 250 GENERAL PHARMACY W/O HCPCS

## 2024-07-04 RX ORDER — MAGNESIUM SULFATE HEPTAHYDRATE 40 MG/ML
2 INJECTION, SOLUTION INTRAVENOUS ONCE
Status: COMPLETED | OUTPATIENT
Start: 2024-07-04 | End: 2024-07-04

## 2024-07-04 RX ORDER — MAGNESIUM SULFATE HEPTAHYDRATE 40 MG/ML
4 INJECTION, SOLUTION INTRAVENOUS ONCE
Status: COMPLETED | OUTPATIENT
Start: 2024-07-04 | End: 2024-07-05

## 2024-07-04 RX ADMIN — MAGNESIUM SULFATE HEPTAHYDRATE 4 G: 40 INJECTION, SOLUTION INTRAVENOUS at 20:44

## 2024-07-04 RX ADMIN — ASPIRIN 81 MG CHEWABLE TABLET 81 MG: 81 TABLET CHEWABLE at 06:04

## 2024-07-04 RX ADMIN — MAGNESIUM SULFATE HEPTAHYDRATE 2 G: 40 INJECTION, SOLUTION INTRAVENOUS at 11:00

## 2024-07-04 RX ADMIN — Medication 6 MG: at 20:29

## 2024-07-04 RX ADMIN — SENNOSIDES 17.2 MG: 8.6 TABLET, FILM COATED ORAL at 20:28

## 2024-07-04 RX ADMIN — AMIODARONE HYDROCHLORIDE 200 MG: 200 TABLET ORAL at 08:29

## 2024-07-04 RX ADMIN — HYDROMORPHONE HYDROCHLORIDE 0.4 MG: 1 INJECTION INTRAMUSCULAR; INTRAVENOUS; SUBCUTANEOUS at 07:40

## 2024-07-04 RX ADMIN — PANTOPRAZOLE SODIUM 40 MG: 40 TABLET, DELAYED RELEASE ORAL at 06:04

## 2024-07-04 RX ADMIN — SACUBITRIL AND VALSARTAN 1 TABLET: 24; 26 TABLET, FILM COATED ORAL at 08:30

## 2024-07-04 RX ADMIN — SENNOSIDES 17.2 MG: 8.6 TABLET, FILM COATED ORAL at 08:29

## 2024-07-04 RX ADMIN — HEPARIN SODIUM 1600 UNITS/HR: 10000 INJECTION, SOLUTION INTRAVENOUS at 11:59

## 2024-07-04 RX ADMIN — POLYETHYLENE GLYCOL 3350 17 G: 17 POWDER, FOR SOLUTION ORAL at 20:29

## 2024-07-04 RX ADMIN — OXYCODONE HYDROCHLORIDE 10 MG: 5 TABLET ORAL at 16:21

## 2024-07-04 RX ADMIN — Medication 21 PERCENT: at 08:00

## 2024-07-04 RX ADMIN — SACUBITRIL AND VALSARTAN 1 TABLET: 24; 26 TABLET, FILM COATED ORAL at 20:29

## 2024-07-04 RX ADMIN — OXYCODONE HYDROCHLORIDE 10 MG: 5 TABLET ORAL at 10:10

## 2024-07-04 ASSESSMENT — PAIN - FUNCTIONAL ASSESSMENT
PAIN_FUNCTIONAL_ASSESSMENT: 0-10

## 2024-07-04 ASSESSMENT — PAIN SCALES - GENERAL
PAINLEVEL_OUTOF10: 10 - WORST POSSIBLE PAIN
PAINLEVEL_OUTOF10: 10 - WORST POSSIBLE PAIN
PAINLEVEL_OUTOF10: 0 - NO PAIN
PAINLEVEL_OUTOF10: 0 - NO PAIN
PAINLEVEL_OUTOF10: 10 - WORST POSSIBLE PAIN
PAINLEVEL_OUTOF10: 5 - MODERATE PAIN
PAINLEVEL_OUTOF10: 10 - WORST POSSIBLE PAIN
PAINLEVEL_OUTOF10: 0 - NO PAIN
PAINLEVEL_OUTOF10: 0 - NO PAIN
PAINLEVEL_OUTOF10: 10 - WORST POSSIBLE PAIN

## 2024-07-04 ASSESSMENT — PAIN SCALES - WONG BAKER: WONGBAKER_NUMERICALRESPONSE: NO HURT

## 2024-07-04 NOTE — PROGRESS NOTES
VASCULAR SURGERY PROGRESS NOTE  [facility] [place]  Today's Date/Time: 9:11 AM 24                Patient: Amirah Bennett  Admitted: 2024   : 1980 Length of Stay: Day 14    MRN: [id number] Attending: Rj     Procedure(s):  Embolectomy Lower Extremity  Amputation Above Knee   6 Days Post-Op  Vascular Attending:       * Rizwana De La Rosa - Primary     PROBLEM LIST  Principal Problem:    Atrial fibrillation (Multi)  Active Problems:    Acute decompensated heart failure (Multi)    Aphasia due to late effects of cerebrovascular disease    Mural thrombus of left atrium    CHF (congestive heart failure) (Multi)    Diabetes (Multi)    Elevated LFTs    Primary hypertension    Stroke (Multi)    Critical limb ischemia of right lower extremity (Multi)    Deep vein thrombosis (DVT) of lower extremity (Multi)    Tracheocutaneous fistula following tracheostomy (Multi)    Rhabdomyolysis    Lactic acidosis    Thrombocytopenia (CMS-HCC)    Cardiogenic shock (Multi)    Acute lower extremity ischemia     Assessment/Plan    ASSESSMENT  Amirah Bennett is a 43 y.o. female  has a past medical history of CHF (congestive heart failure) (Multi) and Stroke (Multi). Patient presented with Afib RVR, significant heart failure in cardiogenic shock. 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 CTA was obtained which demonstrated right BA, EIA, and CFA/SFA thrombus, likely embolic.    On day 14 of admission Who is now s/p Procedure(s):  Embolectomy Lower Extremity  Amputation Above Knee     : stable vascular exam, stable motor/sensory exam, downtrending CK  : No major changes.   : R iliac and RLE open thromboembolectomy, followed by R AKA    R AKA dressing removed due to sanguinous saturation  : Dressing was changed with Ace compression. CTA with delayed phase  showed Postsurgical changes of right above knee amputation   07/02: discussion with the primary team about stopping the heparin drip for the day. Pending risks/benefits discussion with primary team and vascular medicine.    Events:  - 07/01: Was called at 5:50 pm for bleeding from her right groin. On examination, there was oozing of old dark blood from one point at the top of the incision. Packing was placed. This Morning 07/02 groin bleeding stopped  - 07/02: Patient had continued bleeding from stump site. Hemoglobin down trended to 6.5. Patient received 2 units packed red blood cells. Hgb is 8.2 from 6.5.     H/H stable today.  Some pain with dressing change.  Bloody drainage on dressing, however, no visible oozing from incision site.       Plan  Continue to monitor H/H  Dressing changed today  no acute plan for intervention at this time   monitor for signs of worsening bleeding (based on labs or physical exam)  Rest of care per the primary team.          Subjective   SUBJECTIVE  RLE stump had no bleeding, saturating dressing  Otherwise, patient had no complaints       Objective   OBJECTIVE  Last Recorded Vitals  Heart Rate:  []   Temp:  [35.9 °C (96.6 °F)-36.6 °C (97.9 °F)]   Resp:  [14-26]   BP: ()/(47-94)   Weight:  [81.3 kg (179 lb 3.7 oz)]   SpO2:  [98 %-100 %]   Physical Exam:  Vascular Physical Exam  Constitutional: no acute distress  Neuro: A/O x4, no gross deficits   Psych: normal affect  HEENT: No deformities, no scleral icterus   Cardiac: RRR  Pulmonary: unlabored respirations   Abdomen: soft, non distended, non tender  Skin: warm and dry overall    Extremities: RLE s/p AKA, dressing with saturation and soaking through to the bed below, no obvious hematoma vision    Discussed with attending, Bill Taylor MD  General Surgery, PGY-3  Vascular Surgery [id number]

## 2024-07-04 NOTE — CARE PLAN
Problem: Skin  Goal: Decreased wound size/increased tissue granulation at next dressing change  Outcome: Progressing  Flowsheets (Taken 7/4/2024 0334)  Decreased wound size/increased tissue granulation at next dressing change: Protective dressings over bony prominences  Goal: Participates in plan/prevention/treatment measures  Outcome: Progressing  Flowsheets (Taken 7/4/2024 0334)  Participates in plan/prevention/treatment measures:   Elevate heels   Increase activity/out of bed for meals   Discuss with provider PT/OT consult  Goal: Prevent/manage excess moisture  Outcome: Progressing  Flowsheets (Taken 7/4/2024 0334)  Prevent/manage excess moisture:   Moisturize dry skin   Monitor for/manage infection if present  Goal: Prevent/minimize sheer/friction injuries  Outcome: Progressing  Flowsheets (Taken 7/4/2024 0334)  Prevent/minimize sheer/friction injuries:   Turn/reposition every 2 hours/use positioning/transfer devices   Use pull sheet   HOB 30 degrees or less  Goal: Promote/optimize nutrition  Outcome: Progressing  Goal: Promote skin healing  Outcome: Progressing     Problem: Pain - Adult  Goal: Verbalizes/displays adequate comfort level or baseline comfort level  Outcome: Progressing     Problem: Safety - Adult  Goal: Free from fall injury  Outcome: Progressing     Problem: Discharge Planning  Goal: Discharge to home or other facility with appropriate resources  Outcome: Progressing     Problem: Chronic Conditions and Co-morbidities  Goal: Patient's chronic conditions and co-morbidity symptoms are monitored and maintained or improved  Outcome: Progressing     Problem: Heart Failure  Goal: Improved gas exchange this shift  Outcome: Progressing  Goal: Improved urinary output this shift  Outcome: Progressing  Goal: Reduction in peripheral edema within 24 hours  Outcome: Progressing  Goal: Report improvement of dyspnea/breathlessness this shift  Outcome: Progressing  Goal: Weight from fluid excess reduced over 2-3  days, then stabilize  Outcome: Progressing  Goal: Increase self care and/or family involvement in 24 hours  Outcome: Progressing   The patient's goals for the shift include      The clinical goals for the shift include Patient will remain hemodynamically stable and get rest throughout shift.

## 2024-07-04 NOTE — PROGRESS NOTES
"  MEDICINE INPATIENT PROGRESS NOTE    Amirah Bennett is a 43 y.o. female on hospital day 14    Overnight:   Did not require a blood transfusion yesterday.  Heparin drip was restarted without a bolus at 9 PM.  Hemoglobin does appear to slowly downtrend.  Monitoring with q6 CBC checks.    Subjective   Patient did not report that she had as much bleeding from the stump site or groin site as compared to prior days.  She feels okay.         Objective     Last Recorded Vitals  Blood pressure 105/63, pulse 88, temperature 36 °C (96.8 °F), resp. rate 14, height 1.702 m (5' 7.01\"), weight 81.3 kg (179 lb 3.7 oz), SpO2 100%.    Intake/Output last 3 Shifts:  I/O last 3 completed shifts:  In: 2003.6 (24.6 mL/kg) [P.O.:720; I.V.:436.9 (5.4 mL/kg); Blood:696.7; IV Piggyback:150]  Out: 3500 (43.1 mL/kg) [Urine:3500 (1.2 mL/kg/hr)]  Weight: 81.3 kg     Relevant Results  Results from last 7 days   Lab Units 07/04/24  0603 07/04/24  0039 07/03/24  1802   WBC AUTO x10*3/uL 20.5* 21.6* 21.4*   HEMOGLOBIN g/dL 7.3* 7.4* 7.6*   HEMATOCRIT % 21.2* 20.9* 22.0*   PLATELETS AUTO x10*3/uL 180 190 181     Results from last 7 days   Lab Units 07/04/24  0441 07/03/24  1802 07/03/24  0137   SODIUM mmol/L 132* 129* 130*   POTASSIUM mmol/L 4.3 4.4 4.6   CO2 mmol/L 20* 23 24   ANION GAP mmol/L 13 11 11   BUN mg/dL 10 10 11   CREATININE mg/dL 0.56 0.59 0.63   GLUCOSE mg/dL 123* 124* 113*   EGFR mL/min/1.73m*2 >90 >90 >90   MAGNESIUM mg/dL 1.67 1.81 1.79   PHOSPHORUS mg/dL 3.6 4.1 3.7      Results from last 7 days   Lab Units 07/04/24  0441 07/03/24  0137 07/02/24  0351   ALT U/L 41 54* 64*   AST U/L 33 37 36   ALK PHOS U/L 57 55 60      Results from last 7 days   Lab Units 07/02/24  0351 06/29/24  0304 06/28/24  0348   INR  1.2* 1.3* 1.3*     Results from last 7 days   Lab Units 07/02/24  0542 07/02/24  0514 07/01/24  1816 06/28/24  1113 06/28/24  1035 06/28/24  0836   POCT PH, ARTERIAL pH  --   --   --   --  7.50* 7.44*   POCT PO2, ARTERIAL mm Hg  -- " "  --   --   --  112* 308*   POCT PCO2, ARTERIAL mm Hg  --   --   --   --  35* 40   FIO2 % 21 21 21   < > 50 60    < > = values in this interval not displayed.     Results from last 7 days   Lab Units 06/27/24  1835   POCT PH, VENOUS pH 7.41   POCT PCO2, VENOUS mm Hg 41           No lab exists for component: \"BNPRESU\", \"CPKT\"            Physical Exam  Constitutional: in NAD, tearful on 7/3.  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 Can hear airleak.  CV: normal rate, irregular rhythm, no murmurs noted. Cap refill 2+ throughout, extremities warm to touch. Trace edema in LLE   Pulm: CTAB anteriorly, room air  GI: abd soft, tender diffusely, bowel sounds present  Ext: She has equal sensation in bilateral thighs. Significant tenderness to palpation at right stump site No DP in left. Has TP and popliteal in left. S/p Rt AKA. Right groin access site covered, feels firm (per vascular surgery due to how it was sutured).  Dried blood at groin site.    LUE is bruised and swollen but not warm to the touch. had L brachial artery injury after a -line attempt earlier on in hospital course. Previous LUE duplex showed non vascularized mass  Neuro: alert and conversant however only intermittently answering questions appropriately, +expressive aphasia so will initially say no pain, endorsing pain in bilateral legs.     Medications    amiodarone, 200 mg, oral, Daily  aspirin, 81 mg, oral, Daily  bisacodyl, 10 mg, rectal, Daily  [Held by provider] empagliflozin, 10 mg, oral, Daily  magnesium sulfate, 2 g, intravenous, Once  melatonin, 6 mg, oral, Daily  oxygen, , inhalation, Continuous - Inhalation  pantoprazole, 40 mg, oral, Daily before breakfast   Or  pantoprazole, 40 mg, intravenous, Daily before breakfast  perflutren protein A microsphere, 0.5 mL, intravenous, Once in imaging  polyethylene glycol, 17 g, oral, BID  sacubitriL-valsartan, 1 tablet, oral, " BID  sennosides, 2 tablet, oral, BID  sodium zirconium cyclosilicate, 10 g, oral, Once  sulfur hexafluoride microsphr, 2 mL, intravenous, Once in imaging        heparin, 0-4,500 Units/hr, Last Rate: 1,600 Units/hr (07/04/24 1159)        PRN medications: dextrose, dextrose, glucagon, glucagon, heparin, HYDROmorphone, lidocaine, melatonin, oxyCODONE           Assessment/Plan   Amirah Bennett is a 42 y.o. 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 RHC c/w cardiogenic shock with CI of 1.6, CVP of 25. She is not candidate for advanced therapies given documented medical nonadherence. Managing medically. Attempted to wean off dobutamine while increasing nitroprusside so stopped dobutamine 6/21 however acutely worsened with increased lactate and worsened SvO2 from 65 to 25 overnight with new abdominal pain and right leg pain concerning for ischemia. Improving parameters, normalized lactate, and pain resolution when dobutamine restarted. Course also c/b SARAH, cardiac thrombi, liver injury likely due to ischemia, and Afib with RVR. Found to have rhabdomyolysis with CK of 14,000, unable to give fluids due to cardiogenic shock. Arterial doppler of lower extremities with echogenic material within right distal femoral and popliteal arteries with absent Doppler flow in the right popliteal artery with significantly diminished flow within the right distal superficial femoral artery significantly diminished flow within  the right distal superficial femoral artery, findings raise concern for thrombosis of distal SFA and popliteal artery. Decreased flow within right posterior tibial and peroneal arteries could be through collateralization. Additionally decreased flow in right common femoral artery, right deep femoral artery as well as proximal and mid superficial femoral arteries concerning for stenosis proximal to right common femoral artery possible within right external iliac right common iliac artery. CTA Aorta with runoff 6/24: aortic bifurcation embolus, R BA-EIA embolus, SFA and popliteal emboli. Vascular surgery recommending right AKA. Bilateral LE duplex with likely early nonocclusive deep venous thrombosis. IVC filter placed 6/27 on recommendation by vascular medicine in preparation for right AKA 6/28. IVC filter should be removed within 3 months. AKA performed 6/28. This was complicated by oozing from the stump site over the next few days. CTA RLE suggested intramuscular hematoma and suspected vascular injury.  Vascular surgery did not feel that intervention was necessary on the stump site.  Patient required 6 blood transfusions over 3 days while on heparin drip.  Decision was made to hold heparin drip for 12 hours to allow hemostasis.  Patient's hemoglobin still downtrended but she did not require transfusion.  Heparin drip was restarted 7/3 at 9 PM.  Ethics involved given lack of documentation of POA and limited family (NOK are cousins). Palliative care consulted.    Updates 7/4  -Resumed heparin drip at 9 PM on 7/3.  Will continue to monitor hemoglobin to assess need for blood transfusions.  -Plan to keep the patient in the CICU today considering downtrending hemoglobin.  -stop zosyn today. Has had 7 days of coverage for presumed stump site infection. Still has leukocytosis but may be reactive  -maintaining dhillon in place to prevent urine from getting in stump   -starting entresto 24/26 bid low dose  -GOC discussion  begun with family yesterday.  Mainly discussed CODE STATUS.  Primary team communicated to family that patient would likely have a poor prognosis following a code.    NEUROLOGIC  #AMS, resolved at her baseline  #ischemic stroke L MCA d/t AFib LLA thrombus seen on echo (lack of OAC adherence) s/p thrombectomy 01/2022 @ [facility] w/ residual expressive aphasia and R sided weakness   ::Neurology consulted 6/25, no new deficits concerning for cerebral event  -no acute changes     #Depression?  #Insomnia  ::Patient's friend states that she doesn't care for herself due to being depressed  -consider psychiatry consult, deferring for now  -told to stop quetiapine 50mg at last discharge  -c/w melatonin 6mg at bedtime     CARDIOVASCULAR  #Cardiogenic shock, improving  #HFrEF (LVEF 20-25%)  ::Not candidate for advanced therapies due to medical nonadherence  ::Weaned off dobutamine and nipride  Plan:  -diuresis as needed -cvp 2 on 7/2, defer  -palliative care consulted given patient not candidate for advanced therapies, appreciate recommendations  -Low-dose Entresto 24/26 twice daily  -previous GDMT was entresto 24-26, spironolactone 25mg, metoprolol succinate 50mg daily, lasix 40mg, jardiance 10mg     #Right limb ischemia  #Arterial emboli  #S/p femoral arterial line sheath placement now removed  ::CTA Aorta with runoff 6/24: aortic bifurcation embolus, R BA-EIA embolus, SFA and popliteal emboli   ::arterial duplex exam with monophasic right CFA, SFA and PFA signals, absent popliteal flow and monophasic flow in the tibial artery   ::Unable to walk prior to presentation to hospital  ::Right leg has been cold and painful in times where cardiogenic shock has worsened then warm and not painful to touch when out of shock state, fluctuating exam  ::s/p AKA with vascular surgery, 6/28/2024.   ::ongoing bleeding from stump site  ::CTA RLE with hematomas but does not have any obvious source that vascular surgery could intervene  upon  Plan:  -Heparin drip   -pain regimen: oxycodone 10mg severe painq 4hr, dilaudid 0.4mg q 3 hr breakthrough - also can use before and after dresssing changes     #Cardiac thrombi  #Right lower extremity DVT  ::Likely in setting of Xarelto nonadherence  ::There are indeterminate low-attenuation nodular filling defects within the atrial appendage. While this may represent contrast under filling, a right atrial appendage thrombus can not be excluded.  ::Rt US duplex with likely early nonocclusive deep venous thrombosis.   Plan:  -Heparin drip     #Afib on Xarelto w/ DCCV 05/2023), ischemic stroke L MCA d/t AFib LLA thrombus seen on echo (lack of OAC adherence) s/p thrombectomy 01/2022 @ [facility] w/ residual expressive aphasia and R sided weakness   ::Episode of RVR in ED  ::Previously prescribed digoxin 250mcg however last fill was 12/2023  ::TSH 6.28H, free T4 1.48  Plan:  -c/w amiodarone 200mg daily  -hold home digoxin 250mcg  -c/w AC per above (holding home Xarelto 20mg daily in evening)  -c/w aspirin 81mg  -stopped atorvastatin 80 due to liver injury     #Elevated troponin, resolved     #HTN  -holding home medications     PULMONARY  #Dyspnea  #Mild pulmonary edema  ::No evidence of PE  -diuresis per above     #Hx trach c/b trancutaneous fistula  ::ENT had been consulted 3/2024 for gurgling and mucus drainage, no inpatient interventions required  -monitor for breaths/mucus discharge from trach site  -Cover the tracheocutaneous fistula with tape and gauze and encourage patient to apply pressure when voicing.   -per prior ENT note on last admission   -follow up outpatient ENT for closure (Dr. King)     RENAL/  #Hyponatremia  -monitor RFP and diurese per Harvinder     #Rhabdomyolysis, improving-Ck peaked > 14,000, trending down as of 6/30  #Metabolic acidosis, resolved  #SARAH, resolved  #Elevated lactate, resolved     GASTROINTESTINAL  #Elevated Tbili (3.2 on admission, from 0.5 in 03/2024)  #Elevated LFTs,  downtrending  ::Likely ischemic liver injury  ::RUQ US 7/1 without abnormality beyond hepatic steatosis and liver cyst  -trend CMP     #GERD  -c/w pantoprazole 40mg daily     ENDOCRINE  #T2DM, diet controlled:: HgbA1c 3/2024 5.5     HEME/ONC  #Cardiac thrombi  -see cardiac section for plan     #Arterial thrombosis  #DVT  ::s/p IVC filter 6/27  -heparin drip per above  -further plan per above     #Anemia  ::had 3 units of RBC transfused 6/30 without appropriate incrementation.   ::CTA C/A/P 6/30 without evidence of active hemorrhage within chest/abdomen/pelvis  ::CTA RLE 7/1 without a source that could be intervened upon  Plan:  -Heparin drip resumed  - CBC q6   -Hgb goal >7  given cardiac hx     #Thrombocytopenia  ::Multiple thrombi, DIC score 5 on 6/24/2024  ::Hit score of 6  ::Negative PF4     #Left brachial injury  ::Likely injury  :LUE DVT scan with nonvascularized mass, called CT while patient was there to request CTA of left upper extremity and they said logistically they would be unable to perform both scans, and given protocol for max dosing of contrast, could not give further contrast until 6/25 at 2PM  -no CTA LUE needed      INFECTIOUS DISEASE  #stump infection, treated for 7 days, resolved  ::Procal 0.28  ::CXR without signs of PNA  ::s/p vanc (6/20-6/24) (6/27-7/2 ) and zosyn (6/20-6/24), (6/27- p)-restarted vanc/zosyn 6/27 due to elevated leukocytosis and purulent appearing right groin site  ::UA with 1+ blood, 1+ bacteria, no WBC, neg nitrite and leuk esterase  ::6/27 right groin wound culture negative  ::blood cultures x1 6/20, x2 6/22, NGTD   ::blood cultures 6/27, NGTD  Plan:  -discontinued vancomycin 7/2, discontinued zosyn 7/3      #leukocytosis  -may be reactive, has received tx as above       Dispo:  -PT: Moderate intensity level of care  -patient would like enrollment in  home delivery service for medications (she has trouble getting to preferred pharmacy), pharmacy updated to  "[facility]  -patient's family would like her enrolled in [facility]  -repeat thyroid labs outpatient  -ensure IVC filter removal in 3 months!     N: cardiac  A: pIVs, dhillon   DVT ppx: heparin drip  GI ppx: pantoprazole 40mg     Code Status: FULL CODE per patient and NOKs  *Note that patient LACKS capacity due to inability to express decision and inconsistency in decisions     Surrogate Medical Decision-maker:   Surrogate decision maker is: pt's cousin, Clara Wayne: [phone], Efrain Black: [phone], Keli Osborne: [phone]      Friends who may be helpful in making decisions:  Prior boyfriend Navin Sanches [phone]  Very good friend \"Isiah\" Pranay Owen [phone]    Galdino Boles MD  Internal Medicine, PGY-2   "

## 2024-07-05 LAB
ALBUMIN SERPL BCP-MCNC: 2.4 G/DL (ref 3.4–5)
ALP SERPL-CCNC: 59 U/L (ref 33–110)
ALT SERPL W P-5'-P-CCNC: 35 U/L (ref 7–45)
ANION GAP SERPL CALC-SCNC: 11 MMOL/L (ref 10–20)
AST SERPL W P-5'-P-CCNC: 30 U/L (ref 9–39)
BASOPHILS # BLD AUTO: 0.05 X10*3/UL (ref 0–0.1)
BASOPHILS # BLD AUTO: 0.06 X10*3/UL (ref 0–0.1)
BASOPHILS # BLD AUTO: 0.06 X10*3/UL (ref 0–0.1)
BASOPHILS # BLD AUTO: 0.07 X10*3/UL (ref 0–0.1)
BASOPHILS NFR BLD AUTO: 0.3 %
BASOPHILS NFR BLD AUTO: 0.4 %
BILIRUB DIRECT SERPL-MCNC: 1.5 MG/DL (ref 0–0.3)
BILIRUB SERPL-MCNC: 2.9 MG/DL (ref 0–1.2)
BUN SERPL-MCNC: 9 MG/DL (ref 6–23)
CALCIUM SERPL-MCNC: 8.2 MG/DL (ref 8.6–10.6)
CHLORIDE SERPL-SCNC: 101 MMOL/L (ref 98–107)
CO2 SERPL-SCNC: 24 MMOL/L (ref 21–32)
CREAT SERPL-MCNC: 0.56 MG/DL (ref 0.5–1.05)
EGFRCR SERPLBLD CKD-EPI 2021: >90 ML/MIN/1.73M*2
EOSINOPHIL # BLD AUTO: 0.16 X10*3/UL (ref 0–0.7)
EOSINOPHIL # BLD AUTO: 0.16 X10*3/UL (ref 0–0.7)
EOSINOPHIL # BLD AUTO: 0.19 X10*3/UL (ref 0–0.7)
EOSINOPHIL # BLD AUTO: 0.2 X10*3/UL (ref 0–0.7)
EOSINOPHIL NFR BLD AUTO: 0.9 %
EOSINOPHIL NFR BLD AUTO: 0.9 %
EOSINOPHIL NFR BLD AUTO: 1.1 %
EOSINOPHIL NFR BLD AUTO: 1.2 %
ERYTHROCYTE [DISTWIDTH] IN BLOOD BY AUTOMATED COUNT: 16.7 % (ref 11.5–14.5)
ERYTHROCYTE [DISTWIDTH] IN BLOOD BY AUTOMATED COUNT: 17.2 % (ref 11.5–14.5)
ERYTHROCYTE [DISTWIDTH] IN BLOOD BY AUTOMATED COUNT: 17.2 % (ref 11.5–14.5)
ERYTHROCYTE [DISTWIDTH] IN BLOOD BY AUTOMATED COUNT: 17.4 % (ref 11.5–14.5)
GLUCOSE BLD MANUAL STRIP-MCNC: 107 MG/DL (ref 74–99)
GLUCOSE BLD MANUAL STRIP-MCNC: 110 MG/DL (ref 74–99)
GLUCOSE BLD MANUAL STRIP-MCNC: 119 MG/DL (ref 74–99)
GLUCOSE BLD MANUAL STRIP-MCNC: 125 MG/DL (ref 74–99)
GLUCOSE SERPL-MCNC: 107 MG/DL (ref 74–99)
HCT VFR BLD AUTO: 26.1 % (ref 36–46)
HCT VFR BLD AUTO: 26.4 % (ref 36–46)
HCT VFR BLD AUTO: 27.6 % (ref 36–46)
HCT VFR BLD AUTO: 32.6 % (ref 36–46)
HGB BLD-MCNC: 10.6 G/DL (ref 12–16)
HGB BLD-MCNC: 8.5 G/DL (ref 12–16)
HGB BLD-MCNC: 8.7 G/DL (ref 12–16)
HGB BLD-MCNC: 8.9 G/DL (ref 12–16)
IMM GRANULOCYTES # BLD AUTO: 0.4 X10*3/UL (ref 0–0.7)
IMM GRANULOCYTES # BLD AUTO: 0.42 X10*3/UL (ref 0–0.7)
IMM GRANULOCYTES # BLD AUTO: 0.6 X10*3/UL (ref 0–0.7)
IMM GRANULOCYTES # BLD AUTO: 0.62 X10*3/UL (ref 0–0.7)
IMM GRANULOCYTES NFR BLD AUTO: 2.3 % (ref 0–0.9)
IMM GRANULOCYTES NFR BLD AUTO: 2.4 % (ref 0–0.9)
IMM GRANULOCYTES NFR BLD AUTO: 3.3 % (ref 0–0.9)
IMM GRANULOCYTES NFR BLD AUTO: 3.4 % (ref 0–0.9)
LYMPHOCYTES # BLD AUTO: 2.53 X10*3/UL (ref 1.2–4.8)
LYMPHOCYTES # BLD AUTO: 2.66 X10*3/UL (ref 1.2–4.8)
LYMPHOCYTES # BLD AUTO: 2.74 X10*3/UL (ref 1.2–4.8)
LYMPHOCYTES # BLD AUTO: 2.78 X10*3/UL (ref 1.2–4.8)
LYMPHOCYTES NFR BLD AUTO: 13.5 %
LYMPHOCYTES NFR BLD AUTO: 14.9 %
LYMPHOCYTES NFR BLD AUTO: 15.7 %
LYMPHOCYTES NFR BLD AUTO: 16.2 %
MAGNESIUM SERPL-MCNC: 2.23 MG/DL (ref 1.6–2.4)
MCH RBC QN AUTO: 30.2 PG (ref 26–34)
MCH RBC QN AUTO: 30.3 PG (ref 26–34)
MCH RBC QN AUTO: 30.9 PG (ref 26–34)
MCH RBC QN AUTO: 31 PG (ref 26–34)
MCHC RBC AUTO-ENTMCNC: 32.2 G/DL (ref 32–36)
MCHC RBC AUTO-ENTMCNC: 32.5 G/DL (ref 32–36)
MCHC RBC AUTO-ENTMCNC: 32.6 G/DL (ref 32–36)
MCHC RBC AUTO-ENTMCNC: 33 G/DL (ref 32–36)
MCV RBC AUTO: 93 FL (ref 80–100)
MCV RBC AUTO: 94 FL (ref 80–100)
MCV RBC AUTO: 94 FL (ref 80–100)
MCV RBC AUTO: 95 FL (ref 80–100)
MONOCYTES # BLD AUTO: 1.45 X10*3/UL (ref 0.1–1)
MONOCYTES # BLD AUTO: 1.6 X10*3/UL (ref 0.1–1)
MONOCYTES # BLD AUTO: 1.7 X10*3/UL (ref 0.1–1)
MONOCYTES # BLD AUTO: 1.97 X10*3/UL (ref 0.1–1)
MONOCYTES NFR BLD AUTO: 10.5 %
MONOCYTES NFR BLD AUTO: 8.2 %
MONOCYTES NFR BLD AUTO: 9.2 %
MONOCYTES NFR BLD AUTO: 9.8 %
NEUTROPHILS # BLD AUTO: 11.51 X10*3/UL (ref 1.2–7.7)
NEUTROPHILS # BLD AUTO: 12.65 X10*3/UL (ref 1.2–7.7)
NEUTROPHILS # BLD AUTO: 13.3 X10*3/UL (ref 1.2–7.7)
NEUTROPHILS # BLD AUTO: 13.35 X10*3/UL (ref 1.2–7.7)
NEUTROPHILS NFR BLD AUTO: 70.1 %
NEUTROPHILS NFR BLD AUTO: 71.2 %
NEUTROPHILS NFR BLD AUTO: 71.5 %
NEUTROPHILS NFR BLD AUTO: 72.4 %
NRBC BLD-RTO: 0.2 /100 WBCS (ref 0–0)
NRBC BLD-RTO: 0.3 /100 WBCS (ref 0–0)
PHOSPHATE SERPL-MCNC: 3.6 MG/DL (ref 2.5–4.9)
PLATELET # BLD AUTO: 214 X10*3/UL (ref 150–450)
PLATELET # BLD AUTO: 224 X10*3/UL (ref 150–450)
PLATELET # BLD AUTO: 226 X10*3/UL (ref 150–450)
PLATELET # BLD AUTO: 274 X10*3/UL (ref 150–450)
POTASSIUM SERPL-SCNC: 4.6 MMOL/L (ref 3.5–5.3)
PROT SERPL-MCNC: 5.7 G/DL (ref 6.4–8.2)
RBC # BLD AUTO: 2.81 X10*6/UL (ref 4–5.2)
RBC # BLD AUTO: 2.82 X10*6/UL (ref 4–5.2)
RBC # BLD AUTO: 2.94 X10*6/UL (ref 4–5.2)
RBC # BLD AUTO: 3.42 X10*6/UL (ref 4–5.2)
SODIUM SERPL-SCNC: 131 MMOL/L (ref 136–145)
UFH PPP CHRO-ACNC: 0.3 IU/ML
UFH PPP CHRO-ACNC: 0.5 IU/ML
WBC # BLD AUTO: 16.4 X10*3/UL (ref 4.4–11.3)
WBC # BLD AUTO: 17.8 X10*3/UL (ref 4.4–11.3)
WBC # BLD AUTO: 18.4 X10*3/UL (ref 4.4–11.3)
WBC # BLD AUTO: 18.7 X10*3/UL (ref 4.4–11.3)

## 2024-07-05 PROCEDURE — 2500000001 HC RX 250 WO HCPCS SELF ADMINISTERED DRUGS (ALT 637 FOR MEDICARE OP)

## 2024-07-05 PROCEDURE — 2500000005 HC RX 250 GENERAL PHARMACY W/O HCPCS

## 2024-07-05 PROCEDURE — 36415 COLL VENOUS BLD VENIPUNCTURE: CPT

## 2024-07-05 PROCEDURE — 85025 COMPLETE CBC W/AUTO DIFF WBC: CPT

## 2024-07-05 PROCEDURE — 99291 CRITICAL CARE FIRST HOUR: CPT

## 2024-07-05 PROCEDURE — 82947 ASSAY GLUCOSE BLOOD QUANT: CPT

## 2024-07-05 PROCEDURE — 99233 SBSQ HOSP IP/OBS HIGH 50: CPT

## 2024-07-05 PROCEDURE — 85520 HEPARIN ASSAY: CPT

## 2024-07-05 PROCEDURE — 80048 BASIC METABOLIC PNL TOTAL CA: CPT

## 2024-07-05 PROCEDURE — 99232 SBSQ HOSP IP/OBS MODERATE 35: CPT

## 2024-07-05 PROCEDURE — 99497 ADVNCD CARE PLAN 30 MIN: CPT

## 2024-07-05 PROCEDURE — 2500000004 HC RX 250 GENERAL PHARMACY W/ HCPCS (ALT 636 FOR OP/ED)

## 2024-07-05 PROCEDURE — 84100 ASSAY OF PHOSPHORUS: CPT

## 2024-07-05 PROCEDURE — 82248 BILIRUBIN DIRECT: CPT

## 2024-07-05 PROCEDURE — 85520 HEPARIN ASSAY: CPT | Performed by: INTERNAL MEDICINE

## 2024-07-05 PROCEDURE — 2500000002 HC RX 250 W HCPCS SELF ADMINISTERED DRUGS (ALT 637 FOR MEDICARE OP, ALT 636 FOR OP/ED)

## 2024-07-05 PROCEDURE — 83735 ASSAY OF MAGNESIUM: CPT

## 2024-07-05 PROCEDURE — 1200000002 HC GENERAL ROOM WITH TELEMETRY DAILY

## 2024-07-05 RX ORDER — ATORVASTATIN CALCIUM 80 MG/1
80 TABLET, FILM COATED ORAL NIGHTLY
Status: DISCONTINUED | OUTPATIENT
Start: 2024-07-05 | End: 2024-07-24 | Stop reason: HOSPADM

## 2024-07-05 RX ORDER — DULOXETIN HYDROCHLORIDE 30 MG/1
30 CAPSULE, DELAYED RELEASE ORAL DAILY
Status: DISCONTINUED | OUTPATIENT
Start: 2024-07-05 | End: 2024-07-19

## 2024-07-05 RX ADMIN — PANTOPRAZOLE SODIUM 40 MG: 40 TABLET, DELAYED RELEASE ORAL at 06:05

## 2024-07-05 RX ADMIN — SACUBITRIL AND VALSARTAN 1 TABLET: 24; 26 TABLET, FILM COATED ORAL at 08:06

## 2024-07-05 RX ADMIN — HEPARIN SODIUM 1600 UNITS/HR: 10000 INJECTION, SOLUTION INTRAVENOUS at 16:31

## 2024-07-05 RX ADMIN — ASPIRIN 81 MG CHEWABLE TABLET 81 MG: 81 TABLET CHEWABLE at 06:05

## 2024-07-05 RX ADMIN — OXYCODONE HYDROCHLORIDE 10 MG: 5 TABLET ORAL at 21:29

## 2024-07-05 RX ADMIN — Medication 6 MG: at 21:26

## 2024-07-05 RX ADMIN — ATORVASTATIN CALCIUM 80 MG: 80 TABLET, FILM COATED ORAL at 21:24

## 2024-07-05 RX ADMIN — SACUBITRIL AND VALSARTAN 1 TABLET: 24; 26 TABLET, FILM COATED ORAL at 21:24

## 2024-07-05 RX ADMIN — AMIODARONE HYDROCHLORIDE 200 MG: 200 TABLET ORAL at 08:06

## 2024-07-05 RX ADMIN — Medication 21 PERCENT: at 08:00

## 2024-07-05 RX ADMIN — Medication 1 L/MIN: at 20:00

## 2024-07-05 RX ADMIN — HYDROMORPHONE HYDROCHLORIDE 0.4 MG: 1 INJECTION INTRAMUSCULAR; INTRAVENOUS; SUBCUTANEOUS at 16:35

## 2024-07-05 RX ADMIN — HYDROMORPHONE HYDROCHLORIDE 0.4 MG: 1 INJECTION INTRAMUSCULAR; INTRAVENOUS; SUBCUTANEOUS at 14:04

## 2024-07-05 RX ADMIN — OXYCODONE HYDROCHLORIDE 10 MG: 5 TABLET ORAL at 05:47

## 2024-07-05 RX ADMIN — OXYCODONE HYDROCHLORIDE 10 MG: 5 TABLET ORAL at 10:25

## 2024-07-05 RX ADMIN — HYDROMORPHONE HYDROCHLORIDE 0.4 MG: 1 INJECTION INTRAMUSCULAR; INTRAVENOUS; SUBCUTANEOUS at 07:33

## 2024-07-05 ASSESSMENT — COGNITIVE AND FUNCTIONAL STATUS - GENERAL
MOVING TO AND FROM BED TO CHAIR: A LITTLE
MOVING TO AND FROM BED TO CHAIR: A LOT
MOBILITY SCORE: 13
MOVING FROM LYING ON BACK TO SITTING ON SIDE OF FLAT BED WITH BEDRAILS: A LITTLE
MOBILITY SCORE: 12
WALKING IN HOSPITAL ROOM: TOTAL
HELP NEEDED FOR BATHING: A LITTLE
EATING MEALS: A LITTLE
STANDING UP FROM CHAIR USING ARMS: A LOT
PERSONAL GROOMING: A LITTLE
CLIMB 3 TO 5 STEPS WITH RAILING: TOTAL
DAILY ACTIVITIY SCORE: 16
TURNING FROM BACK TO SIDE WHILE IN FLAT BAD: A LITTLE
MOVING FROM LYING ON BACK TO SITTING ON SIDE OF FLAT BED WITH BEDRAILS: A LITTLE
DRESSING REGULAR UPPER BODY CLOTHING: A LITTLE
TURNING FROM BACK TO SIDE WHILE IN FLAT BAD: A LITTLE
DRESSING REGULAR LOWER BODY CLOTHING: A LOT
TOILETING: A LOT
WALKING IN HOSPITAL ROOM: TOTAL
CLIMB 3 TO 5 STEPS WITH RAILING: TOTAL
STANDING UP FROM CHAIR USING ARMS: A LOT

## 2024-07-05 ASSESSMENT — PAIN DESCRIPTION - LOCATION
LOCATION: LEG

## 2024-07-05 ASSESSMENT — PAIN - FUNCTIONAL ASSESSMENT
PAIN_FUNCTIONAL_ASSESSMENT: 0-10

## 2024-07-05 ASSESSMENT — PAIN DESCRIPTION - ORIENTATION
ORIENTATION: RIGHT

## 2024-07-05 ASSESSMENT — PAIN SCALES - GENERAL
PAINLEVEL_OUTOF10: 3
PAINLEVEL_OUTOF10: 10 - WORST POSSIBLE PAIN
PAINLEVEL_OUTOF10: 10 - WORST POSSIBLE PAIN
PAINLEVEL_OUTOF10: 8
PAINLEVEL_OUTOF10: 10 - WORST POSSIBLE PAIN
PAINLEVEL_OUTOF10: 2
PAINLEVEL_OUTOF10: 10 - WORST POSSIBLE PAIN
PAINLEVEL_OUTOF10: 2
PAINLEVEL_OUTOF10: 0 - NO PAIN
PAINLEVEL_OUTOF10: 10 - WORST POSSIBLE PAIN
PAINLEVEL_OUTOF10: 3
PAINLEVEL_OUTOF10: 8
PAINLEVEL_OUTOF10: 1

## 2024-07-05 NOTE — PROGRESS NOTES
Amirah Bennett is a 43 y.o. female on day 15 of admission presenting with Atrial fibrillation (Multi).    Palliative Medicine following for:  Complex medical decision making, symptom management, patient/family support     History obtained from chart review including ED note, H&P, patient's daily progress notes, review of lab/test results, and discussion with primary team and bedside RN.        Subjective     History of Present Illness  Amirah Bennett is a 42 y.o. female with significant PMHx of HFrEF (LVEF 20-25% (08/2022), Afib on Xarelto w/ DCCV 05/2023), ischemic stroke L MCA d/t AFib LLA thrombus seen on echo (lack of OAC adherence) s/p thrombectomy 01/2022 @ [facility] w/ residual expressive aphasia and R sided weakness, recent 03/2024 left cerebellar MCA, s/p tracheostomy, T2DM (03/2024 HgbA1c 5.5) and HTN, presenting with bilateral leg pain.      In the ED, she was noted to be tachycardic to 105 in triage and but then when placed on the monitor was found to be in atrial fibrillation with RVR with a rate between 150 and 190. She was given 5 mg of metoprolol with slight improvement of her rate but became hypotensive and symptomatic. She was started on heparin both for her A-fib and for possible DVT/PE. Lytics were not given because of the risk of potential intracranial hemorrhage after her recent stroke. Instead decision made to cardiovert the patient and also gave digoxin, and amiodarone. After cardioversion she still looked like she was in atrial fibrillation with RVR but only to a rate in the 120s and 130s. Her blood pressure improved. Levophed ordered along with calcium and magnesium. No evidence of infection. Patient admitted to the ICU with plan to get a CT angiogram of her chest to rule out PE and RHC. Ongoing work up revealed aortic bifurcation embolus, R BA-EIA embolus, SFA and popliteal emboli. Pt with acute right lower extremity ischemia, which is not salvageable, needing inflow procedure and R AKA given new  "motor deficits.     IVC filter placed 6/27/24, open R iliofemoral thromboembolectomy and R AKA completed 6/28/24. Ongoing bleeding from stump c/b need for heparin for other clots, being managed by primary team and vascular medicine. CTA RLE 7/2 with post-surgical changes, possible vascular injury, stable H&H after 2u pRBC.     Symptoms  Pain: RLE stump with manipulation of dressing.  Dyspnea: denies  Fatigue: denies  Insomnia: sleeping great now  Drowsiness: denies  Constipation: denies  Nausea: denies  Appetite: good  Anxiety: denies  Depression: yes    /73   Pulse 87   Temp 36.5 °C (97.7 °F) (Temporal)   Resp 13   Ht 1.702 m (5' 7.01\")   Wt 79.7 kg (175 lb 11.3 oz)   SpO2 100%   BMI 27.51 kg/m²     Physical Exam  Constitutional:       Appearance: She is obese.   HENT:      Head: Normocephalic and atraumatic.      Nose: Nose normal.      Mouth/Throat:      Mouth: Mucous membranes are moist.      Pharynx: Oropharynx is clear.   Cardiovascular:      Rate and Rhythm: Regular rhythm with PVCs present. Murmur.  Pulmonary:      Effort: Pulmonary effort is normal.      Breath sounds: CTA throughout.  Abdominal:      Palpations: Abdomen is soft. BM yesterday?  Musculoskeletal:      Right lower leg: AKA. bulky ace wrap/pressure dsg of stump.     Left lower leg: minimal nonpitting Edema present.      Comments: LUE bruising noted.   Skin:     General: Skin is dry. Large lue bruising.     Comments: R AKA with bulky ace wrap, D&I.  Neurological:      Mental Status: She is alert, awake and cooperative.     Comments: Expressive aphasia. Some pauses and occasional word searching.   Psychiatric:         Attention and Perception: Attention normal.         Mood and Affect: Mood is calm, smiling, happy.         Behavior: Behavior is cooperative.     Lab Results   Component Value Date    WBC 16.4 (H) 07/05/2024    HGB 8.7 (L) 07/05/2024    HCT 26.4 (L) 07/05/2024    MCV 94 07/05/2024     07/05/2024     Lab Results "   Component Value Date    GLUCOSE 107 (H) 07/05/2024    CALCIUM 8.2 (L) 07/05/2024     (L) 07/05/2024    K 4.6 07/05/2024    CO2 24 07/05/2024     07/05/2024    BUN 9 07/05/2024    CREATININE 0.56 07/05/2024     Allergies   Allergen Reactions    Hydrocodone-Acetaminophen Rash    Ibuprofen Rash     Tolerates aspirin     Current Facility-Administered Medications   Medication Dose Route Frequency Provider Last Rate Last Admin    amiodarone (Pacerone) tablet 200 mg  200 mg oral Daily Kristine Pat MD   200 mg at 07/05/24 0806    aspirin chewable tablet 81 mg  81 mg oral Daily Kristine Pat MD   81 mg at 07/05/24 0605    atorvastatin (Lipitor) tablet 80 mg  80 mg oral Nightly Brian Acevedo MD        bisacodyl (Dulcolax) suppository 10 mg  10 mg rectal Daily Kristine Pat MD   10 mg at 06/25/24 1006    dextrose 50 % injection 12.5 g  12.5 g intravenous q15 min PRN Kristine Pat MD   12.5 g at 06/25/24 1604    dextrose 50 % injection 25 g  25 g intravenous q15 min PRN Kristine Pat MD   25 g at 06/20/24 1237    DULoxetine (Cymbalta) DR capsule 30 mg  30 mg oral Daily Yandel Huston MD        [Held by provider] empagliflozin (Jardiance) tablet 10 mg  10 mg oral Daily Hosea Bourgeois MD   10 mg at 06/30/24 0814    glucagon (Glucagen) injection 1 mg  1 mg intramuscular q15 min PRNILESH Pat MD        glucagon (Glucagen) injection 1 mg  1 mg intramuscular q15 min PRNILESH Pat MD        heparin 25,000 Units in dextrose 5% 250 mL (100 Units/mL) infusion (premix)  0-4,500 Units/hr intravenous Continuous Renan Bustillo MD 16 mL/hr at 07/05/24 1631 1,600 Units/hr at 07/05/24 1631    heparin bolus from bag 3,000-6,000 Units  3,000-6,000 Units intravenous q4h PRNILESH Pat MD   3,000 Units at 07/01/24 1414    HYDROmorphone (Dilaudid) injection 0.4 mg  0.4 mg intravenous q3h PRN Galdino Boles MD   0.4 mg at 07/05/24 1635    lidocaine (Xylocaine) 5 % ointment   Topical PRN Kristine  MD Bi        melatonin tablet 6 mg  6 mg oral Daily Galdino Boles MD   6 mg at 07/04/24 2029    melatonin tablet 6 mg  6 mg oral Nightly PRN Galdino Boles MD        oxyCODONE (Roxicodone) immediate release tablet 10 mg  10 mg oral q3h PRN Galdino Boles MD   10 mg at 07/05/24 1025    oxygen (O2) therapy   inhalation Continuous - Inhalation Kristine Pat MD   21 percent at 07/05/24 0800    pantoprazole (ProtoNix) EC tablet 40 mg  40 mg oral Daily before breakfast Sapna Jordan MD   40 mg at 07/05/24 0605    Or    pantoprazole (ProtoNix) injection 40 mg  40 mg intravenous Daily before breakfast Sapna Jordan MD        perflutren protein A microsphere (Optison) injection 0.5 mL  0.5 mL intravenous Once in imaging Kristine Pat MD        polyethylene glycol (Glycolax, Miralax) packet 17 g  17 g oral BID Kristine Pat MD   17 g at 07/04/24 2029    sacubitriL-valsartan (Entresto) 24-26 mg per tablet 1 tablet  1 tablet oral BID Galdino Boles MD   1 tablet at 07/05/24 0806    sennosides (Senokot) tablet 17.2 mg  2 tablet oral BID Kristine Pat MD   17.2 mg at 07/04/24 2028    sodium zirconium cyclosilicate (Lokelma) packet 10 g  10 g oral Once Britany Luevano MD        sulfur hexafluoride microsphr (Lumason) injection 24.28 mg  2 mL intravenous Once in imaging Kristine Pat MD         Assessment/Plan   History of Present Illness  Amirah Bennett is a 42 y.o. female with significant PMHx of HFrEF (LVEF 20-25% (08/2022), Afib on Xarelto w/ DCCV 05/2023), ischemic stroke L MCA d/t AFib LLA thrombus seen on echo (lack of OAC adherence) s/p thrombectomy 01/2022 @ [facility] w/ residual expressive aphasia and R sided weakness, recent 03/2024 left cerebellar MCA, s/p tracheostomy, T2DM (03/2024 HgbA1c 5.5) and HTN presenting with bilateral leg pain.      In the ED, she was noted to be tachycardic to 105 in triage and but then when placed on the monitor was found to be in atrial fibrillation with RVR with a rate between 150  and 190. She was given 5 mg of metoprolol with slight improvement of her rate but became hypotensive and symptomatic. She was started on heparin both for her A-fib and for possible DVT/PE. Lytics were not given because of the risk of potential intracranial hemorrhage after her recent stroke. Instead decision made to cardiovert the patient and also gave digoxin, and amiodarone. After cardioversion she still looked like she was in atrial fibrillation with RVR but only to a rate in the 120s and 130s. Her blood pressure improved. Levophed ordered along with calcium and magnesium. No evidence of infection. Patient admitted to the ICU with plan to get a CT angiogram of her chest to rule out PE and RHC. Ongoing work up revealed aortic bifurcation embolus, R BA-EIA embolus, SFA and popliteal emboli. Pt with acute right lower extremity ischemia, which is not salvageable, needing inflow procedure and R AKA given new motor deficits.     IVC filter placed 6/27/24, open R iliofemoral thromboembolectomy and R AKA completed 6/28/24. Ongoing bleeding from stump c/b need for heparin for other clots, being managed by primary team and vascular medicine. CTA RLE 7/2 with post-surgical changes, possible vascular injury, stable H&H after 2u pRBC.     6/21: Palliative consulted for pt support and GOC.       6/25: Palliative met for a family and care team meeting in pt's room. Discussed current findings via imaging/scans and labs, current complications, cardiac and circulation risks of sx and not doing sx. Pt wishes to move forward with sx. NOK/surrogates agreeable to pt's wishes. Needing cardiac optimization prior to sx. No current infection present.   Pt elects cousins Felicitas and Keli as surrogate decision makers. Keli mentions a recent MPOA completed and will bring in.      6/26: Possible plan for sx today? Palliative rounded on pt for questions/clarifications, positive presence/support, and symptom management. Pt still choosing  "sx to support her goal of \"keep going\". Pt speaking with evident anxiety. Reviewed anxiety reduction strategies. See recs below.     6/27: Pt calm today, feeling better sx was not yesterday and has had time to process. Tentative IVC filter today with amputation and thrombectomy tomorrow. Discussed pain, see recs below. Continued emotional support.     7/3: Rounded with pt and nursing. Pt happy, calm, cooperative, in no pain. Pt feeling happy with her decision that she went through with the amputation. Pointed out and happy with art and music therapies. Endorsed some issues with staying asleep, see recs below.    7/5: Recommendations for anxiety/depression made in respect to pt's Qtc. [facility] discussion with MPOA. Pt remains full code to support pt's goals and wishes.      Palliative Performance Scale (PPS): 30     ----------------------------------------------------------------------------------------------------------------------------------------------------------------------------------------------------------------------------------------------------------------------------------------------------------------------------------------------------------------------        I spent  25 minutes in providing separately identifiable ACP services with the patient and/or surrogate decision maker in a voluntary conversation discussing the patient's wishes and goals as detailed in the above note.   ----------------------------------------------------------------------------------------------------------------------------------------------------------------------------------------------------------------------------------------------------------------------------------------------------------------------------------------------------------------------     #Complex Medical Decision Making  #Goals of Care  #Advanced Care Planning  - Code status: Full code  - Surrogate decision maker: Keli Osborne (sarai) [phone]. Clara " "Wayne (cousin) [phone].  - Goals are survival and improved quality of life.   6/21: Pt with notable expressive aphasia making open ended questioning difficult. Pt able to answer yes and no questions more easily. Pt demonstrating understanding/comprehension of questions asked. Pt agrees that Navin is MPOA and papers are signed. She is upset with him today but is unable to verbalize why. Palliative recapped medication nonadherence and pt agreeable that she stops taking them when she is depressed. When pt asked why she stops taking her medicine, pt starting spelling out her name and saying nonsensical words. Palliative expressed a worry that her depression is causing her to not to want to continue to live. Palliative asked is that is correct, pt stated \"no\". Palliative asked pt earlier about her goals, she stated \"to live\" and \"I can make it\". Pt was unable to articulate any further explanation of her statements.   Pt was asked if her breathing were to be compromised and she needed intubated again (trached) would she want that? Pt hesitated but stated \"yes\".      Palliative attempted to reach out to Navin x3. First call he answered, and said he was on the other line with a doctor and to call back at 1230. Damian did and his phone went to . Gia called back around 1500 and again, right to . Gia then called Pranay to see if Navin has another number, and Bledsoe's went right to . Palliative left a message with call back number for Navin and Pranay.      Will attempt to discuss GOC with returned call from Navin. Otherwise Palliative can follow up Monday or Tuesday.      6/25: Palliative met for a family and care team meeting in pt's room. Those involved included Dr. Simon, Dr. Pat, Dannielle NP, Jennifer Palliative [place], pt, and cousins Felicitas and Keli, and second cousin Marianela. Discussed current findings via imaging/scans and labs, current clot complications, and cardiac and circulation standpoints. Pt now " "requiring right AKA for loss of circulation. Vascular explained risks of AKA sx and not doing sx. Pt wishes to move forward with sx. NOK/new surrogates (Clara and Keli) agreeable to pt's wishes. Vascular and primary team expressed needing cardiac optimization prior to sx. Pt does not currently have an active infection, making this a non-urgent decision. Potential sx in the next few days.   Pt elects cousins Clara and Keli as surrogate decision makers vs Navin, and both Felicitas and Keli expressing the want and willingness to fulfil that position. Keli mentions a recent MPOA was completed with pt and signed by a notary. Keli will bring in.   Palliative reiterated and realigned heard goals of continuing to do everything we are currently doing as life and continued aggressive measures are paramount. Pt and family state \"yes\".     7/5: Spoke to pt's MPOA Keli today r/t follow up on GOC/tx limitations conversation started earlier this week, along with recommendations r/t pt's underlying depression.     Keli supportive of pt starting antidepressant understanding pt is consistently stating depression and a reason for her medication non-adherence.     Keli and Clara previously requesting time to think and talk over DNR/DNI. Palliative expressed high c/f progressive HF and educated to advanced state with low EF ~25%. Expressed concern for survival following a code and the chance of continued or improved QOL would be unlikely. Keli states that she and Clara spoke this over and agree that they and Amirah would want full resuscitative measures if her heart were to stop or go into a lethal arrhythmia. Keli agreeable if a resuscitation event were to occur, pt's team would be transparent and honest with pt's prognosis and expected trajectory and assist with guiding further care that would best honor pt. Pt remains full code.    Will order Palliative Outpatient Navigator at discharge to " assist with early identification and monitoring for HF symptoms.      #Cardiogenic shock  #Acute SARAH  #Shock Liver  - Per primary team  - Kidney and liver levels with continued improvements.   - Pt with multiple admissions r/t medication nonadherence d/t pt stated depression. Recommend Psychiatry consult.  - Will order Palliative Outpatient Navigator at discharge to assist with early identification/tx and monitoring for HF symptoms.         #Acute Pain  - Is s/p Right AKA with thrombectomy. Pt reporting RLE/stump pain with movement or dressing changes. Denies pain of LLE. LUE is tender to touch.  - Has been getting  Hydromorphone 0.4mg Q3H PRN BTP/prior to dressing changes (3 doses in the last 24 hours).  On Oxycodone 10mg Q3H PRN pain (4 doses in the last 24 hours). Is helping pain to a tolerable level.  - Holding off on hepatotoxic meds (Acetaminophen) ISO acute liver injury, although improving.  - Duloxetine 30mg daily for anxiety/depression may also assist with peripheral nerve pain and muscle/bone pain reduction.        #Acute delirium  - Resolved. Continue delirium prevention and safety measures:  - Bed alarm  - Family presence  - Familiar home objects near and insight  - If possible, move pt to a room with a window.  - Continued treatment of any of the following that apply: Pain, infection, nutrition, constipation, hydration, medications, electrolytes.   Delirium Guidelines  Provide glasses, hearing aids and/or communication boards as needed for impairments  Frequent reorientation, minimize room and staff changes  Open blinds during the day, dark/quiet room at night   Minimal interruptions and daytime naps  Early evaluation and intervention by PT, out of bed as tolerated  Minimize use of restraints   Minimize use of benzodiazepines, anticholinergic medications, and opiates (while ensuring adequate treatment of pain)  Keep Mg>2, K>4 (as able)  Ensure regular bowel and bladder function (as able)          #Depression  #Anxiety  - Not currently on antidepressant but home med list notable for Quetiapine 50mg at bedtime. (Not currently taking in pt.) Unclear history. Recommend Psychiatry consult.  - C/f vicious cycle of ongoing depressive symptoms and medication non-adherence ISO worsened health and possible QOL. Recommend Duloxetine 30mg daily. Not known to prolong QTc. Latest EKG 6/22/2024 Qtc 469ms. Recommend repeat EKG for latest evaluation.     #Insomnia  - On Melatonin 6mg PO PRN sleep and scheduled Melatonin 6mg at 1800. Reports sleeping very well.      #Psychosocial Support  - Ongoing pt and family support, positive presence and emotional support  - Music Therapy  - Spiritual Care Support     Plan of Care discussed with: Updated primary team and bedside RN on goals of care decision, medication adjustments, and code status      Medical Decision Making was high level due to high complexity of problems, extensive data review, and high risk of management/treatment.     - Cardiogenic shock, shock liver, acute kidney injury requiring inotrope cardiac support, multiple emboli and acute loss of right lower extremity perfusion requiring AKA, worsening leukocytosis, s/p right AKA c/b post op bleeding and fall,  posing threat to life and function.  - Reviewed external notes from   - Reviews results from  which were used in decision making for   - Recommended the following tests: EKG to check qtc iso starting SSRI  - Assessment required independent historian: nursing and primary team  - Independent interpretation of test: labs   - Discussion of management with: nursing, primary.   - Drug therapy requiring intensive monitoring for toxicity: monitor renal status with meds/HF, mag level, potassium.  - Decision regarding elective major surgery with identified patient or procedure risk factors:   - Decision regarding emergency major surgery:   - Decision regarding hospitalization or escalation of hospital-level care:   -  Decision not to resuscitate or to de-escalate care because of poor prognosis:   - Parenteral controlled substances: heparin, dilaudid,      Thank you for allowing us to participate in the care of this patient. Palliative will continue to follow as needed. Palliative medicine is available Monday-Friday, 8a-6p. Please contact team with any questions or concerns.  Team pager [phone] (weekdays)      JAE Evangelista-CNP

## 2024-07-05 NOTE — PROGRESS NOTES
Art Therapy Note    Amirah Bennett     Therapy Session  Referral Type: New referral this admission  Visit Type: Follow-up visit  Session Start Time: 1516  Session End Time: 1518  Intervention Delivery: In-person              Treatment/Interventions       Post-assessment  Total Session Time (min): 2 minutes    Narrative  Assessment Detail: Pt was eating lunch when ATR attempted session. ATR will f/u another time.  Follow-up: ATR will continue to follow.    Education Documentation  No documentation found.

## 2024-07-05 NOTE — PROGRESS NOTES
"  MEDICINE INPATIENT PROGRESS NOTE    Amirah Bennett is a 43 y.o. female on hospital day 15    Overnight:   Did not require a blood transfusion yesterday.  Heparin drip was restarted without a bolus at 9 PM.  Hemoglobin does appear to slowly downtrend.  Monitoring with q6 CBC checks.    Subjective   Patient did not report that she had as much bleeding from the stump site or groin site as compared to prior days.  She feels okay.         Objective     Last Recorded Vitals  Blood pressure 101/66, pulse 83, temperature 36.5 °C (97.7 °F), resp. rate 21, height 1.702 m (5' 7.01\"), weight 79.7 kg (175 lb 11.3 oz), SpO2 100%.    Intake/Output last 3 Shifts:  I/O last 3 completed shifts:  In: 2061.2 (25.9 mL/kg) [P.O.:1220; I.V.:481.2 (6 mL/kg); Blood:360]  Out: 4745 (59.5 mL/kg) [Urine:4745 (1.7 mL/kg/hr)]  Weight: 79.7 kg     Relevant Results  Results from last 7 days   Lab Units 07/05/24  1222 07/05/24  0559 07/05/24  0029   WBC AUTO x10*3/uL 16.4* 18.4* 18.7*   HEMOGLOBIN g/dL 8.7* 8.9* 8.5*   HEMATOCRIT % 26.4* 27.6* 26.1*   PLATELETS AUTO x10*3/uL 224 226 214     Results from last 7 days   Lab Units 07/05/24  0559 07/04/24  1825 07/04/24  0441   SODIUM mmol/L 131* 133* 132*   POTASSIUM mmol/L 4.6 4.2 4.3   CO2 mmol/L 24 19* 20*   ANION GAP mmol/L 11 13 13   BUN mg/dL 9 10 10   CREATININE mg/dL 0.56 0.54 0.56   GLUCOSE mg/dL 107* 117* 123*   EGFR mL/min/1.73m*2 >90 >90 >90   MAGNESIUM mg/dL 2.23 1.67 1.67   PHOSPHORUS mg/dL 3.6 3.2 3.6      Results from last 7 days   Lab Units 07/05/24  0559 07/04/24  0441 07/03/24  0137   ALT U/L 35 41 54*   AST U/L 30 33 37   ALK PHOS U/L 59 57 55      Results from last 7 days   Lab Units 07/02/24  0351 06/29/24  0304   INR  1.2* 1.3*     Results from last 7 days   Lab Units 07/02/24  0542 07/02/24  0514 07/01/24  1816   FIO2 % 21 21 21                 No lab exists for component: \"BNPRESU\", \"CPKT\"            Physical Exam  Constitutional: in NAD, tearful on 7/3.  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 Can hear airleak.  CV: normal rate, irregular rhythm, no murmurs noted. Cap refill 2+ throughout, extremities warm to touch. Trace edema in LLE   Pulm: CTAB anteriorly, room air  GI: abd soft, tender diffusely, bowel sounds present  Ext: She has equal sensation in bilateral thighs. Significant tenderness to palpation at right stump site No DP in left. Has TP and popliteal in left. S/p Rt AKA. Right groin access site covered, feels firm (per vascular surgery due to how it was sutured).  Dried blood at groin site.    LUE is bruised and swollen but not warm to the touch. had L brachial artery injury after a -line attempt earlier on in hospital course. Previous LUE duplex showed non vascularized mass  Neuro: alert and conversant however only intermittently answering questions appropriately, +expressive aphasia so will initially say no pain, endorsing pain in bilateral legs.     Medications    amiodarone, 200 mg, oral, Daily  aspirin, 81 mg, oral, Daily  atorvastatin, 80 mg, oral, Nightly  bisacodyl, 10 mg, rectal, Daily  [Held by provider] empagliflozin, 10 mg, oral, Daily  melatonin, 6 mg, oral, Daily  oxygen, , inhalation, Continuous - Inhalation  pantoprazole, 40 mg, oral, Daily before breakfast   Or  pantoprazole, 40 mg, intravenous, Daily before breakfast  perflutren protein A microsphere, 0.5 mL, intravenous, Once in imaging  polyethylene glycol, 17 g, oral, BID  sacubitriL-valsartan, 1 tablet, oral, BID  sennosides, 2 tablet, oral, BID  sodium zirconium cyclosilicate, 10 g, oral, Once  sulfur hexafluoride microsphr, 2 mL, intravenous, Once in imaging        heparin, 0-4,500 Units/hr, Last Rate: 1,600 Units/hr (07/05/24 0131)        PRN medications: dextrose, dextrose, glucagon, glucagon, heparin, HYDROmorphone, lidocaine, melatonin, oxyCODONE           Assessment/Plan     HOSPITAL COURSE:  Amirah Bennett is a  42 y.o. 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 RHC c/w cardiogenic shock with CI of 1.6, CVP of 25. She is not candidate for advanced therapies given documented medical nonadherence. Managing medically. Attempted to wean off dobutamine while increasing nitroprusside so stopped dobutamine 6/21 however acutely worsened with increased lactate and worsened SvO2 from 65 to 25 overnight with new abdominal pain and right leg pain concerning for ischemia. Improving parameters, normalized lactate, and pain resolution when dobutamine restarted. Course also c/b SARAH, cardiac thrombi, liver injury likely due to ischemia, and Afib with RVR. Found to have rhabdomyolysis with CK of 14,000, unable to give fluids due to cardiogenic shock. Arterial doppler of lower extremities with echogenic material within right distal femoral and popliteal arteries with absent Doppler flow in the right popliteal artery with significantly diminished flow within the right distal superficial femoral artery significantly diminished flow within the right distal superficial femoral artery, findings raise concern for thrombosis of distal SFA and popliteal artery. Decreased flow within right posterior tibial and peroneal arteries could be through collateralization. Additionally decreased flow in right common femoral artery, right deep femoral artery as well as proximal and mid superficial femoral arteries concerning for stenosis proximal  to right common femoral artery possible within right external iliac right common iliac artery. CTA Aorta with runoff 6/24: aortic bifurcation embolus, R BA-EIA embolus, SFA and popliteal emboli. Vascular surgery recommending right AKA. Bilateral LE duplex with likely early nonocclusive deep venous thrombosis. IVC filter placed 6/27 on recommendation by vascular medicine in preparation for right AKA 6/28. IVC filter should be removed within 3 months. AKA performed 6/28. This was complicated by oozing from the stump site over the next few days. CTA RLE suggested intramuscular hematoma and suspected vascular injury.  Vascular surgery did not feel that intervention was necessary on the stump site.  Patient required 6 blood transfusions over 3 days while on heparin drip.  Decision was made to hold heparin drip for 12 hours to allow hemostasis.  Patient's hemoglobin still downtrended but she did not require transfusion.  Heparin drip was restarted 7/3 at 9 PM.  Ethics involved given lack of documentation of POA and limited family (NOK are cousins). Palliative care consulted. Ongoing GOC discussion but patient remains full code. On 7/4 around 1:30 PM patient had bleeding from her stump. Heparin was stopped with plan to restart in 12 hours. CBC with Hgb 6.9. Received 1 unit pRBC and Hgb incremented to 8.1. Heparin was restarted 12 hours later at 1:30 Am 7/5 and she had no signs of bleeding overnight. On 7/5 AM hgb was 8.9 with afternoon Hgb stable at 8.7. She is stable for transfer to the floor.    Follow up:  [ ] CBC q6hr. Monitor Hgb and signs of bleeding. If starts to bleed again can hold heparin for 12 hours and restart. Vascular surgery and vascular med following. Notify for significant bleed  [ ] When she is stable for discharge will need to discuss with vasc med regarding home going AC plan  [ ] Titrate GDMT as able. Currently on entresto 24/26.   [ ] Follow up further pall care recs. Patient remains full code.  There is ongoing GOC discussion  [ ] Restarted statin today as liver enzymes normalized. Monitor with daily HFP.  [ ] Needs to follow up OP with ENT for trach c/b trancutaneous fistula. Dr. King for closure.  [ ] maintaining dhillon in place to prevent urine from getting in stump     Updates 7/5    -Yesterday around 1:30 PM patient had bleeding from her stump. Heparin was stopped with plan to restart in 12 hours. CBC with Hgb 6.9. Received 1 unit pRBC and Hgb incremented to 8.1. Heparin was restarted 12 hours later at 1:30 Am   - No signs of bleeding overnight.   -AM hgb was 8.9 with afternoon Hgb stable at 8.7.   -maintaining dhillon in place to prevent urine from getting in stump   -Continue entresto 24/26 bid low dose  -GOC discussion ongoing facilitated by palliative care. Patient and cousins (MPOA) wish to continue full code    NEUROLOGIC  #AMS, resolved at her baseline  #ischemic stroke L MCA d/t AFib LLA thrombus seen on echo (lack of OAC adherence) s/p thrombectomy 01/2022 @ [facility] w/ residual expressive aphasia and R sided weakness   ::Neurology consulted 6/25, no new deficits concerning for cerebral event  -no acute changes     #Depression?  #Insomnia  ::Patient's friend states that she doesn't care for herself due to being depressed  -consider psychiatry consult, deferring for now  -told to stop quetiapine 50mg at last discharge  -c/w melatonin 6mg at bedtime     CARDIOVASCULAR  #Cardiogenic shock, improving  #HFrEF (LVEF 20-25%)  ::Not candidate for advanced therapies due to medical nonadherence  ::Weaned off dobutamine and nipride  Plan:  -diuresis as needed -cvp 2 on 7/2, defer  -palliative care consulted given patient not candidate for advanced therapies, appreciate recommendations  -Low-dose Entresto 24/26 twice daily  -previous GDMT was entresto 24-26, spironolactone 25mg, metoprolol succinate 50mg daily, lasix 40mg, jardiance 10mg     #Right limb ischemia  #Arterial emboli  #S/p femoral arterial line  sheath placement now removed  ::CTA Aorta with runoff 6/24: aortic bifurcation embolus, R BA-EIA embolus, SFA and popliteal emboli   ::arterial duplex exam with monophasic right CFA, SFA and PFA signals, absent popliteal flow and monophasic flow in the tibial artery   ::Unable to walk prior to presentation to hospital  ::Right leg has been cold and painful in times where cardiogenic shock has worsened then warm and not painful to touch when out of shock state, fluctuating exam  ::s/p AKA with vascular surgery, 6/28/2024.   ::ongoing bleeding from stump site  ::CTA RLE with hematomas but does not have any obvious source that vascular surgery could intervene upon  Plan:  -Heparin drip   -If patient bleeds from stump notify vasc med  -pain regimen: oxycodone 10mg severe painq 4hr, dilaudid 0.4mg q 3 hr breakthrough - also can use before and after dresssing changes     #Cardiac thrombi  #Right lower extremity DVT  ::Likely in setting of Xarelto nonadherence  ::There are indeterminate low-attenuation nodular filling defects within the atrial appendage. While this may represent contrast under filling, a right atrial appendage thrombus can not be excluded.  ::Rt US duplex with likely early nonocclusive deep venous thrombosis.   Plan:  -Heparin drip     #Afib on Xarelto w/ DCCV 05/2023), ischemic stroke L MCA d/t AFib LLA thrombus seen on echo (lack of OAC adherence) s/p thrombectomy 01/2022 @ [facility] w/ residual expressive aphasia and R sided weakness   ::Episode of RVR in ED  ::Previously prescribed digoxin 250mcg however last fill was 12/2023  ::TSH 6.28H, free T4 1.48  Plan:  -c/w amiodarone 200mg daily  -hold home digoxin 250mcg  -c/w AC per above  -c/w aspirin 81mg  -Restarted atorvastatin 80. Monitor LFTs daily     #Elevated troponin, resolved     #HTN  -holding home medications     PULMONARY  #Dyspnea  #Mild pulmonary edema  ::No evidence of PE  -diuresis per above     #Hx trach c/b trancutaneous fistula  ::ENT had  been consulted 3/2024 for gurgling and mucus drainage, no inpatient interventions required  -monitor for breaths/mucus discharge from trach site  -Cover the tracheocutaneous fistula with tape and gauze and encourage patient to apply pressure when voicing.   -per prior ENT note on last admission   -follow up outpatient ENT for closure (Dr. King)     RENAL/  #Hyponatremia  -monitor RFP and diurese per [place]     #Rhabdomyolysis, improving-Ck peaked > 14,000, trending down as of 6/30  #Metabolic acidosis, resolved  #SARAH, resolved  #Elevated lactate, resolved     GASTROINTESTINAL  #Elevated Tbili (3.2 on admission, from 0.5 in 03/2024)  #Elevated LFTs, downtrending  ::Likely ischemic liver injury  ::RUQ US 7/1 without abnormality beyond hepatic steatosis and liver cyst  -trend CMP     #GERD  -c/w pantoprazole 40mg daily     ENDOCRINE  #T2DM, diet controlled:: HgbA1c 3/2024 5.5     HEME/ONC  #Cardiac thrombi  -see cardiac section for plan     #Arterial thrombosis  #DVT  ::s/p IVC filter 6/27  -heparin drip per above  -further plan per above     #Anemia  ::had 3 units of RBC transfused 6/30 without appropriate incrementation.   ::CTA C/A/P 6/30 without evidence of active hemorrhage within chest/abdomen/pelvis  ::CTA RLE 7/1 without a source that could be intervened upon  Plan:  -Heparin drip resumed  - CBC q6   -Hgb goal >7  given cardiac hx     #Thrombocytopenia  ::Multiple thrombi, DIC score 5 on 6/24/2024  ::Hit score of 6  ::Negative PF4     #Left brachial injury  ::Likely injury  :LUE DVT scan with nonvascularized mass, called CT while patient was there to request CTA of left upper extremity and they said logistically they would be unable to perform both scans, and given protocol for max dosing of contrast, could not give further contrast until 6/25 at 2PM  -no CTA LUE needed      INFECTIOUS DISEASE  #stump infection, treated for 7 days, resolved  ::Procal 0.28  ::CXR without signs of PNA  ::s/p vanc (6/20-6/24)  "(6/27-7/2 ) and zosyn (6/20-6/24), (6/27- p)-restarted vanc/zosyn 6/27 due to elevated leukocytosis and purulent appearing right groin site  ::UA with 1+ blood, 1+ bacteria, no WBC, neg nitrite and leuk esterase  ::6/27 right groin wound culture negative  ::blood cultures x1 6/20, x2 6/22, NGTD   ::blood cultures 6/27, NGTD  Plan:  -discontinued vancomycin 7/2, discontinued zosyn 7/3      #leukocytosis  -may be reactive, has received tx as above       Dispo:  -PT: Moderate intensity level of care  -patient would like enrollment in  home delivery service for medications (she has trouble getting to preferred pharmacy), pharmacy updated to [facility]  -patient's family would like her enrolled in [facility]  -repeat thyroid labs outpatient  -ensure IVC filter removal in 3 months!     N: cardiac  A: pIVs, dhillon   DVT ppx: heparin drip  GI ppx: pantoprazole 40mg     Code Status: FULL CODE per patient and NOKs  *Note that patient LACKS capacity due to inability to express decision and inconsistency in decisions     Surrogate Medical Decision-maker:   Surrogate decision maker is: pt's cousin, Clara Wayne: [phone], Efrain Black: [phone], Keli Osborne: [phone]      Friends who may be helpful in making decisions:  Prior boyfriend Navin Sanches [phone]  Very good friend \"Isiah\" Pranay Owen [phone]    Yandel Huston MD  "

## 2024-07-06 LAB
ABO GROUP (TYPE) IN BLOOD: NORMAL
ANION GAP SERPL CALC-SCNC: 10 MMOL/L (ref 10–20)
ANTIBODY SCREEN: NORMAL
BASOPHILS # BLD AUTO: 0.03 X10*3/UL (ref 0–0.1)
BASOPHILS # BLD AUTO: 0.06 X10*3/UL (ref 0–0.1)
BASOPHILS # BLD AUTO: 0.06 X10*3/UL (ref 0–0.1)
BASOPHILS NFR BLD AUTO: 0.2 %
BASOPHILS NFR BLD AUTO: 0.3 %
BASOPHILS NFR BLD AUTO: 0.4 %
BUN SERPL-MCNC: 10 MG/DL (ref 6–23)
CALCIUM SERPL-MCNC: 8.5 MG/DL (ref 8.6–10.6)
CHLORIDE SERPL-SCNC: 101 MMOL/L (ref 98–107)
CO2 SERPL-SCNC: 24 MMOL/L (ref 21–32)
CREAT SERPL-MCNC: 0.46 MG/DL (ref 0.5–1.05)
EGFRCR SERPLBLD CKD-EPI 2021: >90 ML/MIN/1.73M*2
EOSINOPHIL # BLD AUTO: 0.18 X10*3/UL (ref 0–0.7)
EOSINOPHIL # BLD AUTO: 0.19 X10*3/UL (ref 0–0.7)
EOSINOPHIL # BLD AUTO: 0.19 X10*3/UL (ref 0–0.7)
EOSINOPHIL NFR BLD AUTO: 1.1 %
EOSINOPHIL NFR BLD AUTO: 1.2 %
EOSINOPHIL NFR BLD AUTO: 1.4 %
ERYTHROCYTE [DISTWIDTH] IN BLOOD BY AUTOMATED COUNT: 17.2 % (ref 11.5–14.5)
GLUCOSE BLD MANUAL STRIP-MCNC: 102 MG/DL (ref 74–99)
GLUCOSE BLD MANUAL STRIP-MCNC: 102 MG/DL (ref 74–99)
GLUCOSE BLD MANUAL STRIP-MCNC: 140 MG/DL (ref 74–99)
GLUCOSE SERPL-MCNC: 96 MG/DL (ref 74–99)
HCT VFR BLD AUTO: 26.7 % (ref 36–46)
HCT VFR BLD AUTO: 27 % (ref 36–46)
HCT VFR BLD AUTO: 28.5 % (ref 36–46)
HGB BLD-MCNC: 8.5 G/DL (ref 12–16)
HGB BLD-MCNC: 8.7 G/DL (ref 12–16)
HGB BLD-MCNC: 9.1 G/DL (ref 12–16)
IMM GRANULOCYTES # BLD AUTO: 0.32 X10*3/UL (ref 0–0.7)
IMM GRANULOCYTES # BLD AUTO: 0.38 X10*3/UL (ref 0–0.7)
IMM GRANULOCYTES # BLD AUTO: 0.46 X10*3/UL (ref 0–0.7)
IMM GRANULOCYTES NFR BLD AUTO: 2.5 % (ref 0–0.9)
IMM GRANULOCYTES NFR BLD AUTO: 2.5 % (ref 0–0.9)
IMM GRANULOCYTES NFR BLD AUTO: 2.7 % (ref 0–0.9)
LYMPHOCYTES # BLD AUTO: 2.19 X10*3/UL (ref 1.2–4.8)
LYMPHOCYTES # BLD AUTO: 2.73 X10*3/UL (ref 1.2–4.8)
LYMPHOCYTES # BLD AUTO: 2.98 X10*3/UL (ref 1.2–4.8)
LYMPHOCYTES NFR BLD AUTO: 17.2 %
LYMPHOCYTES NFR BLD AUTO: 17.4 %
LYMPHOCYTES NFR BLD AUTO: 17.8 %
MCH RBC QN AUTO: 29.5 PG (ref 26–34)
MCH RBC QN AUTO: 30.6 PG (ref 26–34)
MCH RBC QN AUTO: 30.7 PG (ref 26–34)
MCHC RBC AUTO-ENTMCNC: 31.8 G/DL (ref 32–36)
MCHC RBC AUTO-ENTMCNC: 31.9 G/DL (ref 32–36)
MCHC RBC AUTO-ENTMCNC: 32.2 G/DL (ref 32–36)
MCV RBC AUTO: 93 FL (ref 80–100)
MCV RBC AUTO: 95 FL (ref 80–100)
MCV RBC AUTO: 96 FL (ref 80–100)
MONOCYTES # BLD AUTO: 1.13 X10*3/UL (ref 0.1–1)
MONOCYTES # BLD AUTO: 1.42 X10*3/UL (ref 0.1–1)
MONOCYTES # BLD AUTO: 1.63 X10*3/UL (ref 0.1–1)
MONOCYTES NFR BLD AUTO: 8.9 %
MONOCYTES NFR BLD AUTO: 9.2 %
MONOCYTES NFR BLD AUTO: 9.5 %
NEUTROPHILS # BLD AUTO: 10.6 X10*3/UL (ref 1.2–7.7)
NEUTROPHILS # BLD AUTO: 11.84 X10*3/UL (ref 1.2–7.7)
NEUTROPHILS # BLD AUTO: 8.89 X10*3/UL (ref 1.2–7.7)
NEUTROPHILS NFR BLD AUTO: 68.9 %
NEUTROPHILS NFR BLD AUTO: 69 %
NEUTROPHILS NFR BLD AUTO: 69.8 %
NRBC BLD-RTO: 0 /100 WBCS (ref 0–0)
NRBC BLD-RTO: 0.2 /100 WBCS (ref 0–0)
NRBC BLD-RTO: 0.2 /100 WBCS (ref 0–0)
PHOSPHATE SERPL-MCNC: 3.8 MG/DL (ref 2.5–4.9)
PLATELET # BLD AUTO: 252 X10*3/UL (ref 150–450)
PLATELET # BLD AUTO: 259 X10*3/UL (ref 150–450)
PLATELET # BLD AUTO: 282 X10*3/UL (ref 150–450)
POTASSIUM SERPL-SCNC: 4.5 MMOL/L (ref 3.5–5.3)
RBC # BLD AUTO: 2.83 X10*6/UL (ref 4–5.2)
RBC # BLD AUTO: 2.88 X10*6/UL (ref 4–5.2)
RBC # BLD AUTO: 2.97 X10*6/UL (ref 4–5.2)
RH FACTOR (ANTIGEN D): NORMAL
SODIUM SERPL-SCNC: 130 MMOL/L (ref 136–145)
UFH PPP CHRO-ACNC: 0.3 IU/ML
WBC # BLD AUTO: 12.7 X10*3/UL (ref 4.4–11.3)
WBC # BLD AUTO: 15.4 X10*3/UL (ref 4.4–11.3)
WBC # BLD AUTO: 17.2 X10*3/UL (ref 4.4–11.3)

## 2024-07-06 PROCEDURE — 2500000001 HC RX 250 WO HCPCS SELF ADMINISTERED DRUGS (ALT 637 FOR MEDICARE OP)

## 2024-07-06 PROCEDURE — 80048 BASIC METABOLIC PNL TOTAL CA: CPT

## 2024-07-06 PROCEDURE — 36415 COLL VENOUS BLD VENIPUNCTURE: CPT

## 2024-07-06 PROCEDURE — 2500000005 HC RX 250 GENERAL PHARMACY W/O HCPCS

## 2024-07-06 PROCEDURE — 85520 HEPARIN ASSAY: CPT

## 2024-07-06 PROCEDURE — 2500000004 HC RX 250 GENERAL PHARMACY W/ HCPCS (ALT 636 FOR OP/ED)

## 2024-07-06 PROCEDURE — 99232 SBSQ HOSP IP/OBS MODERATE 35: CPT

## 2024-07-06 PROCEDURE — 85025 COMPLETE CBC W/AUTO DIFF WBC: CPT

## 2024-07-06 PROCEDURE — 86901 BLOOD TYPING SEROLOGIC RH(D): CPT

## 2024-07-06 PROCEDURE — 86900 BLOOD TYPING SEROLOGIC ABO: CPT

## 2024-07-06 PROCEDURE — 82947 ASSAY GLUCOSE BLOOD QUANT: CPT

## 2024-07-06 PROCEDURE — 2500000002 HC RX 250 W HCPCS SELF ADMINISTERED DRUGS (ALT 637 FOR MEDICARE OP, ALT 636 FOR OP/ED)

## 2024-07-06 PROCEDURE — 1200000002 HC GENERAL ROOM WITH TELEMETRY DAILY

## 2024-07-06 PROCEDURE — 84100 ASSAY OF PHOSPHORUS: CPT

## 2024-07-06 RX ORDER — OXYCODONE HYDROCHLORIDE 5 MG/1
5 TABLET ORAL EVERY 6 HOURS PRN
Status: DISCONTINUED | OUTPATIENT
Start: 2024-07-06 | End: 2024-07-16

## 2024-07-06 RX ORDER — OXYCODONE HYDROCHLORIDE 5 MG/1
5 TABLET ORAL
Status: DISCONTINUED | OUTPATIENT
Start: 2024-07-06 | End: 2024-07-06

## 2024-07-06 RX ORDER — OXYCODONE HYDROCHLORIDE 5 MG/1
5 TABLET ORAL EVERY 6 HOURS PRN
Status: DISCONTINUED | OUTPATIENT
Start: 2024-07-06 | End: 2024-07-06

## 2024-07-06 RX ORDER — HYDROMORPHONE HYDROCHLORIDE 1 MG/ML
0.2 INJECTION, SOLUTION INTRAMUSCULAR; INTRAVENOUS; SUBCUTANEOUS EVERY 4 HOURS PRN
Status: CANCELLED | OUTPATIENT
Start: 2024-07-06

## 2024-07-06 RX ADMIN — OXYCODONE HYDROCHLORIDE 10 MG: 5 TABLET ORAL at 06:04

## 2024-07-06 RX ADMIN — ASPIRIN 81 MG CHEWABLE TABLET 81 MG: 81 TABLET CHEWABLE at 06:04

## 2024-07-06 RX ADMIN — HEPARIN SODIUM 1600 UNITS/HR: 10000 INJECTION, SOLUTION INTRAVENOUS at 12:38

## 2024-07-06 RX ADMIN — SENNOSIDES 17.2 MG: 8.6 TABLET, FILM COATED ORAL at 21:00

## 2024-07-06 RX ADMIN — SACUBITRIL AND VALSARTAN 1 TABLET: 24; 26 TABLET, FILM COATED ORAL at 10:38

## 2024-07-06 RX ADMIN — SACUBITRIL AND VALSARTAN 1 TABLET: 24; 26 TABLET, FILM COATED ORAL at 21:00

## 2024-07-06 RX ADMIN — SENNOSIDES 17.2 MG: 8.6 TABLET, FILM COATED ORAL at 10:38

## 2024-07-06 RX ADMIN — DULOXETINE HYDROCHLORIDE 30 MG: 30 CAPSULE, DELAYED RELEASE ORAL at 10:38

## 2024-07-06 RX ADMIN — Medication 6 MG: at 18:46

## 2024-07-06 RX ADMIN — Medication 2 L/MIN: at 08:00

## 2024-07-06 RX ADMIN — OXYCODONE HYDROCHLORIDE 5 MG: 5 TABLET ORAL at 21:00

## 2024-07-06 RX ADMIN — OXYCODONE HYDROCHLORIDE 10 MG: 5 TABLET ORAL at 01:42

## 2024-07-06 RX ADMIN — AMIODARONE HYDROCHLORIDE 200 MG: 200 TABLET ORAL at 10:38

## 2024-07-06 RX ADMIN — ATORVASTATIN CALCIUM 80 MG: 80 TABLET, FILM COATED ORAL at 21:00

## 2024-07-06 RX ADMIN — PANTOPRAZOLE SODIUM 40 MG: 40 TABLET, DELAYED RELEASE ORAL at 06:04

## 2024-07-06 RX ADMIN — POLYETHYLENE GLYCOL 3350 17 G: 17 POWDER, FOR SOLUTION ORAL at 21:00

## 2024-07-06 ASSESSMENT — COGNITIVE AND FUNCTIONAL STATUS - GENERAL
MOBILITY SCORE: 10
PERSONAL GROOMING: A LITTLE
STANDING UP FROM CHAIR USING ARMS: TOTAL
EATING MEALS: A LITTLE
HELP NEEDED FOR BATHING: A LITTLE
STANDING UP FROM CHAIR USING ARMS: TOTAL
DAILY ACTIVITIY SCORE: 16
WALKING IN HOSPITAL ROOM: TOTAL
TOILETING: A LOT
WALKING IN HOSPITAL ROOM: TOTAL
DAILY ACTIVITIY SCORE: 16
PERSONAL GROOMING: A LITTLE
EATING MEALS: A LITTLE
TURNING FROM BACK TO SIDE WHILE IN FLAT BAD: A LITTLE
MOVING TO AND FROM BED TO CHAIR: TOTAL
MOVING FROM LYING ON BACK TO SITTING ON SIDE OF FLAT BED WITH BEDRAILS: A LITTLE
TURNING FROM BACK TO SIDE WHILE IN FLAT BAD: A LITTLE
MOBILITY SCORE: 10
DRESSING REGULAR LOWER BODY CLOTHING: A LOT
CLIMB 3 TO 5 STEPS WITH RAILING: TOTAL
HELP NEEDED FOR BATHING: A LITTLE
DRESSING REGULAR UPPER BODY CLOTHING: A LITTLE
CLIMB 3 TO 5 STEPS WITH RAILING: TOTAL
DRESSING REGULAR UPPER BODY CLOTHING: A LITTLE
MOVING FROM LYING ON BACK TO SITTING ON SIDE OF FLAT BED WITH BEDRAILS: A LITTLE
DRESSING REGULAR LOWER BODY CLOTHING: A LOT
TOILETING: A LOT
MOVING TO AND FROM BED TO CHAIR: TOTAL

## 2024-07-06 ASSESSMENT — PAIN SCALES - GENERAL
PAINLEVEL_OUTOF10: 0 - NO PAIN
PAINLEVEL_OUTOF10: 2
PAINLEVEL_OUTOF10: 10 - WORST POSSIBLE PAIN
PAINLEVEL_OUTOF10: 5 - MODERATE PAIN
PAINLEVEL_OUTOF10: 5 - MODERATE PAIN

## 2024-07-06 ASSESSMENT — PAIN - FUNCTIONAL ASSESSMENT
PAIN_FUNCTIONAL_ASSESSMENT: 0-10

## 2024-07-06 ASSESSMENT — ACTIVITIES OF DAILY LIVING (ADL): LACK_OF_TRANSPORTATION: NO

## 2024-07-06 NOTE — PROGRESS NOTES
Functional Status: Alert, Oriented to Place and person, Bed bound, tolerate orally.     Background: obtained from the patient & EMR.  Amirah Bennett is a 43 y.o. female known to have:   -HFrEF (LVEF 20-25% (2022), with prior history of cardiogenic shock 2022.  - Afib on Rivaroxaban w/ DCCV 2023)  - Ischemic stroke L MCA d/t AFib LLA thrombus seen on echo (lack of OAC adherence) s/p thrombectomy 2022 @ [facility] w/ residual expressive aphasia and R sided weakness, recent 2024 left cerebellar MCA.  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  - T2DM (2024 HgbA1c 5.5).   -Hypertension.     Reason for Hospital admission: 2024  Admitted through: EMS. Cardiogenic shock.  Subjective:   -No active issues today, resumed heparin drip without stump bleeding, transferred [facility] CCU to [facility].   -Tolerate orally, passes bowel motions.   Systemic review: Limited.   Complained or right stump pain.     Vital signs   Temp:  36.2 °C (97.2 °F)  Heart Rate:  90  Resp:  19  BP: ()/(57-79) 106/71  Temp (24hrs), Av.2 °C (97.1 °F)     24 hour Intake/Output:    Intake/Output Summary (Last 24 hours) at 2024  Last data filed at 2024 1800  Gross per 24 hour   Intake 2723.73 ml   Output 2445 ml   Net 278.73 ml     Net IO Since Admission: -22,644.25 mL [24]   Vitals:    24 0600   Weight:     79.7 kg (175 lb 11.3 oz)     Weight change: -1.6 kg (-3 lb 8.4 oz)     Examination:   -Patient was conscious, oriented , place and person. wasn't in acute respiratory distress, not in jaundiced nor cyanosed. On Folly's Catheter, removed right femoral line.   -Cardiovascular examination: Regular bilateral pulse. JVP was not distended. Audible 1st and 2nd heart sound without added sounds or murmurs.  -Lower limb examination: amputated right above knee amputation, No pitting edema or signs of deep vein thrombosis.    -Chest examination: Bilateral vesicular breathing bilateral basal  crackles.   -Abdominal examination: Soft and lax abdomen, no rigidity nor rebound tenderness.   -Neurological examination: limited, symmetrical facial impression, equally reactive pupils, right hand weakness.       LABS:  CBC RFP   Lab Results   Component Value Date    WBC 16.4 (H) 07/05/2024    HGB 8.7 (L) 07/05/2024    HCT 26.4 (L) 07/05/2024    MCV 94 07/05/2024     07/05/2024    NEUTROABS 11.51 (H) 07/05/2024    Lab Results   Component Value Date     (L) 07/05/2024    K 4.6 07/05/2024     07/05/2024    CO2 24 07/05/2024    BUN 9 07/05/2024    CREATININE 0.56 07/05/2024    CREATININE 0.54 07/04/2024     Lab Results   Component Value Date    MG 2.23 07/05/2024    PHOS 3.6 07/05/2024    CALCIUM 8.2 (L) 07/05/2024         Hepatic Function ABG/VBG   Lab Results   Component Value Date    ALT 35 07/05/2024    AST 30 07/05/2024    ALKPHOS 59 07/05/2024     Lab Results   Component Value Date    BILIDIR 1.5 (H) 07/05/2024      Lab Results   Component Value Date    PROTIME 13.0 (H) 07/02/2024    APTT 89 (H) 07/02/2024    INR 1.2 (H) 07/02/2024    Lab Results   Component Value Date    LACTATE 0.6 06/26/2024        Micro/culture data:  No results found for the last 90 days.    Imaging:   Vascular US Lower Extremity Arterial Duplex 06/23/2024  1. Echogenic material within the right distal femoral and popliteal  arteries with  absent Doppler flow in the right popliteal artery  significantly diminished flow within the right distal superficial  femoral artery. Findings raise concern for thrombosis of the distal  SFA and popliteal artery. Decreased flow within the right posterior  tibial and peroneal arteries could be through collateralization.  2. Decreased flow within the right common femoral artery, right deep  femoral artery as well as the proximal and mid superficial femoral  arteries, raising concern for stenosis proximal to the right common  femoral artery, possibly within the right external iliac right  common  iliac artery. A CTA of the lower extremities can be obtained for  further evaluation.  3. Unremarkable Doppler interrogation of the left lower extremity  arteries.      Cardiac:  Transthoracic Echo (TTE) Complete With Contrast : 06/22/2024   1. Left ventricular ejection fraction is severely decreased, by visual estimate at 20-25%.   2. There is global hypokinesis of the left ventricle with minor regional variations.   3. Left ventricular diastolic filling was indeterminate.   4. Mildly enlarged right ventricle.   5. There is mildly reduced right ventricular systolic function.   6. The left atrium is severely dilated.   7. Moderate to severe tricuspid regurgitation visualized.   8. Moderately elevated right ventricular systolic pressure.   9. TR has worsened.        Current Medications:  Scheduled Medications:  PRN Medications:    amiodarone, 200 mg, oral, Daily  aspirin, 81 mg, oral, Daily  atorvastatin, 80 mg, oral, Nightly  bisacodyl, 10 mg, rectal, Daily  DULoxetine, 30 mg, oral, Daily  [Held by provider] empagliflozin, 10 mg, oral, Daily  melatonin, 6 mg, oral, Daily  oxygen, , inhalation, Continuous - Inhalation  pantoprazole, 40 mg, oral, Daily before breakfast   Or  pantoprazole, 40 mg, intravenous, Daily before breakfast  perflutren protein A microsphere, 0.5 mL, intravenous, Once in imaging  polyethylene glycol, 17 g, oral, BID  sacubitriL-valsartan, 1 tablet, oral, BID  sennosides, 2 tablet, oral, BID  sodium zirconium cyclosilicate, 10 g, oral, Once  sulfur hexafluoride microsphr, 2 mL, intravenous, Once in imaging     PRN medications: dextrose, dextrose, glucagon, glucagon, heparin, HYDROmorphone, lidocaine, oxyCODONE    heparin, 0-4,500 Units/hr, Last Rate: 1,600 Units/hr (07/05/24 1631)         Hospital Course Problem lists:  Amirah Bennett is a 43 y.o. female with significant PMHx of HFrEF (LVEF 20-25% (08/2022), with prior history of cardiogenic shock 04/2022 Afib on Xarelto w/ DCCV 05/2023),  ischemic stroke L MCA d/t AFib LLA thrombus seen on echo (lack of OAC adherence) s/p thrombectomy 01/2022 @ [facility] w/ residual expressive aphasia and R sided weakness, recent 03/2024 left cerebellar MCA, paratracheal abscess s/p tracheostomy c/b tracheocutaneous fistula, T2DM (03/2024 HgbA1c 5.5) and HTN presenting with dyspnea and leg swelling, found to have elevated lactate to 14.7, cold extremities, and 3+ pitting edema. RHC c/w cardiogenic shock with CI of 1.6, CVP of 25. She is not candidate for advanced therapies given documented medical nonadherence. Managing medically. Attempted to wean off dobutamine while increasing nitroprusside so stopped dobutamine 6/21 however acutely worsened with increased lactate and worsened SvO2 from 65 to 25 overnight with new abdominal pain and right leg pain concerning for ischemia. Improving parameters, normalized lactate, and pain resolution when dobutamine restarted. Course also c/b SARAH, cardiac thrombi, liver injury likely due to ischemia, and Afib with RVR. Found to have rhabdomyolysis with CK of 14,000, unable to give fluids due to cardiogenic shock. Arterial doppler of lower extremities with echogenic material within right distal femoral and popliteal arteries with absent Doppler flow in the right popliteal artery with significantly diminished flow within the right distal superficial femoral artery significantly diminished flow within the right distal superficial femoral artery, findings raise concern for thrombosis of distal SFA and popliteal artery. Decreased flow within right posterior tibial and peroneal arteries could be through collateralization. Additionally decreased flow in right common femoral artery, right deep femoral artery as well as proximal and mid superficial femoral arteries concerning for stenosis proximal to right common femoral artery possible within right external iliac right common iliac artery. CTA Aorta with runoff 6/24: aortic bifurcation embolus,  R BA-EIA embolus, SFA and popliteal emboli. Vascular surgery recommending right AKA. Bilateral LE duplex with likely early nonocclusive deep venous thrombosis. IVC filter placed 6/27 on recommendation by vascular medicine in preparation for right AKA 6/28. IVC filter should be removed within 3 months. AKA performed 6/28. This was complicated by oozing from the stump site over the next few days. CTA RLE suggested intramuscular hematoma and suspected vascular injury.  Vascular surgery did not feel that intervention was necessary on the stump site.  Patient required 6 blood transfusions over 3 days while on heparin drip.  Decision was made to hold heparin drip for 12 hours to allow hemostasis.  Patient's hemoglobin still downtrended but she did not require transfusion.  Heparin drip was restarted 7/3 at 9 PM.  Ethics involved given lack of documentation of POA and limited family (NOK are cousins). Palliative care consulted. Ongoing GOC discussion but patient remains full code. On 7/4 around 1:30 PM patient had bleeding from her stump. Heparin was stopped with plan to restart in 12 hours. CBC with Hgb 6.9. Received 1 unit pRBC and Hgb incremented to 8.1. Heparin was restarted 12 hours later at 1:30 Am 7/5 and she had no signs of bleeding overnight. On 7/5 AM hgb was 8.9 with afternoon Hgb stable at 8.7. She is stable for transfer to the floor.      Heart failure with reduced ejection fraction:   -HFrEF (LVEF 20-25%)  ::Not candidate for advanced therapies due to medical nonadherence  Plan:  -palliative care consulted given patient not candidate for advanced therapies.  -Aim to resume quadruple GDMT was entresto 24-26, spironolactone 25mg, metoprolol succinate 50mg daily, Empa, lasix 40mg.     Right limb ischemia secondary to Arterial emboli, S/P above knee amputation:  #S/p femoral arterial line sheath placement now removed  ::CTA Aorta with runoff 6/24: aortic bifurcation embolus, R BA-EIA embolus, SFA and popliteal  emboli   ::arterial duplex exam with monophasic right CFA, SFA and PFA signals, absent popliteal flow and monophasic flow in the tibial artery   ::Unable to walk prior to presentation to hospital  ::Right leg has been cold and painful in times where cardiogenic shock has worsened then warm and not painful to touch when out of shock state, fluctuating exam  ::s/p AKA with vascular surgery, 6/28/2024.   ::ongoing bleeding from stump site  ::CTA RLE with hematomas but does not have any obvious source that vascular surgery could intervene upon  Plan:  -Cont. Heparin drip   -If patient bleeds from stump notify vasc med  -pain regimen: oxycodone 10mg severe painq 4hr, dilaudid 0.4mg q 3 hr breakthrough,  before and after dresssing changes      Query Cardiac thrombi,  #Right lower extremity DVT, and arterial thrombi,s/p IVC filter 6/27 .   ::Likely in setting of Rivaroxaban no adherence  ::There are indeterminate low-attenuation nodular filling defects within the atrial appendage. While this may represent contrast under filling, a right atrial appendage thrombus can not be excluded.  ::Rt US duplex with likely early nonocclusive deep venous thrombosis.   Plan:  -Cont. Heparin drip     Atrial fibrillation:   on was on rivaroxaban w/ DCCV 05/2023), ischemic stroke L MCA d/t AFib LLA thrombus seen on echo (lack of OAC adherence) s/p thrombectomy 01/2022 @ [facility] w/ residual expressive aphasia and R sided weakness   ::Episode of RVR in ED  ::Previously prescribed digoxin 250mcg however last fill was 12/2023  ::TSH 6.28H, free T4 1.48  Plan:  -c/w amiodarone 200mg daily  -hold home digoxin 250mcg  -c/w aspirin 81mg  -Restarted atorvastatin 80. Monitor LFTs daily     Direct Hyperbilirubinemia, Likely following ischemic liver injury  Tbili (3.2 on admission, from 0.5 in 03/2024)  #Elevated LFTs, downtrending  ::RUQ US 7/1 without abnormality beyond hepatic steatosis and liver cyst  -trend CMP    Hx trach c/b trancutaneous  fistula  ::ENT had been consulted 3/2024 for gurgling and mucus drainage, no inpatient interventions required  -monitor for breaths/mucus discharge from trach site  -Cover the tracheocutaneous fistula with tape and gauze and encourage patient to apply pressure when voicing.   -follow up outpatient ENT for closure (Dr. King)      Anemia:  ::had 3 units of RBC transfused 6/30 without appropriate incrementation.   ::CTA C/A/P 6/30 without evidence of active hemorrhage within chest/abdomen/pelvis  ::CTA RLE 7/1 without a source that could be intervened upon  Plan:  - CBC q6   -Hgb goal >7  given cardiac hx     Thrombocytopenia:  ::Multiple thrombi, DIC score 5 on 6/24/2024  ::Hit score of 6  ::Negative PF4     #Left brachial injury  ::Likely injury  :LUE DVT scan with nonvascularized mass, called CT while patient was there to request CTA of left upper extremity and they said logistically they would be unable to perform both scans, and given protocol for max dosing of contrast, could not give further contrast until 6/25 at 2PM  -no CTA LUE needed        Insomnia  -Continue melatonin 6mg at bedtime    Resolved:   -Rhabdomyolysis.  -Metabolic acidosis, Lactic acidosis.   -Acute kidney injury.    -stump infection, treated for 7 days, s/p vanc (6/20-6/24) (6/27-7/2 ) and zosyn (6/20-6/24), (6/27- p)-restarted vanc/zosyn 6/27 due to elevated leukocytosis and purulent appearing right groin site      ICU Disposition Plan:  -patient would like enrollment in  home delivery service for medications (she has trouble getting to preferred pharmacy), pharmacy updated to [facility]  -patient's family would like her enrolled in [employer]  -repeat thyroid labs outpatient  - IVC filter removal in 3 months!  [ ] When she is stable for discharge will need to discuss with vasc med regarding home going AC plan  [ ] Titrate GDMT as able. Currently on entresto 24/26.   [ ] Follow up further pall care recs. Patient remains full code. There is ongoing  "GOC discussion  [ ] Restarted statin today as liver enzymes normalized. Monitor with daily HFP.  [ ] Needs to follow up OP with ENT for trach c/b trancutaneous fistula. Dr. King for closure.      Prevention:   Fall: High.   Mobility: Physical therapy referral.   DVT: Rejected, on heparin infusion.   Diet: Cardiac Diet    Code status: Full Code  Emergency contact:   * patient LACKS capacity due to inability to express decision and inconsistency in decisions     Surrogate Medical Decision-maker:   Surrogate decision maker is: pt's cousin, Clara Wayne: [phone], Efrain Black: [phone], Keli Osborne: [phone]      Friends who may be helpful in making decisions:  Prior boyfriend Navin Sanches [phone]  Close friend \"Isiah\" Pranay Owen [phone]  "

## 2024-07-06 NOTE — PROGRESS NOTES
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 female  has a past medical history of CHF (congestive heart failure) (Multi) and Stroke (Multi). Patient presented with Afib RVR, significant heart failure in cardiogenic shock. Vascular surgery consulted for acute limb ischemia, however, was found to be [place] 3 with motor and sensory loss, paralysis of the right leg from advanced ischemia time. S/p R AKA with post op course c/b likely blood loss anemia with multiple blood transfusions. Has been stable for last 48 hours on heparin with stable Hgb. Stump site is well appearing, soft, no drainage or ooze.      Recommendations   - stump is well appearing with no recent transfusions while on heparin  - continue to wrap with ACE for light compression of stump site  - patient will follow up with Dr. De La Rosa in clinic for staples removal (2024)    Vascular surgery team to sign off at  this time. Please page with further questions.    Discussed with Dr Ward.     Mara Oro MD  PGY-2 Vascular Surgery Resident  [id number]          Subjective   SUBJECTIVE  Patient feels well this morning, well controlled pain at R aka site. No headaches, no dizziness, no SOB, no chest pain. Tolerating diet, voiding.        Objective   OBJECTIVE  Last Recorded Vitals  Heart Rate:  [77-90]   Temp:  [36.2 °C (97.2 °F)-36.7 °C (98.1 °F)]   Resp:  [13-21]   BP: ()/(58-73)   Weight:  [80.5 kg (177 lb 7.5 oz)]   SpO2:  [96 %-100 %]   Physical Exam:  Vascular Physical Exam  Constitutional: no acute distress  Neuro: A/O x4, no gross deficits   Psych: normal affect  HEENT: No deformities, no scleral icterus   Cardiac: RRR  Pulmonary: unlabored respirations   Abdomen: soft, non distended, non tender  Skin: warm and dry overall    Extremities: dressing taken down at bedside, ACE with no strikethrough. No drainage from wound, staples in place, no necrosis of skin, thigh compartments soft and nontender. Rewrapped with ACE for gentle compression.

## 2024-07-06 NOTE — PROGRESS NOTES
Functional Status: Alert, Oriented to Place and person, Bed bound, tolerate orally.      Background: obtained from the patient & EMR.  Amirah Bennett is a 43 y.o. female known to have:   -HFrEF (LVEF 20-25% (2022), with prior history of cardiogenic shock 2022.  - Afib on Rivaroxaban w/ DCCV 2023)  - Ischemic stroke L MCA d/t AFib LLA thrombus seen on echo (lack of OAC adherence) s/p thrombectomy 2022 @ [facility] w/ residual expressive aphasia and R sided weakness, recent 2024 left cerebellar MCA.  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  - T2DM (2024 HgbA1c 5.5).   -Hypertension.      Reason for Hospital admission: 2024  Admitted through: EMS. Cardiogenic shock.  Subjective:   -No active issues last night, nor nursing concerns.   -Tolerate orally, passes bowel motions.   Systemic review: Limited.   Complained or right stump pain.     Vital signs   Temp:  [36.2 °C (97.2 °F)-36.7 °C (98.1 °F)] 36.3 °C (97.3 °F)  Heart Rate:  [77-90] 85  Resp:  [13-19] 19  BP: ()/(58-73) 99/65  Temp (24hrs), Av.4 °C (97.5 °F), Min:36.2 °C (97.2 °F), Max:36.7 °C (98.1 °F)     24 hour Intake/Output:    Intake 820 ml   Output 2700 ml   Net -1880 ml     Net IO Since Admission: -25,344.25 mL [24 1305]   Vitals:    24 0415   Weight: 80.5 kg (177 lb 7.5 oz)     Weight change: 0.8 kg (1 lb 12.2 oz)     Examination:   -Patient was conscious, oriented  place and person only . wasn't in acute respiratory distress, not in jaundiced nor cyanosed. On Folly's Catheter, removed right femoral line.   -Cardiovascular examination: Regular bilateral pulse. JVP was not distended. Audible 1st and 2nd heart sound without added sounds or murmurs.  -Lower limb examination: amputated right above knee amputation, No pitting edema or signs of deep vein thrombosis.    -Chest examination: Bilateral vesicular breathing bilateral basal crackles.   -Abdominal examination: Soft and lax abdomen, no  rigidity nor rebound tenderness.   -Neurological examination: limited, symmetrical facial impression, equally reactive pupils, right hand weakness.       LABS:  CBC RFP   Lab Results   Component Value Date    WBC 15.4 (H) 07/06/2024    HGB 8.7 (L) 07/06/2024    HCT 27.0 (L) 07/06/2024    MCV 95 07/06/2024     07/06/2024    NEUTROABS 10.60 (H) 07/06/2024    Lab Results   Component Value Date     (L) 07/06/2024    K 4.5 07/06/2024     07/06/2024    CO2 24 07/06/2024    BUN 10 07/06/2024    CREATININE 0.46 (L) 07/06/2024    CREATININE 0.56 07/05/2024     Lab Results   Component Value Date    MG 2.23 07/05/2024    PHOS 3.8 07/06/2024    CALCIUM 8.5 (L) 07/06/2024         Hepatic Function ABG/VBG   Lab Results   Component Value Date    ALT 35 07/05/2024    AST 30 07/05/2024    ALKPHOS 59 07/05/2024     Lab Results   Component Value Date    BILIDIR 1.5 (H) 07/05/2024      Lab Results   Component Value Date    PROTIME 13.0 (H) 07/02/2024    APTT 89 (H) 07/02/2024    INR 1.2 (H) 07/02/2024    Lab Results   Component Value Date    LACTATE 0.6 06/26/2024        Micro/culture data:  No results found for the last 90 days.    Micro/culture data:  No results found for the last 90 days.     Imaging:   Vascular US Lower Extremity Arterial Duplex 06/23/2024  1. Echogenic material within the right distal femoral and popliteal  arteries with  absent Doppler flow in the right popliteal artery  significantly diminished flow within the right distal superficial  femoral artery. Findings raise concern for thrombosis of the distal  SFA and popliteal artery. Decreased flow within the right posterior  tibial and peroneal arteries could be through collateralization.  2. Decreased flow within the right common femoral artery, right deep  femoral artery as well as the proximal and mid superficial femoral  arteries, raising concern for stenosis proximal to the right common  femoral artery, possibly within the right external  iliac right common  iliac artery. A CTA of the lower extremities can be obtained for  further evaluation.  3. Unremarkable Doppler interrogation of the left lower extremity  arteries.     Cardiac:  Transthoracic Echo (TTE) Complete With Contrast : 06/22/2024   1. Left ventricular ejection fraction is severely decreased, by visual estimate at 20-25%.   2. There is global hypokinesis of the left ventricle with minor regional variations.   3. Left ventricular diastolic filling was indeterminate.   4. Mildly enlarged right ventricle.   5. There is mildly reduced right ventricular systolic function.   6. The left atrium is severely dilated.   7. Moderate to severe tricuspid regurgitation visualized.   8. Moderately elevated right ventricular systolic pressure.   9. TR has worsened.     Current Medications:  Scheduled  PRN Medications:    amiodarone, 200 mg, oral, Daily  aspirin, 81 mg, oral, Daily  atorvastatin, 80 mg, oral, Nightly  bisacodyl, 10 mg, rectal, Daily  DULoxetine, 30 mg, oral, Daily  [Held by provider] empagliflozin, 10 mg, oral, Daily  melatonin, 6 mg, oral, Daily  oxygen, , inhalation, Continuous - Inhalation  pantoprazole, 40 mg, oral, Daily before breakfast   Or  pantoprazole, 40 mg, intravenous, Daily before breakfast  perflutren protein A microsphere, 0.5 mL, intravenous, Once in imaging  polyethylene glycol, 17 g, oral, BID  sacubitriL-valsartan, 1 tablet, oral, BID  sennosides, 2 tablet, oral, BID  sodium zirconium cyclosilicate, 10 g, oral, Once  sulfur hexafluoride microsphr, 2 mL, intravenous, Once in imaging. PRN medications: dextrose, dextrose, glucagon, glucagon, heparin, HYDROmorphone, lidocaine, oxyCODONE    heparin, 0-4,500 Units/hr, Last Rate: 1,600 Units/hr (07/06/24 1238)          Hospital Course Problem lists:  Amirah Bennett is a 43 y.o. female with significant PMHx of HFrEF (LVEF 20-25% (08/2022), with prior history of cardiogenic shock 04/2022 Afib on Xarelto w/ DCCV 05/2023),  ischemic stroke L MCA d/t AFib LLA thrombus seen on echo (lack of OAC adherence) s/p thrombectomy 01/2022 @ [facility] w/ residual expressive aphasia and R sided weakness, recent 03/2024 left cerebellar MCA, paratracheal abscess s/p tracheostomy c/b tracheocutaneous fistula, T2DM (03/2024 HgbA1c 5.5) and HTN.     On 20th June, Patient presented with dyspnea and leg swelling, suggesting cardiogenic shock with elevated lactate to 14.7, cold extremities, and 3+ pitting edema.  Patient's hospital course was complicated with SARAH, transaminases, and cardiac thrombi in setting of Afib with RVR following cardiogenic shock.   RHC c/w with CI of 1.6, CVP of 25, requiring inotropes initiation with difficult weaning off trials, on the following day patient had worsened SvO2 from 65 to 25 with rapid lactate increase and associated with acute abdominal pain and right leg pain concerning for limb ischemia, and rhabdomyolysis with CK of 14,000, limiting IV hydrations due to ongoing cardiogenic shock.  Arterial doppler of lower extremities reported absent flow in the right popliteal artery with significantly diminished flow within the right distal superficial femoral artery, concerning for thrombosis. Additionally decreased flow in right common femoral artery, right deep femoral artery concerning for stenosis proximal to right common femoral artery. CTA Aorta with runoff 6/24: aortic bifurcation embolus, R BA-EIA embolus, SFA and popliteal emboli.   Vascular medicine recommended IVC filter insertion on 6/27 in preparation for right AKA 6/28. Which was complicated by 3 days persistent stump site oozing & intramuscular hematoma on CTA RLE, which was managed conservatively with 6 blood transfusions while on heparin infusion necessitation to hold heparin drip for 12 hours to allow hemostasis. On the following day patient had similar stump bleeding with Hgb 6.9 requiring an additional 1pRBC which incremented to 8.1. Heparin was restarted 12  hours later at 1:30 Am 7/5 and she had no signs of bleeding overnight. With stable hemostasis Hemoglobin 8.9.   On 6th July, patient was hemodynamically stable and transferred to cardiology floor.     Patient was labeled is not candidate for advanced therapies given documented medical no adherence. Managing medically.   Given the lack of documentation POA and limited family (NOK are cousins). ICU taem involved Ethics & Palliative care.     Heart failure with reduced ejection fraction:   -HFrEF (LVEF 20-25%)  -Patient was labeled not candidate for advanced therapies due to medical nonadherence  Plan:  -palliative care follow up.  -Aim to resume quadruple GDMT was entresto 24-26, spironolactone 25mg, metoprolol succinate 50mg daily, Empa, lasix 40mg.     Right limb ischemia secondary to Arterial emboli, S/P above right knee amputation:  #S/p femoral arterial line sheath placement now removed  -CTA Aorta with runoff 6/24: aortic bifurcation embolus, R BA-EIA embolus, SFA and popliteal emboli   -arterial duplex exam with monophasic right CFA, SFA and PFA signals, absent popliteal flow and monophasic flow in the tibial artery   -Unable to walk prior to presentation to hospital  -Right leg has been cold and painful in times where cardiogenic shock has worsened then warm and not painful to touch when out of shock state, fluctuating exam  -s/p AKA with vascular surgery, 6/28/2024.   Plan:  -Cont. low dose Heparin infusion, without boluses.    -If patient bleeds from stump notify vasc med  -pain regimen: oxycodone 10mg severe pain 4hr, PRN dilaudid 0.2mg q 4 hr breakthrough,  before and after dresssing changes.   -Start Duloxetine.     Query Cardiac thrombi:  -Likely in setting of Atrial fibrillation with Rivaroxaban no adherence  -There are indeterminate low-attenuation nodular filling defects within the atrial appendage.   --Right lower extremity DVT, and arterial thrombus/p IVC filter 6/27 .   Plan:  -Cont. Heparin  drip     Atrial fibrillation:   on was on rivaroxaban w/ DCCV 05/2023), ischemic stroke L MCA d/t AFib LLA thrombus seen on echo (lack of OAC adherence) s/p thrombectomy 01/2022 @ [facility] w/ residual expressive aphasia and R sided weakness   -Previously prescribed digoxin 250mcg however last fill was 12/2023  -TSH 6.28H, free T4 1.48  Plan:  -Continue amiodarone 200mg daily  -hold home digoxin 250mcg  - Continue aspirin 81mg  -Restarted atorvastatin 80. Monitor LFTs daily     Direct Hyperbilirubinemia, Likely following ischemic liver injury  Tbili (3.2 on admission, from 0.5 in 03/2024)  #Elevated LFTs, downtrending  ::RUQ US 7/1 without abnormality beyond hepatic steatosis and liver cyst  Plan:   [place] Liver enzymes.      Hx trach c/b trancutaneous fistula  -ENT had been consulted 3/2024 for gurgling and mucus drainage, no inpatient interventions required  -monitor for breaths/mucus discharge from trach site  -Cover the tracheocutaneous fistula with tape and gauze and encourage patient to apply pressure when voicing.   -follow up outpatient ENT for closure (Dr. King)      Anemia:  ::had 3 units of RBC transfused 6/30 without appropriate incrementation.   ::CTA C/A/P 6/30 without evidence of active hemorrhage within chest/abdomen/pelvis  ::CTA RLE 7/1 without a source that could be intervened upon  Plan:  - CBC q6   -Hgb goal >7  given cardiac hx     Thrombocytopenia:  -Multiple thrombi, DIC score 5 on 6/24/2024  -Hit score of 6  -Negative PF4     Left brachial injury  Likely injury  LUE DVT scan with nonvascularized mass, called CT while patient was there to request CTA of left upper extremity and they said logistically they would be unable to perform both scans, and given protocol for max dosing of contrast, could not give further contrast until 6/25 at 2PM  -no CTA LUE needed        Insomnia  -Continue melatonin 6mg at bedtime     Resolved:   -Rhabdomyolysis.  -Metabolic acidosis, Lactic acidosis.   -Acute kidney  "injury.    -stump infection, treated for 7 days, s/p vanc (6/20-6/24) (6/27-7/2 ) and zosyn (6/20-6/24), (6/27- p)-restarted vanc/zosyn 6/27 due to elevated leukocytosis and purulent appearing right groin site     ICU Disposition Plan:  -patient would like enrollment in  home delivery service for medications (she has trouble getting to preferred pharmacy), pharmacy updated to [facility]  -patient's family would like her enrolled in [facility]  -repeat thyroid labs outpatient  - IVC filter removal in 3 months!  [ ] When she is stable for discharge will need to discuss with vasc med regarding home going AC plan  [ ] Titrate GDMT as able. Currently on entresto 24/26.   [ ] Follow up further pall care recs. Patient remains full code. There is ongoing GOC discussion  [ ] Restarted statin today as liver enzymes normalized. Monitor with daily HFP.  [ ] Needs to follow up OP with ENT for trach c/b trancutaneous fistula. Dr. King for closure.     Prevention:   Fall: High.   Mobility: Physical therapy referral.   DVT: Rejected, on heparin infusion.   Diet: Cardiac Diet     Code status: Full Code  Emergency contact:   * patient LACKS capacity due to inability to express decision and inconsistency in decisions  Surrogate Medical Decision-maker:   Surrogate decision maker is: pt's cousin, Clara Wayne: [phone], Efrain Black: [phone], Keli Osborne: [phone]   Friends who may be helpful in making decisions:  Prior boyfriend Navin Myron [phone]  Close friend \"Isiah\" Pranay Owen [phone]    "

## 2024-07-06 NOTE — PROGRESS NOTES
07/06/24 1317   Discharge Planning   Living Arrangements Spouse/significant other   Support Systems Spouse/significant other   Assistance Needed None   Type of Residence Private residence   Number of Stairs to Enter Residence 10   Number of Stairs Within Residence 0   Do you have animals or pets at home? No   Who is requesting discharge planning? Provider   Home or Post Acute Services None   Patient expects to be discharged to: Home   Does the patient need discharge transport arranged? Yes   RoundTrip coordination needed? Yes   Has discharge transport been arranged? Yes   Financial Resource Strain   How hard is it for you to pay for the very basics like food, housing, medical care, and heating? Not very   Housing Stability   In the last 12 months, was there a time when you were not able to pay the mortgage or rent on time? N   In the last 12 months, how many places have you lived? 1   In the last 12 months, was there a time when you did not have a steady place to sleep or slept in a shelter (including now)? N   Transportation Needs   In the past 12 months, has lack of transportation kept you from medical appointments or from getting medications? no   In the past 12 months, has lack of transportation kept you from meetings, work, or from getting things needed for daily living? No   Patient Choice   Provider Choice list and CMS website (https://medicare.gov/care-compare#search) for post-acute Quality and Resource Measure Data were provided and reviewed with: Patient   Patient / Family choosing to utilize agency / facility established prior to hospitalization No     Will continue to monitor for all discharging needs

## 2024-07-06 NOTE — CARE PLAN
The patient's goals for the shift include  remain safe    The clinical goals for the shift include patient will remain free from falls/injury during this shift    Over the shift, the patient did not make progress toward the following goals. Barriers to progression include cognition and impulsivity. Recommendations to address these barriers include education and reorientation.      Problem: Skin  Goal: Decreased wound size/increased tissue granulation at next dressing change  Outcome: Progressing  Goal: Participates in plan/prevention/treatment measures  Outcome: Progressing  Goal: Prevent/manage excess moisture  Outcome: Progressing  Goal: Prevent/minimize sheer/friction injuries  Outcome: Progressing  Goal: Promote/optimize nutrition  Outcome: Progressing  Goal: Promote skin healing  Outcome: Progressing     Problem: Pain - Adult  Goal: Verbalizes/displays adequate comfort level or baseline comfort level  Outcome: Progressing     Problem: Safety - Adult  Goal: Free from fall injury  Outcome: Progressing     Problem: Discharge Planning  Goal: Discharge to home or other facility with appropriate resources  Outcome: Progressing     Problem: Chronic Conditions and Co-morbidities  Goal: Patient's chronic conditions and co-morbidity symptoms are monitored and maintained or improved  Outcome: Progressing     Problem: Heart Failure  Goal: Improved gas exchange this shift  Outcome: Progressing  Goal: Improved urinary output this shift  Outcome: Progressing  Goal: Reduction in peripheral edema within 24 hours  Outcome: Progressing  Goal: Report improvement of dyspnea/breathlessness this shift  Outcome: Progressing  Goal: Weight from fluid excess reduced over 2-3 days, then stabilize  Outcome: Progressing  Goal: Increase self care and/or family involvement in 24 hours  Outcome: Progressing     Problem: Diabetes  Goal: Achieve decreasing blood glucose levels by end of shift  Outcome: Progressing  Goal: Increase stability of  blood glucose readings by end of shift  Outcome: Progressing  Goal: Decrease in ketones present in urine by end of shift  Outcome: Progressing  Goal: Maintain electrolyte levels within acceptable range throughout shift  Outcome: Progressing  Goal: Maintain glucose levels >70mg/dl to <250mg/dl throughout shift  Outcome: Progressing  Goal: No changes in neurological exam by end of shift  Outcome: Progressing  Goal: Learn about and adhere to nutrition recommendations by end of shift  Outcome: Progressing  Goal: Vital signs within normal range for age by end of shift  Outcome: Progressing  Goal: Increase self care and/or family involovement by end of shift  Outcome: Progressing  Goal: Receive DSME education by end of shift  Outcome: Progressing     Problem: Skin  Goal: Decreased wound size/increased tissue granulation at next dressing change  Outcome: Progressing  Goal: Participates in plan/prevention/treatment measures  Outcome: Progressing  Goal: Prevent/manage excess moisture  Outcome: Progressing  Goal: Prevent/minimize sheer/friction injuries  Outcome: Progressing  Goal: Promote/optimize nutrition  Outcome: Progressing  Goal: Promote skin healing  Outcome: Progressing     Problem: Pain - Adult  Goal: Verbalizes/displays adequate comfort level or baseline comfort level  Outcome: Progressing     Problem: Safety - Adult  Goal: Free from fall injury  Outcome: Progressing     Problem: Discharge Planning  Goal: Discharge to home or other facility with appropriate resources  Outcome: Progressing     Problem: Chronic Conditions and Co-morbidities  Goal: Patient's chronic conditions and co-morbidity symptoms are monitored and maintained or improved  Outcome: Progressing     Problem: Heart Failure  Goal: Improved gas exchange this shift  Outcome: Progressing  Goal: Improved urinary output this shift  Outcome: Progressing  Goal: Reduction in peripheral edema within 24 hours  Outcome: Progressing  Goal: Report improvement of  dyspnea/breathlessness this shift  Outcome: Progressing  Goal: Weight from fluid excess reduced over 2-3 days, then stabilize  Outcome: Progressing  Goal: Increase self care and/or family involvement in 24 hours  Outcome: Progressing     Problem: Diabetes  Goal: Achieve decreasing blood glucose levels by end of shift  Outcome: Progressing  Goal: Increase stability of blood glucose readings by end of shift  Outcome: Progressing  Goal: Decrease in ketones present in urine by end of shift  Outcome: Progressing  Goal: Maintain electrolyte levels within acceptable range throughout shift  Outcome: Progressing  Goal: Maintain glucose levels >70mg/dl to <250mg/dl throughout shift  Outcome: Progressing  Goal: No changes in neurological exam by end of shift  Outcome: Progressing  Goal: Learn about and adhere to nutrition recommendations by end of shift  Outcome: Progressing  Goal: Vital signs within normal range for age by end of shift  Outcome: Progressing  Goal: Increase self care and/or family involovement by end of shift  Outcome: Progressing  Goal: Receive DSME education by end of shift  Outcome: Progressing

## 2024-07-07 VITALS
RESPIRATION RATE: 18 BRPM | TEMPERATURE: 97 F | HEIGHT: 67 IN | DIASTOLIC BLOOD PRESSURE: 72 MMHG | OXYGEN SATURATION: 99 % | WEIGHT: 174.38 LBS | HEART RATE: 85 BPM | SYSTOLIC BLOOD PRESSURE: 112 MMHG | BODY MASS INDEX: 27.37 KG/M2

## 2024-07-07 LAB
ABO GROUP (TYPE) IN BLOOD: NORMAL
ANTIBODY SCREEN: NORMAL
GLUCOSE BLD MANUAL STRIP-MCNC: 109 MG/DL (ref 74–99)
GLUCOSE BLD MANUAL STRIP-MCNC: 122 MG/DL (ref 74–99)
GLUCOSE BLD MANUAL STRIP-MCNC: 123 MG/DL (ref 74–99)
GLUCOSE BLD MANUAL STRIP-MCNC: 125 MG/DL (ref 74–99)
GLUCOSE BLD MANUAL STRIP-MCNC: 156 MG/DL (ref 74–99)
RH FACTOR (ANTIGEN D): NORMAL
UFH PPP CHRO-ACNC: 0.4 IU/ML

## 2024-07-07 PROCEDURE — 2500000001 HC RX 250 WO HCPCS SELF ADMINISTERED DRUGS (ALT 637 FOR MEDICARE OP)

## 2024-07-07 PROCEDURE — 99232 SBSQ HOSP IP/OBS MODERATE 35: CPT

## 2024-07-07 PROCEDURE — 85520 HEPARIN ASSAY: CPT

## 2024-07-07 PROCEDURE — 36415 COLL VENOUS BLD VENIPUNCTURE: CPT

## 2024-07-07 PROCEDURE — 82947 ASSAY GLUCOSE BLOOD QUANT: CPT

## 2024-07-07 PROCEDURE — 1200000002 HC GENERAL ROOM WITH TELEMETRY DAILY

## 2024-07-07 PROCEDURE — 86901 BLOOD TYPING SEROLOGIC RH(D): CPT

## 2024-07-07 PROCEDURE — 2500000002 HC RX 250 W HCPCS SELF ADMINISTERED DRUGS (ALT 637 FOR MEDICARE OP, ALT 636 FOR OP/ED)

## 2024-07-07 PROCEDURE — 2500000004 HC RX 250 GENERAL PHARMACY W/ HCPCS (ALT 636 FOR OP/ED)

## 2024-07-07 RX ADMIN — OXYCODONE HYDROCHLORIDE 5 MG: 5 TABLET ORAL at 22:38

## 2024-07-07 RX ADMIN — Medication 6 MG: at 21:08

## 2024-07-07 RX ADMIN — ATORVASTATIN CALCIUM 80 MG: 80 TABLET, FILM COATED ORAL at 21:08

## 2024-07-07 RX ADMIN — OXYCODONE HYDROCHLORIDE 5 MG: 5 TABLET ORAL at 16:29

## 2024-07-07 RX ADMIN — OXYCODONE HYDROCHLORIDE 5 MG: 5 TABLET ORAL at 09:31

## 2024-07-07 RX ADMIN — HEPARIN SODIUM 1600 UNITS/HR: 10000 INJECTION, SOLUTION INTRAVENOUS at 05:15

## 2024-07-07 RX ADMIN — AMIODARONE HYDROCHLORIDE 200 MG: 200 TABLET ORAL at 09:09

## 2024-07-07 RX ADMIN — SACUBITRIL AND VALSARTAN 1 TABLET: 24; 26 TABLET, FILM COATED ORAL at 21:08

## 2024-07-07 RX ADMIN — DULOXETINE HYDROCHLORIDE 30 MG: 30 CAPSULE, DELAYED RELEASE ORAL at 09:10

## 2024-07-07 RX ADMIN — SACUBITRIL AND VALSARTAN 1 TABLET: 24; 26 TABLET, FILM COATED ORAL at 09:09

## 2024-07-07 RX ADMIN — PANTOPRAZOLE SODIUM 40 MG: 40 TABLET, DELAYED RELEASE ORAL at 06:22

## 2024-07-07 RX ADMIN — HEPARIN SODIUM 1600 UNITS/HR: 10000 INJECTION, SOLUTION INTRAVENOUS at 22:37

## 2024-07-07 RX ADMIN — ASPIRIN 81 MG CHEWABLE TABLET 81 MG: 81 TABLET CHEWABLE at 06:22

## 2024-07-07 ASSESSMENT — COGNITIVE AND FUNCTIONAL STATUS - GENERAL
MOVING TO AND FROM BED TO CHAIR: TOTAL
PERSONAL GROOMING: A LITTLE
DRESSING REGULAR LOWER BODY CLOTHING: A LOT
WALKING IN HOSPITAL ROOM: TOTAL
DAILY ACTIVITIY SCORE: 16
CLIMB 3 TO 5 STEPS WITH RAILING: TOTAL
DAILY ACTIVITIY SCORE: 16
MOVING FROM LYING ON BACK TO SITTING ON SIDE OF FLAT BED WITH BEDRAILS: A LITTLE
STANDING UP FROM CHAIR USING ARMS: TOTAL
EATING MEALS: A LITTLE
EATING MEALS: A LITTLE
TOILETING: A LOT
HELP NEEDED FOR BATHING: A LITTLE
DRESSING REGULAR UPPER BODY CLOTHING: A LITTLE
MOBILITY SCORE: 9
STANDING UP FROM CHAIR USING ARMS: TOTAL
PERSONAL GROOMING: A LITTLE
WALKING IN HOSPITAL ROOM: TOTAL
DRESSING REGULAR UPPER BODY CLOTHING: A LITTLE
TURNING FROM BACK TO SIDE WHILE IN FLAT BAD: A LOT
DRESSING REGULAR LOWER BODY CLOTHING: A LOT
MOVING FROM LYING ON BACK TO SITTING ON SIDE OF FLAT BED WITH BEDRAILS: A LITTLE
CLIMB 3 TO 5 STEPS WITH RAILING: TOTAL
TURNING FROM BACK TO SIDE WHILE IN FLAT BAD: A LITTLE
MOVING TO AND FROM BED TO CHAIR: TOTAL
TOILETING: A LOT
MOBILITY SCORE: 10
HELP NEEDED FOR BATHING: A LITTLE

## 2024-07-07 ASSESSMENT — PAIN - FUNCTIONAL ASSESSMENT
PAIN_FUNCTIONAL_ASSESSMENT: 0-10
PAIN_FUNCTIONAL_ASSESSMENT: 0-10

## 2024-07-07 ASSESSMENT — PAIN SCALES - GENERAL
PAINLEVEL_OUTOF10: 3
PAINLEVEL_OUTOF10: 6

## 2024-07-07 NOTE — CARE PLAN
The patient's goals for the shift include  pain control    The clinical goals for the shift include patient will remain free from falls    Over the shift, the patient made progress toward the following goals. Barriers to progression include communication limitations. Recommendations to address these barriers include yes or no questions.      Problem: Skin  Goal: Decreased wound size/increased tissue granulation at next dressing change  Outcome: Progressing  Goal: Participates in plan/prevention/treatment measures  Outcome: Progressing  Goal: Prevent/manage excess moisture  Outcome: Progressing  Goal: Prevent/minimize sheer/friction injuries  Outcome: Progressing  Goal: Promote/optimize nutrition  Outcome: Progressing  Goal: Promote skin healing  Outcome: Progressing     Problem: Pain - Adult  Goal: Verbalizes/displays adequate comfort level or baseline comfort level  Outcome: Progressing     Problem: Safety - Adult  Goal: Free from fall injury  Outcome: Progressing     Problem: Discharge Planning  Goal: Discharge to home or other facility with appropriate resources  Outcome: Progressing     Problem: Chronic Conditions and Co-morbidities  Goal: Patient's chronic conditions and co-morbidity symptoms are monitored and maintained or improved  Outcome: Progressing     Problem: Heart Failure  Goal: Improved gas exchange this shift  Outcome: Progressing  Goal: Improved urinary output this shift  Outcome: Progressing  Goal: Reduction in peripheral edema within 24 hours  Outcome: Progressing  Goal: Report improvement of dyspnea/breathlessness this shift  Outcome: Progressing  Goal: Weight from fluid excess reduced over 2-3 days, then stabilize  Outcome: Progressing  Goal: Increase self care and/or family involvement in 24 hours  Outcome: Progressing     Problem: Diabetes  Goal: Achieve decreasing blood glucose levels by end of shift  Outcome: Progressing  Goal: Increase stability of blood glucose readings by end of  shift  Outcome: Progressing  Goal: Decrease in ketones present in urine by end of shift  Outcome: Progressing  Goal: Maintain electrolyte levels within acceptable range throughout shift  Outcome: Progressing  Goal: Maintain glucose levels >70mg/dl to <250mg/dl throughout shift  Outcome: Progressing  Goal: No changes in neurological exam by end of shift  Outcome: Progressing  Goal: Learn about and adhere to nutrition recommendations by end of shift  Outcome: Progressing  Goal: Vital signs within normal range for age by end of shift  Outcome: Progressing  Goal: Increase self care and/or family involovement by end of shift  Outcome: Progressing  Goal: Receive DSME education by end of shift  Outcome: Progressing

## 2024-07-07 NOTE — PROGRESS NOTES
Functional Status: Alert, Oriented to Place and person, Bed bound, tolerate orally.      Background: obtained from the patient & EMR.  Amirah Bennett is a 43 y.o. female known to have:   -HFrEF (LVEF 20-25% (2022), with prior history of cardiogenic shock 2022.  - Afib on Rivaroxaban w/ DCCV 2023)  - Ischemic stroke L MCA d/t AFib LLA thrombus seen on echo (lack of OAC adherence) s/p thrombectomy 2022 @ [facility] w/ residual expressive aphasia and R sided weakness, recent 2024 left cerebellar MCA.  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  - T2DM (2024 HgbA1c 5.5).   -Hypertension.      Reason for Hospital admission: 2024  Admitted through: EMS. Cardiogenic shock.    Subjective:   -No active issues last night, nor nursing concerns.   -Tolerate orally, passes bowel motions.   Systemic review: Limited.   Improved right stump pain.      Vital signs   Temp:  [35.7 °C (96.3 °F)-36.3 °C (97.3 °F)] 35.7 °C (96.3 °F)  Heart Rate:  [72-85] 82  Resp:  [18-19] 18  BP: ()/(58-72) 102/69  Temp (24hrs), Av °C (96.8 °F), Min:35.7 °C (96.3 °F), Max:36.3 °C (97.3 °F)     24 hour Intake/Output:  Net IO Since Admission: -27,619.25 mL [24 1107]   Vitals:    24 0412   Weight: 79.1 kg (174 lb 6.1 oz)     Weight change: -1.4 kg (-3 lb 1.4 oz)      Examination:   -Patient was conscious, oriented to place and person only . wasn't in acute respiratory distress, not in jaundiced nor cyanosed. On Folly's Catheter, removed right femoral line.   -Cardiovascular examination: Regular bilateral pulse. JVP was not distended. Audible 1st and 2nd heart sound with pansystolic murmur best appreciated at left lower sternal border.   -Lower limb examination: amputated right above knee amputation, No pitting edema or signs of deep vein thrombosis.    -Chest examination: Bilateral vesicular breathing bilateral basal crackles.   -Abdominal examination: Soft and lax abdomen, no rigidity nor  rebound tenderness.   -Neurological examination: limited, symmetrical facial impression, equally reactive pupils, right hand weakness.        LABS:  CBC RFP         Lab Results   Component Value Date     WBC 15.4 (H) 07/06/2024     HGB 8.7 (L) 07/06/2024     HCT 27.0 (L) 07/06/2024     MCV 95 07/06/2024      07/06/2024     NEUTROABS 10.60 (H) 07/06/2024          Lab Results   Component Value Date      (L) 07/06/2024     K 4.5 07/06/2024      07/06/2024     CO2 24 07/06/2024     BUN 10 07/06/2024     CREATININE 0.46 (L) 07/06/2024     CREATININE 0.56 07/05/2024            Lab Results   Component Value Date     MG 2.23 07/05/2024     PHOS 3.8 07/06/2024     CALCIUM  8.5 (L)  Hypoalbuminemia. 07/06/2024          Hepatic Function ABG/VBG         Lab Results   Component Value Date     ALT 35 07/05/2024     AST 30 07/05/2024     ALKPHOS 59 07/05/2024            Lab Results   Component Value Date     BILIDIR 1.5 (H) 07/05/2024            Lab Results   Component Value Date     PROTIME 13.0 (H) 07/02/2024     APTT 89 (H) 07/02/2024     INR 1.2 (H) 07/02/2024          Lab Results   Component Value Date     LACTATE 0.6 06/26/2024         Micro/culture data:  No results found for the last 90 days.     Imaging:   Vascular US Lower Extremity Arterial Duplex 06/23/2024  1. Echogenic material within the right distal femoral and popliteal  arteries with  absent Doppler flow in the right popliteal artery  significantly diminished flow within the right distal superficial  femoral artery. Findings raise concern for thrombosis of the distal  SFA and popliteal artery. Decreased flow within the right posterior  tibial and peroneal arteries could be through collateralization.  2. Decreased flow within the right common femoral artery, right deep  femoral artery as well as the proximal and mid superficial femoral  arteries, raising concern for stenosis proximal to the right common  femoral artery, possibly within the right  external iliac right common  iliac artery. A CTA of the lower extremities can be obtained for  further evaluation.  3. Unremarkable Doppler interrogation of the left lower extremity  arteries.     Cardiac:  Transthoracic Echo (TTE) Complete With Contrast : 06/22/2024   1. Left ventricular ejection fraction is severely decreased, by visual estimate at 20-25%.   2. There is global hypokinesis of the left ventricle with minor regional variations.   3. Left ventricular diastolic filling was indeterminate.   4. Mildly enlarged right ventricle.   5. There is mildly reduced right ventricular systolic function.   6. The left atrium is severely dilated.   7. Moderate to severe tricuspid regurgitation visualized.   8. Moderately elevated right ventricular systolic pressure.   9. TR has worsened.      Current Medications:  Scheduled  PRN Medications:    Amiodarone, 200 mg, oral, Daily  aspirin, 81 mg, oral, Daily  atorvastatin, 80 mg, oral, Nightly  DULoxetine, 30 mg, oral, Daily  empagliflozin, 10 mg, oral, Daily  melatonin, 6 mg, oral, Daily  pantoprazole, 40 mg, oral, Daily before breakfast  polyethylene glycol, 17 g, oral, BID  sacubitriL-valsartan, 1 tablet, oral, BID  bisacodyl, 10 mg, rectal, Daily  sennosides, 2 tablet, oral, BID PRN medications: dextrose, dextrose, glucagon, glucagon, heparin, HYDROmorphone, lidocaine, oxyCODONE     heparin, 0-4,500 Units/hr, Last Rate: 1,600 Units/hr (07/06/24 1238)             Hospital Course Problem lists:  Amirah Bennett is a 43 y.o. 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      On  20th June, Patient presented with dyspnea and leg swelling, with elevated lactate to 14.7, cold extremities, and 3+ pitting edema suggesting cardiogenic shock.  She underwent RHC with CI of 1.6, CVP of 25, requiring inotropes initiation with difficult weaning off trials, on the following day patient had worsened SvO2 from 65 to 25 with rapid lactate increase following acute abdominal pain and right leg pain concerning for limb ischemia, and rhabdomyolysis with CK of 14,000, with limited IV hydrations due to ongoing cardiogenic shock.  Arterial doppler of lower extremities reported absent flow in the right popliteal artery with significantly diminished flow within the right distal superficial femoral artery, concerning for thrombosis. Additionally decreased flow in right common femoral artery, right deep femoral artery concerning for stenosis proximal to right common femoral artery.  Vascular medicine recommended IVC filter insertion on 6/27 in preparation for right AKA 6/28. Which was complicated by 3 days persistent stump site oozing & intramuscular hematoma evident on CTA RLE, which was managed conservatively with 6 blood transfusions while on heparin infusion necessitation to hold heparin drip temporarily for 12 hours to allow hemostasis. On the following day patient had similar stump bleeding with Hgb 6.9 requiring an additional 1pRBC incrementing hemoglobin to 8.1. hence Heparin was restarted 12 hours later at 1:30 Am 7/5 without signs of bleeding & stable Hemoglobin 8.9.   On 6th July, patient was hemodynamically stable and transferred to cardiology floor.     Patient was labeled is not candidate for advanced therapies given documented medical no adherence. Managed medically.   Given the lack of documentation POA and limited family (NOK are cousins). ICU taem involved Ethics & Palliative care.      Heart failure with reduced ejection fraction:   -HFrEF (LVEF 20-25%)  -Patient was labeled not candidate for  advanced therapies due to medical nonadherence  Plan:  -palliative care follow up.  -Aim to resume quadruple GDMT (spironolactone 25mg, metoprolol succinate 50mg daily), Currently patient on: entresto 24-26,  lasix 40mg.     Right limb ischemia secondary to Arterial emboli, S/P above right knee amputation:  #S/p femoral arterial line sheath placement now removed  -CTA Aorta with runoff 6/24: aortic bifurcation embolus, R BA-EIA embolus, SFA and popliteal emboli   -arterial duplex exam with monophasic right CFA, SFA and PFA signals, absent popliteal flow and monophasic flow in the tibial artery   -Unable to walk prior to presentation to hospital  -Right leg has been cold and painful in times where cardiogenic shock has worsened then warm and not painful to touch when out of shock state, fluctuating exam  -s/p Above knee amputation with vascular surgery, 6/28/2024.   Plan:  -Cont. low dose Heparin infusion, without boluses.   -If patient bleeds from stump consult vasc med.  -pain regimen: oxycodone 10mg severe pain 4hr, PRN dilaudid 0.2mg q 4 hr breakthrough,  before and after dresssing changes.   -Start Duloxetine.  -Vascular surgery, requested to keep Folly's in, to avoid stomp site infection.      Query Cardiac thrombi:  -Likely in setting of Atrial fibrillation with Rivaroxaban no adherence  -There are indeterminate low-attenuation nodular filling defects within the atrial appendage.   --Right lower extremity DVT, and arterial thrombus/p IVC filter 6/27 .   Plan:  -Cont. Heparin drip.   -Vascular medicine follow up for discharge planning.       Atrial fibrillation:   on was on rivaroxaban w/ DCCV 05/2023), ischemic stroke L MCA d/t AFib LLA thrombus seen on echo (lack of OAC adherence) s/p thrombectomy 01/2022 @ [facility] w/ residual expressive aphasia and R sided weakness   -Previously prescribed digoxin 250mcg however last fill was 12/2023  -TSH 6.28H, free T4 1.48  Plan:  -Continue amiodarone 200mg daily  -hold home  digoxin 250mcg  - Continue aspirin 81mg  -Restarted atorvastatin 80. Monitor LFTs daily     Direct Hyperbilirubinemia, Likely following ischemic liver injury:  -Patient denied Abdominal pain.   Tbili (3.2 on admission, from 0.5 in 03/2024)  ::RUQ US 7/1 without abnormality beyond hepatic steatosis and liver cyst  Plan:   [place] Liver enzymes.      Hx trach c/b trancutaneous fistula  -ENT had been consulted 3/2024 for gurgling and mucus drainage, no inpatient interventions required  -monitor for breaths/mucus discharge from trach site  -Cover the tracheocutaneous fistula with tape and gauze and encourage patient to apply pressure when voicing.   -follow up outpatient ENT for closure (Dr. King)      Anemia:  ::had 3 units of RBC transfused 6/30 without appropriate incrementation.   ::CTA C/A/P 6/30 without evidence of active hemorrhage within chest/abdomen/pelvis  ::CTA RLE 7/1 without a source that could be intervened upon  Plan:  - CBC daily, as stable Hb.   -Hgb goal >7  given cardiac hx     Thrombocytopenia:  -Multiple thrombi, DIC score 5 on 6/24/2024  -Hit score of 6  -Negative PF4     Left brachial injury  Likely injury  LUE DVT scan with nonvascularized mass, called CT while patient was there to request CTA of left upper extremity and they said logistically they would be unable to perform both scans, and given protocol for max dosing of contrast, could not give further contrast until 6/25 at 2PM  -no CTA LUE needed        Insomnia  -Continue melatonin 6mg at bedtime     Resolved:   -Rhabdomyolysis.  -Metabolic acidosis, Lactic acidosis.   -Acute kidney injury.    -stump infection, treated for 7 days, s/p vanc (6/20-6/24) (6/27-7/2 ) and zosyn (6/20-6/24), (6/27- p)-restarted vanc/zosyn 6/27 due to elevated leukocytosis and purulent appearing right groin site     ICU Disposition Plan:  -patient would like enrollment in  home delivery service for medications (she has trouble getting to preferred pharmacy),  "pharmacy updated to [facility]  -patient's family would like her enrolled in [facility]  -repeat thyroid labs outpatient  - IVC filter removal in 3 months!  [ ] When she is stable for discharge will need to discuss with vasc med regarding home going AC plan  [ ] Titrate GDMT as able. Currently on entresto 24/26.   [ ] Follow up further pall care recs. Patient remains full code. There is ongoing GOC discussion  [ ] Restarted statin today as liver enzymes normalized. Monitor with daily HFP.  [ ] Needs to follow up OP with ENT for trach c/b trancutaneous fistula. Dr. King for closure.     Prevention:   Fall: High.   Mobility: Physical therapy referral.   DVT: Rejected, on heparin infusion.   Diet: Cardiac Diet     Code status: Full Code  Emergency contact:   * patient LACKS capacity due to inability to express decision and inconsistency in decisions  Surrogate Medical Decision-maker:   Surrogate decision maker is: pt's cousin, Clara Wayne: [phone], Efrain Black: [phone], Keli Osborne: [phone]   Friends who may be helpful in making decisions:  Prior boyfriend Navin Sanches [phone]  Close friend \"Isiah\" Pranay Owen [phone]   "

## 2024-07-08 ENCOUNTER — HOME CARE VISIT (OUTPATIENT)
Dept: HOME HEALTH SERVICES | Facility: HOME HEALTH | Age: 44
End: 2024-07-08
Payer: COMMERCIAL

## 2024-07-08 LAB
ALBUMIN SERPL BCP-MCNC: 2.5 G/DL (ref 3.4–5)
ALP SERPL-CCNC: 73 U/L (ref 33–110)
ALT SERPL W P-5'-P-CCNC: 23 U/L (ref 7–45)
ANION GAP SERPL CALC-SCNC: 12 MMOL/L (ref 10–20)
AST SERPL W P-5'-P-CCNC: 22 U/L (ref 9–39)
ATRIAL RATE: 107 BPM
ATRIAL RATE: 107 BPM
ATRIAL RATE: 111 BPM
BASOPHILS # BLD AUTO: 0.03 X10*3/UL (ref 0–0.1)
BASOPHILS NFR BLD AUTO: 0.3 %
BILIRUB DIRECT SERPL-MCNC: 0.9 MG/DL (ref 0–0.3)
BILIRUB SERPL-MCNC: 2.1 MG/DL (ref 0–1.2)
BUN SERPL-MCNC: 9 MG/DL (ref 6–23)
CALCIUM SERPL-MCNC: 8.5 MG/DL (ref 8.6–10.6)
CHLORIDE SERPL-SCNC: 102 MMOL/L (ref 98–107)
CO2 SERPL-SCNC: 25 MMOL/L (ref 21–32)
CREAT SERPL-MCNC: 0.43 MG/DL (ref 0.5–1.05)
EGFRCR SERPLBLD CKD-EPI 2021: >90 ML/MIN/1.73M*2
EOSINOPHIL # BLD AUTO: 0.14 X10*3/UL (ref 0–0.7)
EOSINOPHIL NFR BLD AUTO: 1.3 %
ERYTHROCYTE [DISTWIDTH] IN BLOOD BY AUTOMATED COUNT: 17.2 % (ref 11.5–14.5)
GLUCOSE BLD MANUAL STRIP-MCNC: 109 MG/DL (ref 74–99)
GLUCOSE BLD MANUAL STRIP-MCNC: 122 MG/DL (ref 74–99)
GLUCOSE SERPL-MCNC: 96 MG/DL (ref 74–99)
HCT VFR BLD AUTO: 28.8 % (ref 36–46)
HGB BLD-MCNC: 9 G/DL (ref 12–16)
IMM GRANULOCYTES # BLD AUTO: 0.17 X10*3/UL (ref 0–0.7)
IMM GRANULOCYTES NFR BLD AUTO: 1.6 % (ref 0–0.9)
LYMPHOCYTES # BLD AUTO: 1.88 X10*3/UL (ref 1.2–4.8)
LYMPHOCYTES NFR BLD AUTO: 17.3 %
MAGNESIUM SERPL-MCNC: 1.39 MG/DL (ref 1.6–2.4)
MCH RBC QN AUTO: 29.5 PG (ref 26–34)
MCHC RBC AUTO-ENTMCNC: 31.3 G/DL (ref 32–36)
MCV RBC AUTO: 94 FL (ref 80–100)
MONOCYTES # BLD AUTO: 0.89 X10*3/UL (ref 0.1–1)
MONOCYTES NFR BLD AUTO: 8.2 %
NEUTROPHILS # BLD AUTO: 7.77 X10*3/UL (ref 1.2–7.7)
NEUTROPHILS NFR BLD AUTO: 71.3 %
NRBC BLD-RTO: 0 /100 WBCS (ref 0–0)
P AXIS: -65 DEGREES
P OFFSET: 200 MS
P ONSET: 144 MS
PHOSPHATE SERPL-MCNC: 4.4 MG/DL (ref 2.5–4.9)
PLATELET # BLD AUTO: 309 X10*3/UL (ref 150–450)
POTASSIUM SERPL-SCNC: 4.5 MMOL/L (ref 3.5–5.3)
PR INTERVAL: 144 MS
PROT SERPL-MCNC: 6.2 G/DL (ref 6.4–8.2)
Q ONSET: 213 MS
Q ONSET: 216 MS
Q ONSET: 216 MS
QRS COUNT: 17 BEATS
QRS COUNT: 19 BEATS
QRS COUNT: 22 BEATS
QRS DURATION: 100 MS
QRS DURATION: 82 MS
QRS DURATION: 90 MS
QT INTERVAL: 334 MS
QT INTERVAL: 340 MS
QT INTERVAL: 352 MS
QTC CALCULATION(BAZETT): 462 MS
QTC CALCULATION(BAZETT): 469 MS
QTC CALCULATION(BAZETT): 494 MS
QTC FREDERICIA: 417 MS
QTC FREDERICIA: 427 MS
QTC FREDERICIA: 434 MS
R AXIS: 18 DEGREES
R AXIS: 39 DEGREES
R AXIS: 56 DEGREES
RBC # BLD AUTO: 3.05 X10*6/UL (ref 4–5.2)
SODIUM SERPL-SCNC: 134 MMOL/L (ref 136–145)
T AXIS: -20 DEGREES
T AXIS: -65 DEGREES
T AXIS: 256 DEGREES
T OFFSET: 380 MS
T OFFSET: 386 MS
T OFFSET: 392 MS
UFH PPP CHRO-ACNC: 0.4 IU/ML
VENTRICULAR RATE: 107 BPM
VENTRICULAR RATE: 111 BPM
VENTRICULAR RATE: 132 BPM
WBC # BLD AUTO: 10.9 X10*3/UL (ref 4.4–11.3)

## 2024-07-08 PROCEDURE — 83735 ASSAY OF MAGNESIUM: CPT

## 2024-07-08 PROCEDURE — 2500000001 HC RX 250 WO HCPCS SELF ADMINISTERED DRUGS (ALT 637 FOR MEDICARE OP)

## 2024-07-08 PROCEDURE — 85025 COMPLETE CBC W/AUTO DIFF WBC: CPT

## 2024-07-08 PROCEDURE — 85520 HEPARIN ASSAY: CPT

## 2024-07-08 PROCEDURE — 1200000002 HC GENERAL ROOM WITH TELEMETRY DAILY

## 2024-07-08 PROCEDURE — 84100 ASSAY OF PHOSPHORUS: CPT

## 2024-07-08 PROCEDURE — 82248 BILIRUBIN DIRECT: CPT

## 2024-07-08 PROCEDURE — 97530 THERAPEUTIC ACTIVITIES: CPT | Mod: GP | Performed by: STUDENT IN AN ORGANIZED HEALTH CARE EDUCATION/TRAINING PROGRAM

## 2024-07-08 PROCEDURE — 36415 COLL VENOUS BLD VENIPUNCTURE: CPT

## 2024-07-08 PROCEDURE — 80048 BASIC METABOLIC PNL TOTAL CA: CPT

## 2024-07-08 PROCEDURE — 2500000004 HC RX 250 GENERAL PHARMACY W/ HCPCS (ALT 636 FOR OP/ED)

## 2024-07-08 PROCEDURE — 99233 SBSQ HOSP IP/OBS HIGH 50: CPT

## 2024-07-08 PROCEDURE — 2500000002 HC RX 250 W HCPCS SELF ADMINISTERED DRUGS (ALT 637 FOR MEDICARE OP, ALT 636 FOR OP/ED)

## 2024-07-08 PROCEDURE — 82947 ASSAY GLUCOSE BLOOD QUANT: CPT

## 2024-07-08 PROCEDURE — 97110 THERAPEUTIC EXERCISES: CPT | Mod: GP | Performed by: STUDENT IN AN ORGANIZED HEALTH CARE EDUCATION/TRAINING PROGRAM

## 2024-07-08 RX ORDER — MAGNESIUM SULFATE HEPTAHYDRATE 40 MG/ML
2 INJECTION, SOLUTION INTRAVENOUS ONCE
Status: COMPLETED | OUTPATIENT
Start: 2024-07-08 | End: 2024-07-08

## 2024-07-08 RX ORDER — METOPROLOL SUCCINATE 25 MG/1
25 TABLET, EXTENDED RELEASE ORAL NIGHTLY
Status: DISCONTINUED | OUTPATIENT
Start: 2024-07-08 | End: 2024-07-24 | Stop reason: HOSPADM

## 2024-07-08 ASSESSMENT — PAIN DESCRIPTION - ORIENTATION: ORIENTATION: RIGHT

## 2024-07-08 ASSESSMENT — PAIN SCALES - GENERAL
PAINLEVEL_OUTOF10: 6
PAINLEVEL_OUTOF10: 4
PAINLEVEL_OUTOF10: 6
PAINLEVEL_OUTOF10: 4
PAINLEVEL_OUTOF10: 10 - WORST POSSIBLE PAIN

## 2024-07-08 ASSESSMENT — COGNITIVE AND FUNCTIONAL STATUS - GENERAL
STANDING UP FROM CHAIR USING ARMS: A LOT
TOILETING: A LOT
DAILY ACTIVITIY SCORE: 16
MOVING FROM LYING ON BACK TO SITTING ON SIDE OF FLAT BED WITH BEDRAILS: A LITTLE
CLIMB 3 TO 5 STEPS WITH RAILING: TOTAL
TURNING FROM BACK TO SIDE WHILE IN FLAT BAD: A LITTLE
CLIMB 3 TO 5 STEPS WITH RAILING: TOTAL
MOBILITY SCORE: 12
WALKING IN HOSPITAL ROOM: TOTAL
DRESSING REGULAR UPPER BODY CLOTHING: A LITTLE
TURNING FROM BACK TO SIDE WHILE IN FLAT BAD: A LITTLE
MOVING TO AND FROM BED TO CHAIR: A LOT
PERSONAL GROOMING: A LITTLE
STANDING UP FROM CHAIR USING ARMS: A LOT
MOVING FROM LYING ON BACK TO SITTING ON SIDE OF FLAT BED WITH BEDRAILS: A LITTLE
MOBILITY SCORE: 12
MOVING TO AND FROM BED TO CHAIR: A LOT
EATING MEALS: A LITTLE
DRESSING REGULAR LOWER BODY CLOTHING: A LOT
WALKING IN HOSPITAL ROOM: TOTAL
HELP NEEDED FOR BATHING: A LITTLE

## 2024-07-08 ASSESSMENT — PAIN DESCRIPTION - LOCATION: LOCATION: LEG

## 2024-07-08 ASSESSMENT — PAIN - FUNCTIONAL ASSESSMENT
PAIN_FUNCTIONAL_ASSESSMENT: 0-10

## 2024-07-08 ASSESSMENT — PAIN SCALES - PAIN ASSESSMENT IN ADVANCED DEMENTIA (PAINAD): TOTALSCORE: MEDICATION (SEE MAR)

## 2024-07-08 NOTE — PROGRESS NOTES
SW met with pt and pt's family at bedside to offer support and consult issues on social work. Pt is alert and oriented. SW offered information of discharge process per their request, and pt and family likes to process with a facility named [facility]. SW discussed with [facility] and [facility] will follow and assist pt with discharge placement. SW made pt aware that SW is available for issues or questions on social work. SW will continue to follow and assist with discharge plan.     MATTHEW MejiaA, LSW

## 2024-07-08 NOTE — PROGRESS NOTES
Functional Status: Alert, Oriented to Place and person, Bed bound, tolerates PO intake.      Background: obtained from the patient & EMR.  Amirah Bennett is a 43 y.o. female known to have:   -HFrEF (LVEF 20-25% (2022), with prior history of cardiogenic shock 2022.  - Afib on Rivaroxaban w/ DCCV 2023)  - Ischemic stroke L MCA d/t AFib LLA thrombus seen on echo (lack of OAC adherence) s/p thrombectomy 2022 @ [facility] w/ residual expressive aphasia and R sided weakness, recent 2024 left cerebellar MCA.  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  - T2DM (2024 HgbA1c 5.5).   -Hypertension.      Reason for Hospital admission: 2024  Admitted through: EMS. Cardiogenic shock.    Subjective:   -No active issues last night, nor nursing concerns.   -Tolerates PO intake, passes bowel motions. C/o stable stump pain.    Systemic review: Limited.   Improved right stump pain.      Vital signs   Temp:  [35.7 °C (96.3 °F)-36.3 °C (97.3 °F)] 35.7 °C (96.3 °F)  Heart Rate:  [72-85] 82  Resp:  [18-19] 18  BP: ()/(58-72) 102/69  Temp (24hrs), Av °C (96.8 °F), Min:35.7 °C (96.3 °F), Max:36.3 °C (97.3 °F)     24 hour Intake/Output:  Net IO Since Admission: -27,619.25 mL [24 1107]   Vitals:    24 0412   Weight: 79.1 kg (174 lb 6.1 oz)     Weight change: -1.4 kg (-3 lb 1.4 oz)      Examination:   -Patient was conscious, oriented to place and person only . wasn't in acute respiratory distress, not in jaundiced nor cyanosed. On Folly's Catheter, removed right femoral line.   -Cardiovascular examination: Regular bilateral pulse. JVP was not distended. Audible 1st and 2nd heart sound with pansystolic murmur best appreciated at left lower sternal border.   -Lower limb examination: amputated right above knee amputation, No pitting edema or signs of deep vein thrombosis.    -Chest examination: Bilateral vesicular breathing bilateral basal crackles.   -Abdominal examination: Soft  and lax abdomen, no rigidity nor rebound tenderness.   -Neurological examination: limited, symmetrical facial impression, equally reactive pupils, right hand weakness.        LABS:  CBC RFP         Lab Results   Component Value Date     WBC 15.4 (H) 07/06/2024     HGB 8.7 (L) 07/06/2024     HCT 27.0 (L) 07/06/2024     MCV 95 07/06/2024      07/06/2024     NEUTROABS 10.60 (H) 07/06/2024          Lab Results   Component Value Date      (L) 07/06/2024     K 4.5 07/06/2024      07/06/2024     CO2 24 07/06/2024     BUN 10 07/06/2024     CREATININE 0.46 (L) 07/06/2024     CREATININE 0.56 07/05/2024            Lab Results   Component Value Date     MG 2.23 07/05/2024     PHOS 3.8 07/06/2024     CALCIUM  8.5 (L)  Hypoalbuminemia. 07/06/2024          Hepatic Function ABG/VBG         Lab Results   Component Value Date     ALT 35 07/05/2024     AST 30 07/05/2024     ALKPHOS 59 07/05/2024            Lab Results   Component Value Date     BILIDIR 1.5 (H) 07/05/2024            Lab Results   Component Value Date     PROTIME 13.0 (H) 07/02/2024     APTT 89 (H) 07/02/2024     INR 1.2 (H) 07/02/2024          Lab Results   Component Value Date     LACTATE 0.6 06/26/2024         Micro/culture data:  No results found for the last 90 days.     Imaging:   Vascular US Lower Extremity Arterial Duplex 06/23/2024  1. Echogenic material within the right distal femoral and popliteal  arteries with  absent Doppler flow in the right popliteal artery  significantly diminished flow within the right distal superficial  femoral artery. Findings raise concern for thrombosis of the distal  SFA and popliteal artery. Decreased flow within the right posterior  tibial and peroneal arteries could be through collateralization.  2. Decreased flow within the right common femoral artery, right deep  femoral artery as well as the proximal and mid superficial femoral  arteries, raising concern for stenosis proximal to the right common  femoral  artery, possibly within the right external iliac right common  iliac artery. A CTA of the lower extremities can be obtained for  further evaluation.  3. Unremarkable Doppler interrogation of the left lower extremity  arteries.     Cardiac:  Transthoracic Echo (TTE) Complete With Contrast : 06/22/2024   1. Left ventricular ejection fraction is severely decreased, by visual estimate at 20-25%.   2. There is global hypokinesis of the left ventricle with minor regional variations.   3. Left ventricular diastolic filling was indeterminate.   4. Mildly enlarged right ventricle.   5. There is mildly reduced right ventricular systolic function.   6. The left atrium is severely dilated.   7. Moderate to severe tricuspid regurgitation visualized.   8. Moderately elevated right ventricular systolic pressure.   9. TR has worsened.      Current Medications:  Scheduled  PRN Medications:    Amiodarone, 200 mg, oral, Daily  aspirin, 81 mg, oral, Daily  atorvastatin, 80 mg, oral, Nightly  DULoxetine, 30 mg, oral, Daily  empagliflozin, 10 mg, oral, Daily  melatonin, 6 mg, oral, Daily  pantoprazole, 40 mg, oral, Daily before breakfast  polyethylene glycol, 17 g, oral, BID  sacubitriL-valsartan, 1 tablet, oral, BID  bisacodyl, 10 mg, rectal, Daily  sennosides, 2 tablet, oral, BID PRN medications: dextrose, dextrose, glucagon, glucagon, heparin, HYDROmorphone, lidocaine, oxyCODONE     heparin, 0-4,500 Units/hr, Last Rate: 1,600 Units/hr (07/06/24 1238)             Hospital Course Problem lists:  Amirah Bennett is a 43 y.o. female with significant PMHx of HFrEF (LVEF 20-25% (08/2022), with prior history of cardiogenic shock 04/2022 Afib on Xarelto w/ DCCV 05/2023), ischemic stroke L MCA d/t AFib LLA thrombus seen on echo (lack of OAC adherence) s/p thrombectomy 01/2022 @ [facility] w/ residual expressive aphasia and R sided weakness, recent 03/2024 left cerebellar MCA, paratracheal abscess s/p tracheostomy c/b tracheocutaneous fistula, T2DM  (03/2024 HgbA1c 5.5) and HTN.      On 20th June, Patient presented with dyspnea and leg swelling, with elevated lactate to 14.7, cold extremities, and 3+ pitting edema suggesting cardiogenic shock.  She underwent RHC with CI of 1.6, CVP of 25, requiring inotropes initiation with difficult weaning off trials, on the following day patient had worsened SvO2 from 65 to 25 with rapid lactate increase following acute abdominal pain and right leg pain concerning for limb ischemia, and rhabdomyolysis with CK of 14,000, with limited IV hydrations due to ongoing cardiogenic shock.  Arterial doppler of lower extremities reported absent flow in the right popliteal artery with significantly diminished flow within the right distal superficial femoral artery, concerning for thrombosis. Additionally decreased flow in right common femoral artery, right deep femoral artery concerning for stenosis proximal to right common femoral artery.  Vascular medicine recommended IVC filter insertion on 6/27 in preparation for right AKA 6/28. Which was complicated by 3 days persistent stump site oozing & intramuscular hematoma evident on CTA RLE, which was managed conservatively with 6 blood transfusions while on heparin infusion necessitation to hold heparin drip temporarily for 12 hours to allow hemostasis. On the following day patient had similar stump bleeding with Hgb 6.9 requiring an additional 1pRBC incrementing hemoglobin to 8.1. hence Heparin was restarted 12 hours later at 1:30 Am 7/5 without signs of bleeding & stable Hemoglobin 8.9.   On 6th July, patient was hemodynamically stable and transferred to cardiology floor.     Patient was labeled is not candidate for advanced therapies given documented medical no adherence. Managed medically.   Given the lack of documentation POA and limited family (NOK are cousins). ICU taem involved Ethics & Palliative care.        Updates 7/8:  - increased Entresto 24-26 1 tab BID to 2 tab BID  -  started Toprol XL 25 mg daily  - considering initiating pt on Warfarin on 7/9, per Vascular medicine recs    Heart failure with reduced ejection fraction:   -HFrEF (LVEF 20-25%)  -Patient was labeled not candidate for advanced therapies due to medical nonadherence  Plan:  -palliative care follow up.  -Aim to resume quadruple GDMT (spironolactone 25mg, metoprolol succinate 50mg daily), Currently patient on: entresto 24-26 two tabs BID, jardiance 10 mg daily, Toprol XL 25 mg daily     Right limb ischemia secondary to Arterial emboli, S/P above right knee amputation:  #S/p femoral arterial line sheath placement now removed  -CTA Aorta with runoff 6/24: aortic bifurcation embolus, R BA-EIA embolus, SFA and popliteal emboli   -arterial duplex exam with monophasic right CFA, SFA and PFA signals, absent popliteal flow and monophasic flow in the tibial artery   -Unable to walk prior to presentation to hospital  -Right leg has been cold and painful in times where cardiogenic shock has worsened then warm and not painful to touch when out of shock state, fluctuating exam  -s/p Above knee amputation with vascular surgery, 6/28/2024.   Plan:  -Cont. low dose Heparin infusion, without boluses.   -If patient bleeds from stump consult vasc med.  -pain regimen: oxycodone 10mg severe pain 4hr, PRN dilaudid 0.2mg q 4 hr breakthrough,  before and after dresssing changes.   -continue Duloxetine.  -Vascular surgery, requested to keep Folly's in, to avoid stomp site infection.      Query Cardiac thrombi:  -Likely in setting of Atrial fibrillation with Rivaroxaban no adherence  -There are indeterminate low-attenuation nodular filling defects within the atrial appendage.   --Right lower extremity DVT, and arterial thrombus/p IVC filter 6/27 .   Plan:  -Cont. Heparin drip.   -Vascular medicine follow up for discharge planning.       Atrial fibrillation:   on was on rivaroxaban w/ DCCV 05/2023), ischemic stroke L MCA d/t AFib LLA thrombus  seen on echo (lack of OAC adherence) s/p thrombectomy 01/2022 @ [facility] w/ residual expressive aphasia and R sided weakness   -Previously prescribed digoxin 250mcg however last fill was 12/2023  -TSH 6.28H, free T4 1.48  Plan:  -Continue amiodarone 200mg daily  -hold home digoxin 250mcg  -continue aspirin 81mg  -continue atorvastatin 80. Monitor LFTs daily     Direct Hyperbilirubinemia, Likely following ischemic liver injury:  -Patient denied Abdominal pain.   Tbili (3.2 on admission, from 0.5 in 03/2024)  ::RUQ US 7/1 without abnormality beyond hepatic steatosis and liver cyst  Plan:   [place] Liver enzymes.      Hx trach c/b trancutaneous fistula  -ENT had been consulted 3/2024 for gurgling and mucus drainage, no inpatient interventions required  -monitor for breaths/mucus discharge from trach site  -Cover the tracheocutaneous fistula with tape and gauze and encourage patient to apply pressure when voicing.   -follow up outpatient ENT for closure (Dr. King)      Anemia:  ::had 3 units of RBC transfused 6/30 without appropriate incrementation.   ::CTA C/A/P 6/30 without evidence of active hemorrhage within chest/abdomen/pelvis  ::CTA RLE 7/1 without a source that could be intervened upon  Plan:  - CBC daily, as stable Hb.   -Hgb goal >7  given cardiac hx     Thrombocytopenia:  -Multiple thrombi, DIC score 5 on 6/24/2024  -Hit score of 6  -Negative PF4     Left brachial injury  Likely injury  LUE DVT scan with nonvascularized mass, called CT while patient was there to request CTA of left upper extremity and they said logistically they would be unable to perform both scans, and given protocol for max dosing of contrast, could not give further contrast until 6/25 at 2PM  -no CTA LUE needed        Insomnia  -Continue melatonin 6mg at bedtime     Resolved:   -Rhabdomyolysis.  -Metabolic acidosis, Lactic acidosis.   -Acute kidney injury.    -stump infection, treated for 7 days, s/p vanc (6/20-6/24) (6/27-7/2 ) and zosyn  "(6/20-6/24), (6/27- p)-restarted vanc/zosyn 6/27 due to elevated leukocytosis and purulent appearing right groin site     ICU Disposition Plan:  -patient would like enrollment in  home delivery service for medications (she has trouble getting to preferred pharmacy), pharmacy updated to [facility]  -patient's family would like her enrolled in [facility]  -repeat thyroid labs outpatient  - IVC filter removal in 3 months!  [ ] When she is stable for discharge will need to discuss with [facility] med regarding home going AC plan  [ ] Titrate GDMT as able. Currently on entresto 24/26.   [ ] Follow up further pall care recs. Patient remains full code. There is ongoing GOC discussion  [ ] Restarted statin today as liver enzymes normalized. Monitor with daily HFP.  [ ] Needs to follow up OP with ENT for trach c/b trancutaneous fistula. Dr. King for closure.     Prevention:   Fall: High.   Mobility: Physical therapy referral.   DVT: Rejected, on heparin infusion.   Diet: Cardiac Diet     Code status: Full Code  Emergency contact:   * patient LACKS capacity due to inability to express decision and inconsistency in decisions  Surrogate Medical Decision-maker:   Surrogate decision maker is: pt's cousin, Clara Wayne: [phone], Efrain Black: [phone], Keli Osborne: [phone]   Friends who may be helpful in making decisions:  Prior boyfriend Navin Sanches [phone]  Close friend \"Isiah\" Pranay Owen [phone]   "

## 2024-07-08 NOTE — PROGRESS NOTES
Art Therapy Note    Amirah Bennett     Therapy Session  Referral Type: New referral this admission  Visit Type: Follow-up visit  Session Start Time: 1504  Session End Time: 1505  Intervention Delivery: In-person  Conflict of Service: Declined treatment  Number of family members present: 2              Treatment/Interventions       Post-assessment  Total Session Time (min): 1 minutes    Narrative  Assessment Detail: Pt was sititng up in chair with 2 visitors when ATR attempted session. Pt appeared in good spirits, and welcomed ATR to visit another time.  Follow-up: ATR will continue to follow pt.    Education Documentation  No documentation found.

## 2024-07-08 NOTE — CARE PLAN
The patient's goals for the shift include  remain free from falls    The clinical goals for the shift include tolerate medication changes    Over the shift, the patient made progress toward the following goals. Barriers to progression include communication limitations. Recommendations to address these barriers include education with teach back.      Problem: Skin  Goal: Decreased wound size/increased tissue granulation at next dressing change  7/8/2024 1942 by Jennifer Iverson RN  Outcome: Progressing  7/8/2024 1942 by Jennifer Iverson RN  Outcome: Progressing  Goal: Participates in plan/prevention/treatment measures  7/8/2024 1942 by Jennifer Iverson RN  Outcome: Progressing  7/8/2024 1942 by Jennifer Iverson RN  Outcome: Progressing  Goal: Prevent/manage excess moisture  7/8/2024 1942 by Jennifer Iverson RN  Outcome: Progressing  7/8/2024 1942 by Jennifer Iverson RN  Outcome: Progressing  Goal: Prevent/minimize sheer/friction injuries  7/8/2024 1942 by Jennifer Iverson RN  Outcome: Progressing  7/8/2024 1942 by Jennifer Iverson RN  Outcome: Progressing  Goal: Promote/optimize nutrition  7/8/2024 1942 by Jennifer Iverson RN  Outcome: Progressing  7/8/2024 1942 by Jennifer Iverson RN  Outcome: Progressing  Goal: Promote skin healing  7/8/2024 1942 by Jennifer Iverson RN  Outcome: Progressing  7/8/2024 1942 by Jennifer Iverson RN  Outcome: Progressing     Problem: Pain - Adult  Goal: Verbalizes/displays adequate comfort level or baseline comfort level  7/8/2024 1942 by Jennifer Iverson RN  Outcome: Progressing  7/8/2024 1942 by Jennifer Iverson RN  Outcome: Progressing     Problem: Safety - Adult  Goal: Free from fall injury  7/8/2024 1942 by Jennifer Iverson RN  Outcome: Progressing  7/8/2024 1942 by Jennifer Iverson RN  Outcome: Progressing     Problem: Discharge Planning  Goal: Discharge to home or other facility with appropriate resources  7/8/2024 1942 by Jennifer Iverson RN  Outcome: Progressing  7/8/2024 1942 by Jennifer Iverson RN  Outcome:  Progressing     Problem: Chronic Conditions and Co-morbidities  Goal: Patient's chronic conditions and co-morbidity symptoms are monitored and maintained or improved  7/8/2024 1942 by Jennifer Iverson RN  Outcome: Progressing  7/8/2024 1942 by Jennifer Iverson RN  Outcome: Progressing     Problem: Heart Failure  Goal: Improved gas exchange this shift  7/8/2024 1942 by Jennifer Iverson RN  Outcome: Progressing  7/8/2024 1942 by Jennifer Iverson RN  Outcome: Progressing  Goal: Improved urinary output this shift  7/8/2024 1942 by Jennifer Iverson RN  Outcome: Progressing  7/8/2024 1942 by Jennifer Iverson RN  Outcome: Progressing  Goal: Reduction in peripheral edema within 24 hours  7/8/2024 1942 by Jennifer Iverson RN  Outcome: Progressing  7/8/2024 1942 by Jennifer Iverson RN  Outcome: Progressing  Goal: Report improvement of dyspnea/breathlessness this shift  7/8/2024 1942 by Jennifer Iverson RN  Outcome: Progressing  7/8/2024 1942 by Jennifer Iverson RN  Outcome: Progressing  Goal: Weight from fluid excess reduced over 2-3 days, then stabilize  7/8/2024 1942 by Jennifer Iverson RN  Outcome: Progressing  7/8/2024 1942 by Jennifer Iverson RN  Outcome: Progressing  Goal: Increase self care and/or family involvement in 24 hours  7/8/2024 1942 by Jennifer Iverson RN  Outcome: Progressing  7/8/2024 1942 by Jennifer Iverson RN  Outcome: Progressing     Problem: Diabetes  Goal: Achieve decreasing blood glucose levels by end of shift  7/8/2024 1942 by Jennifer Iverson RN  Outcome: Progressing  7/8/2024 1942 by Jennifer Iverson RN  Outcome: Progressing  Goal: Increase stability of blood glucose readings by end of shift  7/8/2024 1942 by Jennifer Iverson RN  Outcome: Progressing  7/8/2024 1942 by Jennifer Iverson RN  Outcome: Progressing  Goal: Decrease in ketones present in urine by end of shift  7/8/2024 1942 by Jennifer Iverson RN  Outcome: Progressing  7/8/2024 1942 by Jennifer Iverson RN  Outcome: Progressing  Goal: Maintain electrolyte levels within  acceptable range throughout shift  7/8/2024 1942 by Jennifer Iverson RN  Outcome: Progressing  7/8/2024 1942 by Jennifer Iverson RN  Outcome: Progressing  Goal: Maintain glucose levels >70mg/dl to <250mg/dl throughout shift  7/8/2024 1942 by Jennifer Iverson RN  Outcome: Progressing  7/8/2024 1942 by Jennifer Iverson RN  Outcome: Progressing  Goal: No changes in neurological exam by end of shift  7/8/2024 1942 by Jennifer Iverson RN  Outcome: Progressing  7/8/2024 1942 by Jennifer Iverson RN  Outcome: Progressing  Goal: Learn about and adhere to nutrition recommendations by end of shift  7/8/2024 1942 by Jennifer Iverson RN  Outcome: Progressing  7/8/2024 1942 by Jennifer Iverson RN  Outcome: Progressing  Goal: Vital signs within normal range for age by end of shift  7/8/2024 1942 by Jennifer Iverson RN  Outcome: Progressing  7/8/2024 1942 by Jennifer Iverson RN  Outcome: Progressing  Goal: Increase self care and/or family involovement by end of shift  7/8/2024 1942 by Jennifer Iverson RN  Outcome: Progressing  7/8/2024 1942 by Jennifer Iverson RN  Outcome: Progressing  Goal: Receive DSME education by end of shift  7/8/2024 1942 by Jennifer Iverson RN  Outcome: Progressing  7/8/2024 1942 by Jennifer Iverson RN  Outcome: Progressing

## 2024-07-08 NOTE — CONSULTS
"Nutrition Follow Up Assessment:   Nutrition Assessment    Reason for Assessment: Dietitian discretion    Patient is a 43 y.o. female presenting with PMHx of HFrEF (LVEF 20-25% (08/2022), Afib on Xarelto w/ DCCV 05/2023), ischemic stroke L MCA d/t AFib LLA thrombus seen on echo (lack of OAC adherence) s/p thrombectomy 01/2022 @ [facility] w/ residual expressive aphasia and R sided weakness, recent 03/2024 left cerebellar MCA, s/p tracheostomy, T2DM (03/2024 HgbA1c 5.5) and HTN presenting with bilateral leg pain   6/28 right AKA      Nutrition History:  Energy Intake: Fair 50-75 %  Food and Nutrient History: Met with Pt at bedside for nutrition follow up. Pt reports to be tolerating diet well, tolerating ensure plus as well prefers chocolate flavor. Pt denied any nausea or vomiting, no constipation or diarrhea , no difficulty chewing or swallowing. No other nutrition related questions or concerns at this time.  Food Allergies/Intolerances:  None  GI Symptoms: None  Oral Problems: None       Anthropometrics:  Height: 170.2 cm (5' 7.01\")   Weight: 80.4 kg (177 lb 4 oz)   BMI (Calculated): 27.75  IBW/kg (Dietitian Calculated): 61.4 kg  Percent of IBW: 131 %       Weight History:   Wt Readings from Last 20 Encounters:   07/08/24 80.4 kg (177 lb 4 oz)   03/09/24 83 kg (182 lb 15.7 oz)   12/15/23 92.8 kg (204 lb 9.4 oz)   09/21/23 100 kg (221 lb 5 oz)   06/29/23 92.4 kg (203 lb 12.8 oz)   01/12/23 95.3 kg (210 lb)   11/02/22 103 kg (226 lb 6.6 oz)   06/27/22 98 kg (216 lb)      Date/Time Weight   07/08/24 0434 80.4 kg (177 lb 4 oz)-bed scale   07/07/24 0412 79.1 kg (174 lb 6.1 oz)   07/06/24 0415 80.5 kg (177 lb 7.5 oz)- standing weight   07/05/24 0600 79.7 kg (175 lb 11.3 oz)   07/04/24 0600 81.3 kg (179 lb 3.7 oz)   07/03/24 0600 82.5 kg (181 lb 14.1 oz)   07/01/24 0500 84.4 kg (186 lb 1.1 oz)   06/30/24 0600 80.7 kg (177 lb 14.6 oz)   06/29/24 0600 80.3 kg (177 lb 0.5 oz)   06/29/24 0419 78.6 kg (173 lb 4.5 oz)   06/28/24 0600 " 87.4 kg (192 lb 10.9 oz)   06/28/24 0400 88.6 kg (195 lb 5.2 oz)   06/27/24 0600 88.5 kg (195 lb 1.7 oz)   06/26/24 1100 89.8 kg (197 lb 15.6 oz)   06/25/24 0600 95.5 kg (210 lb 8.6 oz     Weight Change %:  Weight History / % Weight Change: Noting weight fluctuations likely related to fluid shifts and scale variances    Nutrition Focused Physical Exam Findings:    Subcutaneous Fat Loss:   Orbital Fat Pads: Well nourished (slightly bulging fat pads)  Buccal Fat Pads: Well nourished (full, rounded cheeks)  Triceps: Well nourished (ample fat tissue)  Muscle Wasting:  Temporalis: Well nourished (well-defined muscle)  Pectoralis (Clavicular Region): Well nourished (clavicle not visible)  Deltoid/Trapezius: Well nourished (rounded appearance at arm, shoulder, neck)  Interosseous: Well nourished (muscle bulges)  Edema:  Edema: none  Physical Findings:  Skin: Positive (Surgical incisions)    Nutrition Significant Labs:  CBC Trend:   Results from last 7 days   Lab Units 07/08/24  0843 07/06/24  2057 07/06/24  0602 07/06/24  0141   WBC AUTO x10*3/uL 10.9 12.7* 15.4* 17.2*   RBC AUTO x10*6/uL 3.05* 2.97* 2.83* 2.88*   HEMOGLOBIN g/dL 9.0* 9.1* 8.7* 8.5*   HEMATOCRIT % 28.8* 28.5* 27.0* 26.7*   MCV fL 94 96 95 93   PLATELETS AUTO x10*3/uL 309 282 259 252    , BMP Trend:   Results from last 7 days   Lab Units 07/08/24  0843 07/06/24  0602 07/05/24  0559 07/04/24  1825   GLUCOSE mg/dL 96 96 107* 117*   CALCIUM mg/dL 8.5* 8.5* 8.2* 6.6*   SODIUM mmol/L 134* 130* 131* 133*   POTASSIUM mmol/L 4.5 4.5 4.6 4.2   CO2 mmol/L 25 24 24 19*   CHLORIDE mmol/L 102 101 101 105   BUN mg/dL 9 10 9 10   CREATININE mg/dL 0.43* 0.46* 0.56 0.54    , BG POCT trend:   Results from last 7 days   Lab Units 07/08/24  1146 07/08/24  0616 07/07/24  2107 07/07/24  1725 07/07/24  1336   POCT GLUCOSE mg/dL 122* 109* 156* 125* 122*    , Renal Lab Trend:   Results from last 7 days   Lab Units 07/08/24  0843 07/06/24  0602 07/05/24  0559 07/04/24  1825    POTASSIUM mmol/L 4.5 4.5 4.6 4.2   PHOSPHORUS mg/dL 4.4 3.8 3.6 3.2   SODIUM mmol/L 134* 130* 131* 133*   MAGNESIUM mg/dL 1.39*  --  2.23 1.67   EGFR mL/min/1.73m*2 >90 >90 >90 >90   BUN mg/dL 9 10 9 10   CREATININE mg/dL 0.43* 0.46* 0.56 0.54        Nutrition Specific Medications:  All med's reviewed     I/O:   Last BM Date: 07/08/24; Stool Appearance: Formed (07/07/24 1624)    Dietary Orders (From admission, onward)       Start     Ordered    07/02/24 1042  Adult diet Cardiac, 2-3 grams sodium; 2000 mL fluid; 70 gm fat; 2 - 3 grams Sodium  Diet effective now        Question Answer Comment   Diet type Cardiac    Diet type 2-3 grams sodium    Dietary fluid restriction / 24h: 2000 mL fluid    Fat restriction: 70 gm fat    Sodium restriction: 2 - 3 grams Sodium        07/02/24 1041    06/24/24 1327  Oral nutritional supplements  Until discontinued        Question Answer Comment   Deliver with Breakfast    Deliver with Lunch    Select supplement: Ensure Plus        06/24/24 1326                     Estimated Needs:   Total Energy Estimated Needs (kCal): 1700 kCal  Method for Estimating Needs: 28 kcal/kg IBW  Total Protein Estimated Needs (g): 80 g  Method for Estimating Needs: 1.3 g/kg IBW     Method for Estimating Needs: 1 ml/kcal or per team        Nutrition Diagnosis   Malnutrition Diagnosis  Patient has Malnutrition Diagnosis: No    Nutrition Diagnosis  Patient has Nutrition Diagnosis: Yes  Diagnosis Status (1): Ongoing  Nutrition Diagnosis 1: Inadequate protein energy intake  Related to (1): decreased appetite?  As Evidenced by (1): meals while inpatient mostly include only fluids/pudding/gelatin  Additional Assessment Information (1): Appears to be improving with consumption of ONS bid and now ordering 3 meals/d. Will continue to monitor.       Nutrition Interventions/Recommendations         Nutrition Prescription:  Individualized Nutrition Prescription Provided for : 1700 kcal and 80g protein via oral diet         Nutrition Interventions:   Interventions: Medical food supplement, Meals and snacks  Goal: Continue adult cardiac diet as tolerated  Medical Food Supplement: Commercial beverage  Goal: Ensure plus BID- Provides additional 350 kcal and 13 gm PRO      Nutrition Education: N/A         Nutrition Monitoring and Evaluation   Food/Nutrient Related History Monitoring  Monitoring and Evaluation Plan: Energy intake, Amount of food  Energy Intake: Estimated energy intake  Criteria: >75% of energy needs met via PO+ supplements  Amount of Food: Estimated amout of food  Criteria: >50% of meals and supplements                        Time Spent (min): 45 minutes

## 2024-07-08 NOTE — PROGRESS NOTES
Physical Therapy    Physical Therapy Treatment    Patient Name: Amirah Bennett  MRN: [id number]  Today's Date: 7/8/2024  Room: [address] [place] [address]  Time Calculation  Start Time: 1425  Stop Time: 1505  Time Calculation (min): 40 min       Assessment/Plan   PT Assessment  End of Session Communication: Bedside nurse  End of Session Patient Position: Up in chair     PT Plan  Treatment/Interventions: Bed mobility, Transfer training, Gait training, Balance training, Strengthening, Endurance training, Range of motion, Therapeutic exercise, Therapeutic activity, Home exercise program, Positioning  PT Plan: Ongoing PT  PT Frequency: 3 times per week  PT Discharge Recommendations: Moderate intensity level of continued care  PT Recommended Transfer Status: Assist x2  PT - OK to Discharge: Yes      General Visit Information:   PT  Visit  PT Received On: 07/08/24  Prior to Session Communication: Bedside nurse  Patient Position Received: Bed, 3 rail up, Alarm on     Subjective   Subjective: Pt is alert and agreeable to treatment. She reports a recent fall out of the bed.   Precautions:  Precautions  LE Weight Bearing Status: Right Non-Weight Bearing  Medical Precautions: Cardiac precautions, Fall precautions  Vital Signs:       Objective   Pain:  Pain Assessment  Pain Assessment: 0-10  0-10 (Numeric) Pain Score: 10 - Worst possible pain (10)  Cognition:  Cognition  Overall Cognitive Status: Impaired at baseline  Orientation Level: Disoriented to place (oriented to self and situation, disoriented to place, knows the day of the week,)  Lines/Tubes/Drains:  Urethral Catheter (Active)   Number of days: 6     Medical Gas Therapy: None (Room air)  Continuous Medications/Drips:  heparin, 0-4,500 Units/hr, Last Rate: 1,600 Units/hr (07/08/24 1509)        PT Treatments:  Therapeutic Exercise  Therapeutic Exercise Performed: Yes  Therapeutic Exercise Activity 1: AROM LLE: ankle df/pf, heel slides, SLR, hip abd add, SAQ, LAQ, Seated maryellen  x10  Therapeutic Exercise Activity 2: AROM RLE: iso quads, SLR x10, hip abd/add x5  Therapeutic Activity  Therapeutic Activity Performed: Yes  Therapeutic Activity 1: static stance x20 sec Min A     Bed Mobility  Bed Mobility: Yes  Bed Mobility 1  Bed Mobility 1: Supine to sitting  Level of Assistance 1: Close supervision     Transfers  Transfer: Yes  Transfer 1  Transfer From 1: Sit to  Transfer to 1: Stand  Transfer Device 1:  (2 HHA)  Transfer Level of Assistance 1: Maximum assistance (moderate assistance on 2nd trial)  Trials/Comments 1: x2  Transfers 2  Transfer From 2: Stand to  Transfer to 2: Sit  Transfer Device 2:  (2 HHA)  Transfer Level of Assistance 2: Minimum assistance  Trials/Comments 2: x2  Transfers 3  Transfer From 3: Bed to  Transfer to 3: Chair with arms  Technique 3: Stand pivot  Transfer Device 3:  (2 HHA)  Transfer Level of Assistance 3: Moderate assistance  Trials/Comments 3: x1             Outcome Measures:  [facility] Basic Mobility  Turning from your back to your side while in a flat bed without using bedrails: A little  Moving from lying on your back to sitting on the side of a flat bed without using bedrails: A little  Moving to and from bed to chair (including a wheelchair): A lot  Standing up from a chair using your arms (e.g. wheelchair or bedside chair): A lot  To walk in hospital room: Total  Climbing 3-5 steps with railing: Total  Basic Mobility - Total Score: 12                            Education Documentation  Precautions, taught by AUBREY Fox at 7/8/2024  3:14 PM.  Learner: Patient  Readiness: Acceptance  Method: Explanation  Response: Needs Reinforcement    Body Mechanics, taught by AUBREY Fox at 7/8/2024  3:14 PM.  Learner: Patient  Readiness: Acceptance  Method: Explanation  Response: Needs Reinforcement    Home Exercise Program, taught by AUBREY Fox at 7/8/2024  3:14 PM.  Learner: Patient  Readiness: Acceptance  Method: Explanation  Response: Needs  Reinforcement    Mobility Training, taught by AUBREY Cummings at 7/8/2024  3:14 PM.  Learner: Patient  Readiness: Acceptance  Method: Explanation  Response: Needs Reinforcement    Education Comments  No comments found.          OP EDUCATION:       Encounter Problems       Encounter Problems (Active)       Balance       Patient will complete static and dynamic sitting balance tasks with SUP to improve upright posture, core activation, and proximal stability.  (Progressing)       Start:  07/03/24    Expected End:  07/17/24            Patient to demo static standing with unilateral UE support, performing single UE task with SBA, no sway or LOB x 2 mins for functional carryover  (Progressing)       Start:  07/03/24    Expected End:  07/17/24               Mobility       STG - Patient will ambulate >/= 15 ft with minAx1 and LRAD, no acute LOB (Progressing)       Start:  07/03/24    Expected End:  07/17/24               PT Transfers       STG - Patient will perform bed mobility Ruth (Progressing)       Start:  07/03/24    Expected End:  07/17/24            STG - Patient will transfer sit to and from stand with CGAx1 and LRAD (Progressing)       Start:  07/03/24    Expected End:  07/17/24               Pain - Adult              Assessment: Patient is progressing Well with therapy this date.  Able to tolerate static standing and perform stand pivot transfer to chair. Cues to relax residual limb on bed between reps during bed level therex to avoid flexion contracture. Educated on purpose of residual limb wrapping and methods to avoid hip flexion contracture. Additional time required for skilled residual limb wrapping. Patient remains appropriate for MOD intensity therapy when medically appropriate for discharge from acute stay.  Will continue to follow.     07/08/24 at 3:27 PM   AUBREY CUMMINGS   Rehab Office: [phone]

## 2024-07-09 ENCOUNTER — DOCUMENTATION (OUTPATIENT)
Dept: CARDIOLOGY | Facility: HOSPITAL | Age: 44
End: 2024-07-09
Payer: COMMERCIAL

## 2024-07-09 ENCOUNTER — APPOINTMENT (OUTPATIENT)
Dept: NEUROLOGY | Facility: HOSPITAL | Age: 44
End: 2024-07-09
Payer: COMMERCIAL

## 2024-07-09 LAB
ALBUMIN SERPL BCP-MCNC: 2.5 G/DL (ref 3.4–5)
ANION GAP SERPL CALC-SCNC: 13 MMOL/L (ref 10–20)
BASOPHILS # BLD AUTO: 0.05 X10*3/UL (ref 0–0.1)
BASOPHILS NFR BLD AUTO: 0.5 %
BUN SERPL-MCNC: 10 MG/DL (ref 6–23)
CALCIUM SERPL-MCNC: 8.6 MG/DL (ref 8.6–10.6)
CHLORIDE SERPL-SCNC: 103 MMOL/L (ref 98–107)
CO2 SERPL-SCNC: 24 MMOL/L (ref 21–32)
CREAT SERPL-MCNC: 0.52 MG/DL (ref 0.5–1.05)
EGFRCR SERPLBLD CKD-EPI 2021: >90 ML/MIN/1.73M*2
EOSINOPHIL # BLD AUTO: 0.21 X10*3/UL (ref 0–0.7)
EOSINOPHIL NFR BLD AUTO: 2.1 %
ERYTHROCYTE [DISTWIDTH] IN BLOOD BY AUTOMATED COUNT: 17.2 % (ref 11.5–14.5)
GLUCOSE BLD MANUAL STRIP-MCNC: 103 MG/DL (ref 74–99)
GLUCOSE BLD MANUAL STRIP-MCNC: 128 MG/DL (ref 74–99)
GLUCOSE BLD MANUAL STRIP-MCNC: 133 MG/DL (ref 74–99)
GLUCOSE BLD MANUAL STRIP-MCNC: 143 MG/DL (ref 74–99)
GLUCOSE SERPL-MCNC: 89 MG/DL (ref 74–99)
HCT VFR BLD AUTO: 29.7 % (ref 36–46)
HGB BLD-MCNC: 9.3 G/DL (ref 12–16)
IMM GRANULOCYTES # BLD AUTO: 0.15 X10*3/UL (ref 0–0.7)
IMM GRANULOCYTES NFR BLD AUTO: 1.5 % (ref 0–0.9)
LYMPHOCYTES # BLD AUTO: 2.17 X10*3/UL (ref 1.2–4.8)
LYMPHOCYTES NFR BLD AUTO: 21.4 %
MAGNESIUM SERPL-MCNC: 1.91 MG/DL (ref 1.6–2.4)
MCH RBC QN AUTO: 30.4 PG (ref 26–34)
MCHC RBC AUTO-ENTMCNC: 31.3 G/DL (ref 32–36)
MCV RBC AUTO: 97 FL (ref 80–100)
MONOCYTES # BLD AUTO: 1.06 X10*3/UL (ref 0.1–1)
MONOCYTES NFR BLD AUTO: 10.4 %
NEUTROPHILS # BLD AUTO: 6.52 X10*3/UL (ref 1.2–7.7)
NEUTROPHILS NFR BLD AUTO: 64.1 %
NRBC BLD-RTO: 0 /100 WBCS (ref 0–0)
PHOSPHATE SERPL-MCNC: 4.5 MG/DL (ref 2.5–4.9)
PLATELET # BLD AUTO: 329 X10*3/UL (ref 150–450)
POTASSIUM SERPL-SCNC: 4.3 MMOL/L (ref 3.5–5.3)
RBC # BLD AUTO: 3.06 X10*6/UL (ref 4–5.2)
SODIUM SERPL-SCNC: 136 MMOL/L (ref 136–145)
UFH PPP CHRO-ACNC: 0.5 IU/ML
WBC # BLD AUTO: 10.2 X10*3/UL (ref 4.4–11.3)

## 2024-07-09 PROCEDURE — 85520 HEPARIN ASSAY: CPT

## 2024-07-09 PROCEDURE — 97530 THERAPEUTIC ACTIVITIES: CPT | Mod: GP | Performed by: STUDENT IN AN ORGANIZED HEALTH CARE EDUCATION/TRAINING PROGRAM

## 2024-07-09 PROCEDURE — 36415 COLL VENOUS BLD VENIPUNCTURE: CPT

## 2024-07-09 PROCEDURE — 83735 ASSAY OF MAGNESIUM: CPT

## 2024-07-09 PROCEDURE — 2500000001 HC RX 250 WO HCPCS SELF ADMINISTERED DRUGS (ALT 637 FOR MEDICARE OP)

## 2024-07-09 PROCEDURE — 2500000004 HC RX 250 GENERAL PHARMACY W/ HCPCS (ALT 636 FOR OP/ED)

## 2024-07-09 PROCEDURE — 1200000002 HC GENERAL ROOM WITH TELEMETRY DAILY

## 2024-07-09 PROCEDURE — 85025 COMPLETE CBC W/AUTO DIFF WBC: CPT

## 2024-07-09 PROCEDURE — 99232 SBSQ HOSP IP/OBS MODERATE 35: CPT

## 2024-07-09 PROCEDURE — 2500000002 HC RX 250 W HCPCS SELF ADMINISTERED DRUGS (ALT 637 FOR MEDICARE OP, ALT 636 FOR OP/ED)

## 2024-07-09 PROCEDURE — 82947 ASSAY GLUCOSE BLOOD QUANT: CPT

## 2024-07-09 PROCEDURE — 80069 RENAL FUNCTION PANEL: CPT

## 2024-07-09 PROCEDURE — 97110 THERAPEUTIC EXERCISES: CPT | Mod: GP | Performed by: STUDENT IN AN ORGANIZED HEALTH CARE EDUCATION/TRAINING PROGRAM

## 2024-07-09 RX ORDER — WARFARIN SODIUM 5 MG/1
5 TABLET ORAL DAILY
Status: COMPLETED | OUTPATIENT
Start: 2024-07-09 | End: 2024-07-11

## 2024-07-09 ASSESSMENT — COGNITIVE AND FUNCTIONAL STATUS - GENERAL
DRESSING REGULAR UPPER BODY CLOTHING: A LITTLE
WALKING IN HOSPITAL ROOM: A LOT
MOBILITY SCORE: 15
TURNING FROM BACK TO SIDE WHILE IN FLAT BAD: A LITTLE
CLIMB 3 TO 5 STEPS WITH RAILING: TOTAL
MOVING FROM LYING ON BACK TO SITTING ON SIDE OF FLAT BED WITH BEDRAILS: A LITTLE
TOILETING: A LOT
DRESSING REGULAR LOWER BODY CLOTHING: A LITTLE
MOBILITY SCORE: 16
HELP NEEDED FOR BATHING: A LITTLE
STANDING UP FROM CHAIR USING ARMS: A LITTLE
STANDING UP FROM CHAIR USING ARMS: A LITTLE
WALKING IN HOSPITAL ROOM: A LITTLE
MOVING TO AND FROM BED TO CHAIR: A LITTLE
TURNING FROM BACK TO SIDE WHILE IN FLAT BAD: A LITTLE
DAILY ACTIVITIY SCORE: 19
MOVING FROM LYING ON BACK TO SITTING ON SIDE OF FLAT BED WITH BEDRAILS: A LITTLE
CLIMB 3 TO 5 STEPS WITH RAILING: TOTAL
MOVING TO AND FROM BED TO CHAIR: A LITTLE

## 2024-07-09 ASSESSMENT — PAIN SCALES - GENERAL
PAINLEVEL_OUTOF10: 10 - WORST POSSIBLE PAIN
PAINLEVEL_OUTOF10: 10 - WORST POSSIBLE PAIN
PAINLEVEL_OUTOF10: 5 - MODERATE PAIN
PAINLEVEL_OUTOF10: 7
PAINLEVEL_OUTOF10: 10 - WORST POSSIBLE PAIN

## 2024-07-09 ASSESSMENT — PAIN DESCRIPTION - ORIENTATION
ORIENTATION: RIGHT
ORIENTATION: RIGHT

## 2024-07-09 ASSESSMENT — PAIN DESCRIPTION - LOCATION
LOCATION: LEG
LOCATION: LEG

## 2024-07-09 ASSESSMENT — PAIN - FUNCTIONAL ASSESSMENT
PAIN_FUNCTIONAL_ASSESSMENT: 0-10

## 2024-07-09 NOTE — PROGRESS NOTES
Physical Therapy    Physical Therapy Treatment    Patient Name: Amirah Bennett  MRN: [id number]  Today's Date: 7/9/2024  Room: [address] [place] [address]  Time Calculation  Start Time: 1215  Stop Time: 1240  Time Calculation (min): 25 min       Assessment/Plan   PT Assessment  End of Session Communication: Bedside nurse  End of Session Patient Position: Up in chair, Alarm on     PT Plan  Treatment/Interventions: Bed mobility, Transfer training, Gait training, Balance training, Strengthening, Endurance training, Range of motion, Therapeutic exercise, Therapeutic activity, Home exercise program, Positioning  PT Plan: Ongoing PT  PT Frequency: 3 times per week  PT Discharge Recommendations: Moderate intensity level of continued care  PT Recommended Transfer Status: Assist x2  PT - OK to Discharge: Yes      General Visit Information:   PT  Visit  PT Received On: 07/09/24  Prior to Session Communication: Bedside nurse  Patient Position Received: Bed, 3 rail up, Alarm off, not on at start of session     Subjective   Subjective: Pt is alert and agreeable to treatment. She reports being able to transfer from the bed to the chair with ease yesterday.   Precautions:  Precautions  LE Weight Bearing Status: Right Non-Weight Bearing  Medical Precautions: Cardiac precautions, Fall precautions  Vital Signs:       Objective   Pain:     Cognition:  Cognition  Overall Cognitive Status: Impaired at baseline  Orientation Level: Oriented X4  Lines/Tubes/Drains:  Urethral Catheter (Active)   Number of days: 7     Medical Gas Therapy: None (Room air)  Continuous Medications/Drips:  heparin, 0-4,500 Units/hr, Last Rate: 1,600 Units/hr (07/09/24 0817)        PT Treatments:  Therapeutic Exercise  Therapeutic Exercise Performed: Yes  Therapeutic Exercise Activity 1: AROM LLE: ankle df/pf, heel slides, SLR, hip abd add, SAQ, x10  Therapeutic Exercise Activity 2: AROM RLE: SLR x10, hip abd/add x10, Ssidelying hip extesnsion 2x10  Therapeutic  Activity  Therapeutic Activity Performed: Yes  Therapeutic Activity 1: static stance with RW 2x15 seconds CGA     Bed Mobility  Bed Mobility: Yes  Bed Mobility 1  Bed Mobility 1: Supine to sitting  Level of Assistance 1: Close supervision  Bed Mobility Comments 1: HOB elevated  Bed Mobility 2  Bed Mobility  2: Rolling left  Level of Assistance 2: Independent  Ambulation/Gait Training  Ambulation/Gait Training Performed: Yes  Ambulation/Gait Training 1  Surface 1: Level tile  Device 1: Rolling walker  Assistance 1: Minimum assistance  Quality of Gait 1: Decreased step length, Knee(s) buckle, Forward flexed posture  Comments/Distance (ft) 1: 5 steps across room  Transfers  Transfer: Yes  Transfer 1  Transfer From 1: Sit to  Transfer to 1: Stand  Transfer Device 1:  (2 HHA)  Transfer Level of Assistance 1: Minimum assistance  Trials/Comments 1: x1  Transfers 2  Transfer From 2: Stand to  Transfer to 2: Sit  Transfer Device 2:  (2 HHA)  Transfer Level of Assistance 2: Minimum assistance  Trials/Comments 2: x1  Transfers 3  Transfer From 3: Sit to  Transfer to 3: Stand  Transfer Device 3: Walker  Transfer Level of Assistance 3: Minimum assistance  Trials/Comments 3: x3  Transfers 4  Transfer From 4: Stand to  Transfer to 4: Sit  Transfer Device 4: Walker  Transfer Level of Assistance 4: Minimum assistance  Trials/Comments 4: x3  Transfers 5  Transfer From 5: Bed to  Transfer to 5: Chair with arms  Technique 5: Stand pivot  Transfer Device 5:  (2 HHA)  Transfer Level of Assistance 5: Minimum assistance  Trials/Comments 5: x1  Transfers 6  Transfer From 6: Chair with arms to  Transfer to 6: Chair with arms  Transfer Device 6: Walker  Transfer Level of Assistance 6: Minimum assistance  Trials/Comments 6: x1             Outcome Measures:  [facility] Basic Mobility  Turning from your back to your side while in a flat bed without using bedrails: A little  Moving from lying on your back to sitting on the side of a flat bed without  using bedrails: A little  Moving to and from bed to chair (including a wheelchair): A little  Standing up from a chair using your arms (e.g. wheelchair or bedside chair): A little  To walk in hospital room: A little  Climbing 3-5 steps with railing: Total  Basic Mobility - Total Score: 16                            Education Documentation  Precautions, taught by AUBREY Fox at 7/9/2024 12:53 PM.  Learner: Patient  Readiness: Acceptance  Method: Explanation  Response: Needs Reinforcement    Body Mechanics, taught by AUBREY Fox at 7/9/2024 12:53 PM.  Learner: Patient  Readiness: Acceptance  Method: Explanation  Response: Needs Reinforcement    Home Exercise Program, taught by AUBREY Fox at 7/9/2024 12:53 PM.  Learner: Patient  Readiness: Acceptance  Method: Explanation  Response: Needs Reinforcement    Mobility Training, taught by AUBREY Fox at 7/9/2024 12:53 PM.  Learner: Patient  Readiness: Acceptance  Method: Explanation  Response: Needs Reinforcement    Education Comments  No comments found.          OP EDUCATION:       Encounter Problems       Encounter Problems (Active)       Balance       Patient will complete static and dynamic sitting balance tasks with SUP to improve upright posture, core activation, and proximal stability.  (Progressing)       Start:  07/03/24    Expected End:  07/17/24            Patient to demo static standing with unilateral UE support, performing single UE task with SBA, no sway or LOB x 2 mins for functional carryover  (Progressing)       Start:  07/03/24    Expected End:  07/17/24               Mobility       STG - Patient will ambulate >/= 15 ft with minAx1 and LRAD, no acute LOB (Progressing)       Start:  07/03/24    Expected End:  07/17/24               PT Transfers       STG - Patient will perform bed mobility Ruth (Progressing)       Start:  07/03/24    Expected End:  07/17/24            STG - Patient will transfer sit to and from stand with CGAx1 and LRAD  (Progressing)       Start:  07/03/24    Expected End:  07/17/24               Pain - Adult              Assessment: Patient is progressing Well with therapy this date.  Able to tolerate static stance with support of RW. Able to hop across room with cues required for sequencing mechanics. Patient remains appropriate for MOD intensity therapy when medically appropriate for discharge from acute stay.  Will continue to follow.     07/09/24 at 12:56 PM   AUBREY CUMMINGS   Rehab Office: [phone]

## 2024-07-09 NOTE — PROGRESS NOTES
Warfarin for ongoing thromboembolic events;Target INR 2-3, will need to bridge to Warfarin for a minimum of 5 days, and have outpatient Anticoagulation Clinic set up at [facility] which is the closest facility near her home.  Continue to monitor H&H and platelet count

## 2024-07-09 NOTE — PROGRESS NOTES
Functional Status: Alert, Oriented to Place and person, Bed bound, tolerate orally.      Background: obtained from the patient & EMR.  Amirah Bennett is a 43 y.o. female known to have:   -HFrEF (LVEF 20-25% (2022), with prior history of cardiogenic shock 2022.  - Afib on Rivaroxaban (non compliant) w/ DCCV 2023)  - Ischemic stroke L MCA d/t AFib LLA thrombus seen on echo (lack of OAC adherence) s/p thrombectomy 2022 @ [facility] w/ residual expressive aphasia and R sided weakness, recent 2024 left cerebellar MCA.  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  - T2DM (2024 HgbA1c 5.5).   -Hypertension.      Reason for Hospital admission: 2024  Admitted through: EMS. Cardiogenic shock.     Subjective:   -No active issues last night, nor nursing concerns.   -Tolerate orally, passes bowel motions.   Systemic review: Limited.   Improved right stump pain.      Vital signs   Temp:  36.1 °C (97 °F)  Heart Rate:  69  Resp:   18  BP: (104-119)/(68-83) 104/68  Temp (24hrs), Av.4 °C (97.6 °F)    24 hour Intake/Output:  Last data filed at 2024 0544  Gross per 24 hour   Intake 340 ml   Output 4825 ml   Net -4485 ml   (Not accurate charting)  Net IO Since Admission: -35,004.25 mL [24 0734]   Vitals:    24 0544   Weight: 76.8 kg (169 lb 5 oz)     Weight change: -3.6 kg (-7 lb 15 oz)      Examination:   -Patient was conscious, oriented to place and person only, with expressive aphasia . wasn't in acute respiratory distress, not in jaundiced nor cyanosed. On Folly's Catheter, clean right stump staples without gross bleeding nor discharges.   -Cardiovascular examination: Regular bilateral pulse. JVP was not distended. Audible 1st and 2nd heart sound with pansystolic murmur best appreciated at left lower sternal border.   -Lower limb examination: amputated right above knee amputation, No pitting edema or signs of deep vein thrombosis.    -Chest examination: Bilateral vesicular  breathing bilateral basal crackles.   -Abdominal examination: Soft and lax abdomen, no rigidity nor rebound tenderness.   -Neurological examination: limited, symmetrical facial impression, equally reactive pupils, right hand weakness.         LABS:  CBC RFP   Lab Results   Component Value Date    WBC 10.9 07/08/2024    HGB 9.0 (Stable) 07/08/2024    HCT 28.8 (L) 07/08/2024    MCV 94 07/08/2024     07/08/2024    NEUTROABS 7.77 (H) 07/08/2024    Lab Results   Component Value Date     (L) 07/08/2024    K 4.5 07/08/2024     07/08/2024    CO2 25 07/08/2024    BUN 9 07/08/2024    CREATININE 0.43 (L) 07/08/2024    CREATININE 0.46 (L) 07/06/2024     Lab Results   Component Value Date    MG 1.39 (L) 07/08/2024    PHOS 4.4 07/08/2024    CALCIUM 8.5 (L) 07/08/2024         Hepatic Function ABG/VBG   Lab Results   Component Value Date    ALT 23 07/08/2024    AST 22 07/08/2024    ALKPHOS 73 07/08/2024     Lab Results   Component Value Date    BILIDIR 0.9 (H) 07/08/2024      Lab Results   Component Value Date    PROTIME 13.0 (H) 07/02/2024    APTT 89 (H) 07/02/2024    INR 1.2 (H) 07/02/2024    Lab Results   Component Value Date    LACTATE 0.6 06/26/2024         Micro/culture data:  No results found for the last 90 days.     Imaging:   Vascular US Lower Extremity Arterial Duplex 06/23/2024  1. Echogenic material within the right distal femoral and popliteal  arteries with  absent Doppler flow in the right popliteal artery  significantly diminished flow within the right distal superficial  femoral artery. Findings raise concern for thrombosis of the distal  SFA and popliteal artery. Decreased flow within the right posterior  tibial and peroneal arteries could be through collateralization.  2. Decreased flow within the right common femoral artery, right deep  femoral artery as well as the proximal and mid superficial femoral  arteries, raising concern for stenosis proximal to the right common  femoral artery,  possibly within the right external iliac right common  iliac artery. A CTA of the lower extremities can be obtained for  further evaluation.  3. Unremarkable Doppler interrogation of the left lower extremity  arteries.     Cardiac:  Transthoracic Echo (TTE) Complete With Contrast : 06/22/2024   1. Left ventricular ejection fraction is severely decreased, by visual estimate at 20-25%.   2. There is global hypokinesis of the left ventricle with minor regional variations.   3. Left ventricular diastolic filling was indeterminate.   4. Mildly enlarged right ventricle.   5. There is mildly reduced right ventricular systolic function.   6. The left atrium is severely dilated.   7. Moderate to severe tricuspid regurgitation visualized.   8. Moderately elevated right ventricular systolic pressure.   9. TR has worsened.      Current Medications:  Scheduled  PRN Medications:    aspirin, 81 mg, oral, Daily  atorvastatin, 80 mg, oral, Nightly    amiodarone, 200 mg, oral, Daily.  sacubitriL-valsartan, 2 tablet, oral, BID.  empagliflozin, 10 mg, oral, Daily.  metoprolol succinate XL, 25 mg, oral, Nightly.    DULoxetine, 30 mg, oral, Daily    pantoprazole, 40 mg, oral, Daily before breakfast    melatonin, 6 mg, oral, Daily  bisacodyl, 10 mg, rectal, Daily  sennosides, 2 tablet, oral, BID  polyethylene glycol, 17 g, oral, BID PRN medications: dextrose, dextrose, glucagon, glucagon, heparin, HYDROmorphone, lidocaine, oxyCODONE     heparin, 0-4,500 Units/hr, Last Rate: 1,600 Units/hr (07/06/24 1238)             Hospital Course Problem lists:  Amirah Bennett is a 43 y.o. 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      On 20th June, Patient presented with dyspnea and leg swelling, with elevated lactate to 14.7, cold extremities, and 3+ pitting edema suggesting cardiogenic shock.  She underwent RHC with CI of 1.6, CVP of 25, requiring inotropes initiation with difficult weaning off trials, on the following day patient had worsened SvO2 from 65 to 25 with rapid lactate increase following acute abdominal pain and right leg pain concerning for limb ischemia, and rhabdomyolysis with CK of 14,000, with limited IV hydrations due to ongoing cardiogenic shock.  Arterial doppler of lower extremities reported absent flow in the right popliteal artery with significantly diminished flow within the right distal superficial femoral artery, concerning for thrombosis. Additionally decreased flow in right common femoral artery, right deep femoral artery concerning for stenosis proximal to right common femoral artery.  Vascular medicine recommended IVC filter insertion on 6/27 in preparation for right AKA 6/28. Which was complicated by 3 days persistent stump site oozing & intramuscular hematoma evident on CTA RLE, which was managed conservatively with 6 blood transfusions while on heparin infusion necessitation to hold heparin drip temporarily for 12 hours to allow hemostasis. On the following day patient had similar stump bleeding with Hgb 6.9 requiring an additional 1pRBC incrementing hemoglobin to 8.1. hence Heparin was restarted 12 hours later at 1:30 Am 7/5 without signs of bleeding & stable Hemoglobin 8.9.   On 6th July, patient was hemodynamically stable and transferred to cardiology floor, was kept on heparin infusion with hemoglobin monitoring, as patient's stump headed well without gross bleeding, nor hemoglobin drop, Vascular medicine recommended anticoagulation bridging with warfarin on 9th July.      Patient was labeled is not candidate for advanced  therapies given documented medical no adherence. Managed medically.   Given the lack of documentation POA and limited family (NOK are cousins). ICU team involved Ethics & Palliative care.      Heart failure with reduced ejection fraction:   -HFrEF (LVEF 20-25%)  -Patient was labeled not candidate for advanced therapies due to medical nonadherence  Plan:  -palliative care follow up.  -Aim to resume quadruple GDMT (spironolactone).     Right limb ischemia secondary to Arterial emboli, S/P above right knee amputation:  #S/p femoral arterial line sheath placement now removed  -CTA Aorta with runoff 6/24: aortic bifurcation embolus, R BA-EIA embolus, SFA and popliteal emboli   -arterial duplex exam with monophasic right CFA, SFA and PFA signals, absent popliteal flow and monophasic flow in the tibial artery   -Patient labeled Unable to walk prior to presentation to hospital  -Right leg has been cold and painful in times where cardiogenic shock has worsened then warm and not painful to touch when out of shock state, fluctuating exam  -s/p Above knee amputation with vascular surgery, 6/28/2024.   Plan:  -Cont. low dose Heparin infusion, without boluses.   -If patient bleeds from stump consult vasc med.  -pain regimen: oxycodone 10mg severe pain 4hr, PRN dilaudid 0.2mg q 4 hr breakthrough,  before and after dresssing changes.   -Start Duloxetine.  -Vascular surgery, requested to keep Folly's in, to avoid stomp site infection.      Query Cardiac thrombi:  -Likely in setting of Atrial fibrillation with Rivaroxaban no adherence  -There are indeterminate low-attenuation nodular filling defects within the atrial appendage.   --Right lower extremity DVT, and arterial thrombus/p IVC filter 6/27.  -Vascular medicine recommended Warfarin with Target INR 2-3, for a minimum of 5 days.  Plan:  -Cont. Heparin drip (Bridging therapy).   -Vascular medicine    -Outpatient Anticoagulation Clinic set up at [facility].     Atrial fibrillation:     was on rivaroxaban w/ DCCV 05/2023), ischemic stroke L MCA d/t AFib LLA thrombus seen on echo (lack of OAC adherence) s/p thrombectomy 01/2022 @ [facility] w/ residual expressive aphasia and R sided weakness   -Previously prescribed digoxin 250mcg however last fill was 12/2023  -TSH 6.28H, free T4 1.48  Plan:  -Continue amiodarone 200mg daily  - Continue aspirin 81mg    Direct Hyperbilirubinemia, Likely following ischemic liver injury:  -Patient denied abdominal pain.   Tbili (3.2 on admission, from 0.5 in 03/2024)  ::RUQ US 7/1 without abnormality beyond hepatic steatosis and liver cyst  Plan:   [place] Liver enzymes.      Hx trach c/b trancutaneous fistula  -ENT had been consulted 3/2024 for gurgling and mucus drainage, no inpatient interventions required  -monitor for breaths/mucus discharge from trach site  -Cover the tracheocutaneous fistula with tape and gauze and encourage patient to apply pressure when voicing.   -follow up outpatient ENT for closure (Dr. King)      Anemia:  ::had 3 units of RBC transfused 6/30, currently controlled bleeding, increeminting gradually.   ::CTA C/A/P 6/30 without evidence of active hemorrhage within chest/abdomen/pelvis  ::CTA RLE 7/1 without a source that could be intervened upon  Plan:  - CBC daily, as stable Hb.   -Hgb goal >7  given cardiac hx        Left brachial injury  Likely injury  LUE DVT scan with nonvascularized mass, called CT while patient was there to request CTA of left upper extremity and they said logistically they would be unable to perform both scans, and given protocol for max dosing of contrast, could not give further contrast until 6/25 at 2PM  -no CTA LUE needed        Insomnia  -Continue melatonin 6mg at bedtime     Resolved:   -Thrombocytopenia, likely attributed to: previous consumptive coagulopathy/sepsis and bleeding, Multiple thrombi, DIC score 5 on 6/24/2024, Hit score of 6, Negative PF4  -Rhabdomyolysis.  -Metabolic acidosis, Lactic acidosis.   -Acute  "kidney injury.    -stump infection, treated for 7 days, s/p vanc (6/20-6/24) (6/27-7/2 ) and zosyn (6/20-6/24), (6/27- p)-restarted vanc/zosyn 6/27 due to elevated leukocytosis and purulent appearing right groin site     Disposition Plan:  -patient would like enrollment in  home delivery service for medications (she has trouble getting to preferred pharmacy), pharmacy updated to [facility]  -patient's family would like her enrolled in [facility]  -repeat thyroid labs outpatient  - IVC filter removal in 3 months!  [ ] When she is stable for discharge will need to discuss with vasc med regarding home going AC plan  [ ] Titrate GDMT as able. Currently on entresto 24/26.   [ ] Follow up further pall care recs. Patient remains full code. There is ongoing GOC discussion  [ ] Restarted statin today as liver enzymes normalized. Monitor with daily HFP.  [ ] Needs to follow up OP with ENT for trach c/b trancutaneous fistula. Dr. King for closure.     Prevention:   Fall: High.   Mobility: Physical therapy referral.   DVT: Rejected, on heparin infusion.   Diet: Cardiac Diet     Code status: Full Code  Emergency contact:   * patient LACKS capacity due to inability to express decision and inconsistency in decisions  Surrogate Medical Decision-maker:   Surrogate decision maker is: pt's cousin, Clara Wayne: [phone], Efrain Black: [phone], Keli Osborne: [phone]   Friends who may be helpful in making decisions:  Prior boyfriend Navin Myron [phone]  Close friend \"Isiah\" Pranay Owen [phone]   "

## 2024-07-10 LAB
ABO GROUP (TYPE) IN BLOOD: NORMAL
ALBUMIN SERPL BCP-MCNC: 2.7 G/DL (ref 3.4–5)
ALP SERPL-CCNC: 70 U/L (ref 33–110)
ALT SERPL W P-5'-P-CCNC: 20 U/L (ref 7–45)
ANION GAP SERPL CALC-SCNC: 11 MMOL/L (ref 10–20)
ANTIBODY SCREEN: NORMAL
APTT PPP: 88 SECONDS (ref 27–38)
AST SERPL W P-5'-P-CCNC: 20 U/L (ref 9–39)
BASOPHILS # BLD AUTO: 0.04 X10*3/UL (ref 0–0.1)
BASOPHILS NFR BLD AUTO: 0.4 %
BILIRUB DIRECT SERPL-MCNC: 0.7 MG/DL (ref 0–0.3)
BILIRUB SERPL-MCNC: 1.8 MG/DL (ref 0–1.2)
BUN SERPL-MCNC: 10 MG/DL (ref 6–23)
CALCIUM SERPL-MCNC: 8.8 MG/DL (ref 8.6–10.6)
CHLORIDE SERPL-SCNC: 103 MMOL/L (ref 98–107)
CO2 SERPL-SCNC: 26 MMOL/L (ref 21–32)
CREAT SERPL-MCNC: 0.65 MG/DL (ref 0.5–1.05)
EGFRCR SERPLBLD CKD-EPI 2021: >90 ML/MIN/1.73M*2
EOSINOPHIL # BLD AUTO: 0.22 X10*3/UL (ref 0–0.7)
EOSINOPHIL NFR BLD AUTO: 2.1 %
ERYTHROCYTE [DISTWIDTH] IN BLOOD BY AUTOMATED COUNT: 17.3 % (ref 11.5–14.5)
GLUCOSE BLD MANUAL STRIP-MCNC: 104 MG/DL (ref 74–99)
GLUCOSE BLD MANUAL STRIP-MCNC: 132 MG/DL (ref 74–99)
GLUCOSE BLD MANUAL STRIP-MCNC: 141 MG/DL (ref 74–99)
GLUCOSE BLD MANUAL STRIP-MCNC: 94 MG/DL (ref 74–99)
GLUCOSE SERPL-MCNC: 90 MG/DL (ref 74–99)
HCT VFR BLD AUTO: 30.2 % (ref 36–46)
HGB BLD-MCNC: 9.5 G/DL (ref 12–16)
IMM GRANULOCYTES # BLD AUTO: 0.14 X10*3/UL (ref 0–0.7)
IMM GRANULOCYTES NFR BLD AUTO: 1.3 % (ref 0–0.9)
INR PPP: 1.1 (ref 0.9–1.1)
LYMPHOCYTES # BLD AUTO: 2.17 X10*3/UL (ref 1.2–4.8)
LYMPHOCYTES NFR BLD AUTO: 20.3 %
MAGNESIUM SERPL-MCNC: 1.61 MG/DL (ref 1.6–2.4)
MCH RBC QN AUTO: 30.7 PG (ref 26–34)
MCHC RBC AUTO-ENTMCNC: 31.5 G/DL (ref 32–36)
MCV RBC AUTO: 98 FL (ref 80–100)
MONOCYTES # BLD AUTO: 1.03 X10*3/UL (ref 0.1–1)
MONOCYTES NFR BLD AUTO: 9.6 %
NEUTROPHILS # BLD AUTO: 7.1 X10*3/UL (ref 1.2–7.7)
NEUTROPHILS NFR BLD AUTO: 66.3 %
NRBC BLD-RTO: 0 /100 WBCS (ref 0–0)
PHOSPHATE SERPL-MCNC: 4.8 MG/DL (ref 2.5–4.9)
PLATELET # BLD AUTO: 352 X10*3/UL (ref 150–450)
POTASSIUM SERPL-SCNC: 4.5 MMOL/L (ref 3.5–5.3)
PROT SERPL-MCNC: 6.3 G/DL (ref 6.4–8.2)
PROTHROMBIN TIME: 12 SECONDS (ref 9.8–12.8)
RBC # BLD AUTO: 3.09 X10*6/UL (ref 4–5.2)
RH FACTOR (ANTIGEN D): NORMAL
SODIUM SERPL-SCNC: 135 MMOL/L (ref 136–145)
UFH PPP CHRO-ACNC: 0.5 IU/ML
WBC # BLD AUTO: 10.7 X10*3/UL (ref 4.4–11.3)

## 2024-07-10 PROCEDURE — 99233 SBSQ HOSP IP/OBS HIGH 50: CPT

## 2024-07-10 PROCEDURE — 85520 HEPARIN ASSAY: CPT

## 2024-07-10 PROCEDURE — 85025 COMPLETE CBC W/AUTO DIFF WBC: CPT

## 2024-07-10 PROCEDURE — 2500000001 HC RX 250 WO HCPCS SELF ADMINISTERED DRUGS (ALT 637 FOR MEDICARE OP)

## 2024-07-10 PROCEDURE — 83735 ASSAY OF MAGNESIUM: CPT

## 2024-07-10 PROCEDURE — 84075 ASSAY ALKALINE PHOSPHATASE: CPT

## 2024-07-10 PROCEDURE — 1200000002 HC GENERAL ROOM WITH TELEMETRY DAILY

## 2024-07-10 PROCEDURE — 86901 BLOOD TYPING SEROLOGIC RH(D): CPT

## 2024-07-10 PROCEDURE — 2500000002 HC RX 250 W HCPCS SELF ADMINISTERED DRUGS (ALT 637 FOR MEDICARE OP, ALT 636 FOR OP/ED)

## 2024-07-10 PROCEDURE — 85610 PROTHROMBIN TIME: CPT

## 2024-07-10 PROCEDURE — 84100 ASSAY OF PHOSPHORUS: CPT

## 2024-07-10 PROCEDURE — 2500000004 HC RX 250 GENERAL PHARMACY W/ HCPCS (ALT 636 FOR OP/ED)

## 2024-07-10 PROCEDURE — 97116 GAIT TRAINING THERAPY: CPT | Mod: GP | Performed by: STUDENT IN AN ORGANIZED HEALTH CARE EDUCATION/TRAINING PROGRAM

## 2024-07-10 PROCEDURE — 36415 COLL VENOUS BLD VENIPUNCTURE: CPT

## 2024-07-10 PROCEDURE — 80048 BASIC METABOLIC PNL TOTAL CA: CPT

## 2024-07-10 PROCEDURE — 82947 ASSAY GLUCOSE BLOOD QUANT: CPT

## 2024-07-10 PROCEDURE — 97110 THERAPEUTIC EXERCISES: CPT | Mod: GP | Performed by: STUDENT IN AN ORGANIZED HEALTH CARE EDUCATION/TRAINING PROGRAM

## 2024-07-10 RX ORDER — PREGABALIN 75 MG/1
75 CAPSULE ORAL NIGHTLY
Status: DISCONTINUED | OUTPATIENT
Start: 2024-07-10 | End: 2024-07-17

## 2024-07-10 RX ORDER — TALC
6 POWDER (GRAM) TOPICAL DAILY
Status: DISCONTINUED | OUTPATIENT
Start: 2024-07-10 | End: 2024-07-24 | Stop reason: HOSPADM

## 2024-07-10 ASSESSMENT — COGNITIVE AND FUNCTIONAL STATUS - GENERAL
DAILY ACTIVITIY SCORE: 17
MOBILITY SCORE: 16
TURNING FROM BACK TO SIDE WHILE IN FLAT BAD: A LITTLE
TOILETING: A LOT
CLIMB 3 TO 5 STEPS WITH RAILING: TOTAL
MOVING TO AND FROM BED TO CHAIR: A LOT
TURNING FROM BACK TO SIDE WHILE IN FLAT BAD: A LITTLE
STANDING UP FROM CHAIR USING ARMS: A LITTLE
CLIMB 3 TO 5 STEPS WITH RAILING: TOTAL
MOVING FROM LYING ON BACK TO SITTING ON SIDE OF FLAT BED WITH BEDRAILS: A LITTLE
DRESSING REGULAR UPPER BODY CLOTHING: A LITTLE
TOILETING: A LOT
WALKING IN HOSPITAL ROOM: A LOT
MOVING FROM LYING ON BACK TO SITTING ON SIDE OF FLAT BED WITH BEDRAILS: A LITTLE
STANDING UP FROM CHAIR USING ARMS: A LITTLE
HELP NEEDED FOR BATHING: A LITTLE
DRESSING REGULAR UPPER BODY CLOTHING: A LITTLE
DRESSING REGULAR LOWER BODY CLOTHING: A LOT
MOVING FROM LYING ON BACK TO SITTING ON SIDE OF FLAT BED WITH BEDRAILS: A LITTLE
MOVING TO AND FROM BED TO CHAIR: A LITTLE
MOVING TO AND FROM BED TO CHAIR: A LITTLE
MOBILITY SCORE: 13
TURNING FROM BACK TO SIDE WHILE IN FLAT BAD: A LITTLE
STANDING UP FROM CHAIR USING ARMS: A LOT
HELP NEEDED FOR BATHING: A LITTLE
WALKING IN HOSPITAL ROOM: A LITTLE
CLIMB 3 TO 5 STEPS WITH RAILING: TOTAL
PERSONAL GROOMING: A LITTLE
DRESSING REGULAR LOWER BODY CLOTHING: A LITTLE
WALKING IN HOSPITAL ROOM: A LOT

## 2024-07-10 ASSESSMENT — PAIN SCALES - GENERAL
PAINLEVEL_OUTOF10: 10 - WORST POSSIBLE PAIN

## 2024-07-10 ASSESSMENT — PAIN - FUNCTIONAL ASSESSMENT
PAIN_FUNCTIONAL_ASSESSMENT: 0-10
PAIN_FUNCTIONAL_ASSESSMENT: 0-10

## 2024-07-10 NOTE — PROGRESS NOTES
Physical Therapy    Physical Therapy Treatment    Patient Name: Amirah Bennett  MRN: [id number]  Today's Date: 7/10/2024  Room: [address] [place] [address]  Time Calculation  Start Time: 1325  Stop Time: 1404  Time Calculation (min): 39 min       Assessment/Plan   PT Assessment  End of Session Communication: Bedside nurse  End of Session Patient Position: Up in chair, Alarm on     PT Plan  Treatment/Interventions: Bed mobility, Transfer training, Gait training, Balance training, Strengthening, Endurance training, Range of motion, Therapeutic exercise, Therapeutic activity, Home exercise program, Positioning  PT Plan: Ongoing PT  PT Frequency: 3 times per week  PT Discharge Recommendations: Moderate intensity level of continued care  PT Recommended Transfer Status: Assist x2  PT - OK to Discharge: Yes      General Visit Information:   PT  Visit  PT Received On: 07/10/24  Prior to Session Communication: Bedside nurse  Patient Position Received: Bed, 3 rail up, Alarm off, not on at start of session     Subjective   Subjective: Pt is alert and agreeable to treatment. She is in a good mood today and is ready for therapy.  Precautions:  Precautions  LE Weight Bearing Status: Right Non-Weight Bearing  Medical Precautions: Cardiac precautions, Fall precautions  Vital Signs:       Objective   Pain:  Pain Assessment  Pain Assessment: 0-10  0-10 (Numeric) Pain Score: 10 - Worst possible pain (10)  Pain Type: Surgical pain  Pain Location: Leg  Pain Orientation: Right  Cognition:  Cognition  Overall Cognitive Status: Impaired at baseline  Orientation Level: Oriented X4  Lines/Tubes/Drains:  Urethral Catheter (Active)   Number of days: 8     Medical Gas Therapy: None (Room air)  Continuous Medications/Drips:  heparin, 0-4,500 Units/hr, Last Rate: 1,600 Units/hr (07/10/24 0057)        PT Treatments:  Therapeutic Exercise  Therapeutic Exercise Performed: Yes  Therapeutic Exercise Activity 1: RLE AROM: SLR x10, hip abd/add, hip extemsion  2x10  Therapeutic Exercise Activity 2: RLE AROM: standing hip extension 2x10, standing hip abduction 2x10        Bed Mobility 1  Bed Mobility 1: Supine to sitting  Level of Assistance 1: Close supervision  Bed Mobility Comments 1: HOB elevated  Bed Mobility 2  Bed Mobility  2: Rolling left  Level of Assistance 2: Independent  Ambulation/Gait Training 1  Surface 1: Level tile  Device 1: Rolling walker  Assistance 1: Contact guard  Quality of Gait 1: Decreased step length, Forward flexed posture, Antalgic  Comments/Distance (ft) 1: 3x10 hops  Transfers  Transfer: Yes  Transfer 1  Transfer From 1: Sit to  Transfer to 1: Stand  Transfer Device 1: Walker  Transfer Level of Assistance 1: Minimum assistance  Trials/Comments 1: x8  Transfers 2  Transfer From 2: Stand to  Transfer to 2: Sit  Transfer Device 2: Walker  Transfer Level of Assistance 2: Minimum assistance  Trials/Comments 2: x8  Transfers 3  Transfer From 3: Bed to  Transfer to 3: Chair with arms  Technique 3: Stand pivot  Transfer Device 3: Walker  Transfer Level of Assistance 3: Minimum assistance  Trials/Comments 3: x1  Transfers 4  Transfer From 4: Chair with arms to  Transfer to 4: Wheelchair  Transfer Device 4: Walker  Transfer Level of Assistance 4: Minimum assistance  Trials/Comments 4: x1  Transfers 5  Transfer From 5: Wheelchair to  Transfer to 5: Wheelchair  Transfer Level of Assistance 5: Minimum assistance  Trials/Comments 5: x3  Transfers 6  Transfer From 6: Wheelchair to  Transfer to 6: Chair with arms  Transfer Device 6: Walker  Transfer Level of Assistance 6: Minimum assistance  Trials/Comments 6: x1             Outcome Measures:  [facility] Basic Mobility  Turning from your back to your side while in a flat bed without using bedrails: A little  Moving from lying on your back to sitting on the side of a flat bed without using bedrails: A little  Moving to and from bed to chair (including a wheelchair): A little  Standing up from a chair using your  arms (e.g. wheelchair or bedside chair): A little  To walk in hospital room: A little  Climbing 3-5 steps with railing: Total  Basic Mobility - Total Score: 16                            Education Documentation  Precautions, taught by AUBREY Fox at 7/10/2024  2:50 PM.  Learner: Patient  Readiness: Acceptance  Method: Explanation  Response: Needs Reinforcement    Body Mechanics, taught by AUBREY Fox at 7/10/2024  2:50 PM.  Learner: Patient  Readiness: Acceptance  Method: Explanation  Response: Needs Reinforcement    Home Exercise Program, taught by AUBREY Fox at 7/10/2024  2:50 PM.  Learner: Patient  Readiness: Acceptance  Method: Explanation  Response: Needs Reinforcement    Mobility Training, taught by AUBREY Fox at 7/10/2024  2:50 PM.  Learner: Patient  Readiness: Acceptance  Method: Explanation  Response: Needs Reinforcement    Education Comments  No comments found.          OP EDUCATION:       Encounter Problems       Encounter Problems (Active)       Balance       Patient will complete static and dynamic sitting balance tasks with SUP to improve upright posture, core activation, and proximal stability.  (Progressing)       Start:  07/03/24    Expected End:  07/17/24            Patient to demo static standing with unilateral UE support, performing single UE task with SBA, no sway or LOB x 2 mins for functional carryover  (Progressing)       Start:  07/03/24    Expected End:  07/17/24               Mobility       STG - Patient will ambulate >/= 15 ft with minAx1 and LRAD, no acute LOB (Progressing)       Start:  07/03/24    Expected End:  07/17/24               PT Transfers       STG - Patient will perform bed mobility Ruth (Progressing)       Start:  07/03/24    Expected End:  07/17/24            STG - Patient will transfer sit to and from stand with CGAx1 and LRAD (Progressing)       Start:  07/03/24    Expected End:  07/17/24               Pain - Adult              Assessment: Patient  is progressing Well with therapy this date.  Gait trials with increased distance today. Pt demonstrated improved hopping length and sequencing with each set of hops. Pt fatigues between reps 8-10 in each set.  Requires cues to maintain form during standing residual limb exercises. Improved ability to transfer. Frequency updated 2/2 continued improvement. Patient remains appropriate for MOD intensity therapy when medically appropriate for discharge from acute stay.  Will continue to follow.      07/10/24 at 2:51 PM   CRIS CUMMINGS-PT   Rehab Office: [phone]

## 2024-07-10 NOTE — PROGRESS NOTES
Expressive Therapies Note    Patient was working with art therapist during MT attempt to provide a session.    Will continue to follow and provide support as needed

## 2024-07-10 NOTE — PROGRESS NOTES
Functional Status: Alert, Oriented to Place and person, Bed bound, tolerate orally.      Background: obtained from the patient & EMR.  Amirah Bennett is a 43 y.o. female known to have:   -HFrEF (LVEF 20-25% (2022), with prior history of cardiogenic shock 2022.  - Afib on Rivaroxaban (non compliant) w/ DCCV 2023)  - Ischemic stroke L MCA d/t AFib LLA thrombus seen on echo (lack of OAC adherence) s/p thrombectomy 2022 @ [facility] w/ residual expressive aphasia and R sided weakness, recent 2024 left cerebellar MCA.  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  - T2DM (2024 HgbA1c 5.5).   -Hypertension.      Reason for Hospital admission: 2024  Admitted through: EMS. Cardiogenic shock.     Subjective:   -No active issues last night, per nursing, patient experiencing some drainage from trach. Very minimal bleeding from incision site this AM     Systemic review: Limited.   Improved right stump pain.      Vital signs   Temp:  36.1 °C (97 °F)  Heart Rate:  69  Resp:   18  BP: (104-119)/(68-83) 104/68  Temp (24hrs), Av.4 °C (97.6 °F)    24 hour Intake/Output:  Last data filed at 2024 0544  Gross per 24 hour   Intake 340 ml   Output 4825 ml   Net -4485 ml   (Not accurate charting)  Net IO Since Admission: -35,004.25 mL [24 0734]   Vitals:    24 0544   Weight: 76.8 kg (169 lb 5 oz)     Weight change: -3.6 kg (-7 lb 15 oz)      Examination:   -Patient was conscious, oriented x4, with expressive aphasia . wasn't in acute respiratory distress, not in jaundiced nor cyanosed. On Folly's Catheter, clean right stump staples with very small amount of blood draining  -Cardiovascular examination: Regular bilateral pulse. JVP was not distended. Audible 1st and 2nd heart sound   -Lower limb examination: amputated right above knee amputation, No pitting edema or signs of deep vein thrombosis.    -Chest examination: Lungs clear to auscultation bilaterally.  -Abdominal  examination: Soft and lax abdomen, no rigidity nor rebound tenderness.   -Neurological examination: limited, symmetrical facial impression, equally reactive pupils, right hand weakness.         LABS:  CBC RFP   Lab Results   Component Value Date    WBC 10.9 07/08/2024    HGB 9.0 (Stable) 07/08/2024    HCT 28.8 (L) 07/08/2024    MCV 94 07/08/2024     07/08/2024    NEUTROABS 7.77 (H) 07/08/2024    Lab Results   Component Value Date     (L) 07/08/2024    K 4.5 07/08/2024     07/08/2024    CO2 25 07/08/2024    BUN 9 07/08/2024    CREATININE 0.43 (L) 07/08/2024    CREATININE 0.46 (L) 07/06/2024     Lab Results   Component Value Date    MG 1.39 (L) 07/08/2024    PHOS 4.4 07/08/2024    CALCIUM 8.5 (L) 07/08/2024         Hepatic Function ABG/VBG   Lab Results   Component Value Date    ALT 23 07/08/2024    AST 22 07/08/2024    ALKPHOS 73 07/08/2024     Lab Results   Component Value Date    BILIDIR 0.9 (H) 07/08/2024      Lab Results   Component Value Date    PROTIME 13.0 (H) 07/02/2024    APTT 89 (H) 07/02/2024    INR 1.2 (H) 07/02/2024    Lab Results   Component Value Date    LACTATE 0.6 06/26/2024         Micro/culture data:  No results found for the last 90 days.     Imaging:   Vascular US Lower Extremity Arterial Duplex 06/23/2024  1. Echogenic material within the right distal femoral and popliteal  arteries with  absent Doppler flow in the right popliteal artery  significantly diminished flow within the right distal superficial  femoral artery. Findings raise concern for thrombosis of the distal  SFA and popliteal artery. Decreased flow within the right posterior  tibial and peroneal arteries could be through collateralization.  2. Decreased flow within the right common femoral artery, right deep  femoral artery as well as the proximal and mid superficial femoral  arteries, raising concern for stenosis proximal to the right common  femoral artery, possibly within the right external iliac right  common  iliac artery. A CTA of the lower extremities can be obtained for  further evaluation.  3. Unremarkable Doppler interrogation of the left lower extremity  arteries.     Cardiac:  Transthoracic Echo (TTE) Complete With Contrast : 06/22/2024   1. Left ventricular ejection fraction is severely decreased, by visual estimate at 20-25%.   2. There is global hypokinesis of the left ventricle with minor regional variations.   3. Left ventricular diastolic filling was indeterminate.   4. Mildly enlarged right ventricle.   5. There is mildly reduced right ventricular systolic function.   6. The left atrium is severely dilated.   7. Moderate to severe tricuspid regurgitation visualized.   8. Moderately elevated right ventricular systolic pressure.   9. TR has worsened.      Current Medications:  Scheduled  PRN Medications:    aspirin, 81 mg, oral, Daily  atorvastatin, 80 mg, oral, Nightly    amiodarone, 200 mg, oral, Daily.  sacubitriL-valsartan, 2 tablet, oral, BID.  empagliflozin, 10 mg, oral, Daily.  metoprolol succinate XL, 25 mg, oral, Nightly.    DULoxetine, 30 mg, oral, Daily    pantoprazole, 40 mg, oral, Daily before breakfast    melatonin, 6 mg, oral, Daily  bisacodyl, 10 mg, rectal, Daily  sennosides, 2 tablet, oral, BID  polyethylene glycol, 17 g, oral, BID PRN medications: dextrose, dextrose, glucagon, glucagon, heparin, HYDROmorphone, lidocaine, oxyCODONE     heparin, 0-4,500 Units/hr, Last Rate: 1,600 Units/hr (07/06/24 1238)             Hospital Course Problem lists:  Amirah Bennett is a 43 y.o. female with significant PMHx of HFrEF (LVEF 20-25% (08/2022), with prior history of cardiogenic shock 04/2022 Afib on Xarelto w/ DCCV 05/2023), ischemic stroke L MCA d/t AFib LLA thrombus seen on echo (lack of OAC adherence) s/p thrombectomy 01/2022 @ [facility] w/ residual expressive aphasia and R sided weakness, recent 03/2024 left cerebellar MCA, paratracheal abscess s/p tracheostomy c/b tracheocutaneous fistula, T2DM  (03/2024 HgbA1c 5.5) and HTN.      On 20th June, Patient presented with dyspnea and leg swelling, with elevated lactate to 14.7, cold extremities, and 3+ pitting edema suggesting cardiogenic shock.  She underwent RHC with CI of 1.6, CVP of 25, requiring inotropes initiation with difficult weaning off trials, on the following day patient had worsened SvO2 from 65 to 25 with rapid lactate increase following acute abdominal pain and right leg pain concerning for limb ischemia, and rhabdomyolysis with CK of 14,000, with limited IV hydrations due to ongoing cardiogenic shock.  Arterial doppler of lower extremities reported absent flow in the right popliteal artery with significantly diminished flow within the right distal superficial femoral artery, concerning for thrombosis. Additionally decreased flow in right common femoral artery, right deep femoral artery concerning for stenosis proximal to right common femoral artery.  Vascular medicine recommended IVC filter insertion on 6/27 in preparation for right AKA 6/28. Which was complicated by 3 days persistent stump site oozing & intramuscular hematoma evident on CTA RLE, which was managed conservatively with 6 blood transfusions while on heparin infusion necessitation to hold heparin drip temporarily for 12 hours to allow hemostasis. On the following day patient had similar stump bleeding with Hgb 6.9 requiring an additional 1pRBC incrementing hemoglobin to 8.1. hence Heparin was restarted 12 hours later at 1:30 Am 7/5 without signs of bleeding & stable Hemoglobin 8.9.   On 6th July, patient was hemodynamically stable and transferred to cardiology floor, was kept on heparin infusion with hemoglobin monitoring, as patient's stump headed well without gross bleeding, nor hemoglobin drop, Vascular medicine recommended anticoagulation bridging with warfarin on 9th July.      Patient was labeled is not candidate for advanced therapies given documented medical no adherence.  Managed medically.   Given the lack of documentation POA and limited family (NOK are cousins). ICU team involved Ethics & Palliative care.      Heart failure with reduced ejection fraction:   -HFrEF (LVEF 20-25%)  -Patient was labeled not candidate for advanced therapies due to medical nonadherence  Plan:  -palliative care follow up.  -Aim to resume quadruple GDMT (spironolactone).     Right limb ischemia secondary to Arterial emboli, S/P above right knee amputation:  #S/p femoral arterial line sheath placement now removed  -CTA Aorta with runoff 6/24: aortic bifurcation embolus, R BA-EIA embolus, SFA and popliteal emboli   -arterial duplex exam with monophasic right CFA, SFA and PFA signals, absent popliteal flow and monophasic flow in the tibial artery   -Patient labeled Unable to walk prior to presentation to hospital  -Right leg has been cold and painful in times where cardiogenic shock has worsened then warm and not painful to touch when out of shock state, fluctuating exam  -s/p Above knee amputation with vascular surgery, 6/28/2024.   Plan:  -Cont. low dose Heparin infusion, without boluses.   -If patient bleeds from stump consult vasc med.  -pain regimen: oxycodone 10mg severe pain 4hr, PRN dilaudid 0.2mg q 4 hr breakthrough,  before and after dresssing changes.   -Continue duloxetine. Started on pregabalin for phantom limb pain   -Vascular surgery, requested to keep Folly's in, to avoid stump site infection.      Query Cardiac thrombi:  -Likely in setting of Atrial fibrillation with Rivaroxaban no adherence  -There are indeterminate low-attenuation nodular filling defects within the atrial appendage.   --Right lower extremity DVT, and arterial thrombus/p IVC filter 6/27.  -Vascular medicine recommended Warfarin with Target INR 2-3, for a minimum of 5 days.  Plan:  -Started warfarin with target INR 2-3   - Cont. Heparin drip (Bridging therapy).   -Vascular medicine    -Outpatient Anticoagulation Clinic set  up at [facility].     Atrial fibrillation:    was on rivaroxaban w/ DCCV 05/2023), ischemic stroke L MCA d/t AFib LLA thrombus seen on echo (lack of OAC adherence) s/p thrombectomy 01/2022 @ [facility] w/ residual expressive aphasia and R sided weakness   -Previously prescribed digoxin 250mcg however last fill was 12/2023  -TSH 6.28H, free T4 1.48  Plan:  -Continue amiodarone 200mg daily  - Continue aspirin 81mg    Direct Hyperbilirubinemia, Likely following ischemic liver injury:  -Patient denied abdominal pain.   Tbili (3.2 on admission, from 0.5 in 03/2024)  ::RUQ US 7/1 without abnormality beyond hepatic steatosis and liver cyst  Plan:   [place] Liver enzymes.      Hx trach c/b trancutaneous fistula  -ENT had been consulted 3/2024 for gurgling and mucus drainage, no inpatient interventions required  -monitor for breaths/mucus discharge from trach site  -Cover the tracheocutaneous fistula with tape and gauze and encourage patient to apply pressure when voicing.   -follow up outpatient ENT for closure (Dr. King)      Anemia:  ::had 3 units of RBC transfused 6/30, currently controlled bleeding, increeminting gradually.   ::CTA C/A/P 6/30 without evidence of active hemorrhage within chest/abdomen/pelvis  ::CTA RLE 7/1 without a source that could be intervened upon  Plan:  - CBC daily, as stable Hb.   -Hgb goal >7  given cardiac hx        Left brachial injury  Likely injury  LUE DVT scan with nonvascularized mass, called CT while patient was there to request CTA of left upper extremity and they said logistically they would be unable to perform both scans, and given protocol for max dosing of contrast, could not give further contrast until 6/25 at 2PM  -no CTA LUE needed        Insomnia  -Continue melatonin 6mg nightly plus additional 6mg PRN for sleep    Depression   - Continue duloxetine      Resolved:   -Thrombocytopenia, likely attributed to: previous consumptive coagulopathy/sepsis and bleeding, Multiple thrombi, DIC score  "5 on 6/24/2024, Hit score of 6, Negative PF4  -Rhabdomyolysis.  -Metabolic acidosis, Lactic acidosis.   -Acute kidney injury.    -stump infection, treated for 7 days, s/p vanc (6/20-6/24) (6/27-7/2 ) and zosyn (6/20-6/24), (6/27- p)-restarted vanc/zosyn 6/27 due to elevated leukocytosis and purulent appearing right groin site     Disposition Plan:  -patient would like enrollment in  home delivery service for medications (she has trouble getting to preferred pharmacy), pharmacy updated to [facility]  -patient's family would like her enrolled in [facility]  -repeat thyroid labs outpatient  - IVC filter removal in 3 months!  [ ] When she is stable for discharge will need to discuss with [facility] med regarding home going AC plan  [ ] Titrate GDMT as able. Currently on entresto 24/26.   [ ] Follow up further pall care recs. Patient remains full code. There is ongoing GOC discussion  [ ] Needs to follow up OP with ENT for trach c/b trancutaneous fistula. Dr. King for closure.     Prevention:   Fall: High.   Mobility: Physical therapy referral.   DVT: Rejected, on heparin infusion.   Diet: Cardiac Diet     Code status: Full Code  Emergency contact:   * patient LACKS capacity due to inability to express decision and inconsistency in decisions  Surrogate Medical Decision-maker:   Surrogate decision maker is: pt's cousin, Clara Wayne: [phone], Efrain Black: [phone], Keli Osborne: [phone]   Friends who may be helpful in making decisions:  Prior boyfriend Navin Sanches [phone]  Close friend \"Isiah\" Pranay Owen [phone]     Kelle Andrade MD  PGY1 Neurology     "

## 2024-07-10 NOTE — CARE PLAN
The patient's goals for the shift include      The clinical goals for the shift include Patient will manage bed activities without injury    Over the shift, the patient did not get out of bed. Her pain was managed with 5mg oxycodone. Patient had >4hrs of uninterrupted sleep.

## 2024-07-10 NOTE — PROGRESS NOTES
Amirah Bennett is a 43 y.o. female on day 20 of admission presenting with Atrial fibrillation (Multi).    Palliative Medicine following for:  Complex medical decision making, symptom management, patient/family support     History obtained from chart review including ED note, H&P, patient's daily progress notes, review of lab/test results, and discussion with primary team and bedside RN.        Subjective   History of Present Illness  Amirah Bennett is a 42 y.o. female with significant PMHx of HFrEF (LVEF 20-25% (08/2022), Afib on Xarelto w/ DCCV 05/2023), ischemic stroke L MCA d/t AFib LLA thrombus seen on echo (lack of OAC adherence) s/p thrombectomy 01/2022 @ [facility] w/ residual expressive aphasia and R sided weakness, recent 03/2024 left cerebellar MCA, s/p tracheostomy, T2DM (03/2024 HgbA1c 5.5) and HTN, presenting with bilateral leg pain.      In the ED, she was noted to be tachycardic to 105 in triage and but then when placed on the monitor was found to be in atrial fibrillation with RVR with a rate between 150 and 190. She was given 5 mg of metoprolol with slight improvement of her rate but became hypotensive and symptomatic. She was started on heparin both for her A-fib and for possible DVT/PE. Lytics were not given because of the risk of potential intracranial hemorrhage after her recent stroke. Instead decision made to cardiovert the patient and also gave digoxin, and amiodarone. After cardioversion she still looked like she was in atrial fibrillation with RVR but only to a rate in the 120s and 130s. Her blood pressure improved. Levophed ordered along with calcium and magnesium. No evidence of infection. Patient admitted to the ICU with plan to get a CT angiogram of her chest to rule out PE and RHC. Ongoing work up revealed aortic bifurcation embolus, R BA-EIA embolus, SFA and popliteal emboli. Pt with acute right lower extremity ischemia, which is not salvageable, needing inflow procedure and R AKA given new  "motor deficits.     IVC filter placed 6/27/24, open R iliofemoral thromboembolectomy and R AKA completed 6/28/24. Ongoing bleeding from stump c/b need for heparin for other clots, being managed by primary team and vascular medicine. CTA RLE 7/2 with post-surgical changes, possible vascular injury, stable H&H after 2u pRBC.     Symptoms  Pain: RLE stump and PLP. Throbbing, achey, sharp, and stabbing.  Dyspnea: denies  Fatigue: denies  Insomnia: yes  Drowsiness: denies  Constipation: denies  Nausea: denies  Appetite: good  Anxiety: yes  Depression: yes    /66   Pulse 69   Temp 35.8 °C (96.4 °F)   Resp 18   Ht 1.702 m (5' 7.01\")   Wt 76.6 kg (168 lb 14 oz)   SpO2 99%   BMI 26.44 kg/m²     Physical Exam  Constitutional:       Appearance: She is obese.   HENT:      Head: Normocephalic and atraumatic.      Nose: Nose normal.      Mouth/Throat: clear drainage from tracheostomy     Mouth: Mucous membranes are moist.      Pharynx: Oropharynx is clear.   Cardiovascular:      Rate and Rhythm: Regular rhythm with PVCs present. Murmur.  Pulmonary:      Effort: Pulmonary effort is normal.      Breath sounds: CTA throughout.  Abdominal:      Palpations: Abdomen is soft. BM yesterday.  Musculoskeletal:      Right lower leg: AKA.     LUE: bruising/hematoma resolving. Weakness and paresthesia/less feeling to the forearm and palm  Skin:     General: Skin is dry. Large lue bruising.     Comments: R AKA HECTOR, staples, scabbing mid stump. Well approximated. Right groin bulky dressing. Surrounding skin soft.  Neurological:      Mental Status: She is alert, awake and cooperative.     Comments: Expressive aphasia. Some pauses and occasional word searching.   Psychiatric:         Attention and Perception: Attention normal.         Mood and Affect: Mood is calm, appropriate.         Behavior: Behavior is cooperative.      Lab Results   Component Value Date    WBC 10.7 07/10/2024    HGB 9.5 (L) 07/10/2024    HCT 30.2 (L) 07/10/2024 "    MCV 98 07/10/2024     07/10/2024     Lab Results   Component Value Date    GLUCOSE 90 07/10/2024    CALCIUM 8.8 07/10/2024     (L) 07/10/2024    K 4.5 07/10/2024    CO2 26 07/10/2024     07/10/2024    BUN 10 07/10/2024    CREATININE 0.65 07/10/2024     Allergies   Allergen Reactions    Hydrocodone-Acetaminophen Rash    Ibuprofen Rash     Tolerates aspirin     Current Facility-Administered Medications   Medication Dose Route Frequency Provider Last Rate Last Admin    amiodarone (Pacerone) tablet 200 mg  200 mg oral Daily Abel Lucas MD   200 mg at 07/10/24 0927    aspirin chewable tablet 81 mg  81 mg oral Daily Abel Lucas MD   81 mg at 07/10/24 0930    atorvastatin (Lipitor) tablet 80 mg  80 mg oral Nightly Abel Lucas MD   80 mg at 07/09/24 2146    bisacodyl (Dulcolax) suppository 10 mg  10 mg rectal Daily Abel Lucas MD   10 mg at 06/25/24 1006    dextrose 50 % injection 12.5 g  12.5 g intravenous q15 min PRN Abel Lucas MD   12.5 g at 06/25/24 1604    dextrose 50 % injection 25 g  25 g intravenous q15 min PRN Abel Lucas MD   25 g at 06/20/24 1237    DULoxetine (Cymbalta) DR capsule 30 mg  30 mg oral Daily Abel Lucas MD   30 mg at 07/10/24 0928    empagliflozin (Jardiance) tablet 10 mg  10 mg oral Daily Carl Soares MD   10 mg at 07/10/24 0927    glucagon (Glucagen) injection 1 mg  1 mg intramuscular q15 min PRN Abel Lucas MD        glucagon (Glucagen) injection 1 mg  1 mg intramuscular q15 min PRN Abel Lucas MD        heparin 25,000 Units in dextrose 5% 250 mL (100 Units/mL) infusion (premix)  0-4,500 Units/hr intravenous Continuous Abel Lucas MD 16 mL/hr at 07/10/24 0057 1,600 Units/hr at 07/10/24 0057    heparin bolus from bag 3,000-6,000 Units  3,000-6,000 Units intravenous q4h PRN Abel Lucas MD   3,000 Units at 07/01/24 1414    HYDROmorphone (Dilaudid) injection 0.4 mg  0.4 mg intravenous q3h PRN Abel Lucas MD   0.4 mg at 07/05/24 163     lidocaine (Xylocaine) 5 % ointment   Topical PRN Abel Lucas MD        melatonin tablet 6 mg  6 mg oral Daily Abel Lucas MD   6 mg at 07/09/24 2148    metoprolol succinate XL (Toprol-XL) 24 hr tablet 25 mg  25 mg oral Nightly Adrianne Bacon MD   25 mg at 07/09/24 2146    oxyCODONE (Roxicodone) immediate release tablet 5 mg  5 mg oral q6h PRN Carl Soares MD   5 mg at 07/10/24 0629    pantoprazole (ProtoNix) EC tablet 40 mg  40 mg oral Daily before breakfast Abel Lucas MD   40 mg at 07/10/24 0629    perflutren protein A microsphere (Optison) injection 0.5 mL  0.5 mL intravenous Once in imaging Abel Lucas MD        polyethylene glycol (Glycolax, Miralax) packet 17 g  17 g oral BID Abel Lucas MD   17 g at 07/06/24 2100    sacubitriL-valsartan (Entresto) 24-26 mg per tablet 2 tablet  2 tablet oral BID Adrianne Bacon MD   2 tablet at 07/10/24 0928    sennosides (Senokot) tablet 17.2 mg  2 tablet oral BID Abel Lucas MD   17.2 mg at 07/09/24 0811    warfarin (Coumadin) tablet 5 mg  5 mg oral Daily Abel Lucas MD   5 mg at 07/09/24 1753     Assessment/Plan   History of Present Illness  Amirah Bennett is a 42 y.o. female with significant PMHx of HFrEF (LVEF 20-25% (08/2022), Afib on Xarelto w/ DCCV 05/2023), ischemic stroke L MCA d/t AFib LLA thrombus seen on echo (lack of OAC adherence) s/p thrombectomy 01/2022 @ [facility] w/ residual expressive aphasia and R sided weakness, recent 03/2024 left cerebellar MCA, s/p tracheostomy, T2DM (03/2024 HgbA1c 5.5) and HTN presenting with bilateral leg pain.      In the ED, she was noted to be tachycardic to 105 in triage and but then when placed on the monitor was found to be in atrial fibrillation with RVR with a rate between 150 and 190. She was given 5 mg of metoprolol with slight improvement of her rate but became hypotensive and symptomatic. She was started on heparin both for her A-fib and for possible DVT/PE. Lytics were not given because of  "the risk of potential intracranial hemorrhage after her recent stroke. Instead decision made to cardiovert the patient and also gave digoxin, and amiodarone. After cardioversion she still looked like she was in atrial fibrillation with RVR but only to a rate in the 120s and 130s. Her blood pressure improved. Levophed ordered along with calcium and magnesium. No evidence of infection. Patient admitted to the ICU with plan to get a CT angiogram of her chest to rule out PE and RHC. Ongoing work up revealed aortic bifurcation embolus, R BA-EIA embolus, SFA and popliteal emboli. Pt with acute right lower extremity ischemia, which is not salvageable, needing inflow procedure and R AKA given new motor deficits.     IVC filter placed 6/27/24, open R iliofemoral thromboembolectomy and R AKA completed 6/28/24. Ongoing bleeding from stump c/b need for heparin for other clots, being managed by primary team and vascular medicine. CTA RLE 7/2 with post-surgical changes, possible vascular injury, stable H&H after 2u pRBC.     6/21: Palliative consulted for pt support and GOC.       6/25: Palliative met for a family and care team meeting in pt's room. Discussed current findings via imaging/scans and labs, current complications, cardiac and circulation risks of sx and not doing sx. Pt wishes to move forward with sx. NOK/surrogates agreeable to pt's wishes. Needing cardiac optimization prior to sx. No current infection present.   Pt elects cousins Felicitas and Keli as surrogate decision makers. Keli mentions a recent MPOA completed and will bring in.      6/26: Possible plan for sx today? Palliative rounded on pt for questions/clarifications, positive presence/support, and symptom management. Pt still choosing sx to support her goal of \"keep going\". Pt speaking with evident anxiety. Reviewed anxiety reduction strategies. See recs below.     6/27: Pt calm today, feeling better sx was not yesterday and has had time to process. " Tentative IVC filter today with amputation and thrombectomy tomorrow. Discussed pain, see recs below. Continued emotional support.     7/3: Rounded with pt and nursing. Pt happy, calm, cooperative, in no pain. Pt feeling happy with her decision that she went through with the amputation. Pointed out and happy with art and music therapies. Endorsed some issues with staying asleep, see recs below.     7/5: Recommendations for anxiety/depression made in respect to pt's Qtc. GOC discussion with MPOA. Pt remains full code to support pt's goals and wishes.     7/10: Spoke with pt, addressed symptoms of insomnia, pain/PLP anxiety/depression. Pt expressing troubles with coping with missing limb. Ongoing positive presence, enlightenment, advocacy, support, and encouragement. Please see recs below.     Palliative Performance Scale (PPS): 30     ----------------------------------------------------------------------------------------------------------------------------------------------------------------------------------------------------------------------------------------------------------------------------------------------------------------------------------------------------------------------        I spent  minutes in providing separately identifiable ACP services with the patient and/or surrogate decision maker in a voluntary conversation discussing the patient's wishes and goals as detailed in the above note.   ----------------------------------------------------------------------------------------------------------------------------------------------------------------------------------------------------------------------------------------------------------------------------------------------------------------------------------------------------------------------     #Complex Medical Decision Making  #Goals of Care  #Advanced Care Planning  - Code status: Full code  - Surrogate decision maker: Keli solano)  "[phone]. Clara Black (cousin) [phone].  - Goals are survival and improved quality of life.   6/21: Pt with notable expressive aphasia making open ended questioning difficult. Pt able to answer yes and no questions more easily. Pt demonstrating understanding/comprehension of questions asked. Pt agrees that Navin is MPOA and papers are signed. She is upset with him today but is unable to verbalize why. Palliative recapped medication nonadherence and pt agreeable that she stops taking them when she is depressed. When pt asked why she stops taking her medicine, pt starting spelling out her name and saying nonsensical words. Palliative expressed a worry that her depression is causing her to not to want to continue to live. Palliative asked is that is correct, pt stated \"no\". Palliative asked pt earlier about her goals, she stated \"to live\" and \"I can make it\". Pt was unable to articulate any further explanation of her statements.   Pt was asked if her breathing were to be compromised and she needed intubated again (trached) would she want that? Pt hesitated but stated \"yes\".      Palliative attempted to reach out to Navin x3. First call he answered, and said he was on the other line with a doctor and to call back at 1230. Palliative did and his phone went to . Gia called back around 1500 and again, right to . Gia then called Pranay to see if Navin has another number, and [facility] went right to . Palliative left a message with call back number for Navin and Pranay.      Will attempt to discuss GOC with returned call from Navin. Otherwise Palliative can follow up Monday or Tuesday.      6/25: Palliative met for a family and care team meeting in pt's room. Those involved included Dr. Simon, Dr. Pat, Dannielle NP, Jennifer Palliative McKenzie, pt, and cousins Felicitas and Keli, and second cousin Marianela. Discussed current findings via imaging/scans and labs, current clot complications, and cardiac and circulation " "standpoints. Pt now requiring right AKA for loss of circulation. Vascular explained risks of AKA sx and not doing sx. Pt wishes to move forward with sx. NOK/new surrogates (Clara and Keli) agreeable to pt's wishes. Vascular and primary team expressed needing cardiac optimization prior to sx. Pt does not currently have an active infection, making this a non-urgent decision. Potential sx in the next few days.   Pt elects cousins Clara and Keli as surrogate decision makers vs Navin, and both Felicitas and Keli expressing the want and willingness to fulfil that position. Keli mentions a recent MPOA was completed with pt and signed by a notary. Keli will bring in.   Palliative reiterated and realigned heard goals of continuing to do everything we are currently doing as life and continued aggressive measures are paramount. Pt and family state \"yes\".      7/5: Spoke to pt's MPOA Keli today r/t follow up on GOC/tx limitations conversation started earlier this week, along with recommendations r/t pt's underlying depression.      Keli supportive of pt starting antidepressant understanding pt is consistently stating depression and a reason for her medication non-adherence.      Keli and Clara previously requesting time to think and talk over DNR/DNI. Palliative expressed high c/f progressive HF and educated to advanced state with low EF ~25%. Expressed concern for survival following a code and the chance of continued or improved QOL would be unlikely. Keli states that she and Clara spoke this over and agree that they and Amirah would want full resuscitative measures if her heart were to stop or go into a lethal arrhythmia. Keli agreeable if a resuscitation event were to occur, pt's team would be transparent and honest with pt's prognosis and expected trajectory and assist with guiding further care that would best honor pt. Pt remains full code.     Palliative Outpatient Navigator at " discharge to assist with early identification and monitoring for HF symptoms- ordered.     #HFrEF  - Palliative Outpatient Navigator at discharge to assist with early identification/tx and monitoring for HF symptoms.  (ordered)        #Acute Pain  - Is s/p Right AKA with thrombectomy. Pt reporting continuous RLE/stump pain, achey, stabbing and throbbing, more so with with movement. Denies pain of LLE.   - Has been getting Oxycodone 5mg Q6H prn moderate pain, pt stating not making pain tolerable, does not feel any difference in pain level after taking. Pain has also been keeping her up at night.  - On Duloxetine 30mg daily for anxiety/depression may also assist with peripheral nerve pain and muscle/bone pain reduction. Consider increasing to Duloxetine 60mg daily after 7-10 days of use. May take 4-6 weeks for full pain effect.      #Phantom limb pain  - Pt stating still feeling pain of the right foot. States sharp.   - Recommend Psych and Cognitive Behavioral Therapy as additive nonpharmacological modality. Recommend Lyrica 75mg nightly. May also assist in reduced opiate intake and improved sleep.  - Beta blockers are known to help PLP along with antidepressants and AEDs. Pt on Metoprolol and Duloxetine.      #Coping with amputation  #Disfigurement  - Pt stating she is not coping well with seeing her missing limb. Pt shakes head and rubs face with looking at her stump.  - Recommend Psych and Cognitive Behavioral Therapy as additive nonpharmacological modality for coping, anxiety/depression and PLP.        #Depression  #Anxiety  - Recommend Psychiatry consult/CBT.  - C/f vicious cycle of ongoing depressive symptoms and medication non-adherence ISO worsened health and possible QOL. Currently on Duloxetine 30mg daily, started 7/5. Not known to prolong QTc. Latest EKG 6/22/2024 Qtc 469ms. Recommend repeat EKG for latest evaluation.   - 7/10, reports still feeling depressed and anxious, has taken 5 doses to date. Consider  Duloxetine 60mg daily after 7-10 days of use. May take 4-6 weeks for full effect.       #Insomnia  - Previously endorsed trouble falling and staying asleep. Was on Melatonin 6mg PO PRN sleep and scheduled Melatonin 6mg at 1800. Reports sleeping very well on this regimen.   - 7/10, noted only on daily at 1800. Please reinstate previous regimen as pt is stating trouble falling and staying asleep again.       #Psychosocial Support  - Ongoing pt and family support, pt advocacy, positive presence and emotional support.  - Music and Art Therapy  - Spiritual Care Support       Plan of Care discussed with: Updated primary team and bedside RN on goals of care decision, medication adjustments, and code status      Medical Decision Making was high level due to high complexity of problems, extensive data review, and high risk of management/treatment.     - Cardiogenic shock, shock liver, acute kidney injury requiring inotrope cardiac support, multiple emboli and acute loss of right lower extremity perfusion requiring AKA, worsening leukocytosis, s/p right AKA c/b post op bleeding and fall, posing threat to life and function.  - Reviewed external notes from   - Reviews results from  which were used in decision making for   - Recommended the following tests: EKG to check qtc iso starting SSRI  - Assessment required independent historian: nursing and primary team  - Independent interpretation of test: labs   - Discussion of management with: nursing, primary.   - Drug therapy requiring intensive monitoring for toxicity: monitor renal status with meds/HF, heparin.  - Decision regarding elective major surgery with identified patient or procedure risk factors:   - Decision regarding emergency major surgery:   - Decision regarding hospitalization or escalation of hospital-level care:   - Decision not to resuscitate or to de-escalate care because of poor prognosis:   - Parenteral controlled substances: heparin, dilaudid prn      Thank  you for allowing us to participate in the care of this patient. Palliative will continue to follow as needed. Palliative medicine is available Monday-Friday, 8a-6p. Please contact team with any questions or concerns.  Team pager [phone] (weekdays)    JAE Evangelista-CNP

## 2024-07-11 LAB
ALBUMIN SERPL BCP-MCNC: 2.8 G/DL (ref 3.4–5)
ANION GAP SERPL CALC-SCNC: 13 MMOL/L (ref 10–20)
APPEARANCE UR: ABNORMAL
BASOPHILS # BLD AUTO: 0.04 X10*3/UL (ref 0–0.1)
BASOPHILS NFR BLD AUTO: 0.4 %
BILIRUB UR STRIP.AUTO-MCNC: NEGATIVE MG/DL
BUN SERPL-MCNC: 12 MG/DL (ref 6–23)
CALCIUM SERPL-MCNC: 9 MG/DL (ref 8.6–10.6)
CHLORIDE SERPL-SCNC: 101 MMOL/L (ref 98–107)
CO2 SERPL-SCNC: 26 MMOL/L (ref 21–32)
COLOR UR: ABNORMAL
CREAT SERPL-MCNC: 0.62 MG/DL (ref 0.5–1.05)
EGFRCR SERPLBLD CKD-EPI 2021: >90 ML/MIN/1.73M*2
EOSINOPHIL # BLD AUTO: 0.23 X10*3/UL (ref 0–0.7)
EOSINOPHIL NFR BLD AUTO: 2.1 %
ERYTHROCYTE [DISTWIDTH] IN BLOOD BY AUTOMATED COUNT: 17.2 % (ref 11.5–14.5)
GLUCOSE BLD MANUAL STRIP-MCNC: 103 MG/DL (ref 74–99)
GLUCOSE BLD MANUAL STRIP-MCNC: 107 MG/DL (ref 74–99)
GLUCOSE BLD MANUAL STRIP-MCNC: 120 MG/DL (ref 74–99)
GLUCOSE BLD MANUAL STRIP-MCNC: 99 MG/DL (ref 74–99)
GLUCOSE SERPL-MCNC: 98 MG/DL (ref 74–99)
GLUCOSE UR STRIP.AUTO-MCNC: ABNORMAL MG/DL
HCT VFR BLD AUTO: 33.4 % (ref 36–46)
HGB BLD-MCNC: 10.6 G/DL (ref 12–16)
HOLD SPECIMEN: NORMAL
IMM GRANULOCYTES # BLD AUTO: 0.12 X10*3/UL (ref 0–0.7)
IMM GRANULOCYTES NFR BLD AUTO: 1.1 % (ref 0–0.9)
INR PPP: 1.2 (ref 0.9–1.1)
KETONES UR STRIP.AUTO-MCNC: NEGATIVE MG/DL
LEUKOCYTE ESTERASE UR QL STRIP.AUTO: ABNORMAL
LYMPHOCYTES # BLD AUTO: 2.12 X10*3/UL (ref 1.2–4.8)
LYMPHOCYTES NFR BLD AUTO: 19.6 %
MAGNESIUM SERPL-MCNC: 1.56 MG/DL (ref 1.6–2.4)
MCH RBC QN AUTO: 30.9 PG (ref 26–34)
MCHC RBC AUTO-ENTMCNC: 31.7 G/DL (ref 32–36)
MCV RBC AUTO: 97 FL (ref 80–100)
MONOCYTES # BLD AUTO: 0.95 X10*3/UL (ref 0.1–1)
MONOCYTES NFR BLD AUTO: 8.8 %
NEUTROPHILS # BLD AUTO: 7.37 X10*3/UL (ref 1.2–7.7)
NEUTROPHILS NFR BLD AUTO: 68 %
NITRITE UR QL STRIP.AUTO: NEGATIVE
NRBC BLD-RTO: 0 /100 WBCS (ref 0–0)
PH UR STRIP.AUTO: 7.5 [PH]
PHOSPHATE SERPL-MCNC: 4.9 MG/DL (ref 2.5–4.9)
PLATELET # BLD AUTO: 354 X10*3/UL (ref 150–450)
POTASSIUM SERPL-SCNC: 4.5 MMOL/L (ref 3.5–5.3)
PROT UR STRIP.AUTO-MCNC: ABNORMAL MG/DL
PROTHROMBIN TIME: 13.3 SECONDS (ref 9.8–12.8)
RBC # BLD AUTO: 3.43 X10*6/UL (ref 4–5.2)
RBC # UR STRIP.AUTO: ABNORMAL /UL
RBC #/AREA URNS AUTO: >20 /HPF
SODIUM SERPL-SCNC: 135 MMOL/L (ref 136–145)
SP GR UR STRIP.AUTO: 1.01
SQUAMOUS #/AREA URNS AUTO: ABNORMAL /HPF
UFH PPP CHRO-ACNC: 0.5 IU/ML
UROBILINOGEN UR STRIP.AUTO-MCNC: ABNORMAL MG/DL
WBC # BLD AUTO: 10.8 X10*3/UL (ref 4.4–11.3)
WBC #/AREA URNS AUTO: ABNORMAL /HPF

## 2024-07-11 PROCEDURE — 85520 HEPARIN ASSAY: CPT

## 2024-07-11 PROCEDURE — 36415 COLL VENOUS BLD VENIPUNCTURE: CPT

## 2024-07-11 PROCEDURE — 2500000001 HC RX 250 WO HCPCS SELF ADMINISTERED DRUGS (ALT 637 FOR MEDICARE OP)

## 2024-07-11 PROCEDURE — 2500000004 HC RX 250 GENERAL PHARMACY W/ HCPCS (ALT 636 FOR OP/ED)

## 2024-07-11 PROCEDURE — 85025 COMPLETE CBC W/AUTO DIFF WBC: CPT

## 2024-07-11 PROCEDURE — 85610 PROTHROMBIN TIME: CPT

## 2024-07-11 PROCEDURE — 87086 URINE CULTURE/COLONY COUNT: CPT

## 2024-07-11 PROCEDURE — 97110 THERAPEUTIC EXERCISES: CPT | Mod: GO

## 2024-07-11 PROCEDURE — 2500000002 HC RX 250 W HCPCS SELF ADMINISTERED DRUGS (ALT 637 FOR MEDICARE OP, ALT 636 FOR OP/ED)

## 2024-07-11 PROCEDURE — 81001 URINALYSIS AUTO W/SCOPE: CPT

## 2024-07-11 PROCEDURE — 99232 SBSQ HOSP IP/OBS MODERATE 35: CPT

## 2024-07-11 PROCEDURE — 80069 RENAL FUNCTION PANEL: CPT

## 2024-07-11 PROCEDURE — 83735 ASSAY OF MAGNESIUM: CPT

## 2024-07-11 PROCEDURE — 1200000002 HC GENERAL ROOM WITH TELEMETRY DAILY

## 2024-07-11 PROCEDURE — 82947 ASSAY GLUCOSE BLOOD QUANT: CPT

## 2024-07-11 RX ORDER — LANOLIN ALCOHOL/MO/W.PET/CERES
400 CREAM (GRAM) TOPICAL ONCE
Status: COMPLETED | OUTPATIENT
Start: 2024-07-11 | End: 2024-07-11

## 2024-07-11 ASSESSMENT — PAIN SCALES - GENERAL
PAINLEVEL_OUTOF10: 9
PAINLEVEL_OUTOF10: 5 - MODERATE PAIN
PAINLEVEL_OUTOF10: 8
PAINLEVEL_OUTOF10: 10 - WORST POSSIBLE PAIN
PAINLEVEL_OUTOF10: 6
PAINLEVEL_OUTOF10: 6

## 2024-07-11 ASSESSMENT — PAIN - FUNCTIONAL ASSESSMENT
PAIN_FUNCTIONAL_ASSESSMENT: 0-10

## 2024-07-11 ASSESSMENT — COGNITIVE AND FUNCTIONAL STATUS - GENERAL
WALKING IN HOSPITAL ROOM: A LOT
MOVING TO AND FROM BED TO CHAIR: A LITTLE
MOVING FROM LYING ON BACK TO SITTING ON SIDE OF FLAT BED WITH BEDRAILS: A LITTLE
CLIMB 3 TO 5 STEPS WITH RAILING: TOTAL
TOILETING: A LOT
WALKING IN HOSPITAL ROOM: A LOT
DAILY ACTIVITIY SCORE: 19
TOILETING: A LOT
CLIMB 3 TO 5 STEPS WITH RAILING: TOTAL
DRESSING REGULAR LOWER BODY CLOTHING: A LITTLE
STANDING UP FROM CHAIR USING ARMS: A LITTLE
HELP NEEDED FOR BATHING: A LOT
HELP NEEDED FOR BATHING: A LITTLE
PERSONAL GROOMING: A LITTLE
TURNING FROM BACK TO SIDE WHILE IN FLAT BAD: A LITTLE
MOVING FROM LYING ON BACK TO SITTING ON SIDE OF FLAT BED WITH BEDRAILS: A LITTLE
STANDING UP FROM CHAIR USING ARMS: A LITTLE
HELP NEEDED FOR BATHING: A LITTLE
TURNING FROM BACK TO SIDE WHILE IN FLAT BAD: A LITTLE
DRESSING REGULAR UPPER BODY CLOTHING: A LITTLE
DRESSING REGULAR UPPER BODY CLOTHING: A LITTLE
TOILETING: A LOT
DAILY ACTIVITIY SCORE: 16
DRESSING REGULAR LOWER BODY CLOTHING: A LITTLE
MOBILITY SCORE: 15
DRESSING REGULAR UPPER BODY CLOTHING: A LITTLE
MOVING TO AND FROM BED TO CHAIR: A LITTLE
DRESSING REGULAR LOWER BODY CLOTHING: A LOT

## 2024-07-11 ASSESSMENT — PAIN DESCRIPTION - DESCRIPTORS
DESCRIPTORS: ACHING;TENDER
DESCRIPTORS: TENDER;ACHING
DESCRIPTORS: ACHING;TENDER

## 2024-07-11 NOTE — PROGRESS NOTES
Occupational Therapy    OT Treatment    Patient Name: Amirah Bennett  MRN: [id number]  Today's Date: 7/11/2024  Time Calculation  Start Time: 0906  Stop Time: 0924  Time Calculation (min): 18 min      Assessment:  OT Assessment: Pt will benefit from continued skilled OT to increase indpendence in ADLs, functional mobility, activity tolerance, strength, and safety.  Prognosis: Good  Barriers to Discharge: Inaccessible home environment, Decreased caregiver support  Evaluation/Treatment Tolerance: Patient tolerated treatment well  Medical Staff Made Aware: Yes  End of Session Communication: Bedside nurse  End of Session Patient Position: Bed, 3 rail up, Alarm on  OT Assessment Results: Decreased ADL status, Decreased upper extremity strength, Decreased endurance, Decreased cognition, Decreased safe judgment during ADL, Decreased functional mobility, Decreased fine motor control  Prognosis: Good  Barriers to Discharge: Inaccessible home environment, Decreased caregiver support  Evaluation/Treatment Tolerance: Patient tolerated treatment well  Medical Staff Made Aware: Yes  Strengths: Attitude of self  Barriers to Participation: Comorbidities  Plan:  Treatment Interventions: ADL retraining, Functional transfer training, UE strengthening/ROM, Endurance training, Cognitive reorientation, Patient/family training, Equipment evaluation/education, Neuromuscular reeducation, Compensatory technique education, Fine motor coordination activities  OT Frequency: 3 times per week  OT Discharge Recommendations: Moderate intensity level of continued care, 24 hr supervision due to cognition  Equipment Recommended upon Discharge:  (TBD)  OT Recommended Transfer Status: Assist of 2  OT - OK to Discharge: Yes  Treatment Interventions: ADL retraining, Functional transfer training, UE strengthening/ROM, Endurance training, Cognitive reorientation, Patient/family training, Equipment evaluation/education, Neuromuscular reeducation, Compensatory  "technique education, Fine motor coordination activities    Subjective   Previous Visit Info:  OT Last Visit  OT Received On: 07/11/24  General:  General  Reason for Referral: 44 y/o female initially presented with cardiogenic shock; Course c/b SARAH, cardiac thrombi, and Afib with RVR. Now s/p R AKA on 6/28.  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, T2DM and HTN  Family/Caregiver Present: No  Prior to Session Communication: Bedside nurse  Patient Position Received: Bed, 3 rail up, Alarm off, not on at start of session  Preferred Learning Style: auditory, verbal  General Comment: Pt supine in bed upon arrival, agreeable to OT  Precautions:  LE Weight Bearing Status: Right Non-Weight Bearing  Medical Precautions: Cardiac precautions, Fall precautions    Pain:  Pain Assessment  Pain Assessment: 0-10  0-10 (Numeric) Pain Score: 10 - Worst possible pain  Pain Type: Surgical pain  Pain Location: Leg  Pain Orientation: Right  Pain Interventions: Repositioned, Distraction    Objective    Cognition:  Cognition  Overall Cognitive Status: Impaired  Orientation Level: Oriented X4 (reported \"July\" and \"hospital\")  Insight: Mild  Processing Speed: Delayed    Activities of Daily Living:      UE Bathing  UE Bathing Level of Assistance: Contact guard  UE Bathing Where Assessed: Bed level  UE Bathing Comments: min A to adjust gown over shoulders supine in bed    Bed Mobility/Transfers: Bed Mobility  Bed Mobility: Yes  Bed Mobility 1  Bed Mobility 1: Rolling left  Level of Assistance 1: Close supervision    Transfers  Transfer: No (denied transfer 2/2 pain)    Modalities:  Modalities Used: " No    Therapy/Activity: Therapeutic Exercise  Therapeutic Exercise Performed: Yes  Therapeutic Exercise Activity 1: BUE 1x10 AROM shoulder flex/extension, elbow flex/extension, open/close grap, pt  provided with sponges to improve  strength, educated on completing 3x/day    Outcome Measures:[facility] Daily Activity  Putting on and taking off regular lower body clothing: A lot  Bathing (including washing, rinsing, drying): A lot  Putting on and taking off regular upper body clothing: A little  Toileting, which includes using toilet, bedpan or urinal: A lot  Taking care of personal grooming such as brushing teeth: A little  Eating Meals: None  Daily Activity - Total Score: 16        Education Documentation  Precautions, taught by Candido Cerda OT at 7/11/2024 11:22 AM.  Learner: Patient  Readiness: Acceptance  Method: Explanation  Response: Verbalizes Understanding, Needs Reinforcement    Body Mechanics, taught by Candido Cerda OT at 7/11/2024 11:22 AM.  Learner: Patient  Readiness: Acceptance  Method: Explanation  Response: Verbalizes Understanding, Needs Reinforcement    ADL Training, taught by Candido Cerda OT at 7/11/2024 11:22 AM.  Learner: Patient  Readiness: Acceptance  Method: Explanation  Response: Verbalizes Understanding, Needs Reinforcement    Education Comments  No comments found.      Goals:  Encounter Problems       Encounter Problems (Active)       ADLs       Patient will complete lower body dressing with MIN A  for donning and doffing all LE clothes in order to increase Indep with task participation.  (Progressing)       Start:  07/03/24    Expected End:  07/24/24            Patient will complete toileting, including clothing management and hygiene, with MIN A in order to maximize functional Indep with task completion.  (Progressing)       Start:  07/03/24    Expected End:  07/24/24               BALANCE       Pt will increase static/dynamic sit/stand to Good to increase safety and indep with  functional task completion.   (Progressing)       Start:  07/03/24    Expected End:  07/24/24               COGNITION/SAFETY       Pt will demo good safety awareness with no more than min vc during functional task completion. (Progressing)       Start:  07/03/24    Expected End:  07/24/24               EXERCISE/STRENGTHENING       Pt will increase overall LUE strength to 4+/5 in order to increase functional task participation.  (Progressing)       Start:  07/03/24    Expected End:  07/24/24            Pt will demo increased LUE fine motor/bimanual coordination in order to achieve MOD I with functional task completion.  (Progressing)       Start:  07/03/24    Expected End:  07/24/24               TRANSFERS       Patient will complete functional transfers using least restrictive device with  CGA  in order to maximize functional potential and increase safety.  (Progressing)       Start:  07/03/24    Expected End:  07/24/24                 DEB Turk/DINESH

## 2024-07-11 NOTE — CARE PLAN
The patient's goals for the shift include      The clinical goals for the shift include Patient will rate pain less than 5/10 by the end of shift    Over the shift, the patient had greater than 4hrs of uninterrupted sleep. She rated her pain this morning as 5/10. She continues to make improvement with PT.

## 2024-07-11 NOTE — PROGRESS NOTES
SW met with pt at bedside to offer support and consult issues on social work. Pt showed SW a form for [employer], and pt didn't recall who gave this form. SW found out a business card for [employer], and texted and emailed to them requesting assistance. Pt denied any further issues or questions on social work at the moment. SW will continue to assist pt with social issues.     Elias Montes De Oca, MATTHEWA, LSW

## 2024-07-11 NOTE — CARE PLAN
Pt up to bsc with assist x1, RAKA site well approximated with staples intact. Araya catheter draining yellow urine with no blood tinged urine since this afternoon.    The patient's goals for the shift include      The clinical goals for the shift include pt will remain hemodynamically stable this shift      Problem: Safety - Adult  Goal: Free from fall injury  Outcome: Progressing     Problem: Diabetes  Goal: Increase stability of blood glucose readings by end of shift  Outcome: Progressing     Problem: Diabetes  Goal: Maintain glucose levels >70mg/dl to <250mg/dl throughout shift  Outcome: Progressing     Problem: Skin  Goal: Promote/optimize nutrition  Outcome: Progressing

## 2024-07-11 NOTE — PROGRESS NOTES
Art Therapy Note    Amirah Bennett     Therapy Session  Referral Type: New referral this admission  Visit Type: Follow-up visit  Session Start Time: 1452  Session End Time: 1552  Intervention Delivery: In-person    Pre-assessment  Pain Score: 6  Stress Level (0-10): 10  Anxiety Level (0-10): 10  Mood/Affect: Participative, Calm, Cooperative  Verbalized Emotional State:  (I'm okay)         Treatment/Interventions  Areas of Focus: Anxiety reduction, Stress reduction, Self-expression, Emotional support, Coping  Art Therapy Interventions: Active art engagement, Counseling, Education/instruction, Empathic listening/validating emotions, Mindfulness-based stress reduction, Spontaneous art expression  Interruption: No  Patient Fell Asleep at End of Session: Yes    Post-assessment  Total Session Time (min): 60 minutes    Narrative  Assessment Detail: Pt was lying in bed when ATR arrived and stated she wanted to engage in AT.  Plan: To educate pt on coping benefits of AT for self-expression.  Intervention: Using watercolor paint and paper, ATR instructed on watercolor properties and techniques, encouraging pt to enjoy process. ATR also provided recommendations of art interventions she could incorporate in bedtime routine to discharge negative thoughts and feelings, anxieties and fears, giving pt unlined journal (after learning that she was having toruble sleeping due to anxiety).  Evaluation: Pt fully engaged in watercolor painting symbols meaningful to her, trying different techniques ATR demonstrated for her.  Follow-up: ATR will continue to follow pt.    Education Documentation  No documentation found.

## 2024-07-11 NOTE — PROGRESS NOTES
Functional Status: Alert, Oriented to Place and person, Bed bound, tolerate orally.      Background: obtained from the patient & EMR.  Amirah Bennett is a 43 y.o. female known to have:   -HFrEF (LVEF 20-25% (2022), with prior history of cardiogenic shock 2022.  - Afib on Rivaroxaban (non compliant) w/ DCCV 2023)  - Ischemic stroke L MCA d/t AFib LLA thrombus seen on echo (lack of OAC adherence) s/p thrombectomy 2022 @ [facility] w/ residual expressive aphasia and R sided weakness, recent 2024 left cerebellar MCA.  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  - T2DM (2024 HgbA1c 5.5).   -Hypertension.      Reason for Hospital admission: 2024  Admitted through: EMS. Cardiogenic shock.     Subjective:   -Patient said she did not have leg pain overnight but was reporting an uncomfortable sensation this morning in the leg. She does have some blood in dhillon and reported burning with urination.         Vital signs   Temp:  36.1 °C (97 °F)  Heart Rate:  69  Resp:   18  BP: (104-119)/(68-83) 104/68  Temp (24hrs), Av.4 °C (97.6 °F)    24 hour Intake/Output:  Last data filed at 2024 0544  Gross per 24 hour   Intake 340 ml   Output 4825 ml   Net -4485 ml   (Not accurate charting)  Net IO Since Admission: -35,004.25 mL [24 0734]   Vitals:    24 0544   Weight: 76.8 kg (169 lb 5 oz)     Weight change: -3.6 kg (-7 lb 15 oz)      Examination:   -Patient was conscious, oriented x4, with expressive aphasia . wasn't in acute respiratory distress, not in jaundiced nor cyanosed. Blood tinged urine from Dhillon. clean right stump staples without drainage or blood   -Cardiovascular examination: Audible 1st and 2nd heart sound, no murmurs  -Lower limb examination: amputated right above knee amputation is edematous, No pitting edema or signs of deep vein thrombosis.    -Chest examination: Lungs clear to auscultation bilaterally.  -Abdominal examination: Soft and lax abdomen, no  rigidity nor rebound tenderness.   -Neurological examination: limited, symmetrical facial impression, equally reactive pupils, right hand weakness.         LABS:  CBC RFP   Lab Results   Component Value Date    WBC 10.9 07/08/2024    HGB 9.0 (Stable) 07/08/2024    HCT 28.8 (L) 07/08/2024    MCV 94 07/08/2024     07/08/2024    NEUTROABS 7.77 (H) 07/08/2024    Lab Results   Component Value Date     (L) 07/08/2024    K 4.5 07/08/2024     07/08/2024    CO2 25 07/08/2024    BUN 9 07/08/2024    CREATININE 0.43 (L) 07/08/2024    CREATININE 0.46 (L) 07/06/2024     Lab Results   Component Value Date    MG 1.39 (L) 07/08/2024    PHOS 4.4 07/08/2024    CALCIUM 8.5 (L) 07/08/2024         Hepatic Function ABG/VBG   Lab Results   Component Value Date    ALT 23 07/08/2024    AST 22 07/08/2024    ALKPHOS 73 07/08/2024     Lab Results   Component Value Date    BILIDIR 0.9 (H) 07/08/2024      Lab Results   Component Value Date    PROTIME 13.0 (H) 07/02/2024    APTT 89 (H) 07/02/2024    INR 1.2 (H) 07/02/2024    Lab Results   Component Value Date    LACTATE 0.6 06/26/2024           Micro/culture data:  No results found for the last 90 days.     Imaging:   Vascular US Lower Extremity Arterial Duplex 06/23/2024  1. Echogenic material within the right distal femoral and popliteal  arteries with  absent Doppler flow in the right popliteal artery  significantly diminished flow within the right distal superficial  femoral artery. Findings raise concern for thrombosis of the distal  SFA and popliteal artery. Decreased flow within the right posterior  tibial and peroneal arteries could be through collateralization.  2. Decreased flow within the right common femoral artery, right deep  femoral artery as well as the proximal and mid superficial femoral  arteries, raising concern for stenosis proximal to the right common  femoral artery, possibly within the right external iliac right common  iliac artery. A CTA of the lower  extremities can be obtained for  further evaluation.  3. Unremarkable Doppler interrogation of the left lower extremity  arteries.     Cardiac:  Transthoracic Echo (TTE) Complete With Contrast : 06/22/2024   1. Left ventricular ejection fraction is severely decreased, by visual estimate at 20-25%.   2. There is global hypokinesis of the left ventricle with minor regional variations.   3. Left ventricular diastolic filling was indeterminate.   4. Mildly enlarged right ventricle.   5. There is mildly reduced right ventricular systolic function.   6. The left atrium is severely dilated.   7. Moderate to severe tricuspid regurgitation visualized.   8. Moderately elevated right ventricular systolic pressure.   9. TR has worsened.      Current Medications:  Scheduled  PRN Medications:    aspirin, 81 mg, oral, Daily  atorvastatin, 80 mg, oral, Nightly    amiodarone, 200 mg, oral, Daily.  sacubitriL-valsartan, 2 tablet, oral, BID.  empagliflozin, 10 mg, oral, Daily.  metoprolol succinate XL, 25 mg, oral, Nightly.    DULoxetine, 30 mg, oral, Daily    pantoprazole, 40 mg, oral, Daily before breakfast    melatonin, 6 mg, oral, Daily  bisacodyl, 10 mg, rectal, Daily  sennosides, 2 tablet, oral, BID  polyethylene glycol, 17 g, oral, BID PRN medications: dextrose, dextrose, glucagon, glucagon, heparin, HYDROmorphone, lidocaine, oxyCODONE     heparin, 0-4,500 Units/hr, Last Rate: 1,600 Units/hr (07/06/24 1238)             Hospital Course Problem lists:  Amirah Bennett is a 43 y.o. 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      On  20th June, Patient presented with dyspnea and leg swelling, with elevated lactate to 14.7, cold extremities, and 3+ pitting edema suggesting cardiogenic shock.  She underwent RHC with CI of 1.6, CVP of 25, requiring inotropes initiation with difficult weaning off trials, on the following day patient had worsened SvO2 from 65 to 25 with rapid lactate increase following acute abdominal pain and right leg pain concerning for limb ischemia, and rhabdomyolysis with CK of 14,000, with limited IV hydrations due to ongoing cardiogenic shock.  Arterial doppler of lower extremities reported absent flow in the right popliteal artery with significantly diminished flow within the right distal superficial femoral artery, concerning for thrombosis. Additionally decreased flow in right common femoral artery, right deep femoral artery concerning for stenosis proximal to right common femoral artery.  Vascular medicine recommended IVC filter insertion on 6/27 in preparation for right AKA 6/28. Which was complicated by 3 days persistent stump site oozing & intramuscular hematoma evident on CTA RLE, which was managed conservatively with 6 blood transfusions while on heparin infusion necessitation to hold heparin drip temporarily for 12 hours to allow hemostasis. On the following day patient had similar stump bleeding with Hgb 6.9 requiring an additional 1pRBC incrementing hemoglobin to 8.1. hence Heparin was restarted 12 hours later at 1:30 Am 7/5 without signs of bleeding & stable Hemoglobin 8.9.   On 6th July, patient was hemodynamically stable and transferred to cardiology floor, was kept on heparin infusion with hemoglobin monitoring, as patient's stump headed well without gross bleeding, nor hemoglobin drop, Vascular medicine recommended anticoagulation bridging with warfarin on 9th July. Patient had some bloody urine output in dhillon on 07/11, urine culture pending. Patient was started on Lyrica for phantom limb pain.      Patient  was labeled is not candidate for advanced therapies given documented medical no adherence. Managed medically.   Given the lack of documentation POA and limited family (NOK are cousins). ICU team involved Ethics & Palliative care.      Heart failure with reduced ejection fraction:   -HFrEF (LVEF 20-25%)  -Patient was labeled not candidate for advanced therapies due to medical nonadherence  Plan:  -palliative care follow up.  -Aim to resume quadruple GDMT (spironolactone). Patient on metoprolol succinate, Entresto, jardiance     Right limb ischemia secondary to Arterial emboli, S/P above right knee amputation:  #S/p femoral arterial line sheath placement now removed  -CTA Aorta with runoff 6/24: aortic bifurcation embolus, R BA-EIA embolus, SFA and popliteal emboli   -arterial duplex exam with monophasic right CFA, SFA and PFA signals, absent popliteal flow and monophasic flow in the tibial artery   -Patient labeled Unable to walk prior to presentation to hospital  -s/p Above knee amputation with vascular surgery, 6/28/2024.   Plan:  -Warfarin with heparin bridge   -If patient bleeds from stump consult vasc med.  -pain regimen: oxycodone 10mg severe pain 4hr, PRN dilaudid 0.2mg q 4 hr breakthrough,  before and after dresssing changes.   -Continue duloxetine. Started on pregabalin for phantom limb pain   -Vascular surgery, requested to keep Folly's in, to avoid stump site infection.   -Patient had small amount of blood from dhillon this morning but hemoglobin stable, will continue to monitor      Query Cardiac thrombi:  -Likely in setting of Atrial fibrillation with Rivaroxaban no adherence  -There are indeterminate low-attenuation nodular filling defects within the atrial appendage.   --Right lower extremity DVT, and arterial thrombus/p IVC filter 6/27.  -Vascular medicine recommended Warfarin with Target INR 2-3, for a minimum of 5 days.  Plan:  -Started warfarin with target INR 2-3   - Cont. Heparin drip (Bridging  therapy).   -Vascular medicine    -Outpatient Anticoagulation Clinic set up at [facility].     Atrial fibrillation:    was on rivaroxaban w/ DCCV 05/2023), ischemic stroke L MCA d/t AFib LLA thrombus seen on echo (lack of OAC adherence) s/p thrombectomy 01/2022 @ [facility] w/ residual expressive aphasia and R sided weakness   -Previously prescribed digoxin 250mcg however last fill was 12/2023  -TSH 6.28H, free T4 1.48  Plan:  -Continue amiodarone 200mg daily  - Continue aspirin 81mg    Direct Hyperbilirubinemia, Likely following ischemic liver injury:  -Patient denied abdominal pain.   Tbili (3.2 on admission, from 0.5 in 03/2024)  ::RUQ US 7/1 without abnormality beyond hepatic steatosis and liver cyst  Plan:   Follow Liver enzymes.      Hx trach c/b trancutaneous fistula  -ENT had been consulted 3/2024 for gurgling and mucus drainage, no inpatient interventions required  -monitor for breaths/mucus discharge from trach site  -Cover the tracheocutaneous fistula with tape and gauze and encourage patient to apply pressure when voicing.   -follow up outpatient ENT for closure (Dr. King)      Anemia:  ::had 3 units of RBC transfused 6/30, currently controlled bleeding, increeminting gradually.   ::CTA C/A/P 6/30 without evidence of active hemorrhage within chest/abdomen/pelvis  ::CTA RLE 7/1 without a source that could be intervened upon  Plan:  - CBC daily, as stable Hb.   -Hgb goal >7  given cardiac hx    Dysuria  ::small amount of blood in urine 07/11 am  - UA turbid brown with blood, glucose, protein and LE 25, nitrite negative   -urine culture pending      Left brachial injury  Likely injury  LUE DVT scan with nonvascularized mass, called CT while patient was there to request CTA of left upper extremity and they said logistically they would be unable to perform both scans, and given protocol for max dosing of contrast, could not give further contrast until 6/25 at 2PM  -no CTA LUE needed        Insomnia  -Continue melatonin  "6mg nightly plus additional 6mg PRN for sleep    Depression   - Continue duloxetine      Resolved:   -Thrombocytopenia, likely attributed to: previous consumptive coagulopathy/sepsis and bleeding, Multiple thrombi, DIC score 5 on 6/24/2024, Hit score of 6, Negative PF4  -Rhabdomyolysis.  -Metabolic acidosis, Lactic acidosis.   -Acute kidney injury.    -stump infection, treated for 7 days, s/p vanc (6/20-6/24) (6/27-7/2 ) and zosyn (6/20-6/24), (6/27- p)-restarted vanc/zosyn 6/27 due to elevated leukocytosis and purulent appearing right groin site     Disposition Plan:  -patient would like enrollment in  home delivery service for medications (she has trouble getting to preferred pharmacy), pharmacy updated to [facility]  -patient's family would like her enrolled in [facility]  -repeat thyroid labs outpatient  - IVC filter removal in 3 months!  [ ] When she is stable for discharge will need to discuss with [facility] med regarding home going AC plan  [ ] Titrate GDMT as able. Currently on entresto 24/26, metoprolol, jardiance  [ ] Follow up further pall care recs. Patient remains full code. There is ongoing GOC discussion  [ ] Needs to follow up OP with ENT for trach c/b trancutaneous fistula. Dr. King for closure.     Prevention:   Fall: High.   Mobility: Physical therapy referral.   DVT: Heparin  Diet: Cardiac Diet     Code status: Full Code  Emergency contact:   * patient LACKS capacity due to inability to express decision and inconsistency in decisions  Surrogate Medical Decision-maker:   Surrogate decision maker is: pt's cousin, Clara Wanye: [phone], Efrain Black: [phone], Keli Osborne: [phone]   Friends who may be helpful in making decisions:  Prior boyfriend Navin Sanches [phone]  Close friend \"Isiah\" Pranay Owen [phone]     Kelle Andrade MD  PGY1 Neurology     "

## 2024-07-12 LAB
ALBUMIN SERPL BCP-MCNC: 2.8 G/DL (ref 3.4–5)
ANION GAP SERPL CALC-SCNC: 11 MMOL/L (ref 10–20)
BASOPHILS # BLD AUTO: 0.06 X10*3/UL (ref 0–0.1)
BASOPHILS NFR BLD AUTO: 0.6 %
BUN SERPL-MCNC: 13 MG/DL (ref 6–23)
CALCIUM SERPL-MCNC: 9.2 MG/DL (ref 8.6–10.6)
CHLORIDE SERPL-SCNC: 102 MMOL/L (ref 98–107)
CO2 SERPL-SCNC: 27 MMOL/L (ref 21–32)
CREAT SERPL-MCNC: 0.64 MG/DL (ref 0.5–1.05)
EGFRCR SERPLBLD CKD-EPI 2021: >90 ML/MIN/1.73M*2
EOSINOPHIL # BLD AUTO: 0.27 X10*3/UL (ref 0–0.7)
EOSINOPHIL NFR BLD AUTO: 2.9 %
ERYTHROCYTE [DISTWIDTH] IN BLOOD BY AUTOMATED COUNT: 17.1 % (ref 11.5–14.5)
GLUCOSE BLD MANUAL STRIP-MCNC: 101 MG/DL (ref 74–99)
GLUCOSE BLD MANUAL STRIP-MCNC: 104 MG/DL (ref 74–99)
GLUCOSE BLD MANUAL STRIP-MCNC: 131 MG/DL (ref 74–99)
GLUCOSE BLD MANUAL STRIP-MCNC: 153 MG/DL (ref 74–99)
GLUCOSE SERPL-MCNC: 90 MG/DL (ref 74–99)
HCT VFR BLD AUTO: 33.8 % (ref 36–46)
HGB BLD-MCNC: 10.2 G/DL (ref 12–16)
IMM GRANULOCYTES # BLD AUTO: 0.11 X10*3/UL (ref 0–0.7)
IMM GRANULOCYTES NFR BLD AUTO: 1.2 % (ref 0–0.9)
INR PPP: 1.5 (ref 0.9–1.1)
LABORATORY COMMENT REPORT: NORMAL
LYMPHOCYTES # BLD AUTO: 2.04 X10*3/UL (ref 1.2–4.8)
LYMPHOCYTES NFR BLD AUTO: 22 %
MAGNESIUM SERPL-MCNC: 1.7 MG/DL (ref 1.6–2.4)
MCH RBC QN AUTO: 30.1 PG (ref 26–34)
MCHC RBC AUTO-ENTMCNC: 30.2 G/DL (ref 32–36)
MCV RBC AUTO: 100 FL (ref 80–100)
MONOCYTES # BLD AUTO: 0.93 X10*3/UL (ref 0.1–1)
MONOCYTES NFR BLD AUTO: 10 %
NEUTROPHILS # BLD AUTO: 5.88 X10*3/UL (ref 1.2–7.7)
NEUTROPHILS NFR BLD AUTO: 63.3 %
NRBC BLD-RTO: 0 /100 WBCS (ref 0–0)
PATH REPORT.FINAL DX SPEC: NORMAL
PATH REPORT.GROSS SPEC: NORMAL
PATH REPORT.RELEVANT HX SPEC: NORMAL
PATH REPORT.TOTAL CANCER: NORMAL
PHOSPHATE SERPL-MCNC: 4.7 MG/DL (ref 2.5–4.9)
PLATELET # BLD AUTO: 349 X10*3/UL (ref 150–450)
POTASSIUM SERPL-SCNC: 4.4 MMOL/L (ref 3.5–5.3)
PROTHROMBIN TIME: 17.1 SECONDS (ref 9.8–12.8)
RBC # BLD AUTO: 3.39 X10*6/UL (ref 4–5.2)
SODIUM SERPL-SCNC: 136 MMOL/L (ref 136–145)
WBC # BLD AUTO: 9.3 X10*3/UL (ref 4.4–11.3)

## 2024-07-12 PROCEDURE — 2500000001 HC RX 250 WO HCPCS SELF ADMINISTERED DRUGS (ALT 637 FOR MEDICARE OP)

## 2024-07-12 PROCEDURE — 2500000004 HC RX 250 GENERAL PHARMACY W/ HCPCS (ALT 636 FOR OP/ED)

## 2024-07-12 PROCEDURE — 99232 SBSQ HOSP IP/OBS MODERATE 35: CPT

## 2024-07-12 PROCEDURE — 85025 COMPLETE CBC W/AUTO DIFF WBC: CPT

## 2024-07-12 PROCEDURE — 97110 THERAPEUTIC EXERCISES: CPT | Mod: GP

## 2024-07-12 PROCEDURE — 80069 RENAL FUNCTION PANEL: CPT

## 2024-07-12 PROCEDURE — 1200000002 HC GENERAL ROOM WITH TELEMETRY DAILY

## 2024-07-12 PROCEDURE — 36415 COLL VENOUS BLD VENIPUNCTURE: CPT

## 2024-07-12 PROCEDURE — 85610 PROTHROMBIN TIME: CPT

## 2024-07-12 PROCEDURE — 97530 THERAPEUTIC ACTIVITIES: CPT | Mod: GP

## 2024-07-12 PROCEDURE — 82947 ASSAY GLUCOSE BLOOD QUANT: CPT

## 2024-07-12 PROCEDURE — 83735 ASSAY OF MAGNESIUM: CPT

## 2024-07-12 PROCEDURE — 2500000002 HC RX 250 W HCPCS SELF ADMINISTERED DRUGS (ALT 637 FOR MEDICARE OP, ALT 636 FOR OP/ED)

## 2024-07-12 RX ORDER — CEFTRIAXONE 1 G/50ML
1 INJECTION, SOLUTION INTRAVENOUS EVERY 24 HOURS
Status: COMPLETED | OUTPATIENT
Start: 2024-07-12 | End: 2024-07-13

## 2024-07-12 ASSESSMENT — PAIN DESCRIPTION - ORIENTATION: ORIENTATION: RIGHT

## 2024-07-12 ASSESSMENT — COGNITIVE AND FUNCTIONAL STATUS - GENERAL
WALKING IN HOSPITAL ROOM: A LOT
MOVING FROM LYING ON BACK TO SITTING ON SIDE OF FLAT BED WITH BEDRAILS: A LITTLE
STANDING UP FROM CHAIR USING ARMS: A LITTLE
CLIMB 3 TO 5 STEPS WITH RAILING: TOTAL
DAILY ACTIVITIY SCORE: 19
TURNING FROM BACK TO SIDE WHILE IN FLAT BAD: A LITTLE
MOVING FROM LYING ON BACK TO SITTING ON SIDE OF FLAT BED WITH BEDRAILS: A LITTLE
MOBILITY SCORE: 15
DRESSING REGULAR LOWER BODY CLOTHING: A LITTLE
STANDING UP FROM CHAIR USING ARMS: A LITTLE
MOVING TO AND FROM BED TO CHAIR: A LITTLE
TURNING FROM BACK TO SIDE WHILE IN FLAT BAD: A LITTLE
TOILETING: A LOT
DRESSING REGULAR UPPER BODY CLOTHING: A LITTLE
HELP NEEDED FOR BATHING: A LITTLE
MOBILITY SCORE: 15
DRESSING REGULAR LOWER BODY CLOTHING: A LITTLE
TURNING FROM BACK TO SIDE WHILE IN FLAT BAD: A LITTLE
PERSONAL GROOMING: A LITTLE
DRESSING REGULAR UPPER BODY CLOTHING: A LITTLE
MOVING FROM LYING ON BACK TO SITTING ON SIDE OF FLAT BED WITH BEDRAILS: A LITTLE
WALKING IN HOSPITAL ROOM: A LOT
TOILETING: A LITTLE
CLIMB 3 TO 5 STEPS WITH RAILING: TOTAL
STANDING UP FROM CHAIR USING ARMS: A LITTLE
CLIMB 3 TO 5 STEPS WITH RAILING: TOTAL
WALKING IN HOSPITAL ROOM: A LOT
MOVING TO AND FROM BED TO CHAIR: A LITTLE
HELP NEEDED FOR BATHING: A LITTLE
MOVING TO AND FROM BED TO CHAIR: A LITTLE

## 2024-07-12 ASSESSMENT — PAIN SCALES - PAIN ASSESSMENT IN ADVANCED DEMENTIA (PAINAD): TOTALSCORE: MEDICATION (SEE MAR)

## 2024-07-12 ASSESSMENT — PAIN SCALES - GENERAL
PAINLEVEL_OUTOF10: 10 - WORST POSSIBLE PAIN
PAINLEVEL_OUTOF10: 6
PAINLEVEL_OUTOF10: 9

## 2024-07-12 ASSESSMENT — PAIN - FUNCTIONAL ASSESSMENT
PAIN_FUNCTIONAL_ASSESSMENT: 0-10
PAIN_FUNCTIONAL_ASSESSMENT: 0-10

## 2024-07-12 ASSESSMENT — PAIN DESCRIPTION - LOCATION: LOCATION: LEG

## 2024-07-12 NOTE — PROGRESS NOTES
"Art Therapy Note    [facility]     Therapy Session  Referral Type: New referral this admission  Visit Type: Follow-up visit  Session Start Time: 1433  Session End Time: 1510  Intervention Delivery: In-person    Pre-assessment  Unable to Assess Reason: Outcomes not assessed  Pain Score: 5 - Moderate pain  Stress Level (0-10): 10  Anxiety Level (0-10): 10  Mood/Affect: Flat/blunted, Participative, Cooperative, Calm, Tired/lethargic  Verbalized Emotional State:  (I'm okay)         Treatment/Interventions  Areas of Focus: Coping, Self-expression, Stress reduction, Anxiety reduction, Pain management  Art Therapy Interventions: Active art engagement, Spontaneous art expression, Education/instruction  Interruption: No  Patient Fell Asleep at End of Session: No    Post-assessment  Total Session Time (min): 37 minutes    Narrative  Assessment Detail: Pt was lying in bed when ATR arrived but sat up and wanted to continue working on her watercolor painting.  Plan: To educate pt on coping benefits of AT for self-expression.  Intervention: ATR provided pt additional instruction on watercolor techniques and pt continues painting slowly and thoughtfully. Pt reported feeling relaxed and shared that the coping skills ATR provided the previous session helped her.  Evaluation: Pt appeared calm, relaxed and at one point she started giggling and pointed to two round, yellow objects with red outlining and said \"sneakers\".  Follow-up: ATR will continue to follow pt.    Education Documentation  No documentation found.      "

## 2024-07-12 NOTE — PROGRESS NOTES
Physical Therapy    Physical Therapy Treatment    Patient Name: Amirah Bennett  MRN: [id number]  Today's Date: 7/12/2024  Time Calculation  Start Time: 1331  Stop Time: 1359  Time Calculation (min): 28 min    Assessment/Plan   PT Assessment  PT Assessment Results: Decreased strength, Decreased range of motion, Decreased endurance, Impaired balance, Decreased mobility, Pain, Decreased cognition, Decreased safety awareness  Rehab Prognosis: Excellent  Barriers to Discharge: none  End of Session Communication: Bedside nurse  Assessment Comment: 43 y.o. F s/p EULALIA continues to demonstrate impaired strength, balance, and function. Pt remains appropriate for continued PT in house and after discharge to maximize functional independence.  End of Session Patient Position: Bed, 3 rail up, Alarm off, not on at start of session  PT Plan  Inpatient/Swing Bed or Outpatient: Inpatient  PT Plan  Treatment/Interventions: Bed mobility, Transfer training, Gait training, Balance training, Strengthening, Endurance training, Range of motion, Therapeutic exercise, Therapeutic activity, Home exercise program, Positioning  PT Plan: Ongoing PT  PT Frequency: 5 times per week  PT Discharge Recommendations: High intensity level of continued care  PT Recommended Transfer Status: Assist x2  PT - OK to Discharge: Yes    General Visit Information:   PT  Visit  PT Received On: 07/12/24  General  Family/Caregiver Present: No  Prior to Session Communication: Bedside nurse  Patient Position Received: Bed, 3 rail up  Preferred Learning Style: auditory, verbal  General Comment: Pt sleeping upon entry. Easily awakened though fatigued during visit. Pt willing to participate with PT. Pleasant and cooperative though expressively aphasic. Following commands appropriately.    Subjective   Precautions:  Precautions  LE Weight Bearing Status: Right Non-Weight Bearing  Medical Precautions:  (AKA)    Objective   Pain:  Pain Assessment  Pain Assessment: 0-10  0-10  (Numeric) Pain Score: 6  Cognition:  Cognition  Overall Cognitive Status: Within Functional Limits (Pt with baseline expressive aphasia)  Arousal/Alertness: Appropriate responses to stimuli  Orientation Level: Oriented X4  Following Commands: Follows all commands and directions without difficulty  Coordination:  Movements are Fluid and Coordinated: Yes  Postural Control:  Postural Control  Postural Control: Within Functional Limits  Static Sitting Balance  Static Sitting-Balance Support: Bilateral upper extremity supported  Static Sitting-Level of Assistance: Close supervision  Static Sitting-Comment/Number of Minutes: 20 minutes total  Static Standing Balance  Static Standing-Comment/Number of Minutes: Pt declined attempts to stand due to generalized fatigue. Anticipate would require min/mod assist.    Activity Tolerance:  Activity Tolerance  Endurance: Tolerates 10 - 20 min exercise with multiple rests  Treatments:  Therapeutic Exercise  Therapeutic Exercise Performed: Yes  Therapeutic Exercise Activity 1: LAQ, ankle pumps, hip abd/add.    Therapeutic Activity  Therapeutic Activity Performed: Yes  Therapeutic Activity 1: bed mobility, static sit    Bed Mobility  Bed Mobility: Yes  Bed Mobility 1  Bed Mobility 1: Supine to sitting, Sitting to supine  Level of Assistance 1: Close supervision    Ambulation/Gait Training  Ambulation/Gait Training Performed: No  Ambulation/Gait Training 1  Comments/Distance (ft) 1: Declined attempts though had previous tolerated short hopping with a wheeled walker.  Transfers  Transfer: No  Transfer 1  Trials/Comments 1: Not performed on this date though previous required min assist    Other Activity  Other Activity Performed: No    Outcome Measures:  [facility] Basic Mobility  Turning from your back to your side while in a flat bed without using bedrails: A little  Moving from lying on your back to sitting on the side of a flat bed without using bedrails: A little  Moving to and from bed  to chair (including a wheelchair): A little  Standing up from a chair using your arms (e.g. wheelchair or bedside chair): A little  To walk in hospital room: A lot  Climbing 3-5 steps with railing: Total  Basic Mobility - Total Score: 15    Education Documentation  Precautions, taught by Humberto Mccabe, PT at 7/12/2024  2:18 PM.  Learner: Patient  Readiness: Acceptance  Method: Explanation  Response: Verbalizes Understanding    Mobility Training, taught by Humberto Mccabe PT at 7/12/2024  2:18 PM.  Learner: Patient  Readiness: Acceptance  Method: Explanation  Response: Verbalizes Understanding    Education Comments  No comments found.      Encounter Problems       Encounter Problems (Active)       Balance       Patient will complete static and dynamic sitting balance tasks with SUP to improve upright posture, core activation, and proximal stability.  (Progressing)       Start:  07/03/24    Expected End:  07/17/24            Patient to demo static standing with unilateral UE support, performing single UE task with SBA, no sway or LOB x 2 mins for functional carryover  (Progressing)       Start:  07/03/24    Expected End:  07/17/24               Mobility       STG - Patient will ambulate >/= 15 ft with minAx1 and LRAD, no acute LOB (Progressing)       Start:  07/03/24    Expected End:  07/17/24               PT Transfers       STG - Patient will perform bed mobility Ruth (Progressing)       Start:  07/03/24    Expected End:  07/17/24            STG - Patient will transfer sit to and from stand with CGAx1 and LRAD (Progressing)       Start:  07/03/24    Expected End:  07/17/24               Pain - Adult

## 2024-07-12 NOTE — CARE PLAN
The patient's goals for the shift include      The clinical goals for the shift include Patient will rate pain less than 5/10 by the end of shift    Over the shift, the patient remained safe and without injury. She rated her pain as 6/10 this morning. Urine to be clear without a tinge of blood.

## 2024-07-12 NOTE — CARE PLAN
CHW Note  CHW tasked to follow up with patient and family to discuss "[employer] application.  This CHW spoke with pt's cousin Felicitas ([phone]) whom was spoken with previously.  CHW explained the "[employer] bipin to Felicitas, including the references requested by [employer].  CHW attempted to call pt's cousin Keli Osborne twice but no answer, left vm with name and requested a call back to discuss application.  Felicitas requests a team member to contact him to discuss patient's DC plan and current status, CHW made team aware.  Community Resource Name: [employer]  Discussed the following topics on behalf of the patient:  []  Behavioral Health Assistance     []  Case Management  []   Assistance  []  Digital Equity Assistance  []  Dental Health Assistance  []  Education Assistance  []  Employment Assistance  []  Financial Strain Relief Assistance  []  Food Insecurity Assistance  []  Healthcare Coverage Assistance  [x]  Housing Stability Assistance  []  IP Violence Relief Assistance  []  Legal Assistance  []  Physical Activity Assistance  []  Social Connection Assistance  []  Stress Relief Assistance   []  Substance Abuse Assistance  []  Transportation Assistance  []  Utility Assistance  [x]  Other: *[employer] - Accessible Housing Form    LEONORA Kwok

## 2024-07-12 NOTE — PROGRESS NOTES
Daily Progress Note    Amirah Bennett is a 43 y.o. female admitted on 6/20/2024  6:40 AM with Cardiogenic shock.     Overnight:  No acute events reported    Subjective   Pt was seen at the bedside  Patient said she did not have leg pain overnight but was reporting an uncomfortable sensation this morning in the leg and 3/10 pain score. She reported burning with urination and itchy. She had small amount of blood oozing from stump.   Denied any chills, headache/dizziness, NV, chest pain/tightness, dyspnea, abdominal pain, DC, dysuria.     Objective   Vitals: I/O:   Vitals:    07/12/24 1657   BP: 120/74   Pulse: 70   Resp: 18   Temp: 36 °C (96.8 °F)   SpO2: 100%        24hr Min/Max:  Temp  Min: 35.8 °C (96.4 °F)  Max: 36.5 °C (97.7 °F)  Pulse  Min: 65  Max: 77  BP  Min: 102/65  Max: 120/74  Resp  Min: 18  Max: 18  SpO2  Min: 98 %  Max: 100 %   Intake/Output Summary (Last 24 hours) at 7/12/2024 1724  Last data filed at 7/12/2024 1301  Gross per 24 hour   Intake 981.08 ml   Output 3125 ml   Net -2143.92 ml        Net IO Since Admission: -41,365.71 mL [07/12/24 1724]      PE:  -Patient was conscious, oriented x4, with expressive aphasia . wasn't in acute respiratory distress, not in jaundiced nor cyanosed. Bloody discharge on right stump staples.  -Cardiovascular examination: Audible 1st and 2nd heart sound, no murmurs  -Lower limb examination: amputated right above knee amputation is edematous, No pitting edema or signs of deep vein thrombosis.    -Chest examination: Lungs clear to auscultation bilaterally.  -Abdominal examination: Soft and lax abdomen, no rigidity nor rebound tenderness.   -Neurological examination: limited, symmetrical facial impression, equally reactive pupils, right hand weakness.          Labs:  CBC RFP   Lab Results   Component Value Date    WBC 9.3 07/12/2024    HGB 10.2 (L) 07/12/2024    HCT 33.8 (L) 07/12/2024     07/12/2024     07/12/2024    NEUTROABS 5.88 07/12/2024    Lab Results    Component Value Date     07/12/2024    K 4.4 07/12/2024     07/12/2024    CO2 27 07/12/2024    BUN 13 07/12/2024    CREATININE 0.64 07/12/2024    CREATININE 0.62 07/11/2024     Lab Results   Component Value Date    MG 1.70 07/12/2024    PHOS 4.7 07/12/2024    CALCIUM 9.2 07/12/2024         Hepatic Function ABG/VBG   Lab Results   Component Value Date    ALT 20 07/10/2024    AST 20 07/10/2024    ALKPHOS 70 07/10/2024     Lab Results   Component Value Date    BILIDIR 0.7 (H) 07/10/2024      Lab Results   Component Value Date    PROTIME 17.1 (H) 07/12/2024    APTT 88 (H) 07/10/2024    INR 1.5 (H) 07/12/2024    Lab Results   Component Value Date    LACTATE 0.6 06/26/2024        Results from last 7 days   Lab Units 07/12/24  1615 07/12/24  1201 07/12/24  0743 07/12/24  0624 07/11/24  2136 07/11/24  1548 07/11/24  0642 07/11/24  0616 07/10/24  0645 07/10/24  0610 07/09/24  0644 07/09/24  0607 07/08/24  1146 07/08/24  0843   POCT GLUCOSE mg/dL 104* 153*  --  101* 99 103*   < >  --    < >  --    < >  --    < >  --    GLUCOSE mg/dL  --   --  90  --   --   --   --  98  --  90  --  89  --  96    < > = values in this interval not displayed.       Imaging:  Imaging:   Vascular US Lower Extremity Arterial Duplex 06/23/2024  1. Echogenic material within the right distal femoral and popliteal  arteries with  absent Doppler flow in the right popliteal artery  significantly diminished flow within the right distal superficial  femoral artery. Findings raise concern for thrombosis of the distal  SFA and popliteal artery. Decreased flow within the right posterior  tibial and peroneal arteries could be through collateralization.  2. Decreased flow within the right common femoral artery, right deep  femoral artery as well as the proximal and mid superficial femoral  arteries, raising concern for stenosis proximal to the right common  femoral artery, possibly within the right external iliac right common  iliac artery. A CTA  of the lower extremities can be obtained for  further evaluation.  3. Unremarkable Doppler interrogation of the left lower extremity  arteries.     Cardiac:  Transthoracic Echo (TTE) Complete With Contrast : 06/22/2024   1. Left ventricular ejection fraction is severely decreased, by visual estimate at 20-25%.   2. There is global hypokinesis of the left ventricle with minor regional variations.   3. Left ventricular diastolic filling was indeterminate.   4. Mildly enlarged right ventricle.   5. There is mildly reduced right ventricular systolic function.   6. The left atrium is severely dilated.   7. Moderate to severe tricuspid regurgitation visualized.   8. Moderately elevated right ventricular systolic pressure.   9. TR has worsened.        Medications:  Scheduled Medications:  PRN Medications:    amiodarone, 200 mg, oral, Daily  aspirin, 81 mg, oral, Daily  atorvastatin, 80 mg, oral, Nightly  bisacodyl, 10 mg, rectal, Daily  cefTRIAXone, 1 g, intravenous, q24h  DULoxetine, 30 mg, oral, Daily  [Held by provider] empagliflozin, 10 mg, oral, Daily  melatonin, 6 mg, oral, Daily  metoprolol succinate XL, 25 mg, oral, Nightly  pantoprazole, 40 mg, oral, Daily before breakfast  perflutren protein A microsphere, 0.5 mL, intravenous, Once in imaging  polyethylene glycol, 17 g, oral, BID  pregabalin, 75 mg, oral, Nightly  sacubitriL-valsartan, 2 tablet, oral, BID  sennosides, 2 tablet, oral, BID     PRN medications: dextrose, dextrose, glucagon, glucagon, heparin, HYDROmorphone, lidocaine, oxyCODONE       Assessment/Plan:   Amirah Bennett is a 43 y.o. 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      On 20th June, Patient presented with dyspnea and leg swelling, with elevated lactate to 14.7, cold extremities, and 3+ pitting edema suggesting cardiogenic shock.  She underwent RHC with CI of 1.6, CVP of 25, requiring inotropes initiation with difficult weaning off trials, on the following day patient had worsened SvO2 from 65 to 25 with rapid lactate increase following acute abdominal pain and right leg pain concerning for limb ischemia, and rhabdomyolysis with CK of 14,000, with limited IV hydrations due to ongoing cardiogenic shock.  Arterial doppler of lower extremities reported absent flow in the right popliteal artery with significantly diminished flow within the right distal superficial femoral artery, concerning for thrombosis. Additionally decreased flow in right common femoral artery, right deep femoral artery concerning for stenosis proximal to right common femoral artery.  Vascular medicine recommended IVC filter insertion on 6/27 in preparation for right AKA 6/28. Which was complicated by 3 days persistent stump site oozing & intramuscular hematoma evident on CTA RLE, which was managed conservatively with 6 blood transfusions while on heparin infusion necessitation to hold heparin drip temporarily for 12 hours to allow hemostasis. On the following day patient had similar stump bleeding with Hgb 6.9 requiring an additional 1pRBC incrementing hemoglobin to 8.1. hence Heparin was restarted 12 hours later at 1:30 Am 7/5 without signs of bleeding & stable Hemoglobin 8.9.   On 6th July, patient was hemodynamically stable and transferred to cardiology floor, was kept on heparin infusion with hemoglobin monitoring, as patient's stump headed well without gross bleeding, nor hemoglobin drop, Vascular medicine recommended anticoagulation bridging with warfarin on 9th July. Patient had some bloody urine output in dhillon on 07/11,  urine culture was positive for Ecoli and started on ceftriaxone, hold on jardiance and removed dhillon.Patient was started on Lyrica for phantom limb pain.      Patient was labeled is not candidate for advanced therapies given documented medical no adherence. Managed medically.   Given the lack of documentation POA and limited family (NOK are cousins). ICU team involved Ethics & Palliative care.      Heart failure with reduced ejection fraction:   -HFrEF (LVEF 20-25%)  -Patient was labeled not candidate for advanced therapies due to medical nonadherence  Plan:  -palliative care follow up.  -Aim to resume quadruple GDMT (spironolactone). Patient on metoprolol succinate, Entresto, jardiance  -stopped jardiance for now because of UTI      Right limb ischemia secondary to Arterial emboli, S/P above right knee amputation:  #S/p femoral arterial line sheath placement now removed  -CTA Aorta with runoff 6/24: aortic bifurcation embolus, R BA-EIA embolus, SFA and popliteal emboli   -arterial duplex exam with monophasic right CFA, SFA and PFA signals, absent popliteal flow and monophasic flow in the tibial artery   -Patient labeled Unable to walk prior to presentation to hospital  -s/p Above knee amputation with vascular surgery, 6/28/2024.   Plan:  -Warfarin with heparin bridge   -If patient bleeds from stump consult vasc med.  -pain regimen: oxycodone 10mg severe pain 4hr, PRN dilaudid 0.2mg q 4 hr breakthrough,  before and after dresssing changes.   -Continue duloxetine. Started on pregabalin for phantom limb pain   -Vascular surgery, requested to keep Folly's in, to avoid stump site infection.   -Patient had small amount of blood from stump this morning but hemoglobin stable, will continue to monitor      Query Cardiac thrombi:  -Likely in setting of Atrial fibrillation with Rivaroxaban no adherence  -There are indeterminate low-attenuation nodular filling defects within the atrial appendage.   --Right lower extremity  DVT, and arterial thrombus/p IVC filter 6/27.  -Vascular medicine recommended Warfarin with Target INR 2-3, for a minimum of 5 days.  Plan:  -Started warfarin with target INR 2-3   - Cont. Heparin drip (Bridging therapy).   -Vascular medicine    -Outpatient Antico[facility] Clinic set up at [facility].     Atrial fibrillation:    was on rivaroxaban w/ DCCV 05/2023), ischemic stroke L MCA d/t AFib LLA thrombus seen on echo (lack of OAC adherence) s/p thrombectomy 01/2022 @ [facility] w/ residual expressive aphasia and R sided weakness   -Previously prescribed digoxin 250mcg however last fill was 12/2023  -TSH 6.28H, free T4 1.48  Plan:  -Continue amiodarone 200mg daily  - Continue aspirin 81mg     Direct Hyperbilirubinemia, Likely following ischemic liver injury:  -Patient denied abdominal pain.   Tbili (3.2 on admission, from 0.5 in 03/2024)  ::RUQ US 7/1 without abnormality beyond hepatic steatosis and liver cyst  Plan:   Follow Liver enzymes.      Hx trach c/b trancutaneous fistula  -ENT had been consulted 3/2024 for gurgling and mucus drainage, no inpatient interventions required  -monitor for breaths/mucus discharge from trach site  -Cover the tracheocutaneous fistula with tape and gauze and encourage patient to apply pressure when voicing.   -follow up outpatient ENT for closure (Dr. King)      Anemia:  ::had 3 units of RBC transfused 6/30, currently controlled bleeding, increeminting gradually.   ::CTA C/A/P 6/30 without evidence of active hemorrhage within chest/abdomen/pelvis  ::CTA RLE 7/1 without a source that could be intervened upon  Plan:  - CBC daily, as stable Hb.   -Hgb goal >7  given cardiac hx     Dysuria  ::small amount of blood in urine 07/11 am  - UA turbid brown with blood, glucose, protein and LE 25, nitrite negative   -urine culture positive, started on ceftriaxone and hold on empagliflozin  -remove dhillon     Left brachial injury  Likely injury  LUE DVT scan with nonvascularized mass, called CT while  patient was there to request CTA of left upper extremity and they said logistically they would be unable to perform both scans, and given protocol for max dosing of contrast, could not give further contrast until 6/25 at 2PM  -no CTA LUE needed        Insomnia  -Continue melatonin 6mg nightly plus additional 6mg PRN for sleep     Depression   - Continue duloxetine      Resolved:   -Thrombocytopenia, likely attributed to: previous consumptive coagulopathy/sepsis and bleeding, Multiple thrombi, DIC score 5 on 6/24/2024, Hit score of 6, Negative PF4  -Rhabdomyolysis.  -Metabolic acidosis, Lactic acidosis.   -Acute kidney injury.    -stump infection, treated for 7 days, s/p vanc (6/20-6/24) (6/27-7/2 ) and zosyn (6/20-6/24), (6/27- p)-restarted vanc/zosyn 6/27 due to elevated leukocytosis and purulent appearing right groin site     Disposition Plan:  -patient would like enrollment in  home delivery service for medications (she has trouble getting to preferred pharmacy), pharmacy updated to [facility]  -patient's family would like her enrolled in [facility]  -repeat thyroid labs outpatient  - IVC filter removal in 3 months!  [ ] When she is stable for discharge will need to discuss with [facility] med regarding home going AC plan  [ ] Titrate GDMT as able. Currently on entresto 24/26, metoprolol, jardiance  [ ] Follow up further pall care recs. Patient remains full code. There is ongoing GOC discussion  [ ] Needs to follow up OP with ENT for trach c/b trancutaneous fistula. Dr. King for closure.     Prevention:   Fall: High.   Mobility: Physical therapy referral.   DVT: Heparin  Diet: Cardiac Diet     Code status: Full Code  Emergency contact:   * patient LACKS capacity due to inability to express decision and inconsistency in decisions  Surrogate Medical Decision-maker:   Surrogate decision maker is: pt's cousin, Clara Black: [phone], Efrain Black: [phone], Keli Osborne: [phone]   Friends who may be  "helpful in making decisions:  Prior boyfriend Navin Sanches [phone]  Close friend \"Isiah\" Pranay Owen [phone]        Benito Mckinney MD  PGY-1 Internal Medicine   "

## 2024-07-13 LAB
ABO GROUP (TYPE) IN BLOOD: NORMAL
ALBUMIN SERPL BCP-MCNC: 2.9 G/DL (ref 3.4–5)
ANION GAP SERPL CALC-SCNC: 15 MMOL/L (ref 10–20)
ANTIBODY SCREEN: NORMAL
BACTERIA UR CULT: ABNORMAL
BASOPHILS # BLD AUTO: 0.06 X10*3/UL (ref 0–0.1)
BASOPHILS NFR BLD AUTO: 0.6 %
BUN SERPL-MCNC: 12 MG/DL (ref 6–23)
CALCIUM SERPL-MCNC: 8.6 MG/DL (ref 8.6–10.6)
CHLORIDE SERPL-SCNC: 99 MMOL/L (ref 98–107)
CO2 SERPL-SCNC: 26 MMOL/L (ref 21–32)
CREAT SERPL-MCNC: 0.52 MG/DL (ref 0.5–1.05)
EGFRCR SERPLBLD CKD-EPI 2021: >90 ML/MIN/1.73M*2
EOSINOPHIL # BLD AUTO: 0.32 X10*3/UL (ref 0–0.7)
EOSINOPHIL NFR BLD AUTO: 3.4 %
ERYTHROCYTE [DISTWIDTH] IN BLOOD BY AUTOMATED COUNT: 16.9 % (ref 11.5–14.5)
GLUCOSE BLD MANUAL STRIP-MCNC: 108 MG/DL (ref 74–99)
GLUCOSE BLD MANUAL STRIP-MCNC: 118 MG/DL (ref 74–99)
GLUCOSE BLD MANUAL STRIP-MCNC: 146 MG/DL (ref 74–99)
GLUCOSE BLD MANUAL STRIP-MCNC: 92 MG/DL (ref 74–99)
GLUCOSE SERPL-MCNC: 85 MG/DL (ref 74–99)
HCT VFR BLD AUTO: 33 % (ref 36–46)
HGB BLD-MCNC: 10.2 G/DL (ref 12–16)
IMM GRANULOCYTES # BLD AUTO: 0.13 X10*3/UL (ref 0–0.7)
IMM GRANULOCYTES NFR BLD AUTO: 1.4 % (ref 0–0.9)
INR PPP: 1.4 (ref 0.9–1.1)
LYMPHOCYTES # BLD AUTO: 2.22 X10*3/UL (ref 1.2–4.8)
LYMPHOCYTES NFR BLD AUTO: 23.8 %
MAGNESIUM SERPL-MCNC: 1.62 MG/DL (ref 1.6–2.4)
MCH RBC QN AUTO: 30.5 PG (ref 26–34)
MCHC RBC AUTO-ENTMCNC: 30.9 G/DL (ref 32–36)
MCV RBC AUTO: 99 FL (ref 80–100)
MONOCYTES # BLD AUTO: 0.83 X10*3/UL (ref 0.1–1)
MONOCYTES NFR BLD AUTO: 8.9 %
NEUTROPHILS # BLD AUTO: 5.75 X10*3/UL (ref 1.2–7.7)
NEUTROPHILS NFR BLD AUTO: 61.9 %
NRBC BLD-RTO: 0 /100 WBCS (ref 0–0)
PHOSPHATE SERPL-MCNC: 4.6 MG/DL (ref 2.5–4.9)
PLATELET # BLD AUTO: 365 X10*3/UL (ref 150–450)
POTASSIUM SERPL-SCNC: 4.9 MMOL/L (ref 3.5–5.3)
PROTHROMBIN TIME: 16 SECONDS (ref 9.8–12.8)
RBC # BLD AUTO: 3.34 X10*6/UL (ref 4–5.2)
RH FACTOR (ANTIGEN D): NORMAL
SODIUM SERPL-SCNC: 135 MMOL/L (ref 136–145)
UFH PPP CHRO-ACNC: 0.5 IU/ML
WBC # BLD AUTO: 9.3 X10*3/UL (ref 4.4–11.3)

## 2024-07-13 PROCEDURE — 85025 COMPLETE CBC W/AUTO DIFF WBC: CPT

## 2024-07-13 PROCEDURE — 99232 SBSQ HOSP IP/OBS MODERATE 35: CPT

## 2024-07-13 PROCEDURE — 80069 RENAL FUNCTION PANEL: CPT

## 2024-07-13 PROCEDURE — 2500000001 HC RX 250 WO HCPCS SELF ADMINISTERED DRUGS (ALT 637 FOR MEDICARE OP)

## 2024-07-13 PROCEDURE — 86901 BLOOD TYPING SEROLOGIC RH(D): CPT

## 2024-07-13 PROCEDURE — 85520 HEPARIN ASSAY: CPT

## 2024-07-13 PROCEDURE — 83735 ASSAY OF MAGNESIUM: CPT

## 2024-07-13 PROCEDURE — 36415 COLL VENOUS BLD VENIPUNCTURE: CPT

## 2024-07-13 PROCEDURE — 2500000004 HC RX 250 GENERAL PHARMACY W/ HCPCS (ALT 636 FOR OP/ED)

## 2024-07-13 PROCEDURE — 85610 PROTHROMBIN TIME: CPT

## 2024-07-13 PROCEDURE — 2500000002 HC RX 250 W HCPCS SELF ADMINISTERED DRUGS (ALT 637 FOR MEDICARE OP, ALT 636 FOR OP/ED)

## 2024-07-13 PROCEDURE — 86900 BLOOD TYPING SEROLOGIC ABO: CPT

## 2024-07-13 PROCEDURE — 82947 ASSAY GLUCOSE BLOOD QUANT: CPT

## 2024-07-13 PROCEDURE — 1200000002 HC GENERAL ROOM WITH TELEMETRY DAILY

## 2024-07-13 RX ORDER — OXYCODONE HYDROCHLORIDE 5 MG/1
5 TABLET ORAL ONCE
Status: COMPLETED | OUTPATIENT
Start: 2024-07-14 | End: 2024-07-14

## 2024-07-13 RX ORDER — AMOXICILLIN AND CLAVULANATE POTASSIUM 875; 125 MG/1; MG/1
1 TABLET, FILM COATED ORAL EVERY 12 HOURS SCHEDULED
Status: COMPLETED | OUTPATIENT
Start: 2024-07-14 | End: 2024-07-21

## 2024-07-13 RX ORDER — MAGNESIUM SULFATE HEPTAHYDRATE 40 MG/ML
2 INJECTION, SOLUTION INTRAVENOUS ONCE
Status: COMPLETED | OUTPATIENT
Start: 2024-07-13 | End: 2024-07-13

## 2024-07-13 RX ORDER — WARFARIN SODIUM 5 MG/1
5 TABLET ORAL DAILY
Status: DISCONTINUED | OUTPATIENT
Start: 2024-07-13 | End: 2024-07-21

## 2024-07-13 ASSESSMENT — COGNITIVE AND FUNCTIONAL STATUS - GENERAL
WALKING IN HOSPITAL ROOM: TOTAL
HELP NEEDED FOR BATHING: A LITTLE
TOILETING: A LITTLE
STANDING UP FROM CHAIR USING ARMS: A LOT
MOVING FROM LYING ON BACK TO SITTING ON SIDE OF FLAT BED WITH BEDRAILS: A LITTLE
DAILY ACTIVITIY SCORE: 19
CLIMB 3 TO 5 STEPS WITH RAILING: TOTAL
PERSONAL GROOMING: A LITTLE
MOBILITY SCORE: 12
TURNING FROM BACK TO SIDE WHILE IN FLAT BAD: A LITTLE
DRESSING REGULAR UPPER BODY CLOTHING: A LITTLE
DRESSING REGULAR LOWER BODY CLOTHING: A LITTLE
MOVING TO AND FROM BED TO CHAIR: A LOT

## 2024-07-13 ASSESSMENT — PAIN SCALES - GENERAL
PAINLEVEL_OUTOF10: 8
PAINLEVEL_OUTOF10: 10 - WORST POSSIBLE PAIN

## 2024-07-13 NOTE — CARE PLAN
The patient's goals for the shift include      The clinical goals for the shift include Patient will have no bleeding at stump throughout the shift    Over the shift, the patient had some bleeding at the beginning of shift. Dressing was changed (cleansed with NS, applied surgicel, 4X4, ABD and Kerlox roll). No bleeding from the stump noticed at the end of shift.

## 2024-07-13 NOTE — PROGRESS NOTES
Daily Progress Note    Amirah Bennett is a 43 y.o. female admitted on 6/20/2024  6:40 AM with Cardiogenic shock.     Overnight:  No acute events reported    Subjective   Pt was seen at the bedside  Patient reports continued R AKA leg pain 5 out of 10, but reports medications she is receiving are helping with pain. She reported burning with urination and itchy.   Denied any chills, headache/dizziness, NV, chest pain/tightness, dyspnea, abdominal pain, DC, dysuria.     Objective   Vitals: I/O:   Vitals:    07/13/24 0629   BP: 105/67   Pulse: 61   Resp: 16   Temp: 36.3 °C (97.3 °F)   SpO2: 99%        24hr Min/Max:  Temp  Min: 36 °C (96.8 °F)  Max: 36.7 °C (98.1 °F)  Pulse  Min: 61  Max: 76  BP  Min: 100/62  Max: 120/74  Resp  Min: 16  Max: 18  SpO2  Min: 99 %  Max: 100 %   Intake/Output Summary (Last 24 hours) at 7/13/2024 0837  Last data filed at 7/13/2024 0648  Gross per 24 hour   Intake 414.8 ml   Output 1620 ml   Net -1205.2 ml        Net IO Since Admission: -41,770.91 mL [07/13/24 0837]      PE:  -Patient was conscious, oriented x4, with expressive aphasia . wasn't in acute respiratory distress, not in jaundiced nor cyanosed. AKA dressing is clean.   -Cardiovascular examination: Audible 1st and 2nd heart sound, no murmurs  -Lower limb examination: amputated right above knee amputation is edematous, has clean dressing. No pitting edema or signs of deep vein thrombosis.    -Chest examination: Lungs clear to auscultation bilaterally.  -Abdominal examination: Soft and lax abdomen, no rigidity nor rebound tenderness.   -Neurological examination: limited, symmetrical facial impression, equally reactive pupils, right hand weakness.          Labs:  CBC RFP   Lab Results   Component Value Date    WBC 9.3 07/12/2024    HGB 10.2 (L) 07/12/2024    HCT 33.8 (L) 07/12/2024     07/12/2024     07/12/2024    NEUTROABS 5.88 07/12/2024    Lab Results   Component Value Date     07/12/2024    K 4.4 07/12/2024      07/12/2024    CO2 27 07/12/2024    BUN 13 07/12/2024    CREATININE 0.64 07/12/2024    CREATININE 0.62 07/11/2024     Lab Results   Component Value Date    MG 1.70 07/12/2024    PHOS 4.7 07/12/2024    CALCIUM 9.2 07/12/2024         Hepatic Function ABG/VBG   Lab Results   Component Value Date    ALT 20 07/10/2024    AST 20 07/10/2024    ALKPHOS 70 07/10/2024     Lab Results   Component Value Date    BILIDIR 0.7 (H) 07/10/2024      Lab Results   Component Value Date    PROTIME 17.1 (H) 07/12/2024    APTT 88 (H) 07/10/2024    INR 1.5 (H) 07/12/2024    Lab Results   Component Value Date    LACTATE 0.6 06/26/2024        Results from last 7 days   Lab Units 07/13/24  0647 07/12/24  2129 07/12/24  1615 07/12/24  1201 07/12/24  0743 07/12/24  0624 07/11/24  0642 07/11/24  0616 07/10/24  0645 07/10/24  0610 07/09/24  0644 07/09/24  0607 07/08/24  1146 07/08/24  0843   POCT GLUCOSE mg/dL 92 131* 104* 153*  --  101*   < >  --    < >  --    < >  --    < >  --    GLUCOSE mg/dL  --   --   --   --  90  --   --  98  --  90  --  89  --  96    < > = values in this interval not displayed.       Imaging:  Imaging:   Vascular US Lower Extremity Arterial Duplex 06/23/2024  1. Echogenic material within the right distal femoral and popliteal  arteries with  absent Doppler flow in the right popliteal artery  significantly diminished flow within the right distal superficial  femoral artery. Findings raise concern for thrombosis of the distal  SFA and popliteal artery. Decreased flow within the right posterior  tibial and peroneal arteries could be through collateralization.  2. Decreased flow within the right common femoral artery, right deep  femoral artery as well as the proximal and mid superficial femoral  arteries, raising concern for stenosis proximal to the right common  femoral artery, possibly within the right external iliac right common  iliac artery. A CTA of the lower extremities can be obtained for  further  evaluation.  3. Unremarkable Doppler interrogation of the left lower extremity  arteries.     Cardiac:  Transthoracic Echo (TTE) Complete With Contrast : 06/22/2024   1. Left ventricular ejection fraction is severely decreased, by visual estimate at 20-25%.   2. There is global hypokinesis of the left ventricle with minor regional variations.   3. Left ventricular diastolic filling was indeterminate.   4. Mildly enlarged right ventricle.   5. There is mildly reduced right ventricular systolic function.   6. The left atrium is severely dilated.   7. Moderate to severe tricuspid regurgitation visualized.   8. Moderately elevated right ventricular systolic pressure.   9. TR has worsened.        Medications:  Scheduled Medications:  PRN Medications:    amiodarone, 200 mg, oral, Daily  aspirin, 81 mg, oral, Daily  atorvastatin, 80 mg, oral, Nightly  bisacodyl, 10 mg, rectal, Daily  cefTRIAXone, 1 g, intravenous, q24h  DULoxetine, 30 mg, oral, Daily  [Held by provider] empagliflozin, 10 mg, oral, Daily  melatonin, 6 mg, oral, Daily  metoprolol succinate XL, 25 mg, oral, Nightly  pantoprazole, 40 mg, oral, Daily before breakfast  perflutren protein A microsphere, 0.5 mL, intravenous, Once in imaging  polyethylene glycol, 17 g, oral, BID  pregabalin, 75 mg, oral, Nightly  sacubitriL-valsartan, 2 tablet, oral, BID  sennosides, 2 tablet, oral, BID  warfarin, 5 mg, oral, Daily     PRN medications: dextrose, dextrose, glucagon, glucagon, heparin, HYDROmorphone, lidocaine, oxyCODONE       Assessment/Plan:   Amirah Bennett is a 43 y.o. 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      On 20th June, Patient presented with dyspnea and leg swelling, with elevated lactate to 14.7, cold extremities, and 3+ pitting edema suggesting cardiogenic shock.  She underwent RHC with CI of 1.6, CVP of 25, requiring inotropes initiation with difficult weaning off trials, on the following day patient had worsened SvO2 from 65 to 25 with rapid lactate increase following acute abdominal pain and right leg pain concerning for limb ischemia, and rhabdomyolysis with CK of 14,000, with limited IV hydrations due to ongoing cardiogenic shock.  Arterial doppler of lower extremities reported absent flow in the right popliteal artery with significantly diminished flow within the right distal superficial femoral artery, concerning for thrombosis. Additionally decreased flow in right common femoral artery, right deep femoral artery concerning for stenosis proximal to right common femoral artery.  Vascular medicine recommended IVC filter insertion on 6/27 in preparation for right AKA 6/28. Which was complicated by 3 days persistent stump site oozing & intramuscular hematoma evident on CTA RLE, which was managed conservatively with 6 blood transfusions while on heparin infusion necessitation to hold heparin drip temporarily for 12 hours to allow hemostasis. On the following day patient had similar stump bleeding with Hgb 6.9 requiring an additional 1pRBC incrementing hemoglobin to 8.1. hence Heparin was restarted 12 hours later at 1:30 Am 7/5 without signs of bleeding & stable Hemoglobin 8.9.   On 6th July, patient was hemodynamically stable and transferred to cardiology floor, was kept on heparin infusion with hemoglobin monitoring, as patient's stump headed well without gross bleeding, nor hemoglobin drop, Vascular medicine recommended anticoagulation bridging with warfarin on 9th July. Patient had some bloody urine output in dhillon on 07/11, urine culture was  positive for Ecoli and started on ceftriaxone, hold on jardiance and removed dhillon.Patient was started on Lyrica for phantom limb pain.      Patient was labeled is not candidate for advanced therapies given documented medical no adherence. Managed medically.   Given the lack of documentation POA and limited family (NOK are cousins). ICU team involved Ethics & Palliative care.      Heart failure with reduced ejection fraction:   -HFrEF (LVEF 20-25%)  -Patient was labeled not candidate for advanced therapies due to medical nonadherence  Plan:  -palliative care follow up.  -Aim to resume quadruple GDMT (spironolactone). Patient on metoprolol succinate, Entresto, jardiance  -stopped jardiance for now because of UTI      Right limb ischemia secondary to Arterial emboli, S/P above right knee amputation:  #S/p femoral arterial line sheath placement now removed  -CTA Aorta with runoff 6/24: aortic bifurcation embolus, R BA-EIA embolus, SFA and popliteal emboli   -arterial duplex exam with monophasic right CFA, SFA and PFA signals, absent popliteal flow and monophasic flow in the tibial artery   -Patient labeled Unable to walk prior to presentation to hospital  -s/p Above knee amputation with vascular surgery, 6/28/2024.   Plan:  -Warfarin with heparin bridge   -Started warfarin 5mg 07/09 (received 3 doses 07/09-07/11) with missed dose on 07/12, restarted on 07/13  -If patient bleeds from stump consult vasc med.  -pain regimen: oxycodone 10mg severe pain 4hr, PRN dilaudid 0.2mg q 4 hr breakthrough,  before and after dresssing changes.   -Continue duloxetine. Started on pregabalin for phantom limb pain   -Vascular surgery, requested to keep Folly's in, to avoid stump site infection.      Query Cardiac thrombi:  -Likely in setting of Atrial fibrillation with Rivaroxaban no adherence  -There are indeterminate low-attenuation nodular filling defects within the atrial appendage.   --Right lower extremity DVT, and arterial  thrombus/p IVC filter 6/27.  -Vascular medicine recommended Warfarin with Target INR 2-3, for a minimum of 5 days.  Plan:  -Started warfarin with target INR 2-3   - Cont. Heparin drip (Bridging therapy).   -Vascular medicine    -Outpatient Anticoagulation Clinic set up at [facility].     Atrial fibrillation:    was on rivaroxaban w/ DCCV 05/2023), ischemic stroke L MCA d/t AFib LLA thrombus seen on echo (lack of OAC adherence) s/p thrombectomy 01/2022 @ [facility] w/ residual expressive aphasia and R sided weakness   -Previously prescribed digoxin 250mcg however last fill was 12/2023  -TSH 6.28H, free T4 1.48  Plan:  -Continue amiodarone 200mg daily  - Continue aspirin 81mg     Direct Hyperbilirubinemia, Likely following ischemic liver injury:  -Patient denied abdominal pain.   Tbili (3.2 on admission, from 0.5 in 03/2024)  ::RUQ US 7/1 without abnormality beyond hepatic steatosis and liver cyst  Plan:   Follow Liver enzymes.      Hx trach c/b trancutaneous fistula  -ENT had been consulted 3/2024 for gurgling and mucus drainage, no inpatient interventions required  -monitor for breaths/mucus discharge from trach site  -Cover the tracheocutaneous fistula with tape and gauze and encourage patient to apply pressure when voicing.   -follow up outpatient ENT for closure (Dr. King)      Anemia:  ::had 3 units of RBC transfused 6/30, currently controlled bleeding, increeminting gradually.   ::CTA C/A/P 6/30 without evidence of active hemorrhage within chest/abdomen/pelvis  ::CTA RLE 7/1 without a source that could be intervened upon  Plan:  - CBC daily, as stable Hb.   -Hgb goal >7  given cardiac hx     UTI  ::small amount of blood in urine 07/11 am  - UA turbid brown with blood, glucose, protein and LE 25, nitrite negative   -urine culture positive, started on ceftriaxone 7 day course (07/12-07/18) and hold on empagliflozin  -dhillon removed      Left brachial injury  Likely injury  LUE DVT scan with nonvascularized mass, called CT  while patient was there to request CTA of left upper extremity and they said logistically they would be unable to perform both scans, and given protocol for max dosing of contrast, could not give further contrast until 6/25 at 2PM  -no CTA LUE needed        Insomnia  -Continue melatonin 6mg nightly plus additional 6mg PRN for sleep     Depression   - Continue duloxetine      Resolved:   -Thrombocytopenia, likely attributed to: previous consumptive coagulopathy/sepsis and bleeding, Multiple thrombi, DIC score 5 on 6/24/2024, Hit score of 6, Negative PF4  -Rhabdomyolysis.  -Metabolic acidosis, Lactic acidosis.   -Acute kidney injury.    -stump infection, treated for 7 days, s/p vanc (6/20-6/24) (6/27-7/2 ) and zosyn (6/20-6/24), (6/27- p)-restarted vanc/zosyn 6/27 due to elevated leukocytosis and purulent appearing right groin site     Disposition Plan:  -patient would like enrollment in  home delivery service for medications (she has trouble getting to preferred pharmacy), pharmacy updated to [facility]  -patient's family would like her enrolled in [place]  -repeat thyroid labs outpatient  - IVC filter removal in 3 months!  [ ] When she is stable for discharge will need to discuss with [facility] med regarding home going AC plan  [ ] Titrate GDMT as able. Currently on entresto 24/26, metoprolol, holding jardiance  [ ] Follow up further pall care recs. Patient remains full code. There is ongoing GOC discussion  [ ] Needs to follow up OP with ENT for trach c/b trancutaneous fistula. Dr. King for closure.     Prevention:   Fall: High.   Mobility: Physical therapy referral.   DVT: Heparin  Diet: Cardiac Diet     Code status: Full Code  Emergency contact:   * patient LACKS capacity due to inability to express decision and inconsistency in decisions  Surrogate Medical Decision-maker:   Surrogate decision maker is: pt's cousin, lCara Wayne: [phone], Efrain Black: [phone], Keli Osborne: [phone]   Friends  "who may be helpful in making decisions:  Prior boyfriend Navin Sanches [phone]  Close friend \"Isiah\" Pranay Owen [phone]        Kelle Andrade MD  PGY-1 Neurology  "

## 2024-07-14 VITALS
HEART RATE: 72 BPM | BODY MASS INDEX: 26.3 KG/M2 | OXYGEN SATURATION: 100 % | HEIGHT: 67 IN | RESPIRATION RATE: 18 BRPM | WEIGHT: 167.55 LBS | SYSTOLIC BLOOD PRESSURE: 106 MMHG | DIASTOLIC BLOOD PRESSURE: 66 MMHG | TEMPERATURE: 97.2 F

## 2024-07-14 LAB
ALBUMIN SERPL BCP-MCNC: 3.4 G/DL (ref 3.4–5)
ANION GAP SERPL CALC-SCNC: 16 MMOL/L (ref 10–20)
BASOPHILS # BLD AUTO: 0.05 X10*3/UL (ref 0–0.1)
BASOPHILS NFR BLD AUTO: 0.6 %
BUN SERPL-MCNC: 13 MG/DL (ref 6–23)
CALCIUM SERPL-MCNC: 9.2 MG/DL (ref 8.6–10.6)
CHLORIDE SERPL-SCNC: 98 MMOL/L (ref 98–107)
CO2 SERPL-SCNC: 26 MMOL/L (ref 21–32)
CREAT SERPL-MCNC: 0.58 MG/DL (ref 0.5–1.05)
EGFRCR SERPLBLD CKD-EPI 2021: >90 ML/MIN/1.73M*2
EOSINOPHIL # BLD AUTO: 0.28 X10*3/UL (ref 0–0.7)
EOSINOPHIL NFR BLD AUTO: 3.4 %
ERYTHROCYTE [DISTWIDTH] IN BLOOD BY AUTOMATED COUNT: 16.8 % (ref 11.5–14.5)
GLUCOSE BLD MANUAL STRIP-MCNC: 105 MG/DL (ref 74–99)
GLUCOSE BLD MANUAL STRIP-MCNC: 122 MG/DL (ref 74–99)
GLUCOSE BLD MANUAL STRIP-MCNC: 127 MG/DL (ref 74–99)
GLUCOSE BLD MANUAL STRIP-MCNC: 97 MG/DL (ref 74–99)
GLUCOSE SERPL-MCNC: 112 MG/DL (ref 74–99)
HCT VFR BLD AUTO: 34.8 % (ref 36–46)
HGB BLD-MCNC: 10.7 G/DL (ref 12–16)
IMM GRANULOCYTES # BLD AUTO: 0.1 X10*3/UL (ref 0–0.7)
IMM GRANULOCYTES NFR BLD AUTO: 1.2 % (ref 0–0.9)
INR PPP: 1.2 (ref 0.9–1.1)
LYMPHOCYTES # BLD AUTO: 2.25 X10*3/UL (ref 1.2–4.8)
LYMPHOCYTES NFR BLD AUTO: 26.9 %
MAGNESIUM SERPL-MCNC: 1.83 MG/DL (ref 1.6–2.4)
MCH RBC QN AUTO: 30.7 PG (ref 26–34)
MCHC RBC AUTO-ENTMCNC: 30.7 G/DL (ref 32–36)
MCV RBC AUTO: 100 FL (ref 80–100)
MONOCYTES # BLD AUTO: 0.72 X10*3/UL (ref 0.1–1)
MONOCYTES NFR BLD AUTO: 8.6 %
NEUTROPHILS # BLD AUTO: 4.95 X10*3/UL (ref 1.2–7.7)
NEUTROPHILS NFR BLD AUTO: 59.3 %
NRBC BLD-RTO: 0 /100 WBCS (ref 0–0)
PHOSPHATE SERPL-MCNC: 5.2 MG/DL (ref 2.5–4.9)
PLATELET # BLD AUTO: 348 X10*3/UL (ref 150–450)
POTASSIUM SERPL-SCNC: 4.7 MMOL/L (ref 3.5–5.3)
PROTHROMBIN TIME: 13.5 SECONDS (ref 9.8–12.8)
RBC # BLD AUTO: 3.49 X10*6/UL (ref 4–5.2)
SODIUM SERPL-SCNC: 135 MMOL/L (ref 136–145)
UFH PPP CHRO-ACNC: 0.6 IU/ML
WBC # BLD AUTO: 8.4 X10*3/UL (ref 4.4–11.3)

## 2024-07-14 PROCEDURE — 1200000002 HC GENERAL ROOM WITH TELEMETRY DAILY

## 2024-07-14 PROCEDURE — 2500000001 HC RX 250 WO HCPCS SELF ADMINISTERED DRUGS (ALT 637 FOR MEDICARE OP)

## 2024-07-14 PROCEDURE — 83735 ASSAY OF MAGNESIUM: CPT

## 2024-07-14 PROCEDURE — 2500000002 HC RX 250 W HCPCS SELF ADMINISTERED DRUGS (ALT 637 FOR MEDICARE OP, ALT 636 FOR OP/ED)

## 2024-07-14 PROCEDURE — 85025 COMPLETE CBC W/AUTO DIFF WBC: CPT

## 2024-07-14 PROCEDURE — 2500000004 HC RX 250 GENERAL PHARMACY W/ HCPCS (ALT 636 FOR OP/ED)

## 2024-07-14 PROCEDURE — 80069 RENAL FUNCTION PANEL: CPT

## 2024-07-14 PROCEDURE — 82947 ASSAY GLUCOSE BLOOD QUANT: CPT

## 2024-07-14 PROCEDURE — 36415 COLL VENOUS BLD VENIPUNCTURE: CPT | Performed by: STUDENT IN AN ORGANIZED HEALTH CARE EDUCATION/TRAINING PROGRAM

## 2024-07-14 PROCEDURE — 85610 PROTHROMBIN TIME: CPT

## 2024-07-14 PROCEDURE — 99232 SBSQ HOSP IP/OBS MODERATE 35: CPT

## 2024-07-14 PROCEDURE — 85520 HEPARIN ASSAY: CPT | Performed by: STUDENT IN AN ORGANIZED HEALTH CARE EDUCATION/TRAINING PROGRAM

## 2024-07-14 ASSESSMENT — PAIN - FUNCTIONAL ASSESSMENT
PAIN_FUNCTIONAL_ASSESSMENT: 0-10

## 2024-07-14 ASSESSMENT — PAIN SCALES - GENERAL
PAINLEVEL_OUTOF10: 7
PAINLEVEL_OUTOF10: 10 - WORST POSSIBLE PAIN
PAINLEVEL_OUTOF10: 6
PAINLEVEL_OUTOF10: 5 - MODERATE PAIN
PAINLEVEL_OUTOF10: 5 - MODERATE PAIN
PAINLEVEL_OUTOF10: 6
PAINLEVEL_OUTOF10: 5 - MODERATE PAIN
PAINLEVEL_OUTOF10: 5 - MODERATE PAIN
PAINLEVEL_OUTOF10: 9
PAINLEVEL_OUTOF10: 9

## 2024-07-14 ASSESSMENT — COGNITIVE AND FUNCTIONAL STATUS - GENERAL
DAILY ACTIVITIY SCORE: 17
STANDING UP FROM CHAIR USING ARMS: A LOT
TURNING FROM BACK TO SIDE WHILE IN FLAT BAD: A LITTLE
MOBILITY SCORE: 11
DRESSING REGULAR UPPER BODY CLOTHING: A LITTLE
MOBILITY SCORE: 13
CLIMB 3 TO 5 STEPS WITH RAILING: TOTAL
MOVING FROM LYING ON BACK TO SITTING ON SIDE OF FLAT BED WITH BEDRAILS: A LITTLE
WALKING IN HOSPITAL ROOM: TOTAL
PERSONAL GROOMING: A LOT
MOVING TO AND FROM BED TO CHAIR: A LOT
DRESSING REGULAR LOWER BODY CLOTHING: A LITTLE
MOVING FROM LYING ON BACK TO SITTING ON SIDE OF FLAT BED WITH BEDRAILS: A LITTLE
HELP NEEDED FOR BATHING: A LITTLE
STANDING UP FROM CHAIR USING ARMS: TOTAL
DAILY ACTIVITIY SCORE: 19
PERSONAL GROOMING: A LITTLE
TURNING FROM BACK TO SIDE WHILE IN FLAT BAD: A LITTLE
CLIMB 3 TO 5 STEPS WITH RAILING: TOTAL
MOVING TO AND FROM BED TO CHAIR: A LOT
TOILETING: A LITTLE
WALKING IN HOSPITAL ROOM: A LOT
HELP NEEDED FOR BATHING: A LITTLE
DRESSING REGULAR UPPER BODY CLOTHING: A LITTLE
DRESSING REGULAR LOWER BODY CLOTHING: A LITTLE
TOILETING: A LOT

## 2024-07-14 ASSESSMENT — PAIN DESCRIPTION - ORIENTATION: ORIENTATION: RIGHT

## 2024-07-14 ASSESSMENT — PAIN DESCRIPTION - DESCRIPTORS
DESCRIPTORS: ACHING;TENDER
DESCRIPTORS: ACHING;SHARP
DESCRIPTORS: ACHING;TENDER

## 2024-07-14 ASSESSMENT — PAIN DESCRIPTION - LOCATION: LOCATION: LEG

## 2024-07-14 NOTE — CARE PLAN
The patient's goals for the shift include  patient will have decrease pain during the shift    The clinical goals for the shift include Patient will remain safe during the shift    Over the shift, the patient did not make progress toward the following goals. Barriers to progression include Patient still having pain . Recommendations to address these barriers include continue to treat and monitor  Problem: Skin  Goal: Prevent/manage excess moisture  Outcome: Progressing     Problem: Skin  Goal: Participates in plan/prevention/treatment measures  Outcome: Progressing     Problem: Skin  Goal: Promote/optimize nutrition  Outcome: Progressing   .

## 2024-07-14 NOTE — CARE PLAN
The clinical goals for the shift include pt will remain safe during shift      Problem: Safety - Adult  Goal: Free from fall injury  Outcome: Progressing     Problem: Chronic Conditions and Co-morbidities  Goal: Patient's chronic conditions and co-morbidity symptoms are monitored and maintained or improved  Outcome: Progressing     Problem: Diabetes  Goal: Achieve decreasing blood glucose levels by end of shift  Outcome: Progressing

## 2024-07-14 NOTE — CARE PLAN
Pt's wound dressing changed x 1 this shift for moderate amount of sanguinous dressing present along the incision line.  Pt given prn pain medication after dressing change for c/o pain to R AKA site.    Problem: Skin  Goal: Prevent/minimize sheer/friction injuries  7/14/2024 1733 by Zora Jerez RN  Outcome: Progressing  Flowsheets (Taken 7/14/2024 1733)  Prevent/minimize sheer/friction injuries:   Turn/reposition every 2 hours/use positioning/transfer devices   Use pull sheet  Note: Pt repositions self in bed independently, sitting on edge of bed during shift   7/14/2024 1733 by Zora Jerez RN  Outcome: Progressing  Flowsheets (Taken 7/14/2024 1733)  Prevent/minimize sheer/friction injuries:   Turn/reposition every 2 hours/use positioning/transfer devices   Use pull sheet

## 2024-07-14 NOTE — PROGRESS NOTES
Daily Progress Note    Amirah Bennett is a 43 y.o. female admitted on 6/20/2024  6:40 AM with Cardiogenic shock.     Overnight:  No acute events reported    Subjective   Pt was seen at the bedside  Patient reports continued R AKA leg pain 5 out of 10, but reports medications she is receiving are helping with pain. She continues to endorse burning with urination  Denied any chills, headache/dizziness, NV, chest pain/tightness, dyspnea, abdominal pain    Objective   Vitals: I/O:   Vitals:    07/14/24 0739   BP: 95/63   Pulse: 62   Resp: 18   Temp: 36.4 °C (97.5 °F)   SpO2: 99%        24hr Min/Max:  Temp  Min: 36.1 °C (97 °F)  Max: 36.5 °C (97.7 °F)  Pulse  Min: 62  Max: 74  BP  Min: 95/63  Max: 112/72  Resp  Min: 18  Max: 20  SpO2  Min: 96 %  Max: 100 %   Intake/Output Summary (Last 24 hours) at 7/14/2024 1127  Last data filed at 7/14/2024 0900  Gross per 24 hour   Intake 991.2 ml   Output 2300 ml   Net -1308.8 ml        Net IO Since Admission: -43,429.71 mL [07/14/24 1127]      PE:  -Patient was conscious, oriented x4, with expressive aphasia. Not in acute respiratory distress, not in jaundiced nor cyanosed. AKA dressing is clean.   -Cardiovascular examination: Audible 1st and 2nd heart sound, no murmurs  -Lower limb examination: amputated right above knee amputation is edematous, has clean dressing. No pitting edema or signs of deep vein thrombosis.    -Chest examination: Lungs clear to auscultation bilaterally.  -Abdominal examination: Soft and lax abdomen, no rigidity nor rebound tenderness.   -Neurological examination: limited, symmetrical facial impression, equally reactive pupils, right hand weakness.          Labs:  CBC RFP   Lab Results   Component Value Date    WBC 9.3 07/13/2024    HGB 10.2 (L) 07/13/2024    HCT 33.0 (L) 07/13/2024    MCV 99 07/13/2024     07/13/2024    NEUTROABS 5.75 07/13/2024    Lab Results   Component Value Date     (L) 07/13/2024    K 4.9 07/13/2024    CL 99  07/13/2024    CO2 26 07/13/2024    BUN 12 07/13/2024    CREATININE 0.52 07/13/2024    CREATININE 0.64 07/12/2024     Lab Results   Component Value Date    MG 1.62 07/13/2024    PHOS 4.6 07/13/2024    CALCIUM 8.6 07/13/2024         Hepatic Function ABG/VBG   Lab Results   Component Value Date    ALT 20 07/10/2024    AST 20 07/10/2024    ALKPHOS 70 07/10/2024     Lab Results   Component Value Date    BILIDIR 0.7 (H) 07/10/2024      Lab Results   Component Value Date    PROTIME 16.0 (H) 07/13/2024    APTT 88 (H) 07/10/2024    INR 1.4 (H) 07/13/2024    Lab Results   Component Value Date    LACTATE 0.6 06/26/2024        Results from last 7 days   Lab Units 07/14/24  0600 07/13/24  2135 07/13/24  1634 07/13/24  1209 07/13/24  0923 07/13/24  0647 07/12/24  1201 07/12/24  0743 07/11/24  0642 07/11/24  0616 07/10/24  0645 07/10/24  0610 07/09/24  0644 07/09/24  0607   POCT GLUCOSE mg/dL 105* 118* 146* 108*  --  92   < >  --    < >  --    < >  --    < >  --    GLUCOSE mg/dL  --   --   --   --  85  --   --  90  --  98  --  90  --  89    < > = values in this interval not displayed.       Imaging:  Imaging:   Vascular US Lower Extremity Arterial Duplex 06/23/2024  1. Echogenic material within the right distal femoral and popliteal  arteries with  absent Doppler flow in the right popliteal artery  significantly diminished flow within the right distal superficial  femoral artery. Findings raise concern for thrombosis of the distal  SFA and popliteal artery. Decreased flow within the right posterior  tibial and peroneal arteries could be through collateralization.  2. Decreased flow within the right common femoral artery, right deep  femoral artery as well as the proximal and mid superficial femoral  arteries, raising concern for stenosis proximal to the right common  femoral artery, possibly within the right external iliac right common  iliac artery. A CTA of the lower extremities can be obtained for  further evaluation.  3.  Unremarkable Doppler interrogation of the left lower extremity  arteries.     Cardiac:  Transthoracic Echo (TTE) Complete With Contrast : 06/22/2024   1. Left ventricular ejection fraction is severely decreased, by visual estimate at 20-25%.   2. There is global hypokinesis of the left ventricle with minor regional variations.   3. Left ventricular diastolic filling was indeterminate.   4. Mildly enlarged right ventricle.   5. There is mildly reduced right ventricular systolic function.   6. The left atrium is severely dilated.   7. Moderate to severe tricuspid regurgitation visualized.   8. Moderately elevated right ventricular systolic pressure.   9. TR has worsened.        Medications:  Scheduled Medications:  PRN Medications:    amiodarone, 200 mg, oral, Daily  amoxicillin-pot clavulanate, 1 tablet, oral, q12h TELMA  aspirin, 81 mg, oral, Daily  atorvastatin, 80 mg, oral, Nightly  bisacodyl, 10 mg, rectal, Daily  DULoxetine, 30 mg, oral, Daily  [Held by provider] empagliflozin, 10 mg, oral, Daily  melatonin, 6 mg, oral, Daily  metoprolol succinate XL, 25 mg, oral, Nightly  pantoprazole, 40 mg, oral, Daily before breakfast  perflutren protein A microsphere, 0.5 mL, intravenous, Once in imaging  polyethylene glycol, 17 g, oral, BID  pregabalin, 75 mg, oral, Nightly  sacubitriL-valsartan, 2 tablet, oral, BID  sennosides, 2 tablet, oral, BID  warfarin, 5 mg, oral, Daily     PRN medications: dextrose, dextrose, glucagon, glucagon, heparin, HYDROmorphone, lidocaine, oxyCODONE       Hospital Course  On 20th June, Patient presented with dyspnea and leg swelling, with elevated lactate to 14.7, cold extremities, and 3+ pitting edema suggesting cardiogenic shock.  She underwent RHC with CI of 1.6, CVP of 25, requiring inotropes initiation with difficult weaning off trials, on the following day patient had worsened SvO2 from 65 to 25 with rapid lactate increase following acute abdominal pain and right leg pain concerning for  limb ischemia, and rhabdomyolysis with CK of 14,000, with limited IV hydrations due to ongoing cardiogenic shock.  Arterial doppler of lower extremities reported absent flow in the right popliteal artery with significantly diminished flow within the right distal superficial femoral artery, concerning for thrombosis. Additionally decreased flow in right common femoral artery, right deep femoral artery concerning for stenosis proximal to right common femoral artery.  Vascular medicine recommended IVC filter insertion on 6/27 in preparation for right AKA 6/28. Which was complicated by 3 days persistent stump site oozing & intramuscular hematoma evident on CTA RLE, which was managed conservatively with 6 blood transfusions while on heparin infusion necessitation to hold heparin drip temporarily for 12 hours to allow hemostasis. On the following day patient had similar stump bleeding with Hgb 6.9 requiring an additional 1pRBC incrementing hemoglobin to 8.1. hence Heparin was restarted 12 hours later at 1:30 Am 7/5 without signs of bleeding & stable Hemoglobin 8.9.     On 6th July, patient was hemodynamically stable and transferred to cardiology floor, was kept on heparin infusion with hemoglobin monitoring, as patient's stump headed well without gross bleeding, nor hemoglobin drop, Vascular medicine recommended anticoagulation bridging with warfarin on 9th July. Patient had some bloody urine output in dhillon on 07/11, urine culture was positive and started on ceftriaxone, hold on jardiance and removed dhillon.     Patient was labeled is not candidate for advanced therapies given documented medical non adherence. Managed medically.   Given the lack of documentation POA and limited family (NOK are cousins). ICU team involved Ethics & Palliative care.     Assessment/Plan:   Amirah Bennett is a 43 y.o. 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 She was found to have elevated lactate to 14.7, cold extremities, and 3+ pitting edema. RHC c/w cardiogenic shock c/b SARAH, cardiac thrombi, and Right limb ischemia S/P above knee amputation. She was initially started on heparin, now bridging with warfarin with a goal of INR 2-3. Additionally, found to have UTI starting 07/11, she had a dhillon to prevent contamination of new AKA site, but dhillon was removed upon UTI discovery.      Heart failure with reduced ejection fraction:   -HFrEF (LVEF 20-25%)  -Patient was labeled not candidate for advanced therapies due to medical nonadherence  Plan:  -palliative care follow up.  -Aim to resume quadruple GDMT (spironolactone). Patient on metoprolol succinate, Entresto  -stopped jardiance for now because of UTI      Right limb ischemia secondary to Arterial emboli, S/P above right knee amputation:  #S/p femoral arterial line sheath placement now removed  -CTA Aorta with runoff 6/24: aortic bifurcation embolus, R BA-EIA embolus, SFA and popliteal emboli   -arterial duplex exam with monophasic right CFA, SFA and PFA signals, absent popliteal flow and monophasic flow in the tibial artery   -Patient labeled Unable to walk prior to presentation to hospital  -s/p Above knee amputation with vascular surgery, 6/28/2024.   Plan:  -Warfarin with heparin bridge goal INR 2-3  -Started warfarin 5mg 07/09 (received 3 doses 07/09-07/11) with missed dose on 07/12, restarted on 07/13  -If patient bleeds from stump consult vasc med.  -pain regimen: oxycodone 10mg severe pain 4hr, PRN dilaudid 0.2mg q 4 hr breakthrough,  before and after dresssing changes.   -Continue duloxetine.  Continue pregabalin for phantom limb pain      Query Cardiac thrombi:  -Likely in setting of Atrial fibrillation with Rivaroxaban no adherence  -There are indeterminate low-attenuation nodular filling defects within the atrial appendage.   --Right lower extremity DVT, and arterial thrombus/p IVC filter 6/27.  -Vascular medicine recommended Warfarin with Target INR 2-3, for a minimum of 5 days.  Plan:  -Started warfarin with target INR 2-3   - Cont. Heparin drip (Bridging therapy).   -Vascular medicine    -Outpatient Anticoagulation Clinic set up at [facility].     Atrial fibrillation:    was on rivaroxaban w/ DCCV 05/2023), ischemic stroke L MCA d/t AFib LLA thrombus seen on echo (lack of OAC adherence) s/p thrombectomy 01/2022 @ [facility] w/ residual expressive aphasia and R sided weakness   -Previously prescribed digoxin 250mcg however last fill was 12/2023  -TSH 6.28H, free T4 1.48  Plan:  -Continue amiodarone 200mg daily  - Continue aspirin 81mg     Direct Hyperbilirubinemia, Likely following ischemic liver injury:  -Patient denied abdominal pain.   Tbili (3.2 on admission, from 0.5 in 03/2024)  ::RUQ US 7/1 without abnormality beyond hepatic steatosis and liver cyst  Plan:   Follow Liver enzymes.      Hx trach c/b trancutaneous fistula  -ENT had been consulted 3/2024 for gurgling and mucus drainage, no inpatient interventions required  -monitor for breaths/mucus discharge from trach site  -Cover the tracheocutaneous fistula with tape and gauze and encourage patient to apply pressure when voicing.   -follow up outpatient ENT for closure (Dr. King)      Anemia:  ::had 3 units of RBC transfused 6/30, currently controlled bleeding, increeminting gradually.   ::CTA C/A/P 6/30 without evidence of active hemorrhage within chest/abdomen/pelvis  ::CTA RLE 7/1 without a source that could be intervened upon  Plan:  - CBC daily, as stable Hb.   -Hgb goal >7  given cardiac hx     UTI:  ::small amount of blood in urine 07/11 am  - UA  turbid brown with blood, glucose, protein and LE 25, nitrite negative   -urine culture positive, had 2 days of ceftriaxone then cultures were positive for Klebsiella so abx de-escalated to Augmentin, will treat with 10 day course (07/14-07/23) and hold on empagliflozin    Left brachial injury  Likely injury  LUE DVT scan with nonvascularized mass, called CT while patient was there to request CTA of left upper extremity and they said logistically they would be unable to perform both scans, and given protocol for max dosing of contrast, could not give further contrast until 6/25 at 2PM  -no CTA LUE needed        Insomnia  -Continue melatonin 6mg nightly plus additional 6mg PRN for sleep     Depression   - Continue duloxetine      Resolved:   -Thrombocytopenia, likely attributed to: previous consumptive coagulopathy/sepsis and bleeding, Multiple thrombi, DIC score 5 on 6/24/2024, Hit score of 6, Negative PF4  -Rhabdomyolysis.  -Metabolic acidosis, Lactic acidosis.   -Acute kidney injury.    -stump infection, treated for 7 days, s/p vanc (6/20-6/24) (6/27-7/2 ) and zosyn (6/20-6/24), (6/27- p)-restarted vanc/zosyn 6/27 due to elevated leukocytosis and purulent appearing right groin site     Disposition Plan:  -patient would like enrollment in  home delivery service for medications (she has trouble getting to preferred pharmacy), pharmacy updated to [facility]  -patient's family would like her enrolled in [facility]  -repeat thyroid labs outpatient  - IVC filter removal within 3 months!  [ ] Titrate GDMT as able. Currently on entresto 24/26, metoprolol, holding jardiance  [ ] Follow up further pall care recs. Patient remains full code. There is ongoing [facility] discussion  [ ] Needs to follow up OP with ENT for trach c/b trancutaneous fistula. Dr. King for closure.     Prevention:   Fall: High.   Mobility: Physical therapy referral.   DVT: Heparin/warfarin  Diet: Cardiac Diet     Code status: Full Code  Emergency contact:   *  "patient LACKS capacity due to inability to express decision and inconsistency in decisions  Surrogate Medical Decision-maker:   Surrogate decision maker is: pt's cousin, Calra Wayne: [phone], Efrain Black: [phone], Keli Osborne: [phone]   Friends who may be helpful in making decisions:  Prior boyfriend Navin Sanches [phone]  Close friend \"Isiah\" Pranay Owen [phone]        Kelle Andrade MD  PGY-1 Neurology  "

## 2024-07-15 LAB
ALBUMIN SERPL BCP-MCNC: 3.1 G/DL (ref 3.4–5)
ANION GAP SERPL CALC-SCNC: 15 MMOL/L (ref 10–20)
BASOPHILS # BLD AUTO: 0.06 X10*3/UL (ref 0–0.1)
BASOPHILS NFR BLD AUTO: 0.5 %
BUN SERPL-MCNC: 14 MG/DL (ref 6–23)
CALCIUM SERPL-MCNC: 9.2 MG/DL (ref 8.6–10.6)
CHLORIDE SERPL-SCNC: 98 MMOL/L (ref 98–107)
CO2 SERPL-SCNC: 25 MMOL/L (ref 21–32)
CREAT SERPL-MCNC: 0.61 MG/DL (ref 0.5–1.05)
EGFRCR SERPLBLD CKD-EPI 2021: >90 ML/MIN/1.73M*2
EOSINOPHIL # BLD AUTO: 0.43 X10*3/UL (ref 0–0.7)
EOSINOPHIL NFR BLD AUTO: 3.9 %
ERYTHROCYTE [DISTWIDTH] IN BLOOD BY AUTOMATED COUNT: 16.6 % (ref 11.5–14.5)
GLUCOSE BLD MANUAL STRIP-MCNC: 101 MG/DL (ref 74–99)
GLUCOSE BLD MANUAL STRIP-MCNC: 104 MG/DL (ref 74–99)
GLUCOSE BLD MANUAL STRIP-MCNC: 119 MG/DL (ref 74–99)
GLUCOSE BLD MANUAL STRIP-MCNC: 140 MG/DL (ref 74–99)
GLUCOSE SERPL-MCNC: 90 MG/DL (ref 74–99)
HCT VFR BLD AUTO: 33.2 % (ref 36–46)
HGB BLD-MCNC: 10 G/DL (ref 12–16)
IMM GRANULOCYTES # BLD AUTO: 0.23 X10*3/UL (ref 0–0.7)
IMM GRANULOCYTES NFR BLD AUTO: 2.1 % (ref 0–0.9)
INR PPP: 1.3 (ref 0.9–1.1)
LYMPHOCYTES # BLD AUTO: 3.01 X10*3/UL (ref 1.2–4.8)
LYMPHOCYTES NFR BLD AUTO: 27.2 %
MAGNESIUM SERPL-MCNC: 1.66 MG/DL (ref 1.6–2.4)
MCH RBC QN AUTO: 29.9 PG (ref 26–34)
MCHC RBC AUTO-ENTMCNC: 30.1 G/DL (ref 32–36)
MCV RBC AUTO: 99 FL (ref 80–100)
MONOCYTES # BLD AUTO: 1.13 X10*3/UL (ref 0.1–1)
MONOCYTES NFR BLD AUTO: 10.2 %
NEUTROPHILS # BLD AUTO: 6.22 X10*3/UL (ref 1.2–7.7)
NEUTROPHILS NFR BLD AUTO: 56.1 %
NRBC BLD-RTO: 0 /100 WBCS (ref 0–0)
PHOSPHATE SERPL-MCNC: 5.1 MG/DL (ref 2.5–4.9)
PLATELET # BLD AUTO: 338 X10*3/UL (ref 150–450)
POTASSIUM SERPL-SCNC: 4.5 MMOL/L (ref 3.5–5.3)
PROTHROMBIN TIME: 14.5 SECONDS (ref 9.8–12.8)
RBC # BLD AUTO: 3.34 X10*6/UL (ref 4–5.2)
SODIUM SERPL-SCNC: 133 MMOL/L (ref 136–145)
UFH PPP CHRO-ACNC: 0.6 IU/ML
WBC # BLD AUTO: 11.1 X10*3/UL (ref 4.4–11.3)

## 2024-07-15 PROCEDURE — 99233 SBSQ HOSP IP/OBS HIGH 50: CPT

## 2024-07-15 PROCEDURE — 85520 HEPARIN ASSAY: CPT | Performed by: STUDENT IN AN ORGANIZED HEALTH CARE EDUCATION/TRAINING PROGRAM

## 2024-07-15 PROCEDURE — 2500000001 HC RX 250 WO HCPCS SELF ADMINISTERED DRUGS (ALT 637 FOR MEDICARE OP)

## 2024-07-15 PROCEDURE — 85025 COMPLETE CBC W/AUTO DIFF WBC: CPT

## 2024-07-15 PROCEDURE — 97116 GAIT TRAINING THERAPY: CPT | Mod: GP,CQ

## 2024-07-15 PROCEDURE — 82947 ASSAY GLUCOSE BLOOD QUANT: CPT

## 2024-07-15 PROCEDURE — 85610 PROTHROMBIN TIME: CPT

## 2024-07-15 PROCEDURE — 80069 RENAL FUNCTION PANEL: CPT

## 2024-07-15 PROCEDURE — 2500000002 HC RX 250 W HCPCS SELF ADMINISTERED DRUGS (ALT 637 FOR MEDICARE OP, ALT 636 FOR OP/ED)

## 2024-07-15 PROCEDURE — 83735 ASSAY OF MAGNESIUM: CPT

## 2024-07-15 PROCEDURE — 36415 COLL VENOUS BLD VENIPUNCTURE: CPT | Performed by: STUDENT IN AN ORGANIZED HEALTH CARE EDUCATION/TRAINING PROGRAM

## 2024-07-15 PROCEDURE — 1200000002 HC GENERAL ROOM WITH TELEMETRY DAILY

## 2024-07-15 PROCEDURE — 97110 THERAPEUTIC EXERCISES: CPT | Mod: GP,CQ

## 2024-07-15 PROCEDURE — 2500000004 HC RX 250 GENERAL PHARMACY W/ HCPCS (ALT 636 FOR OP/ED)

## 2024-07-15 PROCEDURE — 97535 SELF CARE MNGMENT TRAINING: CPT | Mod: GO

## 2024-07-15 ASSESSMENT — PAIN - FUNCTIONAL ASSESSMENT
PAIN_FUNCTIONAL_ASSESSMENT: 0-10

## 2024-07-15 ASSESSMENT — ENCOUNTER SYMPTOMS: DYSURIA: 1

## 2024-07-15 ASSESSMENT — PAIN SCALES - GENERAL
PAINLEVEL_OUTOF10: 6
PAINLEVEL_OUTOF10: 5 - MODERATE PAIN
PAINLEVEL_OUTOF10: 10 - WORST POSSIBLE PAIN
PAINLEVEL_OUTOF10: 6
PAINLEVEL_OUTOF10: 5 - MODERATE PAIN
PAINLEVEL_OUTOF10: 10 - WORST POSSIBLE PAIN
PAINLEVEL_OUTOF10: 6
PAINLEVEL_OUTOF10: 6
PAINLEVEL_OUTOF10: 10 - WORST POSSIBLE PAIN
PAINLEVEL_OUTOF10: 10 - WORST POSSIBLE PAIN
PAINLEVEL_OUTOF10: 9

## 2024-07-15 ASSESSMENT — ACTIVITIES OF DAILY LIVING (ADL)
HOME_MANAGEMENT_TIME_ENTRY: 21

## 2024-07-15 ASSESSMENT — PAIN DESCRIPTION - LOCATION
LOCATION: OTHER (COMMENT)
LOCATION: LEG
LOCATION: LEG
LOCATION: OTHER (COMMENT)

## 2024-07-15 ASSESSMENT — COGNITIVE AND FUNCTIONAL STATUS - GENERAL
CLIMB 3 TO 5 STEPS WITH RAILING: TOTAL
TURNING FROM BACK TO SIDE WHILE IN FLAT BAD: A LITTLE
TURNING FROM BACK TO SIDE WHILE IN FLAT BAD: A LITTLE
PERSONAL GROOMING: A LOT
MOVING FROM LYING ON BACK TO SITTING ON SIDE OF FLAT BED WITH BEDRAILS: A LITTLE
MOVING TO AND FROM BED TO CHAIR: A LITTLE
DRESSING REGULAR UPPER BODY CLOTHING: A LITTLE
MOBILITY SCORE: 15
WALKING IN HOSPITAL ROOM: A LOT
MOVING TO AND FROM BED TO CHAIR: A LITTLE
CLIMB 3 TO 5 STEPS WITH RAILING: TOTAL
WALKING IN HOSPITAL ROOM: A LOT
STANDING UP FROM CHAIR USING ARMS: A LITTLE
DRESSING REGULAR LOWER BODY CLOTHING: A LITTLE
DAILY ACTIVITIY SCORE: 17
MOBILITY SCORE: 16
TOILETING: A LOT
STANDING UP FROM CHAIR USING ARMS: A LITTLE
HELP NEEDED FOR BATHING: A LITTLE

## 2024-07-15 ASSESSMENT — PAIN SCALES - WONG BAKER
WONGBAKER_NUMERICALRESPONSE: HURTS WORST
WONGBAKER_NUMERICALRESPONSE: HURTS WHOLE LOT

## 2024-07-15 ASSESSMENT — PAIN DESCRIPTION - DESCRIPTORS
DESCRIPTORS: SHARP
DESCRIPTORS: SHARP
DESCRIPTORS: ACHING;SHARP
DESCRIPTORS: ACHING

## 2024-07-15 NOTE — PROGRESS NOTES
"Music Therapy Note    Amirah Bennett was referred by     Therapy Session  Referral Type: New referral this admission  Visit Type: New visit  Session Start Time: 1401  Session End Time: 1436  Intervention Delivery: In-person  Conflict of Service: None     Pre-assessment  Pain Score: 5 - Moderate pain  Stress Level (0-10): 0  Anxiety Level (0-10): 0  Mood/Affect: Somatic, Appropriate  Verbalized Emotional State: Frustration         Treatment/Interventions  Areas of Focus: Coping, Normalization  Music Therapy Interventions: Improvisation, Songwriting/composition  Interruption: No  Patient Fell Asleep at End of Session: No    Post-assessment  0-10 (Numeric) Pain Score: 5 - Moderate pain  Stress Level (0-10): 0  Anxiety Level (0-10): 0  Mood/Affect: Appropriate, Tired/lethargic, Calm  Verbalized Emotional State: Relaxed, Acceptance  Continue Visiting: Yes  Total Session Time (min): 35 minutes    Narrative  Assessment Detail: Patient found sitting up in bed. She smiled when MT reminded her of music therapy. Patient interested in playing the piano today. Agreed to a session.  Plan: To engage patient in improvisation on the piano and participate in songwriting to promote self-expression and coping.  Intervention: MT provided opportunity for patient to improvise, choose sounds on the keyboard and play the white keys while MT played the guitar. MT and patient wrote lyrics to a song about a peaceful place she'd rather be and she chose the \"beach\"  Evaluation: Patient participated fully in improvising; closed her eyes, swayed back and forth and appeared to be expressing through the music she was playing. She played an upbeat song and a more relaxing song with MT. She assisted MT in writing lyrics about the beach to a Mateo Lupe song which was chosen by the patient. Patient was moving to the music and presented a big smile and gave MT high five post-intervention. Patient stated she was tired and agreeable to a follow up on a " different day. Patient was in acute pain off and on throughout the session.  Follow-up: MT will continue to follow and provide servies as needed    Education Documentation  No documentation found.

## 2024-07-15 NOTE — PROGRESS NOTES
Occupational Therapy    Occupational Therapy Treatment    Name: Amirah Bennett  MRN: [id number]  : 1980  Date: 07/15/24  Room: [facility]      Time Calculation  Start Time: 1458  Stop Time: 1519  Time Calculation (min): 21 min    Assessment:  OT Assessment: Pt demos improved participation in OT, however continues to demonstrate deficits in self care and functional mobility/transfers resulting in OT now recommending High intensity services prior to d/c home to allow for a safe and funcitonal retrun to prior living enviornment.  Barriers to Discharge: None  Evaluation/Treatment Tolerance: Patient limited by pain  End of Session Communication: Bedside nurse  End of Session Patient Position: Bed, 4 rail up (Pt puts all 4 bed rails up herself prior to OTR leaving room)  Plan:  Treatment Interventions: ADL retraining, Functional transfer training, UE strengthening/ROM, Endurance training, Cognitive reorientation, Patient/family training, Equipment evaluation/education, Neuromuscular reeducation, Compensatory technique education, Fine motor coordination activities  OT Frequency: 3 times per week  OT Discharge Recommendations: High intensity level of continued care  Equipment Recommended upon Discharge:  (TBD)  OT Recommended Transfer Status: Assist of 2  OT - OK to Discharge: Yes    Subjective   General:  OT Last Visit  OT Received On: 07/15/24  Reason for Referral: 44 y/o female initially presented with cardiogenic shock; Course c/b SARAH, cardiac thrombi, and Afib with RVR. Now s/p R AKA on .  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 RN cleared pt for OT.   Precautions:  LE Weight Bearing Status: Right Non-Weight Bearing  Medical Precautions: Cardiac precautions, Fall precautions    Lines/Tubes/Drains:  External Urinary Catheter Female (Active)   Number of days: 1       Cognition:  Overall Cognitive Status: Within Functional Limits  Arousal/Alertness: Appropriate responses to stimuli  Orientation Level: Oriented X4    Pain Assessment:  Pain Assessment  Pain Assessment: 0-10  0-10 (Numeric) Pain Score: 10 - Worst possible pain  Pain Type: Acute pain  Pain Location: Leg  Pain Orientation: Right  Pain Interventions: Other (Comment) (RN aware, pain medication provided at begining of session)     Objective   Activities of Daily Living:      Grooming  Grooming Level of Assistance: Minimum assistance  Grooming Where Assessed: Other (Comment) (Standing to tray table)  Grooming Comments: Pt completes oral hygiene while standing w/ FWW (1 hand supported). Pt requires s/u and assist w/ opening toothpaste and holding spit basen. Min A required for standing balance while completeing task. Good throughness noted. Pt states \"thank you\" following.      UE Dressing  UE Dressing Level of Assistance: Minimum assistance  UE Dressing Where Assessed: Edge of bed  UE Dressing Comments: Assist for untying/tying gown and buttoning L sleve d/t IV. Anticipte s/u w/ own clothing without lines      Functional Standing Tolerance:  Functional Standing Tolerance  Time: ~3 min  Activity: Oral hygiene  Functional Standing Tolerance Comments: Min A for balance w/ FWW  Functional Mobility  Functional Mobility Performed: Yes  Functional Mobility 1  Surface 1: Level tile  Device 1: Rolling walker  Assistance 1: Minimum assistance  Comments 1: Side hopping towards HOB (~2 ft)  Bed " Mobility/Transfers:     Bed Mobility  Bed Mobility: Yes  Bed Mobility 1  Bed Mobility 1: Supine to sitting  Level of Assistance 1: Close supervision  Bed Mobility Comments 1: HOB elevated  Bed Mobility 2  Bed Mobility  2: Sitting to supine  Level of Assistance 2: Close supervision  Bed Mobility 3  Bed Mobility 3: Scooting  Level of Assistance 3: Close supervision  Bed Mobility Comments 3: Scooting self towards HOB    Transfers  Transfer: Yes  Transfer 1  Transfer From 1: Sit to  Transfer to 1: Stand  Technique 1: Sit to stand  Transfer Device 1: Walker  Transfer Level of Assistance 1: Minimum assistance (Min Ax1 & CGAx1)  Trials/Comments 1: Verbal cues required for hand placement  Transfers 2  Transfer From 2: Stand to  Transfer to 2: Sit  Technique 2: Stand to sit  Transfer Device 2: Walker  Transfer Level of Assistance 2: Contact guard  Trials/Comments 2: Verbal cues for hand placement and sequencing of task    Balance:  Dynamic Standing Balance  Dynamic Standing-Balance Support: Left upper extremity supported  Dynamic Standing-Comments: Min A while completing oral hygiene  Static Sitting Balance  Static Sitting-Balance Support: No upper extremity supported  Static Sitting-Level of Assistance: Distant supervision  Static Sitting-Comment/Number of Minutes: ~10 min      Education Documentation  No documentation found.  Education Comments  No comments found.        Goals:  Encounter Problems       Encounter Problems (Active)       ADLs       Patient will complete lower body dressing with MIN A  for donning and doffing all LE clothes in order to increase Indep with task participation.  (Met)       Start:  07/03/24    Expected End:  07/24/24    Resolved:  07/15/24    Updated to: Patient will complete lower body dressing with S/U  for donning and doffing all LE clothes in order to increase Indep with task participation.    Update reason: goal met         Patient will complete toileting, including clothing management and  hygiene, with MIN A in order to maximize functional Indep with task completion.  (Progressing)       Start:  07/03/24    Expected End:  07/24/24            Patient will complete lower body dressing with S/U  for donning and doffing all LE clothes in order to increase Indep with task participation. (Progressing)       Start:  07/15/24    Expected End:  07/24/24                   BALANCE       Pt will increase static/dynamic sit/stand to Good to increase safety and indep with functional task completion.   (Progressing)       Start:  07/03/24    Expected End:  07/24/24               COGNITION/SAFETY       Pt will demo good safety awareness with no more than min vc during functional task completion. (Progressing)       Start:  07/03/24    Expected End:  07/24/24               EXERCISE/STRENGTHENING       Pt will increase overall LUE strength to 4+/5 in order to increase functional task participation.  (Progressing)       Start:  07/03/24    Expected End:  07/24/24            Pt will demo increased LUE fine motor/bimanual coordination in order to achieve MOD I with functional task completion.  (Progressing)       Start:  07/03/24    Expected End:  07/24/24               TRANSFERS       Patient will complete functional transfers using least restrictive device with  CGA  in order to maximize functional potential and increase safety.  (Progressing)       Start:  07/03/24    Expected End:  07/24/24                     07/15/24 at 3:47 PM   JULIANA ORTEGA, OT   [phone]

## 2024-07-15 NOTE — PROGRESS NOTES
Physical Therapy    Physical Therapy    Physical Therapy Treatment    Patient Name: Amirah Bennett  MRN: [id number]  Today's Date: 7/15/2024  Time Calculation  Start Time: 0945  Stop Time: 1015  Time Calculation (min): 30 min       Assessment/Plan   PT Assessment  PT Assessment Results: Decreased strength, Decreased range of motion, Decreased endurance, Impaired balance, Decreased mobility, Pain, Decreased cognition, Decreased safety awareness  Rehab Prognosis: Excellent  End of Session Communication: Bedside nurse  Assessment Comment: Patient was able to perform multiple transfers with FWW and complete static standing with FWW for 2 mins as well as completing seated ther-ex's -Patient did decline to attempt ambulation on this date.Patient remains appropriate for continued therapy upon discharge at a moderate intensity level to focus on improved functional mobility, balance and activity tolerance.  End of Session Patient Position: Up in chair, Alarm on  PT Plan  Inpatient/Swing Bed or Outpatient: Inpatient  PT Plan  Treatment/Interventions: Bed mobility, Transfer training, Gait training, Balance training, Strengthening, Endurance training, Range of motion, Therapeutic exercise, Therapeutic activity, Home exercise program, Positioning  PT Plan: Ongoing PT  PT Frequency: 5 times per week  PT Discharge Recommendations: High intensity level of continued care  PT Recommended Transfer Status: Assist x2  PT - OK to Discharge: Yes      General Visit Information:   PT  Visit  PT Received On: 07/15/24  Reason for Referral: 44 y/o female initially presented with cardiogenic shock; Course c/b SARAH, cardiac thrombi, and Afib with RVR. Now s/p R AKA on 6/28.  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 Patient pleasant and agreeable to therapy session.     Subjective   Precautions:  Precautions  LE Weight Bearing Status: Right Non-Weight Bearing  Medical Precautions:  (AKA)    Objective   Pain:  Pain Assessment  Pain Assessment: 0-10  0-10 (Numeric) Pain Score: 5 - Moderate pain  Pain Type: Acute pain  Pain Location: Leg  Pain Orientation: Right  Pain Descriptors: Aching  Cognition:  Cognition  Overall Cognitive Status: Within Functional Limits  Arousal/Alertness: Appropriate responses to stimuli  Orientation Level: Oriented X4  Following Commands: Follows one step commands without difficulty  Lines/Tubes/Drains:  External Urinary Catheter Female (Active)   Number of days: 1     PT Treatments:  Therapeutic Exercise  Therapeutic Exercise Performed: Yes  Therapeutic Exercise Activity 1: R LE ABD/ADD, Hip flex x10 each  Therapeutic Exercise Activity 2: Seated L LE: ankle pumps, LAQ, marching, HS, HIP ABD/ADD, GS x10 each  Therapeutic Activity  Therapeutic Activity Performed: Yes  Therapeutic Activity 1: Pt stood statically with FWW and MIN A x1 for 2 minutes-required cuing to maintain upright posture     Bed Mobility  Bed Mobility: Yes  Bed Mobility 1  Bed Mobility 1: Supine to sitting  Level of Assistance 1: Close supervision  Bed Mobility Comments 1: HOB elevated  Ambulation/Gait Training  Ambulation/Gait Training Performed: No (Patient declined to attempt ambulation on this date)  Transfers  Transfer: Yes  Transfer 1  Transfer From 1: Bed to  Transfer to 1: Chair with arms  Technique 1: Stand pivot  Transfer Device 1: Walker  Transfer Level of Assistance 1: Minimum assistance, Moderate verbal cues, Moderate tactile cues  Trials/Comments 1:  Patient required cuing for hand placement and reminders to not pull up from FWW  Transfers 2  Transfer From 2: Chair with arms to  Transfer to 2: Stand  Transfer Device 2: Walker  Transfer Level of Assistance 2: Minimum assistance, Moderate tactile cues, Moderate verbal cues  Trials/Comments 2: x3 (Patient required cuing for hand placement as well as sequencing.)  Stairs  Stairs: No        Outcome Measures:  [facility] Basic Mobility  Turning from your back to your side while in a flat bed without using bedrails: A little  Moving from lying on your back to sitting on the side of a flat bed without using bedrails: A little  Moving to and from bed to chair (including a wheelchair): A little  Standing up from a chair using your arms (e.g. wheelchair or bedside chair): A little  To walk in hospital room: A lot  Climbing 3-5 steps with railing: Total  Basic Mobility - Total Score: 15       Encounter Problems       Encounter Problems (Active)       Balance       Patient will complete static and dynamic sitting balance tasks with SUP to improve upright posture, core activation, and proximal stability.  (Progressing)       Start:  07/03/24    Expected End:  07/17/24            Patient to demo static standing with unilateral UE support, performing single UE task with SBA, no sway or LOB x 2 mins for functional carryover  (Progressing)       Start:  07/03/24    Expected End:  07/17/24               Mobility       STG - Patient will ambulate >/= 15 ft with minAx1 and LRAD, no acute LOB (Progressing)       Start:  07/03/24    Expected End:  07/17/24               PT Transfers       STG - Patient will perform bed mobility Ruth (Progressing)       Start:  07/03/24    Expected End:  07/17/24            STG - Patient will transfer sit to and from stand with CGAx1 and LRAD (Progressing)       Start:  07/03/24    Expected End:  07/17/24               Pain - Adult                07/15/24 at 10:45 AM   Desi Turcios PTA   Rehab Office:  [phone]

## 2024-07-15 NOTE — PROGRESS NOTES
07/15/24        Transitional Care Coordination Progress Note: ]  Patient discussed during interdisciplinary rounds.   Team members present: RN TCC JANICE [facility]   Plan per Medical/Surgical team: Hep Warfarin Bridge patient not medically heady for discharge   Discharge disposition: [facility]   Status-Inpatient   Payer-  [facility]   Potential Barriers: None   ADOD: 7-   Colton Vaca RN [facility] [phone]

## 2024-07-15 NOTE — PROGRESS NOTES
Daily Progress Note    Amirah Bennett is a 43 y.o. female admitted on 6/20/2024  6:40 AM with Cardiogenic shock.     Overnight:  No acute events reported.    Subjective   Patient seen and examined at bedside.  Patient states that she is doing well overall.  Per nursing staff, patient has continued to have some minor bleeding and oozing from the right AKA site.  Patient has otherwise been eating and drinking well, having bowel movements.  She continues to report some dysuria consistent with previous days.    Objective   Vitals: I/O:   Vitals:    07/15/24 1119   BP: 98/71   Pulse: 70   Resp: 18   Temp: 36.2 °C (97.2 °F)   SpO2: 99%        24hr Min/Max:  Temp  Min: 36 °C (96.8 °F)  Max: 36.5 °C (97.7 °F)  Pulse  Min: 63  Max: 75  BP  Min: 91/55  Max: 106/66  Resp  Min: 16  Max: 18  SpO2  Min: 97 %  Max: 100 %   Intake/Output Summary (Last 24 hours) at 7/15/2024 1315  Last data filed at 7/15/2024 0900  Gross per 24 hour   Intake 1362 ml   Output 1100 ml   Net 262 ml        Net IO Since Admission: -43,647.71 mL [07/15/24 1315]      PE:  -Patient was conscious, oriented x4, with expressive aphasia. Not in acute respiratory distress, not in jaundiced nor cyanosed. AKA dressing is present with some dried blood.   -Cardiovascular examination: Audible 1st and 2nd heart sound, no murmurs  -Lower limb examination: amputated right above knee amputation is edematous, has clean dressing. No pitting edema or signs of deep vein thrombosis.    -Chest examination: Lungs clear to auscultation bilaterally.  -Abdominal examination: Soft and lax abdomen, no rigidity nor rebound tenderness.   -Neurological examination: limited, symmetrical facial impression, equally reactive pupils, right hand weakness.          Labs:  CBC RFP   Lab Results   Component Value Date    WBC 11.1 07/15/2024    HGB 10.0 (L) 07/15/2024    HCT 33.2 (L) 07/15/2024    MCV 99 07/15/2024     07/15/2024    NEUTROABS 6.22 07/15/2024    Lab Results    Component Value Date     (L) 07/15/2024    K 4.5 07/15/2024    CL 98 07/15/2024    CO2 25 07/15/2024    BUN 14 07/15/2024    CREATININE 0.61 07/15/2024    CREATININE 0.58 07/14/2024     Lab Results   Component Value Date    MG 1.66 07/15/2024    PHOS 5.1 (H) 07/15/2024    CALCIUM 9.2 07/15/2024         Hepatic Function ABG/VBG   Lab Results   Component Value Date    ALT 20 07/10/2024    AST 20 07/10/2024    ALKPHOS 70 07/10/2024     Lab Results   Component Value Date    BILIDIR 0.7 (H) 07/10/2024      Lab Results   Component Value Date    PROTIME 14.5 (H) 07/15/2024    APTT 88 (H) 07/10/2024    INR 1.3 (H) 07/15/2024    Lab Results   Component Value Date    LACTATE 0.6 06/26/2024        Results from last 7 days   Lab Units 07/15/24  1306 07/15/24  0726 07/15/24  0648 07/14/24  2121 07/14/24  1723 07/14/24  1252 07/14/24  1121 07/13/24  1209 07/13/24  0923 07/12/24  1201 07/12/24  0743 07/11/24  0642 07/11/24  0616   POCT GLUCOSE mg/dL 140*  --  104* 127* 97 122*  --    < >  --    < >  --    < >  --    GLUCOSE mg/dL  --  90  --   --   --   --  112*  --  85  --  90  --  98    < > = values in this interval not displayed.       Imaging:  Imaging:   Vascular US Lower Extremity Arterial Duplex 06/23/2024  1. Echogenic material within the right distal femoral and popliteal  arteries with  absent Doppler flow in the right popliteal artery  significantly diminished flow within the right distal superficial  femoral artery. Findings raise concern for thrombosis of the distal  SFA and popliteal artery. Decreased flow within the right posterior  tibial and peroneal arteries could be through collateralization.  2. Decreased flow within the right common femoral artery, right deep  femoral artery as well as the proximal and mid superficial femoral  arteries, raising concern for stenosis proximal to the right common  femoral artery, possibly within the right external iliac right common  iliac artery. A CTA of the lower  extremities can be obtained for  further evaluation.  3. Unremarkable Doppler interrogation of the left lower extremity  arteries.     Cardiac:  Transthoracic Echo (TTE) Complete With Contrast : 06/22/2024   1. Left ventricular ejection fraction is severely decreased, by visual estimate at 20-25%.   2. There is global hypokinesis of the left ventricle with minor regional variations.   3. Left ventricular diastolic filling was indeterminate.   4. Mildly enlarged right ventricle.   5. There is mildly reduced right ventricular systolic function.   6. The left atrium is severely dilated.   7. Moderate to severe tricuspid regurgitation visualized.   8. Moderately elevated right ventricular systolic pressure.   9. TR has worsened.        Medications:  Scheduled Medications:  PRN Medications:    amiodarone, 200 mg, oral, Daily  amoxicillin-pot clavulanate, 1 tablet, oral, q12h TELMA  aspirin, 81 mg, oral, Daily  atorvastatin, 80 mg, oral, Nightly  bisacodyl, 10 mg, rectal, Daily  DULoxetine, 30 mg, oral, Daily  [Held by provider] empagliflozin, 10 mg, oral, Daily  melatonin, 6 mg, oral, Daily  metoprolol succinate XL, 25 mg, oral, Nightly  pantoprazole, 40 mg, oral, Daily before breakfast  perflutren protein A microsphere, 0.5 mL, intravenous, Once in imaging  polyethylene glycol, 17 g, oral, BID  pregabalin, 75 mg, oral, Nightly  sacubitriL-valsartan, 2 tablet, oral, BID  sennosides, 2 tablet, oral, BID  warfarin, 5 mg, oral, Daily     PRN medications: dextrose, dextrose, glucagon, glucagon, heparin, HYDROmorphone, lidocaine, oxyCODONE     Assessment    Hospital Course  On 20th June, Patient presented with dyspnea and leg swelling, with elevated lactate to 14.7, cold extremities, and 3+ pitting edema suggesting cardiogenic shock.  She underwent RHC with CI of 1.6, CVP of 25, requiring inotropes initiation with difficult weaning off trials, on the following day patient had worsened SvO2 from 65 to 25 with rapid lactate  increase following acute abdominal pain and right leg pain concerning for limb ischemia, and rhabdomyolysis with CK of 14,000, with limited IV hydrations due to ongoing cardiogenic shock.    Arterial doppler of lower extremities reported absent flow in the right popliteal artery with significantly diminished flow within the right distal superficial femoral artery, concerning for thrombosis. Additionally decreased flow in right common femoral artery, right deep femoral artery concerning for stenosis proximal to right common femoral artery.    Vascular medicine recommended IVC filter insertion on 6/27 in preparation for right AKA 6/28. Which was complicated by 3 days persistent stump site oozing & intramuscular hematoma evident on CTA RLE, which was managed conservatively with 6 blood transfusions while on heparin infusion necessitation to hold heparin drip temporarily for 12 hours to allow hemostasis. On the following day patient had similar stump bleeding with Hgb 6.9 requiring an additional 1pRBC incrementing hemoglobin to 8.1. hence Heparin was restarted 12 hours later at 1:30 Am 7/5 without signs of bleeding & stable Hemoglobin 8.9.     On 6th July, patient was hemodynamically stable and transferred to cardiology floor, was kept on heparin infusion with hemoglobin monitoring, as patient's stump headed well without gross bleeding, nor hemoglobin drop, Vascular medicine recommended anticoagulation bridging with warfarin on 9th July. Patient had some bloody urine output in dhillon on 07/11, urine culture was positive and started on ceftriaxone, hold on jardiance and removed dhillon.     Patient was labeled is not candidate for advanced therapies given documented medical non adherence. Managed medically.     Given the lack of documentation POA and limited family (NOK are cousins). ICU team involved Ethics & Palliative care.     Assessment/Plan:   Amirah Bennett is a 43 y.o. 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 She was found to have elevated lactate to 14.7, cold extremities, and 3+ pitting edema. RHC c/w cardiogenic shock c/b SARAH, cardiac thrombi, and Right limb ischemia S/P above knee amputation. She was initially started on heparin, now bridging with warfarin with a goal of INR 2-3.  Patient remains below goal 1.2 today.  Additionally, found to have UTI starting 07/11, she had a dhillon to prevent contamination of new AKA site, but dhillon was removed upon UTI discovery.  Now on Augmentin, dysuria appears to be improving.     Heart failure with reduced ejection fraction:   -HFrEF (LVEF 20-25%)  -Patient was labeled not candidate for advanced therapies due to medical nonadherence  Plan:  -palliative care follow up.  -Aim to resume quadruple GDMT (spironolactone). Patient on metoprolol succinate, Entresto  -Hold jardiance for now until completing Augmentin course from UTI      Right limb ischemia secondary to Arterial emboli, S/P above right knee amputation:  #S/p femoral arterial line sheath placement now removed  -CTA Aorta with runoff 6/24: aortic bifurcation embolus, R BA-EIA embolus, SFA and popliteal emboli   -arterial duplex exam with monophasic right CFA, SFA and PFA signals, absent popliteal flow and monophasic flow in the tibial artery   -Patient labeled Unable to walk prior to presentation to hospital  -s/p Above knee amputation with vascular surgery, 6/28/2024.   Plan:  -Warfarin with heparin bridge goal INR 2-3  -Started warfarin 5mg 07/09 (received 3 doses 07/09-07/11) with missed dose on 07/12, restarted on  07/13  -Patient continues to bleed and oozing from right AKA stump.  Vascular surgery contacted.  Will follow-up recommendations regarding appropriate dressing for the wound.  -pain regimen: oxycodone 10mg severe pain 4hr, PRN dilaudid 0.2mg q 4 hr breakthrough,  before and after dressing changes.   -Continue duloxetine. Continue pregabalin for phantom limb pain      Query Cardiac thrombi:  -Likely in setting of Atrial fibrillation with Rivaroxaban no adherence  -There are indeterminate low-attenuation nodular filling defects within the atrial appendage.   -Right lower extremity DVT, and arterial thrombus/p IVC filter 6/27.  -Vascular medicine recommended Warfarin with Target INR 2-3, for a minimum of 5 days.  Plan:  -Started warfarin with target INR 2-3   - Cont. Heparin drip (Bridging therapy).   -Vascular medicine    -Outpatient Anticoagulation Clinic set up at [facility] [place].     Atrial fibrillation previously on rivaroxaban  -S/p DCCV 05/2023), ischemic stroke L MCA d/t AFib LLA thrombus seen on echo (lack of OAC adherence) s/p thrombectomy 01/2022 @ [facility] w/ residual expressive aphasia and R sided weakness   -Previously prescribed digoxin 250mcg however last fill was 12/2023  -TSH 6.28H, free T4 1.48  Plan:  -Continue amiodarone 200mg daily  - Continue aspirin 81mg     Direct Hyperbilirubinemia, Likely following ischemic liver injury:  -Patient denied abdominal pain.   Tbili (3.2 on admission, from 0.5 in 03/2024)  ::RUQ US 7/1 without abnormality beyond hepatic steatosis and liver cyst  Plan:   Follow Liver enzymes.      Hx trach c/b trancutaneous fistula  -ENT had been consulted 3/2024 for gurgling and mucus drainage, no inpatient interventions required  -monitor for breaths/mucus discharge from trach site  -Cover the tracheocutaneous fistula with tape and gauze and encourage patient to apply pressure when voicing.   -follow up outpatient ENT for closure (Dr. King)      Anemia:  ::had 3 units of RBC transfused 6/30,  currently controlled bleeding, increeminting gradually.   ::CTA C/A/P 6/30 without evidence of active hemorrhage within chest/abdomen/pelvis  ::CTA RLE 7/1 without a source that could be intervened upon  Plan:  - CBC daily, as stable Hb.   -Hgb goal >7  given cardiac hx     UTI:  ::small amount of blood in urine 07/11 am  - UA turbid brown with blood, glucose, protein and LE 25, nitrite negative   -urine culture positive, had 2 days of ceftriaxone then cultures were positive for Klebsiella so abx de-escalated to Augmentin, will treat with 10 day course (07/14-07/23) and hold on empagliflozin    Left brachial injury  -LUE DVT scan with nonvascularized mass, called CT while patient was there to request CTA of left upper extremity and they said logistically they would be unable to perform both scans, and given protocol for max dosing of contrast, could not give further contrast until 6/25 at 2PM  -no CTA LUE needed        Insomnia  -Continue melatonin 6mg nightly plus additional 6mg PRN for sleep     Depression   - Continue duloxetine      Resolved:   -Thrombocytopenia, likely attributed to: previous consumptive coagulopathy/sepsis and bleeding, Multiple thrombi, DIC score 5 on 6/24/2024, Hit score of 6, Negative PF4  -Rhabdomyolysis.  -Metabolic acidosis, Lactic acidosis.   -Acute kidney injury.   -stump infection, treated for 7 days, s/p vanc (6/20-6/24) (6/27-7/2 ) and zosyn (6/20-6/24), (6/27- p)-restarted vanc/zosyn 6/27 due to elevated leukocytosis and purulent appearing right groin site     Disposition Plan:  -patient would like enrollment in  home delivery service for medications (she has trouble getting to preferred pharmacy), pharmacy updated to [facility]  -patient's family would like her enrolled in [facility]  -repeat thyroid labs outpatient  -IVC filter removal within 3 months  [ ] Titrate GDMT as able. Currently on entresto 24/26, metoprolol, holding jardiance  [ ] Follow up further pall care recs. Patient  "remains full code. There is ongoing [facility] discussion  [ ] Needs to follow up OP with ENT for trach c/b trancutaneous fistula. Dr. King for closure.     Prevention:   Fall: High.   Mobility: Physical therapy referral.   DVT: Heparin/warfarin bridge  Diet: Cardiac Diet     Code status: Full Code  Emergency contact:   * patient LACKS capacity due to inability to express decision and inconsistency in decisions  Surrogate Medical Decision-maker:   Surrogate decision maker is: pt's cousin, Clara Wayne: [phone], Efrain Black: [phone], Keli Osborne: [phone]   Friends who may be helpful in making decisions:  Prior boyfriend Navin Sanches [phone]  Close friend \"Isiah\" Pranay Owen [phone]        Ayaan Rowland MD  PGY-1 Internal Medicine  "

## 2024-07-15 NOTE — CARE PLAN
The patient's goals for the shift include  patient will have decrease pain during the shift    The clinical goals for the shift include patient will remain safe during the shift    Over the shift, the patient did not make progress toward the following goals. Barriers to progression include patient still having high level of pain. Recommendations to address these barriers include continue to monitor.

## 2024-07-16 ENCOUNTER — ANESTHESIA EVENT (OUTPATIENT)
Dept: OPERATING ROOM | Facility: HOSPITAL | Age: 44
End: 2024-07-16
Payer: COMMERCIAL

## 2024-07-16 ENCOUNTER — APPOINTMENT (OUTPATIENT)
Dept: NEUROLOGY | Facility: HOSPITAL | Age: 44
End: 2024-07-16
Payer: COMMERCIAL

## 2024-07-16 PROBLEM — T81.30XA WOUND DEHISCENCE: Status: ACTIVE | Noted: 2024-06-20

## 2024-07-16 LAB
ABO GROUP (TYPE) IN BLOOD: NORMAL
ALBUMIN SERPL BCP-MCNC: 3 G/DL (ref 3.4–5)
ANION GAP SERPL CALC-SCNC: 14 MMOL/L (ref 10–20)
ANTIBODY SCREEN: NORMAL
BASOPHILS # BLD AUTO: 0.06 X10*3/UL (ref 0–0.1)
BASOPHILS NFR BLD AUTO: 0.6 %
BUN SERPL-MCNC: 14 MG/DL (ref 6–23)
CALCIUM SERPL-MCNC: 9.1 MG/DL (ref 8.6–10.6)
CHLORIDE SERPL-SCNC: 99 MMOL/L (ref 98–107)
CO2 SERPL-SCNC: 26 MMOL/L (ref 21–32)
CREAT SERPL-MCNC: 0.57 MG/DL (ref 0.5–1.05)
EGFRCR SERPLBLD CKD-EPI 2021: >90 ML/MIN/1.73M*2
EOSINOPHIL # BLD AUTO: 0.33 X10*3/UL (ref 0–0.7)
EOSINOPHIL NFR BLD AUTO: 3.5 %
ERYTHROCYTE [DISTWIDTH] IN BLOOD BY AUTOMATED COUNT: 16.6 % (ref 11.5–14.5)
GLUCOSE BLD MANUAL STRIP-MCNC: 105 MG/DL (ref 74–99)
GLUCOSE BLD MANUAL STRIP-MCNC: 116 MG/DL (ref 74–99)
GLUCOSE BLD MANUAL STRIP-MCNC: 127 MG/DL (ref 74–99)
GLUCOSE BLD MANUAL STRIP-MCNC: 140 MG/DL (ref 74–99)
GLUCOSE SERPL-MCNC: 88 MG/DL (ref 74–99)
HCT VFR BLD AUTO: 31.2 % (ref 36–46)
HGB BLD-MCNC: 9.6 G/DL (ref 12–16)
IMM GRANULOCYTES # BLD AUTO: 0.21 X10*3/UL (ref 0–0.7)
IMM GRANULOCYTES NFR BLD AUTO: 2.2 % (ref 0–0.9)
INR PPP: 1.5 (ref 0.9–1.1)
LYMPHOCYTES # BLD AUTO: 2.24 X10*3/UL (ref 1.2–4.8)
LYMPHOCYTES NFR BLD AUTO: 23.5 %
MAGNESIUM SERPL-MCNC: 1.61 MG/DL (ref 1.6–2.4)
MCH RBC QN AUTO: 30.7 PG (ref 26–34)
MCHC RBC AUTO-ENTMCNC: 30.8 G/DL (ref 32–36)
MCV RBC AUTO: 100 FL (ref 80–100)
MONOCYTES # BLD AUTO: 1.14 X10*3/UL (ref 0.1–1)
MONOCYTES NFR BLD AUTO: 12 %
NEUTROPHILS # BLD AUTO: 5.54 X10*3/UL (ref 1.2–7.7)
NEUTROPHILS NFR BLD AUTO: 58.2 %
NRBC BLD-RTO: 0 /100 WBCS (ref 0–0)
PHOSPHATE SERPL-MCNC: 4.8 MG/DL (ref 2.5–4.9)
PLATELET # BLD AUTO: 277 X10*3/UL (ref 150–450)
POTASSIUM SERPL-SCNC: 4.7 MMOL/L (ref 3.5–5.3)
PROTHROMBIN TIME: 17.4 SECONDS (ref 9.8–12.8)
RBC # BLD AUTO: 3.13 X10*6/UL (ref 4–5.2)
RH FACTOR (ANTIGEN D): NORMAL
SODIUM SERPL-SCNC: 134 MMOL/L (ref 136–145)
UFH PPP CHRO-ACNC: 0.5 IU/ML
UFH PPP CHRO-ACNC: 0.6 IU/ML
UFH PPP CHRO-ACNC: 0.8 IU/ML
WBC # BLD AUTO: 9.5 X10*3/UL (ref 4.4–11.3)

## 2024-07-16 PROCEDURE — 86901 BLOOD TYPING SEROLOGIC RH(D): CPT

## 2024-07-16 PROCEDURE — 2500000002 HC RX 250 W HCPCS SELF ADMINISTERED DRUGS (ALT 637 FOR MEDICARE OP, ALT 636 FOR OP/ED)

## 2024-07-16 PROCEDURE — 2500000001 HC RX 250 WO HCPCS SELF ADMINISTERED DRUGS (ALT 637 FOR MEDICARE OP)

## 2024-07-16 PROCEDURE — 80069 RENAL FUNCTION PANEL: CPT

## 2024-07-16 PROCEDURE — 85025 COMPLETE CBC W/AUTO DIFF WBC: CPT

## 2024-07-16 PROCEDURE — 85520 HEPARIN ASSAY: CPT

## 2024-07-16 PROCEDURE — 85610 PROTHROMBIN TIME: CPT

## 2024-07-16 PROCEDURE — 1200000002 HC GENERAL ROOM WITH TELEMETRY DAILY

## 2024-07-16 PROCEDURE — 86850 RBC ANTIBODY SCREEN: CPT

## 2024-07-16 PROCEDURE — 36415 COLL VENOUS BLD VENIPUNCTURE: CPT

## 2024-07-16 PROCEDURE — 2500000004 HC RX 250 GENERAL PHARMACY W/ HCPCS (ALT 636 FOR OP/ED)

## 2024-07-16 PROCEDURE — 83735 ASSAY OF MAGNESIUM: CPT

## 2024-07-16 PROCEDURE — 99233 SBSQ HOSP IP/OBS HIGH 50: CPT

## 2024-07-16 PROCEDURE — 82947 ASSAY GLUCOSE BLOOD QUANT: CPT

## 2024-07-16 RX ORDER — OXYCODONE HYDROCHLORIDE 5 MG/1
10 TABLET ORAL EVERY 6 HOURS PRN
Status: DISCONTINUED | OUTPATIENT
Start: 2024-07-16 | End: 2024-07-22

## 2024-07-16 RX ORDER — ACETAMINOPHEN 325 MG/1
975 TABLET ORAL EVERY 6 HOURS
Status: DISCONTINUED | OUTPATIENT
Start: 2024-07-16 | End: 2024-07-24 | Stop reason: HOSPADM

## 2024-07-16 RX ORDER — HYDROMORPHONE HYDROCHLORIDE 1 MG/ML
0.2 INJECTION, SOLUTION INTRAMUSCULAR; INTRAVENOUS; SUBCUTANEOUS ONCE
Status: COMPLETED | OUTPATIENT
Start: 2024-07-16 | End: 2024-07-16

## 2024-07-16 ASSESSMENT — COGNITIVE AND FUNCTIONAL STATUS - GENERAL
MOVING TO AND FROM BED TO CHAIR: A LOT
TOILETING: A LOT
DAILY ACTIVITIY SCORE: 17
TURNING FROM BACK TO SIDE WHILE IN FLAT BAD: A LOT
WALKING IN HOSPITAL ROOM: TOTAL
DRESSING REGULAR LOWER BODY CLOTHING: A LOT
MOBILITY SCORE: 11
CLIMB 3 TO 5 STEPS WITH RAILING: TOTAL
STANDING UP FROM CHAIR USING ARMS: A LOT
DRESSING REGULAR UPPER BODY CLOTHING: A LITTLE
MOVING FROM LYING ON BACK TO SITTING ON SIDE OF FLAT BED WITH BEDRAILS: A LITTLE
HELP NEEDED FOR BATHING: A LOT

## 2024-07-16 ASSESSMENT — PAIN SCALES - GENERAL
PAINLEVEL_OUTOF10: 9
PAINLEVEL_OUTOF10: 0 - NO PAIN
PAINLEVEL_OUTOF10: 0 - NO PAIN
PAINLEVEL_OUTOF10: 10 - WORST POSSIBLE PAIN
PAINLEVEL_OUTOF10: 10 - WORST POSSIBLE PAIN
PAINLEVEL_OUTOF10: 0 - NO PAIN
PAINLEVEL_OUTOF10: 9

## 2024-07-16 ASSESSMENT — PAIN DESCRIPTION - LOCATION
LOCATION: LEG
LOCATION: LEG

## 2024-07-16 ASSESSMENT — PAIN - FUNCTIONAL ASSESSMENT
PAIN_FUNCTIONAL_ASSESSMENT: 0-10

## 2024-07-16 ASSESSMENT — PAIN SCALES - WONG BAKER: WONGBAKER_NUMERICALRESPONSE: NO HURT

## 2024-07-16 ASSESSMENT — PAIN DESCRIPTION - ORIENTATION
ORIENTATION: RIGHT

## 2024-07-16 NOTE — CARE PLAN
The patient's goals for the shift include  less pain     The clinical goals for the shift include pain management    -1540 [facility] team and vascular surgery paged about increased pain in patient's R stump. Vascular team bedside to assess right stump. See orders     Problem: Skin  Goal: Decreased wound size/increased tissue granulation at next dressing change  7/16/2024 1731 by Wendy Pizano RN  Outcome: Progressing  7/16/2024 1201 by Wendy Pizano RN  Flowsheets (Taken 7/7/2024 1930 by Jennifer Iverson RN)  Decreased wound size/increased tissue granulation at next dressing change: Promote sleep for wound healing  Goal: Participates in plan/prevention/treatment measures  7/16/2024 1731 by Wendy Pizano RN  Outcome: Progressing  7/16/2024 1201 by Wendy Pizano RN  Flowsheets (Taken 7/4/2024 0334 by Lavern Valenzuela RN)  Participates in plan/prevention/treatment measures:   Elevate heels   Increase activity/out of bed for meals   Discuss with provider PT/OT consult  Goal: Prevent/manage excess moisture  7/16/2024 1731 by Wendy Pizano RN  Outcome: Progressing  7/16/2024 1201 by Wendy Pizano RN  Flowsheets (Taken 7/16/2024 1201)  Prevent/manage excess moisture: Cleanse incontinence/protect with barrier cream  Goal: Prevent/minimize sheer/friction injuries  7/16/2024 1731 by Wendy Pizano RN  Outcome: Progressing  7/16/2024 1201 by Wendy Pizano RN  Flowsheets (Taken 7/16/2024 1201)  Prevent/minimize sheer/friction injuries: HOB 30 degrees or less  Goal: Promote/optimize nutrition  7/16/2024 1731 by Wendy Pizano RN  Outcome: Progressing  7/16/2024 1201 by Wendy Pizano RN  Flowsheets (Taken 7/16/2024 1201)  Promote/optimize nutrition: Assist with feeding  Goal: Promote skin healing  7/16/2024 1731 by Wendy Pizano RN  Outcome: Progressing  7/16/2024 1201 by Wendy Pizano RN  Flowsheets (Taken 7/16/2024 1201)  Promote skin healing:   Protective dressings over bony prominences   Assess skin/pad under  line(s)/device(s)     Problem: Pain - Adult  Goal: Verbalizes/displays adequate comfort level or baseline comfort level  Outcome: Progressing     Problem: Safety - Adult  Goal: Free from fall injury  Outcome: Progressing     Problem: Discharge Planning  Goal: Discharge to home or other facility with appropriate resources  Outcome: Progressing     Problem: Chronic Conditions and Co-morbidities  Goal: Patient's chronic conditions and co-morbidity symptoms are monitored and maintained or improved  Outcome: Progressing     Problem: Heart Failure  Goal: Improved gas exchange this shift  Outcome: Progressing  Goal: Improved urinary output this shift  Outcome: Progressing  Goal: Reduction in peripheral edema within 24 hours  Outcome: Progressing  Goal: Report improvement of dyspnea/breathlessness this shift  Outcome: Progressing  Goal: Weight from fluid excess reduced over 2-3 days, then stabilize  Outcome: Progressing  Goal: Increase self care and/or family involvement in 24 hours  Outcome: Progressing     Problem: Diabetes  Goal: Achieve decreasing blood glucose levels by end of shift  Outcome: Progressing  Goal: Increase stability of blood glucose readings by end of shift  Outcome: Progressing  Goal: Decrease in ketones present in urine by end of shift  Outcome: Progressing  Goal: Maintain electrolyte levels within acceptable range throughout shift  Outcome: Progressing  Goal: Maintain glucose levels >70mg/dl to <250mg/dl throughout shift  Outcome: Progressing  Goal: No changes in neurological exam by end of shift  Outcome: Progressing  Goal: Learn about and adhere to nutrition recommendations by end of shift  Outcome: Progressing  Goal: Vital signs within normal range for age by end of shift  Outcome: Progressing  Goal: Increase self care and/or family involovement by end of shift  Outcome: Progressing  Goal: Receive DSME education by end of shift  Outcome: Progressing     Problem: Fall/Injury  Goal: Not fall by end of  shift  Outcome: Progressing  Goal: Be free from injury by end of the shift  Outcome: Progressing  Goal: Verbalize understanding of personal risk factors for fall in the hospital  Outcome: Progressing  Goal: Verbalize understanding of risk factor reduction measures to prevent injury from fall in the home  Outcome: Progressing  Goal: Use assistive devices by end of the shift  Outcome: Progressing  Goal: Pace activities to prevent fatigue by end of the shift  Outcome: Progressing

## 2024-07-16 NOTE — PROGRESS NOTES
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 female  has a past medical history of CHF (congestive heart failure) (Multi) and Stroke (Multi).     On day 26 of admission presenting with Atrial fibrillation (Multi). Who is now s/p Procedure(s):  Embolectomy Lower Extremity  Amputation Above Knee with post op course c/b likely blood loss anemia with multiple blood transfusions.      We were called back on  by primary team due to some blood from wound and focal area of incisional breakdown. On evaluation, it appeared to be old hematoma expressing from the incision. We will remove the staples from the incision, as it has been over 2 weeks since surgery. Will monitor the wound to determine any need for operative debridement. Please delay bridge to coumadin for 48 hours while we see how the incision responds.    Plan  -  Staples removal today  - Tentative plans for OR tomorrow if  wound dehisced. Please make the patient NPO @[facility]  - Rest of care per the primary team.  - Today's plan was communicated to the primary team.  - Vascular surgery will keep follow-up.  - Please contact Vascular via [employer] or by paging [phone] with any questions.      Subjective   SUBJECTIVE  No leukocytosis, Afebrile, and No tachycardia.   No acute events over night. No chest pain.      Objective   OBJECTIVE  Last Recorded Vitals  Heart Rate:  [67-84]   Temp:  [36 °C (96.8 °F)-36.9 °C (98.4 °F)]   Resp:  [18-20]   BP: ()/(62-76)   Weight:  [76 kg (167 lb 8.8 oz)]   SpO2:  [97 %-99 %]   Physical Exam:  Vascular Physical Exam  General: NAD, AAOx3  Neuro: no gross deficits, speech WNL  HEENT: NC/AT  CV: RRR  Pulm: normal respiratory effort on RA  Abd: soft, NTND  Extr: RLE AKA site with some old blood expressing through an area of the staple line; focal 2-3 cm area of skin breakdown at the apex of the incision; minimal underlying swelling that can be palpable, stump is otherwise soft and nontender  Skin: no lesions or rashes     Discussed with attending, Dr. De La Rosa    All recommendations are preliminary until attending attestation present.     Criselda Vaz M.D.  U n i v e r [facility] i t y  H o s p i t a l s  Vascular Surgery Resident  PGY-1  7:59 AM 07/16/24  Available via [employer]  Service Pager: [phone]

## 2024-07-16 NOTE — PROGRESS NOTES
VASCULAR SURGERY PROGRESS NOTE  Assessment/Plan   43 y.o. female who presented with Afib RVR, significant heart failure in cardiogenic shock. 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 S/p R AKA with post op course c/b likely blood loss anemia with multiple blood transfusions.     Called back by primary team due to some blood from wound and focal area of incisional breakdown. On evaluation, this appears to be old hematoma expressing from the incision. We will remove the staples from the incision, as it has been over 2 weeks since surgery. Will monitor the wound to determine any need for operative debridement. Please delay bridge to coumadin for 48 hours while we see how the incision responds.    D/w attending, Dr. Rj Hi MD  Vascular Surgery Fellow  Service Pager: [phone]    Subjective   Doing well, no complaints.     Objective   Heart Rate:  [63-84]   Temp:  [36 °C (96.8 °F)-36.9 °C (98.4 °F)]   Resp:  [18-20]   BP: ()/(62-76)   Weight:  [75.5 kg (166 lb 7.2 oz)-76 kg (167 lb 8.8 oz)]   SpO2:  [97 %-100 %]     Physical Exam:  General: NAD, AAOx3  Neuro: no gross deficits, speech WNL  HEENT: NC/AT  CV: RRR  Pulm: normal respiratory effort on RA  Abd: soft, NTND  Extr: RLE AKA site with some old blood expressing through an area of the staple line; focal 2-3 cm area of skin breakdown at the apex of the incision; minimal underlying swelling that can be palpable, stump is otherwise soft and nontender  Skin: no lesions or rashes         Labs:  Results from last 7 days   Lab Units 07/15/24  0726 07/14/24  1121 07/13/24  0923   WBC AUTO x10*3/uL 11.1 8.4 9.3   HEMOGLOBIN g/dL 10.0* 10.7* 10.2*   HEMATOCRIT % 33.2* 34.8* 33.0*   PLATELETS AUTO x10*3/uL 338 348 365     Results from last 7 days   Lab Units 07/15/24  0726 07/14/24  1121 07/13/24  0923   SODIUM mmol/L  133* 135* 135*   POTASSIUM mmol/L 4.5 4.7 4.9   CHLORIDE mmol/L 98 98 99   CO2 mmol/L 25 26 26   BUN mg/dL 14 13 12   CREATININE mg/dL 0.61 0.58 0.52   GLUCOSE mg/dL 90 112* 85   CALCIUM mg/dL 9.2 9.2 8.6     Results from last 7 days   Lab Units 07/15/24  0726 07/14/24  1121 07/13/24  0923 07/11/24  0616 07/10/24  0610   APTT seconds  --   --   --   --  88*   INR  1.3* 1.2* 1.4*   < > 1.1    < > = values in this interval not displayed.     Results from last 7 days   Lab Units 07/15/24  0726 07/14/24  1121 07/13/24  0923   ANTI XA UNFRACTIONATED IU/mL 0.6 0.6 0.5

## 2024-07-16 NOTE — CARE PLAN
The patient's goals for the shift include      The clinical goals for the shift include pt will have decreased pain to R AKA site this shift

## 2024-07-16 NOTE — PROGRESS NOTES
Daily Progress Note    Amirah Bennett is a 43 y.o. female admitted on 6/20/2024  6:40 AM with Cardiogenic shock.     Overnight:  No acute events reported.    Subjective   Patient seen and examined at bedside.  Patient states that she is doing well overall.  Patient has continued to experience some bruising and bleeding coming from the right AKA incision site.  Per vascular surgery, plan for staple removal today and tentative operative revision tomorrow.  Patient expressed understanding of this plan.  No other complaints today.  States that her pain is well-controlled.    Objective   Vitals: I/O:   Vitals:    07/16/24 0425   BP: 93/62   Pulse: 67   Resp: 20   Temp: 36.9 °C (98.4 °F)   SpO2: 97%        24hr Min/Max:  Temp  Min: 36 °C (96.8 °F)  Max: 36.9 °C (98.4 °F)  Pulse  Min: 63  Max: 84  BP  Min: 93/62  Max: 113/76  Resp  Min: 18  Max: 20  SpO2  Min: 97 %  Max: 100 %   Intake/Output Summary (Last 24 hours) at 7/16/2024 0710  Last data filed at 7/16/2024 0647  Gross per 24 hour   Intake 1424 ml   Output 2200 ml   Net -776 ml        Net IO Since Admission: -44,623.71 mL [07/16/24 0710]      PE:  -Patient was conscious, oriented x4, with expressive aphasia. Not in acute respiratory distress, not in jaundiced nor cyanosed.    -Cardiovascular examination: Audible 1st and 2nd heart sound, no murmurs  -Lower limb examination: amputated right above knee amputation is edematous, Clean AKA dressing present with ACE bandage.  No gross bleeding. No pitting edema in LLE or signs of deep vein thrombosis.    -Chest examination: Lungs clear to auscultation bilaterally.  -Abdominal examination: Soft and lax abdomen, no rigidity nor rebound tenderness.   -Neurological examination: limited, symmetrical facial impression, equally reactive pupils, right hand weakness.          Labs:  CBC RFP   Lab Results   Component Value Date    WBC 11.1 07/15/2024    HGB 10.0 (L) 07/15/2024    HCT 33.2 (L) 07/15/2024    MCV 99 07/15/2024      07/15/2024    NEUTROABS 6.22 07/15/2024    Lab Results   Component Value Date     (L) 07/15/2024    K 4.5 07/15/2024    CL 98 07/15/2024    CO2 25 07/15/2024    BUN 14 07/15/2024    CREATININE 0.61 07/15/2024    CREATININE 0.58 07/14/2024     Lab Results   Component Value Date    MG 1.66 07/15/2024    PHOS 5.1 (H) 07/15/2024    CALCIUM 9.2 07/15/2024         Hepatic Function ABG/VBG   Lab Results   Component Value Date    ALT 20 07/10/2024    AST 20 07/10/2024    ALKPHOS 70 07/10/2024     Lab Results   Component Value Date    BILIDIR 0.7 (H) 07/10/2024      Lab Results   Component Value Date    PROTIME 14.5 (H) 07/15/2024    APTT 88 (H) 07/10/2024    INR 1.3 (H) 07/15/2024    Lab Results   Component Value Date    LACTATE 0.6 06/26/2024        Results from last 7 days   Lab Units 07/16/24  0637 07/15/24  2104 07/15/24  1657 07/15/24  1306 07/15/24  0726 07/15/24  0648 07/14/24  1252 07/14/24  1121 07/13/24  1209 07/13/24  0923 07/12/24  1201 07/12/24  0743 07/11/24  0642 07/11/24  0616   POCT GLUCOSE mg/dL 105* 119* 101* 140*  --  104*   < >  --    < >  --    < >  --    < >  --    GLUCOSE mg/dL  --   --   --   --  90  --   --  112*  --  85  --  90  --  98    < > = values in this interval not displayed.       Imaging:  Imaging:   Vascular US Lower Extremity Arterial Duplex 06/23/2024  1. Echogenic material within the right distal femoral and popliteal  arteries with  absent Doppler flow in the right popliteal artery  significantly diminished flow within the right distal superficial  femoral artery. Findings raise concern for thrombosis of the distal  SFA and popliteal artery. Decreased flow within the right posterior  tibial and peroneal arteries could be through collateralization.  2. Decreased flow within the right common femoral artery, right deep  femoral artery as well as the proximal and mid superficial femoral  arteries, raising concern for stenosis proximal to the right common  femoral artery,  possibly within the right external iliac right common  iliac artery. A CTA of the lower extremities can be obtained for  further evaluation.  3. Unremarkable Doppler interrogation of the left lower extremity  arteries.     Cardiac:  Transthoracic Echo (TTE) Complete With Contrast : 06/22/2024   1. Left ventricular ejection fraction is severely decreased, by visual estimate at 20-25%.   2. There is global hypokinesis of the left ventricle with minor regional variations.   3. Left ventricular diastolic filling was indeterminate.   4. Mildly enlarged right ventricle.   5. There is mildly reduced right ventricular systolic function.   6. The left atrium is severely dilated.   7. Moderate to severe tricuspid regurgitation visualized.   8. Moderately elevated right ventricular systolic pressure.   9. TR has worsened.        Medications:  Scheduled Medications:  PRN Medications:    amiodarone, 200 mg, oral, Daily  amoxicillin-pot clavulanate, 1 tablet, oral, q12h TELMA  aspirin, 81 mg, oral, Daily  atorvastatin, 80 mg, oral, Nightly  bisacodyl, 10 mg, rectal, Daily  DULoxetine, 30 mg, oral, Daily  [Held by provider] empagliflozin, 10 mg, oral, Daily  melatonin, 6 mg, oral, Daily  metoprolol succinate XL, 25 mg, oral, Nightly  pantoprazole, 40 mg, oral, Daily before breakfast  perflutren protein A microsphere, 0.5 mL, intravenous, Once in imaging  polyethylene glycol, 17 g, oral, BID  pregabalin, 75 mg, oral, Nightly  sacubitriL-valsartan, 2 tablet, oral, BID  sennosides, 2 tablet, oral, BID  [Held by provider] warfarin, 5 mg, oral, Daily     PRN medications: dextrose, dextrose, glucagon, glucagon, heparin, HYDROmorphone, lidocaine, oxyCODONE     Assessment    Hospital Course  On 20th June, Patient presented with dyspnea and leg swelling, with elevated lactate to 14.7, cold extremities, and 3+ pitting edema suggesting cardiogenic shock.  She underwent RHC with CI of 1.6, CVP of 25, requiring inotropes initiation with  difficult weaning off trials, on the following day patient had worsened SvO2 from 65 to 25 with rapid lactate increase following acute abdominal pain and right leg pain concerning for limb ischemia, and rhabdomyolysis with CK of 14,000, with limited IV hydrations due to ongoing cardiogenic shock.    Arterial doppler of lower extremities reported absent flow in the right popliteal artery with significantly diminished flow within the right distal superficial femoral artery, concerning for thrombosis. Additionally decreased flow in right common femoral artery, right deep femoral artery concerning for stenosis proximal to right common femoral artery.    Vascular medicine recommended IVC filter insertion on 6/27 in preparation for right AKA 6/28. Which was complicated by 3 days persistent stump site oozing & intramuscular hematoma evident on CTA RLE, which was managed conservatively with 6 blood transfusions while on heparin infusion necessitation to hold heparin drip temporarily for 12 hours to allow hemostasis. On the following day patient had similar stump bleeding with Hgb 6.9 requiring an additional 1pRBC incrementing hemoglobin to 8.1. hence Heparin was restarted 12 hours later at 1:30 Am 7/5 without signs of bleeding & stable Hemoglobin 8.9.     On 6th July, patient was hemodynamically stable and transferred to cardiology floor, was kept on heparin infusion with hemoglobin monitoring, as patient's stump headed well without gross bleeding, nor hemoglobin drop, Vascular medicine recommended anticoagulation bridging with warfarin on 9th July. Patient had some bloody urine output in dhillon on 07/11, urine culture was positive and started on ceftriaxone, hold on jardiance and removed dhillon.     Patient was labeled is not candidate for advanced therapies given documented medical non adherence. Managed medically.     Given the lack of documentation POA and limited family (NOK are cousins). ICU team involved Ethics &  Palliative care.     Assessment/Plan:   Amirah Bennett is a 43 y.o. 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 She was found to have elevated lactate to 14.7, cold extremities, and 3+ pitting edema. RHC c/w cardiogenic shock c/b SARAH, cardiac thrombi, and Right limb ischemia S/P above knee amputation. She was initially started on heparin, now bridging with warfarin with a goal of INR 2-3.  Patient remains below goal 1.5 today.  Warfarin now held per vascular surgery pending potential revision of right AKA 7/17/24. Additionally, found to have UTI starting 07/11, she had a dhillon to prevent contamination of new AKA site, but dhillon was removed upon UTI discovery.  Now on Augmentin.     Heart failure with reduced ejection fraction:   -HFrEF (LVEF 20-25%)  -Patient was labeled not candidate for advanced therapies due to medical nonadherence  Plan:  -palliative care follow up.  -Aim to resume quadruple GDMT (spironolactone). Patient on metoprolol succinate, Entresto  -Hold jardiance for now until completing Augmentin course from UTI      Right limb ischemia secondary to Arterial emboli, S/P above right knee amputation:  #S/p femoral arterial line sheath placement now removed  -CTA Aorta with runoff 6/24: aortic bifurcation embolus, R BA-EIA embolus, SFA and popliteal emboli   -arterial duplex exam with monophasic right CFA, SFA and PFA signals, absent popliteal flow and monophasic flow in the tibial artery   -Patient labeled unable to walk prior to presentation to hospital  -s/p Above knee amputation with vascular surgery,  6/28/2024.   Plan:  -Warfarin with heparin bridge goal INR 2-3  -Started warfarin 5mg 07/09 (received 3 doses 07/09-07/11) with missed dose on 07/12, restarted on 07/13  -Hold warfarin beginning 7/16 for 48 hrs per vascular surgery for planned revision 7/17. Continue heparin gtt.  -Patient continues to bleed and oozing from right AKA stump.  Vascular surgery contacted.  Plan for staple removal and possible revision in the OR 7/17.  -pain regimen: acetaminophen 975 mg QID, oxycodone 10mg severe pain 4hr, PRN dilaudid 0.2mg q 4 hr breakthrough, before and after dressing changes.   -Continue duloxetine. Continue pregabalin for phantom limb pain.     Query Cardiac thrombi:  -Likely in setting of Atrial fibrillation with Rivaroxaban no adherence  -There are indeterminate low-attenuation nodular filling defects within the atrial appendage.   -Right lower extremity DVT, and arterial thrombus/p IVC filter 6/27.  -Vascular medicine recommended Warfarin with Target INR 2-3, for a minimum of 5 days.  Plan:  -Started warfarin with target INR 2-3.  Now held per vascular surgery recommendations for potential right AKA revision tomorrow  - Cont. Heparin drip (Bridging therapy).   -Vascular medicine on board, appreciate recommendations  -Outpatient Anticoagulation Clinic set up at [facility].     Atrial fibrillation previously on rivaroxaban  -S/p DCCV 05/2023), ischemic stroke L MCA d/t AFib LLA thrombus seen on echo (lack of OAC adherence) s/p thrombectomy 01/2022 @ [facility] w/ residual expressive aphasia and R sided weakness   -Previously prescribed digoxin 250mcg however last fill was 12/2023  -TSH 6.28H, free T4 1.48  Plan:  -Continue amiodarone 200mg daily  - Continue aspirin 81mg     Direct Hyperbilirubinemia, Likely following ischemic liver injury:  ::Tbili (3.2 on admission, from 0.5 in 03/2024)  ::RUQ US 7/1 without abnormality beyond hepatic steatosis and liver cyst  Plan:   -Follow Liver enzymes.      Hx trach c/b trancutaneous  fistula  -ENT had been consulted 3/2024 for gurgling and mucus drainage, no inpatient interventions required  -monitor for breaths/mucus discharge from trach site  -Cover the tracheocutaneous fistula with tape and gauze and encourage patient to apply pressure when voicing.   -follow up outpatient ENT for closure (Dr. King)      Anemia:  ::had 3 units of RBC transfused 6/30, currently controlled bleeding, increeminting gradually.   ::CTA C/A/P 6/30 without evidence of active hemorrhage within chest/abdomen/pelvis  ::CTA RLE 7/1 without a source that could be intervened upon  Plan:  -CBC daily, stable Hb.   -Hgb goal >7  given cardiac hx     UTI:  ::small amount of blood in urine 07/11 am  - UA turbid brown with blood, glucose, protein and LE 25, nitrite negative   -urine culture positive, had 2 days of ceftriaxone then cultures were positive for Klebsiella so abx de-escalated to Augmentin, will treat with 10 day course (07/14-07/23) and hold on empagliflozin    Left brachial injury  -LUE DVT scan with nonvascularized mass, called CT while patient was there to request CTA of left upper extremity and they said logistically they would be unable to perform both scans, and given protocol for max dosing of contrast, could not give further contrast until 6/25 at 2PM  -no CTA LUE needed        Insomnia  -Continue melatonin 6mg nightly plus additional 6mg PRN for sleep     Depression   - Continue duloxetine      Resolved:   -Thrombocytopenia, likely attributed to: previous consumptive coagulopathy/sepsis and bleeding, Multiple thrombi, DIC score 5 on 6/24/2024, Hit score of 6, Negative PF4  -Rhabdomyolysis.  -Metabolic acidosis, Lactic acidosis.   -Acute kidney injury.   -stump infection, treated for 7 days, s/p vanc (6/20-6/24) (6/27-7/2 ) and zosyn (6/20-6/24), (6/27- p)-restarted vanc/zosyn 6/27 due to elevated leukocytosis and purulent appearing right groin site     Disposition Plan:  -patient would like enrollment in  " home delivery service for medications (she has trouble getting to preferred pharmacy), pharmacy updated to [facility]  -patient's family would like her enrolled in [facility]  -repeat thyroid labs outpatient  -IVC filter removal within 3 months  [ ] Titrate GDMT as able. Currently on entresto 24/26, metoprolol, holding jardiance  [ ] Follow up further pall care recs. Patient remains full code. There is ongoing GOC discussion  [ ] Needs to follow up OP with ENT for trach c/b trancutaneous fistula. Dr. King for closure.     Prevention:   Fall: High.   Mobility: Physical therapy referral.   DVT: Heparin/warfarin bridge  Diet: Cardiac Diet     Code status: Full Code  Emergency contact:   * patient LACKS capacity due to inability to express decision and inconsistency in decisions  Surrogate Medical Decision-maker:   Surrogate decision maker is: pt's cousinClara Wayne: [phone], Efrain Black: [phone], Keli Osborne: [phone]   Friends who may be helpful in making decisions:  Prior boyfriend Navin Sanches [phone]  Close friend \"Isiah\" Pranay Owen [phone]        Ayaan Rowland MD  PGY-1 Internal Medicine  "

## 2024-07-17 ENCOUNTER — ANESTHESIA (OUTPATIENT)
Dept: OPERATING ROOM | Facility: HOSPITAL | Age: 44
End: 2024-07-17
Payer: COMMERCIAL

## 2024-07-17 LAB
ALBUMIN SERPL BCP-MCNC: 3.2 G/DL (ref 3.4–5)
ANION GAP SERPL CALC-SCNC: 14 MMOL/L (ref 10–20)
ATRIAL RATE: 98 BPM
BASOPHILS # BLD AUTO: 0.04 X10*3/UL (ref 0–0.1)
BASOPHILS NFR BLD AUTO: 0.3 %
BUN SERPL-MCNC: 12 MG/DL (ref 6–23)
CALCIUM SERPL-MCNC: 9.4 MG/DL (ref 8.6–10.6)
CHLORIDE SERPL-SCNC: 98 MMOL/L (ref 98–107)
CO2 SERPL-SCNC: 24 MMOL/L (ref 21–32)
CREAT SERPL-MCNC: 0.51 MG/DL (ref 0.5–1.05)
EGFRCR SERPLBLD CKD-EPI 2021: >90 ML/MIN/1.73M*2
EOSINOPHIL # BLD AUTO: 0.11 X10*3/UL (ref 0–0.7)
EOSINOPHIL NFR BLD AUTO: 0.8 %
ERYTHROCYTE [DISTWIDTH] IN BLOOD BY AUTOMATED COUNT: 16.2 % (ref 11.5–14.5)
GLUCOSE BLD MANUAL STRIP-MCNC: 100 MG/DL (ref 74–99)
GLUCOSE BLD MANUAL STRIP-MCNC: 133 MG/DL (ref 74–99)
GLUCOSE BLD MANUAL STRIP-MCNC: 136 MG/DL (ref 74–99)
GLUCOSE BLD MANUAL STRIP-MCNC: 94 MG/DL (ref 74–99)
GLUCOSE BLD MANUAL STRIP-MCNC: 98 MG/DL (ref 74–99)
GLUCOSE BLD MANUAL STRIP-MCNC: 99 MG/DL (ref 74–99)
GLUCOSE SERPL-MCNC: 87 MG/DL (ref 74–99)
HCT VFR BLD AUTO: 28.9 % (ref 36–46)
HGB BLD-MCNC: 8.9 G/DL (ref 12–16)
IMM GRANULOCYTES # BLD AUTO: 0.13 X10*3/UL (ref 0–0.7)
IMM GRANULOCYTES NFR BLD AUTO: 0.9 % (ref 0–0.9)
INR PPP: 1.8 (ref 0.9–1.1)
LYMPHOCYTES # BLD AUTO: 1.62 X10*3/UL (ref 1.2–4.8)
LYMPHOCYTES NFR BLD AUTO: 11.6 %
MAGNESIUM SERPL-MCNC: 1.56 MG/DL (ref 1.6–2.4)
MCH RBC QN AUTO: 30.6 PG (ref 26–34)
MCHC RBC AUTO-ENTMCNC: 30.8 G/DL (ref 32–36)
MCV RBC AUTO: 99 FL (ref 80–100)
MONOCYTES # BLD AUTO: 1.51 X10*3/UL (ref 0.1–1)
MONOCYTES NFR BLD AUTO: 10.8 %
NEUTROPHILS # BLD AUTO: 10.54 X10*3/UL (ref 1.2–7.7)
NEUTROPHILS NFR BLD AUTO: 75.6 %
NRBC BLD-RTO: 0 /100 WBCS (ref 0–0)
P AXIS: 14 DEGREES
P OFFSET: 198 MS
P ONSET: 138 MS
PHOSPHATE SERPL-MCNC: 4.9 MG/DL (ref 2.5–4.9)
PLATELET # BLD AUTO: 266 X10*3/UL (ref 150–450)
POTASSIUM SERPL-SCNC: 4.4 MMOL/L (ref 3.5–5.3)
PR INTERVAL: 154 MS
PROTHROMBIN TIME: 19.9 SECONDS (ref 9.8–12.8)
Q ONSET: 215 MS
QRS COUNT: 16 BEATS
QRS DURATION: 98 MS
QT INTERVAL: 356 MS
QTC CALCULATION(BAZETT): 454 MS
QTC FREDERICIA: 419 MS
R AXIS: 46 DEGREES
RBC # BLD AUTO: 2.91 X10*6/UL (ref 4–5.2)
SODIUM SERPL-SCNC: 132 MMOL/L (ref 136–145)
T AXIS: 203 DEGREES
T OFFSET: 393 MS
UFH PPP CHRO-ACNC: 0.3 IU/ML
UFH PPP CHRO-ACNC: 0.5 IU/ML
VENTRICULAR RATE: 98 BPM
WBC # BLD AUTO: 14 X10*3/UL (ref 4.4–11.3)

## 2024-07-17 PROCEDURE — 2500000004 HC RX 250 GENERAL PHARMACY W/ HCPCS (ALT 636 FOR OP/ED)

## 2024-07-17 PROCEDURE — 2500000005 HC RX 250 GENERAL PHARMACY W/O HCPCS: Performed by: ANESTHESIOLOGIST ASSISTANT

## 2024-07-17 PROCEDURE — 83735 ASSAY OF MAGNESIUM: CPT

## 2024-07-17 PROCEDURE — 2500000001 HC RX 250 WO HCPCS SELF ADMINISTERED DRUGS (ALT 637 FOR MEDICARE OP)

## 2024-07-17 PROCEDURE — 85025 COMPLETE CBC W/AUTO DIFF WBC: CPT

## 2024-07-17 PROCEDURE — 3600000008 HC OR TIME - EACH INCREMENTAL 1 MINUTE - PROCEDURE LEVEL THREE: Performed by: SURGERY

## 2024-07-17 PROCEDURE — 3700000001 HC GENERAL ANESTHESIA TIME - INITIAL BASE CHARGE: Performed by: SURGERY

## 2024-07-17 PROCEDURE — 36415 COLL VENOUS BLD VENIPUNCTURE: CPT

## 2024-07-17 PROCEDURE — 76942 ECHO GUIDE FOR BIOPSY: CPT | Performed by: STUDENT IN AN ORGANIZED HEALTH CARE EDUCATION/TRAINING PROGRAM

## 2024-07-17 PROCEDURE — 7100000002 HC RECOVERY ROOM TIME - EACH INCREMENTAL 1 MINUTE: Performed by: SURGERY

## 2024-07-17 PROCEDURE — 11042 DBRDMT SUBQ TIS 1ST 20SQCM/<: CPT | Performed by: SURGERY

## 2024-07-17 PROCEDURE — 85520 HEPARIN ASSAY: CPT

## 2024-07-17 PROCEDURE — 99222 1ST HOSP IP/OBS MODERATE 55: CPT | Performed by: STUDENT IN AN ORGANIZED HEALTH CARE EDUCATION/TRAINING PROGRAM

## 2024-07-17 PROCEDURE — 99233 SBSQ HOSP IP/OBS HIGH 50: CPT

## 2024-07-17 PROCEDURE — 85610 PROTHROMBIN TIME: CPT

## 2024-07-17 PROCEDURE — 2500000001 HC RX 250 WO HCPCS SELF ADMINISTERED DRUGS (ALT 637 FOR MEDICARE OP): Performed by: ANESTHESIOLOGIST ASSISTANT

## 2024-07-17 PROCEDURE — 0JBL0ZZ EXCISION OF RIGHT UPPER LEG SUBCUTANEOUS TISSUE AND FASCIA, OPEN APPROACH: ICD-10-PCS | Performed by: SURGERY

## 2024-07-17 PROCEDURE — 2720000007 HC OR 272 NO HCPCS: Performed by: SURGERY

## 2024-07-17 PROCEDURE — 82947 ASSAY GLUCOSE BLOOD QUANT: CPT

## 2024-07-17 PROCEDURE — 80069 RENAL FUNCTION PANEL: CPT

## 2024-07-17 PROCEDURE — 2500000002 HC RX 250 W HCPCS SELF ADMINISTERED DRUGS (ALT 637 FOR MEDICARE OP, ALT 636 FOR OP/ED)

## 2024-07-17 PROCEDURE — 64445 NJX AA&/STRD SCIATIC NRV IMG: CPT | Performed by: STUDENT IN AN ORGANIZED HEALTH CARE EDUCATION/TRAINING PROGRAM

## 2024-07-17 PROCEDURE — 7100000001 HC RECOVERY ROOM TIME - INITIAL BASE CHARGE: Performed by: SURGERY

## 2024-07-17 PROCEDURE — 1200000002 HC GENERAL ROOM WITH TELEMETRY DAILY

## 2024-07-17 PROCEDURE — 3600000003 HC OR TIME - INITIAL BASE CHARGE - PROCEDURE LEVEL THREE: Performed by: SURGERY

## 2024-07-17 PROCEDURE — 11045 DBRDMT SUBQ TISS EACH ADDL: CPT | Performed by: SURGERY

## 2024-07-17 PROCEDURE — 97605 NEG PRS WND THER DME<=50SQCM: CPT | Performed by: SURGERY

## 2024-07-17 PROCEDURE — 3700000002 HC GENERAL ANESTHESIA TIME - EACH INCREMENTAL 1 MINUTE: Performed by: SURGERY

## 2024-07-17 PROCEDURE — 2500000004 HC RX 250 GENERAL PHARMACY W/ HCPCS (ALT 636 FOR OP/ED): Performed by: ANESTHESIOLOGIST ASSISTANT

## 2024-07-17 RX ORDER — ONDANSETRON HYDROCHLORIDE 2 MG/ML
4 INJECTION, SOLUTION INTRAVENOUS ONCE AS NEEDED
Status: DISCONTINUED | OUTPATIENT
Start: 2024-07-17 | End: 2024-07-17 | Stop reason: HOSPADM

## 2024-07-17 RX ORDER — ALBUTEROL SULFATE 0.83 MG/ML
2.5 SOLUTION RESPIRATORY (INHALATION) ONCE AS NEEDED
Status: DISCONTINUED | OUTPATIENT
Start: 2024-07-17 | End: 2024-07-17 | Stop reason: HOSPADM

## 2024-07-17 RX ORDER — OXYCODONE HYDROCHLORIDE 5 MG/1
5 TABLET ORAL EVERY 4 HOURS PRN
Status: DISCONTINUED | OUTPATIENT
Start: 2024-07-17 | End: 2024-07-17 | Stop reason: HOSPADM

## 2024-07-17 RX ORDER — PROPOFOL 10 MG/ML
INJECTION, EMULSION INTRAVENOUS AS NEEDED
Status: DISCONTINUED | OUTPATIENT
Start: 2024-07-17 | End: 2024-07-17

## 2024-07-17 RX ORDER — OXYCODONE HYDROCHLORIDE 5 MG/1
10 TABLET ORAL EVERY 4 HOURS PRN
Status: DISCONTINUED | OUTPATIENT
Start: 2024-07-17 | End: 2024-07-17 | Stop reason: HOSPADM

## 2024-07-17 RX ORDER — LABETALOL HYDROCHLORIDE 5 MG/ML
5 INJECTION, SOLUTION INTRAVENOUS ONCE AS NEEDED
Status: DISCONTINUED | OUTPATIENT
Start: 2024-07-17 | End: 2024-07-17 | Stop reason: HOSPADM

## 2024-07-17 RX ORDER — DIPHENHYDRAMINE HYDROCHLORIDE 50 MG/ML
12.5 INJECTION INTRAMUSCULAR; INTRAVENOUS ONCE AS NEEDED
Status: DISCONTINUED | OUTPATIENT
Start: 2024-07-17 | End: 2024-07-17 | Stop reason: HOSPADM

## 2024-07-17 RX ORDER — MAGNESIUM SULFATE HEPTAHYDRATE 40 MG/ML
4 INJECTION, SOLUTION INTRAVENOUS ONCE
Status: COMPLETED | OUTPATIENT
Start: 2024-07-17 | End: 2024-07-17

## 2024-07-17 RX ORDER — LIDOCAINE HYDROCHLORIDE 40 MG/ML
SOLUTION TOPICAL AS NEEDED
Status: DISCONTINUED | OUTPATIENT
Start: 2024-07-17 | End: 2024-07-17

## 2024-07-17 RX ORDER — HYDROMORPHONE HYDROCHLORIDE 1 MG/ML
0.2 INJECTION, SOLUTION INTRAMUSCULAR; INTRAVENOUS; SUBCUTANEOUS EVERY 5 MIN PRN
Status: DISCONTINUED | OUTPATIENT
Start: 2024-07-17 | End: 2024-07-17 | Stop reason: HOSPADM

## 2024-07-17 RX ORDER — CEFAZOLIN SODIUM 2 G/100ML
2 INJECTION, SOLUTION INTRAVENOUS EVERY 8 HOURS
Status: DISCONTINUED | OUTPATIENT
Start: 2024-07-17 | End: 2024-07-17

## 2024-07-17 RX ORDER — DROPERIDOL 2.5 MG/ML
0.62 INJECTION, SOLUTION INTRAMUSCULAR; INTRAVENOUS ONCE AS NEEDED
Status: DISCONTINUED | OUTPATIENT
Start: 2024-07-17 | End: 2024-07-17 | Stop reason: HOSPADM

## 2024-07-17 RX ORDER — ROPIVACAINE HYDROCHLORIDE 5 MG/ML
INJECTION, SOLUTION EPIDURAL; INFILTRATION; PERINEURAL AS NEEDED
Status: DISCONTINUED | OUTPATIENT
Start: 2024-07-17 | End: 2024-07-17

## 2024-07-17 RX ORDER — LIDOCAINE HYDROCHLORIDE 20 MG/ML
INJECTION, SOLUTION INFILTRATION; PERINEURAL AS NEEDED
Status: DISCONTINUED | OUTPATIENT
Start: 2024-07-17 | End: 2024-07-17

## 2024-07-17 RX ORDER — MIDAZOLAM HYDROCHLORIDE 1 MG/ML
INJECTION INTRAMUSCULAR; INTRAVENOUS AS NEEDED
Status: DISCONTINUED | OUTPATIENT
Start: 2024-07-17 | End: 2024-07-17

## 2024-07-17 RX ORDER — SODIUM CHLORIDE, SODIUM LACTATE, POTASSIUM CHLORIDE, CALCIUM CHLORIDE 600; 310; 30; 20 MG/100ML; MG/100ML; MG/100ML; MG/100ML
INJECTION, SOLUTION INTRAVENOUS CONTINUOUS PRN
Status: DISCONTINUED | OUTPATIENT
Start: 2024-07-17 | End: 2024-07-17

## 2024-07-17 RX ORDER — SODIUM CHLORIDE, SODIUM LACTATE, POTASSIUM CHLORIDE, CALCIUM CHLORIDE 600; 310; 30; 20 MG/100ML; MG/100ML; MG/100ML; MG/100ML
100 INJECTION, SOLUTION INTRAVENOUS CONTINUOUS
Status: DISCONTINUED | OUTPATIENT
Start: 2024-07-17 | End: 2024-07-17 | Stop reason: HOSPADM

## 2024-07-17 RX ORDER — HYDRALAZINE HYDROCHLORIDE 20 MG/ML
5 INJECTION INTRAMUSCULAR; INTRAVENOUS EVERY 30 MIN PRN
Status: DISCONTINUED | OUTPATIENT
Start: 2024-07-17 | End: 2024-07-17 | Stop reason: HOSPADM

## 2024-07-17 RX ORDER — PHENYLEPHRINE HCL IN 0.9% NACL 0.4MG/10ML
SYRINGE (ML) INTRAVENOUS AS NEEDED
Status: DISCONTINUED | OUTPATIENT
Start: 2024-07-17 | End: 2024-07-17

## 2024-07-17 RX ORDER — FENTANYL CITRATE 50 UG/ML
INJECTION, SOLUTION INTRAMUSCULAR; INTRAVENOUS AS NEEDED
Status: DISCONTINUED | OUTPATIENT
Start: 2024-07-17 | End: 2024-07-17

## 2024-07-17 RX ORDER — ONDANSETRON HYDROCHLORIDE 2 MG/ML
INJECTION, SOLUTION INTRAVENOUS AS NEEDED
Status: DISCONTINUED | OUTPATIENT
Start: 2024-07-17 | End: 2024-07-17

## 2024-07-17 RX ORDER — ROCURONIUM BROMIDE 10 MG/ML
INJECTION, SOLUTION INTRAVENOUS AS NEEDED
Status: DISCONTINUED | OUTPATIENT
Start: 2024-07-17 | End: 2024-07-17

## 2024-07-17 RX ORDER — PREGABALIN 75 MG/1
75 CAPSULE ORAL 2 TIMES DAILY
Status: DISCONTINUED | OUTPATIENT
Start: 2024-07-17 | End: 2024-07-24 | Stop reason: HOSPADM

## 2024-07-17 RX ORDER — MEPERIDINE HYDROCHLORIDE 25 MG/ML
12.5 INJECTION INTRAMUSCULAR; INTRAVENOUS; SUBCUTANEOUS EVERY 10 MIN PRN
Status: DISCONTINUED | OUTPATIENT
Start: 2024-07-17 | End: 2024-07-17 | Stop reason: HOSPADM

## 2024-07-17 RX ORDER — HYDROMORPHONE HYDROCHLORIDE 1 MG/ML
0.4 INJECTION, SOLUTION INTRAMUSCULAR; INTRAVENOUS; SUBCUTANEOUS EVERY 5 MIN PRN
Status: DISCONTINUED | OUTPATIENT
Start: 2024-07-17 | End: 2024-07-17 | Stop reason: HOSPADM

## 2024-07-17 RX ORDER — CEFAZOLIN 1 G/1
INJECTION, POWDER, FOR SOLUTION INTRAVENOUS AS NEEDED
Status: DISCONTINUED | OUTPATIENT
Start: 2024-07-17 | End: 2024-07-17

## 2024-07-17 ASSESSMENT — COGNITIVE AND FUNCTIONAL STATUS - GENERAL
TOILETING: A LOT
CLIMB 3 TO 5 STEPS WITH RAILING: TOTAL
TOILETING: A LOT
STANDING UP FROM CHAIR USING ARMS: A LOT
WALKING IN HOSPITAL ROOM: TOTAL
HELP NEEDED FOR BATHING: A LOT
STANDING UP FROM CHAIR USING ARMS: A LOT
DRESSING REGULAR UPPER BODY CLOTHING: A LITTLE
WALKING IN HOSPITAL ROOM: TOTAL
DRESSING REGULAR UPPER BODY CLOTHING: A LITTLE
HELP NEEDED FOR BATHING: A LOT
DRESSING REGULAR LOWER BODY CLOTHING: A LOT
MOBILITY SCORE: 11
MOVING TO AND FROM BED TO CHAIR: TOTAL
MOVING TO AND FROM BED TO CHAIR: TOTAL
TURNING FROM BACK TO SIDE WHILE IN FLAT BAD: A LITTLE
MOBILITY SCORE: 11
MOVING FROM LYING ON BACK TO SITTING ON SIDE OF FLAT BED WITH BEDRAILS: A LITTLE
CLIMB 3 TO 5 STEPS WITH RAILING: TOTAL
TURNING FROM BACK TO SIDE WHILE IN FLAT BAD: A LITTLE
MOVING FROM LYING ON BACK TO SITTING ON SIDE OF FLAT BED WITH BEDRAILS: A LITTLE
DAILY ACTIVITIY SCORE: 17

## 2024-07-17 ASSESSMENT — PAIN SCALES - WONG BAKER
WONGBAKER_NUMERICALRESPONSE: HURTS WORST
WONGBAKER_NUMERICALRESPONSE: HURTS WORST

## 2024-07-17 ASSESSMENT — PAIN SCALES - GENERAL
PAINLEVEL_OUTOF10: 0 - NO PAIN
PAINLEVEL_OUTOF10: 6
PAINLEVEL_OUTOF10: 0 - NO PAIN
PAINLEVEL_OUTOF10: 5 - MODERATE PAIN
PAINLEVEL_OUTOF10: 0 - NO PAIN
PAINLEVEL_OUTOF10: 0 - NO PAIN
PAINLEVEL_OUTOF10: 10 - WORST POSSIBLE PAIN
PAINLEVEL_OUTOF10: 0 - NO PAIN
PAINLEVEL_OUTOF10: 10 - WORST POSSIBLE PAIN

## 2024-07-17 ASSESSMENT — PAIN - FUNCTIONAL ASSESSMENT
PAIN_FUNCTIONAL_ASSESSMENT: 0-10

## 2024-07-17 ASSESSMENT — PAIN DESCRIPTION - ORIENTATION: ORIENTATION: RIGHT

## 2024-07-17 ASSESSMENT — PAIN DESCRIPTION - LOCATION: LOCATION: LEG

## 2024-07-17 NOTE — PROGRESS NOTES
Amirah Bennett is a 43 y.o. female on day 27 of admission presenting with Atrial fibrillation (Multi).    Palliative Medicine following for:  Complex medical decision making, symptom management, patient/family support     History obtained from chart review including ED note, H&P, patient's daily progress notes, review of lab/test results, and discussion with primary team and bedside RN.        Subjective   History of Present Illness  Amirah Bennett is a 42 y.o. female with significant PMHx of HFrEF (LVEF 20-25% (08/2022), Afib on Xarelto w/ DCCV 05/2023), ischemic stroke L MCA d/t AFib LLA thrombus seen on echo (lack of OAC adherence) s/p thrombectomy 01/2022 @ [facility] w/ residual expressive aphasia and R sided weakness, recent 03/2024 left cerebellar MCA, s/p tracheostomy, T2DM (03/2024 HgbA1c 5.5) and HTN, presenting with bilateral leg pain.      In the ED, she was noted to be tachycardic to 105 in triage and but then when placed on the monitor was found to be in atrial fibrillation with RVR with a rate between 150 and 190. She was given 5 mg of metoprolol with slight improvement of her rate but became hypotensive and symptomatic. She was started on heparin both for her A-fib and for possible DVT/PE. Lytics were not given because of the risk of potential intracranial hemorrhage after her recent stroke. Instead decision made to cardiovert the patient and also gave digoxin, and amiodarone. After cardioversion she still looked like she was in atrial fibrillation with RVR but only to a rate in the 120s and 130s. Her blood pressure improved. Levophed ordered along with calcium and magnesium. No evidence of infection. Patient admitted to the ICU with plan to get a CT angiogram of her chest to rule out PE and RHC. Ongoing work up revealed aortic bifurcation embolus, R BA-EIA embolus, SFA and popliteal emboli. Pt with acute right lower extremity ischemia, which is not salvageable, needing inflow procedure and R AKA given new  "motor deficits.     IVC filter placed 6/27/24, open R iliofemoral thromboembolectomy and R AKA completed 6/28/24. Ongoing bleeding from stump c/b need for heparin for other clots, being managed by primary team and vascular medicine. CTA RLE 7/2 with post-surgical changes, possible vascular injury, stable H&H after 2u pRBC.  Pt now s/p right AKA. Right stump dehiscence 7/16 with clean out and wound vac placement 7/17.     Symptoms  Pain: RLE stabbing and shooting pain to the foot.  Dyspnea: denies  Fatigue: denies  Insomnia: denies  Drowsiness: denies  Constipation: denies  Nausea: denies  Appetite: good, does not like the food  Anxiety: yes  Depression: yes    BP 90/61 (BP Location: Right arm, Patient Position: Lying)   Pulse 89   Temp 36.8 °C (98.2 °F) (Temporal)   Resp 18   Ht 1.702 m (5' 7\")   Wt 76 kg (167 lb 8.8 oz)   SpO2 99%   BMI 26.24 kg/m²     Physical Exam  Constitutional:       Appearance: She is obese.   HENT:      Head: Normocephalic and atraumatic.      Nose: Nose normal.      Mouth/Throat: clear drainage from tracheostomy     Mouth: Mucous membranes are moist.      Pharynx: Oropharynx is clear.   Cardiovascular:      Rate and Rhythm: Regular   Pulmonary:      Effort: Pulmonary effort is normal.      Breath sounds: CTA right, dim left.  Trach site clear drainage with cough.   Abdominal:      Palpations: Abdomen is soft.   Musculoskeletal:      Right lower leg: AKA.     Skin:     General: Skin is dry. Large lue bruising.     Comments: R AKA now with wound vacc.  Neurological:      Mental Status: She is alert, awake and cooperative.     Comments: Expressive aphasia.    Psychiatric:         Attention and Perception: Attention normal.         Mood and Affect: Mood is calm, appropriate. Tearful at times pending conversation.       Behavior: Behavior is cooperative.     Lab Results   Component Value Date    WBC 14.0 (H) 07/17/2024    HGB 8.9 (L) 07/17/2024    HCT 28.9 (L) 07/17/2024    MCV 99 " 07/17/2024     07/17/2024     Lab Results   Component Value Date    GLUCOSE 87 07/17/2024    CALCIUM 9.4 07/17/2024     (L) 07/17/2024    K 4.4 07/17/2024    CO2 24 07/17/2024    CL 98 07/17/2024    BUN 12 07/17/2024    CREATININE 0.51 07/17/2024       Intake/Output Summary (Last 24 hours) at 7/17/2024 1555  Last data filed at 7/17/2024 1440  Gross per 24 hour   Intake 1339.83 ml   Output 920 ml   Net 419.83 ml     Allergies   Allergen Reactions    Hydrocodone-Acetaminophen Rash    Ibuprofen Rash     Tolerates aspirin     Current Facility-Administered Medications   Medication Dose Route Frequency Provider Last Rate Last Admin    acetaminophen (Tylenol) tablet 975 mg  975 mg oral q6h Mara Oro MD   975 mg at 07/17/24 1232    amiodarone (Pacerone) tablet 200 mg  200 mg oral Daily Mara Oro MD   200 mg at 07/17/24 1141    amoxicillin-pot clavulanate (Augmentin) 875-125 mg per tablet 1 tablet  1 tablet oral q12h TELMA Mara Oro MD   1 tablet at 07/17/24 1140    aspirin chewable tablet 81 mg  81 mg oral Daily Mara Oro MD   81 mg at 07/17/24 1141    atorvastatin (Lipitor) tablet 80 mg  80 mg oral Nightly Mara Oro MD   80 mg at 07/16/24 2021    bisacodyl (Dulcolax) suppository 10 mg  10 mg rectal Daily Mara Oro MD   10 mg at 06/25/24 1006    dextrose 50 % injection 12.5 g  12.5 g intravenous q15 min PRN Mara Oro MD   12.5 g at 06/25/24 1604    dextrose 50 % injection 25 g  25 g intravenous q15 min PRN Mara Oro MD   25 g at 06/20/24 1237    DULoxetine (Cymbalta) DR capsule 30 mg  30 mg oral Daily Mara Oro MD   30 mg at 07/16/24 0811    [Held by provider] empagliflozin (Jardiance) tablet 10 mg  10 mg oral Daily Carl Soares MD   10 mg at 07/12/24 0959    glucagon (Glucagen) injection 1 mg  1 mg intramuscular q15 min PRNILESH Oro MD        glucagon (Glucagen) injection 1 mg  1 mg intramuscular q15 min TONA Oro MD        heparin 25,000 Units in dextrose 5% 250 mL  (100 Units/mL) infusion (premix)  0-4,500 Units/hr intravenous Continuous Mara Oro MD 14 mL/hr at 07/17/24 1440 1,400 Units/hr at 07/17/24 1440    heparin bolus from bag 3,000-6,000 Units  3,000-6,000 Units intravenous q4h PRN Mara Oro MD   3,000 Units at 07/01/24 1414    HYDROmorphone (Dilaudid) injection 0.4 mg  0.4 mg intravenous q3h PRN Mara Oro MD   0.4 mg at 07/16/24 1504    lidocaine (Xylocaine) 5 % ointment   Topical PRN Mara Oro MD        melatonin tablet 6 mg  6 mg oral Daily Mara Oro MD   6 mg at 07/16/24 2020    metoprolol succinate XL (Toprol-XL) 24 hr tablet 25 mg  25 mg oral Nightly Mara Oro MD   25 mg at 07/16/24 2021    oxyCODONE (Roxicodone) immediate release tablet 10 mg  10 mg oral q6h PRN Mara Oro MD        pantoprazole (ProtoNix) EC tablet 40 mg  40 mg oral Daily before breakfast Mara Oro MD   40 mg at 07/17/24 1141    perflutren protein A microsphere (Optison) injection 0.5 mL  0.5 mL intravenous Once in imaging Mara Oro MD        polyethylene glycol (Glycolax, Miralax) packet 17 g  17 g oral BID Mara Oro MD   17 g at 07/15/24 2117    pregabalin (Lyrica) capsule 75 mg  75 mg oral Nightly Mara Oro MD   75 mg at 07/16/24 2021    sacubitriL-valsartan (Entresto) 24-26 mg per tablet 2 tablet  2 tablet oral BID Mara Oro MD   2 tablet at 07/17/24 1140    sennosides (Senokot) tablet 17.2 mg  2 tablet oral BID Mara Oro MD   17.2 mg at 07/16/24 0816    [Held by provider] warfarin (Coumadin) tablet 5 mg  5 mg oral Daily Adrianne Bacon MD   5 mg at 07/15/24 1756     Assessment/Plan   History of Present Illness  Amirah Bennett is a 42 y.o. female with significant PMHx of HFrEF (LVEF 20-25% (08/2022), Afib on Xarelto w/ DCCV 05/2023), ischemic stroke L MCA d/t AFib LLA thrombus seen on echo (lack of OAC adherence) s/p thrombectomy 01/2022 @ [facility] w/ residual expressive aphasia and R sided weakness, recent 03/2024 left cerebellar MCA, s/p tracheostomy, T2DM  (03/2024 HgbA1c 5.5) and HTN presenting with bilateral leg pain.      In the ED, she was noted to be tachycardic to 105 in triage and but then when placed on the monitor was found to be in atrial fibrillation with RVR with a rate between 150 and 190. She was given 5 mg of metoprolol with slight improvement of her rate but became hypotensive and symptomatic. She was started on heparin both for her A-fib and for possible DVT/PE. Lytics were not given because of the risk of potential intracranial hemorrhage after her recent stroke. Instead decision made to cardiovert the patient and also gave digoxin, and amiodarone. After cardioversion she still looked like she was in atrial fibrillation with RVR but only to a rate in the 120s and 130s. Her blood pressure improved. Levophed ordered along with calcium and magnesium. No evidence of infection. Patient admitted to the ICU with plan to get a CT angiogram of her chest to rule out PE and RHC. Ongoing work up revealed aortic bifurcation embolus, R BA-EIA embolus, SFA and popliteal emboli. Pt with acute right lower extremity ischemia, which is not salvageable, needing inflow procedure and R AKA given new motor deficits.     IVC filter placed 6/27/24, open R iliofemoral thromboembolectomy and R AKA completed 6/28/24. Ongoing bleeding from stump c/b need for heparin for other clots, being managed by primary team and vascular medicine. CTA RLE 7/2 with post-surgical changes, possible vascular injury, stable H&H after 2u pRBC. Pt now s/p right AKA. Right stump dehiscence 7/16 with clean out and wound vac placement 7/17.     6/21: Palliative consulted for pt support and GOC.       6/25: Palliative met for a family and care team meeting in pt's room. Discussed current findings via imaging/scans and labs, current complications, cardiac and circulation risks of sx and not doing sx. Pt wishes to move forward with sx. NOK/surrogates agreeable to pt's wishes. Needing cardiac  "optimization prior to sx. No current infection present.   Pt elects cousins Felicitas and Keli as surrogate decision makers. Keli mentions a recent MPOA completed and will bring in.      6/26: Possible plan for sx today? Palliative rounded on pt for questions/clarifications, positive presence/support, and symptom management. Pt still choosing sx to support her goal of \"keep going\". Pt speaking with evident anxiety. Reviewed anxiety reduction strategies. See recs below.     6/27: Pt calm today, feeling better sx was not yesterday and has had time to process. Tentative IVC filter today with amputation and thrombectomy tomorrow. Discussed pain, see recs below. Continued emotional support.     7/3: Rounded with pt and nursing. Pt happy, calm, cooperative, in no pain. Pt feeling happy with her decision that she went through with the amputation. Pointed out and happy with art and music therapies. Endorsed some issues with staying asleep, see recs below.     7/5: Recommendations for anxiety/depression made in respect to pt's Qtc. GOC discussion with MPOA. Pt remains full code to support pt's goals and wishes.      7/10: Spoke with pt, addressed symptoms of insomnia, pain/PLP anxiety/depression. Pt expressing troubles with coping with missing limb. Ongoing positive presence, enlightenment, advocacy, support, and encouragement. Please see recs below.    7/17: Spoke with pt about pain, experiences, and troubles. Offered an comforting environment, explained pt rights, and pt easily consoled. PLP no longer at night, feeling it during the day. Please see recs below.     Palliative Performance Scale (PPS): 30   " "  ----------------------------------------------------------------------------------------------------------------------------------------------------------------------------------------------------------------------------------------------------------------------------------------------------------------------------------------------------------------------        I spent  minutes in providing separately identifiable ACP services with the patient and/or surrogate decision maker in a voluntary conversation discussing the patient's wishes and goals as detailed in the above note.   ----------------------------------------------------------------------------------------------------------------------------------------------------------------------------------------------------------------------------------------------------------------------------------------------------------------------------------------------------------------------     #Complex Medical Decision Making  #Goals of Care  #Advanced Care Planning  - Code status: Full code  - Surrogate decision maker: Keli Osborne (cousin) [phone]. Clara Black (cousin) [phone].  - Goals are survival and improved quality of life.   6/21: Pt with notable expressive aphasia making open ended questioning difficult. Pt able to answer yes and no questions more easily. Pt demonstrating understanding/comprehension of questions asked. Pt agrees that Navin is MPOA and papers are signed. She is upset with him today but is unable to verbalize why. Palliative recapped medication nonadherence and pt agreeable that she stops taking them when she is depressed. When pt asked why she stops taking her medicine, pt starting spelling out her name and saying nonsensical words. Palliative expressed a worry that her depression is causing her to not to want to continue to live. Palliative asked is that is correct, pt stated \"no\". Palliative asked pt earlier about her goals, she " "stated \"to live\" and \"I can make it\". Pt was unable to articulate any further explanation of her statements.   Pt was asked if her breathing were to be compromised and she needed intubated again (trached) would she want that? Pt hesitated but stated \"yes\".      Palliative attempted to reach out to Navin x3. First call he answered, and said he was on the other line with a doctor and to call back at 1230. Palliative did and his phone went to . Pall called back around 1500 and again, right to . Pall then called Pranay to see if Navin has another number, and Pranay's went right to . Palliative left a message with call back number for Navin and Pranay.      Will attempt to discuss GOC with returned call from Navin. Otherwise Palliative can follow up Monday or Tuesday.      6/25: Palliative met for a family and care team meeting in pt's room. Those involved included Dr. Simon, Dr. Pat, Dannielle NP, Jennifer Palliative [place], pt, and cousins Felicitas and Keli, and second cousin Marianela. Discussed current findings via imaging/scans and labs, current clot complications, and cardiac and circulation standpoints. Pt now requiring right AKA for loss of circulation. Vascular explained risks of AKA sx and not doing sx. Pt wishes to move forward with sx. NOK/new surrogates (Clara and Keli) agreeable to pt's wishes. Vascular and primary team expressed needing cardiac optimization prior to sx. Pt does not currently have an active infection, making this a non-urgent decision. Potential sx in the next few days.   Pt elects cousins Clara and Keli as surrogate decision makers vs Navin, and both Felicitas and Keli expressing the want and willingness to fulfil that position. Keli mentions a recent MPOA was completed with pt and signed by a notadriana. Keli will bring in.   Palliative reiterated and realigned heard goals of continuing to do everything we are currently doing as life and continued aggressive measures are " "paramount. Pt and family state \"yes\".      7/5: Spoke to pt's CHERIE Keli today r/t follow up on GOC/tx limitations conversation started earlier this week, along with recommendations r/t pt's underlying depression.      Keli supportive of pt starting antidepressant understanding pt is consistently stating depression and a reason for her medication non-adherence.      Keli and Clara previously requesting time to think and talk over DNR/DNI. Palliative expressed high c/f progressive HF and educated to advanced state with low EF ~25%. Expressed concern for survival following a code and the chance of continued or improved QOL would be unlikely. Keli states that she and Clara spoke this over and agree that they and Amirah would want full resuscitative measures if her heart were to stop or go into a lethal arrhythmia. Keli agreeable if a resuscitation event were to occur, pt's team would be transparent and honest with pt's prognosis and expected trajectory and assist with guiding further care that would best honor pt. Pt remains full code.     Palliative Outpatient Navigator at discharge to assist with early identification and monitoring for HF symptoms- ordered.     #HFrEF  - Palliative Outpatient Navigator at discharge to assist with early identification/tx and monitoring for HF symptoms.  (ordered)     #Cough  - clear sputum and trach drainage.  - Left lung fields dim.  - Recommend cxr for evaluation.     #Acute Pain  - Is s/p Right AKA with thrombectomy.   - Now on Oxycodone 10mg Q6H prn pain (0 doses given).   - Has Hydromorphone 0.4mg IV Q3H prn BTP (2 doses given). Pt stating not making pain tolerable, does not feel any difference in pain level after taking.   - On Duloxetine 30mg daily for anxiety/depression may also assist with peripheral nerve pain and muscle/bone pain reduction. Consider increasing to Duloxetine 60mg daily after 7-10 days of use. May take 4-6 weeks for full pain effect.   "      #Phantom limb pain  - Pt stating still feeling pain of the right foot. States sharp, shooting and radiating to the right foot.   - Pt reporting more pain with PLP than the stump pain. Experiences sharp pains several times a day shooting pain to the right foot.  - Recommend Psych and Cognitive Behavioral Therapy as additive nonpharmacological modality. Currently on Lyrica 75mg nightly. No longer waking up from shooting RLE pain since the lyrica was started at [facility]. Recommend Lyrica 75mg BID dosing for daytime PLP management. May also assist in reduced opiate intake.  - Lyrica may prolong Qtc. Please evaluate current Qtc with EKG.  - Beta blockers are known to help PLP along with antidepressants and AEDs. Pt on Metoprolol and Duloxetine.        #Coping with amputation  #Disfigurement  - Recommend Psych and Cognitive Behavioral Therapy as additive nonpharmacological modality for coping, anxiety/depression and PLP treatment.         #Depression  #Anxiety  - Recommend Psychiatry consult/CBT.  - C/f vicious cycle of ongoing depressive symptoms and medication non-adherence ISO worsened health and possible QOL. Currently on Duloxetine 30mg daily, started 7/5. Not known to prolong QTc. Latest EKG 6/22/2024 Qtc 469ms. Recommend repeat EKG for latest evaluation.   - 7/10, reports still feeling depressed and anxious, has taken 5 doses to date. Consider Duloxetine 60mg daily after 7-10 days of use. May take 4-6 weeks for full effect.  - 7/17, endorses a bad interaction with staff that worsened her depression and anxiety.         #Insomnia  - Previously endorsed trouble falling and staying asleep. Was on Melatonin 6mg PO PRN sleep and scheduled Melatonin 6mg at 1800. Reports sleeping very well on this regimen.   - 7/10, noted only on daily at 1800. Please reinstate previous regimen as pt is stating trouble falling and staying asleep again.  - Endorses no longer waking up from shooting rle pain since the lyrica was started at  [facility].     #Psychosocial Support  - Ongoing pt and family support, pt advocacy, positive presence and emotional support.  - Music and Art Therapy  - Spiritual Care Support        Plan of Care discussed with: Updated primary team and bedside RN on goals of care decision, medication adjustments, and code status      Medical Decision Making was high level due to high complexity of problems, extensive data review, and high risk of management/treatment.     - Cardiogenic shock, shock liver, acute kidney injury requiring inotrope cardiac support, multiple emboli and acute loss of right lower extremity perfusion requiring AKA, worsening leukocytosis, s/p right AKA c/b post op bleeding and fall, and dehiscence, posing threat to life and function.  - Reviewed external notes from   - Reviews results from  which were used in decision making for   - Recommended the following tests: EKG for monitoring qtc s/p SSRI and starting lyrica. CXR for cough and dim lung fields  - Assessment required independent historian: nursing and primary team  - Independent interpretation of test: labs   - Discussion of management with: primary.   - Drug therapy requiring intensive monitoring for toxicity: heparin.  - Decision regarding elective major surgery with identified patient or procedure risk factors:   - Decision regarding emergency major surgery:   - Decision regarding hospitalization or escalation of hospital-level care:   - Decision not to resuscitate or to de-escalate care because of poor prognosis:   - Parenteral controlled substances: heparin, dilaudid prn      Thank you for allowing us to participate in the care of this patient. Palliative will continue to follow as needed. Palliative medicine is available Monday-Friday, 8a-6p. Please contact team with any questions or concerns.  Team pager [phone] (weekdays)  JAE Evangelista-CNP

## 2024-07-17 NOTE — ANESTHESIA POSTPROCEDURE EVALUATION
Patient: Amirah Bennett    Procedure Summary       Date: 07/17/24 Room / Location: [facility] OR 16 / Virtual [facility] OR    Anesthesia Start: 0835 Anesthesia Stop: 0945    Procedure: Right stump revision, wound vac placement (Right) Diagnosis:       Wound dehiscence      (Wound dehiscence [T81.30XA])    Surgeons: Rizwana De La Rosa MD Responsible Provider: Susana Augustin MD    Anesthesia Type: general ASA Status: 4            Anesthesia Type: general    Vitals Value Taken Time   /59 07/17/24 1008   Temp 36.4 07/17/24 1008   Pulse 76 07/17/24 1008   Resp 17 07/17/24 1008   SpO2 95 07/17/24 1008       Anesthesia Post Evaluation    Patient location during evaluation: PACU  Patient participation: complete - patient participated  Level of consciousness: awake  Pain management: adequate  Airway patency: patent  Cardiovascular status: stable  Respiratory status: room air  Hydration status: stable  Postoperative Nausea and Vomiting: none        There were no known notable events for this encounter.

## 2024-07-17 NOTE — CONSULTS
Consults  Acute Pain Service  Amirah Bennett is a 43 y.o. year old female patient who presents for Revision of R stump with Dr. De La Rosa. Acute Pain consulted for block for postoperative pain control.     Anticipated Postop Pain Issues -   Palliative: typically relieved with IV analgesics and regional local anesthetics  Provocative: typically with movement  Quality: typically burning and aching  Radiation: typically none  Severity: typically severe 8-10/10  Timing: typically constant    Past Medical History:   Diagnosis Date    CHF (congestive heart failure) (Multi)     Stroke (Multi)         Past Surgical History:   Procedure Laterality Date    CARDIAC CATHETERIZATION N/A 2024    Procedure: Right Heart Cath;  Surgeon: Osman Su MD;  Location: [place] [address] [facility];  Service: Cardiovascular;  Laterality: N/A;    INVASIVE VASCULAR PROCEDURE N/A 2024    Procedure: Arterial  Access, Intracatheter;  Surgeon: Osman Su MD;  Location: [place] [address] [facility];  Service: Cardiovascular;  Laterality: N/A;    OTHER SURGICAL HISTORY  2022    Trachectomy        No family history on file.     Social History     Socioeconomic History    Marital status: Single     Spouse name: Not on file    Number of children: Not on file    Years of education: Not on file    Highest education level: Not on file   Occupational History    Not on file   Tobacco Use    Smoking status: Former     Current packs/day: 0.00     Types: Cigarettes     Quit date: 2023     Years since quittin.6    Smokeless tobacco: Not on file   Substance and Sexual Activity    Alcohol use: Yes    Drug use: Not on file    Sexual activity: Not on file   Other Topics Concern    Not on file   Social History Narrative    Not on file     Social Determinants of Health     Financial Resource Strain: Low Risk  (2024)    Overall Financial Resource Strain (CARDIA)     Difficulty of Paying Living Expenses: Not very hard   Food  Insecurity: Food Insecurity Present (6/26/2024)    Hunger Vital Sign     Worried About Running Out of Food in the Last Year: Sometimes true     Ran Out of Food in the Last Year: Sometimes true   Transportation Needs: No Transportation Needs (7/6/2024)    PRAPARE - Transportation     Lack of Transportation (Medical): No     Lack of Transportation (Non-Medical): No   Recent Concern: Transportation Needs - Unmet Transportation Needs (6/26/2024)    PRAPARE - Transportation     Lack of Transportation (Medical): Yes     Lack of Transportation (Non-Medical): Yes   Physical Activity: Inactive (6/26/2024)    Exercise Vital Sign     Days of Exercise per Week: 0 days     Minutes of Exercise per Session: 0 min   Stress: No Stress Concern Present (6/26/2024)    [employer] - Occupational Stress Questionnaire     Feeling of Stress : Only a little   Social Connections: Socially Isolated (6/26/2024)    Social Connection and Isolation Panel [NHANES]     Frequency of Communication with Friends and Family: Twice a week     Frequency of Social Gatherings with Friends and Family: Twice a week     Attends Jew Services: Never     Active Member of Clubs or Organizations: No     Attends Club or Organization Meetings: Never     Marital Status: Never    Intimate Partner Violence: Not At Risk (6/26/2024)    Humiliation, Afraid, Rape, and Kick questionnaire     Fear of Current or Ex-Partner: No     Emotionally Abused: No     Physically Abused: No     Sexually Abused: No   Housing Stability: Low Risk  (7/6/2024)    Housing Stability Vital Sign     Unable to Pay for Housing in the Last Year: No     Number of Places Lived in the Last Year: 1     Unstable Housing in the Last Year: No        Allergies   Allergen Reactions    Hydrocodone-Acetaminophen Rash    Ibuprofen Rash     Tolerates aspirin         Review of Systems  Gen: No fatigue, anorexia, insomnia, fever.   Eyes: No vision loss, double vision,  drainage, eye pain.   ENT: No pharyngitis, dry mouth, no hearing changes or ear discharge  Cardiac: No chest pain, palpitations, syncope, near syncope.   Pulmonary: No shortness of breath, cough, hemoptysis.   Heme/lymph: No swollen glands, fever, bleeding.   GI: No abdominal pain, change in bowel habits, melena, hematemesis, hematochezia, nausea, vomiting, diarrhea.   : No discharge, dysuria, frequency, urgency, hematuria.  Endo: No polyuria or weight loss.   Musculoskeletal: Negative for any pain or loss of ROM/weakness  Skin: No rashes or lesions  Neuro: Normal speech, no numbness or weakness. No gait difficulties  Review of systems is otherwise negative unless stated above or in history of present illness.    Physical Exam:  Constitutional:  no distress, alert and cooperative  Eyes: clear sclera  Head/Neck: No apparent injury, trachea midline  Respiratory/Thorax: Patent airways, thorax symmetric, breathing comfortably  Cardiovascular: no pitting edema  Gastrointestinal: Nondistended  Musculoskeletal: ROM intact  Extremities: sp R amputation  Neurological: alert, fuller x4  Psychological: Appropriate affect    Results for orders placed or performed during the hospital encounter of 06/20/24 (from the past 24 hour(s))   POCT GLUCOSE   Result Value Ref Range    POCT Glucose 116 (H) 74 - 99 mg/dL   Heparin Assay, [facility]   Result Value Ref Range    Heparin Unfractionated 0.6 See Comment Below for Therapeutic Ranges IU/mL   POCT GLUCOSE   Result Value Ref Range    POCT Glucose 140 (H) 74 - 99 mg/dL   POCT GLUCOSE   Result Value Ref Range    POCT Glucose 127 (H) 74 - 99 mg/dL   Heparin Assay, [facility]FH   Result Value Ref Range    Heparin Unfractionated 0.5 See Comment Below for Therapeutic Ranges IU/mL   POCT GLUCOSE   Result Value Ref Range    POCT Glucose 98 74 - 99 mg/dL   POCT GLUCOSE   Result Value Ref Range    POCT Glucose 94 74 - 99 mg/dL      Plan:  - R femoral and sciatic nerve blocks performed intraop on 7/17  - Pain  medications per primary team  - Will see on POD1 if inpatient    Acute Pain Team  pg [phone] ph [phone].

## 2024-07-17 NOTE — BRIEF OP NOTE
Date: 2024  OR Location: [facility] OR    Name: Amirah Bennett, : 1980, Age: 43 y.o., MRN: [id number], Sex: female    Diagnosis  Pre-op Diagnosis      * Wound dehiscence [T81.30XA] Post-op Diagnosis     * Wound dehiscence [T81.30XA]     Procedures  Right stump revision, wound vac placement  38361 - ID AMP THIGH THRU FEMUR SEC CLOSURE/SCAR REVISION  Right lower extremity stump debridement  Irrigation of wound  Application of negative pressure system (Wound Vac)    Surgeons      * Rizwana De La Rosa - Primary    Resident/Fellow/Other Assistant:  Surgeons and Role:     * Mara Oro MD - Resident - Assisting    Procedure Summary  Anesthesia: Anesthesia type not filed in the log.  ASA: IV  Anesthesia Staff: Anesthesiologist: Susana Augustin MD  C-AA: CORNELIA Krause  Estimated Blood Loss: 20mL  Intra-op Medications: * Intraprocedure medication information is unavailable because the case start and end events have not been set *           Anesthesia Record               Intraprocedure I/O Totals       None           Specimen: No specimens collected     Staff:   Circulator: Dee Moodyub Person: Nadia  Scrub Person: Areli    Findings: Old hematoma product, dehiscence of superficial dermal and fascial planes. Opened length of incision with sharp debridement of necrotic skin edges to healthy margins. Deeper fascial sutures intact. Exposed tissue healthy, no exposed bone, no pockets of necrotic cavities or purulence. Irrigated with irrisept.  Wound Dimensions: 28cm (length) by 4cm (height) by 3cm (depth)  Wound vac applied with single black sponge at -120 pressure    Complications:  None; patient tolerated the procedure well.     Disposition: PACU - hemodynamically stable.  Condition: stable  Specimens Collected: No specimens collected  Attending Attestation:     Rizwana De La Rosa  Phone Number: [phone]

## 2024-07-17 NOTE — PROCEDURES
Peripheral Block    Patient location during procedure: OR  Start time: 7/17/2024 8:42 AM  End time: 7/17/2024 9:05 AM  Reason for block: at surgeon's request and post-op pain management  Staffing  Performed: resident   Authorized by: Marshal Alcocer MD    Performed by: Marshal Alcocer MD  Preanesthetic Checklist  Completed: patient identified, IV checked, site marked, risks and benefits discussed, surgical consent, monitors and equipment checked, pre-op evaluation and timeout performed   Timeout performed at: 7/17/2024 8:42 AM  Peripheral Block  Patient position: laying flat  Prep: ChloraPrep  Patient monitoring: heart rate and continuous pulse ox  Block type: sciatic and femoral  Laterality: right  Injection technique: single-shot  Guidance: ultrasound guided  Local infiltration: ropivacaine  Infiltration strength: 0.5 %  Dose: 30 mL  Needle  Needle type: Tuohy   Needle gauge: 26 G  Needle length: 8 cm  Needle localization: ultrasound guidance     image stored in chart  Assessment  Injection assessment: negative aspiration for heme, no paresthesia on injection, incremental injection and local visualized surrounding nerve on ultrasound  Paresthesia pain: none  Heart rate change: yes  Additional Notes  Sciatic and femoral nerve block: Informed consent obtained.  Risks, benefits, and alternatives discussed.  ASA monitors placed and timeout performed.  Patient induced and intubated in the OR. Patient positioned, prepped with chlorhexidine, and draped with sterile towels.  Ultrasound guidance was used to visualize the tibia and common peroneal nerves at the popliteal fossa and surrounding structures and probe moved cephalad with visualization of the needle throughout duration of the procedure.  Aspiration was negative.  20 cc of 0.5% ropivacaine, dexamethasone 4 mg, and 1:200,000 epinephrine injected.  Probe placed in groin with visualization of femoral nerve, artery, and vein. Under ultrasound guidance needle  advanced to femoral nerve. Aspiration negative. 10cc 0.5% ropivacaine, dexamethasone 4 mg, and 1:200,000 epinephrine injected.    Timeout by OR RN

## 2024-07-17 NOTE — ANESTHESIA PREPROCEDURE EVALUATION
Patient: Amirah Bennett    Procedure Information       Date/Time: 07/17/24 0815    Procedure: Revision Stump Leg (Right)    Location: [facility] OR 16 / [facility]    Surgeons: Rizwana De La Rosa MD            Relevant Problems   Cardiac   (+) Acute lower extremity ischemia   (+) Atrial fibrillation (Multi)   (+) CHF (congestive heart failure) (Multi)   (+) Critical limb ischemia of right lower extremity (Multi)   (+) Primary hypertension      Pulmonary  Trach site unknown when removed      Neuro   (+) Intracranial hemorrhage (Multi)   (+) Schizophrenia (Multi)   (+) Stroke (Multi)      GI   (+) Dysphagia following cerebral infarction      Liver   (+) Elevated LFTs      Hematology   (+) Deep vein thrombosis (DVT) of lower extremity (Multi)   (+) Thrombocytopenia (CMS-HCC)       Clinical information reviewed:    Allergies  Meds               NPO Detail:  No data recorded     Physical Exam    Airway  Mallampati: III  TM distance: >3 FB  Neck ROM: full     Cardiovascular   Rhythm: irregular  Rate: normal  (+) weak pulses     Dental - normal exam     Pulmonary   Comments: Has previous trach site    Abdominal          Anesthesia Plan    History of general anesthesia?: yes  History of complications of general anesthesia?: no    ASA 4     general     intravenous induction   Postoperative administration of opioids is intended.  Trial extubation is planned.  Anesthetic plan and risks discussed with patient.  Use of blood products discussed with patient who consented to blood products.    Plan discussed with attending.       Erythromycin Counseling:  I discussed with the patient the risks of erythromycin including but not limited to GI upset, allergic reaction, drug rash, diarrhea, increase in liver enzymes, and yeast infections. High Dose Vitamin A Counseling: Side effects reviewed, pt to contact office should one occur. Topical Retinoid Pregnancy And Lactation Text: This medication is Pregnancy Category C. It is unknown if this medication is excreted in breast milk. Winlevi Counseling:  I discussed with the patient the risks of topical clascoterone including but not limited to erythema, scaling, itching, and stinging. Patient voiced their understanding. Bactrim Counseling:  I discussed with the patient the risks of sulfa antibiotics including but not limited to GI upset, allergic reaction, drug rash, diarrhea, dizziness, photosensitivity, and yeast infections.  Rarely, more serious reactions can occur including but not limited to aplastic anemia, agranulocytosis, methemoglobinemia, blood dyscrasias, liver or kidney failure, lung infiltrates or desquamative/blistering drug rashes. Tetracycline Pregnancy And Lactation Text: This medication is Pregnancy Category D and not consider safe during pregnancy. It is also excreted in breast milk. Dapsone Counseling: I discussed with the patient the risks of dapsone including but not limited to hemolytic anemia, agranulocytosis, rashes, methemoglobinemia, kidney failure, peripheral neuropathy, headaches, GI upset, and liver toxicity.  Patients who start dapsone require monitoring including baseline LFTs and weekly CBCs for the first month, then every month thereafter.  The patient verbalized understanding of the proper use and possible adverse effects of dapsone.  All of the patient's questions and concerns were addressed. Topical Sulfur Applications Counseling: Topical Sulfur Counseling: Patient counseled that this medication may cause skin irritation or allergic reactions.  In the event of skin irritation, the patient was advised to reduce the amount of the drug applied or use it less frequently.   The patient verbalized understanding of the proper use and possible adverse effects of topical sulfur application.  All of the patient's questions and concerns were addressed. Detail Level: Zone Tazorac Counseling:  Patient advised that medication is irritating and drying.  Patient may need to apply sparingly and wash off after an hour before eventually leaving it on overnight.  The patient verbalized understanding of the proper use and possible adverse effects of tazorac.  All of the patient's questions and concerns were addressed. High Dose Vitamin A Pregnancy And Lactation Text: High dose vitamin A therapy is contraindicated during pregnancy and breast feeding. Dapsone Pregnancy And Lactation Text: This medication is Pregnancy Category C and is not considered safe during pregnancy or breast feeding. Include Pregnancy/Lactation Warning?: No Erythromycin Pregnancy And Lactation Text: This medication is Pregnancy Category B and is considered safe during pregnancy. It is also excreted in breast milk. Benzoyl Peroxide Counseling: Patient counseled that medicine may cause skin irritation and bleach clothing.  In the event of skin irritation, the patient was advised to reduce the amount of the drug applied or use it less frequently.   The patient verbalized understanding of the proper use and possible adverse effects of benzoyl peroxide.  All of the patient's questions and concerns were addressed. Spironolactone Counseling: Patient advised regarding risks of diarrhea, abdominal pain, hyperkalemia, birth defects (for female patients), liver toxicity and renal toxicity. The patient may need blood work to monitor liver and kidney function and potassium levels while on therapy. The patient verbalized understanding of the proper use and possible adverse effects of spironolactone.  All of the patient's questions and concerns were addressed. Bactrim Pregnancy And Lactation Text: This medication is Pregnancy Category D and is known to cause fetal risk.  It is also excreted in breast milk. Winlevi Pregnancy And Lactation Text: This medication is considered safe during pregnancy and breastfeeding. Minocycline Counseling: Patient advised regarding possible photosensitivity and discoloration of the teeth, skin, lips, tongue and gums.  Patient instructed to avoid sunlight, if possible.  When exposed to sunlight, patients should wear protective clothing, sunglasses, and sunscreen.  The patient was instructed to call the office immediately if the following severe adverse effects occur:  hearing changes, easy bruising/bleeding, severe headache, or vision changes.  The patient verbalized understanding of the proper use and possible adverse effects of minocycline.  All of the patient's questions and concerns were addressed. Topical Sulfur Applications Pregnancy And Lactation Text: This medication is Pregnancy Category C and has an unknown safety profile during pregnancy. It is unknown if this topical medication is excreted in breast milk. Tazorac Pregnancy And Lactation Text: This medication is not safe during pregnancy. It is unknown if this medication is excreted in breast milk. Isotretinoin Counseling: Patient should get monthly blood tests, not donate blood, not drive at night if vision affected, not share medication, and not undergo elective surgery for 6 months after tx completed. Side effects reviewed, pt to contact office should one occur. Birth Control Pills Counseling: Birth Control Pill Counseling: I discussed with the patient the potential side effects of OCPs including but not limited to increased risk of stroke, heart attack, thrombophlebitis, deep venous thrombosis, hepatic adenomas, breast changes, GI upset, headaches, and depression.  The patient verbalized understanding of the proper use and possible adverse effects of OCPs. All of the patient's questions and concerns were addressed. Benzoyl Peroxide Pregnancy And Lactation Text: This medication is Pregnancy Category C. It is unknown if benzoyl peroxide is excreted in breast milk. Doxycycline Counseling:  Patient counseled regarding possible photosensitivity and increased risk for sunburn.  Patient instructed to avoid sunlight, if possible.  When exposed to sunlight, patients should wear protective clothing, sunglasses, and sunscreen.  The patient was instructed to call the office immediately if the following severe adverse effects occur:  hearing changes, easy bruising/bleeding, severe headache, or vision changes.  The patient verbalized understanding of the proper use and possible adverse effects of doxycycline.  All of the patient's questions and concerns were addressed. Azelaic Acid Counseling: Patient counseled that medicine may cause skin irritation and to avoid applying near the eyes.  In the event of skin irritation, the patient was advised to reduce the amount of the drug applied or use it less frequently.   The patient verbalized understanding of the proper use and possible adverse effects of azelaic acid.  All of the patient's questions and concerns were addressed. Azithromycin Counseling:  I discussed with the patient the risks of azithromycin including but not limited to GI upset, allergic reaction, drug rash, diarrhea, and yeast infections. Spironolactone Pregnancy And Lactation Text: This medication can cause feminization of the male fetus and should be avoided during pregnancy. The active metabolite is also found in breast milk. Isotretinoin Pregnancy And Lactation Text: This medication is Pregnancy Category X and is considered extremely dangerous during pregnancy. It is unknown if it is excreted in breast milk. Topical Clindamycin Counseling: Patient counseled that this medication may cause skin irritation or allergic reactions.  In the event of skin irritation, the patient was advised to reduce the amount of the drug applied or use it less frequently.   The patient verbalized understanding of the proper use and possible adverse effects of clindamycin.  All of the patient's questions and concerns were addressed. Birth Control Pills Pregnancy And Lactation Text: This medication should be avoided if pregnant and for the first 30 days post-partum. Topical Retinoid counseling:  Patient advised to apply a pea-sized amount only at bedtime and wait 30 minutes after washing their face before applying.  If too drying, patient may add a non-comedogenic moisturizer. The patient verbalized understanding of the proper use and possible adverse effects of retinoids.  All of the patient's questions and concerns were addressed. Doxycycline Pregnancy And Lactation Text: This medication is Pregnancy Category D and not consider safe during pregnancy. It is also excreted in breast milk but is considered safe for shorter treatment courses. Azithromycin Pregnancy And Lactation Text: This medication is considered safe during pregnancy and is also secreted in breast milk. Tetracycline Counseling: Patient counseled regarding possible photosensitivity and increased risk for sunburn.  Patient instructed to avoid sunlight, if possible.  When exposed to sunlight, patients should wear protective clothing, sunglasses, and sunscreen.  The patient was instructed to call the office immediately if the following severe adverse effects occur:  hearing changes, easy bruising/bleeding, severe headache, or vision changes.  The patient verbalized understanding of the proper use and possible adverse effects of tetracycline.  All of the patient's questions and concerns were addressed. Patient understands to avoid pregnancy while on therapy due to potential birth defects. Azelaic Acid Pregnancy And Lactation Text: This medication is considered safe during pregnancy and breast feeding. Sarecycline Counseling: Patient advised regarding possible photosensitivity and discoloration of the teeth, skin, lips, tongue and gums.  Patient instructed to avoid sunlight, if possible.  When exposed to sunlight, patients should wear protective clothing, sunglasses, and sunscreen.  The patient was instructed to call the office immediately if the following severe adverse effects occur:  hearing changes, easy bruising/bleeding, severe headache, or vision changes.  The patient verbalized understanding of the proper use and possible adverse effects of sarecycline.  All of the patient's questions and concerns were addressed. Topical Clindamycin Pregnancy And Lactation Text: This medication is Pregnancy Category B and is considered safe during pregnancy. It is unknown if it is excreted in breast milk.

## 2024-07-17 NOTE — ANESTHESIA PROCEDURE NOTES
Peripheral IV  Date/Time: 7/17/2024 8:46 AM  Inserted by: CORNELIA Krause    Placement  Needle size: 18 G  Laterality: right  Location: hand  Local anesthetic: none  Site prep: alcohol  Technique: anatomical landmarks  Attempts: 1

## 2024-07-17 NOTE — PROGRESS NOTES
Art Therapy Note    Amirah Bennett    Therapy Session  Referral Type: New referral this admission  Visit Type: Follow-up visit  Session Start Time: 1434  Session End Time: 1441  Intervention Delivery: In-person  Conflict of Service: Declined treatment              Treatment/Interventions       Post-assessment  Total Session Time (min): 7 minutes    Narrative  Assessment Detail: Pt was lying in bed with painful facial expression when ATR attempted session. Pt nodded that was in pain when ATR enquired. ATR provided pt fresh ice water to drink, after asking pt if she needed anything. ATR also moved tres-potty and placed stool in front per pt's request. Pt very appreciative. ATR will f/u another time.    Education Documentation  No documentation found.

## 2024-07-17 NOTE — SIGNIFICANT EVENT
POSTOPERATIVE CHECK  S:   Patient reports minimal pain at right stump. Eating food on exam. No new symptoms, no complaints. In good mood.     O:   Vital signs are stable, normotensive, afebrile, no new or worsening oxygen requirement, not tachycardic  Visit Vitals  BP 90/61 (BP Location: Right arm, Patient Position: Lying)   Pulse 89   Temp 36.8 °C (98.2 °F) (Temporal)   Resp 18      Constitutional: no acute distress, conversational  Skin: warm and dry overall   Neuro: A/O x4, no motor or sensory deficits noted  Cardiac: Regular rate  Pulmonary: Unlabored respirations on RA  Extremities: Right stump with wound vac. Good suction, scant drainage in cannister. No obvious hematoma. Soft compartments.     A/P:  Amirah Bennett is a 43 y.o.yo female s/p right stump debridement and wound vac application.     - continue wound vac at 125mmHg  - okay to restart coumadin tonight  - no activity restrictions  - pain control with kwasi tylenol, oxy 5/10 q4h, dilaudid for breakthrough  - will need home going wound vac system    Mara Oro MD  PGY1 Vascular surgery resident  [id number]

## 2024-07-17 NOTE — CARE PLAN
Problem: Skin  Goal: Decreased wound size/increased tissue granulation at next dressing change  7/17/2024 1711 by Teetee Cervantes RN  Outcome: Progressing  Flowsheets (Taken 7/17/2024 1711)  Decreased wound size/increased tissue granulation at next dressing change: Promote sleep for wound healing  7/17/2024 1710 by Teetee Cervantes RN  Outcome: Progressing  Goal: Participates in plan/prevention/treatment measures  7/17/2024 1711 by Teetee Cervantes RN  Outcome: Progressing  Flowsheets (Taken 7/4/2024 0334 by Lavern Valenzuela RN)  Participates in plan/prevention/treatment measures:   Elevate heels   Increase activity/out of bed for meals   Discuss with provider PT/OT consult  7/17/2024 1710 by Teetee Cervantes RN  Outcome: Progressing  Goal: Prevent/manage excess moisture  7/17/2024 1711 by Teetee Cervantes RN  Outcome: Progressing  Flowsheets (Taken 7/17/2024 1711)  Prevent/manage excess moisture: Cleanse incontinence/protect with barrier cream  7/17/2024 1710 by Teetee Cervantes RN  Outcome: Progressing  Goal: Prevent/minimize sheer/friction injuries  7/17/2024 1711 by Teetee Cervantes RN  Outcome: Progressing  Flowsheets (Taken 7/17/2024 1711)  Prevent/minimize sheer/friction injuries:   HOB 30 degrees or less   Turn/reposition every 2 hours/use positioning/transfer devices  7/17/2024 1710 by eTetee Cervantes RN  Outcome: Progressing  Goal: Promote/optimize nutrition  7/17/2024 1711 by Teetee Cervantes RN  Outcome: Progressing  Flowsheets (Taken 7/17/2024 1711)  Promote/optimize nutrition: Consume > 50% meals/supplements  7/17/2024 1710 by Teetee Cervantes RN  Outcome: Progressing  Goal: Promote skin healing  7/17/2024 1711 by Teetee Cervantes RN  Outcome: Progressing  Flowsheets (Taken 7/17/2024 1711)  Promote skin healing:   Assess skin/pad under line(s)/device(s)   Turn/reposition every 2 hours/use positioning/transfer devices  7/17/2024 1710 by Teetee Cervantes RN  Outcome: Progressing     Problem: Pain - Adult  Goal: Verbalizes/displays adequate  comfort level or baseline comfort level  7/17/2024 1711 by Teetee Cervantes RN  Outcome: Progressing  7/17/2024 1710 by Teetee Cervantes RN  Outcome: Progressing     Problem: Safety - Adult  Goal: Free from fall injury  7/17/2024 1711 by Teetee Ceravntes RN  Outcome: Progressing  7/17/2024 1710 by Teetee Cervantes RN  Outcome: Progressing     Problem: Heart Failure  Goal: Improved gas exchange this shift  7/17/2024 1711 by Teetee Cervantes RN  Outcome: Progressing  7/17/2024 1710 by Teetee Cervantes RN  Outcome: Progressing  Goal: Improved urinary output this shift  7/17/2024 1711 by Teetee Cervantes RN  Outcome: Progressing  7/17/2024 1710 by Teetee Cervantes RN  Outcome: Progressing  Goal: Reduction in peripheral edema within 24 hours  7/17/2024 1711 by Teetee Cervantes RN  Outcome: Progressing  7/17/2024 1710 by Teetee Cervantes RN  Outcome: Progressing  Goal: Report improvement of dyspnea/breathlessness this shift  7/17/2024 1711 by Teetee Cervantes RN  Outcome: Progressing  7/17/2024 1710 by Teetee Cervantes RN  Outcome: Progressing  Goal: Weight from fluid excess reduced over 2-3 days, then stabilize  7/17/2024 1711 by Teetee Cervantes RN  Outcome: Progressing  7/17/2024 1710 by Teetee Cervantes RN  Outcome: Progressing  Goal: Increase self care and/or family involvement in 24 hours  7/17/2024 1711 by Teetee Cervantes RN  Outcome: Progressing  7/17/2024 1710 by Teetee Cervantes RN  Outcome: Progressing     Problem: Fall/Injury  Goal: Not fall by end of shift  7/17/2024 1711 by Teetee Cervantes RN  Outcome: Progressing  7/17/2024 1710 by Teetee Cervantes RN  Outcome: Progressing  Goal: Be free from injury by end of the shift  7/17/2024 1711 by Teetee Cervantes RN  Outcome: Progressing  7/17/2024 1710 by Teetee Cervantes RN  Outcome: Progressing  Goal: Verbalize understanding of personal risk factors for fall in the hospital  7/17/2024 1711 by Teetee Cervantes RN  Outcome: Progressing  7/17/2024 1710 by Teetee Cervantes RN  Outcome: Progressing  Goal: Verbalize understanding of risk factor  reduction measures to prevent injury from fall in the home  7/17/2024 1711 by Teetee Cervantes RN  Outcome: Progressing  7/17/2024 1710 by Teetee Cervantes RN  Outcome: Progressing  Goal: Use assistive devices by end of the shift  7/17/2024 1711 by Teetee Cervantes RN  Outcome: Progressing  7/17/2024 1710 by Teetee Cervantes RN  Outcome: Progressing  Goal: Pace activities to prevent fatigue by end of the shift  7/17/2024 1711 by Teetee Cervantes RN  Outcome: Progressing  7/17/2024 1710 by Teetee Cervantes RN  Outcome: Progressing    Pt HDS, had debridement of R AKA. Restarted heparin drip, plan to bridge to coumadin.

## 2024-07-17 NOTE — INTERVAL H&P NOTE
H&P reviewed. The patient was examined and there are no changes to the H&P.    Stump incision with lateral wound dehiscence. Plan for RLE wound washout, debridement, and possible vac placement. Patient amenable to procedure. Risks, benefits, and alternatives discussed.     Mara Oro MD  PGY-2 Vascular Surgery Resident  [id number]

## 2024-07-17 NOTE — SIGNIFICANT EVENT
Post Op Plan of Care Note    S/P RLE stump debridement, opening of wound and irrigation, application of wound vac. Received pre op R femoral and sciatic nerve blocks by anesthesia/pain team.     Recommendations  - okay for diet when back on floor  - continue wound vac at 125mmHg  - restart heparin gtt 4 hours post op (2:30PM)  - continue ancef [id number]  - no activity restrictions  - pain control with kwasi tylenol, oxy 5/10 q4h, dilaudid for breakthrough  - will need home going wound vac system    Follow up: Will see patient in Dr. Newman clinic. Our team to arrange.     Mara Oro MD  PGY-2 Vascular Surgery Resident  [id number]

## 2024-07-17 NOTE — PROGRESS NOTES
Daily Progress Note    Amirah Bennett is a 43 y.o. female admitted on 6/20/2024  6:40 AM with Cardiogenic shock.     Overnight:  No acute events reported.    Subjective   Patient seen and examined at bedside.  Patient states that she is doing well overall.  Patient understands plans to have right AKA incision revised today in the OR with vascular surgery.  Patient remains concerned with the amount of pain from the right stump.  After returning from the OR, patient says that the pain is better controlled and she feels that she is doing well overall.  No other complaints or concerns today.    Objective   Vitals: I/O:   Vitals:    07/17/24 1100   BP: 102/65   Pulse: 79   Resp: 18   Temp: 36.4 °C (97.5 °F)   SpO2: 96%        24hr Min/Max:  Temp  Min: 36 °C (96.8 °F)  Max: 37.2 °C (99 °F)  Pulse  Min: 70  Max: 84  BP  Min: 94/50  Max: 132/83  Resp  Min: 15  Max: 20  SpO2  Min: 96 %  Max: 100 %   Intake/Output Summary (Last 24 hours) at 7/17/2024 1317  Last data filed at 7/17/2024 1100  Gross per 24 hour   Intake 780 ml   Output 1720 ml   Net -940 ml        Net IO Since Admission: -46,363.71 mL [07/17/24 1317]      PE:  -Patient was conscious, oriented x4, with expressive aphasia. Not in acute respiratory distress, not in jaundiced nor cyanosed.    -Cardiovascular examination: Audible 1st and 2nd heart sound, no murmurs  -Lower limb examination: amputated right above knee amputation is edematous, Clean AKA dressing present with ACE bandage.  No gross bleeding. No pitting edema in LLE or signs of deep vein thrombosis.    -Chest examination: Lungs clear to auscultation bilaterally.  -Abdominal examination: Soft and lax abdomen, no rigidity nor rebound tenderness.   -Neurological examination: limited, symmetrical facial impression, equally reactive pupils, right hand weakness.          Labs:  CBC RFP   Lab Results   Component Value Date    WBC 14.0 (H) 07/17/2024    HGB 8.9 (L) 07/17/2024    HCT 28.9 (L) 07/17/2024    MCV  99 07/17/2024     07/17/2024    NEUTROABS 10.54 (H) 07/17/2024    Lab Results   Component Value Date     (L) 07/17/2024    K 4.4 07/17/2024    CL 98 07/17/2024    CO2 24 07/17/2024    BUN 12 07/17/2024    CREATININE 0.51 07/17/2024    CREATININE 0.57 07/16/2024     Lab Results   Component Value Date    MG 1.56 (L) 07/17/2024    PHOS 4.9 07/17/2024    CALCIUM 9.4 07/17/2024         Hepatic Function ABG/VBG   Lab Results   Component Value Date    ALT 20 07/10/2024    AST 20 07/10/2024    ALKPHOS 70 07/10/2024     Lab Results   Component Value Date    BILIDIR 0.7 (H) 07/10/2024      Lab Results   Component Value Date    PROTIME 19.9 (H) 07/17/2024    APTT 88 (H) 07/10/2024    INR 1.8 (H) 07/17/2024    Lab Results   Component Value Date    LACTATE 0.6 06/26/2024        Results from last 7 days   Lab Units 07/17/24  1117 07/17/24  0948 07/17/24  0816 07/17/24  0730 07/17/24  0613 07/16/24  2059 07/16/24  1312 07/16/24  0707 07/15/24  1306 07/15/24  0726 07/14/24  1252 07/14/24  1121 07/13/24  1209 07/13/24  0923   POCT GLUCOSE mg/dL 100* 99 94  --  98 127*   < >  --    < >  --    < >  --    < >  --    GLUCOSE mg/dL  --   --   --  87  --   --   --  88  --  90  --  112*  --  85    < > = values in this interval not displayed.       Imaging:  Imaging:   Vascular US Lower Extremity Arterial Duplex 06/23/2024  1. Echogenic material within the right distal femoral and popliteal  arteries with  absent Doppler flow in the right popliteal artery  significantly diminished flow within the right distal superficial  femoral artery. Findings raise concern for thrombosis of the distal  SFA and popliteal artery. Decreased flow within the right posterior  tibial and peroneal arteries could be through collateralization.  2. Decreased flow within the right common femoral artery, right deep  femoral artery as well as the proximal and mid superficial femoral  arteries, raising concern for stenosis proximal to the right  common  femoral artery, possibly within the right external iliac right common  iliac artery. A CTA of the lower extremities can be obtained for  further evaluation.  3. Unremarkable Doppler interrogation of the left lower extremity  arteries.     Cardiac:  Transthoracic Echo (TTE) Complete With Contrast : 06/22/2024   1. Left ventricular ejection fraction is severely decreased, by visual estimate at 20-25%.   2. There is global hypokinesis of the left ventricle with minor regional variations.   3. Left ventricular diastolic filling was indeterminate.   4. Mildly enlarged right ventricle.   5. There is mildly reduced right ventricular systolic function.   6. The left atrium is severely dilated.   7. Moderate to severe tricuspid regurgitation visualized.   8. Moderately elevated right ventricular systolic pressure.   9. TR has worsened.        Medications:  Scheduled Medications:  PRN Medications:    acetaminophen, 975 mg, oral, q6h  amiodarone, 200 mg, oral, Daily  amoxicillin-pot clavulanate, 1 tablet, oral, q12h TELMA  aspirin, 81 mg, oral, Daily  atorvastatin, 80 mg, oral, Nightly  bisacodyl, 10 mg, rectal, Daily  ceFAZolin, 2 g, intravenous, q8h  DULoxetine, 30 mg, oral, Daily  [Held by provider] empagliflozin, 10 mg, oral, Daily  magnesium sulfate, 4 g, intravenous, Once  melatonin, 6 mg, oral, Daily  metoprolol succinate XL, 25 mg, oral, Nightly  pantoprazole, 40 mg, oral, Daily before breakfast  perflutren protein A microsphere, 0.5 mL, intravenous, Once in imaging  polyethylene glycol, 17 g, oral, BID  pregabalin, 75 mg, oral, Nightly  sacubitriL-valsartan, 2 tablet, oral, BID  sennosides, 2 tablet, oral, BID  [Held by provider] warfarin, 5 mg, oral, Daily     PRN medications: dextrose, dextrose, glucagon, glucagon, heparin, HYDROmorphone, lidocaine, oxyCODONE     Assessment    Hospital Course  On 20th June, Patient presented with dyspnea and leg swelling, with elevated lactate to 14.7, cold extremities, and 3+  pitting edema suggesting cardiogenic shock.  She underwent RHC with CI of 1.6, CVP of 25, requiring inotropes initiation with difficult weaning off trials, on the following day patient had worsened SvO2 from 65 to 25 with rapid lactate increase following acute abdominal pain and right leg pain concerning for limb ischemia, and rhabdomyolysis with CK of 14,000, with limited IV hydrations due to ongoing cardiogenic shock.    Arterial doppler of lower extremities reported absent flow in the right popliteal artery with significantly diminished flow within the right distal superficial femoral artery, concerning for thrombosis. Additionally decreased flow in right common femoral artery, right deep femoral artery concerning for stenosis proximal to right common femoral artery.    Vascular medicine recommended IVC filter insertion on 6/27 in preparation for right AKA 6/28. Which was complicated by 3 days persistent stump site oozing & intramuscular hematoma evident on CTA RLE, which was managed conservatively with 6 blood transfusions while on heparin infusion necessitation to hold heparin drip temporarily for 12 hours to allow hemostasis. On the following day patient had similar stump bleeding with Hgb 6.9 requiring an additional 1pRBC incrementing hemoglobin to 8.1. hence Heparin was restarted 12 hours later at 1:30 Am 7/5 without signs of bleeding & stable Hemoglobin 8.9.     On 6th July, patient was hemodynamically stable and transferred to cardiology floor, was kept on heparin infusion with hemoglobin monitoring, as patient's stump headed well without gross bleeding, nor hemoglobin drop, Vascular medicine recommended anticoagulation bridging with warfarin on 9th July. Patient had some bloody urine output in dhillon on 07/11, urine culture was positive and started on ceftriaxone, hold on jardiance and removed dhillon.     Patient was labeled is not candidate for advanced therapies given documented medical non adherence.  Managed medically.     Given the lack of documentation POA and limited family (NOK are cousins). ICU team involved Ethics & Palliative care.     Assessment/Plan:   Amirah Bennett is a 43 y.o. 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 She was found to have elevated lactate to 14.7, cold extremities, and 3+ pitting edema. RHC c/w cardiogenic shock c/b SARAH, cardiac thrombi, and Right limb ischemia S/P above knee amputation. She was initially started on heparin, now bridging with warfarin with a goal of INR 2-3.  Warfarin remains held per vascular surgery s/p revision of right AKA 7/17/24 with plans to potentially resume tomorrow 7/18. Additionally, found to have UTI starting 07/11, she had a dhillon to prevent contamination of new AKA site, but dhillon was removed upon UTI discovery.  Now on Augmentin.     Heart failure with reduced ejection fraction:   -HFrEF (LVEF 20-25%)  -Patient was labeled not candidate for advanced therapies due to medical nonadherence  Plan:  -palliative care follow up.  -Aim to resume quadruple GDMT (spironolactone). Patient on metoprolol succinate, Entresto  -Hold jardiance for now until completing Augmentin course from UTI      Right limb ischemia secondary to Arterial emboli, S/P above right knee amputation and revision:  #S/p femoral arterial line sheath placement now removed  -CTA Aorta with runoff 6/24: aortic bifurcation embolus, R BA-EIA embolus, SFA and popliteal emboli   -arterial duplex exam with monophasic right CFA, SFA and PFA signals, absent popliteal flow and monophasic flow in the  tibial artery   -Patient labeled unable to walk prior to presentation to hospital  -s/p Above knee amputation with vascular surgery 6/28/24 and revision 7/17/24.  Plan:  -Warfarin with heparin bridge goal INR 2-3  -Started warfarin 5mg 07/09 (received 3 doses 07/09-07/11) with missed dose on 07/12, restarted on 07/13, and subsequently started on 7/16 pending surgery  -Will resume heparin gtt at 2:30pm today.  -Will discuss plans to restart warfarin with vascular surgery tomorrow 7/18.  -Wound VAC to 125 mmHg per vascular surgery  -Vascular surgery previously recommended patient to be on Ancef 24 hours post-surgery; however, after further discussion, will continue with Augmentin as patient is already being treated with abx for UTI.  -Plan for patient to be discharged with wound VAC  -pain regimen: acetaminophen 975 mg QID, oxycodone 10mg severe pain 4hr, PRN dilaudid 0.2mg q 4 hr breakthrough, before and after dressing changes.  -Continue duloxetine and pregabalin for phantom limb pain.     Query Cardiac thrombi:  -Likely in setting of Atrial fibrillation with Rivaroxaban no adherence  -There are indeterminate low-attenuation nodular filling defects within the atrial appendage.   -Right lower extremity DVT, and arterial thrombus/p IVC filter 6/27.  -Vascular medicine recommended Warfarin with Target INR 2-3, for a minimum of 5 days.  Plan:  -Restart Heparin gtt at 2:30pm per vascular surgery.  -Will discuss plans to restart warfarin with vascular surgery tomorrow 7/18.  -Vascular medicine on board, appreciate recommendations  -Outpatient Anticoagulation Clinic set up at [facility].     Atrial fibrillation previously on rivaroxaban  -S/p DCCV 05/2023), ischemic stroke L MCA d/t AFib LLA thrombus seen on echo (lack of OAC adherence) s/p thrombectomy 01/2022 @ [facility] w/ residual expressive aphasia and R sided weakness   -Previously prescribed digoxin 250mcg however last fill was 12/2023  -TSH 6.28H, free T4  1.48  Plan:  -Continue amiodarone 200mg daily  -Continue aspirin 81mg     Direct Hyperbilirubinemia, Likely following ischemic liver injury:  ::Tbili (3.2 on admission, from 0.5 in 03/2024)  ::RUQ US 7/1 without abnormality beyond hepatic steatosis and liver cyst  Plan:   -Follow Liver enzymes.      Hx trach c/b trancutaneous fistula  -ENT had been consulted 3/2024 for gurgling and mucus drainage, no inpatient interventions required  -monitor for breaths/mucus discharge from trach site  -Cover the tracheocutaneous fistula with tape and gauze and encourage patient to apply pressure when voicing.   -follow up outpatient ENT for closure (Dr. King)      Anemia:  ::had 3 units of RBC transfused 6/30, currently controlled bleeding, increeminting gradually.   ::CTA C/A/P 6/30 without evidence of active hemorrhage within chest/abdomen/pelvis  ::CTA RLE 7/1 without a source that could be intervened upon  Plan:  -CBC daily, downtrending hemoglobin today likely secondary to oozing AKA incision.  Will continue to monitor closely.  -Hgb goal >7 given cardiac hx     UTI:  ::small amount of blood in urine 07/11 am  - UA turbid brown with blood, glucose, protein and LE 25, nitrite negative   -urine culture positive, had 2 days of ceftriaxone then cultures were positive for Klebsiella so abx de-escalated to Augmentin, will treat with 10 day course (07/14-07/23) and hold on empagliflozin    Left brachial injury  -LUE DVT scan with nonvascularized mass, called CT while patient was there to request CTA of left upper extremity and they said logistically they would be unable to perform both scans, and given protocol for max dosing of contrast, could not give further contrast until 6/25 at 2PM  -no CTA LUE needed        Insomnia  -Continue melatonin 6mg nightly plus additional 6mg PRN for sleep     Depression   - Continue duloxetine      Resolved:   -Thrombocytopenia, likely attributed to: previous consumptive coagulopathy/sepsis and  "bleeding, Multiple thrombi, DIC score 5 on 6/24/2024, Hit score of 6, Negative PF4  -Rhabdomyolysis.  -Metabolic acidosis, Lactic acidosis.   -Acute kidney injury.   -stump infection, treated for 7 days, s/p vanc (6/20-6/24) (6/27-7/2) and zosyn (6/20-6/24), (6/27- p)-restarted vanc/zosyn 6/27 due to elevated leukocytosis and purulent appearing right groin site     Disposition Plan:  -patient would like enrollment in  home delivery service for medications (she has trouble getting to preferred pharmacy), pharmacy updated to [facility]  -patient's family would like her enrolled in [facility]  -repeat thyroid labs outpatient  -IVC filter removal within 3 months  [ ] Titrate GDMT as able. Currently on entresto 24/26, metoprolol, holding jardiance  [ ] Follow up further pall care recs. Patient remains full code. There is ongoing GOC discussion  [ ] Needs to follow up OP with ENT for trach c/b trancutaneous fistula. Dr. King for closure.     Prevention:   Fall: High.   Mobility: Physical therapy referral.   DVT: Heparin  Diet: Cardiac Diet     Code status: Full Code  Emergency contact:   * patient LACKS capacity due to inability to express decision and inconsistency in decisions  Surrogate Medical Decision-maker:   Surrogate decision maker is: pt's cousinClara Wayne: [phone], Efrain Black: [phone], Keli Osborne: [phone]   Friends who may be helpful in making decisions:  Prior boyfriend Navin Sanches [phone]  Close friend \"Isiah\" Pranay Owen [phone]        Ayaan Rowland MD  PGY-1 Internal Medicine  "

## 2024-07-17 NOTE — ANESTHESIA PROCEDURE NOTES
Airway  Date/Time: 7/17/2024 8:49 AM  Urgency: elective    Airway not difficult    Staffing  Performed: JINNY   Authorized by: Susana Augustin MD    Performed by: CORNELIA Krause  Patient location during procedure: OR    Indications and Patient Condition  Indications for airway management: anesthesia and airway protection  Spontaneous Ventilation: absent  Sedation level: deep  Preoxygenated: yes  Patient position: sniffing  Mask difficulty assessment: 1 - vent by mask    Final Airway Details  Final airway type: endotracheal airway      Successful airway: ETT  Cuffed: yes   Successful intubation technique: video laryngoscopy  Facilitating devices/methods: intubating stylet  Endotracheal tube insertion site: oral  Blade: Sonia  Blade size: #3  ETT size (mm): 6.5  Cormack-Lehane Classification: grade I - full view of glottis  Placement verified by: chest auscultation and capnometry   Measured from: lips  ETT to lips (cm): 22  Number of attempts at approach: 1

## 2024-07-18 LAB
ALBUMIN SERPL BCP-MCNC: 3.1 G/DL (ref 3.4–5)
ANION GAP SERPL CALC-SCNC: 13 MMOL/L (ref 10–20)
BASOPHILS # BLD AUTO: 0.05 X10*3/UL (ref 0–0.1)
BASOPHILS NFR BLD AUTO: 0.3 %
BUN SERPL-MCNC: 15 MG/DL (ref 6–23)
CALCIUM SERPL-MCNC: 9 MG/DL (ref 8.6–10.6)
CHLORIDE SERPL-SCNC: 99 MMOL/L (ref 98–107)
CO2 SERPL-SCNC: 24 MMOL/L (ref 21–32)
CREAT SERPL-MCNC: 0.57 MG/DL (ref 0.5–1.05)
EGFRCR SERPLBLD CKD-EPI 2021: >90 ML/MIN/1.73M*2
EOSINOPHIL # BLD AUTO: 0.04 X10*3/UL (ref 0–0.7)
EOSINOPHIL NFR BLD AUTO: 0.3 %
ERYTHROCYTE [DISTWIDTH] IN BLOOD BY AUTOMATED COUNT: 16.4 % (ref 11.5–14.5)
GLUCOSE BLD MANUAL STRIP-MCNC: 137 MG/DL (ref 74–99)
GLUCOSE BLD MANUAL STRIP-MCNC: 159 MG/DL (ref 74–99)
GLUCOSE SERPL-MCNC: 102 MG/DL (ref 74–99)
HCT VFR BLD AUTO: 26.8 % (ref 36–46)
HGB BLD-MCNC: 8.2 G/DL (ref 12–16)
IMM GRANULOCYTES # BLD AUTO: 0.18 X10*3/UL (ref 0–0.7)
IMM GRANULOCYTES NFR BLD AUTO: 1.1 % (ref 0–0.9)
INR PPP: 1.4 (ref 0.9–1.1)
LYMPHOCYTES # BLD AUTO: 2.51 X10*3/UL (ref 1.2–4.8)
LYMPHOCYTES NFR BLD AUTO: 15.9 %
MAGNESIUM SERPL-MCNC: 2.04 MG/DL (ref 1.6–2.4)
MCH RBC QN AUTO: 30.4 PG (ref 26–34)
MCHC RBC AUTO-ENTMCNC: 30.6 G/DL (ref 32–36)
MCV RBC AUTO: 99 FL (ref 80–100)
MONOCYTES # BLD AUTO: 1.6 X10*3/UL (ref 0.1–1)
MONOCYTES NFR BLD AUTO: 10.1 %
NEUTROPHILS # BLD AUTO: 11.4 X10*3/UL (ref 1.2–7.7)
NEUTROPHILS NFR BLD AUTO: 72.3 %
NRBC BLD-RTO: 0 /100 WBCS (ref 0–0)
PHOSPHATE SERPL-MCNC: 3.9 MG/DL (ref 2.5–4.9)
PLATELET # BLD AUTO: 236 X10*3/UL (ref 150–450)
POTASSIUM SERPL-SCNC: 4.6 MMOL/L (ref 3.5–5.3)
PROTHROMBIN TIME: 16.2 SECONDS (ref 9.8–12.8)
RBC # BLD AUTO: 2.7 X10*6/UL (ref 4–5.2)
SODIUM SERPL-SCNC: 131 MMOL/L (ref 136–145)
UFH PPP CHRO-ACNC: 0.4 IU/ML
UFH PPP CHRO-ACNC: 0.5 IU/ML
WBC # BLD AUTO: 15.8 X10*3/UL (ref 4.4–11.3)

## 2024-07-18 PROCEDURE — 2500000001 HC RX 250 WO HCPCS SELF ADMINISTERED DRUGS (ALT 637 FOR MEDICARE OP)

## 2024-07-18 PROCEDURE — 85610 PROTHROMBIN TIME: CPT

## 2024-07-18 PROCEDURE — 99231 SBSQ HOSP IP/OBS SF/LOW 25: CPT | Performed by: STUDENT IN AN ORGANIZED HEALTH CARE EDUCATION/TRAINING PROGRAM

## 2024-07-18 PROCEDURE — 85520 HEPARIN ASSAY: CPT

## 2024-07-18 PROCEDURE — 82565 ASSAY OF CREATININE: CPT

## 2024-07-18 PROCEDURE — 36415 COLL VENOUS BLD VENIPUNCTURE: CPT

## 2024-07-18 PROCEDURE — 83735 ASSAY OF MAGNESIUM: CPT

## 2024-07-18 PROCEDURE — 2500000002 HC RX 250 W HCPCS SELF ADMINISTERED DRUGS (ALT 637 FOR MEDICARE OP, ALT 636 FOR OP/ED)

## 2024-07-18 PROCEDURE — 82947 ASSAY GLUCOSE BLOOD QUANT: CPT

## 2024-07-18 PROCEDURE — 2500000004 HC RX 250 GENERAL PHARMACY W/ HCPCS (ALT 636 FOR OP/ED)

## 2024-07-18 PROCEDURE — 1200000002 HC GENERAL ROOM WITH TELEMETRY DAILY

## 2024-07-18 PROCEDURE — 85025 COMPLETE CBC W/AUTO DIFF WBC: CPT

## 2024-07-18 PROCEDURE — 99233 SBSQ HOSP IP/OBS HIGH 50: CPT

## 2024-07-18 ASSESSMENT — PAIN - FUNCTIONAL ASSESSMENT
PAIN_FUNCTIONAL_ASSESSMENT: 0-10

## 2024-07-18 ASSESSMENT — PAIN SCALES - GENERAL
PAINLEVEL_OUTOF10: 5 - MODERATE PAIN
PAINLEVEL_OUTOF10: 9
PAINLEVEL_OUTOF10: 0 - NO PAIN
PAINLEVEL_OUTOF10: 3
PAINLEVEL_OUTOF10: 6
PAINLEVEL_OUTOF10: 5 - MODERATE PAIN
PAINLEVEL_OUTOF10: 2
PAINLEVEL_OUTOF10: 6
PAINLEVEL_OUTOF10: 5 - MODERATE PAIN
PAINLEVEL_OUTOF10: 0 - NO PAIN
PAINLEVEL_OUTOF10: 10 - WORST POSSIBLE PAIN

## 2024-07-18 ASSESSMENT — COGNITIVE AND FUNCTIONAL STATUS - GENERAL
STANDING UP FROM CHAIR USING ARMS: A LOT
MOBILITY SCORE: 11
DRESSING REGULAR LOWER BODY CLOTHING: A LOT
CLIMB 3 TO 5 STEPS WITH RAILING: TOTAL
STANDING UP FROM CHAIR USING ARMS: A LOT
HELP NEEDED FOR BATHING: A LOT
TOILETING: A LOT
WALKING IN HOSPITAL ROOM: TOTAL
TOILETING: A LOT
DAILY ACTIVITIY SCORE: 17
TURNING FROM BACK TO SIDE WHILE IN FLAT BAD: A LITTLE
CLIMB 3 TO 5 STEPS WITH RAILING: TOTAL
DRESSING REGULAR UPPER BODY CLOTHING: A LITTLE
MOVING FROM LYING ON BACK TO SITTING ON SIDE OF FLAT BED WITH BEDRAILS: A LITTLE
TURNING FROM BACK TO SIDE WHILE IN FLAT BAD: A LITTLE
WALKING IN HOSPITAL ROOM: TOTAL
MOVING FROM LYING ON BACK TO SITTING ON SIDE OF FLAT BED WITH BEDRAILS: A LITTLE
DRESSING REGULAR LOWER BODY CLOTHING: A LOT
MOVING TO AND FROM BED TO CHAIR: A LOT
HELP NEEDED FOR BATHING: A LOT
MOBILITY SCORE: 12
MOVING TO AND FROM BED TO CHAIR: TOTAL
DAILY ACTIVITIY SCORE: 17
DRESSING REGULAR UPPER BODY CLOTHING: A LITTLE

## 2024-07-18 ASSESSMENT — PAIN DESCRIPTION - LOCATION
LOCATION: LEG
LOCATION: LEG

## 2024-07-18 ASSESSMENT — PAIN SCALES - WONG BAKER: WONGBAKER_NUMERICALRESPONSE: HURTS WORST

## 2024-07-18 NOTE — PROGRESS NOTES
Amirah Bennett is a 43 y.o. year old female patient who presents for Revision of R stump with Dr. De La Rosa. Acute Pain consulted for block for postoperative pain control.        Anticipated Postop Pain Issues -   Palliative: typically relieved with IV analgesics and regional local anesthetics  Provocative: typically with movement  Quality: typically burning and aching  Radiation: typically none  Severity: typically severe 8-10/10  Timing: typically constant    Past Medical History:   Diagnosis Date    CHF (congestive heart failure) (Multi)     Stroke (Multi)         Past Surgical History:   Procedure Laterality Date    CARDIAC CATHETERIZATION N/A 2024    Procedure: Right Heart Cath;  Surgeon: Osamn Su MD;  Location: [place] [address] [facility];  Service: Cardiovascular;  Laterality: N/A;    INVASIVE VASCULAR PROCEDURE N/A 2024    Procedure: Arterial  Access, Intracatheter;  Surgeon: Osman Su MD;  Location: [place] [address] [facility];  Service: Cardiovascular;  Laterality: N/A;    OTHER SURGICAL HISTORY  2022    Trachectomy        No family history on file.     Social History     Socioeconomic History    Marital status: Single     Spouse name: Not on file    Number of children: Not on file    Years of education: Not on file    Highest education level: Not on file   Occupational History    Not on file   Tobacco Use    Smoking status: Former     Current packs/day: 0.00     Types: Cigarettes     Quit date: 2023     Years since quittin.6    Smokeless tobacco: Not on file   Substance and Sexual Activity    Alcohol use: Yes    Drug use: Not on file    Sexual activity: Not on file   Other Topics Concern    Not on file   Social History Narrative    Not on file     Social Determinants of Health     Financial Resource Strain: Low Risk  (2024)    Overall Financial Resource Strain (CARDIA)     Difficulty of Paying Living Expenses: Not very hard   Food Insecurity: Food  Insecurity Present (6/26/2024)    Hunger Vital Sign     Worried About Running Out of Food in the Last Year: Sometimes true     Ran Out of Food in the Last Year: Sometimes true   Transportation Needs: No Transportation Needs (7/6/2024)    PRAPARE - Transportation     Lack of Transportation (Medical): No     Lack of Transportation (Non-Medical): No   Recent Concern: Transportation Needs - Unmet Transportation Needs (6/26/2024)    PRAPARE - Transportation     Lack of Transportation (Medical): Yes     Lack of Transportation (Non-Medical): Yes   Physical Activity: Inactive (6/26/2024)    Exercise Vital Sign     Days of Exercise per Week: 0 days     Minutes of Exercise per Session: 0 min   Stress: No Stress Concern Present (6/26/2024)    [employer] [place] [employer] - Occupational Stress Questionnaire     Feeling of Stress : Only a little   Social Connections: Socially Isolated (6/26/2024)    Social Connection and Isolation Panel [NHANES]     Frequency of Communication with Friends and Family: Twice a week     Frequency of Social Gatherings with Friends and Family: Twice a week     Attends [employer] Services: Never     Active Member of Clubs or Organizations: No     Attends Club or Organization Meetings: Never     Marital Status: Never    Intimate Partner Violence: Not At Risk (6/26/2024)    Humiliation, Afraid, Rape, and Kick questionnaire     Fear of Current or Ex-Partner: No     Emotionally Abused: No     Physically Abused: No     Sexually Abused: No   Housing Stability: Low Risk  (7/6/2024)    Housing Stability Vital Sign     Unable to Pay for Housing in the Last Year: No     Number of Places Lived in the Last Year: 1     Unstable Housing in the Last Year: No        Allergies   Allergen Reactions    Hydrocodone-Acetaminophen Rash    Ibuprofen Rash     Tolerates aspirin         Review of Systems  Gen: No fatigue, anorexia, insomnia, fever.   Eyes: No vision loss, double vision, drainage, eye pain.    ENT: No pharyngitis, dry mouth, no hearing changes or ear discharge  Cardiac: No chest pain, palpitations, syncope, near syncope.   Pulmonary: No shortness of breath, cough, hemoptysis.   Heme/lymph: No swollen glands, fever, bleeding.   GI: No abdominal pain, change in bowel habits, melena, hematemesis, hematochezia, nausea, vomiting, diarrhea.   : No discharge, dysuria, frequency, urgency, hematuria.  Endo: No polyuria or weight loss.   Musculoskeletal: Negative for any pain or loss of ROM/weakness  Skin: No rashes or lesions  Neuro: Normal speech, no numbness or weakness. No gait difficulties  Review of systems is otherwise negative unless stated above or in history of present illness.    Physical Exam:  Constitutional:  no distress, alert and cooperative  Eyes: clear sclera  Head/Neck: No apparent injury, trachea midline  Respiratory/Thorax: Patent airways, thorax symmetric, breathing comfortably  Cardiovascular: no pitting edema  Gastrointestinal: Nondistended  Musculoskeletal: ROM intact  Extremities: no clubbing  Neurological: alert, fuller x4  Psychological: Appropriate affect    Results for orders placed or performed during the hospital encounter of 06/20/24 (from the past 24 hour(s))   POCT GLUCOSE   Result Value Ref Range    POCT Glucose 133 (H) 74 - 99 mg/dL   Heparin Assay, UFH   Result Value Ref Range    Heparin Unfractionated 0.3 See Comment Below for Therapeutic Ranges IU/mL   POCT GLUCOSE   Result Value Ref Range    POCT Glucose 136 (H) 74 - 99 mg/dL   Heparin Assay, UFH   Result Value Ref Range    Heparin Unfractionated 0.4 See Comment Below for Therapeutic Ranges IU/mL   POCT GLUCOSE   Result Value Ref Range    POCT Glucose 159 (H) 74 - 99 mg/dL   CBC and Auto Differential   Result Value Ref Range    WBC 15.8 (H) 4.4 - 11.3 x10*3/uL    nRBC 0.0 0.0 - 0.0 /100 WBCs    RBC 2.70 (L) 4.00 - 5.20 x10*6/uL    Hemoglobin 8.2 (L) 12.0 - 16.0 g/dL    Hematocrit 26.8 (L) 36.0 - 46.0 %    MCV 99 80 - 100 fL     MCH 30.4 26.0 - 34.0 pg    MCHC 30.6 (L) 32.0 - 36.0 g/dL    RDW 16.4 (H) 11.5 - 14.5 %    Platelets 236 150 - 450 x10*3/uL    Neutrophils % 72.3 40.0 - 80.0 %    Immature Granulocytes %, Automated 1.1 (H) 0.0 - 0.9 %    Lymphocytes % 15.9 13.0 - 44.0 %    Monocytes % 10.1 2.0 - 10.0 %    Eosinophils % 0.3 0.0 - 6.0 %    Basophils % 0.3 0.0 - 2.0 %    Neutrophils Absolute 11.40 (H) 1.20 - 7.70 x10*3/uL    Immature Granulocytes Absolute, Automated 0.18 0.00 - 0.70 x10*3/uL    Lymphocytes Absolute 2.51 1.20 - 4.80 x10*3/uL    Monocytes Absolute 1.60 (H) 0.10 - 1.00 x10*3/uL    Eosinophils Absolute 0.04 0.00 - 0.70 x10*3/uL    Basophils Absolute 0.05 0.00 - 0.10 x10*3/uL   Renal Function Panel   Result Value Ref Range    Glucose 102 (H) 74 - 99 mg/dL    Sodium 131 (L) 136 - 145 mmol/L    Potassium 4.6 3.5 - 5.3 mmol/L    Chloride 99 98 - 107 mmol/L    Bicarbonate 24 21 - 32 mmol/L    Anion Gap 13 10 - 20 mmol/L    Urea Nitrogen 15 6 - 23 mg/dL    Creatinine 0.57 0.50 - 1.05 mg/dL    eGFR >90 >60 mL/min/1.73m*2    Calcium 9.0 8.6 - 10.6 mg/dL    Phosphorus 3.9 2.5 - 4.9 mg/dL    Albumin 3.1 (L) 3.4 - 5.0 g/dL   Magnesium   Result Value Ref Range    Magnesium 2.04 1.60 - 2.40 mg/dL   Protime-INR   Result Value Ref Range    Protime 16.2 (H) 9.8 - 12.8 seconds    INR 1.4 (H) 0.9 - 1.1   Heparin Assay   Result Value Ref Range    Heparin Unfractionated 0.5 See Comment Below for Therapeutic Ranges IU/mL        Plan:  Plan:  - R femoral and sciatic nerve blocks performed intraop on 7/17  - Pain medications per primary team  - Pain uncontrol. APS to sign off.       Acute Pain Team  pg [phone] ph [phone].

## 2024-07-18 NOTE — PROGRESS NOTES
Daily Progress Note    Amirah Bennett is a 43 y.o. female admitted on 6/20/2024  6:40 AM with Cardiogenic shock.     Overnight:  No acute events reported.    Subjective   Patient seen and examined at bedside.  States that her pain is much improved on Lyrica 75 twice daily per palliative recommendations.  Patient previously complained of phantom right toe pain, but is not present at this moment.  No chest pain or shortness of breath.    Objective   Vitals: I/O:   Vitals:    07/18/24 1115   BP: 107/69   Pulse: 75   Resp: 18   Temp: 37 °C (98.6 °F)   SpO2: 98%        24hr Min/Max:  Temp  Min: 36.4 °C (97.5 °F)  Max: 37.2 °C (99 °F)  Pulse  Min: 64  Max: 108  BP  Min: 89/52  Max: 110/75  Resp  Min: 17  Max: 18  SpO2  Min: 96 %  Max: 100 %   Intake/Output Summary (Last 24 hours) at 7/18/2024 1316  Last data filed at 7/18/2024 0944  Gross per 24 hour   Intake 1779 ml   Output 1000 ml   Net 779 ml        Net IO Since Admission: -45,584.71 mL [07/18/24 1316]      PE:  -Patient was conscious, oriented x4, with expressive aphasia. Not in acute respiratory distress, not in jaundiced nor cyanosed.    -Cardiovascular examination: Audible 1st and 2nd heart sound, no murmurs  -Lower limb examination: amputated right above knee amputation, wound VAC present with sanguinous output.  No gross bleeding. No pitting edema in LLE.  -Chest examination: Lungs clear to auscultation bilaterally.  -Abdominal examination: Soft and lax abdomen, no rigidity nor rebound tenderness.   -Neurological examination: limited, symmetrical facial impression, equally reactive pupils, right hand weakness.          Labs:  CBC RFP   Lab Results   Component Value Date    WBC 15.8 (H) 07/18/2024    HGB 8.2 (L) 07/18/2024    HCT 26.8 (L) 07/18/2024    MCV 99 07/18/2024     07/18/2024    NEUTROABS 11.40 (H) 07/18/2024    Lab Results   Component Value Date     (L) 07/18/2024    K 4.6 07/18/2024    CL 99 07/18/2024    CO2 24 07/18/2024    BUN 15  07/18/2024    CREATININE 0.57 07/18/2024    CREATININE 0.51 07/17/2024     Lab Results   Component Value Date    MG 2.04 07/18/2024    PHOS 3.9 07/18/2024    CALCIUM 9.0 07/18/2024         Hepatic Function ABG/VBG   Lab Results   Component Value Date    ALT 20 07/10/2024    AST 20 07/10/2024    ALKPHOS 70 07/10/2024     Lab Results   Component Value Date    BILIDIR 0.7 (H) 07/10/2024      Lab Results   Component Value Date    PROTIME 16.2 (H) 07/18/2024    APTT 88 (H) 07/10/2024    INR 1.4 (H) 07/18/2024    Lab Results   Component Value Date    LACTATE 0.6 06/26/2024        Results from last 7 days   Lab Units 07/18/24  0709 07/18/24  0637 07/17/24  2158 07/17/24  1708 07/17/24  1117 07/17/24  0948 07/17/24  0816 07/17/24  0730 07/16/24  1312 07/16/24  0707 07/15/24  1306 07/15/24  0726 07/14/24  1252 07/14/24  1121   POCT GLUCOSE mg/dL  --  159* 136* 133* 100* 99   < >  --    < >  --    < >  --    < >  --    GLUCOSE mg/dL 102*  --   --   --   --   --   --  87  --  88  --  90  --  112*    < > = values in this interval not displayed.       Imaging:  Imaging:   Vascular US Lower Extremity Arterial Duplex 06/23/2024  1. Echogenic material within the right distal femoral and popliteal  arteries with  absent Doppler flow in the right popliteal artery  significantly diminished flow within the right distal superficial  femoral artery. Findings raise concern for thrombosis of the distal  SFA and popliteal artery. Decreased flow within the right posterior  tibial and peroneal arteries could be through collateralization.  2. Decreased flow within the right common femoral artery, right deep  femoral artery as well as the proximal and mid superficial femoral  arteries, raising concern for stenosis proximal to the right common  femoral artery, possibly within the right external iliac right common  iliac artery. A CTA of the lower extremities can be obtained for  further evaluation.  3. Unremarkable Doppler interrogation of the  left lower extremity  arteries.     Cardiac:  Transthoracic Echo (TTE) Complete With Contrast : 06/22/2024   1. Left ventricular ejection fraction is severely decreased, by visual estimate at 20-25%.   2. There is global hypokinesis of the left ventricle with minor regional variations.   3. Left ventricular diastolic filling was indeterminate.   4. Mildly enlarged right ventricle.   5. There is mildly reduced right ventricular systolic function.   6. The left atrium is severely dilated.   7. Moderate to severe tricuspid regurgitation visualized.   8. Moderately elevated right ventricular systolic pressure.   9. TR has worsened.        Medications:  Scheduled Medications:  PRN Medications:    acetaminophen, 975 mg, oral, q6h  amiodarone, 200 mg, oral, Daily  amoxicillin-pot clavulanate, 1 tablet, oral, q12h TELMA  aspirin, 81 mg, oral, Daily  atorvastatin, 80 mg, oral, Nightly  bisacodyl, 10 mg, rectal, Daily  DULoxetine, 30 mg, oral, Daily  [Held by provider] empagliflozin, 10 mg, oral, Daily  melatonin, 6 mg, oral, Daily  metoprolol succinate XL, 25 mg, oral, Nightly  pantoprazole, 40 mg, oral, Daily before breakfast  perflutren protein A microsphere, 0.5 mL, intravenous, Once in imaging  polyethylene glycol, 17 g, oral, BID  pregabalin, 75 mg, oral, BID  sacubitriL-valsartan, 2 tablet, oral, BID  sennosides, 2 tablet, oral, BID  warfarin, 5 mg, oral, Daily     PRN medications: dextrose, dextrose, glucagon, glucagon, heparin, HYDROmorphone, lidocaine, oxyCODONE     Assessment    Assessment/Plan:   Amirah Bennett is a 43 y.o. 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 She was found to have elevated lactate to 14.7, cold extremities, and 3+ pitting edema. RHC c/w cardiogenic shock c/b SARAH, cardiac thrombi, and Right limb ischemia S/P above knee amputation. She was initially started on heparin, now bridging with warfarin with a goal of INR 2-3.  Warfarin held  per vascular surgery after patient taken to the OR for revision of right AKA 7/17/24. Warfarin was then restarted 7/18 per vascular surgery recommendations. Additionally, found to have UTI starting 07/11, she had a dhillon to prevent contamination of new AKA site, but dhillon was removed upon UTI discovery.  Now on Augmentin with end date 7/23.     Heart failure with reduced ejection fraction:   -HFrEF (LVEF 20-25%)  -Patient was labeled not candidate for advanced therapies due to medical nonadherence  Plan:  -palliative care follow up.  -Aim to resume quadruple GDMT (spironolactone). Patient on metoprolol succinate, Entresto  -Hold jardiance for now until completing Augmentin course from UTI      Right limb ischemia secondary to Arterial emboli, S/P above right knee amputation and revision:  #S/p femoral arterial line sheath placement, now removed  #Phantom limb pain  -CTA Aorta with runoff 6/24: aortic bifurcation embolus, R BA-EIA embolus, SFA and popliteal emboli   -arterial duplex exam with monophasic right CFA, SFA and PFA signals, absent popliteal flow and monophasic flow in the tibial artery   -Patient labeled unable to walk prior to presentation to hospital  -s/p Above knee amputation with vascular surgery 6/28/24 and revision 7/17/24.  Plan:  -Warfarin with heparin bridge goal INR 2-3  -Started warfarin 5mg 07/09 (received 3 doses 07/09-07/11) with missed dose on 07/12, restarted on 07/13, and subsequently held on 7/16 pending right AKA surgery  -Restarted warfarin bridge 7/18.  Will continue with heparin gtt.  -Wound VAC to 125 mmHg per vascular surgery,  currently draining sanguinous output  -Vascular surgery previously recommended patient to be on Ancef 24 hours post-surgery; however, after further discussion, will continue with Augmentin as patient is already being treated with abx for UTI.  -Plan for patient to be discharged with wound VAC  -pain regimen: acetaminophen 975 mg scheduled QID, oxycodone 10mg severe pain 4hr, PRN dilaudid 0.2mg q 4 hr breakthrough, before and after dressing changes.  -Continue duloxetine and Lyrica 75 twice daily per palliative recommendations for phantom limb pain.     Query Cardiac thrombi:  -Likely in setting of Atrial fibrillation with Rivaroxaban no adherence  -There are indeterminate low-attenuation nodular filling defects within the atrial appendage.   -Right lower extremity DVT, and arterial thrombus/p IVC filter 6/27.  Plan:  -Restarted warfarin bridge 7/18.  Will continue with heparin gtt. Goal per vascular medicine is Warfarin with Target INR 2-3 for a minimum of 5 days.  -Vascular medicine on board, appreciate recommendations  -Outpatient Anticoagulation Clinic set up at [facility].     Atrial fibrillation previously on rivaroxaban  -S/p DCCV 05/2023), ischemic stroke L MCA d/t AFib LLA thrombus seen on echo (lack of OAC adherence) s/p thrombectomy 01/2022 @ [facility] w/ residual expressive aphasia and R sided weakness   -Previously prescribed digoxin 250mcg however last fill was 12/2023  -TSH 6.28H, free T4 1.48  Plan:  -Continue amiodarone 200mg daily  -Continue aspirin 81mg     Direct Hyperbilirubinemia, Likely following ischemic liver injury:  ::Tbili (3.2 on admission, from 0.5 in 03/2024)  ::RUQ US 7/1 without abnormality beyond hepatic steatosis and liver cyst  Plan:   -Follow Liver enzymes.      Hx trach c/b trancutaneous fistula  -ENT had been consulted 3/2024 for gurgling and mucus drainage, no inpatient interventions required  -monitor for breaths/mucus discharge from trach site  -Cover the tracheocutaneous fistula with  tape and gauze and encourage patient to apply pressure when voicing.   -follow up outpatient ENT for closure (Dr. King)     Anemia:  ::had 3 units of RBC transfused 6/30, currently controlled bleeding, increeminting gradually.   ::CTA C/A/P 6/30 without evidence of active hemorrhage within chest/abdomen/pelvis  ::CTA RLE 7/1 without a source that could be intervened upon  Plan:  -CBC daily, downtrending hemoglobin today likely secondary to oozing AKA incision.  Will continue to monitor closely.  -Hgb goal >7 given cardiac hx     UTI:  ::small amount of blood in urine 07/11 am  - UA turbid brown with blood, glucose, protein and LE 25, nitrite negative   -urine culture positive, had 2 days of ceftriaxone then cultures were positive for Klebsiella so abx de-escalated to Augmentin, will treat with 10 day course (07/14-07/23) and hold on empagliflozin    Left brachial injury  -LUE DVT scan with nonvascularized mass, called CT while patient was there to request CTA of left upper extremity and they said logistically they would be unable to perform both scans, and given protocol for max dosing of contrast, could not give further contrast until 6/25 at 2PM  -no CTA LUE needed        Insomnia  -Continue melatonin 6mg nightly plus additional 6mg PRN for sleep     Depression   - Continue duloxetine      Resolved:   -Thrombocytopenia, likely attributed to: previous consumptive coagulopathy/sepsis and bleeding, Multiple thrombi, DIC score 5 on 6/24/2024, Hit score of 6, Negative PF4  -Rhabdomyolysis.  -Metabolic acidosis, Lactic acidosis.   -Acute kidney injury.   -stump infection, treated for 7 days, s/p vanc (6/20-6/24) (6/27-7/2) and zosyn (6/20-6/24), (6/27- p)-restarted vanc/zosyn 6/27 due to elevated leukocytosis and purulent appearing right groin site     Disposition Plan:  -patient would like enrollment in  home delivery service for medications (she has trouble getting to preferred pharmacy), pharmacy updated to  [facility]  -patient's family would like her enrolled in [facility]  -repeat thyroid labs outpatient  -IVC filter removal within 3 months  [ ] Titrate GDMT as able. Currently on entresto 24/26, metoprolol, holding jardiance  [ ] Follow up further pall care recs. Patient remains full code. There is ongoing GOC discussion  [ ] Needs to follow up OP with ENT for trach c/b trancutaneous fistula. Dr. King for closure.     Prevention:   Fall: High.   Mobility: Physical therapy referral.   DVT: Heparin  Diet: Cardiac Diet     Code status: Full Code  Emergency contact:   * patient LACKS capacity due to inability to express decision and inconsistency in decisions  Surrogate Medical Decision-maker:   Surrogate decision maker is: pt's cousin, Clara Wayne: [phone], Efrain Black: [phone], Keli Osborne: [phone]   Friends who may be helpful in making decisions:  Prior boyfriend Navin Sanches [phone]  Close friend \"Isiah\" Pranay Owen [phone]        Ayaan Rowland MD  PGY-1 Internal Medicine  "

## 2024-07-18 NOTE — CARE PLAN
The patient's goals for the shift include  Patient will have good pain control this shift    The clinical goals for the shift include Patient will remain safe during the shift    Over the shift, the patient did not make progress toward the following goals. Barriers to progression include Patient still with high levels of pain. Recommendations to address these barriers include monitor and treat  Problem: Skin  Goal: Decreased wound size/increased tissue granulation at next dressing change  Outcome: Progressing     Problem: Skin  Goal: Participates in plan/prevention/treatment measures  Outcome: Progressing     Problem: Skin  Goal: Prevent/manage excess moisture  Outcome: Progressing   .

## 2024-07-18 NOTE — PROGRESS NOTES
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 female  has a past medical history of CHF (congestive heart failure) (Multi) and Stroke (Multi). Patient presented with Afib RVR, significant heart failure in cardiogenic shock. 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 S/p R AKA with post op course c/b likely blood loss anemia with multiple blood transfusions.     Now s/p right stump debridement and wound vac application.    Recommendations   - wound vac over stump with good suction  - Consult wound care team for vac change      - Ordered placed on      - VAC change to be done on   - Will determine follow-up date when close to discharge    Discussed with Dr. De La Rosa,      Bill Zarate MD  General Surgery, PGY-3  [id number]       "    Subjective   SUBJECTIVE  Patient feels well this morning, well controlled pain at R stump site No headaches, no dizziness, no SOB, no chest pain. Tolerating diet, voiding.        Objective   OBJECTIVE  Last Recorded Vitals  Heart Rate:  []   Temp:  [36 °C (96.8 °F)-37.2 °C (99 °F)]   Resp:  [15-18]   BP: ()/(50-75)   Height:  [170.2 cm (5' 7\")]   Weight:  [76 kg (167 lb 8.8 oz)-79.7 kg (175 lb 11.3 oz)]   SpO2:  [96 %-100 %]   Physical Exam:  Vascular Physical Exam  Constitutional: no acute distress  Neuro: A/O x4, no gross deficits   Psych: normal affect  HEENT: No deformities, no scleral icterus   Cardiac: RRR  Pulmonary: unlabored respirations   Abdomen: soft, non distended, non tender  Skin: warm and dry overall    Extremities: wound vac in place over stump, minimal output  "

## 2024-07-18 NOTE — CARE PLAN
Problem: Skin  Goal: Decreased wound size/increased tissue granulation at next dressing change  Flowsheets (Taken 7/17/2024 1711)  Decreased wound size/increased tissue granulation at next dressing change: Promote sleep for wound healing  Goal: Participates in plan/prevention/treatment measures  Flowsheets (Taken 7/4/2024 0334 by Lavern Valenzuela, RN)  Participates in plan/prevention/treatment measures:   Elevate heels   Increase activity/out of bed for meals   Discuss with provider PT/OT consult  Goal: Prevent/manage excess moisture  Flowsheets (Taken 7/17/2024 1711)  Prevent/manage excess moisture: Cleanse incontinence/protect with barrier cream  Goal: Prevent/minimize sheer/friction injuries  Flowsheets (Taken 7/18/2024 0301 by Sandeep Whelan RN)  Prevent/minimize sheer/friction injuries:   Turn/reposition every 2 hours/use positioning/transfer devices   Use pull sheet  Goal: Promote/optimize nutrition  Flowsheets (Taken 7/17/2024 1711)  Promote/optimize nutrition: Consume > 50% meals/supplements     Problem: Pain - Adult  Goal: Verbalizes/displays adequate comfort level or baseline comfort level  Flowsheets (Taken 7/18/2024 1724)  Verbalizes/displays adequate comfort level or baseline comfort level: Encourage patient to monitor pain and request assistance    Pt HDS on heparin drip, restarting coumadin tonight.

## 2024-07-19 ENCOUNTER — APPOINTMENT (OUTPATIENT)
Dept: CARDIOLOGY | Facility: HOSPITAL | Age: 44
DRG: 239 | End: 2024-07-19
Payer: COMMERCIAL

## 2024-07-19 LAB
ABO GROUP (TYPE) IN BLOOD: NORMAL
ALBUMIN SERPL BCP-MCNC: 3.1 G/DL (ref 3.4–5)
ANION GAP SERPL CALC-SCNC: 14 MMOL/L (ref 10–20)
ANTIBODY SCREEN: NORMAL
BASOPHILS # BLD AUTO: 0.07 X10*3/UL (ref 0–0.1)
BASOPHILS NFR BLD AUTO: 0.4 %
BUN SERPL-MCNC: 26 MG/DL (ref 6–23)
CALCIUM SERPL-MCNC: 8.9 MG/DL (ref 8.6–10.6)
CHLORIDE SERPL-SCNC: 98 MMOL/L (ref 98–107)
CO2 SERPL-SCNC: 24 MMOL/L (ref 21–32)
CREAT SERPL-MCNC: 0.92 MG/DL (ref 0.5–1.05)
EGFRCR SERPLBLD CKD-EPI 2021: 78 ML/MIN/1.73M*2
EOSINOPHIL # BLD AUTO: 0.3 X10*3/UL (ref 0–0.7)
EOSINOPHIL NFR BLD AUTO: 1.9 %
ERYTHROCYTE [DISTWIDTH] IN BLOOD BY AUTOMATED COUNT: 16.1 % (ref 11.5–14.5)
GLUCOSE BLD MANUAL STRIP-MCNC: 122 MG/DL (ref 74–99)
GLUCOSE BLD MANUAL STRIP-MCNC: 129 MG/DL (ref 74–99)
GLUCOSE BLD MANUAL STRIP-MCNC: 142 MG/DL (ref 74–99)
GLUCOSE SERPL-MCNC: 120 MG/DL (ref 74–99)
HCT VFR BLD AUTO: 28.2 % (ref 36–46)
HGB BLD-MCNC: 8.4 G/DL (ref 12–16)
IMM GRANULOCYTES # BLD AUTO: 0.36 X10*3/UL (ref 0–0.7)
IMM GRANULOCYTES NFR BLD AUTO: 2.3 % (ref 0–0.9)
INR PPP: 1.2 (ref 0.9–1.1)
LYMPHOCYTES # BLD AUTO: 3.49 X10*3/UL (ref 1.2–4.8)
LYMPHOCYTES NFR BLD AUTO: 22.3 %
MAGNESIUM SERPL-MCNC: 1.66 MG/DL (ref 1.6–2.4)
MCH RBC QN AUTO: 30.5 PG (ref 26–34)
MCHC RBC AUTO-ENTMCNC: 29.8 G/DL (ref 32–36)
MCV RBC AUTO: 103 FL (ref 80–100)
MONOCYTES # BLD AUTO: 1.36 X10*3/UL (ref 0.1–1)
MONOCYTES NFR BLD AUTO: 8.7 %
NEUTROPHILS # BLD AUTO: 10.05 X10*3/UL (ref 1.2–7.7)
NEUTROPHILS NFR BLD AUTO: 64.4 %
NRBC BLD-RTO: 0 /100 WBCS (ref 0–0)
PHOSPHATE SERPL-MCNC: 4.1 MG/DL (ref 2.5–4.9)
PLATELET # BLD AUTO: 235 X10*3/UL (ref 150–450)
POTASSIUM SERPL-SCNC: 4.7 MMOL/L (ref 3.5–5.3)
PROTHROMBIN TIME: 13 SECONDS (ref 9.8–12.8)
RBC # BLD AUTO: 2.75 X10*6/UL (ref 4–5.2)
RH FACTOR (ANTIGEN D): NORMAL
SODIUM SERPL-SCNC: 131 MMOL/L (ref 136–145)
UFH PPP CHRO-ACNC: 0.6 IU/ML
WBC # BLD AUTO: 15.6 X10*3/UL (ref 4.4–11.3)

## 2024-07-19 PROCEDURE — 82947 ASSAY GLUCOSE BLOOD QUANT: CPT

## 2024-07-19 PROCEDURE — 85025 COMPLETE CBC W/AUTO DIFF WBC: CPT

## 2024-07-19 PROCEDURE — 2500000001 HC RX 250 WO HCPCS SELF ADMINISTERED DRUGS (ALT 637 FOR MEDICARE OP)

## 2024-07-19 PROCEDURE — 80069 RENAL FUNCTION PANEL: CPT

## 2024-07-19 PROCEDURE — 83735 ASSAY OF MAGNESIUM: CPT

## 2024-07-19 PROCEDURE — 85520 HEPARIN ASSAY: CPT

## 2024-07-19 PROCEDURE — 99233 SBSQ HOSP IP/OBS HIGH 50: CPT

## 2024-07-19 PROCEDURE — 2500000002 HC RX 250 W HCPCS SELF ADMINISTERED DRUGS (ALT 637 FOR MEDICARE OP, ALT 636 FOR OP/ED)

## 2024-07-19 PROCEDURE — 85610 PROTHROMBIN TIME: CPT

## 2024-07-19 PROCEDURE — 1200000002 HC GENERAL ROOM WITH TELEMETRY DAILY

## 2024-07-19 PROCEDURE — 36415 COLL VENOUS BLD VENIPUNCTURE: CPT

## 2024-07-19 PROCEDURE — 97530 THERAPEUTIC ACTIVITIES: CPT | Mod: GP

## 2024-07-19 PROCEDURE — 97110 THERAPEUTIC EXERCISES: CPT | Mod: GP

## 2024-07-19 PROCEDURE — 2500000004 HC RX 250 GENERAL PHARMACY W/ HCPCS (ALT 636 FOR OP/ED)

## 2024-07-19 PROCEDURE — 93005 ELECTROCARDIOGRAM TRACING: CPT

## 2024-07-19 PROCEDURE — 86901 BLOOD TYPING SEROLOGIC RH(D): CPT

## 2024-07-19 PROCEDURE — 93010 ELECTROCARDIOGRAM REPORT: CPT | Performed by: INTERNAL MEDICINE

## 2024-07-19 RX ORDER — MAGNESIUM SULFATE HEPTAHYDRATE 40 MG/ML
4 INJECTION, SOLUTION INTRAVENOUS ONCE
Status: COMPLETED | OUTPATIENT
Start: 2024-07-19 | End: 2024-07-19

## 2024-07-19 RX ORDER — TALC
3 POWDER (GRAM) TOPICAL DAILY
Status: DISCONTINUED | OUTPATIENT
Start: 2024-07-19 | End: 2024-07-24 | Stop reason: HOSPADM

## 2024-07-19 RX ORDER — DULOXETIN HYDROCHLORIDE 60 MG/1
60 CAPSULE, DELAYED RELEASE ORAL DAILY
Status: DISCONTINUED | OUTPATIENT
Start: 2024-07-20 | End: 2024-07-24 | Stop reason: HOSPADM

## 2024-07-19 ASSESSMENT — COGNITIVE AND FUNCTIONAL STATUS - GENERAL
CLIMB 3 TO 5 STEPS WITH RAILING: TOTAL
DRESSING REGULAR LOWER BODY CLOTHING: A LITTLE
MOBILITY SCORE: 13
MOVING FROM LYING ON BACK TO SITTING ON SIDE OF FLAT BED WITH BEDRAILS: A LITTLE
TURNING FROM BACK TO SIDE WHILE IN FLAT BAD: A LITTLE
HELP NEEDED FOR BATHING: A LOT
STANDING UP FROM CHAIR USING ARMS: A LOT
MOVING FROM LYING ON BACK TO SITTING ON SIDE OF FLAT BED WITH BEDRAILS: A LITTLE
DRESSING REGULAR UPPER BODY CLOTHING: A LITTLE
MOVING TO AND FROM BED TO CHAIR: A LOT
CLIMB 3 TO 5 STEPS WITH RAILING: TOTAL
MOVING FROM LYING ON BACK TO SITTING ON SIDE OF FLAT BED WITH BEDRAILS: A LITTLE
MOVING TO AND FROM BED TO CHAIR: A LOT
TOILETING: A LOT
PERSONAL GROOMING: A LITTLE
DRESSING REGULAR UPPER BODY CLOTHING: A LITTLE
TOILETING: A LOT
STANDING UP FROM CHAIR USING ARMS: A LOT
STANDING UP FROM CHAIR USING ARMS: A LOT
CLIMB 3 TO 5 STEPS WITH RAILING: TOTAL
DAILY ACTIVITIY SCORE: 17
DAILY ACTIVITIY SCORE: 17
WALKING IN HOSPITAL ROOM: A LOT
DRESSING REGULAR LOWER BODY CLOTHING: A LITTLE
WALKING IN HOSPITAL ROOM: A LOT
MOBILITY SCORE: 13
WALKING IN HOSPITAL ROOM: A LOT
MOBILITY SCORE: 13
TURNING FROM BACK TO SIDE WHILE IN FLAT BAD: A LITTLE
MOVING TO AND FROM BED TO CHAIR: A LOT
HELP NEEDED FOR BATHING: A LOT
TURNING FROM BACK TO SIDE WHILE IN FLAT BAD: A LITTLE
PERSONAL GROOMING: A LITTLE

## 2024-07-19 ASSESSMENT — PAIN DESCRIPTION - ORIENTATION
ORIENTATION: RIGHT
ORIENTATION: RIGHT

## 2024-07-19 ASSESSMENT — PAIN SCALES - GENERAL
PAINLEVEL_OUTOF10: 4
PAINLEVEL_OUTOF10: 9
PAINLEVEL_OUTOF10: 6
PAINLEVEL_OUTOF10: 10 - WORST POSSIBLE PAIN
PAINLEVEL_OUTOF10: 10 - WORST POSSIBLE PAIN
PAINLEVEL_OUTOF10: 4
PAINLEVEL_OUTOF10: 5 - MODERATE PAIN

## 2024-07-19 ASSESSMENT — PAIN - FUNCTIONAL ASSESSMENT
PAIN_FUNCTIONAL_ASSESSMENT: 0-10

## 2024-07-19 ASSESSMENT — PAIN DESCRIPTION - LOCATION
LOCATION: LEG
LOCATION: LEG

## 2024-07-19 ASSESSMENT — PAIN SCALES - PAIN ASSESSMENT IN ADVANCED DEMENTIA (PAINAD): TOTALSCORE: MEDICATION (SEE MAR)

## 2024-07-19 NOTE — CONSULTS
Wound Care Consult     Visit Date: 7/19/2024      Patient Name: Amirah Bennett         MRN: [id number]           YOB: 1980     Reason for Consult: Wound vac dressing change of the AKA stump wound         Wound History: s/p right stump debridement and wound vac application      Pertinent Labs:   Albumin   Date Value Ref Range Status   07/19/2024 3.1 (L) 3.4 - 5.0 g/dL Final       Wound Assessment:    Wound 06/28/24 Incision Leg Distal;Right;Upper (Active)   Wound Image   07/19/24 1158   Site Assessment Bleeding;Granulation;Red 07/19/24 1158   Rosario-Wound Assessment Clean;Dry;Intact 07/19/24 1158   Shape Linear 07/19/24 1158   Wound Length (cm) 3 cm 07/19/24 1158   Wound Width (cm) 27 cm 07/19/24 1158   Wound Surface Area (cm^2) 81 cm^2 07/19/24 1158   Wound Depth (cm) 2.8 cm 07/19/24 1158   Wound Volume (cm^3) 226.8 cm^3 07/19/24 1158   State of Healing Early/partial granulation 07/19/24 1158   Margins Well-defined edges 07/19/24 1158   Closure Open to air 07/19/24 1158   Sutures/Staple Line Non approximated 07/19/24 1158   Drainage Description Serosanguineous 07/19/24 1158   Drainage Amount Small 07/19/24 1158   Dressing Vacuum dressing;Non adherent 07/19/24 1158   Dressing Changed New 07/19/24 1158   Dressing Status Clean;Dry 07/19/24 1158        07/19/24 1124   Negative Pressure Wound Therapy Knee Distal;Right;Upper;Anterior   Placement Date/Time: 07/16/24 1500   Hand Hygiene Completed: Yes  Wound Type: Surgical  Location: Knee  Wound Location Orientation: Distal;Right;Upper;Anterior   Unit Type 3M ulta   Dressing Type Non-adherent;Black foam   Number of Foam Pieces Used 1   Cycle Continuous   Target Pressure (mmHg) 125   Canister Changed No     Wound Vac applied to Right AKA debridement site.  (2)  Mepitel   (1)  black granufoam  Pressure; -125  Plan:  Change wound Vac 7/23. Patient is on a Tuesday and Friday schedule   If patient is discharged prior to next dressing change, please disconnect VAC  machine, remove foam dressing, and place machine in the soiled utility room on the unit. Call [phone] for pickup immediately upon removal.   Pack wound with wet-to moist NS kerlix and cover with a dry sterile dressing. Patient cannot leave hospital with VAC     Wound Team Summary Assessment: The wound care team came to bedside to assess the patient's right AKA wound site.  The wound is clean, red with some bleeding and granulation.  The wound was cleansed with vashe wound cleanser and the periwound skin was prepped with 3M cavilon skin prepped.  Drape dressing was applied to the periwound skin to add protection from moisture. 2 strips of mepitel and 1 strip of black granufoam was applied to the wound bed and covered with drape.  The 3M ulta wound vac is set at -125 mmHg and continuous.       Wound Team Plan: See the patient on 7/23 for a wound vac dressing change      DONAVAN Orr  7/19/2024  1:52 PM

## 2024-07-19 NOTE — PROGRESS NOTES
Daily Progress Note    Amirah Bennett is a 43 y.o. female admitted on 6/20/2024  6:40 AM with Cardiogenic shock.     Subjective   NAEO. Patient seen and examined at bedside.  States that continues to have phantom limb pain near her right AKA stump despite changing Lyrica 75 mg to twice daily on 7/17. No chest pain or shortness of breath. No other complaints today.    Objective   Vitals: I/O:   Vitals:    07/19/24 0719   BP: 100/61   Pulse: 71   Resp: 17   Temp: 36.4 °C (97.5 °F)   SpO2: 98%        24hr Min/Max:  Temp  Min: 36.1 °C (97 °F)  Max: 37 °C (98.6 °F)  Pulse  Min: 61  Max: 75  BP  Min: 87/53  Max: 107/69  Resp  Min: 17  Max: 18  SpO2  Min: 96 %  Max: 100 %   Intake/Output Summary (Last 24 hours) at 7/19/2024 0914  Last data filed at 7/19/2024 0600  Gross per 24 hour   Intake 1231.5 ml   Output 1300 ml   Net -68.5 ml        Net IO Since Admission: -45,593.21 mL [07/19/24 0914]      PE:  -Patient was conscious, oriented x4, with expressive aphasia. Not in acute respiratory distress, not in jaundiced nor cyanosed.    -Cardiovascular examination: Audible 1st and 2nd heart sound, no murmurs  -Lower limb examination: amputated right above knee amputation, wound VAC present with sanguinous output.  No gross bleeding. No pitting edema in LLE.  -Chest examination: Lungs clear to auscultation bilaterally.  -Abdominal examination: Soft and lax abdomen, no rigidity nor rebound tenderness.   -Neurological examination: limited, symmetrical facial impression, equally reactive pupils, right hand weakness.          Labs:  CBC RFP   Lab Results   Component Value Date    WBC 15.6 (H) 07/19/2024    HGB 8.4 (L) 07/19/2024    HCT 28.2 (L) 07/19/2024     (H) 07/19/2024     07/19/2024    NEUTROABS 10.05 (H) 07/19/2024    Lab Results   Component Value Date     (L) 07/19/2024    K 4.7 07/19/2024    CL 98 07/19/2024    CO2 24 07/19/2024    BUN 26 (H) 07/19/2024    CREATININE 0.92 07/19/2024    CREATININE 0.57  07/18/2024     Lab Results   Component Value Date    MG 1.66 07/19/2024    PHOS 4.1 07/19/2024    CALCIUM 8.9 07/19/2024         Hepatic Function ABG/VBG   Lab Results   Component Value Date    ALT 20 07/10/2024    AST 20 07/10/2024    ALKPHOS 70 07/10/2024     Lab Results   Component Value Date    BILIDIR 0.7 (H) 07/10/2024      Lab Results   Component Value Date    PROTIME 13.0 (H) 07/19/2024    APTT 88 (H) 07/10/2024    INR 1.2 (H) 07/19/2024    Lab Results   Component Value Date    LACTATE 0.6 06/26/2024        Results from last 7 days   Lab Units 07/19/24  0713 07/19/24  0643 07/18/24  2128 07/18/24  0709 07/18/24  0637 07/17/24  2158 07/17/24  1708 07/17/24  0816 07/17/24  0730 07/16/24  1312 07/16/24  0707 07/15/24  1306 07/15/24  0726   POCT GLUCOSE mg/dL  --  129* 137*  --  159* 136* 133*   < >  --    < >  --    < >  --    GLUCOSE mg/dL 120*  --   --  102*  --   --   --   --  87  --  88  --  90    < > = values in this interval not displayed.       Imaging:  Imaging:   Vascular US Lower Extremity Arterial Duplex 06/23/2024  1. Echogenic material within the right distal femoral and popliteal  arteries with  absent Doppler flow in the right popliteal artery  significantly diminished flow within the right distal superficial  femoral artery. Findings raise concern for thrombosis of the distal  SFA and popliteal artery. Decreased flow within the right posterior  tibial and peroneal arteries could be through collateralization.  2. Decreased flow within the right common femoral artery, right deep  femoral artery as well as the proximal and mid superficial femoral  arteries, raising concern for stenosis proximal to the right common  femoral artery, possibly within the right external iliac right common  iliac artery. A CTA of the lower extremities can be obtained for  further evaluation.  3. Unremarkable Doppler interrogation of the left lower extremity  arteries.     Cardiac:  Transthoracic Echo (TTE) Complete With  Contrast : 06/22/2024   1. Left ventricular ejection fraction is severely decreased, by visual estimate at 20-25%.   2. There is global hypokinesis of the left ventricle with minor regional variations.   3. Left ventricular diastolic filling was indeterminate.   4. Mildly enlarged right ventricle.   5. There is mildly reduced right ventricular systolic function.   6. The left atrium is severely dilated.   7. Moderate to severe tricuspid regurgitation visualized.   8. Moderately elevated right ventricular systolic pressure.   9. TR has worsened.        Medications:  Scheduled Medications:  PRN Medications:    acetaminophen, 975 mg, oral, q6h  amiodarone, 200 mg, oral, Daily  amoxicillin-pot clavulanate, 1 tablet, oral, q12h TELMA  aspirin, 81 mg, oral, Daily  atorvastatin, 80 mg, oral, Nightly  bisacodyl, 10 mg, rectal, Daily  DULoxetine, 30 mg, oral, Daily  [Held by provider] empagliflozin, 10 mg, oral, Daily  magnesium sulfate, 4 g, intravenous, Once  melatonin, 6 mg, oral, Daily  metoprolol succinate XL, 25 mg, oral, Nightly  pantoprazole, 40 mg, oral, Daily before breakfast  perflutren protein A microsphere, 0.5 mL, intravenous, Once in imaging  polyethylene glycol, 17 g, oral, BID  pregabalin, 75 mg, oral, BID  sacubitriL-valsartan, 2 tablet, oral, BID  sennosides, 2 tablet, oral, BID  warfarin, 5 mg, oral, Daily     PRN medications: dextrose, dextrose, glucagon, glucagon, heparin, HYDROmorphone, lidocaine, oxyCODONE     Assessment    Assessment/Plan:   Amirah Bennett is a 43 y.o. 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 She was found to have elevated lactate to 14.7, cold extremities, and 3+ pitting edema. RHC c/w cardiogenic shock c/b SARAH, cardiac thrombi, and Right limb ischemia S/P above knee amputation. She was initially started on heparin, now bridging with warfarin with a goal of INR 2-3.  Warfarin held per vascular surgery after patient taken to the OR for revision of right AKA 7/17/24. Warfarin was then restarted 7/18 per vascular surgery recommendations. Additionally, found to have UTI starting 07/11, she had a dhillon to prevent contamination of new AKA site, but dhillon was removed upon UTI discovery.  Now on Augmentin with end date 7/23.     #Heart failure with reduced ejection fraction (LVEF 20-25%):   -Patient was labeled not candidate for advanced therapies due to medical nonadherence  Plan:  -palliative care follow up.  -Aim to resume quadruple GDMT (spironolactone). Patient on metoprolol succinate, Entresto  -Hold jardiance for now until completing Augmentin course from UTI      #Right limb ischemia secondary to Arterial emboli, S/P above right knee amputation and revision:  #S/p femoral arterial line sheath placement, now removed  #Phantom limb pain  -CTA Aorta with runoff 6/24: aortic bifurcation embolus, R BA-EIA embolus, SFA and popliteal emboli   -arterial duplex exam with monophasic right CFA, SFA and PFA signals, absent popliteal flow and monophasic flow in the tibial artery   -Patient labeled unable to walk prior to presentation to hospital  -s/p Above knee amputation with vascular surgery 6/28/24 and revision 7/17/24.  Plan:  -Warfarin with heparin bridge goal INR 2-3 for a minimum of 5 days per vascular surgery  -Started warfarin 5mg 07/09 (received 3 doses 07/09-07/11) with missed dose on 07/12, restarted on 07/13, and subsequently held on 7/16 pending right AKA surgery  -Restarted warfarin bridge 7/18.  Will continue with heparin gtt.  -Wound VAC to 125 mmHg per vascular surgery, continues to drain  sanguinous output  -Plan for patient to be discharged with wound VAC  -pain regimen: acetaminophen 975 mg scheduled QID, oxycodone 10mg severe pain 4hr, PRN dilaudid 0.2mg q 4 hr breakthrough, before and after dressing changes.  -Continue duloxetine and Lyrica 75 twice daily per palliative recommendations for phantom limb pain.     #Query Cardiac thrombi:  -Likely in setting of Atrial fibrillation with Rivaroxaban no adherence  -There are indeterminate low-attenuation nodular filling defects within the atrial appendage.   -Right lower extremity DVT, and arterial thrombus/p IVC filter 6/27.  Plan:  -Restarted warfarin bridge 7/18.  Will continue with heparin gtt. Goal per vascular medicine is Warfarin with Target INR 2-3 for a minimum of 5 days.  -Vascular medicine on board, appreciate recommendations  -Outpatient Anticoagulation Clinic set up at [facility].     #Atrial fibrillation previously on rivaroxaban  -S/p DCCV (05/2023), ischemic stroke L MCA d/t AFib LLA thrombus seen on echo (lack of OAC adherence) s/p thrombectomy 01/2022 @ [facility] w/ residual expressive aphasia and R sided weakness   -Previously prescribed digoxin 250mcg however last fill was 12/2023  -TSH 6.28H, free T4 1.48  Plan:  -Continue amiodarone 200mg daily  -Continue aspirin 81mg     #Direct Hyperbilirubinemia likely following ischemic liver injury  -Tbili (3.2 on admission, from 0.5 in 03/2024)  -RUQ US 7/1 without abnormality beyond hepatic steatosis and liver cyst  Plan:  -Follow Liver enzymes.      #Hx trach c/b trancutaneous fistula  -ENT had been consulted 3/2024 for gurgling and mucus drainage, no inpatient interventions required  Plan:  -monitor for breaths/mucus discharge from trach site  -Cover the tracheocutaneous fistula with tape and gauze and encourage patient to apply pressure when voicing.   -follow up outpatient ENT for closure (Dr. King)     #Anemia:  ::had 3 units of RBC transfused 6/30, currently controlled bleeding, increeminting  gradually.   ::CTA C/A/P 6/30 without evidence of active hemorrhage within chest/abdomen/pelvis  ::CTA RLE 7/1 without a source that could be intervened upon  Plan:  -CBC daily, downtrending hemoglobin likely secondary to oozing AKA incision.  Will continue to monitor closely.  -Type and screen active from 7/19  -Hgb goal >7 given cardiac hx     #UTI:  ::small amount of blood in urine 07/11 am  -UA turbid brown with blood, glucose, protein and LE 25, nitrite negative   -urine culture positive  -S/p 2 days of ceftriaxone.  Cultures are positive for Klebsiella.  Abx de-escalated to Augmentin, will treat with 10 day course (07/14-07/23)  -Hold empagliflozin    #Left brachial injury  -LUE DVT scan with nonvascularized mass, called CT while patient was there to request CTA of left upper extremity and they said logistically they would be unable to perform both scans, and given protocol for max dosing of contrast, could not give further contrast until 6/25 at 2PM  -no CTA LUE needed        Insomnia  -Continue melatonin 6mg at 9 PM nightly  -Added melatonin 3 mg at 6 PM per palliative recommendations     Depression   -Increased duloxetine from 30 mg to 60 mg per palliative recommendations     Resolved:   -Thrombocytopenia, likely attributed to: previous consumptive coagulopathy/sepsis and bleeding, Multiple thrombi, DIC score 5 on 6/24/2024, Hit score of 6, Negative PF4  -Rhabdomyolysis.  -Metabolic acidosis, Lactic acidosis.   -Acute kidney injury.   -stump infection, treated for 7 days, s/p vanc (6/20-6/24) (6/27-7/2) and zosyn (6/20-6/24), (6/27- p)-restarted vanc/zosyn 6/27 due to elevated leukocytosis and purulent appearing right groin site     Disposition Plan:  -patient would like enrollment in  home delivery service for medications (she has trouble getting to preferred pharmacy), pharmacy updated to [facility]  -patient's family would like her enrolled in [facility]  -repeat thyroid labs outpatient  -IVC filter removal  "within 3 months  [ ] Titrate GDMT as able. Currently on entresto 24/26, metoprolol, holding jardiance  [ ] Follow up further pall care recs. Patient remains full code. There is ongoing GOC discussion  [ ] Needs to follow up OP with ENT for trach c/b trancutaneous fistula. Dr. King for closure.     Prevention:   Fall: High.   Mobility: Physical therapy referral.   DVT: Heparin  Diet: Cardiac Diet     Code status: Full Code  Emergency contact:   * patient LACKS capacity due to inability to express decision and inconsistency in decisions  Surrogate Medical Decision-maker:   Surrogate decision maker is: pt's cousin, Calra Wayne: [phone], Efrain Black: [phone], Keli Osborne: [phone]   Friends who may be helpful in making decisions:  Prior boyfriend Navin Sanches [phone]  Close friend \"Isiah\" Pranay Owen [phone]        Ayaan Rowland MD  PGY-1 Internal Medicine  "

## 2024-07-19 NOTE — PROGRESS NOTES
Physical Therapy    Physical Therapy Treatment    Patient Name: Amirah Bennett  MRN: [id number]  Today's Date: 7/19/2024  Time Calculation  Start Time: 1034  Stop Time: 1059  Time Calculation (min): 25 min    Assessment/Plan   PT Assessment  PT Assessment Results: Decreased strength, Decreased range of motion, Decreased endurance, Impaired balance, Decreased mobility, Pain, Decreased cognition, Decreased safety awareness  Rehab Prognosis: Excellent  Barriers to Discharge: none  End of Session Communication: Bedside nurse  Assessment Comment: Pt continues to demonstrate impaired strength, balance, and function. Pt remains appropriate for continued PT in house and after discharge for further strengthening, transfer training, gait training as tolerated.  PT Plan  Inpatient/Swing Bed or Outpatient: Inpatient  PT Plan  Treatment/Interventions: Bed mobility, Transfer training, Gait training, Balance training, Strengthening, Endurance training, Range of motion, Therapeutic exercise, Therapeutic activity, Home exercise program, Positioning  PT Plan: Ongoing PT  PT Frequency: 5 times per week  PT Discharge Recommendations: High intensity level of continued care  PT Recommended Transfer Status: Assist x2  PT - OK to Discharge: Yes      General Visit Information:   PT  Visit  PT Received On: 07/19/24  General  Past Medical History Relevant to Rehab: Pt now most recently s/p I&D and placement of of wound vac on R AKA.  Family/Caregiver Present: No  Prior to Session Communication: Bedside nurse  Patient Position Received: Bed, 3 rail up, Alarm off, not on at start of session  Preferred Learning Style: verbal, auditory  General Comment: Pt in bed upon entry. Remains in good spirits with positive attitude. Pt is expressively aphasic though able to hold conversation and follows commands appropriately.    Subjective   Precautions:  Precautions  LE Weight Bearing Status: Right Non-Weight Bearing (R AKA)  Medical Precautions: Fall  precautions  Precautions Comment: Wound vac on R AKA    Objective   Pain:  Pain Assessment  Pain Assessment: 0-10  0-10 (Numeric) Pain Score: 6  Pain Type: Surgical pain  Pain Location: Leg  Pain Orientation: Right (surgical site)  Cognition:  Cognition  Overall Cognitive Status: Within Functional Limits  Arousal/Alertness: Appropriate responses to stimuli  Orientation Level: Oriented X4  Following Commands: Follows all commands and directions without difficulty  Coordination:  Movements are Fluid and Coordinated: Yes  Postural Control:  Postural Control  Postural Control: Within Functional Limits  Static Sitting Balance  Static Sitting-Balance Support: Bilateral upper extremity supported  Static Sitting-Level of Assistance: Close supervision  Static Sitting-Comment/Number of Minutes: 15 minutes total  Static Standing Balance  Static Standing-Comment/Number of Minutes: Pt declining attempts to stand due to increased discomfort now s/p I&D and wound vac placement. Anticipate would require min/mod assist and a standard walker.    Activity Tolerance:  Activity Tolerance  Endurance: Tolerates 10 - 20 min exercise with multiple rests  Treatments:  Therapeutic Exercise  Therapeutic Exercise Performed: Yes  Therapeutic Exercise Activity 1: R LE ABD/ADD, Hip flex, sidelying R hip extension x 10 each. Pt required verbal and tactile cueing performing sidelying hip extension RLE. LLE LAQ, hip flex, ankle pumps x 10.    Therapeutic Activity  Therapeutic Activity Performed: Yes  Therapeutic Activity 1: bed mobility, static sit.    Bed Mobility  Bed Mobility: Yes  Bed Mobility 1  Bed Mobility 1: Supine to sitting  Level of Assistance 1: Minimum assistance (hand held)  Bed Mobility 2  Bed Mobility  2: Sitting to supine  Level of Assistance 2: Close supervision    Ambulation/Gait Training  Ambulation/Gait Training Performed: No  Transfers  Transfer: No (Anticipate would require mod assist for sit to stand or pivot transfer to  bedside chair/wheelchair.)    Outcome Measures:  [facility] Basic Mobility  Turning from your back to your side while in a flat bed without using bedrails: A little  Moving from lying on your back to sitting on the side of a flat bed without using bedrails: A little  Moving to and from bed to chair (including a wheelchair): A lot  Standing up from a chair using your arms (e.g. wheelchair or bedside chair): A lot  To walk in hospital room: A lot  Climbing 3-5 steps with railing: Total  Basic Mobility - Total Score: 13    Education Documentation  Precautions, taught by Humberto Mccabe, PT at 7/19/2024 11:22 AM.  Learner: Patient  Readiness: Acceptance  Method: Explanation  Response: Verbalizes Understanding    Mobility Training, taught by Humberto Mccabe PT at 7/19/2024 11:22 AM.  Learner: Patient  Readiness: Acceptance  Method: Explanation  Response: Verbalizes Understanding    Education Comments  No comments found.      Encounter Problems       Encounter Problems (Active)       Balance       Patient will complete static and dynamic sitting balance tasks with SUP to improve upright posture, core activation, and proximal stability.  (Progressing)       Start:  07/03/24    Expected End:  07/17/24            Patient to demo static standing with unilateral UE support, performing single UE task with SBA, no sway or LOB x 2 mins for functional carryover  (Progressing)       Start:  07/03/24    Expected End:  07/17/24               Mobility       STG - Patient will ambulate >/= 15 ft with minAx1 and LRAD, no acute LOB (Progressing)       Start:  07/03/24    Expected End:  07/17/24               PT Transfers       STG - Patient will perform bed mobility Ruth (Progressing)       Start:  07/03/24    Expected End:  07/17/24            STG - Patient will transfer sit to and from stand with CGAx1 and LRAD (Progressing)       Start:  07/03/24    Expected End:  07/17/24               Pain - Adult

## 2024-07-19 NOTE — PROGRESS NOTES
Music Therapy Note    Amirah Bennett was referred by     Therapy Session  Referral Type: New referral this admission  Visit Type: Follow-up visit  Session Start Time: 1320  Session End Time: 1325  Intervention Delivery: In-person  Conflict of Service: Declined treatment               Treatment/Interventions       Post-assessment  Total Session Time (min): 5 minutes    Narrative  Assessment Detail: Patient sitting up in bed looking at the lunch menu. She smiled as MT entered. Stated she still was in a lot of pain. She sang part of the song she wrote with MT in the previous session ;She declined music therapy services on this day; agreedable to follow up after the weekend.  Follow-up: MT will continue to follow    Education Documentation  No documentation found.

## 2024-07-19 NOTE — PROGRESS NOTES
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 female  has a past medical history of CHF (congestive heart failure) (Multi) and Stroke (Multi). Patient presented with Afib RVR, significant heart failure in cardiogenic shock. 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 S/p R AKA with post op course c/b likely blood loss anemia with multiple blood transfusions.     Now s/p right stump debridement and wound vac application.  Minimal output from vac.      Recommendations   - wound vac over stump with good suction  - Consult wound care team for vac change      - Ordered placed on      - VAC change to be done on   - Will determine follow-up date when close to discharge    Discussed with Dr. De La Rosa,      Bill Zarate MD  General Surgery,  PGY-3  [id number]          Subjective   SUBJECTIVE  Patient feels well this morning, reports some pain at R stump site, but tolerable. No headaches, no dizziness, no SOB, no chest pain. Tolerating diet, voiding.        Objective   OBJECTIVE  Last Recorded Vitals  Heart Rate:  [61-75]   Temp:  [36.1 °C (97 °F)-37 °C (98.6 °F)]   Resp:  [17-18]   BP: ()/(53-69)   Weight:  [81.4 kg (179 lb 7.3 oz)]   SpO2:  [96 %-100 %]   Physical Exam:  Vascular Physical Exam  Constitutional: no acute distress  Neuro: A/O x4, no gross deficits   Psych: normal affect  HEENT: No deformities, no scleral icterus   Cardiac: RRR  Pulmonary: unlabored respirations   Abdomen: soft, non distended, non tender  Skin: warm and dry overall    Extremities: wound vac in place over stump, minimal output

## 2024-07-19 NOTE — PROGRESS NOTES
Amirah Bennett is a 43 y.o. female on day 29 of admission presenting with Atrial fibrillation (Multi).    Palliative Medicine following for:  Complex medical decision making, symptom management, patient/family support     History obtained from chart review including ED note, H&P, patient's daily progress notes, review of lab/test results, and discussion with primary team and bedside RN.        Subjective   History of Present Illness  Amirah Bennett is a 42 y.o. female with significant PMHx of HFrEF (LVEF 20-25% (08/2022), Afib on Xarelto w/ DCCV 05/2023), ischemic stroke L MCA d/t AFib LLA thrombus seen on echo (lack of OAC adherence) s/p thrombectomy 01/2022 @ [facility] w/ residual expressive aphasia and R sided weakness, recent 03/2024 left cerebellar MCA, s/p tracheostomy, T2DM (03/2024 HgbA1c 5.5) and HTN, presenting with bilateral leg pain.      In the ED, she was noted to be tachycardic to 105 in triage and but then when placed on the monitor was found to be in atrial fibrillation with RVR with a rate between 150 and 190. She was given 5 mg of metoprolol with slight improvement of her rate but became hypotensive and symptomatic. She was started on heparin both for her A-fib and for possible DVT/PE. Lytics were not given because of the risk of potential intracranial hemorrhage after her recent stroke. Instead decision made to cardiovert the patient and also gave digoxin, and amiodarone. After cardioversion she still looked like she was in atrial fibrillation with RVR but only to a rate in the 120s and 130s. Her blood pressure improved. Levophed ordered along with calcium and magnesium. No evidence of infection. Patient admitted to the ICU with plan to get a CT angiogram of her chest to rule out PE and RHC. Ongoing work up revealed aortic bifurcation embolus, R BA-EIA embolus, SFA and popliteal emboli. Pt with acute right lower extremity ischemia, which is not salvageable, needing inflow procedure and R AKA given new  "motor deficits.     IVC filter placed 6/27/24, open R iliofemoral thromboembolectomy and R AKA completed 6/28/24. Ongoing bleeding from stump c/b need for heparin for other clots, being managed by primary team and vascular medicine. CTA RLE 7/2 with post-surgical changes, possible vascular injury, stable H&H after 2u pRBC.  Pt now s/p right AKA. Right stump dehiscence 7/16 with clean out and wound vac placement 7/17.     Symptoms  Pain: improved plp  Dyspnea: denies  Fatigue: denies  Insomnia: denies  Drowsiness: yes with the oxy  Constipation: denies  Nausea: denies  Appetite: good, but does not like some of the food  Anxiety: yes  Depression: yes    BP 97/59 (Patient Position: Lying) Comment: RN notified  Pulse 89   Temp 35.4 °C (95.7 °F) (Temporal)   Resp 18   Ht 1.702 m (5' 7\")   Wt 81.4 kg (179 lb 7.3 oz)   SpO2 98%   BMI 28.11 kg/m²     Physical Exam  Constitutional:       Appearance: She is obese.   HENT:      Head: Normocephalic and atraumatic.      Nose: Nose normal.      Mouth/Throat: clear drainage from tracheostomy     Mouth: Mucous membranes are moist.      Pharynx: Oropharynx is clear.   Cardiovascular:      Rate and Rhythm: Regular   Pulmonary:      Effort: Pulmonary effort is normal.      Breath sounds: CTA right, dim left.   Cough with clear sputum  Abdominal:      Palpations: Abdomen is soft.   Musculoskeletal:      Right lower leg: AKA.     Skin:     General: Skin is dry.      Comments: R AKA now with wound vacc.  Neurological:      Mental Status: She is alert, awake and cooperative.     Comments: Expressive aphasia.    Psychiatric:         Attention and Perception: Attention normal.         Mood and Affect: Mood is calm, appropriate. Tearful at times pending conversation.       Behavior: Behavior is cooperative.     Lab Results   Component Value Date    WBC 15.6 (H) 07/19/2024    HGB 8.4 (L) 07/19/2024    HCT 28.2 (L) 07/19/2024     (H) 07/19/2024     07/19/2024     Lab " Results   Component Value Date    GLUCOSE 120 (H) 07/19/2024    CALCIUM 8.9 07/19/2024     (L) 07/19/2024    K 4.7 07/19/2024    CO2 24 07/19/2024    CL 98 07/19/2024    BUN 26 (H) 07/19/2024    CREATININE 0.92 07/19/2024     Allergies   Allergen Reactions    Hydrocodone-Acetaminophen Rash    Ibuprofen Rash     Tolerates aspirin       Intake/Output Summary (Last 24 hours) at 7/19/2024 1256  Last data filed at 7/19/2024 1039  Gross per 24 hour   Intake 751.5 ml   Output 1300 ml   Net -548.5 ml     Current Facility-Administered Medications   Medication Dose Route Frequency Provider Last Rate Last Admin    acetaminophen (Tylenol) tablet 975 mg  975 mg oral q6h Mara Oro MD   975 mg at 07/19/24 0617    amiodarone (Pacerone) tablet 200 mg  200 mg oral Daily Mara Oro MD   200 mg at 07/19/24 0909    amoxicillin-pot clavulanate (Augmentin) 875-125 mg per tablet 1 tablet  1 tablet oral q12h TELMA Mara Oro MD   1 tablet at 07/19/24 0911    aspirin chewable tablet 81 mg  81 mg oral Daily Mara Oro MD   81 mg at 07/19/24 0908    atorvastatin (Lipitor) tablet 80 mg  80 mg oral Nightly Mara Oro MD   80 mg at 07/18/24 2038    bisacodyl (Dulcolax) suppository 10 mg  10 mg rectal Daily Mara Oro MD   10 mg at 06/25/24 1006    dextrose 50 % injection 12.5 g  12.5 g intravenous q15 min PRN Mara rOo MD   12.5 g at 06/25/24 1604    dextrose 50 % injection 25 g  25 g intravenous q15 min PRN Mara Oro MD   25 g at 06/20/24 1237    DULoxetine (Cymbalta) DR capsule 30 mg  30 mg oral Daily Mara Oro MD   30 mg at 07/19/24 0908    [Held by provider] empagliflozin (Jardiance) tablet 10 mg  10 mg oral Daily Carl Soares MD   10 mg at 07/12/24 0959    glucagon (Glucagen) injection 1 mg  1 mg intramuscular q15 min PRNILESH Oro MD        glucagon (Glucagen) injection 1 mg  1 mg intramuscular q15 min PRNILESH Oro MD        heparin 25,000 Units in dextrose 5% 250 mL (100 Units/mL) infusion (premix)   0-4,500 Units/hr intravenous Continuous Mara Oro MD 14 mL/hr at 07/19/24 0428 1,400 Units/hr at 07/19/24 0428    heparin bolus from bag 3,000-6,000 Units  3,000-6,000 Units intravenous q4h PRN Mara Oro MD   3,000 Units at 07/01/24 1414    HYDROmorphone (Dilaudid) injection 0.4 mg  0.4 mg intravenous q3h PRN Mara Oro MD   0.4 mg at 07/19/24 1155    lidocaine (Xylocaine) 5 % ointment   Topical PRN Mara Oro MD        magnesium sulfate 4 g in sterile water for injection 100 mL  4 g intravenous Once Ayaan Rowland MD 25 mL/hr at 07/19/24 1124 4 g at 07/19/24 1124    melatonin tablet 6 mg  6 mg oral Daily Mara Oro MD   6 mg at 07/18/24 2038    metoprolol succinate XL (Toprol-XL) 24 hr tablet 25 mg  25 mg oral Nightly Mara Oro MD   25 mg at 07/18/24 2037    oxyCODONE (Roxicodone) immediate release tablet 10 mg  10 mg oral q6h PRN Mara Oro MD   10 mg at 07/19/24 0909    pantoprazole (ProtoNix) EC tablet 40 mg  40 mg oral Daily before breakfast Mara Oro MD   40 mg at 07/19/24 0617    perflutren protein A microsphere (Optison) injection 0.5 mL  0.5 mL intravenous Once in imaging Mara Oro MD        polyethylene glycol (Glycolax, Miralax) packet 17 g  17 g oral BID Mara Oro MD   17 g at 07/15/24 2117    pregabalin (Lyrica) capsule 75 mg  75 mg oral BID Ayaan Rowland MD   75 mg at 07/19/24 0909    sacubitriL-valsartan (Entresto) 24-26 mg per tablet 2 tablet  2 tablet oral BID Mara Oro MD   2 tablet at 07/19/24 0908    sennosides (Senokot) tablet 17.2 mg  2 tablet oral BID Mara Oro MD   17.2 mg at 07/16/24 0816    warfarin (Coumadin) tablet 5 mg  5 mg oral Daily Jovani Andrews MD   5 mg at 07/18/24 1822     Assessment/Plan   History of Present Illness  Amirah Bennett is a 42 y.o. female with significant PMHx of HFrEF (LVEF 20-25% (08/2022), Afib on Xarelto w/ DCCV 05/2023), ischemic stroke L MCA d/t AFib LLA thrombus seen on echo (lack of OAC adherence) s/p thrombectomy 01/2022 @  [facility] w/ residual expressive aphasia and R sided weakness, recent 03/2024 left cerebellar MCA, s/p tracheostomy, T2DM (03/2024 HgbA1c 5.5) and HTN presenting with bilateral leg pain.      In the ED, she was noted to be tachycardic to 105 in triage and but then when placed on the monitor was found to be in atrial fibrillation with RVR with a rate between 150 and 190. She was given 5 mg of metoprolol with slight improvement of her rate but became hypotensive and symptomatic. She was started on heparin both for her A-fib and for possible DVT/PE. Lytics were not given because of the risk of potential intracranial hemorrhage after her recent stroke. Instead decision made to cardiovert the patient and also gave digoxin, and amiodarone. After cardioversion she still looked like she was in atrial fibrillation with RVR but only to a rate in the 120s and 130s. Her blood pressure improved. Levophed ordered along with calcium and magnesium. No evidence of infection. Patient admitted to the ICU with plan to get a CT angiogram of her chest to rule out PE and RHC. Ongoing work up revealed aortic bifurcation embolus, R BA-EIA embolus, SFA and popliteal emboli. Pt with acute right lower extremity ischemia, which is not salvageable, needing inflow procedure and R AKA given new motor deficits.     IVC filter placed 6/27/24, open R iliofemoral thromboembolectomy and R AKA completed 6/28/24. Ongoing bleeding from stump c/b need for heparin for other clots, being managed by primary team and vascular medicine. CTA RLE 7/2 with post-surgical changes, possible vascular injury, stable H&H after 2u pRBC. Pt now s/p right AKA. Right stump dehiscence 7/16 with clean out and wound vac placement 7/17.     6/21: Palliative consulted for pt support and GOC.       6/25: Palliative met for a family and care team meeting in pt's room. Discussed current findings via imaging/scans and labs, current complications, cardiac and circulation risks of sx and  "not doing sx. Pt wishes to move forward with sx. NOK/surrogates agreeable to pt's wishes. Needing cardiac optimization prior to sx. No current infection present.   Pt elects cousins Felicitas and Keli as surrogate decision makers. Keli mentions a recent MPOA completed and will bring in.      6/26: Possible plan for sx today? Palliative rounded on pt for questions/clarifications, positive presence/support, and symptom management. Pt still choosing sx to support her goal of \"keep going\". Pt speaking with evident anxiety. Reviewed anxiety reduction strategies. See recs below.     6/27: Pt calm today, feeling better sx was not yesterday and has had time to process. Tentative IVC filter today with amputation and thrombectomy tomorrow. Discussed pain, see recs below. Continued emotional support.     7/3: Rounded with pt and nursing. Pt happy, calm, cooperative, in no pain. Pt feeling happy with her decision that she went through with the amputation. Pointed out and happy with art and music therapies. Endorsed some issues with staying asleep, see recs below.     7/5: Recommendations for anxiety/depression made in respect to pt's Qtc. GOC discussion with MPOA. Pt remains full code to support pt's goals and wishes.      7/10: Spoke with pt, addressed symptoms of insomnia, pain/PLP anxiety/depression. Pt expressing troubles with coping with missing limb. Ongoing positive presence, enlightenment, advocacy, support, and encouragement. Please see recs below.     7/17: Spoke with pt about pain, experiences, and troubles. Offered an comforting environment, explained pt rights, and pt easily consoled. PLP no longer at night, feeling it during the day. Please see recs below.    7/19: Pt feeling better pain-wise with reduced PLP on Lyrica 75mg BID. Endorsed Oziel visited and upset pt. Still with underlying depressive symptoms and intermittently tearful. See recs below.     Palliative Performance Scale (PPS): 30   " "  ----------------------------------------------------------------------------------------------------------------------------------------------------------------------------------------------------------------------------------------------------------------------------------------------------------------------------------------------------------------------        I spent  minutes in providing separately identifiable ACP services with the patient and/or surrogate decision maker in a voluntary conversation discussing the patient's wishes and goals as detailed in the above note.   ----------------------------------------------------------------------------------------------------------------------------------------------------------------------------------------------------------------------------------------------------------------------------------------------------------------------------------------------------------------------     #Complex Medical Decision Making  #Goals of Care  #Advanced Care Planning  - Code status: Full code  - Surrogate decision maker: Keli Osborne (cousin) [phone]. Clara Black (cousin) [phone].  - Goals are survival and improved quality of life.   6/21: Pt with notable expressive aphasia making open ended questioning difficult. Pt able to answer yes and no questions more easily. Pt demonstrating understanding/comprehension of questions asked. Pt agrees that Navin is MPOA and papers are signed. She is upset with him today but is unable to verbalize why. Palliative recapped medication nonadherence and pt agreeable that she stops taking them when she is depressed. When pt asked why she stops taking her medicine, pt starting spelling out her name and saying nonsensical words. Palliative expressed a worry that her depression is causing her to not to want to continue to live. Palliative asked is that is correct, pt stated \"no\". Palliative asked pt earlier about her goals, she " "stated \"to live\" and \"I can make it\". Pt was unable to articulate any further explanation of her statements.   Pt was asked if her breathing were to be compromised and she needed intubated again (trached) would she want that? Pt hesitated but stated \"yes\".      Palliative attempted to reach out to Navin x3. First call he answered, and said he was on the other line with a doctor and to call back at 1230. Palliative did and his phone went to . Pall called back around 1500 and again, right to . Pall then called Pranay to see if Navin has another number, and Pranay's went right to . Palliative left a message with call back number for Navin and Pranay.      Will attempt to discuss GOC with returned call from Navin. Otherwise Palliative can follow up Monday or Tuesday.      6/25: Palliative met for a family and care team meeting in pt's room. Those involved included Dr. Simon, Dr. Pat, Dannielle NP, Jennifer Palliative [place], pt, and cousins Felicitas and Keil, and second cousin Marianela. Discussed current findings via imaging/scans and labs, current clot complications, and cardiac and circulation standpoints. Pt now requiring right AKA for loss of circulation. Vascular explained risks of AKA sx and not doing sx. Pt wishes to move forward with sx. NOK/new surrogates (Clara and Keli) agreeable to pt's wishes. Vascular and primary team expressed needing cardiac optimization prior to sx. Pt does not currently have an active infection, making this a non-urgent decision. Potential sx in the next few days.   Pt elects cousins Clara and Keli as surrogate decision makers vs Navin, and both Felicitas and Keli expressing the want and willingness to fulfil that position. Keli mentions a recent MPOA was completed with pt and signed by a notadriana. Keli will bring in.   Palliative reiterated and realigned heard goals of continuing to do everything we are currently doing as life and continued aggressive measures are " "paramount. Pt and family state \"yes\".      7/5: Spoke to pt's CHERIE Keli today r/t follow up on GOC/tx limitations conversation started earlier this week, along with recommendations r/t pt's underlying depression.      Keli supportive of pt starting antidepressant understanding pt is consistently stating depression and a reason for her medication non-adherence.      Keli and Clara previously requesting time to think and talk over DNR/DNI. Palliative expressed high c/f progressive HF and educated to advanced state with low EF ~25%. Expressed concern for survival following a code and the chance of continued or improved QOL would be unlikely. Keli states that she and Clara spoke this over and agree that they and Amirah would want full resuscitative measures if her heart were to stop or go into a lethal arrhythmia. Keli agreeable if a resuscitation event were to occur, pt's team would be transparent and honest with pt's prognosis and expected trajectory and assist with guiding further care that would best honor pt. Pt remains full code.     Palliative Outpatient Navigator at discharge to assist with early identification and monitoring for HF symptoms- ordered.     #HFrEF  - Palliative Outpatient Navigator at discharge to assist with early identification/tx and monitoring for HF symptoms.  (ordered)       #Cough  - Still with clear sputum, less trach drainage.  - Continues to have left lung fields dim. Monitor.  - Is on Augmentin PO for underlying UTI.       #Acute Pain  - Is s/p Right AKA with thrombectomy. Dehiscence 7/16. Debridement and wound vac placement 7/17.   - Now on Oxycodone 10mg Q6H prn pain (4 doses in the last 24 hours). Has Hydromorphone 0.4mg IV Q3H prn BTP (1 dose given in the last 24 hours). Pain is tolerable at this time.   - On Duloxetine 30mg daily for anxiety/depression may also assist with peripheral nerve pain and muscle/bone pain reduction.  Recommend increasing Duloxetine " to 60mg daily.        #Phantom limb pain  - Pt stating feeling pain of the right foot. States sharp, shooting and radiating to the right foot.   - On Lyrica 75mg BID, reports improvement of the PLP. Not gone. May also assist in reduced opiate intake.  - Lyrica may prolong Qtc. Please evaluate current Qtc with EKG.  - Beta blockers are known to help PLP along with antidepressants and AEDs. Pt on Metoprolol and Duloxetine. Recommend increasing Duloxetine to 60mg daily.         #Coping with amputation  #Disfigurement  - Recommend Psych and Cognitive Behavioral Therapy/mirror therapy as additive nonpharmacological modality for coping, anxiety/depression and PLP treatment.         #Depression  #Anxiety  - Recommend Psychiatry consult/CBT.  - C/f vicious cycle of ongoing depressive symptoms and medication non-adherence ISO worsened health and possible QOL.   - 7/19, endorses Oziel visited and was asking questions that made pt worry about healing, rehabing and her future. This event situationally worsened her depression and anxiety.   - Currently on Duloxetine 30mg daily, started 7/5. Not known to prolong QTc. Latest EKG 6/22/2024 Qtc 469ms but ISO of taking Lyrica. Recommend repeat EKG for latest evaluation.   - Recommend increasing Duloxetine to 60mg daily.        #Insomnia  - Previously endorsed trouble falling and staying asleep. Was on Melatonin 6mg PO PRN sleep and scheduled Melatonin 6mg at 1800. Reports sleeping very well on this regimen.   - 7/10, noted only on daily at 1800. Please reinstate previous regimen as pt is stating trouble falling and staying asleep again.       #Psychosocial Support  - Ongoing pt and family support, pt advocacy, positive presence and emotional support.  - Music and Art Therapy  - Spiritual Care Support        Plan of Care discussed with: Updated primary team and bedside RN on goals of care decision, medication adjustments, and code status      Medical Decision Making was high level due  to high complexity of problems, extensive data review, and high risk of management/treatment.     - Cardiogenic shock, shock liver, acute kidney injury requiring inotrope cardiac support, multiple emboli and acute loss of right lower extremity perfusion requiring AKA, worsening leukocytosis, s/p right AKA c/b post op bleeding and fall, and dehiscence, posing threat to life and function.  - Reviewed external notes from   - Reviews results from  which were used in decision making for   - Recommended the following tests: EKG for monitoring qtc s/p SSRI and starting lyrica. CXR for cough and dim lung fields  - Assessment required independent historian: nursing and primary team  - Independent interpretation of test: labs   - Discussion of management with: primary.   - Drug therapy requiring intensive monitoring for toxicity: heparin.  - Decision regarding elective major surgery with identified patient or procedure risk factors:   - Decision regarding emergency major surgery:   - Decision regarding hospitalization or escalation of hospital-level care:   - Decision not to resuscitate or to de-escalate care because of poor prognosis:   - Parenteral controlled substances: heparin, dilaudid prn      Thank you for allowing us to participate in the care of this patient. Palliative will continue to follow as needed. Palliative medicine is available Monday-Friday, 8a-6p. Please contact team with any questions or concerns.  Team pager [phone] (weekdays)  JAE Evangelista-CNP

## 2024-07-19 NOTE — CARE PLAN
The patient's goals for the shift include  feel better this shift.    The clinical goals for the shift include maintaining safety and out of bed in chair twice this shift.

## 2024-07-19 NOTE — CARE PLAN
The patient's goals for the shift include  patient will have good pain control    The clinical goals for the shift include Patient will remain safe during shift    Over the shift, the patient did not make progress toward the following goals. Barriers to progression include patient with high level of pain. Recommendations to address these barriers include continue  Problem: Skin  Goal: Prevent/manage excess moisture  Flowsheets (Taken 7/19/2024 4440)  Prevent/manage excess moisture:   Use wicking fabric (obtain order)   Moisturize dry skin   Monitor for/manage infection if present    to monitor.

## 2024-07-20 LAB
ALBUMIN SERPL BCP-MCNC: 2.9 G/DL (ref 3.4–5)
ANION GAP SERPL CALC-SCNC: 12 MMOL/L (ref 10–20)
BASOPHILS # BLD AUTO: 0.07 X10*3/UL (ref 0–0.1)
BASOPHILS NFR BLD AUTO: 0.4 %
BUN SERPL-MCNC: 22 MG/DL (ref 6–23)
CALCIUM SERPL-MCNC: 8.6 MG/DL (ref 8.6–10.6)
CHLORIDE SERPL-SCNC: 99 MMOL/L (ref 98–107)
CO2 SERPL-SCNC: 27 MMOL/L (ref 21–32)
CREAT SERPL-MCNC: 0.66 MG/DL (ref 0.5–1.05)
EGFRCR SERPLBLD CKD-EPI 2021: >90 ML/MIN/1.73M*2
EOSINOPHIL # BLD AUTO: 0.36 X10*3/UL (ref 0–0.7)
EOSINOPHIL NFR BLD AUTO: 2.2 %
ERYTHROCYTE [DISTWIDTH] IN BLOOD BY AUTOMATED COUNT: 15.9 % (ref 11.5–14.5)
GLUCOSE BLD MANUAL STRIP-MCNC: 104 MG/DL (ref 74–99)
GLUCOSE BLD MANUAL STRIP-MCNC: 118 MG/DL (ref 74–99)
GLUCOSE BLD MANUAL STRIP-MCNC: 137 MG/DL (ref 74–99)
GLUCOSE BLD MANUAL STRIP-MCNC: 95 MG/DL (ref 74–99)
GLUCOSE BLD MANUAL STRIP-MCNC: 96 MG/DL (ref 74–99)
GLUCOSE SERPL-MCNC: 84 MG/DL (ref 74–99)
HCT VFR BLD AUTO: 25.8 % (ref 36–46)
HGB BLD-MCNC: 7.7 G/DL (ref 12–16)
IMM GRANULOCYTES # BLD AUTO: 0.48 X10*3/UL (ref 0–0.7)
IMM GRANULOCYTES NFR BLD AUTO: 3 % (ref 0–0.9)
INR PPP: 1.3 (ref 0.9–1.1)
LYMPHOCYTES # BLD AUTO: 2.97 X10*3/UL (ref 1.2–4.8)
LYMPHOCYTES NFR BLD AUTO: 18.3 %
MAGNESIUM SERPL-MCNC: 2.07 MG/DL (ref 1.6–2.4)
MCH RBC QN AUTO: 30.3 PG (ref 26–34)
MCHC RBC AUTO-ENTMCNC: 29.8 G/DL (ref 32–36)
MCV RBC AUTO: 102 FL (ref 80–100)
MONOCYTES # BLD AUTO: 2.44 X10*3/UL (ref 0.1–1)
MONOCYTES NFR BLD AUTO: 15 %
NEUTROPHILS # BLD AUTO: 9.93 X10*3/UL (ref 1.2–7.7)
NEUTROPHILS NFR BLD AUTO: 61.1 %
NRBC BLD-RTO: 0 /100 WBCS (ref 0–0)
PHOSPHATE SERPL-MCNC: 3.5 MG/DL (ref 2.5–4.9)
PLATELET # BLD AUTO: 227 X10*3/UL (ref 150–450)
POTASSIUM SERPL-SCNC: 5.1 MMOL/L (ref 3.5–5.3)
PROTHROMBIN TIME: 14.9 SECONDS (ref 9.8–12.8)
RBC # BLD AUTO: 2.54 X10*6/UL (ref 4–5.2)
SODIUM SERPL-SCNC: 133 MMOL/L (ref 136–145)
UFH PPP CHRO-ACNC: 0.5 IU/ML
WBC # BLD AUTO: 16.3 X10*3/UL (ref 4.4–11.3)

## 2024-07-20 PROCEDURE — 82947 ASSAY GLUCOSE BLOOD QUANT: CPT

## 2024-07-20 PROCEDURE — 2500000001 HC RX 250 WO HCPCS SELF ADMINISTERED DRUGS (ALT 637 FOR MEDICARE OP)

## 2024-07-20 PROCEDURE — 2500000002 HC RX 250 W HCPCS SELF ADMINISTERED DRUGS (ALT 637 FOR MEDICARE OP, ALT 636 FOR OP/ED)

## 2024-07-20 PROCEDURE — 85610 PROTHROMBIN TIME: CPT

## 2024-07-20 PROCEDURE — 80069 RENAL FUNCTION PANEL: CPT

## 2024-07-20 PROCEDURE — 83735 ASSAY OF MAGNESIUM: CPT

## 2024-07-20 PROCEDURE — 99233 SBSQ HOSP IP/OBS HIGH 50: CPT

## 2024-07-20 PROCEDURE — 36415 COLL VENOUS BLD VENIPUNCTURE: CPT

## 2024-07-20 PROCEDURE — 85520 HEPARIN ASSAY: CPT

## 2024-07-20 PROCEDURE — 85025 COMPLETE CBC W/AUTO DIFF WBC: CPT

## 2024-07-20 PROCEDURE — 2500000004 HC RX 250 GENERAL PHARMACY W/ HCPCS (ALT 636 FOR OP/ED)

## 2024-07-20 PROCEDURE — 1200000002 HC GENERAL ROOM WITH TELEMETRY DAILY

## 2024-07-20 ASSESSMENT — PAIN DESCRIPTION - ORIENTATION
ORIENTATION: RIGHT

## 2024-07-20 ASSESSMENT — COGNITIVE AND FUNCTIONAL STATUS - GENERAL
STANDING UP FROM CHAIR USING ARMS: A LOT
CLIMB 3 TO 5 STEPS WITH RAILING: TOTAL
MOVING TO AND FROM BED TO CHAIR: A LOT
DRESSING REGULAR LOWER BODY CLOTHING: A LITTLE
WALKING IN HOSPITAL ROOM: A LOT
MOVING FROM LYING ON BACK TO SITTING ON SIDE OF FLAT BED WITH BEDRAILS: A LITTLE
TOILETING: A LOT
MOBILITY SCORE: 13
TURNING FROM BACK TO SIDE WHILE IN FLAT BAD: A LITTLE
HELP NEEDED FOR BATHING: A LOT
PERSONAL GROOMING: A LITTLE
DAILY ACTIVITIY SCORE: 17
DRESSING REGULAR UPPER BODY CLOTHING: A LITTLE

## 2024-07-20 ASSESSMENT — PAIN SCALES - GENERAL
PAINLEVEL_OUTOF10: 2
PAINLEVEL_OUTOF10: 0 - NO PAIN
PAINLEVEL_OUTOF10: 9
PAINLEVEL_OUTOF10: 9
PAINLEVEL_OUTOF10: 2
PAINLEVEL_OUTOF10: 6
PAINLEVEL_OUTOF10: 9
PAINLEVEL_OUTOF10: 9
PAINLEVEL_OUTOF10: 10 - WORST POSSIBLE PAIN

## 2024-07-20 ASSESSMENT — PAIN - FUNCTIONAL ASSESSMENT
PAIN_FUNCTIONAL_ASSESSMENT: 0-10

## 2024-07-20 ASSESSMENT — PAIN SCALES - PAIN ASSESSMENT IN ADVANCED DEMENTIA (PAINAD): TOTALSCORE: REPOSITIONED;MEDICATION (SEE MAR)

## 2024-07-20 ASSESSMENT — PAIN DESCRIPTION - LOCATION
LOCATION: LEG

## 2024-07-20 NOTE — CARE PLAN
The patient's goals for the shift include  pain management, rest    The clinical goals for the shift include Pain management      Problem: Skin  Goal: Decreased wound size/increased tissue granulation at next dressing change  Outcome: Progressing  Goal: Participates in plan/prevention/treatment measures  Outcome: Progressing  Goal: Prevent/manage excess moisture  Outcome: Progressing  Goal: Prevent/minimize sheer/friction injuries  Outcome: Progressing  Goal: Promote/optimize nutrition  Outcome: Progressing  Goal: Promote skin healing  Outcome: Progressing     Problem: Pain - Adult  Goal: Verbalizes/displays adequate comfort level or baseline comfort level  Outcome: Progressing     Problem: Safety - Adult  Goal: Free from fall injury  Outcome: Progressing     Problem: Discharge Planning  Goal: Discharge to home or other facility with appropriate resources  Outcome: Progressing     Problem: Chronic Conditions and Co-morbidities  Goal: Patient's chronic conditions and co-morbidity symptoms are monitored and maintained or improved  Outcome: Progressing     Problem: Heart Failure  Goal: Improved gas exchange this shift  Outcome: Progressing  Goal: Improved urinary output this shift  Outcome: Progressing  Goal: Reduction in peripheral edema within 24 hours  Outcome: Progressing  Goal: Report improvement of dyspnea/breathlessness this shift  Outcome: Progressing  Goal: Weight from fluid excess reduced over 2-3 days, then stabilize  Outcome: Progressing  Goal: Increase self care and/or family involvement in 24 hours  Outcome: Progressing     Problem: Diabetes  Goal: Achieve decreasing blood glucose levels by end of shift  Outcome: Progressing  Goal: Increase stability of blood glucose readings by end of shift  Outcome: Progressing  Goal: Decrease in ketones present in urine by end of shift  Outcome: Progressing  Goal: Maintain electrolyte levels within acceptable range throughout shift  Outcome: Progressing  Goal: Maintain  glucose levels >70mg/dl to <250mg/dl throughout shift  Outcome: Progressing  Goal: No changes in neurological exam by end of shift  Outcome: Progressing  Goal: Learn about and adhere to nutrition recommendations by end of shift  Outcome: Progressing  Goal: Vital signs within normal range for age by end of shift  Outcome: Progressing  Goal: Increase self care and/or family involovement by end of shift  Outcome: Progressing  Goal: Receive DSME education by end of shift  Outcome: Progressing     Problem: Fall/Injury  Goal: Not fall by end of shift  Outcome: Progressing  Goal: Be free from injury by end of the shift  Outcome: Progressing  Goal: Verbalize understanding of personal risk factors for fall in the hospital  Outcome: Progressing  Goal: Verbalize understanding of risk factor reduction measures to prevent injury from fall in the home  Outcome: Progressing  Goal: Use assistive devices by end of the shift  Outcome: Progressing  Goal: Pace activities to prevent fatigue by end of the shift  Outcome: Progressing      Paz Gómez

## 2024-07-20 NOTE — CARE PLAN
Problem: Skin  Goal: Decreased wound size/increased tissue granulation at next dressing change  7/20/2024 1705 by Wendy Pizano RN  Outcome: Progressing  Flowsheets (Taken 7/20/2024 1705)  Decreased wound size/increased tissue granulation at next dressing change:   Promote sleep for wound healing   Protective dressings over bony prominences  7/20/2024 1703 by Wendy Pizano RN  Outcome: Progressing  Goal: Participates in plan/prevention/treatment measures  7/20/2024 1705 by Wendy Pizano RN  Outcome: Progressing  Flowsheets (Taken 7/20/2024 1705)  Participates in plan/prevention/treatment measures: Elevate heels  7/20/2024 1703 by Wendy Pizano RN  Outcome: Progressing  Goal: Prevent/manage excess moisture  7/20/2024 1705 by Wendy Pizano RN  Outcome: Progressing  Flowsheets (Taken 7/20/2024 1705)  Prevent/manage excess moisture: Cleanse incontinence/protect with barrier cream  7/20/2024 1703 by Wendy Pizano RN  Outcome: Progressing  Goal: Prevent/minimize sheer/friction injuries  7/20/2024 1705 by Wendy Pizano RN  Outcome: Progressing  Flowsheets (Taken 7/20/2024 1705)  Prevent/minimize sheer/friction injuries:   Use pull sheet   HOB 30 degrees or less  7/20/2024 1703 by Wendy Pizano RN  Outcome: Progressing  Goal: Promote/optimize nutrition  7/20/2024 1705 by Wendy Pizano RN  Outcome: Progressing  Flowsheets (Taken 7/20/2024 1705)  Promote/optimize nutrition:   Consume > 50% meals/supplements   Offer water/supplements/favorite foods  7/20/2024 1703 by Wendy Pizano RN  Outcome: Progressing  Goal: Promote skin healing  7/20/2024 1705 by Wendy Pizano RN  Outcome: Progressing  Flowsheets (Taken 7/20/2024 1705)  Promote skin healing:   Protective dressings over bony prominences   Turn/reposition every 2 hours/use positioning/transfer devices   Assess skin/pad under line(s)/device(s)  7/20/2024 1703 by Wendy Pizano RN  Outcome: Progressing     Problem: Pain - Adult  Goal: Verbalizes/displays adequate  comfort level or baseline comfort level  7/20/2024 1705 by Wendy Pizano RN  Outcome: Progressing  7/20/2024 1703 by Wendy Pizano RN  Outcome: Progressing     Problem: Safety - Adult  Goal: Free from fall injury  7/20/2024 1705 by Wendy Pizano RN  Outcome: Progressing  7/20/2024 1703 by Wendy Pizano RN  Outcome: Progressing     Problem: Discharge Planning  Goal: Discharge to home or other facility with appropriate resources  7/20/2024 1705 by Wendy Pizano RN  Outcome: Progressing  7/20/2024 1703 by Wendy Pizano RN  Outcome: Progressing     Problem: Chronic Conditions and Co-morbidities  Goal: Patient's chronic conditions and co-morbidity symptoms are monitored and maintained or improved  7/20/2024 1705 by Wendy Pizano RN  Outcome: Progressing  7/20/2024 1703 by Wendy Pizano RN  Outcome: Progressing     Problem: Heart Failure  Goal: Improved gas exchange this shift  7/20/2024 1705 by Wendy Pizano RN  Outcome: Progressing  7/20/2024 1703 by Wendy Pizano RN  Outcome: Progressing  Goal: Improved urinary output this shift  7/20/2024 1705 by Wendy Pizano RN  Outcome: Progressing  7/20/2024 1703 by Wendy Pizano RN  Outcome: Progressing  Goal: Reduction in peripheral edema within 24 hours  7/20/2024 1705 by Wendy Pizano RN  Outcome: Progressing  7/20/2024 1703 by Wendy Pizano RN  Outcome: Progressing  Goal: Report improvement of dyspnea/breathlessness this shift  7/20/2024 1705 by Wendy Pizano RN  Outcome: Progressing  7/20/2024 1703 by Wendy Pizano RN  Outcome: Progressing  Goal: Weight from fluid excess reduced over 2-3 days, then stabilize  7/20/2024 1705 by Wendy Pizano RN  Outcome: Progressing  7/20/2024 1703 by Wendy Pizano RN  Outcome: Progressing  Goal: Increase self care and/or family involvement in 24 hours  7/20/2024 1705 by Wendy Pizano RN  Outcome: Progressing  7/20/2024 1703 by Wendy Pizano RN  Outcome: Progressing     Problem: Diabetes  Goal: Achieve decreasing blood  glucose levels by end of shift  7/20/2024 1705 by Wendy Pizano RN  Outcome: Progressing  7/20/2024 1703 by Wendy Pizano RN  Outcome: Progressing  Goal: Increase stability of blood glucose readings by end of shift  7/20/2024 1705 by Wendy Pizano RN  Outcome: Progressing  7/20/2024 1703 by Wendy Pizano RN  Outcome: Progressing  Goal: Decrease in ketones present in urine by end of shift  7/20/2024 1705 by Wendy Pizano RN  Outcome: Progressing  7/20/2024 1703 by Wendy Pizano RN  Outcome: Progressing  Goal: Maintain electrolyte levels within acceptable range throughout shift  7/20/2024 1705 by Wendy Pizano RN  Outcome: Progressing  7/20/2024 1703 by Wendy Pizano RN  Outcome: Progressing  Goal: Maintain glucose levels >70mg/dl to <250mg/dl throughout shift  7/20/2024 1705 by Wendy Pizano RN  Outcome: Progressing  7/20/2024 1703 by Wendy Pizano RN  Outcome: Progressing  Goal: No changes in neurological exam by end of shift  7/20/2024 1705 by Wendy Pizano RN  Outcome: Progressing  7/20/2024 1703 by Wendy Pizano RN  Outcome: Progressing  Goal: Learn about and adhere to nutrition recommendations by end of shift  7/20/2024 1705 by eWndy Pizano RN  Outcome: Progressing  7/20/2024 1703 by Wendy Pizano RN  Outcome: Progressing  Goal: Vital signs within normal range for age by end of shift  7/20/2024 1705 by Wendy Piznao RN  Outcome: Progressing  7/20/2024 1703 by Wendy Pizano RN  Outcome: Progressing  Goal: Increase self care and/or family involovement by end of shift  7/20/2024 1705 by Wendy Pizano RN  Outcome: Progressing  7/20/2024 1703 by Wendy Pizano RN  Outcome: Progressing  Goal: Receive DSME education by end of shift  7/20/2024 1705 by Wendy Pizano RN  Outcome: Progressing  7/20/2024 1703 by Wendy Pizano RN  Outcome: Progressing     Problem: Fall/Injury  Goal: Not fall by end of shift  7/20/2024 1705 by Wendy Pizano, RN  Outcome: Progressing  7/20/2024 1703 by Wendy Pizano,  RN  Outcome: Progressing  Goal: Be free from injury by end of the shift  7/20/2024 1705 by Wendy Pizano RN  Outcome: Progressing  7/20/2024 1703 by Wendy Pizano RN  Outcome: Progressing  Goal: Verbalize understanding of personal risk factors for fall in the hospital  7/20/2024 1705 by Wendy Pizano RN  Outcome: Progressing  7/20/2024 1703 by Wendy Pizano RN  Outcome: Progressing  Goal: Verbalize understanding of risk factor reduction measures to prevent injury from fall in the home  7/20/2024 1705 by Wendy Pizano RN  Outcome: Progressing  7/20/2024 1703 by Wendy Pizano RN  Outcome: Progressing  Goal: Use assistive devices by end of the shift  7/20/2024 1705 by Wendy Pizano RN  Outcome: Progressing  7/20/2024 1703 by Wendy Pizano RN  Outcome: Progressing  Goal: Pace activities to prevent fatigue by end of the shift  7/20/2024 1705 by Wendy Pizano RN  Outcome: Progressing  7/20/2024 1703 by Wendy Pizano RN  Outcome: Progressing

## 2024-07-20 NOTE — PROGRESS NOTES
Amirah Bennett is a 43 y.o. female on day 30 of admission presenting with Atrial fibrillation (Multi).      Subjective   The patient was seen at bedside. Patient endorses improved pain. She denies chest pain or shortness of breath. No additional complaints at this time.        Objective     Last Recorded Vitals  BP 95/59 (BP Location: Left arm, Patient Position: Lying)   Pulse 73   Temp 36.5 °C (97.7 °F) (Temporal)   Resp 18   Wt 81.8 kg (180 lb 5.4 oz)   SpO2 99%   Intake/Output last 3 Shifts:    Intake/Output Summary (Last 24 hours) at 7/20/2024 1154  Last data filed at 7/20/2024 1000  Gross per 24 hour   Intake 500.9 ml   Output 1170 ml   Net -669.1 ml       Admission Weight  Weight: 88.5 kg (195 lb) (06/20/24 0649)    Daily Weight  07/20/24 : 81.8 kg (180 lb 5.4 oz)    Image Results  Electrocardiogram, 12-lead PRN ACS symptoms  Normal sinus rhythm  Voltage criteria for left ventricular hypertrophy  T wave abnormality, consider inferolateral ischemia  Abnormal ECG  When compared with ECG of 29-JUN-2024 02:59,  Premature atrial complexes are no longer Present  Confirmed by Jose F Mariano ([id number]) on 7/17/2024 8:48:23 AM      Physical Exam  -Patient was conscious, oriented x4, with expressive aphasia. Not in acute respiratory distress, not in jaundiced nor cyanosed.    -Cardiovascular examination: Audible 1st and 2nd heart sound, no murmurs  -Lower limb examination: amputated right above knee amputation, wound VAC present with sanguinous output.  No gross bleeding. No pitting edema in LLE.  -Chest examination: Lungs clear to auscultation bilaterally.  -Abdominal examination: Soft and lax abdomen, no rigidity nor rebound tenderness.   -Neurological examination: limited, symmetrical facial impression, equally reactive pupils, right hand weakness.     Relevant Results  Scheduled medications  acetaminophen, 975 mg, oral, q6h  amiodarone, 200 mg, oral, Daily  amoxicillin-pot clavulanate, 1 tablet, oral, q12h  [facility]  aspirin, 81 mg, oral, Daily  atorvastatin, 80 mg, oral, Nightly  bisacodyl, 10 mg, rectal, Daily  DULoxetine, 60 mg, oral, Daily  [Held by provider] empagliflozin, 10 mg, oral, Daily  melatonin, 3 mg, oral, Daily  melatonin, 6 mg, oral, Daily  metoprolol succinate XL, 25 mg, oral, Nightly  pantoprazole, 40 mg, oral, Daily before breakfast  perflutren protein A microsphere, 0.5 mL, intravenous, Once in imaging  polyethylene glycol, 17 g, oral, BID  pregabalin, 75 mg, oral, BID  sacubitriL-valsartan, 2 tablet, oral, BID  sennosides, 2 tablet, oral, BID  warfarin, 5 mg, oral, Daily      Continuous medications  heparin, 0-4,500 Units/hr, Last Rate: 1,400 Units/hr (07/20/24 1604)      PRN medications  PRN medications: dextrose, dextrose, glucagon, glucagon, heparin, HYDROmorphone, lidocaine, oxyCODONE'  Results for orders placed or performed during the hospital encounter of 06/20/24 (from the past 24 hour(s))   POCT GLUCOSE   Result Value Ref Range    POCT Glucose 104 (H) 74 - 99 mg/dL   POCT GLUCOSE   Result Value Ref Range    POCT Glucose 96 74 - 99 mg/dL   CBC and Auto Differential   Result Value Ref Range    WBC 16.3 (H) 4.4 - 11.3 x10*3/uL    nRBC 0.0 0.0 - 0.0 /100 WBCs    RBC 2.54 (L) 4.00 - 5.20 x10*6/uL    Hemoglobin 7.7 (L) 12.0 - 16.0 g/dL    Hematocrit 25.8 (L) 36.0 - 46.0 %     (H) 80 - 100 fL    MCH 30.3 26.0 - 34.0 pg    MCHC 29.8 (L) 32.0 - 36.0 g/dL    RDW 15.9 (H) 11.5 - 14.5 %    Platelets 227 150 - 450 x10*3/uL    Neutrophils % 61.1 40.0 - 80.0 %    Immature Granulocytes %, Automated 3.0 (H) 0.0 - 0.9 %    Lymphocytes % 18.3 13.0 - 44.0 %    Monocytes % 15.0 2.0 - 10.0 %    Eosinophils % 2.2 0.0 - 6.0 %    Basophils % 0.4 0.0 - 2.0 %    Neutrophils Absolute 9.93 (H) 1.20 - 7.70 x10*3/uL    Immature Granulocytes Absolute, Automated 0.48 0.00 - 0.70 x10*3/uL    Lymphocytes Absolute 2.97 1.20 - 4.80 x10*3/uL    Monocytes Absolute 2.44 (H) 0.10 - 1.00 x10*3/uL    Eosinophils Absolute 0.36 0.00  - 0.70 x10*3/uL    Basophils Absolute 0.07 0.00 - 0.10 x10*3/uL   Renal Function Panel   Result Value Ref Range    Glucose 84 74 - 99 mg/dL    Sodium 133 (L) 136 - 145 mmol/L    Potassium 5.1 3.5 - 5.3 mmol/L    Chloride 99 98 - 107 mmol/L    Bicarbonate 27 21 - 32 mmol/L    Anion Gap 12 10 - 20 mmol/L    Urea Nitrogen 22 6 - 23 mg/dL    Creatinine 0.66 0.50 - 1.05 mg/dL    eGFR >90 >60 mL/min/1.73m*2    Calcium 8.6 8.6 - 10.6 mg/dL    Phosphorus 3.5 2.5 - 4.9 mg/dL    Albumin 2.9 (L) 3.4 - 5.0 g/dL   Magnesium   Result Value Ref Range    Magnesium 2.07 1.60 - 2.40 mg/dL   Protime-INR   Result Value Ref Range    Protime 14.9 (H) 9.8 - 12.8 seconds    INR 1.3 (H) 0.9 - 1.1   Heparin Assay, UFH   Result Value Ref Range    Heparin Unfractionated 0.5 See Comment Below for Therapeutic Ranges IU/mL   POCT GLUCOSE   Result Value Ref Range    POCT Glucose 118 (H) 74 - 99 mg/dL     Electrocardiogram, 12-lead PRN ACS symptoms    Result Date: 7/17/2024  Normal sinus rhythm Voltage criteria for left ventricular hypertrophy T wave abnormality, consider inferolateral ischemia Abnormal ECG When compared with ECG of 29-JUN-2024 02:59, Premature atrial complexes are no longer Present Confirmed by Dontae Marianoo ([id number]) on 7/17/2024 8:48:23 AM    ECG 12 lead    Result Date: 7/8/2024  Unusual P axis, possible ectopic atrial tachycardia with occasional Premature ventricular complexes Nonspecific T wave abnormality Abnormal ECG When compared with ECG of 20-JUN-2024 23:42, Ectopic atrial rhythm has replaced Atrial fibrillation Confirmed by Thal Jose F ([id number]) on 7/8/2024 8:47:38 AM    Electrocardiogram, 12-lead PRN ACS symptoms    Result Date: 7/8/2024  Atrial fibrillation with rapid ventricular response with premature ventricular or aberrantly conducted complexes T wave abnormality, consider inferolateral ischemia Abnormal ECG When compared with ECG of 20-JUN-2024 10:05, Non-specific change in ST segment in Inferior leads Confirmed by  Jose F Mariano ([id number]) on 7/8/2024 8:45:42 AM    CT head wo IV contrast    Result Date: 7/3/2024  Interpreted By:  Richa Peterson and Sheng Max STUDY: CT HEAD WO IV CONTRAST;  7/2/2024 8:46 pm   INDICATION: Signs/Symptoms:Fell from bed while on heparin.   Per EMR: 43-year-old female atrial fibrillation with left atrial appendage thrombus and left MCA stroke status post thrombectomy in 2022 and another left cerebellar stroke on 3/2024 with residual expressive aphasia and right-sided weakness.   COMPARISON: CT head dated 03/10/2024, 03/08/2024   ACCESSION NUMBER(S): [id number]   ORDERING CLINICIAN: OMER SCHILLING   TECHNIQUE: Noncontrast axial CT scan of head was performed. Angled reformats in brain and bone windows were generated. The images were reviewed in bone, brain, blood and soft tissue windows.   FINDINGS: CT HEAD:   CSF Spaces: There is again prominence of ventricles and sulci compatible with diffuse parenchymal volume loss with ex vacuo dilatation of the left lateral ventricle and posterior left 4th ventricle. There is no extraaxial fluid collection.   Parenchyma: There is demonstration of encephalomalacia involving the left frontal and temporal lobes compatible with sequela of prior left MCA territory infarct. There is additional encephalomalacia involving the left cerebellar hemisphere compatible with prior left cerebellar infarct. There is no midline shift. There is no intracranial hemorrhage. Mild hypoattenuation in the bilateral periventricular white matter and centrum semiovale is non-specific and likely relate to sequela from chronic small vessel ischemic disease given patient's age.   Calvarium: The calvarium is unremarkable.   Paranasal sinuses and mastoids: Visualized paranasal sinuses and mastoids are clear.       1. No evidence of acute cortical infarct, intracranial hemorrhage or displaced skull fracture. 2. Redemonstration of encephalomalacia involving the left frontotemporal lobes compatible  with sequela of prior left MCA territory infarct and the left cerebellar hemisphere compatible with prior left cerebellar infarct. 3. Mild hypoattenuation in the bilateral periventricular white matter and centrum semiovale is non-specific and likely relate to sequela from chronic small vessel ischemic disease given patient's age.     I personally reviewed the images/study and I agree with the findings as stated by Dr. Ed Grubbs.   MACRO: None   Signed by: Richa Peterson 7/3/2024 6:04 AM Dictation workstation:   [id number]    XR chest 1 view    Result Date: 7/2/2024  Interpreted By:  Patricia Curiel, STUDY: XR CHEST 1 VIEW; 7/2/2024 8:21 am   INDICATION: Signs/Symptoms:adjusted swan abe catheter.   COMPARISON: Radiograph dated 07/02/2024   ACCESSION NUMBER(S): [id number]   ORDERING CLINICIAN: OMER SCHILLING   FINDINGS: Cardiac silhouette size is enlarged, stable. Right IJ [place]-Abe catheter is in place with the tip projecting over the right main pulmonary artery.   Mild perihilar congestion. No sizable pleural effusion, focal infiltrate or pneumothorax.   No acute osseous abnormality.       1. Slight interval increase in perihilar congestion/edema. 2. [place]-Abe catheter as described above       Signed by: Patricia Henriquez 7/2/2024 10:39 AM Dictation workstation:   [id number]    XR chest 1 view    Result Date: 7/2/2024  Interpreted By:  Patricia Curiel and Meyers Emily STUDY: XR CHEST 1 VIEW;  7/2/2024 7:13 am   INDICATION: Signs/Symptoms:change in swan position.   COMPARISON: Chest radiograph 06/23/2024   ACCESSION NUMBER(S): [id number]   ORDERING CLINICIAN: OMER SCHILLING   FINDINGS: AP radiograph of the chest was provided.   Right IJ approach [place]-Abe catheter with its tip overlying the expected location of the right distal main pulmonary artery.   CARDIOMEDIASTINAL SILHOUETTE: Cardiomediastinal silhouette is enlarged, though stable in size and configuration.   LUNGS: Decreased lung  volumes with resultant bronchovascular crowding. Otherwise, no focal consolidation, pleural effusion, or pneumothorax.   ABDOMEN: No remarkable upper abdominal findings.   BONES: No acute osseous changes.       1. Persistent cardiomegaly. 2. Right IJ approach [place]-Abe catheter with its tip overlying the expected location of the right distal main pulmonary artery. 3. No focal infiltrate, pleural effusion, edema or pneumothorax.   I personally reviewed the images/study, and I agree with the findings as stated above. This study was interpreted at [place], [place].   MACRO: None   Signed by: Patricia Henriquez 7/2/2024 10:38 AM Dictation workstation:   [id number]    CT angio lower extremity right w and or wo IV contrast    Result Date: 7/2/2024  Interpreted By:  Manuel Miller and Sheng Max STUDY: CT ANGIO LOWER EXTREMITY RIGHT W AND OR WO IV CONTRAST;  7/2/2024 2:06 am   INDICATION: Signs/Symptoms:source of bleeding.   Per EMR: 43-year-old female status post right AKA due to concern for acute limb ischemia. CTA right lower extremity to evaluate for source of bleed.   COMPARISON: CTA aorta with runoff dated 06/24/2020   ACCESSION NUMBER(S): [id number]   ORDERING CLINICIAN: TERESO LAROSE   TECHNIQUE: Multiple contiguous axial images of the right lower extremity were obtained after the intravenous administration of 75 mL of Omnipaque 350 contrast in the arterial phase.  Coronal and sagittal reformatted images were reconstructed from the axial data. Multiple maximum intensity projection images were provided. 3D reconstructions.   FINDINGS: VASCULATURE:   There is absent opacification of the right SFA before it additions into the right popliteal artery (series 501, image 121). There are extraluminal contrast interspersed throughout the right vastus intermedius and vastus lateralis muscles. The right profunda femoris artery appears diminutive. Severe atherosclerosis  results in atherosclerotic plaque in the partially imaged left common iliac artery.     OSSEOUS STRUCTURES:   Postsurgical changes of right above knee amputation. There is no acute fracture in the imaged osseous structures.     SOFT TISSUES:   There is extensive subcutaneous emphysema within the right vastus intermedius muscle and biceps femoris likely due to recent amputation. There are hypoattenuating fluid collections within the distal right vastus lateralis musculature measuring 2.2 x 1.4 cm (series 501, image 104) and 1.9 x 1.4 cm (series 501, image 108) likely postsurgical collections. There is enlargement of the right biceps femoris muscle with increased attenuation likely due to intramuscular hematoma. Extensive soft tissue edema overlying the right above knee amputation surgical bed with surgical staples in place. There is overall increased pelvic wall anasarca likely due to volume overload.   OTHER:   Small volume ascites layering within the rectouterine recess. There are numerous enlarged right inguinal lymph nodes measuring 2.2 x 0.8 cm (series 501, image 348) 2.0 x 1.5 cm (series 50, image 334).       1. Postsurgical changes of right above knee amputation. There is extraluminal contrast extending distal to the right SFA interspersed throughout the right vastus intermedius and vastus lateralis muscles suggestive of expected vascular injury postsurgery. 2. There is extensive subcutaneous emphysema overlying the right vastus intermedius muscle and right biceps femoris with heterogenous high attenuating densities likely representing intramuscular hematomas. 3. New hypoattenuating fluid collections abutting the distal right vastus intermedius muscle at the surgical bed are secondary to postsurgical collections. 4. Diffuse body wall anasarca with small volume ascites layering within the rectouterine recess are compatible with volume overload.     I personally reviewed the images/study and I agree with the  findings as stated by Dr. Ed Grubbs. This study was interpreted at [facility], [place], [place].   MACRO: None.   Signed by: Manuel Miller 7/2/2024 6:46 AM Dictation workstation:   [id number]    CT angio chest abdomen pelvis    Result Date: 7/1/2024  Interpreted By:  Manuel Miller and Baker Zachary STUDY: CT ANGIO CHEST ABDOMEN PELVIS;  6/30/2024 12:00 pm   INDICATION: Signs/Symptoms:evaluate for source of bleeding, not incrementing appropriately.   COMPARISON: CTA on 06/24/2024. CTA head and neck on 03/09/2024.   ACCESSION NUMBER(S): [id number]   ORDERING CLINICIAN: ANTIONE GAMBLE   PROCEDURE: CT ANGIOGRAM OF THE CHEST, ABDOMEN AND PELVIS   TECHNIQUE: High-resolution contrast-enhanced helical CT of the chest, abdomen, and pelvis was performed, without contrast, timed to arterial phase, and timed to venous phase (three phases).  3-D processing was performed by the physician on an independent work station, with MIP and volume-rendering techniques. Total of 90 ml of Omnipaque 350 was injected intravenously during the examination.  The study was performed without oral contrast. The patient tolerated the injection without complications.   FINDINGS: VASCULAR:   Pulmonary Arteries: No acute pulmonary embolism. Note is made of right IJ [place]-Abe catheter with tip terminating within a right upper lobe pulmonary artery.   Thoracic Aorta: Non-contrast images show no evidence of acute intramural hematoma. No thoracic aortic aneurysm or dissection. No significant atherosclerosis. Anatomic pattern of great vessels off the arch is normal.   Images of the abdominal aorta demonstrate diffuse atherosclerotic change without significant focal stenosis or aneurysm. Redemonstration of hypodense filling defect involving the bifurcation of the right external/internal iliacs, decreased in size and extent compared to prior exam. Remaining common, external, and internal iliac arteries are patent without  narrowing or filling defects.   Celiac artery demonstrates no significant focal stenosis.   Superior mesenteric artery demonstrates no significant focal stenosis.   Inferior mesenteric artery demonstrates no significant focal stenosis.   There is a single right renal artery.  Right renal artery demonstrates  no significant focal stenosis.  There  is a single renal vein which is patent.   There  is a single left renal artery.  Left renal artery demonstrates no significant focal stenosis.  There  is a single renal vein which is patent.   IVC filter noted in place, new compared to prior exam.   NONVASCULAR FINDINGS:   CHEST:   LUNG/PLEURA/LARGE AIRWAYS: No lung masses, pulmonary nodules or consolidations are present. There is no pleural effusion, no pneumothorax.   VESSELS: Please see above.   HEART: The heart is mildly enlarged, similar to prior exam. No pericardial effusion   MEDIASTINUM AND GHISLAINE: No mediastinal, hilar or axillary lymph nodes are present. The esophagus appears normal.   CHEST WALL AND LOWER NECK: Within normal limits. Hypodense nodule again noted within the left thyroid lobe, measuring 0.9 cm. Remaining thyroid gland is unremarkable.   ABDOMEN:   LIVER: The liver is mildly enlarged measuring approximately 20 cm in the craniocaudal dimension. Redemonstration of a hypoattenuating lesion within the anterior aspect of the left hepatic lobe measuring approximately 1.1 cm, likely representing a simple hepatic cyst. No new focal liver lesions.   BILE DUCTS: The intrahepatic and extrahepatic ducts are not dilated.   GALLBLADDER: The gallbladder is nondistended and without evidence of radiopaque stones.   PANCREAS: The pancreas appears unremarkable without evidence of ductal dilatation or masses.   SPLEEN: The spleen is normal in size without focal lesions.   ADRENAL GLANDS: Nodular thickening of the bilateral adrenal glands, similar to prior exam.   KIDNEYS AND URETERS: Redemonstration of cortical atrophy  involving the upper and mid left kidney with wedge-shaped areas of hypoenhancement in the interpolar region, likely sequelae of remote infarct. The right kidney is normal in appearance. No hydroureteronephrosis or nephroureterolithiasis is identified.   PELVIS:   BLADDER: The urinary bladder is decompressed with a Araya catheter in place.   REPRODUCTIVE ORGANS: No pelvic masses.   BOWEL: The stomach, small and large bowel are normal in appearance without wall thickening or obstruction. No extravasation of contrast to suggest active gastrointestinal hemorrhage. The appendix is not definitively visualized, however there is no pericecal stranding or fluid   VESSELS: Please see above.   PERITONEUM/RETROPERITONEUM/LYMPH NODES: Small amount of abdominopelvic ascites. Mild diffuse mesenteric edema. No loculated abdominopelvic fluid collections. No abdominopelvic lymphadenopathy.   BONES AND ABDOMINAL WALL: No suspicious osseous lesions are identified. Degenerative discogenic disease is noted in the lower thoracic and lumbar spine. Diffuse body wall edema. Multiple intramuscular lipomas again noted within the proximal left lower extremity. Fat stranding and soft tissue thickening involving the right inguinal soft tissues, with a small focus air, likely postprocedural. Multiple mildly enlarged and prominent inguinal lymph nodes, right-greater-than-left, likely reactive.       1. No evidence of active hemorrhage within the chest, abdomen, or pelvis. 2. Small filling defect at the bifurcation of the right internal and external iliac arteries, likely representing residual thrombus, decreased in size and extent compared to prior exam. No additional filling defects identified. 3. Small volume abdominopelvic ascites and diffuse mesenteric/body wall edema, likely secondary to volume overload. 4. Soft tissue inflammation/edema and emphysema within the right inguinal region, likely post procedural in etiology. Multiple mildly  enlarged inguinal lymph nodes in this region, likely reactive. 5. Additional findings as above.   I personally reviewed the images/study and I agree with the findings as stated by Dr. Vincent Urbina M.D. This study was interpreted at [place], [place].   MACRO: None   Signed by: Manuel Miller 7/1/2024 5:51 AM Dictation workstation:   [id number]    US abdomen limited liver    Result Date: 6/30/2024  Interpreted By:  George Hagen,  and Erin Richards STUDY: US ABDOMEN LIMITED LIVER;  6/30/2024 3:05 pm   INDICATION: Signs/Symptoms:uptrending direct bilirubin, diffuse abdominal pain.   COMPARISON: Right upper quadrant ultrasound 08/08/2022 Same day CTA chest abdomen pelvis   ACCESSION NUMBER(S): [id number]   ORDERING CLINICIAN: ANTIONE GAMBLE   TECHNIQUE: Multiple images of the right upper quadrant were obtained.  Gray scale, color Doppler and spectral Doppler waveform analysis was performed.  This examination was interpreted at [facility].   FINDINGS: LIVER: The liver measures 15.63 cm and is diffusely echogenic in appearance, consistent with diffuse fatty infiltration. The resulting increased beam attenuation thereby limiting evaluation of the liver for focal lesions. There is a 1.4 x 0.8 x 1.1 cm well-circumscribed anechoic lesion along the left hepatic lobe liver capsule likely representing a cyst. There are otherwise no focal lesions..     GALLBLADDER: The gallbladder is contracted due to the patient reportedly having just eaten. It demonstrates no evidence of gallstones, wall thickening or surrounding fluid. The gallbladder wall thickness is 0.36 cm. Sonographic James's sign is reported to be negative.     BILE DUCTS: No evidence of intra or extrahepatic biliary dilatation is identified; the common bile duct measures 0.3cm.   PANCREAS: The pancreas is poorly visualized due to overlying bowel gas.   RIGHT  KIDNEY: The right kidney measures 11.15 cm in length. The renal cortical echogenicity and thickness are within normal limit.  No hydronephrosis or renal calculi are seen.     PERITONEUM AND RETROPERITONEUM: There is no free or loculated fluid seen in the abdomen.       1. No acute sonographic abnormality of the right upper quadrant. 2. Hepatic steatosis.   I personally reviewed the images/study and I agree with the findings as stated by Maurice Khan MD (resident) . This study was interpreted at [place], [place].   MACRO: None   Signed by: George Hagen 6/30/2024 5:44 PM Dictation workstation:   [id number]    IR intervention filter placement    Result Date: 6/28/2024  Interpreted By:  Alber Del Cid and Dervishi Mario STUDY: IR INTERVENTION FILTER PLACEMENT;  6/27/2024 11:36 am   INDICATION: Signs/Symptoms:IVC filter placement.   COMPARISON: None.   ACCESSION NUMBER(S): [id number]   ORDERING CLINICIAN: ANTIONE GAMBLE   TECHNIQUE: INTERVENTIONALIST(S): Resident: Wellington Mesa MD Attending: Alber Del Cid MD   CONSENT: The patient/patient's POA/next of kin was informed of the nature of the proposed procedure. The purposes, alternatives, risks, and benefits were explained and discussed. All questions were answered and consent was obtained.   RADIATION EXPOSURE: Fluoroscopy time: 3 min. Dose: 171.08 mGy. Dose Area Product (DAP): [id number]   SEDATION: Moderate conscious IV sedation services (supervision of administration, induction, and maintenance) were provided by the physician performing the procedure with intravenous fentanyl 150 mcg and versed 2 mg for 20 minutes. The physician was assisted by an independent trained observer, an interventional radiology nurse, in the continuous monitoring of patient level of consciousness and physiologic status.   MEDICATION/CONTRAST: No additional   TIME OUT: A time out was performed immediately prior to procedure  start with the interventional team, correctly identifying the patient name, date of birth, MRN, procedure, anatomy (including marking of site and side), patient position, procedure consent form, relevant laboratory and imaging test results, antibiotic administration, safety precautions, and procedure-specific equipment needs.   COMPLICATIONS: No immediate adverse events identified.   FINDINGS: Maximum sterile barrier technique was implemented. In the recumbent position, the patient was positioned on the angiography table. The cutaneous tissues overlying the  right common femoral vein were prepared and draped in usual sterile manner. The optimal access site was identified by fluoroscopy with subsequent subcutaneous instillation of lidocaine 1% local anesthesia. Ultrasound images demonstrate a patent   right common femoral vein. Utilizing Seldinger/micropuncture access technique and direct ultrasound guidance the targeted vein was accessed. A hard copy ultrasound image was acquired and stored on the  PACS.   After confirmation of location, the access was secured with a 018 [place]-Mandril guidewire. Subsequently, the 018 wire was removed and exchanged for an 035. The microcatheter was removed over the 035 wire and the site was upsized to the 5-Greek dilator sheath part of the IVC filter system which was positioned at the proximal right common iliac vein. The 035 glidewire was then removed. Pre-placement inferior vena cava angiogram was performed at 20 cc for 30 sec injection rates at 900 PSI in the AP projection.   The inferior vena cava was patent without filling defects to sugest the presence of thrombus. No stricture, stenosis or oclusion was present. No anatomic variation was identified. The left common iliac vein was patent.   The level of the right and left renal veins were identified at the L1-2 level.   PROCEDURE: A retrievable  Option IVC filter was selected for placement. The infrarenal location was  confirmed. Utilizing a 035 Amplatz wire, the introducer system was advanced into the inferior vena cava. There was subsequent deployment of the retrievable IVC filter in an infrarenal location.   A spot fluoroscopic image was obtained to confirm location.  The delivery system was removed and hemostasis was achieved utilizing manual compression.   The patient tolerated the procedure without complication.       1. Uncomplicated and technically successful placement of a retrievable   Option inferior vena cava filter in an infrarenal IVC location. The IVC filter may be retrieved as clinically indicated. Optimal recovery period is less than 90 days post-placement. However, longer periods have been reported and are possible. 2.  Patent inferior vena cava and common iliac vein reflux without evidence of thrombus, stenosis, occlusion, or anatomic variation.   Please contact interventional radiology if filtration is no longer indicated.   I was present for and/or performed the critical portions of the procedure and immediately available throughout the entire procedure.   I personally reviewed the image(s) / study and resident interpretation. I agree with the findings as stated.   Performed and dictated at [facility].   MACRO: None   Signed by: Alber Del Cid 6/28/2024 4:48 PM Dictation workstation:   [id number]    Vascular US upper extremity venous duplex left    Result Date: 6/26/2024            [place] [address]   Tel [phone] and Fax [phone]  Vascular Lab Report VASC US UPPER EXTREMITY VENOUS DUPLEX LEFT  Patient Name:      MANUELPENNY Giron Physician:  [id number] Ugo Moreno MD Study Date:        6/24/2024             Ordering Physician: [id number] OMER SCHILLING MRN/PID:           [id number]               Technologist:       Catalina Lantigua RVT Accession#:        [id number]          Technologist 2: Date of Birth/Age: 1980 / 43 years Encounter#:         [id number] Gender:            F Admission Status:  Inpatient             Location Performed: [facility]  Diagnosis/ICD: Left arm swelling-M79.89 CPT Codes:     24144 Peripheral venous duplex scan for DVT Limited  CONCLUSIONS: Right Upper Venous: The subclavian vein demonstrates a normal spontaneous and phasic flow. Left Upper Venous: No evidence of acute deep vein thrombus visualized in the left upper extremity. Non-vascularized mass in left upper arm measuring 46.27 mm x 64.90 mm. Additional Findings; Non-vascularized mass.  Imaging & Doppler Findings:  Right             Thrombus   Flow Subclavian Distal   None   Pulsatile  Left                Compress Thrombus   Flow Internal Jugular      Yes      None   Pulsatile Subclavian            Yes      None Subclavian Proximal   Yes      None   Pulsatile Subclavian Mid        Yes      None Subclavian Distal     Yes      None   Pulsatile Axillary              Yes      None   Pulsatile Brachial              Yes      None Cephalic              Yes      None Basilic               Yes      None  [id number] Ugo Moreno MD Electronically signed by [id number] Ugo Moreno MD on 6/26/2024 at 9:55:56 AM  ** Final **     CT angio aorta and bilateral iliofemoral runoff w and or wo IV contrast    Result Date: 6/25/2024  Interpreted By:  Manuel Miller,  and Moshe Paris STUDY: CT ANGIO AORTA AND BILATERAL ILIOFEMORAL RUNOFF W AND OR WO IV CONTRAST;  6/24/2024 2:24 pm   INDICATION: Signs/Symptoms:concern for limb ischemia in right leg.   COMPARISON: CT abdomen pelvis: 03/08/2024., 12/09/2023, 04/05/2022   ACCESSION NUMBER(S): [id number]   ORDERING CLINICIAN: OMER SCHILLING   TECHNIQUE: Thin-section axial images of the abdomen, pelvis and bilateral lower  extremities were obtained in the arterial phase after intravenous administration of 120 mL Omnipaque 350 contrast. Delayed images the legs were obtained for assessment of venous patency. Coronal and sagittal reformatted images were reconstructed from the axial data. Multiplanar MIPs and 3D reconstructions were created on an independent workstation and reviewed.   FINDINGS: VASCULATURE:   ABDOMINAL AORTA: No abdominal aortic aneurysm or dissection. No significant aortic atherosclerosis.   ABDOMINAL ARTERIES: Normal opacification of the celiac artery, SMA and bilateral renal arteries with no hemodynamically significant stenosis. Atherosclerotic calcification of the origin of the inferior mesenteric artery which is otherwise patent and opacify.   There is cavity in the aortoiliac bifurcation as visualized on series 401, image 206 and coronal imaging 402 image 82 consistent with thrombus.     RIGHT LOWER EXTREMITY:   - Right Common Iliac Artery: There is partially occlusive thrombus seen in the distal right common femoral artery (series 401, image 270). There is otherwise normal opacification of the vasculature proximal to the thrombus. - Right External Iliac Artery:  There is multifocal partially occlusive in nearly occlusive thrombus throughout the proximal and mid external iliac artery. There is reconstitution of flow into the distal segments of the external iliac artery. - Right Internal Iliac Artery:  Partially occlusive thrombus is seen at the origin of the internal iliac artery. The remaining segments of the internal iliac artery demonstrate normal contrast opacification.   - Right Common Femoral Artery:  Widely patent with no hemodynamically significant stenosis. There is no filling defects to suggest thrombus. - Right Profunda Artery:  Normal contrast opacification with no intraluminal filling defects. - Right Superficial Femoral Artery:  Short-segment of filling defects in the proximal SFA consistent with  thrombus. The remaining segments of the SFA are otherwise widely patent until the most distant portion of the SFA as it exits the adductor hiatus there is partially occlusive thrombus. - Right Popliteal Artery:  Completely occlusive thrombus is seen throughout the popliteal artery including all 3 segments. - Right Anterior Tibial, Posterior Tibial, Peroneal Arteries:  Occlusive thrombus in the tibioperoneal trunk. There is reconstitution of faint opacification of the posterior tibial, anterior tibial and peroneal arteries with the posterior tibial being the most opacify out of the 3 aforementioned vessels and visualized at the ankle. Dorsalis pedis is not well opacified.     LEFT LOWER EXTREMITY:   - Left Common Iliac Artery: No evidence of filling defects to suggest thrombus. No hemodynamically significant stenosis. - Left External Iliac Artery: No evidence of filling defects to suggest thrombus. No hemodynamically significant stenosis. - Left Internal Iliac Artery: No evidence of filling defects to suggest thrombus. No hemodynamically significant stenosis.   - Left Common Femoral Artery: No evidence of filling defects to suggest thrombus. No hemodynamically significant stenosis. - Left Profunda Artery: No evidence of filling defects to suggest thrombus. No hemodynamically significant stenosis. - Left Superficial Femoral Artery: No evidence of filling defects to suggest thrombus. No hemodynamically significant stenosis. - Left Popliteal Artery: The P1 segment of the popliteal artery is patent. There is complete abrupt vessel cutoff/occlusion of the P2 and P3 segments. - Left Anterior Tibial, Posterior Tibial, Peroneal Arteries: There is complete occlusion of the tibioperoneal trunk. There is reconstitution of flow into the proximal posterior tibial, anterior tibial and peroneal artery. There is a abrupt vessel cutoff of the mid anterior tibial artery with faint reconstitution of flow and opacification of the distal  anterior tibial artery. The dorsalis pedis is not well visualized. There is faint opacification of the posterior tibial and peroneal arteries throughout their course with the posterior tibial artery visualized at the ankle.       ABDOMEN/PELVIS:   LOWER CHEST: The heart is enlarged with significant enlargement of the right atrium. There is early reflux of contrast in the hepatic arteries compatible with congestive hepatopathy which can seen with right heart failure. Bibasilar dependent atelectasis are noted. No sizable pleural effusions. No pericardial effusion. Visualized distal esophagus is within normal limits.   LIVER: The liver is enlarged measuring 19 cm in craniocaudal dimension. There is diffuse decreased attenuation of the liver parenchyma with comparison to the splenic parenchyma suggestive of steatosis. There is no discrete liver lesions.   BILE DUCTS: No significant intrahepatic or extrahepatic dilatation.   GALLBLADDER: No significant abnormality.   SPLEEN: No significant abnormality.   PANCREAS: No significant abnormality.   ADRENALS: Mild thickening of bilateral adrenal glands.   KIDNEYS, URETERS, BLADDER: There is re-demonstration of cortical atrophy involving the upper and midpole region of the left kidney with a wedge-shaped area of relative hypo-enhancement in the interpolar region likely representing sequela of remote infarct. No enhancing renal lesions. No hydroureteronephrosis. The urinary bladder is within normal limits.   REPRODUCTIVE ORGANS: No significant abnormality.   RETROPERITONEUM/LYMPH NODES: No abdominal lymphadenopathy. Prominent right inguinal lymph node with the largest measuring 1.1 cm in diameter. Which are incompletely characterized.   BOWEL/PERITONEUM:  No inflammatory bowel wall thickening or dilatation. Normal appendix.   No pneumoperitoneum, small amount of free fluid in the pelvis. No evidence of fluid collections.     ABDOMINAL WALL: There is re-demonstration of a small  umbilical hernia defect with the herniated mesenteric fat. No evidence of herniated loops of bowel. There is diffuse body wall edema.   MUSCULOSKELETAL:  No acute osseous abnormality. Degenerative changes of the visualized spine. Bilateral lower extremities demonstrates no acute osseous or soft tissue abnormality.         RIGHT LOWER EXTREMITY:   1. Intraluminal filling defects in the aortoiliac bifurcation. 2. Nearly occlusive thrombus in the distal right common iliac artery with nearly completely occlusive thrombus in the proximal right external iliac artery with extension into the proximal right internal iliac artery. Partially occlusive thrombus within the short-segment of the proximal SFA and distal SFA as it exits the adductor hiatus with complete occlusion of the popliteal artery and faint reconstitution of flow into the infrapopliteal vasculature as detailed above.     LEFT LOWER EXTREMITY:   1. Complete total occlusion of the P2 and P3 segments of the left popliteal artery with the extension into the tibioperoneal trunk and reconstitution of flow into the proximal posterior tibial, peroneal and anterior tibial arteries. There is abrupt cutoff of the mid anterior tibial artery with distal reconstitution. Faint opacification of the peroneal artery and posterior artery throughout their course. Additional findings are as detailed above.   NONVASCULAR:   1. Cardiomegaly with early opacification of the hepatic veins suggestive of congestive hepatopathy with component of right heart failure. 2. Mildly enlarged liver with diffuse decreased hepatic attenuation suggestive of steatosis. 3. Cortical atrophy involving the upper and middle pole of the region of the left kidney similar compared to prior distant imaging suggestive of sequela of remote infarcts. 4. Diffuse anasarca with mesenteric edema and small abdominopelvic ascites. 5. Additional incidental findings are as detailed in the body of the report.       I  personally reviewed the images/study and I agree with the findings as stated by Resident Wellington Mesa MD.   MACRO: Wellington Mesa discussed the significance and urgency of this critical finding by telephone with  ABE PARKINSON MD on 6/24/2024 at 4:33 pm.  (**-RCF-**) Findings:  See findings.   Signed by: Manuel Miller 6/25/2024 6:30 AM Dictation workstation:   [id number]    Lower extremity arterial duplex bilateral    Result Date: 6/23/2024  Interpreted By:  Kun Marshall,  and Mindi Padilla STUDY: [facility] LOWER EXTREMITY ARTERIAL DUPLEX BILATERAL; ;  6/23/2024 10:24 am   INDICATION: Signs/Symptoms:loss of dopplerable pulses.   COMPARISON: None.   ACCESSION NUMBER(S): [id number]   ORDERING CLINICIAN: OMER SCHILLING   TECHNIQUE: Grayscale and color Doppler images of the arteries of the bilateral lower extremities were obtained for evaluation.   FINDINGS: Bilateral common femoral, superficial femoral, popliteal, posterior tibial, peroneal arteries were evaluated.     The right common femoral artery, deep femoral artery, proximal and mid superficial femoral arteries demonstrate normal color filling but decreased flow velocities and biphasic waveforms on Doppler interrogation compared to the left side.   There is echogenic material within the distal right superficial femoral artery as well as the popliteal artery. Doppler interrogation demonstrates a decreased flow within the distal superficial femoral artery with the slightly biphasic waveforms and no significant flow within the popliteal artery. The right posterior tibial and peroneal arteries demonstrate color filling but with markedly decreased flow velocities and monophasic waveforms.   The left common femoral, deep femoral, superficial femoral and popliteal arteries demonstrate normal color filling with normal peak velocities and triphasic waveforms. The left peroneal and posterior tibial arteries demonstrate normal color filling with biphasic  waveforms.   Few prominent inguinal lymph nodes are visualized.       1. Echogenic material within the right distal femoral and popliteal arteries with  absent Doppler flow in the right popliteal artery significantly diminished flow within the right distal superficial femoral artery. Findings raise concern for thrombosis of the distal SFA and popliteal artery. Decreased flow within the right posterior tibial and peroneal arteries could be through collateralization. 2. Decreased flow within the right common femoral artery, right deep femoral artery as well as the proximal and mid superficial femoral arteries, raising concern for stenosis proximal to the right common femoral artery, possibly within the right external iliac right common iliac artery. A CTA of the lower extremities can be obtained for further evaluation. 3. Unremarkable Doppler interrogation of the left lower extremity arteries.     I personally reviewed the images/study and I agree with the findings as stated. This study was interpreted at [place], [place].   MACRO: None   Signed by: Kun Marshall 6/23/2024 12:18 PM Dictation workstation:   [id number]    Lower extremity venous duplex bilateral    Result Date: 6/23/2024  Interpreted By:  Kun Marshall,  and Mindi Padilla STUDY: [facility] US LOWER EXTREMITY VENOUS DUPLEX BILATERAL;  6/23/2024 10:24 am   INDICATION: Signs/Symptoms:lower extremity swelling and concern clot was seen on US as femoral [place] placed, doac nonadherene.   COMPARISON: None.   ACCESSION NUMBER(S): [id number]   ORDERING CLINICIAN: OMER SCHILLING   TECHNIQUE: Vascular ultrasound of the bilateral lower extremities was performed. Real-time compression views as well as Gray scale, color Doppler and spectral Doppler waveform analysis was performed.   FINDINGS: Evaluation of the visualized portions of the bilateral common femoral vein, proximal, mid, and distal femoral vein, and  popliteal vein were performed.  Evaluation of the visualized portions of the  posterior tibial and peroneal veins were also performed.   There is partial compressibility of the right external iliac vein and the right common femoral vein, however with normal flow seen on Doppler images.   The remainder of the evaluated veins demonstrate normal compressibility. There is intact venous flow demonstrating normal respiratory variability and normal augmentation of flow with calf compression. Therefore, there is no ultrasonographic evidence for deep vein thrombosis within the evaluated veins.   Respiratory variation and augmentation to calf pressure was noted.       1. Partial compressibility of the right external iliac vein and the right common femoral vein, which raises concern for early nonocclusive deep venous thrombosis, although flow is seen on Doppler images. 2. Otherwise, no sonographic evidence for deep vein thrombosis within the remainder of the evaluated veins of the bilateral lower extremity.   I personally reviewed the images/study and I agree with the findings as stated. This study was interpreted at [place], [place].   MACRO: None   Signed by: Kun Marshall 6/23/2024 11:25 AM Dictation workstation:   [id number]    XR chest 1 view    Result Date: 6/23/2024  Interpreted By:  Perfecto Cope and Dervishi Mario STUDY: XR CHEST 1 VIEW;  6/23/2024 9:33 am   INDICATION: Signs/Symptoms:check PA catheter placement.   COMPARISON: Chest radiograph: 06/22/2024. CT chest 06/20/2024   ACCESSION NUMBER(S): [id number]   ORDERING CLINICIAN: OMER SCHILLING   FINDINGS: AP radiograph of the chest was provided. Patient is slightly rotated to the right. A right IJ [place]-Abe catheter is noted with the tip projecting over the distal right main pulmonary artery, similar to prior.   CARDIOMEDIASTINAL SILHOUETTE: Cardio-pericardial silhouette remains markedly enlarged, similar  compared to prior imaging.   LUNGS: There are low lung volumes with associated bronchovascular crowding and no tiarra pulmonary edema. Bilateral subsegmental atelectasis are again noted. No focal consolidations, sizable pleural effusions or pneumothorax.   ABDOMEN: No remarkable upper abdominal findings.   BONES: No acute osseous changes.       1. Right IJ approach [place]-Abe catheter tip overlies distal right pulmonary artery, similar to prior. 2. Stable enlargement of cardiomediastinal silhouette with no tiarra pulmonary edema on present study.   I personally reviewed the images/study and I agree with the findings as stated by Resident Wellington Mesa MD.   MACRO: NONE.   Signed by: Perfecto Cope 6/23/2024 10:48 AM Dictation workstation:   [id number]    Transthoracic Echo (TTE) Complete    Result Date: 6/22/2024   [facility], [address] [place] [address]                Tel [phone] and Fax [phone] TRANSTHORACIC ECHOCARDIOGRAM REPORT  Patient Name:      MANUEL Giron Physician:    [id number] Rao MD Study Date:        6/22/2024            Ordering Provider:    [id number] OMER SCHILLING MRN/PID:           [id number]             Fellow: Accession#:        [id number]         Nurse: Date of Birth/Age: 1980 / 43      Sonographer:          Juanita Carlos RCS                    years Gender:            F                    Additional Staff: Height:            170.00 cm            Admit Date:           6/20/2024 Weight:            102.06 kg            Admission Status:     Inpatient -                                                               Routine BSA / BMI:         2.12 m2 / 35.31      Encounter#:           [id number]                    kg/m2 Blood Pressure:    85/59 mmHg           Department Location:  [facility] Study Type:     TRANSTHORACIC ECHO (TTE) COMPLETE Diagnosis/ICD: Cardiogenic shock-R57.0; Acute systolic (congestive) heart                failure (CHF)-I50.21 Indication:    Congestive Heart Failure; Cardiogenic Shock CPT Code:      Echo Complete w Full Doppler-34232 Patient History: Valve Disorders:   Mitral Regurgitation and Tricuspid Regurgitation. Pertinent History: HTN, CHF and A-Fib. HFrEF, Cardiogenic shock, ETOH abuse, Hx                    of stroke. Study Detail: The following Echo studies were performed: 2D, M-Mode, Doppler and               color flow. Technically challenging study due to prominent lung               artifact. Definity used as a contrast agent for endocardial border               definition. Total contrast used for this procedure was 3.0 mL via               IV push. Unable to obtain suprasternal notch view.  PHYSICIAN INTERPRETATION: Left Ventricle: Left ventricular ejection fraction is severely decreased, by visual estimate at 20-25%. There is global hypokinesis of the left ventricle with minor regional variations. The left ventricular cavity size is normal. Left ventricular diastolic filling was indeterminate. Left Atrium: The left atrium is severely dilated. Right Ventricle: The right ventricle is mildly enlarged. There is mildly reduced right ventricular systolic function. A device is visualized in the right ventricle. Right Atrium: The right atrium is mildly dilated. Aortic Valve: The aortic valve is trileaflet. The aortic valve dimensionless index is 0.77. There is no evidence of aortic valve regurgitation. The peak instantaneous gradient of the aortic valve is 12.7 mmHg. The mean gradient of the aortic valve is 8.0 mmHg. Mitral Valve: The mitral valve is normal in structure. There is trace mitral valve regurgitation. Tricuspid Valve: The tricuspid valve is structurally normal. There is moderate to severe tricuspid regurgitation. The Doppler estimated RVSP is moderately elevated at 54.9 mmHg.  Pulmonic Valve: The pulmonic valve is structurally normal. There is mild pulmonic valve regurgitation. Pericardium: There is a small pericardial effusion. Aorta: The aortic root is normal. The Ao Sinus is 2.70 cm. The Asc Ao is 3.30 cm. Systemic Veins: The inferior vena cava appears dilated. There is less than 50% IVC collapse with inspiration. In comparison to the previous echocardiogram(s): Compared with study dated 3/9/2024,. TR has worsened.  CONCLUSIONS:  1. Left ventricular ejection fraction is severely decreased, by visual estimate at 20-25%.  2. There is global hypokinesis of the left ventricle with minor regional variations.  3. Left ventricular diastolic filling was indeterminate.  4. Mildly enlarged right ventricle.  5. There is mildly reduced right ventricular systolic function.  6. The left atrium is severely dilated.  7. Moderate to severe tricuspid regurgitation visualized.  8. Moderately elevated right ventricular systolic pressure.  9. TR has worsened. QUANTITATIVE DATA SUMMARY: 2D MEASUREMENTS:                          Normal Ranges: Ao Root d:     2.70 cm   (2.0-3.7cm) LAs:           4.70 cm   (2.7-4.0cm) IVSd:          0.80 cm   (0.6-1.1cm) LVPWd:         0.90 cm   (0.6-1.1cm) LVIDd:         5.60 cm   (3.9-5.9cm) LVIDs:         5.00 cm LV Mass Index: 83.9 g/m2 LV % FS        10.7 % LA VOLUME:                               Normal Ranges: LA Vol A4C:        89.6 ml    (22+/-6mL/m2) LA Vol A2C:        79.1 ml LA Vol BP:         86.1 ml LA Vol Index A4C:  42.2ml/m2 LA Vol Index A2C:  37.3 ml/m2 LA Vol Index BP:   40.5 ml/m2 LA Area A4C:       25.6 cm2 LA Area A2C:       24.6 cm2 LA Major Axis A4C: 6.2 cm LA Major Axis A2C: 6.5 cm LA Volume Index:   40.5 ml/m2 LA Vol A4C:        82.5 ml LA Vol A2C:        71.9 ml RA VOLUME BY A/L METHOD:                               Normal Ranges: RA Vol A4C:        72.7 ml    (8.3-19.5ml) RA Vol Index A4C:  34.2 ml/m2 RA Area A4C:       22.4 cm2 RA Major Axis A4C:  5.9 cm AORTA MEASUREMENTS:                      Normal Ranges: Ao Sinus, d: 2.70 cm (2.1-3.5cm) Asc Ao, d:   3.30 cm (2.1-3.4cm) LV SYSTOLIC FUNCTION BY 2D PLANIMETRY (MOD):                      Normal Ranges: EF-A4C View:    20 % (>=55%) EF-A2C View:    21 % EF-Biplane:     21 % EF-Visual:      23 % LV EF Reported: 23 % LV DIASTOLIC FUNCTION:                         Normal Ranges: MV Peak E:    1.51 m/s  (0.7-1.2 m/s) MV Peak A:    0.41 m/s  (0.42-0.7 m/s) E/A Ratio:    3.66      (1.0-2.2) MV e'         0.088 m/s (>8.0) MV lateral e' 0.10 m/s MV medial e'  0.07 m/s E/e' Ratio:   17.17     (<8.0) MITRAL VALVE:                       Normal Ranges: MV Vmax:    1.76 m/s  (<=1.3m/s) MV peak P.4 mmHg (<5mmHg) MV mean P.0 mmHg  (<2mmHg) MV DT:      123 msec  (150-240msec) AORTIC VALVE:                                    Normal Ranges: AoV Vmax:                1.78 m/s  (<=1.7m/s) AoV Peak P.7 mmHg (<20mmHg) AoV Mean P.0 mmHg  (1.7-11.5mmHg) LVOT Max Jim:            1.39 m/s  (<=1.1m/s) AoV VTI:                 29.80 cm  (18-25cm) LVOT VTI:                23.00 cm LVOT Diameter:           1.90 cm   (1.8-2.4cm) AoV Area, VTI:           2.19 cm2  (2.5-5.5cm2) AoV Area,Vmax:           2.21 cm2  (2.5-4.5cm2) AoV Dimensionless Index: 0.77  RIGHT VENTRICLE: RV Basal 4.70 cm RV Mid   4.50 cm RV Major 6.8 cm TAPSE:   20.7 mm RV s'    0.11 m/s TRICUSPID VALVE/RVSP:                             Normal Ranges: Peak TR Velocity: 3.16 m/s Est. RA Pressure: 15 mmHg RV Syst Pressure: 54.9 mmHg (< 30mmHg) IVC Diam:         3.31 cm PULMONIC VALVE:                          Normal Ranges: PV Accel Time: 53 msec   (>120ms) PV Max Jim:    1.4 m/s   (0.6-0.9m/s) PV Max P.0 mmHg PV Mean P.0 mmHg PV VTI:        21.50 cm AK Vmax:       1.54 m/s PIEDV:         1.54 m/s PADP:          24.5 mmHg  [id number] Alex Rao MD Electronically signed on 2024 at 2:26:43 PM  ** Final **     XR chest 1  view    Result Date: 6/22/2024  Interpreted By:  Perfecto Cope and Meyers Emily STUDY: XR CHEST 1 VIEW;  6/22/2024 4:22 am   INDICATION: Signs/Symptoms:swan placement.   COMPARISON: Chest radiograph 06/21/2024 and chest CT 06/20/2024   ACCESSION NUMBER(S): [id number]   ORDERING CLINICIAN: OMER SCHILLING   FINDINGS: AP radiograph of the chest was provided. Right IJ approach Bonneau-Abe catheter with its tip overlying the expected location of the distal right main pulmonary artery, similar to prior..   CARDIOMEDIASTINAL SILHOUETTE: Cardiomediastinal silhouette is persistently enlarged, stable in size and configuration.   LUNGS: Low lung volumes with bronchovascular crowding with suggestion of mild interstitial edema, similar to prior. Bilateral subsegmental atelectasis. Otherwise, no focal consolidation, pleural effusion, or pneumothorax.   ABDOMEN: No remarkable upper abdominal findings.   BONES: No acute osseous changes.       1. Right IJ approach Bonneau-Abe catheter with its tip overlying the expected location of the distal right main pulmonary artery, similar to prior. 2. Suggestion of mild interstitial pulmonary edema, similar to prior. 3. Bilateral subsegmental atelectasis.   I personally reviewed the images/study, and I agree with the findings as stated above. This study was interpreted at [facility], [place], [place].   MACRO: None   Signed by: Perfecto Cope 6/22/2024 12:12 PM Dictation workstation:   [id number]    XR abdomen 1 view    Result Date: 6/22/2024  Interpreted By:  Perfecto Cope and Meyers Emily STUDY: XR ABDOMEN 1 VIEW;  6/22/2024 2:26 am   INDICATION: Signs/Symptoms:abdominal pain.   COMPARISON: CT abdomen pelvis 03/20/2024, abdominal radiograph 04/10/2022   ACCESSION NUMBER(S): [id number]   ORDERING CLINICIAN: OMER SCHILLING   FINDINGS: 2 AP radiographs of the abdomen available for interpretation.   Nonobstructive bowel gas pattern. Limited evaluation of  pneumoperitoneum on supine imaging, however no gross evidence of free air is noted.   Visualized lungs are clear. Similar asymmetric right hemidiaphragm elevation.   Osseous structures demonstrate no acute bony changes.       1.  Nonobstructive bowel gas pattern.   I personally reviewed the images/study, and I agree with the findings as stated above. This study was interpreted at [place], [place].   MACRO: None   Signed by: Perfecto Cope 6/22/2024 12:11 PM Dictation workstation:   [id number]    XR chest 1 view    Result Date: 6/21/2024  Interpreted By:  Zheng Damon and Meyers Emily STUDY: XR CHEST 1 VIEW;  6/21/2024 7:13 am   INDICATION: Signs/Symptoms:swan placement.   COMPARISON: Chest radiograph 06/20/2024   ACCESSION NUMBER(S): [id number]   ORDERING CLINICIAN: MINNA MICHAEL   FINDINGS: AP radiograph of the chest was provided.   Interval placement of a right IJ approach Ruso-Abe catheter with its tip overlying the expected location of the right interlobar pulmonary artery.   CARDIOMEDIASTINAL SILHOUETTE: Cardiomediastinal silhouette is enlarged, though stable in size and configuration.   LUNGS: Bilateral subsegmental atelectasis. Otherwise, no focal consolidation, pleural effusion, or pneumothorax.   ABDOMEN: No remarkable upper abdominal findings.   BONES: No acute osseous changes.       1. Bilateral subsegmental atelectasis. 2. Right IJ approach Ruso-Abe catheter with its tip overlying the expected location of the right interlobar pulmonary artery.   I personally reviewed the images/study, and I agree with the findings as stated above. This study was interpreted at [place], [place].   MACRO: None   Signed by: Zheng Damon 6/21/2024 8:04 AM Dictation workstation:   [id number]    US right upper quadrant    Result Date: 6/21/2024  Interpreted By:  Manuel Miller and Ebai Jerky STUDY: US RIGHT UPPER QUADRANT;   6/21/2024 12:22 am   INDICATION: Signs/Symptoms:elevated LFTs.   COMPARISON: Right upper quadrant ultrasound 08/08/2022.   ACCESSION NUMBER(S): [id number]   ORDERING CLINICIAN: MINNA MICHAEL   TECHNIQUE: Multiple images of the right upper quadrant were obtained.   FINDINGS: LIVER: The liver measures 17.99 and is diffusely echogenic and coarse appearance, consistent with diffuse fatty infiltration. The resulting increased beam attenuation thereby limiting evaluation of the liver for focal lesions. There is a 1.1 x 0.8 x 0.8 cm anechoic mass within the periphery of the left hepatic lobe demonstrating posterior acoustic enhancement and no internal vascularity on Doppler interrogation, consistent with a cyst seen on CT abdomen pelvis from 03/08/2024. No additional liver lesions are seen..   GALLBLADDER: The gallbladder is decompressed, and demonstrates no evidence of gallstones,  or surrounding fluid. The gallbladder wall is thickened measuring 0.55 cm. Sonographic James's sign is negative.   BILE DUCTS: No evidence of intra or extrahepatic biliary dilatation is identified; the common bile duct measures .43 cm.   PANCREAS: The visualized pancreas is unremarkable in appearance.   RIGHT KIDNEY: The right kidney measures 12.13 cm in length. The renal cortical echogenicity and thickness are within normal limit.  No hydronephrosis or renal calculi are seen.   There is perihepatic ascites. Newly enlarged intrahepatic hepatic veins compared to 08/08/2022. Stable from same day CT chest for PE.       1. Diffuse coarse and echogenic appearance of the liver, correlate with liver disease. 2. Decompressed gallbladder with wall thickening, correlate with increased fluid status. 3. Interval increase in size of the intrahepatic veins compared to 08/08/2022, recommend correlation with concern for right heart dysfunction. 4. Perihepatic ascites.   I personally reviewed the images/study and I agree with the findings as stated by  Resident Tam Oliver. This study was interpreted at [facility], [place], [place].   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 She was found to have elevated lactate to 14.7, cold extremities, and 3+ pitting edema. RHC c/w cardiogenic shock c/b SARAH, cardiac thrombi, and Right limb ischemia S/P above knee amputation. She was initially started on heparin, now bridging with warfarin with a goal of INR 2-3.  Warfarin held per vascular surgery after patient taken to the OR for revision of right AKA 7/17/24. Warfarin was then restarted 7/18 per vascular surgery recommendations. Additionally, found to have UTI starting 07/11, she had a dhillon to prevent contamination of new AKA site, but  dhillon was removed upon UTI discovery.  Now on Augmentin with end date 7/23.     #Heart failure with reduced ejection fraction (LVEF 20-25%):   -Patient was labeled not candidate for advanced therapies due to medical nonadherence  Plan:  -palliative care follow up.  -Aim to resume quadruple GDMT (spironolactone). Patient on metoprolol succinate, Entresto  -Hold jardiance for now until completing Augmentin course from UTI      #Right limb ischemia secondary to Arterial emboli, S/P above right knee amputation and revision:  #S/p femoral arterial line sheath placement, now removed  #Phantom limb pain  -CTA Aorta with runoff 6/24: aortic bifurcation embolus, R BA-EIA embolus, SFA and popliteal emboli   -arterial duplex exam with monophasic right CFA, SFA and PFA signals, absent popliteal flow and monophasic flow in the tibial artery   -Patient labeled unable to walk prior to presentation to hospital  -s/p Above knee amputation with vascular surgery 6/28/24 and revision 7/17/24.  Plan:  -Warfarin with heparin bridge goal INR 2-3 for a minimum of 5 days per vascular surgery  -Started warfarin 5mg 07/09 (received 3 doses 07/09-07/11) with missed dose on 07/12, restarted on 07/13, and subsequently held on 7/16 pending right AKA surgery  -Restarted warfarin bridge 7/18.  Will continue with heparin gtt.  -continue to follow INR, may increase warfarin dose if there is no significant increase tomorrow   -Wound VAC to 125 mmHg per vascular surgery, continues to drain sanguinous output, VAC change completed   -Plan for patient to be discharged with wound VAC  -pain regimen: acetaminophen 975 mg scheduled QID, oxycodone 10mg severe pain 4hr, PRN dilaudid 0.2mg q 4 hr breakthrough, before and after dressing changes.  -Continue duloxetine and Lyrica 75 twice daily per palliative recommendations for phantom limb pain.     #Query Cardiac thrombi:  -Likely in setting of Atrial fibrillation with Rivaroxaban no adherence  -There are  indeterminate low-attenuation nodular filling defects within the atrial appendage.   -Right lower extremity DVT, and arterial thrombus/p IVC filter 6/27.  Plan:  -Restarted warfarin bridge 7/18.  Will continue with heparin gtt. Goal per vascular medicine is Warfarin with Target INR 2-3 for a minimum of 5 days.  -Vascular medicine on board, appreciate recommendations  -Outpatient Anticoagulation Clinic set up at [facility].     #Atrial fibrillation previously on rivaroxaban  -S/p DCCV (05/2023), ischemic stroke L MCA d/t AFib LLA thrombus seen on echo (lack of OAC adherence) s/p thrombectomy 01/2022 @ [facility] w/ residual expressive aphasia and R sided weakness   -Previously prescribed digoxin 250mcg however last fill was 12/2023  -TSH 6.28H, free T4 1.48  Plan:  -Continue amiodarone 200mg daily  -Continue aspirin 81mg     #Direct Hyperbilirubinemia likely following ischemic liver injury  -Tbili (3.2 on admission, from 0.5 in 03/2024)  -RUQ US 7/1 without abnormality beyond hepatic steatosis and liver cyst  Plan:  -Follow Liver enzymes.      #Hx trach c/b trancutaneous fistula  -ENT had been consulted 3/2024 for gurgling and mucus drainage, no inpatient interventions required  Plan:  -monitor for breaths/mucus discharge from trach site  -Cover the tracheocutaneous fistula with tape and gauze and encourage patient to apply pressure when voicing.   -follow up outpatient ENT for closure (Dr. King)     #Anemia:  ::had 3 units of RBC transfused 6/30, currently controlled bleeding, increeminting gradually.   ::CTA C/A/P 6/30 without evidence of active hemorrhage within chest/abdomen/pelvis  ::CTA RLE 7/1 without a source that could be intervened upon  Plan:  -CBC daily, downtrending hemoglobin likely secondary to oozing AKA incision.  Will continue to monitor closely.  -Type and screen active from 7/19  -Hgb goal >7 given cardiac hx     #UTI:  ::small amount of blood in urine 07/11 am  -UA turbid brown with blood, glucose, protein  and LE 25, nitrite negative   -urine culture positive  -S/p 2 days of ceftriaxone.  Cultures are positive for Klebsiella.  Abx de-escalated to Augmentin, will treat with 10 day course (07/14-07/23)  -Hold empagliflozin     #Left brachial injury  -LUE DVT scan with nonvascularized mass, called CT while patient was there to request CTA of left upper extremity and they said logistically they would be unable to perform both scans, and given protocol for max dosing of contrast, could not give further contrast until 6/25 at 2PM  -no CTA LUE needed        Insomnia  -Continue melatonin 6mg at 9 PM nightly  -Added melatonin 3 mg at 6 PM per palliative recommendations     Depression   -Increased duloxetine from 30 mg to 60 mg per palliative recommendations     Resolved:   -Thrombocytopenia, likely attributed to: previous consumptive coagulopathy/sepsis and bleeding, Multiple thrombi, DIC score 5 on 6/24/2024, Hit score of 6, Negative PF4  -Rhabdomyolysis.  -Metabolic acidosis, Lactic acidosis.   -Acute kidney injury.   -stump infection, treated for 7 days, s/p vanc (6/20-6/24) (6/27-7/2) and zosyn (6/20-6/24), (6/27- p)-restarted vanc/zosyn 6/27 due to elevated leukocytosis and purulent appearing right groin site     Disposition Plan:  -patient would like enrollment in  home delivery service for medications (she has trouble getting to preferred pharmacy), pharmacy updated to [facility]  -patient's family would like her enrolled in [facility]  -repeat thyroid labs outpatient  -IVC filter removal within 3 months  [ ] Titrate GDMT as able. Currently on entresto 24/26, metoprolol, holding jardiance  [ ] Follow up further pall care recs. Patient remains full code. There is ongoing [facility] discussion  [ ] Needs to follow up OP with ENT for trach c/b trancutaneous fistula. Dr. King for closure.     Prevention:   Fall: High.   Mobility: Physical therapy referral.   DVT: Heparin  Diet: Cardiac Diet     Code status: Full Code  Emergency  "contact:   * patient LACKS capacity due to inability to express decision and inconsistency in decisions  Surrogate Medical Decision-maker:   Surrogate decision maker is: pt's cousin, Clara Wayne: [phone], Efrain Black: [phone], Keli Osborne: [phone]   Friends who may be helpful in making decisions:  Prior boyfriend Navin Sanches [phone]  Close friend \"Isiah\" Pranay Owen [phone]               Renetta Calvin MD      "

## 2024-07-21 VITALS
WEIGHT: 183.64 LBS | HEIGHT: 67 IN | HEART RATE: 70 BPM | OXYGEN SATURATION: 96 % | TEMPERATURE: 98.1 F | BODY MASS INDEX: 28.82 KG/M2 | DIASTOLIC BLOOD PRESSURE: 53 MMHG | SYSTOLIC BLOOD PRESSURE: 95 MMHG | RESPIRATION RATE: 18 BRPM

## 2024-07-21 LAB
ALBUMIN SERPL BCP-MCNC: 2.9 G/DL (ref 3.4–5)
ANION GAP SERPL CALC-SCNC: 12 MMOL/L (ref 10–20)
BASOPHILS # BLD AUTO: 0.04 X10*3/UL (ref 0–0.1)
BASOPHILS NFR BLD AUTO: 0.3 %
BUN SERPL-MCNC: 17 MG/DL (ref 6–23)
CALCIUM SERPL-MCNC: 8.7 MG/DL (ref 8.6–10.6)
CHLORIDE SERPL-SCNC: 100 MMOL/L (ref 98–107)
CO2 SERPL-SCNC: 28 MMOL/L (ref 21–32)
CREAT SERPL-MCNC: 0.62 MG/DL (ref 0.5–1.05)
EGFRCR SERPLBLD CKD-EPI 2021: >90 ML/MIN/1.73M*2
EOSINOPHIL # BLD AUTO: 0.26 X10*3/UL (ref 0–0.7)
EOSINOPHIL NFR BLD AUTO: 2.1 %
ERYTHROCYTE [DISTWIDTH] IN BLOOD BY AUTOMATED COUNT: 15.8 % (ref 11.5–14.5)
GLUCOSE BLD MANUAL STRIP-MCNC: 101 MG/DL (ref 74–99)
GLUCOSE BLD MANUAL STRIP-MCNC: 117 MG/DL (ref 74–99)
GLUCOSE BLD MANUAL STRIP-MCNC: 92 MG/DL (ref 74–99)
GLUCOSE SERPL-MCNC: 86 MG/DL (ref 74–99)
HCT VFR BLD AUTO: 25.4 % (ref 36–46)
HGB BLD-MCNC: 7.5 G/DL (ref 12–16)
IMM GRANULOCYTES # BLD AUTO: 0.33 X10*3/UL (ref 0–0.7)
IMM GRANULOCYTES NFR BLD AUTO: 2.6 % (ref 0–0.9)
INR PPP: 1.6 (ref 0.9–1.1)
LYMPHOCYTES # BLD AUTO: 2.67 X10*3/UL (ref 1.2–4.8)
LYMPHOCYTES NFR BLD AUTO: 21.2 %
MAGNESIUM SERPL-MCNC: 1.67 MG/DL (ref 1.6–2.4)
MCH RBC QN AUTO: 30.2 PG (ref 26–34)
MCHC RBC AUTO-ENTMCNC: 29.5 G/DL (ref 32–36)
MCV RBC AUTO: 102 FL (ref 80–100)
MONOCYTES # BLD AUTO: 1.63 X10*3/UL (ref 0.1–1)
MONOCYTES NFR BLD AUTO: 12.9 %
NEUTROPHILS # BLD AUTO: 7.66 X10*3/UL (ref 1.2–7.7)
NEUTROPHILS NFR BLD AUTO: 60.9 %
NRBC BLD-RTO: 0 /100 WBCS (ref 0–0)
PHOSPHATE SERPL-MCNC: 4.2 MG/DL (ref 2.5–4.9)
PLATELET # BLD AUTO: 198 X10*3/UL (ref 150–450)
POTASSIUM SERPL-SCNC: 4.9 MMOL/L (ref 3.5–5.3)
PROTHROMBIN TIME: 17.6 SECONDS (ref 9.8–12.8)
RBC # BLD AUTO: 2.48 X10*6/UL (ref 4–5.2)
SODIUM SERPL-SCNC: 135 MMOL/L (ref 136–145)
UFH PPP CHRO-ACNC: 0.4 IU/ML
WBC # BLD AUTO: 12.6 X10*3/UL (ref 4.4–11.3)

## 2024-07-21 PROCEDURE — 2500000001 HC RX 250 WO HCPCS SELF ADMINISTERED DRUGS (ALT 637 FOR MEDICARE OP)

## 2024-07-21 PROCEDURE — 2500000002 HC RX 250 W HCPCS SELF ADMINISTERED DRUGS (ALT 637 FOR MEDICARE OP, ALT 636 FOR OP/ED)

## 2024-07-21 PROCEDURE — 36415 COLL VENOUS BLD VENIPUNCTURE: CPT

## 2024-07-21 PROCEDURE — 83735 ASSAY OF MAGNESIUM: CPT

## 2024-07-21 PROCEDURE — 2500000004 HC RX 250 GENERAL PHARMACY W/ HCPCS (ALT 636 FOR OP/ED)

## 2024-07-21 PROCEDURE — 36430 TRANSFUSION BLD/BLD COMPNT: CPT

## 2024-07-21 PROCEDURE — 80069 RENAL FUNCTION PANEL: CPT

## 2024-07-21 PROCEDURE — 82947 ASSAY GLUCOSE BLOOD QUANT: CPT

## 2024-07-21 PROCEDURE — 97530 THERAPEUTIC ACTIVITIES: CPT | Mod: GP,CQ

## 2024-07-21 PROCEDURE — 85610 PROTHROMBIN TIME: CPT

## 2024-07-21 PROCEDURE — 99232 SBSQ HOSP IP/OBS MODERATE 35: CPT

## 2024-07-21 PROCEDURE — 85025 COMPLETE CBC W/AUTO DIFF WBC: CPT

## 2024-07-21 PROCEDURE — 1200000002 HC GENERAL ROOM WITH TELEMETRY DAILY

## 2024-07-21 PROCEDURE — 97110 THERAPEUTIC EXERCISES: CPT | Mod: GP,CQ

## 2024-07-21 PROCEDURE — 85520 HEPARIN ASSAY: CPT

## 2024-07-21 RX ORDER — WARFARIN SODIUM 5 MG/1
7.5 TABLET ORAL DAILY
Status: DISCONTINUED | OUTPATIENT
Start: 2024-07-21 | End: 2024-07-22

## 2024-07-21 RX ORDER — MAGNESIUM SULFATE HEPTAHYDRATE 40 MG/ML
4 INJECTION, SOLUTION INTRAVENOUS ONCE
Status: COMPLETED | OUTPATIENT
Start: 2024-07-21 | End: 2024-07-21

## 2024-07-21 ASSESSMENT — PAIN SCALES - PAIN ASSESSMENT IN ADVANCED DEMENTIA (PAINAD)
TOTALSCORE: 0
BREATHING: NORMAL
CONSOLABILITY: NO NEED TO CONSOLE
TOTALSCORE: MEDICATION (SEE MAR)
FACIALEXPRESSION: SMILING OR INEXPRESSIVE
BODYLANGUAGE: RELAXED

## 2024-07-21 ASSESSMENT — COGNITIVE AND FUNCTIONAL STATUS - GENERAL
HELP NEEDED FOR BATHING: A LOT
MOBILITY SCORE: 13
STANDING UP FROM CHAIR USING ARMS: A LOT
PERSONAL GROOMING: A LITTLE
MOBILITY SCORE: 12
MOVING TO AND FROM BED TO CHAIR: A LOT
MOBILITY SCORE: 13
DRESSING REGULAR LOWER BODY CLOTHING: A LITTLE
MOVING TO AND FROM BED TO CHAIR: A LOT
TOILETING: A LOT
DAILY ACTIVITIY SCORE: 19
WALKING IN HOSPITAL ROOM: A LOT
DAILY ACTIVITIY SCORE: 17
DRESSING REGULAR UPPER BODY CLOTHING: A LITTLE
MOVING FROM LYING ON BACK TO SITTING ON SIDE OF FLAT BED WITH BEDRAILS: A LITTLE
MOVING FROM LYING ON BACK TO SITTING ON SIDE OF FLAT BED WITH BEDRAILS: A LITTLE
CLIMB 3 TO 5 STEPS WITH RAILING: TOTAL
STANDING UP FROM CHAIR USING ARMS: A LOT
WALKING IN HOSPITAL ROOM: A LOT
CLIMB 3 TO 5 STEPS WITH RAILING: TOTAL
DRESSING REGULAR UPPER BODY CLOTHING: A LITTLE
TURNING FROM BACK TO SIDE WHILE IN FLAT BAD: A LITTLE
TOILETING: A LOT
WALKING IN HOSPITAL ROOM: TOTAL
TURNING FROM BACK TO SIDE WHILE IN FLAT BAD: A LITTLE
DRESSING REGULAR LOWER BODY CLOTHING: A LITTLE
MOVING FROM LYING ON BACK TO SITTING ON SIDE OF FLAT BED WITH BEDRAILS: A LITTLE
HELP NEEDED FOR BATHING: A LITTLE
MOVING TO AND FROM BED TO CHAIR: A LOT
STANDING UP FROM CHAIR USING ARMS: A LOT
TURNING FROM BACK TO SIDE WHILE IN FLAT BAD: A LITTLE
CLIMB 3 TO 5 STEPS WITH RAILING: TOTAL

## 2024-07-21 ASSESSMENT — PAIN SCALES - GENERAL
PAINLEVEL_OUTOF10: 0 - NO PAIN
PAINLEVEL_OUTOF10: 9
PAINLEVEL_OUTOF10: 9
PAINLEVEL_OUTOF10: 8
PAINLEVEL_OUTOF10: 9
PAINLEVEL_OUTOF10: 9

## 2024-07-21 ASSESSMENT — PAIN - FUNCTIONAL ASSESSMENT: PAIN_FUNCTIONAL_ASSESSMENT: 0-10

## 2024-07-21 ASSESSMENT — PAIN SCALES - WONG BAKER: WONGBAKER_NUMERICALRESPONSE: NO HURT

## 2024-07-21 ASSESSMENT — PAIN DESCRIPTION - ORIENTATION: ORIENTATION: RIGHT

## 2024-07-21 NOTE — PROGRESS NOTES
Physical Therapy    Physical Therapy Treatment    Patient Name: Amirah Bennett  MRN: [id number]  Today's Date: 7/21/2024  Room: [address] [place], [place] [address]-  Time Calculation  Start Time: 0958  Stop Time: 1037  Time Calculation (min): 39 min       Assessment/Plan   PT Assessment  PT Assessment Results: Decreased strength, Decreased range of motion, Decreased endurance, Impaired balance, Decreased mobility, Pain, Decreased cognition, Decreased safety awareness  Rehab Prognosis: Good  Barriers to Discharge: none  Evaluation/Treatment Tolerance: Patient tolerated treatment well  Medical Staff Made Aware: Yes  Strengths: Attitude of self  End of Session Communication: Bedside nurse  PT Plan  Inpatient/Swing Bed or Outpatient: Inpatient  PT Plan  Treatment/Interventions: Bed mobility, Transfer training, Gait training, Balance training, Strengthening, Endurance training, Range of motion, Therapeutic exercise, Therapeutic activity, Home exercise program, Positioning  PT Plan: Ongoing PT  PT Frequency: 5 times per week  PT Discharge Recommendations: High intensity level of continued care  PT Recommended Transfer Status: Assist x2  PT - OK to Discharge: Yes  Assessment: Patient is progressing Well with therapy this date. Increased pain this date limiting some mobility, but pt in good mood. Would continue to benefit from continued skilled PT to address all mobility deficits; remains appropriate for HIGH intensity therapy when medically appropriate for discharge from acute stay.  Will continue to follow.     General Visit Information:   PT  Visit  PT Received On: 07/21/24  Prior to Session Communication: Bedside nurse  Patient Position Received: Bed, 3 rail up, Alarm off, not on at start of session     Subjective   Subjective: Pt pleasant and agreeable to therapy upon approach   Precautions:  Precautions  Medical Precautions: Fall precautions, Cardiac precautions  Precautions Comment: Wound vac on R AKA  Vital Signs:       Objective   Pain:  Pain  Assessment  Pain Assessment: 0-10  0-10 (Numeric) Pain Score: 8  Pain Type: Acute pain, Neuropathic pain, Surgical pain  Pain Location: Leg  Pain Orientation: Right  Pain Interventions: Medication (See MAR)  Cognition:  Cognition  Arousal/Alertness: Appropriate responses to stimuli  Orientation Level: Oriented X4  Following Commands: Follows one step commands with repetition  Insight: Mild  Impulsive: Mildly     Static Sitting balance:  Static Sitting Balance  Static Sitting-Balance Support: Bilateral upper extremity supported  Static Sitting-Level of Assistance: Close supervision  Static Standing Balance:  Static Standing Balance  Static Standing-Balance Support: Bilateral upper extremity supported  Static Standing-Level of Assistance: Contact guard  Static Standing-Comment/Number of Minutes: 30-45s x2 trials      Lines/Tubes/Drains:  External Urinary Catheter Female (Active)   Number of days: 7     Continuous Medications/Drips:  heparin, 0-4,500 Units/hr, Last Rate: 1,400 Units/hr (07/20/24 1604)        PT Treatments:  Therapeutic Exercise  Therapeutic Exercise Activity 1: 10x2 RLE hip ext, abduction with tactile/verbal cues for correct technique and maintaining side lying position  Therapeutic Activity  Therapeutic Activity 1: Pt educated to not put pillow under RLE and the importance of extension throughout the day, keeping lower portion of bed flat to reduce risk of contractures     Bed Mobility 1  Bed Mobility 1: Supine to sitting  Level of Assistance 1: Close supervision  Bed Mobility Comments 1: HOB elevated, vc for safety coming EOB d/t AKA  Bed Mobility 2  Bed Mobility  2: Sitting to supine  Level of Assistance 2: Close supervision  Bed Mobility Comments 2: increased time to get LE back in bed  Bed Mobility 3  Bed Mobility 3: Scooting  Level of Assistance 3: Close supervision  Bed Mobility Comments 3: pt able to complete 5 lateral scoots towards HOB  Bed Mobility 4  Bed Mobility 4: Rolling left  Level of  Assistance 4: Distant supervision  Bed Mobility Comments 4: use of bed rail, vc for full roll     Transfer 1  Transfer From 1: Sit to  Transfer to 1: Stand  Transfer Level of Assistance 1: Minimum assistance, Moderate verbal cues  Trials/Comments 1: x2 trials. vc for mechanics/handplacement. Holding onto bed/therapist arms to come to full stand. x2 attempts with fww, pt has incr difficulty with sequencing standing to fww.        Activity tolerance:  Activity Tolerance  Endurance: Tolerates 10 - 20 min exercise with multiple rests    Outcome Measures:  [facility] Basic Mobility  Turning from your back to your side while in a flat bed without using bedrails: A little  Moving from lying on your back to sitting on the side of a flat bed without using bedrails: A little  Moving to and from bed to chair (including a wheelchair): A lot  Standing up from a chair using your arms (e.g. wheelchair or bedside chair): A lot  To walk in hospital room: A lot  Climbing 3-5 steps with railing: Total  Basic Mobility - Total Score: 13          Education Documentation  Precautions, taught by Teetee Aguayo PTA at 7/21/2024 10:51 AM.  Learner: Patient  Readiness: Acceptance  Method: Explanation  Response: Verbalizes Understanding    Body Mechanics, taught by Teetee Aguayo PTA at 7/21/2024 10:51 AM.  Learner: Patient  Readiness: Acceptance  Method: Explanation  Response: Verbalizes Understanding    ADL Training, taught by Teetee Aguayo PTA at 7/21/2024 10:51 AM.  Learner: Patient  Readiness: Acceptance  Method: Explanation  Response: Verbalizes Understanding    Precautions, taught by Teetee Aguayo PTA at 7/21/2024 10:51 AM.  Learner: Patient  Readiness: Acceptance  Method: Explanation  Response: Verbalizes Understanding    Body Mechanics, taught by Teetee Aguayo PTA at 7/21/2024 10:51 AM.  Learner: Patient  Readiness: Acceptance  Method: Explanation  Response: Verbalizes Understanding    Home Exercise Program, taught by Teetee Aguayo PTA at  7/21/2024 10:51 AM.  Learner: Patient  Readiness: Acceptance  Method: Explanation  Response: Verbalizes Understanding    Mobility Training, taught by Teetee Aguayo PTA at 7/21/2024 10:51 AM.  Learner: Patient  Readiness: Acceptance  Method: Explanation  Response: Verbalizes Understanding    Education Comments  No comments found.          OP EDUCATION:       Encounter Problems       Encounter Problems (Active)       Balance       Patient will complete static and dynamic sitting balance tasks with SUP to improve upright posture, core activation, and proximal stability.  (Progressing)       Start:  07/03/24    Expected End:  07/17/24            Patient to demo static standing with unilateral UE support, performing single UE task with SBA, no sway or LOB x 2 mins for functional carryover  (Progressing)       Start:  07/03/24    Expected End:  07/17/24               Mobility       STG - Patient will ambulate >/= 15 ft with minAx1 and LRAD, no acute LOB (Progressing)       Start:  07/03/24    Expected End:  07/17/24               PT Transfers       STG - Patient will perform bed mobility Ruth (Progressing)       Start:  07/03/24    Expected End:  07/17/24            STG - Patient will transfer sit to and from stand with CGAx1 and LRAD (Progressing)       Start:  07/03/24    Expected End:  07/17/24               Pain - Adult

## 2024-07-21 NOTE — PROGRESS NOTES
Amirah Bennett is a 43 y.o. female on day 31 of admission presenting with Atrial fibrillation (Multi).      Subjective   No acute complaints overnight.  Patient states that she continues to feel phantom limb pain.  She feels that these are somewhat well-controlled with her current pain regimen.  Explained to the patient that we are continuing on the warfarin bridge.  No other complaints today.       Objective     Vitals:  Temp: 36.6 °C (97.9 °F)  Temp  Av.6 °C (97.8 °F)  Min: 36 °C (96.8 °F)  Max: 37.1 °C (98.8 °F)    BP: 98/61  Systolic (24hrs), Av , Min:91 , Max:104   Diastolic (24hrs), Av, Min:56, Max:64      Heart Rate: 74  Pulse  Av  Min: 68  Max: 79    Resp: 18  Resp  Av  Min: 18  Max: 18    SpO2: 100 %   SpO2  Av.6 %  Min: 97 %  Max: 100 %    Weight: 83.3 kg (183 lb 10.3 oz)         Intake/Output last 3 Shifts:  I/O last 3 completed shifts:  In: 500.9 (6 mL/kg) [P.O.:300; I.V.:200.9 (2.4 mL/kg)]  Out: 2650 (31.8 mL/kg) [Urine:2650 (0.9 mL/kg/hr)]  Weight: 83.3 kg       Medications:  Scheduled Medications PRN Medications   acetaminophen, 975 mg, oral, q6h  amiodarone, 200 mg, oral, Daily  amoxicillin-pot clavulanate, 1 tablet, oral, q12h TELMA  aspirin, 81 mg, oral, Daily  atorvastatin, 80 mg, oral, Nightly  bisacodyl, 10 mg, rectal, Daily  DULoxetine, 60 mg, oral, Daily  [Held by provider] empagliflozin, 10 mg, oral, Daily  melatonin, 3 mg, oral, Daily  melatonin, 6 mg, oral, Daily  metoprolol succinate XL, 25 mg, oral, Nightly  pantoprazole, 40 mg, oral, Daily before breakfast  perflutren protein A microsphere, 0.5 mL, intravenous, Once in imaging  polyethylene glycol, 17 g, oral, BID  pregabalin, 75 mg, oral, BID  sacubitriL-valsartan, 2 tablet, oral, BID  sennosides, 2 tablet, oral, BID  warfarin, 5 mg, oral, Daily      PRN medications: dextrose, dextrose, glucagon, glucagon, heparin, HYDROmorphone, lidocaine, oxyCODONE        Relevant Results:  Results from last 7 days   Lab Units  "07/20/24  0720 07/19/24  0713 07/18/24  0709   WBC AUTO x10*3/uL 16.3* 15.6* 15.8*   HEMOGLOBIN g/dL 7.7* 8.4* 8.2*   HEMATOCRIT % 25.8* 28.2* 26.8*   PLATELETS AUTO x10*3/uL 227 235 236            Results from last 7 days   Lab Units 07/20/24  0720 07/19/24  0713 07/18/24  0709   SODIUM mmol/L 133* 131* 131*   POTASSIUM mmol/L 5.1 4.7 4.6   CHLORIDE mmol/L 99 98 99   CO2 mmol/L 27 24 24   BUN mg/dL 22 26* 15   CREATININE mg/dL 0.66 0.92 0.57   CALCIUM mg/dL 8.6 8.9 9.0           No lab exists for component: \"LABALBU\"   Results from last 7 days   Lab Units 07/21/24  0723 07/20/24  0720 07/19/24  0713   INR  1.6* 1.3* 1.2*      Physical Exam:  -Patient was conscious, oriented x4, with expressive aphasia. Not in acute respiratory distress, not in jaundiced nor cyanosed.    -Cardiovascular examination: Audible 1st and 2nd heart sound, no murmurs  -Lower limb examination: amputated right above knee amputation, wound VAC present with sanguinous output.  No gross bleeding. No pitting edema in LLE.  -Chest examination: Lungs clear to auscultation bilaterally.  -Abdominal examination: Soft and lax abdomen, no rigidity nor rebound tenderness.   -Neurological examination: limited, symmetrical facial impression, equally reactive pupils, right hand weakness.            Assessment/Plan      Amirah Bennett is a 43 y.o. 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 She was found to have elevated lactate to 14.7, cold extremities, and 3+ pitting edema. RHC c/w cardiogenic shock c/b SARAH, cardiac thrombi, and Right limb ischemia S/P above " knee amputation. She was initially started on heparin, now bridging with warfarin with a goal of INR 2-3.  Warfarin held per vascular surgery after patient taken to the OR for revision of right AKA 7/17/24. Warfarin was then restarted 7/18 per vascular surgery recommendations. Additionally, found to have UTI starting 07/11, she had a dhillon to prevent contamination of new AKA site, but dhillon was removed upon UTI discovery.  Now on Augmentin with end date 7/23.     #Heart failure with reduced ejection fraction (LVEF 20-25%):   -Patient was labeled not candidate for advanced therapies due to medical nonadherence  Plan:  -palliative care follow up.  -Aim to resume quadruple GDMT (spironolactone). Patient on metoprolol succinate, Entresto  -Hold jardiance for now until completing Augmentin course from UTI      #Right limb ischemia secondary to Arterial emboli, S/P above right knee amputation and revision:  #S/p femoral arterial line sheath placement, now removed  #Phantom limb pain  -CTA Aorta with runoff 6/24: aortic bifurcation embolus, R BA-EIA embolus, SFA and popliteal emboli   -arterial duplex exam with monophasic right CFA, SFA and PFA signals, absent popliteal flow and monophasic flow in the tibial artery   -Patient labeled unable to walk prior to presentation to hospital  -s/p Above knee amputation with vascular surgery 6/28/24 and revision 7/17/24.  Plan:  -Warfarin with heparin bridge  -Started warfarin 5mg 07/09 (received 3 doses 07/09-07/11) with missed dose on 07/12, restarted on 07/13, and subsequently held on 7/16 pending right AKA surgery  -Restarted warfarin bridge 7/18.  Will continue with heparin gtt.  -continue to follow INR, increase warfarin from 5 mg to 7.5 mg.  -goal INR 2-3 for a minimum of 2 days per vascular medicine  -Wound VAC to 125 mmHg per vascular surgery, continues to drain sanguinous output, VAC change completed   -Plan for patient to be discharged with wound VAC  -pain regimen:  acetaminophen 975 mg scheduled QID, oxycodone 10mg severe pain 4hr, PRN dilaudid 0.2mg q 4 hr breakthrough, before and after dressing changes.  -Continue duloxetine and Lyrica 75 twice daily per palliative recommendations for phantom limb pain.     #Query Cardiac thrombi:  -Likely in setting of Atrial fibrillation with Rivaroxaban no adherence  -There are indeterminate low-attenuation nodular filling defects within the atrial appendage.   -Right lower extremity DVT, and arterial thrombus/p IVC filter 6/27.  Plan:  -Restarted warfarin bridge 7/18.  Will continue with heparin gtt. Goal per vascular medicine is Warfarin with Target INR 2-3 for a minimum of 2 days.  -Vascular medicine on board, appreciate recommendations  -Outpatient Anticoagulation Clinic set up at [facility].     #Atrial fibrillation previously on rivaroxaban  -S/p DCCV (05/2023), ischemic stroke L MCA d/t AFib LLA thrombus seen on echo (lack of OAC adherence) s/p thrombectomy 01/2022 @ [facility] w/ residual expressive aphasia and R sided weakness   -Previously prescribed digoxin 250mcg however last fill was 12/2023  -TSH 6.28H, free T4 1.48  Plan:  -Continue amiodarone 200mg daily  -Continue aspirin 81mg     #Direct Hyperbilirubinemia likely following ischemic liver injury  -Tbili (3.2 on admission, from 0.5 in 03/2024)  -RUQ US 7/1 without abnormality beyond hepatic steatosis and liver cyst  Plan:  -Follow Liver enzymes.      #Hx trach c/b trancutaneous fistula  -ENT had been consulted 3/2024 for gurgling and mucus drainage, no inpatient interventions required  Plan:  -monitor for breaths/mucus discharge from trach site  -Cover the tracheocutaneous fistula with tape and gauze and encourage patient to apply pressure when voicing.   -follow up outpatient ENT for closure (Dr. King)     #Anemia:  ::had 3 units of RBC transfused 6/30, currently controlled bleeding, increeminting gradually.   ::CTA C/A/P 6/30 without evidence of active hemorrhage within  chest/abdomen/pelvis  ::CTA RLE 7/1 without a source that could be intervened upon  Plan:  -CBC daily, downtrending hemoglobin likely secondary to oozing AKA incision.  Will continue to monitor closely.  -Type and screen active from 7/19.  Patient consented from previous transfusions.  -Hgb goal >7 given cardiac hx     #UTI:  ::small amount of blood in urine 07/11 am  -UA turbid brown with blood, glucose, protein and LE 25, nitrite negative   -urine culture positive  -S/p 2 days of ceftriaxone.  Cultures are positive for Klebsiella.  Abx de-escalated to Augmentin, will treat with 7 day course (07/14-07/21)  -Hold empagliflozin     #Left brachial injury  -LUE DVT scan with nonvascularized mass, called CT while patient was there to request CTA of left upper extremity and they said logistically they would be unable to perform both scans, and given protocol for max dosing of contrast, could not give further contrast until 6/25 at 2PM  -no CTA LUE needed        Insomnia  -Continue melatonin 6mg at 9 PM nightly  -Added melatonin 3 mg at 6 PM per palliative recommendations     Depression   -Increased duloxetine from 30 mg to 60 mg per palliative recommendations     Resolved:   -Thrombocytopenia, likely attributed to: previous consumptive coagulopathy/sepsis and bleeding, Multiple thrombi, DIC score 5 on 6/24/2024, Hit score of 6, Negative PF4  -Rhabdomyolysis.  -Metabolic acidosis, Lactic acidosis.   -Acute kidney injury.   -stump infection, treated for 7 days, s/p vanc (6/20-6/24) (6/27-7/2) and zosyn (6/20-6/24), (6/27- p)-restarted vanc/zosyn 6/27 due to elevated leukocytosis and purulent appearing right groin site     Disposition Plan:  -patient would like enrollment in  home delivery service for medications (she has trouble getting to preferred pharmacy), pharmacy updated to [facility]  -patient's family would like her enrolled in [facility]  -repeat thyroid labs outpatient  -IVC filter removal within 3 months  [ ] Titrate  "GDMT as able. Currently on entresto 24/26, metoprolol, holding jardiance  [ ] Follow up further pall care recs. Patient remains full code. There is ongoing GOC discussion  [ ] Needs to follow up OP with ENT for trach c/b trancutaneous fistula. Dr. King for closure.     Prevention:   Fall: High.   Mobility: Physical therapy referral.   DVT: Heparin/Warfarin (bridge)  Diet: Cardiac Diet     Code status: Full Code  Emergency contact:   * patient LACKS capacity due to inability to express decision and inconsistency in decisions  Surrogate Medical Decision-maker:   Surrogate decision maker is: pt's cousinClara Wayne: [phone], Efrain Black: [phone], Keli Osborne: [phone]   Friends who may be helpful in making decisions:  Prior boyfriend Navin Sanches [phone]  Close friend \"Isiah\" Pranay Owen [phone]          Ayaan Rowland MD  PGY-1 Internal Medicine     "

## 2024-07-21 NOTE — CARE PLAN
The patient's goals for the shift include pain control, rest     The clinical goals for the shift include pain control      Problem: Skin  Goal: Decreased wound size/increased tissue granulation at next dressing change  Flowsheets (Taken 7/21/2024 1123)  Decreased wound size/increased tissue granulation at next dressing change:   Promote sleep for wound healing   Protective dressings over bony prominences  Goal: Participates in plan/prevention/treatment measures  Flowsheets (Taken 7/21/2024 1123)  Participates in plan/prevention/treatment measures:   Discuss with provider PT/OT consult   Elevate heels  Goal: Prevent/manage excess moisture  Flowsheets (Taken 7/21/2024 1123)  Prevent/manage excess moisture: Cleanse incontinence/protect with barrier cream  Goal: Prevent/minimize sheer/friction injuries  Flowsheets (Taken 7/21/2024 1123)  Prevent/minimize sheer/friction injuries:   HOB 30 degrees or less   Turn/reposition every 2 hours/use positioning/transfer devices   Use pull sheet  Goal: Promote/optimize nutrition  Flowsheets (Taken 7/21/2024 1123)  Promote/optimize nutrition:   Consume > 50% meals/supplements   Monitor/record intake including meals  Goal: Promote skin healing  Flowsheets (Taken 7/21/2024 1123)  Promote skin healing:   Turn/reposition every 2 hours/use positioning/transfer devices   Assess skin/pad under line(s)/device(s)

## 2024-07-22 LAB
ABO GROUP (TYPE) IN BLOOD: NORMAL
ANTIBODY SCREEN: NORMAL
BASOPHILS # BLD AUTO: 0.03 X10*3/UL (ref 0–0.1)
BASOPHILS NFR BLD AUTO: 0.2 %
EOSINOPHIL # BLD AUTO: 0.33 X10*3/UL (ref 0–0.7)
EOSINOPHIL NFR BLD AUTO: 2.7 %
ERYTHROCYTE [DISTWIDTH] IN BLOOD BY AUTOMATED COUNT: 15.7 % (ref 11.5–14.5)
GLUCOSE BLD MANUAL STRIP-MCNC: 100 MG/DL (ref 74–99)
GLUCOSE BLD MANUAL STRIP-MCNC: 102 MG/DL (ref 74–99)
GLUCOSE BLD MANUAL STRIP-MCNC: 116 MG/DL (ref 74–99)
GLUCOSE BLD MANUAL STRIP-MCNC: 98 MG/DL (ref 74–99)
HCT VFR BLD AUTO: 25.9 % (ref 36–46)
HGB BLD-MCNC: 7.7 G/DL (ref 12–16)
IMM GRANULOCYTES # BLD AUTO: 0.35 X10*3/UL (ref 0–0.7)
IMM GRANULOCYTES NFR BLD AUTO: 2.9 % (ref 0–0.9)
INR PPP: 2.2 (ref 0.9–1.1)
LYMPHOCYTES # BLD AUTO: 2.84 X10*3/UL (ref 1.2–4.8)
LYMPHOCYTES NFR BLD AUTO: 23.2 %
MCH RBC QN AUTO: 30.3 PG (ref 26–34)
MCHC RBC AUTO-ENTMCNC: 29.7 G/DL (ref 32–36)
MCV RBC AUTO: 102 FL (ref 80–100)
MONOCYTES # BLD AUTO: 1.67 X10*3/UL (ref 0.1–1)
MONOCYTES NFR BLD AUTO: 13.6 %
NEUTROPHILS # BLD AUTO: 7.02 X10*3/UL (ref 1.2–7.7)
NEUTROPHILS NFR BLD AUTO: 57.4 %
NRBC BLD-RTO: 0.3 /100 WBCS (ref 0–0)
PLATELET # BLD AUTO: 201 X10*3/UL (ref 150–450)
PROTHROMBIN TIME: 25 SECONDS (ref 9.8–12.8)
RBC # BLD AUTO: 2.54 X10*6/UL (ref 4–5.2)
RH FACTOR (ANTIGEN D): NORMAL
UFH PPP CHRO-ACNC: 0.4 IU/ML
WBC # BLD AUTO: 12.2 X10*3/UL (ref 4.4–11.3)

## 2024-07-22 PROCEDURE — 2500000002 HC RX 250 W HCPCS SELF ADMINISTERED DRUGS (ALT 637 FOR MEDICARE OP, ALT 636 FOR OP/ED)

## 2024-07-22 PROCEDURE — 2500000001 HC RX 250 WO HCPCS SELF ADMINISTERED DRUGS (ALT 637 FOR MEDICARE OP)

## 2024-07-22 PROCEDURE — 1200000002 HC GENERAL ROOM WITH TELEMETRY DAILY

## 2024-07-22 PROCEDURE — 2500000004 HC RX 250 GENERAL PHARMACY W/ HCPCS (ALT 636 FOR OP/ED)

## 2024-07-22 PROCEDURE — 36415 COLL VENOUS BLD VENIPUNCTURE: CPT

## 2024-07-22 PROCEDURE — 99232 SBSQ HOSP IP/OBS MODERATE 35: CPT | Performed by: INTERNAL MEDICINE

## 2024-07-22 PROCEDURE — 82947 ASSAY GLUCOSE BLOOD QUANT: CPT

## 2024-07-22 PROCEDURE — 86923 COMPATIBILITY TEST ELECTRIC: CPT

## 2024-07-22 PROCEDURE — 85025 COMPLETE CBC W/AUTO DIFF WBC: CPT

## 2024-07-22 PROCEDURE — 85520 HEPARIN ASSAY: CPT

## 2024-07-22 PROCEDURE — 85610 PROTHROMBIN TIME: CPT

## 2024-07-22 PROCEDURE — 86901 BLOOD TYPING SEROLOGIC RH(D): CPT

## 2024-07-22 RX ORDER — WARFARIN SODIUM 5 MG/1
5 TABLET ORAL DAILY
Status: DISCONTINUED | OUTPATIENT
Start: 2024-07-22 | End: 2024-07-24

## 2024-07-22 RX ORDER — OXYCODONE HYDROCHLORIDE 5 MG/1
5 TABLET ORAL EVERY 6 HOURS PRN
Status: DISCONTINUED | OUTPATIENT
Start: 2024-07-22 | End: 2024-07-24 | Stop reason: HOSPADM

## 2024-07-22 RX ORDER — OXYCODONE HYDROCHLORIDE 5 MG/1
10 TABLET ORAL EVERY 6 HOURS PRN
Status: DISCONTINUED | OUTPATIENT
Start: 2024-07-22 | End: 2024-07-24 | Stop reason: HOSPADM

## 2024-07-22 ASSESSMENT — PAIN SCALES - GENERAL
PAINLEVEL_OUTOF10: 4
PAINLEVEL_OUTOF10: 9

## 2024-07-22 ASSESSMENT — PAIN DESCRIPTION - LOCATION: LOCATION: INCISION

## 2024-07-22 ASSESSMENT — PAIN DESCRIPTION - ORIENTATION: ORIENTATION: RIGHT;OTHER (COMMENT)

## 2024-07-22 ASSESSMENT — PAIN SCALES - PAIN ASSESSMENT IN ADVANCED DEMENTIA (PAINAD)
BREATHING: NORMAL
BODYLANGUAGE: RELAXED
CONSOLABILITY: NO NEED TO CONSOLE
TOTALSCORE: 0
FACIALEXPRESSION: SMILING OR INEXPRESSIVE
TOTALSCORE: MEDICATION (SEE MAR)

## 2024-07-22 ASSESSMENT — PAIN SCALES - WONG BAKER: WONGBAKER_NUMERICALRESPONSE: HURTS LITTLE BIT

## 2024-07-22 NOTE — PROGRESS NOTES
Spoke to Keli HOYOS, discussed rec for high - acute rehab. [facility], notified liasion of referral, currently reviewing.     This TCC will continue to follow for home going needs and safe DC plan.     ORIN SEO RN

## 2024-07-22 NOTE — SIGNIFICANT EVENT
Around 7:40 PM, was made aware that patient was experiencing bleed from outside wound vac. Went to go see patient and she reported that she did not know when the bleeding outside the wound vac started or if there has been increased bloody output from the wound vac. However, she denied any SOB, increased pain at wound vac site, lightheadedness, or dizziness. Vitals remain HDS. After discussing with nurse, it seems like the bleed started around 7 pm. The bleed was a small pocket underneath the stump site and did not cover the whole stump. Vascular surgery was notified.

## 2024-07-22 NOTE — OP NOTE
Right stump revision, wound vac placement (R) Operative Note     Date: 2024  OR Location: [facility] OR    Name: Amirah Bennett, : 1980, Age: 43 y.o., MRN: [id number], Sex: female    Diagnosis  Pre-op Diagnosis      * Wound dehiscence [T81.30XA] Post-op Diagnosis     * Wound dehiscence [T81.30XA]     Procedures  Right above knee amputation washout, sharp excisional debridement down to subcutaneous tissue  Negative pressure wound vac placement (18x2x2 cm)      Surgeons      * Rizwana De La Rosa - Primary    Resident/Fellow/Other Assistant:  Surgeons and Role:     * Mara Oro MD - Resident - Assisting    Procedure Summary  Anesthesia: General  ASA: IV  Anesthesia Staff: Anesthesiologist: Susana Augustin MD  C-AA: CORNELIA Krause  Estimated Blood Loss: 20 mL  Intra-op Medications: * Intraprocedure medication information is unavailable because the case start and end events have not been set *           Anesthesia Record               Intraprocedure I/O Totals          Intake    LR infusion 300.00 mL    Total Intake 300 mL       Output    Est. Blood Loss 20 mL    Total Output 20 mL       Net    Net Volume 280 mL          Specimen: No specimens collected     Staff:   Circulator: Dee  Scrub Person: Nadia  Scrub Person: Areli         Drains and/or Catheters:   External Urinary Catheter Female (Active)   Wall Suction (mmHg) Other (Comment) 24 0600   External Catheter Status Changed 24 0010   Securement Method Other (Comment) 24 0600   Output (mL) 950 mL 24 0009       Tourniquet Times:   None    Implants:     Findings: Intact fascia, no dehiscence, small amount of old hematoma expressed    Indications: Amirah Bennett is an 43 y.o. female who is having surgery for Wound dehiscence [T81.30XA]. Patient underwent right AKA on 24 after delayed presentation of RLE acute limb ischemia and [place]'s 3 classification. Postoperatively had a hematoma from required heparin gtt which  was conservatively managed. On subsequent wound checks noted to have small ulceration along lateral aspect of incision. She was taken for wound exploration and vac placement    The patient was seen in the preoperative area. The risks, benefits, complications, treatment options, non-operative alternatives, expected recovery and outcomes were discussed with the patient. The possibilities of reaction to medication, pulmonary aspiration, injury to surrounding structures, bleeding, recurrent infection, the need for additional procedures, failure to diagnose a condition, and creating a complication requiring transfusion or operation were discussed with the patient. The patient concurred with the proposed plan, giving informed consent.  The site of surgery was properly noted/marked if necessary per policy. The patient has been actively warmed in preoperative area. Preoperative antibiotics have been ordered and given within 1 hours of incision. Venous thrombosis prophylaxis have been ordered including unilateral sequential compression device    Procedure Details:   The previous staples were removed and the lateral lesion with skin necrosis was explored. This was opened to the subcutaneous tissue and did split easily. The rest of the incision was also opened as it easily came apart. The fascial sutures were noted to be intact and no deeper dehiscence was found. There was minimal fat necrosis which was sharply debrided with Metzenbaum scissors. The wound was copiously irrigated with irrisept. A wound vac was applied with black sponge with good seal.     Complications:  None; patient tolerated the procedure well.    Disposition: PACU - hemodynamically stable.  Condition: stable       Attending Attestation: I was present and scrubbed for the entire procedure.    Rizwana De La Rosa  Phone Number: [phone]

## 2024-07-22 NOTE — PROGRESS NOTES
Transitional Care Coordination Progress Note: Interdisciplinary Rounds     Team members present: TAMIR, Lupis PARSON     Discharge disposition: AR - discussing choices with family     ** x2 calls placed to [facility] left **     Status: IP     Payer: Medicare Advantage - [employer]       Potential Barriers: bleeding around wound vac, bridging to warfarin      ADOD: 7/24 or 7/25     This TCC will continue to follow for home going needs and safe DC plan.     Salma Anderson RN

## 2024-07-22 NOTE — PROGRESS NOTES
VASCULAR SURGERY PROGRESS NOTE  Assessment/Plan   43 y.o. female with hx CHF, stroke, who presented with Afib RVR and heart failure exacerbation with cardiogenic shock. Vascular surgery consulted for acute limb ischemia, however, was found to be [place]'s 3 with motor and sensory loss, paralysis of the right leg from advanced ischemia time. S/p R AKA with post op course c/b likely blood loss anemia with multiple blood transfusions. Required incisional wound debridement in OR for skin separation. Now with wound vac in place overlying the fascia.     Overnight had some issues with bleeding around the vac sponge, and in cannister.    Plan:  - Vac changed at bedside today due to the bloody output: no obvious bleeders; left piece of surgicel in wound bed covered by black sponge  - Can continue transitioning to Warfarin, INR yesterday 1.6; today 2.2    D/w attending, Dr. Rj Hi MD  Vascular Surgery Fellow  Service Pager: [phone]    Subjective   Some bleeding around vac sponge overnight, and in cannister. Patient reports no issues.    Objective   Heart Rate:  [61-97]   Temp:  [35.6 °C (96.1 °F)-36.7 °C (98.1 °F)]   Resp:  [18]   BP: ()/(52-62)   Weight:  [81.7 kg (180 lb 3.2 oz)]   SpO2:  [94 %-100 %]     Physical Exam:  General: NAD, AAOx3  Neuro: no gross deficits, speech WNL  HEENT: NC/AT  CV: RRR  Pulm: normal respiratory effort  Abd: soft, NTND  Extr: wound vac in place over amputation stump with good seal, sanguinous output noted in cannister, and accumulated under the clear dressing    Labs:  Results from last 7 days   Lab Units 07/21/24  0723 07/20/24  0720 07/19/24  0713   WBC AUTO x10*3/uL 12.6* 16.3* 15.6*   HEMOGLOBIN g/dL 7.5* 7.7* 8.4*   HEMATOCRIT % 25.4* 25.8* 28.2*   PLATELETS AUTO x10*3/uL 198 227 235     Results from last 7 days   Lab Units 07/21/24  0723 07/20/24  0720 07/19/24  0713   SODIUM mmol/L 135* 133* 131*   POTASSIUM mmol/L 4.9 5.1 4.7   CHLORIDE mmol/L 100 99 98    CO2 mmol/L 28 27 24   BUN mg/dL 17 22 26*   CREATININE mg/dL 0.62 0.66 0.92   GLUCOSE mg/dL 86 84 120*   CALCIUM mg/dL 8.7 8.6 8.9     Results from last 7 days   Lab Units 07/21/24  0723 07/20/24  0720 07/19/24  0713   INR  1.6* 1.3* 1.2*     Results from last 7 days   Lab Units 07/21/24  0731 07/20/24  0737 07/19/24  0713   ANTI XA UNFRACTIONATED IU/mL 0.4 0.5 0.6

## 2024-07-22 NOTE — PROGRESS NOTES
"Music Therapy Note    Amirah Bennett was referred by     Therapy Session  Referral Type: New referral this admission  Visit Type: Follow-up visit  Session Start Time: 1400  Session End Time: 1405  Intervention Delivery: In-person  Conflict of Service: Declined treatment               Treatment/Interventions       Post-assessment  Total Session Time (min): 5 minutes    Narrative  Assessment Detail: Patient found sitting up in bed. Reported \"doing fine.\" She showed MT her incision and said it was infected. Patient stated \"I'm not doing the same as before\" when asked if she wanted music therapy today; referring to doing better than when MT last worked with her. She declined Mt for today. Asked to continue with checking in.  Follow-up: Will continue to follow and provide services as needed    Education Documentation  No documentation found.          "

## 2024-07-22 NOTE — PROGRESS NOTES
Occupational Therapy                                          Therapy Communication Note    Patient Name: Amirah Bennett  MRN: [id number]  Today's Date: 7/22/2024     Discipline: Occupational Therapy    Missed Visit Reason: Missed Visit Reason: Patient refused (Pt refusing OT tx this AM 2/2 pain. Pt educated on therapeutic benefits but ultimately refusing tx. RN reported pt's wound dressing was changed this AM and pt has been in pain since. Will reattempt as schedule permits.)    Missed Time: Attempt

## 2024-07-23 LAB
ALBUMIN SERPL BCP-MCNC: 3.1 G/DL (ref 3.4–5)
ANION GAP SERPL CALC-SCNC: 14 MMOL/L (ref 10–20)
BASOPHILS # BLD AUTO: 0.05 X10*3/UL (ref 0–0.1)
BASOPHILS NFR BLD AUTO: 0.4 %
BLOOD EXPIRATION DATE: NORMAL
BUN SERPL-MCNC: 14 MG/DL (ref 6–23)
CALCIUM SERPL-MCNC: 8.8 MG/DL (ref 8.6–10.6)
CHLORIDE SERPL-SCNC: 99 MMOL/L (ref 98–107)
CO2 SERPL-SCNC: 26 MMOL/L (ref 21–32)
CREAT SERPL-MCNC: 0.55 MG/DL (ref 0.5–1.05)
DISPENSE STATUS: NORMAL
EGFRCR SERPLBLD CKD-EPI 2021: >90 ML/MIN/1.73M*2
EOSINOPHIL # BLD AUTO: 0.37 X10*3/UL (ref 0–0.7)
EOSINOPHIL NFR BLD AUTO: 2.7 %
ERYTHROCYTE [DISTWIDTH] IN BLOOD BY AUTOMATED COUNT: 15.8 % (ref 11.5–14.5)
GLUCOSE BLD MANUAL STRIP-MCNC: 101 MG/DL (ref 74–99)
GLUCOSE BLD MANUAL STRIP-MCNC: 104 MG/DL (ref 74–99)
GLUCOSE BLD MANUAL STRIP-MCNC: 114 MG/DL (ref 74–99)
GLUCOSE BLD MANUAL STRIP-MCNC: 99 MG/DL (ref 74–99)
GLUCOSE SERPL-MCNC: 84 MG/DL (ref 74–99)
HCT VFR BLD AUTO: 24.4 % (ref 36–46)
HGB BLD-MCNC: 7.3 G/DL (ref 12–16)
IMM GRANULOCYTES # BLD AUTO: 0.38 X10*3/UL (ref 0–0.7)
IMM GRANULOCYTES NFR BLD AUTO: 2.8 % (ref 0–0.9)
INR PPP: 2.5 (ref 0.9–1.1)
LYMPHOCYTES # BLD AUTO: 3.26 X10*3/UL (ref 1.2–4.8)
LYMPHOCYTES NFR BLD AUTO: 23.7 %
MAGNESIUM SERPL-MCNC: 1.52 MG/DL (ref 1.6–2.4)
MCH RBC QN AUTO: 30 PG (ref 26–34)
MCHC RBC AUTO-ENTMCNC: 29.9 G/DL (ref 32–36)
MCV RBC AUTO: 100 FL (ref 80–100)
MONOCYTES # BLD AUTO: 1.61 X10*3/UL (ref 0.1–1)
MONOCYTES NFR BLD AUTO: 11.7 %
NEUTROPHILS # BLD AUTO: 8.09 X10*3/UL (ref 1.2–7.7)
NEUTROPHILS NFR BLD AUTO: 58.7 %
NRBC BLD-RTO: 0.3 /100 WBCS (ref 0–0)
PHOSPHATE SERPL-MCNC: 4.4 MG/DL (ref 2.5–4.9)
PLATELET # BLD AUTO: 202 X10*3/UL (ref 150–450)
POTASSIUM SERPL-SCNC: 5 MMOL/L (ref 3.5–5.3)
PRODUCT BLOOD TYPE: 9500
PRODUCT CODE: NORMAL
PROTHROMBIN TIME: 27.9 SECONDS (ref 9.8–12.8)
RBC # BLD AUTO: 2.43 X10*6/UL (ref 4–5.2)
SODIUM SERPL-SCNC: 134 MMOL/L (ref 136–145)
UFH PPP CHRO-ACNC: 0.4 IU/ML
UNIT ABO: NORMAL
UNIT NUMBER: NORMAL
UNIT RH: NORMAL
UNIT VOLUME: 282
WBC # BLD AUTO: 13.8 X10*3/UL (ref 4.4–11.3)
XM INTEP: NORMAL

## 2024-07-23 PROCEDURE — 84520 ASSAY OF UREA NITROGEN: CPT

## 2024-07-23 PROCEDURE — 2500000002 HC RX 250 W HCPCS SELF ADMINISTERED DRUGS (ALT 637 FOR MEDICARE OP, ALT 636 FOR OP/ED)

## 2024-07-23 PROCEDURE — 83735 ASSAY OF MAGNESIUM: CPT

## 2024-07-23 PROCEDURE — 2500000001 HC RX 250 WO HCPCS SELF ADMINISTERED DRUGS (ALT 637 FOR MEDICARE OP)

## 2024-07-23 PROCEDURE — 1200000002 HC GENERAL ROOM WITH TELEMETRY DAILY

## 2024-07-23 PROCEDURE — 36415 COLL VENOUS BLD VENIPUNCTURE: CPT

## 2024-07-23 PROCEDURE — 85520 HEPARIN ASSAY: CPT

## 2024-07-23 PROCEDURE — 2500000004 HC RX 250 GENERAL PHARMACY W/ HCPCS (ALT 636 FOR OP/ED)

## 2024-07-23 PROCEDURE — 85610 PROTHROMBIN TIME: CPT

## 2024-07-23 PROCEDURE — 99232 SBSQ HOSP IP/OBS MODERATE 35: CPT | Performed by: INTERNAL MEDICINE

## 2024-07-23 PROCEDURE — 36430 TRANSFUSION BLD/BLD COMPNT: CPT

## 2024-07-23 PROCEDURE — 85025 COMPLETE CBC W/AUTO DIFF WBC: CPT

## 2024-07-23 PROCEDURE — 82947 ASSAY GLUCOSE BLOOD QUANT: CPT

## 2024-07-23 PROCEDURE — P9016 RBC LEUKOCYTES REDUCED: HCPCS

## 2024-07-23 PROCEDURE — 99223 1ST HOSP IP/OBS HIGH 75: CPT | Performed by: PHYSICAL MEDICINE & REHABILITATION

## 2024-07-23 RX ORDER — MAGNESIUM SULFATE HEPTAHYDRATE 40 MG/ML
4 INJECTION, SOLUTION INTRAVENOUS ONCE
Status: COMPLETED | OUTPATIENT
Start: 2024-07-23 | End: 2024-07-23

## 2024-07-23 RX ORDER — FUROSEMIDE 10 MG/ML
40 INJECTION INTRAMUSCULAR; INTRAVENOUS ONCE
Status: COMPLETED | OUTPATIENT
Start: 2024-07-23 | End: 2024-07-23

## 2024-07-23 ASSESSMENT — COGNITIVE AND FUNCTIONAL STATUS - GENERAL
HELP NEEDED FOR BATHING: A LITTLE
TOILETING: A LITTLE
MOVING TO AND FROM BED TO CHAIR: A LITTLE
CLIMB 3 TO 5 STEPS WITH RAILING: TOTAL
MOBILITY SCORE: 14
TURNING FROM BACK TO SIDE WHILE IN FLAT BAD: A LITTLE
MOVING TO AND FROM BED TO CHAIR: A LITTLE
WALKING IN HOSPITAL ROOM: TOTAL
STANDING UP FROM CHAIR USING ARMS: A LITTLE
MOBILITY SCORE: 14
HELP NEEDED FOR BATHING: A LITTLE
WALKING IN HOSPITAL ROOM: TOTAL
CLIMB 3 TO 5 STEPS WITH RAILING: TOTAL
TURNING FROM BACK TO SIDE WHILE IN FLAT BAD: A LITTLE
DRESSING REGULAR UPPER BODY CLOTHING: A LITTLE
DRESSING REGULAR UPPER BODY CLOTHING: A LITTLE
MOVING FROM LYING ON BACK TO SITTING ON SIDE OF FLAT BED WITH BEDRAILS: A LITTLE
DAILY ACTIVITIY SCORE: 20
MOVING FROM LYING ON BACK TO SITTING ON SIDE OF FLAT BED WITH BEDRAILS: A LITTLE
DRESSING REGULAR LOWER BODY CLOTHING: A LITTLE
TOILETING: A LITTLE
STANDING UP FROM CHAIR USING ARMS: A LITTLE
DRESSING REGULAR LOWER BODY CLOTHING: A LITTLE
DAILY ACTIVITIY SCORE: 20

## 2024-07-23 ASSESSMENT — PAIN SCALES - PAIN ASSESSMENT IN ADVANCED DEMENTIA (PAINAD): TOTALSCORE: MEDICATION (SEE MAR)

## 2024-07-23 ASSESSMENT — PAIN SCALES - WONG BAKER: WONGBAKER_NUMERICALRESPONSE: NO HURT

## 2024-07-23 ASSESSMENT — PAIN SCALES - GENERAL
PAINLEVEL_OUTOF10: 5 - MODERATE PAIN
PAINLEVEL_OUTOF10: 0 - NO PAIN
PAINLEVEL_OUTOF10: 5 - MODERATE PAIN
PAINLEVEL_OUTOF10: 7
PAINLEVEL_OUTOF10: 7

## 2024-07-23 ASSESSMENT — PAIN - FUNCTIONAL ASSESSMENT
PAIN_FUNCTIONAL_ASSESSMENT: 0-10

## 2024-07-23 ASSESSMENT — PAIN DESCRIPTION - LOCATION
LOCATION: INCISION
LOCATION: INCISION

## 2024-07-23 ASSESSMENT — PAIN DESCRIPTION - ORIENTATION
ORIENTATION: RIGHT
ORIENTATION: RIGHT

## 2024-07-23 NOTE — PROGRESS NOTES
Amirah Bennett is a 43 y.o. female on day 33 of admission presenting with Atrial fibrillation (Multi).      Subjective   Overnight, HgB downtrending to 7.7 to 7.3. No blood in stool or urine per nursing, but continuous to have sanguineous output from wound VAC.  HDS.  No acute events overnight.  Patient states that she is doing well overall without any new complaints.  No chest pain or shortness of breath.       Objective     Vitals:  Temp: 36.4 °C (97.5 °F)  Temp  Av.2 °C (97.2 °F)  Min: 35.9 °C (96.6 °F)  Max: 36.8 °C (98.2 °F)    BP: 111/57  Systolic (24hrs), Av , Min:95 , Max:111   Diastolic (24hrs), Av, Min:52, Max:67      Heart Rate: 57  Pulse  Av.4  Min: 57  Max: 91    Resp: 18  Resp  Avg: 15.3  Min: 12  Max: 18    SpO2: 100 %   SpO2  Av.8 %  Min: 96 %  Max: 100 %    Weight: 81.7 kg (180 lb 3.2 oz)         Intake/Output last 3 Shifts:    Intake/Output Summary (Last 24 hours) at 2024 0853  Last data filed at 2024 0650  Gross per 24 hour   Intake --   Output 600 ml   Net -600 ml        Medications:  Scheduled Medications PRN Medications   acetaminophen, 975 mg, oral, q6h  amiodarone, 200 mg, oral, Daily  aspirin, 81 mg, oral, Daily  atorvastatin, 80 mg, oral, Nightly  bisacodyl, 10 mg, rectal, Daily  DULoxetine, 60 mg, oral, Daily  empagliflozin, 10 mg, oral, Daily  melatonin, 3 mg, oral, Daily  melatonin, 6 mg, oral, Daily  metoprolol succinate XL, 25 mg, oral, Nightly  pantoprazole, 40 mg, oral, Daily before breakfast  perflutren protein A microsphere, 0.5 mL, intravenous, Once in imaging  polyethylene glycol, 17 g, oral, BID  pregabalin, 75 mg, oral, BID  sacubitriL-valsartan, 2 tablet, oral, BID  sennosides, 2 tablet, oral, BID  warfarin, 5 mg, oral, Daily      PRN medications: dextrose, dextrose, glucagon, glucagon, heparin, HYDROmorphone, lidocaine, oxyCODONE, oxyCODONE        Relevant Results:  Results from last 7 days   Lab Units 24  0457 24  0843  "07/21/24  0723   WBC AUTO x10*3/uL 13.8* 12.2* 12.6*   HEMOGLOBIN g/dL 7.3* 7.7* 7.5*   HEMATOCRIT % 24.4* 25.9* 25.4*   PLATELETS AUTO x10*3/uL 202 201 198            Results from last 7 days   Lab Units 07/21/24  0723 07/20/24  0720 07/19/24  0713   SODIUM mmol/L 135* 133* 131*   POTASSIUM mmol/L 4.9 5.1 4.7   CHLORIDE mmol/L 100 99 98   CO2 mmol/L 28 27 24   BUN mg/dL 17 22 26*   CREATININE mg/dL 0.62 0.66 0.92   CALCIUM mg/dL 8.7 8.6 8.9           No lab exists for component: \"LABALBU\"   Results from last 7 days   Lab Units 07/22/24  0818 07/21/24  0723 07/20/24  0720   INR  2.2* 1.6* 1.3*      Physical Exam:  -Patient was conscious, oriented x4, with expressive aphasia. Not in acute respiratory distress, not in jaundiced nor cyanosed.    -Cardiovascular examination: Audible 1st and 2nd heart sound, no murmurs  -Lower limb examination: amputated right above knee amputation, wound VAC present with sanguinous output.  No gross bleeding. No pitting edema in LLE.  -Chest examination: Lungs clear to auscultation bilaterally.  -Abdominal examination: Soft and lax abdomen, no rigidity nor rebound tenderness.   -Neurological examination: limited, symmetrical facial impression, equally reactive pupils, right hand weakness.            Assessment/Plan      Amirah Bennett is a 43 y.o. 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 She was found to have elevated lactate to 14.7, cold extremities, and 3+ pitting edema. RHC c/w cardiogenic shock c/b SARAH, cardiac thrombi, and Right limb ischemia S/P above knee amputation. She was " initially started on heparin, now bridging with warfarin with a goal of INR 2-3.  Warfarin held per vascular surgery after patient taken to the OR for revision of right AKA 7/17/24. Warfarin was then restarted 7/18 per vascular surgery recommendations. Additionally, found to have UTI starting 07/11, she had a dhillon to prevent contamination of new AKA site, but dhillon was removed upon UTI discovery.  Now completed Augmentin 7-day course 7/21.     #Heart failure with reduced ejection fraction (LVEF 20-25%):   -Patient was labeled not candidate for advanced therapies due to medical nonadherence  Plan:  -palliative care follow up.  -Aim to resume quadruple GDMT (spironolactone and Jardiance). Patient on metoprolol succinate, Entresto.     #Right limb ischemia secondary to Arterial emboli, S/P above right knee amputation and revision:  #S/p femoral arterial line sheath placement, now removed  #Phantom limb pain  -CTA Aorta with runoff 6/24: aortic bifurcation embolus, R BA-EIA embolus, SFA and popliteal emboli   -arterial duplex exam with monophasic right CFA, SFA and PFA signals, absent popliteal flow and monophasic flow in the tibial artery   -Patient labeled unable to walk prior to presentation to hospital  -s/p Above knee amputation with vascular surgery 6/28/24 and revision 7/17/24.  Plan:  -Warfarin with heparin bridge  -Started warfarin 5mg 07/09 (received 3 doses 07/09-07/11) with missed dose on 07/12, restarted on 07/13, and subsequently held on 7/16 pending right AKA surgery  -Restarted warfarin bridge 7/18.  Will continue with heparin gtt to ensure stable INR.  -continue to follow INR, continue warfarin 5 mg.  -goal INR 2-3 for a minimum of 2 days per vascular medicine. Patient has now met this goal.  -Wound VAC to 125 mmHg per vascular surgery, continues to drain sanguinous output, plans for wound VAC change 7/24 or 7/25  -Plan for patient to be discharged with wound VAC  -pain regimen: acetaminophen 975 mg  scheduled QID, oxycodone 10mg severe pain 4hr, PRN dilaudid 0.2mg q4h breakthrough, before and after dressing changes.  -Continue duloxetine and Lyrica 75 twice daily per palliative recommendations for phantom limb pain.     #Query Cardiac thrombi:  -Likely in setting of Atrial fibrillation with Rivaroxaban no adherence  -There are indeterminate low-attenuation nodular filling defects within the atrial appendage.   -Right lower extremity DVT, and arterial thrombus/p IVC filter 6/27.  Plan:  -Restarted warfarin bridge 7/18.  Will continue with heparin gtt. Goal per vascular medicine is Warfarin with Target INR 2-3 for a minimum of 2 days.  -Vascular medicine on board, appreciate recommendations  -Outpatient Anticoagulation Clinic set up at [facility].     #Atrial fibrillation previously on rivaroxaban  -S/p DCCV (05/2023), ischemic stroke L MCA d/t AFib LLA thrombus seen on echo (lack of OAC adherence) s/p thrombectomy 01/2022 @ [facility] w/ residual expressive aphasia and R sided weakness   -Previously prescribed digoxin 250mcg however last fill was 12/2023  -TSH 6.28H, free T4 1.48  Plan:  -Continue amiodarone 200mg daily  -Continue aspirin 81mg     #Direct Hyperbilirubinemia likely following ischemic liver injury  -Tbili (3.2 on admission, from 0.5 in 03/2024)  -RUQ US 7/1 without abnormality beyond hepatic steatosis and liver cyst  Plan:  -Follow Liver enzymes.      #Hx trach c/b trancutaneous fistula  -ENT had been consulted 3/2024 for gurgling and mucus drainage, no inpatient interventions required  Plan:  -monitor for breaths/mucus discharge from trach site  -Cover the tracheocutaneous fistula with tape and gauze and encourage patient to apply pressure when voicing.   -follow up outpatient ENT for closure (Dr. King)     #Anemia:  ::had 3 units of RBC transfused 6/30, currently controlled bleeding, increeminting gradually.   ::CTA C/A/P 6/30 without evidence of active hemorrhage within chest/abdomen/pelvis  ::CTA RLE  7/1 without a source that could be intervened upon  Plan:  -CBC daily, downtrending hemoglobin likely secondary to oozing AKA incision.  Will continue to monitor closely.  -Keep type and screen active.  Patient consented from previous transfusions.  -Hgb goal >7 given cardiac hx  -Transfuse 1u pRBCs 7/23 and 40 mg Lasix IV given persistent anemia secondary to chronic blood loss through wound vac     #UTI, resolved:  ::small amount of blood in urine 07/11 am  -UA turbid brown with blood, glucose, protein and LE 25, nitrite negative   -urine culture positive  -S/p 2 days of ceftriaxone.  Cx previously positive for Klebsiella.  Abx de-escalated to Augmentin, now completed 7 day course (07/14-07/21)     #Left brachial injury  -LUE DVT scan with nonvascularized mass, called CT while patient was there to request CTA of left upper extremity and they said logistically they would be unable to perform both scans, and given protocol for max dosing of contrast, could not give further contrast until 6/25 at 2PM  -no CTA LUE needed        Insomnia  -Continue melatonin 6mg at 9 PM nightly  -Added melatonin 3 mg at 6 PM per palliative recommendations     Depression   -Increased duloxetine from 30 mg to 60 mg per palliative recommendations     Resolved:   -Thrombocytopenia, likely attributed to: previous consumptive coagulopathy/sepsis and bleeding, Multiple thrombi, DIC score 5 on 6/24/2024, Hit score of 6, Negative PF4  -Rhabdomyolysis.  -Metabolic acidosis, Lactic acidosis.   -Acute kidney injury.   -stump infection, treated for 7 days, s/p vanc (6/20-6/24) (6/27-7/2) and zosyn (6/20-6/24), (6/27- p)-restarted vanc/zosyn 6/27 due to elevated leukocytosis and purulent appearing right groin site     Disposition Plan:  -patient would like enrollment in  home delivery service for medications (she has trouble getting to preferred pharmacy), pharmacy updated to [facility]  -patient's family would like her enrolled in [facility]  -repeat  "thyroid labs outpatient  -IVC filter removal within 3 months  [ ] Follow up further pall care recs. Patient remains full code. There is ongoing GOC discussion  [ ] Needs to follow up OP with ENT for trach c/b trancutaneous fistula. Dr. King for closure.     Prevention:   Fall: High.   Mobility: Physical therapy referral.   DVT: Heparin/Warfarin (bridge)  Diet: Cardiac Diet     Code status: Full Code  Emergency contact:   * patient LACKS capacity due to inability to express decision and inconsistency in decisions  Surrogate Medical Decision-maker:   Surrogate decision maker is: pt's cousin, Clara Wayne: [phone], Efrain Black: [phone], Keli Osborne: [phone]   Friends who may be helpful in making decisions:  Prior boyfriend Navin Sanches [phone]  Close friend \"Isiah\" Pranay Brayden [phone]        Ayaan Rowland MD   PGY-1 Internal Medicine   "

## 2024-07-23 NOTE — PROGRESS NOTES
VASCULAR SURGERY PROGRESS NOTE  Assessment/Plan   43 y.o. female with hx CHF, stroke, who presented with Afib RVR and heart failure exacerbation with cardiogenic shock. 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 S/p R AKA with post op course c/b likely blood loss anemia with multiple blood transfusions. Required incisional wound debridement in OR for skin separation. Now with wound vac in place overlying the fascia.     Wound vac changed (surgicel and black sponge) by vascular surgery on 7/22.  No bleeding over vac site overnight. Vac remain intact this morning, holding good suction    Plan:  - Vac change plan for Wed or Thursday  - Can continue transitioning to Warfarin    D/w attending, Dr. Rj Zarate MD  General Surgery, PGY-3  Please page service pager with questions  Service Pager: [phone]    Subjective   No bleeding overnight.  Incision looks clean, dry with vac holding good suction.   Patient reports no issues.    Objective   Heart Rate:  [57-91]   Temp:  [35.9 °C (96.6 °F)-36.8 °C (98.2 °F)]   Resp:  [12-18]   BP: ()/(52-67)   SpO2:  [96 %-100 %]     Physical Exam:  General: NAD, AAOx3  Neuro: no gross deficits, speech WNL  HEENT: NC/AT  CV: RRR  Pulm: normal respiratory effort  Abd: soft, NTND  Extr: wound vac in place over amputation stump with good seal, sanguinous output noted in cannister.  No hematoma or signs of infection    Labs:  Results from last 7 days   Lab Units 07/23/24  0457 07/22/24  0818 07/21/24  0723   WBC AUTO x10*3/uL 13.8* 12.2* 12.6*   HEMOGLOBIN g/dL 7.3* 7.7* 7.5*   HEMATOCRIT % 24.4* 25.9* 25.4*   PLATELETS AUTO x10*3/uL 202 201 198     Results from last 7 days   Lab Units 07/21/24  0723 07/20/24  0720 07/19/24  0713   SODIUM mmol/L 135* 133* 131*   POTASSIUM mmol/L 4.9 5.1 4.7   CHLORIDE mmol/L 100 99 98   CO2 mmol/L 28 27 24    BUN mg/dL 17 22 26*   CREATININE mg/dL 0.62 0.66 0.92   GLUCOSE mg/dL 86 84 120*   CALCIUM mg/dL 8.7 8.6 8.9     Results from last 7 days   Lab Units 07/22/24  0818 07/21/24  0723 07/20/24  0720   INR  2.2* 1.6* 1.3*     Results from last 7 days   Lab Units 07/23/24  0457 07/22/24  0818 07/21/24  0731   ANTI XA UNFRACTIONATED IU/mL 0.4 0.4 0.4

## 2024-07-23 NOTE — PROGRESS NOTES
Transitional Care Coordination Progress Note: Interdisciplinary Rounds     Team members present: Lupis GARNETT NP     Discharge disposition: [facility] - acceptance pending review of capacity        Status: IP     Payer: Medicare Advantage - [facility]       Potential Barriers: PMR consult ---> concern for capacity and dispo plan home after rehab, VAS rec, wound vac, transfusing 1 unit PRBC     ADOD: 7/25     This TCC will continue to follow for home going needs and safe DC plan.     Salma Anderson RN

## 2024-07-23 NOTE — CONSULTS
PM&R Consult Note  Patient: Amirah Bennett   Age/sex: 43 y.o.   Medical Record #: [id number]       Referring physician: Dr. Faraz Akins   Consulting physician: Dr. Alcazar      Procedures performed on/related to this admission:  - Revision of right AKA on 7/17/2024    Chief complaint:   Impairments and activity limitations in ADLs, mobility, functional communication, and cognition secondary to right AKA in the setting of previous strokes.     HPI:   Amirah Bennett is a 43 y.o. 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  Patient was found to be in cardiogenic shock complicated by SARAH and was found to have right limb ischemia status post above-knee amputation.  Patient underwent revision of right AKA on 7/17/2024 and oral anticoagulation was restarted.  Hospital course was further complicated by UTI.  Patient underwent incision and debridement in the operating room for this concern for ration surrounding amputation site and now has wound VAC in place which she will be discharged with.     Present status: Resting comfortably in bed, no acute distress.  PO: Tolerating adult regular diet well (cardiac).  Pain: Well-controlled.  Bowel: Last bowel movement 7/24.   Bladder: Currently voiding volitionally.  DVT prophylaxis with therapeutic warfarin.   Weight bearing status: Nonweightbearing of right lower extremity.  Precautions: Cardiac precautions, fall precautions.    Past Medical History:   Diagnosis Date    CHF (congestive heart failure) (Multi)     Stroke (Multi)         Past Surgical  History:   Procedure Laterality Date    CARDIAC CATHETERIZATION N/A 6/20/2024    Procedure: Right Heart Cath;  Surgeon: Osman Su MD;  Location: [place] [address] Cardiac Cath Lab;  Service: Cardiovascular;  Laterality: N/A;    INVASIVE VASCULAR PROCEDURE N/A 6/20/2024    Procedure: Arterial  Access, Intracatheter;  Surgeon: Osman Su MD;  Location: [place] [address] Cardiac Cath Lab;  Service: Cardiovascular;  Laterality: N/A;    OTHER SURGICAL HISTORY  06/09/2022    Trachectomy        No family history on file.     Allergies   Allergen Reactions    Hydrocodone-Acetaminophen Rash    Ibuprofen Rash     Tolerates aspirin        acetaminophen, 975 mg, oral, q6h  amiodarone, 200 mg, oral, Daily  aspirin, 81 mg, oral, Daily  atorvastatin, 80 mg, oral, Nightly  bisacodyl, 10 mg, rectal, Daily  DULoxetine, 60 mg, oral, Daily  empagliflozin, 10 mg, oral, Daily  melatonin, 3 mg, oral, Daily  melatonin, 6 mg, oral, Daily  metoprolol succinate XL, 25 mg, oral, Nightly  pantoprazole, 40 mg, oral, Daily before breakfast  perflutren protein A microsphere, 0.5 mL, intravenous, Once in imaging  polyethylene glycol, 17 g, oral, BID  pregabalin, 75 mg, oral, BID  sacubitriL-valsartan, 2 tablet, oral, BID  sennosides, 2 tablet, oral, BID  warfarin, 5 mg, oral, Daily         PRN medications: dextrose, dextrose, glucagon, glucagon, heparin, HYDROmorphone, lidocaine, oxyCODONE, oxyCODONE     Social History:  Working History: Unemployed.  Social supports: Lives with her friend who is able to provide assistance at home as needed.  Home situation: Lives in single level floor, 3-4 steps to enter home.     Functional History:  Home DME: Rolling walker.  Premorbid ADL's: independent   Premorbid Mobility: independent      PT: Close supervision with static sitting, bilateral upper extremity supported.  Contact-guard with static standing.  Standing for 30-45 seconds X2.  Close supervision with bed mobility, min assist with transfers.   Tolerating 10-20 minutes exercise with multiple rest.  Recommending high intensity.      OT: Side hopping towards head of bed with rolling walker approximately 2 feet.  Min assist for balance with front wheeled walker standing tolerance.  Close supervision bed mobility.  Min assist with transfers.  Recommending high intensity.    ROS  10 point review of systems is performed and negative unless mentioned in HPI    Physical Exam  GENERAL: Awake and alert, well-developed, well-nourished, No acute distress   HEENT - NC/AT   CARDIOVASCULAR: extremities warm, well perfused   RESPIRATORY: no conversational dyspnea, comfortable on room air, symmetrical chest rise   ABDOMEN: Soft, non-tender, normal bowel sounds, no distention   MSK: Right AKA.  Well-healing without signs or symptoms of infection.  SKIN: Wound VAC in place right AKA stump.  Psych: Initially tearful but conversant, cooperative.  Conversational.     NEURO: Residual expressive aphasia on exam.  RUE strength: 5/5 elbow flexors, 5/5 elbow extensors, 5/5 wrist extensors, 5/5 finger flexors, 5/5 finger abductors    LUE strength: 5/5 elbow flexors, 5/5 elbow extensors, 5/5 wrist extensors, 5/5 finger flexors, 5/5 finger abductors    RLE strength: 4/5 hip flexors; right AKA.  LLE strength: 5/5 hip flexors, 5/5 knee extensors, 5/5 ankle dorsiflexors, 5/5 EHL, 5/5 ankle plantar flexors   Sensation - intact to light touch in bilateral lower extremities.    Reflexes -3+ bilateral biceps, brachioradialis.  3+ left patella, 1+ left Achilles.    Imaging:  === 06/20/24 ===    XR CHEST 1 VIEW    - Impression -  1. Slight interval increase in perihilar congestion/edema.  2. [place]-Abe catheter as described above    Signed by: Patricia Henriquez 7/2/2024 10:39 AM  Dictation workstation:   [id number]    === 03/08/24 ===    MR BRAIN WO CONTRAST    - Impression -  1. The inferior left cerebellum has acute ischemia with restricted  diffusion. No hemorrhage is noted.  2. Left-sided  encephalomalacia and gliosis compatible with sequela of  prior left MCA stroke.  3. [place] periventricular and subcortical white matter changes  which is nonspecific may be seen with chronic small vessel ischemic  disease, migraine headaches, demyelinating processes, vasculitis,  among others.    I personally reviewed the image(s) / study and I agree with the  findings as stated by Lesli Davis MD. This study was interpreted at  [facility], [place], [place].    MACRO:  None.    Signed by: Kali Win 3/9/2024 12:50 PM  Dictation workstation:   [id number]      IMPRESSION:  Amirah Bennett is a 43 y.o. year old female patient with a past medical history of HFrEF, atrial fibrillation, left MCA stroke with residual expressive aphasia and right-sided weakness who originally presented on 6/20 was found to be in cardiogenic shock.  Patient was also found to have right limb ischemia status post above knee amputation and underwent revision of right AKA on 7/17. PM&R was consulted for recommendations and for IRF appropriateness.    Recommendations:  - Patient would benefit from an acute inpatient rehab stay to improve functional outcome of impaired mobility, ADL's, transfers, and self-care.  -She does have a friend who is at home who is able to care for her throughout the day and has multiple steps to enter her house.  Patient would benefit from wheelchair training and functional mobility rehabilitation given her recent AKA and would likely be able to return home following her stay   - Patient would be an excellent candidate for acute rehabilitation once medically stable and is able and motivated to participate in 3 hours/day of therapy.   - Patient would benefit from medical follow up at least three times a week to address and monitor pain, BP, complications and other comorbidities. Patient was independent in mobility and ADLs at baseline prior to this incident. Acute rehab is appropriate to assist in  returning to PLOF and living situation.  - Post rehab destination: anticipated home   - Must be off IV pain meds prior to transfer  - For questions, please contact via Haiku    # Neurogenic bowel/bladder  - Voiding volitionally   - Last BM 7/24  - Recommend daily bowel program of Miralax BID and Docusate 100mg BID  - Goal of one BM daily, at risk for constipation while on opioids for pain management    # Immobility   - Prevent secondary complications   - Skin protection: low air loss mattress, turn q 2 hours in bed, maintain bowel and bladder continence/containment  - Prevention of pulmonary complications: incentive spirometry and pulmonary toileting  - Maintain adequate nutrition and hydration  - Q shift PROM of upper and lower extremities to prevent contracture    # Impaired mobility and Impaired independence with ADLs and I/ADLs  - Continue PT, working on bed mobility, transfers, balance, endurance, strength, gait, eval for appropriate assistive device  - Continue OT, working on functional cognition, functional mobility, upper limb strength/coordination, balance, endurance, eval for appropriate adaptive equipment, ADLs, and I/ADLs.    # Pain Management   - Please discontinue IV and/or PO Dilaudid 24 hours prior to discharge to acute rehabilitation. Exceptions would include:  - Patients taking PO Dilaudid for chronic pain with outpatient pain management provider who can resume medication at discharge from Rehab.  - Patients on PO Dilaudid with pain from oncologic/cancer diagnoses and are followed by  Supportive Oncology as an outpatient.  - Patients who are requiring PO Dilaudid for wound VAC dressing changes.    Estimated length of stay is 12 days with discharge home at modified independent level.    We will follow along with you.  Thank you very much for this consult. Continue with excellent care per primary team.     Patient seen and examined with attending Dr. Alcazar who agrees with assessment and plan.  Note is not finalized until signed by attending physician.     Cullen Moreno, DO PGY3  Physical Medicine & Rehabilitation     Supervisory note:  Seen with Dr MAGDY Moreno, resident.  I saw and evaluated the patient. I personally obtained the key and critical portions of the history and physical exam. I reviewed the resident's documentation and discussed the patient with the resident. I agree with the resident's medical decision making as documented in the resident's note.     Excellent candidate for acute inpt rehab, needed for wheelchair training and medical supervision for CHF / cardiogenic shock mgmt and wound monitoring. Pain also is complex with residual limb and phantom phenomenon.   Rec increase pregabalin to 150 bid.     Evan Alcazar M.D, FAAPMR, R-MSK  Chief, Division of PM&R  Board Certified in PM&R, Sports Medicine and Musculoskeletal Ultrasound

## 2024-07-24 VITALS
OXYGEN SATURATION: 98 % | HEIGHT: 67 IN | TEMPERATURE: 97.3 F | BODY MASS INDEX: 24.78 KG/M2 | RESPIRATION RATE: 15 BRPM | WEIGHT: 157.85 LBS | SYSTOLIC BLOOD PRESSURE: 105 MMHG | HEART RATE: 75 BPM | DIASTOLIC BLOOD PRESSURE: 72 MMHG

## 2024-07-24 LAB
ALBUMIN SERPL BCP-MCNC: 3.4 G/DL (ref 3.4–5)
ANION GAP SERPL CALC-SCNC: 10 MMOL/L (ref 10–20)
ATRIAL RATE: 76 BPM
BASOPHILS # BLD AUTO: 0.05 X10*3/UL (ref 0–0.1)
BASOPHILS NFR BLD AUTO: 0.4 %
BUN SERPL-MCNC: 18 MG/DL (ref 6–23)
CALCIUM SERPL-MCNC: 9.6 MG/DL (ref 8.6–10.6)
CHLORIDE SERPL-SCNC: 98 MMOL/L (ref 98–107)
CO2 SERPL-SCNC: 30 MMOL/L (ref 21–32)
CREAT SERPL-MCNC: 0.65 MG/DL (ref 0.5–1.05)
EGFRCR SERPLBLD CKD-EPI 2021: >90 ML/MIN/1.73M*2
EOSINOPHIL # BLD AUTO: 0.35 X10*3/UL (ref 0–0.7)
EOSINOPHIL NFR BLD AUTO: 2.5 %
ERYTHROCYTE [DISTWIDTH] IN BLOOD BY AUTOMATED COUNT: 16.8 % (ref 11.5–14.5)
GLUCOSE BLD MANUAL STRIP-MCNC: 102 MG/DL (ref 74–99)
GLUCOSE BLD MANUAL STRIP-MCNC: 88 MG/DL (ref 74–99)
GLUCOSE BLD MANUAL STRIP-MCNC: 97 MG/DL (ref 74–99)
GLUCOSE SERPL-MCNC: 93 MG/DL (ref 74–99)
HCT VFR BLD AUTO: 30.9 % (ref 36–46)
HGB BLD-MCNC: 9.7 G/DL (ref 12–16)
IMM GRANULOCYTES # BLD AUTO: 0.27 X10*3/UL (ref 0–0.7)
IMM GRANULOCYTES NFR BLD AUTO: 2 % (ref 0–0.9)
INR PPP: 2.1 (ref 0.9–1.1)
LYMPHOCYTES # BLD AUTO: 2.55 X10*3/UL (ref 1.2–4.8)
LYMPHOCYTES NFR BLD AUTO: 18.5 %
MAGNESIUM SERPL-MCNC: 1.9 MG/DL (ref 1.6–2.4)
MCH RBC QN AUTO: 30.9 PG (ref 26–34)
MCHC RBC AUTO-ENTMCNC: 31.4 G/DL (ref 32–36)
MCV RBC AUTO: 98 FL (ref 80–100)
MONOCYTES # BLD AUTO: 1.37 X10*3/UL (ref 0.1–1)
MONOCYTES NFR BLD AUTO: 9.9 %
NEUTROPHILS # BLD AUTO: 9.22 X10*3/UL (ref 1.2–7.7)
NEUTROPHILS NFR BLD AUTO: 66.7 %
NRBC BLD-RTO: 0 /100 WBCS (ref 0–0)
P AXIS: 17 DEGREES
P OFFSET: 193 MS
P ONSET: 140 MS
PHOSPHATE SERPL-MCNC: 5.4 MG/DL (ref 2.5–4.9)
PLATELET # BLD AUTO: 230 X10*3/UL (ref 150–450)
POTASSIUM SERPL-SCNC: 5 MMOL/L (ref 3.5–5.3)
PR INTERVAL: 148 MS
PROTHROMBIN TIME: 23.4 SECONDS (ref 9.8–12.8)
Q ONSET: 214 MS
QRS COUNT: 13 BEATS
QRS DURATION: 102 MS
QT INTERVAL: 400 MS
QTC CALCULATION(BAZETT): 450 MS
QTC FREDERICIA: 432 MS
R AXIS: 16 DEGREES
RBC # BLD AUTO: 3.14 X10*6/UL (ref 4–5.2)
SODIUM SERPL-SCNC: 133 MMOL/L (ref 136–145)
T AXIS: 76 DEGREES
T OFFSET: 414 MS
VENTRICULAR RATE: 76 BPM
WBC # BLD AUTO: 13.8 X10*3/UL (ref 4.4–11.3)

## 2024-07-24 PROCEDURE — 36415 COLL VENOUS BLD VENIPUNCTURE: CPT

## 2024-07-24 PROCEDURE — 85025 COMPLETE CBC W/AUTO DIFF WBC: CPT

## 2024-07-24 PROCEDURE — 2500000001 HC RX 250 WO HCPCS SELF ADMINISTERED DRUGS (ALT 637 FOR MEDICARE OP)

## 2024-07-24 PROCEDURE — 97110 THERAPEUTIC EXERCISES: CPT | Mod: GO

## 2024-07-24 PROCEDURE — 97530 THERAPEUTIC ACTIVITIES: CPT | Mod: GP | Performed by: PHYSICAL THERAPIST

## 2024-07-24 PROCEDURE — 85610 PROTHROMBIN TIME: CPT

## 2024-07-24 PROCEDURE — 99239 HOSP IP/OBS DSCHRG MGMT >30: CPT

## 2024-07-24 PROCEDURE — 2500000002 HC RX 250 W HCPCS SELF ADMINISTERED DRUGS (ALT 637 FOR MEDICARE OP, ALT 636 FOR OP/ED)

## 2024-07-24 PROCEDURE — 83735 ASSAY OF MAGNESIUM: CPT

## 2024-07-24 PROCEDURE — 2500000004 HC RX 250 GENERAL PHARMACY W/ HCPCS (ALT 636 FOR OP/ED)

## 2024-07-24 PROCEDURE — 97530 THERAPEUTIC ACTIVITIES: CPT | Mod: GO

## 2024-07-24 PROCEDURE — 82947 ASSAY GLUCOSE BLOOD QUANT: CPT

## 2024-07-24 PROCEDURE — 84100 ASSAY OF PHOSPHORUS: CPT

## 2024-07-24 RX ORDER — MULTIVIT-MIN/IRON FUM/FOLIC AC 7.5 MG-4
1 TABLET ORAL DAILY
Qty: 30 TABLET | Refills: 0 | Status: SHIPPED | OUTPATIENT
Start: 2024-07-24

## 2024-07-24 RX ORDER — OXYCODONE HYDROCHLORIDE 5 MG/1
5 TABLET ORAL EVERY 6 HOURS PRN
Start: 2024-07-24 | End: 2024-07-24

## 2024-07-24 RX ORDER — OXYCODONE HYDROCHLORIDE 10 MG/1
10 TABLET ORAL EVERY 6 HOURS PRN
Start: 2024-07-24 | End: 2024-07-24

## 2024-07-24 RX ORDER — FUROSEMIDE 40 MG/1
40 TABLET ORAL
Qty: 60 TABLET | Refills: 0 | Status: SHIPPED | OUTPATIENT
Start: 2024-07-24

## 2024-07-24 RX ORDER — ATORVASTATIN CALCIUM 80 MG/1
80 TABLET, FILM COATED ORAL DAILY
Qty: 30 TABLET | Refills: 0 | Status: SHIPPED | OUTPATIENT
Start: 2024-07-24

## 2024-07-24 RX ORDER — AMIODARONE HYDROCHLORIDE 200 MG/1
200 TABLET ORAL DAILY
Qty: 30 TABLET | Refills: 0 | Status: SHIPPED | OUTPATIENT
Start: 2024-07-25

## 2024-07-24 RX ORDER — METOPROLOL SUCCINATE 50 MG/1
25 TABLET, EXTENDED RELEASE ORAL DAILY
Start: 2024-07-24 | End: 2024-07-24

## 2024-07-24 RX ORDER — OXYCODONE HYDROCHLORIDE 10 MG/1
10 TABLET ORAL EVERY 6 HOURS PRN
Qty: 10 TABLET | Refills: 0 | Status: SHIPPED | OUTPATIENT
Start: 2024-07-24

## 2024-07-24 RX ORDER — PANTOPRAZOLE SODIUM 40 MG/1
40 TABLET, DELAYED RELEASE ORAL
Qty: 30 TABLET | Refills: 0 | Status: SHIPPED | OUTPATIENT
Start: 2024-07-24

## 2024-07-24 RX ORDER — WARFARIN SODIUM 5 MG/1
7.5 TABLET ORAL DAILY
Status: DISCONTINUED | OUTPATIENT
Start: 2024-07-24 | End: 2024-07-24 | Stop reason: HOSPADM

## 2024-07-24 RX ORDER — DULOXETIN HYDROCHLORIDE 60 MG/1
60 CAPSULE, DELAYED RELEASE ORAL DAILY
Qty: 30 CAPSULE | Refills: 0 | Status: SHIPPED | OUTPATIENT
Start: 2024-07-25

## 2024-07-24 RX ORDER — TALC
3 POWDER (GRAM) TOPICAL NIGHTLY PRN
Qty: 30 TABLET | Refills: 0 | Status: SHIPPED | OUTPATIENT
Start: 2024-07-24

## 2024-07-24 RX ORDER — SENNOSIDES 8.6 MG/1
2 TABLET ORAL 2 TIMES DAILY
Start: 2024-07-24 | End: 2024-07-24

## 2024-07-24 RX ORDER — WARFARIN SODIUM 5 MG/1
TABLET ORAL
Qty: 30 TABLET | Refills: 0 | Status: SHIPPED | OUTPATIENT
Start: 2024-07-24 | End: 2025-07-24

## 2024-07-24 RX ORDER — WARFARIN SODIUM 5 MG/1
TABLET ORAL
Start: 2024-07-24 | End: 2024-07-24

## 2024-07-24 RX ORDER — FOLIC ACID 1 MG/1
1 TABLET ORAL DAILY
Qty: 30 TABLET | Refills: 0 | Status: SHIPPED | OUTPATIENT
Start: 2024-07-24

## 2024-07-24 RX ORDER — AMIODARONE HYDROCHLORIDE 200 MG/1
200 TABLET ORAL DAILY
Start: 2024-07-25 | End: 2024-07-24

## 2024-07-24 RX ORDER — NAPROXEN SODIUM 220 MG/1
81 TABLET, FILM COATED ORAL
Qty: 30 TABLET | Refills: 0 | Status: SHIPPED | OUTPATIENT
Start: 2024-07-24

## 2024-07-24 RX ORDER — METOPROLOL SUCCINATE 50 MG/1
25 TABLET, EXTENDED RELEASE ORAL DAILY
Qty: 15 TABLET | Refills: 0 | Status: SHIPPED | OUTPATIENT
Start: 2024-07-24

## 2024-07-24 RX ORDER — PREGABALIN 75 MG/1
75 CAPSULE ORAL 2 TIMES DAILY
Start: 2024-07-24 | End: 2024-07-24

## 2024-07-24 RX ORDER — PREGABALIN 75 MG/1
75 CAPSULE ORAL 2 TIMES DAILY
Qty: 60 CAPSULE | Refills: 0 | Status: SHIPPED | OUTPATIENT
Start: 2024-07-24

## 2024-07-24 RX ORDER — SENNOSIDES 8.6 MG/1
2 TABLET ORAL 2 TIMES DAILY
Qty: 60 TABLET | Refills: 0 | Status: SHIPPED | OUTPATIENT
Start: 2024-07-24

## 2024-07-24 RX ORDER — MAGNESIUM SULFATE HEPTAHYDRATE 40 MG/ML
2 INJECTION, SOLUTION INTRAVENOUS ONCE
Status: COMPLETED | OUTPATIENT
Start: 2024-07-24 | End: 2024-07-24

## 2024-07-24 RX ORDER — LIDOCAINE 50 MG/G
1 PATCH TOPICAL DAILY
Qty: 30 PATCH | Refills: 0 | Status: SHIPPED | OUTPATIENT
Start: 2024-07-24

## 2024-07-24 RX ORDER — OXYCODONE HYDROCHLORIDE 5 MG/1
5 TABLET ORAL EVERY 6 HOURS PRN
Qty: 15 TABLET | Refills: 0 | Status: SHIPPED | OUTPATIENT
Start: 2024-07-24

## 2024-07-24 RX ORDER — DULOXETIN HYDROCHLORIDE 60 MG/1
60 CAPSULE, DELAYED RELEASE ORAL DAILY
Start: 2024-07-25 | End: 2024-07-24

## 2024-07-24 RX ORDER — POLYETHYLENE GLYCOL 3350 17 G/17G
17 POWDER, FOR SOLUTION ORAL
Qty: 30 PACKET | Refills: 0 | Status: SHIPPED | OUTPATIENT
Start: 2024-07-24

## 2024-07-24 ASSESSMENT — COGNITIVE AND FUNCTIONAL STATUS - GENERAL
TOILETING: A LITTLE
HELP NEEDED FOR BATHING: A LITTLE
DAILY ACTIVITIY SCORE: 20
MOVING TO AND FROM BED TO CHAIR: A LITTLE
WALKING IN HOSPITAL ROOM: TOTAL
TURNING FROM BACK TO SIDE WHILE IN FLAT BAD: A LITTLE
STANDING UP FROM CHAIR USING ARMS: A LITTLE
HELP NEEDED FOR BATHING: A LITTLE
DRESSING REGULAR UPPER BODY CLOTHING: A LITTLE
MOVING FROM LYING ON BACK TO SITTING ON SIDE OF FLAT BED WITH BEDRAILS: A LITTLE
DRESSING REGULAR UPPER BODY CLOTHING: A LITTLE
CLIMB 3 TO 5 STEPS WITH RAILING: TOTAL
WALKING IN HOSPITAL ROOM: TOTAL
DRESSING REGULAR LOWER BODY CLOTHING: A LITTLE
STANDING UP FROM CHAIR USING ARMS: A LITTLE
DRESSING REGULAR LOWER BODY CLOTHING: A LITTLE
MOVING TO AND FROM BED TO CHAIR: A LITTLE
DRESSING REGULAR UPPER BODY CLOTHING: A LITTLE
CLIMB 3 TO 5 STEPS WITH RAILING: TOTAL
DAILY ACTIVITIY SCORE: 20
HELP NEEDED FOR BATHING: A LITTLE
TOILETING: A LITTLE
MOBILITY SCORE: 14
MOVING FROM LYING ON BACK TO SITTING ON SIDE OF FLAT BED WITH BEDRAILS: A LITTLE
CLIMB 3 TO 5 STEPS WITH RAILING: TOTAL
MOBILITY SCORE: 14
MOVING TO AND FROM BED TO CHAIR: A LITTLE
DRESSING REGULAR LOWER BODY CLOTHING: A LITTLE
DAILY ACTIVITIY SCORE: 20
MOBILITY SCORE: 16
TURNING FROM BACK TO SIDE WHILE IN FLAT BAD: A LITTLE
TOILETING: A LITTLE
STANDING UP FROM CHAIR USING ARMS: A LITTLE
WALKING IN HOSPITAL ROOM: TOTAL

## 2024-07-24 ASSESSMENT — PAIN - FUNCTIONAL ASSESSMENT
PAIN_FUNCTIONAL_ASSESSMENT: 0-10

## 2024-07-24 ASSESSMENT — PAIN SCALES - GENERAL
PAINLEVEL_OUTOF10: 7
PAINLEVEL_OUTOF10: 10 - WORST POSSIBLE PAIN
PAINLEVEL_OUTOF10: 6
PAINLEVEL_OUTOF10: 0 - NO PAIN
PAINLEVEL_OUTOF10: 4
PAINLEVEL_OUTOF10: 5 - MODERATE PAIN
PAINLEVEL_OUTOF10: 9
PAINLEVEL_OUTOF10: 8
PAINLEVEL_OUTOF10: 6

## 2024-07-24 ASSESSMENT — PAIN DESCRIPTION - ORIENTATION
ORIENTATION: RIGHT
ORIENTATION: RIGHT

## 2024-07-24 ASSESSMENT — PAIN SCALES - WONG BAKER
WONGBAKER_NUMERICALRESPONSE: HURTS LITTLE MORE
WONGBAKER_NUMERICALRESPONSE: HURTS LITTLE MORE

## 2024-07-24 ASSESSMENT — PAIN DESCRIPTION - LOCATION: LOCATION: INCISION

## 2024-07-24 NOTE — DISCHARGE INSTRUCTIONS
Dear Ms. Bennett,    While at the [facility], you were treated for a blood clot in your heart that also caused a clot in your leg. For this reason, you had an amputation of your left leg. We have placed you on a blood thinner called Warfarin. You will need regular monitoring with labs. You are also being discharged with a device called a wound vac. This helps the wound to heal and will be taken care of by your next facility. Facility: please change this wound vac on Thursday, 7/25.    After leaving the hospital, please follow up with:  Your primary care provider  Your ear/nose/throat doctor (ENT): for tracheocutaneous fistula closure (with Dr. King)  Cardiology with Dr. English  Cardiology/vascular medicine with Dr. Gordon  Outpatient vascular surgery with Dr. De La Rosa  Neurology with Dr. Moore    The following appointments have already been scheduled:  Future Appointments   Date Time Provider Department Center   7/26/2024 11:30 AM Carolyn Moore MD [id number] Academic   8/12/2024  1:20 PM Rizwana De La Rosa MD [id number] Academic   8/20/2024  2:00 PM Jennifer English MD [id number] Academic   9/4/2024  8:45 AM Julia Gordon MD [id number] Academic     Please return to the nearest emergency department or the [facility] for any new shortness of breath, chest pain, or bleeding.    Take care,  Your Medical Team at

## 2024-07-24 NOTE — DISCHARGE SUMMARY
Discharge Diagnosis  Atrial fibrillation (Multi)    Issues Requiring Follow-Up  Cardiology and Vascular Medicine (Dr. English and Dr. Gordon): will need IVC removal in 3 months (around late September 2024)  ENT (Dr. King) for tracheocutaneous fistula closure  Vascular surgery (Dr. De La Rosa) for right AKA follow-up  Neurology with Dr. Moore for stroke follow-up  SNF facility: Facility: please change the patient's wound vac on Thursday, 7/25.    Test Results Pending At Discharge  Pending Labs       Order Current Status    Extra Urine Gray Tube Collected (06/20/24 1414)    Urinalysis with Reflex Culture and Microscopic Collected (06/20/24 1414)            Hospital Course  Amirah Bennett is a 42 y.o. female with significant PMHx of HFrEF (LVEF 20-25% (08/2022), with prior history of cardiogenic shock 04/2022 Afib on Xarelto w/ DCCV 05/2023), ischemic stroke L MCA d/t AFib LLA thrombus seen on echo (lack of OAC adherence) s/p thrombectomy 01/2022 @ [facility] w/ residual expressive aphasia and R sided weakness, recent 03/2024 left cerebellar MCA, paratracheal abscess s/p tracheostomy c/b tracheocutaneous fistula, T2DM (03/2024 HgbA1c 5.5) and HTN presenting with dyspnea and leg swelling/pain in setting of running out of medications 2-3 weeks ago.     ED course:  In the ED, she was noted to be tachycardic to 105 in triage and but then when placed on the monitor was found to be in atrial fibrillation with RVR with a rate between 150 and 190. She was given 5 mg of metoprolol with slight improvement of her rate but became hypotensive and symptomatic. She was started on heparin both for her A-fib and for possible DVT/PE. Lytics were not given because of the risk of potential intracranial hemorrhage after her recent stroke. Instead decision made to cardiovert the patient and also gave digoxin, and amiodarone. After cardioversion she still looked like she was in atrial fibrillation with RVR but only to a rate in the 120s and  130s. Her blood pressure h improved. Levophed ordered along with calcium and magnesium. No evidence of infection. UDS positive for cannabinoids. CTPE without pulmonary embolus however there are indeterminate low-attenuation nodular filling defects within the atrial appendage (suggests RAA thrombus), cardiomegaly with influx of contrast into IVC and hepatic veins, mild pulmonary edema, and small volume ascites. Patient admitted to the ICU.    CICU course:  Initially with significant difficulty placing Haily so multiple attempts were made. Patient arrived with cold extremities and lacking pulses in extremities, unable to move right lower extremity and numbness below knee. RHC c/w cardiogenic shock with CI of 1.6, CVP of 25 ([place] placed). Troponin peak of 326. Lactate peak of 16. Procal 0.26. Started on vanc/zosyn (6/20-6/24-stopped as shock likely cardiogenic and blood cultures negative, 6/27 to present). She is not candidate for advanced therapies given documented medical nonadherence, determined by two heart failure specialists at [facility]. Managed medically with dobutamine and nipride, along with heparin drip and amiodarone drip. Holding digoxin and GDMT. Attempted to wean off dobutamine while increasing nitroprusside so stopped dobutamine 6/21 however acutely worsened with increased lactate and worsened SvO2 from 65 to 25 overnight with new abdominal pain and right leg pain concerning for ischemia. Diuresed as needed. Improving parameters, normalized lactate, and pain resolution when dobutamine restarted. Echo 6/22 with 20-25% EF, global hypokinesis, moderate to severe TR, and moderately elevated RV systolic pressure along with severely dilated LA, mildly enlarged RV, and mildly reduced RV systolic function. Lactate normalized. Patient with fluctuating pain in lower extremities and limbs intermittently cool which both seemed associated with state of cardiogenic shock at time of exam.  Vascular surgery consulted 6/24  due to concern for right limb ischemia. Arterial duplex with monophasic R CFA, SFA, and PFA signals, absent popliteal flow, and monophasic tibial flow though hard to visualize. 6/24 CTA Aorta with BLE runoff showed aortic bifurcation embolus, R BA-EIA embolus, SFA and popliteal emboli. Vascular surgery recommending right leg AKA. AKA surgery delayed pending stability with cardiogenic shock. Endovascular consulted to attempt to salvage limb however not able to offer alternative options. Neurology also confirmed likely vascular cause to inability to move right foot. Had family meeting, patient and family electing to move forward with AKA. Vascular medicine consulted 6/25 for thrombocytopenia and multiple thrombi. Recommended IVC filter prior to surgery due to duration of time off heparin drip for surgery, IVC filter placed 6/27.      Broad spectrum antibiotics restarted 6/27 due to leukocytosis and concern for infected wound (wound culture negative). Blood cultures have remained negative. Patient had right limb AKA and embolectomy on 6/28 with Dr. Vaca and Dr. De La Rosa.  Vascular medicine recommended IVC filter insertion on 6/27 in preparation for right AKA 6/28. Which was complicated by 3 days persistent stump site oozing & intramuscular hematoma evident on CTA RLE, which was managed conservatively with 6 blood transfusions while on heparin infusion necessitation to hold heparin drip temporarily for 12 hours to allow hemostasis. On the following day patient had similar stump bleeding with Hgb 6.9 requiring an additional 1pRBC incrementing hemoglobin to 8.1. hence Heparin was restarted 12 hours later at 1:30 Am 7/5 without signs of bleeding & stable Hemoglobin 8.9.    Course also c/b Afib with RVR, SARAH (resolved), cardiac thrombi, liver injury with AST to 2,629 on 6/21 likely due to ischemia (negative RUQ US) which has improved, anemia (in part due to oozing while maintaining heparin drip, no evidence of DIC),   thrombocytopenia (briefly on bivalrudin however negative HIT abs, likely iso critical illness), left brachial artery injury with attempt at Haily with hematoma though resolving, bilateral lower extremity DVTs, and rhabdomyolysis (CK 14K, unable to give fluids due to cardiogenic shock). Infectious workup remained negative. Palliative care and ethics were consulted given patient's overall clinical status. In the absence of POA documentation, NOK are cousins Clara, Elyse, and Keli, however friends can give additional perspective.     Floor  On 6th July, patient was hemodynamically stable and transferred to cardiology floor, was kept on heparin infusion with hemoglobin monitoring, as patient's stump headed well without gross bleeding, nor hemoglobin drop, Vascular medicine recommended anticoagulation bridging with warfarin started on 9th July. Patient was started on Lyrica for phantom limb pain.  July 11th patient had minimal bloody output from dhillon catheter, but hemoglobin stable. Urine culture was positive and patient was started on ceftriaxone for UTI, received 2 days. Urine culture resulted with Klebsiella and switched her to Augmentin for 10 days starting 07/14 (07/14-07/23). We also held her home jardiance in the setting of UTI. Continuing to monitor INR on warfarin with heparin bridge with goal of 2-3. Warfarin was held for right AKA revision which took place on 7/17 with vascular surgery.  Wound VAC was placed.  Warfarin was then restarted on 7/18.  Lyrica was increased to 75 twice daily and duloxetine to 60 mg daily for phantom limb pain and depression, respectively. On 7/23, she was also administered 1u pRBCs and Lasix 40 mg IV after persistent anemia (HgB approximately 7.5). Patient met INR goal of >2 for at least 2 days before leaving the hospital. She was discharged on Warfarin 7.5 mg.  Also discharged with a wound VAC to be changed at her facility. Patient was medically stable and appropriate for  "discharge.     Pertinent Physical Exam At Time of Discharge  -Patient was conscious, oriented x4, with expressive aphasia. Not in acute respiratory distress, not in jaundiced nor cyanosed.    -Cardiovascular examination: Audible 1st and 2nd heart sound, no murmurs  -Lower limb examination: amputated right above knee amputation, wound VAC present with sanguinous output.  No gross bleeding. No pitting edema in LLE.  -Chest examination: Lungs clear to auscultation bilaterally.  -Abdominal examination: Soft and lax abdomen, no rigidity nor rebound tenderness.   -Neurological examination: limited, symmetrical facial impression, equally reactive pupils, right hand weakness.     Vital Signs At Time of Discharge:  /69   Pulse 75   Temp 36.5 °C (97.7 °F)   Resp 19   Ht 1.702 m (5' 7\")   Wt 71.6 kg (157 lb 13.6 oz)   SpO2 100%   BMI 24.72 kg/m²      Home-going Medications     Medication List      CONTINUE taking these medications     sacubitriL-valsartan 24-26 mg tablet; Commonly known as: Entresto; Take   1 tablet by mouth 2 times a day.     ASK your doctor about these medications     aspirin 81 mg chewable tablet   atorvastatin 80 mg tablet; Commonly known as: Lipitor; Take 1 tablet (80   mg) by mouth once daily.   digoxin 250 MCG tab;et; Commonly known as: Lanoxin; Ask about: Which   instructions should I use?   empagliflozin 10 mg; Commonly known as: Jardiance; Take 1 tablet (10 mg)   by mouth once daily.   folic acid 1 mg tablet; Commonly known as: Folvite   furosemide 40 mg tablet; Commonly known as: Lasix   gabapentin 100 mg capsule; Commonly known as: Neurontin; Take 1 capsule   (100 mg) by mouth 3 times a day.   lidocaine 5 % patch; Commonly known as: Lidoderm   melatonin 3 mg tablet   metoprolol succinate XL 50 mg 24 hr tablet; Commonly known as:   Toprol-XL; Take 1 tablet (50 mg) by mouth once daily. Do not crush or   chew.   multivitamin with minerals tablet   pantoprazole 40 mg EC tablet; Commonly " known as: ProtoNix; Take 1 tablet   (40 mg) by mouth once daily in the morning. Take before meals. Do not   crush, chew, or split.   polyethylene glycol 17 gram packet; Commonly known as: Glycolax, Miralax   QUEtiapine 50 mg tablet; Commonly known as: SEROquel   rivaroxaban 20 mg tablet; Commonly known as: Xarelto; Take 1 tablet (20   mg) by mouth once daily in the evening. Take with meals. Take with food.   Do not start before March 16, 2024.   spironolactone 25 mg tablet; Commonly known as: Aldactone       Outpatient Follow-Up  Future Appointments   Date Time Provider Department Center   7/26/2024 11:30 AM Carolyn Moore MD [id number] Academic   8/12/2024  1:20 PM Rizwana De La Rosa MD [id number] Academic   8/20/2024  2:00 PM Jennifer English MD [id number] Academic   9/4/2024  8:45 AM Julia Gordon MD [id number] Academic       Ayaan Rowland MD  PGY-1 Internal Medicine

## 2024-07-24 NOTE — PROGRESS NOTES
Physical Therapy    Physical Therapy Treatment    Patient Name: Amirah Bennett  MRN: [id number]  Today's Date: 7/24/2024  Time Calculation  Start Time: 1135  Stop Time: 1158  Time Calculation (min): 23 min    Assessment/Plan   PT Assessment  PT Assessment Results: Decreased strength, Decreased endurance, Impaired balance, Decreased mobility, Pain, Decreased cognition, Decreased safety awareness, Impaired judgement  Rehab Prognosis: Good  Barriers to Participation: Comorbidities  End of Session Patient Position: Up in chair, Alarm on  PT Plan  Inpatient/Swing Bed or Outpatient: Inpatient  PT Plan  Treatment/Interventions: Bed mobility, Transfer training, Gait training, Balance training, Strengthening, Endurance training, Therapeutic exercise, Therapeutic activity  PT Plan: Ongoing PT  PT Frequency: 5 times per week  PT Discharge Recommendations: High intensity level of continued care  PT Recommended Transfer Status: Assist x1  PT - OK to Discharge: Yes      General Visit Information:   PT  Visit  PT Received On: 07/24/24  General  Reason for Referral: 42 y/o female initially presented with cardiogenic shock; Course c/b SARAH, cardiac thrombi, and Afib with RVR. Now s/p R AKA on 6/28.  Past Medical History Relevant to Rehab: Pt now most recently s/p I&D and placement of of wound vac on R AKA.  Prior to Session Communication: Bedside nurse  Patient Position Received: Up in chair, Alarm off, not on at start of session  Preferred Learning Style: verbal, visual  General Comment: Pt seated in chair upon arrival, pleasant and willing to participate in PT session    Subjective   Precautions:  Precautions  LE Weight Bearing Status: Right Non-Weight Bearing (R AKA)  Medical Precautions: Fall precautions, Cardiac precautions  Vital Signs:  Vital Signs  Heart Rate: 68  SpO2: 100 %  Patient Position: Sitting    Objective   Pain:  Pain Assessment  Pain Assessment: 0-10  0-10 (Numeric) Pain Score: 6  Pain Type: Surgical pain  Pain  Location: Incision  Pain Orientation: Right  Cognition:  Cognition  Overall Cognitive Status: Impaired  Orientation Level: Oriented X4  Following Commands: Follows one step commands with repetition      Treatments:  Therapeutic Exercise  Therapeutic Exercise Performed: Yes  Therapeutic Exercise Activity 1: LLE: AP, hip abd, LAQ, Hip F 2x10 reps. Cues for techniques. Rest breaks as needed.    Therapeutic Activity  Therapeutic Activity Performed: Yes  Therapeutic Activity 1: Pt tolertaed static standing x5 for 30-45 secs with Denny with walker support. Pt completed 2 small hops with Denny-ModA for balance and with walker    Bed Mobility  Bed Mobility: No (pt seated in chair pre and post session)       Transfers  Transfer: Yes  Transfer 1  Transfer From 1: Sit to  Transfer to 1: Stand  Technique 1: Sit to stand  Transfer Device 1: Walker  Transfer Level of Assistance 1: Minimum assistance, Hand held assistance, Moderate verbal cues  Trials/Comments 1: cues for safe hand and sequencing (x5 from chair with rest breaks in between)  Transfers 2  Transfer From 2: Stand to  Transfer to 2: Stand  Technique 2: Stand to sit  Transfer Device 2: Walker  Transfer Level of Assistance 2: Moderate verbal cues  Trials/Comments 2: cues for safe hand placement         Outcome Measures:  [facility] Basic Mobility  Turning from your back to your side while in a flat bed without using bedrails: A little  Moving from lying on your back to sitting on the side of a flat bed without using bedrails: A little  Moving to and from bed to chair (including a wheelchair): A little  Standing up from a chair using your arms (e.g. wheelchair or bedside chair): A little  To walk in hospital room: Total  Climbing 3-5 steps with railing: Total  Basic Mobility - Total Score: 14    Education Documentation  Precautions, taught by Yvonne Roman PT at 7/24/2024 12:44 PM.  Learner: Patient  Readiness: Acceptance  Method: Explanation, Demonstration  Response: Verbalizes  Understanding, Needs Reinforcement    Body Mechanics, taught by Yvonne Roman PT at 7/24/2024 12:44 PM.  Learner: Patient  Readiness: Acceptance  Method: Explanation, Demonstration  Response: Verbalizes Understanding, Needs Reinforcement    Mobility Training, taught by Yvonne Roman PT at 7/24/2024 12:44 PM.  Learner: Patient  Readiness: Acceptance  Method: Explanation, Demonstration  Response: Verbalizes Understanding, Needs Reinforcement    Education Comments  No comments found.        OP EDUCATION:       Encounter Problems       Encounter Problems (Active)       Balance       Patient will complete static and dynamic sitting balance tasks with SUP to improve upright posture, core activation, and proximal stability.  (Progressing)       Start:  07/03/24    Expected End:  07/31/24            Patient to demo static standing with unilateral UE support, performing single UE task with SBA, no sway or LOB x 2 mins for functional carryover  (Progressing)       Start:  07/03/24    Expected End:  07/31/24               Mobility       STG - Patient will ambulate >/= 15 ft with minAx1 and LRAD, no acute LOB (Progressing)       Start:  07/03/24    Expected End:  07/31/24               PT Transfers       STG - Patient will perform bed mobility Ruth (Progressing)       Start:  07/03/24    Expected End:  07/31/24            STG - Patient will transfer sit to and from stand with CGAx1 and LRAD (Progressing)       Start:  07/03/24    Expected End:  07/31/24               Pain - Adult

## 2024-07-24 NOTE — PROGRESS NOTES
Amirah Bennett is a 43 y.o. female on day 34 of admission presenting with Atrial fibrillation (Multi).      Subjective   Patient received 1u pRBCs and Lasix 40 mg IV yesterday after persistent anemia (HgB previous around 7.5, now 9.8). No acute overnight events. No new complaints today. Denies CP/SOB. Patient states that she is doing well overall.       Objective     Vitals:  Temp: 36.5 °C (97.7 °F)  Temp  Av.3 °C (97.4 °F)  Min: 35.9 °C (96.6 °F)  Max: 36.6 °C (97.9 °F)    BP: 109/69  Systolic (24hrs), Av , Min:97 , Max:113   Diastolic (24hrs), Av, Min:54, Max:69      Heart Rate: 75  Pulse  Av.4  Min: 57  Max: 75    Resp: 19  Resp  Av.2  Min: 16  Max: 19    SpO2: 100 %   SpO2  Av.8 %  Min: 94 %  Max: 100 %    Weight: 71.6 kg (157 lb 13.6 oz)         Intake/Output last 3 Shifts:    Intake/Output Summary (Last 24 hours) at 2024 1250  Last data filed at 2024 1100  Gross per 24 hour   Intake 402 ml   Output 1250 ml   Net -848 ml        Medications:  Scheduled Medications PRN Medications   acetaminophen, 975 mg, oral, q6h  amiodarone, 200 mg, oral, Daily  aspirin, 81 mg, oral, Daily  atorvastatin, 80 mg, oral, Nightly  bisacodyl, 10 mg, rectal, Daily  DULoxetine, 60 mg, oral, Daily  empagliflozin, 10 mg, oral, Daily  melatonin, 3 mg, oral, Daily  melatonin, 6 mg, oral, Daily  metoprolol succinate XL, 25 mg, oral, Nightly  pantoprazole, 40 mg, oral, Daily before breakfast  perflutren protein A microsphere, 0.5 mL, intravenous, Once in imaging  polyethylene glycol, 17 g, oral, BID  pregabalin, 75 mg, oral, BID  sacubitriL-valsartan, 2 tablet, oral, BID  sennosides, 2 tablet, oral, BID  warfarin, 7.5 mg, oral, Daily      PRN medications: dextrose, dextrose, glucagon, glucagon, HYDROmorphone, lidocaine, oxyCODONE, oxyCODONE        Relevant Results:  Results from last 7 days   Lab Units 24  0824 24  0457 24  0818   WBC AUTO x10*3/uL 13.8* 13.8* 12.2*   HEMOGLOBIN g/dL  "9.7* 7.3* 7.7*   HEMATOCRIT % 30.9* 24.4* 25.9*   PLATELETS AUTO x10*3/uL 230 202 201            Results from last 7 days   Lab Units 07/24/24  0824 07/23/24  0843 07/21/24  0723   SODIUM mmol/L 133* 134* 135*   POTASSIUM mmol/L 5.0 5.0 4.9   CHLORIDE mmol/L 98 99 100   CO2 mmol/L 30 26 28   BUN mg/dL 18 14 17   CREATININE mg/dL 0.65 0.55 0.62   CALCIUM mg/dL 9.6 8.8 8.7           No lab exists for component: \"LABALBU\"   Results from last 7 days   Lab Units 07/24/24  0824 07/23/24  0843 07/22/24  0818   INR  2.1* 2.5* 2.2*      Physical Exam:  -Patient was conscious, oriented x4, with expressive aphasia. Not in acute respiratory distress, not in jaundiced nor cyanosed.    -Cardiovascular examination: Audible 1st and 2nd heart sound, no murmurs  -Lower limb examination: amputated right above knee amputation, wound VAC present with sanguinous output.  No gross bleeding. No pitting edema in LLE.  -Chest examination: Lungs clear to auscultation bilaterally.  -Abdominal examination: Soft and lax abdomen, no rigidity nor rebound tenderness.   -Neurological examination: limited, symmetrical facial impression, equally reactive pupils, right hand weakness.          Assessment/Plan      Amirah Bennett is a 43 y.o. 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 She was found to have elevated lactate to 14.7, cold extremities, and 3+ pitting edema. RHC c/w cardiogenic shock c/b SARAH, cardiac thrombi, and Right limb ischemia S/P above knee amputation. She was initially started on heparin, now bridging with warfarin with a goal of INR " 2-3.  Warfarin held per vascular surgery after patient taken to the OR for revision of right AKA 7/17/24. Warfarin was then restarted 7/18 per vascular surgery recommendations. Additionally, found to have UTI starting 07/11, she had a dhillon to prevent contamination of new AKA site, but dhillon was removed upon UTI discovery.  Now completed Augmentin 7-day course 7/21.    #Heart failure with reduced ejection fraction (LVEF 20-25%):   -Patient was labeled not candidate for advanced therapies due to medical nonadherence  Plan:  -palliative care follow up.  -Aim to resume quadruple GDMT (spironolactone and Jardiance). Patient on metoprolol succinate, Entresto.    #Right limb ischemia secondary to Arterial emboli, S/P above right knee amputation and revision:  #S/p femoral arterial line sheath placement, now removed  #Phantom limb pain  -CTA Aorta with runoff 6/24: aortic bifurcation embolus, R BA-EIA embolus, SFA and popliteal emboli   -arterial duplex exam with monophasic right CFA, SFA and PFA signals, absent popliteal flow and monophasic flow in the tibial artery   -Patient labeled unable to walk prior to presentation to hospital  -s/p Above knee amputation with vascular surgery 6/28/24 and revision 7/17/24.  Plan:  -Warfarin with heparin bridge  -Started warfarin 5mg 07/09 (received 3 doses 07/09-07/11) with missed dose on 07/12, restarted on 07/13, and subsequently held on 7/16 pending right AKA surgery  -Restarted warfarin bridge 7/18.  -continue to follow INR, continue warfarin 7.5 mg.  -goal INR 2-3 for a minimum of 2 days per vascular medicine. Patient has now met therapeutic goal, off heparin ggt.  -Wound VAC to 125 mmHg per vascular surgery, continues to drain sanguinous output, plans for wound VAC change 7/25 at new facility  -Plan for patient to be discharged with wound VAC  -pain regimen: acetaminophen 975 mg scheduled QID, oxycodone 10mg severe pain 4hr, PRN dilaudid 0.2mg q4h breakthrough, before and  after dressing changes.  -Continue duloxetine and Lyrica 75 twice daily per palliative recommendations for phantom limb pain.     #Query Cardiac thrombi:  -Likely in setting of Atrial fibrillation with Rivaroxaban no adherence  -There are indeterminate low-attenuation nodular filling defects within the atrial appendage.   -Right lower extremity DVT, and arterial thrombus/p IVC filter 6/27.  Plan:  -on Warfarin 7.5 mg  -Vascular medicine on board, appreciate recommendations  -Outpatient Anticoagulation Clinic [facility] up at [facility].     #Atrial fibrillation previously on rivaroxaban  -S/p DCCV (05/2023), ischemic stroke L MCA d/t AFib LLA thrombus seen on echo (lack of OAC adherence) s/p thrombectomy 01/2022 @ [facility] w/ residual expressive aphasia and R sided weakness   -Previously prescribed digoxin 250mcg however last fill was 12/2023  -TSH 6.28H, free T4 1.48  Plan:  -Continue amiodarone 200mg daily  -Continue aspirin 81mg     #Direct Hyperbilirubinemia likely following ischemic liver injury  -Tbili (3.2 on admission, from 0.5 in 03/2024)  -RUQ US 7/1 without abnormality beyond hepatic steatosis and liver cyst  Plan:  -Follow Liver enzymes.      #Hx trach c/b trancutaneous fistula  -ENT had been consulted 3/2024 for gurgling and mucus drainage, no inpatient interventions required  Plan:  -monitor for breaths/mucus discharge from trach site  -Cover the tracheocutaneous fistula with tape and gauze and encourage patient to apply pressure when voicing.   -follow up outpatient ENT for closure (Dr. King)     #Anemia:  ::had 3 units of RBC transfused 6/30, currently controlled bleeding, increeminting gradually.  ::CTA C/A/P 6/30 without evidence of active hemorrhage within chest/abdomen/pelvis.  ::CTA RLE 7/1 without a source that could be intervened upon  ::s/p 1u pRBCs 7/23 and Lasix 40 mg IV for persistent anemia  Plan:  -CBC daily, downtrending hemoglobin likely secondary to oozing AKA incision.  Will continue to monitor  closely.  -Keep type and screen active.  Patient consented from previous transfusions.  -Hgb goal >7 given cardiac hx     #UTI, resolved:  ::small amount of blood in urine 07/11 am  -UA turbid brown with blood, glucose, protein and LE 25, nitrite negative  -urine culture positive  -S/p 2 days of ceftriaxone.  Cx previously positive for Klebsiella.  Abx de-escalated to Augmentin, now completed 7 day course (07/14-07/21)     #Left brachial injury  -LUE DVT scan with nonvascularized mass, called CT while patient was there to request CTA of left upper extremity and they said logistically they would be unable to perform both scans, and given protocol for max dosing of contrast, could not give further contrast until 6/25 at 2PM  -no CTA LUE needed       Insomnia  -Continue melatonin 6mg at 9 PM nightly  -Added melatonin 3 mg at 6 PM per palliative recommendations     Depression   -Increased duloxetine from 30 mg to 60 mg per palliative recommendations     Resolved:   -Thrombocytopenia, likely attributed to: previous consumptive coagulopathy/sepsis and bleeding, Multiple thrombi, DIC score 5 on 6/24/2024, Hit score of 6, Negative PF4  -Rhabdomyolysis.  -Metabolic acidosis, Lactic acidosis.   -Acute kidney injury.   -stump infection, treated for 7 days, s/p vanc (6/20-6/24) (6/27-7/2) and zosyn (6/20-6/24), (6/27- p)-restarted vanc/zosyn 6/27 due to elevated leukocytosis and purulent appearing right groin site     Disposition Plan:  -awaiting PT/OT evaluation. Plans for discharge to SNF.     Prevention:   Fall: High.   Mobility: Physical therapy referral.   DVT: Warfarin  Diet: Cardiac Diet     Code status: Full Code  Emergency contact:   * patient LACKS capacity due to inability to express decision and inconsistency in decisions  Surrogate Medical Decision-maker:   Surrogate decision maker is: pt's cousin, Clara Black: [phone], Efrain Black: [phone], Keli Osborne: [phone]   Friends who may be  "helpful in making decisions:  Prior boyfriend Navin Sanches [phone]  Close friend \"Isiah\" Pranay Owen [phone]        Ayaan Rowland MD   PGY-1 Internal Medicine   "

## 2024-07-24 NOTE — PROGRESS NOTES
Pt medically ready for d/c [facility]. Transport via [facility] ([phone]) at 6:30 pm. Report can be called to [phone] (1st floor). Bedside nurse notified. Team updated. NOK updated on all above.     Salma Anderson RN

## 2024-07-24 NOTE — PROGRESS NOTES
"Music Therapy Note    Amirah Bennett was referred by     Therapy Session  Referral Type: New referral this admission  Visit Type: New visit  Session Start Time: 1105  Session End Time: 1130  Intervention Delivery: In-person  Conflict of Service: None  Family Present for Session: None     Pre-assessment  Pain Score: 5 - Moderate pain  Stress Level (0-10): 0  Anxiety Level (0-10): 0  Mood/Affect: Flat/blunted, Calm  Verbalized Emotional State: Frustration         Treatment/Interventions  Areas of Focus: Self-expression, Mood modification  Music Therapy Interventions: Active music engagement  Interruption: Yes  Interrupted by: Staff  Interruption Duration (min): 5 minutes  Interruption Outcome: Session resumed    Post-assessment  Unable to Assess Reason: Session interrupted  Mood/Affect: Distracted  Verbalized Emotional State: Frustration (\"I don't understand\")  Continue Visiting: Yes  Total Session Time (min): 25 minutes    Narrative  Assessment Detail: Patient found sitting in bedside chair. Flat affect; then observed MT with guitar and became excited and smiling. Agreed to a music therapy session.  Plan: To engage patient in active music engagement to improve mood and promote self-expression.  Intervention: MT and MTI provided live music via guitar and voice and shaker. Patient declined playing an instrument. She moved to the music in an expressive manner.  Evaluation: Patient expressed herself through the music evidenced by closing her eyes, dancing in her chair, and waving her arms. Session was interupted by staff in which patient stated \"I am confused.\" she became tearful; MT validated feelings and assessed her needs in the moment. She requested a more relaxing song for the last of the session. Patient was distract through the end of the session by not understanding what the staff had said. MT offered to find her nurse to help her and she agreed. Session ended and patient agreedable to follow up on another day. " Expressed gratitude for the session.  Follow-up: MT will continue to follow and provide services as needed    Education Documentation  No documentation found.

## 2024-07-24 NOTE — PROGRESS NOTES
Occupational Therapy    Occupational Therapy Treatment    Name: Amirah Bennett  MRN: [id number]  : 1980  Date: 24  Room: [address] [place] [address]    Time Calculation  Start Time: 0850  Stop Time: 924  Time Calculation (min): 34 min    Assessment:  OT Assessment: Pt tolerated treatment well this day demonstrating progress towards therapeutic goals. Pt with good effort throughout session. Intermittently confused but easily redirected with verbal and tactile cues. Pt appreciative of session. Pt likely to benefit from skilled OT services at a HIGH intensity to address noted deficits and new R AKA.  Prognosis: Good  Barriers to Discharge: None  Evaluation/Treatment Tolerance: Patient tolerated treatment well  Medical Staff Made Aware: Yes  End of Session Communication: Bedside nurse  End of Session Patient Position: Up in chair, Alarm on  Plan:  Treatment Interventions: ADL retraining, Functional transfer training, UE strengthening/ROM, Endurance training, Equipment evaluation/education, Fine motor coordination activities, Compensatory technique education, Cognitive reorientation, Neuromuscular reeducation  OT Frequency: 4 times per week  OT Discharge Recommendations: High intensity level of continued care  Equipment Recommended upon Discharge:  (TBD)  OT Recommended Transfer Status: Assist of 1  OT - OK to Discharge: Yes    Subjective   General:  OT Last Visit  OT Received On: 24  Reason for Referral: 42 y/o female initially presented with cardiogenic shock; Course c/b SARAH, cardiac thrombi, and Afib with RVR. Now s/p R AKA on .  Past Medical History Relevant to Rehab: Pt now most recently s/p I&D and placement of of wound vac on R AKA.  Prior to Session Communication: Bedside nurse  Patient Position Received: Bed, 3 rail up, Alarm off, not on at start of session  Family/Caregiver Present: No  General Comment: Pt supine in bed upon arrival. Pleasant and agreeable to OT Tx. Pt demonstrated good effort  throughout. Pt appreciative of OT session.   Precautions:  LE Weight Bearing Status: Right Non-Weight Bearing (R AKA)  Medical Precautions: Fall precautions, Cardiac precautions  Precautions Comment: Wound vac on R AKA  Lines/Tubes/Drains:  External Urinary Catheter Female (Active)   Number of days: 10     Cognition:  Overall Cognitive Status: Impaired  Arousal/Alertness: Appropriate responses to stimuli  Orientation Level: Oriented X4  Following Commands: Follows one step commands with repetition  Safety Judgment: Decreased awareness of need for assistance  Problem Solving: Assistance required to identify errors made  Cognition Comments: Pt intermittently confused but easily redirected with verbal cues. Slightly impulsive at times.  Attention: Within Functional Limits  Insight: Mild  Impulsive: Mildly  Processing Speed: Within funtional limits    Pain Assessment:  Pain Assessment  Pain Assessment: 0-10  0-10 (Numeric) Pain Score: 10 - Worst possible pain  Pain Type: Surgical pain  Pain Location: Leg  Pain Orientation: Right  Pain Interventions: Repositioned, Ambulation/increased activity, Relaxation technique, Rest  Response to Interventions: Unchanged  Multiple Pain Sites: Two  Pain 2  Pain Score 2: 6  Pain Type 2: Acute pain  Pain Location 2: Hand  Pain Orientation 2: Left  Pain Interventions 2: Repositioned, Rest  Response to Interventions 2: Decreased with rest     Objective     Functional Standing Tolerance:  Functional Standing Tolerance  Time: ~6 minutes total  Activity: Static standing, dynamic standing activities, transfers  Functional Standing Tolerance Comments: CGA-Denny (Demonstrated ability to self correct LOB 80% of the time with only 2 LOB requiring Denny to correct)  Functional Mobility  Functional Mobility Performed: Yes  Functional Mobility 1  Comments 1: Pt took several hops using Denny hand held assistance and VCs for guidance from bedside to chair nearby  Bed Mobility/Transfers:     Bed  Mobility  Bed Mobility: Yes  Bed Mobility 1  Bed Mobility 1: Supine to sitting  Level of Assistance 1: Close supervision, Minimal verbal cues  Bed Mobility Comments 1: HOB elevated, VCs for sequencing    Transfers  Transfer: Yes  Transfer 1  Transfer From 1: Bed to  Transfer to 1: Stand  Technique 1: Sit to stand  Transfer Device 1:  (BUE hand held assist)  Transfer Level of Assistance 1: Minimum assistance, Hand held assistance, Moderate verbal cues  Trials/Comments 1: VCs for strategy and sequencing, Denny hand held assistance to balance (x1 trial)  Transfers 2  Transfer From 2: Stand to  Transfer to 2: Chair with arms  Technique 2: Stand to sit  Transfer Level of Assistance 2: Hand held assistance, Minimum assistance, Minimal verbal cues  Trials/Comments 2: VCs for positioning and hand placement  Transfers 3  Transfer From 3: Chair with arms to, Stand to  Transfer to 3: Stand, Chair with arms  Technique 3: Sit to stand, Stand to sit  Transfer Device 3:  (Use of bedrail nearby with RUE)  Transfer Level of Assistance 3: Minimum assistance, Contact guard, Minimal verbal cues  Trials/Comments 3: x9 total trials, Denny for 1st trial and CGA for remaining trials with VCs for guidance and strategy (Rest breaks between transfers during each set of seated exercises)    Balance:  Dynamic Sitting Balance  Dynamic Sitting-Balance: Lateral lean, Forward lean, Reaching for objects, Reaching across midline (Ther ex)  Dynamic Sitting-Comments: SBA  Dynamic Standing Balance  Dynamic Standing-Balance Support: Right upper extremity supported (on bed rail)  Dynamic Standing-Balance: Lateral lean, Forward lean, Reaching for objects  Dynamic Standing-Comments: CGA-Denny  Static Sitting Balance  Static Sitting-Comment/Number of Minutes: SBA  Static Standing Balance  Static Standing-Balance Support: Right upper extremity supported (On bed rail)  Static Standing-Level of Assistance: Minimum assistance, Contact guard ([id number])  Static  Standing-Comment/Number of Minutes: ~6 total minutes    Therapy/Activity:   Therapeutic Exercise  Therapeutic Exercise Performed: Yes  Therapeutic Exercise Activity 1: BUE Biceps flexion with Green T-band resistance held by OT: 12 reps x 2 sets with PRN rest in between each set  Therapeutic Exercise Activity 2: BUE Triceps flexion with Green T-band resistance held by OT: 10 reps x 2 sets with PRN rest in between each set  Therapeutic Exercise Activity 3: Pt educated on therapeutic benefits of ther ex for functional mobility- verbalized understanding  Therapeutic Activity  Therapeutic Activity Performed: Yes  Therapeutic Activity 1: Functional mobility and transfer training as noted requiring skilled assistance and cueing for safety and guidance  Therapeutic Activity 2: Standing balance training as noted  Therapeutic Activity 3: x10 sit to stand transfers from bed and chair with arms as noted    Outcome Measures:  [facility] Daily Activity  Putting on and taking off regular lower body clothing: A little  Bathing (including washing, rinsing, drying): A little  Putting on and taking off regular upper body clothing: A little  Toileting, which includes using toilet, bedpan or urinal: A little  Taking care of personal grooming such as brushing teeth: None  Eating Meals: None  Daily Activity - Total Score: 20     Education Documentation  Precautions, taught by Jovani Zelaya OT at 7/24/2024 12:08 PM.  Learner: Patient  Readiness: Acceptance  Method: Explanation, Demonstration  Response: Verbalizes Understanding, Demonstrated Understanding    Body Mechanics, taught by Jovani Zelaya OT at 7/24/2024 12:08 PM.  Learner: Patient  Readiness: Acceptance  Method: Explanation, Demonstration  Response: Verbalizes Understanding, Demonstrated Understanding    Education Comments  No comments found.      Goals:  Encounter Problems       Encounter Problems (Active)       ADLs       Patient will complete lower body dressing with  MIN A  for donning and doffing all LE clothes in order to increase Indep with task participation.  (Met)       Start:  07/03/24    Expected End:  07/24/24    Resolved:  07/15/24    Updated to: Patient will complete lower body dressing with S/U  for donning and doffing all LE clothes in order to increase Indep with task participation.    Update reason: goal met         Patient will complete toileting, including clothing management and hygiene, with MIN A in order to maximize functional Indep with task completion.  (Progressing)       Start:  07/03/24    Expected End:  08/07/24            Patient will complete lower body dressing with S/U  for donning and doffing all LE clothes in order to increase Indep with task participation. (Progressing)       Start:  07/15/24    Expected End:  08/07/24                   BALANCE       Pt will increase static/dynamic sit/stand to Good to increase safety and indep with functional task completion.   (Progressing)       Start:  07/03/24    Expected End:  08/07/24               COGNITION/SAFETY       Pt will demo good safety awareness with no more than min vc during functional task completion. (Progressing)       Start:  07/03/24    Expected End:  08/07/24               EXERCISE/STRENGTHENING       Pt will increase overall LUE strength to 4+/5 in order to increase functional task participation.  (Progressing)       Start:  07/03/24    Expected End:  08/07/24            Pt will demo increased LUE fine motor/bimanual coordination in order to achieve MOD I with functional task completion.  (Progressing)       Start:  07/03/24    Expected End:  08/07/24               TRANSFERS       Patient will complete functional transfers using least restrictive device with  CGA  in order to maximize functional potential and increase safety.  (Progressing)       Start:  07/03/24    Expected End:  08/07/24 07/24/24 at 12:11 PM   Jovani Zelaya, OT   [phone]

## 2024-07-24 NOTE — PROGRESS NOTES
Transitional Care Coordination Progress Note: Interdisciplinary Rounds     Team members present: Lupis GARNETT NP     Discharge disposition: [facility] (7/24 accepted)      Status: IP     Payer: Medicare Advantage - [facility]       Potential Barriers: medically ready, pt refusing to work with PT/OT ---> attempting to see together today then can initiate precert    ** Discussion with Arlette, regarding placement and safe dispo plan after discharge from [place], family doesn't have anything in place at this time, is agreeable to [facility] b/c family works there, RUBENSK said family will discuss long term care options which might be available at the same facility **    1300 requested DSC to initiate precert    1400  precert status: spproved [employer]. ID: [id number] Dates: 7/24/2024 - 7/26/2024      ADOD: 7/25     This TCC will continue to follow for home going needs and safe DC plan.     Salma Anderson RN

## 2024-07-24 NOTE — PROGRESS NOTES
VASCULAR SURGERY PROGRESS NOTE  Assessment/Plan   43 y.o. female with hx CHF, stroke, who presented with Afib RVR and heart failure exacerbation with cardiogenic shock. Vascular surgery consulted for acute limb ischemia, however, was found to be [place]'s 3 with motor and sensory loss, paralysis of the right leg from advanced ischemia time. S/p R AKA with post op course c/b likely blood loss anemia with multiple blood transfusions. Required incisional wound debridement in OR for skin separation. Now with wound vac in place overlying the fascia.     Wound vac changed (surgicel and black sponge) by vascular surgery on 7/22.        No bleeding over vac site overnight. Vac continues to remain intact this morning, holding good suction.  Minimal output.    Plan:  - Vac change plan for Thursday 7/25     - Consulting wound care team for assistance      D/w attending, Dr. Rj Zarate MD  General Surgery, PGY-3  Please page service pager with questions  Service Pager: [phone]    Subjective   No bleeding overnight.  Incision looks clean, dry with vac holding good suction.   Patient reports no issues.    Objective   Heart Rate:  [57-71]   Temp:  [35.9 °C (96.6 °F)-36.7 °C (98.1 °F)]   Resp:  [16-19]   BP: ()/(54-66)   Weight:  [71.6 kg (157 lb 13.6 oz)]   SpO2:  [94 %-100 %]     Physical Exam:  General: NAD, AAOx3  Neuro: no gross deficits, speech WNL  HEENT: NC/AT  CV: RRR  Pulm: normal respiratory effort  Abd: soft, NTND  Extr: wound vac in place over amputation stump with good seal, sanguinous output noted in cannister.  No hematoma or signs of infection    Labs:  Results from last 7 days   Lab Units 07/24/24  0824 07/23/24  0457 07/22/24  0818   WBC AUTO x10*3/uL 13.8* 13.8* 12.2*   HEMOGLOBIN g/dL 9.7* 7.3* 7.7*   HEMATOCRIT % 30.9* 24.4* 25.9*   PLATELETS AUTO x10*3/uL 230 202 201     Results from last 7 days   Lab Units 07/24/24  0824 07/23/24  0843 07/21/24  0723   SODIUM mmol/L 133* 134* 135*    POTASSIUM mmol/L 5.0 5.0 4.9   CHLORIDE mmol/L 98 99 100   CO2 mmol/L 30 26 28   BUN mg/dL 18 14 17   CREATININE mg/dL 0.65 0.55 0.62   GLUCOSE mg/dL 93 84 86   CALCIUM mg/dL 9.6 8.8 8.7     Results from last 7 days   Lab Units 07/24/24  0824 07/23/24  0843 07/22/24  0818   INR  2.1* 2.5* 2.2*     Results from last 7 days   Lab Units 07/23/24  0457 07/22/24  0818 07/21/24  0731   ANTI XA UNFRACTIONATED IU/mL 0.4 0.4 0.4

## 2024-07-24 NOTE — PROGRESS NOTES
Physical Therapy                 Therapy Communication Note    Patient Name: Amirah Bennett  MRN: [id number]  Today's Date: 7/24/2024     Discipline: Physical Therapy    Missed Visit Reason: Missed Visit Reason:  (Care provider in the room assisting pt with hygiene. Will re-attempt.)    Missed Time: 10:45 AM

## 2024-07-24 NOTE — PROGRESS NOTES
Wound Care Progress Note    Wound VAC dressing removed from pt's R AKA site for pending transport to facility (per hospital policy). Wound appears red with some surgicel remaining in wound from initial VAC application. Wound base remains friable with small amount of bleeding (easily stopped with pressure) at lateral-most aspect, so surgicel was not removed at this time. Periwound skin prepped, and wound lightly packed with Vashe wound cleanser-moistened kerlix. This was covered with ABD pads and secured with 2 kerlix wraps and paper tape. Pt tolerated well.     If pt should experience bleeding and dressing strikethough prior to discharge, please reach out to Vascular Surgery Resident for recommendations.    Wound Care signing off.    Luciana Carpenter RN, CWON  Wound/Ostomy Nurse

## 2024-07-26 ENCOUNTER — OFFICE VISIT (OUTPATIENT)
Dept: NEUROLOGY | Facility: HOSPITAL | Age: 44
End: 2024-07-26
Payer: COMMERCIAL

## 2024-07-26 VITALS
WEIGHT: 157 LBS | DIASTOLIC BLOOD PRESSURE: 73 MMHG | TEMPERATURE: 96.6 F | BODY MASS INDEX: 24.64 KG/M2 | HEART RATE: 78 BPM | RESPIRATION RATE: 18 BRPM | SYSTOLIC BLOOD PRESSURE: 113 MMHG | HEIGHT: 67 IN

## 2024-07-26 DIAGNOSIS — I63.40 CEREBROVASCULAR ACCIDENT (CVA) DUE TO EMBOLISM OF CEREBRAL ARTERY (MULTI): ICD-10-CM

## 2024-07-26 PROCEDURE — 3048F LDL-C <100 MG/DL: CPT | Performed by: PSYCHIATRY & NEUROLOGY

## 2024-07-26 PROCEDURE — 3074F SYST BP LT 130 MM HG: CPT | Performed by: PSYCHIATRY & NEUROLOGY

## 2024-07-26 PROCEDURE — 3060F POS MICROALBUMINURIA REV: CPT | Performed by: PSYCHIATRY & NEUROLOGY

## 2024-07-26 PROCEDURE — 3044F HG A1C LEVEL LT 7.0%: CPT | Performed by: PSYCHIATRY & NEUROLOGY

## 2024-07-26 PROCEDURE — 3078F DIAST BP <80 MM HG: CPT | Performed by: PSYCHIATRY & NEUROLOGY

## 2024-07-26 PROCEDURE — 3008F BODY MASS INDEX DOCD: CPT | Performed by: PSYCHIATRY & NEUROLOGY

## 2024-07-26 PROCEDURE — 99215 OFFICE O/P EST HI 40 MIN: CPT | Performed by: PSYCHIATRY & NEUROLOGY

## 2024-07-26 ASSESSMENT — PAIN SCALES - GENERAL: PAINLEVEL: 10-WORST PAIN EVER

## 2024-07-26 NOTE — PROGRESS NOTES
"Art Therapy Note    Amirah Bennett    Therapy Session  Referral Type: New referral this admission  Visit Type: Follow-up visit  Session Start Time: 1429  Session End Time: 1513  Intervention Delivery: In-person              Treatment/Interventions  Areas of Focus: Anxiety reduction, Pain management, Self-expression, Coping  Art Therapy Interventions: Active art engagement, Spontaneous art expression, Meaning making intervention, Empathic listening/validating emotions  Interruption: No  Patient Fell Asleep at End of Session: Yes    Post-assessment  Total Session Time (min): 44 minutes    Narrative  Assessment Detail: Pt was lying in bed and sat up when ATR arrived to visit and engage in AT.  Plan: To complete the watercolor picture she began in previous session.  Intervention: Pt was amenable to completing the watercolor and engaged whole heartedly. Pt watercolored meaningful symbols of her life before the surgery with the words \"One Day at a Time\" above  Evaluation: ATR and pt discussed and processed some of the symbols she painted. Pt thanked ATR.  Follow-up: ATR will contineu to follow pt until discharge    Education Documentation  No documentation found.      "

## 2024-07-26 NOTE — PROGRESS NOTES
Assessment/Plan:  1. left middle cerebral artery infarction: due to cardioembolism in this patient with atrial fibrillation, reduced ejection fraction and prior left atrial thrombus at the time. Now on warfarin which she should continue. Stressed need for compliance with medication and warfarin monitoring compliance.  Compliance with warfarin monitoring will be critical when she leaves Skilled nursing facility and consideration of DOAC on discharge home should be considered.  Note: the patient indicates that she was not compliant at the time of the ischemic leg and she should not be considered a DOAC failure.     2. Aphasia due to left middle cerebral artery infarction. Referred to speech therapy while at Skilled nursing facility.      3. right hemiparesis due to left middle cerebral artery infarction: not bad at this time.  Receiving Physical therapy given her recent right above knee amputation.       4. Central pain: now on lyrica.  Also complains of phantom limb pain.     5. trach: removed    6.  Ischemic right leg: now s/p above knee amputation.  Due to cardiac embolism.  At the time concern for possible thombus seen on echo; admits today to inconsistent use of anticoagulation prior to embolization.     Follow up in 3 months, virtual or in person    HPI:   Amirah Bennett is a 43 y.o. right handed female with vascular risk factors of atrial fibrillation, reduced Ejection fraction and prior atrial thrombus with poor medical compliance including with anticoagulation (with ejection fraction of 25% Dec 2021). left atrial thrombus (Dec 2021). In Jan 2022 she had a left M1 occlusion and received thrombectomy at [facility]. She had residual aphasia and right hemiparesis. In March 2022 she was found to have severe respiratory distress and cardiogenic shock. She was intubated and eventually required trach. She briefly required presssors. CT head showed the left middle cerebral artery infarction and Stroke team was consulted but  the stroke was remote (Jan 2022).  In March 2024 she was admitted with a new subacute infarction in the left cerebellum in the setting of medication non-compliance    3/2024  MRI: remote Left middle cerebral artery infarction and subacute left cerebellar infarct  MRA: diminished Left middle cerebral artery branches; stenosis vs occlusion left vert; left Posterior inferior cerebellar artery not visualized.  Echo: Ejection fraction: 20-25%            Left Atrium: mld Left atrial enlargement             Patent foramen ovale: none  LDL: 87    HbA1C: 5.5    Since the March admission she was readmitted in June 2024 with ischemic right leg.  She eventually required an above knee amputation.  Her DOAC was changed to warfarin and she is now in a Skilled nursing facility receiving physical therapy.        Current Outpatient Medications on File Prior to Visit   Medication Sig Dispense Refill    amiodarone (Pacerone) 200 mg tablet Take 1 tablet (200 mg) by mouth once daily. Do not fill before July 25, 2024. 30 tablet 0    aspirin 81 mg chewable tablet Chew 1 tablet (81 mg) once daily. 30 tablet 0    atorvastatin (Lipitor) 80 mg tablet Take 1 tablet (80 mg) by mouth once daily. 30 tablet 0    DULoxetine (Cymbalta) 60 mg DR capsule Take 1 capsule (60 mg) by mouth once daily. Do not crush or chew. Do not fill before July 25, 2024. 30 capsule 0    empagliflozin (Jardiance) 10 mg Take 1 tablet (10 mg) by mouth once daily. 30 tablet 0    folic acid (Folvite) 1 mg tablet Take 1 tablet (1 mg) by mouth once daily. 30 tablet 0    furosemide (Lasix) 40 mg tablet Take 1 tablet (40 mg) by mouth 2 times daily (morning and late afternoon). 60 tablet 0    lidocaine (Lidoderm) 5 % patch Place 1 patch over 12 hours on the skin once daily. Remove & discard patch within 12 hours or as directed by MD. 30 patch 0    melatonin 3 mg tablet Take 1 tablet (3 mg) by mouth as needed at bedtime for sleep. 30 tablet 0    metoprolol succinate XL (Toprol-XL)  50 mg 24 hr tablet Take 0.5 tablets (25 mg) by mouth once daily. Do not crush or chew. 15 tablet 0    multivitamin with minerals tablet Take 1 tablet by mouth once daily. 30 tablet 0    oxyCODONE (Roxicodone) 10 mg immediate release tablet Take 1 tablet (10 mg) by mouth every 6 hours if needed for severe pain (7 - 10). 10 tablet 0    oxyCODONE (Roxicodone) 5 mg immediate release tablet Take 1 tablet (5 mg) by mouth every 6 hours if needed for moderate pain (4 - 6). 15 tablet 0    pantoprazole (ProtoNix) 40 mg EC tablet Take 1 tablet (40 mg) by mouth once daily in the morning. Take before meals. Do not crush, chew, or split. 30 tablet 0    polyethylene glycol (Glycolax, Miralax) 17 gram packet Take 17 g by mouth once daily. 30 packet 0    pregabalin (Lyrica) 75 mg capsule Take 1 capsule (75 mg) by mouth 2 times a day. 60 capsule 0    sacubitriL-valsartan (Entresto) 24-26 mg tablet Take 2 tablets by mouth 2 times a day. 60 tablet 0    sennosides (Senokot) 8.6 mg tablet Take 2 tablets (17.2 mg) by mouth 2 times a day. 60 tablet 0    warfarin (Coumadin) 5 mg tablet Take as directed per After Visit Summary. 30 tablet 0    [DISCONTINUED] amiodarone (Pacerone) 200 mg tablet Take 1 tablet (200 mg) by mouth once daily.      [DISCONTINUED] aspirin 81 mg chewable tablet 1 tablet (81 mg) by Enteral route once daily.      [DISCONTINUED] atorvastatin (Lipitor) 80 mg tablet Take 1 tablet (80 mg) by mouth once daily. 30 tablet 0    [DISCONTINUED] digoxin (Lanoxin) 250 MCG tab;et Take 1 tablet (250 mcg) by mouth once daily.      [DISCONTINUED] DULoxetine (Cymbalta) 60 mg DR capsule Take 1 capsule (60 mg) by mouth once daily. Do not crush or chew.      [DISCONTINUED] empagliflozin (Jardiance) 10 mg Take 1 tablet (10 mg) by mouth once daily. 30 tablet 11    [DISCONTINUED] folic acid (Folvite) 1 mg tablet Take 1 tablet (1 mg) by mouth once daily.      [DISCONTINUED] furosemide (Lasix) 40 mg tablet Take 1 tablet (40 mg) by mouth twice a  day.      [DISCONTINUED] gabapentin (Neurontin) 100 mg capsule Take 1 capsule (100 mg) by mouth 3 times a day. 270 capsule 1    [DISCONTINUED] lidocaine (Lidoderm) 5 % patch Place 1 patch on the skin once daily. Remove & discard patch within 12 hours or as directed by MD.      [DISCONTINUED] melatonin 3 mg tablet Take 1 tablet (3 mg) by mouth once daily as needed.      [DISCONTINUED] metoprolol succinate XL (Toprol-XL) 50 mg 24 hr tablet Take 1 tablet (50 mg) by mouth once daily. Do not crush or chew. 30 tablet 11    [DISCONTINUED] metoprolol succinate XL (Toprol-XL) 50 mg 24 hr tablet Take 0.5 tablets (25 mg) by mouth once daily. Do not crush or chew.      [DISCONTINUED] multivitamin with minerals tablet Take 1 tablet by mouth once daily.      [DISCONTINUED] oxyCODONE (Roxicodone) 10 mg immediate release tablet Take 1 tablet (10 mg) by mouth every 6 hours if needed for severe pain (7 - 10).      [DISCONTINUED] oxyCODONE (Roxicodone) 5 mg immediate release tablet Take 1 tablet (5 mg) by mouth every 6 hours if needed for moderate pain (4 - 6).      [DISCONTINUED] pantoprazole (ProtoNix) 40 mg EC tablet Take 1 tablet (40 mg) by mouth once daily in the morning. Take before meals. Do not crush, chew, or split. 30 tablet 0    [DISCONTINUED] polyethylene glycol (Glycolax, Miralax) 17 gram packet Take 17 g by mouth once daily.      [DISCONTINUED] pregabalin (Lyrica) 75 mg capsule Take 1 capsule (75 mg) by mouth 2 times a day.      [DISCONTINUED] QUEtiapine (SEROquel) 50 mg tablet Take 3 tablets by mouth at bedtime      [DISCONTINUED] rivaroxaban (Xarelto) 20 mg tablet Take 1 tablet (20 mg) by mouth once daily in the evening. Take with meals. Take with food. Do not start before March 16, 2024. 30 tablet 11    [DISCONTINUED] sacubitriL-valsartan (Entresto) 24-26 mg tablet Take 1 tablet by mouth 2 times a day. 60 tablet 1    [DISCONTINUED] sacubitriL-valsartan (Entresto) 24-26 mg tablet Take 2 tablets by mouth 2 times a day.       [DISCONTINUED] sennosides (Senokot) 8.6 mg tablet Take 2 tablets (17.2 mg) by mouth 2 times a day.      [DISCONTINUED] spironolactone (Aldactone) 25 mg tablet Take 1 tablet (25 mg) by mouth once daily.      [DISCONTINUED] warfarin (Coumadin) 5 mg tablet Take as directed per After Visit Summary.       No current facility-administered medications on file prior to visit.        Patient Active Problem List   Diagnosis    Acute decompensated heart failure (Multi)    Encounter for medical examination to establish care    Aphasia due to late effects of cerebrovascular disease    Central pain syndrome    Chronic kidney disease    History of stroke    Laryngeal mass    Laryngeal stenosis    Mural thrombus of left atrium    Obesity, Class I, BMI 30-34.9    Change in voice    Voice disorder    CHF (congestive heart failure) (Multi)    Atrial fibrillation (Multi)    Chronic systolic heart failure (Multi)    Diabetes (Multi)    Dysphagia following cerebral infarction    Elevated LFTs    ETOH abuse    Hemiparesis affecting right side as late effect of cerebrovascular accident (Multi)    Intracranial hemorrhage (Multi)    Mood disorder (CMS-[facility])    Nicotine use disorder    Primary hypertension    Schizophrenia (Multi)    Stroke (Multi)    Cardiogenic shock (Multi)    Acute lower extremity ischemia    Critical limb ischemia of right lower extremity (Multi)    Deep vein thrombosis (DVT) of lower extremity (Multi)    Tracheocutaneous fistula following tracheostomy (Multi)    Rhabdomyolysis    Lactic acidosis    Thrombocytopenia (CMS-[facility])    Wound dehiscence        Past Surgical History:   Procedure Laterality Date    CARDIAC CATHETERIZATION N/A 6/20/2024    Procedure: Right Heart Cath;  Surgeon: Osman Su MD;  Location: [place] [address] Cardiac Cath Lab;  Service: Cardiovascular;  Laterality: N/A;    INVASIVE VASCULAR PROCEDURE N/A 6/20/2024    Procedure: Arterial  Access, Intracatheter;  Surgeon: Osman Su MD;   Location: [place] [address] Cardiac Cath Lab;  Service: Cardiovascular;  Laterality: N/A;    OTHER SURGICAL HISTORY  2022    Trachectomy        Allergies   Allergen Reactions    Hydrocodone-Acetaminophen Rash    Ibuprofen Rash     Tolerates aspirin        No family history on file.     Social History     Tobacco Use    Smoking status: Former     Current packs/day: 0.00     Types: Cigarettes     Quit date: 2023     Years since quittin.6   Substance Use Topics    Alcohol use: Yes        Objective:  Visit Vitals  /73   Pulse 78   Temp 35.9 °C (96.6 °F)   Resp 18        Neurologic Exam:    Alert attentive, with mild aphasia with frequent word searching.  Able to name with effort (including stethoscope with significant delay), difficulty repeating, able to follow simple but not complex crossed commands. and speech.   Visual fields are full to confrontation.  Extraocular eye movements are intact without nystagmus.  V1 through V3 is intact to light touch.  Face is symmetric.  Motor strength is five out of five throughout normal bulk and tone.  No orbiting and reduced fine finger movements on the left.  Sensory exam is intact to light touch bilaterally.  No ataxia on finger-nose-finger.  She has a recent above knee amputation with healing wound and drain in place.    Barthel Index:  Feeding: 10=independent  Dressin=can do half  Groomin=independent  Bathin=needs help  Transfers: 10=one person help  Mobility: 0=unable 50 yards  Stairs: 0=unable  Toiletin=dependent  Bladder: 0=incontinent  Bowels: 0=incontinent    Total Score: 30 (much of current disability due to recent above knee amputation)    Modified Danielle Score:  5=Severe disability: Bedridden, incontinent; requiring constant nursing care and attention    NIHSS:  Level of Consciousness: 0=alert      LOC questions: 0=answers both questions      LOC commands: 0=performs both  Best Gaze: 0=normal  Visual: 0=normal  Facial Palsy:  0=normal  Motor:      Motor Arm (Left): 0=normal      Motor Arm (Right): 0=normal      Motor Leg (Left): 0= normal      Motor Leg (Right): amputated  Limb Ataxia: 0=absent  Sensory: 0=normal  Best Language: 1=mild-mod aphasia (can communicate ideas)  Dysarthria: 0=normal  Extinction and Inattention: 0=no abnormality     Total Score: 1    The chart was reviewed and summarized as above.  Neuroimaging was personally reviewed and interpreted as documented in the HPI or Assessment  and Plan.

## 2024-07-26 NOTE — PATIENT INSTRUCTIONS
Has history of remote stroke due to atrial fibrillation and low Ejection fraction.  Continue anticoagulation.    Please start speech therapy for aphasia due to her previous stroke.    No other new recommendations    Stroke prevention:  Eat a healthy diet with plenty of fresh fruits and vegetables.  Avoid salt and fried foods.  Lose weight and exercise on a regular basis.  Do not smoke or use drugs.  Be sure to take all medications.  Call 911 for signs of stroke (numbness or weakness on one side, trouble seeing on one side, trouble speaking (either because your speech is slurred or you can't find the words), sudden double vision, spinning sensation or loss of coordination).    To find a stroke support group: https://[email or web address].org/en/stroke-support-group-finder  To learn about Virtual Stroke Educations sessions: contact Parris Hahn at: [email or web address]    I can be reached at phone: [phone] or email: [email or web address] or message me via [facility]

## 2024-07-29 ENCOUNTER — APPOINTMENT (OUTPATIENT)
Dept: RADIOLOGY | Facility: HOSPITAL | Age: 44
End: 2024-07-29
Payer: COMMERCIAL

## 2024-07-29 ENCOUNTER — APPOINTMENT (OUTPATIENT)
Dept: VASCULAR SURGERY | Facility: HOSPITAL | Age: 44
End: 2024-07-29
Payer: COMMERCIAL

## 2024-07-29 ENCOUNTER — HOSPITAL ENCOUNTER (INPATIENT)
Facility: HOSPITAL | Age: 44
End: 2024-07-29
Attending: EMERGENCY MEDICINE | Admitting: NURSE PRACTITIONER
Payer: COMMERCIAL

## 2024-07-29 ENCOUNTER — HOME CARE VISIT (OUTPATIENT)
Dept: HOME HEALTH SERVICES | Facility: HOME HEALTH | Age: 44
End: 2024-07-29
Payer: COMMERCIAL

## 2024-07-29 DIAGNOSIS — I82.4Z9 DEEP VEIN THROMBOSIS (DVT) OF DISTAL VEIN OF LOWER EXTREMITY, UNSPECIFIED CHRONICITY, UNSPECIFIED LATERALITY (MULTI): ICD-10-CM

## 2024-07-29 DIAGNOSIS — I95.9 HYPOTENSION, UNSPECIFIED HYPOTENSION TYPE: ICD-10-CM

## 2024-07-29 DIAGNOSIS — I50.9 ACUTE DECOMPENSATED HEART FAILURE (MULTI): ICD-10-CM

## 2024-07-29 DIAGNOSIS — I50.9 ACUTE ON CHRONIC CONGESTIVE HEART FAILURE, UNSPECIFIED HEART FAILURE TYPE (MULTI): ICD-10-CM

## 2024-07-29 DIAGNOSIS — T81.30XA WOUND DEHISCENCE: Primary | ICD-10-CM

## 2024-07-29 DIAGNOSIS — I50.22 CHRONIC SYSTOLIC HEART FAILURE (MULTI): ICD-10-CM

## 2024-07-29 DIAGNOSIS — I99.8 ACUTE LOWER EXTREMITY ISCHEMIA: ICD-10-CM

## 2024-07-29 DIAGNOSIS — R57.0 CARDIOGENIC SHOCK (MULTI): ICD-10-CM

## 2024-07-29 LAB
ABO GROUP (TYPE) IN BLOOD: NORMAL
ALBUMIN SERPL BCP-MCNC: 3.7 G/DL (ref 3.4–5)
ALP SERPL-CCNC: 83 U/L (ref 33–110)
ALT SERPL W P-5'-P-CCNC: 13 U/L (ref 7–45)
ANION GAP SERPL CALC-SCNC: 17 MMOL/L (ref 10–20)
ANTIBODY SCREEN: NORMAL
APTT PPP: 38 SECONDS (ref 27–38)
AST SERPL W P-5'-P-CCNC: 22 U/L (ref 9–39)
B-HCG SERPL-ACNC: <3 MIU/ML
BASOPHILS # BLD AUTO: 0.03 X10*3/UL (ref 0–0.1)
BASOPHILS NFR BLD AUTO: 0.3 %
BILIRUB SERPL-MCNC: 0.7 MG/DL (ref 0–1.2)
BNP SERPL-MCNC: 230 PG/ML (ref 0–99)
BUN SERPL-MCNC: 23 MG/DL (ref 6–23)
CALCIUM SERPL-MCNC: 9.4 MG/DL (ref 8.6–10.6)
CHLORIDE SERPL-SCNC: 98 MMOL/L (ref 98–107)
CO2 SERPL-SCNC: 25 MMOL/L (ref 21–32)
CREAT SERPL-MCNC: 0.87 MG/DL (ref 0.5–1.05)
CRP SERPL-MCNC: 0.7 MG/DL
EGFRCR SERPLBLD CKD-EPI 2021: 84 ML/MIN/1.73M*2
EOSINOPHIL # BLD AUTO: 0.2 X10*3/UL (ref 0–0.7)
EOSINOPHIL NFR BLD AUTO: 1.7 %
ERYTHROCYTE [DISTWIDTH] IN BLOOD BY AUTOMATED COUNT: 14.9 % (ref 11.5–14.5)
EST. AVERAGE GLUCOSE BLD GHB EST-MCNC: 74 MG/DL
GLUCOSE SERPL-MCNC: 84 MG/DL (ref 74–99)
HBA1C MFR BLD: 4.2 %
HCT VFR BLD AUTO: 34.7 % (ref 36–46)
HGB BLD-MCNC: 11.1 G/DL (ref 12–16)
HOLD SPECIMEN: NORMAL
IMM GRANULOCYTES # BLD AUTO: 0.11 X10*3/UL (ref 0–0.7)
IMM GRANULOCYTES NFR BLD AUTO: 0.9 % (ref 0–0.9)
INR PPP: 1.8 (ref 0.9–1.1)
LYMPHOCYTES # BLD AUTO: 2.92 X10*3/UL (ref 1.2–4.8)
LYMPHOCYTES NFR BLD AUTO: 24.4 %
MCH RBC QN AUTO: 29.8 PG (ref 26–34)
MCHC RBC AUTO-ENTMCNC: 32 G/DL (ref 32–36)
MCV RBC AUTO: 93 FL (ref 80–100)
MONOCYTES # BLD AUTO: 1.02 X10*3/UL (ref 0.1–1)
MONOCYTES NFR BLD AUTO: 8.5 %
NEUTROPHILS # BLD AUTO: 7.7 X10*3/UL (ref 1.2–7.7)
NEUTROPHILS NFR BLD AUTO: 64.2 %
NRBC BLD-RTO: 0 /100 WBCS (ref 0–0)
PLATELET # BLD AUTO: 268 X10*3/UL (ref 150–450)
POTASSIUM SERPL-SCNC: 4.3 MMOL/L (ref 3.5–5.3)
PROT SERPL-MCNC: 8 G/DL (ref 6.4–8.2)
PROTHROMBIN TIME: 20.9 SECONDS (ref 9.8–12.8)
RBC # BLD AUTO: 3.72 X10*6/UL (ref 4–5.2)
RH FACTOR (ANTIGEN D): NORMAL
SODIUM SERPL-SCNC: 136 MMOL/L (ref 136–145)
WBC # BLD AUTO: 12 X10*3/UL (ref 4.4–11.3)

## 2024-07-29 PROCEDURE — 2500000001 HC RX 250 WO HCPCS SELF ADMINISTERED DRUGS (ALT 637 FOR MEDICARE OP): Performed by: NURSE PRACTITIONER

## 2024-07-29 PROCEDURE — 99285 EMERGENCY DEPT VISIT HI MDM: CPT

## 2024-07-29 PROCEDURE — 86140 C-REACTIVE PROTEIN: CPT | Performed by: NURSE PRACTITIONER

## 2024-07-29 PROCEDURE — 96374 THER/PROPH/DIAG INJ IV PUSH: CPT

## 2024-07-29 PROCEDURE — 83036 HEMOGLOBIN GLYCOSYLATED A1C: CPT | Performed by: NURSE PRACTITIONER

## 2024-07-29 PROCEDURE — 2500000004 HC RX 250 GENERAL PHARMACY W/ HCPCS (ALT 636 FOR OP/ED): Performed by: NURSE PRACTITIONER

## 2024-07-29 PROCEDURE — 99285 EMERGENCY DEPT VISIT HI MDM: CPT | Performed by: EMERGENCY MEDICINE

## 2024-07-29 PROCEDURE — 2500000004 HC RX 250 GENERAL PHARMACY W/ HCPCS (ALT 636 FOR OP/ED)

## 2024-07-29 PROCEDURE — 36415 COLL VENOUS BLD VENIPUNCTURE: CPT

## 2024-07-29 PROCEDURE — 73552 X-RAY EXAM OF FEMUR 2/>: CPT | Mod: RT

## 2024-07-29 PROCEDURE — 73552 X-RAY EXAM OF FEMUR 2/>: CPT | Mod: RIGHT SIDE | Performed by: RADIOLOGY

## 2024-07-29 PROCEDURE — 85025 COMPLETE CBC W/AUTO DIFF WBC: CPT

## 2024-07-29 PROCEDURE — 84702 CHORIONIC GONADOTROPIN TEST: CPT | Performed by: NURSE PRACTITIONER

## 2024-07-29 PROCEDURE — 85610 PROTHROMBIN TIME: CPT | Performed by: NURSE PRACTITIONER

## 2024-07-29 PROCEDURE — 83880 ASSAY OF NATRIURETIC PEPTIDE: CPT | Performed by: NURSE PRACTITIONER

## 2024-07-29 PROCEDURE — 80053 COMPREHEN METABOLIC PANEL: CPT

## 2024-07-29 PROCEDURE — 1200000002 HC GENERAL ROOM WITH TELEMETRY DAILY

## 2024-07-29 PROCEDURE — 86901 BLOOD TYPING SEROLOGIC RH(D): CPT

## 2024-07-29 PROCEDURE — 86900 BLOOD TYPING SEROLOGIC ABO: CPT

## 2024-07-29 PROCEDURE — 99223 1ST HOSP IP/OBS HIGH 75: CPT | Performed by: NURSE PRACTITIONER

## 2024-07-29 RX ORDER — OXYCODONE HYDROCHLORIDE 5 MG/1
5 TABLET ORAL EVERY 6 HOURS PRN
Status: DISCONTINUED | OUTPATIENT
Start: 2024-07-29 | End: 2024-08-09 | Stop reason: HOSPADM

## 2024-07-29 RX ORDER — DULOXETIN HYDROCHLORIDE 60 MG/1
60 CAPSULE, DELAYED RELEASE ORAL DAILY
Status: DISCONTINUED | OUTPATIENT
Start: 2024-07-30 | End: 2024-08-09 | Stop reason: HOSPADM

## 2024-07-29 RX ORDER — PREGABALIN 75 MG/1
75 CAPSULE ORAL 2 TIMES DAILY
Status: DISCONTINUED | OUTPATIENT
Start: 2024-07-29 | End: 2024-08-09 | Stop reason: HOSPADM

## 2024-07-29 RX ORDER — MULTIVIT-MIN/IRON FUM/FOLIC AC 7.5 MG-4
1 TABLET ORAL DAILY
Status: DISCONTINUED | OUTPATIENT
Start: 2024-07-30 | End: 2024-08-09 | Stop reason: HOSPADM

## 2024-07-29 RX ORDER — HEPARIN SODIUM 10000 [USP'U]/100ML
0-4000 INJECTION, SOLUTION INTRAVENOUS CONTINUOUS
Status: DISCONTINUED | OUTPATIENT
Start: 2024-07-29 | End: 2024-08-09 | Stop reason: HOSPADM

## 2024-07-29 RX ORDER — DEXTROSE 50 % IN WATER (D50W) INTRAVENOUS SYRINGE
25
Status: DISCONTINUED | OUTPATIENT
Start: 2024-07-29 | End: 2024-08-09 | Stop reason: HOSPADM

## 2024-07-29 RX ORDER — FUROSEMIDE 40 MG/1
40 TABLET ORAL
Status: DISCONTINUED | OUTPATIENT
Start: 2024-07-30 | End: 2024-08-03

## 2024-07-29 RX ORDER — NAPROXEN SODIUM 220 MG/1
81 TABLET, FILM COATED ORAL DAILY
Status: DISCONTINUED | OUTPATIENT
Start: 2024-07-30 | End: 2024-08-09 | Stop reason: HOSPADM

## 2024-07-29 RX ORDER — OXYCODONE HYDROCHLORIDE 5 MG/1
10 TABLET ORAL EVERY 6 HOURS PRN
Status: DISCONTINUED | OUTPATIENT
Start: 2024-07-29 | End: 2024-08-09 | Stop reason: HOSPADM

## 2024-07-29 RX ORDER — SACUBITRIL AND VALSARTAN 24; 26 MG/1; MG/1
1 TABLET, FILM COATED ORAL 2 TIMES DAILY
Status: ON HOLD | COMMUNITY
End: 2024-08-09

## 2024-07-29 RX ORDER — PANTOPRAZOLE SODIUM 40 MG/1
40 TABLET, DELAYED RELEASE ORAL
Status: DISCONTINUED | OUTPATIENT
Start: 2024-07-30 | End: 2024-08-09 | Stop reason: HOSPADM

## 2024-07-29 RX ORDER — FOLIC ACID 1 MG/1
1 TABLET ORAL DAILY
Status: DISCONTINUED | OUTPATIENT
Start: 2024-07-30 | End: 2024-08-09 | Stop reason: HOSPADM

## 2024-07-29 RX ORDER — ATORVASTATIN CALCIUM 80 MG/1
80 TABLET, FILM COATED ORAL NIGHTLY
Status: DISCONTINUED | OUTPATIENT
Start: 2024-07-29 | End: 2024-08-09 | Stop reason: HOSPADM

## 2024-07-29 RX ORDER — POLYETHYLENE GLYCOL 3350 17 G/17G
17 POWDER, FOR SOLUTION ORAL 2 TIMES DAILY PRN
Status: DISCONTINUED | OUTPATIENT
Start: 2024-07-29 | End: 2024-08-09 | Stop reason: HOSPADM

## 2024-07-29 RX ORDER — METOPROLOL SUCCINATE 25 MG/1
25 TABLET, EXTENDED RELEASE ORAL DAILY
Status: DISCONTINUED | OUTPATIENT
Start: 2024-07-30 | End: 2024-08-03

## 2024-07-29 RX ORDER — AMIODARONE HYDROCHLORIDE 200 MG/1
200 TABLET ORAL DAILY
Status: DISCONTINUED | OUTPATIENT
Start: 2024-07-30 | End: 2024-07-31

## 2024-07-29 RX ORDER — SENNOSIDES 8.6 MG/1
2 TABLET ORAL 2 TIMES DAILY
Status: DISCONTINUED | OUTPATIENT
Start: 2024-07-29 | End: 2024-08-09 | Stop reason: HOSPADM

## 2024-07-29 RX ORDER — DEXTROSE 50 % IN WATER (D50W) INTRAVENOUS SYRINGE
12.5
Status: DISCONTINUED | OUTPATIENT
Start: 2024-07-29 | End: 2024-08-09 | Stop reason: HOSPADM

## 2024-07-29 RX ORDER — MORPHINE SULFATE 4 MG/ML
4 INJECTION INTRAVENOUS ONCE
Status: COMPLETED | OUTPATIENT
Start: 2024-07-29 | End: 2024-07-29

## 2024-07-29 RX ORDER — TALC
3 POWDER (GRAM) TOPICAL NIGHTLY PRN
Status: DISCONTINUED | OUTPATIENT
Start: 2024-07-29 | End: 2024-08-09 | Stop reason: HOSPADM

## 2024-07-29 ASSESSMENT — ENCOUNTER SYMPTOMS
WOUND: 1
CONSTIPATION: 0
HEADACHES: 0
ENDOCRINE NEGATIVE: 1
COUGH: 0
DIARRHEA: 0
VOMITING: 0
ALLERGIC/IMMUNOLOGIC NEGATIVE: 1
DIFFICULTY URINATING: 0
PSYCHIATRIC NEGATIVE: 1
DIZZINESS: 0
HEMATOLOGIC/LYMPHATIC NEGATIVE: 1
NAUSEA: 0
CONFUSION: 0
GASTROINTESTINAL NEGATIVE: 1
ACTIVITY CHANGE: 0
CHILLS: 0
MUSCULOSKELETAL NEGATIVE: 1
NEUROLOGICAL NEGATIVE: 1
FEVER: 0
EYES NEGATIVE: 1
SHORTNESS OF BREATH: 0
FATIGUE: 0
CARDIOVASCULAR NEGATIVE: 1
ABDOMINAL PAIN: 0
RESPIRATORY NEGATIVE: 1

## 2024-07-29 ASSESSMENT — COLUMBIA-SUICIDE SEVERITY RATING SCALE - C-SSRS
2. HAVE YOU ACTUALLY HAD ANY THOUGHTS OF KILLING YOURSELF?: NO
6. HAVE YOU EVER DONE ANYTHING, STARTED TO DO ANYTHING, OR PREPARED TO DO ANYTHING TO END YOUR LIFE?: NO
1. IN THE PAST MONTH, HAVE YOU WISHED YOU WERE DEAD OR WISHED YOU COULD GO TO SLEEP AND NOT WAKE UP?: NO

## 2024-07-29 ASSESSMENT — PAIN - FUNCTIONAL ASSESSMENT
PAIN_FUNCTIONAL_ASSESSMENT: 0-10
PAIN_FUNCTIONAL_ASSESSMENT: 0-10

## 2024-07-29 ASSESSMENT — PAIN DESCRIPTION - PROGRESSION: CLINICAL_PROGRESSION: GRADUALLY IMPROVING

## 2024-07-29 ASSESSMENT — PAIN SCALES - GENERAL
PAINLEVEL_OUTOF10: 0 - NO PAIN
PAINLEVEL_OUTOF10: 3
PAINLEVEL_OUTOF10: 10 - WORST POSSIBLE PAIN

## 2024-07-29 NOTE — ED TRIAGE NOTES
"Pt had recent amputation of her RLE on 7/17. Pt had a would vac on the wound. The doctor took the dressing down today and the would had de hissed. Pt is now a resident of [facility]. Wound is wrapped at this time but wound care nurse that is with her reports the would looks like \"a shark took a bite out of her leg\"  "

## 2024-07-29 NOTE — H&P
History Of Present Illness  Amirah Bennett is a 43 y.o. 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 She presented to ED for further evaluation of wound dehiscence of RLE stump noticed by wound care doctor today on home visit once her wound vac was removed. She endorses worsening pain at surgical site that is worse than the pain she experienced after initial surgery. She denies fever, chills, n/v/d, or changes in appetite. Serosanguineous drainage present from wound upon arrival to ED. Vascular surgery was consulted by ED provider and pt was seen by consulting team while in the ED. Plan is for pt to go to OR in AM with vascular surgery for wound debridement and possible closure. Per documentation from vascular surgery consent for OR will need to be obtained from [facility]. Labs obtained on admit notable for anemia and mild leukocytosis. Last hospitalization was 6/20-7/24 after presented to ED with c/o dyspnea and leg swelling/pain in setting of running out of medications. During her last admit she was treated for harinder 3 AKLI s/p open RLE and iliac embolectomy via right CFA and AKA on 6/28 and wound debridement with wound vac placement on 7/17. Patient hospitalization was complicated with wound infection requiring abx and cardiogenic shock which required stay in CICU. Please refer to discharge summary dated 7/24. Pt had no complaints when seen by writer in ED bed 45. Parameters added to home dose of toprol XL d/t documentation of bradycardia while in the ED. Pt will be NPO at MN 7/30 except meds with sips of water. Pt admitted for further treatment and management of wound  dehiscence.       Past Medical History  Past Medical History:   Diagnosis Date    CHF (congestive heart failure) (Multi)     Stroke (Multi)        Surgical History  Past Surgical History:   Procedure Laterality Date    CARDIAC CATHETERIZATION N/A 06/20/2024    Procedure: Right Heart Cath;  Surgeon: Osman Su MD;  Location: [place] [address] [facility];  Service: Cardiovascular;  Laterality: N/A;    INVASIVE VASCULAR PROCEDURE N/A 06/20/2024    Procedure: Arterial  Access, Intracatheter;  Surgeon: Osman Su MD;  Location: [place] [address] [facility]iac [facility];  Service: Cardiovascular;  Laterality: N/A;    LEG AMPUTATION THROUGH KNEE      LEG AMPUTATION THROUGH KNEE      OTHER SURGICAL HISTORY  06/09/2022    Trachectomy        Social History  She reports that she quit smoking about 7 months ago. Her smoking use included cigarettes. She does not have any smokeless tobacco history on file. She reports current alcohol use. She reports that she does not use drugs.    Family History  No family history on file.     Allergies  Hydrocodone-acetaminophen and Ibuprofen    Review of Systems   Constitutional:  Negative for chills and fever.   HENT:  Negative for congestion.    Respiratory:  Negative for cough and shortness of breath.    Cardiovascular:  Positive for chest pain.   Gastrointestinal:  Negative for abdominal pain, constipation, diarrhea, nausea and vomiting.   Genitourinary:  Negative for difficulty urinating.   Skin:  Positive for wound.   Neurological:  Negative for dizziness and headaches.   Psychiatric/Behavioral:  Negative for confusion.         Physical Exam  Constitutional:       General: She is not in acute distress.  HENT:      Head: Normocephalic.      Nose: Nose normal.      Mouth/Throat:      Mouth: Mucous membranes are dry.   Eyes:      Extraocular Movements: Extraocular movements intact.   Cardiovascular:      Rate and Rhythm: Normal rate.      Pulses: Normal pulses.      Heart sounds:  "Normal heart sounds.   Pulmonary:      Effort: Pulmonary effort is normal. No respiratory distress.      Breath sounds: Normal breath sounds.   Abdominal:      General: Bowel sounds are normal.      Palpations: Abdomen is soft.   Musculoskeletal:      Cervical back: Normal range of motion.      Comments: R AKA   Skin:     General: Skin is warm.      Comments: Prior trach site   Neurological:      Mental Status: She is alert.      Comments: Expressive aphasia from prior CVA; oriented to self, place and situation   Psychiatric:         Mood and Affect: Mood normal.          Last Recorded Vitals  Blood pressure 100/61, pulse (!) 45, temperature 36.8 °C (98.2 °F), temperature source Temporal, resp. rate 16, height 1.702 m (5' 7\"), weight 71.2 kg (157 lb), SpO2 99%.    Relevant Results  XR femur right 2+ views    Result Date: 7/29/2024  Interpreted By:  Leland Burnham and [place]yersdorf Conner STUDY: XR FEMUR RIGHT 2+ VIEWS; ;  7/29/2024 5:47 pm   INDICATION: Signs/Symptoms:Recent AKA RLE 7/17 with wound dehiscence.   COMPARISON: CTA aorta and bilateral iliac arteries 07/02/2024   ACCESSION NUMBER(S): [id number]   ORDERING CLINICIAN: CAROLINA AMBROSIO   FINDINGS: Four views of the right femur provided.   Postsurgical changes of the above knee amputation. Cortical margins of the right femoral stump appear sharp. No cortical irregularity or osteolytic changes. Soft tissue irregularity of the distal stump and few collections of subcutaneous gas. Right hip joint is unremarkable.       1.  Postsurgical changes of above knee amputation of the right femur. No evidence of cortical breakdown or osteolytic changes. 2. Skin soft tissue defect at the distal tip of the amputation along with multiple foci of soft tissue gas adjacent to the distal femur with overlying soft tissue irregularity. Underlying soft tissue infection or open wound may be present     I personally reviewed the image(s)/study and resident interpretation. I agree " with the findings as stated by resident Robin Prince. Data analyzed and images interpreted at [facility], [place], [place].   MACRO: None   Signed by: Leland Burnham 7/29/2024 6:07 PM Dictation workstation:   [id number]    Results for orders placed or performed during the hospital encounter of 07/29/24 (from the past 24 hour(s))   CBC and Auto Differential   Result Value Ref Range    WBC 12.0 (H) 4.4 - 11.3 x10*3/uL    nRBC 0.0 0.0 - 0.0 /100 WBCs    RBC 3.72 (L) 4.00 - 5.20 x10*6/uL    Hemoglobin 11.1 (L) 12.0 - 16.0 g/dL    Hematocrit 34.7 (L) 36.0 - 46.0 %    MCV 93 80 - 100 fL    MCH 29.8 26.0 - 34.0 pg    MCHC 32.0 32.0 - 36.0 g/dL    RDW 14.9 (H) 11.5 - 14.5 %    Platelets 268 150 - 450 x10*3/uL    Neutrophils % 64.2 40.0 - 80.0 %    Immature Granulocytes %, Automated 0.9 0.0 - 0.9 %    Lymphocytes % 24.4 13.0 - 44.0 %    Monocytes % 8.5 2.0 - 10.0 %    Eosinophils % 1.7 0.0 - 6.0 %    Basophils % 0.3 0.0 - 2.0 %    Neutrophils Absolute 7.70 1.20 - 7.70 x10*3/uL    Immature Granulocytes Absolute, Automated 0.11 0.00 - 0.70 x10*3/uL    Lymphocytes Absolute 2.92 1.20 - 4.80 x10*3/uL    Monocytes Absolute 1.02 (H) 0.10 - 1.00 x10*3/uL    Eosinophils Absolute 0.20 0.00 - 0.70 x10*3/uL    Basophils Absolute 0.03 0.00 - 0.10 x10*3/uL   Comprehensive metabolic panel   Result Value Ref Range    Glucose 84 74 - 99 mg/dL    Sodium 136 136 - 145 mmol/L    Potassium 4.3 3.5 - 5.3 mmol/L    Chloride 98 98 - 107 mmol/L    Bicarbonate 25 21 - 32 mmol/L    Anion Gap 17 10 - 20 mmol/L    Urea Nitrogen 23 6 - 23 mg/dL    Creatinine 0.87 0.50 - 1.05 mg/dL    eGFR 84 >60 mL/min/1.73m*2    Calcium 9.4 8.6 - 10.6 mg/dL    Albumin 3.7 3.4 - 5.0 g/dL    Alkaline Phosphatase 83 33 - 110 U/L    Total Protein 8.0 6.4 - 8.2 g/dL    AST 22 9 - 39 U/L    Bilirubin, Total 0.7 0.0 - 1.2 mg/dL    ALT 13 7 - 45 U/L   Type and Screen   Result Value Ref Range    ABO TYPE B     Rh TYPE POS     ANTIBODY  SCREEN NEG    C-reactive protein   Result Value Ref Range    C-Reactive Protein 0.70 <1.00 mg/dL   Hemoglobin A1c   Result Value Ref Range    Hemoglobin A1C 4.2 see below %    Estimated Average Glucose 74 Not Established mg/dL   Light Blue Top   Result Value Ref Range    Extra Tube Hold for add-ons.    SST TOP   Result Value Ref Range    Extra Tube Hold for add-ons.    Gray Top   Result Value Ref Range    Extra Tube Hold for add-ons.    Coagulation Screen   Result Value Ref Range    Protime 20.9 (H) 9.8 - 12.8 seconds    INR 1.8 (H) 0.9 - 1.1    aPTT 38 27 - 38 seconds      ED Medication Administration from 07/29/2024 1304 to 07/29/2024 1939         Date/Time Order Dose Route Action Action by     07/29/2024 1647 EDT morphine injection 4 mg 4 mg intravenous Given DANNY Negron     07/29/2024 1848 EDT aspirin chewable tablet 81 mg -- oral Held by provider DINESH Brewer     07/30/2024 0900 EDT aspirin chewable tablet 81 mg -- oral Dose Auto Held (none)     07/31/2024 0900 EDT aspirin chewable tablet 81 mg -- oral Dose Auto Held (none)     08/01/2024 0900 EDT aspirin chewable tablet 81 mg -- oral Dose Auto Held (none)     08/02/2024 0900 EDT aspirin chewable tablet 81 mg -- oral Dose Auto Held (none)     08/03/2024 0900 EDT aspirin chewable tablet 81 mg -- oral Dose Auto Held (none)           Scheduled medications  [START ON 7/30/2024] amiodarone, 200 mg, oral, Daily  [Held by provider] aspirin, 81 mg, oral, Daily  atorvastatin, 80 mg, oral, Nightly  [START ON 7/30/2024] DULoxetine, 60 mg, oral, Daily  [START ON 7/30/2024] empagliflozin, 10 mg, oral, Daily  [START ON 7/30/2024] folic acid, 1 mg, oral, Daily  [START ON 7/30/2024] furosemide, 40 mg, oral, BID  heparin, 4,000 Units, intravenous, Once  [START ON 7/30/2024] metoprolol succinate XL, 25 mg, oral, Daily  [START ON 7/30/2024] multivitamin with minerals, 1 tablet, oral, Daily  [START ON 7/30/2024] pantoprazole, 40 mg, oral, Daily before breakfast  pregabalin, 75 mg, oral,  BID  sacubitriL-valsartan, 1 tablet, oral, BID  sennosides, 2 tablet, oral, BID      Continuous medications  heparin, 0-4,000 Units/hr      PRN medications  PRN medications: dextrose, dextrose, glucagon, glucagon, melatonin, oxyCODONE, oxyCODONE, polyethylene glycol      Assessment/Plan   Principal Problem:    Wound dehiscence    #wound dehiscence s/p R AKA  #leukocytosis  - WBC 13.8 on admit, WBC 12.0 on 7/24  - vascular surgery consulted, appreciate recs  - NPO at MN 7/30 except meds with sips of water  - OR 7/30 with vascular surgery for wound debridement and possible closure  - pre-op labs ordered: coag screen, CMP, T&S, CBC w/diff  - continue outpatient pain regimen: Oxycodone 5 mg q6h PRN moderate pain and Oxycodone 10 mg q6h PRN severe pain  - continue Lyrica 75 mg PO BID  - holding home dose of Coumadin 5 mg PO every day d/t upcoming surgery will start low intensity heparin gtt with plan to bridge back to coumadin  - Saline wet to dry dressing kerlix with abd pads covering the wound while waiting for OR.    #afib  #YFN appendage  - s/p DCCV 5/2023  - INR 1.8 on admit  - holding home dose of Coumadin 5 mg PO every day  - continue home dose of Amiodarone 200 mg PO QD  - low intensity heparin gtt ordered with plan to bridge back to Coumadin    #HFrEF (last EF 20-25% 6/22/24)  #bradycardia (asymptomatic)  - HR documentation as 43 while in ED, EKG ordered, parameters placed on Toprol XL  - monitor on tele  - BNP pending result  - strict I&O, DW  - continue home dose of Toprol XL 25 mg PO every day (hold for HR <60 or SBP <100), Lasix 40 mg PO BID, Entresto 24/26 mg PO BID, & Jardiance 10 mg PO every day    #hx of L cerebellar MCA stroke w/expressive aphasia 3/2024  #hx of ischemic CVA  - continue home dose of Lipitor 80 mg PO at bedtime  - holding home dose of ASA 81 mg d/t upcoming surgery    #T2DM  - accucheck ACHS  - hypoglycemia order set placed  - HgA1c 4.2% on admit, can be diet controlled    Dispo: admit  to RNF. Plan per above.      I spent 75 minutes in the professional and overall care of this patient.      Ysabel Brewer, APRN-CNP

## 2024-07-29 NOTE — ED PROVIDER NOTES
Emergency Department Provider Note        History of Present Illness     History provided by: Patient and Caregiver  Limitations to History:  Expressive aphasia  External Records Reviewed with Brief Summary: Discharge Summary from 7/24/2024 which showed recent admission for atrial fibrillation with placement of IVC filter, and R above-knee amputation for limb ischemia    HPI:  Amirah Bennett is a 43 y.o. 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  Patient has expressive aphasia at baseline, however was able to answer some questions.  A wound nurse from her SNF was present and was able to supplement history.  Per wound nurse, the wound physician who comes to the patient's facility was seeing her today and removed her wound vac for a dressing exchange, and at that time found that her fascial closure was dehisced and there was bone visible in the wound.  At that time the patient was sent to the emergency department.    Patient reports no fever/chills, no headaches, no lightheadedness, no chest pain, no shortness of breath, no abdominal pain, no increase in her leg pain.    Physical Exam   Triage vitals:  T 36.8 °C (98.2 °F)  HR 67  /72  RR 16  O2 100 % None (Room air)    Physical exam:   Triage vitals reviewed.  Constitutional: Well developed adult in no acute distress, non toxic in appearance  Head: Normocephalic, atraumatic  Skin: Warm, dry. No rashes or lesions.  Eyes: Pupils are equal. No conjunctival injection.  Neck: Supple. Trachea is midline.  Pulmonary: Normal work of breathing with no accessory muscle use noted.  Clear to auscultation  bilaterally.   Cardiovascular: Normal rate, regular rhythm. No murmurs/gallops/rubs appreciated. 2+ radial pulses bilaterally, 2+ left PT pulse, 2+ right femoral pulse.   Abdomen: Soft, nondistended. Nontender to palpation.  Extremities: Moving all extremities spontaneously without difficulty. S/p RLE AKA. There is complete dehiscence of her AKA incision through the fascial layer with serosanguinous drainage. There is visible bone within the wound.  No surrounding erythema or induration.  Mild tenderness surrounding wound.  Neuro: Awake and alert. Face is symmetric. Speech is clear. No obvious focal findings.  Psych: Appropriate mood and affect.      Medical Decision Making & ED Course   Medical Decision Makin y.o. female with PMHx HFrEF (LVEF 20 to 25%), A-fib on warfarin, ischemic stroke with residual expressive aphasia and right-sided weakness, T2DM, recent AKA for ischemic RLE who presents for wound dehiscence.  On arrival, patient was afebrile and hemodynamically stable.  She is well-appearing and in no distress.  Exam was significant for complete dehiscence of her fascial closure, with bone visible in her wound.  She was given 4 mg morphine for pain.    Vascular surgery consulted, and assessed the patient.  Recommended admission to medicine with vascular surgery following, with plan for OR tomorrow for debridement and possible closure.      Laboratory workup showed mild leukocytosis, downtrending from previous, and appropriately elevated INR. XR right femur shows no fractures, no osteolytic changes.    I discussed the diagnosis and the plan with the patient, who is in agreement with the plan.  Discussed with admission coordinator, who accepted the patient for admission to medicine.  Patient remained hemodynamically stable throughout my shift.    ----    Differential diagnoses considered include but are not limited to: Wound dehiscence, wound infection, osteomyelitis, limb ischemia, phantom limb pain,  chronic pain     Social Determinants of Health which Significantly Impact Care: None identified     EKG Independent Interpretation: EKG not obtained    Independent Result Review and Interpretation: Relevant laboratory and radiographic results were reviewed and independently interpreted by myself.  As necessary, they are commented on in the ED Course.    Chronic conditions affecting the patient's care: As documented above in [facility]    The patient was discussed with the following consultants/services: Vascular Surgery regarding wound dehiscence and admissions coordinator who accepted patient for admission    Care Considerations: As documented above in [facility]    ED Course:  ED Course as of 07/30/24 0019 Mon Jul 29, 2024   1716 LEUKOCYTES (10*3/UL) IN BLOOD BY AUTOMATED COUNT, [place](!): 12.0  Leukocytosis downtrending from previous [HH]      ED Course User Index  [HH] Lucretia Zamudio MD         Diagnoses as of 07/30/24 0019   Wound dehiscence     Disposition   As a result of their workup, the patient will require admission to the hospital.  The patient was informed of her diagnosis.  The patient was given the opportunity to ask questions and I answered them. The patient agreed to be admitted to the hospital.    Patient seen and discussed with ED attending physician.    Lucretia Zamudio MD  Emergency Medicine    Lucretia Zamudio MD  Resident  07/30/24 0029  ----------------------------------------    This patient was seen by the resident physician. I have seen and examined the patient, agree with the workup, evaluation, management and diagnosis. The care plan has been discussed and I concur.    My assessment reveals the following:    HPI:  Patient is a 44 y/o female s/p RLE amputation on 7/17, discharged on 7/24, presenting with large dehiscence of RLE stump noticed by wound care doctor today on home visit. Reports pain in stump, but not worsening than baseline since surgery. No F/C/N/V. No purulent discharge, but does  report some serosanguinous discharge. No redness or induration at site. 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     Limitations to History: Acute CVA with expressive aphasia.     PE:  Vital signs reviewed in nursing triage note, EMR flowsheets, and at patient's bedside  GEN: Patient is awake, alert, calm, cooperative, and in moderate dermatological distress.  HEAD: Normocephalic and atraumatic.  EYES: Anicteric sclera.  MOUTH: Mucous membranes moist.  CV: Regular rate and rhythm. (+) s1/s2. No murmurs/rubs/gallops.  PULM: CTAB. No wheezes, rales, or crackles.  GI: Soft, non-tender, non-distended without rebound or guarding.  EXT: Mild TTP over RLE stump with moderate to severe wound dehiscence.   NEURO: Moves all extremities.  SKIN: Warm, dry. No erythema or ecchymosis.    Labs Reviewed   TISSUE/WOUND CULTURE/SMEAR - Abnormal       Result Value    Tissue/Wound Culture/Smear Culture in progress      Gram Stain (2+) Few Polymorphonuclear leukocytes (*)     Gram Stain (2+) Few Gram negative bacilli (*)    TISSUE/WOUND CULTURE/SMEAR - Abnormal    Tissue/Wound Culture/Smear Culture in progress      Gram Stain (1+) Rare Polymorphonuclear leukocytes (*)     Gram Stain (1+) Rare Gram negative bacilli (*)    CBC WITH AUTO DIFFERENTIAL - Abnormal    WBC 12.0 (*)     nRBC 0.0      RBC 3.72 (*)     Hemoglobin 11.1 (*)     Hematocrit 34.7 (*)     MCV 93      MCH 29.8      MCHC 32.0      RDW 14.9 (*)     Platelets 268      Neutrophils % 64.2      Immature Granulocytes %, Automated 0.9      Lymphocytes % 24.4      Monocytes % 8.5      Eosinophils % 1.7      Basophils % 0.3      Neutrophils Absolute 7.70      Immature Granulocytes Absolute, Automated 0.11       Lymphocytes Absolute 2.92      Monocytes Absolute 1.02 (*)     Eosinophils Absolute 0.20      Basophils Absolute 0.03     B-TYPE NATRIURETIC PEPTIDE - Abnormal     (*)     Narrative:        <100 pg/mL - Heart failure unlikely  100-299 pg/mL - Intermediate probability of acute heart                  failure exacerbation. Correlate with clinical                  context and patient history.    >=300 pg/mL - Heart Failure likely. Correlate with clinical                  context and patient history.     Biotin interference may cause falsely decreased results. Patients taking a Biotin dose of up to 5 mg/day should refrain from taking Biotin for 24 hours before sample  collection. Providers may contact their local laboratory for further information.   COAGULATION SCREEN - Abnormal    Protime 20.9 (*)     INR 1.8 (*)     aPTT 38      Narrative:     The APTT is no longer used for monitoring Unfractionated Heparin Therapy. For monitoring Heparin Therapy, use the Heparin Assay.   CBC WITH AUTO DIFFERENTIAL - Abnormal    WBC 9.0      nRBC 0.0      RBC 3.36 (*)     Hemoglobin 10.1 (*)     Hematocrit 33.2 (*)     MCV 99      MCH 30.1      MCHC 30.4 (*)     RDW 15.2 (*)     Platelets 241      Neutrophils % 53.8      Immature Granulocytes %, Automated 0.3      Lymphocytes % 30.9      Monocytes % 11.3      Eosinophils % 3.4      Basophils % 0.3      Neutrophils Absolute 4.84      Immature Granulocytes Absolute, Automated 0.03      Lymphocytes Absolute 2.78      Monocytes Absolute 1.02 (*)     Eosinophils Absolute 0.31      Basophils Absolute 0.03     RENAL FUNCTION PANEL - Abnormal    Glucose 104 (*)     Sodium 131 (*)     Potassium 7.2 (*)     Chloride 97 (*)     Bicarbonate 28      Anion Gap 13      Urea Nitrogen 23      Creatinine 0.96      eGFR 75      Calcium 8.6      Phosphorus 5.5 (*)     Albumin 3.6     PROTIME-INR - Abnormal    Protime 20.2 (*)     INR 1.8 (*)    SEDIMENTATION RATE, AUTOMATED - Abnormal     Sedimentation Rate 79 (*)    BASIC METABOLIC PANEL - Abnormal    Glucose 116 (*)     Sodium 134 (*)     Potassium 3.4 (*)     Chloride 99      Bicarbonate 26      Anion Gap 12      Urea Nitrogen 22      Creatinine 0.85      eGFR 86      Calcium 8.7     CBC WITH AUTO DIFFERENTIAL - Abnormal    WBC 14.4 (*)     nRBC 0.0      RBC 3.09 (*)     Hemoglobin 9.4 (*)     Hematocrit 30.4 (*)     MCV 98      MCH 30.4      MCHC 30.9 (*)     RDW 14.7 (*)     Platelets 250      Neutrophils % 79.6      Immature Granulocytes %, Automated 0.6      Lymphocytes % 11.6      Monocytes % 8.0      Eosinophils % 0.1      Basophils % 0.1      Neutrophils Absolute 11.47 (*)     Immature Granulocytes Absolute, Automated 0.08      Lymphocytes Absolute 1.67      Monocytes Absolute 1.15 (*)     Eosinophils Absolute 0.02      Basophils Absolute 0.02     RENAL FUNCTION PANEL - Abnormal    Glucose 98      Sodium 136      Potassium 4.5      Chloride 98      Bicarbonate 27      Anion Gap 16      Urea Nitrogen 17      Creatinine 0.84      eGFR 87      Calcium 9.2      Phosphorus 5.6 (*)     Albumin 3.1 (*)    MAGNESIUM - Abnormal    Magnesium 1.55 (*)    COAGULATION SCREEN - Abnormal    Protime 16.7 (*)     INR 1.5 (*)     aPTT 30      Narrative:     The APTT is no longer used for monitoring Unfractionated Heparin Therapy. For monitoring Heparin Therapy, use the Heparin Assay.   POCT GLUCOSE - Abnormal    POCT Glucose 117 (*)    POCT GLUCOSE - Abnormal    POCT Glucose 145 (*)    POCT GLUCOSE - Abnormal    POCT Glucose 110 (*)    BLOOD CULTURE - Normal    Blood Culture No growth at 1 day     BLOOD CULTURE - Normal    Blood Culture No growth at 1 day     COMPREHENSIVE METABOLIC PANEL - Normal    Glucose 84      Sodium 136      Potassium 4.3      Chloride 98      Bicarbonate 25      Anion Gap 17      Urea Nitrogen 23      Creatinine 0.87      eGFR 84      Calcium 9.4      Albumin 3.7      Alkaline Phosphatase 83      Total Protein 8.0      AST 22       Bilirubin, Total 0.7      ALT 13     HUMAN CHORIONIC GONADOTROPIN, SERUM QUANTITATIVE - Normal    HCG, Beta-Quantitative <3      Narrative:     Total HCG measurement is performed using the Siemens dilitronicsllThe Old Reader immunoassay which detects intact HCG and free beta HCG subunit.  This test is not indicated for use as a tumor marker.  HCG testing is performed using a different test methodology at [facility] than other [facility]. Direct result comparison  should only be made within the same method.          C-REACTIVE PROTEIN - Normal    C-Reactive Protein 0.70     MAGNESIUM - Normal    Magnesium 1.87     HEPARIN ASSAY - Normal    Heparin Unfractionated 0.2      Narrative:     The therapeutic reference range for UFH may be either 0.3-0.6 IU/mL or 0.3-0.7 IU/mL based on the clinical setting for anticoagulant therapy and the associated nomogram used. For Heparin dosing guidelines based on clinical scenario and Heparin Assay results, please refer to local Pharmacy and the [facility] Guidelines for Anticoagulation Therapy available on the [facility] intranet at: [email or web address]   B-TYPE NATRIURETIC PEPTIDE - Normal    BNP 97      Narrative:        <100 pg/mL - Heart failure unlikely  100-299 pg/mL - Intermediate probability of acute heart                  failure exacerbation. Correlate with clinical                  context and patient history.    >=300 pg/mL - Heart Failure likely. Correlate with clinical                  context and patient history.     Biotin interference may cause falsely decreased results. Patients taking a Biotin dose of up to 5 mg/day should refrain from taking Biotin for 24 hours before sample  collection. Providers may contact their local laboratory for further information.   HEPARIN ASSAY - Normal    Heparin Unfractionated 0.4      Narrative:     The therapeutic reference range for UFH may be  either 0.3-0.6 IU/mL or 0.3-0.7 IU/mL based on the clinical setting for anticoagulant therapy and the associated nomogram used. For Heparin dosing guidelines based on clinical scenario and Heparin Assay results, please refer to [facility] and [facility] Guidelines for Anticoagulation Therapy available on the [facility] intranet at: [email or web address]   HEPARIN ASSAY - Normal    Heparin Unfractionated 0.1      Narrative:     The therapeutic reference range for UFH may be either 0.3-0.6 IU/mL or 0.3-0.7 IU/mL based on the clinical setting for anticoagulant therapy and the associated nomogram used. For Heparin dosing guidelines based on clinical scenario and Heparin Assay results, please refer to [facility] and [facility] Guidelines for Anticoagulation Therapy available on the [facility] intranet at: [email or web address]   VANCOMYCIN, TROUGH - Normal    Vancomycin, Trough 12.4     POCT GLUCOSE - Normal    POCT Glucose 96     POCT GLUCOSE - Normal    POCT Glucose 98     TYPE AND SCREEN    ABO TYPE B      Rh TYPE POS      ANTIBODY SCREEN NEG     GRAY TOP    Extra Tube Hold for add-ons.     HEMOGLOBIN A1C    Hemoglobin A1C 4.2      Estimated Average Glucose 74      Narrative:     Diagnosis of Diabetes-Adults  Non-Diabetic: < or = 5.6%  Increased risk for developing diabetes: 5.7-6.4%  Diagnostic of diabetes: > or = 6.5%       POCT GLUCOSE   POCT GLUCOSE   POCT GLUCOSE   POCT GLUCOSE   POCT GLUCOSE   POCT GLUCOSE   POCT GLUCOSE   SURGICAL PATHOLOGY EXAM     XR chest 1 view   Final Result   1.  No evidence of acute cardiopulmonary process.             Signed by: Jonathan Sin 7/30/2024 4:31 AM   Dictation workstation:   [id number]      XR femur right 2+ views   Final Result   1.  Postsurgical changes of above knee amputation of the right  femur.   No evidence of cortical breakdown or osteolytic changes.   2. Skin soft tissue defect at the distal tip of the amputation along   with multiple foci of soft tissue gas adjacent to the distal femur   with overlying soft tissue irregularity. Underlying soft tissue   infection or open wound may be present             I personally reviewed the image(s)/study and resident interpretation.   I agree with the findings as stated by resident Robin Prince.   Data analyzed and images interpreted at [facility], [place], [place].        MACRO:   None        Signed by: Leland Burnham 7/29/2024 6:07 PM   Dictation workstation:   [id number]            Medical Decision Making:  - IV  - Labs  - Right femur x-ray  - Pain meds  - Vascular surgery consult    Differential Diagnoses Considered: Post op complication, Wound dehiscence     Chronic Medical Conditions Significantly Affecting Care: See HPI    External Records Reviewed: I reviewed recent and relevant outside records including: [PCP notes, prior discharge summary, previous radiologic studies, HIE, EMS runsheet, OARRS, etc.]     Independent Interpretation of Studies:  I independently interpreted: Right femur x-ray shows AKA with no acute fractures.     Escalation of Care:  Appropriate for inpatient management    MD Logan Martinez MD  07/31/24 4421

## 2024-07-29 NOTE — CONSULTS
Vascular Surgery Consult  Assessment/Plan   Amirah Bennett is 43 y.o. 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 Patient hospitalization was complicated with wound infection requiring abx and cardiogenic shock. Patient had wound vac taken down for the first time after discharge by home wound nurse. Patient found to have wound dehiscence and recommended to come to ED for further evaluation. Vascular surgery consulted for further evaluation and management.     Plan:  Patients AKA wound with evidence of wound dehiscence. Patient will require debridement and possible closure in the OR. Tentatively added on for 7/30.   Will need to obtain consent NOK for surgery.  Saline wet to dry dressing kerlix with abd pads covering the wound while waiting for OR.  NPO at midnight  Please ensure patient has updated Type & screen and coags   Recommend medicine consult given extensive medical history  Vascular surgery to follow  Please call with any questions or concerns    D/w attending, Dr. Rj Leyva MD  General Surgery Resident PGY-3   Vascular Surgery [id number]      Subjective   HPI:  Amirah Bennett is 43 y.o. 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 Patient hospitalization was complicated with wound infection requiring abx and cardiogenic shock. Patient had wound vac taken down for the first time after discharge by home wound nurse. Patient found to have wound dehiscence and recommended to come to ED for further evaluation. Vascular surgery consulted for further evaluation and management.   Per patient she has had no issue with wound vac since discharge until today when it was being changed. Denies any fevers, chills, nausea, vomiting, abdominal pain, constipation, or chest pain. Complaining of RLE pain exacerbated with touch. Has not had any drainage. When wound vac was taken down today, patient was told there is dehiscence in the wound and patient would require to come to the ED for further evaluation      PMH:   As per HPI  Past Medical History:   Diagnosis Date    CHF (congestive heart failure) (Multi)     Stroke (Multi)          PSH:   Past Surgical History:   Procedure Laterality Date    CARDIAC CATHETERIZATION N/A 06/20/2024    Procedure: Right Heart Cath;  Surgeon: Osman Su MD;  Location: [place] [address] Cardiac Cath Lab;  Service: Cardiovascular;  Laterality: N/A;    INVASIVE VASCULAR PROCEDURE N/A 06/20/2024    Procedure: Arterial  Access, Intracatheter;  Surgeon: Osman Su MD;  Location: [place] [address] Cardiac Cath Lab;  Service: Cardiovascular;  Laterality: N/A;    LEG AMPUTATION THROUGH KNEE      LEG AMPUTATION THROUGH KNEE      OTHER SURGICAL HISTORY  06/09/2022    Trachectomy         Relevant Home Meds:     Current Outpatient Medications   Medication Instructions    amiodarone (PACERONE) 200 mg, oral,  Daily    aspirin 81 mg, oral, Daily RT    atorvastatin (LIPITOR) 80 mg, oral, Daily    DULoxetine (CYMBALTA) 60 mg, oral, Daily, Do not crush or chew.    empagliflozin (JARDIANCE) 10 mg, oral, Daily    folic acid (FOLVITE) 1 mg, oral, Daily    furosemide (LASIX) 40 mg, oral, 2 times daily (morning and late afternoon)    lidocaine (Lidoderm) 5 % patch 1 patch, transdermal, Daily, Remove & discard patch within 12 hours or as directed by MD.    melatonin 3 mg, oral, Nightly PRN    metoprolol succinate XL (TOPROL-XL) 25 mg, oral, Daily, Do not crush or chew.    multivitamin with minerals tablet 1 tablet, oral, Daily    oxyCODONE (ROXICODONE) 10 mg, oral, Every 6 hours PRN    oxyCODONE (ROXICODONE) 5 mg, oral, Every 6 hours PRN    pantoprazole (PROTONIX) 40 mg, oral, Daily before breakfast, Do not crush, chew, or split.    polyethylene glycol (GLYCOLAX, MIRALAX) 17 g, oral, Daily RT    pregabalin (LYRICA) 75 mg, oral, 2 times daily    sacubitriL-valsartan (Entresto) 24-26 mg tablet 2 tablets, oral, 2 times daily    sennosides (SENOKOT) 17.2 mg, oral, 2 times daily    warfarin (Coumadin) 5 mg tablet Take as directed per After Visit Summary.          Allergies:   Allergies   Allergen Reactions    Hydrocodone-Acetaminophen Rash    Ibuprofen Rash     Tolerates aspirin         SH/FH:   No know family history  Working History: Unemployed.  Social supports: Lives with her friend who is able to provide assistance at home as needed.  Home situation: Lives in single level floor, 3-4 steps to enter home.     ROS:   Review of Systems   Constitutional:  Negative for activity change, chills and fatigue.   HENT: Negative.  Negative for congestion.    Eyes: Negative.    Respiratory: Negative.     Cardiovascular: Negative.    Gastrointestinal: Negative.    Endocrine: Negative.    Genitourinary: Negative.    Musculoskeletal: Negative.         Pain at the mid thigh and AKA wound   Skin: Negative.    Allergic/Immunologic: Negative.   "  Neurological: Negative.    Hematological: Negative.    Psychiatric/Behavioral: Negative.           Objective   Vitals:  Heart Rate:  [43-67]   Temperature:  [36.8 °C (98.2 °F)]   Respirations:  [16]   BP: (100-108)/(61-72)   Height:  [170.2 cm (5' 7\")]   Weight:  [71.2 kg (157 lb)]   Pulse Ox:  [99 %-100 %]     Exam:  Constitutional: No acute distress, sitting in bed comfortably  Neuro:  AOx3, grossly intact, speech within normal limits  ENMT: moist mucous membranes  Head/neck: atraumatic  CV: RRR  Pulm: non-labored on room air  GI: soft, non-tender, non-distended  Skin: warm and dry  Musculoskeletal: moving all extremities  Extremities: RLE AKA without wound vac in place. Deep dehiscence at the middle of AKA site without evidence of purulence and drainage. Some evidence of scattered Eschar within the wound. Tender on exam. R femoral artery palpable. Neuro and motor intact.    Vitals    Visit Vitals  /61 (BP Location: Right arm, Patient Position: Lying)   Pulse (!) 45   Temp 36.8 °C (98.2 °F) (Temporal)   Resp 16   Ht 1.702 m (5' 7\")   Wt 71.2 kg (157 lb)   SpO2 99%   BMI 24.59 kg/m²   Smoking Status Former   BSA 1.83 m²        No intake or output data in the 24 hours ending 07/29/24 1449     Medications  Scheduled medications  [START ON 7/30/2024] amiodarone, 200 mg, oral, Daily  [Held by provider] aspirin, 81 mg, oral, Daily  atorvastatin, 80 mg, oral, Nightly  [START ON 7/30/2024] DULoxetine, 60 mg, oral, Daily  [START ON 7/30/2024] empagliflozin, 10 mg, oral, Daily  [START ON 7/30/2024] folic acid, 1 mg, oral, Daily  [START ON 7/30/2024] furosemide, 40 mg, oral, BID  [START ON 7/30/2024] metoprolol succinate XL, 25 mg, oral, Daily  [START ON 7/30/2024] multivitamin with minerals, 1 tablet, oral, Daily  [START ON 7/30/2024] pantoprazole, 40 mg, oral, Daily before breakfast  pregabalin, 75 mg, oral, BID  sacubitriL-valsartan, 2 tablet, oral, BID  sennosides, 2 tablet, oral, BID      Continuous " medications     PRN medications  PRN medications: dextrose, dextrose, glucagon, glucagon, melatonin, oxyCODONE, oxyCODONE, polyethylene glycol     Labs            11.1     12.0>-----<268              34.7   136  98  23                  ----------------<84     4.3  25  0.87          Ca 9.4 Phos 5.4 Mg 1.90       ALT 13 AST 22 AlkPhos 83 tBili 0.7          Imaging  XR femur right 2+ views    Result Date: 7/29/2024  Interpreted By:  Leland Burnham and [place] Conner STUDY: XR FEMUR RIGHT 2+ VIEWS; ;  7/29/2024 5:47 pm   INDICATION: Signs/Symptoms:Recent AKA RLE 7/17 with wound dehiscence.   COMPARISON: CTA aorta and bilateral iliac arteries 07/02/2024   ACCESSION NUMBER(S): [id number]   ORDERING CLINICIAN: CAROLINA AMBROSIO   FINDINGS: Four views of the right femur provided.   Postsurgical changes of the above knee amputation. Cortical margins of the right femoral stump appear sharp. No cortical irregularity or osteolytic changes. Soft tissue irregularity of the distal stump and few collections of subcutaneous gas. Right hip joint is unremarkable.       1.  Postsurgical changes of above knee amputation of the right femur. No evidence of cortical breakdown or osteolytic changes. 2. Skin soft tissue defect at the distal tip of the amputation along with multiple foci of soft tissue gas adjacent to the distal femur with overlying soft tissue irregularity. Underlying soft tissue infection or open wound may be present     I personally reviewed the image(s)/study and resident interpretation. I agree with the findings as stated by resident Robin Prince. Data analyzed and images interpreted at [facility], [place], [place].   MACRO: None   Signed by: Leland Burnham 7/29/2024 6:07 PM Dictation workstation:   [id number]

## 2024-07-30 ENCOUNTER — APPOINTMENT (OUTPATIENT)
Dept: RADIOLOGY | Facility: HOSPITAL | Age: 44
End: 2024-07-30
Payer: COMMERCIAL

## 2024-07-30 ENCOUNTER — ANESTHESIA EVENT (OUTPATIENT)
Dept: OPERATING ROOM | Facility: HOSPITAL | Age: 44
End: 2024-07-30
Payer: COMMERCIAL

## 2024-07-30 ENCOUNTER — ANESTHESIA (OUTPATIENT)
Dept: OPERATING ROOM | Facility: HOSPITAL | Age: 44
End: 2024-07-30
Payer: COMMERCIAL

## 2024-07-30 ENCOUNTER — CLINICAL SUPPORT (OUTPATIENT)
Dept: EMERGENCY MEDICINE | Facility: HOSPITAL | Age: 44
End: 2024-07-30
Payer: COMMERCIAL

## 2024-07-30 LAB
ALBUMIN SERPL BCP-MCNC: 3.6 G/DL (ref 3.4–5)
ANION GAP SERPL CALC-SCNC: 12 MMOL/L (ref 10–20)
ANION GAP SERPL CALC-SCNC: 13 MMOL/L (ref 10–20)
BASOPHILS # BLD AUTO: 0.03 X10*3/UL (ref 0–0.1)
BASOPHILS NFR BLD AUTO: 0.3 %
BNP SERPL-MCNC: 97 PG/ML (ref 0–99)
BUN SERPL-MCNC: 22 MG/DL (ref 6–23)
BUN SERPL-MCNC: 23 MG/DL (ref 6–23)
CALCIUM SERPL-MCNC: 8.6 MG/DL (ref 8.6–10.6)
CALCIUM SERPL-MCNC: 8.7 MG/DL (ref 8.6–10.6)
CHLORIDE SERPL-SCNC: 97 MMOL/L (ref 98–107)
CHLORIDE SERPL-SCNC: 99 MMOL/L (ref 98–107)
CO2 SERPL-SCNC: 26 MMOL/L (ref 21–32)
CO2 SERPL-SCNC: 28 MMOL/L (ref 21–32)
CREAT SERPL-MCNC: 0.85 MG/DL (ref 0.5–1.05)
CREAT SERPL-MCNC: 0.96 MG/DL (ref 0.5–1.05)
EGFRCR SERPLBLD CKD-EPI 2021: 75 ML/MIN/1.73M*2
EGFRCR SERPLBLD CKD-EPI 2021: 86 ML/MIN/1.73M*2
EOSINOPHIL # BLD AUTO: 0.31 X10*3/UL (ref 0–0.7)
EOSINOPHIL NFR BLD AUTO: 3.4 %
ERYTHROCYTE [DISTWIDTH] IN BLOOD BY AUTOMATED COUNT: 15.2 % (ref 11.5–14.5)
ERYTHROCYTE [SEDIMENTATION RATE] IN BLOOD BY WESTERGREN METHOD: 79 MM/H (ref 0–20)
GLUCOSE BLD MANUAL STRIP-MCNC: 117 MG/DL (ref 74–99)
GLUCOSE BLD MANUAL STRIP-MCNC: 145 MG/DL (ref 74–99)
GLUCOSE BLD MANUAL STRIP-MCNC: 96 MG/DL (ref 74–99)
GLUCOSE SERPL-MCNC: 104 MG/DL (ref 74–99)
GLUCOSE SERPL-MCNC: 116 MG/DL (ref 74–99)
HCT VFR BLD AUTO: 33.2 % (ref 36–46)
HGB BLD-MCNC: 10.1 G/DL (ref 12–16)
IMM GRANULOCYTES # BLD AUTO: 0.03 X10*3/UL (ref 0–0.7)
IMM GRANULOCYTES NFR BLD AUTO: 0.3 % (ref 0–0.9)
INR PPP: 1.8 (ref 0.9–1.1)
LYMPHOCYTES # BLD AUTO: 2.78 X10*3/UL (ref 1.2–4.8)
LYMPHOCYTES NFR BLD AUTO: 30.9 %
MAGNESIUM SERPL-MCNC: 1.87 MG/DL (ref 1.6–2.4)
MCH RBC QN AUTO: 30.1 PG (ref 26–34)
MCHC RBC AUTO-ENTMCNC: 30.4 G/DL (ref 32–36)
MCV RBC AUTO: 99 FL (ref 80–100)
MONOCYTES # BLD AUTO: 1.02 X10*3/UL (ref 0.1–1)
MONOCYTES NFR BLD AUTO: 11.3 %
NEUTROPHILS # BLD AUTO: 4.84 X10*3/UL (ref 1.2–7.7)
NEUTROPHILS NFR BLD AUTO: 53.8 %
NRBC BLD-RTO: 0 /100 WBCS (ref 0–0)
PHOSPHATE SERPL-MCNC: 5.5 MG/DL (ref 2.5–4.9)
PLATELET # BLD AUTO: 241 X10*3/UL (ref 150–450)
POTASSIUM SERPL-SCNC: 3.4 MMOL/L (ref 3.5–5.3)
POTASSIUM SERPL-SCNC: 7.2 MMOL/L (ref 3.5–5.3)
PROTHROMBIN TIME: 20.2 SECONDS (ref 9.8–12.8)
RBC # BLD AUTO: 3.36 X10*6/UL (ref 4–5.2)
SODIUM SERPL-SCNC: 131 MMOL/L (ref 136–145)
SODIUM SERPL-SCNC: 134 MMOL/L (ref 136–145)
UFH PPP CHRO-ACNC: 0.1 IU/ML
UFH PPP CHRO-ACNC: 0.2 IU/ML
UFH PPP CHRO-ACNC: 0.4 IU/ML
WBC # BLD AUTO: 9 X10*3/UL (ref 4.4–11.3)

## 2024-07-30 PROCEDURE — 85610 PROTHROMBIN TIME: CPT | Performed by: NURSE PRACTITIONER

## 2024-07-30 PROCEDURE — 88304 TISSUE EXAM BY PATHOLOGIST: CPT | Mod: TC,SUR | Performed by: EMERGENCY MEDICINE

## 2024-07-30 PROCEDURE — 0JCL0ZZ EXTIRPATION OF MATTER FROM RIGHT UPPER LEG SUBCUTANEOUS TISSUE AND FASCIA, OPEN APPROACH: ICD-10-PCS | Performed by: SURGERY

## 2024-07-30 PROCEDURE — 36415 COLL VENOUS BLD VENIPUNCTURE: CPT

## 2024-07-30 PROCEDURE — 85520 HEPARIN ASSAY: CPT | Performed by: NURSE PRACTITIONER

## 2024-07-30 PROCEDURE — 82947 ASSAY GLUCOSE BLOOD QUANT: CPT

## 2024-07-30 PROCEDURE — 27596 AMPUTATION FOLLOW-UP SURGERY: CPT | Performed by: SURGERY

## 2024-07-30 PROCEDURE — A27596 PR AMPUTATE THIGH,RE-AMPUTATN: Performed by: NURSE ANESTHETIST, CERTIFIED REGISTERED

## 2024-07-30 PROCEDURE — 36415 COLL VENOUS BLD VENIPUNCTURE: CPT | Performed by: NURSE PRACTITIONER

## 2024-07-30 PROCEDURE — 83880 ASSAY OF NATRIURETIC PEPTIDE: CPT | Performed by: STUDENT IN AN ORGANIZED HEALTH CARE EDUCATION/TRAINING PROGRAM

## 2024-07-30 PROCEDURE — 71045 X-RAY EXAM CHEST 1 VIEW: CPT

## 2024-07-30 PROCEDURE — 88304 TISSUE EXAM BY PATHOLOGIST: CPT | Performed by: STUDENT IN AN ORGANIZED HEALTH CARE EDUCATION/TRAINING PROGRAM

## 2024-07-30 PROCEDURE — 2500000004 HC RX 250 GENERAL PHARMACY W/ HCPCS (ALT 636 FOR OP/ED): Performed by: ANESTHESIOLOGY

## 2024-07-30 PROCEDURE — 85025 COMPLETE CBC W/AUTO DIFF WBC: CPT | Performed by: NURSE PRACTITIONER

## 2024-07-30 PROCEDURE — 2500000004 HC RX 250 GENERAL PHARMACY W/ HCPCS (ALT 636 FOR OP/ED): Performed by: STUDENT IN AN ORGANIZED HEALTH CARE EDUCATION/TRAINING PROGRAM

## 2024-07-30 PROCEDURE — 87205 SMEAR GRAM STAIN: CPT | Performed by: SURGERY

## 2024-07-30 PROCEDURE — 2780000003 HC OR 278 NO HCPCS: Performed by: SURGERY

## 2024-07-30 PROCEDURE — 3600000003 HC OR TIME - INITIAL BASE CHARGE - PROCEDURE LEVEL THREE: Performed by: SURGERY

## 2024-07-30 PROCEDURE — 2500000001 HC RX 250 WO HCPCS SELF ADMINISTERED DRUGS (ALT 637 FOR MEDICARE OP): Performed by: STUDENT IN AN ORGANIZED HEALTH CARE EDUCATION/TRAINING PROGRAM

## 2024-07-30 PROCEDURE — P9045 ALBUMIN (HUMAN), 5%, 250 ML: HCPCS | Mod: JZ,JG | Performed by: NURSE ANESTHETIST, CERTIFIED REGISTERED

## 2024-07-30 PROCEDURE — 7100000001 HC RECOVERY ROOM TIME - INITIAL BASE CHARGE: Performed by: SURGERY

## 2024-07-30 PROCEDURE — 3700000002 HC GENERAL ANESTHESIA TIME - EACH INCREMENTAL 1 MINUTE: Performed by: SURGERY

## 2024-07-30 PROCEDURE — 0Y6C0Z3 DETACHMENT AT RIGHT UPPER LEG, LOW, OPEN APPROACH: ICD-10-PCS | Performed by: SURGERY

## 2024-07-30 PROCEDURE — 2500000004 HC RX 250 GENERAL PHARMACY W/ HCPCS (ALT 636 FOR OP/ED): Performed by: NURSE ANESTHETIST, CERTIFIED REGISTERED

## 2024-07-30 PROCEDURE — 3600000008 HC OR TIME - EACH INCREMENTAL 1 MINUTE - PROCEDURE LEVEL THREE: Performed by: SURGERY

## 2024-07-30 PROCEDURE — 2500000001 HC RX 250 WO HCPCS SELF ADMINISTERED DRUGS (ALT 637 FOR MEDICARE OP): Performed by: NURSE PRACTITIONER

## 2024-07-30 PROCEDURE — 87185 SC STD ENZYME DETCJ PER NZM: CPT | Performed by: SURGERY

## 2024-07-30 PROCEDURE — 80069 RENAL FUNCTION PANEL: CPT | Performed by: NURSE PRACTITIONER

## 2024-07-30 PROCEDURE — 1200000002 HC GENERAL ROOM WITH TELEMETRY DAILY

## 2024-07-30 PROCEDURE — 71045 X-RAY EXAM CHEST 1 VIEW: CPT | Performed by: STUDENT IN AN ORGANIZED HEALTH CARE EDUCATION/TRAINING PROGRAM

## 2024-07-30 PROCEDURE — 85652 RBC SED RATE AUTOMATED: CPT

## 2024-07-30 PROCEDURE — 93005 ELECTROCARDIOGRAM TRACING: CPT

## 2024-07-30 PROCEDURE — 99232 SBSQ HOSP IP/OBS MODERATE 35: CPT | Performed by: STUDENT IN AN ORGANIZED HEALTH CARE EDUCATION/TRAINING PROGRAM

## 2024-07-30 PROCEDURE — 0JBL0ZZ EXCISION OF RIGHT UPPER LEG SUBCUTANEOUS TISSUE AND FASCIA, OPEN APPROACH: ICD-10-PCS | Performed by: SURGERY

## 2024-07-30 PROCEDURE — 2720000007 HC OR 272 NO HCPCS: Performed by: SURGERY

## 2024-07-30 PROCEDURE — 83735 ASSAY OF MAGNESIUM: CPT | Performed by: NURSE PRACTITIONER

## 2024-07-30 PROCEDURE — 87040 BLOOD CULTURE FOR BACTERIA: CPT | Performed by: STUDENT IN AN ORGANIZED HEALTH CARE EDUCATION/TRAINING PROGRAM

## 2024-07-30 PROCEDURE — 2500000005 HC RX 250 GENERAL PHARMACY W/O HCPCS: Performed by: NURSE ANESTHETIST, CERTIFIED REGISTERED

## 2024-07-30 PROCEDURE — 3700000001 HC GENERAL ANESTHESIA TIME - INITIAL BASE CHARGE: Performed by: SURGERY

## 2024-07-30 PROCEDURE — A27596 PR AMPUTATE THIGH,RE-AMPUTATN: Performed by: ANESTHESIOLOGY

## 2024-07-30 PROCEDURE — 7100000002 HC RECOVERY ROOM TIME - EACH INCREMENTAL 1 MINUTE: Performed by: SURGERY

## 2024-07-30 PROCEDURE — 80048 BASIC METABOLIC PNL TOTAL CA: CPT | Mod: CCI | Performed by: STUDENT IN AN ORGANIZED HEALTH CARE EDUCATION/TRAINING PROGRAM

## 2024-07-30 PROCEDURE — 2500000002 HC RX 250 W HCPCS SELF ADMINISTERED DRUGS (ALT 637 FOR MEDICARE OP, ALT 636 FOR OP/ED): Performed by: NURSE PRACTITIONER

## 2024-07-30 RX ORDER — AMIODARONE HYDROCHLORIDE 200 MG/1
400 TABLET ORAL 2 TIMES DAILY
Status: DISPENSED | OUTPATIENT
Start: 2024-07-30 | End: 2024-08-04

## 2024-07-30 RX ORDER — DROPERIDOL 2.5 MG/ML
0.62 INJECTION, SOLUTION INTRAMUSCULAR; INTRAVENOUS ONCE AS NEEDED
Status: DISCONTINUED | OUTPATIENT
Start: 2024-07-30 | End: 2024-07-30 | Stop reason: HOSPADM

## 2024-07-30 RX ORDER — MIDAZOLAM HYDROCHLORIDE 1 MG/ML
INJECTION INTRAMUSCULAR; INTRAVENOUS AS NEEDED
Status: DISCONTINUED | OUTPATIENT
Start: 2024-07-30 | End: 2024-07-30

## 2024-07-30 RX ORDER — PROPOFOL 10 MG/ML
INJECTION, EMULSION INTRAVENOUS AS NEEDED
Status: DISCONTINUED | OUTPATIENT
Start: 2024-07-30 | End: 2024-07-30

## 2024-07-30 RX ORDER — FENTANYL CITRATE 50 UG/ML
INJECTION, SOLUTION INTRAMUSCULAR; INTRAVENOUS AS NEEDED
Status: DISCONTINUED | OUTPATIENT
Start: 2024-07-30 | End: 2024-07-30

## 2024-07-30 RX ORDER — ONDANSETRON HYDROCHLORIDE 2 MG/ML
INJECTION, SOLUTION INTRAVENOUS AS NEEDED
Status: DISCONTINUED | OUTPATIENT
Start: 2024-07-30 | End: 2024-07-30

## 2024-07-30 RX ORDER — FENTANYL CITRATE 50 UG/ML
12.5 INJECTION, SOLUTION INTRAMUSCULAR; INTRAVENOUS EVERY 5 MIN PRN
Status: DISCONTINUED | OUTPATIENT
Start: 2024-07-30 | End: 2024-07-30 | Stop reason: HOSPADM

## 2024-07-30 RX ORDER — FENTANYL CITRATE 50 UG/ML
25 INJECTION, SOLUTION INTRAMUSCULAR; INTRAVENOUS EVERY 5 MIN PRN
Status: DISCONTINUED | OUTPATIENT
Start: 2024-07-30 | End: 2024-07-30 | Stop reason: HOSPADM

## 2024-07-30 RX ORDER — VANCOMYCIN HYDROCHLORIDE 1 G/200ML
1000 INJECTION, SOLUTION INTRAVENOUS EVERY 12 HOURS
Status: DISCONTINUED | OUTPATIENT
Start: 2024-07-30 | End: 2024-07-31

## 2024-07-30 RX ORDER — PHENYLEPHRINE HYDROCHLORIDE 10 MG/ML
INJECTION INTRAVENOUS AS NEEDED
Status: DISCONTINUED | OUTPATIENT
Start: 2024-07-30 | End: 2024-07-30

## 2024-07-30 RX ORDER — ROCURONIUM BROMIDE 10 MG/ML
INJECTION, SOLUTION INTRAVENOUS AS NEEDED
Status: DISCONTINUED | OUTPATIENT
Start: 2024-07-30 | End: 2024-07-30

## 2024-07-30 RX ORDER — HYDROMORPHONE HYDROCHLORIDE 1 MG/ML
INJECTION, SOLUTION INTRAMUSCULAR; INTRAVENOUS; SUBCUTANEOUS AS NEEDED
Status: DISCONTINUED | OUTPATIENT
Start: 2024-07-30 | End: 2024-07-30

## 2024-07-30 RX ORDER — SODIUM CHLORIDE, SODIUM LACTATE, POTASSIUM CHLORIDE, CALCIUM CHLORIDE 600; 310; 30; 20 MG/100ML; MG/100ML; MG/100ML; MG/100ML
INJECTION, SOLUTION INTRAVENOUS CONTINUOUS PRN
Status: DISCONTINUED | OUTPATIENT
Start: 2024-07-30 | End: 2024-07-30

## 2024-07-30 RX ORDER — ONDANSETRON HYDROCHLORIDE 2 MG/ML
4 INJECTION, SOLUTION INTRAVENOUS ONCE AS NEEDED
Status: DISCONTINUED | OUTPATIENT
Start: 2024-07-30 | End: 2024-07-30 | Stop reason: HOSPADM

## 2024-07-30 RX ORDER — ALBUMIN HUMAN 50 G/1000ML
SOLUTION INTRAVENOUS AS NEEDED
Status: DISCONTINUED | OUTPATIENT
Start: 2024-07-30 | End: 2024-07-30

## 2024-07-30 RX ORDER — SODIUM CHLORIDE, SODIUM LACTATE, POTASSIUM CHLORIDE, CALCIUM CHLORIDE 600; 310; 30; 20 MG/100ML; MG/100ML; MG/100ML; MG/100ML
100 INJECTION, SOLUTION INTRAVENOUS CONTINUOUS
Status: DISCONTINUED | OUTPATIENT
Start: 2024-07-30 | End: 2024-07-30 | Stop reason: HOSPADM

## 2024-07-30 RX ORDER — CEFAZOLIN 1 G/1
INJECTION, POWDER, FOR SOLUTION INTRAVENOUS AS NEEDED
Status: DISCONTINUED | OUTPATIENT
Start: 2024-07-30 | End: 2024-07-30

## 2024-07-30 RX ORDER — PHENYLEPHRINE HCL IN 0.9% NACL 0.4MG/10ML
SYRINGE (ML) INTRAVENOUS AS NEEDED
Status: DISCONTINUED | OUTPATIENT
Start: 2024-07-30 | End: 2024-07-30

## 2024-07-30 RX ORDER — AMIODARONE HYDROCHLORIDE 200 MG/1
200 TABLET ORAL DAILY
Status: DISCONTINUED | OUTPATIENT
Start: 2024-08-05 | End: 2024-08-09 | Stop reason: HOSPADM

## 2024-07-30 RX ORDER — FENTANYL CITRATE 50 UG/ML
50 INJECTION, SOLUTION INTRAMUSCULAR; INTRAVENOUS EVERY 5 MIN PRN
Status: DISCONTINUED | OUTPATIENT
Start: 2024-07-30 | End: 2024-07-30 | Stop reason: HOSPADM

## 2024-07-30 RX ORDER — VANCOMYCIN HYDROCHLORIDE 1 G/20ML
INJECTION, POWDER, LYOPHILIZED, FOR SOLUTION INTRAVENOUS DAILY PRN
Status: DISCONTINUED | OUTPATIENT
Start: 2024-07-30 | End: 2024-08-02

## 2024-07-30 RX ORDER — LIDOCAINE HCL/PF 100 MG/5ML
SYRINGE (ML) INTRAVENOUS AS NEEDED
Status: DISCONTINUED | OUTPATIENT
Start: 2024-07-30 | End: 2024-07-30

## 2024-07-30 RX ORDER — NORETHINDRONE AND ETHINYL ESTRADIOL 0.5-0.035
KIT ORAL AS NEEDED
Status: DISCONTINUED | OUTPATIENT
Start: 2024-07-30 | End: 2024-07-30

## 2024-07-30 RX ORDER — LIDOCAINE HYDROCHLORIDE 10 MG/ML
0.1 INJECTION INFILTRATION; PERINEURAL ONCE
Status: DISCONTINUED | OUTPATIENT
Start: 2024-07-30 | End: 2024-07-30 | Stop reason: HOSPADM

## 2024-07-30 ASSESSMENT — PAIN SCALES - GENERAL
PAINLEVEL_OUTOF10: 0 - NO PAIN
PAINLEVEL_OUTOF10: 6
PAINLEVEL_OUTOF10: 10 - WORST POSSIBLE PAIN
PAINLEVEL_OUTOF10: 8
PAINLEVEL_OUTOF10: 10 - WORST POSSIBLE PAIN

## 2024-07-30 ASSESSMENT — PAIN - FUNCTIONAL ASSESSMENT
PAIN_FUNCTIONAL_ASSESSMENT: 0-10

## 2024-07-30 ASSESSMENT — PAIN DESCRIPTION - PROGRESSION
CLINICAL_PROGRESSION: GRADUALLY WORSENING
CLINICAL_PROGRESSION: RAPIDLY IMPROVING

## 2024-07-30 ASSESSMENT — PAIN DESCRIPTION - ORIENTATION: ORIENTATION: RIGHT

## 2024-07-30 ASSESSMENT — COGNITIVE AND FUNCTIONAL STATUS - GENERAL
MOVING TO AND FROM BED TO CHAIR: TOTAL
CLIMB 3 TO 5 STEPS WITH RAILING: TOTAL
WALKING IN HOSPITAL ROOM: TOTAL
MOVING FROM LYING ON BACK TO SITTING ON SIDE OF FLAT BED WITH BEDRAILS: A LOT
TURNING FROM BACK TO SIDE WHILE IN FLAT BAD: A LOT
STANDING UP FROM CHAIR USING ARMS: TOTAL
MOBILITY SCORE: 8

## 2024-07-30 ASSESSMENT — PAIN DESCRIPTION - FREQUENCY: FREQUENCY: CONSTANT/CONTINUOUS

## 2024-07-30 ASSESSMENT — PAIN DESCRIPTION - LOCATION: LOCATION: LEG

## 2024-07-30 NOTE — H&P
Surgical Attestation  H&P reviewed. The patient was examined and there are no changes to the H&P.    Mrs Bennett is a 42yo well known to vascular surgery service. S/p recent R AKA for ischemic RLE requiring stay in ICU for shock and acute blood loss anemia. Required revision in OR. Now presenting to ED with wound dehiscence requiring further operative debridement and possible AKA revision. Patient intermittently confused this morning. NOK was consented for procedure.    Marked, consented.  Laterality: Right    Discussed with Dr. Rj Oro MD  PGY-2 Vascular Surgery Resident  [id number]

## 2024-07-30 NOTE — SIGNIFICANT EVENT
Post-OP Vascular Surgery Update and Plan    Patient w/ dehisced R AKA stump. Brought to OR for R AKA revision and washout today (7/30) with Dr. De La Rosa.    In OR, stump was explored. There was hematoma and some fat necrosis, but no frankly necrotic tissue or purulence. A culture of this contents was sent to the lab for analysis. There was no active bleeding. It was thoroughly washed out. The myodesis was taken down and the femur was resected approximately 3cm proximally. The myodesis was then redone. The skin edges were resected slightly to enable better epithelialization and wound healing. A drain was placed deep to the fascia and tunneled laterally through the lateral thigh. The fascia was reapproximated using simple interrupted 2-0 Vicryl. The closure wasn't water tight, however. A wound vac w/ black foam was placed to the open portion of the wound.    Post-operatively, the patient went into afib w/ RVR in PACU w/ HRs in 130s and BPs stable in 100/60s. She was otherwise asymptomatic and mentating appropriately, complaining of no symptoms. She does have a known history of afib (on Warfarin for this). Anesthesia was aware as was the primary medicine team she was going to be admitted to. Seen by both anesthesia and medicine, as well as vascular surgery team when this was ongoing. While she remained in afib w/ RVR, she remained stable and with no complaints.    Medicine primary deemed her to be appropriate for the floor.    Plan:  - Medicine team primary  - Continue Vanc/Zosyn  --> Narrow based on intra-operative culture results  - Restart heparin gtt 4 hours post-operatively (1800).  - Vac change Thursday (8/1) - vascular surgery will coordinate and communicate with primary team about this.    Please call with any questions or concerns.    Jovani Liang MD  Vascular Surgery, PGY4  Team Pager: [phone]

## 2024-07-30 NOTE — ANESTHESIA PREPROCEDURE EVALUATION
Patient: Amirah Bennett    Procedure Information       Anesthesia Start Date/Time: 07/30/24 1242    Procedure: Right AKA Revision (Right) - wound vac    Location: [facility] OR 16 / [facility]    Surgeons: Rizwana De La Rosa MD            Relevant Problems   Cardiac   (+) Acute lower extremity ischemia   (+) Atrial fibrillation (Multi)   (+) CHF (congestive heart failure) (Multi)   (+) Critical limb ischemia of right lower extremity (Multi)   (+) Primary hypertension      Neuro   (+) Intracranial hemorrhage (Multi)   (+) Schizophrenia (Multi)   (+) Stroke (Multi)      GI   (+) Dysphagia following cerebral infarction      Liver   (+) Elevated LFTs      Hematology   (+) Deep vein thrombosis (DVT) of lower extremity (Multi)   (+) Thrombocytopenia (CMS-HCC)       Clinical information reviewed:   Tobacco  Allergies  Meds   Med Hx  Surg Hx   Fam Hx  Soc Hx        NPO Detail:  No data recorded     Physical Exam    Airway  Mallampati: II  TM distance: >3 FB  Neck ROM: full     Cardiovascular    Dental    Pulmonary    Abdominal            Anesthesia Plan    History of general anesthesia?: yes  History of complications of general anesthesia?: no    ASA 4     general   (Ef 30%, no significant valvular abnormalities. Preinduction volume load (500cc))  intravenous induction   Anesthetic plan and risks discussed with patient.    Plan discussed with CRNA.

## 2024-07-30 NOTE — BRIEF OP NOTE
Date: 2024  OR Location: [facility] OR    Name: Amirah Bennett, : 1980, Age: 43 y.o., MRN: [id number], Sex: female    Diagnosis  Pre-op Diagnosis      * Wound dehiscence [T81.30XA] Post-op Diagnosis     * Wound dehiscence [T81.30XA]     Procedures  Right above knee amputation revision.  Washout of right knee amputation site.  Drain placement.  Wound vac placement.    Surgeons      * Rizwana De La Rosa - Primary    Resident/Fellow/Other Assistant:  Surgeons and Role:     * Jovani Liang MD - Resident - Assisting    Procedure Summary  Anesthesia: General  ASA: IV  Anesthesia Staff: Anesthesiologist: Andrzej Terry MD; Alexis Ruiz MD  CRNA: JAE Caal-CRNA  Estimated Blood Loss: 50mL  Intra-op Medications:   Administrations occurring from 1055 to 1300 on 24:   Medication Name Total Dose   amiodarone (Pacerone) tablet 200 mg Cannot be calculated   atorvastatin (Lipitor) tablet 80 mg Cannot be calculated   dextrose 50 % injection 12.5 g Cannot be calculated   dextrose 50 % injection 25 g Cannot be calculated   DULoxetine (Cymbalta) DR capsule 60 mg Cannot be calculated   empagliflozin (Jardiance) tablet 10 mg Cannot be calculated   folic acid (Folvite) tablet 1 mg Cannot be calculated   furosemide (Lasix) tablet 40 mg Cannot be calculated   glucagon (Glucagen) injection 1 mg Cannot be calculated   glucagon (Glucagen) injection 1 mg Cannot be calculated   heparin bolus from bag 2,000-4,000 Units Cannot be calculated   melatonin tablet 3 mg Cannot be calculated   metoprolol succinate XL (Toprol-XL) 24 hr tablet 25 mg Cannot be calculated   oxyCODONE (Roxicodone) immediate release tablet 10 mg Cannot be calculated   oxyCODONE (Roxicodone) immediate release tablet 5 mg Cannot be calculated   pantoprazole (ProtoNix) EC tablet 40 mg Cannot be calculated   piperacillin-tazobactam (Zosyn) 3.375 g in dextrose (iso) IV 50 mL Cannot be calculated   polyethylene glycol (Glycolax, Miralax) packet 17  g Cannot be calculated   pregabalin (Lyrica) capsule 75 mg Cannot be calculated   sacubitriL-valsartan (Entresto) 24-26 mg per tablet 1 tablet Cannot be calculated   sennosides (Senokot) tablet 17.2 mg Cannot be calculated   vancomycin (Vancocin) 1,000 mg in dextrose 5%  mL Cannot be calculated   vancomycin (Vancocin) pharmacy to dose - pharmacy monitoring Cannot be calculated          Anesthesia Record               Intraprocedure I/O Totals          Intake    LR infusion 1400.00 mL    Total Intake 1400 mL       Output    Est. Blood Loss 50 mL    Total Output 50 mL       Net    Net Volume 1350 mL          Specimen:   ID Type Source Tests Collected by Time   1 : RIGHT LEG HEMATOMA Tissue CLOT/THROMBUS SURGICAL PATHOLOGY EXAM Rizwana De La Rosa MD 7/30/2024 5880   A : RIGHT LEG HEMATOMA Swab CLOT/THROMBUS TISSUE/WOUND CULTURE/SMEAR Rizwana De La Rosa MD 7/30/2024 1329   B : RIGHT FEMUR Tissue BONE RESECTION TISSUE/WOUND CULTURE/SMEAR Rizwana De La Rosa MD 7/30/2024 1339      Staff:   Circulator: Laura Moodyub Person: Lana Cote Circulator: Sarah Cote Scrub: Kirstie    Findings:   Dehisced R AKA stump; non-infected hematoma; minimal necrotic tissue, but fat necrosis along the edges of the wound. No purulence. No exposed femur, myodesis intact.    Complications:  None; patient tolerated the procedure well.     Disposition: PACU - hemodynamically stable.  Condition: stable  Specimens Collected:   ID Type Source Tests Collected by Time   1 : RIGHT LEG HEMATOMA Tissue CLOT/THROMBUS SURGICAL PATHOLOGY EXAM Rizwana De La Rosa MD 7/30/2024 1330   A : RIGHT LEG HEMATOMA Swab CLOT/THROMBUS TISSUE/WOUND CULTURE/SMEAR Rizwana De La Rosa MD 7/30/2024 1329   B : RIGHT FEMUR Tissue BONE RESECTION TISSUE/WOUND CULTURE/SMEAR Rizwana De La Rosa MD 7/30/2024 1339     Attending Attestation: I was present and scrubbed for the entire procedure.    Rizwana De La Rosa  Phone Number: [phone]    Jovani Liang MD  Vascular Surgery, PGY4  Team Pager: [phone]

## 2024-07-30 NOTE — SIGNIFICANT EVENT
RAPID RESPONSE RN NOTE- Rapid Response   07/30/24 1850   Onset Documentation   Rapid Response Initiated By RN   Location/Room [facility]  ([facility] 5067-B)   Pager Time [phone]   Arrival Time 1850   Event End Time 1930   Level II Called No   Primary Reason for Call Staff concern  (AFIB RVR HR  120's)     Rapid Response page received for patient in AFIB RVR.    Upon arrival patient found sitting up in bed, in no distress.  Per Falguni RN the patient recently transferred to [place] [address] from PACU after surgery today. Patient was in AFIB RVR in the PACU and continues to be in arrhythmia in the 120's.    DACR Dr. Castro to bedside-Patient's HR in the low 100's.  Per physician patient to receive her home medications (amiodarone, metoprolol, Lasix) and post op pain meds, Team to reevaluate tonight.    RN to contact Rapid Response Team with any further issues or patient deterioration.

## 2024-07-30 NOTE — ANESTHESIA POSTPROCEDURE EVALUATION
Patient: Amirah Bennett    Procedure Summary       Date: 07/30/24 Room / Location: [facility] OR 16 / Virtual [facility] OR    Anesthesia Start: 1242 Anesthesia Stop: 1505    Procedure: Right AKA Revision (Right) Diagnosis:       Wound dehiscence      (Wound dehiscence [T81.30XA])    Surgeons: Rizwana De La Rosa MD Responsible Provider: Andrzej Terry MD    Anesthesia Type: general ASA Status: 4            Anesthesia Type: general    Vitals Value Taken Time   /66 07/30/24 1516   Temp See chart 07/30/24 1524   Pulse 121 07/30/24 1519   Resp 17 07/30/24 1519   SpO2 100 % 07/30/24 1519   Vitals shown include unfiled device data.    Anesthesia Post Evaluation    Patient participation: complete - patient participated  Level of consciousness: awake and alert  Pain management: adequate  Airway patency: patent  Cardiovascular status: acceptable and tachycardic  Respiratory status: acceptable  Hydration status: acceptable  Postoperative Nausea and Vomiting: none  Comments: Patient is awake and alert.  No current complaints;  PACU nurse at bedside.  Pain reasonably controlled.  Normothermic.  Euvolemic.  Stable respiratory status;  no current nausea or emesis.  Indicated PACU care given.  Afib with rates into the 130's;  review of chart shows low EF with long afib history complicated by CHF and cardiogenic shock.  BP's ok in PACU;  instructed nursing to have primary team come to bedside to decide disposition.  Patient received amiodarone this morning as scheduled.  No additional orders at this time for afib with RVR given stable BP's.    Andrzej Terry MD    No notable events documented.

## 2024-07-30 NOTE — PROGRESS NOTES
INTERNAL MEDICINE PROGRESS NOTE     BRIEF NARRATIVE      Amirah Bennett is a 43 y.o. female on day 1 of admission presenting with Wound dehiscence.    43 y.o. 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 She presented to ED for further evaluation of wound dehiscence of RLE stump noticed by wound care doctor today on home visit once her wound vac was removed. She endorses worsening pain at surgical site that is worse than the pain she experienced after initial surgery. She denies fever, chills, n/v/d, or changes in appetite. Serosanguineous drainage present from wound upon arrival to ED. Vascular surgery was consulted by ED provider and pt was seen by consulting team while in the ED. Plan is for pt to go to OR in AM with vascular surgery for wound debridement and possible closure. Per documentation from vascular surgery consent for OR will need to be obtained from [facility]. Labs obtained on admit notable for anemia and mild leukocytosis. Last hospitalization was 6/20-7/24 after presented to ED with c/o dyspnea and leg swelling/pain in setting of running out of medications. During her last admit she was treated for harinder 3 AKLI s/p open RLE and iliac embolectomy via right CFA and AKA on 6/28 and wound debridement with wound vac placement on 7/17. Patient hospitalization was complicated with wound  infection requiring abx and cardiogenic shock which required stay in CICU. Please refer to discharge summary dated 7/24. Pt had no complaints when seen by writer in ED bed 45. Parameters added to home dose of toprol XL d/t documentation of bradycardia while in the ED. Pt will be NPO at MN 7/30 except meds with sips of water. Pt admitted for further treatment and management of wound dehiscence.     SUBJECTIVE       Patient seen and examined in the ED today, with no acute event overnight.  Patient was hemodynamically stable with this morning, n.p.o. 4 OR by vascular surgery today.    OBJECTIVE      Visit Vitals  /68   Pulse 98   Temp 36.8 °C (98.2 °F) (Temporal)   Resp 16      No intake or output data in the 24 hours ending 07/30/24 0905    Physical Exam   Constitutional:       General: She is not in acute distress.  HENT:      Head: Normocephalic.      Nose: Nose normal.      Mouth/Throat:      Mouth: Mucous membranes are dry.   Eyes:      Extraocular Movements: Extraocular movements intact.   Cardiovascular:      Rate and Rhythm: Normal rate.      Pulses: Normal pulses.      Heart sounds: Normal heart sounds.   Pulmonary:      Effort: Pulmonary effort is normal. No respiratory distress.      Breath sounds: Normal breath sounds.   Abdominal:      General: Bowel sounds are normal.      Palpations: Abdomen is soft.   Musculoskeletal:      Cervical back: Normal range of motion.      Comments: R AKA   Skin:     General: Skin is warm.      Comments: Prior trach site   Neurological:      Mental Status: She is alert.      Comments: Expressive aphasia from prior CVA; oriented to self, place and situation   Psychiatric:         Mood and Affect: Mood normal.   Current Meds   amiodarone, 200 mg, oral, Daily  [Held by provider] aspirin, 81 mg, oral, Daily  atorvastatin, 80 mg, oral, Nightly  DULoxetine, 60 mg, oral, Daily  empagliflozin, 10 mg, oral, Daily  folic acid, 1 mg, oral, Daily  furosemide, 40 mg, oral,  BID  metoprolol succinate XL, 25 mg, oral, Daily  multivitamin with minerals, 1 tablet, oral, Daily  pantoprazole, 40 mg, oral, Daily before breakfast  piperacillin-tazobactam, 3.375 g, intravenous, q6h  pregabalin, 75 mg, oral, BID  sacubitriL-valsartan, 1 tablet, oral, BID  sennosides, 2 tablet, oral, BID  vancomycin, 1,000 mg, intravenous, q12h       PRN medications: dextrose, dextrose, glucagon, glucagon, heparin, melatonin, oxyCODONE, oxyCODONE, polyethylene glycol, vancomycin     LABS and IMAGING     WBC   Date Value Ref Range Status   07/30/2024 9.0 4.4 - 11.3 x10*3/uL Final   07/29/2024 12.0 (H) 4.4 - 11.3 x10*3/uL Final     Hemoglobin   Date Value Ref Range Status   07/30/2024 10.1 (L) 12.0 - 16.0 g/dL Final   07/29/2024 11.1 (L) 12.0 - 16.0 g/dL Final     Hematocrit   Date Value Ref Range Status   07/30/2024 33.2 (L) 36.0 - 46.0 % Final   07/29/2024 34.7 (L) 36.0 - 46.0 % Final     Bicarbonate   Date Value Ref Range Status   07/30/2024 26 21 - 32 mmol/L Final   07/30/2024 28 21 - 32 mmol/L Final   07/29/2024 25 21 - 32 mmol/L Final     Creatinine   Date Value Ref Range Status   07/30/2024 0.85 0.50 - 1.05 mg/dL Final   07/30/2024 0.96 0.50 - 1.05 mg/dL Final   07/29/2024 0.87 0.50 - 1.05 mg/dL Final     Calcium   Date Value Ref Range Status   07/30/2024 8.7 8.6 - 10.6 mg/dL Final   07/30/2024 8.6 8.6 - 10.6 mg/dL Final   07/29/2024 9.4 8.6 - 10.6 mg/dL Final     INR   Date Value Ref Range Status   07/30/2024 1.8 (H) 0.9 - 1.1 Final   07/29/2024 1.8 (H) 0.9 - 1.1 Final       XR chest 1 view  Narrative: Interpreted By:  Jonathan Sin,   STUDY:  XR CHEST 1 VIEW;  7/30/2024 4:27 am      INDICATION:  Signs/Symptoms:Preop.      COMPARISON:  Chest radiograph dated 07/02/2024.      ACCESSION NUMBER(S):  [id number]      ORDERING CLINICIAN:  CRISTIAN PARKER      FINDINGS:      CARDIOMEDIASTINAL SILHOUETTE:  Cardiomediastinal silhouette is normal in size and configuration.      LUNGS/PLEURA:  There are no  consolidations.There are no pleural effusions. There is  no demonstrated pneumothorax.          BONES: No evidence of acute osseous abnormality.      Impression: 1.  No evidence of acute cardiopulmonary process.          Signed by: Jonathan Sin 7/30/2024 4:31 AM  Dictation workstation:   [id number]       ASSESSMENT / PLANS      #wound dehiscence s/p R AKA  #leukocytosis  - WBC 13.8 on admit, WBC 12.0 on 7/24  - vascular surgery consulted, appreciate recs  - NPO at MN 7/30 except meds with sips of water  - OR 7/30 with vascular surgery for wound debridement and possible closure  - pre-op labs ordered: coag screen, CMP, T&S, CBC w/diff  - continue outpatient pain regimen: Oxycodone 5 mg q6h PRN moderate pain and Oxycodone 10 mg q6h PRN severe pain  - continue Lyrica 75 mg PO BID  - holding home dose of Coumadin 5 mg PO every day d/t upcoming surgery will start low intensity heparin gtt with plan to bridge back to coumadin  - Saline wet to dry dressing kerlix with abd pads covering the wound while waiting for OR.     #afib  #YFN appendage  - s/p DCCV 5/2023  - INR 1.8 on admit  - holding home dose of Coumadin 5 mg PO every day  - continue home dose of Amiodarone 200 mg PO every day, will consider amnio drip if patient goes into RVR  - low intensity heparin gtt ordered with plan to bridge back to Coumadin.  We can stop heparin drip and resume 4 hours postop    #HFrEF (last EF 20-25% 6/22/24)  #bradycardia (asymptomatic)  - HR documentation as 43 while in ED, EKG ordered, parameters placed on Toprol XL  - monitor on tele  - BNP pending result  - strict I&O, DW  - continue home dose of Toprol XL 25 mg PO every day (hold for HR <60 or SBP <100), Lasix 40 mg PO BID, Entresto 24/26 mg PO BID, & Jardiance 10 mg PO every day     #hx of L cerebellar MCA stroke w/expressive aphasia 3/2024  #hx of ischemic CVA  - continue home dose of Lipitor 80 mg PO at bedtime  - holding home dose of ASA 81 mg d/t upcoming surgery      #T2DM  - accucheck ACHS  - hypoglycemia order set placed  - HgA1c 4.2% on admit, can be diet controlled     Dispo: Scheduled for or today by vascular surgery, PT/OT postop        Patria Farias MD

## 2024-07-30 NOTE — CONSULTS
Vancomycin Dosing by Pharmacy- INITIAL    Amirah Bennett is a 43 y.o. year old female who Pharmacy has been consulted for vancomycin dosing for other surgical wound infection . Based on the patient's indication and renal status this patient will be dosed based on a goal AUC of 400-600.     Renal function is currently stable.    Visit Vitals  BP 87/66   Pulse 99   Temp 36.8 °C (98.2 °F) (Temporal)   Resp 16        Lab Results   Component Value Date    CREATININE 0.87 2024    CREATININE 0.65 2024    CREATININE 0.55 2024    CREATININE 0.62 2024        Patient weight is as follows:   Vitals:    24 1333   Weight: 71.2 kg (157 lb)       Cultures:  No results found for the encounter in last 14 days.        No intake/output data recorded.  I/O during current shift:  No intake/output data recorded.    Temp (24hrs), Av.8 °C (98.2 °F), Min:36.8 °C (98.2 °F), Max:36.8 °C (98.2 °F)         Assessment/Plan     Patient will not be given a loading dose.  Will initiate vancomycin maintenance,  1000 mg every 12 hours.    This dosing regimen is predicted by [employer] to result in the following pharmacokinetic paRegimen: 1000 mg IV every 12 hours.  Start time: 04:25 on 2024  Exposure target: AUC24 (range)400-600 mg/L.hr   AUC24,ss: 534 mg/L.hr  Probability of AUC24 > 400: 80 %  Ctrough,ss: 16.7 mg/L  Probability of Ctrough,ss > 20: 34 %  Probability of nephrotoxicity (Lodise KWAME ): 12 %        Follow-up level will be ordered on  at AM labs unless clinically indicated sooner.  Will continue to monitor renal function daily while on vancomycin and order serum creatinine at least every 48 hours if not already ordered.  Follow for continued vancomycin needs, clinical response, and signs/symptoms of toxicity.       Earl Bermeo, PharmD

## 2024-07-30 NOTE — ANESTHESIA PROCEDURE NOTES
Airway  Date/Time: 7/30/2024 1:01 PM  Urgency: elective    Airway not difficult    Staffing  Performed: CRNA   Authorized by: Alexis Ruiz MD    Performed by: JAE Caal-VALE  Patient location during procedure: OR    Indications and Patient Condition  Indications for airway management: anesthesia  Spontaneous Ventilation: absent  Sedation level: deep  Preoxygenated: yes  Patient position: sniffing  MILS maintained throughout  Mask difficulty assessment: 1 - vent by mask    Final Airway Details  Final airway type: endotracheal airway      Successful airway: ETT  Cuffed: yes   Successful intubation technique: direct laryngoscopy  Endotracheal tube insertion site: oral  Blade: Sonia  Blade size: #3  ETT size (mm): 7.0  Cormack-Lehane Classification: grade IIa - partial view of glottis  Placement verified by: chest auscultation and capnometry   Measured from: teeth  ETT to teeth (cm): 21  Number of attempts at approach: 1  Number of other approaches attempted: 0

## 2024-07-31 LAB
ALBUMIN SERPL BCP-MCNC: 3.1 G/DL (ref 3.4–5)
ANION GAP SERPL CALC-SCNC: 16 MMOL/L (ref 10–20)
APTT PPP: 30 SECONDS (ref 27–38)
BASOPHILS # BLD AUTO: 0.02 X10*3/UL (ref 0–0.1)
BASOPHILS NFR BLD AUTO: 0.1 %
BUN SERPL-MCNC: 17 MG/DL (ref 6–23)
CALCIUM SERPL-MCNC: 9.2 MG/DL (ref 8.6–10.6)
CHLORIDE SERPL-SCNC: 98 MMOL/L (ref 98–107)
CO2 SERPL-SCNC: 27 MMOL/L (ref 21–32)
CREAT SERPL-MCNC: 0.84 MG/DL (ref 0.5–1.05)
EGFRCR SERPLBLD CKD-EPI 2021: 87 ML/MIN/1.73M*2
EOSINOPHIL # BLD AUTO: 0.02 X10*3/UL (ref 0–0.7)
EOSINOPHIL NFR BLD AUTO: 0.1 %
ERYTHROCYTE [DISTWIDTH] IN BLOOD BY AUTOMATED COUNT: 14.7 % (ref 11.5–14.5)
GLUCOSE BLD MANUAL STRIP-MCNC: 101 MG/DL (ref 74–99)
GLUCOSE BLD MANUAL STRIP-MCNC: 110 MG/DL (ref 74–99)
GLUCOSE BLD MANUAL STRIP-MCNC: 98 MG/DL (ref 74–99)
GLUCOSE SERPL-MCNC: 98 MG/DL (ref 74–99)
HCT VFR BLD AUTO: 30.4 % (ref 36–46)
HGB BLD-MCNC: 9.4 G/DL (ref 12–16)
IMM GRANULOCYTES # BLD AUTO: 0.08 X10*3/UL (ref 0–0.7)
IMM GRANULOCYTES NFR BLD AUTO: 0.6 % (ref 0–0.9)
INR PPP: 1.5 (ref 0.9–1.1)
LYMPHOCYTES # BLD AUTO: 1.67 X10*3/UL (ref 1.2–4.8)
LYMPHOCYTES NFR BLD AUTO: 11.6 %
MAGNESIUM SERPL-MCNC: 1.55 MG/DL (ref 1.6–2.4)
MCH RBC QN AUTO: 30.4 PG (ref 26–34)
MCHC RBC AUTO-ENTMCNC: 30.9 G/DL (ref 32–36)
MCV RBC AUTO: 98 FL (ref 80–100)
MONOCYTES # BLD AUTO: 1.15 X10*3/UL (ref 0.1–1)
MONOCYTES NFR BLD AUTO: 8 %
NEUTROPHILS # BLD AUTO: 11.47 X10*3/UL (ref 1.2–7.7)
NEUTROPHILS NFR BLD AUTO: 79.6 %
NRBC BLD-RTO: 0 /100 WBCS (ref 0–0)
PHOSPHATE SERPL-MCNC: 5.6 MG/DL (ref 2.5–4.9)
PLATELET # BLD AUTO: 250 X10*3/UL (ref 150–450)
POTASSIUM SERPL-SCNC: 4.5 MMOL/L (ref 3.5–5.3)
PROTHROMBIN TIME: 16.7 SECONDS (ref 9.8–12.8)
Q ONSET: 213 MS
QRS COUNT: 15 BEATS
QRS DURATION: 104 MS
QT INTERVAL: 382 MS
QTC CALCULATION(BAZETT): 469 MS
QTC FREDERICIA: 439 MS
R AXIS: 36 DEGREES
RBC # BLD AUTO: 3.09 X10*6/UL (ref 4–5.2)
SODIUM SERPL-SCNC: 136 MMOL/L (ref 136–145)
T AXIS: 226 DEGREES
T OFFSET: 404 MS
UFH PPP CHRO-ACNC: 1 IU/ML
VANCOMYCIN TROUGH SERPL-MCNC: 12.4 UG/ML (ref 5–20)
VENTRICULAR RATE: 91 BPM
WBC # BLD AUTO: 14.4 X10*3/UL (ref 4.4–11.3)

## 2024-07-31 PROCEDURE — 80202 ASSAY OF VANCOMYCIN: CPT | Performed by: STUDENT IN AN ORGANIZED HEALTH CARE EDUCATION/TRAINING PROGRAM

## 2024-07-31 PROCEDURE — 80069 RENAL FUNCTION PANEL: CPT | Performed by: STUDENT IN AN ORGANIZED HEALTH CARE EDUCATION/TRAINING PROGRAM

## 2024-07-31 PROCEDURE — 85610 PROTHROMBIN TIME: CPT | Performed by: STUDENT IN AN ORGANIZED HEALTH CARE EDUCATION/TRAINING PROGRAM

## 2024-07-31 PROCEDURE — 85520 HEPARIN ASSAY: CPT | Performed by: STUDENT IN AN ORGANIZED HEALTH CARE EDUCATION/TRAINING PROGRAM

## 2024-07-31 PROCEDURE — 85730 THROMBOPLASTIN TIME PARTIAL: CPT | Performed by: STUDENT IN AN ORGANIZED HEALTH CARE EDUCATION/TRAINING PROGRAM

## 2024-07-31 PROCEDURE — 2500000004 HC RX 250 GENERAL PHARMACY W/ HCPCS (ALT 636 FOR OP/ED): Performed by: STUDENT IN AN ORGANIZED HEALTH CARE EDUCATION/TRAINING PROGRAM

## 2024-07-31 PROCEDURE — 36415 COLL VENOUS BLD VENIPUNCTURE: CPT | Performed by: STUDENT IN AN ORGANIZED HEALTH CARE EDUCATION/TRAINING PROGRAM

## 2024-07-31 PROCEDURE — 2500000002 HC RX 250 W HCPCS SELF ADMINISTERED DRUGS (ALT 637 FOR MEDICARE OP, ALT 636 FOR OP/ED): Performed by: STUDENT IN AN ORGANIZED HEALTH CARE EDUCATION/TRAINING PROGRAM

## 2024-07-31 PROCEDURE — 2500000001 HC RX 250 WO HCPCS SELF ADMINISTERED DRUGS (ALT 637 FOR MEDICARE OP): Performed by: STUDENT IN AN ORGANIZED HEALTH CARE EDUCATION/TRAINING PROGRAM

## 2024-07-31 PROCEDURE — 1200000002 HC GENERAL ROOM WITH TELEMETRY DAILY

## 2024-07-31 PROCEDURE — 85025 COMPLETE CBC W/AUTO DIFF WBC: CPT | Performed by: STUDENT IN AN ORGANIZED HEALTH CARE EDUCATION/TRAINING PROGRAM

## 2024-07-31 PROCEDURE — 83735 ASSAY OF MAGNESIUM: CPT | Performed by: STUDENT IN AN ORGANIZED HEALTH CARE EDUCATION/TRAINING PROGRAM

## 2024-07-31 PROCEDURE — 99232 SBSQ HOSP IP/OBS MODERATE 35: CPT | Performed by: STUDENT IN AN ORGANIZED HEALTH CARE EDUCATION/TRAINING PROGRAM

## 2024-07-31 PROCEDURE — 82947 ASSAY GLUCOSE BLOOD QUANT: CPT

## 2024-07-31 PROCEDURE — 2500000001 HC RX 250 WO HCPCS SELF ADMINISTERED DRUGS (ALT 637 FOR MEDICARE OP): Performed by: NURSE PRACTITIONER

## 2024-07-31 RX ORDER — MORPHINE SULFATE 4 MG/ML
2 INJECTION INTRAVENOUS EVERY 4 HOURS PRN
Status: DISCONTINUED | OUTPATIENT
Start: 2024-07-31 | End: 2024-08-01

## 2024-07-31 RX ORDER — VANCOMYCIN HYDROCHLORIDE 750 MG/150ML
750 INJECTION, SOLUTION INTRAVENOUS EVERY 12 HOURS
Status: DISCONTINUED | OUTPATIENT
Start: 2024-07-31 | End: 2024-08-02

## 2024-07-31 RX ORDER — WARFARIN SODIUM 5 MG/1
5 TABLET ORAL DAILY
Status: DISCONTINUED | OUTPATIENT
Start: 2024-07-31 | End: 2024-08-05

## 2024-07-31 RX ORDER — POLYETHYLENE GLYCOL 3350 17 G/17G
17 POWDER, FOR SOLUTION ORAL
Status: DISCONTINUED | OUTPATIENT
Start: 2024-07-31 | End: 2024-08-09 | Stop reason: HOSPADM

## 2024-07-31 SDOH — SOCIAL STABILITY: SOCIAL INSECURITY: ARE THERE ANY APPARENT SIGNS OF INJURIES/BEHAVIORS THAT COULD BE RELATED TO ABUSE/NEGLECT?: NO

## 2024-07-31 SDOH — SOCIAL STABILITY: SOCIAL INSECURITY: DO YOU FEEL ANYONE HAS EXPLOITED OR TAKEN ADVANTAGE OF YOU FINANCIALLY OR OF YOUR PERSONAL PROPERTY?: NO

## 2024-07-31 SDOH — SOCIAL STABILITY: SOCIAL INSECURITY: HAS ANYONE EVER THREATENED TO HURT YOUR FAMILY OR YOUR PETS?: NO

## 2024-07-31 SDOH — SOCIAL STABILITY: SOCIAL INSECURITY: ABUSE: ADULT

## 2024-07-31 SDOH — SOCIAL STABILITY: SOCIAL INSECURITY: HAVE YOU HAD THOUGHTS OF HARMING ANYONE ELSE?: NO

## 2024-07-31 SDOH — SOCIAL STABILITY: SOCIAL INSECURITY: DO YOU FEEL UNSAFE GOING BACK TO THE PLACE WHERE YOU ARE LIVING?: NO

## 2024-07-31 SDOH — SOCIAL STABILITY: SOCIAL INSECURITY: DOES ANYONE TRY TO KEEP YOU FROM HAVING/CONTACTING OTHER FRIENDS OR DOING THINGS OUTSIDE YOUR HOME?: NO

## 2024-07-31 SDOH — SOCIAL STABILITY: SOCIAL INSECURITY: HAVE YOU HAD ANY THOUGHTS OF HARMING ANYONE ELSE?: NO

## 2024-07-31 SDOH — SOCIAL STABILITY: SOCIAL INSECURITY: WERE YOU ABLE TO COMPLETE ALL THE BEHAVIORAL HEALTH SCREENINGS?: YES

## 2024-07-31 SDOH — SOCIAL STABILITY: SOCIAL INSECURITY: ARE YOU OR HAVE YOU BEEN THREATENED OR ABUSED PHYSICALLY, EMOTIONALLY, OR SEXUALLY BY ANYONE?: NO

## 2024-07-31 ASSESSMENT — PAIN SCALES - GENERAL
PAINLEVEL_OUTOF10: 10 - WORST POSSIBLE PAIN
PAINLEVEL_OUTOF10: 6
PAINLEVEL_OUTOF10: 8
PAINLEVEL_OUTOF10: 10 - WORST POSSIBLE PAIN

## 2024-07-31 ASSESSMENT — ACTIVITIES OF DAILY LIVING (ADL)
ASSISTIVE_DEVICE: CANE;WALKER
HEARING - LEFT EAR: FUNCTIONAL
TOILETING: NEEDS ASSISTANCE
GROOMING: NEEDS ASSISTANCE
GROOMING: NEEDS ASSISTANCE
HEARING - RIGHT EAR: FUNCTIONAL
TOILETING: NEEDS ASSISTANCE
WALKS IN HOME: NEEDS ASSISTANCE
DRESSING YOURSELF: NEEDS ASSISTANCE
ADEQUATE_TO_COMPLETE_ADL: YES
BATHING: NEEDS ASSISTANCE
ASSISTIVE_DEVICE: CANE;WALKER
LACK_OF_TRANSPORTATION: NO
WALKS IN HOME: NEEDS ASSISTANCE
JUDGMENT_ADEQUATE_SAFELY_COMPLETE_DAILY_ACTIVITIES: YES
JUDGMENT_ADEQUATE_SAFELY_COMPLETE_DAILY_ACTIVITIES: YES
BATHING: NEEDS ASSISTANCE
ADEQUATE_TO_COMPLETE_ADL: YES
FEEDING YOURSELF: NEEDS ASSISTANCE
HEARING - LEFT EAR: FUNCTIONAL
DRESSING YOURSELF: NEEDS ASSISTANCE
FEEDING YOURSELF: NEEDS ASSISTANCE
HEARING - RIGHT EAR: FUNCTIONAL
PATIENT'S MEMORY ADEQUATE TO SAFELY COMPLETE DAILY ACTIVITIES?: YES

## 2024-07-31 ASSESSMENT — COGNITIVE AND FUNCTIONAL STATUS - GENERAL
MOVING FROM LYING ON BACK TO SITTING ON SIDE OF FLAT BED WITH BEDRAILS: A LOT
TURNING FROM BACK TO SIDE WHILE IN FLAT BAD: A LOT
PERSONAL GROOMING: A LITTLE
PERSONAL GROOMING: A LITTLE
MOBILITY SCORE: 9
STANDING UP FROM CHAIR USING ARMS: A LOT
TOILETING: A LOT
CLIMB 3 TO 5 STEPS WITH RAILING: TOTAL
CLIMB 3 TO 5 STEPS WITH RAILING: TOTAL
HELP NEEDED FOR BATHING: A LOT
TOILETING: A LOT
HELP NEEDED FOR BATHING: A LOT
TURNING FROM BACK TO SIDE WHILE IN FLAT BAD: A LOT
DAILY ACTIVITIY SCORE: 15
MOVING FROM LYING ON BACK TO SITTING ON SIDE OF FLAT BED WITH BEDRAILS: A LOT
DAILY ACTIVITIY SCORE: 15
DRESSING REGULAR UPPER BODY CLOTHING: A LOT
WALKING IN HOSPITAL ROOM: TOTAL
DRESSING REGULAR UPPER BODY CLOTHING: A LOT
DRESSING REGULAR LOWER BODY CLOTHING: A LOT
PATIENT BASELINE BEDBOUND: NO
MOVING TO AND FROM BED TO CHAIR: TOTAL
MOBILITY SCORE: 9
STANDING UP FROM CHAIR USING ARMS: A LOT
MOVING TO AND FROM BED TO CHAIR: TOTAL
DRESSING REGULAR LOWER BODY CLOTHING: A LOT
WALKING IN HOSPITAL ROOM: TOTAL

## 2024-07-31 ASSESSMENT — PAIN - FUNCTIONAL ASSESSMENT
PAIN_FUNCTIONAL_ASSESSMENT: 0-10
PAIN_FUNCTIONAL_ASSESSMENT: 0-10

## 2024-07-31 ASSESSMENT — PATIENT HEALTH QUESTIONNAIRE - PHQ9
SUM OF ALL RESPONSES TO PHQ9 QUESTIONS 1 & 2: 0
1. LITTLE INTEREST OR PLEASURE IN DOING THINGS: NOT AT ALL
2. FEELING DOWN, DEPRESSED OR HOPELESS: NOT AT ALL

## 2024-07-31 ASSESSMENT — LIFESTYLE VARIABLES
HOW OFTEN DO YOU HAVE A DRINK CONTAINING ALCOHOL: NEVER
SKIP TO QUESTIONS 9-10: 1
HOW OFTEN DO YOU HAVE 6 OR MORE DRINKS ON ONE OCCASION: NEVER
HOW MANY STANDARD DRINKS CONTAINING ALCOHOL DO YOU HAVE ON A TYPICAL DAY: PATIENT DOES NOT DRINK
SUBSTANCE_ABUSE_PAST_12_MONTHS: NO
AUDIT-C TOTAL SCORE: 0
AUDIT-C TOTAL SCORE: 0

## 2024-07-31 ASSESSMENT — PAIN DESCRIPTION - ORIENTATION: ORIENTATION: RIGHT

## 2024-07-31 ASSESSMENT — PAIN SCALES - PAIN ASSESSMENT IN ADVANCED DEMENTIA (PAINAD): TOTALSCORE: MEDICATION (SEE MAR)

## 2024-07-31 ASSESSMENT — PAIN DESCRIPTION - LOCATION: LOCATION: LEG

## 2024-07-31 NOTE — SIGNIFICANT EVENT
S:    POD 0 from R AKA revision and washout. She endorses a pain, especially about the AKA stump, but has some relief with pain medications.   Denies weakness, lightheadedness, nausea, vomiting, parasthesias.     O:   Vital signs are normotensive, afebrile, no new or worsening oxygen requirement, but with tachycardia.   Visit Vitals  /64 (BP Location: Left arm)   Pulse (!) 121   Temp 37 °C (98.6 °F) (Temporal)   Resp 20        Constitutional: no acute distress  Skin: warm and dry overall   Neuro: A/O x4, no gross deficits   HEENT: Atraumatic, no scleral icterus  Cardiac: RRR  Pulmonary: Unlabored respirations   Abdomen: Non distended, non tender  GI: Voiding  Surgical Site: Dressing clean dry and intact with minimal amounts of strikethrough, appropriately tender. SHIMA well placed, with sanguinous output. Wound vac also in place, minimal output noted.     A/P:  Overall, patient is doing well postoperatively with no acute concerns. Defer to primary team for management of tachycardia, which has been persistent throughout her admission.   Will continue to optimize pain control as needed.  Will continue to monitor clinical exam, vitals, I&O's, and labs when available.  Will follow up on the patient in the a.m. or sooner as needed.

## 2024-07-31 NOTE — PROGRESS NOTES
Vancomycin Dosing by Pharmacy- FOLLOW UP    Amirah Bennett is a 43 y.o. year old female who Pharmacy has been consulted for vancomycin dosing for other surgical wound infection . Based on the patient's indication and renal status this patient is being dosed based on a goal AUC of 400-600.     Renal function is currently stable.    Current vancomycin dose: 1000 mg given every 12 hours    Estimated vancomycin AUC on current dose: 619 mg/L.hr     Visit Vitals  BP 88/60   Pulse 72   Temp 36.5 °C (97.7 °F)   Resp 16        Lab Results   Component Value Date    CREATININE 0.84 2024    CREATININE 0.85 2024    CREATININE 0.96 2024    CREATININE 0.87 2024        Patient weight is as follows:   Vitals:    24 1333   Weight: 71.2 kg (157 lb)       Cultures:  No results found for the encounter in last 14 days.      I/O last 3 completed shifts:  In: 1900 (26.7 mL/kg) [I.V.:1400 (19.7 mL/kg); IV Piggyback:500]  Out: 1730 (24.3 mL/kg) [Urine:1600 (0.6 mL/kg/hr); Drains:80; Blood:50]  Weight: 71.2 kg   I/O during current shift:  I/O this shift:  In: 100 [IV Piggyback:100]  Out: -     Temp (24hrs), Av.6 °C (97.9 °F), Min:36.3 °C (97.3 °F), Max:37.1 °C (98.8 °F)    Assessment/Plan    Above goal AUC. Orders placed for new vancomcyin regimen of 750 every 12 hours to begin at 1630.    This dosing regimen is predicted by [employer] to result in the following pharmacokinetic parameters:    Regimen: 750 mg IV every 12 hours.  Start time: 08:24 on 2024  Exposure target: AUC24 (range)400-600 mg/L.hr   AUC24,ss: 467 mg/L.hr  Probability of AUC24 > 400: 82 %  Ctrough,ss: 14.8 mg/L  Probability of Ctrough,ss > 20: 9 %  Probability of nephrotoxicity (Lodise KWAME ): 10 %    The next level will be obtained on 8/2 AM labs. May be obtained sooner if clinically indicated.   Will continue to monitor renal function daily while on vancomycin and order serum creatinine at least every 48 hours if not already  ordered.  Follow for continued vancomycin needs, clinical response, and signs/symptoms of toxicity.     Carmela Suresh, RPh

## 2024-07-31 NOTE — PROGRESS NOTES
VASCULAR SURGERY PROGRESS NOTE  Assessment/Plan   Amirah Bennett is 43 y.o. 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 Patient hospitalization was complicated with wound infection requiring abx and cardiogenic shock. Patient had wound vac taken down for the first time after discharge by home wound nurse. Patient found to have wound dehiscence and recommended to come to ED for further evaluation. Vascular surgery consulted for further evaluation and management.   Patient is now s/p R AKA revision and wound vac placement 7/30 doing well    Plan:  Maintain wound vac -> plan to change tomorrow 8/1  Continue zosyn vancomycin -> will narrow down base on intraoperative cultures  Continue heparin -> hold warfarin for now. Will likely resume after first wound vac change  Maintain RLE to bulb suction -> please record output every shift  Rest of care per primary  Please call with any questions or concerns    D/w attending, Dr. Rj Leyva MD  General Surgery Resident PGY-3   Vascular Surgery [id number]      Subjective   Tachycardic (Afib RVR) with softer pressure but clinically stable overnight. This morning HR 60s. Drain with 80 serosanguinous output. complaining of pain at the AKA site. Otherwise no other issues    Objective   Vitals:  Heart Rate:  []   Temp:  [36.3 °C (97.3 °F)-37.1 °C (98.8 °F)]   Resp:  [14-20]   BP: ()/(54-72)   SpO2:  [93 %-100 %]      Exam:  Constitutional: No acute distress, sitting in bed comfortably  Neuro:  AOx3, grossly intact, speech within normal limits  ENMT: moist mucous membranes  Head/neck: atraumatic  CV: RRR  Pulm: non-labored on room air  GI: soft, non-tender, non-distended  Skin: warm and dry  Musculoskeletal: moving all extremities  Extremities: b/l AKA. RLE AKA with wound vac in place. RLE drain with serosanguinous output. R femoral artery palpable. Neuro and motor intact.      Labs:  Results from last 7 days   Lab Units 07/31/24  0755 07/30/24  0439 07/29/24  1618   WBC AUTO x10*3/uL 14.4* 9.0 12.0*   HEMOGLOBIN g/dL 9.4* 10.1* 11.1*   PLATELETS AUTO x10*3/uL 250 241 268      Results from last 7 days   Lab Units 07/31/24  0755 07/30/24  0756 07/30/24  0439   SODIUM mmol/L 136 134* 131*   POTASSIUM mmol/L 4.5 3.4* 7.2*   CHLORIDE mmol/L 98 99 97*   CO2 mmol/L 27 26 28   BUN mg/dL 17 22 23   CREATININE mg/dL 0.84 0.85 0.96   GLUCOSE mg/dL 98 116* 104*   MAGNESIUM mg/dL 1.55*  --  1.87   PHOSPHORUS mg/dL 5.6*  --  5.5*      Results from last 7 days   Lab Units 07/31/24  0754 07/30/24  0439 07/29/24  1619   INR  1.5* 1.8* 1.8*   PROTIME seconds 16.7* 20.2* 20.9*   APTT seconds 30  --  38      Results from last 7 days   Lab Units 07/30/24  0910 07/30/24  0439 07/30/24  0045   ANTI XA UNFRACTIONATED IU/mL 0.1 0.4 0.2

## 2024-07-31 NOTE — PROGRESS NOTES
"Met with pt at bedside to complete discharge planning assessment. Pt with expressive aphagia and deferred assessment questions to significant other Navin Sanches [phone].   Pt stated she has no children, siblings, or parents available for contact. She cited having a cousin named Lane Hernandez but did not have his contact number.  Pt informed she was at [facility] x12 days prior to admission, but does not want to discharge back to this SNF because of the \"crowd there,\" stating \"that's a no-no.\"     Attempted to contact significant other Navin Sanches [phone] for assessment questions but he did not answer, left voice mail message for a return call. Care coordinator will continue to follow for discharge planning needs.    Ruthann Hanna RN  Transitional Care Coordinator/[facility]  [phone]    "

## 2024-07-31 NOTE — PROGRESS NOTES
INTERNAL MEDICINE PROGRESS NOTE     BRIEF NARRATIVE      Amirah Bennett is a 43 y.o. female on day 2 of admission presenting with Wound dehiscence.    43 y.o. 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 She presented to ED for further evaluation of wound dehiscence of RLE stump noticed by wound care doctor today on home visit once her wound vac was removed. She endorses worsening pain at surgical site that is worse than the pain she experienced after initial surgery. She denies fever, chills, n/v/d, or changes in appetite. Serosanguineous drainage present from wound upon arrival to ED. Vascular surgery was consulted by ED provider and pt was seen by consulting team while in the ED. Per documentation from vascular surgery consent for OR will need to be obtained from [facility]. Labs obtained on admit notable for anemia and mild leukocytosis. Last hospitalization was 6/20-7/24 after presented to ED with c/o dyspnea and leg swelling/pain in setting of running out of medications. During her last admit she was treated for harinder 3 AKLI s/p open RLE and iliac embolectomy via right CFA and AKA on 6/28 and wound debridement with wound vac placement on 7/17. Patient hospitalization was complicated with wound infection requiring abx and cardiogenic shock which required stay in CICU. Please refer to discharge  summary dated 7/24. Parameters added to home dose of toprol XL d/t documentation of bradycardia while in the ED. underwent right AKA revision and washout by vascular surgery on 7/30.  Postop complicated with A-fib RVR    SUBJECTIVE       Patient seen and examined today, she was sitting in bed in nonapparent distress.  Patient reports mild pain.  Patient heart rate is better controlled in the low 100s.  Denies any chest pain, also denies palpitation fever or chills.    OBJECTIVE      Visit Vitals  BP 94/64   Pulse 100   Temp 36.7 °C (98.1 °F) (Temporal)   Resp 16        Intake/Output Summary (Last 24 hours) at 7/31/2024 0821  Last data filed at 7/31/2024 0633  Gross per 24 hour   Intake 1900 ml   Output 1730 ml   Net 170 ml       Physical Exam   Constitutional:       General: She is not in acute distress.  HENT:      Head: Normocephalic.      Nose: Nose normal.      Mouth/Throat:      Mouth: Mucous membranes are dry.   Eyes:      Extraocular Movements: Extraocular movements intact.   Cardiovascular:      Rate and Rhythm: Irregularly irregular     Pulses: Normal pulses.      Heart sounds: Normal heart sounds.   Pulmonary:      Effort: Pulmonary effort is normal. No respiratory distress.      Breath sounds: Normal breath sounds.   Abdominal:      General: Bowel sounds are normal.      Palpations: Abdomen is soft.   Musculoskeletal:      Cervical back: Normal range of motion.      Comments: R AKA   Skin:     General: Skin is warm.      Comments: Prior trach site   Neurological:      Mental Status: She is alert.      Comments: Expressive aphasia from prior CVA; oriented to self, place and situation   Psychiatric:         Mood and Affect: Mood normal.     Current Meds   amiodarone, 200 mg, oral, Daily  amiodarone, 400 mg, oral, BID   Followed by  [START ON 8/12/2024] amiodarone, 200 mg, oral, Daily  [Held by provider] aspirin, 81 mg, oral, Daily  atorvastatin, 80 mg, oral, Nightly  DULoxetine, 60 mg, oral,  Daily  empagliflozin, 10 mg, oral, Daily  folic acid, 1 mg, oral, Daily  furosemide, 40 mg, oral, BID  metoprolol succinate XL, 25 mg, oral, Daily  multivitamin with minerals, 1 tablet, oral, Daily  pantoprazole, 40 mg, oral, Daily before breakfast  piperacillin-tazobactam, 3.375 g, intravenous, q6h  pregabalin, 75 mg, oral, BID  sacubitriL-valsartan, 1 tablet, oral, BID  sennosides, 2 tablet, oral, BID  vancomycin, 1,000 mg, intravenous, q12h       PRN medications: dextrose, dextrose, glucagon, glucagon, heparin, melatonin, oxyCODONE, oxyCODONE, polyethylene glycol, vancomycin     LABS and IMAGING     WBC   Date Value Ref Range Status   07/30/2024 9.0 4.4 - 11.3 x10*3/uL Final   07/29/2024 12.0 (H) 4.4 - 11.3 x10*3/uL Final     Hemoglobin   Date Value Ref Range Status   07/30/2024 10.1 (L) 12.0 - 16.0 g/dL Final   07/29/2024 11.1 (L) 12.0 - 16.0 g/dL Final     Hematocrit   Date Value Ref Range Status   07/30/2024 33.2 (L) 36.0 - 46.0 % Final   07/29/2024 34.7 (L) 36.0 - 46.0 % Final     Bicarbonate   Date Value Ref Range Status   07/30/2024 26 21 - 32 mmol/L Final   07/30/2024 28 21 - 32 mmol/L Final   07/29/2024 25 21 - 32 mmol/L Final     Creatinine   Date Value Ref Range Status   07/30/2024 0.85 0.50 - 1.05 mg/dL Final   07/30/2024 0.96 0.50 - 1.05 mg/dL Final   07/29/2024 0.87 0.50 - 1.05 mg/dL Final     Calcium   Date Value Ref Range Status   07/30/2024 8.7 8.6 - 10.6 mg/dL Final   07/30/2024 8.6 8.6 - 10.6 mg/dL Final   07/29/2024 9.4 8.6 - 10.6 mg/dL Final     INR   Date Value Ref Range Status   07/30/2024 1.8 (H) 0.9 - 1.1 Final   07/29/2024 1.8 (H) 0.9 - 1.1 Final       XR chest 1 view  Narrative: Interpreted By:  Jonathan Sin,   STUDY:  XR CHEST 1 VIEW;  7/30/2024 4:27 am      INDICATION:  Signs/Symptoms:Preop.      COMPARISON:  Chest radiograph dated 07/02/2024.      ACCESSION NUMBER(S):  [id number]      ORDERING CLINICIAN:  CRISTIAN PARKER      FINDINGS:      CARDIOMEDIASTINAL  SILHOUETTE:  Cardiomediastinal silhouette is normal in size and configuration.      LUNGS/PLEURA:  There are no consolidations.There are no pleural effusions. There is  no demonstrated pneumothorax.          BONES: No evidence of acute osseous abnormality.      Impression: 1.  No evidence of acute cardiopulmonary process.          Signed by: Jonathan Sin 7/30/2024 4:31 AM  Dictation workstation:   [id number]       ASSESSMENT / PLANS      #wound dehiscence s/p R AKA  #leukocytosis  # S/p right AKA revision and washout since 7/30/24  - WBC 13.8 on admit, WBC 12.0 on 7/24  - vascular surgery following  - continue outpatient pain regimen: Oxycodone 5 mg q6h PRN moderate pain and Oxycodone 10 mg q6h PRN severe pain  - continue Lyrica 75 mg PO BID  - will continue low intensity heparin gtt with plan to bridge back to coumadin if no further procedures planned  - Saline wet to dry dressing kerlix with abd pads covering the wound while waiting for OR.  -PT/oT postop      #afib with RVR  #YFN appendage  - s/p DCCV 5/2023  - INR 1.8 on admit  -The patient went into RVR postprocedure on 7/30, HR better controlled  - continue home dose of Amiodarone 200 mg PO every day, will consider amnio drip if patient heart rate is now well-controlled  - low intensity heparin gtt ordered with plan to bridge back to Coumadin.      #HFrEF (last EF 20-25% 6/22/24)  #bradycardia (asymptomatic)  - BNP 97 , appears compensated, euvolemic  - HR documentation as 43 while in ED,   - parameters placed on Toprol XL  - monitor on tele  - strict I&O, DW  - continue home GDMT with Toprol XL 25 mg PO every day (hold for HR <60 or SBP <100), Lasix 40 mg PO BID, Entresto 24/26 mg PO BID, & Jardiance 10 mg PO every day     #hx of L cerebellar MCA stroke w/expressive aphasia 3/2024  #hx of ischemic CVA  - continue home dose of Lipitor 80 mg PO at bedtime  - holding home dose of ASA 81 mg d/t upcoming surgery     #T2DM  - accucheck ACHS  - hypoglycemia order  set placed  - HgA1c 4.2% on admit, can be diet controlled     Dispo:  PT/OT postop        Patria Farias MD

## 2024-07-31 NOTE — OP NOTE
Right AKA Revision (R) Operative Note     Date: 2024  OR Location: [facility] OR    Name: Amirah Bennett, : 1980, Age: 43 y.o., MRN: [id number], Sex: female    Diagnosis  Pre-op Diagnosis      * Wound dehiscence [T81.30XA] Post-op Diagnosis     * Wound dehiscence [T81.30XA]     Procedures  Right above knee amputation revision    Surgeons      * Rizwana De La Rosa - Primary    Resident/Fellow/Other Assistant:  Surgeons and Role:     * Jovani Liang MD - Resident - Assisting    Procedure Summary  Anesthesia: General  ASA: IV  Anesthesia Staff: Anesthesiologist: Andrzej Terry MD; Aleixs Ruiz MD  CRNA: JAE Caal-CRNA  Estimated Blood Loss: 50 mL  Intra-op Medications:   Administrations occurring from 1055 to 1300 on 24:   Medication Name Total Dose   atorvastatin (Lipitor) tablet 80 mg Cannot be calculated   dextrose 50 % injection 12.5 g Cannot be calculated   dextrose 50 % injection 25 g Cannot be calculated   DULoxetine (Cymbalta) DR capsule 60 mg Cannot be calculated   empagliflozin (Jardiance) tablet 10 mg Cannot be calculated   folic acid (Folvite) tablet 1 mg Cannot be calculated   furosemide (Lasix) tablet 40 mg Cannot be calculated   glucagon (Glucagen) injection 1 mg Cannot be calculated   glucagon (Glucagen) injection 1 mg Cannot be calculated   heparin bolus from bag 2,000-4,000 Units Cannot be calculated   melatonin tablet 3 mg Cannot be calculated   metoprolol succinate XL (Toprol-XL) 24 hr tablet 25 mg Cannot be calculated   oxyCODONE (Roxicodone) immediate release tablet 10 mg Cannot be calculated   oxyCODONE (Roxicodone) immediate release tablet 5 mg Cannot be calculated   pantoprazole (ProtoNix) EC tablet 40 mg Cannot be calculated   piperacillin-tazobactam (Zosyn) 3.375 g in dextrose (iso) IV 50 mL Cannot be calculated   polyethylene glycol (Glycolax, Miralax) packet 17 g Cannot be calculated   pregabalin (Lyrica) capsule 75 mg Cannot be calculated   sennosides  (Senokot) tablet 17.2 mg Cannot be calculated   vancomycin (Vancocin) pharmacy to dose - pharmacy monitoring Cannot be calculated   amiodarone (Pacerone) tablet 200 mg Cannot be calculated   sacubitriL-valsartan (Entresto) 24-26 mg per tablet 1 tablet Cannot be calculated   vancomycin (Vancocin) 1,000 mg in dextrose 5%  mL Cannot be calculated              Anesthesia Record               Intraprocedure I/O Totals          Intake    LR infusion 1400.00 mL    Total Intake 1400 mL       Output    Est. Blood Loss 50 mL    Total Output 50 mL       Net    Net Volume 1350 mL          Specimen:   ID Type Source Tests Collected by Time   1 : RIGHT LEG HEMATOMA Tissue CLOT/THROMBUS SURGICAL PATHOLOGY EXAM Rizwana De La Rosa MD 7/30/2024 1330   A : RIGHT LEG HEMATOMA Swab CLOT/THROMBUS TISSUE/WOUND CULTURE/SMEAR Rizwana De La Rosa MD 7/30/2024 1329   B : RIGHT FEMUR Tissue BONE RESECTION TISSUE/WOUND CULTURE/SMEAR Rizwana De La Rosa MD 7/30/2024 1339        Staff:   Circulator: Laura  Scrub Person: Lana Cote Circulator: Sarah Cote Scrub: Kirstie         Drains and/or Catheters:   Closed/Suction Drain 1 Distal;Right;Anterior Thigh (Active)   Site Description Unable to view 07/31/24 0900   Dressing Status Occlusive 07/31/24 0900   Output (mL) 15 mL 07/31/24 1200       Tourniquet Times:         Implants:     Findings: No abscess    Indications: Amirah Bennett is an 43 y.o. female who is having surgery for Wound dehiscence [T81.30XA]. Patient presents after significant and prolonged hospital course with Afib RVR complicated by delayed presentation for right lower extremity acute limb ischemia. Ultimately found to be nonsalvageable and underwent right AKA. This was complicated by a large hematoma which was attempted to be managed conservatively but ultimately led to wound dehiscence. She presents for revision.    The patient was seen in the preoperative area. The risks, benefits, complications, treatment options, non-operative alternatives,  expected recovery and outcomes were discussed with the patient. The possibilities of reaction to medication, pulmonary aspiration, injury to surrounding structures, bleeding, recurrent infection, the need for additional procedures, failure to diagnose a condition, and creating a complication requiring transfusion or operation were discussed with the patient. The patient concurred with the proposed plan, giving informed consent.  The site of surgery was properly noted/marked if necessary per policy. The patient has been actively warmed in preoperative area. Preoperative antibiotics have been ordered and given within 1 hours of incision. Venous thrombosis prophylaxis have been ordered including chemical prophylaxis    Procedure Details:   Right AKA incision opened and large old hematoma evacuated. A swab was sent to microbiology for culture. The fascia was noted to be dehisced however the myodesis was still covering the femur and this had not been fully exposed. The skin and subcutaneous tissue had retracted and femur at this point was protruding, so decision was made to revise. Oscillating saw used to transect the femur 1 cm proximally. There was significant fat necrosis within the hematoma bed and this was sharply debrided with a  curette down to healthy tissue. The wound was copiously irrigated with irrisept. Hemostasis achieved. A large 10F flat drain was placed into the wound bed. 0 Vicryl used to approximate muscle over the femur. The fascia was reapproximated with 2-0 Vicryl interrupted sutures. Wound vac placed into the subcutaneous tissue.  Complications:  None; patient tolerated the procedure well.    Disposition: PACU - hemodynamically stable.  Condition: stable             Attending Attestation: I was present and scrubbed for the entire procedure.    Rizwana Rj  Phone Number: [phone]

## 2024-08-01 LAB
ALBUMIN SERPL BCP-MCNC: 3.1 G/DL (ref 3.4–5)
ANION GAP SERPL CALC-SCNC: 13 MMOL/L (ref 10–20)
BASOPHILS # BLD AUTO: 0.04 X10*3/UL (ref 0–0.1)
BASOPHILS NFR BLD AUTO: 0.4 %
BUN SERPL-MCNC: 18 MG/DL (ref 6–23)
CALCIUM SERPL-MCNC: 8.3 MG/DL (ref 8.6–10.6)
CHLORIDE SERPL-SCNC: 96 MMOL/L (ref 98–107)
CO2 SERPL-SCNC: 30 MMOL/L (ref 21–32)
CREAT SERPL-MCNC: 0.81 MG/DL (ref 0.5–1.05)
EGFRCR SERPLBLD CKD-EPI 2021: >90 ML/MIN/1.73M*2
EOSINOPHIL # BLD AUTO: 0.27 X10*3/UL (ref 0–0.7)
EOSINOPHIL NFR BLD AUTO: 2.5 %
ERYTHROCYTE [DISTWIDTH] IN BLOOD BY AUTOMATED COUNT: 14.6 % (ref 11.5–14.5)
GLUCOSE BLD MANUAL STRIP-MCNC: 113 MG/DL (ref 74–99)
GLUCOSE BLD MANUAL STRIP-MCNC: 96 MG/DL (ref 74–99)
GLUCOSE BLD MANUAL STRIP-MCNC: 96 MG/DL (ref 74–99)
GLUCOSE SERPL-MCNC: 94 MG/DL (ref 74–99)
HCT VFR BLD AUTO: 28 % (ref 36–46)
HGB BLD-MCNC: 8.9 G/DL (ref 12–16)
IMM GRANULOCYTES # BLD AUTO: 0.05 X10*3/UL (ref 0–0.7)
IMM GRANULOCYTES NFR BLD AUTO: 0.5 % (ref 0–0.9)
INR PPP: 1.4 (ref 0.9–1.1)
LYMPHOCYTES # BLD AUTO: 2.34 X10*3/UL (ref 1.2–4.8)
LYMPHOCYTES NFR BLD AUTO: 21.3 %
MAGNESIUM SERPL-MCNC: 1.61 MG/DL (ref 1.6–2.4)
MCH RBC QN AUTO: 29.9 PG (ref 26–34)
MCHC RBC AUTO-ENTMCNC: 31.8 G/DL (ref 32–36)
MCV RBC AUTO: 94 FL (ref 80–100)
MONOCYTES # BLD AUTO: 1.17 X10*3/UL (ref 0.1–1)
MONOCYTES NFR BLD AUTO: 10.6 %
NEUTROPHILS # BLD AUTO: 7.13 X10*3/UL (ref 1.2–7.7)
NEUTROPHILS NFR BLD AUTO: 64.7 %
NRBC BLD-RTO: 0 /100 WBCS (ref 0–0)
PHOSPHATE SERPL-MCNC: 5 MG/DL (ref 2.5–4.9)
PLATELET # BLD AUTO: 234 X10*3/UL (ref 150–450)
POTASSIUM SERPL-SCNC: 4 MMOL/L (ref 3.5–5.3)
PROTHROMBIN TIME: 16.2 SECONDS (ref 9.8–12.8)
RBC # BLD AUTO: 2.98 X10*6/UL (ref 4–5.2)
SODIUM SERPL-SCNC: 135 MMOL/L (ref 136–145)
UFH PPP CHRO-ACNC: 0.2 IU/ML
UFH PPP CHRO-ACNC: 0.3 IU/ML
UFH PPP CHRO-ACNC: 0.4 IU/ML
WBC # BLD AUTO: 11 X10*3/UL (ref 4.4–11.3)

## 2024-08-01 PROCEDURE — 2500000004 HC RX 250 GENERAL PHARMACY W/ HCPCS (ALT 636 FOR OP/ED): Performed by: STUDENT IN AN ORGANIZED HEALTH CARE EDUCATION/TRAINING PROGRAM

## 2024-08-01 PROCEDURE — 83735 ASSAY OF MAGNESIUM: CPT | Performed by: STUDENT IN AN ORGANIZED HEALTH CARE EDUCATION/TRAINING PROGRAM

## 2024-08-01 PROCEDURE — 84100 ASSAY OF PHOSPHORUS: CPT | Performed by: STUDENT IN AN ORGANIZED HEALTH CARE EDUCATION/TRAINING PROGRAM

## 2024-08-01 PROCEDURE — 97535 SELF CARE MNGMENT TRAINING: CPT | Mod: GO

## 2024-08-01 PROCEDURE — 85520 HEPARIN ASSAY: CPT | Performed by: STUDENT IN AN ORGANIZED HEALTH CARE EDUCATION/TRAINING PROGRAM

## 2024-08-01 PROCEDURE — 2500000001 HC RX 250 WO HCPCS SELF ADMINISTERED DRUGS (ALT 637 FOR MEDICARE OP): Performed by: NURSE PRACTITIONER

## 2024-08-01 PROCEDURE — 99232 SBSQ HOSP IP/OBS MODERATE 35: CPT | Performed by: STUDENT IN AN ORGANIZED HEALTH CARE EDUCATION/TRAINING PROGRAM

## 2024-08-01 PROCEDURE — 36415 COLL VENOUS BLD VENIPUNCTURE: CPT | Performed by: STUDENT IN AN ORGANIZED HEALTH CARE EDUCATION/TRAINING PROGRAM

## 2024-08-01 PROCEDURE — 1200000002 HC GENERAL ROOM WITH TELEMETRY DAILY

## 2024-08-01 PROCEDURE — 85610 PROTHROMBIN TIME: CPT | Performed by: STUDENT IN AN ORGANIZED HEALTH CARE EDUCATION/TRAINING PROGRAM

## 2024-08-01 PROCEDURE — 2500000001 HC RX 250 WO HCPCS SELF ADMINISTERED DRUGS (ALT 637 FOR MEDICARE OP): Performed by: STUDENT IN AN ORGANIZED HEALTH CARE EDUCATION/TRAINING PROGRAM

## 2024-08-01 PROCEDURE — 2500000002 HC RX 250 W HCPCS SELF ADMINISTERED DRUGS (ALT 637 FOR MEDICARE OP, ALT 636 FOR OP/ED): Performed by: STUDENT IN AN ORGANIZED HEALTH CARE EDUCATION/TRAINING PROGRAM

## 2024-08-01 PROCEDURE — 85025 COMPLETE CBC W/AUTO DIFF WBC: CPT | Performed by: STUDENT IN AN ORGANIZED HEALTH CARE EDUCATION/TRAINING PROGRAM

## 2024-08-01 PROCEDURE — 97165 OT EVAL LOW COMPLEX 30 MIN: CPT | Mod: GO

## 2024-08-01 PROCEDURE — 97162 PT EVAL MOD COMPLEX 30 MIN: CPT | Mod: GP

## 2024-08-01 PROCEDURE — 82947 ASSAY GLUCOSE BLOOD QUANT: CPT

## 2024-08-01 PROCEDURE — 97530 THERAPEUTIC ACTIVITIES: CPT | Mod: GP

## 2024-08-01 RX ORDER — MORPHINE SULFATE 4 MG/ML
4 INJECTION INTRAVENOUS EVERY 4 HOURS PRN
Status: DISCONTINUED | OUTPATIENT
Start: 2024-08-01 | End: 2024-08-05

## 2024-08-01 RX ORDER — HYDROMORPHONE HYDROCHLORIDE 1 MG/ML
0.4 INJECTION, SOLUTION INTRAMUSCULAR; INTRAVENOUS; SUBCUTANEOUS ONCE
Status: COMPLETED | OUTPATIENT
Start: 2024-08-01 | End: 2024-08-01

## 2024-08-01 ASSESSMENT — PAIN - FUNCTIONAL ASSESSMENT
PAIN_FUNCTIONAL_ASSESSMENT: 0-10

## 2024-08-01 ASSESSMENT — PAIN SCALES - GENERAL
PAINLEVEL_OUTOF10: 10 - WORST POSSIBLE PAIN
PAINLEVEL_OUTOF10: 6
PAINLEVEL_OUTOF10: 10 - WORST POSSIBLE PAIN
PAINLEVEL_OUTOF10: 8
PAINLEVEL_OUTOF10: 8
PAINLEVEL_OUTOF10: 10 - WORST POSSIBLE PAIN

## 2024-08-01 ASSESSMENT — COGNITIVE AND FUNCTIONAL STATUS - GENERAL
STANDING UP FROM CHAIR USING ARMS: A LOT
MOVING FROM LYING ON BACK TO SITTING ON SIDE OF FLAT BED WITH BEDRAILS: A LITTLE
TOILETING: A LOT
DRESSING REGULAR UPPER BODY CLOTHING: A LITTLE
HELP NEEDED FOR BATHING: A LOT
MOBILITY SCORE: 13
PERSONAL GROOMING: A LITTLE
DAILY ACTIVITIY SCORE: 16
TURNING FROM BACK TO SIDE WHILE IN FLAT BAD: A LITTLE
DRESSING REGULAR LOWER BODY CLOTHING: A LOT
CLIMB 3 TO 5 STEPS WITH RAILING: TOTAL
WALKING IN HOSPITAL ROOM: A LOT
MOVING TO AND FROM BED TO CHAIR: A LOT

## 2024-08-01 ASSESSMENT — PAIN DESCRIPTION - ORIENTATION
ORIENTATION: RIGHT
ORIENTATION: RIGHT

## 2024-08-01 ASSESSMENT — PAIN DESCRIPTION - LOCATION
LOCATION: LEG
LOCATION: LEG

## 2024-08-01 ASSESSMENT — ACTIVITIES OF DAILY LIVING (ADL)
HOME_MANAGEMENT_TIME_ENTRY: 15
BATHING_ASSISTANCE: MODERATE

## 2024-08-01 NOTE — CARE PLAN
The patient's goals for the shift include rest    The clinical goals for the shift include Will have pain controlled and therapeutic on hep by end of the shift.  Problem: Skin  Goal: Decreased wound size/increased tissue granulation at next dressing change  Outcome: Progressing  Goal: Participates in plan/prevention/treatment measures  Outcome: Progressing  Goal: Prevent/manage excess moisture  Outcome: Progressing  Goal: Prevent/minimize sheer/friction injuries  Outcome: Progressing  Goal: Promote/optimize nutrition  Outcome: Progressing  Goal: Promote skin healing  Outcome: Progressing     Problem: Pain  Goal: Takes deep breaths with improved pain control throughout the shift  Outcome: Progressing  Goal: Turns in bed with improved pain control throughout the shift  Outcome: Progressing  Goal: Walks with improved pain control throughout the shift  Outcome: Progressing  Goal: Performs ADL's with improved pain control throughout shift  Outcome: Progressing  Goal: Participates in PT with improved pain control throughout the shift  Outcome: Progressing  Goal: Free from opioid side effects throughout the shift  Outcome: Progressing  Goal: Free from acute confusion related to pain meds throughout the shift  Outcome: Progressing

## 2024-08-01 NOTE — PROGRESS NOTES
Occupational Therapy    Evaluation/Treatment    Patient Name: Amirah Bennett  MRN: [id number]  : 1980  Today's Date: 24  Time Calculation  Start Time: 1055  Stop Time: 1130  Time Calculation (min): 35 min       Assessment:  End of Session Communication: Bedside nurse  End of Session Patient Position: Bed, 3 rail up, Alarm off, not on at start of session  OT Assessment Results: Decreased ADL status, Decreased upper extremity strength, Decreased endurance, Decreased functional mobility, Decreased IADLs, Decreased cognition    Plan:  Treatment Interventions: ADL retraining, Functional transfer training, UE strengthening/ROM, Endurance training, Patient/family training, Equipment evaluation/education, Compensatory technique education, Cognitive reorientation  OT Frequency: 4 times per week  OT Discharge Recommendations: High intensity level of continued care    Subjective   Current Problem:  1. Wound dehiscence  Case Request Operating Room: Debridement Lower Extremity    Case Request Operating Room: Debridement Lower Extremity    Surgical Pathology Exam    Surgical Pathology Exam    Tissue/Wound Culture/Smear    Tissue/Wound Culture/Smear    Tissue/Wound Culture/Smear    Tissue/Wound Culture/Smear        General:   OT Received On: 24  General  Reason for Referral: s/p R AKA revision and wound vac placement   Past Medical History Relevant to Rehab: HFrEF (LVEF 20-25% (2022), with prior history of cardiogenic shock 2022 Afib on warfarin w/ DCCV (2023), ischemic stroke L MCA d/t AFib LLA thrombus seen on echo (lack of OAC adherence) s/p thrombectomy 2022 @ [facility] w/ residual expressive aphasia and R sided weakness, 2024 left cerebellar MCA, paratracheal abscess s/p tracheostomy c/b tracheocutaneous fistula, T2DM (2024 HgbA1c 5.5), HTN, and recent hospitalization (-) for harinder 3 AKLI s/p oper RLE and iliac emolectomy via right CFA and AKA on  and wound debridement and  "wound vac placement 7/17; c/b  wound infection requiring abx and cardiogenic shock  Co-Treatment: PT  Co-Treatment Reason: to max pt safety and indep while targeting discipline specific tasks  Prior to Session Communication: Bedside nurse  Patient Position Received: Bed, 2 rail up, Alarm off, not on at start of session  General Comment: Pt supine in bed upon arrival, willing to participate in therapy. Pt assisted with func mob and ADLs this date and overall highly motivated for engagement in func tasks. Pt demos moderate expressive aphasia and benefits from VCs throughout.    Precautions:  Medical Precautions: Fall precautions    Vital Signs:  Heart Rate: 69  SpO2: 100 %  BP: 103/60    Pain:  Pain Assessment  Pain Assessment: 0-10  0-10 (Numeric) Pain Score: 10 - Worst possible pain  Pain Type: Surgical pain  Pain Location: Leg  Pain Orientation: Right  Pain Interventions: Repositioned, Ambulation/increased activity, Therapeutic presence    Objective   Cognition:  Arousal/Alertness: Appropriate responses to stimuli  Orientation Level:  (Pt reports \"October\" for month; able to identify year and hosp with VCs and self independently)  Following Commands: Follows multistep commands consistently  Cognition Comments: Pt demos moderate expressive aphasia and benefits from VCs to take her time when answering open ended questions; pt identifies 7/9 functional items on tray indep with increased time and 1/8 item (mouthwash) with increased time and mod-VCs. Pt demos frustration 2/2 word finding, however, receptive and appreciative towrads cue for deep breath.    PLOF /Home Living:  Home Living Comments: PLOF and home environment limited this date d/t expressive aphasia; pt reports residing at SNF and using Claudia lift to transfer to ; pt also reports assist with BADLs - although, questionable historian 2/2 current func status.     IADL History:  Leisure and Hobbies: R&B /jazz music, watching movies    ADL:  Eating Assistance: " Independent (anticipate)  Grooming Assistance: Minimal (pt completes oral hygiene and UB grooming while seated EOB; assist for thoroughness)  Bathing Assistance: Moderate (anticipate)  UE Dressing Assistance: Stand by (don /doff hosp gown while seated EOB)  LE Dressing Assistance: Moderate (don /doff L sock)  Toileting Assistance with Device: Moderate (anticipate)    Bed Mobility/Transfers:   Bed Mobility 1  Bed Mobility 1: Supine to sitting, Sitting to supine  Level of Assistance 1: Contact guard  Bed Mobility Comments 1: VCs for body mechanics    Bed Mobility 2  Bed Mobility  2: Scooting  Level of Assistance 2: Contact guard  Bed Mobility Comments 2: pt BUE support on bed    Transfer 1  Technique 1: Sit to stand, Stand to sit  Transfer Device 1: Walker  Transfer Level of Assistance 1: Minimum assistance (x2)  Trials/Comments 1: VCs for body mechanics and hand placement    Functional Mobility:  Functional Mobility 1  Comments 1: Pt completes R lateral side steps with MIN A and walker; VCs for hand placement and body mechanics    Therapy/Activity:   Therapeutic Activity  Therapeutic Activity 1: OT facilitates education re: OT role in psychosocial health intervention to max indep, safety, and engagement in I/ADL tasks; pt with increased interest in additional resources. OT provides pt with interactive handouts including mindful breathing, positive affirmations, journaling, guided imagery, and positive coping skills. Pt highly appreciative and receptive of OT this date.      Therapeutic Activity 2: Pt sits EOB ~20 minutes to address func balance, activity tolerance, BADLs, and pulmonary hygiene with SBA at trunk; no LOB and mild dizziness noted    Vision:  Vision - Basic Assessment  Current Vision: Wears glasses only for reading  Patient Visual Report:  (Pt denies acute visual deficits)    Strength:  Strength Comments: BUE grossly WFL as evidenced by func mob and BADLs    Outcome Measures:   [facility] Daily  Activity  Putting on and taking off regular lower body clothing: A lot  Bathing (including washing, rinsing, drying): A lot  Putting on and taking off regular upper body clothing: A little  Toileting, which includes using toilet, bedpan or urinal: A lot  Taking care of personal grooming such as brushing teeth: A little  Eating Meals: None  Daily Activity - Total Score: 16      Education Documentation  Handouts, taught by Marialuisa Thomas OT at 8/1/2024 11:53 AM.  Learner: Patient  Readiness: Acceptance  Method: Explanation  Response: Verbalizes Understanding    Precautions, taught by Marialuisa Thomas OT at 8/1/2024 11:53 AM.  Learner: Patient  Readiness: Acceptance  Method: Explanation  Response: Verbalizes Understanding    Body Mechanics, taught by Marialuisa Thomas OT at 8/1/2024 11:53 AM.  Learner: Patient  Readiness: Acceptance  Method: Explanation  Response: Verbalizes Understanding    ADL Training, taught by Marialuisa Thomas OT at 8/1/2024 11:53 AM.  Learner: Patient  Readiness: Acceptance  Method: Explanation  Response: Verbalizes Understanding    Education Comments  No comments found.        Goals:  Encounter Problems       Encounter Problems (Active)       ADLs       Pt will complete grooming tasks while seated or standing with supervision level of assistance.   (Progressing)       Start:  08/01/24    Expected End:  08/22/24               BALANCE       Pt will demo improved dynamic sitting /standing balance while engaging in I/ADL task with supervision level of assistance and no LOB.  (Progressing)       Start:  08/01/24    Expected End:  08/22/24               COGNITION/SAFETY       Pt will demo A&O x3 with verbal /visual cues as needed. (Progressing)       Start:  08/01/24    Expected End:  08/22/24               PSYCHOSOCIAL HEALTH       Pt will identify and implement and least one positive coping strategy during symptoms of anxiousness, fear, anger, and /or depression with minimal cues.    (Progressing)       Start:  08/01/24    Expected End:  08/22/24               TRANSFERS       Pt will complete supine <> sitting with independent level of assistance to maximize function and safety in preparation for I/ADLs.   (Progressing)       Start:  08/01/24    Expected End:  08/22/24            Pt will complete functional transfers with supervision level of assistance and LRAD.   (Progressing)       Start:  08/01/24    Expected End:  08/22/24            Pt will complete sit <> stand with supervision level of assistance and LRAD to maximize function and safety in preparation for I/ADLs.   (Progressing)       Start:  08/01/24    Expected End:  08/22/24                 ---------------  Marialuisa Thomas (OTR/L, OTD)  Inpatient Occupational Therapist   Rehab Office: [phone]

## 2024-08-01 NOTE — PROGRESS NOTES
INTERNAL MEDICINE PROGRESS NOTE     BRIEF NARRATIVE      Amirah Bennett is a 43 y.o. female on day 3 of admission presenting with Wound dehiscence.    43 y.o. 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 She presented to ED for further evaluation of wound dehiscence of RLE stump noticed by wound care doctor today on home visit once her wound vac was removed. She endorses worsening pain at surgical site that is worse than the pain she experienced after initial surgery. She denies fever, chills, n/v/d, or changes in appetite. Serosanguineous drainage present from wound upon arrival to ED. Vascular surgery was consulted by ED provider and pt was seen by consulting team while in the ED. Per documentation from vascular surgery consent for OR will need to be obtained from [facility]. Labs obtained on admit notable for anemia and mild leukocytosis. Last hospitalization was 6/20-7/24 after presented to ED with c/o dyspnea and leg swelling/pain in setting of running out of medications. During her last admit she was treated for harinder 3 AKLI s/p open RLE and iliac embolectomy via right CFA and AKA on 6/28 and wound debridement with wound vac placement on 7/17. Patient hospitalization was complicated with wound infection requiring abx and cardiogenic shock which required stay in CICU. Please refer to discharge  summary dated 7/24. Parameters added to home dose of toprol XL d/t documentation of bradycardia while in the ED. underwent right AKA revision and washout by vascular surgery on 7/30.  Postop complicated with A-fib RVR    SUBJECTIVE       Patient seen and examined today, she was sitting in bed in nonapparent distress.  Patient reports mild pain.  Patient heart rate is better controlled.  Denies any chest pain, also denies palpitation fever or chills.    OBJECTIVE      Visit Vitals  /74   Pulse 60   Temp 36.7 °C (98.1 °F)   Resp 20        Intake/Output Summary (Last 24 hours) at 8/1/2024 0824  Last data filed at 8/1/2024 0606  Gross per 24 hour   Intake 1099.33 ml   Output 2885 ml   Net -1785.67 ml       Physical Exam   Constitutional:       General: She is not in acute distress.  HENT:      Head: Normocephalic.      Nose: Nose normal.      Mouth/Throat:      Mouth: Mucous membranes are dry.   Eyes:      Extraocular Movements: Extraocular movements intact.   Cardiovascular:      Rate and Rhythm: Irregularly irregular     Pulses: Normal pulses.      Heart sounds: Normal heart sounds.   Pulmonary:      Effort: Pulmonary effort is normal. No respiratory distress.      Breath sounds: Normal breath sounds.   Abdominal:      General: Bowel sounds are normal.      Palpations: Abdomen is soft.   Musculoskeletal:      Cervical back: Normal range of motion.      Comments: R AKA   Skin:     General: Skin is warm.      Comments: Prior trach site   Neurological:      Mental Status: She is alert.      Comments: Expressive aphasia from prior CVA; oriented to self, place and situation   Psychiatric:         Mood and Affect: Mood normal.     Current Meds   amiodarone, 400 mg, oral, BID   Followed by  [START ON 8/12/2024] amiodarone, 200 mg, oral, Daily  [Held by provider] aspirin, 81 mg, oral, Daily  atorvastatin, 80 mg, oral, Nightly  DULoxetine, 60 mg, oral, Daily  empagliflozin, 10 mg, oral, Daily  folic acid, 1 mg, oral,  Daily  furosemide, 40 mg, oral, BID  metoprolol succinate XL, 25 mg, oral, Daily  multivitamin with minerals, 1 tablet, oral, Daily  pantoprazole, 40 mg, oral, Daily before breakfast  piperacillin-tazobactam, 3.375 g, intravenous, q6h  polyethylene glycol, 17 g, oral, Daily  pregabalin, 75 mg, oral, BID  sacubitriL-valsartan, 1 tablet, oral, BID  sennosides, 2 tablet, oral, BID  vancomycin, 750 mg, intravenous, q12h  [Held by provider] warfarin, 5 mg, oral, Daily       PRN medications: dextrose, dextrose, glucagon, glucagon, heparin, melatonin, morphine, oxyCODONE, oxyCODONE, polyethylene glycol, vancomycin     LABS and IMAGING     WBC   Date Value Ref Range Status   08/01/2024 11.0 4.4 - 11.3 x10*3/uL Final   07/31/2024 14.4 (H) 4.4 - 11.3 x10*3/uL Final   07/30/2024 9.0 4.4 - 11.3 x10*3/uL Final     Hemoglobin   Date Value Ref Range Status   08/01/2024 8.9 (L) 12.0 - 16.0 g/dL Final   07/31/2024 9.4 (L) 12.0 - 16.0 g/dL Final   07/30/2024 10.1 (L) 12.0 - 16.0 g/dL Final     Hematocrit   Date Value Ref Range Status   08/01/2024 28.0 (L) 36.0 - 46.0 % Final   07/31/2024 30.4 (L) 36.0 - 46.0 % Final   07/30/2024 33.2 (L) 36.0 - 46.0 % Final     Bicarbonate   Date Value Ref Range Status   08/01/2024 30 21 - 32 mmol/L Final   07/31/2024 27 21 - 32 mmol/L Final   07/30/2024 26 21 - 32 mmol/L Final     Creatinine   Date Value Ref Range Status   08/01/2024 0.81 0.50 - 1.05 mg/dL Final   07/31/2024 0.84 0.50 - 1.05 mg/dL Final   07/30/2024 0.85 0.50 - 1.05 mg/dL Final     Calcium   Date Value Ref Range Status   08/01/2024 8.3 (L) 8.6 - 10.6 mg/dL Final   07/31/2024 9.2 8.6 - 10.6 mg/dL Final   07/30/2024 8.7 8.6 - 10.6 mg/dL Final     INR   Date Value Ref Range Status   08/01/2024 1.4 (H) 0.9 - 1.1 Final   07/31/2024 1.5 (H) 0.9 - 1.1 Final   07/30/2024 1.8 (H) 0.9 - 1.1 Final       ECG 12 Lead  Atrial fibrillation  Minimal voltage criteria for LVH, may be normal variant ( [employer] product )  T wave abnormality, consider  inferolateral ischemia  Prolonged QT  Abnormal ECG  When compared with ECG of 19-JUL-2024 14:43,  Atrial fibrillation has replaced Sinus rhythm  Inverted T waves have replaced nonspecific T wave abnormality in Inferior leads  Inverted T waves have replaced nonspecific T wave abnormality in Lateral leads    See ED provider note for full interpretation and clinical correlation  Confirmed by Cassie Garcia ([id number]) on 7/31/2024 10:49:06 PM       ASSESSMENT / PLANS      #wound dehiscence s/p R AKA  #leukocytosis  # S/p right AKA revision and washout since 7/30/24  - WBC 13.8 on admit, WBC 12.0 on 7/24  - vascular surgery following  - continue outpatient pain regimen: Oxycodone 5 mg q6h PRN moderate pain and Oxycodone 10 mg q6h PRN severe pain  - continue Lyrica 75 mg PO BID  - will continue low intensity heparin gtt with plan to bridge back to coumadin if no further procedures planned  - Cont wound vac care per vascular recs  (first changed 8/1)   - PT/OT postop recommending high intensity      #afib with RVR  #YFN appendage  - s/p DCCV 5/2023  - INR 1.8 on admit  -The patient went into RVR postprocedure on 7/30, HR better controlled  - Reloaded with Amiodarone 400mg BID for 5 days then transition to home dose 200mg daily   - low intensity heparin gtt ordered with plan to bridge back to Coumadin.  Vascular recommending holding warfarin until first wound change     #HFrEF (last EF 20-25% 6/22/24)  #bradycardia (asymptomatic)  - BNP 97 , appears compensated, euvolemic  - HR documentation as 43 while in ED,   - parameters placed on Toprol XL  - monitor on tele  - strict I&O, DW  - continue home GDMT with Toprol XL 25 mg PO every day (hold for HR <60 or SBP <100), Lasix 40 mg PO BID, Entresto 24/26 mg PO BID, & Jardiance 10 mg PO every day     #hx of L cerebellar MCA stroke w/expressive aphasia 3/2024  #hx of ischemic CVA  - continue home dose of Lipitor 80 mg PO at bedtime  - holding home dose of ASA 81 mg d/t upcoming  surgery     #T2DM  - accucheck ACHS  - hypoglycemia order set placed  - HgA1c 4.2% on admit, can be diet controlled     Dispo:  Cont inpatient care        Patria Farias MD

## 2024-08-01 NOTE — PROGRESS NOTES
Transitional Care Coordination Progress Note:  Patient discussed during interdisciplinary rounds.  Team members present: MD, TAMIR  Plan per Medical/Surgical team: (per chart review) Pt s/p R AKA revision and washout with wound vac in place, plan to continue IV hep gtt with Coumadin bridge, PT/OT consulted.  Payer: [employer]  Status: Inpatient  Discharge disposition: PT is recommending high intensity therapy (8/1), however, will need to coordinate discharge planning with pt/ family as she may be more SNF appropriate depending on the post facility discharge plan of care.  Potential Barriers: none  ADOD: 8/5  Care coordinator will continue to follow for discharge planning needs.     Ruthann Hanna RN  Transitional Care Coordinator/[facility]  [id number]

## 2024-08-01 NOTE — PROGRESS NOTES
Physical Therapy    Physical Therapy Evaluation & Treatment    Patient Name: Amirah Bennett  MRN: [id number]  Today's Date: 8/1/2024   Time Calculation  Start Time: 1055  Stop Time: 1118  Time Calculation (min): 23 min    Assessment/Plan   PT Assessment  End of Session Communication: Bedside nurse  Assessment Comment: Patient is a 42yo F s/p R AKA revision and wound vac placement 7/30. Patient was indep prior and recently at SNF but does not wish to return. Patient is motivated and demonstrated good therapy tolerance during session. dc rec high intensity  End of Session Patient Position: Bed, 3 rail up, Alarm off, not on at start of session   IP OR SWING BED PT PLAN  Inpatient or Swing Bed: Inpatient  PT Plan  Treatment/Interventions: Bed mobility, Gait training, Transfer training, Balance training, Neuromuscular re-education, Strengthening, Endurance training, Range of motion, Therapeutic exercise, Therapeutic activity  PT Plan: Ongoing PT  PT Frequency: 4 times per week  PT Discharge Recommendations: High intensity level of continued care  PT Recommended Transfer Status: Assistive device  PT - OK to Discharge: Yes (indicates PT eval complete and dc rec determined)      Subjective     General Visit Information:  General  Reason for Referral: s/p R AKA revision and wound vac placement 7/30  Past Medical History Relevant to Rehab: HFrEF (LVEF 20-25% (08/2022), with prior history of cardiogenic shock 04/2022 Afib on warfarin w/ DCCV (05/2023), ischemic stroke L MCA d/t AFib LLA thrombus seen on echo (lack of OAC adherence) s/p thrombectomy 01/2022 @ [facility] w/ residual expressive aphasia and R sided weakness, 03/2024 left cerebellar MCA, paratracheal abscess s/p tracheostomy c/b tracheocutaneous fistula, T2DM (03/2024 HgbA1c 5.5), HTN, and recent hospitalization (6/20-7/24) for harinder 3 AKLI s/p oper RLE and iliac emolectomy via right CFA and AKA on 6/28 and wound debridement and wound vac placement 7/17; c/b  wound  infection requiring abx and cardiogenic shock  Co-Treatment: OT  Co-Treatment Reason: to max pt safety and indep while targeting discipline specific tasks  Prior to Session Communication: Bedside nurse  Patient Position Received: Bed, 2 rail up, Alarm off, not on at start of session  General Comment: pt supine in bed, agreebale to PT. RN cleared. expressive aphasia  Home Living:  Home Living  Home Living Comments: pt recently at [facility]; [facility] but does not wish to return per EMR. previously, pt was living home alonem stairs. limited due to aphasia  Prior Level of Function:  Prior Function Per Pt/Caregiver Report  Prior Function Comments: limited due to aphasia; but reports she was working with PT/OT. staff would use abdi to wc but per mobility this date, pt does not need abdi  Precautions:  Precautions  Medical Precautions: Fall precautions  Vital Signs:  Vital Signs  Heart Rate: 69  SpO2: 100 %  BP: 103/60    Objective   Pain:  Pain Assessment  Pain Assessment: 0-10  0-10 (Numeric) Pain Score: 10 - Worst possible pain  Pain Type: Surgical pain  Pain Location:  (RLE)  Pain Interventions:  (RN gave IV pain meds during session)  Cognition:  Cognition  Overall Cognitive Status: Within Functional Limits  Arousal/Alertness: Appropriate responses to stimuli  Orientation Level:  (reports october for month)    General Assessments:       Activity Tolerance  Endurance: Endurance does not limit participation in activity      Static Sitting Balance  Static Sitting-Level of Assistance: Close supervision  Static Sitting-Comment/Number of Minutes: 20  Functional Assessments:  Bed Mobility  Bed Mobility: Yes  Bed Mobility 1  Bed Mobility 1: Supine to sitting, Sitting to supine  Level of Assistance 1: Contact guard  Bed Mobility Comments 1: cues for sequencing, HOB elevated moderately  Bed Mobility 2  Bed Mobility  2: Scooting  Level of Assistance 2: Contact guard  Bed Mobility Comments 2: 4 lateral scoots along EOB,  good clearance    Transfers  Transfer: Yes  Transfer 1  Transfer From 1: Sit to, Stand to  Transfer to 1: Stand, Sit  Technique 1: Sit to stand, Stand to sit  Transfer Device 1: Walker  Transfer Level of Assistance 1: Minimum assistance (x2)  Trials/Comments 1: cues for hand placement, bed height elevated    Ambulation/Gait Training  Ambulation/Gait Training Performed: Yes  Ambulation/Gait Training 1  Surface 1: Level tile  Device 1: Rolling walker  Assistance 1: Minimum assistance  Quality of Gait 1:  (hop)  Comments/Distance (ft) 1: 2 lateral hops to R with FWW, min A  Extremity/Trunk Assessments:  RLE   RLE :  (hip WFL)  LLE   LLE : Within Functional Limits  Treatments:  Therapeutic Activity  Therapeutic Activity Performed: Yes  Therapeutic Activity 1: pt completed bed mobility, sat EOB for 20 min, completed a stand and 2 lateral hops. able to scoot laterally along EOB with cues  Outcome Measures:  [facility] Basic Mobility  Turning from your back to your side while in a flat bed without using bedrails: A little  Moving from lying on your back to sitting on the side of a flat bed without using bedrails: A little  Moving to and from bed to chair (including a wheelchair): A lot  Standing up from a chair using your arms (e.g. wheelchair or bedside chair): A lot  To walk in hospital room: A lot  Climbing 3-5 steps with railing: Total  Basic Mobility - Total Score: 13    Encounter Problems       Encounter Problems (Active)       Mobility       STG - Patient will ambulate > 5' with LRAD SBA (Progressing)       Start:  08/01/24    Expected End:  08/22/24               PT Transfers       STG - Transfer from bed to chair LRAD SBA (Progressing)       Start:  08/01/24    Expected End:  08/22/24            STG - Patient to transfer to and from sit to supine indep (Progressing)       Start:  08/01/24    Expected End:  08/22/24            STG - Patient will transfer sit to and from stand SBA LRAD (Progressing)       Start:   08/01/24    Expected End:  08/22/24                   Education Documentation  Precautions, taught by Na Reynoso PT at 8/1/2024 12:52 PM.  Learner: Patient  Readiness: Acceptance  Method: Explanation  Response: Verbalizes Understanding  Comment: edu on role of PT, dc plan and safety    Mobility Training, taught by Na Reynoso PT at 8/1/2024 12:52 PM.  Learner: Patient  Readiness: Acceptance  Method: Explanation  Response: Verbalizes Understanding  Comment: edu on role of PT, dc plan and safety    Education Comments  No comments found.

## 2024-08-01 NOTE — CARE PLAN
The patient's goals for the shift include: decreased pain to tolerable level.    The clinical goals for the shift include: tolerate turns to help preserve skin integrity.

## 2024-08-01 NOTE — PROGRESS NOTES
VASCULAR SURGERY PROGRESS NOTE  Assessment/Plan   Amirah Bennett is 43 y.o. 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 Patient hospitalization was complicated with wound infection requiring abx and cardiogenic shock. Patient had wound vac taken down for the first time after discharge by home wound nurse. Patient found to have wound dehiscence and recommended to come to ED for further evaluation. Vascular surgery consulted for further evaluation and management.   Patient is now s/p R AKA revision and wound vac placement 7/30 doing well    Plan:  Maintain wound vac -> plan to change today 8/1  Continue zosyn vancomycin -> will narrow down base on intraoperative cultures  OR wound cultures growing gram negative bacili  Continue heparin -> resume warfarin  Recommend ID consult for abx management   Maintain RLE to bulb suction -> please record output every shift  Rest of care per primary  Please call with any questions or concerns    D/w attending, Dr. Rj Leyva MD  General Surgery Resident PGY-3   Vascular Surgery [id number]      Subjective   Episodes of Afib RVR which have resolved. Complaining of right leg discomfort. Otherwise no other complains    Objective   Vitals:  Heart Rate:  []   Temp:  [36.4 °C (97.5 °F)-36.7 °C (98.1 °F)]   Resp:  [16-20]   BP: (83-96)/(55-69)   SpO2:  [97 %-100 %]     Exam:  Constitutional: No acute distress, sitting in bed  comfortably  Neuro:  AOx3, grossly intact, speech within normal limits  ENMT: moist mucous membranes  Head/neck: atraumatic  CV: RRR  Pulm: non-labored on room air  GI: soft, non-tender, non-distended  Skin: warm and dry  Musculoskeletal: moving all extremities  Extremities: b/l AKA. RLE AKA with wound vac in place. RLE drain with serosanguinous output. R femoral artery palpable. Neuro and motor intact.      Labs:  Results from last 7 days   Lab Units 08/01/24  0532 07/31/24  0755 07/30/24  0439   WBC AUTO x10*3/uL 11.0 14.4* 9.0   HEMOGLOBIN g/dL 8.9* 9.4* 10.1*   PLATELETS AUTO x10*3/uL 234 250 241      Results from last 7 days   Lab Units 08/01/24  0532 07/31/24  0755 07/30/24  0756   SODIUM mmol/L 135* 136 134*   POTASSIUM mmol/L 4.0 4.5 3.4*   CHLORIDE mmol/L 96* 98 99   CO2 mmol/L 30 27 26   BUN mg/dL 18 17 22   CREATININE mg/dL 0.81 0.84 0.85   GLUCOSE mg/dL 94 98 116*   MAGNESIUM mg/dL 1.61 1.55*  --    PHOSPHORUS mg/dL 5.0* 5.6*  --       Results from last 7 days   Lab Units 08/01/24  0532 07/31/24  0754 07/30/24  0439   INR  1.4* 1.5* 1.8*   PROTIME seconds 16.2* 16.7* 20.2*   APTT seconds  --  30  --       Results from last 7 days   Lab Units 08/01/24  0532 08/01/24  0015 07/31/24  1809   ANTI XA UNFRACTIONATED IU/mL 0.3 0.2 1.0

## 2024-08-02 LAB
ALBUMIN SERPL BCP-MCNC: 3.2 G/DL (ref 3.4–5)
ANION GAP SERPL CALC-SCNC: 16 MMOL/L (ref 10–20)
BACTERIA SPEC CULT: ABNORMAL
BASOPHILS # BLD AUTO: 0.04 X10*3/UL (ref 0–0.1)
BASOPHILS NFR BLD AUTO: 0.4 %
BUN SERPL-MCNC: 20 MG/DL (ref 6–23)
CALCIUM SERPL-MCNC: 9.1 MG/DL (ref 8.6–10.6)
CARBA5 NEG: ABNORMAL
CHLORIDE SERPL-SCNC: 94 MMOL/L (ref 98–107)
CO2 SERPL-SCNC: 29 MMOL/L (ref 21–32)
CREAT SERPL-MCNC: 1.13 MG/DL (ref 0.5–1.05)
EGFRCR SERPLBLD CKD-EPI 2021: 61 ML/MIN/1.73M*2
EOSINOPHIL # BLD AUTO: 0.37 X10*3/UL (ref 0–0.7)
EOSINOPHIL NFR BLD AUTO: 3.6 %
ERYTHROCYTE [DISTWIDTH] IN BLOOD BY AUTOMATED COUNT: 14.7 % (ref 11.5–14.5)
GLUCOSE BLD MANUAL STRIP-MCNC: 115 MG/DL (ref 74–99)
GLUCOSE BLD MANUAL STRIP-MCNC: 159 MG/DL (ref 74–99)
GLUCOSE SERPL-MCNC: 94 MG/DL (ref 74–99)
GRAM STN SPEC: ABNORMAL
HCT VFR BLD AUTO: 30.3 % (ref 36–46)
HGB BLD-MCNC: 9.7 G/DL (ref 12–16)
HOLD SPECIMEN: NORMAL
IMM GRANULOCYTES # BLD AUTO: 0.07 X10*3/UL (ref 0–0.7)
IMM GRANULOCYTES NFR BLD AUTO: 0.7 % (ref 0–0.9)
INR PPP: 1.5 (ref 0.9–1.1)
LYMPHOCYTES # BLD AUTO: 2.06 X10*3/UL (ref 1.2–4.8)
LYMPHOCYTES NFR BLD AUTO: 19.8 %
MAGNESIUM SERPL-MCNC: 1.78 MG/DL (ref 1.6–2.4)
MCH RBC QN AUTO: 30 PG (ref 26–34)
MCHC RBC AUTO-ENTMCNC: 32 G/DL (ref 32–36)
MCV RBC AUTO: 94 FL (ref 80–100)
MONOCYTES # BLD AUTO: 1.05 X10*3/UL (ref 0.1–1)
MONOCYTES NFR BLD AUTO: 10.1 %
NEUTROPHILS # BLD AUTO: 6.8 X10*3/UL (ref 1.2–7.7)
NEUTROPHILS NFR BLD AUTO: 65.4 %
NRBC BLD-RTO: 0 /100 WBCS (ref 0–0)
PHOSPHATE SERPL-MCNC: 4.7 MG/DL (ref 2.5–4.9)
PLATELET # BLD AUTO: 279 X10*3/UL (ref 150–450)
POTASSIUM SERPL-SCNC: 3.8 MMOL/L (ref 3.5–5.3)
PROTHROMBIN TIME: 17.2 SECONDS (ref 9.8–12.8)
RBC # BLD AUTO: 3.23 X10*6/UL (ref 4–5.2)
SODIUM SERPL-SCNC: 135 MMOL/L (ref 136–145)
UFH PPP CHRO-ACNC: 0.4 IU/ML
VANCOMYCIN SERPL-MCNC: 23.9 UG/ML (ref 5–20)
WBC # BLD AUTO: 10.4 X10*3/UL (ref 4.4–11.3)

## 2024-08-02 PROCEDURE — 85025 COMPLETE CBC W/AUTO DIFF WBC: CPT | Performed by: STUDENT IN AN ORGANIZED HEALTH CARE EDUCATION/TRAINING PROGRAM

## 2024-08-02 PROCEDURE — 2500000001 HC RX 250 WO HCPCS SELF ADMINISTERED DRUGS (ALT 637 FOR MEDICARE OP): Performed by: STUDENT IN AN ORGANIZED HEALTH CARE EDUCATION/TRAINING PROGRAM

## 2024-08-02 PROCEDURE — 2500000001 HC RX 250 WO HCPCS SELF ADMINISTERED DRUGS (ALT 637 FOR MEDICARE OP): Performed by: NURSE PRACTITIONER

## 2024-08-02 PROCEDURE — 85520 HEPARIN ASSAY: CPT | Performed by: STUDENT IN AN ORGANIZED HEALTH CARE EDUCATION/TRAINING PROGRAM

## 2024-08-02 PROCEDURE — 2500000002 HC RX 250 W HCPCS SELF ADMINISTERED DRUGS (ALT 637 FOR MEDICARE OP, ALT 636 FOR OP/ED): Performed by: STUDENT IN AN ORGANIZED HEALTH CARE EDUCATION/TRAINING PROGRAM

## 2024-08-02 PROCEDURE — 36415 COLL VENOUS BLD VENIPUNCTURE: CPT | Performed by: STUDENT IN AN ORGANIZED HEALTH CARE EDUCATION/TRAINING PROGRAM

## 2024-08-02 PROCEDURE — 2500000004 HC RX 250 GENERAL PHARMACY W/ HCPCS (ALT 636 FOR OP/ED): Performed by: STUDENT IN AN ORGANIZED HEALTH CARE EDUCATION/TRAINING PROGRAM

## 2024-08-02 PROCEDURE — 99223 1ST HOSP IP/OBS HIGH 75: CPT | Performed by: STUDENT IN AN ORGANIZED HEALTH CARE EDUCATION/TRAINING PROGRAM

## 2024-08-02 PROCEDURE — 80069 RENAL FUNCTION PANEL: CPT | Performed by: STUDENT IN AN ORGANIZED HEALTH CARE EDUCATION/TRAINING PROGRAM

## 2024-08-02 PROCEDURE — 1200000002 HC GENERAL ROOM WITH TELEMETRY DAILY

## 2024-08-02 PROCEDURE — 80202 ASSAY OF VANCOMYCIN: CPT | Performed by: STUDENT IN AN ORGANIZED HEALTH CARE EDUCATION/TRAINING PROGRAM

## 2024-08-02 PROCEDURE — 2500000004 HC RX 250 GENERAL PHARMACY W/ HCPCS (ALT 636 FOR OP/ED)

## 2024-08-02 PROCEDURE — 82947 ASSAY GLUCOSE BLOOD QUANT: CPT

## 2024-08-02 PROCEDURE — 83735 ASSAY OF MAGNESIUM: CPT | Performed by: STUDENT IN AN ORGANIZED HEALTH CARE EDUCATION/TRAINING PROGRAM

## 2024-08-02 PROCEDURE — 99232 SBSQ HOSP IP/OBS MODERATE 35: CPT | Performed by: STUDENT IN AN ORGANIZED HEALTH CARE EDUCATION/TRAINING PROGRAM

## 2024-08-02 PROCEDURE — 85610 PROTHROMBIN TIME: CPT | Performed by: STUDENT IN AN ORGANIZED HEALTH CARE EDUCATION/TRAINING PROGRAM

## 2024-08-02 RX ORDER — MEROPENEM 1 G/1
1 INJECTION, POWDER, FOR SOLUTION INTRAVENOUS EVERY 8 HOURS
Status: DISCONTINUED | OUTPATIENT
Start: 2024-08-02 | End: 2024-08-02

## 2024-08-02 RX ORDER — MIDODRINE HYDROCHLORIDE 5 MG/1
10 TABLET ORAL
Status: DISCONTINUED | OUTPATIENT
Start: 2024-08-02 | End: 2024-08-09 | Stop reason: HOSPADM

## 2024-08-02 RX ORDER — MEROPENEM 1 G/1
1 INJECTION, POWDER, FOR SOLUTION INTRAVENOUS EVERY 8 HOURS
Status: DISCONTINUED | OUTPATIENT
Start: 2024-08-03 | End: 2024-08-09 | Stop reason: HOSPADM

## 2024-08-02 RX ORDER — VANCOMYCIN HYDROCHLORIDE 500 MG/100ML
500 INJECTION, SOLUTION INTRAVENOUS EVERY 12 HOURS
Status: DISCONTINUED | OUTPATIENT
Start: 2024-08-02 | End: 2024-08-02

## 2024-08-02 RX ORDER — HYDROMORPHONE HYDROCHLORIDE 1 MG/ML
0.2 INJECTION, SOLUTION INTRAMUSCULAR; INTRAVENOUS; SUBCUTANEOUS ONCE
Status: COMPLETED | OUTPATIENT
Start: 2024-08-02 | End: 2024-08-02

## 2024-08-02 ASSESSMENT — PAIN SCALES - GENERAL
PAINLEVEL_OUTOF10: 5 - MODERATE PAIN
PAINLEVEL_OUTOF10: 9
PAINLEVEL_OUTOF10: 8
PAINLEVEL_OUTOF10: 6
PAINLEVEL_OUTOF10: 8

## 2024-08-02 ASSESSMENT — PAIN DESCRIPTION - LOCATION
LOCATION: LEG
LOCATION: LEG

## 2024-08-02 ASSESSMENT — PAIN - FUNCTIONAL ASSESSMENT
PAIN_FUNCTIONAL_ASSESSMENT: 0-10

## 2024-08-02 ASSESSMENT — PAIN DESCRIPTION - ORIENTATION
ORIENTATION: RIGHT
ORIENTATION: RIGHT

## 2024-08-02 NOTE — PROGRESS NOTES
Physical Therapy Attempt                 Therapy Communication Note    Patient Name: Amirah Bennett  MRN: [id number]  Today's Date: 8/2/2024     Discipline: Physical Therapy    Missed Visit Reason: Missed Visit Reason:  (attempted to see pt- wound vac alarming. Per RN- team planning to address today. Will defer mobility until s/p wound vac is addressed)    Missed Time: Attempt

## 2024-08-02 NOTE — PROGRESS NOTES
Vancomycin Dosing by Pharmacy  FOLLOW UP    Amirah Bennett is a 43 y.o. female who Pharmacy is consulted to dose vancomycin for surgical wound infection.     Based on the patient's indication and renal status, this patient is being dosed based on a goal vancomycin AUC of 400-600.     Current vancomycin dose: 750 mg every 12 hours    Estimated vancomycin AUC on current dose: 664 mg/L.hr    Renal function is currently declining.    Estimated Creatinine Clearance: 61.1 mL/min (A) (by C-G formula based on SCr of 1.13 mg/dL (H)).    Vancomycin   Date Value Ref Range Status   08/02/2024 23.9 (H) 5.0 - 20.0 ug/mL Final   07/02/2024 15.6 5.0 - 20.0 ug/mL Final   06/30/2024 19.0 5.0 - 20.0 ug/mL Final   06/28/2024 8.4 5.0 - 20.0 ug/mL Final     Vancomycin, Trough   Date Value Ref Range Status   07/31/2024 12.4 5.0 - 20.0 ug/mL Final     Comment:     Therapeutic Ranges:    Peak (all ages):        30.0-40.0 ug/mL                       Trough (all ages):        10.0-20.0 ug/mL                                             Vancomycin trough concentrations drawn immediately prior to the next dose at steady-state are preferred for concentration-guided monitoring of patients treated with vancomycin.    Reference: Am J Health-Syst Pharm. 2020; 77(11):835-864.        06/22/2024 8.8 5.0 - 20.0 ug/mL Final     Comment:     Therapeutic Ranges:    Peak (all ages):        30.0-40.0 ug/mL                       Trough (all ages):        10.0-20.0 ug/mL                                             Vancomycin trough concentrations drawn immediately prior to the next dose at steady-state are preferred for concentration-guided monitoring of patients treated with vancomycin.    Reference: Am J Health-Syst Pharm. 2020; 77(11):835-864.            Results from last 7 days   Lab Units 08/02/24  0834 08/01/24  0532 07/31/24  0755 07/30/24  0756 07/30/24  0439   CREATININE mg/dL 1.13* 0.81 0.84 0.85 0.96   BUN mg/dL 20 18 17 22 23   WBC AUTO x10*3/uL 10.4  11.0 14.4*  --  9.0        Visit Vitals  BP 99/73   Pulse 79   Temp 35.9 °C (96.6 °F)   Resp 18       Staph/MRSA Screen Culture   Date/Time Value Ref Range Status   07/02/2024 11:09 AM No Staphylococcus aureus isolated  Final     Gram Stain   Date/Time Value Ref Range Status   07/30/2024 01:39 PM (1+) Rare Polymorphonuclear leukocytes (A)  Preliminary   07/30/2024 01:39 PM (1+) Rare Gram negative bacilli (A)  Preliminary     Urine Culture   Date/Time Value Ref Range Status   07/11/2024 07:40 AM >100,000 Klebsiella pneumoniae/variicola (A)  Final     Blood Culture   Date/Time Value Ref Range Status   07/30/2024 04:39 AM No growth at 3 days  Preliminary   07/30/2024 04:39 AM No growth at 3 days  Preliminary     Tissue/Wound Culture/Smear   Date/Time Value Ref Range Status   07/30/2024 01:39 PM (1+) Rare Escherichia coli (A)  Preliminary   07/30/2024 01:39 PM (1+) Rare Enterobacter cloacae complex (A)  Preliminary     Comment:     SECOND and THIRD generation cephalosporin results are not reported as resistance may develop during therapy with these agents.        Susceptibility data from last 90 days.  Collected Specimen Info Organism Amoxicillin/Clavulanate Ampicillin Ampicillin/Sulbactam Cefazolin Cefazolin (uncomplicated UTIs only) Ciprofloxacin Gentamicin Levofloxacin Nitrofurantoin Piperacillin/Tazobactam   07/30/24 Tissue from BONE RESECTION Escherichia coli               Enterobacter cloacae complex             07/30/24 Swab from CLOT/THROMBUS Escherichia coli   S   S   S  S  S   S     Enterobacter cloacae complex             07/11/24 Urine from Indwelling (Araya) Catheter Klebsiella pneumoniae/variicola  S  R  S  S  S  S  S   S  S     Collected Specimen Info Organism Trimethoprim/Sulfamethoxazole   07/30/24 Tissue from BONE RESECTION Escherichia coli      Enterobacter cloacae complex    07/30/24 Swab from CLOT/THROMBUS Escherichia coli  S     Enterobacter cloacae complex    07/11/24 Urine from Indwelling (Araya)  Catheter Klebsiella pneumoniae/variicola  S       Assessment/Plan    AUC is above goal range. Orders placed for new vancomycin regimen of 500 every 12 hours. This dosing regimen is predicted by [employer] to result in the following pharmacokinetic parameters:  Regimen: 500 mg IV every 12 hours.  Start time: 09:14 on 08/02/2024  Exposure target: AUC24 (range)400-600 mg/L.hr   AUC24,ss: 460 mg/L.hr  Probability of AUC24 > 400: 83 %  Ctrough,ss: 15.7 mg/L  Probability of Ctrough,ss > 20: 11 %  Probability of nephrotoxicity (Lodise KWAME 2009): 11 %    The next vancomycin level will be ordered for 8/3 at AM, unless clinically indicated sooner.  Will continue to monitor renal function daily while on vancomycin and order serum creatinine at least every 48 hours if not already ordered.  Will follow for continued vancomycin needs, clinical response, and signs/symptoms of toxicity.       Kole Gray, PharmD

## 2024-08-02 NOTE — CARE PLAN
Problem: Pain  Goal: Turns in bed with improved pain control throughout the shift  Outcome: Progressing  Goal: Free from opioid side effects throughout the shift  Outcome: Progressing  Goal: Free from acute confusion related to pain meds throughout the shift  Outcome: Progressing   The patient's goals for the shift include rest    The clinical goals for the shift include Will have pain controlled during shift and cooperate with Q2 turns/repositioning

## 2024-08-02 NOTE — NURSING NOTE
END OF SHIFT NOTE     Patient remained safe and free from falls during the shift.  Patient complained of Right stump during the shift.  Vascular team came over to replace Wound vac and ace wrap to left stump.  Patient has an extra wound vac canister in window sill.  Patient BP have been soft today.   Dr. Christianson put in order for 10mg Midodrine and LR Bolus.  Patient is now on Contact Precaution for MDRO.  Plan to move patient into a private room when one is available.  Pt. has been cooperative and pleasant.   Call light within reach.  No other events occurred throughout shift.   Oncoming Nurse will continue to monitor patient.     Enedina Egan LPN

## 2024-08-02 NOTE — PROGRESS NOTES
INTERNAL MEDICINE PROGRESS NOTE     BRIEF NARRATIVE      Amirah Bennett is a 43 y.o. female on day 4 of admission presenting with Wound dehiscence.    43 y.o. 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 She presented to ED for further evaluation of wound dehiscence of RLE stump noticed by wound care doctor today on home visit once her wound vac was removed. She endorses worsening pain at surgical site that is worse than the pain she experienced after initial surgery. She denies fever, chills, n/v/d, or changes in appetite. Serosanguineous drainage present from wound upon arrival to ED. Vascular surgery was consulted by ED provider and pt was seen by consulting team while in the ED. Per documentation from vascular surgery consent for OR will need to be obtained from [facility]. Labs obtained on admit notable for anemia and mild leukocytosis. Last hospitalization was 6/20-7/24 after presented to ED with c/o dyspnea and leg swelling/pain in setting of running out of medications. During her last admit she was treated for harinder 3 AKLI s/p open RLE and iliac embolectomy via right CFA and AKA on 6/28 and wound debridement with wound vac placement on 7/17. Patient hospitalization was complicated with wound infection requiring abx and cardiogenic shock which required stay in CICU. Please refer to discharge  summary dated 7/24. Parameters added to home dose of toprol XL d/t documentation of bradycardia while in the ED. underwent right AKA revision and washout by vascular surgery on 7/30.  Postop complicated with A-fib RVR. Bicarb low     SUBJECTIVE       Patient seen and examined today, she was sitting in bed in nonapparent distress.  Patient reports significant pain today.Patient heart rate is better controlled.  Denies any chest pain, also denies palpitation fever or chills.    OBJECTIVE      Visit Vitals  BP 77/50 (BP Location: Right arm, Patient Position: Sitting)   Pulse 75   Temp 36.4 °C (97.5 °F)   Resp 20        Intake/Output Summary (Last 24 hours) at 8/2/2024 1535  Last data filed at 8/2/2024 1414  Gross per 24 hour   Intake 660 ml   Output 1825 ml   Net -1165 ml       Physical Exam   Constitutional:       General: She is not in acute distress.  HENT:      Head: Normocephalic.      Nose: Nose normal.      Mouth/Throat:      Mouth: Mucous membranes are dry.   Eyes:      Extraocular Movements: Extraocular movements intact.   Cardiovascular:      Rate and Rhythm: Irregularly irregular     Pulses: Normal pulses.      Heart sounds: Normal heart sounds.   Pulmonary:      Effort: Pulmonary effort is normal. No respiratory distress.      Breath sounds: Normal breath sounds.   Abdominal:      General: Bowel sounds are normal.      Palpations: Abdomen is soft.   Musculoskeletal:      Cervical back: Normal range of motion.      Comments: R AKA   Skin:     General: Skin is warm.      Comments: Prior trach site   Neurological:      Mental Status: She is alert.      Comments: Expressive aphasia from prior CVA; oriented to self, place and situation   Psychiatric:         Mood and Affect: Mood normal.     Current Meds   amiodarone, 400 mg, oral, BID   Followed by  [START ON 8/12/2024] amiodarone, 200 mg, oral, Daily  [Held by provider] aspirin, 81 mg, oral, Daily  atorvastatin, 80 mg, oral, Nightly  DULoxetine, 60 mg, oral,  Daily  empagliflozin, 10 mg, oral, Daily  folic acid, 1 mg, oral, Daily  furosemide, 40 mg, oral, BID  metoprolol succinate XL, 25 mg, oral, Daily  midodrine, 10 mg, oral, TID  multivitamin with minerals, 1 tablet, oral, Daily  pantoprazole, 40 mg, oral, Daily before breakfast  piperacillin-tazobactam, 3.375 g, intravenous, q6h  polyethylene glycol, 17 g, oral, Daily  pregabalin, 75 mg, oral, BID  sacubitriL-valsartan, 1 tablet, oral, BID  sennosides, 2 tablet, oral, BID  vancomycin, 500 mg, intravenous, q12h  [Held by provider] warfarin, 5 mg, oral, Daily       PRN medications: dextrose, dextrose, glucagon, glucagon, heparin, melatonin, morphine, oxyCODONE, oxyCODONE, polyethylene glycol, vancomycin     LABS and IMAGING     WBC   Date Value Ref Range Status   08/02/2024 10.4 4.4 - 11.3 x10*3/uL Final   08/01/2024 11.0 4.4 - 11.3 x10*3/uL Final   07/31/2024 14.4 (H) 4.4 - 11.3 x10*3/uL Final     Hemoglobin   Date Value Ref Range Status   08/02/2024 9.7 (L) 12.0 - 16.0 g/dL Final   08/01/2024 8.9 (L) 12.0 - 16.0 g/dL Final   07/31/2024 9.4 (L) 12.0 - 16.0 g/dL Final     Hematocrit   Date Value Ref Range Status   08/02/2024 30.3 (L) 36.0 - 46.0 % Final   08/01/2024 28.0 (L) 36.0 - 46.0 % Final   07/31/2024 30.4 (L) 36.0 - 46.0 % Final     Bicarbonate   Date Value Ref Range Status   08/02/2024 29 21 - 32 mmol/L Final   08/01/2024 30 21 - 32 mmol/L Final   07/31/2024 27 21 - 32 mmol/L Final     Creatinine   Date Value Ref Range Status   08/02/2024 1.13 (H) 0.50 - 1.05 mg/dL Final   08/01/2024 0.81 0.50 - 1.05 mg/dL Final   07/31/2024 0.84 0.50 - 1.05 mg/dL Final     Calcium   Date Value Ref Range Status   08/02/2024 9.1 8.6 - 10.6 mg/dL Final   08/01/2024 8.3 (L) 8.6 - 10.6 mg/dL Final   07/31/2024 9.2 8.6 - 10.6 mg/dL Final     INR   Date Value Ref Range Status   08/02/2024 1.5 (H) 0.9 - 1.1 Final   08/01/2024 1.4 (H) 0.9 - 1.1 Final   07/31/2024 1.5 (H) 0.9 - 1.1 Final       ECG 12 Lead  Atrial fibrillation  Minimal  voltage criteria for LVH, may be normal variant ( [place] product )  T wave abnormality, consider inferolateral ischemia  Prolonged QT  Abnormal ECG  When compared with ECG of 19-JUL-2024 14:43,  Atrial fibrillation has replaced Sinus rhythm  Inverted T waves have replaced nonspecific T wave abnormality in Inferior leads  Inverted T waves have replaced nonspecific T wave abnormality in Lateral leads    See ED provider note for full interpretation and clinical correlation  Confirmed by Cassie Garcia ([id number]) on 7/31/2024 10:49:06 PM       ASSESSMENT / PLANS      #wound dehiscence s/p R AKA  #leukocytosis  # S/p right AKA revision and washout since 7/30/24  - WBC 13.8 on admit, WBC 12.0 on 7/24  - vascular surgery following  - continue outpatient pain regimen: Oxycodone 5 mg q6h PRN moderate pain and Oxycodone 10 mg q6h PRN severe pain  - continue Lyrica 75 mg PO BID  - Vanc / Zosyn (7/30-   - ID consulted for abx recs, follow surgical cultures   - will continue low intensity heparin gtt with plan to bridge back to coumadin if no further procedures planned  - Cont wound vac care per vascular  (first changed 8/1)   - PT/OT postop recommending high intensity      #afib with RVR  #YFN appendage  - s/p DCCV 5/2023  - INR 1.8 on admit  -The patient went into RVR postprocedure on 7/30, HR better controlled  - Reloaded with Amiodarone 400mg BID for 5 days then transition to home dose 200mg daily   - low intensity heparin gtt ordered with plan to bridge back to Coumadin.  Vascular recommending holding warfarin for now     #HFrEF (last EF 20-25% 6/22/24)  #bradycardia (asymptomatic)  #Hypotension   - BNP 97 , appears compensated, euvolemic  - HR documentation as 43 while in ED,   - parameters placed on Toprol XL  - Will initiate midodrine (Caution with low EF)   - monitor on tele  - strict I&O, DW  - continue home GDMT with Toprol XL 25 mg PO every day (hold for HR <60 or SBP <100), Lasix 40 mg PO BID, Entresto 24/26 mg PO BID,  & Jardiance 10 mg PO every day     #hx of L cerebellar MCA stroke w/expressive aphasia 3/2024  #hx of ischemic CVA  - continue home dose of Lipitor 80 mg PO at bedtime  - holding home dose of ASA 81 mg d/t upcoming surgery     #T2DM  - accucheck ACHS  - hypoglycemia order set placed  - HgA1c 4.2% on admit, can be diet controlled     Dispo:  Cont inpatient care        Patria Farias MD

## 2024-08-02 NOTE — PROGRESS NOTES
VASCULAR SURGERY PROGRESS NOTE    Assessment/Plan   Amirah Bennett is 43 y.o. 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 Patient hospitalization was complicated with wound infection requiring abx and cardiogenic shock. Patient found to have wound dehiscence and admitted.     Patient is now s/p R AKA revision with wound vac. Changed this morning due to clogged tubing. Tissue is healthy, intact. Restarted on coumadin, Hgb stable (9.7 from 8.9). Intra-op culture growing e coli. Rec ID for abx regimen.     Plan:  Maintain wound vac  Continue zosyn vancomycin -> will narrow down base on intraoperative cultures  OR wound cultures growing e coli, pending sensitivities  Continue AC (warfarin)  ID consult for abx management   Maintain RLE to bulb suction -> please record output every shift  Rest of care per primary  Please call with any questions or concerns    D/w attending, Dr. Rj Oro MD  PGY-2 Vascular Surgery Resident  [id number]    Subjective   Slept well overnight. Wound vac clogged this morning. Changed at bedside, patient tolerated change without issue.     Objective   Vitals:  Heart Rate:  [65-98]   Temp:  [35.9 °C (96.6 °F)-36.7 °C (98.1 °F)]   Resp:  [14-18]   BP: ()/(53-73)   SpO2:  [93 %-100 %]     Exam:  Constitutional: No acute distress, sleeping in bed comfortably  Neuro:  AOx3, grossly intact  ENMT: moist mucous membranes  Head/neck:  atraumatic  CV: RRR  Pulm: non-labored on room air  Skin: warm and dry  Extremities: b/l AKA. RLE AKA with wound vac in place. RLE drain with serosanguinous output. Clogged drain. Serosanguinous drainage at wound under wound vac dressing. Changed, tissue beefy red without purulence. Neuro and motor intact.    Labs:  Results from last 7 days   Lab Units 08/02/24  0834 08/01/24  0532 07/31/24  0755   WBC AUTO x10*3/uL 10.4 11.0 14.4*   HEMOGLOBIN g/dL 9.7* 8.9* 9.4*   PLATELETS AUTO x10*3/uL 279 234 250      Results from last 7 days   Lab Units 08/02/24  0834 08/01/24  0532 07/31/24  0755   SODIUM mmol/L 135* 135* 136   POTASSIUM mmol/L 3.8 4.0 4.5   CHLORIDE mmol/L 94* 96* 98   CO2 mmol/L 29 30 27   BUN mg/dL 20 18 17   CREATININE mg/dL 1.13* 0.81 0.84   GLUCOSE mg/dL 94 94 98   MAGNESIUM mg/dL 1.78 1.61 1.55*   PHOSPHORUS mg/dL 4.7 5.0* 5.6*      Results from last 7 days   Lab Units 08/02/24  0834 08/01/24  0532 07/31/24  0754   INR  1.5* 1.4* 1.5*   PROTIME seconds 17.2* 16.2* 16.7*   APTT seconds  --   --  30      Results from last 7 days   Lab Units 08/02/24  0834 08/01/24  1142 08/01/24  0532   ANTI XA UNFRACTIONATED IU/mL 0.4 0.4 0.3

## 2024-08-02 NOTE — CARE PLAN
The patient's goals for the shift include rest    The clinical goals for the shift include Will have pain controlled during shift and cooperate with Q2 turns/repositioning

## 2024-08-03 ENCOUNTER — APPOINTMENT (OUTPATIENT)
Dept: RADIOLOGY | Facility: HOSPITAL | Age: 44
End: 2024-08-03
Payer: COMMERCIAL

## 2024-08-03 ENCOUNTER — APPOINTMENT (OUTPATIENT)
Dept: CARDIOLOGY | Facility: HOSPITAL | Age: 44
End: 2024-08-03
Payer: COMMERCIAL

## 2024-08-03 LAB
ALBUMIN SERPL BCP-MCNC: 3 G/DL (ref 3.4–5)
ANION GAP BLDV CALCULATED.4IONS-SCNC: 8 MMOL/L (ref 10–25)
ANION GAP SERPL CALC-SCNC: 13 MMOL/L (ref 10–20)
APPEARANCE UR: CLEAR
BACTERIA BLD CULT: NORMAL
BACTERIA BLD CULT: NORMAL
BASE EXCESS BLDV CALC-SCNC: 3.4 MMOL/L (ref -2–3)
BASOPHILS # BLD AUTO: 0.04 X10*3/UL (ref 0–0.1)
BASOPHILS NFR BLD AUTO: 0.5 %
BILIRUB UR STRIP.AUTO-MCNC: NEGATIVE MG/DL
BODY TEMPERATURE: 37 DEGREES CELSIUS
BUN SERPL-MCNC: 22 MG/DL (ref 6–23)
CA-I BLDV-SCNC: 1.1 MMOL/L (ref 1.1–1.33)
CALCIUM SERPL-MCNC: 9 MG/DL (ref 8.6–10.6)
CHLORIDE BLDV-SCNC: 99 MMOL/L (ref 98–107)
CHLORIDE SERPL-SCNC: 97 MMOL/L (ref 98–107)
CO2 SERPL-SCNC: 29 MMOL/L (ref 21–32)
COLOR UR: YELLOW
CREAT SERPL-MCNC: 0.92 MG/DL (ref 0.5–1.05)
EGFRCR SERPLBLD CKD-EPI 2021: 78 ML/MIN/1.73M*2
EJECTION FRACTION APICAL 4 CHAMBER: 40
EJECTION FRACTION: 23 %
EOSINOPHIL # BLD AUTO: 0.39 X10*3/UL (ref 0–0.7)
EOSINOPHIL NFR BLD AUTO: 4.6 %
ERYTHROCYTE [DISTWIDTH] IN BLOOD BY AUTOMATED COUNT: 14.4 % (ref 11.5–14.5)
GLUCOSE BLD MANUAL STRIP-MCNC: 121 MG/DL (ref 74–99)
GLUCOSE BLD MANUAL STRIP-MCNC: 142 MG/DL (ref 74–99)
GLUCOSE BLDV-MCNC: 106 MG/DL (ref 74–99)
GLUCOSE SERPL-MCNC: 85 MG/DL (ref 74–99)
GLUCOSE UR STRIP.AUTO-MCNC: ABNORMAL MG/DL
HCO3 BLDV-SCNC: 28.5 MMOL/L (ref 22–26)
HCT VFR BLD AUTO: 28.7 % (ref 36–46)
HCT VFR BLD EST: 27 % (ref 36–46)
HGB BLD-MCNC: 8.8 G/DL (ref 12–16)
HGB BLDV-MCNC: 9 G/DL (ref 12–16)
IMM GRANULOCYTES # BLD AUTO: 0.03 X10*3/UL (ref 0–0.7)
IMM GRANULOCYTES NFR BLD AUTO: 0.4 % (ref 0–0.9)
INHALED O2 CONCENTRATION: 21 %
INR PPP: 1.5 (ref 0.9–1.1)
KETONES UR STRIP.AUTO-MCNC: NEGATIVE MG/DL
LACTATE BLDV-SCNC: 0.7 MMOL/L (ref 0.4–2)
LEFT ATRIUM VOLUME AREA LENGTH INDEX BSA: 46.5 ML/M2
LEFT VENTRICLE INTERNAL DIMENSION DIASTOLE: 4.62 CM (ref 3.5–6)
LEUKOCYTE ESTERASE UR QL STRIP.AUTO: ABNORMAL
LYMPHOCYTES # BLD AUTO: 1.94 X10*3/UL (ref 1.2–4.8)
LYMPHOCYTES NFR BLD AUTO: 22.9 %
MAGNESIUM SERPL-MCNC: 1.91 MG/DL (ref 1.6–2.4)
MCH RBC QN AUTO: 29.8 PG (ref 26–34)
MCHC RBC AUTO-ENTMCNC: 30.7 G/DL (ref 32–36)
MCV RBC AUTO: 97 FL (ref 80–100)
MONOCYTES # BLD AUTO: 0.9 X10*3/UL (ref 0.1–1)
MONOCYTES NFR BLD AUTO: 10.6 %
NEUTROPHILS # BLD AUTO: 5.18 X10*3/UL (ref 1.2–7.7)
NEUTROPHILS NFR BLD AUTO: 61 %
NITRITE UR QL STRIP.AUTO: NEGATIVE
NRBC BLD-RTO: 0 /100 WBCS (ref 0–0)
OXYHGB MFR BLDV: 85.2 % (ref 45–75)
PCO2 BLDV: 45 MM HG (ref 41–51)
PH BLDV: 7.41 PH (ref 7.33–7.43)
PH UR STRIP.AUTO: 6.5 [PH]
PHOSPHATE SERPL-MCNC: 3.8 MG/DL (ref 2.5–4.9)
PLATELET # BLD AUTO: 252 X10*3/UL (ref 150–450)
PO2 BLDV: 58 MM HG (ref 35–45)
POTASSIUM BLDV-SCNC: 4.8 MMOL/L (ref 3.5–5.3)
POTASSIUM SERPL-SCNC: 3.5 MMOL/L (ref 3.5–5.3)
PROT UR STRIP.AUTO-MCNC: NEGATIVE MG/DL
PROTHROMBIN TIME: 17.1 SECONDS (ref 9.8–12.8)
RBC # BLD AUTO: 2.95 X10*6/UL (ref 4–5.2)
RBC # UR STRIP.AUTO: NEGATIVE /UL
RBC #/AREA URNS AUTO: ABNORMAL /HPF
SAO2 % BLDV: 88 % (ref 45–75)
SODIUM BLDV-SCNC: 131 MMOL/L (ref 136–145)
SODIUM SERPL-SCNC: 135 MMOL/L (ref 136–145)
SP GR UR STRIP.AUTO: 1.02
SQUAMOUS #/AREA URNS AUTO: ABNORMAL /HPF
UFH PPP CHRO-ACNC: 0.3 IU/ML
UFH PPP CHRO-ACNC: 0.9 IU/ML
UROBILINOGEN UR STRIP.AUTO-MCNC: ABNORMAL MG/DL
WBC # BLD AUTO: 8.5 X10*3/UL (ref 4.4–11.3)
WBC #/AREA URNS AUTO: ABNORMAL /HPF

## 2024-08-03 PROCEDURE — 2020000001 HC ICU ROOM DAILY

## 2024-08-03 PROCEDURE — 99233 SBSQ HOSP IP/OBS HIGH 50: CPT | Performed by: STUDENT IN AN ORGANIZED HEALTH CARE EDUCATION/TRAINING PROGRAM

## 2024-08-03 PROCEDURE — 85025 COMPLETE CBC W/AUTO DIFF WBC: CPT | Performed by: STUDENT IN AN ORGANIZED HEALTH CARE EDUCATION/TRAINING PROGRAM

## 2024-08-03 PROCEDURE — 71045 X-RAY EXAM CHEST 1 VIEW: CPT | Performed by: STUDENT IN AN ORGANIZED HEALTH CARE EDUCATION/TRAINING PROGRAM

## 2024-08-03 PROCEDURE — 80069 RENAL FUNCTION PANEL: CPT | Performed by: STUDENT IN AN ORGANIZED HEALTH CARE EDUCATION/TRAINING PROGRAM

## 2024-08-03 PROCEDURE — 2500000002 HC RX 250 W HCPCS SELF ADMINISTERED DRUGS (ALT 637 FOR MEDICARE OP, ALT 636 FOR OP/ED)

## 2024-08-03 PROCEDURE — 99232 SBSQ HOSP IP/OBS MODERATE 35: CPT | Performed by: STUDENT IN AN ORGANIZED HEALTH CARE EDUCATION/TRAINING PROGRAM

## 2024-08-03 PROCEDURE — 36415 COLL VENOUS BLD VENIPUNCTURE: CPT

## 2024-08-03 PROCEDURE — 71045 X-RAY EXAM CHEST 1 VIEW: CPT

## 2024-08-03 PROCEDURE — 2500000002 HC RX 250 W HCPCS SELF ADMINISTERED DRUGS (ALT 637 FOR MEDICARE OP, ALT 636 FOR OP/ED): Performed by: STUDENT IN AN ORGANIZED HEALTH CARE EDUCATION/TRAINING PROGRAM

## 2024-08-03 PROCEDURE — 2500000004 HC RX 250 GENERAL PHARMACY W/ HCPCS (ALT 636 FOR OP/ED)

## 2024-08-03 PROCEDURE — 85520 HEPARIN ASSAY: CPT

## 2024-08-03 PROCEDURE — 93325 DOPPLER ECHO COLOR FLOW MAPG: CPT | Performed by: INTERNAL MEDICINE

## 2024-08-03 PROCEDURE — 2500000001 HC RX 250 WO HCPCS SELF ADMINISTERED DRUGS (ALT 637 FOR MEDICARE OP): Performed by: NURSE PRACTITIONER

## 2024-08-03 PROCEDURE — 84145 PROCALCITONIN (PCT): CPT

## 2024-08-03 PROCEDURE — 2500000001 HC RX 250 WO HCPCS SELF ADMINISTERED DRUGS (ALT 637 FOR MEDICARE OP)

## 2024-08-03 PROCEDURE — 99291 CRITICAL CARE FIRST HOUR: CPT

## 2024-08-03 PROCEDURE — 93308 TTE F-UP OR LMTD: CPT | Performed by: INTERNAL MEDICINE

## 2024-08-03 PROCEDURE — 93321 DOPPLER ECHO F-UP/LMTD STD: CPT | Performed by: INTERNAL MEDICINE

## 2024-08-03 PROCEDURE — 93325 DOPPLER ECHO COLOR FLOW MAPG: CPT

## 2024-08-03 PROCEDURE — 84132 ASSAY OF SERUM POTASSIUM: CPT

## 2024-08-03 PROCEDURE — 87040 BLOOD CULTURE FOR BACTERIA: CPT

## 2024-08-03 PROCEDURE — 2500000004 HC RX 250 GENERAL PHARMACY W/ HCPCS (ALT 636 FOR OP/ED): Performed by: STUDENT IN AN ORGANIZED HEALTH CARE EDUCATION/TRAINING PROGRAM

## 2024-08-03 PROCEDURE — 81001 URINALYSIS AUTO W/SCOPE: CPT

## 2024-08-03 PROCEDURE — 36415 COLL VENOUS BLD VENIPUNCTURE: CPT | Performed by: STUDENT IN AN ORGANIZED HEALTH CARE EDUCATION/TRAINING PROGRAM

## 2024-08-03 PROCEDURE — 2500000001 HC RX 250 WO HCPCS SELF ADMINISTERED DRUGS (ALT 637 FOR MEDICARE OP): Performed by: STUDENT IN AN ORGANIZED HEALTH CARE EDUCATION/TRAINING PROGRAM

## 2024-08-03 PROCEDURE — 85610 PROTHROMBIN TIME: CPT | Performed by: STUDENT IN AN ORGANIZED HEALTH CARE EDUCATION/TRAINING PROGRAM

## 2024-08-03 PROCEDURE — 83735 ASSAY OF MAGNESIUM: CPT | Performed by: STUDENT IN AN ORGANIZED HEALTH CARE EDUCATION/TRAINING PROGRAM

## 2024-08-03 PROCEDURE — 82947 ASSAY GLUCOSE BLOOD QUANT: CPT

## 2024-08-03 RX ORDER — VANCOMYCIN HYDROCHLORIDE 1 G/20ML
INJECTION, POWDER, LYOPHILIZED, FOR SOLUTION INTRAVENOUS DAILY PRN
Status: DISCONTINUED | OUTPATIENT
Start: 2024-08-03 | End: 2024-08-06

## 2024-08-03 RX ORDER — POTASSIUM CHLORIDE 20 MEQ/1
40 TABLET, EXTENDED RELEASE ORAL ONCE
Status: COMPLETED | OUTPATIENT
Start: 2024-08-03 | End: 2024-08-03

## 2024-08-03 RX ORDER — VANCOMYCIN HYDROCHLORIDE 750 MG/150ML
750 INJECTION, SOLUTION INTRAVENOUS EVERY 12 HOURS
Status: DISCONTINUED | OUTPATIENT
Start: 2024-08-03 | End: 2024-08-06

## 2024-08-03 ASSESSMENT — COGNITIVE AND FUNCTIONAL STATUS - GENERAL
DRESSING REGULAR UPPER BODY CLOTHING: A LITTLE
TURNING FROM BACK TO SIDE WHILE IN FLAT BAD: A LITTLE
WALKING IN HOSPITAL ROOM: TOTAL
TOILETING: A LOT
TOILETING: A LOT
TURNING FROM BACK TO SIDE WHILE IN FLAT BAD: A LITTLE
HELP NEEDED FOR BATHING: A LOT
PERSONAL GROOMING: A LOT
STANDING UP FROM CHAIR USING ARMS: A LOT
DRESSING REGULAR UPPER BODY CLOTHING: A LITTLE
CLIMB 3 TO 5 STEPS WITH RAILING: TOTAL
HELP NEEDED FOR BATHING: A LOT
DAILY ACTIVITIY SCORE: 14
STANDING UP FROM CHAIR USING ARMS: A LOT
DRESSING REGULAR LOWER BODY CLOTHING: A LOT
MOVING FROM LYING ON BACK TO SITTING ON SIDE OF FLAT BED WITH BEDRAILS: A LITTLE
WALKING IN HOSPITAL ROOM: A LOT
EATING MEALS: A LITTLE
PERSONAL GROOMING: A LITTLE
MOVING TO AND FROM BED TO CHAIR: A LOT
CLIMB 3 TO 5 STEPS WITH RAILING: TOTAL
MOVING FROM LYING ON BACK TO SITTING ON SIDE OF FLAT BED WITH BEDRAILS: A LITTLE
DAILY ACTIVITIY SCORE: 16
MOBILITY SCORE: 13
DRESSING REGULAR LOWER BODY CLOTHING: A LOT
MOBILITY SCORE: 12
MOVING TO AND FROM BED TO CHAIR: A LOT

## 2024-08-03 ASSESSMENT — ACTIVITIES OF DAILY LIVING (ADL): LACK_OF_TRANSPORTATION: NO

## 2024-08-03 ASSESSMENT — PAIN - FUNCTIONAL ASSESSMENT
PAIN_FUNCTIONAL_ASSESSMENT: 0-10

## 2024-08-03 ASSESSMENT — PAIN SCALES - GENERAL
PAINLEVEL_OUTOF10: 1
PAINLEVEL_OUTOF10: 0 - NO PAIN
PAINLEVEL_OUTOF10: 10 - WORST POSSIBLE PAIN

## 2024-08-03 NOTE — PROGRESS NOTES
INTERNAL MEDICINE PROGRESS NOTE     BRIEF NARRATIVE      Amirah Bennett is a 43 y.o. female on day 5 of admission presenting with Wound dehiscence.    43 y.o. 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 She presented to ED for further evaluation of wound dehiscence of RLE stump noticed by wound care doctor today on home visit once her wound vac was removed. She endorses worsening pain at surgical site that is worse than the pain she experienced after initial surgery. She denies fever, chills, n/v/d, or changes in appetite. Serosanguineous drainage present from wound upon arrival to ED. Vascular surgery was consulted by ED provider and pt was seen by consulting team while in the ED. Per documentation from vascular surgery consent for OR will need to be obtained from [facility]. Labs obtained on admit notable for anemia and mild leukocytosis. Last hospitalization was 6/20-7/24 after presented to ED with c/o dyspnea and leg swelling/pain in setting of running out of medications. During her last admit she was treated for harinder 3 AKLI s/p open RLE and iliac embolectomy via right CFA and AKA on 6/28 and wound debridement with wound vac placement on 7/17. Patient hospitalization was complicated with wound infection requiring abx and cardiogenic shock which required stay in CICU. Please refer to discharge  summary dated 7/24. Parameters added to home dose of toprol XL d/t documentation of bradycardia while in the ED. underwent right AKA revision and washout by vascular surgery on 7/30.  Postop complicated with A-fib RVR. Bicarb low. Patient persistently hypotensive even after 2L boluses and midodrine. Surgical tissue cultures from 7/30 growing multiorganism ( E coli, Enterobacter, PMN, GNB). ID consulted, recommended merrem for now.     SUBJECTIVE       Patient seen and examined today, she was sitting in bed in moderate distress.  Patient reports significant pain today. Patient heart rate is better controlled.  Denies any chest pain, also denies palpitation fever or chills.    OBJECTIVE      Visit Vitals  BP 81/50   Pulse 83   Temp 36 °C (96.8 °F)   Resp 18        Intake/Output Summary (Last 24 hours) at 8/3/2024 0720  Last data filed at 8/3/2024 0346  Gross per 24 hour   Intake 1944.77 ml   Output 2020 ml   Net -75.23 ml       Physical Exam   Constitutional:       General: She is not in acute distress.  HENT:      Head: Normocephalic.      Nose: Nose normal.      Mouth/Throat:      Mouth: Mucous membranes are dry.   Eyes:      Extraocular Movements: Extraocular movements intact.   Cardiovascular:      Rate and Rhythm: Irregularly irregular     Pulses: Normal pulses.      Heart sounds: Normal heart sounds.   Pulmonary:      Effort: Pulmonary effort is normal. No respiratory distress.      Breath sounds: Normal breath sounds.   Abdominal:      General: Bowel sounds are normal.      Palpations: Abdomen is soft.   Musculoskeletal:      Cervical back: Normal range of motion.      Comments: R AKA   Skin:     General: Skin is warm.      Comments: Prior trach site   Neurological:      Mental Status: She is alert.      Comments: Expressive aphasia from prior CVA; oriented to self, place and situation   Psychiatric:         Mood and Affect: Mood normal.     Current Meds   amiodarone, 400 mg, oral, BID   Followed by  [START ON  8/12/2024] amiodarone, 200 mg, oral, Daily  [Held by provider] aspirin, 81 mg, oral, Daily  atorvastatin, 80 mg, oral, Nightly  DULoxetine, 60 mg, oral, Daily  empagliflozin, 10 mg, oral, Daily  folic acid, 1 mg, oral, Daily  furosemide, 40 mg, oral, BID  meropenem, 1 g, intravenous, q8h  metoprolol succinate XL, 25 mg, oral, Daily  midodrine, 10 mg, oral, TID  multivitamin with minerals, 1 tablet, oral, Daily  pantoprazole, 40 mg, oral, Daily before breakfast  polyethylene glycol, 17 g, oral, Daily  pregabalin, 75 mg, oral, BID  sacubitriL-valsartan, 1 tablet, oral, BID  sennosides, 2 tablet, oral, BID  [Held by provider] warfarin, 5 mg, oral, Daily       PRN medications: dextrose, dextrose, glucagon, glucagon, heparin, melatonin, morphine, oxyCODONE, oxyCODONE, polyethylene glycol     LABS and IMAGING     WBC   Date Value Ref Range Status   08/02/2024 10.4 4.4 - 11.3 x10*3/uL Final   08/01/2024 11.0 4.4 - 11.3 x10*3/uL Final   07/31/2024 14.4 (H) 4.4 - 11.3 x10*3/uL Final     Hemoglobin   Date Value Ref Range Status   08/02/2024 9.7 (L) 12.0 - 16.0 g/dL Final   08/01/2024 8.9 (L) 12.0 - 16.0 g/dL Final   07/31/2024 9.4 (L) 12.0 - 16.0 g/dL Final     Hematocrit   Date Value Ref Range Status   08/02/2024 30.3 (L) 36.0 - 46.0 % Final   08/01/2024 28.0 (L) 36.0 - 46.0 % Final   07/31/2024 30.4 (L) 36.0 - 46.0 % Final     Bicarbonate   Date Value Ref Range Status   08/02/2024 29 21 - 32 mmol/L Final   08/01/2024 30 21 - 32 mmol/L Final   07/31/2024 27 21 - 32 mmol/L Final     Creatinine   Date Value Ref Range Status   08/02/2024 1.13 (H) 0.50 - 1.05 mg/dL Final   08/01/2024 0.81 0.50 - 1.05 mg/dL Final   07/31/2024 0.84 0.50 - 1.05 mg/dL Final     Calcium   Date Value Ref Range Status   08/02/2024 9.1 8.6 - 10.6 mg/dL Final   08/01/2024 8.3 (L) 8.6 - 10.6 mg/dL Final   07/31/2024 9.2 8.6 - 10.6 mg/dL Final     INR   Date Value Ref Range Status   08/02/2024 1.5 (H) 0.9 - 1.1 Final   08/01/2024 1.4 (H) 0.9 - 1.1 Final    07/31/2024 1.5 (H) 0.9 - 1.1 Final       ECG 12 Lead  Atrial fibrillation  Minimal voltage criteria for LVH, may be normal variant ( [place] product )  T wave abnormality, consider inferolateral ischemia  Prolonged QT  Abnormal ECG  When compared with ECG of 19-JUL-2024 14:43,  Atrial fibrillation has replaced Sinus rhythm  Inverted T waves have replaced nonspecific T wave abnormality in Inferior leads  Inverted T waves have replaced nonspecific T wave abnormality in Lateral leads    See ED provider note for full interpretation and clinical correlation  Confirmed by Cassie Garcia ([id number]) on 7/31/2024 10:49:06 PM       ASSESSMENT / PLANS      #wound dehiscence s/p R AKA  #leukocytosis  # S/p right AKA revision and washout since 7/30/24  #MDR   - WBC 13.8 on admit, WBC 12.0 on 7/24  - vascular surgery following  - continue outpatient pain regimen: Oxycodone 5 mg q6h PRN moderate pain and Oxycodone 10 mg q6h PRN severe pain  - continue Lyrica 75 mg PO BID  - Vanc / Zosyn (7/30- 8/2)   - Meropenem (8/2 -    - ID following   - will continue low intensity heparin gtt with plan to bridge back to coumadin when no further procedures planned  - Cont wound vac care per vascular  (first changed 8/1)   - PT/OT postop recommending high intensity     #HFrEF (last EF 20-25% 6/22/24)  #bradycardia (asymptomatic)  #Hypotension, etiology unclear   - BNP 97 , appears compensated, euvolemic  - HR documentation as 43 while in ED,   - parameters placed on Toprol XL  - HH stable   - Will initiate midodrine (Caution with low EF) . Will consider MICU transfer for vasopressor   - monitor on tele  - strict I&O, DW  - Unable to continue home GDMT due to hypotension.  ( Toprol XL 25 mg PO every day (hold for HR <60 or SBP <100), Lasix 40 mg PO BID, Entresto 24/26 mg PO BID, & Jardiance 10 mg PO every day)     #afib with RVR  #YFN appendage  - s/p DCCV 5/2023  - INR 1.8 on admit  -The patient went into RVR postprocedure on 7/30, HR better  controlled  - Reloaded with Amiodarone 400mg BID for 5 days then transition to home dose 200mg daily   - low intensity heparin gtt ordered with plan to bridge back to Coumadin.  Vascular recommending holding warfarin for now      #hx of L cerebellar MCA stroke w/expressive aphasia 3/2024  #hx of ischemic CVA  - continue home dose of Lipitor 80 mg PO at bedtime  - holding home dose of ASA 81 mg d/t upcoming surgery     #T2DM  - accucheck ACHS  - hypoglycemia order set placed  - HgA1c 4.2% on admit, can be diet controlled     Dispo:  Cont inpatient care        Patria Farias MD

## 2024-08-03 NOTE — PROGRESS NOTES
Vancomycin Dosing by Pharmacy- Cessation of Therapy    Consult to pharmacy for vancomycin dosing has been discontinued by the prescriber, pharmacy will sign off at this time.    Please call pharmacy if there are further questions or re-enter a consult if vancomycin is resumed.     GENIA ADAN, PharmD

## 2024-08-03 NOTE — SIGNIFICANT EVENT
Transfer Note:     Patient Blood Pressure this morning was 86/59 HR 68.  Patient has been asymptomatic even thought BP's are low.   Code white was called around 9:45a   Dr. Christianson ordered a LR bolus, discontinued Lasix, metoprolol and Entresto order.  Patient BP was rechecked around 10 am /50.  @ 11a patient BP 85/56.    Patient was transferred to MICU for futher managemnt of BP.   Patient was Transported over by code white nurses.   Patient was sent to the MICU with all her belongings that were in her room ([id number]).  Nurse to Nurse report was given to Star.  Patient left the floor around 11:30a    Enedina Egan LPN

## 2024-08-03 NOTE — PROGRESS NOTES
"Amirah Bennett is a 43 y.o. female on day 5 of admission presenting with Wound dehiscence.    Subjective   Interval History: Seen in MICU, feeling well, no lightheadedness/dizziness, still has stump pain. No F/C, tolerating abx. No other complaints.     Review of Systems    Objective   Range of Vitals (last 24 hours)  Heart Rate:  [65-83]   Temp:  [35.9 °C (96.6 °F)-36.3 °C (97.3 °F)]   Resp:  [13-18]   BP: ()/(45-71)   Height:  [170.2 cm (5' 7\")]   Weight:  [80 kg (176 lb 5.9 oz)]   SpO2:  [94 %-100 %]   Daily Weight  08/03/24 : 80 kg (176 lb 5.9 oz)    Body mass index is 27.62 kg/m².    Physical Exam  Comfortable appearing middle aged woman, NAD  Lungs CTA anteriorly  RRR, S1/S2  R stump w/ drain and wound vac  Alert, conversational, answers questions appropriately     Antibiotics  meropenem - 1 gram/100 mL  vancomycin - 750 mg/150 mL    Relevant Results  Labs  Results from last 72 hours   Lab Units 08/03/24  0825 08/02/24  0834 08/01/24  0532   WBC AUTO x10*3/uL 8.5 10.4 11.0   HEMOGLOBIN g/dL 8.8* 9.7* 8.9*   HEMATOCRIT % 28.7* 30.3* 28.0*   PLATELETS AUTO x10*3/uL 252 279 234   NEUTROS PCT AUTO % 61.0 65.4 64.7   LYMPHS PCT AUTO % 22.9 19.8 21.3   MONOS PCT AUTO % 10.6 10.1 10.6   EOS PCT AUTO % 4.6 3.6 2.5     Results from last 72 hours   Lab Units 08/03/24  0825 08/02/24  0834 08/01/24  0532   SODIUM mmol/L 135* 135* 135*   POTASSIUM mmol/L 3.5 3.8 4.0   CHLORIDE mmol/L 97* 94* 96*   CO2 mmol/L 29 29 30   BUN mg/dL 22 20 18   CREATININE mg/dL 0.92 1.13* 0.81   GLUCOSE mg/dL 85 94 94   CALCIUM mg/dL 9.0 9.1 8.3*   ANION GAP mmol/L 13 16 13   EGFR mL/min/1.73m*2 78 61 >90   PHOSPHORUS mg/dL 3.8 4.7 5.0*     Results from last 72 hours   Lab Units 08/03/24  0825 08/02/24  0834 08/01/24  0532   ALBUMIN g/dL 3.0* 3.2* 3.1*     Estimated Creatinine Clearance: 84.1 mL/min (by C-G formula based on SCr of 0.92 mg/dL).  C-Reactive Protein   Date Value Ref Range Status   07/29/2024 0.70 <1.00 mg/dL Final "     Microbiology  Susceptibility data from last 14 days.  Collected Specimen Info Organism Amoxicillin/Clavulanate Ampicillin Ampicillin/Sulbactam Aztreonam Cefazolin Cefepime Ciprofloxacin Ertapenem Gentamicin Imipenem Levofloxacin Meropenem Piperacillin/Tazobactam   07/30/24 Tissue from BONE RESECTION Escherichia coli   S    S   S   S   S   S     Enterobacter cloacae complex  R  R  R  R  R  S  S  S  S   S  S  R   07/30/24 Swab from CLOT/THROMBUS Enterobacter cloacae complex  R  R  R  R  R  R  S  I  S  I  S  S  R     Escherichia coli   S    S   S   S   S   S     Collected Specimen Info Organism Trimethoprim/Sulfamethoxazole   07/30/24 Tissue from BONE RESECTION Escherichia coli  S     Enterobacter cloacae complex  S   07/30/24 Swab from CLOT/THROMBUS Enterobacter cloacae complex  S     Escherichia coli  S         Imaging      Assessment/Plan     Amirah Bennett is a 42 y/o woman pmhx sig for HFrEF, Afib, recent admission for RVR and cardiogenic shock c/b RLE ischemic limb ultimately undergoing R AKA and embolectomy, c/b intramuscular hematoma requiring revision and washout prior to discharge w/ wound vac. Now readmitted 7/29 with R AKA stump wound dehiscence s/p revision w/ Vascular on 7/30 with evacuation of hematoma at the stump site and additional resection of femur. Cx from the thrombus and bone (?resected portion) both with Enterobacter cloacae and E. Coli. Her Enterobacter spp have unique susceptibilities, however both appear to be susceptible to meropenem, reviewed w/ Micro lab. Transitioned to meropenem monotherapy for both organisms on 8/2.    On 8/3 transferred to MICU for worsening hypotension not responsive to fluid boluses. Pt appears nontoxic, WBC wnl, Blood cx drawn and started back on vanc while these are pending. Hgb stable at 8.8.      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

## 2024-08-03 NOTE — PROGRESS NOTES
08/03/24 1115   Discharge Planning   Living Arrangements Other (Comment)  (homeless)   Support Systems Family members   Type of Residence Homeless   Do you have animals or pets at home? No   Who is requesting discharge planning? Provider   Home or Post Acute Services Post acute facilities (Rehab/SNF/etc)   Type of Post Acute Facility Services Long term care;Skilled nursing   Expected Discharge Disposition SNF  ([facility])   Does the patient need discharge transport arranged? Yes   RoundTrip coordination needed? Yes   Has discharge transport been arranged? No   What day is the transport expected? 08/05/24   What time is the transport expected? 1600   Financial Resource Strain   How hard is it for you to pay for the very basics like food, housing, medical care, and heating? Not hard   Housing Stability   In the last 12 months, was there a time when you were not able to pay the mortgage or rent on time? N   In the past 12 months, how many times have you moved where you were living? 1   At any time in the past 12 months, were you homeless or living in a shelter (including now)? N   Transportation Needs   In the past 12 months, has lack of transportation kept you from medical appointments or from getting medications? no   In the past 12 months, has lack of transportation kept you from meetings, work, or from getting things needed for daily living? No   Patient Choice   Provider Choice list and CMS website (https://medicare.gov/care-compare#search) for post-acute Quality and Resource Measure Data were provided and reviewed with: Family   Patient / Family choosing to utilize agency / facility established prior to hospitalization Yes     Met with pt and introduced myself as care coordinator and member of the Care Transitions team for discharge planning. Pt deferred discharge planning to cousin Keli Osborne [phone].   Keli stated pt was previously living with her boyfriend Navin before leg  amputation but he no longer resides at their residence, so pt is essentially homeless. Keli stated there is no one who can let pt in their home or care for her in the community, therefore, she will have to discharge to a SNF when medically clear. Keli stated pt does not have an official POA but she has been acting as surrogate.    Discharge Planning: Keli aware PT is recommending high intensity therapy, but she said she went thru this the last time with dispo planning and pt did not qualify for AR due to social history (see above). Keli stated she would like for pt to return to [facility], unless pt wants to discharge to another SNF, but she will NOT be assisting with picking another SNF. Spoke with pt and Keli (via speaker) together, pt stated she would like to return to [facility] SNF at time of discharge, she did not recall telling me on 7/31 that she did not want to return to this facility.     Referral sent to [facility] to see if they can accept pt back to their facility and if new precert will be required. [facility] updated pt will most likely require wound vac for R AKA wound care, and she is homeless so will eventually need LTC placement according to cousin Keli. Care coordinator will continue to follow for discharge planning needs.    Ruthann Hanna RN  Transitional Care Coordinator/[facility]  [phone]

## 2024-08-03 NOTE — CONSULTS
Vancomycin Dosing by Pharmacy  INITIAL CONSULT      Amirah Bennett is a 43 y.o. female who Pharmacy is consulted to dose vancomycin for surgical wound infection.     Based on the patient's indication and renal status, this patient will be dosed based on a goal vancomycin AUC of 400-600.     Renal function is currently stable.    Estimated Creatinine Clearance: 84.1 mL/min (by C-G formula based on SCr of 0.92 mg/dL).    Vancomycin   Date Value Ref Range Status   08/02/2024 23.9 (H) 5.0 - 20.0 ug/mL Final   07/02/2024 15.6 5.0 - 20.0 ug/mL Final   06/30/2024 19.0 5.0 - 20.0 ug/mL Final   06/28/2024 8.4 5.0 - 20.0 ug/mL Final     Vancomycin, Trough   Date Value Ref Range Status   07/31/2024 12.4 5.0 - 20.0 ug/mL Final     Comment:     Therapeutic Ranges:    Peak (all ages):        30.0-40.0 ug/mL                       Trough (all ages):        10.0-20.0 ug/mL                                             Vancomycin trough concentrations drawn immediately prior to the next dose at steady-state are preferred for concentration-guided monitoring of patients treated with vancomycin.    Reference: Am J Health-Syst Pharm. 2020; 77(11):835-864.        06/22/2024 8.8 5.0 - 20.0 ug/mL Final     Comment:     Therapeutic Ranges:    Peak (all ages):        30.0-40.0 ug/mL                       Trough (all ages):        10.0-20.0 ug/mL                                             Vancomycin trough concentrations drawn immediately prior to the next dose at steady-state are preferred for concentration-guided monitoring of patients treated with vancomycin.    Reference: Am J Health-Syst Pharm. 2020; 77(11):835-864.            Results from last 7 days   Lab Units 08/03/24  0825 08/02/24  0834 08/01/24  0532 07/31/24  0755   CREATININE mg/dL 0.92 1.13* 0.81 0.84   BUN mg/dL 22 20 18 17   WBC AUTO x10*3/uL 8.5 10.4 11.0 14.4*        Visit Vitals  BP 91/66   Pulse 75   Temp 36.3 °C (97.3 °F) (Temporal)   Resp 17       Staph/MRSA Screen Culture    Date/Time Value Ref Range Status   07/02/2024 11:09 AM No Staphylococcus aureus isolated  Final     Gram Stain   Date/Time Value Ref Range Status   07/30/2024 01:39 PM (1+) Rare Polymorphonuclear leukocytes (A)  Final   07/30/2024 01:39 PM (1+) Rare Gram negative bacilli (A)  Final     Urine Culture   Date/Time Value Ref Range Status   07/11/2024 07:40 AM >100,000 Klebsiella pneumoniae/variicola (A)  Final     Blood Culture   Date/Time Value Ref Range Status   07/30/2024 04:39 AM No growth at 4 days -  FINAL REPORT  Final   07/30/2024 04:39 AM No growth at 4 days -  FINAL REPORT  Final     Tissue/Wound Culture/Smear   Date/Time Value Ref Range Status   07/30/2024 01:39 PM (1+) Rare Escherichia coli (A)  Final   07/30/2024 01:39 PM (1+) Rare Enterobacter cloacae complex (A)  Final     Comment:     SECOND and THIRD generation cephalosporin results are not reported as resistance may develop during therapy with these agents.        Susceptibility data from last 90 days.  Collected Specimen Info Organism Amoxicillin/Clavulanate Ampicillin Ampicillin/Sulbactam Aztreonam Cefazolin Cefazolin (uncomplicated UTIs only) Cefepime Ciprofloxacin Ertapenem Gentamicin Imipenem Levofloxacin   07/30/24 Tissue from BONE RESECTION Escherichia coli   S    S    S   S   S     Enterobacter cloacae complex  R  R  R  R  R   S  S  S  S   S   07/30/24 Swab from CLOT/THROMBUS Enterobacter cloacae complex  R  R  R  R  R   R  S  I  S  I  S     Escherichia coli   S    S    S   S   S   07/11/24 Urine from Indwelling (Araya) Catheter Klebsiella pneumoniae/variicola  S  R  S   S  S   S   S       Collected Specimen Info Organism Meropenem Nitrofurantoin Piperacillin/Tazobactam Trimethoprim/Sulfamethoxazole   07/30/24 Tissue from BONE RESECTION Escherichia coli    S  S     Enterobacter cloacae complex  S   R  S   07/30/24 Swab from CLOT/THROMBUS Enterobacter cloacae complex  S   R  S     Escherichia coli    S  S   07/11/24 Urine from Indwelling  (Araya) Catheter Klebsiella pneumoniae/variicola   S  S  S       Assessment/Plan     Will order maintenance dose of 750 mg every 12 hours. This dosing regimen is predicted by [employer] to result in the following pharmacokinetic parameters:   Loading dose: N/A  Regimen: 750 mg IV every 12 hours.  Start time: 01:45 on 08/03/2024  Exposure target: AUC24 (range)400-600 mg/L.hr   AUC24,ss: 535 mg/L.hr  Probability of AUC24 > 400: 99 %  Ctrough,ss: 17.8 mg/L  Probability of Ctrough,ss > 20: 26 %  Probability of nephrotoxicity (Lodise KWAME 2009): 14 %    Vancomycin follow-up level will be ordered for 8/4 at Am labs , unless clinically indicated sooner.  Will continue to monitor renal function daily while on vancomycin and order serum creatinine at least every 48 hours if not already ordered.  Will follow for continued vancomycin needs, clinical response, and signs/symptoms of toxicity.       Ricardo Villareal, MissyD

## 2024-08-03 NOTE — CARE PLAN
Problem: Skin  Goal: Decreased wound size/increased tissue granulation at next dressing change  Flowsheets (Taken 8/3/2024 1721)  Decreased wound size/increased tissue granulation at next dressing change: Promote sleep for wound healing  Goal: Participates in plan/prevention/treatment measures  Flowsheets (Taken 8/3/2024 1721)  Participates in plan/prevention/treatment measures: Elevate heels  Goal: Prevent/manage excess moisture  Flowsheets (Taken 8/3/2024 1721)  Prevent/manage excess moisture:   Moisturize dry skin   Follow provider orders for dressing changes  Goal: Prevent/minimize sheer/friction injuries  Flowsheets (Taken 8/3/2024 1721)  Prevent/minimize sheer/friction injuries:   Use pull sheet   HOB 30 degrees or less   Turn/reposition every 2 hours/use positioning/transfer devices  Goal: Promote/optimize nutrition  Flowsheets (Taken 8/3/2024 1721)  Promote/optimize nutrition:   Assist with feeding   Monitor/record intake including meals  Goal: Promote skin healing  Flowsheets (Taken 8/3/2024 1721)  Promote skin healing:   Assess skin/pad under line(s)/device(s)   Turn/reposition every 2 hours/use positioning/transfer devices     Problem: Pain  Goal: Takes deep breaths with improved pain control throughout the shift  Outcome: Progressing  Goal: Turns in bed with improved pain control throughout the shift  Outcome: Progressing

## 2024-08-03 NOTE — H&P
Medical Intensive Care - History and Physical   Subjective    Amirah Bennett is a 43 y.o. old female patient admitted on 7/29/2024 with following ICU needs: concerns for septic shock iso hypotension and MDR growth from AKA surgical site    HPI:  Patient is a 43 y.o. 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  Patient presented to the ED on 7/2024 with worsening pain at her AKA surgical site, dehiscence of her stump incision as noted by her wound care team. In the ED, HR documentation as 43 while in ED, BNP 97. WBC 13.8 on admit, WBC 12.0 on 7/24.     Of note, patient was hospitalized from 6/20 through 7/24 when she was treated for right AKA, heparin to warfarin bridge. Patient hospitalization was complicated with wound infection requiring abx and cardiogenic shock which required stay in CICU.    Patient was taken to the OR with vascular surgery on 7/29 for debridement and possible closure. Patient then underwent right AKA revision and washout by vascular surgery on 7/30.  Postop complicated with A-fib RVR treated with amio drip. Bicarb low. Patient persistently hypotensive after 2L boluses and midodrine. She had additional transection of her femur about 1cm proximally and tissue debrided; bone resection cx sent. Surgical tissue cultures from 7/30 growing multiorganism (E coli, Enterobacter, PMN, GNB). ID consulted, recommended meropenem.    Since admission, she was been afebrile. Then began  having more hypotension with SBP 70s. Placed on midodrine 10 mg TID for hypotension and transferred to the ICU with concerns for septic shock iso persistent hypotension.      Meds    Home medications:  Current Outpatient Medications   Medication Instructions   • amiodarone (PACERONE) 200 mg, oral, Daily   • aspirin 81 mg, oral, Daily RT   • atorvastatin (LIPITOR) 80 mg, oral, Daily   • DULoxetine (CYMBALTA) 60 mg, oral, Daily, Do not crush or chew.   • empagliflozin (JARDIANCE) 10 mg, oral, Daily   • folic acid (FOLVITE) 1 mg, oral, Daily   • furosemide (LASIX) 40 mg, oral, 2 times daily (morning and late afternoon)   • lidocaine (Lidoderm) 5 % patch 1 patch, transdermal, Daily, Remove & discard patch within 12 hours or as directed by MD.   • melatonin 3 mg, oral, Nightly PRN   • metoprolol succinate XL (TOPROL-XL) 25 mg, oral, Daily, Do not crush or chew.   • multivitamin with minerals tablet 1 tablet, oral, Daily   • oxyCODONE (ROXICODONE) 10 mg, oral, Every 6 hours PRN   • oxyCODONE (ROXICODONE) 5 mg, oral, Every 6 hours PRN   • pantoprazole (PROTONIX) 40 mg, oral, Daily before breakfast, Do not crush, chew, or split.   • polyethylene glycol (GLYCOLAX, MIRALAX) 17 g, oral, Daily RT   • pregabalin (LYRICA) 75 mg, oral, 2 times daily   • sacubitriL-valsartan (Entresto) 24-26 mg tablet 1 tablet, oral, 2 times daily   • sennosides (SENOKOT) 17.2 mg, oral, 2 times daily   • warfarin (Coumadin) 5 mg tablet Take as directed per After Visit Summary.        Inpatient medications:  Scheduled medications  amiodarone, 400 mg, oral, BID   Followed by  [START ON 8/12/2024] amiodarone, 200 mg, oral, Daily  [Held by provider] aspirin, 81 mg, oral, Daily  atorvastatin, 80 mg, oral, Nightly  DULoxetine, 60 mg, oral, Daily  folic acid, 1 mg, oral, Daily  meropenem, 1 g, intravenous, q8h  midodrine, 10 mg, oral, TID  multivitamin with minerals, 1 tablet, oral, Daily  pantoprazole, 40 mg, oral, Daily before breakfast  polyethylene  "glycol, 17 g, oral, Daily  pregabalin, 75 mg, oral, BID  sennosides, 2 tablet, oral, BID  vancomycin, 750 mg, intravenous, q12h  [Held by provider] warfarin, 5 mg, oral, Daily      Continuous medications  heparin, 0-4,000 Units/hr, Last Rate: 1,300 Units/hr (08/03/24 1600)      PRN medications  PRN medications: dextrose, dextrose, glucagon, glucagon, heparin, melatonin, morphine, oxyCODONE, oxyCODONE, polyethylene glycol, vancomycin     Objective    Blood pressure 86/67, pulse 77, temperature 35.9 °C (96.6 °F), temperature source Temporal, resp. rate 14, height 1.702 m (5' 7\"), weight 80 kg (176 lb 5.9 oz), SpO2 100%.       Physical Exam  Constitutional:       General: She is not in acute distress.     Appearance: She is not ill-appearing.      Comments: Alert and oriented with expressive aphasia.  Not in acute distress.  Not jaundiced or cyanosed.   HENT:      Head: Normocephalic and atraumatic.   Neck:      Comments: Tracheostomy present without erythema or edema  Cardiovascular:      Rate and Rhythm: Normal rate and regular rhythm.   Pulmonary:      Effort: Pulmonary effort is normal.      Breath sounds: Normal breath sounds.   Abdominal:      General: Abdomen is flat.      Palpations: Abdomen is soft.   Musculoskeletal:      Cervical back: Normal range of motion.      Right lower leg: No edema.      Left lower leg: No edema.      Comments: Right AKA wrapped in Ace bandage to bulb suction.  No obvious signs of gross bleeding.   Skin:     General: Skin is warm and dry.   Neurological:      Mental Status: She is alert. Mental status is at baseline.   Psychiatric:         Mood and Affect: Mood normal.         Behavior: Behavior normal.          Intake/Output Summary (Last 24 hours) at 8/3/2024 1702  Last data filed at 8/3/2024 1651  Gross per 24 hour   Intake 2233.8 ml   Output 1300 ml   Net 933.8 ml     Net IO Since Admission: -2,629.54 mL [08/03/24 1702]   Labs:   Results from last 72 hours   Lab Units " 08/03/24  0825 08/02/24  0834 08/01/24  0532   SODIUM mmol/L 135* 135* 135*   POTASSIUM mmol/L 3.5 3.8 4.0   CHLORIDE mmol/L 97* 94* 96*   CO2 mmol/L 29 29 30   BUN mg/dL 22 20 18   CREATININE mg/dL 0.92 1.13* 0.81   GLUCOSE mg/dL 85 94 94   CALCIUM mg/dL 9.0 9.1 8.3*   ANION GAP mmol/L 13 16 13   EGFR mL/min/1.73m*2 78 61 >90   PHOSPHORUS mg/dL 3.8 4.7 5.0*      Results from last 72 hours   Lab Units 08/03/24  0825 08/02/24  0834 08/01/24  0532   WBC AUTO x10*3/uL 8.5 10.4 11.0   HEMOGLOBIN g/dL 8.8* 9.7* 8.9*   HEMATOCRIT % 28.7* 30.3* 28.0*   PLATELETS AUTO x10*3/uL 252 279 234   NEUTROS PCT AUTO % 61.0 65.4 64.7   LYMPHS PCT AUTO % 22.9 19.8 21.3   MONOS PCT AUTO % 10.6 10.1 10.6   EOS PCT AUTO % 4.6 3.6 2.5        Micro/ID:   Lab Results   Component Value Date    URINECULTURE >100,000 Klebsiella pneumoniae/variicola (A) 07/11/2024    BLOODCULT No growth at 4 days -  FINAL REPORT 07/30/2024    BLOODCULT No growth at 4 days -  FINAL REPORT 07/30/2024       Summary of Key Imaging Results  CXR 8/3/24: No evidence of acute cardiopulmonary process.     Assessment and Plan     Assessment:  Amirah Bennett is a 43 y.o. year old female patient admitted to the MICU on 08/03/24 for concerns for septic shock iso hypotension and MDR growth from AKA surgical site.    Mechanical Ventilation: none  Sedation/Analgesia:  none  Restraints: no    Plan:  NEUROLOGY/PSYCH:  #hx of L cerebellar MCA stroke w/expressive aphasia 3/2024  #hx of ischemic CVA  - continue home dose of Lipitor 80 mg PO at bedtime  - holding home dose of ASA 81 mg d/t upcoming surgery    CARDIOVASCULAR:  #HFrEF (last EF 20-25% 6/22/24)  #Hypotension, etiology unclear, possibly secondary to septic vs. less likely cardiogenic  - BNP 97, appears compensated, euvolemic  - HR documentation as 43 while in ED  - POCUS with collapsible IVC, patient warm and dry on exam  Plan:  - Obtain TTE  - Continue holding GDMT given hypotension ( Toprol XL 25 mg PO every day (hold  for HR <60 or SBP <100), Lasix 40 mg PO BID, Entresto 24/26 mg PO BID, & Jardiance 10 mg PO every day)   - Continue midodrine 10 mg TID    #afib with RVR  #YFN appendage  - s/p DCCV 5/2023  - INR 1.8 on admit  - S/p RVR postprocedure on 7/30, HR better controlled  Plan:  - Reloaded with Amiodarone 400mg BID for 5 days (started 7/30) then transition to home dose 200mg daily  - Low intensity heparin gtt ordered with plan to bridge back to Coumadin.  Vascular recommending holding warfarin for now .    PULMONARY:  #Hx trach c/b trancutaneous fistula  -ENT had been consulted 3/2024 for gurgling and mucus drainage, no inpatient interventions required  Plan:  - monitor for breaths/mucus discharge from trach site  - Cover the tracheocutaneous fistula with tape and gauze and encourage patient to apply pressure when voicing.   - CXR unremarkable    RENAL/GENITOURINARY:  #Hx UTI during previous admission s/p Augmentin x 7 days  Plan:  - Follow-up urine culture    GASTROENTEROLOGY:  No active concerns    ENDOCRINOLOGY:  #T2DM  - accucheck ACHS  - hypoglycemia order set placed  - HgA1c 4.2% on admit  - Continue to monitor    HEMATOLOGY:  #Anemia, stable  - Status post multiple transfusions during previous hospitalization  Plan:  - Continue to monitor    MUSCULOSKELETAL/ SKIN:  #wound dehiscence s/p R AKA  #leukocytosis, resolved  #S/p right AKA revision and washout since 7/30/24  Plan:  - Vascular surgery following, appreciate recommendations  - Next wound VAC change 8/5 per vascular surgery    INFECTIOUS DISEASE:  #AKA surgical site growing MDR Enterobacter cloacae and E coli  - WBC 13.8 on admit, now resolved  - vascular surgery following  - continue outpatient pain regimen: Oxycodone 5 mg q6h PRN moderate pain and Oxycodone 10 mg q6h PRN severe pain  - continue Lyrica 75 mg PO BID  - Vanc / Zosyn (7/30- 8/2)  - Continue low intensity heparin gtt with plan to bridge back to coumadin when no further procedures planned  - Cont  "wound vac care per vascular (first changed 8/1)   - PT/OT postop recommending high intensity  Management:  Meropenem (8/2 -  ) anticipating 4 weeks as per vascular surgery and ID.  Restart vancomycin  Meropenem  Follow-up Bcx and Ucx    ICU Check List     FEN  Fluids: Conservative given HFrEF  Electrolytes: K>4, P>3, Mg>2  Nutrition: No diet orders on file   Prophylaxis:  DVT ppx: Heparin gtt  GI ppx: Protonix  Bowel care: Senna and Miralax  Hardware:  Catheter: External  Access: PIVs  Drains: Wound vac and suction bulb  Lines: None  Social:  Code: Full Code    Emergency contact:   Per previous hospitalization, patient LACKS capacity due to inability to express decision and inconsistency in decisions  Surrogate Medical Decision-maker:   Pt's cousinClara Wayne: [phone], Efrain Black: [phone], Keli Osborne: [phone]   Friends who may be helpful in making decisions:  Prior boyfriend Navin Sanches [phone]  Close friend \"Isiah\" Pranay Owen [phone]   Disposition: [facility]    Ayaan Rowland MD   08/03/24 at 5:02 PM     Disclaimer: Documentation completed with the information available at the time of input. The times in the chart may not be reflective of actual patient care times, interventions, or procedures. Documentation occurs after the physical care of the patient.       "

## 2024-08-03 NOTE — SIGNIFICANT EVENT
"Rapid Response Note   08/03/24 0935   Onset Documentation   Rapid Response Initiated By RN   Location/Room [facility]   Pager Time [phone]   Arrival Time 0942   Event End Time 1200   Level II Called Yes   Primary Reason for Call SBP less than or equal to 90 mmHg     Rapid Response paged for hypotension.  Upon arrival, Dr. Marina and RN at bedside.  Patient alert and oriented.  Able to answer simple questions; patient has history of stroke.  Patient denies dizziness/lightheadedness.  Patient states, \"I have 10/10 pain in my leg\" and points to right stump.  Patient with wound vac to draining wound from right stump.  Patient remains hypotensive despite IVF boluses; 3rd Liter LR infusing during Rapid Response.  Concern for patient with 20% EF and need for further fluid management.  Dr. Marina contacted MICU Fellow.  Patient accepted to ICU for further work-up for hypotension.  RN report called and patient transferred to MICU #[id number].  "

## 2024-08-03 NOTE — CONSULTS
Inpatient consult to Infectious Diseases  Consult performed by: Montserrat Layton DO  Consult ordered by: Patria Farias MD        Referred by: Patria Farias MD    Primary MD: No primary care provider on file.    Reason For Consult: Wound dehiscence    History Of Present Illness  Amirah Bennett is a 43 y.o. woman pmhx sig for HFrEF, Afib on warfarin, hx CVA, T2DM last A1c 4.2%, recent admission - for Afib w/ RVR and cardiogenic shock during which time she had RLE ischemia with aortic bifurcation embolus and underwent IVC filter  and RLE AKA and embolectomy . Post-op course c/b intramuscular hematoma, had wound revision  and wound vac placed. This admission presented after dehiscence of her stump incision was noted by her wound care team. Per notes she had no fever at home.  pt went to OR for evacuation of old hematoma and swab taken. She had additional transection of her femur about 1cm proximally and tissue debrided; bone resection cx sent. Since admission she has been afebrile, HDS, WBC wnl, although this evening having more hypotension with SBP 70s. Speaking w/ her today her pain is 5/10, denies fever, chills; is tolerating abx very well, denies diarrhea, rash, N/V, and would like to eat. Denies feeling poorly prior to admission.      Past Medical History  She has a past medical history of CHF (congestive heart failure) (Multi) and Stroke (Multi).    Surgical History  She has a past surgical history that includes Other surgical history (2022); Invasive Vascular Procedure (N/A, 2024); Cardiac catheterization (N/A, 2024); Leg amputation through knee; and Leg amputation through knee.     Social History     Occupational History    Not on file   Tobacco Use    Smoking status: Former     Current packs/day: 0.00     Types: Cigarettes     Quit date: 2023     Years since quittin.6    Smokeless tobacco: Not on file   Substance and Sexual Activity    Alcohol use:  Yes    Drug use: Never    Sexual activity: Defer     Travel History   Travel since 07/02/24    No documented travel since 07/02/24            Pets: unknown  Hobbies: unknown    Family History  No family history on file.  Allergies  Patient has no known allergies.       There is no immunization history on file for this patient.  Medications  Home medications:  Medications Prior to Admission   Medication Sig Dispense Refill Last Dose    amiodarone (Pacerone) 200 mg tablet Take 1 tablet (200 mg) by mouth once daily. Do not fill before July 25, 2024. 30 tablet 0     aspirin 81 mg chewable tablet Chew 1 tablet (81 mg) once daily. 30 tablet 0     atorvastatin (Lipitor) 80 mg tablet Take 1 tablet (80 mg) by mouth once daily. 30 tablet 0     DULoxetine (Cymbalta) 60 mg DR capsule Take 1 capsule (60 mg) by mouth once daily. Do not crush or chew. Do not fill before July 25, 2024. 30 capsule 0     empagliflozin (Jardiance) 10 mg Take 1 tablet (10 mg) by mouth once daily. 30 tablet 0     folic acid (Folvite) 1 mg tablet Take 1 tablet (1 mg) by mouth once daily. 30 tablet 0     furosemide (Lasix) 40 mg tablet Take 1 tablet (40 mg) by mouth 2 times daily (morning and late afternoon). 60 tablet 0     lidocaine (Lidoderm) 5 % patch Place 1 patch over 12 hours on the skin once daily. Remove & discard patch within 12 hours or as directed by MD. 30 patch 0     melatonin 3 mg tablet Take 1 tablet (3 mg) by mouth as needed at bedtime for sleep. 30 tablet 0     metoprolol succinate XL (Toprol-XL) 50 mg 24 hr tablet Take 0.5 tablets (25 mg) by mouth once daily. Do not crush or chew. 15 tablet 0     multivitamin with minerals tablet Take 1 tablet by mouth once daily. 30 tablet 0     oxyCODONE (Roxicodone) 10 mg immediate release tablet Take 1 tablet (10 mg) by mouth every 6 hours if needed for severe pain (7 - 10). 10 tablet 0     oxyCODONE (Roxicodone) 5 mg immediate release tablet Take 1 tablet (5 mg) by mouth every 6 hours if needed for  moderate pain (4 - 6). 15 tablet 0     pantoprazole (ProtoNix) 40 mg EC tablet Take 1 tablet (40 mg) by mouth once daily in the morning. Take before meals. Do not crush, chew, or split. 30 tablet 0     polyethylene glycol (Glycolax, Miralax) 17 gram packet Take 17 g by mouth once daily. 30 packet 0     pregabalin (Lyrica) 75 mg capsule Take 1 capsule (75 mg) by mouth 2 times a day. 60 capsule 0     sacubitriL-valsartan (Entresto) 24-26 mg tablet Take 1 tablet by mouth 2 times a day.       sennosides (Senokot) 8.6 mg tablet Take 2 tablets (17.2 mg) by mouth 2 times a day. 60 tablet 0     warfarin (Coumadin) 5 mg tablet Take as directed per After Visit Summary. 30 tablet 0      Current medications:  Scheduled medications  amiodarone, 400 mg, oral, BID   Followed by  [START ON 8/12/2024] amiodarone, 200 mg, oral, Daily  [Held by provider] aspirin, 81 mg, oral, Daily  atorvastatin, 80 mg, oral, Nightly  DULoxetine, 60 mg, oral, Daily  empagliflozin, 10 mg, oral, Daily  folic acid, 1 mg, oral, Daily  furosemide, 40 mg, oral, BID  metoprolol succinate XL, 25 mg, oral, Daily  midodrine, 10 mg, oral, TID  multivitamin with minerals, 1 tablet, oral, Daily  pantoprazole, 40 mg, oral, Daily before breakfast  piperacillin-tazobactam, 3.375 g, intravenous, q6h  polyethylene glycol, 17 g, oral, Daily  pregabalin, 75 mg, oral, BID  sacubitriL-valsartan, 1 tablet, oral, BID  sennosides, 2 tablet, oral, BID  vancomycin, 500 mg, intravenous, q12h  [Held by provider] warfarin, 5 mg, oral, Daily      Continuous medications  heparin, 0-4,000 Units/hr, Last Rate: 1,300 Units/hr (08/02/24 0652)      PRN medications  PRN medications: dextrose, dextrose, glucagon, glucagon, heparin, melatonin, morphine, oxyCODONE, oxyCODONE, polyethylene glycol, vancomycin    Review of Systems - per HPI     Objective  Range of Vitals (last 24 hours)  Heart Rate:  [65-90]   Temp:  [35.4 °C (95.7 °F)-36.6 °C (97.9 °F)]   Resp:  [16-20]   BP: (7299)/(43-75)    SpO2:  [97 %-100 %]   Daily Weight  07/29/24 : 71.2 kg (157 lb)    Body mass index is 24.59 kg/m².     Physical Exam    Comfortable but fatigued appearing middle aged woman, sitting up in bed  Lungs CTA anteriorly, no wheezes or rhonchi  Irregular rhythm, reg rate, no murmurs  Abd soft, nontender, BS quiet   S/p R AKA, wound vac w/ sanguinous drainage  Calm and cooperative      Relevant Results  Outside Hospital Results  No  Labs  Results from last 72 hours   Lab Units 08/02/24  0834 08/01/24  0532 07/31/24  0755   WBC AUTO x10*3/uL 10.4 11.0 14.4*   HEMOGLOBIN g/dL 9.7* 8.9* 9.4*   HEMATOCRIT % 30.3* 28.0* 30.4*   PLATELETS AUTO x10*3/uL 279 234 250   NEUTROS PCT AUTO % 65.4 64.7 79.6   LYMPHS PCT AUTO % 19.8 21.3 11.6   MONOS PCT AUTO % 10.1 10.6 8.0   EOS PCT AUTO % 3.6 2.5 0.1     Results from last 72 hours   Lab Units 08/02/24  0834 08/01/24  0532 07/31/24  0755   SODIUM mmol/L 135* 135* 136   POTASSIUM mmol/L 3.8 4.0 4.5   CHLORIDE mmol/L 94* 96* 98   CO2 mmol/L 29 30 27   BUN mg/dL 20 18 17   CREATININE mg/dL 1.13* 0.81 0.84   GLUCOSE mg/dL 94 94 98   CALCIUM mg/dL 9.1 8.3* 9.2   ANION GAP mmol/L 16 13 16   EGFR mL/min/1.73m*2 61 >90 87   PHOSPHORUS mg/dL 4.7 5.0* 5.6*     Results from last 72 hours   Lab Units 08/02/24  0834 08/01/24  0532 07/31/24  0755   ALBUMIN g/dL 3.2* 3.1* 3.1*     Estimated Creatinine Clearance: 61.1 mL/min (A) (by C-G formula based on SCr of 1.13 mg/dL (H)).  C-Reactive Protein   Date Value Ref Range Status   07/29/2024 0.70 <1.00 mg/dL Final     Sedimentation Rate   Date Value Ref Range Status   07/30/2024 79 (H) 0 - 20 mm/h Final     HIV 1 and 2 Screen   Date Value Ref Range Status   03/26/2022 NONREACTIVE NONREACTIVE Final     Comment:      HIV Ag/Ab screen is performed using the Siemens Broadlink   HIV Ag/Ab Combo assay which detects the presence of HIV    p24 antigen as well as antibodies to HIV-1   (Group M and O) and HIV-2.  .  No laboratory evidence of HIV infection. If  acute HIV infection is   suspected, consider testing for HIV RNA by PCR (viral load).       Hepatitis C AB   Date Value Ref Range Status   06/21/2024 Nonreactive Nonreactive Final     Comment:     Results from patients taking biotin supplements or receiving high-dose biotin therapy should be interpreted with caution due to possible interference with this test. Providers may contact their local laboratory for further information.     Microbiology  Susceptibility data from last 90 days.  Collected Specimen Info Organism Amoxicillin/Clavulanate Ampicillin Ampicillin/Sulbactam Aztreonam Cefazolin Cefazolin (uncomplicated UTIs only) Cefepime Ciprofloxacin Ertapenem Gentamicin Imipenem Levofloxacin   07/30/24 Tissue from BONE RESECTION Escherichia coli   S    S    S   S   S     Enterobacter cloacae complex  R  R  R  R  R   S  S  S  S   S   07/30/24 Swab from CLOT/THROMBUS Enterobacter cloacae complex  R  R  R  R  R   R  S  I  S  I  S     Escherichia coli   S    S    S   S   S   07/11/24 Urine from Indwelling (Araya) Catheter Klebsiella pneumoniae/variicola  S  R  S   S  S   S   S       Collected Specimen Info Organism Meropenem Nitrofurantoin Piperacillin/Tazobactam Trimethoprim/Sulfamethoxazole   07/30/24 Tissue from BONE RESECTION Escherichia coli    S  S     Enterobacter cloacae complex  S   R  S   07/30/24 Swab from CLOT/THROMBUS Enterobacter cloacae complex  S   R  S     Escherichia coli    S  S   07/11/24 Urine from Indwelling (Araya) Catheter Klebsiella pneumoniae/variicola   S  S  S       Imaging    XR femur 7/29    IMPRESSION:  1.  Postsurgical changes of above knee amputation of the right femur.  No evidence of cortical breakdown or osteolytic changes.  2. Skin soft tissue defect at the distal tip of the amputation along  with multiple foci of soft tissue gas adjacent to the distal femur  with overlying soft tissue irregularity. Underlying soft tissue  infection or open wound may be present    Assessment/Plan      Amirah Bennett is a 44 y/o woman pmhx sig for HFrEF, Afib, recent admission for RVR and cardiogenic shock c/b RLE ischemic limb ultimately undergoing R AKA and embolectomy, c/b intramuscular hematoma requiring revision and washout prior to discharge w/ wound vac. Now readmitted 7/29 with R AKA stump wound dehiscence s/p revision w/ Vascular on 7/30 with evacuation of hematoma at the stump site and additional resection of femur. Cx from the thrombus and bone (?resected portion) both with Enterobacter cloacae and E. Coli. Her Enterobacter spp have unique susceptibilities, however both appear to be susceptible to meropenem, reviewed w/ Micro lab. At this time would transition to meropenem monotherapy for both organisms.     #R AKA stump infection, possible residual OM      Recommendations:  -Stop vanc and pip-tazo  -Start IV meropenem 1gm q8H   -Will discuss w/ Vascular team re: residual bone/tissue in place for determination of duration of abx       Discussed w/ overnight covering team, will follow      Montserrat Layton DO  Epic Chat or pager [phone]

## 2024-08-03 NOTE — PROGRESS NOTES
"  VASCULAR SURGERY PROGRESS NOTE    Assessment/Plan   Amirah Bennett is 43 y.o. 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 Patient hospitalization was complicated with wound infection requiring abx and cardiogenic shock. Patient found to have wound dehiscence and admitted.     Patient is now s/p R AKA revision with wound vac 7/30. Wound vac change 8/2. Remains HDS.    Plan:  Maintain wound vac -> next change 8/5  Continue AC (warfarin)  ID consult for abx management - anticipating 4 weeks of meropenem  Maintain RLE to bulb suction -> please record output every shift  Rest of care per primary  Please call with any questions or concerns    D/w attending, Dr. Rj Oro MD  PGY-2 Vascular Surgery Resident  [id number]    Subjective   SERJIO overnight. Complaining of pain at amputation site. Remains HDS on RA    Objective   Vitals:  Heart Rate:  [65-90]   Temp:  [35.9 °C (96.6 °F)-36.4 °C (97.5 °F)]   Resp:  [16-20]   BP: ()/(43-66)   Height:  [170.2 cm (5' 7\")]   Weight:  [80 kg (176 lb 5.9 oz)]   SpO2:  [94 %-100 %]     Exam:  Constitutional: No acute distress, sleeping in bed comfortably  Neuro:  AOx3, grossly intact  ENMT: moist mucous membranes  Head/neck: atraumatic  CV: RRR  Pulm: non-labored on room air  Skin: warm and dry  Extremities: b/l AKA. RLE AKA with wound vac in place. RLE drain with serosanguinous output. Serosanguinous drainage at wound under " wound vac dressing. Neuro and motor intact.    Labs:  Results from last 7 days   Lab Units 08/03/24  0825 08/02/24  0834 08/01/24  0532   WBC AUTO x10*3/uL 8.5 10.4 11.0   HEMOGLOBIN g/dL 8.8* 9.7* 8.9*   PLATELETS AUTO x10*3/uL 252 279 234      Results from last 7 days   Lab Units 08/03/24  0825 08/02/24  0834 08/01/24  0532   SODIUM mmol/L 135* 135* 135*   POTASSIUM mmol/L 3.5 3.8 4.0   CHLORIDE mmol/L 97* 94* 96*   CO2 mmol/L 29 29 30   BUN mg/dL 22 20 18   CREATININE mg/dL 0.92 1.13* 0.81   GLUCOSE mg/dL 85 94 94   MAGNESIUM mg/dL 1.91 1.78 1.61   PHOSPHORUS mg/dL 3.8 4.7 5.0*      Results from last 7 days   Lab Units 08/03/24  0825 08/02/24  0834 08/01/24  0532 07/31/24  0754   INR  1.5* 1.5* 1.4* 1.5*   PROTIME seconds 17.1* 17.2* 16.2* 16.7*   APTT seconds  --   --   --  30      Results from last 7 days   Lab Units 08/02/24  0834 08/01/24  1142 08/01/24  0532   ANTI XA UNFRACTIONATED IU/mL 0.4 0.4 0.3

## 2024-08-03 NOTE — HOSPITAL COURSE
Patient is a 43 y.o. 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  Patient presented to the ED on 7/2024 with worsening pain at her AKA surgical site, dehiscence of her stump incision as noted by her wound care team. In the ED, HR documentation as 43 while in ED, BNP 97. WBC 13.8 on admit, WBC 12.0 on 7/24.      Of note, patient was hospitalized from 6/20 through 7/24 when she was treated for right AKA, heparin to warfarin bridge. Patient hospitalization was complicated with wound infection requiring abx and cardiogenic shock which required stay in CICU.     Patient was taken to the OR with vascular surgery on 7/29 for debridement and possible closure. Patient then underwent right AKA revision and washout by vascular surgery on 7/30.  Postop complicated with A-fib RVR treated with amio drip. Bicarb low. Patient persistently hypotensive after 2L boluses and midodrine. She had additional transection of her femur about 1cm proximally and tissue debrided; bone resection cx sent. Surgical tissue cultures from 7/30 growing multiorganism (E coli, Enterobacter, PMN, GNB). ID consulted, recommended meropenem.     Since admission, she was been afebrile. Then began having more hypotension with SBP 70s. Placed on midodrine 10 mg TID for hypotension and transferred to the MICU with concerns for septic shock iso persistent hypotension.  In the MICU the patient remained hemodynamically stable, not  requiring pressors, and was treated with meropenum (8/2-*) and vancomycin (8/3-*). Restarted home Warfarin 5 mg with heparin gtt bridge.

## 2024-08-04 VITALS
WEIGHT: 176.37 LBS | BODY MASS INDEX: 27.68 KG/M2 | SYSTOLIC BLOOD PRESSURE: 91 MMHG | HEART RATE: 82 BPM | HEIGHT: 67 IN | TEMPERATURE: 97.9 F | RESPIRATION RATE: 14 BRPM | OXYGEN SATURATION: 99 % | DIASTOLIC BLOOD PRESSURE: 61 MMHG

## 2024-08-04 LAB
ABO GROUP (TYPE) IN BLOOD: NORMAL
ALBUMIN SERPL BCP-MCNC: 2.9 G/DL (ref 3.4–5)
ANION GAP SERPL CALC-SCNC: 12 MMOL/L (ref 10–20)
ANTIBODY SCREEN: NORMAL
BACTERIA BLD CULT: NORMAL
BACTERIA BLD CULT: NORMAL
BASOPHILS # BLD AUTO: 0.03 X10*3/UL (ref 0–0.1)
BASOPHILS NFR BLD AUTO: 0.4 %
BUN SERPL-MCNC: 18 MG/DL (ref 6–23)
CALCIUM SERPL-MCNC: 8.3 MG/DL (ref 8.6–10.6)
CHLORIDE SERPL-SCNC: 99 MMOL/L (ref 98–107)
CO2 SERPL-SCNC: 30 MMOL/L (ref 21–32)
CREAT SERPL-MCNC: 0.9 MG/DL (ref 0.5–1.05)
EGFRCR SERPLBLD CKD-EPI 2021: 81 ML/MIN/1.73M*2
EOSINOPHIL # BLD AUTO: 0.4 X10*3/UL (ref 0–0.7)
EOSINOPHIL NFR BLD AUTO: 5.5 %
ERYTHROCYTE [DISTWIDTH] IN BLOOD BY AUTOMATED COUNT: 14.3 % (ref 11.5–14.5)
GLUCOSE BLD MANUAL STRIP-MCNC: 101 MG/DL (ref 74–99)
GLUCOSE BLD MANUAL STRIP-MCNC: 116 MG/DL (ref 74–99)
GLUCOSE BLD MANUAL STRIP-MCNC: 121 MG/DL (ref 74–99)
GLUCOSE BLD MANUAL STRIP-MCNC: 123 MG/DL (ref 74–99)
GLUCOSE SERPL-MCNC: 89 MG/DL (ref 74–99)
HCT VFR BLD AUTO: 26.3 % (ref 36–46)
HGB BLD-MCNC: 8.3 G/DL (ref 12–16)
HOLD SPECIMEN: NORMAL
IMM GRANULOCYTES # BLD AUTO: 0.03 X10*3/UL (ref 0–0.7)
IMM GRANULOCYTES NFR BLD AUTO: 0.4 % (ref 0–0.9)
INR PPP: 1.3 (ref 0.9–1.1)
LYMPHOCYTES # BLD AUTO: 2.08 X10*3/UL (ref 1.2–4.8)
LYMPHOCYTES NFR BLD AUTO: 28.5 %
MAGNESIUM SERPL-MCNC: 1.86 MG/DL (ref 1.6–2.4)
MCH RBC QN AUTO: 30.7 PG (ref 26–34)
MCHC RBC AUTO-ENTMCNC: 31.6 G/DL (ref 32–36)
MCV RBC AUTO: 97 FL (ref 80–100)
MONOCYTES # BLD AUTO: 0.84 X10*3/UL (ref 0.1–1)
MONOCYTES NFR BLD AUTO: 11.5 %
NEUTROPHILS # BLD AUTO: 3.91 X10*3/UL (ref 1.2–7.7)
NEUTROPHILS NFR BLD AUTO: 53.7 %
NRBC BLD-RTO: 0 /100 WBCS (ref 0–0)
PHOSPHATE SERPL-MCNC: 4.1 MG/DL (ref 2.5–4.9)
PLATELET # BLD AUTO: 248 X10*3/UL (ref 150–450)
POTASSIUM SERPL-SCNC: 4.7 MMOL/L (ref 3.5–5.3)
PROCALCITONIN SERPL-MCNC: 0.03 NG/ML
PROTHROMBIN TIME: 14.5 SECONDS (ref 9.8–12.8)
RBC # BLD AUTO: 2.7 X10*6/UL (ref 4–5.2)
RH FACTOR (ANTIGEN D): NORMAL
SODIUM SERPL-SCNC: 136 MMOL/L (ref 136–145)
UFH PPP CHRO-ACNC: 0.1 IU/ML
UFH PPP CHRO-ACNC: 0.2 IU/ML
UFH PPP CHRO-ACNC: 0.3 IU/ML
UFH PPP CHRO-ACNC: 0.3 IU/ML
UFH PPP CHRO-ACNC: 2 IU/ML
VANCOMYCIN SERPL-MCNC: 22.1 UG/ML (ref 5–20)
WBC # BLD AUTO: 7.3 X10*3/UL (ref 4.4–11.3)

## 2024-08-04 PROCEDURE — 2500000004 HC RX 250 GENERAL PHARMACY W/ HCPCS (ALT 636 FOR OP/ED)

## 2024-08-04 PROCEDURE — 99233 SBSQ HOSP IP/OBS HIGH 50: CPT | Performed by: STUDENT IN AN ORGANIZED HEALTH CARE EDUCATION/TRAINING PROGRAM

## 2024-08-04 PROCEDURE — 2500000002 HC RX 250 W HCPCS SELF ADMINISTERED DRUGS (ALT 637 FOR MEDICARE OP, ALT 636 FOR OP/ED)

## 2024-08-04 PROCEDURE — 85520 HEPARIN ASSAY: CPT

## 2024-08-04 PROCEDURE — 83735 ASSAY OF MAGNESIUM: CPT

## 2024-08-04 PROCEDURE — 99233 SBSQ HOSP IP/OBS HIGH 50: CPT | Performed by: INTERNAL MEDICINE

## 2024-08-04 PROCEDURE — 82947 ASSAY GLUCOSE BLOOD QUANT: CPT

## 2024-08-04 PROCEDURE — 2500000001 HC RX 250 WO HCPCS SELF ADMINISTERED DRUGS (ALT 637 FOR MEDICARE OP): Performed by: NURSE PRACTITIONER

## 2024-08-04 PROCEDURE — 36415 COLL VENOUS BLD VENIPUNCTURE: CPT

## 2024-08-04 PROCEDURE — 85025 COMPLETE CBC W/AUTO DIFF WBC: CPT

## 2024-08-04 PROCEDURE — 2500000001 HC RX 250 WO HCPCS SELF ADMINISTERED DRUGS (ALT 637 FOR MEDICARE OP)

## 2024-08-04 PROCEDURE — 84520 ASSAY OF UREA NITROGEN: CPT

## 2024-08-04 PROCEDURE — 80202 ASSAY OF VANCOMYCIN: CPT

## 2024-08-04 PROCEDURE — 85610 PROTHROMBIN TIME: CPT

## 2024-08-04 PROCEDURE — 86901 BLOOD TYPING SEROLOGIC RH(D): CPT

## 2024-08-04 PROCEDURE — 1200000002 HC GENERAL ROOM WITH TELEMETRY DAILY

## 2024-08-04 PROCEDURE — 86850 RBC ANTIBODY SCREEN: CPT

## 2024-08-04 RX ORDER — HYDROMORPHONE HYDROCHLORIDE 1 MG/ML
0.6 INJECTION, SOLUTION INTRAMUSCULAR; INTRAVENOUS; SUBCUTANEOUS
Status: DISCONTINUED | OUTPATIENT
Start: 2024-08-04 | End: 2024-08-09 | Stop reason: HOSPADM

## 2024-08-04 RX ORDER — NAPROXEN SODIUM 220 MG/1
81 TABLET, FILM COATED ORAL DAILY
Status: CANCELLED | OUTPATIENT
Start: 2024-08-05

## 2024-08-04 ASSESSMENT — PAIN - FUNCTIONAL ASSESSMENT
PAIN_FUNCTIONAL_ASSESSMENT: CPOT (CRITICAL CARE PAIN OBSERVATION TOOL)
PAIN_FUNCTIONAL_ASSESSMENT: CPOT (CRITICAL CARE PAIN OBSERVATION TOOL)
PAIN_FUNCTIONAL_ASSESSMENT: 0-10
PAIN_FUNCTIONAL_ASSESSMENT: 0-10

## 2024-08-04 ASSESSMENT — COGNITIVE AND FUNCTIONAL STATUS - GENERAL
MOVING FROM LYING ON BACK TO SITTING ON SIDE OF FLAT BED WITH BEDRAILS: A LITTLE
DRESSING REGULAR UPPER BODY CLOTHING: A LITTLE
WALKING IN HOSPITAL ROOM: A LOT
PERSONAL GROOMING: A LITTLE
MOVING TO AND FROM BED TO CHAIR: A LITTLE
CLIMB 3 TO 5 STEPS WITH RAILING: TOTAL
MOBILITY SCORE: 14
TURNING FROM BACK TO SIDE WHILE IN FLAT BAD: A LITTLE
DAILY ACTIVITIY SCORE: 19
DRESSING REGULAR LOWER BODY CLOTHING: A LITTLE
TOILETING: A LITTLE
STANDING UP FROM CHAIR USING ARMS: A LOT
HELP NEEDED FOR BATHING: A LITTLE

## 2024-08-04 ASSESSMENT — PAIN SCALES - GENERAL
PAINLEVEL_OUTOF10: 10 - WORST POSSIBLE PAIN
PAINLEVEL_OUTOF10: 0 - NO PAIN
PAINLEVEL_OUTOF10: 4
PAINLEVEL_OUTOF10: 10 - WORST POSSIBLE PAIN
PAINLEVEL_OUTOF10: 1
PAINLEVEL_OUTOF10: 9
PAINLEVEL_OUTOF10: 0 - NO PAIN

## 2024-08-04 NOTE — CARE PLAN
Problem: Skin  Goal: Decreased wound size/increased tissue granulation at next dressing change  Outcome: Progressing  Goal: Participates in plan/prevention/treatment measures  Outcome: Progressing  Goal: Prevent/manage excess moisture  Outcome: Progressing  Goal: Prevent/minimize sheer/friction injuries  Outcome: Progressing  Flowsheets (Taken 8/4/2024 9584)  Prevent/minimize sheer/friction injuries:   HOB 30 degrees or less   Turn/reposition every 2 hours/use positioning/transfer devices   Complete micro-shifts as needed if patient unable. Adjust patient position to relieve pressure points, not a full turn   Increase activity/out of bed for meals   Use pull sheet  Goal: Promote/optimize nutrition  Outcome: Progressing  Goal: Promote skin healing  Outcome: Progressing     Problem: Pain  Goal: Takes deep breaths with improved pain control throughout the shift  Outcome: Progressing  Goal: Turns in bed with improved pain control throughout the shift  Outcome: Progressing  Goal: Walks with improved pain control throughout the shift  Outcome: Progressing  Goal: Performs ADL's with improved pain control throughout shift  Outcome: Progressing  Goal: Participates in PT with improved pain control throughout the shift  Outcome: Progressing  Goal: Free from opioid side effects throughout the shift  Outcome: Progressing  Goal: Free from acute confusion related to pain meds throughout the shift  Outcome: Progressing

## 2024-08-04 NOTE — PROGRESS NOTES
"  VASCULAR SURGERY PROGRESS NOTE    Assessment/Plan   Amirah Bennett is 43 y.o. 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 Patient hospitalization was complicated with wound infection requiring abx and cardiogenic shock. Patient found to have wound dehiscence and admitted.     Patient is now s/p R AKA revision with wound vac 7/30. Wound vac change 8/2.   Patient transferred to the MICU 8/3 due to hypotension and started on midodrine    Plan:  Maintain wound vac -> next change 8/5  Continue AC (warfarin)  ID consult for abx management - anticipating 4 weeks of meropenem  Maintain RLE to bulb suction -> please record output every shift  Rest of care per primary  Please call with any questions or concerns    D/w attending, Dr. Rj Leyva MD  General Surgery Resident PGY-3   Vascular surgery [id number]      Subjective   Patient transferred to the MICU for hypotension. This morning doing well. Complaining of pain at AKA site. Remains HDS on RA    Objective   Vitals:  Heart Rate:  [63-87]   Temp:  [35 °C (95 °F)-36.7 °C (98.1 °F)]   Resp:  [4-17]   BP: ()/(50-76)   Height:  [170.2 cm (5' 7\")]   Weight:  [80 kg (176 lb 5.9 oz)]   SpO2:  [94 %-100 %]     Exam:  Constitutional: No acute distress, sleeping in bed comfortably  Neuro:  AOx3, grossly intact  ENMT: moist mucous membranes  Head/neck: atraumatic  CV: RRR  Pulm: non-labored on room air  Skin: warm and " dry  Extremities: b/l AKA. RLE AKA with wound vac in place. RLE drain with serosanguinous output. Serosanguinous drainage at wound under wound vac dressing. Neuro and motor intact.    Labs:  Results from last 7 days   Lab Units 08/04/24  0512 08/03/24  0825 08/02/24  0834   WBC AUTO x10*3/uL 7.3 8.5 10.4   HEMOGLOBIN g/dL 8.3* 8.8* 9.7*   PLATELETS AUTO x10*3/uL 248 252 279      Results from last 7 days   Lab Units 08/04/24  0512 08/03/24  0825 08/02/24  0834   SODIUM mmol/L 136 135* 135*   POTASSIUM mmol/L 4.7 3.5 3.8   CHLORIDE mmol/L 99 97* 94*   CO2 mmol/L 30 29 29   BUN mg/dL 18 22 20   CREATININE mg/dL 0.90 0.92 1.13*   GLUCOSE mg/dL 89 85 94   MAGNESIUM mg/dL 1.86 1.91 1.78   PHOSPHORUS mg/dL 4.1 3.8 4.7      Results from last 7 days   Lab Units 08/04/24  0512 08/03/24  0825 08/02/24  0834 08/01/24  0532 07/31/24  0754   INR  1.3* 1.5* 1.5*   < > 1.5*   PROTIME seconds 14.5* 17.1* 17.2*   < > 16.7*   APTT seconds  --   --   --   --  30    < > = values in this interval not displayed.      Results from last 7 days   Lab Units 08/04/24 0512 08/04/24  0138 08/03/24 2007   ANTI XA UNFRACTIONATED IU/mL 0.3 0.2 0.3

## 2024-08-04 NOTE — PROGRESS NOTES
Vancomycin Dosing by Pharmacy- FOLLOW UP    Amirah Bennett is a 43 y.o. year old female who Pharmacy has been consulted for vancomycin dosing for other surgical wound infection . Based on the patient's indication and renal status this patient is being dosed based on a goal AUC of 400-600.     Renal function is currently stable.    Current vancomycin dose: 750 mg given every 12 hours    Estimated vancomycin AUC on current dose: 560 mg/L.hr     Visit Vitals  BP 92/51   Pulse 71   Temp 35 °C (95 °F) (Temporal)   Resp (!) 4        Lab Results   Component Value Date    CREATININE 0.90 2024    CREATININE 0.92 2024    CREATININE 1.13 (H) 2024    CREATININE 0.81 2024        Patient weight is as follows:   Vitals:    24 1200   Weight: 80 kg (176 lb 5.9 oz)       Cultures:  Susceptibility data for the encounter in last 14 days.  Collected Specimen Info Organism Amoxicillin/Clavulanate Ampicillin Ampicillin/Sulbactam Aztreonam Cefazolin Cefepime Ciprofloxacin Ertapenem Gentamicin Imipenem Levofloxacin Meropenem Piperacillin/Tazobactam   24 Tissue from BONE RESECTION Escherichia coli   S    S   S   S   S   S     Enterobacter cloacae complex  R  R  R  R  R  S  S  S  S   S  S  R   24 Swab from CLOT/THROMBUS Enterobacter cloacae complex  R  R  R  R  R  R  S  I  S  I  S  S  R     Escherichia coli   S    S   S   S   S   S     Collected Specimen Info Organism Trimethoprim/Sulfamethoxazole   24 Tissue from BONE RESECTION Escherichia coli  S     Enterobacter cloacae complex  S   24 Swab from CLOT/THROMBUS Enterobacter cloacae complex  S     Escherichia coli  S        I/O last 3 completed shifts:  In: 3644.6 (45.6 mL/kg) [I.V.:844.6 (10.6 mL/kg); IV Piggyback:2800]  Out: 2535 (31.7 mL/kg) [Urine:1950 (0.7 mL/kg/hr); Drains:585]  Weight: 80 kg   I/O during current shift:  No intake/output data recorded.    Temp (24hrs), Av.7 °C (96.3 °F), Min:35 °C (95 °F), Max:36.7 °C (98.1  °F)      Assessment/Plan    Within goal AUC range. Continue current vancomycin regimen.    This dosing regimen is predicted by [employer] to result in the following pharmacokinetic parameters:    Regimen: 750 mg IV every 12 hours.  Exposure target: AUC24 (range)400-600 mg/L.hr   AUC24,ss: 560 mg/L.hr  Probability of AUC24 > 400: 100 %  Ctrough,ss: 18.8 mg/L  Probability of Ctrough,ss > 20: 35 %  Probability of nephrotoxicity (Lodise KWAME 2009): 15 %    The next level will be obtained on 8/7 with AM labs. May be obtained sooner if clinically indicated.   Will continue to monitor renal function daily while on vancomycin and order serum creatinine at least every 48 hours if not already ordered.  Follow for continued vancomycin needs, clinical response, and signs/symptoms of toxicity.       Naheed Lagunas, PharmD

## 2024-08-04 NOTE — CARE PLAN
Problem: Skin  Goal: Decreased wound size/increased tissue granulation at next dressing change  Flowsheets (Taken 8/3/2024 1721)  Decreased wound size/increased tissue granulation at next dressing change: Promote sleep for wound healing  Goal: Participates in plan/prevention/treatment measures  Flowsheets (Taken 8/3/2024 1721)  Participates in plan/prevention/treatment measures: Elevate heels  Goal: Prevent/manage excess moisture  Flowsheets (Taken 8/4/2024 0940)  Prevent/manage excess moisture: Follow provider orders for dressing changes  Goal: Prevent/minimize sheer/friction injuries  Flowsheets (Taken 8/4/2024 0940)  Prevent/minimize sheer/friction injuries:   Use pull sheet   HOB 30 degrees or less   Turn/reposition every 2 hours/use positioning/transfer devices  Goal: Promote/optimize nutrition  Flowsheets (Taken 8/4/2024 0940)  Promote/optimize nutrition: Monitor/record intake including meals  Goal: Promote skin healing  Flowsheets (Taken 8/4/2024 0940)  Promote skin healing:   Turn/reposition every 2 hours/use positioning/transfer devices   Assess skin/pad under line(s)/device(s)     Problem: Pain  Goal: Takes deep breaths with improved pain control throughout the shift  Outcome: Progressing

## 2024-08-04 NOTE — PROGRESS NOTES
Amirah Bennett is a 43 y.o. female on day 6 of admission presenting with Wound dehiscence.    Subjective   Interval History: Feels well today, NAD, pain still 8/10. No F/C, no lightheadedness     Review of Systems    Objective   Range of Vitals (last 24 hours)  Heart Rate:  [63-87]   Temp:  [35 °C (95 °F)-36.7 °C (98.1 °F)]   Resp:  [4-17]   BP: ()/(51-82)   SpO2:  [100 %]   Daily Weight  08/03/24 : 80 kg (176 lb 5.9 oz)    Body mass index is 27.62 kg/m².    Physical Exam    Comfortable appearing, eating breakfast  Abd soft, nontender   R stump drain w/ sanguinous drainage     Antibiotics  meropenem - 1 gram/100 mL  vancomycin - 750 mg/150 mL    Relevant Results  Labs  Results from last 72 hours   Lab Units 08/04/24 0512 08/03/24  0825 08/02/24  0834   WBC AUTO x10*3/uL 7.3 8.5 10.4   HEMOGLOBIN g/dL 8.3* 8.8* 9.7*   HEMATOCRIT % 26.3* 28.7* 30.3*   PLATELETS AUTO x10*3/uL 248 252 279   NEUTROS PCT AUTO % 53.7 61.0 65.4   LYMPHS PCT AUTO % 28.5 22.9 19.8   MONOS PCT AUTO % 11.5 10.6 10.1   EOS PCT AUTO % 5.5 4.6 3.6     Results from last 72 hours   Lab Units 08/04/24 0512 08/03/24  0825 08/02/24  0834   SODIUM mmol/L 136 135* 135*   POTASSIUM mmol/L 4.7 3.5 3.8   CHLORIDE mmol/L 99 97* 94*   CO2 mmol/L 30 29 29   BUN mg/dL 18 22 20   CREATININE mg/dL 0.90 0.92 1.13*   GLUCOSE mg/dL 89 85 94   CALCIUM mg/dL 8.3* 9.0 9.1   ANION GAP mmol/L 12 13 16   EGFR mL/min/1.73m*2 81 78 61   PHOSPHORUS mg/dL 4.1 3.8 4.7     Results from last 72 hours   Lab Units 08/04/24 0512 08/03/24  0825 08/02/24  0834   ALBUMIN g/dL 2.9* 3.0* 3.2*     Estimated Creatinine Clearance: 86 mL/min (by C-G formula based on SCr of 0.9 mg/dL).  C-Reactive Protein   Date Value Ref Range Status   07/29/2024 0.70 <1.00 mg/dL Final     Microbiology  Susceptibility data from last 14 days.  Collected Specimen Info Organism Amoxicillin/Clavulanate Ampicillin Ampicillin/Sulbactam Aztreonam Cefazolin Cefepime Ciprofloxacin Ertapenem Gentamicin  Imipenem Levofloxacin Meropenem Piperacillin/Tazobactam   07/30/24 Tissue from BONE RESECTION Escherichia coli   S    S   S   S   S   S     Enterobacter cloacae complex  R  R  R  R  R  S  S  S  S   S  S  R   07/30/24 Swab from CLOT/THROMBUS Enterobacter cloacae complex  R  R  R  R  R  R  S  I  S  I  S  S  R     Escherichia coli   S    S   S   S   S   S     Collected Specimen Info Organism Trimethoprim/Sulfamethoxazole   07/30/24 Tissue from BONE RESECTION Escherichia coli  S     Enterobacter cloacae complex  S   07/30/24 Swab from CLOT/THROMBUS Enterobacter cloacae complex  S     Escherichia coli  S         Imaging      Assessment/Plan     Amirah Bennett is a 44 y/o woman pmhx sig for HFrEF, Afib, recent admission for RVR and cardiogenic shock c/b RLE ischemic limb ultimately undergoing R AKA and embolectomy, c/b intramuscular hematoma requiring revision and washout prior to discharge w/ wound vac. Now readmitted 7/29 with R AKA stump wound dehiscence s/p revision w/ Vascular on 7/30 with evacuation of hematoma at the stump site and additional resection of femur. Cx from the thrombus and bone (?resected portion) both with Enterobacter cloacae and E. Coli. Her Enterobacter spp have unique susceptibilities, however both appear to be susceptible to meropenem, reviewed w/ Micro lab. Transitioned to meropenem monotherapy for both organisms on 8/2.     On 8/3 transferred to MICU for worsening hypotension not responsive to fluid boluses. Pt appears nontoxic, WBC wnl, Blood cx drawn and started back on vanc while these are pending. Hgb stable at 8.8.      #R AKA stump infection, possible residual OM       Recommendations:  -Cont IV meropenem 1gm q8H   -Cont IV vanc, dosed w/ pharmacy, while awaiting blood cx, if neg x 48H would discontinue   -Will likely need 4-6 weeks IV abx for residual stump infection, will continue to eval pending MICU course         Following     Montserrat Howe Chat or pager [phone]

## 2024-08-04 NOTE — PROGRESS NOTES
ICU to [facility] Transfer Summary     I:  ICU Admission Reason & Brief ICU Course:    The patient was admitted 7/29 for evaluation of wound dehiscence of the RLE stump after presenting for wound care. She was previously hospitalized from 6/20-7/24 for treatment of right AKA c/b wound infection requiring antibiotics and cardiogenic shock which required a stay in the CICU. On this admission she was seen by vascular surgery who took her to the OR on 7/29 for debridement followed by AKA revision and washout on 7/30. Post op was complicated by Afib with RVR treated with amio drip. Surgical cultures from 7/30 grew MDR Enterobacter colacae and E. Coli, currently on meropenum (8/2-) and vancomycin (8/2-). She began having hypotension with SBP in the 70s, placed on midodrine 10 mg TID for hypotension and transferred to the MICU d/t c/f septic shock. In the MICU the patient was maintained on midodrine 10 mg TID and treatment with meropenem and vancomycin was continued. She remained hemodynamically stable and did not require pressors. Given that she remained stable, she was deemed stable for floor.       C: Code Status/DPOA Info/Goals of Care/ACP Note    Full Code  DPOA/Contact Number: Clara Black (cousin) [phone]    U: Unprescribing & Pertinent High-Risk Medications    Changes to home meds: NA     Anticoagulation: heparin, plan to transition back to warfarin    Antibiotics:   [] N/A - no current planned antimicrobioals  [x]  Meropenum and vancomycin indication AKA with MDRO start date 8/2 (estuardo) and 8/3 (vanc) planned duration meropenum for 4-6 weeks, vanc until blood cultures negative at 48 hrs    P: Pending Tests at the Time of Transfer   Blood cultures  Urine cultures       A: Active consultants, including Rehab:   []  Subspecialty Consultants: infectious disease  [x]  PT  [x]  OT  []  SLP  []  Wound Care    U: Uncertainty Measure/Diagnostic Pause:    Working diagnosis at the time of transfer septic shock 2/2 AKA  infection, though ddx includes cardiogenic shock 2/2 HFrEF     Diagnosis Degree of Certainty: 1. High degree of certainty about the clinical diagnosis.     NEUROLOGY/PSYCH:  #hx of L cerebellar MCA stroke w/expressive aphasia 3/2024  #hx of ischemic CVA  - continue home dose of Lipitor 80 mg PO at bedtime  - holding home dose of ASA 81 mg d/t concern for further need for surgery      CARDIOVASCULAR:  #HFrEF (last EF 20-25% 6/22/24)  #Hypotension, etiology unclear, possibly secondary to septic vs. less likely cardiogenic  - BNP 97, appears compensated, euvolemic  - HR documentation as 43 while in ED  - POCUS with collapsible IVC, patient warm and dry on exam  - 8/4 TTE redemonstrated LVEF 20-25%, LA dilation  Plan  - Continue holding GDMT given hypotension ( Toprol XL 25 mg PO every day (hold for HR <60 or SBP <100), Lasix 40 mg PO BID, Entresto 24/26 mg PO BID, & Jardiance 10 mg PO every day)   - Continue midodrine 10 mg TID     #afib with RVR  #YFN appendage  - s/p DCCV 5/2023  - INR 1.8 on admit  - S/p RVR postprocedure on 7/30, HR better controlled  Plan:  - Reloaded with Amiodarone 400mg BID for 5 days (7/30-8/4) then transition to home dose 200mg daily on 8/5  - Low intensity heparin gtt ordered with plan to bridge back to Coumadin  - vascular recommending holding warfarin for now      PULMONARY:  #Hx trach c/b trancutaneous fistula  -ENT had been consulted 3/2024 for gurgling and mucus drainage, no inpatient interventions required  Plan:  - Monitor for breaths/mucus discharge from trach site  - Cover the tracheocutaneous fistula with tape and gauze and encourage patient to apply pressure when voicing  - CXR unremarkable     RENAL/GENITOURINARY:  #Hx UTI   - admission s/p Augmentin x 7 days  Plan:  - Follow-up urine culture     ENDOCRINOLOGY:  #T2DM  :: Hbg A1C 4.2% on admission  - accucheck ACHS  - Hypoglycemia order set placed     HEMATOLOGY:  #Anemia, stable  - Status post multiple transfusions during  previous hospitalization  Plan:  - Continue low intensity heparin gtt with plan to bridge back to coumadin when no further procedures planned  - Daily CBC, transfuse if Hbg <7     MUSCULOSKELETAL/ SKIN:  #wound dehiscence s/p R AKA  #leukocytosis, resolved  - S/p right AKA revision and washout since 7/30/24  Plan:  - Appreciate vascular surgery recommendations  - maintain wound vac, next change on 8/5  - Maintain RLE to bulb suction, record output     #Pain  - pain at AKA site, phantom limb vs infection  Plan:   - Continue Lyrica 75 mg PO BID  - Oxycodone 5 mg q6h PRN moderate pain and Oxycodone 10 mg q6h PRN severe pain  - Hydromorphone 0.6 Q3 PRN for breakthrough pain  - PT/OT postop recommending high intensity    INFECTIOUS DISEASE:  #AKA surgical site growing MDR Enterobacter cloacae and E coli  - WBC 13.8 on admit, now resolved  - Vascular surgery following, continue wound care per vascular   - Vanc/Zosyn (7/30- 8/2)  Plan:   - Meropenem (8/2 - *) anticipating 4 weeks as per vascular surgery and ID.  - Restart vancomycin (8/3- *) until blood cultures negative at 48 hours  - Follow-up Bcx and Ucx    To-do list prior to transfer:  NTD     E: Exam, including Lines/Drains/Airways & Data Review:   Physical Exam  Constitutional:       General: She is not in acute distress.     Appearance: She is not ill-appearing.      Comments: Alert and oriented with expressive aphasia.  Not in acute distress. Confused, pulling at her wound vac and drain   HENT:      Head: Normocephalic and atraumatic.   Neck:      Comments: Tracheostomy present without erythema or edema  Cardiovascular:      Rate and Rhythm: Normal rate and regular rhythm.   Pulmonary:      Effort: Pulmonary effort is normal.      Breath sounds: Normal breath sounds.   Abdominal:      General: Abdomen is flat.      Palpations: Abdomen is soft.   Musculoskeletal:      Cervical back: Normal range of motion.      Right lower leg: No edema.      Left lower leg: No  edema.      Comments: Right AKA wrapped in clean Ace bandage to bulb suction draining serosanguinous.  No obvious signs of gross bleeding.   Skin:     General: Skin is warm and dry.   Neurological:      Mental Status: She is alert.   Psychiatric:         Mood: tearful     Behavior: Behavior normal.      Difficult airway? N/A  Lines/drains assessed for removal? No    Within 30 minutes of the patient physically leaving the floor, a Floor Readiness Note needs to be placed with updated vitals.

## 2024-08-05 ENCOUNTER — APPOINTMENT (OUTPATIENT)
Dept: RADIOLOGY | Facility: HOSPITAL | Age: 44
End: 2024-08-05
Payer: COMMERCIAL

## 2024-08-05 LAB
ABO GROUP (TYPE) IN BLOOD: NORMAL
ALBUMIN SERPL BCP-MCNC: 2.9 G/DL (ref 3.4–5)
ANION GAP SERPL CALC-SCNC: 13 MMOL/L (ref 10–20)
ANTIBODY SCREEN: NORMAL
BASOPHILS # BLD AUTO: 0.02 X10*3/UL (ref 0–0.1)
BASOPHILS NFR BLD AUTO: 0.3 %
BUN SERPL-MCNC: 14 MG/DL (ref 6–23)
CALCIUM SERPL-MCNC: 8.3 MG/DL (ref 8.6–10.6)
CHLORIDE SERPL-SCNC: 100 MMOL/L (ref 98–107)
CO2 SERPL-SCNC: 27 MMOL/L (ref 21–32)
CREAT SERPL-MCNC: 0.76 MG/DL (ref 0.5–1.05)
EGFRCR SERPLBLD CKD-EPI 2021: >90 ML/MIN/1.73M*2
EOSINOPHIL # BLD AUTO: 0.34 X10*3/UL (ref 0–0.7)
EOSINOPHIL NFR BLD AUTO: 4.6 %
ERYTHROCYTE [DISTWIDTH] IN BLOOD BY AUTOMATED COUNT: 14.6 % (ref 11.5–14.5)
GLUCOSE BLD MANUAL STRIP-MCNC: 110 MG/DL (ref 74–99)
GLUCOSE SERPL-MCNC: 124 MG/DL (ref 74–99)
HCT VFR BLD AUTO: 23.9 % (ref 36–46)
HGB BLD-MCNC: 7.7 G/DL (ref 12–16)
IMM GRANULOCYTES # BLD AUTO: 0.02 X10*3/UL (ref 0–0.7)
IMM GRANULOCYTES NFR BLD AUTO: 0.3 % (ref 0–0.9)
INR PPP: 1.1 (ref 0.9–1.1)
LYMPHOCYTES # BLD AUTO: 2.25 X10*3/UL (ref 1.2–4.8)
LYMPHOCYTES NFR BLD AUTO: 30.6 %
MAGNESIUM SERPL-MCNC: 1.81 MG/DL (ref 1.6–2.4)
MCH RBC QN AUTO: 30.3 PG (ref 26–34)
MCHC RBC AUTO-ENTMCNC: 32.2 G/DL (ref 32–36)
MCV RBC AUTO: 94 FL (ref 80–100)
MONOCYTES # BLD AUTO: 0.78 X10*3/UL (ref 0.1–1)
MONOCYTES NFR BLD AUTO: 10.6 %
NEUTROPHILS # BLD AUTO: 3.95 X10*3/UL (ref 1.2–7.7)
NEUTROPHILS NFR BLD AUTO: 53.6 %
NRBC BLD-RTO: 0 /100 WBCS (ref 0–0)
PHOSPHATE SERPL-MCNC: 3.7 MG/DL (ref 2.5–4.9)
PLATELET # BLD AUTO: 223 X10*3/UL (ref 150–450)
POTASSIUM SERPL-SCNC: 4.5 MMOL/L (ref 3.5–5.3)
PROTHROMBIN TIME: 12.8 SECONDS (ref 9.8–12.8)
RBC # BLD AUTO: 2.54 X10*6/UL (ref 4–5.2)
RH FACTOR (ANTIGEN D): NORMAL
SODIUM SERPL-SCNC: 135 MMOL/L (ref 136–145)
UFH PPP CHRO-ACNC: 0.3 IU/ML
WBC # BLD AUTO: 7.4 X10*3/UL (ref 4.4–11.3)

## 2024-08-05 PROCEDURE — 2500000002 HC RX 250 W HCPCS SELF ADMINISTERED DRUGS (ALT 637 FOR MEDICARE OP, ALT 636 FOR OP/ED): Performed by: STUDENT IN AN ORGANIZED HEALTH CARE EDUCATION/TRAINING PROGRAM

## 2024-08-05 PROCEDURE — 83735 ASSAY OF MAGNESIUM: CPT

## 2024-08-05 PROCEDURE — 86901 BLOOD TYPING SEROLOGIC RH(D): CPT

## 2024-08-05 PROCEDURE — 36569 INSJ PICC 5 YR+ W/O IMAGING: CPT

## 2024-08-05 PROCEDURE — 85520 HEPARIN ASSAY: CPT

## 2024-08-05 PROCEDURE — 2500000001 HC RX 250 WO HCPCS SELF ADMINISTERED DRUGS (ALT 637 FOR MEDICARE OP)

## 2024-08-05 PROCEDURE — 2500000004 HC RX 250 GENERAL PHARMACY W/ HCPCS (ALT 636 FOR OP/ED)

## 2024-08-05 PROCEDURE — 2500000001 HC RX 250 WO HCPCS SELF ADMINISTERED DRUGS (ALT 637 FOR MEDICARE OP): Performed by: STUDENT IN AN ORGANIZED HEALTH CARE EDUCATION/TRAINING PROGRAM

## 2024-08-05 PROCEDURE — 2500000004 HC RX 250 GENERAL PHARMACY W/ HCPCS (ALT 636 FOR OP/ED): Performed by: STUDENT IN AN ORGANIZED HEALTH CARE EDUCATION/TRAINING PROGRAM

## 2024-08-05 PROCEDURE — 1100000001 HC PRIVATE ROOM DAILY

## 2024-08-05 PROCEDURE — 2500000002 HC RX 250 W HCPCS SELF ADMINISTERED DRUGS (ALT 637 FOR MEDICARE OP, ALT 636 FOR OP/ED)

## 2024-08-05 PROCEDURE — 2500000001 HC RX 250 WO HCPCS SELF ADMINISTERED DRUGS (ALT 637 FOR MEDICARE OP): Performed by: NURSE PRACTITIONER

## 2024-08-05 PROCEDURE — C1751 CATH, INF, PER/CENT/MIDLINE: HCPCS

## 2024-08-05 PROCEDURE — 2720000007 HC OR 272 NO HCPCS

## 2024-08-05 PROCEDURE — 99233 SBSQ HOSP IP/OBS HIGH 50: CPT

## 2024-08-05 PROCEDURE — 85025 COMPLETE CBC W/AUTO DIFF WBC: CPT

## 2024-08-05 PROCEDURE — 85610 PROTHROMBIN TIME: CPT

## 2024-08-05 PROCEDURE — 87081 CULTURE SCREEN ONLY: CPT | Performed by: STUDENT IN AN ORGANIZED HEALTH CARE EDUCATION/TRAINING PROGRAM

## 2024-08-05 PROCEDURE — 36415 COLL VENOUS BLD VENIPUNCTURE: CPT

## 2024-08-05 PROCEDURE — 99232 SBSQ HOSP IP/OBS MODERATE 35: CPT | Performed by: STUDENT IN AN ORGANIZED HEALTH CARE EDUCATION/TRAINING PROGRAM

## 2024-08-05 PROCEDURE — 86900 BLOOD TYPING SEROLOGIC ABO: CPT

## 2024-08-05 PROCEDURE — 82565 ASSAY OF CREATININE: CPT

## 2024-08-05 PROCEDURE — 82947 ASSAY GLUCOSE BLOOD QUANT: CPT

## 2024-08-05 RX ORDER — WARFARIN SODIUM 5 MG/1
5 TABLET ORAL DAILY
Status: DISCONTINUED | OUTPATIENT
Start: 2024-08-05 | End: 2024-08-07

## 2024-08-05 RX ORDER — LIDOCAINE HYDROCHLORIDE 10 MG/ML
5 INJECTION INFILTRATION; PERINEURAL ONCE
Status: DISCONTINUED | OUTPATIENT
Start: 2024-08-05 | End: 2024-08-09 | Stop reason: HOSPADM

## 2024-08-05 RX ORDER — POLYETHYLENE GLYCOL 3350 17 G/17G
17 POWDER, FOR SOLUTION ORAL DAILY
Status: DISCONTINUED | OUTPATIENT
Start: 2024-08-05 | End: 2024-08-09 | Stop reason: HOSPADM

## 2024-08-05 ASSESSMENT — COGNITIVE AND FUNCTIONAL STATUS - GENERAL
MOBILITY SCORE: 10
TURNING FROM BACK TO SIDE WHILE IN FLAT BAD: A LOT
DRESSING REGULAR LOWER BODY CLOTHING: A LOT
WALKING IN HOSPITAL ROOM: TOTAL
TOILETING: A LOT
MOVING FROM LYING ON BACK TO SITTING ON SIDE OF FLAT BED WITH BEDRAILS: A LOT
MOVING TO AND FROM BED TO CHAIR: A LOT
STANDING UP FROM CHAIR USING ARMS: A LOT
DRESSING REGULAR UPPER BODY CLOTHING: A LOT
TOILETING: A LOT
WALKING IN HOSPITAL ROOM: TOTAL
DAILY ACTIVITIY SCORE: 15
STANDING UP FROM CHAIR USING ARMS: A LOT
MOVING TO AND FROM BED TO CHAIR: A LOT
HELP NEEDED FOR BATHING: A LOT
PERSONAL GROOMING: A LITTLE
MOBILITY SCORE: 10
DRESSING REGULAR LOWER BODY CLOTHING: A LOT
DAILY ACTIVITIY SCORE: 15
MOVING FROM LYING ON BACK TO SITTING ON SIDE OF FLAT BED WITH BEDRAILS: A LOT
CLIMB 3 TO 5 STEPS WITH RAILING: TOTAL
PERSONAL GROOMING: A LITTLE
CLIMB 3 TO 5 STEPS WITH RAILING: TOTAL
HELP NEEDED FOR BATHING: A LOT
DRESSING REGULAR UPPER BODY CLOTHING: A LOT
TURNING FROM BACK TO SIDE WHILE IN FLAT BAD: A LOT

## 2024-08-05 ASSESSMENT — PAIN SCALES - GENERAL
PAINLEVEL_OUTOF10: 9
PAINLEVEL_OUTOF10: 10 - WORST POSSIBLE PAIN
PAINLEVEL_OUTOF10: 9
PAINLEVEL_OUTOF10: 10 - WORST POSSIBLE PAIN
PAINLEVEL_OUTOF10: 6
PAINLEVEL_OUTOF10: 9
PAINLEVEL_OUTOF10: 10 - WORST POSSIBLE PAIN

## 2024-08-05 ASSESSMENT — PAIN DESCRIPTION - LOCATION
LOCATION: LEG
LOCATION: LEG

## 2024-08-05 NOTE — PROGRESS NOTES
SOCIAL WORK NOTE   Per notes, patient's plan for dispo is return to [facility], needs precert. Updated clinicals attached in [facility]. Social work to follow.  YASSINE Barakat, LISW-S ([id number])

## 2024-08-05 NOTE — PROGRESS NOTES
VASCULAR SURGERY PROGRESS NOTE    Assessment/Plan   Amirah Bennett is 43 y.o. 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 Patient hospitalization was complicated with wound infection requiring abx and cardiogenic shock. Patient found to have wound dehiscence and admitted.     Patient is now s/p R AKA revision with wound vac 7/30. Wound vac change 8/2.   Patient transferred to the MICU 8/3 due to hypotension and started on midodrine    Plan:  Wound vac changed at bedside -> to be changed two times a week  Wound care consult for wound vac management  Okay to resume warfarin  ID consult for abx management - anticipating >4 weeks of meropenem  Right Lower extremity drain removed 8/5  Will need follow up with Dr. De La Rosa in two weeks  Rest of care per primary  Please call with any questions or concerns    D/w attending, Dr. Rj Leyva MD  General Surgery Resident PGY-3   Vascular surgery [id number]      Subjective   SERJIO. Pain managed. Remains in MICU. Wound vac changed this Am. Drain removed.    Objective   Vitals:  Heart Rate:  [71-91]   Temp:  [35.6 °C (96.1 °F)-36.6 °C (97.9 °F)]   Resp:  [11-16]   BP: ()/(54-82)   Weight:  [80 kg (176 lb 5.9 oz)]   SpO2:  [98 %-100 %]     Exam:  Constitutional: No acute distress, sleeping in bed comfortably  Neuro:  AOx3, grossly intact  ENMT: moist mucous membranes  Head/neck: atraumatic  CV: RRR  Pulm: non-labored on  room air  Skin: warm and dry  Extremities: b/l AKA. RLE AKA with wound vac in place. Serosanguinous drainage at wound under wound vac dressing. Neuro and motor intact. Right leg wound ~ 25 cm x 4 cm x 3 cm        Labs:  Results from last 7 days   Lab Units 08/05/24  0730 08/04/24  0512 08/03/24  0825   WBC AUTO x10*3/uL 7.4 7.3 8.5   HEMOGLOBIN g/dL 7.7* 8.3* 8.8*   PLATELETS AUTO x10*3/uL 223 248 252      Results from last 7 days   Lab Units 08/05/24  0537 08/04/24  0512 08/03/24  0825   SODIUM mmol/L 135* 136 135*   POTASSIUM mmol/L 4.5 4.7 3.5   CHLORIDE mmol/L 100 99 97*   CO2 mmol/L 27 30 29   BUN mg/dL 14 18 22   CREATININE mg/dL 0.76 0.90 0.92   GLUCOSE mg/dL 124* 89 85   MAGNESIUM mg/dL 1.81 1.86 1.91   PHOSPHORUS mg/dL 3.7 4.1 3.8      Results from last 7 days   Lab Units 08/05/24  0730 08/04/24  0512 08/03/24  0825 08/01/24  0532 07/31/24  0754   INR  1.1 1.3* 1.5*   < > 1.5*   PROTIME seconds 12.8 14.5* 17.1*   < > 16.7*   APTT seconds  --   --   --   --  30    < > = values in this interval not displayed.      Results from last 7 days   Lab Units 08/05/24  0730 08/04/24  1837 08/04/24  1110   ANTI XA UNFRACTIONATED IU/mL 0.3 0.3 0.1

## 2024-08-05 NOTE — SIGNIFICANT EVENT
Floor Readiness Note     I, personally, evaluated Amirah Bennett prior to transfer to the floor, including reviewing all current laboratory and imaging studies. The patient remains appropriate for transfer to the floor. Bedside nurse and respiratory therapy are also in agreement of patient's readiness for the floor.     Brief summary:  Amirah Bennett is a 43 y.o. female who was admitted to the MICU on 8/3 for hypotension with c/f for septic vs cardiogenic shock. She was treated with midodrine 10 mg TID, vancomycin, and meropenem. She remained hemodynamically stable while in the MICU and was without pressor requirements. AKA care is per vascular surgery, antibiotics per ID.    Updated focused Physical Exam:  Physical Exam  Constitutional:       General: She is not in acute distress.     Appearance: She is not ill-appearing.      Comments: Alert and oriented with expressive aphasia.  Not in acute distress.  HENT:      Head: Normocephalic and atraumatic.   Neck:      Comments: Tracheostomy stoma present without erythema or edema  Cardiovascular:      Rate and Rhythm: Normal rate and regular rhythm.   Pulmonary:      Effort: Pulmonary effort is normal.      Breath sounds: Normal breath sounds.   Abdominal:      General: Abdomen is flat.      Palpations: Abdomen is soft.   Musculoskeletal:      Cervical back: Normal range of motion.      Right lower leg: +1 non-pitting edema     Left lower leg: No edema.      Comments: Right AKA wrapped in clean Ace bandage to bulb suction draining serosanguinous.  No obvious signs of gross bleeding.   Skin:     General: Skin is warm and dry.   Neurological:      Mental Status: She is alert.   Psychiatric:         Mood: tearful     Behavior: Behavior normal.      Current Vital Signs:  Heart Rate: 85 (08/05/24 1100 : Nan Garvey RN)  BP: 101/78 (08/05/24 1000 : Nan Garvey RN)  Temp: 36.3 °C (97.3 °F) (08/05/24 1200 : Cristina Gauthier)  Resp: 15 (08/05/24 1100 : Nan  Guru RN)  SpO2: 100 % (08/05/24 1100 : Nan Garvey, RN)    Relevant updates since rounds:  - Restarted home Warfarin 5 mg daily for bridging. Please follow-up with vascular medicine regarding INR goal.  - Follow-up Bcx and discontinue vancomycin if NG >48hr    Accepting team, Hospitalist KAIA, received verbal sign out and the Provider Care team/Attending has been updated. Bedside nurse will now call accepting nurse for report and patient will be transferred to [facility] [id number].    Ayaan Rowland MD, MS4  [facility]

## 2024-08-05 NOTE — PROGRESS NOTES
ICU to [facility] Transfer Summary     I:  ICU Admission Reason & Brief ICU Course:    The patient was admitted 7/29 for evaluation of wound dehiscence of the RLE stump after presenting for wound care. She was previously hospitalized from 6/20-7/24 for treatment of right AKA c/b wound infection requiring antibiotics and cardiogenic shock which required a stay in the CICU. On this admission she was seen by vascular surgery who took her to the OR on 7/29 for debridement followed by AKA revision and washout on 7/30. Post op was complicated by Afib with RVR treated with amio drip. Surgical cultures from 7/30 grew MDR Enterobacter colacae and E. Coli, currently on meropenum (8/2-) and vancomycin (8/2-). She began having hypotension with SBP in the 70s, placed on midodrine 10 mg TID for hypotension and transferred to the MICU d/t c/f septic shock. In the MICU the patient was maintained on midodrine 10 mg TID and treatment with meropenem and vancomycin was continued. She remained hemodynamically stable and did not require pressors. Given that she remained stable, she is deemed stable for floor.    C: Code Status/DPOA Info/Goals of Care/ACP Note    Full Code  DPOA/Contact Number: Clara Black (cousin) [phone]    U: Unprescribing & Pertinent High-Risk Medications    Changes to home meds: NA     Anticoagulation: heparin, plan to transition back to warfarin    Antibiotics:   [] N/A - no current planned antimicrobioals  [x]  Meropenum and vancomycin indication AKA with MDRO start date 8/2 (estuardo) and 8/3 (vanc) planned duration meropenum for 4-6 weeks, vanc until blood cultures negative at 48 hrs    P: Pending Tests at the Time of Transfer   Blood cultures    A: Active consultants, including Rehab:   [x]  Subspecialty Consultants: infectious disease  [x]  PT  [x]  OT  []  SLP  []  Wound Care    U: Uncertainty Measure/Diagnostic Pause:    Working diagnosis at the time of transfer septic shock 2/2 AKA infection, though ddx  includes cardiogenic shock 2/2 HFrEF     Diagnosis Degree of Certainty: 1. High degree of certainty about the clinical diagnosis.     NEUROLOGY/PSYCH:  #hx of L cerebellar MCA stroke w/expressive aphasia 3/2024  #hx of ischemic CVA  - continue home Lipitor 80 mg PO at bedtime  - restarted home ASA 81 mg     CARDIOVASCULAR:  #HFrEF (last EF 20-25% 6/22/24)  #Hypotension, etiology unclear, possibly secondary to septic vs. less likely cardiogenic  - BNP 97, appears compensated, euvolemic  - HR documentation as 43 while in ED  - POCUS with collapsible IVC, patient warm and dry on exam  - 8/4 TTE redemonstrated LVEF 20-25%, LA dilation  Plan  - Continue holding GDMT given hypotension (Toprol XL 25 mg PO every day (hold for HR <60 or SBP <100), Lasix 40 mg PO BID, Entresto 24/26 mg PO BID, & Jardiance 10 mg PO every day)   - Continue midodrine 10 mg TID     #afib with RVR  #YFN appendage  - s/p DCCV 5/2023  - INR 1.8 on admit  - S/p RVR postprocedure on 7/30, HR better controlled  Plan:  - Reloaded with Amiodarone 400mg BID for 5 days (7/30-8/4), now transitioned to home dose 200mg daily on 8/5  - Continue low intensity heparin gtt, bridging to home Warfarin 5 mg starting 8/5 per vascular surgery. Daily INRs.  - For floor team: reach out to vascular medicine regarding INR goal (seen during previous admission, INR goal was 2-3 for 2 days)     PULMONARY:  #Hx trach c/b trancutaneous fistula  -ENT had been consulted 3/2024 for gurgling and mucus drainage, no inpatient interventions required  Plan:  - Monitor for breaths/mucus discharge from trach site  - Cover the tracheocutaneous fistula with tape and gauze and encourage patient to apply pressure when voicing  - CXR unremarkable     RENAL/GENITOURINARY:  #Hx UTI   - During previous admission, s/p Augmentin x 7 days  - Ucx negative     ENDOCRINOLOGY:  #T2DM  :: Hbg A1C 4.2% on admission  - accucheck ACHS  - Hypoglycemia order set placed     HEMATOLOGY:  #Anemia, stable  -  Status post multiple transfusions during previous hospitalization  Plan:  - Continue low intensity heparin gtt with plan to bridge back to coumadin when no further procedures planned  - Daily CBC, transfuse if Hbg <7     MUSCULOSKELETAL/ SKIN:  #wound dehiscence s/p R AKA  #leukocytosis, resolved  - S/p right AKA revision and washout since 7/30/24  Plan:  - Appreciate vascular surgery recommendations  - maintain wound vac, next change on 8/5  - Maintain RLE to bulb suction, record output     #Pain  - pain at AKA site, phantom limb vs infection  Plan:   - Continue Lyrica 75 mg PO BID  - Oxycodone 5 mg q6h PRN moderate pain and Oxycodone 10 mg q6h PRN severe pain  - Hydromorphone 0.6 Q3 PRN for breakthrough pain  - PT/OT postop recommending high intensity    INFECTIOUS DISEASE:  #AKA surgical site growing MDR Enterobacter cloacae and E coli  - WBC 13.8 on admit, now resolved  - Vascular surgery following, continue wound care per vascular   - Vanc/Zosyn (7/30- 8/2)  Plan:   - Meropenem (8/2 - *) anticipating 4-6 weeks as per vascular surgery and ID.  - Continue vancomycin (8/3- *) until blood cultures negative at 48 hours  - Ucx negative  - Follow-up Bcx    To-do list prior to transfer:  [] Discontinue vancomycin if Bcx negative at 48 hrs     E: Exam, including Lines/Drains/Airways & Data Review:   Physical Exam  Constitutional:       General: She is not in acute distress.     Appearance: She is not ill-appearing.      Comments: Alert and oriented with expressive aphasia.  Not in acute distress.  HENT:      Head: Normocephalic and atraumatic.   Neck:      Comments: Tracheostomy stoma present without erythema or edema  Cardiovascular:      Rate and Rhythm: Normal rate and regular rhythm.   Pulmonary:      Effort: Pulmonary effort is normal.      Breath sounds: Normal breath sounds.   Abdominal:      General: Abdomen is flat.      Palpations: Abdomen is soft.   Musculoskeletal:      Cervical back: Normal range of motion.       Right lower leg: +1 non-pitting edema     Left lower leg: No edema.      Comments: Right AKA wrapped in clean Ace bandage to bulb suction draining serosanguinous.  No obvious signs of gross bleeding.   Skin:     General: Skin is warm and dry.   Neurological:      Mental Status: She is alert.   Psychiatric:         Mood: tearful     Behavior: Behavior normal.      Difficult airway? N/A  Lines/drains assessed for removal? No    Within 30 minutes of the patient physically leaving the floor, a Floor Readiness Note needs to be placed with updated vitals.

## 2024-08-05 NOTE — CARE PLAN
Problem: Pain  Goal: Takes deep breaths with improved pain control throughout the shift  8/5/2024 0203 by Razia Mcarthur RN  Outcome: Progressing  8/5/2024 0203 by Razia Mcarthur RN  Outcome: Progressing  Goal: Turns in bed with improved pain control throughout the shift  8/5/2024 0203 by Razia Mcarthur RN  Outcome: Progressing  8/5/2024 0203 by Razia Mcarthur RN  Outcome: Progressing  Goal: Walks with improved pain control throughout the shift  8/5/2024 0203 by Razia Mcarthur RN  Outcome: Progressing  8/5/2024 0203 by Razia Mcarthur RN  Outcome: Progressing  Goal: Performs ADL's with improved pain control throughout shift  8/5/2024 0203 by Razia Mcarthur RN  Outcome: Progressing  8/5/2024 0203 by Razia Mcarthur RN  Outcome: Progressing  Goal: Participates in PT with improved pain control throughout the shift  8/5/2024 0203 by Razia Mcarthur RN  Outcome: Progressing  8/5/2024 0203 by Razia Mcarthur RN  Outcome: Progressing  Goal: Free from opioid side effects throughout the shift  8/5/2024 0203 by Razia Mcarthur RN  Outcome: Progressing  8/5/2024 0203 by Razia Mcarthur RN  Outcome: Progressing  Goal: Free from acute confusion related to pain meds throughout the shift  8/5/2024 0203 by Razia Mcarthur RN  Outcome: Progressing  8/5/2024 0203 by Razia Mcarthur RN  Outcome: Progressing     Problem: Skin  Goal: Decreased wound size/increased tissue granulation at next dressing change  8/5/2024 0203 by Razia Mcarthur RN  Outcome: Progressing  Flowsheets (Taken 8/5/2024 0203)  Decreased wound size/increased tissue granulation at next dressing change:   Promote sleep for wound healing   Protective dressings over bony prominences   Utilize specialty bed per algorithm  8/5/2024 0203 by Razia Mcarthur RN  Outcome: Progressing  Flowsheets (Taken 8/5/2024 0203)  Decreased wound size/increased tissue granulation at next dressing change:   Promote sleep for wound  healing   Protective dressings over bony prominences   Utilize specialty bed per algorithm  Goal: Participates in plan/prevention/treatment measures  8/5/2024 0203 by Razia Mcarthur RN  Outcome: Progressing  Flowsheets (Taken 8/5/2024 0203)  Participates in plan/prevention/treatment measures:   Discuss with provider PT/OT consult   Elevate heels   Increase activity/out of bed for meals  8/5/2024 0203 by Razia Mcarthur RN  Outcome: Progressing  Flowsheets (Taken 8/5/2024 0203)  Participates in plan/prevention/treatment measures:   Discuss with provider PT/OT consult   Elevate heels   Increase activity/out of bed for meals  Goal: Prevent/manage excess moisture  8/5/2024 0203 by Razia Mcarthur RN  Outcome: Progressing  Flowsheets (Taken 8/5/2024 0203)  Prevent/manage excess moisture:   Cleanse incontinence/protect with barrier cream   Follow provider orders for dressing changes   Moisturize dry skin  8/5/2024 0203 by Razia Mcarthur RN  Outcome: Progressing  Flowsheets (Taken 8/5/2024 0203)  Prevent/manage excess moisture:   Cleanse incontinence/protect with barrier cream   Follow provider orders for dressing changes   Moisturize dry skin  Goal: Prevent/minimize sheer/friction injuries  8/5/2024 0203 by Razia Mcarthur RN  Outcome: Progressing  Flowsheets (Taken 8/5/2024 0203)  Prevent/minimize sheer/friction injuries:   Complete micro-shifts as needed if patient unable. Adjust patient position to relieve pressure points, not a full turn   HOB 30 degrees or less   Increase activity/out of bed for meals  8/5/2024 0203 by Razia Mcarthur RN  Outcome: Progressing  Flowsheets (Taken 8/5/2024 0203)  Prevent/minimize sheer/friction injuries:   Complete micro-shifts as needed if patient unable. Adjust patient position to relieve pressure points, not a full turn   HOB 30 degrees or less   Increase activity/out of bed for meals  Goal: Promote/optimize nutrition  8/5/2024 0203 by Razia Mcarthur  RN  Outcome: Progressing  Flowsheets (Taken 8/5/2024 0203)  Promote/optimize nutrition:   Monitor/record intake including meals   Offer water/supplements/favorite foods  8/5/2024 0203 by Razia Mcatrhur RN  Outcome: Progressing  Flowsheets (Taken 8/5/2024 0203)  Promote/optimize nutrition:   Monitor/record intake including meals   Offer water/supplements/favorite foods  Goal: Promote skin healing  8/5/2024 0203 by Razia Mcarthur RN  Outcome: Progressing  Flowsheets (Taken 8/5/2024 0203)  Promote skin healing:   Assess skin/pad under line(s)/device(s)   Ensure correct size (line/device) and apply per  instructions   Protective dressings over bony prominences  8/5/2024 0203 by Razia Mcarthur RN  Outcome: Progressing  Flowsheets (Taken 8/5/2024 0203)  Promote skin healing:   Assess skin/pad under line(s)/device(s)   Ensure correct size (line/device) and apply per  instructions   Protective dressings over bony prominences     Problem: Fall/Injury  Goal: Not fall by end of shift  8/5/2024 0203 by Razia Mcarthur RN  Outcome: Progressing  8/5/2024 0203 by Razia Mcarthur RN  Outcome: Progressing  Goal: Be free from injury by end of the shift  8/5/2024 0203 by Razia Mcarthur RN  Outcome: Progressing  8/5/2024 0203 by Razia Mcarthur RN  Outcome: Progressing  Goal: Verbalize understanding of personal risk factors for fall in the hospital  8/5/2024 0203 by Razia Mcarthur RN  Outcome: Progressing  8/5/2024 0203 by Razia Mcarthur RN  Outcome: Progressing  Goal: Verbalize understanding of risk factor reduction measures to prevent injury from fall in the home  8/5/2024 0203 by Razia Mcarthur RN  Outcome: Progressing  8/5/2024 0203 by Razia Mcarthur RN  Outcome: Progressing  Goal: Use assistive devices by end of the shift  8/5/2024 0203 by Razia Mcarthur RN  Outcome: Progressing  8/5/2024 0203 by Razia Mcarthur, RN  Outcome: Progressing  Goal: Pace activities  to prevent fatigue by end of the shift  8/5/2024 0203 by Razia Mcarthur RN  Outcome: Progressing  8/5/2024 0203 by Razia Mcarthur RN  Outcome: Progressing     Problem: Pain - Adult  Goal: Verbalizes/displays adequate comfort level or baseline comfort level  8/5/2024 0203 by Razia Mcarthur RN  Outcome: Progressing  8/5/2024 0203 by Razia Mcarthur RN  Outcome: Progressing     Problem: Safety - Adult  Goal: Free from fall injury  8/5/2024 0203 by Razia Mcarthur RN  Outcome: Progressing  8/5/2024 0203 by Razia Mcarthur RN  Outcome: Progressing     Problem: Discharge Planning  Goal: Discharge to home or other facility with appropriate resources  8/5/2024 0203 by Razia Mcarthur RN  Outcome: Progressing  8/5/2024 0203 by Razia Mcarthur RN  Outcome: Progressing     Problem: Chronic Conditions and Co-morbidities  Goal: Patient's chronic conditions and co-morbidity symptoms are monitored and maintained or improved  8/5/2024 0203 by Razia Mcarthur RN  Outcome: Progressing  8/5/2024 0203 by Razia Mcarthur RN  Outcome: Progressing     Problem: Diabetes  Goal: Achieve decreasing blood glucose levels by end of shift  8/5/2024 0203 by Razia Mcarthur RN  Outcome: Progressing  8/5/2024 0203 by Razia Mcarthur RN  Outcome: Progressing  Goal: Increase stability of blood glucose readings by end of shift  8/5/2024 0203 by Razia Mcarthur RN  Outcome: Progressing  8/5/2024 0203 by Razia Mcarthur RN  Outcome: Progressing  Goal: Decrease in ketones present in urine by end of shift  8/5/2024 0203 by Razia Mcarthur RN  Outcome: Progressing  8/5/2024 0203 by Razia Mcarthur RN  Outcome: Progressing  Goal: Maintain electrolyte levels within acceptable range throughout shift  8/5/2024 0203 by Razia Mcarthur RN  Outcome: Progressing  8/5/2024 0203 by Razia N Mihalek, RN  Outcome: Progressing  Goal: Maintain glucose levels >70mg/dl to <250mg/dl throughout shift  8/5/2024 0203 by  Razia Mcarthur RN  Outcome: Progressing  8/5/2024 0203 by Razia Mcarthur RN  Outcome: Progressing  Goal: No changes in neurological exam by end of shift  8/5/2024 0203 by Razia Mcarthur RN  Outcome: Progressing  8/5/2024 0203 by Razia Mcarthur RN  Outcome: Progressing  Goal: Learn about and adhere to nutrition recommendations by end of shift  8/5/2024 0203 by Razia Mcarthur RN  Outcome: Progressing  8/5/2024 0203 by Razia Mcarthur RN  Outcome: Progressing  Goal: Vital signs within normal range for age by end of shift  8/5/2024 0203 by Razia Mcarthur RN  Outcome: Progressing  8/5/2024 0203 by Razia Mcarthur RN  Outcome: Progressing  Goal: Increase self care and/or family involovement by end of shift  8/5/2024 0203 by Razia Mcarthur RN  Outcome: Progressing  8/5/2024 0203 by Razia Mcarthur RN  Outcome: Progressing  Goal: Receive DSME education by end of shift  8/5/2024 0203 by Razia Mcarthur RN  Outcome: Progressing  8/5/2024 0203 by Razia Mcarthur RN  Outcome: Progressing

## 2024-08-06 LAB
ALBUMIN SERPL BCP-MCNC: 2.9 G/DL (ref 3.4–5)
ANION GAP SERPL CALC-SCNC: 13 MMOL/L (ref 10–20)
APTT PPP: 82 SECONDS (ref 27–38)
BASOPHILS # BLD AUTO: 0.02 X10*3/UL (ref 0–0.1)
BASOPHILS NFR BLD AUTO: 0.3 %
BUN SERPL-MCNC: 9 MG/DL (ref 6–23)
CALCIUM SERPL-MCNC: 8.8 MG/DL (ref 8.6–10.6)
CHLORIDE SERPL-SCNC: 102 MMOL/L (ref 98–107)
CO2 SERPL-SCNC: 27 MMOL/L (ref 21–32)
CREAT SERPL-MCNC: 0.72 MG/DL (ref 0.5–1.05)
EGFRCR SERPLBLD CKD-EPI 2021: >90 ML/MIN/1.73M*2
EOSINOPHIL # BLD AUTO: 0.36 X10*3/UL (ref 0–0.7)
EOSINOPHIL NFR BLD AUTO: 5.2 %
ERYTHROCYTE [DISTWIDTH] IN BLOOD BY AUTOMATED COUNT: 14.3 % (ref 11.5–14.5)
GLUCOSE SERPL-MCNC: 83 MG/DL (ref 74–99)
HCT VFR BLD AUTO: 27 % (ref 36–46)
HGB BLD-MCNC: 8.2 G/DL (ref 12–16)
IMM GRANULOCYTES # BLD AUTO: 0.03 X10*3/UL (ref 0–0.7)
IMM GRANULOCYTES NFR BLD AUTO: 0.4 % (ref 0–0.9)
INR PPP: 1 (ref 0.9–1.1)
LABORATORY COMMENT REPORT: NORMAL
LYMPHOCYTES # BLD AUTO: 2.13 X10*3/UL (ref 1.2–4.8)
LYMPHOCYTES NFR BLD AUTO: 30.5 %
MAGNESIUM SERPL-MCNC: 1.69 MG/DL (ref 1.6–2.4)
MCH RBC QN AUTO: 29.8 PG (ref 26–34)
MCHC RBC AUTO-ENTMCNC: 30.4 G/DL (ref 32–36)
MCV RBC AUTO: 98 FL (ref 80–100)
MONOCYTES # BLD AUTO: 0.66 X10*3/UL (ref 0.1–1)
MONOCYTES NFR BLD AUTO: 9.4 %
NEUTROPHILS # BLD AUTO: 3.79 X10*3/UL (ref 1.2–7.7)
NEUTROPHILS NFR BLD AUTO: 54.2 %
NRBC BLD-RTO: 0 /100 WBCS (ref 0–0)
PATH REPORT.FINAL DX SPEC: NORMAL
PATH REPORT.GROSS SPEC: NORMAL
PATH REPORT.RELEVANT HX SPEC: NORMAL
PATH REPORT.TOTAL CANCER: NORMAL
PHOSPHATE SERPL-MCNC: 3.8 MG/DL (ref 2.5–4.9)
PLATELET # BLD AUTO: 239 X10*3/UL (ref 150–450)
POTASSIUM SERPL-SCNC: 4.2 MMOL/L (ref 3.5–5.3)
PROTHROMBIN TIME: 11.8 SECONDS (ref 9.8–12.8)
RBC # BLD AUTO: 2.75 X10*6/UL (ref 4–5.2)
SODIUM SERPL-SCNC: 138 MMOL/L (ref 136–145)
UFH PPP CHRO-ACNC: 0.4 IU/ML
WBC # BLD AUTO: 7 X10*3/UL (ref 4.4–11.3)

## 2024-08-06 PROCEDURE — 2500000001 HC RX 250 WO HCPCS SELF ADMINISTERED DRUGS (ALT 637 FOR MEDICARE OP): Performed by: NURSE PRACTITIONER

## 2024-08-06 PROCEDURE — 99233 SBSQ HOSP IP/OBS HIGH 50: CPT | Performed by: STUDENT IN AN ORGANIZED HEALTH CARE EDUCATION/TRAINING PROGRAM

## 2024-08-06 PROCEDURE — 2500000004 HC RX 250 GENERAL PHARMACY W/ HCPCS (ALT 636 FOR OP/ED): Performed by: STUDENT IN AN ORGANIZED HEALTH CARE EDUCATION/TRAINING PROGRAM

## 2024-08-06 PROCEDURE — 2500000002 HC RX 250 W HCPCS SELF ADMINISTERED DRUGS (ALT 637 FOR MEDICARE OP, ALT 636 FOR OP/ED): Performed by: STUDENT IN AN ORGANIZED HEALTH CARE EDUCATION/TRAINING PROGRAM

## 2024-08-06 PROCEDURE — 85025 COMPLETE CBC W/AUTO DIFF WBC: CPT | Performed by: STUDENT IN AN ORGANIZED HEALTH CARE EDUCATION/TRAINING PROGRAM

## 2024-08-06 PROCEDURE — 2500000001 HC RX 250 WO HCPCS SELF ADMINISTERED DRUGS (ALT 637 FOR MEDICARE OP): Performed by: STUDENT IN AN ORGANIZED HEALTH CARE EDUCATION/TRAINING PROGRAM

## 2024-08-06 PROCEDURE — 97116 GAIT TRAINING THERAPY: CPT | Mod: GP

## 2024-08-06 PROCEDURE — 83735 ASSAY OF MAGNESIUM: CPT | Performed by: STUDENT IN AN ORGANIZED HEALTH CARE EDUCATION/TRAINING PROGRAM

## 2024-08-06 PROCEDURE — 36415 COLL VENOUS BLD VENIPUNCTURE: CPT | Performed by: STUDENT IN AN ORGANIZED HEALTH CARE EDUCATION/TRAINING PROGRAM

## 2024-08-06 PROCEDURE — 85520 HEPARIN ASSAY: CPT | Performed by: STUDENT IN AN ORGANIZED HEALTH CARE EDUCATION/TRAINING PROGRAM

## 2024-08-06 PROCEDURE — 85610 PROTHROMBIN TIME: CPT | Performed by: STUDENT IN AN ORGANIZED HEALTH CARE EDUCATION/TRAINING PROGRAM

## 2024-08-06 PROCEDURE — 02HV33Z INSERTION OF INFUSION DEVICE INTO SUPERIOR VENA CAVA, PERCUTANEOUS APPROACH: ICD-10-PCS | Performed by: STUDENT IN AN ORGANIZED HEALTH CARE EDUCATION/TRAINING PROGRAM

## 2024-08-06 PROCEDURE — 97530 THERAPEUTIC ACTIVITIES: CPT | Mod: GP

## 2024-08-06 PROCEDURE — 82565 ASSAY OF CREATININE: CPT | Performed by: STUDENT IN AN ORGANIZED HEALTH CARE EDUCATION/TRAINING PROGRAM

## 2024-08-06 PROCEDURE — 1100000001 HC PRIVATE ROOM DAILY

## 2024-08-06 ASSESSMENT — COGNITIVE AND FUNCTIONAL STATUS - GENERAL
DAILY ACTIVITIY SCORE: 14
DRESSING REGULAR LOWER BODY CLOTHING: A LOT
HELP NEEDED FOR BATHING: A LOT
CLIMB 3 TO 5 STEPS WITH RAILING: TOTAL
WALKING IN HOSPITAL ROOM: TOTAL
WALKING IN HOSPITAL ROOM: TOTAL
STANDING UP FROM CHAIR USING ARMS: A LITTLE
MOVING FROM LYING ON BACK TO SITTING ON SIDE OF FLAT BED WITH BEDRAILS: A LOT
TOILETING: A LOT
DRESSING REGULAR UPPER BODY CLOTHING: A LOT
STANDING UP FROM CHAIR USING ARMS: A LOT
DRESSING REGULAR UPPER BODY CLOTHING: A LOT
EATING MEALS: A LITTLE
MOBILITY SCORE: 13
DAILY ACTIVITIY SCORE: 13
TURNING FROM BACK TO SIDE WHILE IN FLAT BAD: A LOT
MOBILITY SCORE: 16
TOILETING: A LOT
EATING MEALS: A LITTLE
DRESSING REGULAR LOWER BODY CLOTHING: A LOT
WALKING IN HOSPITAL ROOM: A LITTLE
STANDING UP FROM CHAIR USING ARMS: A LOT
MOBILITY SCORE: 10
HELP NEEDED FOR BATHING: A LOT
MOVING FROM LYING ON BACK TO SITTING ON SIDE OF FLAT BED WITH BEDRAILS: A LITTLE
TURNING FROM BACK TO SIDE WHILE IN FLAT BAD: A LITTLE
CLIMB 3 TO 5 STEPS WITH RAILING: TOTAL
MOVING TO AND FROM BED TO CHAIR: A LITTLE
PERSONAL GROOMING: A LOT
MOVING FROM LYING ON BACK TO SITTING ON SIDE OF FLAT BED WITH BEDRAILS: A LITTLE
TURNING FROM BACK TO SIDE WHILE IN FLAT BAD: A LITTLE
PERSONAL GROOMING: A LITTLE
MOVING TO AND FROM BED TO CHAIR: A LITTLE
MOVING TO AND FROM BED TO CHAIR: A LOT
CLIMB 3 TO 5 STEPS WITH RAILING: TOTAL

## 2024-08-06 ASSESSMENT — PAIN DESCRIPTION - LOCATION: LOCATION: LEG

## 2024-08-06 ASSESSMENT — PAIN - FUNCTIONAL ASSESSMENT
PAIN_FUNCTIONAL_ASSESSMENT: WONG-BAKER FACES
PAIN_FUNCTIONAL_ASSESSMENT: WONG-BAKER FACES

## 2024-08-06 ASSESSMENT — PAIN SCALES - WONG BAKER
WONGBAKER_NUMERICALRESPONSE: HURTS EVEN MORE
WONGBAKER_NUMERICALRESPONSE: HURTS WHOLE LOT

## 2024-08-06 ASSESSMENT — PAIN DESCRIPTION - ORIENTATION: ORIENTATION: RIGHT

## 2024-08-06 NOTE — POST-PROCEDURE NOTE
Pre-Procedure Checklist:  Emergent Line Insertion: No  Type of Line to be Placed: PICC  Consent Obtained: Yes  Emergency Medication Necessary: No  Patient Identified with 2 Independent Identifiers: Yes  Review of Allergies, Anticoagulation, Relevant Labs, ECG/Telemetry: Yes  Risks/Benefits/Alternatives Discussed with Patient/POA/Legal Representative: Yes  Stop Sign on Door: Yes  Time Out Performed: Yes  Catheter Exchange: No    Positioning Checklist:  All People, Including Patient, in the Room with Cap and Mask: Yes  Fluoroscopy Used to Identify Vessel and Guide Insertion: No   Sterile Cover Used: Yes  Full Barrier Precautions Followed (Mask, Cap, Gown, Gloves): Yes  Hands Washed: Yes  Monitors Attached with Sound Alarms On: No  Full Body Sterile Drape (Head-to-Toe) Used to Cover Patient: Yes  Trendelenburg Position (For IJ and Subclavian): No  CHG Skin Prep Used and Allowed to Air Dry to Skin Procedure: Yes    Procedure Checklist:  Blood Aspirated From All Lumens, All Ports Subsequently Flushed: Yes  Catheter Caps Placed on All Lumens; Lumens Clamped: Yes  Maintain Guidewire Control Throughout, Ensuring Guidewire Removal: Yes  Maintain Sterile Field Throughout Insertion: Yes  Catheter Secured: Yes  Confirmatory Test of Venous Placement: Non-Pulsatile Blood    Post Procedure Checklist:  Date and Time Written on Dressing: Yes  Sharp and Wire Count and Safe Disposal of all Sharps/Wires: Yes  Sterile Dressing Applied Per Protocol: Yes  X-ray Ordered or ECG Image: Yes    PICC Insertion Details:  Size (Fr): 4  Lumen Type:Single  Catheter to Vein Ratio Less Than 50%: Yes  Total Length (cm): 44  External Length (cm): 1  Orientation: left  Location: basilic  Site Prep: Chlorohexidine; Usual sterile procedure followed  Local Anesthetic: Injectable/Subcutaneous  Indication:  Insertion Team Members in the Room: Nurse, LPN  Initial Extremity Circumference (cm): 35  Insertion Attempts: 1  Patient Tolerance: Tolerated Well, Age  Appropriate  Comfort Measures: Subcutaneous anesthetic; Verbal  Procedure Location: Bedside  Safety Measures: Patient specific safety measures addressed with RN  Estimated Blood Loss (mL): 1  Vessel Fully Compressible Proximally and Distally to Insertion Site: Yes  Brisk Blood Return Obtained and Line Draws Easily: Yes  Tip Location:CAJ  Line Confirmation: ECG  Lot #:[id number]  : Bard  PICC Line Exp Date:10/31/2025  Securement: Stat Lock  Post Procedure Checklist: Handoff with RN; Obtain all new IV tubing prior to use; Bed at lowest level and wheels locked; Line discharge information at bedside.  Additional Details: Line was inserted using Modified Seldinger's Technique.   Placed by: Ibis Jensen RN-[facility]

## 2024-08-06 NOTE — PROGRESS NOTES
INTERNAL MEDICINE PROGRESS NOTE     BRIEF NARRATIVE    The patient was admitted 7/29 for evaluation of wound dehiscence of the RLE stump after presenting for wound care. She was previously hospitalized from 6/20-7/24 for treatment of right AKA c/b wound infection requiring antibiotics and cardiogenic shock which required a stay in the CICU. On this admission she was seen by vascular surgery who took her to the OR on 7/29 for debridement followed by AKA revision and washout on 7/30. Post op was complicated by Afib with RVR treated with amio drip. Surgical cultures from 7/30 grew MDR Enterobacter colacae and E. Coli, currently on meropenum (8/2-) and vancomycin (8/2-). She began having hypotension with SBP in the 70s, placed on midodrine 10 mg TID for hypotension and transferred to the MICU d/t c/f septic shock. In the MICU the patient was maintained on midodrine 10 mg TID and treatment with meropenem and vancomycin was continued. She remained hemodynamically stable and did not require pressors. Given that she remained stable, returned to floors on 8/5. PICC placed 8/6. Pending INR goal 2-3 with re-initiation of warfarin + heparin bridging.     SUBJECTIVE     Pt was seen and examined at beside. No acute events overnight. Denies fever, chills. States she is having pain at wound site, however controlled with meds.     OBJECTIVE      Visit Vitals  BP 90/60   Pulse 83   Temp 36.5 °C (97.7 °F)   Resp 16      No intake or output data in the 24 hours ending 08/06/24 1554    Physical Exam   Constitutional:       General: She is not in acute distress.     Appearance: She is not ill-appearing.      Comments: Alert and oriented with expressive  aphasia.  Not in acute distress.  HENT:      Head: Normocephalic and atraumatic.   Neck:      Comments: Tracheostomy stoma present without erythema or edema  Cardiovascular:      Rate and Rhythm: Normal rate and regular rhythm.   Pulmonary:      Effort: Pulmonary effort is normal.      Breath sounds: Normal breath sounds.   Abdominal:      General: Abdomen is flat.      Palpations: Abdomen is soft.   Musculoskeletal:      Cervical back: Normal range of motion.      Right lower leg: +1 non-pitting edema     Left lower leg: No edema.      Comments: Right AKA wrapped in clean Ace bandage to bulb suction draining serosanguinous.  No obvious signs of gross bleeding.   Skin:     General: Skin is warm and dry.   Neurological:      Mental Status: She is alert.   Psychiatric:         Mood: tearful     Behavior: Behavior normal.       Current Meds   amiodarone, 200 mg, oral, Daily  aspirin, 81 mg, oral, Daily  atorvastatin, 80 mg, oral, Nightly  DULoxetine, 60 mg, oral, Daily  folic acid, 1 mg, oral, Daily  lidocaine, 5 mL, infiltration, Once  meropenem, 1 g, intravenous, q8h  midodrine, 10 mg, oral, TID  multivitamin with minerals, 1 tablet, oral, Daily  pantoprazole, 40 mg, oral, Daily before breakfast  polyethylene glycol, 17 g, oral, Daily  polyethylene glycol, 17 g, oral, Daily  pregabalin, 75 mg, oral, BID  sennosides, 2 tablet, oral, BID  warfarin, 5 mg, oral, Daily       PRN medications: alteplase, dextrose, dextrose, glucagon, glucagon, heparin, HYDROmorphone, melatonin, oxyCODONE, oxyCODONE, polyethylene glycol     LABS and IMAGING     WBC   Date Value Ref Range Status   08/06/2024 7.0 4.4 - 11.3 x10*3/uL Final   08/05/2024 7.4 4.4 - 11.3 x10*3/uL Final   08/04/2024 7.3 4.4 - 11.3 x10*3/uL Final     Hemoglobin   Date Value Ref Range Status   08/06/2024 8.2 (L) 12.0 - 16.0 g/dL Final   08/05/2024 7.7 (L) 12.0 - 16.0 g/dL Final   08/04/2024 8.3 (L) 12.0 - 16.0 g/dL Final     Hematocrit   Date Value Ref Range Status    08/06/2024 27.0 (L) 36.0 - 46.0 % Final   08/05/2024 23.9 (L) 36.0 - 46.0 % Final   08/04/2024 26.3 (L) 36.0 - 46.0 % Final     Bicarbonate   Date Value Ref Range Status   08/06/2024 27 21 - 32 mmol/L Final   08/05/2024 27 21 - 32 mmol/L Final   08/04/2024 30 21 - 32 mmol/L Final     Creatinine   Date Value Ref Range Status   08/06/2024 0.72 0.50 - 1.05 mg/dL Final   08/05/2024 0.76 0.50 - 1.05 mg/dL Final   08/04/2024 0.90 0.50 - 1.05 mg/dL Final     Calcium   Date Value Ref Range Status   08/06/2024 8.8 8.6 - 10.6 mg/dL Final   08/05/2024 8.3 (L) 8.6 - 10.6 mg/dL Final   08/04/2024 8.3 (L) 8.6 - 10.6 mg/dL Final     INR   Date Value Ref Range Status   08/06/2024 1.0 0.9 - 1.1 Final   08/05/2024 1.1 0.9 - 1.1 Final   08/04/2024 1.3 (H) 0.9 - 1.1 Final       Bedside PICC Imaging  These images are not reportable by radiology and will not be interpreted   by  Radiologists.       ASSESSMENT / PLANS    #R AKA stump wound dehiscence   ::WBC 13.8 on admit, now resolved  ::AKA surgical site growing MDR Enterobacter cloacae and E coli  ::s/p revision w/ Vascular on 7/30 with evacuation of hematoma at the stump site and additional resection of femur.   ::s/p Vanc/Zosyn (7/30- 8/2)  ::Ucx negative  - Vascular surgery following, continue wound care per vascular   - Meropenem (8/2 - 9/10) for 6 weeks per ID, PICC placed 8/6; obtain CBC, CMP, CRP weekly and fax results to [phone] ATTN: Dr. Layton   - discontinue vancomycin (8/3-8/6) given\blood cultures negative at 48 hours  - Follow-up Bcx  - ID follow-up on discharge, have signed off       #HFrEF (last EF 20-25% 6/22/24)  #Hypotension, etiology unclear, possibly secondary to septic vs. less likely cardiogenic  ::lactate 0.7, currently 90s systolic  ::BNP 97, appears compensated, euvolemic  ::HR documentation as 43 while in ED  ::POCUS with collapsible IVC, patient warm and dry on exam  ::8/4 TTE redemonstrated LVEF 20-25%, LA dilation  -Continue holding GDMT given  hypotension (Toprol XL 25 mg PO every day (hold for HR <60 or SBP <100), Lasix 40 mg PO BID, Entresto 24/26 mg PO BID, & Jardiance 10 mg PO every day)   - Continue midodrine 10 mg TID     #Afib with RVR  #YFN appendage  ::s/p DCCV 5/2023  ::INR 1.8 on admit  ::S/p RVR postprocedure on 7/30, HR better controlled  - Reloaded with Amiodarone 400mg BID for 5 days (7/30-8/4), now transitioned to home dose 200mg daily on 8/5  - Continue low intensity heparin gtt, bridging to home Warfarin 5 mg starting 8/5 per vascular surgery. Daily INRs. Goal 2-3, may need to increase dose tomorrow if INR not increasing     #Hx trach c/b trancutaneous fistula  -ENT had been consulted 3/2024 for gurgling and mucus drainage, no inpatient interventions required  Plan:  - Monitor for breaths/mucus discharge from trach site  - Cover the tracheocutaneous fistula with tape and gauze and encourage patient to apply pressure when voicing  - CXR unremarkable    #hx of L cerebellar MCA stroke w/expressive aphasia 3/2024  #hx of ischemic CVA  - continue home Lipitor 80 mg PO at bedtime  - restarted home ASA 81 mg    #Hx UTI   - During previous admission, s/p Augmentin x 7 days  - Ucx negative     #T2DM  :: Hbg A1C 4.2% on admission  - accucheck ACHS  - Hypoglycemia order set placed     #Anemia, stable  - Status post multiple transfusions during previous hospitalization  Plan:  - Continue low intensity heparin gtt with plan to bridge back to coumadin when no further procedures planned  - Daily CBC, transfuse if Hbg <7     #wound dehiscence s/p R AKA  #leukocytosis, resolved  - S/p right AKA revision and washout since 7/30/24  Plan:  - Appreciate vascular surgery recommendations  - maintain wound vac, next change on 8/5  - Maintain RLE to bulb suction, record output      #Pain  - pain at AKA site, phantom limb vs infection  Plan:   - Continue Lyrica 75 mg PO BID  - Oxycodone 5 mg q6h PRN moderate pain and Oxycodone 10 mg q6h PRN severe pain  -  Hydromorphone 0.6 Q3 PRN for breakthrough pain  - PT/OT postop recommending high intensity     Dispo: PT/OT recommended high intensity rehab - acute rehab     Sade Ugarte MD

## 2024-08-06 NOTE — PROGRESS NOTES
Vancomycin Dosing by Pharmacy- Cessation of Therapy    Consult to pharmacy for vancomycin dosing has been discontinued by the prescriber, pharmacy will sign off at this time.    Please call pharmacy if there are further questions or re-enter a consult if vancomycin is resumed.     Evan Mckinney, PharmD

## 2024-08-06 NOTE — PROGRESS NOTES
Amirah Bennett is a 43 y.o. female on day 7 of admission presenting with Wound dehiscence.    Subjective   Interval History: Seen after transfer out of ICU. No complaints, tolerating abx. Agreeable to long-term abx plan.     Review of Systems    Objective   Range of Vitals (last 24 hours)  Heart Rate:  [71-85]   Temp:  [35.8 °C (96.4 °F)-36.6 °C (97.9 °F)]   Resp:  [11-16]   BP: ()/(54-83)   SpO2:  [98 %-100 %]   Daily Weight  08/04/24 : 80 kg (176 lb 5.9 oz)    Body mass index is 27.62 kg/m².    Physical Exam    Comfortable appearing, sitting up in bed, NAD  Breathing comfortably on room air   Abd soft, nontender   RLE stump w/ wound vac, sanguinous drainage in chamber    Antibiotics  meropenem - 1 gram/100 mL  vancomycin - 750 mg/150 mL    Relevant Results  Labs  Results from last 72 hours   Lab Units 08/05/24  0730 08/04/24  0512 08/03/24  0825   WBC AUTO x10*3/uL 7.4 7.3 8.5   HEMOGLOBIN g/dL 7.7* 8.3* 8.8*   HEMATOCRIT % 23.9* 26.3* 28.7*   PLATELETS AUTO x10*3/uL 223 248 252   NEUTROS PCT AUTO % 53.6 53.7 61.0   LYMPHS PCT AUTO % 30.6 28.5 22.9   MONOS PCT AUTO % 10.6 11.5 10.6   EOS PCT AUTO % 4.6 5.5 4.6     Results from last 72 hours   Lab Units 08/05/24  0537 08/04/24  0512 08/03/24  0825   SODIUM mmol/L 135* 136 135*   POTASSIUM mmol/L 4.5 4.7 3.5   CHLORIDE mmol/L 100 99 97*   CO2 mmol/L 27 30 29   BUN mg/dL 14 18 22   CREATININE mg/dL 0.76 0.90 0.92   GLUCOSE mg/dL 124* 89 85   CALCIUM mg/dL 8.3* 8.3* 9.0   ANION GAP mmol/L 13 12 13   EGFR mL/min/1.73m*2 >90 81 78   PHOSPHORUS mg/dL 3.7 4.1 3.8     Results from last 72 hours   Lab Units 08/05/24  0537 08/04/24  0512 08/03/24  0825   ALBUMIN g/dL 2.9* 2.9* 3.0*     Estimated Creatinine Clearance: 101.8 mL/min (by C-G formula based on SCr of 0.76 mg/dL).  C-Reactive Protein   Date Value Ref Range Status   07/29/2024 0.70 <1.00 mg/dL Final     Microbiology  Susceptibility data from last 14 days.  Collected Specimen Info Organism  Amoxicillin/Clavulanate Ampicillin Ampicillin/Sulbactam Aztreonam Cefazolin Cefepime Ciprofloxacin Ertapenem Gentamicin Imipenem Levofloxacin Meropenem Piperacillin/Tazobactam   07/30/24 Tissue from BONE RESECTION Escherichia coli   S    S   S   S   S   S     Enterobacter cloacae complex  R  R  R  R  R  S  S  S  S   S  S  R   07/30/24 Swab from CLOT/THROMBUS Enterobacter cloacae complex  R  R  R  R  R  R  S  I  S  I  S  S  R     Escherichia coli   S    S   S   S   S   S     Collected Specimen Info Organism Trimethoprim/Sulfamethoxazole   07/30/24 Tissue from BONE RESECTION Escherichia coli  S     Enterobacter cloacae complex  S   07/30/24 Swab from CLOT/THROMBUS Enterobacter cloacae complex  S     Escherichia coli  S         Imaging       Assessment/Plan     Amirah Bennett is a 44 y/o woman pmhx sig for HFrEF, Afib, recent admission for RVR and cardiogenic shock c/b RLE ischemic limb ultimately undergoing R AKA and embolectomy, c/b intramuscular hematoma requiring revision and washout prior to discharge w/ wound vac. Now readmitted 7/29 with R AKA stump wound dehiscence s/p revision w/ Vascular on 7/30 with evacuation of hematoma at the stump site and additional resection of femur. Cx from the thrombus and bone (resected portion) both with Enterobacter cloacae and E. Coli. Her Enterobacter spp have unique susceptibilities, however both appear to be susceptible to meropenem, reviewed w/ Micro lab. Transitioned to meropenem monotherapy for both organisms on 8/2.     On 8/3 transferred to MICU for worsening hypotension not responsive to fluid boluses. Pt appears nontoxic, WBC wnl, Blood cx drawn and started back on vanc while these are pending. Hgb stable at 8.8. Not clear what precipitated her hypotension although this has resolved w/o pressor needs in ICU.        #R AKA stump infection, possible residual OM        Recommendations:  -Cont IV meropenem 1gm q8H   -Can discontinue vanc   -Will plan for 6 weeks IV abx for  residual stump infection, end date through 9/10  - Please obtain CBC, CMP, CRP weekly and fax results to [phone] ATTN: Dr. Layton  -Will arrange out pt ID follow up    Will sign off but please reach out w/ questions        Montserrat Layton DO  Epic Chat or pager [phone]

## 2024-08-06 NOTE — CARE PLAN
The patient's goals for the shift include rest    The clinical goals for the shift include decrease in pain, pt to remain comfortable throughout shift    Over the shift, the patient did not make progress toward the following goals. Barriers to progression include . Recommendations to address these barriers include .

## 2024-08-06 NOTE — PROGRESS NOTES
Physical Therapy    Physical Therapy Treatment    Patient Name: Amirah Bennett  MRN: [id number]  Today's Date: 8/6/2024  Time Calculation  Start Time: 1025  Stop Time: 1051  Time Calculation (min): 26 min     2068/2068-A    Assessment/Plan   PT Assessment  PT Assessment Results: Decreased strength, Decreased endurance, Impaired balance, Decreased mobility, Pain  End of Session Communication: Bedside nurse  Assessment Comment: Pt able to complete mobility tasks with min ax1 this date, demos decreased strength and balance, difficulty with hop steps. Will benefit from continued skilled PT to progress with gait tasks and transfer safety.  End of Session Patient Position: Bed, 3 rail up, Alarm off, not on at start of session     PT Plan  Treatment/Interventions: Bed mobility, Gait training, Transfer training, Balance training, Neuromuscular re-education, Strengthening, Endurance training, Range of motion, Therapeutic exercise, Therapeutic activity  PT Plan: Ongoing PT  PT Frequency: 4 times per week  PT Discharge Recommendations: High intensity level of continued care  PT Recommended Transfer Status: Assist x1  PT - OK to Discharge: Yes (indicates PT eval complete and dc rec determined)     08/06/24 1025   PT  Visit   PT Received On 08/06/24   General   Prior to Session Communication Bedside nurse   General Comment Pt agreeable to participate, appears mostly bothered by runny nose throughout session, expressions of pain with standing tasks.  Able to complete mobility with light assist this session, some difficulty with side steps and with (R)/(L) directional cues.  Will continue to follow.   Precautions   Medical Precautions Fall precautions   Pain Assessment   Pain Assessment FACES   [place] FACES Pain Rating 6   Pain Location Leg   Pain Orientation Right   Cognition   Arousal/Alertness   (limited verbal responses due to baseline aphasia)   Following Commands Follows one step commands with repetition   Cognition  "Comments Somewhat emotionally labile during session, tearful during standing, once seated back in bed also expressing \"I can do all this \" multiple times.   Static Sitting Balance   Static Sitting-Balance Support   ((L) foot supported)   Static Sitting-Level of Assistance Close supervision   Dynamic Sitting Balance   Dynamic Sitting-Balance Support Bilateral upper extremity supported  ((L) foot supported)   Dynamic Sitting-Balance   (scooting)   Dynamic Sitting-Comments CGA   Static Standing Balance   Static Standing-Balance Support Bilateral upper extremity supported   Static Standing-Level of Assistance Minimum assistance  (x1)   Static Standing-Comment/Number of Minutes with whw   Dynamic Standing Balance   Dynamic Standing-Balance Support Bilateral upper extremity supported   Dynamic Standing-Comments min ax1 with whw for pivot/hops   Therapeutic Exercise   Therapeutic Exercise Performed Yes   Therapeutic Exercise Activity 1 seated at EOB: (L) LE LAQ, seated marches   Therapeutic Exercise Activity 2 standing with CGA<-> min ax1 and whw: (R) hip flexion/extension, abduction/adduction   Therapeutic Activity   Therapeutic Activity Performed Yes   Therapeutic Activity 1 static stand with whw and CGA<-> min ax1 ~5min post gait trials and therex   Bed Mobility   Bed Mobility Yes   Bed Mobility 1   Bed Mobility 1 Supine to sitting   Level of Assistance 1 Contact guard;Minimal verbal cues   Bed Mobility Comments 1 HOB raised, use of bed rails   Bed Mobility 2   Bed Mobility  2 Scooting  (lateral scoots)   Level of Assistance 2 Contact guard   Bed Mobility 3   Bed Mobility 3 Sitting to supine   Level of Assistance 3 Contact guard;Minimal verbal cues   Ambulation/Gait Training   Ambulation/Gait Training Performed Yes   Ambulation/Gait Training 1   Surface 1 Level tile   Device 1 Rolling walker   Assistance 1 Minimum assistance;Moderate verbal cues  (x1)   Comments/Distance (ft) 1 completes pivoting side steps with (L) DIANN, " demos difficulty clearing foot for hops laterally, able to hop forward/backward x 2 small distance   Transfers   Transfer Yes   Transfer 1   Transfer From 1 Bed to   Transfer to 1 Stand   Transfer Device 1 Walker  (bed height elevated)   Transfer Level of Assistance 1 Minimum assistance;Moderate verbal cues  (x1)   Transfers 2   Transfer From 2 Stand to   Transfer to 2 Bed   Transfer Device 2 Walker   Transfer Level of Assistance 2 Minimum assistance;Moderate verbal cues  (x1)   Activity Tolerance   Endurance Tolerates 10 - 20 min exercise with multiple rests   PT Assessment   PT Assessment Results Decreased strength;Decreased endurance;Impaired balance;Decreased mobility;Pain   End of Session Communication Bedside nurse   Assessment Comment Pt able to complete mobility tasks with min ax1 this date, demos decreased strength and balance, difficulty with hop steps. Will benefit from continued skilled PT to progress with gait tasks and transfer safety.   End of Session Patient Position Bed, 3 rail up;Alarm off, not on at start of session   PT Plan   PT Recommended Transfer Status Assist x1     Outcome Measures:  [facility] Basic Mobility  Turning from your back to your side while in a flat bed without using bedrails: A little  Moving from lying on your back to sitting on the side of a flat bed without using bedrails: A little  Moving to and from bed to chair (including a wheelchair): A little  Standing up from a chair using your arms (e.g. wheelchair or bedside chair): A little  To walk in hospital room: A little  Climbing 3-5 steps with railing: Total  Basic Mobility - Total Score: 16                       EDUCATION:     Education Documentation  Mobility Training, taught by Jazmin Coleman, PT at 8/6/2024 11:46 AM.  Learner: Patient  Readiness: Acceptance  Method: Explanation, Demonstration  Response: Demonstrated Understanding, Needs Reinforcement  Comment: safety with mobility tasks, therex, progression of mobility  with nursing and therapy    GOALS:  Encounter Problems       Encounter Problems (Active)       Mobility       STG - Patient will ambulate > 5' with LRAD SBA (Progressing)       Start:  08/01/24    Expected End:  08/22/24               PT Transfers       STG - Transfer from bed to chair LRAD SBA (Progressing)       Start:  08/01/24    Expected End:  08/22/24            STG - Patient to transfer to and from sit to supine indep (Progressing)       Start:  08/01/24    Expected End:  08/22/24            STG - Patient will transfer sit to and from stand SBA LRAD (Progressing)       Start:  08/01/24    Expected End:  08/22/24 08/06/24 at 11:48 AM - Jazmin Coleman, PT

## 2024-08-06 NOTE — PROGRESS NOTES
VASCULAR SURGERY PROGRESS NOTE    Assessment/Plan   Amirah Bennett is 43 y.o. 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 Patient hospitalization was complicated with wound infection requiring abx and cardiogenic shock. Patient found to have wound dehiscence and admitted.     Patient is now s/p R AKA revision with wound vac 7/30. Wound vac change 8/2.   Patient transferred to the MICU 8/3 due to hypotension and started on midodrine    Plan:  Wound vac changed at bedside 8/5 -> to be changed two times a week  Wound care consult for wound vac management  Continue warfarin  ID consult for abx management - anticipating >4 weeks of meropenem  Right Lower extremity drain removed 8/5  Will need follow up with Dr. De La Rosa in two weeks  Rest of care per primary  Please call with any questions or concerns    D/w attending, Dr. Rj Leyva MD  General Surgery Resident PGY-3   Vascular surgery [id number]      Subjective   SERJIO overnight. Pain well managed. On RNF    Objective   Vitals:  Heart Rate:  [72-83]   Temp:  [36 °C (96.8 °F)-36.5 °C (97.7 °F)]   Resp:  [16]   BP: ()/(60-83)   SpO2:  [100 %]     Exam:  Constitutional: No acute distress, sleeping in bed comfortably  Neuro:  AOx3, grossly intact  ENMT: moist mucous membranes  Head/neck: atraumatic  CV: RRR  Pulm: non-labored on room air  Skin: warm and dry  Extremities: b/l AKA. RLE AKA with wound vac in place.  Serosanguinous drainage at wound under wound vac dressing. Neuro and motor intact. Right leg wound ~ 25 cm x 4 cm x 3 cm        Labs:  Results from last 7 days   Lab Units 08/06/24  1016 08/05/24  0730 08/04/24  0512   WBC AUTO x10*3/uL 7.0 7.4 7.3   HEMOGLOBIN g/dL 8.2* 7.7* 8.3*   PLATELETS AUTO x10*3/uL 239 223 248      Results from last 7 days   Lab Units 08/06/24  1016 08/05/24  0537 08/04/24  0512   SODIUM mmol/L 138 135* 136   POTASSIUM mmol/L 4.2 4.5 4.7   CHLORIDE mmol/L 102 100 99   CO2 mmol/L 27 27 30   BUN mg/dL 9 14 18   CREATININE mg/dL 0.72 0.76 0.90   GLUCOSE mg/dL 83 124* 89   MAGNESIUM mg/dL 1.69 1.81 1.86   PHOSPHORUS mg/dL 3.8 3.7 4.1      Results from last 7 days   Lab Units 08/06/24  1016 08/05/24  0730 08/04/24  0512   INR  1.0 1.1 1.3*   PROTIME seconds 11.8 12.8 14.5*   APTT seconds 82*  --   --       Results from last 7 days   Lab Units 08/06/24  1016 08/05/24  0730 08/04/24  1837   ANTI XA UNFRACTIONATED IU/mL 0.4 0.3 0.3

## 2024-08-07 LAB
ALBUMIN SERPL BCP-MCNC: 2.9 G/DL (ref 3.4–5)
ANION GAP SERPL CALC-SCNC: 13 MMOL/L (ref 10–20)
APTT PPP: 95 SECONDS (ref 27–38)
BACTERIA BLD CULT: NORMAL
BACTERIA BLD CULT: NORMAL
BASOPHILS # BLD AUTO: 0.01 X10*3/UL (ref 0–0.1)
BASOPHILS NFR BLD AUTO: 0.1 %
BUN SERPL-MCNC: 9 MG/DL (ref 6–23)
CALCIUM SERPL-MCNC: 8.6 MG/DL (ref 8.6–10.6)
CHLORIDE SERPL-SCNC: 102 MMOL/L (ref 98–107)
CO2 SERPL-SCNC: 29 MMOL/L (ref 21–32)
CREAT SERPL-MCNC: 0.69 MG/DL (ref 0.5–1.05)
EGFRCR SERPLBLD CKD-EPI 2021: >90 ML/MIN/1.73M*2
EOSINOPHIL # BLD AUTO: 0.29 X10*3/UL (ref 0–0.7)
EOSINOPHIL NFR BLD AUTO: 3.9 %
ERYTHROCYTE [DISTWIDTH] IN BLOOD BY AUTOMATED COUNT: 14.1 % (ref 11.5–14.5)
GLUCOSE SERPL-MCNC: 70 MG/DL (ref 74–99)
HCT VFR BLD AUTO: 26.6 % (ref 36–46)
HGB BLD-MCNC: 8.2 G/DL (ref 12–16)
IMM GRANULOCYTES # BLD AUTO: 0.03 X10*3/UL (ref 0–0.7)
IMM GRANULOCYTES NFR BLD AUTO: 0.4 % (ref 0–0.9)
INR PPP: 1.1 (ref 0.9–1.1)
LYMPHOCYTES # BLD AUTO: 2.7 X10*3/UL (ref 1.2–4.8)
LYMPHOCYTES NFR BLD AUTO: 36.6 %
MAGNESIUM SERPL-MCNC: 1.64 MG/DL (ref 1.6–2.4)
MCH RBC QN AUTO: 30.3 PG (ref 26–34)
MCHC RBC AUTO-ENTMCNC: 30.8 G/DL (ref 32–36)
MCV RBC AUTO: 98 FL (ref 80–100)
MONOCYTES # BLD AUTO: 0.71 X10*3/UL (ref 0.1–1)
MONOCYTES NFR BLD AUTO: 9.6 %
NEUTROPHILS # BLD AUTO: 3.64 X10*3/UL (ref 1.2–7.7)
NEUTROPHILS NFR BLD AUTO: 49.4 %
NRBC BLD-RTO: 0 /100 WBCS (ref 0–0)
PHOSPHATE SERPL-MCNC: 4.4 MG/DL (ref 2.5–4.9)
PLATELET # BLD AUTO: 228 X10*3/UL (ref 150–450)
POTASSIUM SERPL-SCNC: 4.3 MMOL/L (ref 3.5–5.3)
PROTHROMBIN TIME: 12.7 SECONDS (ref 9.8–12.8)
RBC # BLD AUTO: 2.71 X10*6/UL (ref 4–5.2)
SODIUM SERPL-SCNC: 140 MMOL/L (ref 136–145)
STAPHYLOCOCCUS SPEC CULT: NORMAL
UFH PPP CHRO-ACNC: 0.5 IU/ML
WBC # BLD AUTO: 7.4 X10*3/UL (ref 4.4–11.3)

## 2024-08-07 PROCEDURE — 2500000004 HC RX 250 GENERAL PHARMACY W/ HCPCS (ALT 636 FOR OP/ED): Performed by: STUDENT IN AN ORGANIZED HEALTH CARE EDUCATION/TRAINING PROGRAM

## 2024-08-07 PROCEDURE — 2500000001 HC RX 250 WO HCPCS SELF ADMINISTERED DRUGS (ALT 637 FOR MEDICARE OP): Performed by: NURSE PRACTITIONER

## 2024-08-07 PROCEDURE — 83735 ASSAY OF MAGNESIUM: CPT | Performed by: STUDENT IN AN ORGANIZED HEALTH CARE EDUCATION/TRAINING PROGRAM

## 2024-08-07 PROCEDURE — 84100 ASSAY OF PHOSPHORUS: CPT | Performed by: STUDENT IN AN ORGANIZED HEALTH CARE EDUCATION/TRAINING PROGRAM

## 2024-08-07 PROCEDURE — 2500000002 HC RX 250 W HCPCS SELF ADMINISTERED DRUGS (ALT 637 FOR MEDICARE OP, ALT 636 FOR OP/ED): Performed by: STUDENT IN AN ORGANIZED HEALTH CARE EDUCATION/TRAINING PROGRAM

## 2024-08-07 PROCEDURE — 99232 SBSQ HOSP IP/OBS MODERATE 35: CPT | Performed by: STUDENT IN AN ORGANIZED HEALTH CARE EDUCATION/TRAINING PROGRAM

## 2024-08-07 PROCEDURE — 85025 COMPLETE CBC W/AUTO DIFF WBC: CPT | Performed by: STUDENT IN AN ORGANIZED HEALTH CARE EDUCATION/TRAINING PROGRAM

## 2024-08-07 PROCEDURE — 1100000001 HC PRIVATE ROOM DAILY

## 2024-08-07 PROCEDURE — 85730 THROMBOPLASTIN TIME PARTIAL: CPT | Performed by: STUDENT IN AN ORGANIZED HEALTH CARE EDUCATION/TRAINING PROGRAM

## 2024-08-07 PROCEDURE — 85520 HEPARIN ASSAY: CPT | Performed by: STUDENT IN AN ORGANIZED HEALTH CARE EDUCATION/TRAINING PROGRAM

## 2024-08-07 PROCEDURE — 85610 PROTHROMBIN TIME: CPT | Performed by: STUDENT IN AN ORGANIZED HEALTH CARE EDUCATION/TRAINING PROGRAM

## 2024-08-07 PROCEDURE — 2500000001 HC RX 250 WO HCPCS SELF ADMINISTERED DRUGS (ALT 637 FOR MEDICARE OP): Performed by: STUDENT IN AN ORGANIZED HEALTH CARE EDUCATION/TRAINING PROGRAM

## 2024-08-07 RX ORDER — WARFARIN SODIUM 5 MG/1
5 TABLET ORAL DAILY
Status: DISCONTINUED | OUTPATIENT
Start: 2024-08-08 | End: 2024-08-08

## 2024-08-07 RX ORDER — WARFARIN 7.5 MG/1
7.5 TABLET ORAL ONCE
Status: COMPLETED | OUTPATIENT
Start: 2024-08-07 | End: 2024-08-07

## 2024-08-07 ASSESSMENT — COGNITIVE AND FUNCTIONAL STATUS - GENERAL
TOILETING: A LOT
DRESSING REGULAR LOWER BODY CLOTHING: A LOT
PERSONAL GROOMING: A LITTLE
WALKING IN HOSPITAL ROOM: TOTAL
EATING MEALS: A LITTLE
EATING MEALS: A LITTLE
TURNING FROM BACK TO SIDE WHILE IN FLAT BAD: A LITTLE
WALKING IN HOSPITAL ROOM: TOTAL
MOBILITY SCORE: 10
MOBILITY SCORE: 10
PERSONAL GROOMING: A LITTLE
MOVING FROM LYING ON BACK TO SITTING ON SIDE OF FLAT BED WITH BEDRAILS: A LITTLE
HELP NEEDED FOR BATHING: A LOT
TURNING FROM BACK TO SIDE WHILE IN FLAT BAD: A LITTLE
DRESSING REGULAR UPPER BODY CLOTHING: A LOT
STANDING UP FROM CHAIR USING ARMS: TOTAL
DRESSING REGULAR LOWER BODY CLOTHING: A LOT
CLIMB 3 TO 5 STEPS WITH RAILING: TOTAL
HELP NEEDED FOR BATHING: A LOT
MOVING TO AND FROM BED TO CHAIR: TOTAL
TOILETING: A LOT
DRESSING REGULAR UPPER BODY CLOTHING: A LOT
MOVING FROM LYING ON BACK TO SITTING ON SIDE OF FLAT BED WITH BEDRAILS: A LITTLE
MOVING TO AND FROM BED TO CHAIR: TOTAL
DAILY ACTIVITIY SCORE: 14
STANDING UP FROM CHAIR USING ARMS: TOTAL
DAILY ACTIVITIY SCORE: 14
CLIMB 3 TO 5 STEPS WITH RAILING: TOTAL

## 2024-08-07 ASSESSMENT — PAIN SCALES - GENERAL
PAINLEVEL_OUTOF10: 4
PAINLEVEL_OUTOF10: 0 - NO PAIN
PAINLEVEL_OUTOF10: 8
PAINLEVEL_OUTOF10: 9

## 2024-08-07 ASSESSMENT — PAIN DESCRIPTION - LOCATION: LOCATION: LEG

## 2024-08-07 ASSESSMENT — PAIN DESCRIPTION - ORIENTATION: ORIENTATION: RIGHT

## 2024-08-07 ASSESSMENT — PAIN - FUNCTIONAL ASSESSMENT
PAIN_FUNCTIONAL_ASSESSMENT: 0-10
PAIN_FUNCTIONAL_ASSESSMENT: 0-10

## 2024-08-07 ASSESSMENT — PAIN SCALES - WONG BAKER: WONGBAKER_NUMERICALRESPONSE: HURTS EVEN MORE

## 2024-08-07 NOTE — CARE PLAN
The patient's goals for the shift include rest    The clinical goals for the shift include Pt will rate pain less than 4 this shift      Problem: Pain  Goal: Takes deep breaths with improved pain control throughout the shift  Outcome: Progressing  Goal: Turns in bed with improved pain control throughout the shift  Outcome: Progressing  Goal: Walks with improved pain control throughout the shift  Outcome: Progressing  Goal: Performs ADL's with improved pain control throughout shift  Outcome: Progressing  Goal: Participates in PT with improved pain control throughout the shift  Outcome: Progressing  Goal: Free from opioid side effects throughout the shift  Outcome: Progressing  Goal: Free from acute confusion related to pain meds throughout the shift  Outcome: Progressing

## 2024-08-07 NOTE — CARE PLAN
The patient's goals for the shift include rest, pain control.    The clinical goals for the shift include pt will report pain <4 during this shift    Over the shift, the patient did not make progress toward the following goals. Barriers to progression include keeping pain score >5. Recommendations to address these barriers include Administer pain medication as ordered.

## 2024-08-07 NOTE — PROGRESS NOTES
VASCULAR SURGERY PROGRESS NOTE    Assessment/Plan   Amirah Bennett is 43 y.o. 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 Patient hospitalization was complicated with wound infection requiring abx and cardiogenic shock. Patient found to have wound dehiscence and admitted.     Patient is now s/p R AKA revision with wound vac 7/30. Wound vac change 8/2.   Patient transferred to the MICU 8/3 due to hypotension and started on midodrine    Transferred to [facility] 8/5. Remains [facility]    Plan:  Wound vac changed at bedside 8/5 -> to be changed two times a week  Wound care consult for wound vac management  Continue warfarin  ID consult for abx management - anticipating >4 weeks of meropenem  Right Lower extremity drain removed 8/5  Will need follow up with Dr. De La Rosa in two weeks  Rest of care per primary  Please call with any questions or concerns    D/w attending, Dr. Rj Leyva MD  General Surgery Resident PGY-3   Vascular surgery [id number]      Subjective   SERJIO overnight. Pain managed    Objective   Vitals:  Heart Rate:  [60-83]   Temp:  [36.4 °C (97.5 °F)-36.5 °C (97.7 °F)]   Resp:  [16-17]   BP: ()/(60-71)   SpO2:  [98 %-100 %]     Exam:  Constitutional: No acute distress, sleeping in bed comfortably  Neuro:  AOx3, grossly intact  ENMT: moist mucous membranes  Head/neck: atraumatic  CV: RRR  Pulm: non-labored on room air  Skin: warm and dry  Extremities: b/l AKA.  RLE AKA with wound vac in place. Serosanguinous drainage at wound under wound vac dressing. Neuro and motor intact. Right leg wound ~ 25 cm x 4 cm x 3 cm    Labs:  Results from last 7 days   Lab Units 08/07/24  0551 08/06/24  1016 08/05/24  0730   WBC AUTO x10*3/uL 7.4 7.0 7.4   HEMOGLOBIN g/dL 8.2* 8.2* 7.7*   PLATELETS AUTO x10*3/uL 228 239 223      Results from last 7 days   Lab Units 08/07/24  0551 08/06/24  1016 08/05/24  0537   SODIUM mmol/L 140 138 135*   POTASSIUM mmol/L 4.3 4.2 4.5   CHLORIDE mmol/L 102 102 100   CO2 mmol/L 29 27 27   BUN mg/dL 9 9 14   CREATININE mg/dL 0.69 0.72 0.76   GLUCOSE mg/dL 70* 83 124*   MAGNESIUM mg/dL 1.64 1.69 1.81   PHOSPHORUS mg/dL 4.4 3.8 3.7      Results from last 7 days   Lab Units 08/07/24  0551 08/06/24  1016 08/05/24  0730 08/05/24  0730   INR  1.1 1.0  --  1.1   PROTIME seconds 12.7 11.8  --  12.8   APTT seconds 95* 82*   < >  --     < > = values in this interval not displayed.      Results from last 7 days   Lab Units 08/07/24  0551 08/06/24  1016 08/05/24 0730   ANTI XA UNFRACTIONATED IU/mL 0.5 0.4 0.3

## 2024-08-07 NOTE — PROGRESS NOTES
Amirah Bennett is a 43 y.o. female on day 9 of admission presenting with Wound dehiscence.    Subjective   Patient admitted from [facility] and is agreeable to return at discharge. Per MD, patient is currently pending therapeutic INR- ADOD Friday. Patient had PICC line placed yesterday and will be on Meropenem (8/2 - 9/10) for 6 weeks per ID. Facility updated in [facility]; patient will require new auth to admit.     Message sent to direct submit team and requested precert be initiated.     - Ana BAH, MA, LSW  Care Transitions   [facility] Secure Chat or [phone]

## 2024-08-07 NOTE — PROGRESS NOTES
INTERNAL MEDICINE PROGRESS NOTE     BRIEF NARRATIVE    The patient was admitted 7/29 for evaluation of wound dehiscence of the RLE stump after presenting for wound care. She was previously hospitalized from 6/20-7/24 for treatment of right AKA c/b wound infection requiring antibiotics and cardiogenic shock which required a stay in the CICU. On this admission she was seen by vascular surgery who took her to the OR on 7/29 for debridement followed by AKA revision and washout on 7/30. Post op was complicated by Afib with RVR treated with amio drip. Surgical cultures from 7/30 grew MDR Enterobacter colacae and E. Coli, currently on meropenum (8/2-) and vancomycin (8/2-). She began having hypotension with SBP in the 70s, placed on midodrine 10 mg TID for hypotension and transferred to the MICU d/t c/f septic shock. In the MICU the patient was maintained on midodrine 10 mg TID and treatment with meropenem and vancomycin was continued. She remained hemodynamically stable and did not require pressors. Given that she remained stable, returned to floors on 8/5. PICC placed 8/6. Pending INR goal 2-3 with re-initiation of warfarin + heparin bridging.     SUBJECTIVE     Pt was seen and examined at beside. No acute events overnight. Denies fever, chills. States she is having pain at wound site, however controlled with meds.     OBJECTIVE      Visit Vitals  BP (!) 125/93   Pulse 66   Temp 36.4 °C (97.5 °F)   Resp 17        Intake/Output Summary (Last 24 hours) at 8/7/2024 1639  Last data filed at 8/7/2024 0510  Gross per 24 hour   Intake --   Output 650 ml   Net -650 ml       Physical Exam   Constitutional:       General: She is not in acute  distress.     Appearance: She is not ill-appearing.      Comments: Alert and oriented with expressive aphasia.  Not in acute distress.  HENT:      Head: Normocephalic and atraumatic.   Neck:      Comments: Tracheostomy stoma present without erythema or edema  Cardiovascular:      Rate and Rhythm: Normal rate and regular rhythm.   Pulmonary:      Effort: Pulmonary effort is normal.      Breath sounds: Normal breath sounds.   Abdominal:      General: Abdomen is flat.      Palpations: Abdomen is soft.   Musculoskeletal:      Cervical back: Normal range of motion.      Right lower leg: +1 non-pitting edema     Left lower leg: No edema.      Comments: Right AKA wrapped in clean Ace bandage to bulb suction draining serosanguinous.  No obvious signs of gross bleeding.   Skin:     General: Skin is warm and dry.   Neurological:      Mental Status: She is alert.   Psychiatric:         Mood: tearful     Behavior: Behavior normal.       Current Meds   amiodarone, 200 mg, oral, Daily  aspirin, 81 mg, oral, Daily  atorvastatin, 80 mg, oral, Nightly  DULoxetine, 60 mg, oral, Daily  folic acid, 1 mg, oral, Daily  lidocaine, 5 mL, infiltration, Once  meropenem, 1 g, intravenous, q8h  midodrine, 10 mg, oral, TID  multivitamin with minerals, 1 tablet, oral, Daily  pantoprazole, 40 mg, oral, Daily before breakfast  polyethylene glycol, 17 g, oral, Daily  polyethylene glycol, 17 g, oral, Daily  pregabalin, 75 mg, oral, BID  sennosides, 2 tablet, oral, BID  [START ON 8/8/2024] warfarin, 5 mg, oral, Daily  warfarin, 7.5 mg, oral, Once       PRN medications: alteplase, dextrose, dextrose, glucagon, glucagon, heparin, HYDROmorphone, melatonin, oxyCODONE, oxyCODONE, polyethylene glycol     LABS and IMAGING     WBC   Date Value Ref Range Status   08/07/2024 7.4 4.4 - 11.3 x10*3/uL Final   08/06/2024 7.0 4.4 - 11.3 x10*3/uL Final   08/05/2024 7.4 4.4 - 11.3 x10*3/uL Final     Hemoglobin   Date Value Ref Range Status   08/07/2024 8.2 (L) 12.0 -  16.0 g/dL Final   08/06/2024 8.2 (L) 12.0 - 16.0 g/dL Final   08/05/2024 7.7 (L) 12.0 - 16.0 g/dL Final     Hematocrit   Date Value Ref Range Status   08/07/2024 26.6 (L) 36.0 - 46.0 % Final   08/06/2024 27.0 (L) 36.0 - 46.0 % Final   08/05/2024 23.9 (L) 36.0 - 46.0 % Final     Bicarbonate   Date Value Ref Range Status   08/07/2024 29 21 - 32 mmol/L Final   08/06/2024 27 21 - 32 mmol/L Final   08/05/2024 27 21 - 32 mmol/L Final     Creatinine   Date Value Ref Range Status   08/07/2024 0.69 0.50 - 1.05 mg/dL Final   08/06/2024 0.72 0.50 - 1.05 mg/dL Final   08/05/2024 0.76 0.50 - 1.05 mg/dL Final     Calcium   Date Value Ref Range Status   08/07/2024 8.6 8.6 - 10.6 mg/dL Final   08/06/2024 8.8 8.6 - 10.6 mg/dL Final   08/05/2024 8.3 (L) 8.6 - 10.6 mg/dL Final     INR   Date Value Ref Range Status   08/07/2024 1.1 0.9 - 1.1 Final   08/06/2024 1.0 0.9 - 1.1 Final   08/05/2024 1.1 0.9 - 1.1 Final       Bedside PICC Imaging  These images are not reportable by radiology and will not be interpreted   by  Radiologists.       ASSESSMENT / PLANS    #R AKA stump wound dehiscence   ::WBC 13.8 on admit, now resolved  ::AKA surgical site growing MDR Enterobacter cloacae and E coli  ::s/p revision w/ Vascular on 7/30 with evacuation of hematoma at the stump site and additional resection of femur.   ::s/p Vanc/Zosyn (7/30- 8/2)  ::Ucx negative  - Vascular surgery following, continue wound care per vascular   - Meropenem (8/2 - 9/10) for 6 weeks per ID, PICC placed 8/6; obtain CBC, CMP, CRP weekly and fax results to [phone] ATTN: Dr. Layton   - discontinue vancomycin (8/3-8/6) given blood cultures negative at 48 hours  - Follow-up Bcx  - ID follow-up on discharge, have signed off    - Vasc surgery follow-up in 2 weeks with Dr De La Rosa; wound vac to be changed two times per week      #HFrEF (last EF 20-25% 6/22/24)  #Hypotension, etiology unclear, possibly secondary to septic vs. less likely cardiogenic  ::lactate 0.7, currently  90s systolic  ::BNP 97, appears compensated, euvolemic  ::HR documentation as 43 while in ED  ::POCUS with collapsible IVC, patient warm and dry on exam  ::8/4 TTE redemonstrated LVEF 20-25%, LA dilation  -Continue holding GDMT given hypotension (Toprol XL 25 mg PO every day (hold for HR <60 or SBP <100), Lasix 40 mg PO BID, Entresto 24/26 mg PO BID, & Jardiance 10 mg PO every day)   - Continue midodrine 10 mg TID     #Afib with RVR  #YFN appendage  ::s/p DCCV 5/2023  ::INR 1.8 on admit  ::S/p RVR postprocedure on 7/30, HR better controlled  - Reloaded with Amiodarone 400mg BID for 5 days (7/30-8/4), now transitioned to home dose 200mg daily on 8/5  - Continue low intensity heparin gtt, bridging to home Warfarin 5 mg starting 8/5 per vascular surgery. Daily INRs. Goal 2-3, increase tonight's dose to 7.5 mg 1x      #Hx trach c/b trancutaneous fistula  -ENT had been consulted 3/2024 for gurgling and mucus drainage, no inpatient interventions required  Plan:  - Monitor for breaths/mucus discharge from trach site  - Cover the tracheocutaneous fistula with tape and gauze and encourage patient to apply pressure when voicing  - CXR unremarkable    #hx of L cerebellar MCA stroke w/expressive aphasia 3/2024  #hx of ischemic CVA  - continue home Lipitor 80 mg PO at bedtime  - restarted home ASA 81 mg    #Hx UTI   - During previous admission, s/p Augmentin x 7 days  - Ucx negative     #T2DM  :: Hbg A1C 4.2% on admission  - accucheck ACHS  - Hypoglycemia order set placed     #Anemia, stable  - Status post multiple transfusions during previous hospitalization  Plan:  - Continue low intensity heparin gtt with plan to bridge back to coumadin when no further procedures planned  - Daily CBC, transfuse if Hbg <7     #wound dehiscence s/p R AKA  #leukocytosis, resolved  - S/p right AKA revision and washout since 7/30/24  Plan:  - Appreciate vascular surgery recommendations  - maintain wound vac, next change on 8/5  - Maintain RLE to  bulb suction, record output      #Pain  - pain at AKA site, phantom limb vs infection  Plan:   - Continue Lyrica 75 mg PO BID  - Oxycodone 5 mg q6h PRN moderate pain and Oxycodone 10 mg q6h PRN severe pain  - Hydromorphone 0.6 Q3 PRN for breakthrough pain  - PT/OT postop recommending high intensity     Dispo: PT/OT recommended high intensity rehab - acute rehab     Sade Ugarte MD

## 2024-08-08 LAB
APTT PPP: 103 SECONDS (ref 27–38)
INR PPP: 1.8 (ref 0.9–1.1)
PROTHROMBIN TIME: 20 SECONDS (ref 9.8–12.8)
UFH PPP CHRO-ACNC: 0.4 IU/ML

## 2024-08-08 PROCEDURE — 2500000001 HC RX 250 WO HCPCS SELF ADMINISTERED DRUGS (ALT 637 FOR MEDICARE OP): Performed by: STUDENT IN AN ORGANIZED HEALTH CARE EDUCATION/TRAINING PROGRAM

## 2024-08-08 PROCEDURE — 99232 SBSQ HOSP IP/OBS MODERATE 35: CPT | Performed by: STUDENT IN AN ORGANIZED HEALTH CARE EDUCATION/TRAINING PROGRAM

## 2024-08-08 PROCEDURE — 2500000002 HC RX 250 W HCPCS SELF ADMINISTERED DRUGS (ALT 637 FOR MEDICARE OP, ALT 636 FOR OP/ED): Performed by: STUDENT IN AN ORGANIZED HEALTH CARE EDUCATION/TRAINING PROGRAM

## 2024-08-08 PROCEDURE — 85610 PROTHROMBIN TIME: CPT | Performed by: STUDENT IN AN ORGANIZED HEALTH CARE EDUCATION/TRAINING PROGRAM

## 2024-08-08 PROCEDURE — 2500000004 HC RX 250 GENERAL PHARMACY W/ HCPCS (ALT 636 FOR OP/ED): Performed by: STUDENT IN AN ORGANIZED HEALTH CARE EDUCATION/TRAINING PROGRAM

## 2024-08-08 PROCEDURE — 2500000001 HC RX 250 WO HCPCS SELF ADMINISTERED DRUGS (ALT 637 FOR MEDICARE OP): Performed by: NURSE PRACTITIONER

## 2024-08-08 PROCEDURE — 85520 HEPARIN ASSAY: CPT | Performed by: STUDENT IN AN ORGANIZED HEALTH CARE EDUCATION/TRAINING PROGRAM

## 2024-08-08 PROCEDURE — 1100000001 HC PRIVATE ROOM DAILY

## 2024-08-08 PROCEDURE — 2500000004 HC RX 250 GENERAL PHARMACY W/ HCPCS (ALT 636 FOR OP/ED)

## 2024-08-08 RX ORDER — WARFARIN 7.5 MG/1
7.5 TABLET ORAL ONCE
Status: COMPLETED | OUTPATIENT
Start: 2024-08-08 | End: 2024-08-08

## 2024-08-08 RX ORDER — WARFARIN SODIUM 5 MG/1
5 TABLET ORAL DAILY
Status: DISCONTINUED | OUTPATIENT
Start: 2024-08-09 | End: 2024-08-08

## 2024-08-08 ASSESSMENT — COGNITIVE AND FUNCTIONAL STATUS - GENERAL
MOVING FROM LYING ON BACK TO SITTING ON SIDE OF FLAT BED WITH BEDRAILS: A LITTLE
HELP NEEDED FOR BATHING: A LOT
WALKING IN HOSPITAL ROOM: TOTAL
STANDING UP FROM CHAIR USING ARMS: TOTAL
DAILY ACTIVITIY SCORE: 14
MOVING TO AND FROM BED TO CHAIR: TOTAL
DRESSING REGULAR UPPER BODY CLOTHING: A LOT
CLIMB 3 TO 5 STEPS WITH RAILING: TOTAL
MOBILITY SCORE: 10
EATING MEALS: A LITTLE
TOILETING: A LOT
HELP NEEDED FOR BATHING: A LOT
DRESSING REGULAR LOWER BODY CLOTHING: A LOT
DAILY ACTIVITIY SCORE: 14
PERSONAL GROOMING: A LITTLE
MOVING FROM LYING ON BACK TO SITTING ON SIDE OF FLAT BED WITH BEDRAILS: A LITTLE
DRESSING REGULAR LOWER BODY CLOTHING: A LOT
STANDING UP FROM CHAIR USING ARMS: TOTAL
CLIMB 3 TO 5 STEPS WITH RAILING: TOTAL
WALKING IN HOSPITAL ROOM: TOTAL
EATING MEALS: A LITTLE
TURNING FROM BACK TO SIDE WHILE IN FLAT BAD: A LITTLE
TURNING FROM BACK TO SIDE WHILE IN FLAT BAD: A LITTLE
MOVING TO AND FROM BED TO CHAIR: TOTAL
DRESSING REGULAR UPPER BODY CLOTHING: A LOT
PERSONAL GROOMING: A LITTLE
TOILETING: A LOT
MOBILITY SCORE: 10

## 2024-08-08 ASSESSMENT — PAIN SCALES - GENERAL
PAINLEVEL_OUTOF10: 10 - WORST POSSIBLE PAIN
PAINLEVEL_OUTOF10: 8
PAINLEVEL_OUTOF10: 8
PAINLEVEL_OUTOF10: 7

## 2024-08-08 ASSESSMENT — PAIN DESCRIPTION - LOCATION
LOCATION: LEG

## 2024-08-08 ASSESSMENT — PAIN SCALES - WONG BAKER
WONGBAKER_NUMERICALRESPONSE: HURTS WHOLE LOT
WONGBAKER_NUMERICALRESPONSE: HURTS WHOLE LOT

## 2024-08-08 ASSESSMENT — PAIN DESCRIPTION - ORIENTATION
ORIENTATION: RIGHT
ORIENTATION: RIGHT

## 2024-08-08 NOTE — CARE PLAN
The patient's goals for the shift include rest    The clinical goals for the shift include Pt will rate pain less than 4 this shift    Over the shift, the patient did not make progress toward the following goals. Barriers to progression include . Recommendations to address these barriers include .

## 2024-08-08 NOTE — PROGRESS NOTES
VASCULAR SURGERY PROGRESS NOTE    Assessment/Plan   Amirah Bennett is 43 y.o. 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 Patient hospitalization was complicated with wound infection requiring abx and cardiogenic shock. Patient found to have wound dehiscence and admitted.     Patient is now s/p R AKA revision with wound vac 7/30. Wound vac change 8/2.   Patient transferred to the MICU 8/3 due to hypotension and started on midodrine    Transferred to [facility] 8/5. Remains [facility]    Plan:  Wound vac changed at bedside 8/5 -> to be changed two times a week per wound care  Wound care consult for wound vac management  Continue warfarin  ID consult for abx management - anticipating >4 weeks of meropenem  Right Lower extremity drain removed 8/5  Will need follow up with Dr. De La Rosa in two weeks  Okay for discharge from vascular surgery standpoint  Rest of care per primary  Please call with any questions or concerns    D/w attending, Dr. Rj Leyva MD  General Surgery Resident PGY-3   Vascular surgery [id number]      Subjective   SERJIO overnight. Comfortable in bed. Wound vac holding suction    Objective   Vitals:  Heart Rate:  [66-80]   Temp:  [36.5 °C (97.7 °F)]   Resp:  [16]   BP: ()/(57-93)   SpO2:  [98 %-100 %]     Exam:  Constitutional: No acute distress, sleeping in bed comfortably  Neuro:  AOx3, grossly intact  ENMT: moist mucous membranes  Head/neck:  atraumatic  CV: RRR  Pulm: non-labored on room air  Skin: warm and dry  Extremities: b/l AKA. RLE AKA with wound vac in place. Serosanguinous drainage at wound under wound vac dressing. Neuro and motor intact. Right leg wound ~ 25 cm x 4 cm x 3 cm    Labs:  Results from last 7 days   Lab Units 08/07/24  0551 08/06/24  1016 08/05/24  0730   WBC AUTO x10*3/uL 7.4 7.0 7.4   HEMOGLOBIN g/dL 8.2* 8.2* 7.7*   PLATELETS AUTO x10*3/uL 228 239 223      Results from last 7 days   Lab Units 08/07/24  0551 08/06/24  1016 08/05/24  0537   SODIUM mmol/L 140 138 135*   POTASSIUM mmol/L 4.3 4.2 4.5   CHLORIDE mmol/L 102 102 100   CO2 mmol/L 29 27 27   BUN mg/dL 9 9 14   CREATININE mg/dL 0.69 0.72 0.76   GLUCOSE mg/dL 70* 83 124*   MAGNESIUM mg/dL 1.64 1.69 1.81   PHOSPHORUS mg/dL 4.4 3.8 3.7      Results from last 7 days   Lab Units 08/07/24  0551 08/06/24  1016 08/05/24  0730 08/05/24  0730   INR  1.1 1.0  --  1.1   PROTIME seconds 12.7 11.8  --  12.8   APTT seconds 95* 82*   < >  --     < > = values in this interval not displayed.      Results from last 7 days   Lab Units 08/07/24 0551 08/06/24  1016 08/05/24  0730   ANTI XA UNFRACTIONATED IU/mL 0.5 0.4 0.3

## 2024-08-08 NOTE — CONSULTS
Wound Care Consult     Visit Date: 8/8/2024      Patient Name: Amirah Bennett         MRN: [id number]           YOB: 1980     Reason for Consult: Wound vac change        Wound History:Patient is a 43 y.o. female with significant PMHx of HFrEF (LVEF 20-25% 08/2022), hx cardiogenic shock and has had a recent AKA for ischemic right extremity after aortic bifurcation thrombus complicated by intramuscular hematoma requiring multiple transfusions, T2DM (03/2024 HgbA1c 4.2), and HTN.      Pertinent Labs:   Albumin   Date Value Ref Range Status   08/07/2024 2.9 (L) 3.4 - 5.0 g/dL Final       Wound Assessment:  Wound 06/28/24 Incision Leg Distal;Right;Upper (Active)   Wound Image   08/08/24 1210   Site Assessment Granulation;Red;Sloughing 08/08/24 1210   Rosario-Wound Assessment Intact 08/08/24 1210   Shape Dehisced amp site 08/08/24 1210   Wound Length (cm) 4 cm 08/08/24 1210   Wound Width (cm) 20 cm 08/08/24 1210   Wound Surface Area (cm^2) 80 cm^2 08/08/24 1210   Wound Depth (cm) 3.5 cm 08/08/24 1210   Wound Volume (cm^3) 280 cm^3 08/08/24 1210   Wound Healing % -23 08/08/24 1210   Margins Well-defined edges 08/08/24 1210   Closure Dehisced 08/08/24 1210   Drainage Description Sanguineous 08/08/24 1210   Drainage Amount Moderate 08/08/24 1210   Dressing Vacuum dressing 08/08/24 1210   Dressing Changed Changed 08/08/24 1210   Dressing Status Clean;Dry;Occlusive 08/08/24 1210       Wound Team Summary Assessment:    Wound Vac applied to right aka dehisced incision site.  Wound covered with a piece of mepitel for 1 area of light, mimimal bleeding on distal end of incision. 1 black foam used.  Pressure; -125 mmHg     Consider medicating the patient 30 - 60 minutes prior to dressing change. If there is tiarra blood in the tubing (active bleeding), stop the suction and call physician. If the suction is off for greater than 2 hours, remove foam dressing and replace with moist saline dressing. Change canister every week  or when full.      Upon discharge out of the hospital the wound VAC is to be removed, patients cannot leave the hospital with a hospital wound vac in place. Please disconnect VAC machine, remove foam dressing and pack wound with wet to dry sterile dressing, then place machine in the soiled utility room on the unit. Call for pickup immediately upon removal.       Wound Team Plan: Wound team will continue to follow and change wound vac 2 times a week.      SUZANNE GERHARDT, RN, BSN, CWOCN  8/8/2024  2:08 PM

## 2024-08-08 NOTE — PROGRESS NOTES
Physical Therapy                 Therapy Communication Note    Patient Name: Amirah Bennett  MRN: [id number]  Today's Date: 8/8/2024     Discipline: Physical Therapy    Missed Visit Reason: Missed Visit Reason:  (@1202 pt currently with wound care nurse)    Missed Time: Attempt      08/08/24 at 1:01 PM - Jazmin Coleman, PT

## 2024-08-08 NOTE — PROGRESS NOTES
Amirah Bennett is a 43 y.o. female on day 10 of admission presenting with Wound dehiscence.    Subjective   Patient is currently pending therapeutic INR; ADOD tomorrow vs Saturday. Patient has authorization to return to [facility] which is good through 11:59pm on 8/10.    - Ana BAH MA, LSW  Care Transitions   [facility] Secure Chat or [phone]

## 2024-08-08 NOTE — PROGRESS NOTES
INTERNAL MEDICINE PROGRESS NOTE     BRIEF NARRATIVE    The patient was admitted 7/29 for evaluation of wound dehiscence of the RLE stump after presenting for wound care. She was previously hospitalized from 6/20-7/24 for treatment of right AKA c/b wound infection requiring antibiotics and cardiogenic shock which required a stay in the CICU. On this admission she was seen by vascular surgery who took her to the OR on 7/29 for debridement followed by AKA revision and washout on 7/30. Post op was complicated by Afib with RVR treated with amio drip. Surgical cultures from 7/30 grew MDR Enterobacter colacae and E. Coli, currently on meropenum (8/2-) and vancomycin (8/2-). She began having hypotension with SBP in the 70s, placed on midodrine 10 mg TID for hypotension and transferred to the MICU d/t c/f septic shock. In the MICU the patient was maintained on midodrine 10 mg TID and treatment with meropenem and vancomycin was continued. She remained hemodynamically stable and did not require pressors. Given that she remained stable, returned to floors on 8/5. PICC placed 8/6. Pending INR goal 2-3 with re-initiation of warfarin + heparin bridging.     SUBJECTIVE     Pt was seen and examined at beside. No acute events overnight. Denies fever, chills. States she is having pain at wound site, however controlled with meds.     OBJECTIVE      Visit Vitals  BP 89/57   Pulse 70   Temp 36.5 °C (97.7 °F)   Resp 16        Intake/Output Summary (Last 24 hours) at 8/8/2024 1441  Last data filed at 8/8/2024 0618  Gross per 24 hour   Intake --   Output 1000 ml   Net -1000 ml       Physical Exam   Constitutional:       General: She is not in acute distress.      Appearance: She is not ill-appearing.      Comments: Alert and oriented with expressive aphasia.  Not in acute distress.  HENT:      Head: Normocephalic and atraumatic.   Neck:      Comments: Tracheostomy stoma present without erythema or edema  Cardiovascular:      Rate and Rhythm: Normal rate and regular rhythm.   Pulmonary:      Effort: Pulmonary effort is normal.      Breath sounds: Normal breath sounds.   Abdominal:      General: Abdomen is flat.      Palpations: Abdomen is soft.   Musculoskeletal:      Cervical back: Normal range of motion.      Right lower leg: +1 non-pitting edema     Left lower leg: No edema.      Comments: Right AKA wrapped in clean Ace bandage to bulb suction draining serosanguinous.  No obvious signs of gross bleeding.   Skin:     General: Skin is warm and dry.   Neurological:      Mental Status: She is alert.   Psychiatric:         Mood: tearful     Behavior: Behavior normal.       Current Meds   amiodarone, 200 mg, oral, Daily  aspirin, 81 mg, oral, Daily  atorvastatin, 80 mg, oral, Nightly  DULoxetine, 60 mg, oral, Daily  folic acid, 1 mg, oral, Daily  lidocaine, 5 mL, infiltration, Once  meropenem, 1 g, intravenous, q8h  midodrine, 10 mg, oral, TID  multivitamin with minerals, 1 tablet, oral, Daily  pantoprazole, 40 mg, oral, Daily before breakfast  polyethylene glycol, 17 g, oral, Daily  polyethylene glycol, 17 g, oral, Daily  pregabalin, 75 mg, oral, BID  sennosides, 2 tablet, oral, BID  warfarin, 5 mg, oral, Daily       PRN medications: alteplase, dextrose, dextrose, glucagon, glucagon, heparin, HYDROmorphone, melatonin, oxyCODONE, oxyCODONE, polyethylene glycol     LABS and IMAGING     WBC   Date Value Ref Range Status   08/07/2024 7.4 4.4 - 11.3 x10*3/uL Final   08/06/2024 7.0 4.4 - 11.3 x10*3/uL Final     Hemoglobin   Date Value Ref Range Status   08/07/2024 8.2 (L) 12.0 - 16.0 g/dL Final   08/06/2024 8.2 (L) 12.0 - 16.0 g/dL Final     Hematocrit   Date Value Ref Range Status    08/07/2024 26.6 (L) 36.0 - 46.0 % Final   08/06/2024 27.0 (L) 36.0 - 46.0 % Final     Bicarbonate   Date Value Ref Range Status   08/07/2024 29 21 - 32 mmol/L Final   08/06/2024 27 21 - 32 mmol/L Final     Creatinine   Date Value Ref Range Status   08/07/2024 0.69 0.50 - 1.05 mg/dL Final   08/06/2024 0.72 0.50 - 1.05 mg/dL Final     Calcium   Date Value Ref Range Status   08/07/2024 8.6 8.6 - 10.6 mg/dL Final   08/06/2024 8.8 8.6 - 10.6 mg/dL Final     INR   Date Value Ref Range Status   08/08/2024 1.8 (H) 0.9 - 1.1 Final   08/07/2024 1.1 0.9 - 1.1 Final   08/06/2024 1.0 0.9 - 1.1 Final       Bedside PICC Imaging  These images are not reportable by radiology and will not be interpreted   by  Radiologists.       ASSESSMENT / PLANS    #R AKA stump wound dehiscence   ::WBC 13.8 on admit, now resolved  ::AKA surgical site growing MDR Enterobacter cloacae and E coli  ::s/p revision w/ Vascular on 7/30 with evacuation of hematoma at the stump site and additional resection of femur.   ::s/p Vanc/Zosyn (7/30- 8/2)  ::Ucx negative  - Vascular surgery following, continue wound care per vascular   - Meropenem (8/2 - 9/10) for 6 weeks per ID, PICC placed 8/6; obtain CBC, CMP, CRP weekly and fax results to [phone] ATTN: Dr. Layton   - discontinue vancomycin (8/3-8/6) given blood cultures negative at 48 hours  - Follow-up Bcx  - ID follow-up on discharge, have signed off    - Vasc surgery follow-up in 2 weeks with Dr De La Rosa; wound vac to be changed two times per week; will need to be removed in discharge, will follow wound care recs      #HFrEF (last EF 20-25% 6/22/24)  #Hypotension, etiology unclear, possibly secondary to septic vs. less likely cardiogenic  ::lactate 0.7, currently 90s systolic  ::BNP 97, appears compensated, euvolemic  ::HR documentation as 43 while in ED  ::POCUS with collapsible IVC, patient warm and dry on exam  ::8/4 TTE redemonstrated LVEF 20-25%, LA dilation  -Continue holding GDMT given  hypotension (Toprol XL 25 mg PO every day (hold for HR <60 or SBP <100), Lasix 40 mg PO BID, Entresto 24/26 mg PO BID, & Jardiance 10 mg PO every day)   - Continue midodrine 10 mg TID     #Afib with RVR  #YFN appendage  ::s/p DCCV 5/2023  ::INR 1.8 on admit  ::S/p RVR postprocedure on 7/30, HR better controlled  - Reloaded with Amiodarone 400mg BID for 5 days (7/30-8/4), now transitioned to home dose 200mg daily on 8/5  - Continue low intensity heparin gtt, bridging to home Warfarin 5 mg starting 8/5 per vascular surgery. Daily INRs. Goal 2-3, s/p dose increase to 7.5 mg x1, will plan for another 7.5 mg dose today and then decrease back down to 5 mg tomorrow     #Hx trach c/b trancutaneous fistula  -ENT had been consulted 3/2024 for gurgling and mucus drainage, no inpatient interventions required  Plan:  - Monitor for breaths/mucus discharge from trach site  - Cover the tracheocutaneous fistula with tape and gauze and encourage patient to apply pressure when voicing  - CXR unremarkable    #hx of L cerebellar MCA stroke w/expressive aphasia 3/2024  #hx of ischemic CVA  - continue home Lipitor 80 mg PO at bedtime  - restarted home ASA 81 mg    #Hx UTI   - During previous admission, s/p Augmentin x 7 days  - Ucx negative     #T2DM  :: Hbg A1C 4.2% on admission  - accucheck ACHS  - Hypoglycemia order set placed     #Anemia, stable  - Status post multiple transfusions during previous hospitalization  Plan:  - Continue low intensity heparin gtt with plan to bridge back to coumadin when no further procedures planned  - Daily CBC, transfuse if Hbg <7     #wound dehiscence s/p R AKA  #leukocytosis, resolved  - S/p right AKA revision and washout since 7/30/24  Plan:  - Appreciate vascular surgery recommendations  - maintain wound vac, next change on 8/5  - Maintain RLE to bulb suction, record output      #Pain  - pain at AKA site, phantom limb vs infection  Plan:   - Continue Lyrica 75 mg PO BID  - Oxycodone 5 mg q6h PRN  moderate pain and Oxycodone 10 mg q6h PRN severe pain  - Hydromorphone 0.6 Q3 PRN for breakthrough pain  - PT/OT postop recommending high intensity     Dispo: PT/OT recommended high intensity rehab - acute rehab     Sade Ugarte MD

## 2024-08-09 VITALS
TEMPERATURE: 98.1 F | HEIGHT: 67 IN | RESPIRATION RATE: 16 BRPM | SYSTOLIC BLOOD PRESSURE: 128 MMHG | HEART RATE: 84 BPM | BODY MASS INDEX: 27.68 KG/M2 | OXYGEN SATURATION: 98 % | DIASTOLIC BLOOD PRESSURE: 90 MMHG | WEIGHT: 176.37 LBS

## 2024-08-09 LAB
ALBUMIN SERPL BCP-MCNC: 3.6 G/DL (ref 3.4–5)
ANION GAP SERPL CALC-SCNC: 15 MMOL/L (ref 10–20)
APTT PPP: 149 SECONDS (ref 27–38)
BASOPHILS # BLD AUTO: 0.02 X10*3/UL (ref 0–0.1)
BASOPHILS NFR BLD AUTO: 0.3 %
BUN SERPL-MCNC: 8 MG/DL (ref 6–23)
CALCIUM SERPL-MCNC: 9.6 MG/DL (ref 8.6–10.6)
CHLORIDE SERPL-SCNC: 101 MMOL/L (ref 98–107)
CO2 SERPL-SCNC: 26 MMOL/L (ref 21–32)
CREAT SERPL-MCNC: 0.72 MG/DL (ref 0.5–1.05)
EGFRCR SERPLBLD CKD-EPI 2021: >90 ML/MIN/1.73M*2
EOSINOPHIL # BLD AUTO: 0.24 X10*3/UL (ref 0–0.7)
EOSINOPHIL NFR BLD AUTO: 3.8 %
ERYTHROCYTE [DISTWIDTH] IN BLOOD BY AUTOMATED COUNT: 13.9 % (ref 11.5–14.5)
GLUCOSE SERPL-MCNC: 73 MG/DL (ref 74–99)
HCT VFR BLD AUTO: 29.6 % (ref 36–46)
HGB BLD-MCNC: 9.5 G/DL (ref 12–16)
IMM GRANULOCYTES # BLD AUTO: 0.03 X10*3/UL (ref 0–0.7)
IMM GRANULOCYTES NFR BLD AUTO: 0.5 % (ref 0–0.9)
INR PPP: 2.1 (ref 0.9–1.1)
LYMPHOCYTES # BLD AUTO: 2.45 X10*3/UL (ref 1.2–4.8)
LYMPHOCYTES NFR BLD AUTO: 38.7 %
MAGNESIUM SERPL-MCNC: 1.85 MG/DL (ref 1.6–2.4)
MCH RBC QN AUTO: 30.2 PG (ref 26–34)
MCHC RBC AUTO-ENTMCNC: 32.1 G/DL (ref 32–36)
MCV RBC AUTO: 94 FL (ref 80–100)
MONOCYTES # BLD AUTO: 0.56 X10*3/UL (ref 0.1–1)
MONOCYTES NFR BLD AUTO: 8.8 %
NEUTROPHILS # BLD AUTO: 3.03 X10*3/UL (ref 1.2–7.7)
NEUTROPHILS NFR BLD AUTO: 47.9 %
NRBC BLD-RTO: 0 /100 WBCS (ref 0–0)
PHOSPHATE SERPL-MCNC: 4.3 MG/DL (ref 2.5–4.9)
PLATELET # BLD AUTO: 263 X10*3/UL (ref 150–450)
POTASSIUM SERPL-SCNC: 4.2 MMOL/L (ref 3.5–5.3)
PROTHROMBIN TIME: 24.2 SECONDS (ref 9.8–12.8)
RBC # BLD AUTO: 3.15 X10*6/UL (ref 4–5.2)
SODIUM SERPL-SCNC: 138 MMOL/L (ref 136–145)
UFH PPP CHRO-ACNC: 0.4 IU/ML
WBC # BLD AUTO: 6.3 X10*3/UL (ref 4.4–11.3)

## 2024-08-09 PROCEDURE — 2500000004 HC RX 250 GENERAL PHARMACY W/ HCPCS (ALT 636 FOR OP/ED)

## 2024-08-09 PROCEDURE — 2500000001 HC RX 250 WO HCPCS SELF ADMINISTERED DRUGS (ALT 637 FOR MEDICARE OP): Performed by: NURSE PRACTITIONER

## 2024-08-09 PROCEDURE — 2500000002 HC RX 250 W HCPCS SELF ADMINISTERED DRUGS (ALT 637 FOR MEDICARE OP, ALT 636 FOR OP/ED): Performed by: STUDENT IN AN ORGANIZED HEALTH CARE EDUCATION/TRAINING PROGRAM

## 2024-08-09 PROCEDURE — 97116 GAIT TRAINING THERAPY: CPT | Mod: GP

## 2024-08-09 PROCEDURE — 2500000004 HC RX 250 GENERAL PHARMACY W/ HCPCS (ALT 636 FOR OP/ED): Performed by: STUDENT IN AN ORGANIZED HEALTH CARE EDUCATION/TRAINING PROGRAM

## 2024-08-09 PROCEDURE — 2500000001 HC RX 250 WO HCPCS SELF ADMINISTERED DRUGS (ALT 637 FOR MEDICARE OP): Performed by: STUDENT IN AN ORGANIZED HEALTH CARE EDUCATION/TRAINING PROGRAM

## 2024-08-09 PROCEDURE — 85520 HEPARIN ASSAY: CPT | Performed by: STUDENT IN AN ORGANIZED HEALTH CARE EDUCATION/TRAINING PROGRAM

## 2024-08-09 PROCEDURE — 80069 RENAL FUNCTION PANEL: CPT | Performed by: STUDENT IN AN ORGANIZED HEALTH CARE EDUCATION/TRAINING PROGRAM

## 2024-08-09 PROCEDURE — 83735 ASSAY OF MAGNESIUM: CPT | Performed by: STUDENT IN AN ORGANIZED HEALTH CARE EDUCATION/TRAINING PROGRAM

## 2024-08-09 PROCEDURE — 99239 HOSP IP/OBS DSCHRG MGMT >30: CPT | Performed by: STUDENT IN AN ORGANIZED HEALTH CARE EDUCATION/TRAINING PROGRAM

## 2024-08-09 PROCEDURE — 85610 PROTHROMBIN TIME: CPT | Performed by: STUDENT IN AN ORGANIZED HEALTH CARE EDUCATION/TRAINING PROGRAM

## 2024-08-09 PROCEDURE — 85730 THROMBOPLASTIN TIME PARTIAL: CPT | Performed by: STUDENT IN AN ORGANIZED HEALTH CARE EDUCATION/TRAINING PROGRAM

## 2024-08-09 PROCEDURE — 85025 COMPLETE CBC W/AUTO DIFF WBC: CPT | Performed by: STUDENT IN AN ORGANIZED HEALTH CARE EDUCATION/TRAINING PROGRAM

## 2024-08-09 PROCEDURE — 97530 THERAPEUTIC ACTIVITIES: CPT | Mod: GP

## 2024-08-09 PROCEDURE — 36415 COLL VENOUS BLD VENIPUNCTURE: CPT | Performed by: STUDENT IN AN ORGANIZED HEALTH CARE EDUCATION/TRAINING PROGRAM

## 2024-08-09 RX ORDER — WARFARIN SODIUM 5 MG/1
TABLET ORAL
Start: 2024-08-09 | End: 2025-08-09

## 2024-08-09 RX ORDER — MIDODRINE HYDROCHLORIDE 10 MG/1
10 TABLET ORAL
Start: 2024-08-09

## 2024-08-09 RX ORDER — MEROPENEM 1 G/1
1 INJECTION, POWDER, FOR SOLUTION INTRAVENOUS EVERY 8 HOURS
Start: 2024-08-09 | End: 2024-09-10

## 2024-08-09 RX ORDER — SACUBITRIL AND VALSARTAN 24; 26 MG/1; MG/1
0.5 TABLET, FILM COATED ORAL 2 TIMES DAILY
Start: 2024-08-09

## 2024-08-09 RX ORDER — FUROSEMIDE 40 MG/1
40 TABLET ORAL DAILY
Start: 2024-08-09

## 2024-08-09 ASSESSMENT — COGNITIVE AND FUNCTIONAL STATUS - GENERAL
WALKING IN HOSPITAL ROOM: A LOT
PERSONAL GROOMING: A LITTLE
STANDING UP FROM CHAIR USING ARMS: A LITTLE
STANDING UP FROM CHAIR USING ARMS: A LITTLE
DRESSING REGULAR LOWER BODY CLOTHING: A LOT
WALKING IN HOSPITAL ROOM: A LOT
MOBILITY SCORE: 14
CLIMB 3 TO 5 STEPS WITH RAILING: TOTAL
DRESSING REGULAR UPPER BODY CLOTHING: A LITTLE
MOVING TO AND FROM BED TO CHAIR: A LOT
TURNING FROM BACK TO SIDE WHILE IN FLAT BAD: A LITTLE
CLIMB 3 TO 5 STEPS WITH RAILING: TOTAL
MOVING TO AND FROM BED TO CHAIR: A LOT
MOVING FROM LYING ON BACK TO SITTING ON SIDE OF FLAT BED WITH BEDRAILS: A LITTLE
HELP NEEDED FOR BATHING: A LITTLE
TOILETING: A LOT

## 2024-08-09 ASSESSMENT — PAIN SCALES - GENERAL
PAINLEVEL_OUTOF10: 10 - WORST POSSIBLE PAIN
PAINLEVEL_OUTOF10: 0 - NO PAIN
PAINLEVEL_OUTOF10: 10 - WORST POSSIBLE PAIN
PAINLEVEL_OUTOF10: 10 - WORST POSSIBLE PAIN

## 2024-08-09 ASSESSMENT — PAIN - FUNCTIONAL ASSESSMENT
PAIN_FUNCTIONAL_ASSESSMENT: 0-10
PAIN_FUNCTIONAL_ASSESSMENT: 0-10

## 2024-08-09 NOTE — PROGRESS NOTES
"Physical Therapy    Physical Therapy Treatment    Patient Name: Amirah Bennett  MRN: [id number]  Today's Date: 8/9/2024  Time Calculation  Start Time: 0837  Stop Time: 0916  Time Calculation (min): 39 min     2068/2068-A    Assessment/Plan   PT Assessment  PT Assessment Results: Decreased strength, Impaired balance, Decreased endurance, Decreased mobility  End of Session Communication: Bedside nurse  Assessment Comment: Focused on standing/gait attempts/standing therex this session, needing min-mod Ax1 to complete safely.  Will continue to benefit from skilled PT to progress with OOB safe mobility.  End of Session Patient Position: Bed, 3 rail up, Alarm off, not on at start of session     PT Plan  Treatment/Interventions: Bed mobility, Gait training, Transfer training, Balance training, Neuromuscular re-education, Strengthening, Endurance training, Range of motion, Therapeutic exercise, Therapeutic activity  PT Plan: Ongoing PT  PT Frequency: 4 times per week  PT Discharge Recommendations: High intensity level of continued care  PT Recommended Transfer Status: Assist x1  PT - OK to Discharge: Yes (indicates PT eval complete and dc rec determined)     08/09/24 0837   PT  Visit   PT Received On 08/09/24   General   Prior to Session Communication Bedside nurse   General Comment Pt agreeable to participate, able to complete more therex, more gait attempts this session.  No complaints of pain throughout session, reporting at end of session \"I'm good, I'm good.\" Will continue to follow.   Precautions   LE Weight Bearing Status Right Non-Weight Bearing   Medical Precautions Fall precautions   Pain Assessment   Pain Assessment 0-10   0-10 (Numeric) Pain Score   (shakes head no when asked about pain)   Cognition   Arousal/Alertness Appropriate responses to stimuli   Following Commands Follows one step commands without difficulty   Cognition Comments baseline aphasia   Impulsive Mildly   Postural Control   Postural Control WFL "   Static Sitting Balance   Static Sitting-Balance Support No upper extremity supported  ((L) foot supported)   Static Sitting-Level of Assistance Close supervision   Dynamic Sitting Balance   Dynamic Sitting-Balance Support Bilateral upper extremity supported  ((L) foot supported)   Dynamic Sitting-Comments for (B) LE therex, demos mild posterior lean   Static Standing Balance   Static Standing-Balance Support Bilateral upper extremity supported   Static Standing-Level of Assistance Contact guard   Static Standing-Comment/Number of Minutes with whw   Dynamic Standing Balance   Dynamic Standing-Balance Support Bilateral upper extremity supported   Dynamic Standing-Comments min<-> mod ax1 with whw   Therapeutic Exercise   Therapeutic Exercise Performed Yes   Therapeutic Exercise Activity 1 seated at EOB: (L) LE LAQ, (B) LE seated marches x 15 reps each  (verbal and demo cues for performance)   Therapeutic Exercise Activity 2 standing mini squats with mod Ax1 and whw x 10 reps  (as fatigued, completed less hip and knee flexion)   Bed Mobility   Bed Mobility Yes   Bed Mobility 1   Bed Mobility 1 Supine to sitting   Level of Assistance 1 Close supervision   Bed Mobility Comments 1 HOB raised, cues to square hips   Bed Mobility 2   Bed Mobility Comments 2 remained seated at EOB upon PT exit   Ambulation/Gait Training   Ambulation/Gait Training Performed Yes   Ambulation/Gait Training 1   Surface 1 Level tile   Device 1 Rolling walker   Assistance 1 Moderate assistance;Minimum assistance;Moderate verbal cues;Loss of balance (Comment)   Quality of Gait 1   (able to clear (L) foot for hopping steps, requires cues and manual assistance for walker manipulation, LOB with retro steps requiring increased assist)   Comments/Distance (ft) 1 3 ft fwd/bkwd   Ambulation/Gait Training 2   Surface 2 Level tile   Device 2 Rolling walker   Assistance 2 Minimum assistance;Moderate verbal cues   Quality of Gait 2   (pivoting/sliding side  step)   Comments/Distance (ft) 2 x2   Transfers   Transfer Yes   Transfer 1   Transfer From 1 Bed to   Transfer to 1 Stand   Transfer Device 1 Walker   Transfer Level of Assistance 1 Minimum assistance;Moderate verbal cues  (x1)   Trials/Comments 1 x2   Transfers 2   Transfer From 2 Stand to   Transfer to 2 Bed   Transfer Level of Assistance 2 Contact guard;Moderate verbal cues   Trials/Comments 2 improving control/technique with sitting   Activity Tolerance   Endurance Tolerates 30 min exercise with multiple rests   Activity Tolerance Comments rest breaks as needed between therex and transfers/gait   PT Assessment   PT Assessment Results Decreased strength;Impaired balance;Decreased endurance;Decreased mobility   End of Session Communication Bedside nurse   Assessment Comment Focused on standing/gait attempts/standing therex this session, needing min-mod Ax1 to complete safely.  Will continue to benefit from skilled PT to progress with OOB safe mobility.   End of Session Patient Position Bed, 3 rail up;Alarm off, not on at start of session     Outcome Measures:  [facility] Basic Mobility  Turning from your back to your side while in a flat bed without using bedrails: A little  Moving from lying on your back to sitting on the side of a flat bed without using bedrails: A little  Moving to and from bed to chair (including a wheelchair): A lot  Standing up from a chair using your arms (e.g. wheelchair or bedside chair): A little  To walk in hospital room: A lot  Climbing 3-5 steps with railing: Total  Basic Mobility - Total Score: 14                       EDUCATION:     Education Documentation  Mobility Training, taught by Jazmin Coleman, PT at 8/9/2024 11:11 AM.  Learner: Patient  Readiness: Acceptance  Method: Explanation, Demonstration  Response: Demonstrated Understanding  Comment: therex to perform on her own, safety with transfers    GOALS:  Encounter Problems       Encounter Problems (Active)       Mobility        STG - Patient will ambulate > 5' with LRAD SBA (Progressing)       Start:  08/01/24    Expected End:  08/22/24               PT Transfers       STG - Transfer from bed to chair LRAD SBA (Progressing)       Start:  08/01/24    Expected End:  08/22/24            STG - Patient to transfer to and from sit to supine indep (Progressing)       Start:  08/01/24    Expected End:  08/22/24            STG - Patient will transfer sit to and from stand SBA LRAD (Progressing)       Start:  08/01/24    Expected End:  08/22/24 08/09/24 at 11:13 AM - Jazmin Coleman, PT

## 2024-08-09 NOTE — PROGRESS NOTES
INR therapeutic today. Auth from [facility] still active until tomorrow. Patient medically ready for discharge. JANICE notified facility of discharge today. Requested transport. Awaiting confirmation of transport time. Asked facility if they would order wound vac. Awaiting response.    Update 1248:  JANICE spoke with Nicole at [facility]. Facility will order wound vac for patient. She will go with wet to dry dressing to cover until wound vac obtained. Notified Nicole of transport time of 1:30p via 24/7 medical transport.    Ada Barcenas LCSW

## 2024-08-09 NOTE — CARE PLAN
The patient's goals for the shift include rest    The clinical goals for the shift include Patient will remain free from pain and remain safe during shift    Over the shift, the patient did not make progress toward the following goals. Barriers to progression include acute disease process. Recommendations to address these barriers include adherence to treatment plan.

## 2024-08-09 NOTE — NURSING NOTE
Nurse called report to [facility]- spoke to Darling GO. Answered all questions.  Pt was picked up via stretcher. PICC line in place LUE date 8/6/24, Wet to Dry dressing applied to RLE for transport. Pt agreeable to transport.

## 2024-08-09 NOTE — PROGRESS NOTES
VASCULAR SURGERY PROGRESS NOTE    Assessment/Plan   Amirah Bennett is 43 y.o. 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 Patient hospitalization was complicated with wound infection requiring abx and cardiogenic shock. Patient found to have wound dehiscence and admitted.     Patient is now s/p R AKA revision with wound vac 7/30. Wound vac change 8/2.   Patient transferred to the MICU 8/3 due to hypotension and started on midodrine    Transferred to [facility] 8/5. Remains HDS    Plan:  Wound vac changed by wound team 8/8 - plan for twice weekly changes  Continue warfarin  ID consult for abx management - anticipating >4 weeks of meropenem  Right Lower extremity drain removed 8/5  Will need follow up with Dr. De La Rosa in two weeks  Okay for discharge from vascular surgery standpoint  Rest of care per primary  Please call with any questions or concerns    D/w attending, Dr. Rj Leyva MD  General Surgery Resident PGY-3   Vascular surgery [id number]      Subjective   SERJIO overnight. Pain managed. Playing cards this morning.    Objective   Vitals:  Heart Rate:  [66-87]   Temp:  [36.2 °C (97.2 °F)-36.7 °C (98.1 °F)]   Resp:  [16-18]   BP: (104-139)/(66-87)   SpO2:  [98 %-100 %]     Exam:  Constitutional: No acute distress, sleeping in bed comfortably  Neuro:  AOx3, grossly intact  ENMT: moist mucous membranes  Head/neck: atraumatic  CV: RRR  Pulm: non-labored on room  air  Skin: warm and dry  Extremities: b/l AKA. RLE AKA with wound vac in place. Serosanguinous drainage at wound under wound vac dressing. Neuro and motor intact. Right leg wound ~ 25 cm x 4 cm x 3 cm    Labs:  Results from last 7 days   Lab Units 08/09/24  0828 08/07/24  0551 08/06/24  1016   WBC AUTO x10*3/uL 6.3 7.4 7.0   HEMOGLOBIN g/dL 9.5* 8.2* 8.2*   PLATELETS AUTO x10*3/uL 263 228 239      Results from last 7 days   Lab Units 08/07/24  0551 08/06/24  1016 08/05/24  0537   SODIUM mmol/L 140 138 135*   POTASSIUM mmol/L 4.3 4.2 4.5   CHLORIDE mmol/L 102 102 100   CO2 mmol/L 29 27 27   BUN mg/dL 9 9 14   CREATININE mg/dL 0.69 0.72 0.76   GLUCOSE mg/dL 70* 83 124*   MAGNESIUM mg/dL 1.64 1.69 1.81   PHOSPHORUS mg/dL 4.4 3.8 3.7      Results from last 7 days   Lab Units 08/08/24  0914 08/07/24  0551 08/06/24  1016   INR  1.8* 1.1 1.0   PROTIME seconds 20.0* 12.7 11.8   APTT seconds 103* 95* 82*      Results from last 7 days   Lab Units 08/08/24  0914 08/07/24  0551 08/06/24  1016   ANTI XA UNFRACTIONATED IU/mL 0.4 0.5 0.4

## 2024-08-09 NOTE — DISCHARGE INSTRUCTIONS
Hi Ms Bennett,     Thanks for coming to  for your care. As you know you came in needing a procedure with vascular surgery for your R leg wound. You also had a brief stay in the ICU for low blood pressures. Seems this is doing much better now with some medication changes. We are discharging you back to a rehab facility to gain more strength. Here are some important things to remember form your visit:     1) You will have a follow-up appointment for your wound with the vascular surgeons on 8/226 and an appointment with the infectious disease doctors on 9/11. I will also make you an appointment for a new primary care doctor.   2) You will be on IV antibiotics for 6 weeks until 9/10.   3) I have lowered some of your heart failure meds because of your low blood pressure. Entresto will now be 0.5 pills twice per day, I have decreased Lasix to once per day and have stopped th jardiance.    Best of luck,     Your  Medicine Team

## 2024-08-10 NOTE — DISCHARGE SUMMARY
Discharge Diagnosis  Wound dehiscence    Issues Requiring Follow-Up  [  ]vascular surgery follow-up   [ ] infectious disease follow-up   [ ] monitor BP, hypotensive during admission; has cardiology follow-up, will need re-eval of GDMT with new changes       Discharge Meds     Your medication list        START taking these medications        Instructions Last Dose Given Next Dose Due   meropenem 1 gram/100 mL IV  Commonly known as: Merrem      Infuse 100 mL (1 g) at 200 mL/hr over 0.5 hours into a venous catheter every 8 hours. End date: 9/10; will need weekly CBC, CMP, CRP fax results to [phone] ATTN: Dr. Layton       midodrine 10 mg tablet  Commonly known as: Proamatine      Take 1 tablet (10 mg) by mouth 3 times daily (morning, midday, late afternoon).              CHANGE how you take these medications        Instructions Last Dose Given Next Dose Due   furosemide 40 mg tablet  Commonly known as: Lasix  What changed: when to take this      Take 1 tablet (40 mg) by mouth once daily.       Entresto 24-26 mg tablet  Generic drug: sacubitriL-valsartan  What changed: how much to take      Take 0.5 tablets by mouth 2 times a day.       warfarin 5 mg tablet  Commonly known as: Coumadin  What changed: additional instructions      Take as directed per After Visit Summary. INR goal 2-3              CONTINUE taking these medications        Instructions Last Dose Given Next Dose Due   amiodarone 200 mg tablet  Commonly known as: Pacerone      Take 1 tablet (200 mg) by mouth once daily. Do not fill before July 25, 2024.       aspirin 81 mg chewable tablet      Chew 1 tablet (81 mg) once daily.       atorvastatin 80 mg tablet  Commonly known as: Lipitor      Take 1 tablet (80 mg) by mouth once daily.       DULoxetine 60 mg DR capsule  Commonly known as: Cymbalta      Take 1 capsule (60 mg) by mouth once daily. Do not crush or chew. Do not fill before July 25, 2024.       folic acid 1 mg tablet  Commonly known as: Folvite       Take 1 tablet (1 mg) by mouth once daily.       lidocaine 5 % patch  Commonly known as: Lidoderm      Place 1 patch over 12 hours on the skin once daily. Remove & discard patch within 12 hours or as directed by MD.       melatonin 3 mg tablet      Take 1 tablet (3 mg) by mouth as needed at bedtime for sleep.       metoprolol succinate XL 50 mg 24 hr tablet  Commonly known as: Toprol-XL      Take 0.5 tablets (25 mg) by mouth once daily. Do not crush or chew.       multivitamin with minerals tablet      Take 1 tablet by mouth once daily.       oxyCODONE 10 mg immediate release tablet  Commonly known as: Roxicodone      Take 1 tablet (10 mg) by mouth every 6 hours if needed for severe pain (7 - 10).       oxyCODONE 5 mg immediate release tablet  Commonly known as: Roxicodone      Take 1 tablet (5 mg) by mouth every 6 hours if needed for moderate pain (4 - 6).       pantoprazole 40 mg EC tablet  Commonly known as: ProtoNix      Take 1 tablet (40 mg) by mouth once daily in the morning. Take before meals. Do not crush, chew, or split.       polyethylene glycol 17 gram packet  Commonly known as: Glycolax, Miralax      Take 17 g by mouth once daily.       pregabalin 75 mg capsule  Commonly known as: Lyrica      Take 1 capsule (75 mg) by mouth 2 times a day.       sennosides 8.6 mg tablet  Commonly known as: Senokot      Take 2 tablets (17.2 mg) by mouth 2 times a day.              STOP taking these medications      empagliflozin 10 mg  Commonly known as: Jardiance                  Where to Get Your Medications        Information about where to get these medications is not yet available    Ask your nurse or doctor about these medications  Entresto 24-26 mg tablet  furosemide 40 mg tablet  meropenem 1 gram/100 mL IV  midodrine 10 mg tablet  warfarin 5 mg tablet         Test Results Pending At Discharge  Pending Labs       No current pending labs.            Hospital Course  Patient is a 43 y.o. 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      The patient was admitted 7/29 for evaluation of wound dehiscence of the RLE stump after presenting for wound care. She was previously hospitalized from 6/20-7/24 for treatment of right AKA c/b wound infection requiring antibiotics and cardiogenic shock which required a stay in the CICU. On this admission she was seen by vascular surgery who took her to the OR on 7/29 for debridement followed by AKA revision and washout on 7/30. Post op was complicated by Afib with RVR treated with amio drip, now on PO amio. Surgical cultures from 7/30 grew MDR Enterobacter colacae and E. Coli, placed on meropenam (8/2-) and vancomycin (8/2-). She began having hypotension with SBP in the 70s, placed on midodrine 10 mg TID for hypotension and transferred to the MICU d/t c/f septic shock. In the MICU the patient was maintained on midodrine 10 mg TID and treatment with meropenem and vancomycin was continued. She remained hemodynamically stable and did not require pressors. Given that she remained stable, returned to floors on 8/5; wound vac in place.  ID recommended 6 week course of meropenam; no vanc. PICC placed 8/6. PT/OT recommended SNF, pt discharged to SNF after INR reached therapeutic level.     Pertinent Physical Exam At Time of Discharge  Physical Exam  Constitutional:       General: She is not in acute distress.     Appearance: She is not ill-appearing.      Comments: Alert and  oriented with expressive aphasia.  Not in acute distress.  HENT:      Head: Normocephalic and atraumatic.   Neck:      Comments: Tracheostomy stoma present without erythema or edema  Cardiovascular:      Rate and Rhythm: Normal rate and regular rhythm.   Pulmonary:      Effort: Pulmonary effort is normal.      Breath sounds: Normal breath sounds.   Abdominal:      General: Abdomen is flat.      Palpations: Abdomen is soft.   Musculoskeletal:      Cervical back: Normal range of motion.      Right lower leg: +1 non-pitting edema     Left lower leg: No edema.      Comments: Right AKA wrapped in clean Ace bandage to bulb suction draining serosanguinous.  No obvious signs of gross bleeding.   Skin:     General: Skin is warm and dry.   Neurological:      Mental Status: She is alert.   Psychiatric:         Mood: tearful     Behavior: Behavior normal.      Outpatient Follow-Up  Future Appointments   Date Time Provider Department Center   8/20/2024  2:00 PM Jennifer English MD [id number] [facility]   8/26/2024  3:40 PM Bailey Cancino APRN-CNP [id number] [facility]   9/4/2024  8:45 AM Julia Gordon MD [id number] [facility]   9/11/2024  9:00 AM Montserrat Layton DO [id number] [place]   8/1/2025 11:00 AM Carolyn Moore MD [id number] [facility] MD [place]

## 2024-08-12 ENCOUNTER — APPOINTMENT (OUTPATIENT)
Dept: VASCULAR SURGERY | Facility: HOSPITAL | Age: 44
End: 2024-08-12
Payer: COMMERCIAL

## 2024-08-20 ENCOUNTER — OFFICE VISIT (OUTPATIENT)
Dept: CARDIOLOGY | Facility: HOSPITAL | Age: 44
End: 2024-08-20
Payer: COMMERCIAL

## 2024-08-20 VITALS
HEART RATE: 63 BPM | DIASTOLIC BLOOD PRESSURE: 85 MMHG | HEIGHT: 67 IN | WEIGHT: 161 LBS | BODY MASS INDEX: 25.27 KG/M2 | OXYGEN SATURATION: 96 % | SYSTOLIC BLOOD PRESSURE: 138 MMHG

## 2024-08-20 DIAGNOSIS — I50.22 CHRONIC SYSTOLIC HEART FAILURE (MULTI): Primary | ICD-10-CM

## 2024-08-20 PROCEDURE — 3060F POS MICROALBUMINURIA REV: CPT | Performed by: INTERNAL MEDICINE

## 2024-08-20 PROCEDURE — 99214 OFFICE O/P EST MOD 30 MIN: CPT | Performed by: INTERNAL MEDICINE

## 2024-08-20 PROCEDURE — 3044F HG A1C LEVEL LT 7.0%: CPT | Performed by: INTERNAL MEDICINE

## 2024-08-20 PROCEDURE — 3008F BODY MASS INDEX DOCD: CPT | Performed by: INTERNAL MEDICINE

## 2024-08-20 PROCEDURE — 3048F LDL-C <100 MG/DL: CPT | Performed by: INTERNAL MEDICINE

## 2024-08-20 PROCEDURE — 3075F SYST BP GE 130 - 139MM HG: CPT | Performed by: INTERNAL MEDICINE

## 2024-08-20 PROCEDURE — 3079F DIAST BP 80-89 MM HG: CPT | Performed by: INTERNAL MEDICINE

## 2024-08-20 ASSESSMENT — PATIENT HEALTH QUESTIONNAIRE - PHQ9
7. TROUBLE CONCENTRATING ON THINGS, SUCH AS READING THE NEWSPAPER OR WATCHING TELEVISION: NOT AT ALL
1. LITTLE INTEREST OR PLEASURE IN DOING THINGS: MORE THAN HALF THE DAYS
SUM OF ALL RESPONSES TO PHQ QUESTIONS 1-9: 6
9. THOUGHTS THAT YOU WOULD BE BETTER OFF DEAD, OR OF HURTING YOURSELF: NOT AT ALL
3. TROUBLE FALLING OR STAYING ASLEEP OR SLEEPING TOO MUCH: NOT AT ALL
4. FEELING TIRED OR HAVING LITTLE ENERGY: NOT AT ALL
2. FEELING DOWN, DEPRESSED OR HOPELESS: NEARLY EVERY DAY
5. POOR APPETITE OR OVEREATING: NOT AT ALL
3. TROUBLE FALLING OR STAYING ASLEEP OR SLEEPING TOO MUCH: NOT AT ALL
8. MOVING OR SPEAKING SO SLOWLY THAT OTHER PEOPLE COULD HAVE NOTICED. OR THE OPPOSITE, BEING SO FIGETY OR RESTLESS THAT YOU HAVE BEEN MOVING AROUND A LOT MORE THAN USUAL: NOT AT ALL
9. THOUGHTS THAT YOU WOULD BE BETTER OFF DEAD, OR OF HURTING YOURSELF: NOT AT ALL
4. FEELING TIRED OR HAVING LITTLE ENERGY: SEVERAL DAYS
SUM OF ALL RESPONSES TO PHQ9 QUESTIONS 1 AND 2: 5
SUM OF ALL RESPONSES TO PHQ QUESTIONS 1-9: 4
8. MOVING OR SPEAKING SO SLOWLY THAT OTHER PEOPLE COULD HAVE NOTICED. OR THE OPPOSITE, BEING SO FIGETY OR RESTLESS THAT YOU HAVE BEEN MOVING AROUND A LOT MORE THAN USUAL: NOT AT ALL
6. FEELING BAD ABOUT YOURSELF - OR THAT YOU ARE A FAILURE OR HAVE LET YOURSELF OR YOUR FAMILY DOWN: NOT AT ALL
7. TROUBLE CONCENTRATING ON THINGS, SUCH AS READING THE NEWSPAPER OR WATCHING TELEVISION: NOT AT ALL
2. FEELING DOWN, DEPRESSED OR HOPELESS: MORE THAN HALF THE DAYS
SUM OF ALL RESPONSES TO PHQ9 QUESTIONS 1 AND 2: 4
10. IF YOU CHECKED OFF ANY PROBLEMS, HOW DIFFICULT HAVE THESE PROBLEMS MADE IT FOR YOU TO DO YOUR WORK, TAKE CARE OF THINGS AT HOME, OR GET ALONG WITH OTHER PEOPLE: NOT DIFFICULT AT ALL
1. LITTLE INTEREST OR PLEASURE IN DOING THINGS: MORE THAN HALF THE DAYS
10. IF YOU CHECKED OFF ANY PROBLEMS, HOW DIFFICULT HAVE THESE PROBLEMS MADE IT FOR YOU TO DO YOUR WORK, TAKE CARE OF THINGS AT HOME, OR GET ALONG WITH OTHER PEOPLE: VERY DIFFICULT
6. FEELING BAD ABOUT YOURSELF - OR THAT YOU ARE A FAILURE OR HAVE LET YOURSELF OR YOUR FAMILY DOWN: NOT AT ALL
5. POOR APPETITE OR OVEREATING: NOT AT ALL

## 2024-08-20 ASSESSMENT — ENCOUNTER SYMPTOMS
OCCASIONAL FEELINGS OF UNSTEADINESS: 0
LOSS OF SENSATION IN FEET: 1
DEPRESSION: 0

## 2024-08-20 ASSESSMENT — PAIN SCALES - GENERAL: PAINLEVEL: 0-NO PAIN

## 2024-08-20 NOTE — PROGRESS NOTES
Mark Anthony Bennett is a 43 y.o. 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      Investigations:  ECG (7/2024) - AF, lateral TWI.   TTE (8/2024) -LVEF 20-25% in AF.  Global hypokinesis with minor regional variation.  Mildly reduced RV systolic function.  Moderate-severely dilated left atrium.    Chief Complaint:  No chief complaint on file.    HPI  Difficult historian.     Recent hospital admission 7/29 - 8/9/2024: Wound dehiscence of RLE stump, status post debridement/washout.  Surgical cultures grew Enterobacter colacae, commenced on meropenem and vancomycin.    Denies chest pain or shortness of breath.   No blackouts or palpitations.    Currently taking:   Amiodarone 200 mg daily, aspirin 81 mg daily, atorvastatin 80 mg daily, Cymbalta, Entresto 24-26 mg daily, lasix 40mg daily, metoprolol succinate 50mg daily, Midodrine PRN, coumadin.     ROS  A 10-system review was performed and was unremarkable apart from what is presented in the HPI.     Objective   Physical Exam  Alert and orientated.   Appropriate responses, normal affect.  No respiratory distress at rest.   Normal radial pulse character and volume. Clinically AF.  Anicteric sclera, no conjunctival pallor.   No JVD or carotid bruits.  Heart sounds dual, no added heart sounds or audible murmurs.   Chest clear on auscultation.   Calves soft, non-tender.  No left pedal edema. Right stump dressing.        Lab Review:   Lab Results   Component Value Date     08/09/2024    K 4.2 08/09/2024     08/09/2024    CO2 26 08/09/2024    BUN 8 08/09/2024    CREATININE 0.72 08/09/2024    GLUCOSE 73 (L) 08/09/2024    CALCIUM 9.6 08/09/2024     Lab Results   Component Value Date    CKTOTAL 1,534 (H) 06/30/2024    TROPONINI 0.86 (H) 03/26/2022     Lab Results   Component Value Date    WBC 6.3 08/09/2024    HGB 9.5 (L) 08/09/2024    HCT 29.6 (L) 08/09/2024    MCV 94 08/09/2024     08/09/2024     Lab Results   Component Value Date    CHOL 125 03/10/2024    TRIG 100 03/10/2024    HDL 17.6 03/10/2024       Assessment/Plan   In summary, Amirah Bennett is a 43 y.o. 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  In clinic today, she reports no significant cardiac symptoms and on examination she was euvolemic, normotensive and in rate controlled atrial fibrillation.  She is appropriately anticoagulated with Coumadin and on a good GDMT regimen for her HFrEF.  At this point I have advised her to continue her current medications and I will follow-up with her in clinic in 6 months to assess her progress.

## 2024-08-20 NOTE — PATIENT INSTRUCTIONS
Thank you for attending the Cardiology clinic at [facility] today. It was nice to meet you.     Your cardiovascular examination was satisfactory. You are currently in atrial fibrillation.     Continue coumadin and your other medications.     I will follow-up with you in clinic in 6-months.

## 2024-08-26 ENCOUNTER — APPOINTMENT (OUTPATIENT)
Dept: VASCULAR SURGERY | Facility: HOSPITAL | Age: 44
End: 2024-08-26
Payer: COMMERCIAL

## 2024-09-04 ENCOUNTER — OFFICE VISIT (OUTPATIENT)
Dept: CARDIOLOGY | Facility: HOSPITAL | Age: 44
End: 2024-09-04
Payer: COMMERCIAL

## 2024-09-04 VITALS
HEIGHT: 67 IN | WEIGHT: 161 LBS | OXYGEN SATURATION: 97 % | SYSTOLIC BLOOD PRESSURE: 139 MMHG | BODY MASS INDEX: 25.27 KG/M2 | DIASTOLIC BLOOD PRESSURE: 99 MMHG | HEART RATE: 94 BPM

## 2024-09-04 DIAGNOSIS — Z79.01 ANTICOAGULATION MANAGEMENT ENCOUNTER: ICD-10-CM

## 2024-09-04 DIAGNOSIS — Z51.81 ANTICOAGULATION MANAGEMENT ENCOUNTER: ICD-10-CM

## 2024-09-04 DIAGNOSIS — I82.4Y1 ACUTE DEEP VEIN THROMBOSIS (DVT) OF PROXIMAL VEIN OF RIGHT LOWER EXTREMITY (MULTI): Primary | ICD-10-CM

## 2024-09-04 DIAGNOSIS — I99.8 ACUTE LOWER LIMB ISCHEMIA: ICD-10-CM

## 2024-09-04 PROCEDURE — 3080F DIAST BP >= 90 MM HG: CPT | Performed by: INTERNAL MEDICINE

## 2024-09-04 PROCEDURE — 3044F HG A1C LEVEL LT 7.0%: CPT | Performed by: INTERNAL MEDICINE

## 2024-09-04 PROCEDURE — 3060F POS MICROALBUMINURIA REV: CPT | Performed by: INTERNAL MEDICINE

## 2024-09-04 PROCEDURE — 99215 OFFICE O/P EST HI 40 MIN: CPT | Performed by: INTERNAL MEDICINE

## 2024-09-04 PROCEDURE — 3008F BODY MASS INDEX DOCD: CPT | Performed by: INTERNAL MEDICINE

## 2024-09-04 PROCEDURE — G2211 COMPLEX E/M VISIT ADD ON: HCPCS | Performed by: INTERNAL MEDICINE

## 2024-09-04 PROCEDURE — 3048F LDL-C <100 MG/DL: CPT | Performed by: INTERNAL MEDICINE

## 2024-09-04 PROCEDURE — 3075F SYST BP GE 130 - 139MM HG: CPT | Performed by: INTERNAL MEDICINE

## 2024-09-04 ASSESSMENT — PATIENT HEALTH QUESTIONNAIRE - PHQ9
2. FEELING DOWN, DEPRESSED OR HOPELESS: NOT AT ALL
1. LITTLE INTEREST OR PLEASURE IN DOING THINGS: NOT AT ALL
SUM OF ALL RESPONSES TO PHQ9 QUESTIONS 1 AND 2: 0

## 2024-09-04 NOTE — PROGRESS NOTES
Chief complaint: Thrombosis     Subjective   Patient is here for post hospital follow-up discharge  She currently resides at a facility  Today she is accompanied by an aide, and I personally spoke with her bedside nurse over the phone  INR is managed by the facility physician, last INR checked was on , 1.7, Coumadin adjusted, the one prior on  was 1.9  Hemoglobin, platelets reported to be stable   No complaints other than pain at the stump, managed by pain meds    Review of Systems  As noted above   LLE swelling at baseline per patient and nurse  Chronic Expressive aphasia   Chronic R sided weakness  Memory issues since her strokes   No LLE pain  No shortness of breath or chest pain  No lightheadedness or dizziness  No bruises, melena or other obvious bleeding  No other complaints today     Past Medical History:   Diagnosis Date    CHF (congestive heart failure) (Multi)     Stroke (Multi)      Past Surgical History:   Procedure Laterality Date    CARDIAC CATHETERIZATION N/A 2024    Procedure: Right Heart Cath;  Surgeon: Osman Su MD;  Location: [place] [address] Cardiac Cath Lab;  Service: Cardiovascular;  Laterality: N/A;    INVASIVE VASCULAR PROCEDURE N/A 2024    Procedure: Arterial  Access, Intracatheter;  Surgeon: Osman Su MD;  Location: [place] [address] Cardiac [facility] Lab;  Service: Cardiovascular;  Laterality: N/A;    LEG AMPUTATION THROUGH KNEE      LEG AMPUTATION THROUGH KNEE      OTHER SURGICAL HISTORY  2022    Trachectomy     Social History     Socioeconomic History    Marital status: Single   Tobacco Use    Smoking status: Former     Current packs/day: 0.00     Types: Cigarettes     Quit date: 2023     Years since quittin.7   Substance and Sexual Activity    Alcohol use: Yes    Drug use: Never    Sexual activity: Defer     Social Determinants of Health     Financial Resource Strain: Low Risk  (8/3/2024)    Overall Financial Resource Strain ([facility])      "Difficulty of Paying Living Expenses: Not hard at all   Food Insecurity: Food Insecurity Present (6/26/2024)    Hunger Vital Sign     Worried About Running Out of Food in the Last Year: Sometimes true     Ran Out of Food in the Last Year: Sometimes true   Transportation Needs: No Transportation Needs (8/3/2024)    PRAPARE - Transportation     Lack of Transportation (Medical): No     Lack of Transportation (Non-Medical): No   Recent Concern: Transportation Needs - Unmet Transportation Needs (6/26/2024)    PRAPARE - Transportation     Lack of Transportation (Medical): Yes     Lack of Transportation (Non-Medical): Yes   Physical Activity: Inactive (6/26/2024)    Exercise Vital Sign     Days of Exercise per Week: 0 days     Minutes of Exercise per Session: 0 min   Stress: No Stress Concern Present (6/26/2024)    [employer] - Occupational Stress Questionnaire     Feeling of Stress : Only a little   Social Connections: Socially Isolated (6/26/2024)    Social Connection and Isolation Panel [NHANES]     Frequency of Communication with Friends and Family: Twice a week     Frequency of Social Gatherings with Friends and Family: Twice a week     Attends Taoist Services: Never     Active Member of Clubs or Organizations: No     Attends Club or Organization Meetings: Never     Marital Status: Never    Intimate Partner Violence: Not At Risk (6/26/2024)    Humiliation, Afraid, Rape, and Kick questionnaire     Fear of Current or Ex-Partner: No     Emotionally Abused: No     Physically Abused: No     Sexually Abused: No   Housing Stability: Low Risk  (8/3/2024)    Housing Stability Vital Sign     Unable to Pay for Housing in the Last Year: No     Number of Times Moved in the Last Year: 1     Homeless in the Last Year: No      No family history on file.   No Known Allergies    Objective   Physical Exam  BP (!) 139/99   Pulse 94   Ht 1.702 m (5' 7\")   Wt 73 kg (161 lb)   BMI 25.22 kg/m²  "     General:  In no acute distress, in a wheelchair  Neuro: alert and oriented x3, Expressive aphasia, R side weakness   CV:  RRR  Lungs: CTA bilaterally (limited)  Abd:  Soft, non-tender   Psych:  Appropriate affect  Upper extremities: No swelling, +2 radial   LLE PICC line in place  Lower extremities: LLE edema  Right AKA stump with wound VAC in place  Skin:  No chronic venous stasis changes     Medications   Current Outpatient Medications   Medication Instructions    amiodarone (PACERONE) 200 mg, oral, Daily    aspirin 81 mg, oral, Daily RT    atorvastatin (LIPITOR) 80 mg, oral, Daily    DULoxetine (CYMBALTA) 60 mg, oral, Daily, Do not crush or chew.    folic acid (FOLVITE) 1 mg, oral, Daily    furosemide (LASIX) 40 mg, oral, Daily    lidocaine (Lidoderm) 5 % patch 1 patch, transdermal, Daily, Remove & discard patch within 12 hours or as directed by MD.    melatonin 3 mg, oral, Nightly PRN    meropenem (MERREM) 1 g, intravenous, Every 8 hours, End date: 9/10; will need weekly CBC, CMP, CRP fax results to [phone] ATTN: Dr. Layton    metoprolol succinate XL (TOPROL-XL) 25 mg, oral, Daily, Do not crush or chew.    midodrine (PROAMATINE) 10 mg, oral, 3 times daily (morning, midday, late afternoon)    multivitamin with minerals tablet 1 tablet, oral, Daily    oxyCODONE (ROXICODONE) 10 mg, oral, Every 6 hours PRN    oxyCODONE (ROXICODONE) 5 mg, oral, Every 6 hours PRN    pantoprazole (PROTONIX) 40 mg, oral, Daily before breakfast, Do not crush, chew, or split.    polyethylene glycol (GLYCOLAX, MIRALAX) 17 g, oral, Daily RT    pregabalin (LYRICA) 75 mg, oral, 2 times daily    sacubitriL-valsartan (Entresto) 24-26 mg tablet 0.5 tablets, oral, 2 times daily    sennosides (SENOKOT) 17.2 mg, oral, 2 times daily    warfarin (Coumadin) 5 mg tablet Take as directed per After Visit Summary. INR goal 2-3       Lab Review   Recent Labs     08/09/24  0828 08/07/24  0551 08/06/24  1016 08/05/24  0537 08/04/24  0512  08/03/24  0825 08/02/24  0834 08/01/24  0532    140 138 135* 136 135* 135* 135*   K 4.2 4.3 4.2 4.5 4.7 3.5 3.8 4.0    102 102 100 99 97* 94* 96*   CO2 26 29 27 27 30 29 29 30   ANIONGAP 15 13 13 13 12 13 16 13   BUN 8 9 9 14 18 22 20 18   CREATININE 0.72 0.69 0.72 0.76 0.90 0.92 1.13* 0.81   EGFR >90 >90 >90 >90 81 78 61 >90   MG 1.85 1.64 1.69 1.81 1.86 1.91 1.78 1.61     Recent Labs     08/09/24  0828 08/07/24  0551 08/06/24  1016 08/05/24  0537 08/04/24  0512 07/30/24  0439 07/29/24  1618 07/11/24  0616 07/10/24  0610 07/09/24  0607 07/08/24  0843 07/05/24  0559 07/04/24  1825 07/04/24  0441 03/08/24  2115 03/08/24  0134 03/26/22  1926 03/26/22  1446   ALBUMIN 3.6 2.9* 2.9* 2.9* 2.9*   < > 3.7   < > 2.7*   < > 2.5* 2.4*   < > 2.2*   < > 4.4   < > 3.5   ALKPHOS  --   --   --   --   --   --  83  --  70  --  73 59  --  57   < > 68   < > 90   ALT  --   --   --   --   --   --  13  --  20  --  23 35  --  41   < > 107*   < > 2,716*   AST  --   --   --   --   --   --  22  --  20  --  22 30  --  33   < > 107*   < > 6,960*   BILITOT  --   --   --   --   --   --  0.7  --  1.8*  --  2.1* 2.9*  --  2.7*   < > 2.3*   < > 3.6*   LIPASE  --   --   --   --   --   --   --   --   --   --   --   --   --   --   --  16  --  80    < > = values in this interval not displayed.     Recent Labs     08/09/24  0828 08/07/24  0551 08/06/24  1016 08/05/24  0730 08/04/24  0512 08/03/24  0825 08/02/24  0834 08/01/24  0532   WBC 6.3 7.4 7.0 7.4 7.3 8.5 10.4 11.0   HGB 9.5* 8.2* 8.2* 7.7* 8.3* 8.8* 9.7* 8.9*   HCT 29.6* 26.6* 27.0* 23.9* 26.3* 28.7* 30.3* 28.0*    228 239 223 248 252 279 234   MCV 94 98 98 94 97 97 94 94     Recent Labs     08/09/24  0828 08/09/24  0624 08/08/24  0914 08/07/24  0551 08/06/24  1016 08/05/24  0730 08/04/24  1837 08/04/24  1110 08/04/24  0824 08/04/24  0512 08/03/24  1602 08/03/24  0825 08/02/24  0834 07/01/24  0845 06/30/24  1247 06/30/24  0521 06/29/24  0436 06/29/24  0304 06/28/24  2319  06/28/24  0348 06/27/24  0544 06/26/24  0434 06/24/24  1022 06/24/24  0316 03/30/22  0437 03/29/22  1643   INR 2.1*  --  1.8* 1.1 1.0 1.1  --   --   --  1.3*  --  1.5* 1.5*   < >  --   --   --  1.3*  --  1.3* 1.2* 1.5*   < >  --    < >  --    HAUF  --  0.4 0.4 0.5 0.4 0.3 0.3 0.1 2.0* 0.3   < >  --  0.4   < >  --  0.3   < >  --    < >  --  0.3 0.4   < >  --    < >  --    HAPTOGLOBIN  --   --   --   --   --   --   --   --   --   --   --   --   --   --  160  --   --   --   --   --   --   --   --  132  --  76   FIBRINOGEN  --   --   --   --   --   --   --   --   --   --   --   --   --   --   --  632*  --  502*  --  498* 428* 420*   < >  --   --  204    < > = values in this interval not displayed.     PTT - 8/9/2024:  8:28 AM    Recent Labs     03/10/24  0120 06/01/23  1843   CHOL 125 CANCELED   LDLF  --  CANCELED   HDL 17.6 CANCELED   TRIG 100 CANCELED     Lab Results   Component Value Date    HGBA1C 4.2 07/29/2024     Lab Results   Component Value Date    TSH 6.28 (H) 06/20/2024     Component 6/24/2024   Interpretation for Anti-Platelet Factor 4 Antibody Negative      Imaging  CTA 6/24/24  RIGHT LOWER EXTREMITY:  1. Intraluminal filling defects in the aortoiliac bifurcation.  2. Nearly occlusive thrombus in the distal right common iliac artery  with nearly completely occlusive thrombus in the proximal right  external iliac artery with extension into the proximal right internal  iliac artery. Partially occlusive thrombus within the short-segment  of the proximal SFA and distal SFA as it exits the adductor hiatus  with complete occlusion of the popliteal artery and faint  reconstitution of flow into the infrapopliteal vasculature as  detailed above.  LEFT LOWER EXTREMITY:  1. Complete total occlusion of the P2 and P3 segments of the left  popliteal artery with the extension into the tibioperoneal trunk and  reconstitution of flow into the proximal posterior tibial, peroneal  and anterior tibial arteries. There is abrupt  cutoff of the mid  anterior tibial artery with distal reconstitution. Faint  opacification of the peroneal artery and posterior artery throughout  their course. Additional findings are as detailed above      LE arterial duplex 6/23/24  1. Echogenic material within the right distal femoral and popliteal  arteries with  absent Doppler flow in the right popliteal artery  significantly diminished flow within the right distal superficial  femoral artery. Findings raise concern for thrombosis of the distal  SFA and popliteal artery. Decreased flow within the right posterior  tibial and peroneal arteries could be through collateralization.  2. Decreased flow within the right common femoral artery, right deep  femoral artery as well as the proximal and mid superficial femoral  arteries, raising concern for stenosis proximal to the right common  femoral artery, possibly within the right external iliac right common  iliac artery. A CTA of the lower extremities can be obtained for  further evaluation.  3. Unremarkable Doppler interrogation of the left lower extremity  arteries.     LE DVT US 6/23/24  1. Partial compressibility of the right external iliac vein and the  right common femoral vein, which raises concern for early  nonocclusive deep venous thrombosis, although flow is seen on Doppler  images.  2. Otherwise, no sonographic evidence for deep vein thrombosis within  the remainder of the evaluated veins of the bilateral lower extremity     CTPE 6/20/24  1. No evidence of acute pulmonary embolism.  2. Redemonstrated cardiomegaly with findings suggestive of right  heart dysfunction including right atrial dilatation and reflux of  contrast into the intrahepatic IVC and hepatic veins.  3. There are indeterminate low-attenuation nodular filling defects  within the atrial appendage. While this may represent contrast under  filling, a right atrial appendage thrombus can not be excluded.  4. Consider correlation with  transesophageal echocardiogram or  cardiac MR.  5. Mild pulmonary edema.  6. Small volume ascites noted within the partially visualized upper  abdomen.     Assessment/Plan   Amirah Bennett is a 43 y.o. female with PMHx of HTN, HLD, DM, obesity, HFrEF, Afib s/p DCCV (non adherence to Xarelto) c/b YFN thrombus and stroke s/p thrombectomy in 2022, Stroke 3/2024.     6/2024   _ 6/2024 Admitted for cardiogenic shock course further complicated by ASRAH, elevated liver enzymes, rahbdo, Afib with RVR, DVT, intracardiac thrombus and ALI. VM consulted for thrombocytopenia and concern for HIT   _ HIT ruled out   _ 6/23 R EIV-CFV DVT s/p IVCF 6/27 (perioperative)  _ R atrial appendage thrombus in setting of AFib  _ ALI in setting of aortic bifurcation- R BA/IIA/EIA/SFA/popliteal emboli s/p R AKA, iliac embolectomy by VS 6/28   _ s/p heparin to coumadin bridge   _ AKA c/b IM hematoma, infection and wound dehiscence of the RLE stump s/p debridement followed by AKA revision and washout on 7/30 s/p wound VAC    Plan   --- Continue Coumadin, target INR 2-3, monitor for bleeding, if persistently subtherapeutic, she will require bridging as needed to ensure therapeutic levels  --- Duration of anticoagulation: As long as AC is tolerated  --- Continue ASA and HS statins    --- When no further procedures planned, patient is stable and therapeutic on Coumadin, will plan on IVC filter retrieval  --- Follow up with PCP for UTD cancer screening  --- Rest as detailed in the Pt´s instructions     Julia Gordon MD

## 2024-09-04 NOTE — PATIENT INSTRUCTIONS
--- Close monitoring to bleeding and fall precautions while on anticoagulation  --- Close monitoring to vital signs and pulses check    --- Advise being up to date on screening, follow up with PCP  --- Advise against estrogen containing OCP/hormonal therapy if it is to be required in the future   --- Avoid first and second hand smoking  --- Use compression stockings daily, remove at night for comfort   --- Leg elevation above the level of the heart when sitting/sleeping  --- If traveling, please stop every 2 hours for at least 15 mins, walk around, wear your compression stockings   --- Follow up in 3-4 months, earlier if needed

## 2024-09-11 ENCOUNTER — APPOINTMENT (OUTPATIENT)
Dept: INFECTIOUS DISEASES | Facility: CLINIC | Age: 44
End: 2024-09-11
Payer: COMMERCIAL

## 2024-09-13 ENCOUNTER — DOCUMENTATION (OUTPATIENT)
Dept: INFECTIOUS DISEASES | Facility: HOSPITAL | Age: 44
End: 2024-09-13
Payer: COMMERCIAL

## 2024-09-16 ENCOUNTER — OFFICE VISIT (OUTPATIENT)
Dept: VASCULAR SURGERY | Facility: HOSPITAL | Age: 44
End: 2024-09-16
Payer: COMMERCIAL

## 2024-09-16 VITALS
HEART RATE: 91 BPM | OXYGEN SATURATION: 96 % | SYSTOLIC BLOOD PRESSURE: 150 MMHG | WEIGHT: 161 LBS | DIASTOLIC BLOOD PRESSURE: 101 MMHG | HEIGHT: 67 IN | BODY MASS INDEX: 25.27 KG/M2

## 2024-09-16 DIAGNOSIS — Z89.611 S/P AKA (ABOVE KNEE AMPUTATION) UNILATERAL, RIGHT (MULTI): Primary | ICD-10-CM

## 2024-09-16 DIAGNOSIS — T81.31XD WOUND DEHISCENCE, SURGICAL, SUBSEQUENT ENCOUNTER: ICD-10-CM

## 2024-09-16 PROCEDURE — 3060F POS MICROALBUMINURIA REV: CPT | Performed by: NURSE PRACTITIONER

## 2024-09-16 PROCEDURE — 3080F DIAST BP >= 90 MM HG: CPT | Performed by: NURSE PRACTITIONER

## 2024-09-16 PROCEDURE — 99024 POSTOP FOLLOW-UP VISIT: CPT | Performed by: NURSE PRACTITIONER

## 2024-09-16 PROCEDURE — 99214 OFFICE O/P EST MOD 30 MIN: CPT | Performed by: NURSE PRACTITIONER

## 2024-09-16 PROCEDURE — 3048F LDL-C <100 MG/DL: CPT | Performed by: NURSE PRACTITIONER

## 2024-09-16 PROCEDURE — 3044F HG A1C LEVEL LT 7.0%: CPT | Performed by: NURSE PRACTITIONER

## 2024-09-16 PROCEDURE — 3077F SYST BP >= 140 MM HG: CPT | Performed by: NURSE PRACTITIONER

## 2024-09-16 PROCEDURE — 3008F BODY MASS INDEX DOCD: CPT | Performed by: NURSE PRACTITIONER

## 2024-09-16 RX ORDER — COLLAGENASE SANTYL 250 [ARB'U]/G
OINTMENT TOPICAL DAILY
Qty: 30 G | Refills: 2 | Status: SHIPPED | OUTPATIENT
Start: 2024-09-16 | End: 2024-12-15

## 2024-09-16 ASSESSMENT — PATIENT HEALTH QUESTIONNAIRE - PHQ9
SUM OF ALL RESPONSES TO PHQ QUESTIONS 1-9: 6
1. LITTLE INTEREST OR PLEASURE IN DOING THINGS: NEARLY EVERY DAY
7. TROUBLE CONCENTRATING ON THINGS, SUCH AS READING THE NEWSPAPER OR WATCHING TELEVISION: NOT AT ALL
3. TROUBLE FALLING OR STAYING ASLEEP OR SLEEPING TOO MUCH: SEVERAL DAYS
9. THOUGHTS THAT YOU WOULD BE BETTER OFF DEAD, OR OF HURTING YOURSELF: NOT AT ALL
8. MOVING OR SPEAKING SO SLOWLY THAT OTHER PEOPLE COULD HAVE NOTICED. OR THE OPPOSITE, BEING SO FIGETY OR RESTLESS THAT YOU HAVE BEEN MOVING AROUND A LOT MORE THAN USUAL: SEVERAL DAYS
5. POOR APPETITE OR OVEREATING: NOT AT ALL
2. FEELING DOWN, DEPRESSED OR HOPELESS: SEVERAL DAYS
4. FEELING TIRED OR HAVING LITTLE ENERGY: NOT AT ALL
SUM OF ALL RESPONSES TO PHQ9 QUESTIONS 1 AND 2: 4
6. FEELING BAD ABOUT YOURSELF - OR THAT YOU ARE A FAILURE OR HAVE LET YOURSELF OR YOUR FAMILY DOWN: NOT AT ALL

## 2024-09-16 ASSESSMENT — PAIN SCALES - GENERAL: PAINLEVEL: 10-WORST PAIN EVER

## 2024-09-16 ASSESSMENT — ENCOUNTER SYMPTOMS
DEPRESSION: 1
LOSS OF SENSATION IN FEET: 0

## 2024-09-16 NOTE — PROGRESS NOTES
Infectious Diseases Clinic Follow-up:     Reason for Visit: stump infection      History of Current Issue  Amirah Bennett is a 43 y.o. year old woman pmhx Amirah Bennett is a 42 y/o woman pmhx sig for HFrEF, Afib, recent admission for RVR and cardiogenic shock c/b RLE ischemic limb ultimately undergoing R AKA and embolectomy, c/b intramuscular hematoma requiring revision and washout prior to discharge w/ wound vac. Readmitted 7/29-8/9 with R AKA stump wound dehiscence s/p revision w/ Vascular on 7/30 with evacuation of hematoma at the stump site and additional resection of femur. Cx from the thrombus and bone (resected portion) both with Enterobacter cloacae and E. Coli. Her Enterobacter spp have unique susceptibilities, however both appear to be susceptible to meropenem, reviewed w/ Micro lab. Transitioned to meropenem monotherapy for both organisms on 8/2. On 8/3 had transfer to MICU for hypotension however this ultimately resolved and no new bleed found.      Reviewed lab sheet which has labs from 8/26, these all appear to have been stable including WBC, Cr, LFTS. CRP 22. Her most recent INR 9/9 is elevated to 7.0.      She has had follow ups with Cardiology and Vascular Medicine since discharge.      Pt missed appt on 9/11, did not  her cell phone, and unable to reach nursing at that time. Called again 9/13 and spoke w/ nursing; pt was in the bathroom at that time and relayed to nursing to say she was feeling fine. Speaking with her RN, per the facility physicians the pt has finished meropenem, last does yesterday 9/12. Still has PICC in place. Her RN notes she is currently being treated for C. Diff. RN reports vitals are stable, pt has no fever, no pain. Still has wound vac in place.      PAST MEDICAL HISTORY:  Medical History        Past Medical History:   Diagnosis Date    CHF (congestive heart failure) (Multi)      Stroke (Multi)              PAST SURGICAL HISTORY:  Surgical History         Past  Surgical History:   Procedure Laterality Date    CARDIAC CATHETERIZATION N/A 06/20/2024     Procedure: Right Heart Cath;  Surgeon: Osman Su MD;  Location: [place] [address] Cardiac [facility];  Service: Cardiovascular;  Laterality: N/A;    INVASIVE VASCULAR PROCEDURE N/A 06/20/2024     Procedure: Arterial  Access, Intracatheter;  Surgeon: Osman Su MD;  Location: [place] [address] [facility]iac [facility] Lab;  Service: Cardiovascular;  Laterality: N/A;    LEG AMPUTATION THROUGH KNEE        LEG AMPUTATION THROUGH KNEE        OTHER SURGICAL HISTORY   06/09/2022     Trachectomy            ALLERGIES:    Allergies   No Known Allergies        MEDICATIONS:      Current Medications      Current Outpatient Medications:     amiodarone (Pacerone) 200 mg tablet, Take 1 tablet (200 mg) by mouth once daily. Do not fill before July 25, 2024., Disp: 30 tablet, Rfl: 0    aspirin 81 mg chewable tablet, Chew 1 tablet (81 mg) once daily., Disp: 30 tablet, Rfl: 0    atorvastatin (Lipitor) 80 mg tablet, Take 1 tablet (80 mg) by mouth once daily., Disp: 30 tablet, Rfl: 0    DULoxetine (Cymbalta) 60 mg DR capsule, Take 1 capsule (60 mg) by mouth once daily. Do not crush or chew. Do not fill before July 25, 2024., Disp: 30 capsule, Rfl: 0    folic acid (Folvite) 1 mg tablet, Take 1 tablet (1 mg) by mouth once daily., Disp: 30 tablet, Rfl: 0    furosemide (Lasix) 40 mg tablet, Take 1 tablet (40 mg) by mouth once daily., Disp: , Rfl:     lidocaine (Lidoderm) 5 % patch, Place 1 patch over 12 hours on the skin once daily. Remove & discard patch within 12 hours or as directed by MD., Disp: 30 patch, Rfl: 0    melatonin 3 mg tablet, Take 1 tablet (3 mg) by mouth as needed at bedtime for sleep., Disp: 30 tablet, Rfl: 0    metoprolol succinate XL (Toprol-XL) 50 mg 24 hr tablet, Take 0.5 tablets (25 mg) by mouth once daily. Do not crush or chew., Disp: 15 tablet, Rfl: 0    midodrine (Proamatine) 10 mg tablet, Take 1 tablet (10 mg) by mouth 3 times  daily (morning, midday, late afternoon)., Disp: , Rfl:     multivitamin with minerals tablet, Take 1 tablet by mouth once daily., Disp: 30 tablet, Rfl: 0    oxyCODONE (Roxicodone) 10 mg immediate release tablet, Take 1 tablet (10 mg) by mouth every 6 hours if needed for severe pain (7 - 10)., Disp: 10 tablet, Rfl: 0    oxyCODONE (Roxicodone) 5 mg immediate release tablet, Take 1 tablet (5 mg) by mouth every 6 hours if needed for moderate pain (4 - 6)., Disp: 15 tablet, Rfl: 0    pantoprazole (ProtoNix) 40 mg EC tablet, Take 1 tablet (40 mg) by mouth once daily in the morning. Take before meals. Do not crush, chew, or split., Disp: 30 tablet, Rfl: 0    polyethylene glycol (Glycolax, Miralax) 17 gram packet, Take 17 g by mouth once daily., Disp: 30 packet, Rfl: 0    pregabalin (Lyrica) 75 mg capsule, Take 1 capsule (75 mg) by mouth 2 times a day., Disp: 60 capsule, Rfl: 0    sacubitriL-valsartan (Entresto) 24-26 mg tablet, Take 0.5 tablets by mouth 2 times a day., Disp: , Rfl:     sennosides (Senokot) 8.6 mg tablet, Take 2 tablets (17.2 mg) by mouth 2 times a day., Disp: 60 tablet, Rfl: 0    warfarin (Coumadin) 5 mg tablet, Take as directed per After Visit Summary. INR goal 2-3, Disp: , Rfl:         REVIEW OF SYSTEMS:  Review of Systems - per nursing, pt unavailable at time of visit and at time of phone call      PHYSICAL EXAMINATION:                  Visit Vitals  Smoking Status Former                    EXAM:      DATA:  Laboratory Values and Test Results:      Microbiology:      Susceptibility data from last 90 days.  Collected Specimen Info Organism Amoxicillin/Clavulanate Ampicillin Ampicillin/Sulbactam Aztreonam Cefazolin Cefazolin (uncomplicated UTIs only) Cefepime Ciprofloxacin Ertapenem Gentamicin Imipenem Levofloxacin   07/30/24 Tissue from BONE RESECTION Escherichia coli    S      S      S    S    S       Enterobacter cloacae complex  R  R  R  R  R    S  S  S  S    S   07/30/24 Swab from CLOT/THROMBUS  Enterobacter cloacae complex  R  R  R  R  R    R  S  I  S  I  S       Escherichia coli    S      S      S    S    S   07/11/24 Urine from Indwelling (Araya) Catheter Klebsiella pneumoniae/variicola  S  R  S    S  S    S    S          Collected Specimen Info Organism Meropenem Nitrofurantoin Piperacillin/Tazobactam Trimethoprim/Sulfamethoxazole   07/30/24 Tissue from BONE RESECTION Escherichia coli      S  S       Enterobacter cloacae complex  S    R  S   07/30/24 Swab from CLOT/THROMBUS Enterobacter cloacae complex  S    R  S       Escherichia coli      S  S   07/11/24 Urine from Indwelling (Araya) Catheter Klebsiella pneumoniae/variicola    S  S  S            Other Relevant Studies:      Imaging Studies:       ASSESSMENT / RECOMMENDATIONS:     Amirah Bennett is a 42 y/o woman pmhx sig for HFrEF, Afib, recent admission 6/20-7/24 for RVR and cardiogenic shock c/b RLE ischemic limb ultimately undergoing R AKA and embolectomy, c/b intramuscular hematoma requiring revision and washout prior to discharge w/ wound vac. Readmitted 7/29-8/9 with R AKA stump wound dehiscence s/p revision w/ Vascular on 7/30 with evacuation of hematoma at the stump site and additional resection of femur. Cx from the thrombus and bone (resected portion) both with Enterobacter cloacae and E. Coli. Transitioned to meropenem monotherapy for both organisms on 8/2, with plan to complete 6 weeks for possible stump infection and residual OM. Completed abx per facility on 9/12. Pt was not at her appt on 9/11 and not available to speak by phone today 9/13 when I called her facility. Her nurse reports she has had stable vitals signs, afebrile, and no pain or discomfort. She notes she is currently being treated for C. Diff. Additionally had elevated INR on last check 9/9 to 7.0, warfarin on hold and recheck pending from this morning.      #R AKA stump infection, possible residual OM         Recommendations:  -Pt completed abx for R stump infection   -PICC  line in place per facility physicians; I relayed to her nurse that this is no longer needed from an antibiotics perspective        Pt to follow up as needed; I did also relay to the nurse if the pt had any questions or concerns about her recent infection to call our office.        Montserrat Layton, DO

## 2024-09-30 ENCOUNTER — OFFICE VISIT (OUTPATIENT)
Dept: VASCULAR SURGERY | Facility: HOSPITAL | Age: 44
End: 2024-09-30
Payer: COMMERCIAL

## 2024-09-30 VITALS
WEIGHT: 161 LBS | SYSTOLIC BLOOD PRESSURE: 134 MMHG | HEIGHT: 67 IN | BODY MASS INDEX: 25.27 KG/M2 | OXYGEN SATURATION: 97 % | HEART RATE: 66 BPM | DIASTOLIC BLOOD PRESSURE: 85 MMHG

## 2024-09-30 DIAGNOSIS — Z89.611 S/P AKA (ABOVE KNEE AMPUTATION) UNILATERAL, RIGHT (MULTI): Primary | ICD-10-CM

## 2024-09-30 DIAGNOSIS — T81.31XD WOUND DEHISCENCE, SURGICAL, SUBSEQUENT ENCOUNTER: ICD-10-CM

## 2024-09-30 PROCEDURE — 99213 OFFICE O/P EST LOW 20 MIN: CPT | Performed by: NURSE PRACTITIONER

## 2024-09-30 RX ORDER — AMLODIPINE BESYLATE 10 MG/1
10 TABLET ORAL DAILY
COMMUNITY
Start: 2024-09-23

## 2024-09-30 ASSESSMENT — PAIN SCALES - GENERAL: PAINLEVEL: 10-WORST PAIN EVER

## 2024-09-30 NOTE — PATIENT INSTRUCTIONS
Continue with wound care- wash with saline, apply santyl to wound bed with saline moistened gauze packing, cover with dry dressing- Daily.  Refer to wound care clinic  Please return for wound check with me in 4 weeks or sooner as needed.  Bailey Cancino, APRN-CNP

## 2024-09-30 NOTE — PROGRESS NOTES
Chief Complaint:   Amirah Bennett is a 43 y.o. year old woman here for right AKA wound check    Past Medical History:   Diagnosis Date    CHF (congestive heart failure) (Multi)     Stroke (Multi)        Past Surgical History:   Procedure Laterality Date    CARDIAC CATHETERIZATION N/A 06/20/2024    Procedure: Right Heart Cath;  Surgeon: Osman Su MD;  Location: [place] [address] [facility];  Service: Cardiovascular;  Laterality: N/A;    INVASIVE VASCULAR PROCEDURE N/A 06/20/2024    Procedure: Arterial  Access, Intracatheter;  Surgeon: Osman Su MD;  Location: [place] [address] Cardiac [facility];  Service: Cardiovascular;  Laterality: N/A;    LEG AMPUTATION THROUGH KNEE      LEG AMPUTATION THROUGH KNEE      OTHER SURGICAL HISTORY  06/09/2022    Trachectomy         Current Outpatient Medications:     amiodarone (Pacerone) 200 mg tablet, Take 1 tablet (200 mg) by mouth once daily. Do not fill before July 25, 2024., Disp: 30 tablet, Rfl: 0    aspirin 81 mg chewable tablet, Chew 1 tablet (81 mg) once daily., Disp: 30 tablet, Rfl: 0    atorvastatin (Lipitor) 80 mg tablet, Take 1 tablet (80 mg) by mouth once daily., Disp: 30 tablet, Rfl: 0    collagenase (SantyL) 250 unit/gram ointment, Apply topically once daily. Apply to wound bed prior to vac dressing placements, twice a week, Disp: 30 g, Rfl: 2    DULoxetine (Cymbalta) 60 mg DR capsule, Take 1 capsule (60 mg) by mouth once daily. Do not crush or chew. Do not fill before July 25, 2024., Disp: 30 capsule, Rfl: 0    folic acid (Folvite) 1 mg tablet, Take 1 tablet (1 mg) by mouth once daily., Disp: 30 tablet, Rfl: 0    furosemide (Lasix) 40 mg tablet, Take 1 tablet (40 mg) by mouth once daily., Disp: , Rfl:     lidocaine (Lidoderm) 5 % patch, Place 1 patch over 12 hours on the skin once daily. Remove & discard patch within 12 hours or as directed by MD., Disp: 30 patch, Rfl: 0    melatonin 3 mg tablet, Take 1 tablet (3 mg) by mouth as needed at bedtime for  sleep., Disp: 30 tablet, Rfl: 0    metoprolol succinate XL (Toprol-XL) 50 mg 24 hr tablet, Take 0.5 tablets (25 mg) by mouth once daily. Do not crush or chew., Disp: 15 tablet, Rfl: 0    midodrine (Proamatine) 10 mg tablet, Take 1 tablet (10 mg) by mouth 3 times daily (morning, midday, late afternoon)., Disp: , Rfl:     multivitamin with minerals tablet, Take 1 tablet by mouth once daily., Disp: 30 tablet, Rfl: 0    oxyCODONE (Roxicodone) 10 mg immediate release tablet, Take 1 tablet (10 mg) by mouth every 6 hours if needed for severe pain (7 - 10)., Disp: 10 tablet, Rfl: 0    oxyCODONE (Roxicodone) 5 mg immediate release tablet, Take 1 tablet (5 mg) by mouth every 6 hours if needed for moderate pain (4 - 6)., Disp: 15 tablet, Rfl: 0    pantoprazole (ProtoNix) 40 mg EC tablet, Take 1 tablet (40 mg) by mouth once daily in the morning. Take before meals. Do not crush, chew, or split., Disp: 30 tablet, Rfl: 0    polyethylene glycol (Glycolax, Miralax) 17 gram packet, Take 17 g by mouth once daily., Disp: 30 packet, Rfl: 0    pregabalin (Lyrica) 75 mg capsule, Take 1 capsule (75 mg) by mouth 2 times a day., Disp: 60 capsule, Rfl: 0    sacubitriL-valsartan (Entresto) 24-26 mg tablet, Take 0.5 tablets by mouth 2 times a day., Disp: , Rfl:     sennosides (Senokot) 8.6 mg tablet, Take 2 tablets (17.2 mg) by mouth 2 times a day., Disp: 60 tablet, Rfl: 0    warfarin (Coumadin) 5 mg tablet, Take as directed per After Visit Summary. INR goal 2-3, Disp: , Rfl:       Objective   There were no vitals filed for this visit.    no acute distress, well developed woman   normal sclera  moist mucus membranes  no peripheral edema   symmetric chest rise  nondistended abdomen  alert and oriented, full strength in all extremities, normal sensation to light touch throughout, normal mood  Extremities: right AKA open wound pink and granulating, no tunneling, some slough residual with santyl ointment.       Assessment/Plan   The primary  encounter diagnosis was S/P AKA (above knee amputation) unilateral, right (Multi). A diagnosis of Wound dehiscence, surgical, subsequent encounter was also pertinent to this visit.  Amirah Bennett is 43 y.o. 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 Patient hospitalization was complicated with wound infection requiring abx and cardiogenic shock. Patient found to have wound dehiscence and admitted. Underwent R AKA revision with wound vac 7/30 by Dr. De La Rosa. She was discharged to facility with wound vac. On clinic visit 2 weeks ago the wound still deep and open but granulating tissue present, no sign of infection, slough at lateral aspect, santyl was prescribed for wound bed prior to vac placement.  She returns today with dry dressing on the wound, denies malaise or fevers. Right AKA open wound pink and granulating, no tunneling, some slough residual with santyl ointment. No pus or signs of infection.  Plan:  - refer to wound clinic  - dressing changed daily- santyl cream, pack with saline soaked gauze and dry dressing  - return for follow up in one month      JAE Terrell-CNP     This note was created in part after personal review of documents in EMR including recent labs and available radiologic imaging. Total time spent in review of EMR, relevant imaging, time with patient and completion of this document is 20 minutes.

## 2024-11-11 ENCOUNTER — APPOINTMENT (OUTPATIENT)
Dept: VASCULAR SURGERY | Facility: HOSPITAL | Age: 44
End: 2024-11-11
Payer: COMMERCIAL

## 2024-12-02 ENCOUNTER — APPOINTMENT (OUTPATIENT)
Dept: VASCULAR SURGERY | Facility: HOSPITAL | Age: 44
End: 2024-12-02
Payer: COMMERCIAL

## 2024-12-04 ENCOUNTER — APPOINTMENT (OUTPATIENT)
Dept: CARDIOLOGY | Facility: HOSPITAL | Age: 44
End: 2024-12-04
Payer: COMMERCIAL

## 2024-12-09 ENCOUNTER — OFFICE VISIT (OUTPATIENT)
Dept: VASCULAR SURGERY | Facility: HOSPITAL | Age: 44
End: 2024-12-09
Payer: COMMERCIAL

## 2024-12-09 VITALS
WEIGHT: 207 LBS | SYSTOLIC BLOOD PRESSURE: 122 MMHG | BODY MASS INDEX: 32.42 KG/M2 | DIASTOLIC BLOOD PRESSURE: 81 MMHG | RESPIRATION RATE: 18 BRPM | OXYGEN SATURATION: 94 % | HEART RATE: 76 BPM

## 2024-12-09 DIAGNOSIS — T81.31XD WOUND DEHISCENCE, SURGICAL, SUBSEQUENT ENCOUNTER: ICD-10-CM

## 2024-12-09 DIAGNOSIS — Z89.611 S/P AKA (ABOVE KNEE AMPUTATION) UNILATERAL, RIGHT (MULTI): Primary | ICD-10-CM

## 2024-12-09 PROCEDURE — 3074F SYST BP LT 130 MM HG: CPT | Performed by: NURSE PRACTITIONER

## 2024-12-09 PROCEDURE — 3044F HG A1C LEVEL LT 7.0%: CPT | Performed by: NURSE PRACTITIONER

## 2024-12-09 PROCEDURE — 3079F DIAST BP 80-89 MM HG: CPT | Performed by: NURSE PRACTITIONER

## 2024-12-09 PROCEDURE — 3048F LDL-C <100 MG/DL: CPT | Performed by: NURSE PRACTITIONER

## 2024-12-09 PROCEDURE — 3060F POS MICROALBUMINURIA REV: CPT | Performed by: NURSE PRACTITIONER

## 2024-12-09 PROCEDURE — 99213 OFFICE O/P EST LOW 20 MIN: CPT | Performed by: NURSE PRACTITIONER

## 2024-12-09 ASSESSMENT — ENCOUNTER SYMPTOMS
OCCASIONAL FEELINGS OF UNSTEADINESS: 1
LOSS OF SENSATION IN FEET: 1
DEPRESSION: 0

## 2024-12-09 ASSESSMENT — PAIN SCALES - GENERAL: PAINLEVEL_OUTOF10: 0-NO PAIN

## 2024-12-09 NOTE — PROGRESS NOTES
Chief Complaint:   Amirah Bennett is a 43 y.o. year old woman here for wound check S/P right AKA    Past Medical History:   Diagnosis Date    CHF (congestive heart failure) (Multi)     Stroke (Multi)        Past Surgical History:   Procedure Laterality Date    CARDIAC CATHETERIZATION N/A 06/20/2024    Procedure: Right Heart Cath;  Surgeon: Osman Su MD;  Location: [place] [address] Cardiac Cath Lab;  Service: Cardiovascular;  Laterality: N/A;    INVASIVE VASCULAR PROCEDURE N/A 06/20/2024    Procedure: Arterial  Access, Intracatheter;  Surgeon: Osman Su MD;  Location: [place] [address] Cardiac Cath Lab;  Service: Cardiovascular;  Laterality: N/A;    LEG AMPUTATION THROUGH KNEE      LEG AMPUTATION THROUGH KNEE      OTHER SURGICAL HISTORY  06/09/2022    Trachectomy         Current Outpatient Medications:     amiodarone (Pacerone) 200 mg tablet, Take 1 tablet (200 mg) by mouth once daily. Do not fill before July 25, 2024., Disp: 30 tablet, Rfl: 0    amLODIPine (Norvasc) 10 mg tablet, Take 1 tablet (10 mg) by mouth once daily., Disp: , Rfl:     aspirin 81 mg chewable tablet, Chew 1 tablet (81 mg) once daily., Disp: 30 tablet, Rfl: 0    atorvastatin (Lipitor) 80 mg tablet, Take 1 tablet (80 mg) by mouth once daily., Disp: 30 tablet, Rfl: 0    collagenase (SantyL) 250 unit/gram ointment, Apply topically once daily. Apply to wound bed prior to vac dressing placements, twice a week, Disp: 30 g, Rfl: 2    DULoxetine (Cymbalta) 60 mg DR capsule, Take 1 capsule (60 mg) by mouth once daily. Do not crush or chew. Do not fill before July 25, 2024., Disp: 30 capsule, Rfl: 0    folic acid (Folvite) 1 mg tablet, Take 1 tablet (1 mg) by mouth once daily., Disp: 30 tablet, Rfl: 0    furosemide (Lasix) 40 mg tablet, Take 1 tablet (40 mg) by mouth once daily., Disp: , Rfl:     lidocaine (Lidoderm) 5 % patch, Place 1 patch over 12 hours on the skin once daily. Remove & discard patch within 12 hours or as directed by MD., Disp:  30 patch, Rfl: 0    melatonin 3 mg tablet, Take 1 tablet (3 mg) by mouth as needed at bedtime for sleep., Disp: 30 tablet, Rfl: 0    metoprolol succinate XL (Toprol-XL) 50 mg 24 hr tablet, Take 0.5 tablets (25 mg) by mouth once daily. Do not crush or chew., Disp: 15 tablet, Rfl: 0    midodrine (Proamatine) 10 mg tablet, Take 1 tablet (10 mg) by mouth 3 times daily (morning, midday, late afternoon)., Disp: , Rfl:     multivitamin with minerals tablet, Take 1 tablet by mouth once daily., Disp: 30 tablet, Rfl: 0    oxyCODONE (Roxicodone) 10 mg immediate release tablet, Take 1 tablet (10 mg) by mouth every 6 hours if needed for severe pain (7 - 10)., Disp: 10 tablet, Rfl: 0    oxyCODONE (Roxicodone) 5 mg immediate release tablet, Take 1 tablet (5 mg) by mouth every 6 hours if needed for moderate pain (4 - 6)., Disp: 15 tablet, Rfl: 0    pantoprazole (ProtoNix) 40 mg EC tablet, Take 1 tablet (40 mg) by mouth once daily in the morning. Take before meals. Do not crush, chew, or split., Disp: 30 tablet, Rfl: 0    polyethylene glycol (Glycolax, Miralax) 17 gram packet, Take 17 g by mouth once daily., Disp: 30 packet, Rfl: 0    pregabalin (Lyrica) 75 mg capsule, Take 1 capsule (75 mg) by mouth 2 times a day., Disp: 60 capsule, Rfl: 0    sacubitriL-valsartan (Entresto) 24-26 mg tablet, Take 0.5 tablets by mouth 2 times a day., Disp: , Rfl:     sennosides (Senokot) 8.6 mg tablet, Take 2 tablets (17.2 mg) by mouth 2 times a day., Disp: 60 tablet, Rfl: 0    warfarin (Coumadin) 5 mg tablet, Take as directed per After Visit Summary. INR goal 2-3, Disp: , Rfl:       Objective   There were no vitals filed for this visit.    no acute distress, well developed woman   normal sclera  moist mucus membranes  no peripheral edema   symmetric chest rise  nondistended abdomen  alert and oriented, full strength in all extremities, normal sensation to light touch throughout, normal mood  Extremities: right AKA- wound completely healed, no  edema      Assessment/Plan   There were no encounter diagnoses.  Amirah Bennett is 43 y.o. 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 Patient hospitalization was complicated with wound infection requiring abx and cardiogenic shock. Patient found to have wound dehiscence and admitted. Underwent R AKA revision with wound vac 7/30 by Dr. De La Rosa. She was discharged to facility with wound vac. On last visit on 9/30/24 her rt AKA open wound pink and granulating, no tunneling, some slough, no s/s of infection. She missed several follow up appointments in the interim.   Today her wound is completely healed. She affirms desire to attempt prosthetic despite prior stroke.  Plan:  - I will request  prosthetics come to evaluate for prosthetic.  - follow up with vascular surgery as needed, if new symptoms or problems  - continue warfarin therapy.      Bailey Cancino, APRN-CNP     This note was created in part after personal review of documents in EMR including recent labs and available radiologic imaging. Total time spent in review of EMR, relevant imaging, time with patient and completion of this document is 20 minutes.

## 2024-12-09 NOTE — PATIENT INSTRUCTIONS
Your wound has healed well.  I will request that  prosthetics come evaluate you for a prosthesis.   Follow up in clinic as needed.    Bailey Cancino, APRN-CNP

## 2025-02-07 ENCOUNTER — APPOINTMENT (OUTPATIENT)
Dept: CARDIOLOGY | Facility: HOSPITAL | Age: 45
End: 2025-02-07
Payer: COMMERCIAL

## 2025-02-25 ENCOUNTER — APPOINTMENT (OUTPATIENT)
Dept: CARDIOLOGY | Facility: HOSPITAL | Age: 45
End: 2025-02-25
Payer: COMMERCIAL

## 2025-03-19 ENCOUNTER — APPOINTMENT (OUTPATIENT)
Dept: CARDIOLOGY | Facility: HOSPITAL | Age: 45
End: 2025-03-19
Payer: COMMERCIAL

## 2025-04-15 ENCOUNTER — APPOINTMENT (OUTPATIENT)
Dept: CARDIOLOGY | Facility: HOSPITAL | Age: 45
End: 2025-04-15
Payer: COMMERCIAL

## 2025-06-03 ENCOUNTER — OFFICE VISIT (OUTPATIENT)
Dept: CARDIOLOGY | Facility: HOSPITAL | Age: 45
End: 2025-06-03
Payer: COMMERCIAL

## 2025-06-03 VITALS
SYSTOLIC BLOOD PRESSURE: 138 MMHG | OXYGEN SATURATION: 97 % | DIASTOLIC BLOOD PRESSURE: 89 MMHG | WEIGHT: 220 LBS | HEART RATE: 93 BPM | BODY MASS INDEX: 34.53 KG/M2 | HEIGHT: 67 IN

## 2025-06-03 DIAGNOSIS — I48.91 ATRIAL FIBRILLATION, UNSPECIFIED TYPE (MULTI): Primary | ICD-10-CM

## 2025-06-03 DIAGNOSIS — I50.23 ACUTE ON CHRONIC SYSTOLIC (CONGESTIVE) HEART FAILURE: ICD-10-CM

## 2025-06-03 DIAGNOSIS — I70.221 CRITICAL LIMB ISCHEMIA OF RIGHT LOWER EXTREMITY: ICD-10-CM

## 2025-06-03 DIAGNOSIS — I74.5 EMBOLISM AND THROMBOSIS OF ILIAC ARTERY (MULTI): ICD-10-CM

## 2025-06-03 DIAGNOSIS — I70.223 ATHEROSCLEROSIS OF NATIVE ARTERIES OF EXTREMITIES WITH REST PAIN, BILATERAL LEGS: ICD-10-CM

## 2025-06-03 DIAGNOSIS — E11.42 DIABETIC POLYNEUROPATHY ASSOCIATED WITH TYPE 2 DIABETES MELLITUS: ICD-10-CM

## 2025-06-03 DIAGNOSIS — I69.351 HEMIPARESIS AFFECTING RIGHT SIDE AS LATE EFFECT OF CEREBROVASCULAR ACCIDENT (MULTI): ICD-10-CM

## 2025-06-03 DIAGNOSIS — I74.3 EMBOLISM AND THROMBOSIS OF ARTERIES OF THE LOWER EXTREMITIES: ICD-10-CM

## 2025-06-03 PROCEDURE — 3079F DIAST BP 80-89 MM HG: CPT | Performed by: INTERNAL MEDICINE

## 2025-06-03 PROCEDURE — 99212 OFFICE O/P EST SF 10 MIN: CPT | Performed by: INTERNAL MEDICINE

## 2025-06-03 PROCEDURE — 93005 ELECTROCARDIOGRAM TRACING: CPT | Performed by: INTERNAL MEDICINE

## 2025-06-03 PROCEDURE — G2211 COMPLEX E/M VISIT ADD ON: HCPCS | Performed by: INTERNAL MEDICINE

## 2025-06-03 PROCEDURE — 3008F BODY MASS INDEX DOCD: CPT | Performed by: INTERNAL MEDICINE

## 2025-06-03 PROCEDURE — 3075F SYST BP GE 130 - 139MM HG: CPT | Performed by: INTERNAL MEDICINE

## 2025-06-03 PROCEDURE — 99214 OFFICE O/P EST MOD 30 MIN: CPT | Performed by: INTERNAL MEDICINE

## 2025-06-03 ASSESSMENT — PAIN SCALES - GENERAL: PAINLEVEL_OUTOF10: 0-NO PAIN

## 2025-06-03 NOTE — PATIENT INSTRUCTIONS
Thank you for attending the Cardiology clinic at [facility] today.     Your cardiovascular examination was satisfactory. Your ECG showed atrial fibrillation.     Continue your current medications.     Arrange Cardiology follow-up in 12-months with Dr Xiao Clarke.

## 2025-06-03 NOTE — PROGRESS NOTES
Mark Anthony Bennett is a 44 y.o. female presenting for cardiology follow-up on a background of HFrEF (LVEF 20-25% 08/2024), hx cardiogenic shock 04/2022, Afib on Warfarin w/ DCCV 05/2023, ischemic stroke L MCA d/t AFib LLA thrombus seen on echo (lack of OAC adherence), s/p thrombectomy 01/2022 @ [facility] w/ residual expressive aphasia and R sided weakness, recent 03/2024 left cerebellar MCA, paratracheal abscess s/p tracheostomy c/b tracheocutaneous fistula, and recent AKA for ischemic right extremity after aortic bifurcation thrombus complicated by intramuscular hematoma requiring multiple transfusions, T2DM (03/2024 HgbA1c 4.2), and HTN.      Investigations:  ECG (7/2024) - AF, lateral TWI.   TTE (8/2024) -LVEF 20-25% in AF.  Global hypokinesis with minor regional variation.  Mildly reduced RV systolic function.  Moderate-severely dilated left atrium.    Chief Complaint:  Follow-up    HPI  No chest pain.   No shortness of breath.  Reports occasional leg swelling.   Sleeping has been fine.   No palpitations.   EKG showed AF rate 85 bpm.  Remains on coumadin - no issues with bleeding.     Currently taking:   Amiodarone 200 mg daily, aspirin 81 mg daily, atorvastatin 80 mg daily, Cymbalta, Entresto 24-26 mg daily, lasix 40mg daily, metoprolol succinate 50mg daily, Midodrine PRN, Eliquis 5mg bid    ROS  A 10-system review was performed and was unremarkable apart from what is presented in the HPI.     Objective   Physical Exam  Alert and orientated.   Appropriate responses, normal affect.  No respiratory distress at rest.   Normal radial pulse character and volume. Clinically AF.  Anicteric sclera, no conjunctival pallor.   No JVD or carotid bruits.  Heart sounds dual, no added heart sounds or audible murmurs.   Chest clear on auscultation.   Calves soft, non-tender.  No left pedal edema. Right stump dressing.       Lab Review:   Lab Results   Component Value Date     08/09/2024    K 4.2 08/09/2024      08/09/2024    CO2 26 08/09/2024    BUN 8 08/09/2024    CREATININE 0.72 08/09/2024    GLUCOSE 73 (L) 08/09/2024    CALCIUM 9.6 08/09/2024     Lab Results   Component Value Date    CKTOTAL 1,534 (H) 06/30/2024    TROPONINI 0.86 (H) 03/26/2022     Lab Results   Component Value Date    WBC 6.3 08/09/2024    HGB 9.5 (L) 08/09/2024    HCT 29.6 (L) 08/09/2024    MCV 94 08/09/2024     08/09/2024     Lab Results   Component Value Date    CHOL 125 03/10/2024    TRIG 100 03/10/2024    HDL 17.6 03/10/2024       Assessment/Plan   In summary, Amirah Bennett is a 43 y.o. 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  She was euvolemic on examination today and her BP was satisfactory. Her EKG showed rate controlled atrial fibrillation. I have advised her to continue her medications that comprises appropriate GDMT and she will follow-up with cardiology in 12-months to assess her progress.

## 2025-06-04 PROBLEM — E11.42 DIABETIC POLYNEUROPATHY ASSOCIATED WITH TYPE 2 DIABETES MELLITUS: Status: ACTIVE | Noted: 2025-06-04

## 2025-06-05 LAB
ATRIAL RATE: 300 BPM
Q ONSET: 216 MS
QRS COUNT: 14 BEATS
QRS DURATION: 88 MS
QT INTERVAL: 388 MS
QTC CALCULATION(BAZETT): 461 MS
QTC FREDERICIA: 435 MS
R AXIS: 8 DEGREES
T AXIS: 212 DEGREES
T OFFSET: 410 MS
VENTRICULAR RATE: 85 BPM

## 2025-08-01 ENCOUNTER — OFFICE VISIT (OUTPATIENT)
Dept: NEUROLOGY | Facility: HOSPITAL | Age: 45
End: 2025-08-01
Payer: COMMERCIAL

## 2025-08-01 VITALS — RESPIRATION RATE: 17 BRPM | WEIGHT: 220 LBS | BODY MASS INDEX: 34.46 KG/M2

## 2025-08-01 DIAGNOSIS — Z86.73 HISTORY OF STROKE: ICD-10-CM

## 2025-08-01 DIAGNOSIS — G54.6 PHANTOM LIMB SYNDROME WITH PAIN: ICD-10-CM

## 2025-08-01 DIAGNOSIS — I69.920 APHASIA DUE TO LATE EFFECTS OF CEREBROVASCULAR DISEASE: ICD-10-CM

## 2025-08-01 DIAGNOSIS — I63.40 CEREBROVASCULAR ACCIDENT (CVA) DUE TO EMBOLISM OF CEREBRAL ARTERY (MULTI): Primary | ICD-10-CM

## 2025-08-01 DIAGNOSIS — T87.43 INFECTION OF AMPUTATION STUMP, RIGHT LOWER EXTREMITY: ICD-10-CM

## 2025-08-01 DIAGNOSIS — G89.0 CENTRAL PAIN SYNDROME: ICD-10-CM

## 2025-08-01 DIAGNOSIS — Z89.611 S/P AKA (ABOVE KNEE AMPUTATION) UNILATERAL, RIGHT (MULTI): ICD-10-CM

## 2025-08-01 DIAGNOSIS — I99.8 ACUTE LOWER EXTREMITY ISCHEMIA: ICD-10-CM

## 2025-08-01 PROCEDURE — 99215 OFFICE O/P EST HI 40 MIN: CPT | Performed by: PSYCHIATRY & NEUROLOGY

## 2025-08-01 RX ORDER — PREGABALIN 100 MG/1
100 CAPSULE ORAL 2 TIMES DAILY
Qty: 180 CAPSULE | Refills: 1 | OUTPATIENT
Start: 2025-08-01

## 2025-08-01 NOTE — PROGRESS NOTES
Assessment/Plan:  1. left middle cerebral artery infarction: due to cardioembolism in this patient with atrial fibrillation, reduced ejection fraction and prior left atrial thrombus at the time. Still on Eliquis which she should continue.      2. Aphasia due to left middle cerebral artery infarction. Continue speech therapy while at Skilled nursing facility.      3. Right hemiparesis due to left middle cerebral artery infarction: still not bad at this time.  Receiving Physical therapy given her recent right above knee amputation.      4. Central pain: On Lyrica 75 TID which we will increase to 100 TID.  Also complains of phantom limb pain.    5. Psychosis and mood irritability: Unclear family history of Schizophrenia or psychosis. She has never been evaluated by a psychiatrist. Never been on Antipsychotics. Deferential diagnosis is seizures vs psychosis. We ordered routine EEG to rule out seizures and we will refer to Psychiatry for evaluation and management of the Psychosis. The patient has an insight regarding the symptom and is very frustrated from it.     6. S/P above knee amputation for ischemic leg secondary to cardiac embolism.      Follow up in 1-2 months, virtual video visit.    Darnell Lopez MD   Vascular Neurology Fellow    HPI:   Amirah Bennett is a 44 y.o. right handed female with vascular risk factors of atrial fibrillation, reduced Ejection fraction and prior atrial thrombus with poor medical compliance including with anticoagulation (with ejection fraction of 25% Dec 2021). left atrial thrombus (Dec 2021). In Jan 2022 she had a left M1 occlusion and received thrombectomy at [facility]. She had residual aphasia and right hemiparesis. In March 2022 she was found to have severe respiratory distress and cardiogenic shock. She was intubated and eventually required trach. She briefly required presssors. CT head showed the left middle cerebral artery infarction and Stroke team was consulted but the stroke was  "remote (Jan 2022).  In March 2024 she was admitted with a new subacute infarction in the left cerebellum in the setting of medication non-compliance    3/2024  MRI: remote Left middle cerebral artery infarction and subacute left cerebellar infarct  MRA: diminished Left middle cerebral artery branches; stenosis vs occlusion left vert; left Posterior inferior cerebellar artery not visualized.  Echo: Ejection fraction: 20-25%            Left Atrium: mld Left atrial enlargement             Patent foramen ovale: none  LDL: 87    HbA1C: 5.5    Interval Hx 8/1/2025:  -The patient reports a new onset of head pain that started about a month ago. She describes it as a \"pounding\" sensation that worsens throughout the day, typically feeling better when she first wakes up in the morning. This head pain is associated with psychosis, increased emotional distress and aggression, though she does not physically harm others. The patient also experiences pain in her right leg, which she states has always been present but does not specify if it has worsened since her stroke. She mentions phantom pain in her foot and leg, likely related to her above knee amputation.    In addition to the pain, the patient reports hearing voices and seeing things that are not there. These hallucinations are a source of significant frustration for her, as she is aware that they are not real. The patient's caregiver confirms observing her talking to someone who is not present. These symptoms appear to be new since her stroke, as there is no mention of prior psychiatric history.    The patient is currently taking Lyrica for pain management, which she reports taking in the morning as administered by the nurses at her facility. She is also on Eliquis. The patient is receiving speech therapy and has recently resumed physical therapy. She reports being able to perform activities of daily living independently, using a walker or cane for mobility support with " prosthetic limb.    Amirah has been living in a care facility for approximately a year and a half, since July or August of 2024. She expresses a desire to return to her home eventually. The patient denies experiencing feelings of impending doom or other anxiety-related symptoms beyond the frustration caused by her current symptoms.         Current Outpatient Medications on File Prior to Visit   Medication Sig Dispense Refill    amiodarone (Pacerone) 200 mg tablet Take 1 tablet (200 mg) by mouth once daily. Do not fill before July 25, 2024. 30 tablet 0    atorvastatin (Lipitor) 80 mg tablet Take 1 tablet (80 mg) by mouth once daily. 30 tablet 0    Eliquis 5 mg tablet       furosemide (Lasix) 40 mg tablet Take 1 tablet (40 mg) by mouth once daily.      lidocaine (Lidoderm) 5 % patch Place 1 patch over 12 hours on the skin once daily. Remove & discard patch within 12 hours or as directed by MD. 30 patch 0    midodrine (Proamatine) 10 mg tablet Take 1 tablet (10 mg) by mouth 3 times daily (morning, midday, late afternoon).      pantoprazole (ProtoNix) 40 mg EC tablet Take 1 tablet (40 mg) by mouth once daily in the morning. Take before meals. Do not crush, chew, or split. 30 tablet 0    sacubitriL-valsartan (Entresto) 24-26 mg tablet Take 0.5 tablets by mouth 2 times a day.      sennosides (Senokot) 8.6 mg tablet Take 2 tablets (17.2 mg) by mouth 2 times a day. 60 tablet 0    [DISCONTINUED] pregabalin (Lyrica) 75 mg capsule Take 1 capsule (75 mg) by mouth 2 times a day. 60 capsule 0    amLODIPine (Norvasc) 10 mg tablet Take 1 tablet (10 mg) by mouth once daily. (Patient not taking: Reported on 8/1/2025)      metoprolol succinate XL (Toprol-XL) 25 mg 24 hr tablet  (Patient not taking: Reported on 8/1/2025)      polyethylene glycol (Glycolax, Miralax) 17 gram packet Take 17 g by mouth once daily. (Patient not taking: Reported on 8/1/2025) 30 packet 0    warfarin (Coumadin) 2 mg tablet  (Patient not taking: Reported on  6/3/2025)      warfarin (Coumadin) 2.5 mg tablet  (Patient not taking: Reported on 6/3/2025)      warfarin (Coumadin) 5 mg tablet Take as directed per After Visit Summary. INR goal 2-3 (Patient not taking: Reported on 6/3/2025)       No current facility-administered medications on file prior to visit.        Patient Active Problem List   Diagnosis    Acute decompensated heart failure    Encounter for medical examination to establish care    Aphasia due to late effects of cerebrovascular disease    Central pain syndrome    Chronic kidney disease    History of stroke    Laryngeal mass    Laryngeal stenosis    Mural thrombus of left atrium    Obesity, Class I, BMI 30-34.9    Change in voice    Voice disorder    CHF (congestive heart failure)    Atrial fibrillation (Multi)    Chronic systolic heart failure    Diabetes (Multi)    Dysphagia following cerebral infarction    Elevated LFTs    ETOH abuse    Hemiparesis affecting right side as late effect of cerebrovascular accident (Multi)    Intracranial hemorrhage (Multi)    Mood disorder    Nicotine use disorder    Primary hypertension    Schizophrenia    Stroke (Multi)    Cardiogenic shock (Multi)    Acute lower extremity ischemia    Critical limb ischemia of right lower extremity    Deep vein thrombosis (DVT) of lower extremity    Tracheocutaneous fistula following tracheostomy (Multi)    Rhabdomyolysis    Lactic acidosis    Thrombocytopenia    Wound dehiscence, surgical, subsequent encounter    S/P AKA (above knee amputation) unilateral, right (Multi)    Diabetic polyneuropathy associated with type 2 diabetes mellitus        Past Surgical History:   Procedure Laterality Date    CARDIAC CATHETERIZATION N/A 06/20/2024    Procedure: Right Heart Cath;  Surgeon: Osman Su MD;  Location: [place] [address] Cardiac Cath [facility];  Service: Cardiovascular;  Laterality: N/A;    INVASIVE VASCULAR PROCEDURE N/A 06/20/2024    Procedure: Arterial  Access, Intracatheter;  Surgeon:  Osman Su MD;  Location: [place] [address] Cardiac Cath Lab;  Service: Cardiovascular;  Laterality: N/A;    LEG AMPUTATION THROUGH KNEE      LEG AMPUTATION THROUGH KNEE      OTHER SURGICAL HISTORY  2022    Trachectomy        No Known Allergies       No family history on file.     Social History     Tobacco Use    Smoking status: Former     Current packs/day: 0.00     Types: Cigarettes     Quit date: 2023     Years since quittin.6   Substance Use Topics    Alcohol use: Yes    Drug use: Never        Objective:  Visit Vitals  Resp 17        Barthel Index:  Feeding: 10=independent  Dressin=can do half  Groomin=independent  Bathin=needs help  Transfers: 10=one person help  Mobility: 0=unable 50 yards  Stairs: 0=unable  Toiletin=dependent  Bladder: 0=incontinent  Bowels: 0=incontinent    Total Score: 30 (much of current disability due to above knee amputation)    Modified Danielle Score:  5=Severe disability: Bedridden, incontinent; requiring constant nursing care and attention    NIHSS:  Level of Consciousness: 0=alert      LOC questions: 0=answers both questions      LOC commands: 0=performs both  Best Gaze: 0=normal  Visual: 0=normal  Facial Palsy: 0=normal  Motor:      Motor Arm (Left): 0=normal      Motor Arm (Right): 0=normal      Motor Leg (Left): 0= normal      Motor Leg (Right): amputated  Limb Ataxia: 0=absent  Sensory: 0=normal  Best Language: 1=mild-mod aphasia (can communicate ideas)  Dysarthria: 0=normal  Extinction and Inattention: 0=no abnormality     Total Score: 1    Neurological Examination:    Mental Status: Alert and Oriented. Frustrated with the pain and the pychotic symptoms. Moderate aphasia.  Cranial Nerves: Grossly intact.  Motor:5/5 in the left upper and lower extremities. 5/5 Rt knee flexion. The rest of the Rt leg is amputated. RUE 4+/5  Sensation:No sensory alteration other than the pain and the phantom limb phenomena.  Coordination: Orbiting around the right  side and weakness in the fine finger movements with no Ataxia or dysmetria on exam.      The chart was reviewed and summarized as above.  Neuroimaging was personally reviewed and interpreted as documented in the HPI or Assessment  and Plan.    I saw and evaluated the patient. I personally obtained the key and critical portions of the history and physical exam or was physically present for key and critical portions performed by the resident/fellow. I reviewed the resident/fellow's documentation and discussed the patient with the resident/fellow. I agree with the resident/fellow's medical decision making as documented in the note with the exception/addition of the following:    Patient with history of Left middle cerebral artery infarction secondary to Atrial fibrillation with left atrial thrombus.  She has subsequently had embolism to the right leg resulting amputation. She is now on Eliquis and should remain so.  She has been struggling with phantom pain despite lyrica 75mg BID.  In addition she is reported to be hearing voices and becoming very agitated.  Today she was also agitated, at time banging her hand on the desk, crying, but then suddenly smiling.  The staff that comes with her admits that she seems to be staring off at time, but didn't think that these looked like seizures (the staff's sister has seizures).  On exam the patient has a mild aphasia, able to name, and follow complex crossed commands, but could not repeat and had frequent word searching, occasional paraphasic errors.  She rotates around the right arm and has reduced Fine finger movements.      Plan: increase lyrica to 100mg BID           EEG given her hallucinations           Refer to psychiatry.           Follow up in one- two months virtually

## 2025-08-01 NOTE — PATIENT INSTRUCTIONS
You saw Dr. Moore in the stroke clinic.    #We will increase the dose of Lyrica to 100 mg for the pain  #We will send referral to Psychiatry for the behavioral and mood abnormality  #We will also get an EEG for brain waves to rule out seizures

## 2025-09-04 ENCOUNTER — APPOINTMENT (OUTPATIENT)
Dept: NEUROLOGY | Facility: HOSPITAL | Age: 45
End: 2025-09-04
Payer: COMMERCIAL

## (undated) DEVICE — MANIFOLD, 4 PORT NEPTUNE STANDARD

## (undated) DEVICE — CATHETER, EMBOLECTOMY, 4F X 80CM, SYNTEL, SILICONE

## (undated) DEVICE — GEL, ULTRASOUND, AQUASONIC 100, 20 GM, STERILE

## (undated) DEVICE — SUTURE, SILK, 2-0, 30 IN, SH, BLACK

## (undated) DEVICE — Device

## (undated) DEVICE — SUTURE, VICRYL, 3-0, 27 IN, SH

## (undated) DEVICE — TIP, SUCTION, YANKAUER, BULB, ADULT

## (undated) DEVICE — SUTURE, PROLENE, 7-0, 24 IN, BV1, DA, BLUE

## (undated) DEVICE — TOWEL, OR, XRAY DETECT 5 PK, WHITE, 17X26, W/DMT TAG, ST

## (undated) DEVICE — TOWEL, SURGICAL, NEURO, O/R, 16 X 26, BLUE, STERILE

## (undated) DEVICE — SUTURE, MONOCRYL, 4-0, 18 IN, PS2, UNDYED

## (undated) DEVICE — DRAPE, PAD, PREP, W/ 9 IN CUFF, 24 X 41, LF, NS

## (undated) DEVICE — DRESSING, GAUZE, WASHED FLUFF, LARGE, STERILE

## (undated) DEVICE — SHEATH, PINNACLE, 10 CM,  4FR INTRODUCER, 4FR DIA, 2.5 CM DIALATOR

## (undated) DEVICE — SUTURE, SILK, 2-0, 18 IN, BLACK

## (undated) DEVICE — WOUND SYSTEM, DEBRIDEMENT & CLEANING, O.R DUOPAK

## (undated) DEVICE — SUTURE, VICRYL 0, TAPER POINT, CT-1 VIOLET 27 INCH

## (undated) DEVICE — GUIDEWIRE, INQWIRE, 3MM J, .035, 150

## (undated) DEVICE — BLADE, SAW, SAGITTAL, SAFEDGE, STERNUM, REVISION, 40.5 X 47.5 X 0.64 MM

## (undated) DEVICE — INSERT, CLAMP, SURGICAL, SOFT/TRACTION, STEALTH, 1 MM

## (undated) DEVICE — COVER PROBE, SOFT FLEX W/ GEL, 5 X 48 IN (13X122CM)

## (undated) DEVICE — PROTECTOR, NERVE, ULNAR, PINK

## (undated) DEVICE — GOWN, SURGICAL, SMARTGOWN, XLARGE, STERILE

## (undated) DEVICE — STRAP, CIRCUMFERENTIAL, 2 X 76""

## (undated) DEVICE — DRAPE, PAD, INSTRUMENT, MAGNETIC, MEDIUM, 10 X 16 IN, DISPOSABLE

## (undated) DEVICE — COUNTER, NEEDLE, FOAM BLOCK, POP-N-COUNT, W/BLADEGUARD, W/ADHESIVE 40 COUNT, RED

## (undated) DEVICE — DRAPE, SHEET, FAN FOLDED, HALF, 44 X 58 IN, DISPOSABLE, LF, STERILE

## (undated) DEVICE — SUTURE, SILK, 3-0, 30 IN, BR SH, BLACK

## (undated) DEVICE — COVER, TABLE, 44 X 75 IN, DISPOSABLE, LF, STERILE

## (undated) DEVICE — GUIDEWIRE, AMPLATZ SUPER STIFF, STR, .035 IN X 75CM

## (undated) DEVICE — COVER, CART, 45 X 27 X 48 IN, CLEAR

## (undated) DEVICE — DRAPE, INCISE, ANTIMICROBIAL, IOBAN 2, LARGE, 17 X 23 IN, DISPOSABLE, STERILE

## (undated) DEVICE — STOPCOCK, 4 WAY, SMALL BODY, W/SWIVEL, ULTRA, LIPD RESISTANT, LUER LOCK, MALE, LF

## (undated) DEVICE — FLOSEAL, MATRIX, HEMOSTATIC, FULL STERILE PREP, 5ML

## (undated) DEVICE — INTRODUCER KIT, HEMOSTASIS, VALVE/SIDEPORT, W/GUIDEWIRE, 0.035 IN, 8.5 FR, 10 CM, LF

## (undated) DEVICE — CATHETER, THERMODILUTION, SWAN GANZ, VIP, 5 LUMEN, 7.5 FR, 110 CM

## (undated) DEVICE — INTRODUCER SET, MICROPUNCT, STIFF, 4FR 10CM,PLATINUM TIP,NITINOLWIRE

## (undated) DEVICE — STOCKINETTE, DOUBLE PLY, 4 X 48 IN, STERILE

## (undated) DEVICE — CATHETER TRAY, SURESTEP, 16FR, URINE METER W/STATLOCK

## (undated) DEVICE — BOLSTER, HIP

## (undated) DEVICE — DRESSING, ISLAND, ADHESIVE, TELFA, 4 X 8 IN

## (undated) DEVICE — GLOVE, SURGICAL, PROTEXIS PI MICRO, 5.5, PF, LF

## (undated) DEVICE — SUTURE, VICRYL, 3-0,18 IN, SH, UNDYED

## (undated) DEVICE — SUTURE, PROLENE, 5-0, 36 IN, C-1, CV-11, BLUE

## (undated) DEVICE — DRESSING, GAUZE, 16 PLY, 4 X 4 IN, STERILE

## (undated) DEVICE — ACCESS KIT, S-MAK MINI, 4FR 10CM 0.018IN 40CM, NT/PT, ECHO ENHANCE NEEDLE

## (undated) DEVICE — STAPLER, SKIN PROXIMATE, 35 WIDE

## (undated) DEVICE — DRESSING KIT, VACUUM ASSISTED CLOSURE, W/DRAPE/TUBING, MEDIUM, FOAM, BLACK

## (undated) DEVICE — CATHETER, EMBOLECTOMY, 3F X 80CM, SYNTEL, SILICONE

## (undated) DEVICE — SUTURE, PROLENE, 6-0, 30 IN, BV-1 BV-1

## (undated) DEVICE — DRAIN, WOUND, FLAT, HUBLESS, FULL LENGTH PERFORATION, 10 MM X 20 CM, SILICONE

## (undated) DEVICE — DRAPE, TIBURON, SPLIT SHEET, REINF ADH STRIP, 77X122

## (undated) DEVICE — SEALANT, HEMOSTATIC, FLOSEAL, 10 ML

## (undated) DEVICE — DRESSING, GAUZE, SPONGE, VERSALON, 4 PLY, 2 X 2 IN, STERILE

## (undated) DEVICE — DRAPE, SHEET, EXTREMITY, W/ARM BOARD COVERS, 87 X 106 X 128 IN, DISPOSABLE, LF, STERILE

## (undated) DEVICE — WAX, BONE, 2.5 GM

## (undated) DEVICE — BANDAGE, COFLEX, 6 X 5 YDS, FOAM TAN, STERILE, LF

## (undated) DEVICE — WOUND VAC KIT, W/CANNISTER, 120 CC

## (undated) DEVICE — GUIDEWIRE, PLATINUM PLUS ST, 0.018 IN X 260 CM

## (undated) DEVICE — BLADE, SAW, SAGITTAL, 18.5 X 73 X 0.76 MM, STAINLESS STEEL, STERILE

## (undated) DEVICE — SUTURE, VICRYL, 2-0, 27 IN, BR/SH 27, VIOLET

## (undated) DEVICE — PREP TRAY, SKIN, DRY, W/GLOVES

## (undated) DEVICE — EVACUATOR, WOUND, SUCTION, CLOSED, JACKSON-PRATT, 100 CC, SILICONE

## (undated) DEVICE — SUTURE, SILK, 3-0, 18 IN, MULTIPACK, BLACK

## (undated) DEVICE — SPONGE, HEMOSTATIC, GELATIN, SURGIFOAM, 8 X 12.5 CM X 10 MM

## (undated) DEVICE — EXTENSION SET W/MALE LUER LOCK ADAPTER, VOLUME 2.4ML